# Patient Record
Sex: FEMALE | Race: WHITE | NOT HISPANIC OR LATINO | URBAN - METROPOLITAN AREA
[De-identification: names, ages, dates, MRNs, and addresses within clinical notes are randomized per-mention and may not be internally consistent; named-entity substitution may affect disease eponyms.]

---

## 2020-02-04 ENCOUNTER — OUTPATIENT (OUTPATIENT)
Dept: OUTPATIENT SERVICES | Facility: HOSPITAL | Age: 55
LOS: 1 days | End: 2020-02-04
Payer: MEDICARE

## 2020-02-04 VITALS
HEART RATE: 90 BPM | WEIGHT: 134.92 LBS | HEIGHT: 62 IN | DIASTOLIC BLOOD PRESSURE: 88 MMHG | OXYGEN SATURATION: 99 % | RESPIRATION RATE: 16 BRPM | TEMPERATURE: 98 F | SYSTOLIC BLOOD PRESSURE: 126 MMHG

## 2020-02-04 DIAGNOSIS — R22.0 LOCALIZED SWELLING, MASS AND LUMP, HEAD: ICD-10-CM

## 2020-02-04 DIAGNOSIS — K11.8 OTHER DISEASES OF SALIVARY GLANDS: ICD-10-CM

## 2020-02-04 DIAGNOSIS — Z01.818 ENCOUNTER FOR OTHER PREPROCEDURAL EXAMINATION: ICD-10-CM

## 2020-02-04 LAB
ANION GAP SERPL CALC-SCNC: 8 MMOL/L — SIGNIFICANT CHANGE UP (ref 5–17)
BUN SERPL-MCNC: 24 MG/DL — HIGH (ref 7–23)
CALCIUM SERPL-MCNC: 9.1 MG/DL — SIGNIFICANT CHANGE UP (ref 8.5–10.1)
CHLORIDE SERPL-SCNC: 104 MMOL/L — SIGNIFICANT CHANGE UP (ref 96–108)
CO2 SERPL-SCNC: 25 MMOL/L — SIGNIFICANT CHANGE UP (ref 22–31)
CREAT SERPL-MCNC: 1.2 MG/DL — SIGNIFICANT CHANGE UP (ref 0.5–1.3)
GLUCOSE SERPL-MCNC: 100 MG/DL — HIGH (ref 70–99)
POTASSIUM SERPL-MCNC: 3.9 MMOL/L — SIGNIFICANT CHANGE UP (ref 3.5–5.3)
POTASSIUM SERPL-SCNC: 3.9 MMOL/L — SIGNIFICANT CHANGE UP (ref 3.5–5.3)
SODIUM SERPL-SCNC: 137 MMOL/L — SIGNIFICANT CHANGE UP (ref 135–145)

## 2020-02-04 PROCEDURE — 93010 ELECTROCARDIOGRAM REPORT: CPT

## 2020-02-04 PROCEDURE — G0463: CPT

## 2020-02-04 PROCEDURE — 86901 BLOOD TYPING SEROLOGIC RH(D): CPT

## 2020-02-04 PROCEDURE — 93005 ELECTROCARDIOGRAM TRACING: CPT

## 2020-02-04 PROCEDURE — 80048 BASIC METABOLIC PNL TOTAL CA: CPT

## 2020-02-04 PROCEDURE — 86900 BLOOD TYPING SEROLOGIC ABO: CPT

## 2020-02-04 PROCEDURE — 86850 RBC ANTIBODY SCREEN: CPT

## 2020-02-04 PROCEDURE — 36415 COLL VENOUS BLD VENIPUNCTURE: CPT

## 2020-02-04 NOTE — H&P PST ADULT - NSICDXPASTSURGICALHX_GEN_ALL_CORE_FT
PAST SURGICAL HISTORY:  Glaucoma following surgery Right Eyr - 2012    H/O unilateral nephrectomy Right Laparoscopic Nephrectomy - 16913    History of tonsillectomy     S/P hernia repair umbilical Hernia Repair - 2011    S/P knee surgery knee arthroscopy right x 2 ( 2011 & 2012)    S/P partial hysterectomy 8 years ago

## 2020-02-04 NOTE — H&P PST ADULT - NSICDXFAMILYHX_GEN_ALL_CORE_FT
FAMILY HISTORY:  Father  Still living? No  Family history of pancreatic cancer, Age at diagnosis: Age Unknown    Mother  Still living? No  Family history of lung cancer, Age at diagnosis: Age Unknown

## 2020-02-04 NOTE — H&P PST ADULT - HISTORY OF PRESENT ILLNESS
53 y/o female with c/o pain in the right side of face and behind the ear. Was seen by Dr Dorsey, referred to ENT. Scheduled for right parotidectomy with nerve monitoring. 55 y/o female, PMH of fibromyalgia, anxiety, with c/o pain in the right side of face and behind the ear for almost 6 months.  Was seen by Dr Dorsey, referred to ENT. Seen by DR Rangel, biopsy was inconclusive. Scheduled for right parotidectomy with nerve monitoring. Pre op testing today.

## 2020-02-04 NOTE — H&P PST ADULT - NSANTHOSAYNRD_GEN_A_CORE
No. VANESA screening performed.  STOP BANG Legend: 0-2 = LOW Risk; 3-4 = INTERMEDIATE Risk; 5-8 = HIGH Risk

## 2020-02-04 NOTE — H&P PST ADULT - NSICDXPROBLEM_GEN_ALL_CORE_FT
PROBLEM DIAGNOSES  Problem: Lesion of parotid gland  Assessment and Plan: right parotidectomy with nerve monitor

## 2020-02-04 NOTE — H&P PST ADULT - NSICDXPASTMEDICALHX_GEN_ALL_CORE_FT
PAST MEDICAL HISTORY:  Acid reflux disease     Anxiety     Cancer of kidney right kidney    Fibromyalgia Joint pains    Glaucoma Right eye    Herniated cervical disc     Umbilical hernia     Uterine mass Benign

## 2020-02-04 NOTE — H&P PST ADULT - ASSESSMENT
53 y/o female, PMH of fibromyalgia, anxiety, with c/o pain in the right side of face and behind the ear for almost 6 months.  Was seen by Dr Dorsey, referred to ENT. MRI showed right parotid lesion. Seen by DR Rangel, biopsy FNA was inconclusive(as per patient). Scheduled for right parotidectomy with nerve monitoring. Pre op testing today.  PST today  CBC, BMP, T&S pt/ptt and EKG done  Hold OTC supplements. Medications reviewed.  NPO after 11pm the day before surgery. Patient verbalized understanding.  Medical eval not needed

## 2020-02-10 ENCOUNTER — TRANSCRIPTION ENCOUNTER (OUTPATIENT)
Age: 55
End: 2020-02-10

## 2020-02-10 NOTE — ASU PATIENT PROFILE, ADULT - PSH
Glaucoma following surgery  Right Eyr - 2012  H/O unilateral nephrectomy  Right Laparoscopic Nephrectomy - 60511  History of tonsillectomy    S/P hernia repair  umbilical Hernia Repair - 2011  S/P knee surgery  knee arthroscopy right x 2 ( 2011 & 2012)  S/P partial hysterectomy  8 years ago

## 2020-02-10 NOTE — ASU PATIENT PROFILE, ADULT - PMH
Acid reflux disease    Anxiety    Cancer of kidney  right kidney  Fibromyalgia  Joint pains  Glaucoma  Right eye  Herniated cervical disc    Umbilical hernia    Uterine mass  Benign

## 2020-02-11 ENCOUNTER — OUTPATIENT (OUTPATIENT)
Dept: OUTPATIENT SERVICES | Facility: HOSPITAL | Age: 55
LOS: 1 days | End: 2020-02-11
Payer: MEDICARE

## 2020-02-11 ENCOUNTER — RESULT REVIEW (OUTPATIENT)
Age: 55
End: 2020-02-11

## 2020-02-11 VITALS — DIASTOLIC BLOOD PRESSURE: 69 MMHG | HEART RATE: 97 BPM | RESPIRATION RATE: 15 BRPM | SYSTOLIC BLOOD PRESSURE: 129 MMHG

## 2020-02-11 VITALS
HEIGHT: 61.97 IN | WEIGHT: 134.92 LBS | DIASTOLIC BLOOD PRESSURE: 84 MMHG | TEMPERATURE: 98 F | HEART RATE: 89 BPM | SYSTOLIC BLOOD PRESSURE: 130 MMHG | RESPIRATION RATE: 15 BRPM | OXYGEN SATURATION: 93 %

## 2020-02-11 DIAGNOSIS — Z01.818 ENCOUNTER FOR OTHER PREPROCEDURAL EXAMINATION: ICD-10-CM

## 2020-02-11 DIAGNOSIS — R22.0 LOCALIZED SWELLING, MASS AND LUMP, HEAD: ICD-10-CM

## 2020-02-11 PROCEDURE — C1889: CPT

## 2020-02-11 PROCEDURE — 42415 EXCISE PAROTID GLAND/LESION: CPT | Mod: RT

## 2020-02-11 PROCEDURE — 88307 TISSUE EXAM BY PATHOLOGIST: CPT

## 2020-02-11 PROCEDURE — 88307 TISSUE EXAM BY PATHOLOGIST: CPT | Mod: 26

## 2020-02-11 RX ORDER — SODIUM CHLORIDE 9 MG/ML
1000 INJECTION, SOLUTION INTRAVENOUS
Refills: 0 | Status: DISCONTINUED | OUTPATIENT
Start: 2020-02-11 | End: 2020-02-11

## 2020-02-11 RX ORDER — HYDROMORPHONE HYDROCHLORIDE 2 MG/ML
0.5 INJECTION INTRAMUSCULAR; INTRAVENOUS; SUBCUTANEOUS
Refills: 0 | Status: DISCONTINUED | OUTPATIENT
Start: 2020-02-11 | End: 2020-02-11

## 2020-02-11 RX ORDER — ONDANSETRON 8 MG/1
4 TABLET, FILM COATED ORAL ONCE
Refills: 0 | Status: DISCONTINUED | OUTPATIENT
Start: 2020-02-11 | End: 2020-02-11

## 2020-02-11 RX ORDER — OXYCODONE HYDROCHLORIDE 5 MG/1
5 TABLET ORAL ONCE
Refills: 0 | Status: DISCONTINUED | OUTPATIENT
Start: 2020-02-11 | End: 2020-02-11

## 2020-02-11 RX ADMIN — SODIUM CHLORIDE 50 MILLILITER(S): 9 INJECTION, SOLUTION INTRAVENOUS at 07:25

## 2020-02-11 NOTE — ASU DISCHARGE PLAN (ADULT/PEDIATRIC) - ASU DC SPECIAL INSTRUCTIONSFT
Keep dressing clean dry intact  Keep drain in place  Has pain medication at home- followed by pain management.  If issues, contact pain management physician  Keep head of bed elevated.  No citrus.  Keep foods soft and mushy. Keep dressing clean dry intact  Keep drain in place  Ice packs to neck.  Has pain medication at home- followed by pain management.  If issues, contact pain management physician  Keep head of bed elevated.  No citrus.  Keep foods soft and mushy.

## 2020-02-11 NOTE — BRIEF OPERATIVE NOTE - NSICDXBRIEFPROCEDURE_GEN_ALL_CORE_FT
PROCEDURES:  Parotidectomy with intraoperative facial nerve monitoring 11-Feb-2020 10:59:27  Lei Rangel

## 2020-02-11 NOTE — ASU DISCHARGE PLAN (ADULT/PEDIATRIC) - CARE PROVIDER_API CALL
Lei Rangel)  Otolaryngology  [address], [address]  [place] [address]  Phone: [phone]  Fax: [phone]  Follow Up Time:

## 2020-02-11 NOTE — ASU DISCHARGE PLAN (ADULT/PEDIATRIC) - CALL YOUR DOCTOR IF YOU HAVE ANY OF THE FOLLOWING:
Fever greater than (need to indicate Fahrenheit or Celsius)/Inability to tolerate liquids or foods/Wound/Surgical Site with redness, or foul smelling discharge or pus/Nausea and vomiting that does not stop/Bleeding that does not stop/Numbness, tingling, color or temperature change to extremity/Pain not relieved by Medications/Swelling that gets worse

## 2020-02-18 LAB — SURGICAL PATHOLOGY STUDY: SIGNIFICANT CHANGE UP

## 2021-05-08 RX ADMIN — OXYCODONE HYDROCHLORIDE 30 MILLIGRAM(S): 5 TABLET ORAL at 23:13

## 2022-12-01 ENCOUNTER — RX ONLY (RX ONLY)
Age: 57
End: 2022-12-01

## 2022-12-01 ENCOUNTER — OFFICE (OUTPATIENT)
Dept: URBAN - METROPOLITAN AREA CLINIC 113 | Facility: CLINIC | Age: 57
Setting detail: OPHTHALMOLOGY
End: 2022-12-01
Payer: MEDICARE

## 2022-12-01 DIAGNOSIS — H35.033: ICD-10-CM

## 2022-12-01 DIAGNOSIS — Z96.1: ICD-10-CM

## 2022-12-01 DIAGNOSIS — H25.12: ICD-10-CM

## 2022-12-01 DIAGNOSIS — H40.2232: ICD-10-CM

## 2022-12-01 DIAGNOSIS — H04.123: ICD-10-CM

## 2022-12-01 PROCEDURE — 92012 INTRM OPH EXAM EST PATIENT: CPT | Performed by: OPHTHALMOLOGY

## 2022-12-01 ASSESSMENT — REFRACTION_MANIFEST
OD_CYLINDER: SPHERE
OS_VA1: 20/25
OD_SPHERE: +1.25
OD_ADD: +2.25
OD_VA1: 20/60
OD_CYLINDER: -0.50
OS_AXIS: 003
OS_ADD: +2.25
OD_AXIS: 036
OD_SPHERE: +0.75
OS_SPHERE: +0.25
OS_CYLINDER: -0.25
OD_VA1: 20/25

## 2022-12-01 ASSESSMENT — REFRACTION_CURRENTRX
OD_VPRISM_DIRECTION: BF
OD_SPHERE: +0.75
OD_OVR_VA: 20/
OS_ADD: +2.00
OS_AXIS: 8
OD_ADD: +2.00
OS_SPHERE: -0.25
OS_VPRISM_DIRECTION: BF
OS_OVR_VA: 20/
OD_AXIS: 31
OD_CYLINDER: -0.50
OS_CYLINDER: -0.25

## 2022-12-01 ASSESSMENT — AXIALLENGTH_DERIVED
OS_AL: 22.783
OD_AL: 22.7132
OD_AL: 22.4456
OS_AL: 22.9201

## 2022-12-01 ASSESSMENT — KERATOMETRY
OD_AXISANGLE_DEGREES: 175
OS_AXISANGLE_DEGREES: 084
OS_K2POWER_DIOPTERS: 45.50
METHOD_AUTO_MANUAL: AUTO
OS_K1POWER_DIOPTERS: 45.00
OD_K1POWER_DIOPTERS: 45.00
OD_K2POWER_DIOPTERS: 47.50

## 2022-12-01 ASSESSMENT — REFRACTION_AUTOREFRACTION
OD_AXIS: 010
OS_AXIS: 171
OD_CYLINDER: -2.00
OS_CYLINDER: -0.50
OD_SPHERE: +0.75
OS_SPHERE: +0.75

## 2022-12-01 ASSESSMENT — CONFRONTATIONAL VISUAL FIELD TEST (CVF)
OD_FINDINGS: FULL
OS_FINDINGS: FULL

## 2022-12-01 ASSESSMENT — VISUAL ACUITY
OS_BCVA: 20/50
OD_BCVA: 20/20

## 2022-12-01 ASSESSMENT — SPHEQUIV_DERIVED
OS_SPHEQUIV: 0.125
OD_SPHEQUIV: 0.5
OS_SPHEQUIV: 0.5
OD_SPHEQUIV: -0.25

## 2022-12-01 ASSESSMENT — TONOMETRY
OS_IOP_MMHG: 12
OD_IOP_MMHG: 08

## 2022-12-09 PROBLEM — C64.9 MALIGNANT NEOPLASM OF UNSPECIFIED KIDNEY, EXCEPT RENAL PELVIS: Chronic | Status: ACTIVE | Noted: 2020-02-04

## 2022-12-14 ENCOUNTER — APPOINTMENT (OUTPATIENT)
Dept: OTOLARYNGOLOGY | Facility: CLINIC | Age: 57
End: 2022-12-14

## 2022-12-14 VITALS
SYSTOLIC BLOOD PRESSURE: 160 MMHG | DIASTOLIC BLOOD PRESSURE: 99 MMHG | BODY MASS INDEX: 22.08 KG/M2 | HEIGHT: 62 IN | HEART RATE: 109 BPM | WEIGHT: 120 LBS

## 2022-12-14 DIAGNOSIS — E04.2 NONTOXIC MULTINODULAR GOITER: ICD-10-CM

## 2022-12-14 DIAGNOSIS — R25.2 CRAMP AND SPASM: ICD-10-CM

## 2022-12-14 DIAGNOSIS — K11.8 OTHER DISEASES OF SALIVARY GLANDS: ICD-10-CM

## 2022-12-14 DIAGNOSIS — M79.7 FIBROMYALGIA: ICD-10-CM

## 2022-12-14 DIAGNOSIS — M54.2 CERVICALGIA: ICD-10-CM

## 2022-12-14 DIAGNOSIS — F41.9 ANXIETY DISORDER, UNSPECIFIED: ICD-10-CM

## 2022-12-14 PROCEDURE — 99213 OFFICE O/P EST LOW 20 MIN: CPT

## 2022-12-14 NOTE — CONSULT LETTER
[Dear  ___] : Dear  [unfilled], [Consult Letter:] : I had the pleasure of evaluating your patient, [unfilled]. [Please see my note below.] : Please see my note below. [Consult Closing:] : Thank you very much for allowing me to participate in the care of this patient.  If you have any questions, please do not hesitate to contact me. [Sincerely,] : Sincerely, [FreeTextEntry3] : Lei Rangel MD FACS

## 2022-12-14 NOTE — PHYSICAL EXAM
[Midline] : trachea located in midline position [Normal] : orientation to person, place, and time: normal [FreeTextEntry1] : tender right side near larynx\par parotid scar normal\par right soft fullness, tender along mandible [de-identified] : trismus and pain when pt opens on the right side [FreeTextEntry2] : n

## 2022-12-14 NOTE — REVIEW OF SYSTEMS
[Seasonal Allergies] : seasonal allergies [Ear Pain] : ear pain [Ear Itch] : ear itch [Dizziness] : dizziness [Vertigo] : vertigo [Lightheadedness] : lightheadedness [Ear Drainage] : ear drainage [Ear Noises] : ear noises [Nasal Congestion] : nasal congestion [Sinus Pain] : sinus pain [Sinus Pressure] : sinus pressure [Throat Pain] : throat pain [Recent Weight Loss (___ Lbs)] : recent [unfilled] ~Ulb weight loss [Eye Pain] : eye pain [Eyes Itch] : itching of the eyes [Palpitations] : palpitations [Cough] : cough [Heartburn] : heartburn [Easy Bruising] : a tendency for easy bruising [Swollen Glands] : swollen glands [Negative] : Endocrine [FreeTextEntry3] : watery [FreeTextEntry6] : noisy breathing [FreeTextEntry7] : difficulty swallowing, reflux [FreeTextEntry9] : joint aches,muscle aches [de-identified] : headache [FreeTextEntry1] : chills, fatigue, feel warmer than others,daytime sleepiness, sweating at night

## 2022-12-14 NOTE — ASSESSMENT
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 \par \par Physical Exam\par tender right side near larynx\par parotid scar normal\par right soft fullness, tender along mandible\par trismus and pain when pt opens on the right side\par \par neck US 5/2020 \par nodule upper lobe right thyroid, smaller nodules in right lobe\par nodules in left lobe\par parotid normal\par \par Plan:\par Discussed previous US neck. MRI head ordered. FU after MRI.

## 2022-12-14 NOTE — ADDENDUM
[FreeTextEntry1] : Documented by Mila Mak acting as scribe for Dr. Rangel on 12/14/2022.\par \par All Medical record entries made by the scribe were at my, Dr. Rangel,direction and personally dictated by me on 12/14/2022. I have reviewed the chart and agree that the record accurately reflects my personal performance of the history, physical exam, assessment and plan. I have also personally directed, reviewed, and agreed with the discharge instructions.

## 2022-12-14 NOTE — HISTORY OF PRESENT ILLNESS
[de-identified] : Pt presents with h/o renal cancer, neck nodes abnormal, smoking, anxiety - has emotional support dog, surgery 2/11/2020 - pathology mild epithelioma. Pt presents today c/o repercussion. Pt notes she got part of her feeling back on her right side. Pt notes she felt her nerves rerouting on her right cheek and her ear lobe is starting to feel her nerves on her right side of her face. Pt notes liver enzymes were high and she is seeing hematology, want to eventually do a PET scan. Pt notes she started high blood pressure medication. Pt notes she didn't have any problems before. Pt denies any MRIs were done.

## 2023-08-02 ENCOUNTER — NON-APPOINTMENT (OUTPATIENT)
Age: 58
End: 2023-08-02

## 2023-10-23 ENCOUNTER — NON-APPOINTMENT (OUTPATIENT)
Age: 58
End: 2023-10-23

## 2024-03-09 ENCOUNTER — NON-APPOINTMENT (OUTPATIENT)
Age: 59
End: 2024-03-09

## 2024-03-10 ENCOUNTER — EMERGENCY (EMERGENCY)
Facility: HOSPITAL | Age: 59
LOS: 1 days | Discharge: LEFT BEFORE DISCHARGE | End: 2024-03-10
Attending: EMERGENCY MEDICINE | Admitting: EMERGENCY MEDICINE
Payer: MEDICARE

## 2024-03-10 VITALS
SYSTOLIC BLOOD PRESSURE: 102 MMHG | TEMPERATURE: 98 F | HEIGHT: 61 IN | WEIGHT: 110.01 LBS | RESPIRATION RATE: 20 BRPM | DIASTOLIC BLOOD PRESSURE: 69 MMHG | HEART RATE: 109 BPM | OXYGEN SATURATION: 99 %

## 2024-03-10 VITALS
OXYGEN SATURATION: 98 % | TEMPERATURE: 98 F | HEART RATE: 92 BPM | RESPIRATION RATE: 19 BRPM | DIASTOLIC BLOOD PRESSURE: 72 MMHG | SYSTOLIC BLOOD PRESSURE: 106 MMHG

## 2024-03-10 LAB
ALBUMIN SERPL ELPH-MCNC: 2.7 G/DL — LOW (ref 3.3–5)
ALP SERPL-CCNC: 127 U/L — HIGH (ref 30–120)
ALT FLD-CCNC: 19 U/L — SIGNIFICANT CHANGE UP (ref 10–60)
ANION GAP SERPL CALC-SCNC: 11 MMOL/L — SIGNIFICANT CHANGE UP (ref 5–17)
APPEARANCE UR: CLEAR — SIGNIFICANT CHANGE UP
APTT BLD: 32.9 SEC — SIGNIFICANT CHANGE UP (ref 24.5–35.6)
AST SERPL-CCNC: 14 U/L — SIGNIFICANT CHANGE UP (ref 10–40)
BASOPHILS # BLD AUTO: 0.04 K/UL — SIGNIFICANT CHANGE UP (ref 0–0.2)
BASOPHILS NFR BLD AUTO: 0.5 % — SIGNIFICANT CHANGE UP (ref 0–2)
BILIRUB SERPL-MCNC: 0.6 MG/DL — SIGNIFICANT CHANGE UP (ref 0.2–1.2)
BILIRUB UR-MCNC: NEGATIVE — SIGNIFICANT CHANGE UP
BUN SERPL-MCNC: 21 MG/DL — SIGNIFICANT CHANGE UP (ref 7–23)
CALCIUM SERPL-MCNC: 9.2 MG/DL — SIGNIFICANT CHANGE UP (ref 8.4–10.5)
CHLORIDE SERPL-SCNC: 98 MMOL/L — SIGNIFICANT CHANGE UP (ref 96–108)
CO2 SERPL-SCNC: 23 MMOL/L — SIGNIFICANT CHANGE UP (ref 22–31)
COLOR SPEC: YELLOW — SIGNIFICANT CHANGE UP
CREAT SERPL-MCNC: 0.96 MG/DL — SIGNIFICANT CHANGE UP (ref 0.5–1.3)
DIFF PNL FLD: NEGATIVE — SIGNIFICANT CHANGE UP
EGFR: 68 ML/MIN/1.73M2 — SIGNIFICANT CHANGE UP
EOSINOPHIL # BLD AUTO: 0.04 K/UL — SIGNIFICANT CHANGE UP (ref 0–0.5)
EOSINOPHIL NFR BLD AUTO: 0.5 % — SIGNIFICANT CHANGE UP (ref 0–6)
GLUCOSE SERPL-MCNC: 119 MG/DL — HIGH (ref 70–99)
GLUCOSE UR QL: NEGATIVE MG/DL — SIGNIFICANT CHANGE UP
HCT VFR BLD CALC: 35.6 % — SIGNIFICANT CHANGE UP (ref 34.5–45)
HGB BLD-MCNC: 11.8 G/DL — SIGNIFICANT CHANGE UP (ref 11.5–15.5)
IMM GRANULOCYTES NFR BLD AUTO: 0.1 % — SIGNIFICANT CHANGE UP (ref 0–0.9)
INR BLD: 1.04 RATIO — SIGNIFICANT CHANGE UP (ref 0.85–1.18)
KETONES UR-MCNC: NEGATIVE MG/DL — SIGNIFICANT CHANGE UP
LACTATE SERPL-SCNC: 0.9 MMOL/L — SIGNIFICANT CHANGE UP (ref 0.7–2)
LEUKOCYTE ESTERASE UR-ACNC: ABNORMAL
LYMPHOCYTES # BLD AUTO: 1.05 K/UL — SIGNIFICANT CHANGE UP (ref 1–3.3)
LYMPHOCYTES # BLD AUTO: 11.9 % — LOW (ref 13–44)
MCHC RBC-ENTMCNC: 29 PG — SIGNIFICANT CHANGE UP (ref 27–34)
MCHC RBC-ENTMCNC: 33.1 GM/DL — SIGNIFICANT CHANGE UP (ref 32–36)
MCV RBC AUTO: 87.5 FL — SIGNIFICANT CHANGE UP (ref 80–100)
MONOCYTES # BLD AUTO: 0.58 K/UL — SIGNIFICANT CHANGE UP (ref 0–0.9)
MONOCYTES NFR BLD AUTO: 6.6 % — SIGNIFICANT CHANGE UP (ref 2–14)
NEUTROPHILS # BLD AUTO: 7.1 K/UL — SIGNIFICANT CHANGE UP (ref 1.8–7.4)
NEUTROPHILS NFR BLD AUTO: 80.4 % — HIGH (ref 43–77)
NITRITE UR-MCNC: NEGATIVE — SIGNIFICANT CHANGE UP
NRBC # BLD: 0 /100 WBCS — SIGNIFICANT CHANGE UP (ref 0–0)
PH UR: 5.5 — SIGNIFICANT CHANGE UP (ref 5–8)
PLATELET # BLD AUTO: 316 K/UL — SIGNIFICANT CHANGE UP (ref 150–400)
POTASSIUM SERPL-MCNC: 3.8 MMOL/L — SIGNIFICANT CHANGE UP (ref 3.5–5.3)
POTASSIUM SERPL-SCNC: 3.8 MMOL/L — SIGNIFICANT CHANGE UP (ref 3.5–5.3)
PROT SERPL-MCNC: 7.4 G/DL — SIGNIFICANT CHANGE UP (ref 6–8.3)
PROT UR-MCNC: 300 MG/DL
PROTHROM AB SERPL-ACNC: 11.6 SEC — SIGNIFICANT CHANGE UP (ref 9.5–13)
RBC # BLD: 4.07 M/UL — SIGNIFICANT CHANGE UP (ref 3.8–5.2)
RBC # FLD: 13 % — SIGNIFICANT CHANGE UP (ref 10.3–14.5)
SODIUM SERPL-SCNC: 132 MMOL/L — LOW (ref 135–145)
SP GR SPEC: 1.02 — SIGNIFICANT CHANGE UP (ref 1–1.03)
UROBILINOGEN FLD QL: 0.2 MG/DL — SIGNIFICANT CHANGE UP (ref 0.2–1)
WBC # BLD: 8.82 K/UL — SIGNIFICANT CHANGE UP (ref 3.8–10.5)
WBC # FLD AUTO: 8.82 K/UL — SIGNIFICANT CHANGE UP (ref 3.8–10.5)

## 2024-03-10 PROCEDURE — 71250 CT THORAX DX C-: CPT | Mod: 26,MC

## 2024-03-10 PROCEDURE — 71045 X-RAY EXAM CHEST 1 VIEW: CPT | Mod: 26

## 2024-03-10 PROCEDURE — 99285 EMERGENCY DEPT VISIT HI MDM: CPT | Mod: FS

## 2024-03-10 PROCEDURE — 76642 ULTRASOUND BREAST LIMITED: CPT | Mod: 26,RT

## 2024-03-10 PROCEDURE — 93010 ELECTROCARDIOGRAM REPORT: CPT

## 2024-03-10 RX ORDER — VANCOMYCIN HCL 1 G
1000 VIAL (EA) INTRAVENOUS ONCE
Refills: 0 | Status: COMPLETED | OUTPATIENT
Start: 2024-03-10 | End: 2024-03-10

## 2024-03-10 RX ORDER — SODIUM CHLORIDE 9 MG/ML
1550 INJECTION INTRAMUSCULAR; INTRAVENOUS; SUBCUTANEOUS ONCE
Refills: 0 | Status: COMPLETED | OUTPATIENT
Start: 2024-03-10 | End: 2024-03-10

## 2024-03-10 RX ADMIN — SODIUM CHLORIDE 1550 MILLILITER(S): 9 INJECTION INTRAMUSCULAR; INTRAVENOUS; SUBCUTANEOUS at 17:44

## 2024-03-10 RX ADMIN — Medication 250 MILLIGRAM(S): at 18:28

## 2024-03-10 RX ADMIN — Medication 1000 MILLIGRAM(S): at 19:56

## 2024-03-10 NOTE — ED PROVIDER NOTE - CARE PLAN
1 Principal Discharge DX:	Cellulitis of right breast  Secondary Diagnosis:	Pulmonary nodules  Secondary Diagnosis:	Adenopathy

## 2024-03-10 NOTE — ED PROVIDER NOTE - CARE PROVIDER_API CALL
Jennifer Day  Surgery  [address], [place] [address]  Phone: [phone]  Fax: [phone]  Follow Up Time: 1-3 Days

## 2024-03-10 NOTE — ED PROVIDER NOTE - PATIENT PORTAL LINK FT
You can access the FollowMyHealth Patient Portal offered by [facility] by registering at the following website: [email or web address]. By joining [employer]’s FollowMyHealth portal, you will also be able to view your health information using other applications (apps) compatible with our system.

## 2024-03-10 NOTE — ED PROVIDER NOTE - PROGRESS NOTE DETAILS
Spoke to transfer center, unable to reach breast surgeon for consult. Spoke to plastic surgeon, Dr. Escobar The patient has decided to leave against medical advice (AMA).  I have made reasonable attempts to explain to the patient that leaving prior to completion of work up and treatment may result in recurrent or worsening of symptoms, severe permanent disability, pain and suffering, harm, injury, and/or death.  I have explained the risks, benefits, and alternatives to treatment as well as the attendant risks of refusing treatment at this time.  The patient has demonstrated comprehension and verbalizes understanding of these risks.  The patient has been told that they must return to the ER immediately for persistent or recurring symptoms, worsening symptoms, or any concerning symptoms.  The patient has also been informed that they may return to the ER immediately at any time if they change their mind and wish to resume care. The patient has been given the opportunity to ask questions and have them fully answered. Spoke to general surgery attending, Dr. Sharp, discussed case and results, advised to admit pt to medicine , IV antibiotics and oncology consult tomorrow. Pt does not have oncologist outpatient. I urged pt multiple times to stay for admission for IV antibiotics and surgery/oncology consult tomorrow however pt adamant about leaving and does not want to be admitted. States that she will f/u with oncologist and breast surgeon outpatient. Pt wants to sign out AMA. Will rx clindamycin. strict return precautions given. Pt informed about all incidental findings of pulmonary nodules, multiple sites adenopathy and possibility of metastatic disease. Pt still refusing to be admitted. The patient has decided to leave against medical advice (AMA).  I have made reasonable attempts to explain to the patient that leaving prior to completion of work up and treatment may result in recurrent or worsening of symptoms, severe permanent disability, pain and suffering, harm, injury, and/or death.  I have explained the risks, benefits, and alternatives to treatment as well as the attendant risks of refusing treatment at this time.  The patient has demonstrated comprehension and verbalizes understanding of these risks.  The patient has been told that they must return to the ER immediately for persistent or recurring symptoms, worsening symptoms, or any concerning symptoms.  The patient has also been informed that they may return to the ER immediately at any time if they change their mind and wish to resume care. The patient has been given the opportunity to ask questions and have them fully answered.

## 2024-03-10 NOTE — ED PROVIDER NOTE - NSFOLLOWUPINSTRUCTIONS_ED_ALL_ED_FT
Follow up ASAP with oncologist and breast surgeon for further outpatient workup, re-evaluation, ongoing care and treatment. Take full course of antibiotics as prescribed. If having worsening of symptoms, fever, shortness of breath or other related symptoms, RETURN TO THE ER IMMEDIATELY.     Cellulitis, Adult  A person's legs and feet. One leg is normal and the other leg is affected by cellulitis.  Cellulitis is a skin infection. The infected area is usually warm, red, swollen, and tender. It most commonly occurs on the lower body, such as the legs, feet, and toes, but this condition can occur on any part of the body. The infection can travel to the muscles, blood, and underlying tissue and become life-threatening without treatment. It is important to get medical treatment right away for this condition.    What are the causes?  Cellulitis is caused by bacteria. The bacteria enter through a break in the skin, such as a cut, burn, insect or animal bite, open sore, or crack.    What increases the risk?  This condition is more likely to occur in people who:  Have a weak body's defense system (immune system).  Are older than 60 years old.  Have diabetes.  Have a type of long-term (chronic) liver disease (cirrhosis) or kidney disease.  Are obese.  Have a skin condition such as:  An itchy rash, such as eczema or psoriasis.  A fungal rash on the feet or in skinfolds.  Blistering rashes, such as shingles or chickenpox.  Slow movement of blood in the veins (venous stasis).  Fluid buildup below the skin (edema).  Have open wounds on the skin, such as cuts, puncture wounds, burns, bites, scrapes, tattoos, piercings, or wounds from surgery.  Have had radiation therapy.  Use IV drugs.  What are the signs or symptoms?  Symptoms of this condition include:  Skin that looks red, purple, or slightly darker than your usual skin color.  Streaks or spots on the skin.  Swollen area of the skin.  Tenderness or pain when an area of the skin is touched.  Warm skin.  Fever or chills.  Blisters.  Tiredness (fatigue).  How is this diagnosed?  This condition is diagnosed based on a medical history and physical exam. You may also have tests, including:  Blood tests.  Imaging tests.  Tests on a sample of fluid taken from the wound (wound culture).  How is this treated?  Treatment for this condition may include:  Medicines. These may include antibiotics or medicines to treat allergies (antihistamines).  Rest.  Applying cold or warm wet cloths (compresses) to the skin.  If the condition is severe, you may need to stay in the hospital and get antibiotics through an IV.  The infection usually starts to get better within 1–2 days of treatment.    Follow these instructions at home:  Medicines    Take over-the-counter and prescription medicines only as told by your health care provider.  If you were prescribed antibiotics, take them as told by your provider. Do not stop using the antibiotic even if you start to feel better.  General instructions    Drink enough fluid to keep your pee (urine) pale yellow.  Do not touch or rub the infected area.  Raise (elevate) the infected area above the level of your heart while you are sitting or lying down.  Return to your normal activities as told by your provider. Ask your provider what activities are safe for you.  Apply warm or cold compresses to the affected area as told by your provider.  Keep all follow-up visits. Your provider will need to make sure that a more serious infection is not developing.  Contact a health care provider if:  You have a fever.  Your symptoms do not improve within 1–2 days of starting treatment or you develop new symptoms.  Your bone or joint underneath the infected area becomes painful after the skin has healed.  Your infection returns in the same area or another area. Signs of this may include:  You notice a swollen bump in the infected area.  Your red area gets larger, turns dark in color, or becomes more painful.  Drainage increases.  Pus or a bad smell develops in your infected area.  You have more pain.  You feel ill and have muscle aches and weakness.  You develop vomiting or diarrhea that will not go away.  Get help right away if:  You notice red streaks coming from the infected area.  You notice the skin turns purple or black and falls off.  This symptom may be an emergency. Get help right away. Call 911.  Do not wait to see if the symptom will go away.  Do not drive yourself to the hospital.

## 2024-03-10 NOTE — ED PROVIDER NOTE - ATTENDING APP SHARED VISIT CONTRIBUTION OF CARE
Entire history and physical performed with female RYAN Anderson presents  Patient complaining of worsening right breast pain swelling and erythema which began yesterday.  Patient reports she had lidocaine trigger point injection of her right shoulder the day before.  Patient denies fevers chills shortness of breath cough abdominal pain.    Plan sepsis workup including EKG chest x-ray labs IV fluids vancomycin plastics consult    Differential including but not limited to sepsis cellulitis no evidence of abscess or mass on exam

## 2024-03-10 NOTE — ED PROVIDER NOTE - REFUSAL OF SERVICE, MDM
The patient has decided to leave against medical advice (AMA).  I have made reasonable attempts to explain to the patient that leaving prior to completion of work up and treatment may result in recurrent or worsening of symptoms, severe permanent disability, pain and suffering, harm, injury, and/or death.

## 2024-03-10 NOTE — ED PROVIDER NOTE - RESPIRATORY, MLM
+right breast erythema with swelling, increased warmth and tenderness noted, no induration or fluctuance noted, no discharge or streaking noted

## 2024-03-10 NOTE — ED PROVIDER NOTE - NSFOLLOWUPCLINICS_GEN_ALL_ED_FT
[facility]  [facility]  [place]   Phone: [phone]  Fax:   Follow Up Time: Urgent    [facility]  Hematology/Oncology  [address] [place] [address]  Phone: [phone]  Fax:   Follow Up Time: Urgent

## 2024-03-10 NOTE — ED PROVIDER NOTE - DIFFERENTIAL DIAGNOSIS
Differential including but not limited to sepsis cellulitis no evidence of abscess or mass on exam Differential Diagnosis

## 2024-03-10 NOTE — ED ADULT NURSE NOTE - PLAN OF CARE
Consent: Written consent was obtained and risks were reviewed including but not limited to scarring, infection, bleeding, scabbing, incomplete removal, nerve damage and allergy to anesthesia. Call bell/Fall precautions/Side rails

## 2024-03-10 NOTE — ED ADULT NURSE NOTE - OBJECTIVE STATEMENT
Yesterday morning I noticed my right breast swelling, today its worst.    patient is from home, c/o right side breast pain x 2 days and been taking oxycodone without relief, denies fever or chill, noted redness around nipple, IV accessed and tolerating. Labs drawn & sent results pending. ekg done, comfort measure provided, callbell within reach, reinforced surrounding and plan of care, plan of care ongoing

## 2024-03-10 NOTE — ED PROVIDER NOTE - CROS ED ROS STATEMENT
Radiation Therapy Patient Education    Person involved with teaching: Patient    Patient educational needs for self management of treatment-related side effects assessment completed.  [facility] Patient Ed tab contains Patient Learning Assessment    Education Materials Given  Radiation Therapy     Educational Topics Discussed  Side effects expected, Pain management, Skin care, Nutrition and weight loss and When to call MD/RN    Response To Teaching  Verbalizes understanding    GYN Only  Vaginal Dilator-given and educated: N/A    Referrals sent: None    Chemotherapy?  No      
all other ROS negative except as per HPI

## 2024-03-10 NOTE — ED PROVIDER NOTE - OBJECTIVE STATEMENT
58-year-old female with history of right nephrectomy status post mass, anxiety, fibromyalgia, GERD, glaucoma, right shoulder bursitis presents with complaint of right breast swelling/pain/redness x 2 days.  Patient states that her symptoms are worse today, has been taking oxycodone at home without relief.  States that she had lidocaine trigger point injection in her right shoulder on 3/8 for bursitis in the shoulder. Denies fever, chills, nausea, vomiting, shortness of breath, abdominal pain, trauma or other symptoms. 58-year-old female with history of right nephrectomy status post mass, anxiety, fibromyalgia, GERD, glaucoma, right shoulder bursitis presents with complaint of right breast swelling/pain/redness x 2 days.  Patient states that her symptoms are worse today, has been taking oxycodone at home without relief.  States that she had lidocaine trigger point injection in her right shoulder on 3/8 for bursitis in the shoulder. Denies fever, chills, nausea, vomiting, shortness of breath, abdominal pain, trauma, prior breast surgery/procedure or other symptoms.

## 2024-03-10 NOTE — ED ADULT NURSE REASSESSMENT NOTE - NS ED NURSE REASSESS COMMENT FT1
Received pt from change of shift nurse. A&Ox3 sitting comfortably on the stretcher. no labored breathing noted. denies any pain. slight redness noted to right breast. able to move all extremities.  at bedside.

## 2024-03-10 NOTE — ED PROVIDER NOTE - NSICDXPASTSURGICALHX_GEN_ALL_CORE_FT
PAST SURGICAL HISTORY:  Glaucoma following surgery Right Eyr - 2012    H/O unilateral nephrectomy Right Laparoscopic Nephrectomy - 94088    History of tonsillectomy     S/P hernia repair umbilical Hernia Repair - 2011    S/P knee surgery knee arthroscopy right x 2 ( 2011 & 2012)    S/P partial hysterectomy 8 years ago

## 2024-03-11 ENCOUNTER — NON-APPOINTMENT (OUTPATIENT)
Age: 59
End: 2024-03-11

## 2024-03-11 ENCOUNTER — INPATIENT (INPATIENT)
Facility: HOSPITAL | Age: 59
LOS: 22 days | Discharge: ANOTHER HOSPITAL | DRG: 579 | End: 2024-04-03
Attending: HOSPITALIST | Admitting: HOSPITALIST
Payer: MEDICARE

## 2024-03-11 VITALS
TEMPERATURE: 98 F | HEART RATE: 106 BPM | SYSTOLIC BLOOD PRESSURE: 129 MMHG | RESPIRATION RATE: 18 BRPM | WEIGHT: 110.01 LBS | OXYGEN SATURATION: 96 % | DIASTOLIC BLOOD PRESSURE: 84 MMHG | HEIGHT: 61 IN

## 2024-03-11 DIAGNOSIS — N64.4 MASTODYNIA: ICD-10-CM

## 2024-03-11 LAB
ALBUMIN SERPL ELPH-MCNC: 3.3 G/DL — SIGNIFICANT CHANGE UP (ref 3.3–5)
ALP SERPL-CCNC: 125 U/L — HIGH (ref 40–120)
ALT FLD-CCNC: 15 U/L — SIGNIFICANT CHANGE UP (ref 10–45)
ANION GAP SERPL CALC-SCNC: 13 MMOL/L — SIGNIFICANT CHANGE UP (ref 5–17)
AST SERPL-CCNC: 13 U/L — SIGNIFICANT CHANGE UP (ref 10–40)
BASOPHILS # BLD AUTO: 0.04 K/UL — SIGNIFICANT CHANGE UP (ref 0–0.2)
BASOPHILS NFR BLD AUTO: 0.5 % — SIGNIFICANT CHANGE UP (ref 0–2)
BILIRUB SERPL-MCNC: 0.3 MG/DL — SIGNIFICANT CHANGE UP (ref 0.2–1.2)
BUN SERPL-MCNC: 16 MG/DL — SIGNIFICANT CHANGE UP (ref 7–23)
CALCIUM SERPL-MCNC: 9 MG/DL — SIGNIFICANT CHANGE UP (ref 8.4–10.5)
CHLORIDE SERPL-SCNC: 99 MMOL/L — SIGNIFICANT CHANGE UP (ref 96–108)
CO2 SERPL-SCNC: 20 MMOL/L — LOW (ref 22–31)
CREAT SERPL-MCNC: 0.85 MG/DL — SIGNIFICANT CHANGE UP (ref 0.5–1.3)
CULTURE RESULTS: SIGNIFICANT CHANGE UP
EGFR: 78 ML/MIN/1.73M2 — SIGNIFICANT CHANGE UP
EOSINOPHIL # BLD AUTO: 0.08 K/UL — SIGNIFICANT CHANGE UP (ref 0–0.5)
EOSINOPHIL NFR BLD AUTO: 0.9 % — SIGNIFICANT CHANGE UP (ref 0–6)
GLUCOSE SERPL-MCNC: 132 MG/DL — HIGH (ref 70–99)
HCT VFR BLD CALC: 33.8 % — LOW (ref 34.5–45)
HGB BLD-MCNC: 11.3 G/DL — LOW (ref 11.5–15.5)
IMM GRANULOCYTES NFR BLD AUTO: 0.5 % — SIGNIFICANT CHANGE UP (ref 0–0.9)
LYMPHOCYTES # BLD AUTO: 1.22 K/UL — SIGNIFICANT CHANGE UP (ref 1–3.3)
LYMPHOCYTES # BLD AUTO: 14 % — SIGNIFICANT CHANGE UP (ref 13–44)
MCHC RBC-ENTMCNC: 29 PG — SIGNIFICANT CHANGE UP (ref 27–34)
MCHC RBC-ENTMCNC: 33.4 GM/DL — SIGNIFICANT CHANGE UP (ref 32–36)
MCV RBC AUTO: 86.7 FL — SIGNIFICANT CHANGE UP (ref 80–100)
MONOCYTES # BLD AUTO: 0.5 K/UL — SIGNIFICANT CHANGE UP (ref 0–0.9)
MONOCYTES NFR BLD AUTO: 5.7 % — SIGNIFICANT CHANGE UP (ref 2–14)
NEUTROPHILS # BLD AUTO: 6.82 K/UL — SIGNIFICANT CHANGE UP (ref 1.8–7.4)
NEUTROPHILS NFR BLD AUTO: 78.4 % — HIGH (ref 43–77)
NRBC # BLD: 0 /100 WBCS — SIGNIFICANT CHANGE UP (ref 0–0)
PLATELET # BLD AUTO: 316 K/UL — SIGNIFICANT CHANGE UP (ref 150–400)
POTASSIUM SERPL-MCNC: 3.8 MMOL/L — SIGNIFICANT CHANGE UP (ref 3.5–5.3)
POTASSIUM SERPL-SCNC: 3.8 MMOL/L — SIGNIFICANT CHANGE UP (ref 3.5–5.3)
PROT SERPL-MCNC: 6.7 G/DL — SIGNIFICANT CHANGE UP (ref 6–8.3)
RBC # BLD: 3.9 M/UL — SIGNIFICANT CHANGE UP (ref 3.8–5.2)
RBC # FLD: 13.1 % — SIGNIFICANT CHANGE UP (ref 10.3–14.5)
SODIUM SERPL-SCNC: 132 MMOL/L — LOW (ref 135–145)
SPECIMEN SOURCE: SIGNIFICANT CHANGE UP
WBC # BLD: 8.7 K/UL — SIGNIFICANT CHANGE UP (ref 3.8–10.5)
WBC # FLD AUTO: 8.7 K/UL — SIGNIFICANT CHANGE UP (ref 3.8–10.5)

## 2024-03-11 PROCEDURE — 99285 EMERGENCY DEPT VISIT HI MDM: CPT | Mod: FS

## 2024-03-11 PROCEDURE — 99223 1ST HOSP IP/OBS HIGH 75: CPT

## 2024-03-11 RX ORDER — OXYCODONE HYDROCHLORIDE 5 MG/1
10 TABLET ORAL ONCE
Refills: 0 | Status: DISCONTINUED | OUTPATIENT
Start: 2024-03-11 | End: 2024-03-11

## 2024-03-11 RX ORDER — LANOLIN ALCOHOL/MO/W.PET/CERES
3 CREAM (GRAM) TOPICAL AT BEDTIME
Refills: 0 | Status: DISCONTINUED | OUTPATIENT
Start: 2024-03-11 | End: 2024-03-19

## 2024-03-11 RX ORDER — DIAZEPAM 5 MG
0 TABLET ORAL
Qty: 0 | Refills: 0 | DISCHARGE

## 2024-03-11 RX ORDER — ACETAMINOPHEN 500 MG
650 TABLET ORAL EVERY 6 HOURS
Refills: 0 | Status: DISCONTINUED | OUTPATIENT
Start: 2024-03-11 | End: 2024-03-19

## 2024-03-11 RX ORDER — OXYCODONE HYDROCHLORIDE 5 MG/1
1 TABLET ORAL
Qty: 0 | Refills: 0 | DISCHARGE

## 2024-03-11 RX ORDER — DIAZEPAM 5 MG
5 TABLET ORAL ONCE
Refills: 0 | Status: DISCONTINUED | OUTPATIENT
Start: 2024-03-11 | End: 2024-03-11

## 2024-03-11 RX ADMIN — Medication 5 MILLIGRAM(S): at 23:46

## 2024-03-11 RX ADMIN — OXYCODONE HYDROCHLORIDE 10 MILLIGRAM(S): 5 TABLET ORAL at 23:46

## 2024-03-11 RX ADMIN — Medication 300 MILLIGRAM(S): at 20:13

## 2024-03-11 NOTE — ED ADULT NURSE NOTE - CHIEF COMPLAINT QUOTE
R breast pain, swelling, seen at [place] [facility] yesterday "they think there may be a mass" dc'ed and told to return to ED if pain gets worse

## 2024-03-11 NOTE — H&P ADULT - PROBLEM SELECTOR PLAN 1
- Axillary, subclavicular and mediastinal lymphadenopathy, pulmonary nodules and inflammatory changes of breast seen on CT with possible effect on IVC  - Please consult Oncology in AM  - Possible lymph node biopsy  - Refer to breast surgery

## 2024-03-11 NOTE — H&P ADULT - ASSESSMENT
58F w/Hx of RCC s/p R total nephrectomy, S/p hysterectomy, R-sided glaucoma, Fibromyalgia, Anxiety, GERD, smoker presents due to R breast swelling, redness and pain.

## 2024-03-11 NOTE — ED ADULT NURSE NOTE - NSFALLUNIVINTERV_ED_ALL_ED
Bed/Stretcher in lowest position, wheels locked, appropriate side rails in place/Call bell, personal items and telephone in reach/Instruct patient to call for assistance before getting out of bed/chair/stretcher/Non-slip footwear applied when patient is off stretcher/[place] to call system/Physically safe environment - no spills, clutter or unnecessary equipment/Purposeful proactive rounding/Room/bathroom lighting operational, light cord in reach

## 2024-03-11 NOTE — ED ADULT TRIAGE NOTE - CHIEF COMPLAINT QUOTE
R breast pain, swelling, seen at [place] ED yesterday "they think there may be a mass" dc'ed and told to return to ED if pain gets worse

## 2024-03-11 NOTE — ED ADULT NURSE NOTE - OBJECTIVE STATEMENT
58YF A&OX4 Ambulatory PMHX of R nephrectomy s/p mass, anxiety, fibromyalgia, GERD, glaucoma, R shoulder bursitis, presents to the ED c/o worsening R breast pain and swelling. pt was seen at [place] ED and left AMA (for admission), and was dc'd with clindamycin. pt denies CP, SOB, abdominal pain, f/c/n/v/d

## 2024-03-11 NOTE — H&P ADULT - HISTORY OF PRESENT ILLNESS
58F w/Hx of RCC s/p R total nephrectomy, S/p hysterectomy, R-sided glaucoma, Fibromyalgia, Anxiety, GERD, smoker presents due to R breast swelling, redness and pain. Pt was initially evaluated by [facility] yesterday and was going to be admitted but left AMA. Was prescribed clindamycin for presumed breast cellulitis/mastitis on discharge, but was also told about concerning findings on CT Chest related to possible breast malignancy and metastasis. Pt has not had a mammogram or colonoscopy in years. Reports that she had a lidocaine injection in her R shoulder on 3/8/24 and since then has had worsening swelling, redness and pain in her R breast. Also still has R shoulder pain, which had mild improvement since the injection. Pt consistently takes oxycodone q6h and believes this may have masked her pain earlier. Reports over 40 pound weight loss in the last year and loss of appetite. Has conjunctival injection in R eye which she claims is chronic but she also feels her eyes are more swollen than usual currently. Denies vision changes. Smokes 1ppd for many years. Patient denies fever, chills, chest pain, SOB, headache, dizziness, abd pain, nausea, vomiting, constipation, dysuria.

## 2024-03-11 NOTE — H&P ADULT - NSHPPHYSICALEXAM_GEN_ALL_CORE
T(C): 36.8 (03-11-24 @ 23:52), Max: 36.8 (03-11-24 @ 23:52)  HR: 104 (03-11-24 @ 23:52) (102 - 106)  BP: 142/75 (03-11-24 @ 23:52) (118/76 - 142/75)  RR: 18 (03-11-24 @ 23:52) (18 - 18)  SpO2: 95% (03-11-24 @ 23:52) (95% - 96%)    CONSTITUTIONAL: No apparent distress  EYES: R pupil smaller than L, both reactive. EOMI, R sided conjunctival injection.  ENMT: Oral mucosa with moist membranes.  NECK: Supple, symmetric. No JVD.  RESP: No respiratory distress, no use of accessory muscles; CTA b/l, no WRR  CV: RRR, +S1S2, no MRG  GI: Soft, NT, ND, no rebound, no guarding  : No suprapubic tenderness. No CVA tenderness.  LYMPH: Axillary and cervical painless lymphadenopathy  MSK: No spinal tenderness, normal muscle strength/tone  EXTREMITIES: No pedal edema  SKIN: Redness, swelling, tenderness of R breast  NEURO: A+Ox3, responsive. No tremor, sensation intact in upper and lower extremities b/l  PSYCH: Anxious

## 2024-03-11 NOTE — ED PROVIDER NOTE - OBJECTIVE STATEMENT
59 y/o female, past medical history of right nephrectomy status post mass, anxiety, fibromyalgia, GERD, glaucoma, right shoulder bursitis, presents to the ER for right breast pain/swelling. patient was seen yesterday at and was advised to stay for admission with concern for possible malignancy. patient signed out AMA yesterday and was sent home on clinda. patient states she is still having right sided breast pain and swelling at this time. denies f/n/v/d, CP, SOB, HA, dizziness, abdominal pain, urinary symptoms. 57 y/o female, past medical history of right nephrectomy status post mass, anxiety, fibromyalgia, GERD, glaucoma, right shoulder bursitis, presents to the ER for right breast pain/swelling. patient was seen yesterday at and was advised to stay for admission with concern for possible malignancy vs infection vs other acute pathology. patient signed out AMA yesterday and was sent home on clinda. patient states she is still having right sided breast pain and swelling at this time. denies f/n/v/d, CP, SOB, HA, dizziness, abdominal pain, urinary symptoms. 59 y/o female, past medical history of right nephrectomy status post mass, anxiety, fibromyalgia, GERD, glaucoma, right shoulder bursitis, presents to the ER for right breast pain/swelling. patient was seen yesterday at and was advised to stay for admission with concern for malignancy. patient signed out AMA yesterday and was sent home on clinda. patient states she is still having right sided breast pain and swelling at this time. denies f/n/v/d, CP, SOB, HA, dizziness, abdominal pain, urinary symptoms. 57 y/o female, past medical history of right nephrectomy status post mass, anxiety, fibromyalgia, GERD, glaucoma, right shoulder bursitis, presents to the ER for right breast pain/swelling. patient was seen yesterday at [place] and was advised to stay for admission with concern for malignancy. patient signed out AMA yesterday and was sent home on clinda. patient states she is still having right sided breast pain and swelling at this time. denies f/n/v/d, CP, SOB, HA, dizziness, abdominal pain, urinary symptoms.

## 2024-03-11 NOTE — H&P ADULT - NSICDXPASTSURGICALHX_GEN_ALL_CORE_FT
PAST SURGICAL HISTORY:  Glaucoma following surgery Right Eyr - 2012    H/O unilateral nephrectomy Right Laparoscopic Nephrectomy - [id number]    History of tonsillectomy     S/P hernia repair umbilical Hernia Repair - 2011    S/P knee surgery knee arthroscopy right x 2 ( 2011 & 2012)    S/P partial hysterectomy 8 years ago

## 2024-03-11 NOTE — ED PROVIDER NOTE - PHYSICAL EXAMINATION
skin: +erythema, warmth and swelling to right breast. +ttp to the area.  left breast: no ttp. no swelling, erythema, or warmth.

## 2024-03-11 NOTE — ED PROVIDER NOTE - CLINICAL SUMMARY MEDICAL DECISION MAKING FREE TEXT BOX
58-year-old female with history of RCC status post nephrectomy fibromyalgia anxiety and likely opioid dependence returns 1 day after being seen at [facility] for right breast swelling that was concerning for metastatic disease willing to be admitted patient has been reporting only 1 week of right breast feeling swollen and red but no drainage from the nipple and no fever chills weight loss  On physical examination clear lungs S1-S2 no clear lymphadenopathy of the cervical chain  He has redness and    Swelling right breast has no palpable mass on exam and not contracted  Given the CT and ultrasound concerning for metastatic disease plan for admission for further workup and possible therapies patient has poor insight and despite redirection and difficulty understanding her diagnosis

## 2024-03-11 NOTE — H&P ADULT - PROBLEM SELECTOR PLAN 2
- Will d/c cellulitis and start Ancef q8h  - Pt reports no rxn to ancef in past, and rxn to penicillin was headache and nausea  - Unclear if cellulitis or just inflammatory changes as above

## 2024-03-12 ENCOUNTER — NON-APPOINTMENT (OUTPATIENT)
Age: 59
End: 2024-03-12

## 2024-03-12 DIAGNOSIS — F41.9 ANXIETY DISORDER, UNSPECIFIED: ICD-10-CM

## 2024-03-12 DIAGNOSIS — N63.0 UNSPECIFIED LUMP IN UNSPECIFIED BREAST: ICD-10-CM

## 2024-03-12 DIAGNOSIS — E87.1 HYPO-OSMOLALITY AND HYPONATREMIA: ICD-10-CM

## 2024-03-12 DIAGNOSIS — Z87.39 PERSONAL HISTORY OF OTHER DISEASES OF THE MUSCULOSKELETAL SYSTEM AND CONNECTIVE TISSUE: ICD-10-CM

## 2024-03-12 DIAGNOSIS — L03.90 CELLULITIS, UNSPECIFIED: ICD-10-CM

## 2024-03-12 DIAGNOSIS — F17.200 NICOTINE DEPENDENCE, UNSPECIFIED, UNCOMPLICATED: ICD-10-CM

## 2024-03-12 LAB
A1C WITH ESTIMATED AVERAGE GLUCOSE RESULT: 6.6 % — HIGH (ref 4–5.6)
ALBUMIN SERPL ELPH-MCNC: 3.3 G/DL — SIGNIFICANT CHANGE UP (ref 3.3–5)
ALP SERPL-CCNC: 126 U/L — HIGH (ref 40–120)
ALT FLD-CCNC: 14 U/L — SIGNIFICANT CHANGE UP (ref 10–45)
ANION GAP SERPL CALC-SCNC: 12 MMOL/L — SIGNIFICANT CHANGE UP (ref 5–17)
AST SERPL-CCNC: 9 U/L — LOW (ref 10–40)
BILIRUB SERPL-MCNC: 0.3 MG/DL — SIGNIFICANT CHANGE UP (ref 0.2–1.2)
BUN SERPL-MCNC: 14 MG/DL — SIGNIFICANT CHANGE UP (ref 7–23)
CALCIUM SERPL-MCNC: 9.3 MG/DL — SIGNIFICANT CHANGE UP (ref 8.4–10.5)
CHLORIDE SERPL-SCNC: 98 MMOL/L — SIGNIFICANT CHANGE UP (ref 96–108)
CHOLEST SERPL-MCNC: 143 MG/DL — SIGNIFICANT CHANGE UP
CO2 SERPL-SCNC: 22 MMOL/L — SIGNIFICANT CHANGE UP (ref 22–31)
CREAT SERPL-MCNC: 0.82 MG/DL — SIGNIFICANT CHANGE UP (ref 0.5–1.3)
EGFR: 82 ML/MIN/1.73M2 — SIGNIFICANT CHANGE UP
ESTIMATED AVERAGE GLUCOSE: 143 MG/DL — HIGH (ref 68–114)
GLUCOSE SERPL-MCNC: 117 MG/DL — HIGH (ref 70–99)
HCT VFR BLD CALC: 31.7 % — LOW (ref 34.5–45)
HCV AB S/CO SERPL IA: 0.09 S/CO — SIGNIFICANT CHANGE UP (ref 0–0.99)
HCV AB SERPL-IMP: SIGNIFICANT CHANGE UP
HDLC SERPL-MCNC: 50 MG/DL — LOW
HGB BLD-MCNC: 10.9 G/DL — LOW (ref 11.5–15.5)
LIPID PNL WITH DIRECT LDL SERPL: 72 MG/DL — SIGNIFICANT CHANGE UP
MCHC RBC-ENTMCNC: 29.9 PG — SIGNIFICANT CHANGE UP (ref 27–34)
MCHC RBC-ENTMCNC: 34.4 GM/DL — SIGNIFICANT CHANGE UP (ref 32–36)
MCV RBC AUTO: 86.8 FL — SIGNIFICANT CHANGE UP (ref 80–100)
NON HDL CHOLESTEROL: 94 MG/DL — SIGNIFICANT CHANGE UP
NRBC # BLD: 0 /100 WBCS — SIGNIFICANT CHANGE UP (ref 0–0)
PLATELET # BLD AUTO: 331 K/UL — SIGNIFICANT CHANGE UP (ref 150–400)
POTASSIUM SERPL-MCNC: 3.6 MMOL/L — SIGNIFICANT CHANGE UP (ref 3.5–5.3)
POTASSIUM SERPL-SCNC: 3.6 MMOL/L — SIGNIFICANT CHANGE UP (ref 3.5–5.3)
PROT SERPL-MCNC: 6.6 G/DL — SIGNIFICANT CHANGE UP (ref 6–8.3)
RBC # BLD: 3.65 M/UL — LOW (ref 3.8–5.2)
RBC # FLD: 13.1 % — SIGNIFICANT CHANGE UP (ref 10.3–14.5)
SODIUM SERPL-SCNC: 132 MMOL/L — LOW (ref 135–145)
TRIGL SERPL-MCNC: 124 MG/DL — SIGNIFICANT CHANGE UP
TSH SERPL-MCNC: 2.6 UIU/ML — SIGNIFICANT CHANGE UP (ref 0.27–4.2)
WBC # BLD: 9.73 K/UL — SIGNIFICANT CHANGE UP (ref 3.8–10.5)
WBC # FLD AUTO: 9.73 K/UL — SIGNIFICANT CHANGE UP (ref 3.8–10.5)

## 2024-03-12 PROCEDURE — 99233 SBSQ HOSP IP/OBS HIGH 50: CPT

## 2024-03-12 PROCEDURE — 74177 CT ABD & PELVIS W/CONTRAST: CPT | Mod: 26

## 2024-03-12 PROCEDURE — 99223 1ST HOSP IP/OBS HIGH 75: CPT | Mod: GC

## 2024-03-12 RX ORDER — HYDROMORPHONE HYDROCHLORIDE 2 MG/ML
0.5 INJECTION INTRAMUSCULAR; INTRAVENOUS; SUBCUTANEOUS ONCE
Refills: 0 | Status: DISCONTINUED | OUTPATIENT
Start: 2024-03-12 | End: 2024-03-12

## 2024-03-12 RX ORDER — PANTOPRAZOLE SODIUM 20 MG/1
40 TABLET, DELAYED RELEASE ORAL
Refills: 0 | Status: DISCONTINUED | OUTPATIENT
Start: 2024-03-12 | End: 2024-03-19

## 2024-03-12 RX ORDER — NICOTINE POLACRILEX 2 MG
1 GUM BUCCAL DAILY
Refills: 0 | Status: DISCONTINUED | OUTPATIENT
Start: 2024-03-12 | End: 2024-03-19

## 2024-03-12 RX ORDER — CEFAZOLIN SODIUM 1 G
1000 VIAL (EA) INJECTION EVERY 8 HOURS
Refills: 0 | Status: DISCONTINUED | OUTPATIENT
Start: 2024-03-11 | End: 2024-03-14

## 2024-03-12 RX ORDER — CHLORHEXIDINE GLUCONATE 213 G/1000ML
1 SOLUTION TOPICAL DAILY
Refills: 0 | Status: DISCONTINUED | OUTPATIENT
Start: 2024-03-12 | End: 2024-03-19

## 2024-03-12 RX ORDER — OXYCODONE HYDROCHLORIDE 5 MG/1
10 TABLET ORAL EVERY 6 HOURS
Refills: 0 | Status: DISCONTINUED | OUTPATIENT
Start: 2024-03-12 | End: 2024-03-13

## 2024-03-12 RX ORDER — DIAZEPAM 5 MG
5 TABLET ORAL
Refills: 0 | Status: DISCONTINUED | OUTPATIENT
Start: 2024-03-12 | End: 2024-03-19

## 2024-03-12 RX ORDER — INFLUENZA VIRUS VACCINE 15; 15; 15; 15 UG/.5ML; UG/.5ML; UG/.5ML; UG/.5ML
0.5 SUSPENSION INTRAMUSCULAR ONCE
Refills: 0 | Status: DISCONTINUED | OUTPATIENT
Start: 2024-03-12 | End: 2024-04-03

## 2024-03-12 RX ADMIN — Medication 100 MILLIGRAM(S): at 21:12

## 2024-03-12 RX ADMIN — HYDROMORPHONE HYDROCHLORIDE 0.5 MILLIGRAM(S): 2 INJECTION INTRAMUSCULAR; INTRAVENOUS; SUBCUTANEOUS at 15:32

## 2024-03-12 RX ADMIN — OXYCODONE HYDROCHLORIDE 10 MILLIGRAM(S): 5 TABLET ORAL at 11:29

## 2024-03-12 RX ADMIN — Medication 650 MILLIGRAM(S): at 09:11

## 2024-03-12 RX ADMIN — Medication 650 MILLIGRAM(S): at 10:11

## 2024-03-12 RX ADMIN — Medication 100 MILLIGRAM(S): at 14:21

## 2024-03-12 RX ADMIN — OXYCODONE HYDROCHLORIDE 10 MILLIGRAM(S): 5 TABLET ORAL at 04:07

## 2024-03-12 RX ADMIN — OXYCODONE HYDROCHLORIDE 10 MILLIGRAM(S): 5 TABLET ORAL at 17:52

## 2024-03-12 RX ADMIN — OXYCODONE HYDROCHLORIDE 10 MILLIGRAM(S): 5 TABLET ORAL at 12:29

## 2024-03-12 RX ADMIN — Medication 5 MILLIGRAM(S): at 09:11

## 2024-03-12 RX ADMIN — Medication 5 MILLIGRAM(S): at 21:12

## 2024-03-12 RX ADMIN — PANTOPRAZOLE SODIUM 40 MILLIGRAM(S): 20 TABLET, DELAYED RELEASE ORAL at 05:12

## 2024-03-12 RX ADMIN — Medication 1 PATCH: at 12:36

## 2024-03-12 RX ADMIN — Medication 650 MILLIGRAM(S): at 20:10

## 2024-03-12 RX ADMIN — OXYCODONE HYDROCHLORIDE 10 MILLIGRAM(S): 5 TABLET ORAL at 23:37

## 2024-03-12 RX ADMIN — CHLORHEXIDINE GLUCONATE 1 APPLICATION(S): 213 SOLUTION TOPICAL at 21:18

## 2024-03-12 RX ADMIN — OXYCODONE HYDROCHLORIDE 10 MILLIGRAM(S): 5 TABLET ORAL at 00:46

## 2024-03-12 RX ADMIN — Medication 100 MILLIGRAM(S): at 05:15

## 2024-03-12 RX ADMIN — OXYCODONE HYDROCHLORIDE 10 MILLIGRAM(S): 5 TABLET ORAL at 05:07

## 2024-03-12 RX ADMIN — OXYCODONE HYDROCHLORIDE 10 MILLIGRAM(S): 5 TABLET ORAL at 18:52

## 2024-03-12 NOTE — PROGRESS NOTE ADULT - PROBLEM SELECTOR PLAN 2
- cellulitis and start Ancef q8h  - Pt reports no rxn to ancef in past, and rxn to penicillin was headache and nausea  - Unclear if cellulitis or just inflammatory changes as above

## 2024-03-12 NOTE — PROGRESS NOTE ADULT - PROBLEM SELECTOR PLAN 1
- Axillary, subclavicular and mediastinal lymphadenopathy, pulmonary nodules and inflammatory changes of breast seen on CT with possible effect on IVC  - oncology consulted waiting for their recs   - Possible lymph node biopsy pending oncology comment on that   - will consider refering to breast surgery pending oncology input   -As per Patient she has h/o RCC and had nephrectomy by urology by Dr gerhard hall at [facility], also had lymph node dissection 2 years ago, will follow up with oncology recs

## 2024-03-12 NOTE — CONSULT NOTE ADULT - ASSESSMENT
***Note incomplete*** 57 y/o woman presenting to hospital with right breast swelling and cellulitis found to have imaging evidence concerning for metastatic malignancy w/ lung nodules, and axillary, supraclavicular, and mediastinal adenopathy. Oncology consulted for malignancy w/u.         #Metastatic Disease:  -F/u results of CT A/P w/ IV contrast, patient reportedly was surveilled by GI/surgery? at OSH for pancreatic lesion would consider getting collateral if lesion re-appears on our CT scan. Eval for any intrabdominal LAD and primary lesion that is concering.   -Tumor markers w/ am labs including CEA CA 27-29 and CA 15-3.   -Would perform right supraclavicular LN biopsy, please consult IR.   -Pain control per primary team    Oncology to follow with you.     Plan d/w Dr. Kal Nolen M.D.  H/O PGY-4

## 2024-03-12 NOTE — CONSULT NOTE ADULT - SUBJECTIVE AND OBJECTIVE BOX
HEMATOLOGY ONCOLOGY CONSULT     Patient is a 58y old  Female who presents with a chief complaint of Mastitis, breast malignancy (11 Mar 2024 23:59)      HPI:  58F w/Hx of RCC s/p R total nephrectomy, S/p hysterectomy, R-sided glaucoma, Fibromyalgia, Anxiety, GERD, smoker presents due to R breast swelling, redness and pain. Pt was initially evaluated by [place] [facility] yesterday and was going to be admitted but left AMA. Was prescribed clindamycin for presumed breast cellulitis/mastitis on discharge, but was also told about concerning findings on CT Chest related to possible breast malignancy and metastasis. Pt has not had a mammogram or colonoscopy in years. Reports that she had a lidocaine injection in her R shoulder on 3/8/24 and since then has had worsening swelling, redness and pain in her R breast. Also still has R shoulder pain, which had mild improvement since the injection. Pt consistently takes oxycodone q6h and believes this may have masked her pain earlier. Reports over 40 pound weight loss in the last year and loss of appetite. Has conjunctival injection in R eye which she claims is chronic but she also feels her eyes are more swollen than usual currently. Denies vision changes. Smokes 1ppd for many years. Patient denies fever, chills, chest pain, SOB, headache, dizziness, abd pain, nausea, vomiting, constipation, dysuria. (11 Mar 2024 23:59)       ROS:  Negative except for:    PAST MEDICAL & SURGICAL HISTORY:  Anxiety      Acid reflux disease      Umbilical hernia      Uterine mass  Benign      Fibromyalgia  Joint pains      Glaucoma  Right eye      Herniated cervical disc      Cancer of kidney  right kidney      S/P partial hysterectomy  8 years ago      S/P knee surgery  knee arthroscopy right x 2 ( 2011 & 2012)      S/P hernia repair  umbilical Hernia Repair - 2011      H/O unilateral nephrectomy  Right Laparoscopic Nephrectomy - [id number]      Glaucoma following surgery  Right Eyr - 2012      History of tonsillectomy          SOCIAL HISTORY:    FAMILY HISTORY:  Family history of lung cancer (Mother)    Family history of pancreatic cancer (Father)        MEDICATIONS  (STANDING):  ceFAZolin   IVPB 1000 milliGRAM(s) IV Intermittent every 8 hours  nicotine -  14 mG/24Hr(s) Patch 1 Patch Transdermal daily  pantoprazole    Tablet 40 milliGRAM(s) Oral before breakfast    MEDICATIONS  (PRN):  acetaminophen     Tablet .. 650 milliGRAM(s) Oral every 6 hours PRN Temp greater or equal to 38C (100.4F), Mild Pain (1 - 3)  diazepam    Tablet 5 milliGRAM(s) Oral two times a day PRN for anxiety  melatonin 3 milliGRAM(s) Oral at bedtime PRN Insomnia  oxyCODONE    IR 10 milliGRAM(s) Oral every 6 hours PRN for severe pain      Allergies    Cipro (Headache; Vomiting; Rash)  penicillin (Headache; Vomiting; Rash)  erythromycin (Headache; Vomiting; Rash)    Intolerances        Vital Signs Last 24 Hrs  T(C): 36.6 (12 Mar 2024 09:35), Max: 36.8 (11 Mar 2024 23:52)  T(F): 97.9 (12 Mar 2024 09:35), Max: 98.2 (11 Mar 2024 23:52)  HR: 100 (12 Mar 2024 09:35) (100 - 106)  BP: 111/72 (12 Mar 2024 09:35) (111/72 - 142/75)  BP(mean): --  RR: 18 (12 Mar 2024 09:35) (18 - 18)  SpO2: 93% (12 Mar 2024 09:35) (93% - 96%)    Parameters below as of 12 Mar 2024 09:35  Patient On (Oxygen Delivery Method): room air        PHYSICAL EXAM  General: adult in NAD  HEENT: clear oropharynx, anicteric sclera, pink conjunctiva  Neck: supple  CV: normal S1/S2 with no murmur rubs or gallops  Lungs: positive air movement b/l ant lungs,clear to auscultation, no wheezes, no rales  Abdomen: soft non-tender non-distended, no hepatosplenomegaly  Ext: no clubbing cyanosis or edema  Skin: no rashes and no petechiae  Neuro: alert and oriented X 4, no focal deficits      LABS:                          10.9   9.73  )-----------( 331      ( 12 Mar 2024 07:01 )             31.7         Mean Cell Volume : 86.8 fl  Mean Cell Hemoglobin : 29.9 pg  Mean Cell Hemoglobin Concentration : 34.4 gm/dL  Auto Neutrophil # : x  Auto Lymphocyte # : x  Auto Monocyte # : x  Auto Eosinophil # : x  Auto Basophil # : x  Auto Neutrophil % : x  Auto Lymphocyte % : x  Auto Monocyte % : x  Auto Eosinophil % : x  Auto Basophil % : x      03-12    132<L>  |  98  |  14  ----------------------------<  117<H>  3.6   |  22  |  0.82    Ca    9.3      12 Mar 2024 07:01    TPro  6.6  /  Alb  3.3  /  TBili  0.3  /  DBili  x   /  AST  9<L>  /  ALT  14  /  AlkPhos  126<H>  03-12      PT/INR - ( 10 Mar 2024 17:45 )   PT: 11.6 sec;   INR: 1.04 ratio         PTT - ( 10 Mar 2024 17:45 )  PTT:32.9 sec                BLOOD SMEAR INTERPRETATION:       RADIOLOGY & ADDITIONAL STUDIES:       HEMATOLOGY ONCOLOGY CONSULT     Patient is a 58y old  Female who presents with a chief complaint of Mastitis, breast malignancy (11 Mar 2024 23:59)      HPI:  58F w/Hx of RCC s/p R total nephrectomy, S/p hysterectomy, R-sided glaucoma, Fibromyalgia, Anxiety, GERD, smoker presents due to R breast swelling, redness and pain. Pt was initially evaluated by [facility] yesterday and was going to be admitted but left AMA. Was prescribed clindamycin for presumed breast cellulitis/mastitis on discharge, but was also told about concerning findings on CT Chest related to possible breast malignancy and metastasis. Pt has not had a mammogram or colonoscopy in years. Reports that she had a lidocaine injection in her R shoulder on 3/8/24 and since then has had worsening swelling, redness and pain in her R breast. Also still has R shoulder pain, which had mild improvement since the injection. Pt consistently takes oxycodone q6h and believes this may have masked her pain earlier. Reports over 40 pound weight loss in the last year and loss of appetite. Has conjunctival injection in R eye which she claims is chronic but she also feels her eyes are more swollen than usual currently. Denies vision changes. Smokes 1ppd for many years. Patient denies fever, chills, chest pain, SOB, headache, dizziness, abd pain, nausea, vomiting, constipation, dysuria. (11 Mar 2024 23:59)      Heme/Onc History:    History of right sided RCC managed by urologist w/ nephrectomy at [place] reported ~10 years ago and has been in remission. Patient states having a right neck lymph node dissection for suspected malignancy ~1-2 years ago which was reportedly equivocal per pathologist at OSH however patient never received any official diagnosis or therapy and was not told to follow up. She is a smoker, and noted the swelling in her breast ~1 week ago. She denies any recent trauma to the breast. Has significant family hx of malignancy w/ father having pancreatic cancer and mother having lung cancer. She is not UTD w/ her routine breast cancer screening and GYN/CRC screening. Denies any worsening shortness of breath, nausea or vomiting. Endorses right shoulder and back pain that she attributes to her fibromyalgia.     ROS:  +Shoulder and back pain.     PAST MEDICAL & SURGICAL HISTORY:  Anxiety      Acid reflux disease      Umbilical hernia      Uterine mass  Benign      Fibromyalgia  Joint pains      Glaucoma  Right eye      Herniated cervical disc      Cancer of kidney  right kidney      S/P partial hysterectomy  8 years ago      S/P knee surgery  knee arthroscopy right x 2 ( 2011 & 2012)      S/P hernia repair  umbilical Hernia Repair - 2011      H/O unilateral nephrectomy  Right Laparoscopic Nephrectomy - [id number]      Glaucoma following surgery  Right Eyr - 2012      History of tonsillectomy        FAMILY HISTORY:  Family history of lung cancer (Mother)    Family history of pancreatic cancer (Father)        MEDICATIONS  (STANDING):  ceFAZolin   IVPB 1000 milliGRAM(s) IV Intermittent every 8 hours  nicotine -  14 mG/24Hr(s) Patch 1 Patch Transdermal daily  pantoprazole    Tablet 40 milliGRAM(s) Oral before breakfast    MEDICATIONS  (PRN):  acetaminophen     Tablet .. 650 milliGRAM(s) Oral every 6 hours PRN Temp greater or equal to 38C (100.4F), Mild Pain (1 - 3)  diazepam    Tablet 5 milliGRAM(s) Oral two times a day PRN for anxiety  melatonin 3 milliGRAM(s) Oral at bedtime PRN Insomnia  oxyCODONE    IR 10 milliGRAM(s) Oral every 6 hours PRN for severe pain      Allergies    Cipro (Headache; Vomiting; Rash)  penicillin (Headache; Vomiting; Rash)  erythromycin (Headache; Vomiting; Rash)    Intolerances        Vital Signs Last 24 Hrs  T(C): 36.6 (12 Mar 2024 09:35), Max: 36.8 (11 Mar 2024 23:52)  T(F): 97.9 (12 Mar 2024 09:35), Max: 98.2 (11 Mar 2024 23:52)  HR: 100 (12 Mar 2024 09:35) (100 - 106)  BP: 111/72 (12 Mar 2024 09:35) (111/72 - 142/75)  BP(mean): --  RR: 18 (12 Mar 2024 09:35) (18 - 18)  SpO2: 93% (12 Mar 2024 09:35) (93% - 96%)    Parameters below as of 12 Mar 2024 09:35  Patient On (Oxygen Delivery Method): room air        PHYSICAL EXAM  CONSTITUTIONAL: +pain.  RESP: No respiratory distress, no use of accessory muscles; CTA b/l, no WRR  CV: RRR, +S1S2, no MRG  GI: Soft, NT, ND, no rebound, no guarding  NEURO: A+Ox3, responsive. No tremor, sensation intact in upper and lower extremities b/l      LABS:                          10.9   9.73  )-----------( 331      ( 12 Mar 2024 07:01 )             31.7         Mean Cell Volume : 86.8 fl  Mean Cell Hemoglobin : 29.9 pg  Mean Cell Hemoglobin Concentration : 34.4 gm/dL  Auto Neutrophil # : x  Auto Lymphocyte # : x  Auto Monocyte # : x  Auto Eosinophil # : x  Auto Basophil # : x  Auto Neutrophil % : x  Auto Lymphocyte % : x  Auto Monocyte % : x  Auto Eosinophil % : x  Auto Basophil % : x      03-12    132<L>  |  98  |  14  ----------------------------<  117<H>  3.6   |  22  |  0.82    Ca    9.3      12 Mar 2024 07:01    TPro  6.6  /  Alb  3.3  /  TBili  0.3  /  DBili  x   /  AST  9<L>  /  ALT  14  /  AlkPhos  126<H>  03-12      PT/INR - ( 10 Mar 2024 17:45 )   PT: 11.6 sec;   INR: 1.04 ratio         PTT - ( 10 Mar 2024 17:45 )  PTT:32.9 sec                      RADIOLOGY & ADDITIONAL STUDIES:  < from: CT Chest No Cont (03.10.24 @ 20:45) >  Limited evaluation in the absence of IV contrast.    Axillary, supraclavicular and mediastinal adenopathy as well as pulmonary   nodules compatible with metastatic disease. This may be from patient's   known renal cell carcinoma, however, there are asymmetric right breast   inflammatory changes without focal mass. Inflammatory breast cancer   cannot be entirely excluded. Right breast edema/inflammation could   potentially be from IVC obstruction at the level of the right   paratracheal/medial supraclavicular conglomerate adenopathy. Recommend   follow-up with breast surgeon and additional imaging in a dedicated   breast imaging center. Consider percutaneous biopsy of lymph node for   histopathologic confirmation.    Bilateral pleural effusions and small-to-moderate pericardial effusion.    < end of copied text >

## 2024-03-12 NOTE — PROGRESS NOTE ADULT - SUBJECTIVE AND OBJECTIVE BOX
[facility] Division of Hospital Medicine  Osvaldo Lopez MD  Available via MS Teams    SUBJECTIVE / OVERNIGHT EVENTS:  seen and examined at bedside this am, no acute events overnight, c/o having pain around the breast area, denies any new complaints     ADDITIONAL REVIEW OF SYSTEMS:    MEDICATIONS  (STANDING):  ceFAZolin   IVPB 1000 milliGRAM(s) IV Intermittent every 8 hours  chlorhexidine 2% Cloths 1 Application(s) Topical daily  nicotine -  14 mG/24Hr(s) Patch 1 Patch Transdermal daily  pantoprazole    Tablet 40 milliGRAM(s) Oral before breakfast    MEDICATIONS  (PRN):  acetaminophen     Tablet .. 650 milliGRAM(s) Oral every 6 hours PRN Temp greater or equal to 38C (100.4F), Mild Pain (1 - 3)  diazepam    Tablet 5 milliGRAM(s) Oral two times a day PRN for anxiety  melatonin 3 milliGRAM(s) Oral at bedtime PRN Insomnia  oxyCODONE    IR 10 milliGRAM(s) Oral every 6 hours PRN for severe pain      I&O's Summary      PHYSICAL EXAM:  Vital Signs Last 24 Hrs  T(C): 36.7 (12 Mar 2024 16:02), Max: 36.8 (11 Mar 2024 23:52)  T(F): 98 (12 Mar 2024 16:02), Max: 98.2 (11 Mar 2024 23:52)  HR: 98 (12 Mar 2024 16:02) (98 - 104)  BP: 123/74 (12 Mar 2024 16:02) (111/72 - 142/75)  BP(mean): 90 (12 Mar 2024 16:02) (90 - 90)  RR: 18 (12 Mar 2024 16:02) (18 - 18)  SpO2: 96% (12 Mar 2024 16:02) (93% - 96%)    Parameters below as of 12 Mar 2024 16:02  Patient On (Oxygen Delivery Method): room air    CONSTITUTIONAL: No apparent distressD.  RESP: No respiratory distress, no use of accessory muscles; CTA b/l, no WRR  CV: RRR, +S1S2, no MRG  GI: Soft, NT, ND, no rebound, no guarding  SKIN: Redness improved, swelling, tenderness of R breast  NEURO: A+Ox3, responsive. No tremor, sensation intact in upper and lower extremities b/l    LABS:                        10.9   9.73  )-----------( 331      ( 12 Mar 2024 07:01 )             31.7     03-12    132<L>  |  98  |  14  ----------------------------<  117<H>  3.6   |  22  |  0.82    Ca    9.3      12 Mar 2024 07:01    TPro  6.6  /  Alb  3.3  /  TBili  0.3  /  DBili  x   /  AST  9<L>  /  ALT  14  /  AlkPhos  126<H>  03-12          Urinalysis Basic - ( 12 Mar 2024 07:01 )    Color: x / Appearance: x / SG: x / pH: x  Gluc: 117 mg/dL / Ketone: x  / Bili: x / Urobili: x   Blood: x / Protein: x / Nitrite: x   Leuk Esterase: x / RBC: x / WBC x   Sq Epi: x / Non Sq Epi: x / Bacteria: x        Culture - Urine (collected 10 Mar 2024 19:43)  Source: Clean Catch Clean Catch (Midstream)  Final Report (11 Mar 2024 22:19):    <10,000 CFU/mL Normal Urogenital Tammie    Culture - Blood (collected 10 Mar 2024 17:54)  Source: .Blood Blood-Peripheral  Preliminary Report (12 Mar 2024 01:03):    No growth at 24 hours    Culture - Blood (collected 10 Mar 2024 17:54)  Source: .Blood Blood-Peripheral  Preliminary Report (12 Mar 2024 01:03):    No growth at 24 hours          RADIOLOGY & ADDITIONAL TESTS:  New Imaging Personally Reviewed Today:  New Electrocardiogram Personally Reviewed Today:  Other Results Reviewed Today:   Prior or Outpatient Records Reviewed Today with Summary:    COORDINATION OF CARE:  Consultant Communication and Details of Discussion (where applicable):

## 2024-03-12 NOTE — PATIENT PROFILE ADULT - DO YOU FEEL LIKE HURTING YOURSELF OR OTHERS?
If pt calls back please tell her appt date and time, its in the notes.
Lizzie requests a call back from [facility] please. ( I hope I spelt the name correctly)     Thank you.
no

## 2024-03-12 NOTE — PATIENT PROFILE ADULT - BILL PAYMENT
Oscar Lassiter is a 63 year old male patient.  Patient Active Problem List   Diagnosis   • Low back pain radiating to right leg   • Lumbar spinal stenosis   • Primary osteoarthritis of right knee   • History of total hip replacement   • Open wound   • Cellulitis and abscess of toe of right foot   • Skin flap necrosis (CMS/HCC)     Past Medical History:   Diagnosis Date   • Arthritis     right hip DJD   • Back pain    • Fractures 07/31/2017    Right knee, subchondral fx, work comp   • Hoarse voice quality    • Laryngeal spasm    • Open wound 01/2021    Right great toe   • Right hip pain    • Smoker    • Toe infection 02/18/2021    RIGHT great toe     was asked to see Oscar Lassiter right foot pain         Patient is a 63 year old male who was admitted with Toe infection [L08.9].     CC:  Right foot pain      HPI:  Admitted 3.3 for revision right great toe amp     S/p revision   Complains of right toe pain and phantom pain at tip of toe   Describes pressure in toe   No improved with med  On oxycodone 15 mg without helping pain     Current Facility-Administered Medications   Medication Dose Route Frequency Provider Last Rate Last Admin   • amLODIPine (NORVASC) tablet 5 mg  5 mg Oral Daily ALTHEA Del Cid   5 mg at 03/11/21 1716   • senna (SENOKOT) 8.6 mg  1 tablet Oral Nightly Minerva Aguirre NP       • vancomycin 1,500 mg in sodium chloride 0.9% 250 mL IVPB  1,500 mg Intravenous 2 times per day Ather H MD Bg 125 mL/hr at 03/11/21 1716 1,500 mg at 03/11/21 1716   • atorvastatin (LIPITOR) tablet 40 mg  40 mg Oral Nightly Tato Burgos MD   40 mg at 03/10/21 2055   • pentoxifylline (TRENtal) CR tablet 400 mg  400 mg Oral BID PC Rolando Agee MD   400 mg at 03/11/21 1716    Followed by   • [START ON 3/15/2021] pentoxifylline (TRENtal) CR tablet 400 mg  400 mg Oral TID NICOLAS Agee MD       • HYDROmorphone (DILAUDID) injection 0.5 mg  0.5 mg Intravenous Q4H PRN Sadiq Lara MD       •  pregabalin (LYRICA) capsule 50 mg  50 mg Oral TID Sadiq Lara MD   50 mg at 03/11/21 1052   • baclofen (LIORESAL) tablet 10 mg  10 mg Oral TID Sadiq Lara MD   10 mg at 03/11/21 1053   • nicotine (NICODERM) 14 MG/24HR patch 1 patch  1 patch Transdermal Daily Antonella Martin PA-C   1 patch at 03/11/21 1718   • oxyCODONE (IMM REL) (ROXICODONE) tablet 15 mg  15 mg Oral Q4H PRN Antonella Martin PA-C   15 mg at 03/11/21 0637   • melatonin tablet 9 mg  9 mg Oral Nightly Antonella Martin PA-C   9 mg at 03/10/21 2056   • polyethylene glycol (MIRALAX) packet 17 g  17 g Oral Daily Antonella Martin PA-C   17 g at 03/11/21 1052   • LORazepam (ATIVAN) tablet 0.5 mg  0.5 mg Oral Daily PRN Dagoberto Pink DO   0.5 mg at 03/07/21 1401   • ondansetron (ZOFRAN ODT) disintegrating tablet 4 mg  4 mg Oral Q12H PRN Guzman Mackey MD        Or   • ondansetron (ZOFRAN) injection 4 mg  4 mg Intravenous Q12H PRN Guzman Mackey MD       • sodium chloride 0.9% infusion   Intravenous Continuous Guzman Mackey MD   Stopped at 03/07/21 0723   • acetaminophen (TYLENOL) tablet 650 mg  650 mg Oral 3 times per day Veronica Thacker MD   650 mg at 03/11/21 0634   • sodium chloride 0.9 % flush bag 25 mL  25 mL Intravenous PRN Librado Hill PA-C       • sodium chloride (PF) 0.9 % injection 2 mL  2 mL Intracatheter 2 times per day Librado Hill PA-C   Stopped at 03/10/21 2103   • sodium chloride 0.9% infusion   Intravenous Continuous Librado Hill PA-C   Stopped at 03/07/21 0326   • metoCLOPramide (REGLAN) tablet 5 mg  5 mg Oral Q6H PRN Delfino Yun PA-C        Or   • metoCLOPramide (REGLAN) injection 5 mg  5 mg Intravenous Q6H PRN Delfino Yun PA-C       • acetaminophen (TYLENOL) tablet 650 mg  650 mg Oral Q4H PRN Delfino Yun PA-C   650 mg at 03/05/21 1244    Or   • acetaminophen (TYLENOL) suppository 650 mg  650 mg Rectal Q4H PRN Delfino Yun PA-C       • diphenhydrAMINE (BENADRYL) capsule 25 mg  25 mg Oral Q6H PRN Delfino CHINO  ARA Yun       • ferrous sulfate (65 mg Fe per 325 mg) tablet 325 mg  325 mg Oral Daily with breakfast Delfino Yun PA-C   325 mg at 03/11/21 1052   • polyethylene glycol (MIRALAX) packet 17 g  17 g Oral Daily PRN Delfino Yun PA-C       • docusate sodium-sennosides (SENOKOT S) 50-8.6 MG 2 tablet  2 tablet Oral BID PRN Delfino Yun PA-C       • bisacodyl (DULCOLAX) suppository 10 mg  10 mg Rectal Daily PRN Delfino Yun PA-C       • magnesium hydroxide (MILK OF MAGNESIA) 400 MG/5ML suspension 30 mL  30 mL Oral Daily PRN Delfino Yun PA-C   30 mL at 03/11/21 1716   • aspirin (ECOTRIN) EC tablet 325 mg  325 mg Oral Daily Delfino Yun PA-C   325 mg at 03/11/21 1052   • cefepime (MAXIPIME) 1,000 mg in sodium chloride 0.9 % 100 mL IVPB  1,000 mg Intravenous 3 times per day Ather BEAU Pederson MD   Stopped at 03/11/21 1217   • VANCOMYCIN - PHARMACIST MONITORED   Does not apply See Admin Instructions Ather H MD Bg         ALLERGIES:   Allergen Reactions   • Latex ERYTHEMA     Active Problems:    Skin flap necrosis (CMS/HCC)    Blood pressure (!) 119/92, pulse 73, temperature 97.7 °F (36.5 °C), temperature source Temporal, resp. rate 16, height 6' (1.829 m), weight 103.7 kg, SpO2 96 %.    Subjective:  Symptoms:  Stable.  He reports malaise and weakness.  No shortness of breath, cough, chest pain, headache, chest pressure, anorexia, diarrhea or anxiety.    Diet:  Adequate intake.  No nausea or vomiting.    Activity level: Impaired due to pain.    Pain:  He complains of pain that is moderate.  He reports pain is improving.  Pain is partially controlled and requiring pain medication.      Objective:  General Appearance:  Comfortable, well-appearing, in no acute distress and not in pain (no pain behavior at rest ).    Vital signs: (most recent): Blood pressure (!) 119/92, pulse 73, temperature 97.7 °F (36.5 °C), temperature source Temporal, resp. rate 16, height 6' (1.829 m), weight 103.7 kg, SpO2 96  %.  Vital signs are normal.    Output: Producing urine and producing stool.    HEENT: Normal HEENT exam.    Lungs:  Normal effort and normal respiratory rate.  He is not in respiratory distress.    Heart: Normal rate.  Regular rhythm.    Chest: Symmetric chest wall expansion. No chest wall tenderness.    Extremities: Decreased range of motion.  (Right toe )  Pulses: Distal pulses are intact.    Neurological: Patient is alert and oriented to person, place and time.  Normal strength.    Pupils:  Pupils are equal, round, and reactive to light.    Skin:  Warm and dry.      Assessment:    Condition: In stable condition.  Improving.       Daily or twice daily hbo treatments   Tolerating   Pain unchanged    Scores pain 3-5/10 with goal of 3  lyrca 50 mg every 8 hours  Prn oxycodone 15 mg times 4 yesterday and once  today   Scheduled tylenol and pt refusing     No request to change med    Activity increasing but still remains limited  Awaiting discharge     wi pdmp     Oxycodone w/ Acetaminophen  10-325MG / Tablet  Rx# [id number] Opioid Qty: 60  Days: 10  Refills: 0 Prescribed: 3/3/2021  Dispensed: 3/3/2021  Sold: 3/3/2021 WILIAM MALIK  [address]  [place] [facility] [id number]    Admitted 3/3     A    S/p right toe amp  Neuropathic pain   Lumbar stenosis   Total hip p   pvd  cellulitis   Tobacco use disorder     p   D/wpt   Continue current med     Pt has rx of percocet 10 mg for discharge     Sadiq Lara MD       no

## 2024-03-13 ENCOUNTER — NON-APPOINTMENT (OUTPATIENT)
Age: 59
End: 2024-03-13

## 2024-03-13 DIAGNOSIS — R22.1 LOCALIZED SWELLING, MASS AND LUMP, NECK: ICD-10-CM

## 2024-03-13 LAB
ANION GAP SERPL CALC-SCNC: 14 MMOL/L — SIGNIFICANT CHANGE UP (ref 5–17)
BCA 255 TISS QL IMSTN: 17.9 U/ML — SIGNIFICANT CHANGE UP
BUN SERPL-MCNC: 11 MG/DL — SIGNIFICANT CHANGE UP (ref 7–23)
CALCIUM SERPL-MCNC: 9.3 MG/DL — SIGNIFICANT CHANGE UP (ref 8.4–10.5)
CANCER AG27-29 SERPL-ACNC: 20.6 U/ML — SIGNIFICANT CHANGE UP (ref 0–38.6)
CEA SERPL-MCNC: 7.1 NG/ML — HIGH (ref 0–3.8)
CHLORIDE SERPL-SCNC: 94 MMOL/L — LOW (ref 96–108)
CO2 SERPL-SCNC: 22 MMOL/L — SIGNIFICANT CHANGE UP (ref 22–31)
CREAT SERPL-MCNC: 0.92 MG/DL — SIGNIFICANT CHANGE UP (ref 0.5–1.3)
EGFR: 71 ML/MIN/1.73M2 — SIGNIFICANT CHANGE UP
GLUCOSE SERPL-MCNC: 104 MG/DL — HIGH (ref 70–99)
HCT VFR BLD CALC: 33 % — LOW (ref 34.5–45)
HGB BLD-MCNC: 11 G/DL — LOW (ref 11.5–15.5)
LDH SERPL L TO P-CCNC: 132 U/L — SIGNIFICANT CHANGE UP (ref 50–242)
MCHC RBC-ENTMCNC: 28.9 PG — SIGNIFICANT CHANGE UP (ref 27–34)
MCHC RBC-ENTMCNC: 33.3 GM/DL — SIGNIFICANT CHANGE UP (ref 32–36)
MCV RBC AUTO: 86.8 FL — SIGNIFICANT CHANGE UP (ref 80–100)
MRSA PCR RESULT.: SIGNIFICANT CHANGE UP
NRBC # BLD: 0 /100 WBCS — SIGNIFICANT CHANGE UP (ref 0–0)
PLATELET # BLD AUTO: 354 K/UL — SIGNIFICANT CHANGE UP (ref 150–400)
POTASSIUM SERPL-MCNC: 3.6 MMOL/L — SIGNIFICANT CHANGE UP (ref 3.5–5.3)
POTASSIUM SERPL-SCNC: 3.6 MMOL/L — SIGNIFICANT CHANGE UP (ref 3.5–5.3)
RBC # BLD: 3.8 M/UL — SIGNIFICANT CHANGE UP (ref 3.8–5.2)
RBC # FLD: 12.8 % — SIGNIFICANT CHANGE UP (ref 10.3–14.5)
S AUREUS DNA NOSE QL NAA+PROBE: SIGNIFICANT CHANGE UP
SODIUM SERPL-SCNC: 130 MMOL/L — LOW (ref 135–145)
WBC # BLD: 7.85 K/UL — SIGNIFICANT CHANGE UP (ref 3.8–10.5)
WBC # FLD AUTO: 7.85 K/UL — SIGNIFICANT CHANGE UP (ref 3.8–10.5)

## 2024-03-13 PROCEDURE — 99233 SBSQ HOSP IP/OBS HIGH 50: CPT

## 2024-03-13 PROCEDURE — 76705 ECHO EXAM OF ABDOMEN: CPT | Mod: 26

## 2024-03-13 PROCEDURE — 99223 1ST HOSP IP/OBS HIGH 75: CPT | Mod: FS

## 2024-03-13 RX ORDER — HYDROMORPHONE HYDROCHLORIDE 2 MG/ML
0.5 INJECTION INTRAMUSCULAR; INTRAVENOUS; SUBCUTANEOUS ONCE
Refills: 0 | Status: DISCONTINUED | OUTPATIENT
Start: 2024-03-13 | End: 2024-03-13

## 2024-03-13 RX ORDER — OXYCODONE HYDROCHLORIDE 5 MG/1
15 TABLET ORAL EVERY 6 HOURS
Refills: 0 | Status: DISCONTINUED | OUTPATIENT
Start: 2024-03-13 | End: 2024-03-19

## 2024-03-13 RX ADMIN — Medication 650 MILLIGRAM(S): at 10:00

## 2024-03-13 RX ADMIN — HYDROMORPHONE HYDROCHLORIDE 0.5 MILLIGRAM(S): 2 INJECTION INTRAMUSCULAR; INTRAVENOUS; SUBCUTANEOUS at 13:35

## 2024-03-13 RX ADMIN — HYDROMORPHONE HYDROCHLORIDE 0.5 MILLIGRAM(S): 2 INJECTION INTRAMUSCULAR; INTRAVENOUS; SUBCUTANEOUS at 13:03

## 2024-03-13 RX ADMIN — Medication 100 MILLIGRAM(S): at 05:59

## 2024-03-13 RX ADMIN — Medication 5 MILLIGRAM(S): at 23:19

## 2024-03-13 RX ADMIN — Medication 1 PATCH: at 16:39

## 2024-03-13 RX ADMIN — OXYCODONE HYDROCHLORIDE 10 MILLIGRAM(S): 5 TABLET ORAL at 05:58

## 2024-03-13 RX ADMIN — PANTOPRAZOLE SODIUM 40 MILLIGRAM(S): 20 TABLET, DELAYED RELEASE ORAL at 06:01

## 2024-03-13 RX ADMIN — Medication 100 MILLIGRAM(S): at 13:03

## 2024-03-13 RX ADMIN — Medication 30 MILLILITER(S): at 17:24

## 2024-03-13 RX ADMIN — OXYCODONE HYDROCHLORIDE 15 MILLIGRAM(S): 5 TABLET ORAL at 21:16

## 2024-03-13 RX ADMIN — Medication 1 PATCH: at 07:30

## 2024-03-13 RX ADMIN — Medication 650 MILLIGRAM(S): at 09:01

## 2024-03-13 RX ADMIN — Medication 100 MILLIGRAM(S): at 21:19

## 2024-03-13 RX ADMIN — Medication 30 MILLILITER(S): at 01:29

## 2024-03-13 RX ADMIN — CHLORHEXIDINE GLUCONATE 1 APPLICATION(S): 213 SOLUTION TOPICAL at 13:05

## 2024-03-13 RX ADMIN — Medication 1 PATCH: at 13:00

## 2024-03-13 RX ADMIN — OXYCODONE HYDROCHLORIDE 15 MILLIGRAM(S): 5 TABLET ORAL at 21:46

## 2024-03-13 RX ADMIN — Medication 5 MILLIGRAM(S): at 11:18

## 2024-03-13 NOTE — DIETITIAN INITIAL EVALUATION ADULT - REASON FOR ADMISSION
Breast pain    Per chart, patient is a 59 y/o female with PMH including h/o RCC (s/p R total nephrectomy), s/p hysterectomy, R-sided glaucoma, fibromyalgia, anxiety, GERD. Patient presents to [facility] due to R breast swelling, redness and pain; also endorsing over 40lbs of weight loss x1 year w/ loss of appetite. Heme/oncology consulted.

## 2024-03-13 NOTE — DIETITIAN INITIAL EVALUATION ADULT - PERTINENT MEDS FT
MEDICATIONS  (STANDING):  ceFAZolin   IVPB 1000 milliGRAM(s) IV Intermittent every 8 hours  chlorhexidine 2% Cloths 1 Application(s) Topical daily  influenza   Vaccine 0.5 milliLiter(s) IntraMuscular once  nicotine -  14 mG/24Hr(s) Patch 1 Patch Transdermal daily  pantoprazole    Tablet 40 milliGRAM(s) Oral before breakfast    MEDICATIONS  (PRN):  acetaminophen     Tablet .. 650 milliGRAM(s) Oral every 6 hours PRN Temp greater or equal to 38C (100.4F), Mild Pain (1 - 3)  diazepam    Tablet 5 milliGRAM(s) Oral two times a day PRN for anxiety  melatonin 3 milliGRAM(s) Oral at bedtime PRN Insomnia  oxyCODONE    IR 15 milliGRAM(s) Oral every 6 hours PRN Severe Pain (7 - 10)

## 2024-03-13 NOTE — DIETITIAN INITIAL EVALUATION ADULT - PERTINENT LABORATORY DATA
03-13    130<L>  |  94<L>  |  11  ----------------------------<  104<H>  3.6   |  22  |  0.92    Ca    9.3      13 Mar 2024 06:53    TPro  6.6  /  Alb  3.3  /  TBili  0.3  /  DBili  x   /  AST  9<L>  /  ALT  14  /  AlkPhos  126<H>  03-12  A1C with Estimated Average Glucose Result: 6.6 % (03-12-24 @ 07:01)

## 2024-03-13 NOTE — CONSULT NOTE ADULT - ASSESSMENT
58F w/Hx of RCC s/p R total nephrectomy, S/p hysterectomy, R-sided glaucoma, Fibromyalgia, Anxiety, GERD, 1ppd smoker presents due to R breast swelling, redness and pain. ENT consulted for sore throat, hoarse voice, and right neck swelling. Pt reportedly had lymph nodes removed in her neck in the past. On exam, right eye with scleral injection, periorbital swelling and erythema, right neck swelling noted, oropharynx appears grossly normal. Indirect laryngoscopy was offered to the patient however she is refusing at this time. ENT consulted for sore throat, hoarse voice, and right neck swelling    58F w/Hx of RCC s/p R total nephrectomy, S/p hysterectomy, R-sided glaucoma, Fibromyalgia, Anxiety, GERD, 1ppd smoker presents due to R breast swelling, redness and pain.     Patient reportedly had lymph nodes removed in her neck in the past now with lymphoedema in right neck. Patient currently being worked up for metastatic disease to chest by thoracic surgery      Physical exam shows right eye with scleral injection, periorbital swelling and erythema, right neck swelling noted, oropharynx appears grossly normal. Indirect laryngoscopy was offered to the patient however she is refusing at this time.

## 2024-03-13 NOTE — PROGRESS NOTE ADULT - PROBLEM SELECTOR PLAN 4
- Home Oxycodone (Confirmed via [employer])  - Increase oxy to 15 prn as pt is c/o severe pain   - Savella not in formulary

## 2024-03-13 NOTE — DIETITIAN INITIAL EVALUATION ADULT - NSICDXPASTSURGICALHX_GEN_ALL_CORE_FT
PAST SURGICAL HISTORY:  Glaucoma following surgery Right Eyr - 2012    H/O unilateral nephrectomy Right Laparoscopic Nephrectomy - 73103    History of tonsillectomy     S/P hernia repair umbilical Hernia Repair - 2011    S/P knee surgery knee arthroscopy right x 2 ( 2011 & 2012)    S/P partial hysterectomy 8 years ago

## 2024-03-13 NOTE — DIETITIAN INITIAL EVALUATION ADULT - OTHER INFO
Patient confirms NKFA. Patient reports her UBW is normally at least 140lbs or a touch more before enduring her unintentional weight loss and reports at least 40lbs of unintentional weight loss within the past year. Will monitor weight trend.    Hyponatremia noted; management per team. HgbA1C 6.6% 3/12; patient w/ no h/o DM per chart. No current steroid regimen noted. Will monitor blood glucose trend.

## 2024-03-13 NOTE — DIETITIAN INITIAL EVALUATION ADULT - ORAL INTAKE PTA/DIET HISTORY
Patient endorses poor PO intake/appetite for at least 1 year. Denies nausea/vomiting. Reports some intermittent swallowing difficulty related to right sided lymph node enlargement (which patient showed turning her head). She reports that she does not avoid any specific food item for this. Patient reports the nature of her decreased PO intake/appetite has been both reduced portion sizes and skipping meals at times.

## 2024-03-13 NOTE — PROGRESS NOTE ADULT - SUBJECTIVE AND OBJECTIVE BOX
[facility] Division of Hospital Medicine  Osvaldo Lopez MD  Available via MS Teams    SUBJECTIVE / OVERNIGHT EVENTS:  seen and examined at bedside this am, no acute events overnight, c/o having pain around the breast area and on her face, says she is also having some scratching around her neck, was worried about whats going on with her and what is the diagnosis, addressed all her concerns   ADDITIONAL REVIEW OF SYSTEMS:    MEDICATIONS  (STANDING):  ceFAZolin   IVPB 1000 milliGRAM(s) IV Intermittent every 8 hours  chlorhexidine 2% Cloths 1 Application(s) Topical daily  influenza   Vaccine 0.5 milliLiter(s) IntraMuscular once  nicotine -  14 mG/24Hr(s) Patch 1 Patch Transdermal daily  pantoprazole    Tablet 40 milliGRAM(s) Oral before breakfast    MEDICATIONS  (PRN):  acetaminophen     Tablet .. 650 milliGRAM(s) Oral every 6 hours PRN Temp greater or equal to 38C (100.4F), Mild Pain (1 - 3)  diazepam    Tablet 5 milliGRAM(s) Oral two times a day PRN for anxiety  melatonin 3 milliGRAM(s) Oral at bedtime PRN Insomnia  oxyCODONE    IR 15 milliGRAM(s) Oral every 6 hours PRN Severe Pain (7 - 10)      I&O's Summary    13 Mar 2024 07:01  -  13 Mar 2024 14:00  --------------------------------------------------------  IN: 0 mL / OUT: 0 mL / NET: 0 mL        PHYSICAL EXAM:  Vital Signs Last 24 Hrs  T(C): 36.5 (13 Mar 2024 12:26), Max: 36.9 (12 Mar 2024 20:20)  T(F): 97.7 (13 Mar 2024 12:26), Max: 98.5 (12 Mar 2024 20:20)  HR: 104 (13 Mar 2024 12:26) (98 - 104)  BP: 133/85 (13 Mar 2024 12:26) (123/74 - 161/98)  BP(mean): 90 (12 Mar 2024 16:02) (90 - 90)  RR: 18 (13 Mar 2024 12:26) (18 - 18)  SpO2: 92% (13 Mar 2024 12:26) (92% - 99%)    Parameters below as of 13 Mar 2024 12:26  Patient On (Oxygen Delivery Method): room air      CONSTITUTIONAL: No apparent distressD.  RESP: No respiratory distress, no use of accessory muscles; CTA b/l, no WRR  CV: RRR, +S1S2, no MRG  GI: Soft, NT, ND, no rebound, no guarding  SKIN: Redness improved, swelling, tenderness of R breast  NEURO: A+Ox3, responsive. No tremor, sensation intact in upper and lower extremities b/l    LABS:                        11.0   7.85  )-----------( 354      ( 13 Mar 2024 06:53 )             33.0     03-13    130<L>  |  94<L>  |  11  ----------------------------<  104<H>  3.6   |  22  |  0.92    Ca    9.3      13 Mar 2024 06:53    TPro  6.6  /  Alb  3.3  /  TBili  0.3  /  DBili  x   /  AST  9<L>  /  ALT  14  /  AlkPhos  126<H>  03-12          Urinalysis Basic - ( 13 Mar 2024 06:53 )    Color: x / Appearance: x / SG: x / pH: x  Gluc: 104 mg/dL / Ketone: x  / Bili: x / Urobili: x   Blood: x / Protein: x / Nitrite: x   Leuk Esterase: x / RBC: x / WBC x   Sq Epi: x / Non Sq Epi: x / Bacteria: x        Culture - Urine (collected 10 Mar 2024 19:43)  Source: Clean Catch Clean Catch (Midstream)  Final Report (11 Mar 2024 22:19):    <10,000 CFU/mL Normal Urogenital Tammie    Culture - Blood (collected 10 Mar 2024 17:54)  Source: .Blood Blood-Peripheral  Preliminary Report (13 Mar 2024 01:02):    No growth at 48 Hours    Culture - Blood (collected 10 Mar 2024 17:54)  Source: .Blood Blood-Peripheral  Preliminary Report (13 Mar 2024 01:02):    No growth at 48 Hours          RADIOLOGY & ADDITIONAL TESTS:  New Imaging Personally Reviewed Today:  New Electrocardiogram Personally Reviewed Today:  Other Results Reviewed Today:   Prior or Outpatient Records Reviewed Today with Summary:    COORDINATION OF CARE:  Consultant Communication and Details of Discussion (where applicable):

## 2024-03-13 NOTE — DIETITIAN INITIAL EVALUATION ADULT - PERSON TAUGHT/METHOD
and family at bedside/adequate caloric/protein intake w/ food sources reviewed, weight management/weight gain, role of oral nutrition supplements, strategies to help remedy decreased PO intake/appetite, literature provided as reference, all questions were answered/verbal instruction/written material/teach back - (Patient repeats in own words)/patient instructed

## 2024-03-13 NOTE — CONSULT NOTE ADULT - PROBLEM SELECTOR RECOMMENDATION 9
- F/u CT neck   - Can scope pt if she agrees  - F/u optho  - ENT will continue to follow  - Call with questions or concerns - Follow up CT Neck with contrast  - Can scope pt if she agrees  - Follow up ophthalmology  - ENT will continue to follow  - Call with questions or concerns

## 2024-03-13 NOTE — CONSULT NOTE ADULT - SUBJECTIVE AND OBJECTIVE BOX
-----------------------------------------------------------  Interventional Radiology Brief Consult Note  -----------------------------------------------------------    Reason for Referral: R supraclavicular node biopsy    Clinical Summary: 58F w/Hx of RCC s/p R total nephrectomy, S/p hysterectomy, R-sided glaucoma, Fibromyalgia, Anxiety, GERD, smoker presents due to R breast swelling, redness and pain. Pt was initially evaluated by [place] [facility] and was going to be admitted but left AMA. Was prescribed clindamycin for presumed breast cellulitis/mastitis on discharge, but was also told about concerning findings on CT Chest related to possible breast malignancy and metastasis. Pt has not had a mammogram or colonoscopy in years. Reports that she had a lidocaine injection in her R shoulder on 3/8/24 and since then has had worsening swelling, redness and pain in her R breast. Also still has R shoulder pain, which had mild improvement since the injection. Pt consistently takes oxycodone q6h and believes this may have masked her pain earlier. Reports over 40 pound weight loss in the last year and loss of appetite. Pt has history of right sided RCC managed by urologist w/ nephrectomy at [place] reported ~10 years ago and has been in remission. Patient states having a right neck lymph node dissection for suspected malignancy ~1-2 years ago which was reportedly equivocal per pathologist at OSH however patient never received any official diagnosis or therapy and was not told to follow up. She is a smoker, and noted the swelling in her breast ~1 week ago. She denies any recent trauma to the breast. Has significant family hx of malignancy w/ father having pancreatic cancer and mother having lung cancer. She is not UTD w/ her routine breast cancer screening and GYN/CRC screening. CT chest 3/10 demonstrated axillary, subclavicular and mediastinal lymphadenopathy, pulmonary nodules, pleural and pericardial effusions and inflammatory changes. IR consulted for R supraclavicular node biopsy.    Vitals:  T(F): 97.7 (03-13-24 @ 12:26), Max: 98.5 (03-12-24 @ 20:20)  HR: 104 (03-13-24 @ 12:26) (98 - 104)  BP: 133/85 (03-13-24 @ 12:26) (123/74 - 161/98)  RR: 18 (03-13-24 @ 12:26) (18 - 18)  SpO2: 92% (03-13-24 @ 12:26) (92% - 99%)    Labs:           11.0  7.85)-----(354     (03-13-24 @ 06:53)         33.0     130 | 94 | 11  --------------------< 104     (03-13-24 @ 06:53)  3.6 | 22 | 0.92       PT: 11.6 03-10-24 @ 17:45  aPTT: 32.9 03-10-24 @ 17:45   INR: 1.04 03-10-24 @ 17:45    Imaging: Appropriate imaging was reviewed for this consult.     Assessment:  58F w/Hx of RCC s/p R total nephrectomy, S/p hysterectomy, R-sided glaucoma, Fibromyalgia, Anxiety, GERD, smoker presents due to R breast swelling, redness and pain. CT chest 3/10 demonstrated axillary, subclavicular and mediastinal lymphadenopathy, pulmonary nodules, pleural and pericardial effusions and inflammatory changes. IR consulted for R supraclavicular node biopsy.      Recommendations:  -Discussions with heme/onc team were had regarding targeting RIGHT supraclavicular node in the setting of concerning inflammatory changed involving the RIGHT upper extremity lymphatic drainage. Per team, right supraclavicular node will be limited in full staging evaluation however still wishes to proceed as this would still upstage the patient if positive.  - IR to perform Right supraclavicular node biopsy 3/15  - Place IR procedure order (Dr. Martinez), write pre-IR procedure note.  - AM 3/15 labs CBC, BMP, and coags.   - Hold AM AC if safe/applicable.   -----------------------------------------------------------  Interventional Radiology Brief Consult Note  -----------------------------------------------------------    Reason for Referral: R supraclavicular node biopsy    Clinical Summary: 58F w/Hx of RCC s/p R total nephrectomy, S/p hysterectomy, R-sided glaucoma, Fibromyalgia, Anxiety, GERD, smoker presents due to R breast swelling, redness and pain. Pt was initially evaluated by [place] [facility] and was going to be admitted but left AMA. Was prescribed clindamycin for presumed breast cellulitis/mastitis on discharge, but was also told about concerning findings on CT Chest related to possible breast malignancy and metastasis. Pt has not had a mammogram or colonoscopy in years. Reports that she had a lidocaine injection in her R shoulder on 3/8/24 and since then has had worsening swelling, redness and pain in her R breast. Also still has R shoulder pain, which had mild improvement since the injection. Pt consistently takes oxycodone q6h and believes this may have masked her pain earlier. Reports over 40 pound weight loss in the last year and loss of appetite. Pt has history of right sided RCC managed by urologist w/ nephrectomy at [place] reported ~10 years ago and has been in remission. Patient states having a right neck lymph node dissection for suspected malignancy ~1-2 years ago which was reportedly equivocal per pathologist at OSH however patient never received any official diagnosis or therapy and was not told to follow up. She is a smoker, and noted the swelling in her breast ~1 week ago. She denies any recent trauma to the breast. Has significant family hx of malignancy w/ father having pancreatic cancer and mother having lung cancer. She is not UTD w/ her routine breast cancer screening and GYN/CRC screening. CT chest 3/10 demonstrated axillary, subclavicular and mediastinal lymphadenopathy, pulmonary nodules, pleural and pericardial effusions and inflammatory changes. IR consulted for R supraclavicular node biopsy.    Vitals:  T(F): 97.7 (03-13-24 @ 12:26), Max: 98.5 (03-12-24 @ 20:20)  HR: 104 (03-13-24 @ 12:26) (98 - 104)  BP: 133/85 (03-13-24 @ 12:26) (123/74 - 161/98)  RR: 18 (03-13-24 @ 12:26) (18 - 18)  SpO2: 92% (03-13-24 @ 12:26) (92% - 99%)    Labs:           11.0  7.85)-----(354     (03-13-24 @ 06:53)         33.0     130 | 94 | 11  --------------------< 104     (03-13-24 @ 06:53)  3.6 | 22 | 0.92       PT: 11.6 03-10-24 @ 17:45  aPTT: 32.9 03-10-24 @ 17:45   INR: 1.04 03-10-24 @ 17:45    Imaging: Appropriate imaging was reviewed for this consult.     Assessment:  58F w/Hx of RCC s/p R total nephrectomy, S/p hysterectomy, R-sided glaucoma, Fibromyalgia, Anxiety, GERD, smoker presents due to R breast swelling, redness and pain. CT chest 3/10 demonstrated axillary, subclavicular and mediastinal lymphadenopathy, pulmonary nodules, pleural and pericardial effusions and inflammatory changes. IR consulted for R supraclavicular node biopsy.      Recommendations:  -Discussions with heme/onc team were had regarding targeting RIGHT supraclavicular node in the setting of concerning inflammatory changed involving the RIGHT upper extremity lymphatic drainage. Per team, right supraclavicular node will be limited in full staging evaluation however still wishes to proceed as this would still upstage the patient if positive.  - IR to perform Right supraclavicular node biopsy 3/15  - Place IR procedure order (Dr. Martinez).  - AM 3/15 labs CBC, BMP, and coags.   - Hold AM AC if safe/applicable.

## 2024-03-13 NOTE — PROGRESS NOTE ADULT - PROBLEM SELECTOR PLAN 2
- cellulitis and start Ancef q8h, improving , pending duration determination   - Pt reports no rxn to ancef in past, and rxn to penicillin was headache and nausea  - Unclear if cellulitis or just inflammatory changes as above

## 2024-03-13 NOTE — CONSULT NOTE ADULT - SUBJECTIVE AND OBJECTIVE BOX
CC: sore throat     HPI: 58F w/Hx of RCC s/p R total nephrectomy, S/p hysterectomy, R-sided glaucoma, Fibromyalgia, Anxiety, GERD, smoker presents due to R breast swelling, redness and pain. Pt was initially evaluated by [facility] yesterday and was going to be admitted but left AMA. Was prescribed clindamycin for presumed breast cellulitis/mastitis on discharge, but was also told about concerning findings on CT Chest related to possible breast malignancy and metastasis. Reports over 40 pound weight loss in the last year and loss of appetite. Has conjunctival injection in R eye which she claims is chronic but she also feels her eyes are more swollen than usual currently. ENT consulted for sore throat, hoarse voice, and right neck swelling. Pt reportedly had lymph nodes removed in her neck in the past. Smokes 1ppd for many years. Patient denies fever, chills, chest pain, SOB, headache, dizziness, abd pain, nausea, vomiting, constipation, dysuria.        PAST MEDICAL & SURGICAL HISTORY:  Anxiety      Acid reflux disease      Umbilical hernia      Uterine mass  Benign      Fibromyalgia  Joint pains      Glaucoma  Right eye      Herniated cervical disc      Cancer of kidney  right kidney      S/P partial hysterectomy  8 years ago      S/P knee surgery  knee arthroscopy right x 2 ( 2011 & 2012)      S/P hernia repair  umbilical Hernia Repair - 2011      H/O unilateral nephrectomy  Right Laparoscopic Nephrectomy - [id number]      Glaucoma following surgery  Right Eyr - 2012      History of tonsillectomy        Allergies    Cipro (Headache; Vomiting; Rash)  penicillin (Headache; Vomiting; Rash)  erythromycin (Headache; Vomiting; Rash)    Intolerances      MEDICATIONS  (STANDING):  ceFAZolin   IVPB 1000 milliGRAM(s) IV Intermittent every 8 hours  chlorhexidine 2% Cloths 1 Application(s) Topical daily  influenza   Vaccine 0.5 milliLiter(s) IntraMuscular once  nicotine -  14 mG/24Hr(s) Patch 1 Patch Transdermal daily  pantoprazole    Tablet 40 milliGRAM(s) Oral before breakfast    MEDICATIONS  (PRN):  acetaminophen     Tablet .. 650 milliGRAM(s) Oral every 6 hours PRN Temp greater or equal to 38C (100.4F), Mild Pain (1 - 3)  diazepam    Tablet 5 milliGRAM(s) Oral two times a day PRN for anxiety  melatonin 3 milliGRAM(s) Oral at bedtime PRN Insomnia  oxyCODONE    IR 15 milliGRAM(s) Oral every 6 hours PRN Severe Pain (7 - 10)      FAMILY HISTORY:  Father  Still living? No  Family history of pancreatic cancer, Age at diagnosis: Age Unknown    Mother  Still living? No  Family history of lung cancer, Age at diagnosis: Age Unknown.     Social History:  · Substance use	Yes   · Alcohol use	Never  · Substance use	Never  · Tobacco use	1 ppd  · Social History (marital status, living situation, occupation, and sexual history)	Lives with partner      ROS:   ENT: all negative except as noted in HPI   CV: denies palpitations  Pulm: see hpi   GI: denies change in appetite, indigestion, n/v  : denies pertinent urinary symptoms, urgency  Neuro: denies numbness/tingling, loss of sensation  Psych: +anxiety  MS: denies muscle weakness, instability  Heme: denies easy bruising or bleeding  Endo: denies heat/cold intolerance, excessive sweating  Vascular: denies LE edema      Vital Signs Last 24 Hrs  T(C): 36.5 (13 Mar 2024 12:26), Max: 36.9 (12 Mar 2024 20:20)  T(F): 97.7 (13 Mar 2024 12:26), Max: 98.5 (12 Mar 2024 20:20)  HR: 104 (13 Mar 2024 12:26) (100 - 104)  BP: 133/85 (13 Mar 2024 12:26) (133/85 - 161/98)  BP(mean): --  RR: 18 (13 Mar 2024 12:26) (18 - 18)  SpO2: 92% (13 Mar 2024 12:26) (92% - 99%)    Parameters below as of 13 Mar 2024 12:26  Patient On (Oxygen Delivery Method): room air                              11.0   7.85  )-----------( 354      ( 13 Mar 2024 06:53 )             33.0    03-13    130<L>  |  94<L>  |  11  ----------------------------<  104<H>  3.6   |  22  |  0.92    Ca    9.3      13 Mar 2024 06:53    TPro  6.6  /  Alb  3.3  /  TBili  0.3  /  DBili  x   /  AST  9<L>  /  ALT  14  /  AlkPhos  126<H>  03-12       PHYSICAL EXAM:  Gen: NAD  Skin: No rashes, bruises, or lesions  Head: Normocephalic, Atraumatic  Face: no edema, erythema, or fluctuance. Parotid glands soft without mass  Eyes: +right scleral injection, periorbital swelling and erythema  Nose: Nares bilaterally patent, no discharge  Mouth: No Stridor / Drooling / Trismus.  Mucosa moist, tongue/uvula midline, oropharynx clear  Neck: +R neck swelling. Flat, supple, no lymphadenopathy, trachea midline, no masses  Lymphatic: No lymphadenopathy  Resp: breathing easily, no stridor  CV: no peripheral edema/cyanosis  GI: nondistended   Peripheral vascular: no JVD or edema  Neuro: facial nerve intact, no facial droop        Fiberoptic Indirect laryngoscopy:  (Scope #2 used)  Refused exam at this time        CC: sore throat     HPI: 58F w/Hx of RCC s/p R total nephrectomy, S/p hysterectomy, R-sided glaucoma, Fibromyalgia, Anxiety, GERD, smoker presents due to R breast swelling, redness and pain. Pt was initially evaluated by [facility] yesterday and was going to be admitted but left AMA. Was prescribed clindamycin for presumed breast cellulitis/mastitis on discharge, but was also told about concerning findings on CT Chest related to possible breast malignancy and metastasis. Reports over 40 pound weight loss in the last year and loss of appetite. Has conjunctival injection in R eye which she claims is chronic but she also feels her eyes are more swollen than usual currently. ENT consulted for sore throat, hoarse voice, and right neck swelling. Pt reportedly had lymph nodes removed in her neck in the past. Smokes 1ppd for many years. Patient denies fever, chills, chest pain, SOB, headache, dizziness, abd pain, nausea, vomiting, constipation, dysuria.        PAST MEDICAL & SURGICAL HISTORY:  Anxiety      Acid reflux disease      Umbilical hernia      Uterine mass  Benign      Fibromyalgia  Joint pains      Glaucoma  Right eye      Herniated cervical disc      Cancer of kidney  right kidney      S/P partial hysterectomy  8 years ago      S/P knee surgery  knee arthroscopy right x 2 ( 2011 & 2012)      S/P hernia repair  umbilical Hernia Repair - 2011      H/O unilateral nephrectomy  Right Laparoscopic Nephrectomy - [id number]      Glaucoma following surgery  Right Eyr - 2012      History of tonsillectomy        Allergies    Cipro (Headache; Vomiting; Rash)  penicillin (Headache; Vomiting; Rash)  erythromycin (Headache; Vomiting; Rash)    Intolerances      MEDICATIONS  (STANDING):  ceFAZolin   IVPB 1000 milliGRAM(s) IV Intermittent every 8 hours  chlorhexidine 2% Cloths 1 Application(s) Topical daily  influenza   Vaccine 0.5 milliLiter(s) IntraMuscular once  nicotine -  14 mG/24Hr(s) Patch 1 Patch Transdermal daily  pantoprazole    Tablet 40 milliGRAM(s) Oral before breakfast    MEDICATIONS  (PRN):  acetaminophen     Tablet .. 650 milliGRAM(s) Oral every 6 hours PRN Temp greater or equal to 38C (100.4F), Mild Pain (1 - 3)  diazepam    Tablet 5 milliGRAM(s) Oral two times a day PRN for anxiety  melatonin 3 milliGRAM(s) Oral at bedtime PRN Insomnia  oxyCODONE    IR 15 milliGRAM(s) Oral every 6 hours PRN Severe Pain (7 - 10)      FAMILY HISTORY:  Father  Still living? No  Family history of pancreatic cancer, Age at diagnosis: Age Unknown    Mother  Still living? No  Family history of lung cancer, Age at diagnosis: Age Unknown.     Social History:  · Substance use	Yes   · Alcohol use	Never  · Substance use	Never  · Tobacco use	1 ppd  · Social History (marital status, living situation, occupation, and sexual history)	Lives with partner      ROS:   ENT: all negative except as noted in HPI   CV: denies palpitations  Pulm: see hpi   GI: denies change in appetite, indigestion, n/v  : denies pertinent urinary symptoms, urgency  Neuro: denies numbness/tingling, loss of sensation  Psych: +anxiety  MS: denies muscle weakness, instability  Heme: denies easy bruising or bleeding  Endo: denies heat/cold intolerance, excessive sweating  Vascular: denies LE edema      Vital Signs Last 24 Hrs  T(C): 36.5 (13 Mar 2024 12:26), Max: 36.9 (12 Mar 2024 20:20)  T(F): 97.7 (13 Mar 2024 12:26), Max: 98.5 (12 Mar 2024 20:20)  HR: 104 (13 Mar 2024 12:26) (100 - 104)  BP: 133/85 (13 Mar 2024 12:26) (133/85 - 161/98)  BP(mean): --  RR: 18 (13 Mar 2024 12:26) (18 - 18)  SpO2: 92% (13 Mar 2024 12:26) (92% - 99%)    Parameters below as of 13 Mar 2024 12:26  Patient On (Oxygen Delivery Method): room air                              11.0   7.85  )-----------( 354      ( 13 Mar 2024 06:53 )             33.0    03-13    130<L>  |  94<L>  |  11  ----------------------------<  104<H>  3.6   |  22  |  0.92    Ca    9.3      13 Mar 2024 06:53    TPro  6.6  /  Alb  3.3  /  TBili  0.3  /  DBili  x   /  AST  9<L>  /  ALT  14  /  AlkPhos  126<H>  03-12       PHYSICAL EXAM:  Gen: NAD  Skin: No rashes, bruises, or lesions  Head: Normocephalic, Atraumatic  Face: no edema, erythema, or fluctuance. Parotid glands soft without mass  Eyes: +right scleral injection, periorbital swelling and erythema  Nose: Nares bilaterally patent, no discharge  Mouth: No Stridor / Drooling / Trismus.  Mucosa moist, tongue/uvula midline, oropharynx clear  Neck: +R neck swelling. trachea midline  Lymphatic: Right neck lymphadenopathy  Resp: breathing easily, no stridor  CV: no peripheral edema/cyanosis  GI: nondistended   Peripheral vascular: no JVD or edema  Neuro: facial nerve intact, no facial droop        Fiberoptic Indirect laryngoscopy:  (Scope #2 used)  Refused exam at this time

## 2024-03-13 NOTE — PROGRESS NOTE ADULT - PROBLEM SELECTOR PLAN 1
-As per Patient she has h/o RCC and had nephrectomy by urology by Dr gerhard hall at [facility], also had lymph node dissection 2 years ago  - Now Axillary, subclavicular and mediastinal lymphadenopathy, pulmonary nodules and inflammatory changes of breast seen on CT with possible effect on IVC  - oncology consulted : Ordered CT abdomen/pelvis that showed Status post right nephrectomy. No lymphadenopathy or evidence of new   metastatic lesion. A new 1.6 mL pancreatic neck cystic lesion without main pancreatic ductal   dilatation. Differentials include side branch IPMN. Interval increase in small right and trace left pleural effusions: will obtain US abdomen to see CBD dilation if there   -Supraclavicular lymph node biopsy by IR on 3/15 per onc recs   - will consider refering to breast surgery pending oncology input   -Will obtain CT neck as patient was c/o neck pain and swelling, will consult ENT as well, for now protecting airway, will continue to monitor

## 2024-03-13 NOTE — DIETITIAN INITIAL EVALUATION ADULT - ADD RECOMMEND
1. diet advancement per team when medically appropriate  2. recommend addition of Ensure Plus HP 1x/day as trial for extra caloric/protein support (patient/family amenable to trial of oral nutrition supplement)  3. hyponatremia: management per team  4. monitor PO intake, weight trend, electrolytes, blood glucose levels, labs, BMs

## 2024-03-14 ENCOUNTER — RESULT REVIEW (OUTPATIENT)
Age: 59
End: 2024-03-14

## 2024-03-14 LAB
ADD ON TEST-SPECIMEN IN LAB: SIGNIFICANT CHANGE UP
ALBUMIN SERPL ELPH-MCNC: 3.4 G/DL — SIGNIFICANT CHANGE UP (ref 3.3–5)
ALP SERPL-CCNC: 124 U/L — HIGH (ref 40–120)
ALT FLD-CCNC: 14 U/L — SIGNIFICANT CHANGE UP (ref 10–45)
ANION GAP SERPL CALC-SCNC: 10 MMOL/L — SIGNIFICANT CHANGE UP (ref 5–17)
APTT BLD: 34 SEC — SIGNIFICANT CHANGE UP (ref 24.5–35.6)
AST SERPL-CCNC: 8 U/L — LOW (ref 10–40)
BILIRUB SERPL-MCNC: 0.4 MG/DL — SIGNIFICANT CHANGE UP (ref 0.2–1.2)
BUN SERPL-MCNC: 13 MG/DL — SIGNIFICANT CHANGE UP (ref 7–23)
CALCIUM SERPL-MCNC: 9.5 MG/DL — SIGNIFICANT CHANGE UP (ref 8.4–10.5)
CHLORIDE SERPL-SCNC: 93 MMOL/L — LOW (ref 96–108)
CO2 SERPL-SCNC: 25 MMOL/L — SIGNIFICANT CHANGE UP (ref 22–31)
CREAT SERPL-MCNC: 0.84 MG/DL — SIGNIFICANT CHANGE UP (ref 0.5–1.3)
EGFR: 80 ML/MIN/1.73M2 — SIGNIFICANT CHANGE UP
GLUCOSE SERPL-MCNC: 119 MG/DL — HIGH (ref 70–99)
HCT VFR BLD CALC: 32.5 % — LOW (ref 34.5–45)
HCT VFR BLD CALC: 33.2 % — LOW (ref 34.5–45)
HCT VFR BLD CALC: 33.8 % — LOW (ref 34.5–45)
HGB BLD-MCNC: 10.8 G/DL — LOW (ref 11.5–15.5)
HGB BLD-MCNC: 11.1 G/DL — LOW (ref 11.5–15.5)
HGB BLD-MCNC: 11.3 G/DL — LOW (ref 11.5–15.5)
INR BLD: 1.11 RATIO — SIGNIFICANT CHANGE UP (ref 0.85–1.18)
MCHC RBC-ENTMCNC: 28.7 PG — SIGNIFICANT CHANGE UP (ref 27–34)
MCHC RBC-ENTMCNC: 28.7 PG — SIGNIFICANT CHANGE UP (ref 27–34)
MCHC RBC-ENTMCNC: 29.5 PG — SIGNIFICANT CHANGE UP (ref 27–34)
MCHC RBC-ENTMCNC: 32.8 GM/DL — SIGNIFICANT CHANGE UP (ref 32–36)
MCHC RBC-ENTMCNC: 33.2 GM/DL — SIGNIFICANT CHANGE UP (ref 32–36)
MCHC RBC-ENTMCNC: 34 GM/DL — SIGNIFICANT CHANGE UP (ref 32–36)
MCV RBC AUTO: 86.4 FL — SIGNIFICANT CHANGE UP (ref 80–100)
MCV RBC AUTO: 86.7 FL — SIGNIFICANT CHANGE UP (ref 80–100)
MCV RBC AUTO: 87.3 FL — SIGNIFICANT CHANGE UP (ref 80–100)
NRBC # BLD: 0 /100 WBCS — SIGNIFICANT CHANGE UP (ref 0–0)
OSMOLALITY SERPL: 265 MOSMOL/KG — LOW (ref 280–301)
OSMOLALITY UR: 332 MOS/KG — SIGNIFICANT CHANGE UP (ref 300–900)
PLATELET # BLD AUTO: 311 K/UL — SIGNIFICANT CHANGE UP (ref 150–400)
PLATELET # BLD AUTO: 324 K/UL — SIGNIFICANT CHANGE UP (ref 150–400)
PLATELET # BLD AUTO: 359 K/UL — SIGNIFICANT CHANGE UP (ref 150–400)
POTASSIUM SERPL-MCNC: 3.4 MMOL/L — LOW (ref 3.5–5.3)
POTASSIUM SERPL-SCNC: 3.4 MMOL/L — LOW (ref 3.5–5.3)
PROT SERPL-MCNC: 6.6 G/DL — SIGNIFICANT CHANGE UP (ref 6–8.3)
PROTHROM AB SERPL-ACNC: 11.6 SEC — SIGNIFICANT CHANGE UP (ref 9.5–13)
RBC # BLD: 3.76 M/UL — LOW (ref 3.8–5.2)
RBC # BLD: 3.83 M/UL — SIGNIFICANT CHANGE UP (ref 3.8–5.2)
RBC # BLD: 3.87 M/UL — SIGNIFICANT CHANGE UP (ref 3.8–5.2)
RBC # FLD: 12.9 % — SIGNIFICANT CHANGE UP (ref 10.3–14.5)
SODIUM SERPL-SCNC: 128 MMOL/L — LOW (ref 135–145)
SODIUM UR-SCNC: 25 MMOL/L — SIGNIFICANT CHANGE UP
WBC # BLD: 6.21 K/UL — SIGNIFICANT CHANGE UP (ref 3.8–10.5)
WBC # BLD: 6.79 K/UL — SIGNIFICANT CHANGE UP (ref 3.8–10.5)
WBC # BLD: 7.35 K/UL — SIGNIFICANT CHANGE UP (ref 3.8–10.5)
WBC # FLD AUTO: 6.21 K/UL — SIGNIFICANT CHANGE UP (ref 3.8–10.5)
WBC # FLD AUTO: 6.79 K/UL — SIGNIFICANT CHANGE UP (ref 3.8–10.5)
WBC # FLD AUTO: 7.35 K/UL — SIGNIFICANT CHANGE UP (ref 3.8–10.5)

## 2024-03-14 PROCEDURE — 88342 IMHCHEM/IMCYTCHM 1ST ANTB: CPT | Mod: 26,59

## 2024-03-14 PROCEDURE — 99222 1ST HOSP IP/OBS MODERATE 55: CPT | Mod: GC

## 2024-03-14 PROCEDURE — 76942 ECHO GUIDE FOR BIOPSY: CPT | Mod: 26

## 2024-03-14 PROCEDURE — 88333 PATH CONSLTJ SURG CYTO XM 1: CPT | Mod: 26

## 2024-03-14 PROCEDURE — 88305 TISSUE EXAM BY PATHOLOGIST: CPT | Mod: 26

## 2024-03-14 PROCEDURE — 88360 TUMOR IMMUNOHISTOCHEM/MANUAL: CPT | Mod: 26

## 2024-03-14 PROCEDURE — 99223 1ST HOSP IP/OBS HIGH 75: CPT | Mod: 57

## 2024-03-14 PROCEDURE — 71270 CT THORAX DX C-/C+: CPT | Mod: 26

## 2024-03-14 PROCEDURE — 38505 NEEDLE BIOPSY LYMPH NODES: CPT | Mod: RT

## 2024-03-14 PROCEDURE — 99233 SBSQ HOSP IP/OBS HIGH 50: CPT

## 2024-03-14 PROCEDURE — 70491 CT SOFT TISSUE NECK W/DYE: CPT | Mod: 26

## 2024-03-14 PROCEDURE — 88341 IMHCHEM/IMCYTCHM EA ADD ANTB: CPT | Mod: 26,59

## 2024-03-14 RX ORDER — AMPICILLIN SODIUM AND SULBACTAM SODIUM 250; 125 MG/ML; MG/ML
3 INJECTION, POWDER, FOR SUSPENSION INTRAMUSCULAR; INTRAVENOUS ONCE
Refills: 0 | Status: COMPLETED | OUTPATIENT
Start: 2024-03-14 | End: 2024-03-14

## 2024-03-14 RX ORDER — HYDROMORPHONE HYDROCHLORIDE 2 MG/ML
0.5 INJECTION INTRAMUSCULAR; INTRAVENOUS; SUBCUTANEOUS EVERY 6 HOURS
Refills: 0 | Status: DISCONTINUED | OUTPATIENT
Start: 2024-03-14 | End: 2024-03-19

## 2024-03-14 RX ORDER — HEPARIN SODIUM 5000 [USP'U]/ML
INJECTION INTRAVENOUS; SUBCUTANEOUS
Qty: 25000 | Refills: 0 | Status: DISCONTINUED | OUTPATIENT
Start: 2024-03-14 | End: 2024-03-15

## 2024-03-14 RX ORDER — POLYETHYLENE GLYCOL 3350 17 G/17G
17 POWDER, FOR SOLUTION ORAL DAILY
Refills: 0 | Status: DISCONTINUED | OUTPATIENT
Start: 2024-03-14 | End: 2024-03-19

## 2024-03-14 RX ORDER — HEPARIN SODIUM 5000 [USP'U]/ML
4000 INJECTION INTRAVENOUS; SUBCUTANEOUS EVERY 6 HOURS
Refills: 0 | Status: DISCONTINUED | OUTPATIENT
Start: 2024-03-14 | End: 2024-03-15

## 2024-03-14 RX ORDER — HEPARIN SODIUM 5000 [USP'U]/ML
2000 INJECTION INTRAVENOUS; SUBCUTANEOUS EVERY 6 HOURS
Refills: 0 | Status: DISCONTINUED | OUTPATIENT
Start: 2024-03-14 | End: 2024-03-15

## 2024-03-14 RX ORDER — SENNA PLUS 8.6 MG/1
1 TABLET ORAL AT BEDTIME
Refills: 0 | Status: DISCONTINUED | OUTPATIENT
Start: 2024-03-14 | End: 2024-03-19

## 2024-03-14 RX ORDER — AMPICILLIN SODIUM AND SULBACTAM SODIUM 250; 125 MG/ML; MG/ML
INJECTION, POWDER, FOR SUSPENSION INTRAMUSCULAR; INTRAVENOUS
Refills: 0 | Status: DISCONTINUED | OUTPATIENT
Start: 2024-03-14 | End: 2024-03-16

## 2024-03-14 RX ORDER — POTASSIUM CHLORIDE 20 MEQ
20 PACKET (EA) ORAL ONCE
Refills: 0 | Status: COMPLETED | OUTPATIENT
Start: 2024-03-14 | End: 2024-03-14

## 2024-03-14 RX ORDER — AMPICILLIN SODIUM AND SULBACTAM SODIUM 250; 125 MG/ML; MG/ML
3 INJECTION, POWDER, FOR SUSPENSION INTRAMUSCULAR; INTRAVENOUS EVERY 6 HOURS
Refills: 0 | Status: DISCONTINUED | OUTPATIENT
Start: 2024-03-15 | End: 2024-03-16

## 2024-03-14 RX ADMIN — AMPICILLIN SODIUM AND SULBACTAM SODIUM 200 GRAM(S): 250; 125 INJECTION, POWDER, FOR SUSPENSION INTRAMUSCULAR; INTRAVENOUS at 20:54

## 2024-03-14 RX ADMIN — Medication 1 PATCH: at 18:10

## 2024-03-14 RX ADMIN — HEPARIN SODIUM 900 UNIT(S)/HR: 5000 INJECTION INTRAVENOUS; SUBCUTANEOUS at 19:23

## 2024-03-14 RX ADMIN — HEPARIN SODIUM 900 UNIT(S)/HR: 5000 INJECTION INTRAVENOUS; SUBCUTANEOUS at 17:14

## 2024-03-14 RX ADMIN — Medication 650 MILLIGRAM(S): at 02:14

## 2024-03-14 RX ADMIN — OXYCODONE HYDROCHLORIDE 15 MILLIGRAM(S): 5 TABLET ORAL at 09:23

## 2024-03-14 RX ADMIN — Medication 20 MILLIEQUIVALENT(S): at 14:37

## 2024-03-14 RX ADMIN — Medication 1 PATCH: at 16:00

## 2024-03-14 RX ADMIN — OXYCODONE HYDROCHLORIDE 15 MILLIGRAM(S): 5 TABLET ORAL at 09:53

## 2024-03-14 RX ADMIN — PANTOPRAZOLE SODIUM 40 MILLIGRAM(S): 20 TABLET, DELAYED RELEASE ORAL at 05:11

## 2024-03-14 RX ADMIN — Medication 1 PATCH: at 14:36

## 2024-03-14 RX ADMIN — Medication 5 MILLIGRAM(S): at 11:01

## 2024-03-14 RX ADMIN — Medication 100 MILLIGRAM(S): at 05:11

## 2024-03-14 RX ADMIN — OXYCODONE HYDROCHLORIDE 15 MILLIGRAM(S): 5 TABLET ORAL at 22:23

## 2024-03-14 RX ADMIN — OXYCODONE HYDROCHLORIDE 15 MILLIGRAM(S): 5 TABLET ORAL at 15:27

## 2024-03-14 RX ADMIN — HYDROMORPHONE HYDROCHLORIDE 0.5 MILLIGRAM(S): 2 INJECTION INTRAMUSCULAR; INTRAVENOUS; SUBCUTANEOUS at 19:33

## 2024-03-14 RX ADMIN — Medication 650 MILLIGRAM(S): at 01:44

## 2024-03-14 RX ADMIN — OXYCODONE HYDROCHLORIDE 15 MILLIGRAM(S): 5 TABLET ORAL at 03:17

## 2024-03-14 RX ADMIN — Medication 5 MILLIGRAM(S): at 23:50

## 2024-03-14 RX ADMIN — OXYCODONE HYDROCHLORIDE 15 MILLIGRAM(S): 5 TABLET ORAL at 15:59

## 2024-03-14 RX ADMIN — Medication 100 MILLIGRAM(S): at 14:37

## 2024-03-14 RX ADMIN — CHLORHEXIDINE GLUCONATE 1 APPLICATION(S): 213 SOLUTION TOPICAL at 14:37

## 2024-03-14 NOTE — PROGRESS NOTE ADULT - NSPROGADDITIONALINFOA_GEN_ALL_CORE
time spent reviewing prior charts, meds, discussing plan with patient=  54 minutes    d/w ACP Claudia time spent reviewing prior charts, meds, discussing plan with patient=  54 minutes    Pt's spouse Saeid updated on plan of care.    d/w ACP Claudia

## 2024-03-14 NOTE — CONSULT NOTE ADULT - SUBJECTIVE AND OBJECTIVE BOX
Patient is a 58y old  Female who presents with a chief complaint of Mastitis, breast malignancy (14 Mar 2024 14:07)    HPI:  58F w/hx of RCC s/p R total nephrectomy, s/p hysterectomy, R-sided glaucoma, Fibromyalgia, Anxiety, GERD, smoker presents due to R breast swelling, redness and pain. Pt was initially evaluated by [place] [facility] yesterday and was going to be admitted but left AMA. Was prescribed clindamycin for presumed breast cellulitis/mastitis on discharge, but was also told about concerning findings on CT Chest related to possible breast malignancy and metastasis. Pt has not had a mammogram or colonoscopy in years. Reports that she had a lidocaine injection in her R shoulder on 3/8/24 and since then has had worsening swelling, redness and pain in her R breast. Also still has R shoulder pain, which had mild improvement since the injection. Pt consistently takes oxycodone q6h and believes this may have masked her pain earlier. Reports over 40 pound weight loss in the last year and loss of appetite. Has conjunctival injection in R eye which she claims is chronic but she also feels her eyes are more swollen than usual currently. She was also having rt neck swelling, sore throat and change in voice. Smokes 1ppd for many years. Patient denies fever, chills, chest pain, SOB, headache, dizziness, abd pain, nausea, vomiting, constipation, dysuria. (11 Mar 2024 23:59)    Has been afebrile, no leucocytosis.       prior hospital charts reviewed [  ]  primary team notes reviewed [ x ]  other consultant notes reviewed [ x ]    PAST MEDICAL & SURGICAL HISTORY:  Anxiety      Acid reflux disease      Umbilical hernia      Uterine mass  Benign      Fibromyalgia  Joint pains      Glaucoma  Right eye      Herniated cervical disc      Cancer of kidney  right kidney      S/P partial hysterectomy  8 years ago      S/P knee surgery  knee arthroscopy right x 2 ( 2011 & 2012)      S/P hernia repair  umbilical Hernia Repair - 2011      H/O unilateral nephrectomy  Right Laparoscopic Nephrectomy - [id number]      Glaucoma following surgery  Right Eyr - 2012      History of tonsillectomy          Allergies  Cipro (Headache; Vomiting; Rash)  penicillin (Headache; Vomiting; Rash)  erythromycin (Headache; Vomiting; Rash)    ANTIMICROBIALS (past 90 days)  MEDICATIONS  (STANDING):  ceFAZolin   IVPB   100 mL/Hr IV Intermittent (03-14-24 @ 14:37)   100 mL/Hr IV Intermittent (03-14-24 @ 05:11)   100 mL/Hr IV Intermittent (03-13-24 @ 21:19)   100 mL/Hr IV Intermittent (03-13-24 @ 13:03)   100 mL/Hr IV Intermittent (03-13-24 @ 05:59)   100 mL/Hr IV Intermittent (03-12-24 @ 21:12)   100 mL/Hr IV Intermittent (03-12-24 @ 14:21)   100 mL/Hr IV Intermittent (03-12-24 @ 05:15)    clindamycin   Capsule   300 milliGRAM(s) Oral (03-11-24 @ 20:13)        ceFAZolin   IVPB 1000 every 8 hours    MEDICATIONS  (STANDING):  acetaminophen     Tablet .. 650 every 6 hours PRN  diazepam    Tablet 5 two times a day PRN  heparin   Injectable 4000 every 6 hours PRN  heparin   Injectable 2000 every 6 hours PRN  heparin  Infusion.  <Continuous>  HYDROmorphone  Injectable 0.5 every 6 hours PRN  influenza   Vaccine 0.5 once  melatonin 3 at bedtime PRN  oxyCODONE    IR 15 every 6 hours PRN  pantoprazole    Tablet 40 before breakfast  polyethylene glycol 3350 17 daily  senna 1 at bedtime    SOCIAL HISTORY:  Lives at home, on disability, no pets, no active tobacco, drug, alcohol use     FAMILY HISTORY:  Family history of lung cancer (Mother)    Family history of pancreatic cancer (Father)      REVIEW OF SYSTEMS  [  ] ROS unobtainable because:    [  ] All other systems negative except as noted below:	    Constitutional:  [ ] fever [ ] chills  [ ] weight loss  [ ] weakness  Skin:  [ ] rash [ ] phlebitis	  Eyes: [ ] icterus [ ] pain  [ ] discharge	  ENMT: [ ] sore throat  [ ] thrush [ ] ulcers [ ] exudates  Respiratory: [ ] dyspnea [ ] hemoptysis [ ] cough [ ] sputum	  Cardiovascular:  [ ] chest pain [ ] palpitations [ ] edema	  Gastrointestinal:  [ ] nausea [ ] vomiting [ ] diarrhea [ ] constipation [ ] pain	  Genitourinary:  [ ] dysuria [ ] frequency [ ] hematuria [ ] discharge [ ] flank pain  [ ] incontinence  Musculoskeletal:  [ ] myalgias [ ] arthralgias [ ] arthritis  [ ] back pain  Neurological:  [ ] headache [ ] seizures  [ ] confusion/altered mental status  Psychiatric:  [ ] anxiety [ ] depression	  Hematology/Lymphatics:  [ ] lymphadenopathy  Endocrine:  [ ] adrenal [ ] thyroid  Allergic/Immunologic:	 [ ] transplant [ ] seasonal    Vital Signs Last 24 Hrs  T(F): 97.4 (03-14-24 @ 12:02), Max: 98.5 (03-12-24 @ 20:20)  Vital Signs Last 24 Hrs  HR: 96 (03-14-24 @ 12:02) (60 - 96)  BP: 116/78 (03-14-24 @ 12:02) (110/70 - 150/90)  RR: 18 (03-14-24 @ 12:02)  SpO2: 95% (03-14-24 @ 12:02) (93% - 97%)  Wt(kg): --    PHYSICAL EXAM:    General: Patient in NAD  HEENT: Swelling Rt lateral and anterior neck, swelling rt face, tenderness rt neck  CV: S1+S2, no m/r/g appreciated   Lungs: No respiratory distress, CTAB  Abd: Soft, nontender, no guarding, no rebound tenderness, + bowel sounds   : No suprapubic tenderness  Neuro: Alert and oriented to time, place and person. Moves all extremities against gravity.  Ext: No cyanosis, no edema  Skin: No rash, no phlebitis  Rt breast with swelling/ fullness                            11.1   7.35  )-----------( 359      ( 14 Mar 2024 06:49 )             33.8   03-14    128<L>  |  93<L>  |  13  ----------------------------<  119<H>  3.4<L>   |  25  |  0.84    Ca    9.5      14 Mar 2024 06:49    TPro  6.6  /  Alb  3.4  /  TBili  0.4  /  DBili  x   /  AST  8<L>  /  ALT  14  /  AlkPhos  124<H>  03-14    Urinalysis Basic - ( 14 Mar 2024 06:49 )    Color: x / Appearance: x / SG: x / pH: x  Gluc: 119 mg/dL / Ketone: x  / Bili: x / Urobili: x   Blood: x / Protein: x / Nitrite: x   Leuk Esterase: x / RBC: x / WBC x   Sq Epi: x / Non Sq Epi: x / Bacteria: x    MICROBIOLOGY:  Culture - Blood (collected 12 Mar 2024 16:46)  Source: .Blood Blood-Peripheral  Preliminary Report (13 Mar 2024 20:01):    No growth at 24 hours    Culture - Blood (collected 12 Mar 2024 15:51)  Source: .Blood Blood-Peripheral  Preliminary Report (13 Mar 2024 20:01):    No growth at 24 hours    Culture - Urine (collected 10 Mar 2024 19:43)  Source: Clean Catch Clean Catch (Midstream)  Final Report (11 Mar 2024 22:19):    <10,000 CFU/mL Normal Urogenital Tammie    Culture - Blood (collected 10 Mar 2024 17:54)  Source: .Blood Blood-Peripheral  Preliminary Report (14 Mar 2024 01:01):    No growth at 72 Hours    Culture - Blood (collected 10 Mar 2024 17:54)  Source: .Blood Blood-Peripheral  Preliminary Report (14 Mar 2024 01:01):    No growth at 72 Hours                  RADIOLOGY:  imaging below personally reviewed and agree with findings Patient is a 58y old  Female who presents with a chief complaint of Mastitis, breast malignancy (14 Mar 2024 14:07)    HPI:  58F w/hx of RCC s/p R total nephrectomy, s/p hysterectomy, R-sided glaucoma, Fibromyalgia, Anxiety, GERD, smoker presents due to R breast swelling, redness and pain. Pt was initially evaluated by [place] [facility] yesterday and was going to be admitted but left AMA. Was prescribed clindamycin for presumed breast cellulitis/mastitis on discharge, but was also told about concerning findings on CT Chest related to possible breast malignancy and metastasis. Pt has not had a mammogram or colonoscopy in years. Reports that she had a lidocaine injection in her R shoulder on 3/8/24 and since then has had worsening swelling, redness and pain in her R breast. Also still has R shoulder pain, which had mild improvement since the injection. Pt consistently takes oxycodone q6h and believes this may have masked her pain earlier. Reports over 40 pound weight loss in the last year and loss of appetite. Has conjunctival injection in R eye which she claims is chronic but she also feels her eyes are more swollen than usual currently. She was also having rt neck swelling, sore throat and change in voice. Smokes 1ppd for many years. Patient denies fever, chills, chest pain, SOB, headache, dizziness, abd pain, nausea, vomiting, constipation, dysuria. (11 Mar 2024 23:59)    Has been afebrile, no leucocytosis.       prior hospital charts reviewed [  ]  primary team notes reviewed [ x ]  other consultant notes reviewed [ x ]    PAST MEDICAL & SURGICAL HISTORY:  Anxiety      Acid reflux disease      Umbilical hernia      Uterine mass  Benign      Fibromyalgia  Joint pains      Glaucoma  Right eye      Herniated cervical disc      Cancer of kidney  right kidney      S/P partial hysterectomy  8 years ago      S/P knee surgery  knee arthroscopy right x 2 ( 2011 & 2012)      S/P hernia repair  umbilical Hernia Repair - 2011      H/O unilateral nephrectomy  Right Laparoscopic Nephrectomy - [id number]      Glaucoma following surgery  Right Eyr - 2012      History of tonsillectomy          Allergies  Cipro (Headache; Vomiting; Rash)  penicillin (Headache; Vomiting; Rash)  erythromycin (Headache; Vomiting; Rash)    ANTIMICROBIALS (past 90 days)  MEDICATIONS  (STANDING):  ceFAZolin   IVPB   100 mL/Hr IV Intermittent (03-14-24 @ 14:37)   100 mL/Hr IV Intermittent (03-14-24 @ 05:11)   100 mL/Hr IV Intermittent (03-13-24 @ 21:19)   100 mL/Hr IV Intermittent (03-13-24 @ 13:03)   100 mL/Hr IV Intermittent (03-13-24 @ 05:59)   100 mL/Hr IV Intermittent (03-12-24 @ 21:12)   100 mL/Hr IV Intermittent (03-12-24 @ 14:21)   100 mL/Hr IV Intermittent (03-12-24 @ 05:15)    clindamycin   Capsule   300 milliGRAM(s) Oral (03-11-24 @ 20:13)        ceFAZolin   IVPB 1000 every 8 hours    MEDICATIONS  (STANDING):  acetaminophen     Tablet .. 650 every 6 hours PRN  diazepam    Tablet 5 two times a day PRN  heparin   Injectable 4000 every 6 hours PRN  heparin   Injectable 2000 every 6 hours PRN  heparin  Infusion.  <Continuous>  HYDROmorphone  Injectable 0.5 every 6 hours PRN  influenza   Vaccine 0.5 once  melatonin 3 at bedtime PRN  oxyCODONE    IR 15 every 6 hours PRN  pantoprazole    Tablet 40 before breakfast  polyethylene glycol 3350 17 daily  senna 1 at bedtime    SOCIAL HISTORY:  Lives at home, on disability, no pets, no active tobacco, drug, alcohol use     FAMILY HISTORY:  Family history of lung cancer (Mother)    Family history of pancreatic cancer (Father)      REVIEW OF SYSTEMS  [  ] ROS unobtainable because:    [X] All other systems negative except as noted below:	    Constitutional:  [ ] fever [ ] chills  [ ] weight loss  [ ] weakness  Skin:  [ ] rash [ ] phlebitis	  Eyes: [ ] icterus [ ] pain  [ ] discharge	  ENMT: [ ] sore throat  [ ] thrush [ ] ulcers [ ] exudates [X] neck pain  Respiratory: [ ] dyspnea [ ] hemoptysis [ ] cough [ ] sputum	  Cardiovascular:  [ ] chest pain [ ] palpitations [ ] edema	  Gastrointestinal:  [ ] nausea [ ] vomiting [ ] diarrhea [ ] constipation [ ] pain	  Genitourinary:  [ ] dysuria [ ] frequency [ ] hematuria [ ] discharge [ ] flank pain  [ ] incontinence  Musculoskeletal:  [ ] myalgias [ ] arthralgias [ ] arthritis  [ ] back pain  Neurological:  [ ] headache [ ] seizures  [ ] confusion/altered mental status  Psychiatric:  [ ] anxiety [ ] depression	  Hematology/Lymphatics:  [ ] lymphadenopathy  Endocrine:  [ ] adrenal [ ] thyroid  Allergic/Immunologic:	 [ ] transplant [ ] seasonal    Vital Signs Last 24 Hrs  T(F): 97.4 (03-14-24 @ 12:02), Max: 98.5 (03-12-24 @ 20:20)  Vital Signs Last 24 Hrs  HR: 96 (03-14-24 @ 12:02) (60 - 96)  BP: 116/78 (03-14-24 @ 12:02) (110/70 - 150/90)  RR: 18 (03-14-24 @ 12:02)  SpO2: 95% (03-14-24 @ 12:02) (93% - 97%)  Wt(kg): --    PHYSICAL EXAM:    General: Patient in NAD  HEENT: Swelling Rt lateral and anterior neck, swelling rt face, tenderness rt neck  CV: S1+S2, no m/r/g appreciated   Lungs: No respiratory distress, CTAB  Abd: Soft, nontender, no guarding, no rebound tenderness, + bowel sounds   : No suprapubic tenderness  Neuro: Alert and oriented to time, place and person. Moves all extremities against gravity.  Ext: No cyanosis, no edema  Skin: No rash, no phlebitis  Rt breast with swelling/ fullness                            11.1   7.35  )-----------( 359      ( 14 Mar 2024 06:49 )             33.8   03-14    128<L>  |  93<L>  |  13  ----------------------------<  119<H>  3.4<L>   |  25  |  0.84    Ca    9.5      14 Mar 2024 06:49    TPro  6.6  /  Alb  3.4  /  TBili  0.4  /  DBili  x   /  AST  8<L>  /  ALT  14  /  AlkPhos  124<H>  03-14    Urinalysis Basic - ( 14 Mar 2024 06:49 )    Color: x / Appearance: x / SG: x / pH: x  Gluc: 119 mg/dL / Ketone: x  / Bili: x / Urobili: x   Blood: x / Protein: x / Nitrite: x   Leuk Esterase: x / RBC: x / WBC x   Sq Epi: x / Non Sq Epi: x / Bacteria: x    MICROBIOLOGY:  Culture - Blood (collected 12 Mar 2024 16:46)  Source: .Blood Blood-Peripheral  Preliminary Report (13 Mar 2024 20:01):    No growth at 24 hours    Culture - Blood (collected 12 Mar 2024 15:51)  Source: .Blood Blood-Peripheral  Preliminary Report (13 Mar 2024 20:01):    No growth at 24 hours    Culture - Urine (collected 10 Mar 2024 19:43)  Source: Clean Catch Clean Catch (Midstream)  Final Report (11 Mar 2024 22:19):    <10,000 CFU/mL Normal Urogenital Tammie    Culture - Blood (collected 10 Mar 2024 17:54)  Source: .Blood Blood-Peripheral  Preliminary Report (14 Mar 2024 01:01):    No growth at 72 Hours    Culture - Blood (collected 10 Mar 2024 17:54)  Source: .Blood Blood-Peripheral  Preliminary Report (14 Mar 2024 01:01):    No growth at 72 Hours                  RADIOLOGY:  imaging below personally reviewed and agree with findings  < from: CT Chest w/wo IV Cont (03.14.24 @ 18:33) >    ******PRELIMINARY REPORT******      ******PRELIMINARY REPORT******         ACC: [id number] EXAM:  CT CHEST WAW IC   ORDERED BY: TORIN DANIELSON     PROCEDURE DATE:  03/14/2024    ******PRELIMINARY REPORT******      ******PRELIMINARY REPORT******           INTERPRETATION:  CLINICAL INFORMATION: Mediastinal mass    COMPARISON: CT chest 3/10/2024    CONTRAST/COMPLICATIONS:  IV Contrast: Omnipaque 350  80 cc administered   20 cc discarded  Oral Contrast: NONE  Complications: None reported at time of study completion    PRELIMINARY  IMPRESSION:  Conglomerate soft tissue in the superior mediastinum, predominantly in   the right paratracheal region, with is contiguous with conglomerate right   cervical adenopathy. The SVC is obliterated and there is significant mass   effect on the left brachiocephalic vein. Upstream occlusive thrombus is   noted in the right internal jugular vein and subclavian vein. Venous   collateral vessels are noted throughout the soft tissues of the right   hemithorax and upper extremity.    Small pericardial effusion, unchanged.    Emphysema.  Scattered pulmonary nodules are without significant interval change since   3/10/2024.  Increased small right pleural effusion with associated passive   atelectasis.    Follow-up official report.    Dr. Arguello discussed these findings with RYAN Aldridge on 3/14/2024   8:26 PM with read back.        ******PRELIMINARY REPORT******      ******PRELIMINARY REPORT******        SRINIVASA BARRERA MD; Resident Radiology  This document is a PRELIMINARY interpretation and is pending final   attending approval. Mar 14 2024  8:28PM    < end of copied text >

## 2024-03-14 NOTE — CONSULT NOTE ADULT - ASSESSMENT
58F w/Hx of RCC s/p R total nephrectomy, S/p hysterectomy, R-sided glaucoma, Fibromyalgia, Anxiety, GERD, smoker presents due to R breast swelling, redness and pain. Found to have extensive R supraclavicular, axillary, and mediastinal lymphadenopathy. Also noted to have pulmonary nodules w/bilateral pleural effusions, associated with R face and RUE edema and compressive symptoms.    Recommend CT w/ IV contrast to better evaluate intrathoracic involvement and evaluate for SVC syndrome  No acute surgical intervention  F/u IR bx    Discussed with Dr. Ruiz    Thoracic Surgery [id number] 58F w/Hx of RCC s/p R total nephrectomy, S/p hysterectomy, R-sided glaucoma, Fibromyalgia, Anxiety, GERD, smoker presents due to R breast swelling, redness and pain. Found to have extensive R supraclavicular, axillary, and mediastinal lymphadenopathy. Also noted to have pulmonary nodules w/bilateral pleural effusions, associated with R face and RUE edema and compressive symptoms.    Recommend CT chest w/ IV contrast to better evaluate intrathoracic involvement and evaluate for SVC syndrome  No acute surgical intervention  F/u IR bx    Discussed with Dr. Ruiz    Thoracic Surgery [id number]

## 2024-03-14 NOTE — CONSULT NOTE ADULT - SUBJECTIVE AND OBJECTIVE BOX
THORACIC SURGERY CONSULT NOTE    Consulting surgical team: Thoracic Surgery [id number]  Consulting attending: Dr. Ruiz    HPI:  58F w/Hx of RCC s/p R total nephrectomy, S/p hysterectomy, R-sided glaucoma, Fibromyalgia, Anxiety, GERD, smoker presents due to R breast swelling, redness and pain. Pt was initially evaluated by [place] [facility] yesterday and was going to be admitted but left AMA. Was prescribed clindamycin for presumed breast cellulitis/mastitis on discharge, but was also told about concerning findings on CT Chest related to possible breast malignancy and metastasis. Pt has not had a mammogram or colonoscopy in years. Reports that she had a lidocaine injection in her R shoulder on 3/8/24 and since then has had worsening swelling, redness and pain in her R breast. Also still has R shoulder pain, which had mild improvement since the injection. Pt consistently takes oxycodone q6h and believes this may have masked her pain earlier. Reports over 40 pound weight loss in the last year and loss of appetite. Has conjunctival injection in R eye which she claims is chronic but she also feels her eyes are more swollen than usual currently. Denies vision changes. Smokes 1ppd for many years. Patient denies fever, chills, chest pain, SOB, headache, dizziness, abd pain, nausea, vomiting, constipation, dysuria. (11 Mar 2024 23:59)    Patient presented with R shoulder/breast pain. Found to have extensive R supraclavicular, axillary, and mediastinal lymphadenopathy. Also noted to have pulmonary nodules w/bilateral pleural effusions. Pt is s/p IR biopsy of supraclavicular nodes. Patient c/o R shoulder pain, R face swelling, R ptosis, neck heaviness, R face pain, and hoarseness.. Denies dyspnea, chest pain, cough.     PAST MEDICAL HISTORY:  Anxiety    Acid reflux disease    Renal cancer, left    Umbilical hernia    Uterine mass    Fibromyalgia    Glaucoma    Herniated cervical disc    FH: kidney cancer    Cancer of kidney        PAST SURGICAL HISTORY:  S/P partial hysterectomy    S/P knee surgery    S/P hernia repair    H/O unilateral nephrectomy    Glaucoma following surgery    History of tonsillectomy        MEDICATIONS:  acetaminophen     Tablet .. 650 milliGRAM(s) Oral every 6 hours PRN  ampicillin/sulbactam  IVPB      chlorhexidine 2% Cloths 1 Application(s) Topical daily  diazepam    Tablet 5 milliGRAM(s) Oral two times a day PRN  heparin   Injectable 2000 Unit(s) IV Push every 6 hours PRN  heparin   Injectable 4000 Unit(s) IV Push every 6 hours PRN  heparin  Infusion.  Unit(s)/Hr IV Continuous <Continuous>  HYDROmorphone  Injectable 0.5 milliGRAM(s) IV Push every 6 hours PRN  influenza   Vaccine 0.5 milliLiter(s) IntraMuscular once  melatonin 3 milliGRAM(s) Oral at bedtime PRN  nicotine -  14 mG/24Hr(s) Patch 1 Patch Transdermal daily  oxyCODONE    IR 15 milliGRAM(s) Oral every 6 hours PRN  pantoprazole    Tablet 40 milliGRAM(s) Oral before breakfast  polyethylene glycol 3350 17 Gram(s) Oral daily  senna 1 Tablet(s) Oral at bedtime      ALLERGIES:  Cipro (Headache; Vomiting; Rash)  penicillin (Headache; Vomiting; Rash)  erythromycin (Headache; Vomiting; Rash)      VITALS & I/Os:  Vital Signs Last 24 Hrs  T(C): 36.3 (14 Mar 2024 12:02), Max: 36.6 (14 Mar 2024 11:33)  T(F): 97.4 (14 Mar 2024 12:02), Max: 97.8 (14 Mar 2024 11:33)  HR: 96 (14 Mar 2024 12:02) (60 - 96)  BP: 116/78 (14 Mar 2024 12:02) (110/70 - 150/90)  BP(mean): --  RR: 18 (14 Mar 2024 12:02) (18 - 18)  SpO2: 95% (14 Mar 2024 12:02) (93% - 97%)    Parameters below as of 14 Mar 2024 12:01  Patient On (Oxygen Delivery Method): room air        I&O's Summary    13 Mar 2024 07:01  -  14 Mar 2024 07:00  --------------------------------------------------------  IN: 720 mL / OUT: 0 mL / NET: 720 mL    14 Mar 2024 07:01  -  14 Mar 2024 17:13  --------------------------------------------------------  IN: 360 mL / OUT: 0 mL / NET: 360 mL        PHYSICAL EXAM:  General: well developed, well nourished, NAD  Neuro: alert and oriented, no focal deficits, moves all extremities spontaneously  HEENT: R ptosis, R face swelling  Neck: R neck swelling, nontedner  Chest: no palpable masses, no crepitus, no ecchymosis  Respiratory: airway patent, respirations unlabored  CVS: regular rate and rhythm  Abdomen: soft, nontender, nondistended  Extremities: no edema, sensation and movement grossly intact  Skin: warm, dry, appropriate color      LABS:                        10.8   6.79  )-----------( 324      ( 14 Mar 2024 17:00 )             32.5     03-14    128<L>  |  93<L>  |  13  ----------------------------<  119<H>  3.4<L>   |  25  |  0.84    Ca    9.5      14 Mar 2024 06:49    TPro  6.6  /  Alb  3.4  /  TBili  0.4  /  DBili  x   /  AST  8<L>  /  ALT  14  /  AlkPhos  124<H>  03-14    Lactate:              Urinalysis Basic - ( 14 Mar 2024 06:49 )    Color: x / Appearance: x / SG: x / pH: x  Gluc: 119 mg/dL / Ketone: x  / Bili: x / Urobili: x   Blood: x / Protein: x / Nitrite: x   Leuk Esterase: x / RBC: x / WBC x   Sq Epi: x / Non Sq Epi: x / Bacteria: x        IMAGING:      < from: CT Chest No Cont (03.10.24 @ 20:45) >  Sagittal and coronal reformats were performed.    FINDINGS:    Suboptimal assessment in the absence of IV contrast, particularly for   delineation of soft tissue and vascular structures.    LUNGS AND AIRWAYS: Secretions within the trachea. Mild centrilobular   emphysema. Scattered bilateral pulmonary nodules with a representative   nodules including: a somewhat spiculated right upper lobe 1.6 nodule   (2-60), spiculated 1.8 cm posterior right upper lobe nodule (2-53),   irregular perifissural nodule right upper lobe (2-67) measuring 1.3 cm,   1.5 cm left upper lobe(2-48) and 0.4 cm left upper lobe nodule (2-45).   Right middle lobe subsegmental and lower lobe partial compressive   atelectasis.  PLEURA: Small right and trace pleural effusions.  MEDIASTINUM AND IK: Conglomerate soft tissue in the right paratracheal   region contiguous with the right level IVb medial supraclavicular   conglomerate adenopathy measuring approximately 5.7 x 3.1 x 8.1 cm on   series 2 image 22 and series 5 image 39, suboptimally delineated without   IV contrast. Additional mediastinal lymph nodes include a 1.7 cm left   periaortic (2-51) and 3 cm subcarinal (2-66).  VESSELS: Within normal limits.  HEART: Heart size is normal. Small to moderate pericardial effusion.  CHEST WALL AND LOWER NECK: Asymmetric skin thickening and   edema/inflammation throughout the right breast which involves the right   chest wall without focal fluid collection or soft tissue mass. Asymmetric   right axillary lymph nodes including a 1.9 cm node on series 2 image 68.   Mild inflammatory changes with stranding in the left axillary fat which   does not involve the breasts. Smaller left axillary lymph nodes.   Heterogeneous thyroid gland.  VISUALIZED UPPER ABDOMEN: Right kidney is cannot visualize, suspected to   be surgically absent given surgical clips in the right nephrectomy bed.  BONES: Within normal limits.    IMPRESSION:  Limited evaluation in the absence of IV contrast.    Axillary, supraclavicular and mediastinal adenopathy as well as pulmonary   nodules compatible with metastatic disease. This may be from patient's   known renal cell carcinoma, however, there are asymmetric right breast   inflammatory changes without focal mass. Inflammatory breast cancer   cannot be entirely excluded. Right breast edema/inflammation could   potentially be from IVC obstruction at the level of the right   paratracheal/medial supraclavicular conglomerate adenopathy. Recommend   follow-up with breast surgeon and additional imaging in a dedicated   breast imaging center. Consider percutaneous biopsy of lymph node for   histopathologic confirmation.    Bilateral pleural effusions and small-to-moderate pericardial effusion.    --- End of Report ---    < end of copied text >

## 2024-03-14 NOTE — PROGRESS NOTE ADULT - PROBLEM SELECTOR PLAN 2
- erythema and tenderness of right breast improving  - Unclear if cellulitis or just inflammatory changes as above  - c/w  Ancef q8h for now

## 2024-03-14 NOTE — CHART NOTE - NSCHARTNOTEFT_GEN_A_CORE
Medicine PA note    Notified by radiology w/ prelim results of CT Chest. Pt seen and examined at bedside in NAD, VSS. Pt offers no acute complaints. Case and results discusses w/ thoracic surgery, plan to see pt in am.   Will endorse to primary team in am.     RYAN Aldridge-C  -Medicine

## 2024-03-14 NOTE — PROGRESS NOTE ADULT - PROBLEM SELECTOR PLAN 1
- h/o RCC and had nephrectomy by Urology by Dr Jacky Adkins at [facility], also had lymph node dissection 2 years ago  - Now with axillary, subclavicular and mediastinal lymphadenopathy, pulmonary nodules and inflammatory changes of breast seen on CT with possible effect on IVC  - Heme-Oncology following  - CT neck with Large supraclavicular/mediastinal mass lesion with mass effect and complete occlusion of the right IJ ; Thoracic sx consulted ; reccs dedicated CT chest  - also shows  associated surrounding inflammatory changes in the deep neck, concerning for infectious/inflammatory thrombophlebitis ; ID consulted  - Imaging also shows 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

## 2024-03-14 NOTE — CONSULT NOTE ADULT - ASSESSMENT
58F w/hx of RCC s/p R total nephrectomy, s/p hysterectomy, R-sided glaucoma, Fibromyalgia, Anxiety, GERD, smoker presents due to R breast swelling, redness and pain. Pt was initially evaluated by [place] ED yesterday and was going to be admitted but left AMA. Was prescribed clindamycin for presumed breast cellulitis/mastitis on discharge, but was also told about concerning findings on CT Chest related to possible breast malignancy and metastasis. Pt has not had a mammogram or colonoscopy in years. Reports that she had a lidocaine injection in her R shoulder on 3/8/24 and since then has had worsening swelling, redness and pain in her R breast. Also still has R shoulder pain, which had mild improvement since the injection. Pt consistently takes oxycodone q6h and believes this may have masked her pain earlier. Reports over 40 pound weight loss in the last year and loss of appetite. Has conjunctival injection in R eye which she claims is chronic but she also feels her eyes are more swollen than usual currently. She was also having rt neck swelling, sore throat and change in voice. Smokes 1ppd for many years. Patient denies fever, chills, chest pain, SOB, headache, dizziness, abd pain, nausea, vomiting, constipation, dysuria. (11 Mar 2024 23:59)    Has been afebrile, no leucocytosis    CT Neck Soft Tissue w/ IV Cont   Extensive cellulitis/myositis along the right anterior lateral neck and   right upper chest wall with inflammatory fat induration the deep soft   tissue of the neck.    Large supraclavicular/mediastinal mass lesion, presumably conglomerate of   lymph nodes with mass effect and complete occlusion of the right internal   jugular vein with associated surrounding inflammatory changes in the deep   neck, concerning for infectious/inflammatory thrombophlebitis.     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    CT Abdomen and Pelvis w/ IV Cont   Status post right nephrectomy. No lymphadenopathy or evidence of new   metastatic lesion. A new 1.6 mL pancreatic neck cystic lesion without main pancreatic ductal   dilatation. Differentials include side branch IPMN. Interval increase in small right and trace left pleural effusions.      CT Chest No Cont  Axillary, supraclavicular and mediastinal adenopathy as well as pulmonary   nodules compatible with metastatic disease. This may be from patient's   known renal cell carcinoma, however, there are asymmetric right breast   inflammatory changes without focal mass. Inflammatory breast cancer   cannot be entirely excluded. Right breast edema/inflammation could   potentially be from IVC obstruction at the level of the right   paratracheal/medial supraclavicular conglomerate adenopathy. Recommend   follow-up with breast surgeon and additional imaging in a dedicated   breast imaging center. Consider percutaneous biopsy of lymph node for   histopathologic confirmation.      US Breast Limited, Right     Limited sonographic evaluation of the right breast was performed, targeted to the area of pain. ?Evaluation was performed by the   technologist and submitted for interpretation. This study was performed exclusively for the purpose of excluding the presence of abscess in the   clinical setting of mastitis.?  ?  FINDINGS:  No complicated fluid collection consistent with abscess is   identified.  ?  IMPRESSION:    No sonographic evidence of breast abscess.    Additional imaging in a dedicated breast imaging center should be   performed to exclude the possibility of malignancy.      # Rt Neck Myositis Cellulitis  # Concern for Jugular vein infectious/inflammatory thrombophlebitis  # Rt Mastitis/cellulitis ? 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    Mike Shepherd MD, PGY-4  ID Fellow  [employer] Teams Preferred  After 5pm/weekends call [phone]   58F w/hx of RCC s/p R total nephrectomy, s/p hysterectomy, R-sided glaucoma, Fibromyalgia, Anxiety, GERD, smoker presents due to R breast swelling, redness and pain. Pt was initially evaluated by [place] ED yesterday and was going to be admitted but left AMA. Was prescribed clindamycin for presumed breast cellulitis/mastitis on discharge, but was also told about concerning findings on CT Chest related to possible breast malignancy and metastasis. Pt has not had a mammogram or colonoscopy in years. Reports that she had a lidocaine injection in her R shoulder on 3/8/24 and since then has had worsening swelling, redness and pain in her R breast. Also still has R shoulder pain, which had mild improvement since the injection. Pt consistently takes oxycodone q6h and believes this may have masked her pain earlier. Reports over 40 pound weight loss in the last year and loss of appetite. Has conjunctival injection in R eye which she claims is chronic but she also feels her eyes are more swollen than usual currently. She was also having rt neck swelling, sore throat and change in voice. Smokes 1ppd for many years. Patient denies fever, chills, chest pain, SOB, headache, dizziness, abd pain, nausea, vomiting, constipation, dysuria. (11 Mar 2024 23:59)    Has been afebrile, no leucocytosis    CT Neck Soft Tissue w/ IV Cont   Extensive cellulitis/myositis along the right anterior lateral neck and   right upper chest wall with inflammatory fat induration the deep soft   tissue of the neck.    Large supraclavicular/mediastinal mass lesion, presumably conglomerate of   lymph nodes with mass effect and complete occlusion of the right internal   jugular vein with associated surrounding inflammatory changes in the deep   neck, concerning for infectious/inflammatory thrombophlebitis.     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    CT Abdomen and Pelvis w/ IV Cont   Status post right nephrectomy. No lymphadenopathy or evidence of new   metastatic lesion. A new 1.6 mL pancreatic neck cystic lesion without main pancreatic ductal   dilatation. Differentials include side branch IPMN. Interval increase in small right and trace left pleural effusions.      CT Chest No Cont  Axillary, supraclavicular and mediastinal adenopathy as well as pulmonary   nodules compatible with metastatic disease. This may be from patient's   known renal cell carcinoma, however, there are asymmetric right breast   inflammatory changes without focal mass. Inflammatory breast cancer   cannot be entirely excluded. Right breast edema/inflammation could   potentially be from IVC obstruction at the level of the right   paratracheal/medial supraclavicular conglomerate adenopathy. Recommend   follow-up with breast surgeon and additional imaging in a dedicated   breast imaging center. Consider percutaneous biopsy of lymph node for   histopathologic confirmation.      US Breast Limited, Right     Limited sonographic evaluation of the right breast was performed, targeted to the area of pain. ?Evaluation was performed by the   technologist and submitted for interpretation. This study was performed exclusively for the purpose of excluding the presence of abscess in the   clinical setting of mastitis.?  ?  FINDINGS:  No complicated fluid collection consistent with abscess is   identified.  ?  IMPRESSION:    No sonographic evidence of breast abscess.    Additional imaging in a dedicated breast imaging center should be   performed to exclude the possibility of malignancy.      # Rt Neck Myositis Cellulitis  # Concern for Jugular vein infectious/inflammatory thrombophlebitis  # Rt Mastitis/cellulitis ? 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 and nearly occlusive thrombosis in the proximal left brachiocephalic vein  # Axillary, supraclavicular and mediastinal adenopathy as well as pulmonary nodules compatible with metastatic disease  # h/o penicillin allergy; unclear; has been tolerating amoxicillin; allergy listing to be removed     Afebrile, no leucocytosis, non toxic appearing  Overall favor non infectious process; several findings compatible with metastatic process as above  No evidence of belen-pharyngeal infectious focus to incite septic thromboembolism  Would switch Ancef to Unasyn 3 gm Q6hr  Please send blood cultures  Check ESR/CRP  Continued ENT follow up  Oncology following  Consider General surgery evaluation of Rt breast findings      Discussed with attending and primary service.    Mike Shepherd MD, PGY-4  ID Fellow  [employer] Teams Preferred  After 5pm/weekends call [phone]

## 2024-03-14 NOTE — PROCEDURE NOTE - PROCEDURE FINDINGS AND DETAILS
Right supraclavicular LN biopsy with 4 18-gauge cores obtained. Specimen deemed adequate by cytopathology technologist. Hemostasis with manual pressure.

## 2024-03-14 NOTE — PROGRESS NOTE ADULT - PROBLEM SELECTOR PLAN 4
- Home Oxycodone (Confirmed via [employer])  - oxy increased to 15mg q 6 prn as pt is c/o severe pain   - will add Diluadid 0.5mg IVP q 6 prn for severe pain  - Savella not in formulary

## 2024-03-14 NOTE — PROGRESS NOTE ADULT - SUBJECTIVE AND OBJECTIVE BOX
Patient is a 58y old  Female who presents with a chief complaint of Mastitis, breast malignancy (13 Mar 2024 16:42)      SUBJECTIVE / OVERNIGHT EVENTS:  Pt seen and examined. s/p  right supraclavicular lymph node biopsy this morning, well tolerated. She c/o right sided neck pain, swelling and back pain. She denies SOB, dysphagia, CP.    MEDICATIONS  (STANDING):  ceFAZolin   IVPB 1000 milliGRAM(s) IV Intermittent every 8 hours  chlorhexidine 2% Cloths 1 Application(s) Topical daily  influenza   Vaccine 0.5 milliLiter(s) IntraMuscular once  nicotine -  14 mG/24Hr(s) Patch 1 Patch Transdermal daily  pantoprazole    Tablet 40 milliGRAM(s) Oral before breakfast    MEDICATIONS  (PRN):  acetaminophen     Tablet .. 650 milliGRAM(s) Oral every 6 hours PRN Temp greater or equal to 38C (100.4F), Mild Pain (1 - 3)  diazepam    Tablet 5 milliGRAM(s) Oral two times a day PRN for anxiety  HYDROmorphone  Injectable 0.5 milliGRAM(s) IV Push every 6 hours PRN breakthrough pain  melatonin 3 milliGRAM(s) Oral at bedtime PRN Insomnia  oxyCODONE    IR 15 milliGRAM(s) Oral every 6 hours PRN Severe Pain (7 - 10)      Vital Signs Last 24 Hrs  T(C): 36.3 (14 Mar 2024 12:02), Max: 36.6 (14 Mar 2024 11:33)  T(F): 97.4 (14 Mar 2024 12:02), Max: 97.8 (14 Mar 2024 11:33)  HR: 96 (14 Mar 2024 12:02) (60 - 96)  BP: 116/78 (14 Mar 2024 12:02) (110/70 - 150/90)  BP(mean): --  RR: 18 (14 Mar 2024 12:02) (18 - 18)  SpO2: 95% (14 Mar 2024 12:02) (93% - 97%)    Parameters below as of 14 Mar 2024 12:01  Patient On (Oxygen Delivery Method): room air      CAPILLARY BLOOD GLUCOSE        I&O's Summary    13 Mar 2024 07:01  -  14 Mar 2024 07:00  --------------------------------------------------------  IN: 720 mL / OUT: 0 mL / NET: 720 mL    14 Mar 2024 07:01  -  14 Mar 2024 14:39  --------------------------------------------------------  IN: 360 mL / OUT: 0 mL / NET: 360 mL        PHYSICAL EXAM:  GENERAL: NAD, well-groomed  HEAD:  Atraumatic, Normocephalic  EYES:  conjunctiva and sclera clear  NECK: +right sided neck swelling, tender to palpation  CHEST/LUNG: Clear to auscultation bilaterally; No wheeze  HEART: Regular rate and rhythm; No murmurs, rubs, or gallops  ABDOMEN: Soft, Nontender, Nondistended; Bowel sounds present  EXTREMITIES:  2+ Peripheral Pulses, No clubbing, cyanosis, or edema  PSYCH: AAOx3  NEUROLOGY: non-focal  SKIN: No rashes or lesions    LABS:                        11.1   7.35  )-----------( 359      ( 14 Mar 2024 06:49 )             33.8     03-14    128<L>  |  93<L>  |  13  ----------------------------<  119<H>  3.4<L>   |  25  |  0.84    Ca    9.5      14 Mar 2024 06:49    TPro  6.6  /  Alb  3.4  /  TBili  0.4  /  DBili  x   /  AST  8<L>  /  ALT  14  /  AlkPhos  124<H>  03-14          Urinalysis Basic - ( 14 Mar 2024 06:49 )    Color: x / Appearance: x / SG: x / pH: x  Gluc: 119 mg/dL / Ketone: x  / Bili: x / Urobili: x   Blood: x / Protein: x / Nitrite: x   Leuk Esterase: x / RBC: x / WBC x   Sq Epi: x / Non Sq Epi: x / Bacteria: x        RADIOLOGY & ADDITIONAL TESTS:    Imaging Personally Reviewed:    CT NECK SOFT TISSUE WITH IV CONTRAST  Extensive cellulitis/myositis along the right anterior lateral neck and   right upper chest wall with inflammatory fat induration the deep soft   tissue of the neck.    Large supraclavicular/mediastinal mass lesion, presumably conglomerate of   lymph nodes with mass effect and complete occlusion of the right internal   jugular vein with associated surrounding inflammatory changes in the deep   neck, concerning for infectious/inflammatory thrombophlebitis.   Clinical/laboratory correlation and serial ultrasound follow-up is   recommended.    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    No masslike lesions or abnormal enhancement in aerodigestive mucosa.   Airways are patent.    Cervical adenopathy.    Moderate right pleural effusion progressed relative to recent CT chest   from 3/10/2024. Consider CT chest with contrast to reevaluate.          Consultant(s) Notes Reviewed:  ENT    Care Discussed with Consultants/Other Providers: Heme -Onc

## 2024-03-15 DIAGNOSIS — J98.59 OTHER DISEASES OF MEDIASTINUM, NOT ELSEWHERE CLASSIFIED: ICD-10-CM

## 2024-03-15 DIAGNOSIS — I82.C11 ACUTE EMBOLISM AND THROMBOSIS OF RIGHT INTERNAL JUGULAR VEIN: ICD-10-CM

## 2024-03-15 LAB
ANION GAP SERPL CALC-SCNC: 10 MMOL/L — SIGNIFICANT CHANGE UP (ref 5–17)
ANION GAP SERPL CALC-SCNC: 11 MMOL/L — SIGNIFICANT CHANGE UP (ref 5–17)
ANION GAP SERPL CALC-SCNC: 12 MMOL/L — SIGNIFICANT CHANGE UP (ref 5–17)
APPEARANCE UR: CLEAR — SIGNIFICANT CHANGE UP
APTT BLD: 31.6 SEC — SIGNIFICANT CHANGE UP (ref 24.5–35.6)
APTT BLD: 42 SEC — HIGH (ref 24.5–35.6)
APTT BLD: 54.8 SEC — HIGH (ref 24.5–35.6)
BACTERIA # UR AUTO: NEGATIVE /HPF — SIGNIFICANT CHANGE UP
BILIRUB UR-MCNC: NEGATIVE — SIGNIFICANT CHANGE UP
BUN SERPL-MCNC: 13 MG/DL — SIGNIFICANT CHANGE UP (ref 7–23)
BUN SERPL-MCNC: 13 MG/DL — SIGNIFICANT CHANGE UP (ref 7–23)
BUN SERPL-MCNC: 14 MG/DL — SIGNIFICANT CHANGE UP (ref 7–23)
CALCIUM SERPL-MCNC: 8.7 MG/DL — SIGNIFICANT CHANGE UP (ref 8.4–10.5)
CALCIUM SERPL-MCNC: 8.7 MG/DL — SIGNIFICANT CHANGE UP (ref 8.4–10.5)
CALCIUM SERPL-MCNC: 9 MG/DL — SIGNIFICANT CHANGE UP (ref 8.4–10.5)
CAST: 0 /LPF — SIGNIFICANT CHANGE UP (ref 0–4)
CHLORIDE SERPL-SCNC: 91 MMOL/L — LOW (ref 96–108)
CHLORIDE SERPL-SCNC: 92 MMOL/L — LOW (ref 96–108)
CHLORIDE SERPL-SCNC: 94 MMOL/L — LOW (ref 96–108)
CO2 SERPL-SCNC: 24 MMOL/L — SIGNIFICANT CHANGE UP (ref 22–31)
CO2 SERPL-SCNC: 24 MMOL/L — SIGNIFICANT CHANGE UP (ref 22–31)
CO2 SERPL-SCNC: 25 MMOL/L — SIGNIFICANT CHANGE UP (ref 22–31)
COLOR SPEC: YELLOW — SIGNIFICANT CHANGE UP
CREAT ?TM UR-MCNC: 74 MG/DL — SIGNIFICANT CHANGE UP
CREAT SERPL-MCNC: 0.91 MG/DL — SIGNIFICANT CHANGE UP (ref 0.5–1.3)
CREAT SERPL-MCNC: 0.92 MG/DL — SIGNIFICANT CHANGE UP (ref 0.5–1.3)
CREAT SERPL-MCNC: 1.09 MG/DL — SIGNIFICANT CHANGE UP (ref 0.5–1.3)
DIFF PNL FLD: NEGATIVE — SIGNIFICANT CHANGE UP
EGFR: 58 ML/MIN/1.73M2 — LOW
EGFR: 71 ML/MIN/1.73M2 — SIGNIFICANT CHANGE UP
EGFR: 72 ML/MIN/1.73M2 — SIGNIFICANT CHANGE UP
GLUCOSE SERPL-MCNC: 112 MG/DL — HIGH (ref 70–99)
GLUCOSE SERPL-MCNC: 127 MG/DL — HIGH (ref 70–99)
GLUCOSE SERPL-MCNC: 128 MG/DL — HIGH (ref 70–99)
GLUCOSE UR QL: NEGATIVE MG/DL — SIGNIFICANT CHANGE UP
HCT VFR BLD CALC: 30.9 % — LOW (ref 34.5–45)
HGB BLD-MCNC: 10.3 G/DL — LOW (ref 11.5–15.5)
INR BLD: 1.03 RATIO — SIGNIFICANT CHANGE UP (ref 0.85–1.18)
KETONES UR-MCNC: NEGATIVE MG/DL — SIGNIFICANT CHANGE UP
LEUKOCYTE ESTERASE UR-ACNC: NEGATIVE — SIGNIFICANT CHANGE UP
MCHC RBC-ENTMCNC: 28.7 PG — SIGNIFICANT CHANGE UP (ref 27–34)
MCHC RBC-ENTMCNC: 33.3 GM/DL — SIGNIFICANT CHANGE UP (ref 32–36)
MCV RBC AUTO: 86.1 FL — SIGNIFICANT CHANGE UP (ref 80–100)
NITRITE UR-MCNC: NEGATIVE — SIGNIFICANT CHANGE UP
NRBC # BLD: 0 /100 WBCS — SIGNIFICANT CHANGE UP (ref 0–0)
NT-PROBNP SERPL-SCNC: 48 PG/ML — SIGNIFICANT CHANGE UP (ref 0–300)
OSMOLALITY UR: 446 MOS/KG — SIGNIFICANT CHANGE UP (ref 300–900)
PH UR: 6.5 — SIGNIFICANT CHANGE UP (ref 5–8)
PLATELET # BLD AUTO: 352 K/UL — SIGNIFICANT CHANGE UP (ref 150–400)
POTASSIUM SERPL-MCNC: 3.9 MMOL/L — SIGNIFICANT CHANGE UP (ref 3.5–5.3)
POTASSIUM SERPL-MCNC: 4 MMOL/L — SIGNIFICANT CHANGE UP (ref 3.5–5.3)
POTASSIUM SERPL-MCNC: 4 MMOL/L — SIGNIFICANT CHANGE UP (ref 3.5–5.3)
POTASSIUM SERPL-SCNC: 3.9 MMOL/L — SIGNIFICANT CHANGE UP (ref 3.5–5.3)
POTASSIUM SERPL-SCNC: 4 MMOL/L — SIGNIFICANT CHANGE UP (ref 3.5–5.3)
POTASSIUM SERPL-SCNC: 4 MMOL/L — SIGNIFICANT CHANGE UP (ref 3.5–5.3)
POTASSIUM UR-SCNC: 32 MMOL/L — SIGNIFICANT CHANGE UP
PROT ?TM UR-MCNC: 99 MG/DL — HIGH (ref 0–12)
PROT UR-MCNC: 100 MG/DL
PROT/CREAT UR-RTO: 1.3 RATIO — HIGH (ref 0–0.2)
PROTHROM AB SERPL-ACNC: 10.8 SEC — SIGNIFICANT CHANGE UP (ref 9.5–13)
RBC # BLD: 3.59 M/UL — LOW (ref 3.8–5.2)
RBC # FLD: 12.9 % — SIGNIFICANT CHANGE UP (ref 10.3–14.5)
RBC CASTS # UR COMP ASSIST: 0 /HPF — SIGNIFICANT CHANGE UP (ref 0–4)
SODIUM SERPL-SCNC: 126 MMOL/L — LOW (ref 135–145)
SODIUM SERPL-SCNC: 128 MMOL/L — LOW (ref 135–145)
SODIUM SERPL-SCNC: 129 MMOL/L — LOW (ref 135–145)
SODIUM UR-SCNC: 57 MMOL/L — SIGNIFICANT CHANGE UP
SP GR SPEC: 1.02 — SIGNIFICANT CHANGE UP (ref 1–1.03)
SQUAMOUS # UR AUTO: 1 /HPF — SIGNIFICANT CHANGE UP (ref 0–5)
UROBILINOGEN FLD QL: 0.2 MG/DL — SIGNIFICANT CHANGE UP (ref 0.2–1)
WBC # BLD: 6.24 K/UL — SIGNIFICANT CHANGE UP (ref 3.8–10.5)
WBC # FLD AUTO: 6.24 K/UL — SIGNIFICANT CHANGE UP (ref 3.8–10.5)
WBC UR QL: 0 /HPF — SIGNIFICANT CHANGE UP (ref 0–5)

## 2024-03-15 PROCEDURE — 99233 SBSQ HOSP IP/OBS HIGH 50: CPT | Mod: FS

## 2024-03-15 PROCEDURE — 99222 1ST HOSP IP/OBS MODERATE 55: CPT | Mod: GC

## 2024-03-15 PROCEDURE — 99232 SBSQ HOSP IP/OBS MODERATE 35: CPT

## 2024-03-15 PROCEDURE — 99233 SBSQ HOSP IP/OBS HIGH 50: CPT

## 2024-03-15 RX ORDER — HEPARIN SODIUM 5000 [USP'U]/ML
900 INJECTION INTRAVENOUS; SUBCUTANEOUS
Qty: 25000 | Refills: 0 | Status: DISCONTINUED | OUTPATIENT
Start: 2024-03-15 | End: 2024-03-15

## 2024-03-15 RX ORDER — SODIUM CHLORIDE 5 G/100ML
1000 INJECTION, SOLUTION INTRAVENOUS
Refills: 0 | Status: DISCONTINUED | OUTPATIENT
Start: 2024-03-15 | End: 2024-03-19

## 2024-03-15 RX ORDER — HEPARIN SODIUM 5000 [USP'U]/ML
2000 INJECTION INTRAVENOUS; SUBCUTANEOUS EVERY 6 HOURS
Refills: 0 | Status: DISCONTINUED | OUTPATIENT
Start: 2024-03-15 | End: 2024-03-16

## 2024-03-15 RX ORDER — SODIUM CHLORIDE 9 MG/ML
1000 INJECTION INTRAMUSCULAR; INTRAVENOUS; SUBCUTANEOUS
Refills: 0 | Status: DISCONTINUED | OUTPATIENT
Start: 2024-03-15 | End: 2024-03-15

## 2024-03-15 RX ORDER — HEPARIN SODIUM 5000 [USP'U]/ML
1300 INJECTION INTRAVENOUS; SUBCUTANEOUS
Qty: 25000 | Refills: 0 | Status: DISCONTINUED | OUTPATIENT
Start: 2024-03-15 | End: 2024-03-16

## 2024-03-15 RX ORDER — ONDANSETRON 8 MG/1
4 TABLET, FILM COATED ORAL EVERY 8 HOURS
Refills: 0 | Status: DISCONTINUED | OUTPATIENT
Start: 2024-03-15 | End: 2024-03-19

## 2024-03-15 RX ORDER — HEPARIN SODIUM 5000 [USP'U]/ML
4000 INJECTION INTRAVENOUS; SUBCUTANEOUS EVERY 6 HOURS
Refills: 0 | Status: DISCONTINUED | OUTPATIENT
Start: 2024-03-15 | End: 2024-03-16

## 2024-03-15 RX ADMIN — HEPARIN SODIUM 1100 UNIT(S)/HR: 5000 INJECTION INTRAVENOUS; SUBCUTANEOUS at 00:40

## 2024-03-15 RX ADMIN — AMPICILLIN SODIUM AND SULBACTAM SODIUM 200 GRAM(S): 250; 125 INJECTION, POWDER, FOR SUSPENSION INTRAMUSCULAR; INTRAVENOUS at 01:54

## 2024-03-15 RX ADMIN — Medication 1 PATCH: at 05:20

## 2024-03-15 RX ADMIN — Medication 30 MILLILITER(S): at 02:31

## 2024-03-15 RX ADMIN — HYDROMORPHONE HYDROCHLORIDE 0.5 MILLIGRAM(S): 2 INJECTION INTRAMUSCULAR; INTRAVENOUS; SUBCUTANEOUS at 22:20

## 2024-03-15 RX ADMIN — SODIUM CHLORIDE 40 MILLILITER(S): 5 INJECTION, SOLUTION INTRAVENOUS at 17:35

## 2024-03-15 RX ADMIN — AMPICILLIN SODIUM AND SULBACTAM SODIUM 200 GRAM(S): 250; 125 INJECTION, POWDER, FOR SUSPENSION INTRAMUSCULAR; INTRAVENOUS at 21:25

## 2024-03-15 RX ADMIN — PANTOPRAZOLE SODIUM 40 MILLIGRAM(S): 20 TABLET, DELAYED RELEASE ORAL at 05:21

## 2024-03-15 RX ADMIN — OXYCODONE HYDROCHLORIDE 15 MILLIGRAM(S): 5 TABLET ORAL at 08:13

## 2024-03-15 RX ADMIN — AMPICILLIN SODIUM AND SULBACTAM SODIUM 200 GRAM(S): 250; 125 INJECTION, POWDER, FOR SUSPENSION INTRAMUSCULAR; INTRAVENOUS at 13:30

## 2024-03-15 RX ADMIN — SODIUM CHLORIDE 100 MILLILITER(S): 9 INJECTION INTRAMUSCULAR; INTRAVENOUS; SUBCUTANEOUS at 12:19

## 2024-03-15 RX ADMIN — HEPARIN SODIUM 1300 UNIT(S)/HR: 5000 INJECTION INTRAVENOUS; SUBCUTANEOUS at 18:29

## 2024-03-15 RX ADMIN — Medication 1 PATCH: at 13:30

## 2024-03-15 RX ADMIN — OXYCODONE HYDROCHLORIDE 15 MILLIGRAM(S): 5 TABLET ORAL at 18:15

## 2024-03-15 RX ADMIN — Medication 650 MILLIGRAM(S): at 03:57

## 2024-03-15 RX ADMIN — HYDROMORPHONE HYDROCHLORIDE 0.5 MILLIGRAM(S): 2 INJECTION INTRAMUSCULAR; INTRAVENOUS; SUBCUTANEOUS at 01:52

## 2024-03-15 RX ADMIN — OXYCODONE HYDROCHLORIDE 15 MILLIGRAM(S): 5 TABLET ORAL at 09:00

## 2024-03-15 RX ADMIN — OXYCODONE HYDROCHLORIDE 15 MILLIGRAM(S): 5 TABLET ORAL at 17:35

## 2024-03-15 RX ADMIN — HEPARIN SODIUM 2000 UNIT(S): 5000 INJECTION INTRAVENOUS; SUBCUTANEOUS at 08:21

## 2024-03-15 RX ADMIN — POLYETHYLENE GLYCOL 3350 17 GRAM(S): 17 POWDER, FOR SOLUTION ORAL at 12:17

## 2024-03-15 RX ADMIN — Medication 5 MILLIGRAM(S): at 15:30

## 2024-03-15 RX ADMIN — HEPARIN SODIUM 1100 UNIT(S)/HR: 5000 INJECTION INTRAVENOUS; SUBCUTANEOUS at 07:25

## 2024-03-15 RX ADMIN — HEPARIN SODIUM 4000 UNIT(S): 5000 INJECTION INTRAVENOUS; SUBCUTANEOUS at 00:42

## 2024-03-15 RX ADMIN — AMPICILLIN SODIUM AND SULBACTAM SODIUM 200 GRAM(S): 250; 125 INJECTION, POWDER, FOR SUSPENSION INTRAMUSCULAR; INTRAVENOUS at 08:13

## 2024-03-15 RX ADMIN — HEPARIN SODIUM 1200 UNIT(S)/HR: 5000 INJECTION INTRAVENOUS; SUBCUTANEOUS at 08:18

## 2024-03-15 RX ADMIN — HEPARIN SODIUM 1300 UNIT(S)/HR: 5000 INJECTION INTRAVENOUS; SUBCUTANEOUS at 19:11

## 2024-03-15 RX ADMIN — Medication 30 MILLILITER(S): at 12:16

## 2024-03-15 RX ADMIN — CHLORHEXIDINE GLUCONATE 1 APPLICATION(S): 213 SOLUTION TOPICAL at 12:17

## 2024-03-15 RX ADMIN — Medication 1 PATCH: at 13:22

## 2024-03-15 RX ADMIN — Medication 1 PATCH: at 22:44

## 2024-03-15 RX ADMIN — HEPARIN SODIUM 2000 UNIT(S): 5000 INJECTION INTRAVENOUS; SUBCUTANEOUS at 18:27

## 2024-03-15 NOTE — PROGRESS NOTE ADULT - SUBJECTIVE AND OBJECTIVE BOX
CC: sore throat     HPI: 58F w/Hx of RCC s/p R total nephrectomy, S/p hysterectomy, R-sided glaucoma, Fibromyalgia, Anxiety, GERD, smoker presents due to R breast swelling, redness and pain. Pt was initially evaluated by [place] [facility] yesterday and was going to be admitted but left AMA. Was prescribed clindamycin for presumed breast cellulitis/mastitis on discharge, but was also told about concerning findings on CT Chest related to possible breast malignancy and metastasis. Reports over 40 pound weight loss in the last year and loss of appetite. Has conjunctival injection in R eye which she claims is chronic but she also feels her eyes are more swollen than usual currently. ENT consulted for sore throat, hoarse voice, and right neck swelling. Pt reportedly had lymph nodes removed in her neck in the past. Smokes 1ppd for many years. Patient denies fever, chills, chest pain, SOB, headache, dizziness, abd pain, nausea, vomiting, constipation, dysuria.      PAST MEDICAL & SURGICAL HISTORY:  Anxiety      Acid reflux disease      Umbilical hernia      Uterine mass  Benign      Fibromyalgia  Joint pains      Glaucoma  Right eye      Herniated cervical disc      Cancer of kidney  right kidney      S/P partial hysterectomy  8 years ago      S/P knee surgery  knee arthroscopy right x 2 ( 2011 & 2012)      S/P hernia repair  umbilical Hernia Repair - 2011      H/O unilateral nephrectomy  Right Laparoscopic Nephrectomy - 33099      Glaucoma following surgery  Right Eyr - 2012      History of tonsillectomy        Allergies    Cipro (Headache; Vomiting; Rash)  penicillin (Headache; Vomiting; Rash)  erythromycin (Headache; Vomiting; Rash)    Intolerances      MEDICATIONS  (STANDING):  ampicillin/sulbactam  IVPB      ampicillin/sulbactam  IVPB 3 Gram(s) IV Intermittent every 6 hours  chlorhexidine 2% Cloths 1 Application(s) Topical daily  heparin  Infusion. 1300 Unit(s)/Hr (13 mL/Hr) IV Continuous <Continuous>  influenza   Vaccine 0.5 milliLiter(s) IntraMuscular once  nicotine -  14 mG/24Hr(s) Patch 1 Patch Transdermal daily  pantoprazole    Tablet 40 milliGRAM(s) Oral before breakfast  polyethylene glycol 3350 17 Gram(s) Oral daily  senna 1 Tablet(s) Oral at bedtime  sodium chloride 2% . 1000 milliLiter(s) (40 mL/Hr) IV Continuous <Continuous>    MEDICATIONS  (PRN):  acetaminophen     Tablet .. 650 milliGRAM(s) Oral every 6 hours PRN Temp greater or equal to 38C (100.4F), Mild Pain (1 - 3)  aluminum hydroxide/magnesium hydroxide/simethicone Suspension 30 milliLiter(s) Oral every 6 hours PRN Dyspepsia  diazepam    Tablet 5 milliGRAM(s) Oral two times a day PRN for anxiety  heparin   Injectable 2000 Unit(s) IV Push every 6 hours PRN For aPTT between 40 - 57  heparin   Injectable 4000 Unit(s) IV Push every 6 hours PRN For aPTT less than 40  HYDROmorphone  Injectable 0.5 milliGRAM(s) IV Push every 6 hours PRN breakthrough pain  melatonin 3 milliGRAM(s) Oral at bedtime PRN Insomnia  ondansetron Injectable 4 milliGRAM(s) IV Push every 8 hours PRN Nausea and/or Vomiting  oxyCODONE    IR 15 milliGRAM(s) Oral every 6 hours PRN Severe Pain (7 - 10)      social history: see consult     family history: see consult     ROS:   ENT: all negative except as noted in HPI   Pulm: denies SOB, cough, hemoptysis  Neuro: denies numbness/tingling, loss of sensation  Endo: denies heat/cold intolerance, excessive sweating      Vital Signs Last 24 Hrs  T(C): 37 (15 Mar 2024 16:03), Max: 37 (15 Mar 2024 16:03)  T(F): 98.6 (15 Mar 2024 16:03), Max: 98.6 (15 Mar 2024 16:03)  HR: 98 (15 Mar 2024 12:51) (98 - 102)  BP: 129/80 (15 Mar 2024 16:03) (116/76 - 140/90)  BP(mean): --  RR: 18 (15 Mar 2024 16:03) (18 - 18)  SpO2: 96% (15 Mar 2024 12:51) (92% - 96%)    Parameters below as of 15 Mar 2024 12:51  Patient On (Oxygen Delivery Method): room air                              10.3   6.24  )-----------( 352      ( 15 Mar 2024 06:26 )             30.9    03-15    126<L>  |  91<L>  |  13  ----------------------------<  112<H>  4.0   |  25  |  0.91    Ca    9.0      15 Mar 2024 06:26    TPro  6.6  /  Alb  3.4  /  TBili  0.4  /  DBili  x   /  AST  8<L>  /  ALT  14  /  AlkPhos  124<H>  03-14   PT/INR - ( 15 Mar 2024 06:26 )   PT: 10.8 sec;   INR: 1.03 ratio         PTT - ( 15 Mar 2024 15:13 )  PTT:54.8 sec    PHYSICAL EXAM:  Gen: NAD  Skin: No rashes, bruises, or lesions  Head: Normocephalic, Atraumatic  Face: no edema, erythema, or fluctuance. Parotid glands soft without mass  Eyes: +right scleral injection, periorbital swelling and erythema  Nose: Nares bilaterally patent, no discharge  Mouth: No Stridor / Drooling / Trismus.  Mucosa moist, tongue/uvula midline, oropharynx clear  Neck: +R neck swelling. Flat, supple, no lymphadenopathy, trachea midline, no masses  Lymphatic: No lymphadenopathy  Resp: breathing easily, no stridor  CV: no peripheral edema/cyanosis  GI: nondistended   Peripheral vascular: no JVD or edema  Neuro: facial nerve intact, no facial droop        Fiberoptic Indirect laryngoscopy:  (Scope #2 used)  Refused exam at this time       < from: CT Neck Soft Tissue w/ IV Cont (03.14.24 @ 11:24) >  IMPRESSION:    Extensive cellulitis/myositis along the right anterior lateral neck and   right upper chest wall with inflammatory fat induration the deep soft   tissue of the neck.    Large supraclavicular/mediastinal mass lesion, presumably conglomerate of   lymph nodes with mass effect and complete occlusion of the right internal   jugular vein with associated surrounding inflammatory changes in the deep   neck, concerning for infectious/inflammatory thrombophlebitis.   Clinical/laboratory correlation and serial ultrasound follow-up is   recommended.    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    No masslike lesions or abnormal enhancement in aerodigestive mucosa.   Airways are patent.    Cervical adenopathy.    Moderate right pleural effusion progressed relative to recent CT chest   from 3/10/2024. Consider CT chest with contrast to reevaluate.      Findings were communicated with Kavita Echols NP at 1310 hours on   3/14/2024.    < end of copied text > CC: sore throat     HPI: 58F w/Hx of RCC s/p R total nephrectomy, S/p hysterectomy, R-sided glaucoma, Fibromyalgia, Anxiety, GERD, smoker presents due to R breast swelling, redness and pain. Pt was initially evaluated by [place] ED yesterday and was going to be admitted but left AMA. Was prescribed clindamycin for presumed breast cellulitis/mastitis on discharge, but was also told about concerning findings on CT Chest related to possible breast malignancy and metastasis. Reports over 40 pound weight loss in the last year and loss of appetite. Has conjunctival injection in R eye which she claims is chronic but she also feels her eyes are more swollen than usual currently. ENT consulted for sore throat, hoarse voice, and right neck swelling. Pt reportedly had lymph nodes removed in her neck in the past. Smokes 1ppd for many years. Patient denies fever, chills, chest pain, SOB, headache, dizziness, abd pain, nausea, vomiting, constipation, dysuria.      PAST MEDICAL & SURGICAL HISTORY:  Anxiety      Acid reflux disease      Umbilical hernia      Uterine mass  Benign      Fibromyalgia  Joint pains      Glaucoma  Right eye      Herniated cervical disc      Cancer of kidney  right kidney      S/P partial hysterectomy  8 years ago      S/P knee surgery  knee arthroscopy right x 2 ( 2011 & 2012)      S/P hernia repair  umbilical Hernia Repair - 2011      H/O unilateral nephrectomy  Right Laparoscopic Nephrectomy - 59266      Glaucoma following surgery  Right Eyr - 2012      History of tonsillectomy        Allergies    Cipro (Headache; Vomiting; Rash)  penicillin (Headache; Vomiting; Rash)  erythromycin (Headache; Vomiting; Rash)    Intolerances      MEDICATIONS  (STANDING):  ampicillin/sulbactam  IVPB      ampicillin/sulbactam  IVPB 3 Gram(s) IV Intermittent every 6 hours  chlorhexidine 2% Cloths 1 Application(s) Topical daily  heparin  Infusion. 1300 Unit(s)/Hr (13 mL/Hr) IV Continuous <Continuous>  influenza   Vaccine 0.5 milliLiter(s) IntraMuscular once  nicotine -  14 mG/24Hr(s) Patch 1 Patch Transdermal daily  pantoprazole    Tablet 40 milliGRAM(s) Oral before breakfast  polyethylene glycol 3350 17 Gram(s) Oral daily  senna 1 Tablet(s) Oral at bedtime  sodium chloride 2% . 1000 milliLiter(s) (40 mL/Hr) IV Continuous <Continuous>    MEDICATIONS  (PRN):  acetaminophen     Tablet .. 650 milliGRAM(s) Oral every 6 hours PRN Temp greater or equal to 38C (100.4F), Mild Pain (1 - 3)  aluminum hydroxide/magnesium hydroxide/simethicone Suspension 30 milliLiter(s) Oral every 6 hours PRN Dyspepsia  diazepam    Tablet 5 milliGRAM(s) Oral two times a day PRN for anxiety  heparin   Injectable 2000 Unit(s) IV Push every 6 hours PRN For aPTT between 40 - 57  heparin   Injectable 4000 Unit(s) IV Push every 6 hours PRN For aPTT less than 40  HYDROmorphone  Injectable 0.5 milliGRAM(s) IV Push every 6 hours PRN breakthrough pain  melatonin 3 milliGRAM(s) Oral at bedtime PRN Insomnia  ondansetron Injectable 4 milliGRAM(s) IV Push every 8 hours PRN Nausea and/or Vomiting  oxyCODONE    IR 15 milliGRAM(s) Oral every 6 hours PRN Severe Pain (7 - 10)      social history: see consult     family history: see consult     ROS:   ENT: all negative except as noted in HPI   Pulm: denies SOB, cough, hemoptysis  Neuro: denies numbness/tingling, loss of sensation  Endo: denies heat/cold intolerance, excessive sweating      Vital Signs Last 24 Hrs  T(C): 37 (15 Mar 2024 16:03), Max: 37 (15 Mar 2024 16:03)  T(F): 98.6 (15 Mar 2024 16:03), Max: 98.6 (15 Mar 2024 16:03)  HR: 98 (15 Mar 2024 12:51) (98 - 102)  BP: 129/80 (15 Mar 2024 16:03) (116/76 - 140/90)  BP(mean): --  RR: 18 (15 Mar 2024 16:03) (18 - 18)  SpO2: 96% (15 Mar 2024 12:51) (92% - 96%)    Parameters below as of 15 Mar 2024 12:51  Patient On (Oxygen Delivery Method): room air                              10.3   6.24  )-----------( 352      ( 15 Mar 2024 06:26 )             30.9    03-15    126<L>  |  91<L>  |  13  ----------------------------<  112<H>  4.0   |  25  |  0.91    Ca    9.0      15 Mar 2024 06:26    TPro  6.6  /  Alb  3.4  /  TBili  0.4  /  DBili  x   /  AST  8<L>  /  ALT  14  /  AlkPhos  124<H>  03-14   PT/INR - ( 15 Mar 2024 06:26 )   PT: 10.8 sec;   INR: 1.03 ratio         PTT - ( 15 Mar 2024 15:13 )  PTT:54.8 sec    PHYSICAL EXAM:  Gen: NAD  Skin: No rashes, bruises, or lesions  Head: Normocephalic, Atraumatic  Face: no edema, erythema, or fluctuance. Parotid glands soft without mass  Eyes: +right scleral injection, periorbital swelling and erythema  Nose: Nares bilaterally patent, no discharge  Mouth: No Stridor / Drooling / Trismus.  Mucosa moist, tongue/uvula midline, oropharynx clear  Neck: +R neck swelling.  trachea midline, no masses  Lymphatic: Right neck lymphadenopathy  Resp: breathing easily, no stridor  CV: no peripheral edema/cyanosis  GI: nondistended   Peripheral vascular: no JVD or edema  Neuro: facial nerve intact, no facial droop        Fiberoptic Indirect laryngoscopy:  (Scope #2 used)  Refused exam at this time       < from: CT Neck Soft Tissue w/ IV Cont (03.14.24 @ 11:24) >  IMPRESSION:    Extensive cellulitis/myositis along the right anterior lateral neck and   right upper chest wall with inflammatory fat induration the deep soft   tissue of the neck.    Large supraclavicular/mediastinal mass lesion, presumably conglomerate of   lymph nodes with mass effect and complete occlusion of the right internal   jugular vein with associated surrounding inflammatory changes in the deep   neck, concerning for infectious/inflammatory thrombophlebitis.   Clinical/laboratory correlation and serial ultrasound follow-up is   recommended.    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    No masslike lesions or abnormal enhancement in aerodigestive mucosa.   Airways are patent.    Cervical adenopathy.    Moderate right pleural effusion progressed relative to recent CT chest   from 3/10/2024. Consider CT chest with contrast to reevaluate.      Findings were communicated with Kavita Echols NP at 1310 hours on   3/14/2024.    < end of copied text >

## 2024-03-15 NOTE — PROGRESS NOTE ADULT - SUBJECTIVE AND OBJECTIVE BOX
Follow Up:      Interval History/ROS:    Allergies  Cipro (Headache; Vomiting; Rash)  penicillin (Headache; Vomiting; Rash)  erythromycin (Headache; Vomiting; Rash)        ANTIMICROBIALS:  ampicillin/sulbactam  IVPB    ampicillin/sulbactam  IVPB 3 every 6 hours      OTHER MEDS:  MEDICATIONS  (STANDING):  acetaminophen     Tablet .. 650 every 6 hours PRN  diazepam    Tablet 5 two times a day PRN  heparin   Injectable 4000 every 6 hours PRN  heparin   Injectable 2000 every 6 hours PRN  heparin  Infusion.  <Continuous>  HYDROmorphone  Injectable 0.5 every 6 hours PRN  influenza   Vaccine 0.5 once  melatonin 3 at bedtime PRN  oxyCODONE    IR 15 every 6 hours PRN  pantoprazole    Tablet 40 before breakfast  polyethylene glycol 3350 17 daily  senna 1 at bedtime      Vital Signs Last 24 Hrs  T(C): 36.5 (15 Mar 2024 08:18), Max: 36.9 (14 Mar 2024 20:07)  T(F): 97.7 (15 Mar 2024 08:18), Max: 98.4 (14 Mar 2024 20:07)  HR: 99 (15 Mar 2024 08:18) (95 - 102)  BP: 140/90 (15 Mar 2024 08:18) (116/76 - 150/90)  BP(mean): --  RR: 18 (15 Mar 2024 08:18) (18 - 18)  SpO2: 95% (15 Mar 2024 08:18) (92% - 95%)    Parameters below as of 15 Mar 2024 08:18  Patient On (Oxygen Delivery Method): room air        PHYSICAL EXAM:  Constitutional: non-toxic, no distress  HEAD/EYES: R conjunctival erythema  ENT:  R neck edema and tenderness; clear oropharynx  Cardiovascular:   normal S1, S2, no murmur, RRR  Respiratory:  clear BS bilaterally, no wheezes  :  no nelson  Musculoskeletal:  no BLE edema  Neurologic: awake and oriented x3  Skin:  no rash  Psychiatric:  anxious                                10.3   6.24  )-----------( 352      ( 15 Mar 2024 06:26 )             30.9       03-15    126<L>  |  91<L>  |  13  ----------------------------<  112<H>  4.0   |  25  |  0.91    Ca    9.0      15 Mar 2024 06:26    TPro  6.6  /  Alb  3.4  /  TBili  0.4  /  DBili  x   /  AST  8<L>  /  ALT  14  /  AlkPhos  124<H>  03-14      Urinalysis Basic - ( 15 Mar 2024 06:26 )    Color: x / Appearance: x / SG: x / pH: x  Gluc: 112 mg/dL / Ketone: x  / Bili: x / Urobili: x   Blood: x / Protein: x / Nitrite: x   Leuk Esterase: x / RBC: x / WBC x   Sq Epi: x / Non Sq Epi: x / Bacteria: x        MICROBIOLOGY:  v  .Blood Blood-Peripheral  03-12-24   No growth at 48 Hours  --  --      .Blood Blood-Peripheral  03-12-24   No growth at 48 Hours  --  --      Clean Catch Clean Catch (Midstream)  03-10-24   <10,000 CFU/mL Normal Urogenital Tammie  --  --      .Blood Blood-Peripheral  03-10-24   No growth at 4 days  --  --                RADIOLOGY:  Imaging below independently reviewed.  < from: CT Chest w/wo IV Cont (03.14.24 @ 18:33) >    ACC: [id number] EXAM:  CT CHEST WAW IC   ORDERED BY: TORIN DANIELSON     PROCEDURE DATE:  03/14/2024          INTERPRETATION:  CLINICAL INFORMATION: Mediastinal mass    COMPARISON: CT chest 3/10/2024, 6/17/2015, CT abdomen and pelvis   3/12/2024.    CONTRAST/COMPLICATIONS:  IV Contrast: Omnipaque 350  80 cc administered   20 cc discarded  Oral Contrast: NONE  Complications: None reported at time of study completion    PROCEDURE:  CT of the Chest was performed with intravenous contrast. Delayed imaging   was performed.  Sagittal and coronal reformats were performed.    FINDINGS:    LUNGS AND AIRWAYS: Impacted right lower lobe subsegmental bronchi.   Otherwise patent central airways. Centrilobular emphysema. Bilateral   lower lobe dependent atelectasis and right lower lobe compressive   atelectasis. Additional scattered bilateral areas of linear atelectasis.   Multiple pulmonary nodules are similar to comparison study, for example a   1.9 x 1.3 cm bilobed right perihilar nodule (5-64), a 1.9 x 1.2 cm   spiculated right lower lobe nodule (5-60), a 1.1 x 1.0 cm right upper   lobe perifissural nodule with spiculation surrounding groundglass (5-75),   a 1.5 cm left upper lobe spiculated nodule (5-50), a 0.4 cm left upper   lobe nodule (5-47).  PLEURA: Increased moderate right and small left pleural effusions.  MEDIASTINUM AND KI: Extensive soft tissue signal within the right   supraclavicular region measuring 3.3 x 5.0 x 6.9 cm (3-26, 11-53)   obtained due to this with additional soft tissue signal in the superior   mediastinum measuring approximately 5.5 x 3.7 x 4.9 cm (3-43, 11-61)   likely representing conglomerate lymphadenopathy. There is internal   hypodensity within the soft tissue concerning for necrosis.  VESSELS: Conglomerate mediastinal soft tissue mass encases and   obliterates superior vena cava with a small residual knob just above the   right atrium. Additional encasement with thrombosis of the right internal   jugular, right innominate and subclavian, and left innominate veins.   There is partial encasement of the right brachiocephalic and right   subclavian arteries which are otherwise patent. Multiple right-sided   collateral vessels.. Right vertebral artery not visualized at its origin.  HEART: Heart size isnormal. Similar moderate pericardial effusion.  CHEST WALL AND LOWER NECK: Multiple prominent right axillary lymph nodes   measuring up to 2.2 cm (5-76) predominantly right-sided subcutaneous   edema. Right breast skin thickening.  VISUALIZED UPPER ABDOMEN: Left renal cysts and subcentimeter   hypodensities, too small to characterize. Similar cystic hypodensity   within the proximal pancreatic body measuring 1.2 x 1.2 cm. Right   nephrectomy..  BONES: Degenerative changes.    IMPRESSION:  Conglomerate soft tissue signal in the superior mediastinum and right   supraclavicular region likely representing lymphadenopathy with internal   hypodensity concerning for necrosis. 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 Right vertebral artery not visualized at its   origin. There are multiple right-sided collateral vessels and right-sided   soft tissue edema.    Bilateral pulmonary nodules, similar to comparison study.    Increased moderate right and small left pleural effusions. Similar   moderate pericardial effusion.    Dr. Arguello discussed preliminary findings with RYAN Aldridge on   3/14/2024 8:26 PM with read back.    --- End of Report ---           JACQUELYN MARCOS MD; Resident Radiologist  This document has been electronically signed.  KAELA STRANGE MD; Attending Radiologist  This document has been electronically signed. Mar 15 2024 10:47AM    < end of copied text >     Follow Up:    Neck swelling    Interval History/ROS:  VSS ON. Patient was seen and examined at bedside. +neck pain, no dysphagia    Allergies  Cipro (Headache; Vomiting; Rash)  penicillin (Headache; Vomiting; Rash)  erythromycin (Headache; Vomiting; Rash)        ANTIMICROBIALS:  ampicillin/sulbactam  IVPB    ampicillin/sulbactam  IVPB 3 every 6 hours      OTHER MEDS:  MEDICATIONS  (STANDING):  acetaminophen     Tablet .. 650 every 6 hours PRN  diazepam    Tablet 5 two times a day PRN  heparin   Injectable 4000 every 6 hours PRN  heparin   Injectable 2000 every 6 hours PRN  heparin  Infusion.  <Continuous>  HYDROmorphone  Injectable 0.5 every 6 hours PRN  influenza   Vaccine 0.5 once  melatonin 3 at bedtime PRN  oxyCODONE    IR 15 every 6 hours PRN  pantoprazole    Tablet 40 before breakfast  polyethylene glycol 3350 17 daily  senna 1 at bedtime      Vital Signs Last 24 Hrs  T(C): 36.5 (15 Mar 2024 08:18), Max: 36.9 (14 Mar 2024 20:07)  T(F): 97.7 (15 Mar 2024 08:18), Max: 98.4 (14 Mar 2024 20:07)  HR: 99 (15 Mar 2024 08:18) (95 - 102)  BP: 140/90 (15 Mar 2024 08:18) (116/76 - 150/90)  BP(mean): --  RR: 18 (15 Mar 2024 08:18) (18 - 18)  SpO2: 95% (15 Mar 2024 08:18) (92% - 95%)    Parameters below as of 15 Mar 2024 08:18  Patient On (Oxygen Delivery Method): room air        PHYSICAL EXAM:  Constitutional: non-toxic, no distress  HEAD/EYES: R conjunctival erythema  ENT:  R neck edema and tenderness; clear oropharynx  Cardiovascular:   normal S1, S2, no murmur, RRR  Respiratory:  clear BS bilaterally, no wheezes  :  no nelson  Musculoskeletal:  no BLE edema  Neurologic: awake and oriented x3  Skin:  no rash  Psychiatric:  anxious                                10.3   6.24  )-----------( 352      ( 15 Mar 2024 06:26 )             30.9       03-15    126<L>  |  91<L>  |  13  ----------------------------<  112<H>  4.0   |  25  |  0.91    Ca    9.0      15 Mar 2024 06:26    TPro  6.6  /  Alb  3.4  /  TBili  0.4  /  DBili  x   /  AST  8<L>  /  ALT  14  /  AlkPhos  124<H>  03-14      Urinalysis Basic - ( 15 Mar 2024 06:26 )    Color: x / Appearance: x / SG: x / pH: x  Gluc: 112 mg/dL / Ketone: x  / Bili: x / Urobili: x   Blood: x / Protein: x / Nitrite: x   Leuk Esterase: x / RBC: x / WBC x   Sq Epi: x / Non Sq Epi: x / Bacteria: x        MICROBIOLOGY:  v  .Blood Blood-Peripheral  03-12-24   No growth at 48 Hours  --  --      .Blood Blood-Peripheral  03-12-24   No growth at 48 Hours  --  --      Clean Catch Clean Catch (Midstream)  03-10-24   <10,000 CFU/mL Normal Urogenital Tammie  --  --      .Blood Blood-Peripheral  03-10-24   No growth at 4 days  --  --                RADIOLOGY:  Imaging below independently reviewed.  < from: CT Chest w/wo IV Cont (03.14.24 @ 18:33) >    ACC: [id number] EXAM:  CT CHEST WAW IC   ORDERED BY: TORIN DANIELSON     PROCEDURE DATE:  03/14/2024          INTERPRETATION:  CLINICAL INFORMATION: Mediastinal mass    COMPARISON: CT chest 3/10/2024, 6/17/2015, CT abdomen and pelvis   3/12/2024.    CONTRAST/COMPLICATIONS:  IV Contrast: Omnipaque 350  80 cc administered   20 cc discarded  Oral Contrast: NONE  Complications: None reported at time of study completion    PROCEDURE:  CT of the Chest was performed with intravenous contrast. Delayed imaging   was performed.  Sagittal and coronal reformats were performed.    FINDINGS:    LUNGS AND AIRWAYS: Impacted right lower lobe subsegmental bronchi.   Otherwise patent central airways. Centrilobular emphysema. Bilateral   lower lobe dependent atelectasis and right lower lobe compressive   atelectasis. Additional scattered bilateral areas of linear atelectasis.   Multiple pulmonary nodules are similar to comparison study, for example a   1.9 x 1.3 cm bilobed right perihilar nodule (5-64), a 1.9 x 1.2 cm   spiculated right lower lobe nodule (5-60), a 1.1 x 1.0 cm right upper   lobe perifissural nodule with spiculation surrounding groundglass (5-75),   a 1.5 cm left upper lobe spiculated nodule (5-50), a 0.4 cm left upper   lobe nodule (5-47).  PLEURA: Increased moderate right and small left pleural effusions.  MEDIASTINUM AND KI: Extensive soft tissue signal within the right   supraclavicular region measuring 3.3 x 5.0 x 6.9 cm (3-26, 11-53)   obtained due to this with additional soft tissue signal in the superior   mediastinum measuring approximately 5.5 x 3.7 x 4.9 cm (3-43, 11-61)   likely representing conglomerate lymphadenopathy. There is internal   hypodensity within the soft tissue concerning for necrosis.  VESSELS: Conglomerate mediastinal soft tissue mass encases and   obliterates superior vena cava with a small residual knob just above the   right atrium. Additional encasement with thrombosis of the right internal   jugular, right innominate and subclavian, and left innominate veins.   There is partial encasement of the right brachiocephalic and right   subclavian arteries which are otherwise patent. Multiple right-sided   collateral vessels.. Right vertebral artery not visualized at its origin.  HEART: Heart size isnormal. Similar moderate pericardial effusion.  CHEST WALL AND LOWER NECK: Multiple prominent right axillary lymph nodes   measuring up to 2.2 cm (5-76) predominantly right-sided subcutaneous   edema. Right breast skin thickening.  VISUALIZED UPPER ABDOMEN: Left renal cysts and subcentimeter   hypodensities, too small to characterize. Similar cystic hypodensity   within the proximal pancreatic body measuring 1.2 x 1.2 cm. Right   nephrectomy..  BONES: Degenerative changes.    IMPRESSION:  Conglomerate soft tissue signal in the superior mediastinum and right   supraclavicular region likely representing lymphadenopathy with internal   hypodensity concerning for necrosis. 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 Right vertebral artery not visualized at its   origin. There are multiple right-sided collateral vessels and right-sided   soft tissue edema.    Bilateral pulmonary nodules, similar to comparison study.    Increased moderate right and small left pleural effusions. Similar   moderate pericardial effusion.    Dr. Arguello discussed preliminary findings with RYAN Aldridge on   3/14/2024 8:26 PM with read back.    --- End of Report ---           JACQUELYN MARCOS MD; Resident Radiologist  This document has been electronically signed.  KAELA STRANGE MD; Attending Radiologist  This document has been electronically signed. Mar 15 2024 10:47AM    < end of copied text >

## 2024-03-15 NOTE — PROGRESS NOTE ADULT - SUBJECTIVE AND OBJECTIVE BOX
Patient is a 58y old  Female who presents with a chief complaint of Mastitis, breast malignancy (15 Mar 2024 13:39)      SUBJECTIVE / OVERNIGHT EVENTS:  Pt seen and examined. No acute events overnight. Pt reports that right sided neck pain and right breast pain is improving. Pt denies SOB, dysphagia.    MEDICATIONS  (STANDING):  ampicillin/sulbactam  IVPB      ampicillin/sulbactam  IVPB 3 Gram(s) IV Intermittent every 6 hours  chlorhexidine 2% Cloths 1 Application(s) Topical daily  heparin  Infusion.  Unit(s)/Hr (9 mL/Hr) IV Continuous <Continuous>  influenza   Vaccine 0.5 milliLiter(s) IntraMuscular once  nicotine -  14 mG/24Hr(s) Patch 1 Patch Transdermal daily  pantoprazole    Tablet 40 milliGRAM(s) Oral before breakfast  polyethylene glycol 3350 17 Gram(s) Oral daily  senna 1 Tablet(s) Oral at bedtime    MEDICATIONS  (PRN):  acetaminophen     Tablet .. 650 milliGRAM(s) Oral every 6 hours PRN Temp greater or equal to 38C (100.4F), Mild Pain (1 - 3)  aluminum hydroxide/magnesium hydroxide/simethicone Suspension 30 milliLiter(s) Oral every 6 hours PRN Dyspepsia  diazepam    Tablet 5 milliGRAM(s) Oral two times a day PRN for anxiety  heparin   Injectable 2000 Unit(s) IV Push every 6 hours PRN For aPTT between 40 - 57  heparin   Injectable 4000 Unit(s) IV Push every 6 hours PRN For aPTT less than 40  HYDROmorphone  Injectable 0.5 milliGRAM(s) IV Push every 6 hours PRN breakthrough pain  melatonin 3 milliGRAM(s) Oral at bedtime PRN Insomnia  ondansetron Injectable 4 milliGRAM(s) IV Push every 8 hours PRN Nausea and/or Vomiting  oxyCODONE    IR 15 milliGRAM(s) Oral every 6 hours PRN Severe Pain (7 - 10)      Vital Signs Last 24 Hrs  T(C): 36.7 (15 Mar 2024 12:51), Max: 36.9 (14 Mar 2024 20:07)  T(F): 98 (15 Mar 2024 12:51), Max: 98.4 (14 Mar 2024 20:07)  HR: 98 (15 Mar 2024 12:51) (98 - 102)  BP: 129/76 (15 Mar 2024 12:51) (116/76 - 140/90)  BP(mean): --  RR: 18 (15 Mar 2024 12:51) (18 - 18)  SpO2: 96% (15 Mar 2024 12:51) (92% - 96%)    Parameters below as of 15 Mar 2024 12:51  Patient On (Oxygen Delivery Method): room air      CAPILLARY BLOOD GLUCOSE        I&O's Summary    14 Mar 2024 07:01  -  15 Mar 2024 07:00  --------------------------------------------------------  IN: 480 mL / OUT: 0 mL / NET: 480 mL        PHYSICAL EXAM:  GENERAL: NAD, well-groomed  HEAD:  Atraumatic, Normocephalic  EYES:  conjunctiva and sclera clear  NECK: +right sided neck swelling improving, less tender to palpation  CHEST/LUNG: Clear to auscultation bilaterally; No wheeze  HEART: Regular rate and rhythm; No murmurs, rubs, or gallops  ABDOMEN: Soft, Nontender, Nondistended; Bowel sounds present  EXTREMITIES:  2+ Peripheral Pulses, No clubbing, cyanosis, or edema  PSYCH: AAOx3; tearful and anxious  NEUROLOGY: non-focal  SKIN: No rashes or lesions    LABS:                        10.3   6.24  )-----------( 352      ( 15 Mar 2024 06:26 )             30.9     03-15    126<L>  |  91<L>  |  13  ----------------------------<  112<H>  4.0   |  25  |  0.91    Ca    9.0      15 Mar 2024 06:26    TPro  6.6  /  Alb  3.4  /  TBili  0.4  /  DBili  x   /  AST  8<L>  /  ALT  14  /  AlkPhos  124<H>  03-14    PT/INR - ( 15 Mar 2024 06:26 )   PT: 10.8 sec;   INR: 1.03 ratio         PTT - ( 15 Mar 2024 06:26 )  PTT:42.0 sec      Urinalysis Basic - ( 15 Mar 2024 06:26 )    Color: x / Appearance: x / SG: x / pH: x  Gluc: 112 mg/dL / Ketone: x  / Bili: x / Urobili: x   Blood: x / Protein: x / Nitrite: x   Leuk Esterase: x / RBC: x / WBC x   Sq Epi: x / Non Sq Epi: x / Bacteria: x        RADIOLOGY & ADDITIONAL TESTS:    Imaging Personally Reviewed:  CT CHEST W/IV CONTRAST  Conglomerate soft tissue signal in the superior mediastinum and right   supraclavicular region likely representing lymphadenopathy with internal   hypodensity concerning for necrosis. 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 Right vertebral artery not visualized at its   origin. There are multiple right-sided collateral vessels and right-sided   soft tissue edema.    Bilateral pulmonary nodules, similar to comparison study.    Increased moderate right and small left pleural effusions. Similar   moderate pericardial effusion.      Consultant(s) Notes Reviewed:  ID, Thoracic Surgery    Care Discussed with Consultants/Other Providers: Nephrology

## 2024-03-15 NOTE — PROGRESS NOTE ADULT - ASSESSMENT
58-yo F w/ PMH of RCC s/p R total nephrectomy, s/p hysterectomy, fibromyalgia, and concern for breast malignancy currently undergoing workup, admitted with R breast swelling, erythema, and pain. ID was consulted for extensive cellulitis/myositis along the R anterior lateral neck and R upper chest wall. 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 LN biopsy 3/14.    #Neck edema  #Neck pain  #Abnormal CT scan  #Myositis  #Thrombophlebitis  - VSS. No leukocytosis. Significant R neck lymphadenopathy, s/p biopsy. F/u path  - No sore throat or dysphagia. No odynophagia. No fever or rigors.  - Unclear if patient's presentation represents infectious disease process. Labs and physical exam do not support septic thrombophlebitis.  - BCx NGTD.  - D/c cefazolin. Continue with Unasyn 3g IV Q6H. Patient tolerates Unasyn  - ENT and Onc f/u. CT surgery follow-up.    Plan discussed with primary team ACP.  Thank you for this consult. Inpatient ID team will follow.    Edwar Petersen MD, PhD  Attending Physician  Division of Infectious Diseases  Department of Medicine    Please contact through [employer].  Office: [phone] (after 5 PM or weekend) .     58-yo F w/ PMH of RCC s/p R total nephrectomy, s/p hysterectomy, fibromyalgia, and concern for breast malignancy currently undergoing workup, admitted with R breast swelling, erythema, and pain. ID was consulted for extensive cellulitis/myositis along the R anterior lateral neck and R upper chest wall. 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 LN biopsy 3/14.    #Neck edema  #Neck pain  #Abnormal CT scan  #Myositis  #Thrombophlebitis  - VSS. No leukocytosis. Significant R neck lymphadenopathy, s/p biopsy. F/u path  - No sore throat or dysphagia. No odynophagia. No fever or rigors.  - Unclear if patient's presentation represents infectious disease process. Labs and physical exam do not support septic thrombophlebitis.  - BCx NGTD.  - Check Quantiferon.  - If any surgical plan or further biopsy plan is made, would suggest sending for AFB culture  - D/c cefazolin. Continue with Unasyn 3g IV Q6H. Patient tolerates Unasyn  - ENT and Onc f/u. CT surgery follow-up.    Plan discussed with primary team ACP.  Thank you for this consult. Inpatient ID team will follow.    Edwar Petersen MD, PhD  Attending Physician  Division of Infectious Diseases  Department of Medicine    Please contact through [employer].  Office: [phone] (after 5 PM or weekend) .     58-yo F w/ PMH of RCC s/p R total nephrectomy, s/p hysterectomy, fibromyalgia, and concern for breast malignancy currently undergoing workup, admitted with R breast swelling, erythema, and pain. ID was consulted for extensive cellulitis/myositis along the R anterior lateral neck and R upper chest wall. 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 LN biopsy 3/14.    #Neck edema  #Neck pain  #Abnormal CT scan  #Myositis  #Thrombophlebitis  - VSS. No leukocytosis. Significant R neck lymphadenopathy, s/p biopsy. F/u path  - No sore throat or dysphagia. No odynophagia. No fever or rigors.  - Unclear if patient's presentation represents infectious disease process. Labs and physical exam do not support septic thrombophlebitis.  - BCx NGTD.  - Check Quantiferon.  - If any surgical plan or further biopsy plan is made, would suggest sending for AFB culture  - D/c cefazolin. Continue with Unasyn 3g IV Q6H. Patient tolerates Unasyn  - ENT and Onc f/u. CT surgery follow-up.    Plan discussed with primary team ACP.  Thank you for this consult. Inpatient ID team will follow.    Edwar Petersen MD, PhD  Attending Physician  Division of Infectious Diseases  Department of Medicine    Please contact through [employer].  Office: [phone] (after 5 PM or weekend) .

## 2024-03-15 NOTE — PROGRESS NOTE ADULT - PROBLEM SELECTOR PLAN 2
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

## 2024-03-15 NOTE — PROGRESS NOTE ADULT - PROBLEM SELECTOR PLAN 1
- h/o RCC and had nephrectomy by Urology by Dr Jacky Adkins at [facility], also had lymph node dissection 2 years ago  - Now with axillary, subclavicular and mediastinal lymphadenopathy, pulmonary nodules and inflammatory changes of breast seen on CT with possible effect on IVC  - CT neck with large supraclavicular/mediastinal mass lesion with mass effect and complete occlusion of the right IJ ; associated surrounding inflammatory changes in the deep neck, concerning for infectious/inflammatory thrombophlebitis  - Heme-Onc reccs appreciated ; s/p supraclavicular lymph node biopsy by IR on 3/14 ; f/up pathology  - Thoracic sx following ; recc'd dedicated CT chest ; result as above  - ID reccs appreciated ; c/w Unasyn ; f/up bld cx

## 2024-03-15 NOTE — PROGRESS NOTE ADULT - PROBLEM SELECTOR PLAN 1
continue abx per ID   Pt is to follow up at [facility] ENT clinic in upon discharge with Dr. Lira Call [phone] to make appointment.   Pt information sent to providers  to coordinate apt. continue abx per ID   continue vascular/thoracic follow up regarding findings of large supraclavicular/mediastinal mass lesion with mass effect and complete occlusion of the right IJ on CT.   Pt is to follow up at [facility] ENT clinic in upon discharge with Dr. Lira Call [phone] to make appointment.   Pt information sent to providers  to coordinate apt. -Antibiotics as per ID   -Continue Oncology/thoracic management regarding findings of large supraclavicular/mediastinal mass lesion with mass effect and complete occlusion of the right IJ on CT.   -Pt information sent to providers  to coordinate apt.  -Patient instructed that she will need to follow up with Head and Neck Surgeon Dr. Talha Lira [phone] [address] [place] [address]

## 2024-03-15 NOTE — CONSULT NOTE ADULT - SUBJECTIVE AND OBJECTIVE BOX
[facility] DIVISION OF KIDNEY DISEASES AND HYPERTENSION -- [phone]  -- INITIAL CONSULT NOTE  --------------------------------------------------------------------------------  HPI:  58F w/Hx of RCC s/p R total nephrectomy, S/p hysterectomy, R-sided glaucoma, Fibromyalgia, Anxiety, GERD, smoker presents due to R breast swelling, redness and pain. Pt was initially evaluated by [facility] yesterday and was going to be admitted but left AMA. Was prescribed clindamycin for presumed breast cellulitis/mastitis on discharge, but was also told about concerning findings on CT Chest related to possible breast malignancy and metastasis. Pt has not had a mammogram or colonoscopy in years. Reports that she had a lidocaine injection in her R shoulder on 3/8/24 and since then has had worsening swelling, redness and pain in her R breast. Also still has R shoulder pain, which had mild improvement since the injection. Pt consistently takes oxycodone q6h and believes this may have masked her pain earlier. Reports over 40 pound weight loss in the last year and loss of appetite. Has conjunctival injection in R eye which she claims is chronic but she also feels her eyes are more swollen than usual currently. Denies vision changes. Smokes 1ppd for many years. Patient denies fever, chills, chest pain, SOB, headache, dizziness, abd pain, nausea, vomiting, constipation, dysuria. (11 Mar 2024 23:59)    Patient seen at bedside, endorsing mild headaches, otherwise no complaints. States she voids spontaneously, no dysuria, no flank pain, no fevers, chills, abd pain. Endorses similar R breast pain. Minimal pain from R LN biopsy.        PAST HISTORY  --------------------------------------------------------------------------------  PAST MEDICAL & SURGICAL HISTORY:  Anxiety      Acid reflux disease      Umbilical hernia      Uterine mass  Benign      Fibromyalgia  Joint pains      Glaucoma  Right eye      Herniated cervical disc      Cancer of kidney  right kidney      S/P partial hysterectomy  8 years ago      S/P knee surgery  knee arthroscopy right x 2 ( 2011 & 2012)      S/P hernia repair  umbilical Hernia Repair - 2011      H/O unilateral nephrectomy  Right Laparoscopic Nephrectomy - 69999      Glaucoma following surgery  Right Eyr - 2012      History of tonsillectomy        FAMILY HISTORY:  Family history of lung cancer (Mother)    Family history of pancreatic cancer (Father)      PAST SOCIAL HISTORY:    ALLERGIES & MEDICATIONS  --------------------------------------------------------------------------------  Allergies    Cipro (Headache; Vomiting; Rash)  penicillin (Headache; Vomiting; Rash)  erythromycin (Headache; Vomiting; Rash)    Intolerances      Standing Inpatient Medications  ampicillin/sulbactam  IVPB      ampicillin/sulbactam  IVPB 3 Gram(s) IV Intermittent every 6 hours  chlorhexidine 2% Cloths 1 Application(s) Topical daily  heparin  Infusion.  Unit(s)/Hr IV Continuous <Continuous>  influenza   Vaccine 0.5 milliLiter(s) IntraMuscular once  nicotine -  14 mG/24Hr(s) Patch 1 Patch Transdermal daily  pantoprazole    Tablet 40 milliGRAM(s) Oral before breakfast  polyethylene glycol 3350 17 Gram(s) Oral daily  senna 1 Tablet(s) Oral at bedtime    PRN Inpatient Medications  acetaminophen     Tablet .. 650 milliGRAM(s) Oral every 6 hours PRN  aluminum hydroxide/magnesium hydroxide/simethicone Suspension 30 milliLiter(s) Oral every 6 hours PRN  diazepam    Tablet 5 milliGRAM(s) Oral two times a day PRN  heparin   Injectable 2000 Unit(s) IV Push every 6 hours PRN  heparin   Injectable 4000 Unit(s) IV Push every 6 hours PRN  HYDROmorphone  Injectable 0.5 milliGRAM(s) IV Push every 6 hours PRN  melatonin 3 milliGRAM(s) Oral at bedtime PRN  ondansetron Injectable 4 milliGRAM(s) IV Push every 8 hours PRN  oxyCODONE    IR 15 milliGRAM(s) Oral every 6 hours PRN      REVIEW OF SYSTEMS  --------------------------------------------------------------------------------  Gen: No fevers/chills  Head/Eyes/Ears: No HA  Respiratory: No dyspnea, cough  CV: No chest pain  GI: No abdominal pain, diarrhea  : No dysuria, hematuria  MSK: No  edema  Skin: No rashes  Heme: No easy bruising or bleeding    All other systems were reviewed and are negative, except as noted.    VITALS/PHYSICAL EXAM  --------------------------------------------------------------------------------  T(C): 36.7 (03-15-24 @ 12:51), Max: 36.9 (03-14-24 @ 20:07)  HR: 98 (03-15-24 @ 12:51) (98 - 102)  BP: 129/76 (03-15-24 @ 12:51) (116/76 - 140/90)  RR: 18 (03-15-24 @ 12:51) (18 - 18)  SpO2: 96% (03-15-24 @ 12:51) (92% - 96%)  Wt(kg): --  Height (cm): 154.9 (03-14-24 @ 12:02)  Weight (kg): 49.9 (03-14-24 @ 12:02)  BMI (kg/m2): 20.8 (03-14-24 @ 12:02)  BSA (m2): 1.47 (03-14-24 @ 12:02)      03-14-24 @ 07:01  -  03-15-24 @ 07:00  --------------------------------------------------------  IN: 480 mL / OUT: 0 mL / NET: 480 mL        Physical Exam:  	Gen: NAD  	HEENT: Anicteric, swelling R face and neck, dressing over R supraclavicular area   	Pulm: CTA B/L  	CV: S1S2+  	Abd: Soft, +BS    	Ext: No LE edema B/L  	Neuro: Awake  	Skin: Warm and dry    LABS/STUDIES  --------------------------------------------------------------------------------              10.3   6.24  >-----------<  352      [03-15-24 @ 06:26]              30.9     126  |  91  |  13  ----------------------------<  112      [03-15-24 @ 06:26]  4.0   |  25  |  0.91        Ca     9.0     [03-15-24 @ 06:26]    TPro  6.6  /  Alb  3.4  /  TBili  0.4  /  DBili  x   /  AST  8   /  ALT  14  /  AlkPhos  124  [03-14-24 @ 06:49]    PT/INR: PT 10.8 , INR 1.03       [03-15-24 @ 06:26]  PTT: 42.0       [03-15-24 @ 06:26]      Creatinine Trend:  SCr 0.91 [03-15 @ 06:26]  SCr 0.84 [03-14 @ 06:49]  SCr 0.92 [03-13 @ 06:53]  SCr 0.82 [03-12 @ 07:01]  SCr 0.85 [03-11 @ 20:26]    Urinalysis - [03-15-24 @ 06:26]      Color  / Appearance  / SG  / pH       Gluc 112 / Ketone   / Bili  / Urobili        Blood  / Protein  / Leuk Est  / Nitrite       RBC  / WBC  / Hyaline  / Gran  / Sq Epi  / Non Sq Epi  / Bacteria     Urine Sodium 25      [03-14-24 @ 12:55]  Urine Osmolality 332      [03-14-24 @ 12:55]    HCV 0.09, Nonreact      [03-12-24 @ 07:01]      Tacrolimus  Cyclosporine  Sirolimus  Mycophenolate  BK PCR  CMV PCR  Parvo PCR  EBV PCR

## 2024-03-15 NOTE — PROGRESS NOTE ADULT - ASSESSMENT
58F w/Hx of RCC s/p R total nephrectomy, S/p hysterectomy, R-sided glaucoma, Fibromyalgia, Anxiety, GERD, 1ppd smoker presents due to R breast swelling, redness and pain. ENT consulted for sore throat, hoarse voice, and right neck swelling. Pt reportedly had lymph nodes removed in her neck in the past. On exam, right eye with scleral injection, periorbital swelling and erythema, right neck swelling noted, oropharynx appears grossly normal. Indirect laryngoscopy was offered to the patient however she is refusing at this time. CT shows axillary, subclavicular and mediastinal lymphadenopathy, pulmonary nodules and inflammatory changes of breast, possible effect on IVC- CT neck with large supraclavicular/mediastinal mass lesion with mass effect and complete occlusion of the right IJ ; associated surrounding inflammatory changes in the deep neck, concerning for infectious/inflammatory thrombophlebitis       ENT consulted for sore throat, hoarse voice, and right neck swelling    58F w/Hx of RCC s/p R total nephrectomy, S/p hysterectomy, R-sided glaucoma, Fibromyalgia, Anxiety, GERD, 1ppd smoker presents due to R breast swelling, redness and pain.     Patient reportedly had lymph nodes removed in her neck in the past now with lymphoedema in right neck. Patient currently being worked up for metastatic disease to chest by thoracic surgery. Patient s/p Right supraclavicular node biopsy 3/15 by IR    3/14/24 As per radiology CT Neck with contrast shows Extensive cellulitis/myositis along the right anterior lateral neck and right upper chest wall with inflammatory fat induration the deep soft tissue of the neck. Large supraclavicular/mediastinal mass lesion, presumably conglomerate of lymph nodes with mass effect and complete occlusion of the right internal jugular vein with associated surrounding inflammatory changes in the deep neck, concerning for infectious/inflammatory thrombophlebitis. Clinical/laboratory correlation and serial ultrasound follow-up is recommended. 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 No masslike lesions or abnormal enhancement in aerodigestive mucosa. Airways are patent. Cervical adenopathy.    Physical exam shows right eye with scleral injection, periorbital swelling and erythema, right neck swelling noted, oropharynx appears grossly normal. Indirect laryngoscopy was offered to the patient however she is refusing at this time.

## 2024-03-15 NOTE — PROGRESS NOTE ADULT - PROBLEM SELECTOR PLAN 6
- c/w Home Valium (Confirmed via iStop)  - pt is very tearful, anxious and frustrated about current clinical status  - emotional support provided  - she declines Behavioural Health consult

## 2024-03-15 NOTE — PROGRESS NOTE ADULT - PROBLEM SELECTOR PLAN 3
- erythema and tenderness of right breast improving  - Unclear if cellulitis or just inflammatory changes   - on Unasyn  - f/up bld cx  - ID reccs appreciated

## 2024-03-15 NOTE — CONSULT NOTE ADULT - PROBLEM SELECTOR RECOMMENDATION 9
Pt with hyponatremia in the setting of RCC, likely SIADH. SNa 132 initially on this admission. SNa trended down to 126 today. Serum osm: 280, urine osm: 332, urine Na: 25. Recommend 2% NaCl at 40cc for 8hrs. Start NaCl tabs 3g TID. Check BMP q6h to monitor SNa.     Recommendations preliminary until d/w attending. Pt with hyponatremia in the setting of RCC, likely SIADH. SNa 132 initially on this admission. SNa trended down to 126 today. Serum osm: 280, urine osm: 332, urine Na: 25. Recommend 2% NaCl at 40cc for 8hrs. Check BMP q6h to monitor SNa. Can start NaCl tabs TID once SNa above 130.

## 2024-03-15 NOTE — PROGRESS NOTE ADULT - PROBLEM SELECTOR PLAN 5
- Home Oxycodone (Confirmed via iStop)  - c/w Oxycodone IR 15mg q 6 prn   - c/w Diluadid 0.5mg IVP q 6 prn for breakthrough pain  - Savella not in formulary

## 2024-03-15 NOTE — PROGRESS NOTE ADULT - PROBLEM SELECTOR PLAN 4
- down to 126  - serum osm 265, urine osm wnl, urine sodium 25  - Pt euvolemic on exam  - d/w Nephrology ; reccs to check BNP, TSH, Cortisol  - f/up urine lytes  - will switch heparin gtt fluid from D5 to NS

## 2024-03-16 LAB
ANION GAP SERPL CALC-SCNC: 11 MMOL/L — SIGNIFICANT CHANGE UP (ref 5–17)
ANION GAP SERPL CALC-SCNC: 12 MMOL/L — SIGNIFICANT CHANGE UP (ref 5–17)
APTT BLD: 30.6 SEC — SIGNIFICANT CHANGE UP (ref 24.5–35.6)
APTT BLD: 33.2 SEC — SIGNIFICANT CHANGE UP (ref 24.5–35.6)
APTT BLD: 72 SEC — HIGH (ref 24.5–35.6)
BUN SERPL-MCNC: 14 MG/DL — SIGNIFICANT CHANGE UP (ref 7–23)
BUN SERPL-MCNC: 17 MG/DL — SIGNIFICANT CHANGE UP (ref 7–23)
CALCIUM SERPL-MCNC: 8.7 MG/DL — SIGNIFICANT CHANGE UP (ref 8.4–10.5)
CALCIUM SERPL-MCNC: 9 MG/DL — SIGNIFICANT CHANGE UP (ref 8.4–10.5)
CHLORIDE SERPL-SCNC: 94 MMOL/L — LOW (ref 96–108)
CHLORIDE SERPL-SCNC: 95 MMOL/L — LOW (ref 96–108)
CO2 SERPL-SCNC: 23 MMOL/L — SIGNIFICANT CHANGE UP (ref 22–31)
CO2 SERPL-SCNC: 24 MMOL/L — SIGNIFICANT CHANGE UP (ref 22–31)
CREAT SERPL-MCNC: 0.87 MG/DL — SIGNIFICANT CHANGE UP (ref 0.5–1.3)
CREAT SERPL-MCNC: 0.99 MG/DL — SIGNIFICANT CHANGE UP (ref 0.5–1.3)
CULTURE RESULTS: SIGNIFICANT CHANGE UP
CULTURE RESULTS: SIGNIFICANT CHANGE UP
EGFR: 65 ML/MIN/1.73M2 — SIGNIFICANT CHANGE UP
EGFR: 76 ML/MIN/1.73M2 — SIGNIFICANT CHANGE UP
GLUCOSE SERPL-MCNC: 105 MG/DL — HIGH (ref 70–99)
GLUCOSE SERPL-MCNC: 112 MG/DL — HIGH (ref 70–99)
HCT VFR BLD CALC: 30.3 % — LOW (ref 34.5–45)
HCT VFR BLD CALC: 31.8 % — LOW (ref 34.5–45)
HGB BLD-MCNC: 10.1 G/DL — LOW (ref 11.5–15.5)
HGB BLD-MCNC: 10.4 G/DL — LOW (ref 11.5–15.5)
MAGNESIUM SERPL-MCNC: 2 MG/DL — SIGNIFICANT CHANGE UP (ref 1.6–2.6)
MCHC RBC-ENTMCNC: 28.6 PG — SIGNIFICANT CHANGE UP (ref 27–34)
MCHC RBC-ENTMCNC: 28.9 PG — SIGNIFICANT CHANGE UP (ref 27–34)
MCHC RBC-ENTMCNC: 32.7 GM/DL — SIGNIFICANT CHANGE UP (ref 32–36)
MCHC RBC-ENTMCNC: 33.3 GM/DL — SIGNIFICANT CHANGE UP (ref 32–36)
MCV RBC AUTO: 86.8 FL — SIGNIFICANT CHANGE UP (ref 80–100)
MCV RBC AUTO: 87.4 FL — SIGNIFICANT CHANGE UP (ref 80–100)
NRBC # BLD: 0 /100 WBCS — SIGNIFICANT CHANGE UP (ref 0–0)
NRBC # BLD: 0 /100 WBCS — SIGNIFICANT CHANGE UP (ref 0–0)
PHOSPHATE SERPL-MCNC: 3.1 MG/DL — SIGNIFICANT CHANGE UP (ref 2.5–4.5)
PLATELET # BLD AUTO: 348 K/UL — SIGNIFICANT CHANGE UP (ref 150–400)
PLATELET # BLD AUTO: 361 K/UL — SIGNIFICANT CHANGE UP (ref 150–400)
POTASSIUM SERPL-MCNC: 4 MMOL/L — SIGNIFICANT CHANGE UP (ref 3.5–5.3)
POTASSIUM SERPL-MCNC: 4.5 MMOL/L — SIGNIFICANT CHANGE UP (ref 3.5–5.3)
POTASSIUM SERPL-SCNC: 4 MMOL/L — SIGNIFICANT CHANGE UP (ref 3.5–5.3)
POTASSIUM SERPL-SCNC: 4.5 MMOL/L — SIGNIFICANT CHANGE UP (ref 3.5–5.3)
RBC # BLD: 3.49 M/UL — LOW (ref 3.8–5.2)
RBC # BLD: 3.64 M/UL — LOW (ref 3.8–5.2)
RBC # FLD: 13 % — SIGNIFICANT CHANGE UP (ref 10.3–14.5)
RBC # FLD: 13 % — SIGNIFICANT CHANGE UP (ref 10.3–14.5)
SODIUM SERPL-SCNC: 129 MMOL/L — LOW (ref 135–145)
SODIUM SERPL-SCNC: 130 MMOL/L — LOW (ref 135–145)
SPECIMEN SOURCE: SIGNIFICANT CHANGE UP
SPECIMEN SOURCE: SIGNIFICANT CHANGE UP
TSH SERPL-MCNC: 3.46 UIU/ML — SIGNIFICANT CHANGE UP (ref 0.27–4.2)
UUN UR-MCNC: 556 MG/DL — SIGNIFICANT CHANGE UP
WBC # BLD: 5.62 K/UL — SIGNIFICANT CHANGE UP (ref 3.8–10.5)
WBC # BLD: 6.46 K/UL — SIGNIFICANT CHANGE UP (ref 3.8–10.5)
WBC # FLD AUTO: 5.62 K/UL — SIGNIFICANT CHANGE UP (ref 3.8–10.5)
WBC # FLD AUTO: 6.46 K/UL — SIGNIFICANT CHANGE UP (ref 3.8–10.5)

## 2024-03-16 PROCEDURE — 99233 SBSQ HOSP IP/OBS HIGH 50: CPT

## 2024-03-16 RX ORDER — CEFTRIAXONE 500 MG/1
INJECTION, POWDER, FOR SOLUTION INTRAMUSCULAR; INTRAVENOUS
Refills: 0 | Status: DISCONTINUED | OUTPATIENT
Start: 2024-03-16 | End: 2024-03-19

## 2024-03-16 RX ORDER — IPRATROPIUM/ALBUTEROL SULFATE 18-103MCG
3 AEROSOL WITH ADAPTER (GRAM) INHALATION ONCE
Refills: 0 | Status: COMPLETED | OUTPATIENT
Start: 2024-03-16 | End: 2024-03-16

## 2024-03-16 RX ORDER — CEFTRIAXONE 500 MG/1
1000 INJECTION, POWDER, FOR SOLUTION INTRAMUSCULAR; INTRAVENOUS EVERY 24 HOURS
Refills: 0 | Status: DISCONTINUED | OUTPATIENT
Start: 2024-03-17 | End: 2024-03-19

## 2024-03-16 RX ORDER — HEPARIN SODIUM 5000 [USP'U]/ML
2000 INJECTION INTRAVENOUS; SUBCUTANEOUS EVERY 6 HOURS
Refills: 0 | Status: DISCONTINUED | OUTPATIENT
Start: 2024-03-16 | End: 2024-03-19

## 2024-03-16 RX ORDER — HEPARIN SODIUM 5000 [USP'U]/ML
900 INJECTION INTRAVENOUS; SUBCUTANEOUS
Qty: 25000 | Refills: 0 | Status: DISCONTINUED | OUTPATIENT
Start: 2024-03-16 | End: 2024-03-19

## 2024-03-16 RX ORDER — METRONIDAZOLE 500 MG
500 TABLET ORAL ONCE
Refills: 0 | Status: COMPLETED | OUTPATIENT
Start: 2024-03-16 | End: 2024-03-16

## 2024-03-16 RX ORDER — METRONIDAZOLE 500 MG
500 TABLET ORAL EVERY 12 HOURS
Refills: 0 | Status: DISCONTINUED | OUTPATIENT
Start: 2024-03-16 | End: 2024-03-19

## 2024-03-16 RX ORDER — IPRATROPIUM/ALBUTEROL SULFATE 18-103MCG
3 AEROSOL WITH ADAPTER (GRAM) INHALATION EVERY 6 HOURS
Refills: 0 | Status: DISCONTINUED | OUTPATIENT
Start: 2024-03-16 | End: 2024-03-19

## 2024-03-16 RX ORDER — METRONIDAZOLE 500 MG
TABLET ORAL
Refills: 0 | Status: DISCONTINUED | OUTPATIENT
Start: 2024-03-16 | End: 2024-03-19

## 2024-03-16 RX ORDER — ENOXAPARIN SODIUM 100 MG/ML
50 INJECTION SUBCUTANEOUS EVERY 12 HOURS
Refills: 0 | Status: DISCONTINUED | OUTPATIENT
Start: 2024-03-16 | End: 2024-03-16

## 2024-03-16 RX ORDER — CEFTRIAXONE 500 MG/1
1000 INJECTION, POWDER, FOR SOLUTION INTRAMUSCULAR; INTRAVENOUS ONCE
Refills: 0 | Status: COMPLETED | OUTPATIENT
Start: 2024-03-16 | End: 2024-03-16

## 2024-03-16 RX ORDER — HEPARIN SODIUM 5000 [USP'U]/ML
4000 INJECTION INTRAVENOUS; SUBCUTANEOUS EVERY 6 HOURS
Refills: 0 | Status: DISCONTINUED | OUTPATIENT
Start: 2024-03-16 | End: 2024-03-19

## 2024-03-16 RX ADMIN — AMPICILLIN SODIUM AND SULBACTAM SODIUM 200 GRAM(S): 250; 125 INJECTION, POWDER, FOR SUSPENSION INTRAMUSCULAR; INTRAVENOUS at 02:42

## 2024-03-16 RX ADMIN — Medication 3 MILLILITER(S): at 00:10

## 2024-03-16 RX ADMIN — Medication 30 MILLILITER(S): at 23:07

## 2024-03-16 RX ADMIN — Medication 100 MILLIGRAM(S): at 12:31

## 2024-03-16 RX ADMIN — HEPARIN SODIUM 4000 UNIT(S): 5000 INJECTION INTRAVENOUS; SUBCUTANEOUS at 01:35

## 2024-03-16 RX ADMIN — HEPARIN SODIUM 1500 UNIT(S)/HR: 5000 INJECTION INTRAVENOUS; SUBCUTANEOUS at 07:28

## 2024-03-16 RX ADMIN — Medication 1 PATCH: at 12:38

## 2024-03-16 RX ADMIN — Medication 3 MILLILITER(S): at 04:40

## 2024-03-16 RX ADMIN — HEPARIN SODIUM 1500 UNIT(S)/HR: 5000 INJECTION INTRAVENOUS; SUBCUTANEOUS at 01:33

## 2024-03-16 RX ADMIN — OXYCODONE HYDROCHLORIDE 15 MILLIGRAM(S): 5 TABLET ORAL at 01:06

## 2024-03-16 RX ADMIN — OXYCODONE HYDROCHLORIDE 15 MILLIGRAM(S): 5 TABLET ORAL at 22:00

## 2024-03-16 RX ADMIN — Medication 650 MILLIGRAM(S): at 05:58

## 2024-03-16 RX ADMIN — HYDROMORPHONE HYDROCHLORIDE 0.5 MILLIGRAM(S): 2 INJECTION INTRAMUSCULAR; INTRAVENOUS; SUBCUTANEOUS at 14:17

## 2024-03-16 RX ADMIN — Medication 5 MILLIGRAM(S): at 23:07

## 2024-03-16 RX ADMIN — OXYCODONE HYDROCHLORIDE 15 MILLIGRAM(S): 5 TABLET ORAL at 21:04

## 2024-03-16 RX ADMIN — Medication 3 MILLILITER(S): at 11:41

## 2024-03-16 RX ADMIN — CEFTRIAXONE 100 MILLIGRAM(S): 500 INJECTION, POWDER, FOR SOLUTION INTRAMUSCULAR; INTRAVENOUS at 14:17

## 2024-03-16 RX ADMIN — Medication 3 MILLILITER(S): at 17:02

## 2024-03-16 RX ADMIN — Medication 30 MILLILITER(S): at 12:28

## 2024-03-16 RX ADMIN — HYDROMORPHONE HYDROCHLORIDE 0.5 MILLIGRAM(S): 2 INJECTION INTRAMUSCULAR; INTRAVENOUS; SUBCUTANEOUS at 05:01

## 2024-03-16 RX ADMIN — HEPARIN SODIUM 1500 UNIT(S)/HR: 5000 INJECTION INTRAVENOUS; SUBCUTANEOUS at 09:09

## 2024-03-16 RX ADMIN — HEPARIN SODIUM 1500 UNIT(S)/HR: 5000 INJECTION INTRAVENOUS; SUBCUTANEOUS at 15:23

## 2024-03-16 RX ADMIN — PANTOPRAZOLE SODIUM 40 MILLIGRAM(S): 20 TABLET, DELAYED RELEASE ORAL at 05:20

## 2024-03-16 RX ADMIN — HYDROMORPHONE HYDROCHLORIDE 0.5 MILLIGRAM(S): 2 INJECTION INTRAMUSCULAR; INTRAVENOUS; SUBCUTANEOUS at 14:40

## 2024-03-16 RX ADMIN — OXYCODONE HYDROCHLORIDE 15 MILLIGRAM(S): 5 TABLET ORAL at 09:50

## 2024-03-16 RX ADMIN — HEPARIN SODIUM 900 UNIT(S)/HR: 5000 INJECTION INTRAVENOUS; SUBCUTANEOUS at 23:09

## 2024-03-16 RX ADMIN — ENOXAPARIN SODIUM 50 MILLIGRAM(S): 100 INJECTION SUBCUTANEOUS at 20:52

## 2024-03-16 RX ADMIN — Medication 1 PATCH: at 12:20

## 2024-03-16 RX ADMIN — Medication 1 PATCH: at 19:32

## 2024-03-16 RX ADMIN — Medication 1 PATCH: at 12:32

## 2024-03-16 RX ADMIN — Medication 3 MILLILITER(S): at 21:05

## 2024-03-16 RX ADMIN — OXYCODONE HYDROCHLORIDE 15 MILLIGRAM(S): 5 TABLET ORAL at 09:13

## 2024-03-16 NOTE — PROGRESS NOTE ADULT - SUBJECTIVE AND OBJECTIVE BOX
[facility] Division of Hospital Medicine  Cecy Jesus MD  MS Teams PREFERRED        SUBJECTIVE / OVERNIGHT EVENTS: NAD    MEDICATIONS  (STANDING):  albuterol/ipratropium for Nebulization 3 milliLiter(s) Nebulizer every 6 hours  cefTRIAXone   IVPB 1000 milliGRAM(s) IV Intermittent once  cefTRIAXone   IVPB      chlorhexidine 2% Cloths 1 Application(s) Topical daily  heparin  Infusion. 1300 Unit(s)/Hr (13 mL/Hr) IV Continuous <Continuous>  influenza   Vaccine 0.5 milliLiter(s) IntraMuscular once  metroNIDAZOLE  IVPB 500 milliGRAM(s) IV Intermittent once  metroNIDAZOLE  IVPB 500 milliGRAM(s) IV Intermittent every 12 hours  metroNIDAZOLE  IVPB      nicotine -  14 mG/24Hr(s) Patch 1 Patch Transdermal daily  pantoprazole    Tablet 40 milliGRAM(s) Oral before breakfast  polyethylene glycol 3350 17 Gram(s) Oral daily  senna 1 Tablet(s) Oral at bedtime  sodium chloride 2% . 1000 milliLiter(s) (40 mL/Hr) IV Continuous <Continuous>    MEDICATIONS  (PRN):  acetaminophen     Tablet .. 650 milliGRAM(s) Oral every 6 hours PRN Temp greater or equal to 38C (100.4F), Mild Pain (1 - 3)  aluminum hydroxide/magnesium hydroxide/simethicone Suspension 30 milliLiter(s) Oral every 6 hours PRN Dyspepsia  diazepam    Tablet 5 milliGRAM(s) Oral two times a day PRN for anxiety  heparin   Injectable 4000 Unit(s) IV Push every 6 hours PRN For aPTT less than 40  heparin   Injectable 2000 Unit(s) IV Push every 6 hours PRN For aPTT between 40 - 57  HYDROmorphone  Injectable 0.5 milliGRAM(s) IV Push every 6 hours PRN breakthrough pain  melatonin 3 milliGRAM(s) Oral at bedtime PRN Insomnia  ondansetron Injectable 4 milliGRAM(s) IV Push every 8 hours PRN Nausea and/or Vomiting  oxyCODONE    IR 15 milliGRAM(s) Oral every 6 hours PRN Severe Pain (7 - 10)      I&O's Summary    15 Mar 2024 07:01  -  16 Mar 2024 07:00  --------------------------------------------------------  IN: 934 mL / OUT: 0 mL / NET: 934 mL        PHYSICAL EXAM:  Vital Signs Last 24 Hrs  T(C): 36.8 (16 Mar 2024 08:16), Max: 37 (15 Mar 2024 16:03)  T(F): 98.2 (16 Mar 2024 08:16), Max: 98.6 (15 Mar 2024 16:03)  HR: 100 (16 Mar 2024 08:16) (98 - 106)  BP: 114/72 (16 Mar 2024 08:16) (114/72 - 159/87)  BP(mean): --  RR: 18 (16 Mar 2024 08:16) (18 - 18)  SpO2: 93% (16 Mar 2024 08:16) (93% - 96%)    Parameters below as of 16 Mar 2024 08:16  Patient On (Oxygen Delivery Method): room air      PHYSICAL EXAM:  GENERAL: NAD, well-groomed  HEAD:  Atraumatic, Normocephalic  EYES:  conjunctiva and sclera clear  NECK: +right sided neck swelling improving, less tender to palpation  CHEST/LUNG: Clear to auscultation bilaterally; No wheeze  HEART: Regular rate and rhythm; No murmurs, rubs, or gallops  ABDOMEN: Soft, Nontender, Nondistended; Bowel sounds present  EXTREMITIES:  2+ Peripheral Pulses, No clubbing, cyanosis, or edema  PSYCH: AAOx3; tearful and anxious  NEUROLOGY: non-focal  SKIN: No rashes or lesions  LABS:                        10.4   5.62  )-----------( 361      ( 16 Mar 2024 05:42 )             31.8     03-16    130<L>  |  95<L>  |  14  ----------------------------<  112<H>  4.0   |  23  |  0.87    Ca    8.7      16 Mar 2024 05:42  Phos  3.1     03-16  Mg     2.0     03-16      PT/INR - ( 15 Mar 2024 06:26 )   PT: 10.8 sec;   INR: 1.03 ratio         PTT - ( 16 Mar 2024 08:25 )  PTT:72.0 sec      Urinalysis Basic - ( 16 Mar 2024 05:42 )    Color: x / Appearance: x / SG: x / pH: x  Gluc: 112 mg/dL / Ketone: x  / Bili: x / Urobili: x   Blood: x / Protein: x / Nitrite: x   Leuk Esterase: x / RBC: x / WBC x   Sq Epi: x / Non Sq Epi: x / Bacteria: x

## 2024-03-16 NOTE — PROVIDER CONTACT NOTE (OTHER) - RECOMMENDATIONS
none at present
IV attempted by staff and chemo rN attempted x 4 , unable to get access , manager Danika Hanks aware of same

## 2024-03-16 NOTE — CHART NOTE - NSCHARTNOTEFT_GEN_A_CORE
Notified by RN that multiple attempts were made and unable to place IV fo Heparin drip.   Discussed with Med Attending Dr Jesus. Pt placed on Full dose Enoxaparin. Anticoagulation to be re-evaluated tomorrow by MD. Endorsed to Night ACP.

## 2024-03-16 NOTE — PROGRESS NOTE ADULT - PROBLEM SELECTOR PLAN 1
- h/o RCC and had nephrectomy by Urology by Dr Jacky Adkins at [facility], also had lymph node dissection 2 years ago  - Now with axillary, subclavicular and mediastinal lymphadenopathy, pulmonary nodules and inflammatory changes of breast seen on CT with possible effect on IVC  - CT neck with large supraclavicular/mediastinal mass lesion with mass effect and complete occlusion of the right IJ ; associated surrounding inflammatory changes in the deep neck, concerning for infectious/inflammatory thrombophlebitis  - Heme-Onc reccs appreciated ; s/p supraclavicular lymph node biopsy by IR on 3/14 ; f/up pathology  - Thoracic sx following ; recc'd dedicated CT chest ; result as above  - ID reccs appreciated ; c/w Unasyn ; f/up bld cx

## 2024-03-17 LAB
ANION GAP SERPL CALC-SCNC: 11 MMOL/L — SIGNIFICANT CHANGE UP (ref 5–17)
ANION GAP SERPL CALC-SCNC: 12 MMOL/L — SIGNIFICANT CHANGE UP (ref 5–17)
APTT BLD: 36.5 SEC — HIGH (ref 24.5–35.6)
APTT BLD: 41.4 SEC — HIGH (ref 24.5–35.6)
APTT BLD: 81.1 SEC — HIGH (ref 24.5–35.6)
BUN SERPL-MCNC: 16 MG/DL — SIGNIFICANT CHANGE UP (ref 7–23)
BUN SERPL-MCNC: 17 MG/DL — SIGNIFICANT CHANGE UP (ref 7–23)
CALCIUM SERPL-MCNC: 9.1 MG/DL — SIGNIFICANT CHANGE UP (ref 8.4–10.5)
CALCIUM SERPL-MCNC: 9.6 MG/DL — SIGNIFICANT CHANGE UP (ref 8.4–10.5)
CHLORIDE SERPL-SCNC: 93 MMOL/L — LOW (ref 96–108)
CHLORIDE SERPL-SCNC: 94 MMOL/L — LOW (ref 96–108)
CO2 SERPL-SCNC: 22 MMOL/L — SIGNIFICANT CHANGE UP (ref 22–31)
CO2 SERPL-SCNC: 24 MMOL/L — SIGNIFICANT CHANGE UP (ref 22–31)
CREAT SERPL-MCNC: 0.93 MG/DL — SIGNIFICANT CHANGE UP (ref 0.5–1.3)
CREAT SERPL-MCNC: 0.96 MG/DL — SIGNIFICANT CHANGE UP (ref 0.5–1.3)
CRP SERPL-MCNC: 36 MG/L — HIGH (ref 0–4)
CULTURE RESULTS: SIGNIFICANT CHANGE UP
CULTURE RESULTS: SIGNIFICANT CHANGE UP
EGFR: 68 ML/MIN/1.73M2 — SIGNIFICANT CHANGE UP
EGFR: 70 ML/MIN/1.73M2 — SIGNIFICANT CHANGE UP
ERYTHROCYTE [SEDIMENTATION RATE] IN BLOOD: 69 MM/HR — HIGH (ref 0–20)
GLUCOSE SERPL-MCNC: 135 MG/DL — HIGH (ref 70–99)
GLUCOSE SERPL-MCNC: 98 MG/DL — SIGNIFICANT CHANGE UP (ref 70–99)
HCT VFR BLD CALC: 31 % — LOW (ref 34.5–45)
HGB BLD-MCNC: 10.3 G/DL — LOW (ref 11.5–15.5)
MCHC RBC-ENTMCNC: 29.4 PG — SIGNIFICANT CHANGE UP (ref 27–34)
MCHC RBC-ENTMCNC: 33.2 GM/DL — SIGNIFICANT CHANGE UP (ref 32–36)
MCV RBC AUTO: 88.6 FL — SIGNIFICANT CHANGE UP (ref 80–100)
NRBC # BLD: 0 /100 WBCS — SIGNIFICANT CHANGE UP (ref 0–0)
PLATELET # BLD AUTO: 398 K/UL — SIGNIFICANT CHANGE UP (ref 150–400)
POTASSIUM SERPL-MCNC: 4.3 MMOL/L — SIGNIFICANT CHANGE UP (ref 3.5–5.3)
POTASSIUM SERPL-MCNC: 4.3 MMOL/L — SIGNIFICANT CHANGE UP (ref 3.5–5.3)
POTASSIUM SERPL-SCNC: 4.3 MMOL/L — SIGNIFICANT CHANGE UP (ref 3.5–5.3)
POTASSIUM SERPL-SCNC: 4.3 MMOL/L — SIGNIFICANT CHANGE UP (ref 3.5–5.3)
RBC # BLD: 3.5 M/UL — LOW (ref 3.8–5.2)
RBC # FLD: 13.2 % — SIGNIFICANT CHANGE UP (ref 10.3–14.5)
SODIUM SERPL-SCNC: 126 MMOL/L — LOW (ref 135–145)
SODIUM SERPL-SCNC: 130 MMOL/L — LOW (ref 135–145)
SPECIMEN SOURCE: SIGNIFICANT CHANGE UP
SPECIMEN SOURCE: SIGNIFICANT CHANGE UP
WBC # BLD: 7.47 K/UL — SIGNIFICANT CHANGE UP (ref 3.8–10.5)
WBC # FLD AUTO: 7.47 K/UL — SIGNIFICANT CHANGE UP (ref 3.8–10.5)

## 2024-03-17 PROCEDURE — 99233 SBSQ HOSP IP/OBS HIGH 50: CPT

## 2024-03-17 PROCEDURE — 93010 ELECTROCARDIOGRAM REPORT: CPT

## 2024-03-17 RX ADMIN — Medication 3 MILLILITER(S): at 05:15

## 2024-03-17 RX ADMIN — Medication 1 PATCH: at 12:09

## 2024-03-17 RX ADMIN — Medication 1 PATCH: at 12:12

## 2024-03-17 RX ADMIN — Medication 1 PATCH: at 07:38

## 2024-03-17 RX ADMIN — OXYCODONE HYDROCHLORIDE 15 MILLIGRAM(S): 5 TABLET ORAL at 16:57

## 2024-03-17 RX ADMIN — Medication 100 MILLIGRAM(S): at 00:04

## 2024-03-17 RX ADMIN — CEFTRIAXONE 100 MILLIGRAM(S): 500 INJECTION, POWDER, FOR SOLUTION INTRAMUSCULAR; INTRAVENOUS at 12:07

## 2024-03-17 RX ADMIN — OXYCODONE HYDROCHLORIDE 15 MILLIGRAM(S): 5 TABLET ORAL at 04:00

## 2024-03-17 RX ADMIN — Medication 100 MILLIGRAM(S): at 05:14

## 2024-03-17 RX ADMIN — HEPARIN SODIUM 1100 UNIT(S)/HR: 5000 INJECTION INTRAVENOUS; SUBCUTANEOUS at 04:14

## 2024-03-17 RX ADMIN — Medication 100 MILLIGRAM(S): at 18:15

## 2024-03-17 RX ADMIN — OXYCODONE HYDROCHLORIDE 15 MILLIGRAM(S): 5 TABLET ORAL at 10:32

## 2024-03-17 RX ADMIN — Medication 3 MILLILITER(S): at 22:21

## 2024-03-17 RX ADMIN — PANTOPRAZOLE SODIUM 40 MILLIGRAM(S): 20 TABLET, DELAYED RELEASE ORAL at 05:30

## 2024-03-17 RX ADMIN — HEPARIN SODIUM 1200 UNIT(S)/HR: 5000 INJECTION INTRAVENOUS; SUBCUTANEOUS at 11:54

## 2024-03-17 RX ADMIN — CHLORHEXIDINE GLUCONATE 1 APPLICATION(S): 213 SOLUTION TOPICAL at 12:10

## 2024-03-17 RX ADMIN — OXYCODONE HYDROCHLORIDE 15 MILLIGRAM(S): 5 TABLET ORAL at 09:32

## 2024-03-17 RX ADMIN — HEPARIN SODIUM 1100 UNIT(S)/HR: 5000 INJECTION INTRAVENOUS; SUBCUTANEOUS at 07:20

## 2024-03-17 RX ADMIN — HEPARIN SODIUM 2000 UNIT(S): 5000 INJECTION INTRAVENOUS; SUBCUTANEOUS at 12:04

## 2024-03-17 RX ADMIN — HYDROMORPHONE HYDROCHLORIDE 0.5 MILLIGRAM(S): 2 INJECTION INTRAMUSCULAR; INTRAVENOUS; SUBCUTANEOUS at 21:42

## 2024-03-17 RX ADMIN — HEPARIN SODIUM 1200 UNIT(S)/HR: 5000 INJECTION INTRAVENOUS; SUBCUTANEOUS at 19:37

## 2024-03-17 RX ADMIN — Medication 3 MILLILITER(S): at 12:07

## 2024-03-17 RX ADMIN — Medication 30 MILLILITER(S): at 05:14

## 2024-03-17 RX ADMIN — Medication 3 MILLILITER(S): at 18:15

## 2024-03-17 RX ADMIN — OXYCODONE HYDROCHLORIDE 15 MILLIGRAM(S): 5 TABLET ORAL at 18:57

## 2024-03-17 RX ADMIN — HYDROMORPHONE HYDROCHLORIDE 0.5 MILLIGRAM(S): 2 INJECTION INTRAMUSCULAR; INTRAVENOUS; SUBCUTANEOUS at 20:15

## 2024-03-17 RX ADMIN — OXYCODONE HYDROCHLORIDE 15 MILLIGRAM(S): 5 TABLET ORAL at 03:04

## 2024-03-17 RX ADMIN — HEPARIN SODIUM 1200 UNIT(S)/HR: 5000 INJECTION INTRAVENOUS; SUBCUTANEOUS at 18:50

## 2024-03-17 RX ADMIN — HEPARIN SODIUM 4000 UNIT(S): 5000 INJECTION INTRAVENOUS; SUBCUTANEOUS at 04:19

## 2024-03-17 NOTE — PROGRESS NOTE ADULT - PROBLEM SELECTOR PLAN 1
- h/o RCC and had nephrectomy by Urology by Dr Jacky Adkins at [facility], also had lymph node dissection 2 years ago  - Now with axillary, subclavicular and mediastinal lymphadenopathy, pulmonary nodules and inflammatory changes of breast seen on CT with possible effect on IVC  - CT neck with large supraclavicular/mediastinal mass lesion with mass effect and complete occlusion of the right IJ ; associated surrounding inflammatory changes in the deep neck, concerning for infectious/inflammatory thrombophlebitis  - Heme-Onc reccs appreciated ; s/p supraclavicular lymph node biopsy by IR on 3/14 ; f/up pathology  - Thoracic sx following ; recc'd dedicated CT chest ; result as above  - ID reccs appreciated ; c/w Unasyn ; f/up bld cx

## 2024-03-17 NOTE — PROGRESS NOTE ADULT - SUBJECTIVE AND OBJECTIVE BOX
[facility] Division of Hospital Medicine  Cecy Jesus MD  MS Teams PREFERRED        SUBJECTIVE / OVERNIGHT EVENTS: NAD    MEDICATIONS  (STANDING):  albuterol/ipratropium for Nebulization 3 milliLiter(s) Nebulizer every 6 hours  cefTRIAXone   IVPB      cefTRIAXone   IVPB 1000 milliGRAM(s) IV Intermittent every 24 hours  chlorhexidine 2% Cloths 1 Application(s) Topical daily  heparin  Infusion. 900 Unit(s)/Hr (9 mL/Hr) IV Continuous <Continuous>  influenza   Vaccine 0.5 milliLiter(s) IntraMuscular once  metroNIDAZOLE  IVPB      metroNIDAZOLE  IVPB 500 milliGRAM(s) IV Intermittent every 12 hours  nicotine -  14 mG/24Hr(s) Patch 1 Patch Transdermal daily  pantoprazole    Tablet 40 milliGRAM(s) Oral before breakfast  polyethylene glycol 3350 17 Gram(s) Oral daily  senna 1 Tablet(s) Oral at bedtime  sodium chloride 2% . 1000 milliLiter(s) (40 mL/Hr) IV Continuous <Continuous>    MEDICATIONS  (PRN):  acetaminophen     Tablet .. 650 milliGRAM(s) Oral every 6 hours PRN Temp greater or equal to 38C (100.4F), Mild Pain (1 - 3)  aluminum hydroxide/magnesium hydroxide/simethicone Suspension 30 milliLiter(s) Oral every 6 hours PRN Dyspepsia  diazepam    Tablet 5 milliGRAM(s) Oral two times a day PRN for anxiety  heparin   Injectable 2000 Unit(s) IV Push every 6 hours PRN For aPTT between 40 - 57  heparin   Injectable 4000 Unit(s) IV Push every 6 hours PRN For aPTT less than 40  HYDROmorphone  Injectable 0.5 milliGRAM(s) IV Push every 6 hours PRN breakthrough pain  melatonin 3 milliGRAM(s) Oral at bedtime PRN Insomnia  ondansetron Injectable 4 milliGRAM(s) IV Push every 8 hours PRN Nausea and/or Vomiting  oxyCODONE    IR 15 milliGRAM(s) Oral every 6 hours PRN Severe Pain (7 - 10)      I&O's Summary    16 Mar 2024 07:01  -  17 Mar 2024 07:00  --------------------------------------------------------  IN: 1500 mL / OUT: 0 mL / NET: 1500 mL        PHYSICAL EXAM:  Vital Signs Last 24 Hrs  T(C): 36.3 (17 Mar 2024 08:30), Max: 36.9 (16 Mar 2024 15:54)  T(F): 97.4 (17 Mar 2024 08:30), Max: 98.5 (16 Mar 2024 15:54)  HR: 100 (17 Mar 2024 08:30) (98 - 118)  BP: 129/83 (17 Mar 2024 08:30) (123/81 - 159/93)  BP(mean): --  RR: 18 (17 Mar 2024 08:30) (17 - 20)  SpO2: 95% (17 Mar 2024 08:30) (89% - 99%)    Parameters below as of 17 Mar 2024 08:30  Patient On (Oxygen Delivery Method): nasal cannula  O2 Flow (L/min): 2  PHYSICAL EXAM:  GENERAL: NAD, well-groomed  HEAD:  Atraumatic, Normocephalic  EYES:  conjunctiva and sclera clear  NECK: +right sided neck swelling improving, less tender to palpation  CHEST/LUNG: Clear to auscultation bilaterally; No wheeze  HEART: Regular rate and rhythm; No murmurs, rubs, or gallops  ABDOMEN: Soft, Nontender, Nondistended; Bowel sounds present  EXTREMITIES:  2+ Peripheral Pulses, No clubbing, cyanosis, or edema  PSYCH: AAOx3; tearful and anxious  NEUROLOGY: non-focal  SKIN: No rashes or lesions      LABS:                        10.3   7.47  )-----------( 398      ( 17 Mar 2024 07:38 )             31.0     03-17    130<L>  |  94<L>  |  16  ----------------------------<  98  4.3   |  24  |  0.93    Ca    9.6      17 Mar 2024 07:37  Phos  3.1     03-16  Mg     2.0     03-16      PTT - ( 17 Mar 2024 03:45 )  PTT:36.5 sec      Urinalysis Basic - ( 17 Mar 2024 07:37 )    Color: x / Appearance: x / SG: x / pH: x  Gluc: 98 mg/dL / Ketone: x  / Bili: x / Urobili: x   Blood: x / Protein: x / Nitrite: x   Leuk Esterase: x / RBC: x / WBC x   Sq Epi: x / Non Sq Epi: x / Bacteria: x        Culture - Blood (collected 15 Mar 2024 06:23)  Source: .Blood Blood  Preliminary Report (16 Mar 2024 13:02):    No growth at 24 hours    Culture - Blood (collected 15 Mar 2024 06:23)  Source: .Blood Blood  Preliminary Report (16 Mar 2024 13:02):    No growth at 24 hours

## 2024-03-17 NOTE — PROGRESS NOTE ADULT - PROBLEM SELECTOR PLAN 2
- Imaging also shows complete occlusion of the right subclavian vein and nearly occlusive thrombosis in the proximal left brachiocephalic vein with flow reconstitution distally   - started on heparin gtt - no line yesterday so Lovenox given  resume hep gtt today

## 2024-03-17 NOTE — PROGRESS NOTE ADULT - PROBLEM SELECTOR PLAN 4
improved to 130  - serum osm 265, urine osm wnl, urine sodium 25  - Pt euvolemic on exam  - d/w Nephrology ; reccs to check BNP, TSH, Cortisol  - f/up urine lytes  heparin gtt fluid from D5 to NS

## 2024-03-18 ENCOUNTER — RESULT REVIEW (OUTPATIENT)
Age: 59
End: 2024-03-18

## 2024-03-18 ENCOUNTER — TRANSCRIPTION ENCOUNTER (OUTPATIENT)
Age: 59
End: 2024-03-18

## 2024-03-18 DIAGNOSIS — Z29.9 ENCOUNTER FOR PROPHYLACTIC MEASURES, UNSPECIFIED: ICD-10-CM

## 2024-03-18 DIAGNOSIS — I31.39 OTHER PERICARDIAL EFFUSION (NONINFLAMMATORY): ICD-10-CM

## 2024-03-18 LAB
ANION GAP SERPL CALC-SCNC: 12 MMOL/L — SIGNIFICANT CHANGE UP (ref 5–17)
APTT BLD: 137.4 SEC — CRITICAL HIGH (ref 24.5–35.6)
APTT BLD: 37 SEC — HIGH (ref 24.5–35.6)
APTT BLD: 52.7 SEC — HIGH (ref 24.5–35.6)
BLD GP AB SCN SERPL QL: NEGATIVE — SIGNIFICANT CHANGE UP
BUN SERPL-MCNC: 12 MG/DL — SIGNIFICANT CHANGE UP (ref 7–23)
CALCIUM SERPL-MCNC: 9.2 MG/DL — SIGNIFICANT CHANGE UP (ref 8.4–10.5)
CHLORIDE SERPL-SCNC: 92 MMOL/L — LOW (ref 96–108)
CO2 SERPL-SCNC: 22 MMOL/L — SIGNIFICANT CHANGE UP (ref 22–31)
CREAT SERPL-MCNC: 0.84 MG/DL — SIGNIFICANT CHANGE UP (ref 0.5–1.3)
EGFR: 80 ML/MIN/1.73M2 — SIGNIFICANT CHANGE UP
GLUCOSE SERPL-MCNC: 131 MG/DL — HIGH (ref 70–99)
HCT VFR BLD CALC: 31.4 % — LOW (ref 34.5–45)
HGB BLD-MCNC: 10.4 G/DL — LOW (ref 11.5–15.5)
MCHC RBC-ENTMCNC: 28.8 PG — SIGNIFICANT CHANGE UP (ref 27–34)
MCHC RBC-ENTMCNC: 33.1 GM/DL — SIGNIFICANT CHANGE UP (ref 32–36)
MCV RBC AUTO: 87 FL — SIGNIFICANT CHANGE UP (ref 80–100)
NRBC # BLD: 0 /100 WBCS — SIGNIFICANT CHANGE UP (ref 0–0)
PLATELET # BLD AUTO: 391 K/UL — SIGNIFICANT CHANGE UP (ref 150–400)
POTASSIUM SERPL-MCNC: 4.5 MMOL/L — SIGNIFICANT CHANGE UP (ref 3.5–5.3)
POTASSIUM SERPL-SCNC: 4.5 MMOL/L — SIGNIFICANT CHANGE UP (ref 3.5–5.3)
RBC # BLD: 3.61 M/UL — LOW (ref 3.8–5.2)
RBC # FLD: 13.2 % — SIGNIFICANT CHANGE UP (ref 10.3–14.5)
RH IG SCN BLD-IMP: POSITIVE — SIGNIFICANT CHANGE UP
SODIUM SERPL-SCNC: 126 MMOL/L — LOW (ref 135–145)
TROPONIN T, HIGH SENSITIVITY RESULT: 21 NG/L — SIGNIFICANT CHANGE UP (ref 0–51)
WBC # BLD: 6.47 K/UL — SIGNIFICANT CHANGE UP (ref 3.8–10.5)
WBC # FLD AUTO: 6.47 K/UL — SIGNIFICANT CHANGE UP (ref 3.8–10.5)

## 2024-03-18 PROCEDURE — 93306 TTE W/DOPPLER COMPLETE: CPT | Mod: 26

## 2024-03-18 PROCEDURE — 99232 SBSQ HOSP IP/OBS MODERATE 35: CPT | Mod: GC

## 2024-03-18 PROCEDURE — 99233 SBSQ HOSP IP/OBS HIGH 50: CPT

## 2024-03-18 PROCEDURE — 88304 TISSUE EXAM BY PATHOLOGIST: CPT | Mod: 26

## 2024-03-18 PROCEDURE — 88305 TISSUE EXAM BY PATHOLOGIST: CPT | Mod: 26

## 2024-03-18 PROCEDURE — 99232 SBSQ HOSP IP/OBS MODERATE 35: CPT

## 2024-03-18 PROCEDURE — 99233 SBSQ HOSP IP/OBS HIGH 50: CPT | Mod: 57

## 2024-03-18 RX ORDER — MORPHINE SULFATE 50 MG/1
2 CAPSULE, EXTENDED RELEASE ORAL ONCE
Refills: 0 | Status: DISCONTINUED | OUTPATIENT
Start: 2024-03-18 | End: 2024-03-18

## 2024-03-18 RX ORDER — SODIUM CHLORIDE 9 MG/ML
1 INJECTION INTRAMUSCULAR; INTRAVENOUS; SUBCUTANEOUS THREE TIMES A DAY
Refills: 0 | Status: DISCONTINUED | OUTPATIENT
Start: 2024-03-18 | End: 2024-03-19

## 2024-03-18 RX ADMIN — Medication 100 MILLIGRAM(S): at 17:17

## 2024-03-18 RX ADMIN — HEPARIN SODIUM 1500 UNIT(S)/HR: 5000 INJECTION INTRAVENOUS; SUBCUTANEOUS at 19:32

## 2024-03-18 RX ADMIN — HEPARIN SODIUM 1300 UNIT(S)/HR: 5000 INJECTION INTRAVENOUS; SUBCUTANEOUS at 06:59

## 2024-03-18 RX ADMIN — PANTOPRAZOLE SODIUM 40 MILLIGRAM(S): 20 TABLET, DELAYED RELEASE ORAL at 05:54

## 2024-03-18 RX ADMIN — Medication 1 PATCH: at 06:51

## 2024-03-18 RX ADMIN — Medication 1 PATCH: at 02:48

## 2024-03-18 RX ADMIN — HEPARIN SODIUM 2000 UNIT(S): 5000 INJECTION INTRAVENOUS; SUBCUTANEOUS at 01:56

## 2024-03-18 RX ADMIN — HEPARIN SODIUM 4000 UNIT(S): 5000 INJECTION INTRAVENOUS; SUBCUTANEOUS at 13:41

## 2024-03-18 RX ADMIN — OXYCODONE HYDROCHLORIDE 15 MILLIGRAM(S): 5 TABLET ORAL at 18:16

## 2024-03-18 RX ADMIN — HEPARIN SODIUM 1400 UNIT(S)/HR: 5000 INJECTION INTRAVENOUS; SUBCUTANEOUS at 23:46

## 2024-03-18 RX ADMIN — OXYCODONE HYDROCHLORIDE 15 MILLIGRAM(S): 5 TABLET ORAL at 17:16

## 2024-03-18 RX ADMIN — MORPHINE SULFATE 2 MILLIGRAM(S): 50 CAPSULE, EXTENDED RELEASE ORAL at 02:11

## 2024-03-18 RX ADMIN — Medication 3 MILLILITER(S): at 23:07

## 2024-03-18 RX ADMIN — CHLORHEXIDINE GLUCONATE 1 APPLICATION(S): 213 SOLUTION TOPICAL at 13:44

## 2024-03-18 RX ADMIN — Medication 5 MILLIGRAM(S): at 13:51

## 2024-03-18 RX ADMIN — HYDROMORPHONE HYDROCHLORIDE 0.5 MILLIGRAM(S): 2 INJECTION INTRAMUSCULAR; INTRAVENOUS; SUBCUTANEOUS at 23:07

## 2024-03-18 RX ADMIN — OXYCODONE HYDROCHLORIDE 15 MILLIGRAM(S): 5 TABLET ORAL at 11:25

## 2024-03-18 RX ADMIN — Medication 5 MILLIGRAM(S): at 00:40

## 2024-03-18 RX ADMIN — HEPARIN SODIUM 1300 UNIT(S)/HR: 5000 INJECTION INTRAVENOUS; SUBCUTANEOUS at 01:52

## 2024-03-18 RX ADMIN — OXYCODONE HYDROCHLORIDE 15 MILLIGRAM(S): 5 TABLET ORAL at 10:25

## 2024-03-18 RX ADMIN — HEPARIN SODIUM 1500 UNIT(S)/HR: 5000 INJECTION INTRAVENOUS; SUBCUTANEOUS at 21:10

## 2024-03-18 RX ADMIN — Medication 1 PATCH: at 13:43

## 2024-03-18 RX ADMIN — Medication 1 PATCH: at 12:06

## 2024-03-18 RX ADMIN — CEFTRIAXONE 100 MILLIGRAM(S): 500 INJECTION, POWDER, FOR SOLUTION INTRAMUSCULAR; INTRAVENOUS at 10:29

## 2024-03-18 RX ADMIN — HEPARIN SODIUM 0 UNIT(S)/HR: 5000 INJECTION INTRAVENOUS; SUBCUTANEOUS at 22:27

## 2024-03-18 RX ADMIN — Medication 3 MILLILITER(S): at 13:43

## 2024-03-18 RX ADMIN — Medication 3 MILLILITER(S): at 17:16

## 2024-03-18 RX ADMIN — MORPHINE SULFATE 2 MILLIGRAM(S): 50 CAPSULE, EXTENDED RELEASE ORAL at 02:57

## 2024-03-18 RX ADMIN — SODIUM CHLORIDE 1 GRAM(S): 9 INJECTION INTRAMUSCULAR; INTRAVENOUS; SUBCUTANEOUS at 22:59

## 2024-03-18 RX ADMIN — HYDROMORPHONE HYDROCHLORIDE 0.5 MILLIGRAM(S): 2 INJECTION INTRAMUSCULAR; INTRAVENOUS; SUBCUTANEOUS at 23:37

## 2024-03-18 RX ADMIN — HEPARIN SODIUM 1500 UNIT(S)/HR: 5000 INJECTION INTRAVENOUS; SUBCUTANEOUS at 19:48

## 2024-03-18 RX ADMIN — Medication 100 MILLIGRAM(S): at 05:53

## 2024-03-18 RX ADMIN — SODIUM CHLORIDE 1 GRAM(S): 9 INJECTION INTRAMUSCULAR; INTRAVENOUS; SUBCUTANEOUS at 17:16

## 2024-03-18 RX ADMIN — HEPARIN SODIUM 1500 UNIT(S)/HR: 5000 INJECTION INTRAVENOUS; SUBCUTANEOUS at 13:40

## 2024-03-18 RX ADMIN — Medication 30 MILLILITER(S): at 00:56

## 2024-03-18 RX ADMIN — Medication 3 MILLILITER(S): at 05:54

## 2024-03-18 NOTE — PROGRESS NOTE ADULT - PROBLEM SELECTOR PLAN 3
- erythema and tenderness of right breast improving  - Unclear if cellulitis or just inflammatory changes   - c/w ceftriaxone and flagyl   - blood cx ngtd

## 2024-03-18 NOTE — PROGRESS NOTE ADULT - SUBJECTIVE AND OBJECTIVE BOX
Thoracic Surgery Pre-op Note:    CC: Patient is a 58y old  Female who presents with a chief complaint of Mastitis, breast malignancy (18 Mar 2024 13:41)                                                                                                           Surgeon: Dr. ERIS Ruiz    Procedure: Pericardial Window on  March 19th 2024     Allergies:  Cipro (Headache; Vomiting; Rash)  penicillin (Headache; Vomiting; Rash)  erythromycin (Headache; Vomiting; Rash)    HPI:  58F w/Hx of RCC s/p R total nephrectomy, S/p hysterectomy, R-sided glaucoma, Fibromyalgia, Anxiety, GERD, smoker presents due to R breast swelling, redness and pain. Pt was initially evaluated by [facility] yesterday and was going to be admitted but left AMA. Was prescribed clindamycin for presumed breast cellulitis/mastitis on discharge, but was also told about concerning findings on CT Chest related to possible breast malignancy and metastasis. Pt has not had a mammogram or colonoscopy in years. Reports that she had a lidocaine injection in her R shoulder on 3/8/24 and since then has had worsening swelling, redness and pain in her R breast. Also still has R shoulder pain, which had mild improvement since the injection. Pt consistently takes oxycodone q6h and believes this may have masked her pain earlier. Reports over 40 pound weight loss in the last year and loss of appetite. Has conjunctival injection in R eye which she claims is chronic but she also feels her eyes are more swollen than usual currently. Denies vision changes. Smokes 1ppd for many years. Patient denies fever, chills, chest pain, SOB, headache, dizziness, abd pain, nausea, vomiting, constipation, dysuria. (11 Mar 2024 23:59)      PAST MEDICAL & SURGICAL HISTORY:  Anxiety  Acid reflux disease  Umbilical hernia  Uterine mass Benign  Fibromyalgia  Joint pains  Glaucoma: Right eye  Herniated cervical disc  Renal cancer H/O unilateral nephrectomy Right Laparoscopic Nephrectomy - 83291  S/P partial hysterectomy 8 years ago  S/P knee surgery knee arthroscopy right x 2 ( 2011 & 2012)  S/P hernia repair umbilical Hernia Repair - 2011  History of tonsillectomy    MEDICATIONS  (STANDING):  albuterol/ipratropium for Nebulization 3 milliLiter(s) Nebulizer every 6 hours  cefTRIAXone   IVPB      cefTRIAXone   IVPB 1000 milliGRAM(s) IV Intermittent every 24 hours  chlorhexidine 2% Cloths 1 Application(s) Topical daily  heparin  Infusion. 900 Unit(s)/Hr (9 mL/Hr) IV Continuous <Continuous>  influenza   Vaccine 0.5 milliLiter(s) IntraMuscular once  metroNIDAZOLE  IVPB      metroNIDAZOLE  IVPB 500 milliGRAM(s) IV Intermittent every 12 hours  nicotine -  14 mG/24Hr(s) Patch 1 Patch Transdermal daily  pantoprazole    Tablet 40 milliGRAM(s) Oral before breakfast  polyethylene glycol 3350 17 Gram(s) Oral daily  senna 1 Tablet(s) Oral at bedtime  sodium chloride 1 Gram(s) Oral three times a day  sodium chloride 2% . 1000 milliLiter(s) (40 mL/Hr) IV Continuous <Continuous>    MEDICATIONS  (PRN):  acetaminophen     Tablet .. 650 milliGRAM(s) Oral every 6 hours PRN Temp greater or equal to 38C (100.4F), Mild Pain (1 - 3)  aluminum hydroxide/magnesium hydroxide/simethicone Suspension 30 milliLiter(s) Oral every 6 hours PRN Dyspepsia  diazepam    Tablet 5 milliGRAM(s) Oral two times a day PRN for anxiety  heparin   Injectable 4000 Unit(s) IV Push every 6 hours PRN For aPTT less than 40  heparin   Injectable 2000 Unit(s) IV Push every 6 hours PRN For aPTT between 40 - 57  HYDROmorphone  Injectable 0.5 milliGRAM(s) IV Push every 6 hours PRN breakthrough pain  melatonin 3 milliGRAM(s) Oral at bedtime PRN Insomnia  ondansetron Injectable 4 milliGRAM(s) IV Push every 8 hours PRN Nausea and/or Vomiting  oxyCODONE    IR 15 milliGRAM(s) Oral every 6 hours PRN Severe Pain (7 - 10)      Vital Signs Last 24 Hrs  T(C): 36.4 (03-18-24 @ 15:39), Max: 36.6 (03-18-24 @ 04:22)  T(F): 97.5 (03-18-24 @ 15:39), Max: 97.9 (03-18-24 @ 04:22)  HR: 101 (03-18-24 @ 15:39) (99 - 105)  BP: 154/89 (03-18-24 @ 15:39) (120/83 - 156/90)  RR: 18 (03-18-24 @ 15:39) (18 - 18)  SpO2: 95% (03-18-24 @ 15:39) (93% - 96%)             Height (cm): 154.9 (14 Mar 2024 12:02)  Weight (kg): 49.9 (14 Mar 2024 12:02)  BMI (kg/m2): 20.8 (14 Mar 2024 12:02)  BSA (m2): 1.47 (14 Mar 2024 12:02)    Labs:                        10.4   6.47  )-----------( 391      ( 18 Mar 2024 08:40 )             31.4     03-18    126<L>  |  92<L>  |  12  ----------------------------<  131<H>  4.5   |  22  |  0.84    Ca    9.2      18 Mar 2024 08:39      PTT - ( 18 Mar 2024 12:39 )  PTT:37.0 sec    Blood Type:     Urinalysis Basic - ( 18 Mar 2024 08:39 )    Color: x / Appearance: x / SG: x / pH: x  Gluc: 131 mg/dL / Ketone: x  / Bili: x / Urobili: x   Blood: x / Protein: x / Nitrite: x   Leuk Esterase: x / RBC: x / WBC x   Sq Epi: x / Non Sq Epi: x / Bacteria: x          CXR:     EKG:    Gen: WN/WD NAD  Neuro: AAOx3, nonfocal  Pulm: CTA B/L  CV: RRR, S1S2  Abd: Soft, NT, ND +BS  Ext: No edema, + peripheral pulses      Pt has AICD/PPM [ ] Yes  [ ] No             Brand Name:  Pre-op Beta Blocker ordered within 24 hrs of surgery ([facility] ONLY)?  [ ] Yes  [ ] No  If not, Why?  Type & Cross  [ ] Yes  [ ] No  NPO after Midnight [ ] Yes  [ ] No  Pre-op ABX ordered, to be taped on chart:  [ ] Yes  [ ] No     Hibiclens/Peridex ordered [ ] Yes  [ ] No  Intraop on Hold: PRBCs, CXR, YAA [ ]   Consent obtained  [ ] Yes  [ ] No   Thoracic Surgery Pre-op Note:    CC: Patient is a 58y old  Female who presents with a chief complaint of Mastitis, breast malignancy (18 Mar 2024 13:41)                                                                                                           Surgeon: Dr. ERIS Ruiz    Procedure: Pericardial Window on  March 19th 2024     Allergies:  Cipro (Headache; Vomiting; Rash)  penicillin (Headache; Vomiting; Rash)  erythromycin (Headache; Vomiting; Rash)    HPI:  58F w/Hx of RCC s/p R total nephrectomy, S/p hysterectomy, R-sided glaucoma, Fibromyalgia, Anxiety, GERD, smoker, COPD, presents due to R breast swelling, redness and pain. Pt was initially evaluated by [facility] yesterday and was going to be admitted but left AMA. Was prescribed clindamycin for presumed breast cellulitis/mastitis on discharge, but was also told about concerning findings on CT Chest related to possible breast malignancy and metastasis. Pt has not had a mammogram or colonoscopy in years. Reports that she had a lidocaine injection in her R shoulder on 3/8/24 and since then has had worsening swelling, redness and pain in her R breast. Also still has R shoulder pain, which had mild improvement since the injection. Pt consistently takes oxycodone q6h and believes this may have masked her pain earlier. Reports over 40 pound weight loss in the last year and loss of appetite. Has conjunctival injection in R eye which she claims is chronic but she also feels her eyes are more swollen than usual currently. Denies vision changes. Smokes 1ppd for many years. Patient denies fever, chills, chest pain, SOB, headache, dizziness, abd pain, nausea, vomiting, constipation, dysuria.       PAST MEDICAL & SURGICAL HISTORY:  Anxiety  COPD  Acid reflux disease  Umbilical hernia  Uterine mass Benign  Fibromyalgia  Joint pains  Glaucoma: Right eye  Herniated cervical disc  Renal cancer H/O unilateral nephrectomy Right Laparoscopic Nephrectomy - 79074  S/P partial hysterectomy 8 years ago  S/P knee surgery knee arthroscopy right x 2 ( 2011 & 2012)  S/P hernia repair umbilical Hernia Repair - 2011  History of tonsillectomy    MEDICATIONS  (STANDING):  albuterol/ipratropium for Nebulization 3 milliLiter(s) Nebulizer every 6 hours  cefTRIAXone   IVPB      cefTRIAXone   IVPB 1000 milliGRAM(s) IV Intermittent every 24 hours  chlorhexidine 2% Cloths 1 Application(s) Topical daily  heparin  Infusion. 900 Unit(s)/Hr (9 mL/Hr) IV Continuous <Continuous>  influenza   Vaccine 0.5 milliLiter(s) IntraMuscular once  metroNIDAZOLE  IVPB      metroNIDAZOLE  IVPB 500 milliGRAM(s) IV Intermittent every 12 hours  nicotine -  14 mG/24Hr(s) Patch 1 Patch Transdermal daily  pantoprazole    Tablet 40 milliGRAM(s) Oral before breakfast  polyethylene glycol 3350 17 Gram(s) Oral daily  senna 1 Tablet(s) Oral at bedtime  sodium chloride 1 Gram(s) Oral three times a day  sodium chloride 2% . 1000 milliLiter(s) (40 mL/Hr) IV Continuous <Continuous>    MEDICATIONS  (PRN):  acetaminophen     Tablet .. 650 milliGRAM(s) Oral every 6 hours PRN Temp greater or equal to 38C (100.4F), Mild Pain (1 - 3)  aluminum hydroxide/magnesium hydroxide/simethicone Suspension 30 milliLiter(s) Oral every 6 hours PRN Dyspepsia  diazepam    Tablet 5 milliGRAM(s) Oral two times a day PRN for anxiety  heparin   Injectable 4000 Unit(s) IV Push every 6 hours PRN For aPTT less than 40  heparin   Injectable 2000 Unit(s) IV Push every 6 hours PRN For aPTT between 40 - 57  HYDROmorphone  Injectable 0.5 milliGRAM(s) IV Push every 6 hours PRN breakthrough pain  melatonin 3 milliGRAM(s) Oral at bedtime PRN Insomnia  ondansetron Injectable 4 milliGRAM(s) IV Push every 8 hours PRN Nausea and/or Vomiting  oxyCODONE    IR 15 milliGRAM(s) Oral every 6 hours PRN Severe Pain (7 - 10)      Vital Signs Last 24 Hrs  T(C): 36.4 (03-18-24 @ 15:39), Max: 36.6 (03-18-24 @ 04:22)  T(F): 97.5 (03-18-24 @ 15:39), Max: 97.9 (03-18-24 @ 04:22)  HR: 101 (03-18-24 @ 15:39) (99 - 105)  BP: 154/89 (03-18-24 @ 15:39) (120/83 - 156/90)  RR: 18 (03-18-24 @ 15:39) (18 - 18)  SpO2: 95% (03-18-24 @ 15:39) (93% - 96%)             Height (cm): 154.9 (14 Mar 2024 12:02)  Weight (kg): 49.9 (14 Mar 2024 12:02)  BMI (kg/m2): 20.8 (14 Mar 2024 12:02)  BSA (m2): 1.47 (14 Mar 2024 12:02)    Labs:                        10.4   6.47  )-----------( 391      ( 18 Mar 2024 08:40 )             31.4     03-18    126<L>  |  92<L>  |  12  ----------------------------<  131<H>  4.5   |  22  |  0.84    Ca    9.2      18 Mar 2024 08:39      PTT - ( 18 Mar 2024 12:39 )  PTT:37.0 sec    Blood Type:  T+C pending    Urinalysis Basic - ( 18 Mar 2024 08:39 )    Color: x / Appearance: x / SG: x / pH: x  Gluc: 131 mg/dL / Ketone: x  / Bili: x / Urobili: x   Blood: x / Protein: x / Nitrite: x   Leuk Esterase: x / RBC: x / WBC x   Sq Epi: x / Non Sq Epi: x / Bacteria: x      TTE:   Pt. Name:       GADIEL GENAO Study Date:    3/18/2024  MRN:            [id number]      YOB: 1965  Heart Rate:     99 bpm          Height:        60.00 in (152.40 cm)  Rhythm:         sinus rhythm    Weight:        109.00 lb (49.44 kg)  Blood Pressure: 154/89 mmHg     BSA/BMI:       1.44 m² / 21.29 kg/m²    CPT:               ECHO TTE WO CON COMP W DOPP - 55279.m  Indication(s):     Other pericardial effusion (noninflammatory) - I31.39  Procedure:         Transthoracic echocardiogram with 2-D, M-mode and complete                     spectral and color flow Doppler.  Ordering Location: [facility]  Admission Status:  Inpatient  Study Information: Image quality for this study is fair.    _______________________________________________________________________________________     CONCLUSIONS:      1. Moderate pericardial effusion with evidence of hemodynamic compromise (or echocardiographic evidence of cardiac tamponade): diastolic collapse of the right atrium that exceeds 1/3 of the cardiac cycle, greater than 30% respiratory variation across the mitral valve E wave and early diastolic inversion of the right ventricle.   2. The inferior vena cava is normal in size measuring 1.80 cm in diameter, (normal <2.1cm) with abnormal inspiratory collapse (abnormal <50%) consistent with mildly elevated right atrial pressure (~8, range 5-10mmHg).   3. Findings were discussed with RYAN Mcknight on 3/18/2024 at 4:45pm.    ________________________________________________________________________________________  FINDINGS:     Pericardium:  There is a moderate pericardial effusion measuring 1.70 cm with evidence of hemodynamic compromise (or echocardiographic evidence of cardiac tamponade) with a blood pressure of 154 mmHg/89 mmHg and a heart rate of 99 bpm. This was supported by evidence of, diastolic collapse of the right atrium that exceeds 1/3 of the cardiac cycle, greater than 30% respiratory variation across the mitral valve E wave and early diastolic inversion of the right ventricle.     Pleura:  Right pleural effusion noted.     Systemic Veins:  The inferior vena cava is normal in size measuring 1.80 cm in diameter, (normal <2.1cm) with abnormal inspiratory collapse (abnormal <50%) consistent with mildly elevated right atrial pressure (~8, range 5-10mmHg).  ____________________________________________________________________  QUANTITATIVE DATA:   Tricuspid Valve Measurements:   RA Pressure: 8 mmHg  _______________________________________________________________________________________  Electronically signed on 3/18/2024 at 4:54:22 PM by Cintia Buitrago MD       CXR: PROCEDURE DATE:  03/10/2024    INTERPRETATION:  AP semierect chest on March 10, 2024 at 6:20 PM. Patient   had right breast cellulitis.  Heart magnified by technique.  COPD hyper expansion of the lungs is better seen on CAT scan June 17, 2015.  Present film shows a mild to moderate right base effusion which is new.  IMPRESSION: COPD again noted. To moderate right base effusion at this   time.    EKG: On chart 3/10/24    Physical assessment  Gen: WN/WD NAD  Neuro: AAOx3, nonfocal  Pulm: CTA B/L  CV: RRR, S1S2  Abd: Soft, NT, ND +BS  Ext: No edema, + peripheral pulses    Type & Cross  [ x] Yes  [ ] No  NPO after Midnight [x ] Yes  [ ] No  Hibiclens ordered [x ] Yes  [ ] No  Consent obtained  [ ] Yes  [ ] No

## 2024-03-18 NOTE — PROGRESS NOTE ADULT - ASSESSMENT
58F w/Hx of RCC s/p R total nephrectomy, S/p hysterectomy, R-sided glaucoma, Fibromyalgia, Anxiety, GERD, smoker p/w R breast swelling, redness and pain, found to have axillary, subclavicular and mediastinal lymphadenopathy & pulm nodules, s/p LN biopsy. Nephrology consulted for hyponatremia..      Pt alert, conversant,  at bedside.  Chronic ill appearing    Hyponatremia--was up to 130 and back to 126 today  Please make sure all abx are in saline  Na tabs 1tab TID if pt can tolerate  will follow along    Katerine Rai MD  Office   Contact me directly via [employer] Teams     (After 5 pm or on weekends please page the on-call fellow/attending, can check [email or web address] for schedule. Login is shailesh mendoza, schedule under [facility] medicine, psych, derm)

## 2024-03-18 NOTE — PROGRESS NOTE ADULT - PROBLEM SELECTOR PLAN 1
- h/o RCC and had nephrectomy by Urology by Dr Jacky Adkins at [facility], also had lymph node dissection 2 years ago  - Now with axillary, subclavicular and mediastinal lymphadenopathy, pulmonary nodules and inflammatory changes of breast seen on CT with possible effect on IVC  - CT neck with large supraclavicular/mediastinal mass lesion with mass effect and complete occlusion of the right IJ ; associated surrounding inflammatory changes in the deep neck, concerning for infectious/inflammatory thrombophlebitis  - Heme-Onc reccs appreciated ; s/p supraclavicular lymph node biopsy by IR on 3/14 ; f/up pathology  - Thoracic sx following ; recc'd dedicated CT chest ; result as above  - continue with ceftriaxone 1g IV Q24H and metronidazole 500 mg PO Q12H.

## 2024-03-18 NOTE — PROGRESS NOTE ADULT - SUBJECTIVE AND OBJECTIVE BOX
Follow Up:    Neckedema    Interval History/ROS:  VSS. CT chest w/ mediastinal and supraclavicular lymphadenopathy c/f necrosis.      Allergies  Cipro (Headache; Vomiting; Rash)  penicillin (Headache; Vomiting; Rash)  erythromycin (Headache; Vomiting; Rash)        ANTIMICROBIALS:  cefTRIAXone   IVPB    cefTRIAXone   IVPB 1000 every 24 hours  metroNIDAZOLE  IVPB 500 every 12 hours  metroNIDAZOLE  IVPB        OTHER MEDS:  MEDICATIONS  (STANDING):  acetaminophen     Tablet .. 650 every 6 hours PRN  albuterol/ipratropium for Nebulization 3 every 6 hours  aluminum hydroxide/magnesium hydroxide/simethicone Suspension 30 every 6 hours PRN  diazepam    Tablet 5 two times a day PRN  heparin   Injectable 4000 every 6 hours PRN  heparin   Injectable 2000 every 6 hours PRN  heparin  Infusion. 900 <Continuous>  HYDROmorphone  Injectable 0.5 every 6 hours PRN  influenza   Vaccine 0.5 once  melatonin 3 at bedtime PRN  ondansetron Injectable 4 every 8 hours PRN  oxyCODONE    IR 15 every 6 hours PRN  pantoprazole    Tablet 40 before breakfast  polyethylene glycol 3350 17 daily  senna 1 at bedtime      Vital Signs Last 24 Hrs  T(C): 36.4 (18 Mar 2024 08:41), Max: 36.6 (18 Mar 2024 04:22)  T(F): 97.6 (18 Mar 2024 08:41), Max: 97.9 (18 Mar 2024 04:22)  HR: 99 (18 Mar 2024 08:41) (99 - 105)  BP: 156/90 (18 Mar 2024 08:41) (114/71 - 156/90)  BP(mean): --  RR: 18 (18 Mar 2024 08:41) (18 - 18)  SpO2: 95% (18 Mar 2024 08:41) (95% - 96%)    Parameters below as of 18 Mar 2024 08:41  Patient On (Oxygen Delivery Method): nasal cannula  O2 Flow (L/min): 2      PHYSICAL EXAM:  Constitutional: non-toxic, no distress  HEAD/EYES: anicteric, no conjunctival injection  ENT:  supple, no thrush  Cardiovascular:   normal S1, S2, no murmur, no edema  Respiratory:  clear BS bilaterally, no wheezes, no rales  GI:  soft, non-tender, normal bowel sounds  :  no nelson, no CVA tenderness  Musculoskeletal:  no synovitis, normal ROM  Neurologic: awake and alert, normal strength, no focal findings  Skin:  no rash, no erythema, no phlebitis  Heme/Onc: no lymphadenopathy   Psychiatric:  awake, alert, appropriate mood                                10.4   6.47  )-----------( 391      ( 18 Mar 2024 08:40 )             31.4       03-18    126<L>  |  92<L>  |  12  ----------------------------<  131<H>  4.5   |  22  |  0.84    Ca    9.2      18 Mar 2024 08:39        Urinalysis Basic - ( 18 Mar 2024 08:39 )    Color: x / Appearance: x / SG: x / pH: x  Gluc: 131 mg/dL / Ketone: x  / Bili: x / Urobili: x   Blood: x / Protein: x / Nitrite: x   Leuk Esterase: x / RBC: x / WBC x   Sq Epi: x / Non Sq Epi: x / Bacteria: x        MICROBIOLOGY:  v  .Blood Blood  03-15-24   No growth at 48 Hours  --  --      .Blood Blood-Peripheral  03-12-24   No growth at 5 days  --  --      .Blood Blood-Peripheral  03-12-24   No growth at 5 days  --  --      Clean Catch Clean Catch (Midstream)  03-10-24   <10,000 CFU/mL Normal Urogenital Tammie  --  --      .Blood Blood-Peripheral  03-10-24   No growth at 5 days  --  --                RADIOLOGY:  Imaging below independently reviewed.  < from: CT Chest w/wo IV Cont (03.14.24 @ 18:33) >    ACC: [id number] EXAM:  CT CHEST WAW IC   ORDERED BY: TORIN DANIELSON     PROCEDURE DATE:  03/14/2024          INTERPRETATION:  CLINICAL INFORMATION: Mediastinal mass    COMPARISON: CT chest 3/10/2024, 6/17/2015, CT abdomen and pelvis   3/12/2024.    CONTRAST/COMPLICATIONS:  IV Contrast: Omnipaque 350  80 cc administered   20 cc discarded  Oral Contrast: NONE  Complications: None reported at time of study completion    PROCEDURE:  CT of the Chest was performed with intravenous contrast. Delayed imaging   was performed.  Sagittal and coronal reformats were performed.    FINDINGS:    LUNGS AND AIRWAYS: Impacted right lower lobe subsegmental bronchi.   Otherwise patent central airways. Centrilobular emphysema. Bilateral   lower lobe dependent atelectasis and right lower lobe compressive   atelectasis. Additional scattered bilateral areas of linear atelectasis.   Multiple pulmonary nodules are similar to comparison study, for example a   1.9 x 1.3 cm bilobed right perihilar nodule (5-64), a 1.9 x 1.2 cm   spiculated right lower lobe nodule (5-60), a 1.1 x 1.0 cm right upper   lobe perifissural nodule with spiculation surrounding groundglass (5-75),   a 1.5 cm left upper lobe spiculated nodule (5-50), a 0.4 cm left upper   lobe nodule (5-47).  PLEURA: Increased moderate right and small left pleural effusions.  MEDIASTINUM AND KI: Extensive soft tissue signal within the right   supraclavicular region measuring 3.3 x 5.0 x 6.9 cm (3-26, 11-53)   obtained due to this with additional soft tissue signal in the superior   mediastinum measuring approximately 5.5 x 3.7 x 4.9 cm (3-43, 11-61)   likely representing conglomerate lymphadenopathy. There is internal   hypodensity within the soft tissue concerning for necrosis.  VESSELS: Conglomerate mediastinal soft tissue mass encases and   obliterates superior vena cava with a small residual knob just above the   right atrium. Additional encasement with thrombosis of the right internal   jugular, right innominate and subclavian, and left innominate veins.   There is partial encasement of the right brachiocephalic and right   subclavian arteries which are otherwise patent. Multiple right-sided   collateral vessels.. Right vertebral artery not visualized at its origin.  HEART: Heart size isnormal. Similar moderate pericardial effusion.  CHEST WALL AND LOWER NECK: Multiple prominent right axillary lymph nodes   measuring up to 2.2 cm (5-76) predominantly right-sided subcutaneous   edema. Right breast skin thickening.  VISUALIZED UPPER ABDOMEN: Left renal cysts and subcentimeter   hypodensities, too small to characterize. Similar cystic hypodensity   within the proximal pancreatic body measuring 1.2 x 1.2 cm. Right   nephrectomy..  BONES: Degenerative changes.    IMPRESSION:  Conglomerate soft tissue signal in the superior mediastinum and right   supraclavicular region likely representing lymphadenopathy with internal   hypodensity concerning for necrosis. 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 Right vertebral artery not visualized at its   origin. There are multiple right-sided collateral vessels and right-sided   soft tissue edema.    Bilateral pulmonary nodules, similar to comparison study.    Increased moderate right and small left pleural effusions. Similar   moderate pericardial effusion.    Dr. Arguello discussed preliminary findings with RYAN Aldridge on   3/14/2024 8:26 PM with read back.    --- End of Report ---           JACQUELYN MARCOS MD; Resident Radiologist  This document has been electronically signed.  KAELA STRANGE MD; Attending Radiologist  This document has been electronically signed. Mar 15 2024 10:47AM    < end of copied text >     Follow Up:    Neck edema    Interval History/ROS:  VSS. CT chest w/ mediastinal and supraclavicular lymphadenopathy c/f necrosis. Patient was seen and examined at bedside. Reports no change in pain and discomfort. Denies fever.      Allergies  Cipro (Headache; Vomiting; Rash)  penicillin (Headache; Vomiting; Rash)  erythromycin (Headache; Vomiting; Rash)        ANTIMICROBIALS:  cefTRIAXone   IVPB    cefTRIAXone   IVPB 1000 every 24 hours  metroNIDAZOLE  IVPB 500 every 12 hours  metroNIDAZOLE  IVPB        OTHER MEDS:  MEDICATIONS  (STANDING):  acetaminophen     Tablet .. 650 every 6 hours PRN  albuterol/ipratropium for Nebulization 3 every 6 hours  aluminum hydroxide/magnesium hydroxide/simethicone Suspension 30 every 6 hours PRN  diazepam    Tablet 5 two times a day PRN  heparin   Injectable 4000 every 6 hours PRN  heparin   Injectable 2000 every 6 hours PRN  heparin  Infusion. 900 <Continuous>  HYDROmorphone  Injectable 0.5 every 6 hours PRN  influenza   Vaccine 0.5 once  melatonin 3 at bedtime PRN  ondansetron Injectable 4 every 8 hours PRN  oxyCODONE    IR 15 every 6 hours PRN  pantoprazole    Tablet 40 before breakfast  polyethylene glycol 3350 17 daily  senna 1 at bedtime      Vital Signs Last 24 Hrs  T(C): 36.4 (18 Mar 2024 08:41), Max: 36.6 (18 Mar 2024 04:22)  T(F): 97.6 (18 Mar 2024 08:41), Max: 97.9 (18 Mar 2024 04:22)  HR: 99 (18 Mar 2024 08:41) (99 - 105)  BP: 156/90 (18 Mar 2024 08:41) (114/71 - 156/90)  BP(mean): --  RR: 18 (18 Mar 2024 08:41) (18 - 18)  SpO2: 95% (18 Mar 2024 08:41) (95% - 96%)    Parameters below as of 18 Mar 2024 08:41  Patient On (Oxygen Delivery Method): nasal cannula  O2 Flow (L/min): 2      PHYSICAL EXAM:  Constitutional: NAD  HEAD/EYES: R conjunctival erythema  ENT:  R neck edema and tenderness; clear oropharynx  Cardiovascular:   normal S1, S2, no murmur, RRR  Respiratory:  clear BS bilaterally, no wheezes  Musculoskeletal:  no BLE edema  Neurologic: awake and oriented x3  Skin:  R breast w/ slightly erythematous change  Psychiatric:  anxious                                10.4   6.47  )-----------( 391      ( 18 Mar 2024 08:40 )             31.4       03-18    126<L>  |  92<L>  |  12  ----------------------------<  131<H>  4.5   |  22  |  0.84    Ca    9.2      18 Mar 2024 08:39        Urinalysis Basic - ( 18 Mar 2024 08:39 )    Color: x / Appearance: x / SG: x / pH: x  Gluc: 131 mg/dL / Ketone: x  / Bili: x / Urobili: x   Blood: x / Protein: x / Nitrite: x   Leuk Esterase: x / RBC: x / WBC x   Sq Epi: x / Non Sq Epi: x / Bacteria: x        MICROBIOLOGY:  v  .Blood Blood  03-15-24   No growth at 48 Hours  --  --      .Blood Blood-Peripheral  03-12-24   No growth at 5 days  --  --      .Blood Blood-Peripheral  03-12-24   No growth at 5 days  --  --      Clean Catch Clean Catch (Midstream)  03-10-24   <10,000 CFU/mL Normal Urogenital Tammie  --  --      .Blood Blood-Peripheral  03-10-24   No growth at 5 days  --  --                RADIOLOGY:  Imaging below independently reviewed.  < from: CT Chest w/wo IV Cont (03.14.24 @ 18:33) >    ACC: [id number] EXAM:  CT CHEST WAW IC   ORDERED BY: TORIN DANIELSON     PROCEDURE DATE:  03/14/2024          INTERPRETATION:  CLINICAL INFORMATION: Mediastinal mass    COMPARISON: CT chest 3/10/2024, 6/17/2015, CT abdomen and pelvis   3/12/2024.    CONTRAST/COMPLICATIONS:  IV Contrast: Omnipaque 350  80 cc administered   20 cc discarded  Oral Contrast: NONE  Complications: None reported at time of study completion    PROCEDURE:  CT of the Chest was performed with intravenous contrast. Delayed imaging   was performed.  Sagittal and coronal reformats were performed.    FINDINGS:    LUNGS AND AIRWAYS: Impacted right lower lobe subsegmental bronchi.   Otherwise patent central airways. Centrilobular emphysema. Bilateral   lower lobe dependent atelectasis and right lower lobe compressive   atelectasis. Additional scattered bilateral areas of linear atelectasis.   Multiple pulmonary nodules are similar to comparison study, for example a   1.9 x 1.3 cm bilobed right perihilar nodule (5-64), a 1.9 x 1.2 cm   spiculated right lower lobe nodule (5-60), a 1.1 x 1.0 cm right upper   lobe perifissural nodule with spiculation surrounding groundglass (5-75),   a 1.5 cm left upper lobe spiculated nodule (5-50), a 0.4 cm left upper   lobe nodule (5-47).  PLEURA: Increased moderate right and small left pleural effusions.  MEDIASTINUM AND KI: Extensive soft tissue signal within the right   supraclavicular region measuring 3.3 x 5.0 x 6.9 cm (3-26, 11-53)   obtained due to this with additional soft tissue signal in the superior   mediastinum measuring approximately 5.5 x 3.7 x 4.9 cm (3-43, 11-61)   likely representing conglomerate lymphadenopathy. There is internal   hypodensity within the soft tissue concerning for necrosis.  VESSELS: Conglomerate mediastinal soft tissue mass encases and   obliterates superior vena cava with a small residual knob just above the   right atrium. Additional encasement with thrombosis of the right internal   jugular, right innominate and subclavian, and left innominate veins.   There is partial encasement of the right brachiocephalic and right   subclavian arteries which are otherwise patent. Multiple right-sided   collateral vessels.. Right vertebral artery not visualized at its origin.  HEART: Heart size isnormal. Similar moderate pericardial effusion.  CHEST WALL AND LOWER NECK: Multiple prominent right axillary lymph nodes   measuring up to 2.2 cm (5-76) predominantly right-sided subcutaneous   edema. Right breast skin thickening.  VISUALIZED UPPER ABDOMEN: Left renal cysts and subcentimeter   hypodensities, too small to characterize. Similar cystic hypodensity   within the proximal pancreatic body measuring 1.2 x 1.2 cm. Right   nephrectomy..  BONES: Degenerative changes.    IMPRESSION:  Conglomerate soft tissue signal in the superior mediastinum and right   supraclavicular region likely representing lymphadenopathy with internal   hypodensity concerning for necrosis. 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 Right vertebral artery not visualized at its   origin. There are multiple right-sided collateral vessels and right-sided   soft tissue edema.    Bilateral pulmonary nodules, similar to comparison study.    Increased moderate right and small left pleural effusions. Similar   moderate pericardial effusion.    Dr. Arguello discussed preliminary findings with RYAN Aldridge on   3/14/2024 8:26 PM with read back.    --- End of Report ---           JACQUELYN MARCOS MD; Resident Radiologist  This document has been electronically signed.  KAELA STRANGE MD; Attending Radiologist  This document has been electronically signed. Mar 15 2024 10:47AM    < end of copied text >

## 2024-03-18 NOTE — CONSULT NOTE ADULT - NS ATTEND AMEND GEN_ALL_CORE FT
Patient care and plan discussed and reviewed with Advanced Care Provider. Plan as outlined above edited by me to reflect our discussion.
ENT consulted for sore throat, hoarse voice, and right neck swelling    58F w/Hx of RCC s/p R total nephrectomy, S/p hysterectomy, R-sided glaucoma, Fibromyalgia, Anxiety, GERD, 1ppd smoker presents due to R breast swelling, redness and pain.     Patient reportedly had lymph nodes removed in her neck in the past now with lymphoedema in right neck. Patient currently being worked up for metastatic disease to chest by thoracic surgery      Physical exam shows right eye with scleral injection, periorbital swelling and erythema, right neck swelling noted, oropharynx appears grossly normal. Indirect laryngoscopy was offered to the patient however she is refusing at this time.    Recommend:  Neck swelling  - Follow up CT Neck with contrast  - Can scope pt if she agrees  - Follow up ophthalmology  - ENT will continue to follow  - Call with questions or concerns
patient had supraclavicular lymph node biopsy - pending pathology - asked to evaluate for mediastinal adenopathy BUT concerns for svc syndrome - recommend CT with IV contrast.   as IR already involved - recommend management once pathology is final for svc syndrome.   if tissue is nondiagnostic from supraclavicular can consider mediastinal LN biopsy - otherwise no acute thoracic intervention warranted   reconsult prn

## 2024-03-18 NOTE — PROGRESS NOTE ADULT - SUBJECTIVE AND OBJECTIVE BOX
Patient is a 58y old  Female who presents with a chief complaint of Mastitis, breast malignancy (18 Mar 2024 13:32)      SUBJECTIVE / OVERNIGHT EVENTS: pt still with some pain around right breast, denies sob, cp, abdominal pain     MEDICATIONS  (STANDING):  albuterol/ipratropium for Nebulization 3 milliLiter(s) Nebulizer every 6 hours  cefTRIAXone   IVPB 1000 milliGRAM(s) IV Intermittent every 24 hours  cefTRIAXone   IVPB      chlorhexidine 2% Cloths 1 Application(s) Topical daily  heparin  Infusion. 900 Unit(s)/Hr (9 mL/Hr) IV Continuous <Continuous>  influenza   Vaccine 0.5 milliLiter(s) IntraMuscular once  metroNIDAZOLE  IVPB      metroNIDAZOLE  IVPB 500 milliGRAM(s) IV Intermittent every 12 hours  nicotine -  14 mG/24Hr(s) Patch 1 Patch Transdermal daily  pantoprazole    Tablet 40 milliGRAM(s) Oral before breakfast  polyethylene glycol 3350 17 Gram(s) Oral daily  senna 1 Tablet(s) Oral at bedtime  sodium chloride 2% . 1000 milliLiter(s) (40 mL/Hr) IV Continuous <Continuous>    MEDICATIONS  (PRN):  acetaminophen     Tablet .. 650 milliGRAM(s) Oral every 6 hours PRN Temp greater or equal to 38C (100.4F), Mild Pain (1 - 3)  aluminum hydroxide/magnesium hydroxide/simethicone Suspension 30 milliLiter(s) Oral every 6 hours PRN Dyspepsia  diazepam    Tablet 5 milliGRAM(s) Oral two times a day PRN for anxiety  heparin   Injectable 4000 Unit(s) IV Push every 6 hours PRN For aPTT less than 40  heparin   Injectable 2000 Unit(s) IV Push every 6 hours PRN For aPTT between 40 - 57  HYDROmorphone  Injectable 0.5 milliGRAM(s) IV Push every 6 hours PRN breakthrough pain  melatonin 3 milliGRAM(s) Oral at bedtime PRN Insomnia  ondansetron Injectable 4 milliGRAM(s) IV Push every 8 hours PRN Nausea and/or Vomiting  oxyCODONE    IR 15 milliGRAM(s) Oral every 6 hours PRN Severe Pain (7 - 10)        CAPILLARY BLOOD GLUCOSE        I&O's Summary    17 Mar 2024 07:01  -  18 Mar 2024 07:00  --------------------------------------------------------  IN: 470 mL / OUT: 0 mL / NET: 470 mL    18 Mar 2024 07:01  -  18 Mar 2024 13:41  --------------------------------------------------------  IN: 240 mL / OUT: 0 mL / NET: 240 mL        PHYSICAL EXAM:  GENERAL: NAD, well-developed  HEAD:  Atraumatic, Normocephalic  EYES: EOMI, PERRLA, conjunctiva and sclera clear  NECK: Supple, No JVD  CHEST/LUNG: Clear to auscultation bilaterally; No wheeze, +right breast erythema, right neck swelling   HEART: Regular rate and rhythm; No murmurs, rubs, or gallops  ABDOMEN: Soft, Nontender, Nondistended; Bowel sounds present  EXTREMITIES:  2+ Peripheral Pulses, No clubbing, cyanosis, or edema  PSYCH: AAOx3      LABS:                        10.4   6.47  )-----------( 391      ( 18 Mar 2024 08:40 )             31.4     03-18    126<L>  |  92<L>  |  12  ----------------------------<  131<H>  4.5   |  22  |  0.84    Ca    9.2      18 Mar 2024 08:39      PTT - ( 18 Mar 2024 12:39 )  PTT:37.0 sec      Urinalysis Basic - ( 18 Mar 2024 08:39 )    Color: x / Appearance: x / SG: x / pH: x  Gluc: 131 mg/dL / Ketone: x  / Bili: x / Urobili: x   Blood: x / Protein: x / Nitrite: x   Leuk Esterase: x / RBC: x / WBC x   Sq Epi: x / Non Sq Epi: x / Bacteria: x        RADIOLOGY & ADDITIONAL TESTS:    Imaging Personally Reviewed:    Consultant(s) Notes Reviewed:      Care Discussed with Consultants/Other Providers:

## 2024-03-18 NOTE — PROGRESS NOTE ADULT - PROBLEM SELECTOR PLAN 2
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

## 2024-03-18 NOTE — PROGRESS NOTE ADULT - SUBJECTIVE AND OBJECTIVE BOX
[facility] DIVISION OF KIDNEY DISEASES AND HYPERTENSION -- FOLLOW UP NOTE  --------------------------------------------------------------------------------  Chief Complaint:    24 hour events/subjective:      PAST HISTORY  --------------------------------------------------------------------------------  No significant changes to PMH, PSH, FHx, SHx, unless otherwise noted    ALLERGIES & MEDICATIONS  --------------------------------------------------------------------------------  Allergies    Cipro (Headache; Vomiting; Rash)  penicillin (Headache; Vomiting; Rash)  erythromycin (Headache; Vomiting; Rash)    Intolerances      Standing Inpatient Medications  albuterol/ipratropium for Nebulization 3 milliLiter(s) Nebulizer every 6 hours  cefTRIAXone   IVPB      cefTRIAXone   IVPB 1000 milliGRAM(s) IV Intermittent every 24 hours  chlorhexidine 2% Cloths 1 Application(s) Topical daily  heparin  Infusion. 900 Unit(s)/Hr IV Continuous <Continuous>  influenza   Vaccine 0.5 milliLiter(s) IntraMuscular once  metroNIDAZOLE  IVPB 500 milliGRAM(s) IV Intermittent every 12 hours  metroNIDAZOLE  IVPB      nicotine -  14 mG/24Hr(s) Patch 1 Patch Transdermal daily  pantoprazole    Tablet 40 milliGRAM(s) Oral before breakfast  polyethylene glycol 3350 17 Gram(s) Oral daily  senna 1 Tablet(s) Oral at bedtime  sodium chloride 2% . 1000 milliLiter(s) IV Continuous <Continuous>    PRN Inpatient Medications  acetaminophen     Tablet .. 650 milliGRAM(s) Oral every 6 hours PRN  aluminum hydroxide/magnesium hydroxide/simethicone Suspension 30 milliLiter(s) Oral every 6 hours PRN  diazepam    Tablet 5 milliGRAM(s) Oral two times a day PRN  heparin   Injectable 4000 Unit(s) IV Push every 6 hours PRN  heparin   Injectable 2000 Unit(s) IV Push every 6 hours PRN  HYDROmorphone  Injectable 0.5 milliGRAM(s) IV Push every 6 hours PRN  melatonin 3 milliGRAM(s) Oral at bedtime PRN  ondansetron Injectable 4 milliGRAM(s) IV Push every 8 hours PRN  oxyCODONE    IR 15 milliGRAM(s) Oral every 6 hours PRN      REVIEW OF SYSTEMS  --------------------------------------------------------------------------------  Gen: No weight changes, fatigue, fevers/chills, weakness  Skin: No rashes  Head/Eyes/Ears/Mouth: No headache; Normal hearing; Normal vision w/o blurriness; No sinus pain/discomfort, sore throat  Respiratory: No dyspnea, cough, wheezing, hemoptysis  CV: No chest pain, PND, orthopnea  GI: No abdominal pain, diarrhea, constipation, nausea, vomiting, melena, hematochezia  : No increased frequency, dysuria, hematuria, nocturia  MSK: No joint pain/swelling; no back pain; no edema  Neuro: No dizziness/lightheadedness, weakness, seizures, numbness, tingling  Heme: No easy bruising or bleeding  Endo: No heat/cold intolerance  Psych: No significant nervousness, anxiety, stress, depression    All other systems were reviewed and are negative, except as noted.    VITALS/PHYSICAL EXAM  --------------------------------------------------------------------------------  T(C): 36.4 (03-18-24 @ 08:41), Max: 36.6 (03-18-24 @ 04:22)  HR: 99 (03-18-24 @ 08:41) (99 - 105)  BP: 156/90 (03-18-24 @ 08:41) (114/71 - 156/90)  RR: 18 (03-18-24 @ 08:41) (18 - 18)  SpO2: 95% (03-18-24 @ 08:41) (95% - 96%)  Wt(kg): --        03-17-24 @ 07:01  -  03-18-24 @ 07:00  --------------------------------------------------------  IN: 470 mL / OUT: 0 mL / NET: 470 mL    03-18-24 @ 07:01  -  03-18-24 @ 13:33  --------------------------------------------------------  IN: 240 mL / OUT: 0 mL / NET: 240 mL        LABS/STUDIES  --------------------------------------------------------------------------------              10.4   6.47  >-----------<  391      [03-18-24 @ 08:40]              31.4     126  |  92  |  12  ----------------------------<  131      [03-18-24 @ 08:39]  4.5   |  22  |  0.84        Ca     9.2     [03-18-24 @ 08:39]        PTT: 37.0       [03-18-24 @ 12:39]      Creatinine Trend:  SCr 0.84 [03-18 @ 08:39]  SCr 0.93 [03-17 @ 07:37]  SCr 0.96 [03-17 @ 01:21]  SCr 0.99 [03-16 @ 17:26]  SCr 0.87 [03-16 @ 05:42]    Urinalysis - [03-18-24 @ 08:39]      Color  / Appearance  / SG  / pH       Gluc 131 / Ketone   / Bili  / Urobili        Blood  / Protein  / Leuk Est  / Nitrite       RBC  / WBC  / Hyaline  / Gran  / Sq Epi  / Non Sq Epi  / Bacteria     Urine Creatinine 74      [03-15-24 @ 15:08]  Urine Protein 99      [03-15-24 @ 15:08]  Urine Sodium 57      [03-15-24 @ 15:08]  Urine Urea Nitrogen 556      [03-15-24 @ 15:08]  Urine Potassium 32      [03-15-24 @ 15:08]  Urine Osmolality 446      [03-15-24 @ 15:08]    TSH 3.46      [03-15-24 @ 15:13]  Lipid: chol 143, , HDL 50, LDL --      [03-12-24 @ 07:01]    HCV 0.09, Nonreact      [03-12-24 @ 07:01]

## 2024-03-18 NOTE — CONSULT NOTE ADULT - SUBJECTIVE AND OBJECTIVE BOX
DATE OF SERVICE: 03-18-24     CHIEF COMPLAINT:Patient is a 58y old  Female who presents with a chief complaint of Mastitis, breast malignancy (18 Mar 2024 18:36)      HISTORY OF PRESENT ILLNESS:HPI:  58F w/Hx of RCC s/p R total nephrectomy, S/p hysterectomy, R-sided glaucoma, Fibromyalgia, Anxiety, GERD, smoker presents due to R breast swelling, redness and pain. Pt was initially evaluated by [facility] yesterday and was going to be admitted but left AMA. Was prescribed clindamycin for presumed breast cellulitis/mastitis on discharge, but was also told about concerning findings on CT Chest related to possible breast malignancy and metastasis. Pt has not had a mammogram or colonoscopy in years. Reports that she had a lidocaine injection in her R shoulder on 3/8/24 and since then has had worsening swelling, redness and pain in her R breast. Also still has R shoulder pain, which had mild improvement since the injection. Pt consistently takes oxycodone q6h and believes this may have masked her pain earlier. Reports over 40 pound weight loss in the last year and loss of appetite. Has conjunctival injection in R eye which she claims is chronic but she also feels her eyes are more swollen than usual currently. Denies vision changes. Smokes 1ppd for many years. Patient denies fever, chills, chest pain, SOB, headache, dizziness, abd pain, nausea, vomiting, constipation, dysuria. (11 Mar 2024 23:59)      PAST MEDICAL & SURGICAL HISTORY:  Anxiety      Acid reflux disease      Umbilical hernia      Uterine mass  Benign      Fibromyalgia  Joint pains      Glaucoma  Right eye      Herniated cervical disc      Cancer of kidney  right kidney      S/P partial hysterectomy  8 years ago      S/P knee surgery  knee arthroscopy right x 2 ( 2011 & 2012)      S/P hernia repair  umbilical Hernia Repair - 2011      H/O unilateral nephrectomy  Right Laparoscopic Nephrectomy - 72093      Glaucoma following surgery  Right Eyr - 2012      History of tonsillectomy              MEDICATIONS:  heparin   Injectable 4000 Unit(s) IV Push every 6 hours PRN  heparin   Injectable 2000 Unit(s) IV Push every 6 hours PRN  heparin  Infusion. 900 Unit(s)/Hr IV Continuous <Continuous>    cefTRIAXone   IVPB 1000 milliGRAM(s) IV Intermittent every 24 hours  cefTRIAXone   IVPB      metroNIDAZOLE  IVPB 500 milliGRAM(s) IV Intermittent every 12 hours  metroNIDAZOLE  IVPB        albuterol/ipratropium for Nebulization 3 milliLiter(s) Nebulizer every 6 hours    acetaminophen     Tablet .. 650 milliGRAM(s) Oral every 6 hours PRN  diazepam    Tablet 5 milliGRAM(s) Oral two times a day PRN  HYDROmorphone  Injectable 0.5 milliGRAM(s) IV Push every 6 hours PRN  melatonin 3 milliGRAM(s) Oral at bedtime PRN  ondansetron Injectable 4 milliGRAM(s) IV Push every 8 hours PRN  oxyCODONE    IR 15 milliGRAM(s) Oral every 6 hours PRN    aluminum hydroxide/magnesium hydroxide/simethicone Suspension 30 milliLiter(s) Oral every 6 hours PRN  pantoprazole    Tablet 40 milliGRAM(s) Oral before breakfast  polyethylene glycol 3350 17 Gram(s) Oral daily  senna 1 Tablet(s) Oral at bedtime      chlorhexidine 2% Cloths 1 Application(s) Topical daily  influenza   Vaccine 0.5 milliLiter(s) IntraMuscular once  sodium chloride 1 Gram(s) Oral three times a day  sodium chloride 2% . 1000 milliLiter(s) IV Continuous <Continuous>      FAMILY HISTORY:  Family history of lung cancer (Mother)    Family history of pancreatic cancer (Father)        Non-contributory    SOCIAL HISTORY:    [ ] not a smoker    Allergies    Cipro (Headache; Vomiting; Rash)  penicillin (Headache; Vomiting; Rash)  erythromycin (Headache; Vomiting; Rash)    Intolerances    	    REVIEW OF SYSTEMS:  CONSTITUTIONAL: No fever, COMFORTABLE   EYES: No eye pain, visual disturbances, or discharge  ENMT:  No difficulty hearing, tinnitus  NECK: No pain or stiffness  RESPIRATORY: No cough, wheezing, NO SOB  CARDIOVASCULAR: No chest pain, palpitations, passing out, dizziness, or leg swelling  GASTROINTESTINAL:  No nausea, vomiting, diarrhea or constipation. No melena.  GENITOURINARY: No dysuria, hematuria  NEUROLOGICAL: No stroke like symptoms  SKIN: No burning or lesions   ENDOCRINE: No heat or cold intolerance  MUSCULOSKELETAL: No joint pain or swelling  PSYCHIATRIC: No  anxiety, mood swings  HEME/LYMPH: No bleeding gums  ALLERGY AND IMMUNOLOGIC: No hives or eczema	    All other ROS negative    PHYSICAL EXAM:  T(C): 36.4 (03-18-24 @ 15:39), Max: 36.6 (03-18-24 @ 04:22)  HR: 101 (03-18-24 @ 15:39) (99 - 105)  BP: 154/89 (03-18-24 @ 15:39) (126/82 - 156/90)  RR: 18 (03-18-24 @ 15:39) (18 - 18)  SpO2: 95% (03-18-24 @ 15:39) (93% - 96%)  Wt(kg): --  I&O's Summary    17 Mar 2024 07:01  -  18 Mar 2024 07:00  --------------------------------------------------------  IN: 470 mL / OUT: 0 mL / NET: 470 mL    18 Mar 2024 07:01  -  18 Mar 2024 22:09  --------------------------------------------------------  IN: 480 mL / OUT: 0 mL / NET: 480 mL        Appearance: Normal	  HEENT:   Normal oral mucosa, EOMI	  Cardiovascular:  S1 S2, No JVD,    Respiratory: Lungs clear to auscultation	  Psychiatry: Alert  Gastrointestinal:  Soft, Non-tender, + BS	  Skin: No rashes   Neurologic: Non-focal  Extremities:  No edema  Vascular: Peripheral pulses palpable    	    	  	  CARDIAC MARKERS:  Labs personally reviewed by me                                  10.4   6.47  )-----------( 391      ( 18 Mar 2024 08:40 )             31.4     03-18    126<L>  |  92<L>  |  12  ----------------------------<  131<H>  4.5   |  22  |  0.84    Ca    9.2      18 Mar 2024 08:39            EKG: Personally reviewed by me - Sinus tach @ 100bpm  Radiology: Personally reviewed by me - CT Chest   Conglomerate soft tissue signal in the superior mediastinum and right   supraclavicular region likely representing lymphadenopathy with internal   hypodensity concerning for necrosis. 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 Right vertebral artery not visualized at its   origin. There are multiple right-sided collateral vessels and right-sided   soft tissue edema.    Bilateral pulmonary nodules, similar to comparison study.    Increased moderate right and small left pleural effusions. Similar   moderate pericardial effusion.       TTE CONCLUSIONS:      1. Moderate pericardial effusion with evidence of hemodynamic compromise (or echocardiographic evidence of cardiac tamponade): diastolic collapse of the right atrium that exceeds 1/3 of the cardiac cycle, greater than 30% respiratory variation across the mitral valve E wave and early diastolic inversion of the right ventricle.   2. The inferior vena cava is normal in size measuring 1.80 cm in diameter, (normal <2.1cm) with abnormal inspiratory collapse (abnormal <50%) consistent with mildly elevated right atrial pressure (~8, range 5-10mmHg).           Assessment and Plan: 	  58F w/Hx of RCC s/p R total nephrectomy, S/p hysterectomy, R-sided glaucoma, Fibromyalgia, Anxiety, GERD, smoker presents due to R breast swelling, redness and pain.     Problem/Plan - 1:   ·  Problem: Pericardial effusion.  ·  Plan: - pericardial effusion on CT chest   - TTE with Moderate pericardial effusion with echocardiographic evidence of hemodynamic compromise    - Pt hypErtenisve, sinus 90s, denies any SOB or chest pain. Clinically pt is not in tamponade   - Thoracic surgery consulted for pericardial window given likely malignant effusion  ----Plan for pericardial window Tuesday  - Transfer pt to tele    Problem/Plan - 2:  ·  Problem: Internal jugular vein thrombosis, right.   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    Problem/Plan - 3:  ·  Problem: Smoker.   ·  Plan: - 1 ppd smoker  - c/w Nicotine patch.    Problem/Plan - 4:  ·  Problem: Prophylactic measure.   ·  Plan: dispo- home.                Differential diagnosis and plan of care discussed with patient after the evaluation. Counseling on diet, nutritional counseling, weight management, exercise and medication compliance was done.   Advanced care planning/advanced directives discussed with patient/family. DNR status including forceful chest compressions to attempt to restart the heart, ventilator support/artificial breathing, electric shock, artificial nutrition, health care proxy, Molst form all discussed with pt. Pt wishes to consider. Sixteen minutes spent on discussing advanced directives.          Lb Mata DO [facility]  Cardiovascular Medicine  [address] [place] [address], [address]  Office [phone]  Available via call/text on Microsoft Teams

## 2024-03-18 NOTE — PROGRESS NOTE ADULT - ASSESSMENT
**** incomplete note ****    necrotic LN, TB vs malignancy workup? would need repeat biopsy and culture 58-yo F w/ PMH of RCC s/p R total nephrectomy, s/p hysterectomy, fibromyalgia, and concern for breast malignancy currently undergoing workup, admitted with R breast swelling, erythema, and pain. ID was consulted for extensive cellulitis/myositis along the R anterior lateral neck and R upper chest wall. 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 LN biopsy 3/14.    #Neck edema  #Neck pain  #Abnormal CT scan  #Myositis  #Thrombophlebitis  - VSS. No leukocytosis. Significant R neck lymphadenopathy, s/p biopsy. F/u path  - No sore throat or dysphagia. No odynophagia. No fever or rigors.  - Unclear if patient's presentation represents infectious disease process. Labs and physical exam do not support septic thrombophlebitis.  - BCx NGTD.  - F/u Quantiferon.  - CT chest reviewed. Necrotic lympn nodes noted. Would recommend workup (e.g. malignancy vs TB?). Might require repeat biopsy for path and culture. If repeat biopsy is performed, please also send for AFB culture.  - Can continue with ceftriaxone 1g IV Q24H and metronidazole 500 mg PO Q12H.  - ENT and Onc f/u. CT surgery follow-up.    Plan discussed with primary team ACP.  Thank you for this consult. Inpatient ID team will follow.    Edwar Petersen MD, PhD  Attending Physician  Division of Infectious Diseases  Department of Medicine    Please contact through MS Teams message.  Office: [phone] (after 5 PM or weekend) .

## 2024-03-19 LAB
ANION GAP SERPL CALC-SCNC: 11 MMOL/L — SIGNIFICANT CHANGE UP (ref 5–17)
ANION GAP SERPL CALC-SCNC: 14 MMOL/L — SIGNIFICANT CHANGE UP (ref 5–17)
APTT BLD: 33.1 SEC — SIGNIFICANT CHANGE UP (ref 24.5–35.6)
BLD GP AB SCN SERPL QL: NEGATIVE — SIGNIFICANT CHANGE UP
BUN SERPL-MCNC: 10 MG/DL — SIGNIFICANT CHANGE UP (ref 7–23)
BUN SERPL-MCNC: 8 MG/DL — SIGNIFICANT CHANGE UP (ref 7–23)
CALCIUM SERPL-MCNC: 9.3 MG/DL — SIGNIFICANT CHANGE UP (ref 8.4–10.5)
CALCIUM SERPL-MCNC: 9.6 MG/DL — SIGNIFICANT CHANGE UP (ref 8.4–10.5)
CHLORIDE SERPL-SCNC: 90 MMOL/L — LOW (ref 96–108)
CHLORIDE SERPL-SCNC: 92 MMOL/L — LOW (ref 96–108)
CO2 SERPL-SCNC: 18 MMOL/L — LOW (ref 22–31)
CO2 SERPL-SCNC: 24 MMOL/L — SIGNIFICANT CHANGE UP (ref 22–31)
CREAT SERPL-MCNC: 0.87 MG/DL — SIGNIFICANT CHANGE UP (ref 0.5–1.3)
CREAT SERPL-MCNC: 0.88 MG/DL — SIGNIFICANT CHANGE UP (ref 0.5–1.3)
EGFR: 75 ML/MIN/1.73M2 — SIGNIFICANT CHANGE UP
EGFR: 76 ML/MIN/1.73M2 — SIGNIFICANT CHANGE UP
GLUCOSE SERPL-MCNC: 104 MG/DL — HIGH (ref 70–99)
GLUCOSE SERPL-MCNC: 112 MG/DL — HIGH (ref 70–99)
HCT VFR BLD CALC: 31.2 % — LOW (ref 34.5–45)
HGB BLD-MCNC: 10.4 G/DL — LOW (ref 11.5–15.5)
MCHC RBC-ENTMCNC: 29.1 PG — SIGNIFICANT CHANGE UP (ref 27–34)
MCHC RBC-ENTMCNC: 33.3 GM/DL — SIGNIFICANT CHANGE UP (ref 32–36)
MCV RBC AUTO: 87.4 FL — SIGNIFICANT CHANGE UP (ref 80–100)
NRBC # BLD: 0 /100 WBCS — SIGNIFICANT CHANGE UP (ref 0–0)
PLATELET # BLD AUTO: 435 K/UL — HIGH (ref 150–400)
POTASSIUM SERPL-MCNC: 4.2 MMOL/L — SIGNIFICANT CHANGE UP (ref 3.5–5.3)
POTASSIUM SERPL-MCNC: 4.3 MMOL/L — SIGNIFICANT CHANGE UP (ref 3.5–5.3)
POTASSIUM SERPL-SCNC: 4.2 MMOL/L — SIGNIFICANT CHANGE UP (ref 3.5–5.3)
POTASSIUM SERPL-SCNC: 4.3 MMOL/L — SIGNIFICANT CHANGE UP (ref 3.5–5.3)
RBC # BLD: 3.57 M/UL — LOW (ref 3.8–5.2)
RBC # FLD: 13.2 % — SIGNIFICANT CHANGE UP (ref 10.3–14.5)
RH IG SCN BLD-IMP: POSITIVE — SIGNIFICANT CHANGE UP
SODIUM SERPL-SCNC: 124 MMOL/L — LOW (ref 135–145)
SODIUM SERPL-SCNC: 125 MMOL/L — LOW (ref 135–145)
WBC # BLD: 7.36 K/UL — SIGNIFICANT CHANGE UP (ref 3.8–10.5)
WBC # FLD AUTO: 7.36 K/UL — SIGNIFICANT CHANGE UP (ref 3.8–10.5)

## 2024-03-19 PROCEDURE — 99232 SBSQ HOSP IP/OBS MODERATE 35: CPT

## 2024-03-19 PROCEDURE — 93010 ELECTROCARDIOGRAM REPORT: CPT

## 2024-03-19 PROCEDURE — 33025 INCISION OF HEART SAC: CPT

## 2024-03-19 PROCEDURE — 99233 SBSQ HOSP IP/OBS HIGH 50: CPT

## 2024-03-19 PROCEDURE — 71045 X-RAY EXAM CHEST 1 VIEW: CPT | Mod: 26

## 2024-03-19 PROCEDURE — 99232 SBSQ HOSP IP/OBS MODERATE 35: CPT | Mod: GC

## 2024-03-19 RX ORDER — METRONIDAZOLE 500 MG
500 TABLET ORAL EVERY 12 HOURS
Refills: 0 | Status: DISCONTINUED | OUTPATIENT
Start: 2024-03-19 | End: 2024-03-26

## 2024-03-19 RX ORDER — OXYCODONE HYDROCHLORIDE 5 MG/1
10 TABLET ORAL EVERY 6 HOURS
Refills: 0 | Status: DISCONTINUED | OUTPATIENT
Start: 2024-03-19 | End: 2024-03-22

## 2024-03-19 RX ORDER — CEFTRIAXONE 500 MG/1
1000 INJECTION, POWDER, FOR SOLUTION INTRAMUSCULAR; INTRAVENOUS EVERY 24 HOURS
Refills: 0 | Status: DISCONTINUED | OUTPATIENT
Start: 2024-03-19 | End: 2024-03-20

## 2024-03-19 RX ORDER — DIAZEPAM 5 MG
5 TABLET ORAL
Refills: 0 | Status: DISCONTINUED | OUTPATIENT
Start: 2024-03-19 | End: 2024-03-26

## 2024-03-19 RX ORDER — SODIUM BICARBONATE 1 MEQ/ML
650 SYRINGE (ML) INTRAVENOUS THREE TIMES A DAY
Refills: 0 | Status: DISCONTINUED | OUTPATIENT
Start: 2024-03-19 | End: 2024-03-19

## 2024-03-19 RX ORDER — HYDROMORPHONE HYDROCHLORIDE 2 MG/ML
0.5 INJECTION INTRAMUSCULAR; INTRAVENOUS; SUBCUTANEOUS
Refills: 0 | Status: DISCONTINUED | OUTPATIENT
Start: 2024-03-19 | End: 2024-03-19

## 2024-03-19 RX ORDER — LANOLIN ALCOHOL/MO/W.PET/CERES
3 CREAM (GRAM) TOPICAL AT BEDTIME
Refills: 0 | Status: DISCONTINUED | OUTPATIENT
Start: 2024-03-19 | End: 2024-03-26

## 2024-03-19 RX ORDER — SODIUM CHLORIDE 5 G/100ML
100 INJECTION, SOLUTION INTRAVENOUS
Refills: 0 | Status: COMPLETED | OUTPATIENT
Start: 2024-03-19 | End: 2024-03-19

## 2024-03-19 RX ORDER — PANTOPRAZOLE SODIUM 20 MG/1
40 TABLET, DELAYED RELEASE ORAL
Refills: 0 | Status: DISCONTINUED | OUTPATIENT
Start: 2024-03-19 | End: 2024-03-26

## 2024-03-19 RX ORDER — FENTANYL CITRATE 50 UG/ML
25 INJECTION INTRAVENOUS
Refills: 0 | Status: DISCONTINUED | OUTPATIENT
Start: 2024-03-19 | End: 2024-03-19

## 2024-03-19 RX ORDER — POLYETHYLENE GLYCOL 3350 17 G/17G
17 POWDER, FOR SOLUTION ORAL DAILY
Refills: 0 | Status: DISCONTINUED | OUTPATIENT
Start: 2024-03-19 | End: 2024-03-21

## 2024-03-19 RX ORDER — NICOTINE POLACRILEX 2 MG
1 GUM BUCCAL DAILY
Refills: 0 | Status: DISCONTINUED | OUTPATIENT
Start: 2024-03-19 | End: 2024-03-26

## 2024-03-19 RX ORDER — HYDROMORPHONE HYDROCHLORIDE 2 MG/ML
0.5 INJECTION INTRAMUSCULAR; INTRAVENOUS; SUBCUTANEOUS EVERY 6 HOURS
Refills: 0 | Status: DISCONTINUED | OUTPATIENT
Start: 2024-03-19 | End: 2024-03-22

## 2024-03-19 RX ADMIN — OXYCODONE HYDROCHLORIDE 15 MILLIGRAM(S): 5 TABLET ORAL at 04:00

## 2024-03-19 RX ADMIN — OXYCODONE HYDROCHLORIDE 15 MILLIGRAM(S): 5 TABLET ORAL at 13:00

## 2024-03-19 RX ADMIN — OXYCODONE HYDROCHLORIDE 15 MILLIGRAM(S): 5 TABLET ORAL at 03:21

## 2024-03-19 RX ADMIN — HYDROMORPHONE HYDROCHLORIDE 0.5 MILLIGRAM(S): 2 INJECTION INTRAMUSCULAR; INTRAVENOUS; SUBCUTANEOUS at 22:21

## 2024-03-19 RX ADMIN — Medication 100 MILLIGRAM(S): at 05:48

## 2024-03-19 RX ADMIN — CEFTRIAXONE 100 MILLIGRAM(S): 500 INJECTION, POWDER, FOR SOLUTION INTRAMUSCULAR; INTRAVENOUS at 10:50

## 2024-03-19 RX ADMIN — SODIUM CHLORIDE 1 GRAM(S): 9 INJECTION INTRAMUSCULAR; INTRAVENOUS; SUBCUTANEOUS at 15:42

## 2024-03-19 RX ADMIN — Medication 3 MILLILITER(S): at 17:29

## 2024-03-19 RX ADMIN — HYDROMORPHONE HYDROCHLORIDE 0.5 MILLIGRAM(S): 2 INJECTION INTRAMUSCULAR; INTRAVENOUS; SUBCUTANEOUS at 22:45

## 2024-03-19 RX ADMIN — OXYCODONE HYDROCHLORIDE 15 MILLIGRAM(S): 5 TABLET ORAL at 12:39

## 2024-03-19 RX ADMIN — Medication 1 PATCH: at 12:28

## 2024-03-19 RX ADMIN — Medication 1 PATCH: at 08:52

## 2024-03-19 RX ADMIN — Medication 3 MILLILITER(S): at 05:50

## 2024-03-19 RX ADMIN — Medication 1 PATCH: at 12:52

## 2024-03-19 RX ADMIN — SODIUM CHLORIDE 200 MILLILITER(S): 5 INJECTION, SOLUTION INTRAVENOUS at 12:24

## 2024-03-19 RX ADMIN — SODIUM CHLORIDE 1 GRAM(S): 9 INJECTION INTRAMUSCULAR; INTRAVENOUS; SUBCUTANEOUS at 05:50

## 2024-03-19 RX ADMIN — Medication 650 MILLIGRAM(S): at 15:41

## 2024-03-19 RX ADMIN — Medication 5 MILLIGRAM(S): at 01:29

## 2024-03-19 RX ADMIN — Medication 3 MILLILITER(S): at 12:31

## 2024-03-19 RX ADMIN — Medication 100 MILLIGRAM(S): at 17:28

## 2024-03-19 RX ADMIN — CHLORHEXIDINE GLUCONATE 1 APPLICATION(S): 213 SOLUTION TOPICAL at 12:52

## 2024-03-19 NOTE — PROGRESS NOTE ADULT - PROBLEM SELECTOR PLAN 1
Pt with hyponatremia in the setting of RCC, likely SIADH. SNa 132 initially on this admission. SNa trended down to 126 3/19. Serum osm: 280, urine osm: 332, urine Na: 25. C/w NaCl tabs 1g TID. Monitor SNa. Ensure all Abx are in saline. Pt with hyponatremia in the setting of RCC, likely SIADH. SNa 132 initially on this admission. SNa trended down to 126 3/19, 124 today. Serum osm: 280, urine osm: 332, urine Na: 25. Monitor SNa. Ensure all Abx are in saline.  Tentative pericardial window today. Recommend 100cc bolus 2% NaCl and recheck SNa to ensure SNa optimized for procedure.

## 2024-03-19 NOTE — PROVIDER CONTACT NOTE (OTHER) - ACTION/TREATMENT ORDERED:
PA notified, given prn dilaudid at 2300 and oxycodone at this time, pt also received valium prn for sleep/anxiety per pt request.

## 2024-03-19 NOTE — PROGRESS NOTE ADULT - PROBLEM SELECTOR PLAN 3
- pericardial effusion on CT chest   - echo with moderate pericardial effusion and collapse of the RA  - plan for pericardial window 3/19

## 2024-03-19 NOTE — PROGRESS NOTE ADULT - SUBJECTIVE AND OBJECTIVE BOX
Follow Up:    Neck edema    Interval History/ROS:  VSS ON. Patient was seen and examined at bedside. Pericardial window pending. Patient denied fever. +neck pain.    Allergies  Cipro (Headache; Vomiting; Rash)  penicillin (Headache; Vomiting; Rash)  erythromycin (Headache; Vomiting; Rash)        ANTIMICROBIALS:  cefTRIAXone   IVPB    cefTRIAXone   IVPB 1000 every 24 hours  metroNIDAZOLE  IVPB 500 every 12 hours  metroNIDAZOLE  IVPB        OTHER MEDS:  MEDICATIONS  (STANDING):  acetaminophen     Tablet .. 650 every 6 hours PRN  albuterol/ipratropium for Nebulization 3 every 6 hours  aluminum hydroxide/magnesium hydroxide/simethicone Suspension 30 every 6 hours PRN  diazepam    Tablet 5 two times a day PRN  HYDROmorphone  Injectable 0.5 every 6 hours PRN  influenza   Vaccine 0.5 once  melatonin 3 at bedtime PRN  ondansetron Injectable 4 every 8 hours PRN  oxyCODONE    IR 15 every 6 hours PRN  pantoprazole    Tablet 40 before breakfast  polyethylene glycol 3350 17 daily  senna 1 at bedtime      Vital Signs Last 24 Hrs  T(C): 36.4 (19 Mar 2024 05:29), Max: 36.6 (18 Mar 2024 20:50)  T(F): 97.5 (19 Mar 2024 05:29), Max: 97.8 (18 Mar 2024 20:50)  HR: 95 (19 Mar 2024 05:29) (95 - 105)  BP: 151/90 (19 Mar 2024 05:29) (128/88 - 171/95)  BP(mean): --  RR: 18 (19 Mar 2024 05:29) (18 - 18)  SpO2: 94% (19 Mar 2024 05:29) (93% - 95%)    Parameters below as of 19 Mar 2024 05:29  Patient On (Oxygen Delivery Method): nasal cannula  O2 Flow (L/min): 2      PHYSICAL EXAM:  Constitutional: NAD  HEAD/EYES: R conjunctival erythema  ENT:  R neck edema and tenderness; clear oropharynx  Cardiovascular:   normal S1, S2, no murmur, RRR  Respiratory:  clear BS bilaterally, no wheezes  Musculoskeletal:  no BLE edema  Neurologic: awake and oriented x3  Skin:  R breast w/ slightly erythematous change, tenderness to palpation  Psychiatric:  anxious                            10.4   7.36  )-----------( 435      ( 19 Mar 2024 07:30 )             31.2       03-19    124<L>  |  92<L>  |  10  ----------------------------<  104<H>  4.3   |  18<L>  |  0.87    Ca    9.3      19 Mar 2024 07:30        Urinalysis Basic - ( 19 Mar 2024 07:30 )    Color: x / Appearance: x / SG: x / pH: x  Gluc: 104 mg/dL / Ketone: x  / Bili: x / Urobili: x   Blood: x / Protein: x / Nitrite: x   Leuk Esterase: x / RBC: x / WBC x   Sq Epi: x / Non Sq Epi: x / Bacteria: x        MICROBIOLOGY:  v  .Blood Blood  03-15-24   No growth at 72 Hours  --  --      .Blood Blood-Peripheral  03-12-24   No growth at 5 days  --  --      .Blood Blood-Peripheral  03-12-24   No growth at 5 days  --  --      Clean Catch Clean Catch (Midstream)  03-10-24   <10,000 CFU/mL Normal Urogenital Tammie  --  --      .Blood Blood-Peripheral  03-10-24   No growth at 5 days  --  --                RADIOLOGY:  Imaging below independently reviewed.  < from: CT Chest w/wo IV Cont (03.14.24 @ 18:33) >    ACC: [id number] EXAM:  CT CHEST WAW IC   ORDERED BY: TORIN DANIELSON     PROCEDURE DATE:  03/14/2024          INTERPRETATION:  CLINICAL INFORMATION: Mediastinal mass    COMPARISON: CT chest 3/10/2024, 6/17/2015, CT abdomen and pelvis   3/12/2024.    CONTRAST/COMPLICATIONS:  IV Contrast: Omnipaque 350  80 cc administered   20 cc discarded  Oral Contrast: NONE  Complications: None reported at time of study completion    PROCEDURE:  CT of the Chest was performed with intravenous contrast. Delayed imaging   was performed.  Sagittal and coronal reformats were performed.    FINDINGS:    LUNGS AND AIRWAYS: Impacted right lower lobe subsegmental bronchi.   Otherwise patent central airways. Centrilobular emphysema. Bilateral   lower lobe dependent atelectasis and right lower lobe compressive   atelectasis. Additional scattered bilateral areas of linear atelectasis.   Multiple pulmonary nodules are similar to comparison study, for example a   1.9 x 1.3 cm bilobed right perihilar nodule (5-64), a 1.9 x 1.2 cm   spiculated right lower lobe nodule (5-60), a 1.1 x 1.0 cm right upper   lobe perifissural nodule with spiculation surrounding groundglass (5-75),   a 1.5 cm left upper lobe spiculated nodule (5-50), a 0.4 cm left upper   lobe nodule (5-47).  PLEURA: Increased moderate right and small left pleural effusions.  MEDIASTINUM AND KI: Extensive soft tissue signal within the right   supraclavicular region measuring 3.3 x 5.0 x 6.9 cm (3-26, 11-53)   obtained due to this with additional soft tissue signal in the superior   mediastinum measuring approximately 5.5 x 3.7 x 4.9 cm (3-43, 11-61)   likely representing conglomerate lymphadenopathy. There is internal   hypodensity within the soft tissue concerning for necrosis.  VESSELS: Conglomerate mediastinal soft tissue mass encases and   obliterates superior vena cava with a small residual knob just above the   right atrium. Additional encasement with thrombosis of the right internal   jugular, right innominate and subclavian, and left innominate veins.   There is partial encasement of the right brachiocephalic and right   subclavian arteries which are otherwise patent. Multiple right-sided   collateral vessels.. Right vertebral artery not visualized at its origin.  HEART: Heart size isnormal. Similar moderate pericardial effusion.  CHEST WALL AND LOWER NECK: Multiple prominent right axillary lymph nodes   measuring up to 2.2 cm (5-76) predominantly right-sided subcutaneous   edema. Right breast skin thickening.  VISUALIZED UPPER ABDOMEN: Left renal cysts and subcentimeter   hypodensities, too small to characterize. Similar cystic hypodensity   within the proximal pancreatic body measuring 1.2 x 1.2 cm. Right   nephrectomy..  BONES: Degenerative changes.    IMPRESSION:  Conglomerate soft tissue signal in the superior mediastinum and right   supraclavicular region likely representing lymphadenopathy with internal   hypodensity concerning for necrosis. 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 Right vertebral artery not visualized at its   origin. There are multiple right-sided collateral vessels and right-sided   soft tissue edema.    Bilateral pulmonary nodules, similar to comparison study.    Increased moderate right and small left pleural effusions. Similar   moderate pericardial effusion.    Dr. Arguello discussed preliminary findings with RYAN Aldridge on   3/14/2024 8:26 PM with read back.    --- End of Report ---           JACQUELYN MARCOS MD; Resident Radiologist  This document has been electronically signed.  KAELA STRANGE MD; Attending Radiologist  This document has been electronically signed. Mar 15 2024 10:47AM    < end of copied text >

## 2024-03-19 NOTE — PROGRESS NOTE ADULT - ASSESSMENT
58F w/Hx of RCC s/p R total nephrectomy, S/p hysterectomy, R-sided glaucoma, Fibromyalgia, Anxiety, GERD, smoker p/w R breast swelling, redness and pain, found to have axillary, subclavicular and mediastinal lymphadenopathy & pulm nodules, s/p LN biopsy. Nephrology consulted for hyponatremia.

## 2024-03-19 NOTE — PROGRESS NOTE ADULT - PROBLEM SELECTOR PLAN 1
- h/o RCC and had nephrectomy by Urology by Dr Jacky Adkins at [facility], also had lymph node dissection 2 years ago  - Now with axillary, subclavicular and mediastinal lymphadenopathy, pulmonary nodules and inflammatory changes of breast seen on CT with possible effect on IVC  - CT neck with large supraclavicular/mediastinal mass lesion with mass effect and complete occlusion of the right IJ ; associated surrounding inflammatory changes in the deep neck, concerning for infectious/inflammatory thrombophlebitis  - S/p supraclavicular lymph node biopsy by IR on 3/14- f/up pathology  - continue with ceftriaxone 1g IV Q24H and metronidazole 500 mg PO Q12H.

## 2024-03-19 NOTE — PROGRESS NOTE ADULT - SUBJECTIVE AND OBJECTIVE BOX
Patient is a 58y old  Female who presents with a chief complaint of Mastitis, breast malignancy (19 Mar 2024 11:29)      SUBJECTIVE / OVERNIGHT EVENTS: pt hungry, wants to eat, still with some pain and discomfort over right breast/ neck region     MEDICATIONS  (STANDING):  albuterol/ipratropium for Nebulization 3 milliLiter(s) Nebulizer every 6 hours  cefTRIAXone   IVPB      cefTRIAXone   IVPB 1000 milliGRAM(s) IV Intermittent every 24 hours  chlorhexidine 2% Cloths 1 Application(s) Topical daily  influenza   Vaccine 0.5 milliLiter(s) IntraMuscular once  metroNIDAZOLE  IVPB 500 milliGRAM(s) IV Intermittent every 12 hours  metroNIDAZOLE  IVPB      nicotine -  14 mG/24Hr(s) Patch 1 Patch Transdermal daily  pantoprazole    Tablet 40 milliGRAM(s) Oral before breakfast  polyethylene glycol 3350 17 Gram(s) Oral daily  senna 1 Tablet(s) Oral at bedtime  sodium bicarbonate 650 milliGRAM(s) Oral three times a day  sodium chloride 1 Gram(s) Oral three times a day    MEDICATIONS  (PRN):  acetaminophen     Tablet .. 650 milliGRAM(s) Oral every 6 hours PRN Temp greater or equal to 38C (100.4F), Mild Pain (1 - 3)  aluminum hydroxide/magnesium hydroxide/simethicone Suspension 30 milliLiter(s) Oral every 6 hours PRN Dyspepsia  diazepam    Tablet 5 milliGRAM(s) Oral two times a day PRN for anxiety  HYDROmorphone  Injectable 0.5 milliGRAM(s) IV Push every 6 hours PRN breakthrough pain  melatonin 3 milliGRAM(s) Oral at bedtime PRN Insomnia  ondansetron Injectable 4 milliGRAM(s) IV Push every 8 hours PRN Nausea and/or Vomiting  oxyCODONE    IR 15 milliGRAM(s) Oral every 6 hours PRN Severe Pain (7 - 10)        CAPILLARY BLOOD GLUCOSE        I&O's Summary    18 Mar 2024 07:01  -  19 Mar 2024 07:00  --------------------------------------------------------  IN: 830 mL / OUT: 0 mL / NET: 830 mL        PHYSICAL EXAM:  GENERAL: NAD, well-developed  HEAD:  Atraumatic, Normocephalic  EYES: EOMI, PERRLA, conjunctiva and sclera clear  NECK: Supple, No JVD  CHEST/LUNG: Clear to auscultation bilaterally; No wheeze, +right breast erythema, right neck swelling   HEART: Regular rate and rhythm; No murmurs, rubs, or gallops  ABDOMEN: Soft, Nontender, Nondistended; Bowel sounds present  EXTREMITIES:  2+ Peripheral Pulses, No clubbing, cyanosis, or edema  PSYCH: AAOx3    LABS:                        10.4   7.36  )-----------( 435      ( 19 Mar 2024 07:30 )             31.2     03-19    124<L>  |  92<L>  |  10  ----------------------------<  104<H>  4.3   |  18<L>  |  0.87    Ca    9.3      19 Mar 2024 07:30      PTT - ( 19 Mar 2024 07:38 )  PTT:33.1 sec      Urinalysis Basic - ( 19 Mar 2024 07:30 )    Color: x / Appearance: x / SG: x / pH: x  Gluc: 104 mg/dL / Ketone: x  / Bili: x / Urobili: x   Blood: x / Protein: x / Nitrite: x   Leuk Esterase: x / RBC: x / WBC x   Sq Epi: x / Non Sq Epi: x / Bacteria: x        RADIOLOGY & ADDITIONAL TESTS:    Imaging Personally Reviewed:    Consultant(s) Notes Reviewed:      Care Discussed with Consultants/Other Providers:

## 2024-03-19 NOTE — PROGRESS NOTE ADULT - PROBLEM SELECTOR PLAN 4
- erythema and tenderness of right breast improving  - Unclear if cellulitis or just inflammatory changes   - c/w ceftriaxone and flagyl as above   - blood cx ngtd

## 2024-03-19 NOTE — PROGRESS NOTE ADULT - ASSESSMENT
58-yo F w/ PMH of RCC s/p R total nephrectomy, s/p hysterectomy, fibromyalgia, and concern for breast malignancy currently undergoing workup, admitted with R breast swelling, erythema, and pain. ID was consulted for extensive cellulitis/myositis along the R anterior lateral neck and R upper chest wall. 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 LN biopsy 3/14. CT chest w/ mediastinal and supraclavicular lymphadenopathy w/ necrosis. 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    #Neck edema  #Neck pain  #Abnormal CT scan  #Myositis  #Thrombophlebitis  - VSS. No leukocytosis. Significant R neck lymphadenopathy, s/p biopsy. F/u path  - No sore throat or dysphagia. No odynophagia. No fever or rigors.  - Unclear if patient's presentation represents infectious disease process. Labs and physical exam do not support septic thrombophlebitis.  - BCx NGTD.  - F/u Quantiferon.  - CT chest reviewed. Necrotic lympn nodes noted. Would recommend workup (e.g. malignancy vs TB?). Might require repeat biopsy for path and culture. If surgical management is performed, please send repeat biopsy and AFB culture of lymph nodes.  - Can continue with ceftriaxone 1g IV Q24H and metronidazole 500 mg PO Q12H.  - ENT and Onc f/u. CT surgery follow-up.    Plan discussed with primary team ACP.  Thank you for this consult. Inpatient ID team will follow.    Edwar Petersen MD, PhD  Attending Physician  Division of Infectious Diseases  Department of Medicine    Please contact through MS Teams message.  Office: [phone] (after 5 PM or weekend) .

## 2024-03-19 NOTE — PROGRESS NOTE ADULT - SUBJECTIVE AND OBJECTIVE BOX
DATE OF SERVICE: 03-19-24 @ 16:15    Patient is a 58y old  Female who presents with a chief complaint of Mastitis, breast malignancy (19 Mar 2024 14:00)      INTERVAL HISTORY: Feels ok.     REVIEW OF SYSTEMS:  CONSTITUTIONAL: No weakness  EYES/ENT: No visual changes;  No throat pain   NECK: No pain or stiffness  RESPIRATORY: No cough, wheezing; No shortness of breath  CARDIOVASCULAR: No chest pain or palpitations  GASTROINTESTINAL: No abdominal  pain. No nausea, vomiting, or hematemesis  GENITOURINARY: No dysuria, frequency or hematuria  NEUROLOGICAL: No stroke like symptoms  SKIN: No rashes    TELEMETRY Personally reviewed: -110  	  MEDICATIONS:        PHYSICAL EXAM:  T(C): 36.1 (03-19-24 @ 11:48), Max: 36.6 (03-18-24 @ 20:50)  HR: 98 (03-19-24 @ 11:48) (95 - 105)  BP: 150/87 (03-19-24 @ 11:48) (128/88 - 171/95)  RR: 18 (03-19-24 @ 11:48) (18 - 18)  SpO2: 93% (03-19-24 @ 11:48) (93% - 95%)  Wt(kg): --  I&O's Summary    18 Mar 2024 07:01  -  19 Mar 2024 07:00  --------------------------------------------------------  IN: 830 mL / OUT: 0 mL / NET: 830 mL          Appearance: In no distress	  HEENT:    PERRL, EOMI	  Cardiovascular:  S1 S2, No JVD  Respiratory: Lungs clear to auscultation	  Gastrointestinal:  Soft, Non-tender, + BS	  Vascularature:  No edema of LE  Psychiatric: Appropriate affect   Neuro: no acute focal deficits                               10.4   7.36  )-----------( 435      ( 19 Mar 2024 07:30 )             31.2     03-19    124<L>  |  92<L>  |  10  ----------------------------<  104<H>  4.3   |  18<L>  |  0.87    Ca    9.3      19 Mar 2024 07:30          Labs personally reviewed      ASSESSMENT/PLAN: 	    58F w/Hx of RCC s/p R total nephrectomy, S/p hysterectomy, R-sided glaucoma, Fibromyalgia, Anxiety, GERD, smoker presents due to R breast swelling, redness and pain.     Problem/Plan - 1:   ·  Problem: Pericardial effusion.  ·  Plan: - pericardial effusion on CT chest   - TTE with Moderate pericardial effusion with echocardiographic evidence of hemodynamic compromise    - Pt hypErtenisve, sinus 90s, denies any SOB or chest pain. Clinically pt is not in tamponade   - Thoracic surgery consulted for pericardial window given likely malignant effusion  ----Plan for pericardial window today   - Transfer pt to tele    Problem/Plan - 2:  ·  Problem: Internal jugular vein thrombosis, right.   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    Problem/Plan - 3:  ·  Problem: Smoker.   ·  Plan: - 1 ppd smoker  - c/w Nicotine patch.    Problem/Plan - 4:  ·  Problem: Prophylactic measure.   ·  Plan: dispo- home.                  Adriana Julian, TIFFANI-NP   Lb Mata DO [facility]  Cardiovascular Medicine  [address], [facility], [address]  Available through call or text on [employer] TEAMs  Office: [phone]   DATE OF SERVICE: 03-19-24 @ 16:15    Patient is a 58y old  Female who presents with a chief complaint of Mastitis, breast malignancy (19 Mar 2024 14:00)      INTERVAL HISTORY: Feels ok.     REVIEW OF SYSTEMS:  CONSTITUTIONAL: No weakness  EYES/ENT: No visual changes;  No throat pain   NECK: No pain or stiffness  RESPIRATORY: No cough, wheezing; No shortness of breath  CARDIOVASCULAR: No chest pain or palpitations  GASTROINTESTINAL: No abdominal  pain. No nausea, vomiting, or hematemesis  GENITOURINARY: No dysuria, frequency or hematuria  NEUROLOGICAL: No stroke like symptoms  SKIN: No rashes    TELEMETRY Personally reviewed: -110  	  MEDICATIONS:        PHYSICAL EXAM:  T(C): 36.1 (03-19-24 @ 11:48), Max: 36.6 (03-18-24 @ 20:50)  HR: 98 (03-19-24 @ 11:48) (95 - 105)  BP: 150/87 (03-19-24 @ 11:48) (128/88 - 171/95)  RR: 18 (03-19-24 @ 11:48) (18 - 18)  SpO2: 93% (03-19-24 @ 11:48) (93% - 95%)  Wt(kg): --  I&O's Summary    18 Mar 2024 07:01  -  19 Mar 2024 07:00  --------------------------------------------------------  IN: 830 mL / OUT: 0 mL / NET: 830 mL          Appearance: In no distress	  HEENT:    PERRL, EOMI	  Cardiovascular:  S1 S2, No JVD  Respiratory: Lungs clear to auscultation	  Gastrointestinal:  Soft, Non-tender, + BS	  Vascularature:  No edema of LE  Psychiatric: Appropriate affect   Neuro: no acute focal deficits                               10.4   7.36  )-----------( 435      ( 19 Mar 2024 07:30 )             31.2     03-19    124<L>  |  92<L>  |  10  ----------------------------<  104<H>  4.3   |  18<L>  |  0.87    Ca    9.3      19 Mar 2024 07:30          Labs personally reviewed      ASSESSMENT/PLAN: 	  58F w/Hx of RCC s/p R total nephrectomy, S/p hysterectomy, R-sided glaucoma, Fibromyalgia, Anxiety, GERD, smoker presents due to R breast swelling, redness and pain.     Problem/Plan - 1:   ·  Problem: Pericardial effusion.  ·  Plan: - pericardial effusion on CT chest   - TTE with Moderate pericardial effusion with echocardiographic evidence of hemodynamic compromise    - Pt hypErtenisve, sinus 90s, denies any SOB or chest pain. Clinically pt is not in tamponade   - Thoracic surgery consulted for pericardial window given likely malignant effusion  ----Plan for pericardial window today   - Transfer pt to tele    Problem/Plan - 2:  ·  Problem: Internal jugular vein thrombosis, right.   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    Problem/Plan - 3:  ·  Problem: Smoker.   ·  Plan: - 1 ppd smoker  - c/w Nicotine patch.    Problem/Plan - 4:  ·  Problem: Prophylactic measure.   ·  Plan: dispo- home.                  Adriana Julian, TIFFANI-NP   Lb Mata DO [facility]  Cardiovascular Medicine  [address], [address]  Available through call or text on [employer] TEAMs  Office: [phone]

## 2024-03-19 NOTE — PROGRESS NOTE ADULT - SUBJECTIVE AND OBJECTIVE BOX
[facility] DIVISION OF KIDNEY DISEASES AND HYPERTENSION   FOLLOW UP NOTE    --------------------------------------------------------------------------------  Chief Complaint:    24 hour events/subjective: Pt. was seen and examined today. Patient still having R sided facial pain, no new urinary sxs. Endorses not liking the salt tabs but has been compliant with them.       PAST HISTORY  --------------------------------------------------------------------------------  No significant changes to PMH, PSH, FHx, SHx, unless otherwise noted    ALLERGIES & MEDICATIONS  --------------------------------------------------------------------------------  Allergies    Cipro (Headache; Vomiting; Rash)  penicillin (Headache; Vomiting; Rash)  erythromycin (Headache; Vomiting; Rash)    Intolerances      Standing Inpatient Medications  albuterol/ipratropium for Nebulization 3 milliLiter(s) Nebulizer every 6 hours  cefTRIAXone   IVPB      cefTRIAXone   IVPB 1000 milliGRAM(s) IV Intermittent every 24 hours  chlorhexidine 2% Cloths 1 Application(s) Topical daily  influenza   Vaccine 0.5 milliLiter(s) IntraMuscular once  metroNIDAZOLE  IVPB      metroNIDAZOLE  IVPB 500 milliGRAM(s) IV Intermittent every 12 hours  nicotine -  14 mG/24Hr(s) Patch 1 Patch Transdermal daily  pantoprazole    Tablet 40 milliGRAM(s) Oral before breakfast  polyethylene glycol 3350 17 Gram(s) Oral daily  senna 1 Tablet(s) Oral at bedtime  sodium chloride 1 Gram(s) Oral three times a day  sodium chloride 2% . 1000 milliLiter(s) IV Continuous <Continuous>    PRN Inpatient Medications  acetaminophen     Tablet .. 650 milliGRAM(s) Oral every 6 hours PRN  aluminum hydroxide/magnesium hydroxide/simethicone Suspension 30 milliLiter(s) Oral every 6 hours PRN  diazepam    Tablet 5 milliGRAM(s) Oral two times a day PRN  HYDROmorphone  Injectable 0.5 milliGRAM(s) IV Push every 6 hours PRN  melatonin 3 milliGRAM(s) Oral at bedtime PRN  ondansetron Injectable 4 milliGRAM(s) IV Push every 8 hours PRN  oxyCODONE    IR 15 milliGRAM(s) Oral every 6 hours PRN      REVIEW OF SYSTEMS  --------------------------------------------------------------------------------  Gen: No fevers/chills  Head/Eyes/Ears: No HA   Respiratory: No dyspnea, cough  CV: No chest pain  GI: No abdominal pain, diarrhea  : No dysuria, hematuria  MSK: No  edema  Skin: No rashes  Heme: No easy bruising or bleeding    All other systems were reviewed and are negative, except as noted.    VITALS/PHYSICAL EXAM  --------------------------------------------------------------------------------  T(C): 36.4 (03-19-24 @ 05:29), Max: 36.6 (03-18-24 @ 20:50)  HR: 95 (03-19-24 @ 05:29) (95 - 105)  BP: 151/90 (03-19-24 @ 05:29) (128/88 - 171/95)  RR: 18 (03-19-24 @ 05:29) (18 - 18)  SpO2: 94% (03-19-24 @ 05:29) (93% - 95%)  Wt(kg): --        03-18-24 @ 07:01  -  03-19-24 @ 07:00  --------------------------------------------------------  IN: 480 mL / OUT: 0 mL / NET: 480 mL        Physical Exam:  	Gen: NAD  	HEENT: Anicteric, dec swelling R face and neck   	Pulm: CTA B/L  	CV: S1S2+  	Abd: Soft, +BS    	Ext: No LE edema B/L  	Neuro: Awake  	Skin: Warm and dry        LABS/STUDIES  --------------------------------------------------------------------------------              10.4   7.36  >-----------<  435      [03-19-24 @ 07:30]              31.2     126  |  92  |  12  ----------------------------<  131      [03-18-24 @ 08:39]  4.5   |  22  |  0.84        Ca     9.2     [03-18-24 @ 08:39]        PTT: 33.1       [03-19-24 @ 07:38]      Creatinine Trend:  SCr 0.84 [03-18 @ 08:39]  SCr 0.93 [03-17 @ 07:37]  SCr 0.96 [03-17 @ 01:21]  SCr 0.99 [03-16 @ 17:26]  SCr 0.87 [03-16 @ 05:42]    Urinalysis - [03-18-24 @ 08:39]      Color  / Appearance  / SG  / pH       Gluc 131 / Ketone   / Bili  / Urobili        Blood  / Protein  / Leuk Est  / Nitrite       RBC  / WBC  / Hyaline  / Gran  / Sq Epi  / Non Sq Epi  / Bacteria     Urine Creatinine 74      [03-15-24 @ 15:08]  Urine Protein 99      [03-15-24 @ 15:08]  Urine Sodium 57      [03-15-24 @ 15:08]  Urine Urea Nitrogen 556      [03-15-24 @ 15:08]  Urine Potassium 32      [03-15-24 @ 15:08]  Urine Osmolality 446      [03-15-24 @ 15:08]    HCV 0.09, Nonreact      [03-12-24 @ 07:01]      Tacrolimus  Cyclosporine  Sirolimus  Mycophenolate  BK PCR  CMV PCR  Parvo PCR  EBV PCR

## 2024-03-19 NOTE — PROGRESS NOTE ADULT - PROBLEM SELECTOR PLAN 5
- serum osm 265, urine osm wnl, urine sodium 25  - likely SIADH  - Pt euvolemic on exam  - heparin gtt fluid from D5 to NS  - salt tabs started  - received hypertonic saline 3/19

## 2024-03-19 NOTE — PROGRESS NOTE ADULT - ATTENDING COMMENTS
I have seen this patient with the fellow and agree with their assessment and plan. I was physically present for significant portions of the evaluation and management (E/M) service provided.  I agree with the above history, physical, and plan which I have reviewed and edited where appropriate.    Katerine Rai MD  Office   Contact me directly via Microsoft Teams     (After 5 pm or on weekends please page the on-call fellow/attending, can check [email or web address] for schedule. Login is shailesh mendoza, schedule under [facility] medicine, psych, derm)

## 2024-03-20 ENCOUNTER — RESULT REVIEW (OUTPATIENT)
Age: 59
End: 2024-03-20

## 2024-03-20 LAB
ANION GAP SERPL CALC-SCNC: 10 MMOL/L — SIGNIFICANT CHANGE UP (ref 5–17)
BUN SERPL-MCNC: 12 MG/DL — SIGNIFICANT CHANGE UP (ref 7–23)
CALCIUM SERPL-MCNC: 9.1 MG/DL — SIGNIFICANT CHANGE UP (ref 8.4–10.5)
CHLORIDE SERPL-SCNC: 93 MMOL/L — LOW (ref 96–108)
CO2 SERPL-SCNC: 24 MMOL/L — SIGNIFICANT CHANGE UP (ref 22–31)
CREAT SERPL-MCNC: 0.92 MG/DL — SIGNIFICANT CHANGE UP (ref 0.5–1.3)
CULTURE RESULTS: SIGNIFICANT CHANGE UP
CULTURE RESULTS: SIGNIFICANT CHANGE UP
EGFR: 71 ML/MIN/1.73M2 — SIGNIFICANT CHANGE UP
GAMMA INTERFERON BACKGROUND BLD IA-ACNC: 0.04 IU/ML — SIGNIFICANT CHANGE UP
GLUCOSE SERPL-MCNC: 142 MG/DL — HIGH (ref 70–99)
HCT VFR BLD CALC: 32.9 % — LOW (ref 34.5–45)
HCT VFR BLD CALC: 33 % — LOW (ref 34.5–45)
HGB BLD-MCNC: 10.9 G/DL — LOW (ref 11.5–15.5)
HGB BLD-MCNC: 11.1 G/DL — LOW (ref 11.5–15.5)
M TB IFN-G BLD-IMP: NEGATIVE — SIGNIFICANT CHANGE UP
M TB IFN-G CD4+ BCKGRND COR BLD-ACNC: 0 IU/ML — SIGNIFICANT CHANGE UP
M TB IFN-G CD4+CD8+ BCKGRND COR BLD-ACNC: 0 IU/ML — SIGNIFICANT CHANGE UP
MCHC RBC-ENTMCNC: 28.4 PG — SIGNIFICANT CHANGE UP (ref 27–34)
MCHC RBC-ENTMCNC: 28.8 PG — SIGNIFICANT CHANGE UP (ref 27–34)
MCHC RBC-ENTMCNC: 33.1 GM/DL — SIGNIFICANT CHANGE UP (ref 32–36)
MCHC RBC-ENTMCNC: 33.6 GM/DL — SIGNIFICANT CHANGE UP (ref 32–36)
MCV RBC AUTO: 85.5 FL — SIGNIFICANT CHANGE UP (ref 80–100)
MCV RBC AUTO: 85.7 FL — SIGNIFICANT CHANGE UP (ref 80–100)
NON-GYNECOLOGICAL CYTOLOGY STUDY: SIGNIFICANT CHANGE UP
NRBC # BLD: 0 /100 WBCS — SIGNIFICANT CHANGE UP (ref 0–0)
NRBC # BLD: 0 /100 WBCS — SIGNIFICANT CHANGE UP (ref 0–0)
PLATELET # BLD AUTO: 388 K/UL — SIGNIFICANT CHANGE UP (ref 150–400)
PLATELET # BLD AUTO: 420 K/UL — HIGH (ref 150–400)
POTASSIUM SERPL-MCNC: 4 MMOL/L — SIGNIFICANT CHANGE UP (ref 3.5–5.3)
POTASSIUM SERPL-SCNC: 4 MMOL/L — SIGNIFICANT CHANGE UP (ref 3.5–5.3)
QUANT TB PLUS MITOGEN MINUS NIL: 4.39 IU/ML — SIGNIFICANT CHANGE UP
RBC # BLD: 3.84 M/UL — SIGNIFICANT CHANGE UP (ref 3.8–5.2)
RBC # BLD: 3.86 M/UL — SIGNIFICANT CHANGE UP (ref 3.8–5.2)
RBC # FLD: 13 % — SIGNIFICANT CHANGE UP (ref 10.3–14.5)
RBC # FLD: 13.1 % — SIGNIFICANT CHANGE UP (ref 10.3–14.5)
SODIUM SERPL-SCNC: 127 MMOL/L — LOW (ref 135–145)
SPECIMEN SOURCE: SIGNIFICANT CHANGE UP
SPECIMEN SOURCE: SIGNIFICANT CHANGE UP
WBC # BLD: 8.07 K/UL — SIGNIFICANT CHANGE UP (ref 3.8–10.5)
WBC # BLD: 9.04 K/UL — SIGNIFICANT CHANGE UP (ref 3.8–10.5)
WBC # FLD AUTO: 8.07 K/UL — SIGNIFICANT CHANGE UP (ref 3.8–10.5)
WBC # FLD AUTO: 9.04 K/UL — SIGNIFICANT CHANGE UP (ref 3.8–10.5)

## 2024-03-20 PROCEDURE — 99232 SBSQ HOSP IP/OBS MODERATE 35: CPT

## 2024-03-20 PROCEDURE — 99233 SBSQ HOSP IP/OBS HIGH 50: CPT | Mod: GC

## 2024-03-20 PROCEDURE — 99232 SBSQ HOSP IP/OBS MODERATE 35: CPT | Mod: GC

## 2024-03-20 PROCEDURE — 88112 CYTOPATH CELL ENHANCE TECH: CPT | Mod: 26

## 2024-03-20 PROCEDURE — 71045 X-RAY EXAM CHEST 1 VIEW: CPT | Mod: 26

## 2024-03-20 PROCEDURE — 88305 TISSUE EXAM BY PATHOLOGIST: CPT | Mod: 26

## 2024-03-20 PROCEDURE — 99233 SBSQ HOSP IP/OBS HIGH 50: CPT

## 2024-03-20 RX ORDER — HYDROMORPHONE HYDROCHLORIDE 2 MG/ML
0.5 INJECTION INTRAMUSCULAR; INTRAVENOUS; SUBCUTANEOUS ONCE
Refills: 0 | Status: DISCONTINUED | OUTPATIENT
Start: 2024-03-20 | End: 2024-03-20

## 2024-03-20 RX ORDER — HEPARIN SODIUM 5000 [USP'U]/ML
4000 INJECTION INTRAVENOUS; SUBCUTANEOUS EVERY 6 HOURS
Refills: 0 | Status: DISCONTINUED | OUTPATIENT
Start: 2024-03-20 | End: 2024-03-22

## 2024-03-20 RX ORDER — HEPARIN SODIUM 5000 [USP'U]/ML
INJECTION INTRAVENOUS; SUBCUTANEOUS
Qty: 25000 | Refills: 0 | Status: DISCONTINUED | OUTPATIENT
Start: 2024-03-20 | End: 2024-03-22

## 2024-03-20 RX ORDER — SODIUM CHLORIDE 9 MG/ML
1 INJECTION INTRAMUSCULAR; INTRAVENOUS; SUBCUTANEOUS THREE TIMES A DAY
Refills: 0 | Status: DISCONTINUED | OUTPATIENT
Start: 2024-03-20 | End: 2024-03-22

## 2024-03-20 RX ORDER — HEPARIN SODIUM 5000 [USP'U]/ML
2000 INJECTION INTRAVENOUS; SUBCUTANEOUS EVERY 6 HOURS
Refills: 0 | Status: DISCONTINUED | OUTPATIENT
Start: 2024-03-20 | End: 2024-03-22

## 2024-03-20 RX ORDER — CEFTRIAXONE 500 MG/1
1000 INJECTION, POWDER, FOR SOLUTION INTRAMUSCULAR; INTRAVENOUS EVERY 24 HOURS
Refills: 0 | Status: DISCONTINUED | OUTPATIENT
Start: 2024-03-20 | End: 2024-03-23

## 2024-03-20 RX ADMIN — Medication 1 PATCH: at 13:39

## 2024-03-20 RX ADMIN — OXYCODONE HYDROCHLORIDE 10 MILLIGRAM(S): 5 TABLET ORAL at 04:45

## 2024-03-20 RX ADMIN — OXYCODONE HYDROCHLORIDE 10 MILLIGRAM(S): 5 TABLET ORAL at 22:13

## 2024-03-20 RX ADMIN — POLYETHYLENE GLYCOL 3350 17 GRAM(S): 17 POWDER, FOR SOLUTION ORAL at 13:42

## 2024-03-20 RX ADMIN — HYDROMORPHONE HYDROCHLORIDE 0.5 MILLIGRAM(S): 2 INJECTION INTRAMUSCULAR; INTRAVENOUS; SUBCUTANEOUS at 17:17

## 2024-03-20 RX ADMIN — HYDROMORPHONE HYDROCHLORIDE 0.5 MILLIGRAM(S): 2 INJECTION INTRAMUSCULAR; INTRAVENOUS; SUBCUTANEOUS at 00:09

## 2024-03-20 RX ADMIN — SODIUM CHLORIDE 1 GRAM(S): 9 INJECTION INTRAMUSCULAR; INTRAVENOUS; SUBCUTANEOUS at 22:13

## 2024-03-20 RX ADMIN — CEFTRIAXONE 100 MILLIGRAM(S): 500 INJECTION, POWDER, FOR SOLUTION INTRAMUSCULAR; INTRAVENOUS at 00:09

## 2024-03-20 RX ADMIN — SODIUM CHLORIDE 1 GRAM(S): 9 INJECTION INTRAMUSCULAR; INTRAVENOUS; SUBCUTANEOUS at 13:40

## 2024-03-20 RX ADMIN — OXYCODONE HYDROCHLORIDE 10 MILLIGRAM(S): 5 TABLET ORAL at 14:00

## 2024-03-20 RX ADMIN — Medication 5 MILLIGRAM(S): at 04:45

## 2024-03-20 RX ADMIN — HEPARIN SODIUM 900 UNIT(S)/HR: 5000 INJECTION INTRAVENOUS; SUBCUTANEOUS at 19:35

## 2024-03-20 RX ADMIN — HEPARIN SODIUM 900 UNIT(S)/HR: 5000 INJECTION INTRAVENOUS; SUBCUTANEOUS at 22:13

## 2024-03-20 RX ADMIN — Medication 100 MILLIGRAM(S): at 06:00

## 2024-03-20 RX ADMIN — OXYCODONE HYDROCHLORIDE 10 MILLIGRAM(S): 5 TABLET ORAL at 13:41

## 2024-03-20 RX ADMIN — PANTOPRAZOLE SODIUM 40 MILLIGRAM(S): 20 TABLET, DELAYED RELEASE ORAL at 06:00

## 2024-03-20 RX ADMIN — Medication 100 MILLIGRAM(S): at 17:19

## 2024-03-20 RX ADMIN — CEFTRIAXONE 100 MILLIGRAM(S): 500 INJECTION, POWDER, FOR SOLUTION INTRAMUSCULAR; INTRAVENOUS at 17:11

## 2024-03-20 RX ADMIN — HYDROMORPHONE HYDROCHLORIDE 0.5 MILLIGRAM(S): 2 INJECTION INTRAMUSCULAR; INTRAVENOUS; SUBCUTANEOUS at 09:34

## 2024-03-20 NOTE — PROGRESS NOTE ADULT - ASSESSMENT
this is a 58 F hx of   RCC total nephrectomy, S/p hysterectomy, R-sided glaucoma, Fibromyalgia, Anxiety, GERD, smoker, COPD, presents due to R breast swelling, redness and pain. Pt was initially evaluated by [place] [facility] yesterday and was going to be admitted but left AMA. Was prescribed clindamycin for presumed breast cellulitis/mastitis on discharge, but was also told about concerning findings on CT Chest related to possible breast malignancy and metastasis. Pt has not had a mammogram or colonoscopy in years. Reports that she had a lidocaine injection in her R shoulder on 3/8/24 and since then has had worsening swelling, redness and pain in her R breast. Also still has R shoulder pain, which had mild improvement since the injection. Pt consistently takes oxycodone q6h and believes this may have masked her pain earlier. Reports over 40 pound weight loss in the last year and loss of appetite. Has conjunctival injection in R eye which she claims is chronic but she also feels her eyes are more swollen than usual currently. Denies vision changes. Smokes 1ppd for many years. Patient denies fever, chills, chest pain, SOB, headache, dizziness, abd pain, nausea, vomiting, constipation, dysuria.     3/19 underwent Subxiphoid pericardial window. Portion of xiphoid resected and pericardium incised. Fluid drained and Regan drain placed. Approx 320cc of serous pericardial fluid drained initially.  3/20 VSS OOB to chair  pericardial drain  145 cc overnight on 4lnc .

## 2024-03-20 NOTE — PROGRESS NOTE ADULT - PROBLEM SELECTOR PLAN 1
Pt with hyponatremia in the setting of RCC, likely SIADH. SNa 132 initially on this admission. SNa trended down to 125 3/20. Serum osm: 280, urine osm: 332, urine Na: 25. Monitor SNa. Ensure all Abx are in saline. Pt with hyponatremia in the setting of RCC, likely SIADH. SNa 132 initially on this admission. SNa trended down to 125 3/20. Serum osm: 280, urine osm: 332, urine Na: 25. Monitor SNa. Ensure all Abx are in saline. Recommend 2% NaCl at 40cc/hr for 8hrs.

## 2024-03-20 NOTE — PROGRESS NOTE ADULT - PROBLEM SELECTOR PLAN 2
- Imaging also shows complete occlusion of the right subclavian vein and nearly occlusive thrombosis in the proximal left brachiocephalic vein with flow reconstitution distally   - resume heparin gtt when ok with thoracic

## 2024-03-20 NOTE — PROGRESS NOTE ADULT - PROBLEM SELECTOR PLAN 1
- h/o RCC and [facility], also had lymph node dissection 2 years ago  - Now with axillary, subclavicular and mediastinal lymphadenopathy, pulmonary nodules and inflammatory changes of breast seen on CT with possible effect on IVC  - CT neck with large supraclavicular/mediastinal mass lesion with mass effect and complete occlusion of the right IJ; associated surrounding inflammatory changes in the deep neck, concerning for infectious/inflammatory thrombophlebitis  - S/p supraclavicular lymph node biopsy by IR on 3/14- f/up pathology  - discussed with pathology lab 3/20 special stains ordered, can take 2-3 more days to result   - continue with ceftriaxone 1g IV Q24H and metronidazole 500 mg PO Q12H.  - follow up quantiferon   - blood cx ngtd - h/o RCC and had nephrectomy by Urology by Dr Jacky Adkins at [facility], also had lymph node dissection 2 years ago  - Now with axillary, subclavicular and mediastinal lymphadenopathy, pulmonary nodules and inflammatory changes of breast seen on CT with possible effect on IVC  - CT neck with large supraclavicular/mediastinal mass lesion with mass effect and complete occlusion of the right IJ; associated surrounding inflammatory changes in the deep neck, concerning for infectious/inflammatory thrombophlebitis  - S/p supraclavicular lymph node biopsy by IR on 3/14  - discussed with pathology lab 3/20 suspicion for upper GI malignancy, pancreatic/hepatobiliary in origin   - GI eval emailed    - continue with ceftriaxone 1g IV Q24H and metronidazole 500 mg PO Q12H.  - follow up quantiferon   - blood cx ngtd

## 2024-03-20 NOTE — PROGRESS NOTE ADULT - ASSESSMENT
57 y/o woman presenting to hospital with right breast swelling and cellulitis found to have imaging evidence concerning for metastatic malignancy w/ lung nodules, and axillary, supraclavicular, and mediastinal adenopathy. Oncology consulted for malignancy w/u. 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         #Metastatic Disease of GI origin:  -Path from 3/14 showing neoplastic cells positive for CK7 and CDX2 immunostains, while negative for CK20, TTF1, GATA3, TRPS1 and PAX8. The immunoprofile is in favor of carcinoma of upper GI or pancreaticobiliary origin.  -S/p pericardiocentesis and drain on 3/19 for malignant pericardial effusion, f/u cytology from procedure.   -CT Neck c/f SVC syndrome w/ thrombus that patient is on full dose anticoagulation for.   -Given CT A/P findings of pancreatic cyst, with RUQ US results of possible gastric thickening would first recommend MR Abdomen pancreas protocol and MRCP to eval both the pancreatic vasculature and the hepatobiliary system. Pending results of MR abdomen would recommend GI consult to eval if patient would need EGD/EUS +/- FNA for further pathologic evaluation. Would favor inpatient w/u for patient given aggressive nature of her disease and extensive disease.   -Pain control per primary team    Oncology to follow with you.     Plan d/w Dr. Kal Nolen M.D.  H/O PGY-4

## 2024-03-20 NOTE — PROGRESS NOTE ADULT - PROBLEM SELECTOR PLAN 1
3/19 underwent Subxiphoid pericardial window with Dr Ruiz  daily labs  pericardial drain  to water seal document output q shift   echo prior to drain removal  follow up on cytology   care as primary team

## 2024-03-20 NOTE — PROGRESS NOTE ADULT - ASSESSMENT
58-yo F w/ PMH of RCC s/p R total nephrectomy, s/p hysterectomy, fibromyalgia, and concern for breast malignancy currently undergoing workup, admitted with R breast swelling, erythema, and pain. ID was consulted for extensive cellulitis/myositis along the R anterior lateral neck and R upper chest wall. 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 LN biopsy 3/14. CT chest w/ mediastinal and supraclavicular lymphadenopathy w/ necrosis. 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    #Neck edema  #Neck pain  #Abnormal CT scan  #Myositis  #Thrombophlebitis  - VSS. No leukocytosis. Significant R neck lymphadenopathy, s/p biopsy. F/u path  - No sore throat or dysphagia. No odynophagia. No fever or rigors.  - Unclear if patient's presentation represents infectious disease process. Labs and physical exam do not support septic thrombophlebitis.  - BCx NGTD.  - F/u Quantiferon.  - CT chest reviewed. Necrotic lymph nodes noted. Would recommend workup (e.g. malignancy vs TB?). Might require repeat biopsy for path and culture. If surgical management is performed, please send repeat biopsy and AFB culture of lymph nodes.  - Can continue with ceftriaxone 1g IV Q24H and metronidazole 500 mg PO Q12H.  - F/u path results  - ENT and Onc f/u. CT surgery follow-up.    Plan discussed with primary team attending Dr. Shine  Thank you for this consult. Inpatient ID team will follow.    Edwar Petersen MD, PhD  Attending Physician  Division of Infectious Diseases  Department of Medicine    Please contact through MS Teams message.  Office: [phone] (after 5 PM or weekend) .

## 2024-03-20 NOTE — PROGRESS NOTE ADULT - ATTENDING COMMENTS
I have seen this patient with the fellow and agree with their assessment and plan. I was physically present for significant portions of the evaluation and management (E/M) service provided.  I agree with the above history, physical, and plan which I have reviewed and edited where appropriate.    appears dry on exam  d/w with cards, 2% saline 40cc.hr for few hours  no lasix for now  got 300cc of serosangnous fluid removal pericardiocentesis yesterday    Katerine Rai MD  Office   Contact me directly via [employer] Teams     (After 5 pm or on weekends please page the on-call fellow/attending, can check [email or web address] for schedule. Login is shailesh mendoza, schedule under [facility] medicine, psych, derm)

## 2024-03-20 NOTE — PROGRESS NOTE ADULT - SUBJECTIVE AND OBJECTIVE BOX
Follow Up:    Neck pain    Interval History/ROS:  S/p pericardial window. Afebrile. Remains tachycardic. Patient was seen and examined at bedside. Complains of R shoulder pain. No fever.    Allergies  Cipro (Headache; Vomiting; Rash)  penicillin (Headache; Vomiting; Rash)  erythromycin (Headache; Vomiting; Rash)        ANTIMICROBIALS:  cefTRIAXone   IVPB 1000 every 24 hours  metroNIDAZOLE  IVPB 500 every 12 hours      OTHER MEDS:  MEDICATIONS  (STANDING):  aluminum hydroxide/magnesium hydroxide/simethicone Suspension 30 every 6 hours PRN  diazepam    Tablet 5 two times a day PRN  HYDROmorphone  Injectable 0.5 every 6 hours PRN  influenza   Vaccine 0.5 once  melatonin 3 at bedtime PRN  oxyCODONE    IR 10 every 6 hours PRN  pantoprazole    Tablet 40 before breakfast  polyethylene glycol 3350 17 daily      Vital Signs Last 24 Hrs  T(C): 36.9 (20 Mar 2024 11:28), Max: 36.9 (20 Mar 2024 11:28)  T(F): 98.5 (20 Mar 2024 11:28), Max: 98.5 (20 Mar 2024 11:28)  HR: 104 (20 Mar 2024 11:28) (100 - 108)  BP: 148/91 (20 Mar 2024 11:28) (147/69 - 177/79)  BP(mean): 99 (19 Mar 2024 22:45) (90 - 121)  RR: 18 (20 Mar 2024 11:28) (16 - 18)  SpO2: 94% (20 Mar 2024 11:28) (88% - 98%)    Parameters below as of 20 Mar 2024 11:28  Patient On (Oxygen Delivery Method): nasal cannula      PHYSICAL EXAM:  Constitutional: NAD  HEAD/EYES: R conjunctival erythema  ENT:  R neck edema and tenderness; clear oropharynx  Cardiovascular:   normal S1, S2, no murmur, RRR  Respiratory:  clear BS bilaterally, no wheezes  Musculoskeletal:  no BLE edema  Neurologic: awake and oriented x3  Skin:  R breast w/ slightly erythematous change, tenderness to palpation; Chest w/ drain in place, serosanguineous fluid w/ debris  Psychiatric:  anxious                            11.1   9.04  )-----------( 420      ( 20 Mar 2024 06:49 )             33.0       03-19    125<L>  |  90<L>  |  8   ----------------------------<  112<H>  4.2   |  24  |  0.88    Ca    9.6      19 Mar 2024 16:25        Urinalysis Basic - ( 19 Mar 2024 16:25 )    Color: x / Appearance: x / SG: x / pH: x  Gluc: 112 mg/dL / Ketone: x  / Bili: x / Urobili: x   Blood: x / Protein: x / Nitrite: x   Leuk Esterase: x / RBC: x / WBC x   Sq Epi: x / Non Sq Epi: x / Bacteria: x        MICROBIOLOGY:  v  .Blood Blood  03-15-24   No growth at 4 days  --  --      .Blood Blood-Peripheral  03-12-24   No growth at 5 days  --  --      .Blood Blood-Peripheral  03-12-24   No growth at 5 days  --  --      Clean Catch Clean Catch (Midstream)  03-10-24   <10,000 CFU/mL Normal Urogenital Tammie  --  --      .Blood Blood-Peripheral  03-10-24   No growth at 5 days  --  --                RADIOLOGY:  Imaging below independently reviewed.  < from: Xray Chest 1 View- PORTABLE-Routine (Xray Chest 1 View- PORTABLE-Routine in AM.) (03.20.24 @ 09:36) >    ACC: [id number] EXAM:  XR CHEST PORTABLE ROUTINE 1V   ORDERED BY: CANDACE TRUJILLO     ACC: [id number] EXAM:  XR CHEST AP OR PA 1V   ORDERED BY: KENYATTA PARADA     PROCEDURE DATE:  03/19/2024          INTERPRETATION:  EXAMINATION: XR CHEST, XRCHEST    CLINICAL INDICATION: s/p pericardial window w/ drain placement    TECHNIQUE: Single frontal portable view of the chest from 3/19/2024 10:32   PM and 3/20/2024 8:50AM    COMPARISON: Chest x-ray 3/10/2024, CT chest 3/14/1824.    FINDINGS:  3/19/2024  Pericardial drain.  The heart size is not accurately assessed on this projection.  Hazy and patchy opacities throughout the mid to lower right lung. There   is right-sided volume loss to suggest atelectasis. Consolidative opacity   in the right paratracheal region, corresponding to soft tissue mass   characterized on prior CT.  Small right pleural effusion. No pneumothorax.    3/20/2024  Redemonstrated small right pleural effusion, which is better defined on   this projection    IMPRESSION:  Small right pleural effusion with associated atelectasis of the right   lower lung.    --- End of Report ---          MERI WEINSTEIN MD; Resident Radiologist  This document has been electronically signed.  CHRISTOPHE ALLEN MD; Attending Radiologist  This document has been electronically signed. Mar 20 2024 10:05AM    < end of copied text >

## 2024-03-20 NOTE — PROGRESS NOTE ADULT - SUBJECTIVE AND OBJECTIVE BOX
DATE OF SERVICE: 03-20-24 @ 16:10    Patient is a 58y old  Female who presents with a chief complaint of Mastitis, breast malignancy (20 Mar 2024 13:04)      INTERVAL HISTORY: Feels ok.     REVIEW OF SYSTEMS:  CONSTITUTIONAL: No weakness  EYES/ENT: No visual changes;  No throat pain   NECK: No pain or stiffness  RESPIRATORY: No cough, wheezing; No shortness of breath  CARDIOVASCULAR: No chest pain or palpitations  GASTROINTESTINAL: No abdominal  pain. No nausea, vomiting, or hematemesis  GENITOURINARY: No dysuria, frequency or hematuria  NEUROLOGICAL: No stroke like symptoms  SKIN: No rashes    TELEMETRY Personally reviewed: SR/ST , -180  	  MEDICATIONS:        PHYSICAL EXAM:  T(C): 36.9 (03-20-24 @ 11:28), Max: 36.9 (03-20-24 @ 11:28)  HR: 104 (03-20-24 @ 11:28) (100 - 108)  BP: 148/91 (03-20-24 @ 11:28) (147/69 - 177/79)  RR: 18 (03-20-24 @ 11:28) (16 - 18)  SpO2: 94% (03-20-24 @ 11:28) (88% - 98%)  Wt(kg): --  I&O's Summary    19 Mar 2024 07:01  -  20 Mar 2024 07:00  --------------------------------------------------------  IN: 0 mL / OUT: 115 mL / NET: -115 mL    20 Mar 2024 07:01  -  20 Mar 2024 16:10  --------------------------------------------------------  IN: 240 mL / OUT: 0 mL / NET: 240 mL      Height (cm): 154.9 (03-19 @ 20:45)  Weight (kg): 49.9 (03-19 @ 20:45)  BMI (kg/m2): 20.8 (03-19 @ 20:45)  BSA (m2): 1.47 (03-19 @ 20:45)    Appearance: In no distress	  HEENT:    PERRL, EOMI	  Cardiovascular:  S1 S2, No JVD  Respiratory: Lungs clear to auscultation	  Gastrointestinal:  Soft, Non-tender, + BS	  Vascularature:  No edema of LE  Psychiatric: Appropriate affect   Neuro: no acute focal deficits                               11.1   9.04  )-----------( 420      ( 20 Mar 2024 06:49 )             33.0     03-19    125<L>  |  90<L>  |  8   ----------------------------<  112<H>  4.2   |  24  |  0.88    Ca    9.6      19 Mar 2024 16:25          Labs personally reviewed      ASSESSMENT/PLAN: 	    58F w/Hx of RCC s/p R total nephrectomy, S/p hysterectomy, R-sided glaucoma, Fibromyalgia, Anxiety, GERD, smoker presents due to R breast swelling, redness and pain.     Problem/Plan - 1:   ·  Problem: Pericardial effusion.  ·  Plan: - pericardial effusion on CT chest   - TTE with Moderate pericardial effusion with echocardiographic evidence of hemodynamic compromise    - Pt hypErtenisve, sinus 90s, denies any SOB or chest pain. Clinically pt is not in tamponade   - Thoracic surgery consulted for pericardial window given likely malignant effusion  ----s/p pericardial window 3/19      Problem/Plan - 2:  ·  Problem: Internal jugular vein thrombosis, right.   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    Problem/Plan - 3:  ·  Problem: Smoker.   ·  Plan: - 1 ppd smoker  - c/w Nicotine patch.    Problem/Plan - 4:  ·  Problem: Prophylactic measure.   ·  Plan: dispo- home.              TIFFANI Hair-SACHA Mata,  [facility]  Cardiovascular Medicine  [address], [address]  Available through call or text on [employer]  Office: [phone]   DATE OF SERVICE: 03-20-24 @ 16:10    Patient is a 58y old  Female who presents with a chief complaint of Mastitis, breast malignancy (20 Mar 2024 13:04)      INTERVAL HISTORY: Feels ok.     REVIEW OF SYSTEMS:  CONSTITUTIONAL: No weakness  EYES/ENT: No visual changes;  No throat pain   NECK: No pain or stiffness  RESPIRATORY: No cough, wheezing; No shortness of breath  CARDIOVASCULAR: No chest pain or palpitations  GASTROINTESTINAL: No abdominal  pain. No nausea, vomiting, or hematemesis  GENITOURINARY: No dysuria, frequency or hematuria  NEUROLOGICAL: No stroke like symptoms  SKIN: No rashes    TELEMETRY Personally reviewed: SR/ST , -180  	  MEDICATIONS:        PHYSICAL EXAM:  T(C): 36.9 (03-20-24 @ 11:28), Max: 36.9 (03-20-24 @ 11:28)  HR: 104 (03-20-24 @ 11:28) (100 - 108)  BP: 148/91 (03-20-24 @ 11:28) (147/69 - 177/79)  RR: 18 (03-20-24 @ 11:28) (16 - 18)  SpO2: 94% (03-20-24 @ 11:28) (88% - 98%)  Wt(kg): --  I&O's Summary    19 Mar 2024 07:01  -  20 Mar 2024 07:00  --------------------------------------------------------  IN: 0 mL / OUT: 115 mL / NET: -115 mL    20 Mar 2024 07:01  -  20 Mar 2024 16:10  --------------------------------------------------------  IN: 240 mL / OUT: 0 mL / NET: 240 mL      Height (cm): 154.9 (03-19 @ 20:45)  Weight (kg): 49.9 (03-19 @ 20:45)  BMI (kg/m2): 20.8 (03-19 @ 20:45)  BSA (m2): 1.47 (03-19 @ 20:45)    Appearance: In no distress	  HEENT:    PERRL, EOMI	  Cardiovascular:  S1 S2, No JVD  Respiratory: Lungs clear to auscultation	  Gastrointestinal:  Soft, Non-tender, + BS	  Vascularature:  No edema of LE  Psychiatric: Appropriate affect   Neuro: no acute focal deficits                               11.1   9.04  )-----------( 420      ( 20 Mar 2024 06:49 )             33.0     03-19    125<L>  |  90<L>  |  8   ----------------------------<  112<H>  4.2   |  24  |  0.88    Ca    9.6      19 Mar 2024 16:25          Labs personally reviewed      ASSESSMENT/PLAN: 	    58F w/Hx of RCC s/p R total nephrectomy, S/p hysterectomy, R-sided glaucoma, Fibromyalgia, Anxiety, GERD, smoker presents due to R breast swelling, redness and pain.     Problem/Plan - 1:   ·  Problem: Pericardial effusion.  ·  Plan: - pericardial effusion on CT chest   - TTE with Moderate pericardial effusion with echocardiographic evidence of hemodynamic compromise    - Pt hypErtenisve, sinus 90s, denies any SOB or chest pain. Clinically pt is not in tamponade   - Thoracic surgery consulted for pericardial window given likely malignant effusion  ----s/p pericardial window 3/19      Problem/Plan - 2:  ·  Problem: Internal jugular vein thrombosis, right.   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    Problem/Plan - 3:  ·  Problem: Smoker.   ·  Plan: - 1 ppd smoker  - c/w Nicotine patch.    Problem/Plan - 4:  ·  Problem: Prophylactic measure.   ·  Plan: dispo- home.      Problem/Plan - 5:  ·  Problem: Sinus Tachycardia  - Likely reactive   - D5 as per renal      Problem/Plan - 6:  ·  Problem: New Onset Atrial Fibrillation  - Approx 1:42pm--> episode of AF with RVR which lasted 3 minutes  - c/w heparin gtt          Adriana Julian, TIFFANI-SACHA Mata,  [facility]  Cardiovascular Medicine  [address], [address]  Available through call or text on [employer] TE[employer]  Office: [phone]

## 2024-03-20 NOTE — PROGRESS NOTE ADULT - SUBJECTIVE AND OBJECTIVE BOX
[facility] DIVISION OF KIDNEY DISEASES AND HYPERTENSION   FOLLOW UP NOTE    --------------------------------------------------------------------------------  Chief Complaint:    24 hour events/subjective: Pt. was seen and examined today. S/p pericardial window with drain in place. Says she feels lousy some pain at drain site and wants to move around.       PAST HISTORY  --------------------------------------------------------------------------------  No significant changes to PMH, PSH, FHx, SHx, unless otherwise noted    ALLERGIES & MEDICATIONS  --------------------------------------------------------------------------------  Allergies    Cipro (Headache; Vomiting; Rash)  penicillin (Headache; Vomiting; Rash)  erythromycin (Headache; Vomiting; Rash)    Intolerances      Standing Inpatient Medications  cefTRIAXone   IVPB 1000 milliGRAM(s) IV Intermittent every 24 hours  influenza   Vaccine 0.5 milliLiter(s) IntraMuscular once  metroNIDAZOLE  IVPB 500 milliGRAM(s) IV Intermittent every 12 hours  nicotine -  14 mG/24Hr(s) Patch 1 Patch Transdermal daily  pantoprazole    Tablet 40 milliGRAM(s) Oral before breakfast  polyethylene glycol 3350 17 Gram(s) Oral daily    PRN Inpatient Medications  aluminum hydroxide/magnesium hydroxide/simethicone Suspension 30 milliLiter(s) Oral every 6 hours PRN  diazepam    Tablet 5 milliGRAM(s) Oral two times a day PRN  HYDROmorphone  Injectable 0.5 milliGRAM(s) IV Push every 6 hours PRN  melatonin 3 milliGRAM(s) Oral at bedtime PRN  oxyCODONE    IR 10 milliGRAM(s) Oral every 6 hours PRN      REVIEW OF SYSTEMS  --------------------------------------------------------------------------------  Gen: No fevers/chills  Head/Eyes/Ears: No HA   Respiratory: No dyspnea, cough  CV: No chest pain  GI: No abdominal pain, diarrhea  : No dysuria, hematuria  MSK: No  edema  Skin: No rashes  Heme: No easy bruising or bleeding    All other systems were reviewed and are negative, except as noted.    VITALS/PHYSICAL EXAM  --------------------------------------------------------------------------------  T(C): 36.8 (03-20-24 @ 04:51), Max: 36.8 (03-20-24 @ 04:51)  HR: 106 (03-20-24 @ 04:51) (98 - 108)  BP: 160/89 (03-20-24 @ 04:51) (147/69 - 177/79)  RR: 18 (03-20-24 @ 04:51) (16 - 18)  SpO2: 94% (03-20-24 @ 04:51) (88% - 98%)  Wt(kg): --  Height (cm): 154.9 (03-19-24 @ 20:45)  Weight (kg): 49.9 (03-19-24 @ 20:45)  BMI (kg/m2): 20.8 (03-19-24 @ 20:45)  BSA (m2): 1.47 (03-19-24 @ 20:45)      03-19-24 @ 07:01  -  03-20-24 @ 07:00  --------------------------------------------------------  IN: 0 mL / OUT: 115 mL / NET: -115 mL      Physical Exam:  	Gen: NAD  	HEENT: Anicteric, dec swelling R face and neck   	Pulm: CTA B/L  	CV: S1S2+. pericardial drain   	Abd: Soft, +BS    	Ext: No LE edema B/L  	Neuro: Awake  	Skin: Warm and dry      LABS/STUDIES  --------------------------------------------------------------------------------              11.1   9.04  >-----------<  420      [03-20-24 @ 06:49]              33.0     125  |  90  |  8   ----------------------------<  112      [03-19-24 @ 16:25]  4.2   |  24  |  0.88        Ca     9.6     [03-19-24 @ 16:25]        PTT: 33.1       [03-19-24 @ 07:38]      Creatinine Trend:  SCr 0.88 [03-19 @ 16:25]  SCr 0.87 [03-19 @ 07:30]  SCr 0.84 [03-18 @ 08:39]  SCr 0.93 [03-17 @ 07:37]  SCr 0.96 [03-17 @ 01:21]    Urinalysis - [03-19-24 @ 16:25]      Color  / Appearance  / SG  / pH       Gluc 112 / Ketone   / Bili  / Urobili        Blood  / Protein  / Leuk Est  / Nitrite       RBC  / WBC  / Hyaline  / Gran  / Sq Epi  / Non Sq Epi  / Bacteria     Urine Creatinine 74      [03-15-24 @ 15:08]  Urine Protein 99      [03-15-24 @ 15:08]  Urine Sodium 57      [03-15-24 @ 15:08]  Urine Urea Nitrogen 556      [03-15-24 @ 15:08]  Urine Potassium 32      [03-15-24 @ 15:08]  Urine Osmolality 446      [03-15-24 @ 15:08]    HCV 0.09, Nonreact      [03-12-24 @ 07:01]

## 2024-03-20 NOTE — PROGRESS NOTE ADULT - SUBJECTIVE AND OBJECTIVE BOX
Subjective " hello I am much better OOB"         Vital Signs Last 24 Hrs  T(C): 36.9 (03-20-24 @ 11:28), Max: 36.9 (03-20-24 @ 11:28)  T(F): 98.5 (03-20-24 @ 11:28), Max: 98.5 (03-20-24 @ 11:28)  HR: 104 (03-20-24 @ 11:28) (100 - 108)  BP: 148/91 (03-20-24 @ 11:28) (147/69 - 177/79)  RR: 18 (03-20-24 @ 11:28) (16 - 18)  SpO2: 94% (03-20-24 @ 11:28) (88% - 98%)           03-19 @ 07:01  -  03-20 @ 07:00  --------------------------------------------------------  IN: 0 mL / OUT: 115 mL / NET: -115 mL                          11.1   9.04  )-----------( 420      ( 20 Mar 2024 06:49 )             33.0       03-19    125<L>  |  90<L>  |  8   ----------------------------<  112<H>  4.2   |  24  |  0.88    Ca    9.6      19 Mar 2024 16:25      MEDICATIONS  (STANDING):  cefTRIAXone   IVPB 1000 milliGRAM(s) IV Intermittent every 24 hours  influenza   Vaccine 0.5 milliLiter(s) IntraMuscular once  metroNIDAZOLE  IVPB 500 milliGRAM(s) IV Intermittent every 12 hours  nicotine -  14 mG/24Hr(s) Patch 1 Patch Transdermal daily  pantoprazole    Tablet 40 milliGRAM(s) Oral before breakfast  polyethylene glycol 3350 17 Gram(s) Oral daily  sodium chloride 1 Gram(s) Oral three times a day    MEDICATIONS  (PRN):  aluminum hydroxide/magnesium hydroxide/simethicone Suspension 30 milliLiter(s) Oral every 6 hours PRN Dyspepsia  diazepam    Tablet 5 milliGRAM(s) Oral two times a day PRN for anxiety  HYDROmorphone  Injectable 0.5 milliGRAM(s) IV Push every 6 hours PRN breakthrough pain  melatonin 3 milliGRAM(s) Oral at bedtime PRN Insomnia  oxyCODONE    IR 10 milliGRAM(s) Oral every 6 hours PRN for severe pain                Drains:     MS         [  ] Drainage:     80/145                                PHYSICAL EXAM        Neurology: alert and oriented x 3, nonfocal, no gross deficits    CV :S1S2     SUB xiophoid Wound :  CDI , Stable drain to Pleura- vac water seal     Lungs: B/l breath sound s 2lnc    Abdomen: soft, nontender, nondistended, positive bowel sounds,     :voids               Extremities: warm well equal strength throughout    b/lle + DP no edema no calf tenderness                                              Discussed with Dr Ruiz  at AM rounds.

## 2024-03-20 NOTE — PROGRESS NOTE ADULT - SUBJECTIVE AND OBJECTIVE BOX
Patient is a 58y old  Female who presents with a chief complaint of Mastitis, breast malignancy (20 Mar 2024 16:10)       Pt is seen and examined  Patient awake and in bed, tired appearing, states that she is having pain in b/l shoulders and chest  Endorses early satiety.           ROS:  + b/l shoulder pain and chest wall pain  +fatigue     MEDICATIONS  (STANDING):  cefTRIAXone   IVPB 1000 milliGRAM(s) IV Intermittent every 24 hours  heparin  Infusion.  Unit(s)/Hr (9 mL/Hr) IV Continuous <Continuous>  influenza   Vaccine 0.5 milliLiter(s) IntraMuscular once  metroNIDAZOLE  IVPB 500 milliGRAM(s) IV Intermittent every 12 hours  nicotine -  14 mG/24Hr(s) Patch 1 Patch Transdermal daily  pantoprazole    Tablet 40 milliGRAM(s) Oral before breakfast  polyethylene glycol 3350 17 Gram(s) Oral daily  sodium chloride 1 Gram(s) Oral three times a day    MEDICATIONS  (PRN):  aluminum hydroxide/magnesium hydroxide/simethicone Suspension 30 milliLiter(s) Oral every 6 hours PRN Dyspepsia  diazepam    Tablet 5 milliGRAM(s) Oral two times a day PRN for anxiety  heparin   Injectable 4000 Unit(s) IV Push every 6 hours PRN For aPTT less than 40  heparin   Injectable 2000 Unit(s) IV Push every 6 hours PRN For aPTT between 40 - 57  HYDROmorphone  Injectable 0.5 milliGRAM(s) IV Push every 6 hours PRN breakthrough pain  melatonin 3 milliGRAM(s) Oral at bedtime PRN Insomnia  oxyCODONE    IR 10 milliGRAM(s) Oral every 6 hours PRN for severe pain      Allergies    Cipro (Headache; Vomiting; Rash)  penicillin (Headache; Vomiting; Rash)  erythromycin (Headache; Vomiting; Rash)    Intolerances        Vital Signs Last 24 Hrs  T(C): 36.9 (20 Mar 2024 11:28), Max: 36.9 (20 Mar 2024 11:28)  T(F): 98.5 (20 Mar 2024 11:28), Max: 98.5 (20 Mar 2024 11:28)  HR: 104 (20 Mar 2024 11:28) (100 - 108)  BP: 148/91 (20 Mar 2024 11:28) (147/69 - 177/79)  BP(mean): 99 (19 Mar 2024 22:45) (90 - 121)  RR: 18 (20 Mar 2024 11:28) (16 - 18)  SpO2: 94% (20 Mar 2024 11:28) (88% - 98%)    Parameters below as of 20 Mar 2024 11:28  Patient On (Oxygen Delivery Method): nasal cannula        PHYSICAL EXAM:  GENERAL: NAD, well-developed  HEAD:  Atraumatic, Normocephalic  EYES: conjunctiva and sclera clear  NECK:  No JVD  CHEST/LUNG: CTAB, + pericardial drain.   HEART: Regular rate and rhythm; S1S2  ABDOMEN: Soft, Nontender, Nondistended; Bowel sounds present  EXTREMITIES:  2+ Peripheral Pulses, No clubbing, cyanosis, or edema  PSYCH: AAOx3    LABS:                          10.9   8.07  )-----------( 388      ( 20 Mar 2024 17:00 )             32.9         Mean Cell Volume : 85.7 fl  Mean Cell Hemoglobin : 28.4 pg  Mean Cell Hemoglobin Concentration : 33.1 gm/dL  Auto Neutrophil # : x  Auto Lymphocyte # : x  Auto Monocyte # : x  Auto Eosinophil # : x  Auto Basophil # : x  Auto Neutrophil % : x  Auto Lymphocyte % : x  Auto Monocyte % : x  Auto Eosinophil % : x  Auto Basophil % : x    Serial CBC's  03-20 @ 17:00  Hct-32.9 / Hgb-10.9 / Plat-388 / RBC-3.84 / WBC-8.07          Serial CBC's  03-20 @ 06:49  Hct-33.0 / Hgb-11.1 / Plat-420 / RBC-3.86 / WBC-9.04          Serial CBC's  03-19 @ 07:30  Hct-31.2 / Hgb-10.4 / Plat-435 / RBC-3.57 / WBC-7.36          Serial CBC's  03-18 @ 08:40  Hct-31.4 / Hgb-10.4 / Plat-391 / RBC-3.61 / WBC-6.47          Serial CBC's  03-17 @ 07:38  Hct-31.0 / Hgb-10.3 / Plat-398 / RBC-3.50 / WBC-7.47            03-20    127<L>  |  93<L>  |  12  ----------------------------<  142<H>  4.0   |  24  |  0.92    Ca    9.1      20 Mar 2024 17:00        PTT - ( 19 Mar 2024 07:38 )  PTT:33.1 sec    WBC Count: 8.07 K/uL (03-20-24 @ 17:00)  Hemoglobin: 10.9 g/dL (03-20-24 @ 17:00)  Hematocrit: 32.9 % (03-20-24 @ 17:00)  Platelet Count - Automated: 388 K/uL (03-20-24 @ 17:00)  WBC Count: 9.04 K/uL (03-20-24 @ 06:49)  Platelet Count - Automated: 420 K/uL (03-20-24 @ 06:49)  Hemoglobin: 11.1 g/dL (03-20-24 @ 06:49)  Hematocrit: 33.0 % (03-20-24 @ 06:49)  WBC Count: 7.36 K/uL (03-19-24 @ 07:30)  Hemoglobin: 10.4 g/dL (03-19-24 @ 07:30)  Hematocrit: 31.2 % (03-19-24 @ 07:30)  Platelet Count - Automated: 435 K/uL (03-19-24 @ 07:30)  WBC Count: 6.47 K/uL (03-18-24 @ 08:40)  Hemoglobin: 10.4 g/dL (03-18-24 @ 08:40)  Hematocrit: 31.4 % (03-18-24 @ 08:40)  Platelet Count - Automated: 391 K/uL (03-18-24 @ 08:40)  WBC Count: 7.47 K/uL (03-17-24 @ 07:38)  Hemoglobin: 10.3 g/dL (03-17-24 @ 07:38)  Hematocrit: 31.0 % (03-17-24 @ 07:38)  Platelet Count - Automated: 398 K/uL (03-17-24 @ 07:38)  WBC Count: 5.62 K/uL (03-16-24 @ 05:42)  Hemoglobin: 10.4 g/dL (03-16-24 @ 05:42)  Hematocrit: 31.8 % (03-16-24 @ 05:42)  Platelet Count - Automated: 361 K/uL (03-16-24 @ 05:42)  WBC Count: 6.46 K/uL (03-16-24 @ 00:29)  Hemoglobin: 10.1 g/dL (03-16-24 @ 00:29)  Hematocrit: 30.3 % (03-16-24 @ 00:29)  Platelet Count - Automated: 348 K/uL (03-16-24 @ 00:29)  WBC Count: 6.24 K/uL (03-15-24 @ 06:26)  Hemoglobin: 10.3 g/dL (03-15-24 @ 06:26)  Hematocrit: 30.9 % (03-15-24 @ 06:26)  Platelet Count - Automated: 352 K/uL (03-15-24 @ 06:26)  WBC Count: 6.21 K/uL (03-14-24 @ 23:19)  Hemoglobin: 11.3 g/dL (03-14-24 @ 23:19)  Hematocrit: 33.2 % (03-14-24 @ 23:19)  Platelet Count - Automated: 311 K/uL (03-14-24 @ 23:19)  WBC Count: 6.79 K/uL (03-14-24 @ 17:00)  Hemoglobin: 10.8 g/dL (03-14-24 @ 17:00)  Hematocrit: 32.5 % (03-14-24 @ 17:00)  Platelet Count - Automated: 324 K/uL (03-14-24 @ 17:00)  WBC Count: 7.35 K/uL (03-14-24 @ 06:49)  Hemoglobin: 11.1 g/dL (03-14-24 @ 06:49)  Hematocrit: 33.8 % (03-14-24 @ 06:49)  Platelet Count - Automated: 359 K/uL (03-14-24 @ 06:49)  WBC Count: 7.85 K/uL (03-13-24 @ 06:53)  Hemoglobin: 11.0 g/dL (03-13-24 @ 06:53)  Hematocrit: 33.0 % (03-13-24 @ 06:53)  Platelet Count - Automated: 354 K/uL (03-13-24 @ 06:53)  WBC Count: 9.73 K/uL (03-12-24 @ 07:01)  Hemoglobin: 10.9 g/dL (03-12-24 @ 07:01)  Hematocrit: 31.7 % (03-12-24 @ 07:01)  Platelet Count - Automated: 331 K/uL (03-12-24 @ 07:01)  WBC Count: 8.70 K/uL (03-11-24 @ 20:26)  Hemoglobin: 11.3 g/dL (03-11-24 @ 20:26)  Hematocrit: 33.8 % (03-11-24 @ 20:26)  Platelet Count - Automated: 316 K/uL (03-11-24 @ 20:26)                      RADIOLOGY & ADDITIONAL STUDIES: Reviewed

## 2024-03-20 NOTE — PROGRESS NOTE ADULT - ATTENDING COMMENTS
60 F 59 y/o woman presenting to hospital with right breast swelling and cellulitis found to have imaging evidence concerning for metastatic malignancy w/ lung nodules, and axillary, supraclavicular, and mediastinal adenopathy. CT CAP w/co shows compression of IVC as well as R IJ. There is conglomerate radha mass in the neck, supraclavicular area. 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 Biliary dilatation noted as well. Recommend MRCP to eval further, advance GI to consider EUS/EGD to r/o gastric malignancy, pancreatic cancer or cholangio. Further treatment recommendations pending above work up. d/w pt at bedside.

## 2024-03-20 NOTE — PROGRESS NOTE ADULT - PROBLEM SELECTOR PLAN 5
- serum osm 265, urine osm wnl, urine sodium 25  - likely SIADH  - Pt euvolemic on exam  - heparin gtt fluid from D5 to NS  - salt tabs started  - received hypertonic saline 3/19  - continue to monitor na

## 2024-03-20 NOTE — PROGRESS NOTE ADULT - SUBJECTIVE AND OBJECTIVE BOX
Patient is a 58y old  Female who presents with a chief complaint of Mastitis, breast malignancy (20 Mar 2024 08:48)      SUBJECTIVE / OVERNIGHT EVENTS: no events on tele, pt feels ok, denies cp, sob, abdominal pain, chills     MEDICATIONS  (STANDING):  cefTRIAXone   IVPB 1000 milliGRAM(s) IV Intermittent every 24 hours  influenza   Vaccine 0.5 milliLiter(s) IntraMuscular once  metroNIDAZOLE  IVPB 500 milliGRAM(s) IV Intermittent every 12 hours  nicotine -  14 mG/24Hr(s) Patch 1 Patch Transdermal daily  pantoprazole    Tablet 40 milliGRAM(s) Oral before breakfast  polyethylene glycol 3350 17 Gram(s) Oral daily  sodium chloride 1 Gram(s) Oral three times a day    MEDICATIONS  (PRN):  aluminum hydroxide/magnesium hydroxide/simethicone Suspension 30 milliLiter(s) Oral every 6 hours PRN Dyspepsia  diazepam    Tablet 5 milliGRAM(s) Oral two times a day PRN for anxiety  HYDROmorphone  Injectable 0.5 milliGRAM(s) IV Push every 6 hours PRN breakthrough pain  melatonin 3 milliGRAM(s) Oral at bedtime PRN Insomnia  oxyCODONE    IR 10 milliGRAM(s) Oral every 6 hours PRN for severe pain        CAPILLARY BLOOD GLUCOSE        I&O's Summary    19 Mar 2024 07:01  -  20 Mar 2024 07:00  --------------------------------------------------------  IN: 0 mL / OUT: 115 mL / NET: -115 mL        PHYSICAL EXAM:  GENERAL: NAD, well-developed  HEAD:  Atraumatic, Normocephalic  EYES: conjunctiva and sclera clear  NECK:  No JVD  CHEST/LUNG: Clear to auscultation bilaterally; No wheeze  HEART: Regular rate and rhythm; S1S2  ABDOMEN: Soft, Nontender, Nondistended; Bowel sounds present  EXTREMITIES:  2+ Peripheral Pulses, No clubbing, cyanosis, or edema  PSYCH: AAOx3      LABS:                        11.1   9.04  )-----------( 420      ( 20 Mar 2024 06:49 )             33.0     03-19    125<L>  |  90<L>  |  8   ----------------------------<  112<H>  4.2   |  24  |  0.88    Ca    9.6      19 Mar 2024 16:25      PTT - ( 19 Mar 2024 07:38 )  PTT:33.1 sec      Urinalysis Basic - ( 19 Mar 2024 16:25 )    Color: x / Appearance: x / SG: x / pH: x  Gluc: 112 mg/dL / Ketone: x  / Bili: x / Urobili: x   Blood: x / Protein: x / Nitrite: x   Leuk Esterase: x / RBC: x / WBC x   Sq Epi: x / Non Sq Epi: x / Bacteria: x        RADIOLOGY & ADDITIONAL TESTS:    Imaging Personally Reviewed:    Consultant(s) Notes Reviewed:      Care Discussed with Consultants/Other Providers: CARI Petersen

## 2024-03-20 NOTE — PROGRESS NOTE ADULT - PROBLEM SELECTOR PLAN 3
- pericardial effusion on CT chest   - echo with moderate pericardial effusion and collapse of the RA  - s/p pericardial window 3/19  - chest tube drainage care per thoracic sx

## 2024-03-20 NOTE — PROGRESS NOTE ADULT - PROBLEM SELECTOR PLAN 6
- Home Oxycodone (Confirmed via [employer])  - c/w Oxycodone IR 10mg q 6 prn   - c/w Diluadid 0.5mg IVP q 6 prn for breakthrough pain  - Savella not in formulary

## 2024-03-21 ENCOUNTER — RESULT REVIEW (OUTPATIENT)
Age: 59
End: 2024-03-21

## 2024-03-21 DIAGNOSIS — I48.21 PERMANENT ATRIAL FIBRILLATION: ICD-10-CM

## 2024-03-21 LAB
ANION GAP SERPL CALC-SCNC: 13 MMOL/L — SIGNIFICANT CHANGE UP (ref 5–17)
APTT BLD: 31.1 SEC — SIGNIFICANT CHANGE UP (ref 24.5–35.6)
APTT BLD: 32.5 SEC — SIGNIFICANT CHANGE UP (ref 24.5–35.6)
APTT BLD: >200 SEC — CRITICAL HIGH (ref 24.5–35.6)
BUN SERPL-MCNC: 12 MG/DL — SIGNIFICANT CHANGE UP (ref 7–23)
CALCIUM SERPL-MCNC: 9 MG/DL — SIGNIFICANT CHANGE UP (ref 8.4–10.5)
CHLORIDE SERPL-SCNC: 96 MMOL/L — SIGNIFICANT CHANGE UP (ref 96–108)
CK MB CFR SERPL CALC: 2.3 NG/ML — SIGNIFICANT CHANGE UP (ref 0–3.8)
CK SERPL-CCNC: 73 U/L — SIGNIFICANT CHANGE UP (ref 25–170)
CO2 SERPL-SCNC: 22 MMOL/L — SIGNIFICANT CHANGE UP (ref 22–31)
CREAT SERPL-MCNC: 0.83 MG/DL — SIGNIFICANT CHANGE UP (ref 0.5–1.3)
EGFR: 81 ML/MIN/1.73M2 — SIGNIFICANT CHANGE UP
GLUCOSE SERPL-MCNC: 198 MG/DL — HIGH (ref 70–99)
HCT VFR BLD CALC: 31.8 % — LOW (ref 34.5–45)
HGB BLD-MCNC: 10.7 G/DL — LOW (ref 11.5–15.5)
MCHC RBC-ENTMCNC: 28.8 PG — SIGNIFICANT CHANGE UP (ref 27–34)
MCHC RBC-ENTMCNC: 33.6 GM/DL — SIGNIFICANT CHANGE UP (ref 32–36)
MCV RBC AUTO: 85.7 FL — SIGNIFICANT CHANGE UP (ref 80–100)
NRBC # BLD: 0 /100 WBCS — SIGNIFICANT CHANGE UP (ref 0–0)
PLATELET # BLD AUTO: 374 K/UL — SIGNIFICANT CHANGE UP (ref 150–400)
POTASSIUM SERPL-MCNC: 4.2 MMOL/L — SIGNIFICANT CHANGE UP (ref 3.5–5.3)
POTASSIUM SERPL-SCNC: 4.2 MMOL/L — SIGNIFICANT CHANGE UP (ref 3.5–5.3)
RBC # BLD: 3.71 M/UL — LOW (ref 3.8–5.2)
RBC # FLD: 13 % — SIGNIFICANT CHANGE UP (ref 10.3–14.5)
SODIUM SERPL-SCNC: 131 MMOL/L — LOW (ref 135–145)
TROPONIN T, HIGH SENSITIVITY RESULT: 18 NG/L — SIGNIFICANT CHANGE UP (ref 0–51)
WBC # BLD: 8.62 K/UL — SIGNIFICANT CHANGE UP (ref 3.8–10.5)
WBC # FLD AUTO: 8.62 K/UL — SIGNIFICANT CHANGE UP (ref 3.8–10.5)

## 2024-03-21 PROCEDURE — 71045 X-RAY EXAM CHEST 1 VIEW: CPT | Mod: 26

## 2024-03-21 PROCEDURE — 93308 TTE F-UP OR LMTD: CPT | Mod: 26

## 2024-03-21 PROCEDURE — 93010 ELECTROCARDIOGRAM REPORT: CPT

## 2024-03-21 PROCEDURE — 99232 SBSQ HOSP IP/OBS MODERATE 35: CPT | Mod: GC

## 2024-03-21 PROCEDURE — 99233 SBSQ HOSP IP/OBS HIGH 50: CPT

## 2024-03-21 PROCEDURE — 93321 DOPPLER ECHO F-UP/LMTD STD: CPT | Mod: 26

## 2024-03-21 PROCEDURE — 99232 SBSQ HOSP IP/OBS MODERATE 35: CPT

## 2024-03-21 RX ORDER — METOPROLOL TARTRATE 50 MG
25 TABLET ORAL
Refills: 0 | Status: DISCONTINUED | OUTPATIENT
Start: 2024-03-21 | End: 2024-03-26

## 2024-03-21 RX ORDER — METOPROLOL TARTRATE 50 MG
25 TABLET ORAL ONCE
Refills: 0 | Status: COMPLETED | OUTPATIENT
Start: 2024-03-21 | End: 2024-03-21

## 2024-03-21 RX ORDER — METOPROLOL TARTRATE 50 MG
2.5 TABLET ORAL ONCE
Refills: 0 | Status: COMPLETED | OUTPATIENT
Start: 2024-03-21 | End: 2024-03-21

## 2024-03-21 RX ORDER — POLYETHYLENE GLYCOL 3350 17 G/17G
17 POWDER, FOR SOLUTION ORAL
Refills: 0 | Status: DISCONTINUED | OUTPATIENT
Start: 2024-03-21 | End: 2024-03-26

## 2024-03-21 RX ORDER — SODIUM CHLORIDE 5 G/100ML
1000 INJECTION, SOLUTION INTRAVENOUS
Refills: 0 | Status: DISCONTINUED | OUTPATIENT
Start: 2024-03-21 | End: 2024-03-22

## 2024-03-21 RX ORDER — HYDROMORPHONE HYDROCHLORIDE 2 MG/ML
0.5 INJECTION INTRAMUSCULAR; INTRAVENOUS; SUBCUTANEOUS ONCE
Refills: 0 | Status: DISCONTINUED | OUTPATIENT
Start: 2024-03-21 | End: 2024-03-21

## 2024-03-21 RX ORDER — METOPROLOL TARTRATE 50 MG
5 TABLET ORAL ONCE
Refills: 0 | Status: DISCONTINUED | OUTPATIENT
Start: 2024-03-21 | End: 2024-03-21

## 2024-03-21 RX ADMIN — Medication 100 MILLIGRAM(S): at 17:10

## 2024-03-21 RX ADMIN — Medication 2.5 MILLIGRAM(S): at 01:44

## 2024-03-21 RX ADMIN — HEPARIN SODIUM 1000 UNIT(S)/HR: 5000 INJECTION INTRAVENOUS; SUBCUTANEOUS at 11:36

## 2024-03-21 RX ADMIN — Medication 5 MILLIGRAM(S): at 22:33

## 2024-03-21 RX ADMIN — OXYCODONE HYDROCHLORIDE 10 MILLIGRAM(S): 5 TABLET ORAL at 05:41

## 2024-03-21 RX ADMIN — SODIUM CHLORIDE 1 GRAM(S): 9 INJECTION INTRAMUSCULAR; INTRAVENOUS; SUBCUTANEOUS at 21:48

## 2024-03-21 RX ADMIN — HEPARIN SODIUM 4000 UNIT(S): 5000 INJECTION INTRAVENOUS; SUBCUTANEOUS at 02:26

## 2024-03-21 RX ADMIN — Medication 1 PATCH: at 07:00

## 2024-03-21 RX ADMIN — PANTOPRAZOLE SODIUM 40 MILLIGRAM(S): 20 TABLET, DELAYED RELEASE ORAL at 05:42

## 2024-03-21 RX ADMIN — HYDROMORPHONE HYDROCHLORIDE 0.5 MILLIGRAM(S): 2 INJECTION INTRAMUSCULAR; INTRAVENOUS; SUBCUTANEOUS at 17:05

## 2024-03-21 RX ADMIN — HYDROMORPHONE HYDROCHLORIDE 0.5 MILLIGRAM(S): 2 INJECTION INTRAMUSCULAR; INTRAVENOUS; SUBCUTANEOUS at 21:49

## 2024-03-21 RX ADMIN — HYDROMORPHONE HYDROCHLORIDE 0.5 MILLIGRAM(S): 2 INJECTION INTRAMUSCULAR; INTRAVENOUS; SUBCUTANEOUS at 11:21

## 2024-03-21 RX ADMIN — HEPARIN SODIUM 1100 UNIT(S)/HR: 5000 INJECTION INTRAVENOUS; SUBCUTANEOUS at 02:24

## 2024-03-21 RX ADMIN — OXYCODONE HYDROCHLORIDE 10 MILLIGRAM(S): 5 TABLET ORAL at 15:40

## 2024-03-21 RX ADMIN — HYDROMORPHONE HYDROCHLORIDE 0.5 MILLIGRAM(S): 2 INJECTION INTRAMUSCULAR; INTRAVENOUS; SUBCUTANEOUS at 10:43

## 2024-03-21 RX ADMIN — Medication 2.5 MILLIGRAM(S): at 03:49

## 2024-03-21 RX ADMIN — Medication 25 MILLIGRAM(S): at 17:11

## 2024-03-21 RX ADMIN — Medication 5 MILLIGRAM(S): at 00:15

## 2024-03-21 RX ADMIN — HEPARIN SODIUM 1200 UNIT(S)/HR: 5000 INJECTION INTRAVENOUS; SUBCUTANEOUS at 21:49

## 2024-03-21 RX ADMIN — Medication 1 PATCH: at 12:15

## 2024-03-21 RX ADMIN — Medication 25 MILLIGRAM(S): at 08:24

## 2024-03-21 RX ADMIN — SODIUM CHLORIDE 40 MILLILITER(S): 5 INJECTION, SOLUTION INTRAVENOUS at 04:17

## 2024-03-21 RX ADMIN — Medication 100 MILLIGRAM(S): at 05:42

## 2024-03-21 RX ADMIN — HEPARIN SODIUM 0 UNIT(S)/HR: 5000 INJECTION INTRAVENOUS; SUBCUTANEOUS at 10:35

## 2024-03-21 RX ADMIN — OXYCODONE HYDROCHLORIDE 10 MILLIGRAM(S): 5 TABLET ORAL at 16:04

## 2024-03-21 RX ADMIN — OXYCODONE HYDROCHLORIDE 10 MILLIGRAM(S): 5 TABLET ORAL at 07:23

## 2024-03-21 RX ADMIN — CEFTRIAXONE 100 MILLIGRAM(S): 500 INJECTION, POWDER, FOR SOLUTION INTRAMUSCULAR; INTRAVENOUS at 17:10

## 2024-03-21 RX ADMIN — Medication 1 PATCH: at 12:18

## 2024-03-21 RX ADMIN — HEPARIN SODIUM 1200 UNIT(S)/HR: 5000 INJECTION INTRAVENOUS; SUBCUTANEOUS at 18:45

## 2024-03-21 RX ADMIN — HYDROMORPHONE HYDROCHLORIDE 0.5 MILLIGRAM(S): 2 INJECTION INTRAMUSCULAR; INTRAVENOUS; SUBCUTANEOUS at 01:44

## 2024-03-21 RX ADMIN — HYDROMORPHONE HYDROCHLORIDE 0.5 MILLIGRAM(S): 2 INJECTION INTRAMUSCULAR; INTRAVENOUS; SUBCUTANEOUS at 17:46

## 2024-03-21 NOTE — PROGRESS NOTE ADULT - PROBLEM SELECTOR PLAN 6
- serum osm 265, urine osm wnl, urine sodium 25  - likely SIADH  - Pt euvolemic on exam  - heparin gtt fluid from D5 to NS  - salt tabs started  - received hypertonic saline 3/19 and 3/21  - continue to monitor na

## 2024-03-21 NOTE — PROGRESS NOTE ADULT - ATTENDING COMMENTS
I have seen this patient with the fellow and agree with their assessment and plan. I was physically present for significant portions of the evaluation and management (E/M) service provided.  I agree with the above history, physical, and plan which I have reviewed and edited where appropriate.    Katerine Rai MD  Office   Contact me directly via Microsoft Teams     (After 5 pm or on weekends please page the on-call fellow/attending, can check [email or web address] for schedule. Login is shailesh [email or web address], schedule under [facility] medicine, psych, derm)

## 2024-03-21 NOTE — PROVIDER CONTACT NOTE (OTHER) - NAME OF MD/NP/PA/DO NOTIFIED:
Pascual Serrano ACP
Sharmaine Unger
RYAN Dougherty
NP Zeta Clarisse Manriquez
NP Zeta Clarisse Manriquez

## 2024-03-21 NOTE — PROGRESS NOTE ADULT - SUBJECTIVE AND OBJECTIVE BOX
[facility] DIVISION OF KIDNEY DISEASES AND HYPERTENSION   FOLLOW UP NOTE    --------------------------------------------------------------------------------  Chief Complaint:    24 hour events/subjective: Pt. was seen and examined today. No acute events.       PAST HISTORY  --------------------------------------------------------------------------------  No significant changes to PMH, PSH, FHx, SHx, unless otherwise noted    ALLERGIES & MEDICATIONS  --------------------------------------------------------------------------------  Allergies    Cipro (Headache; Vomiting; Rash)  penicillin (Headache; Vomiting; Rash)  erythromycin (Headache; Vomiting; Rash)    Intolerances      Standing Inpatient Medications  cefTRIAXone   IVPB 1000 milliGRAM(s) IV Intermittent every 24 hours  heparin  Infusion.  Unit(s)/Hr IV Continuous <Continuous>  influenza   Vaccine 0.5 milliLiter(s) IntraMuscular once  metoprolol tartrate 25 milliGRAM(s) Oral two times a day  metroNIDAZOLE  IVPB 500 milliGRAM(s) IV Intermittent every 12 hours  nicotine -  14 mG/24Hr(s) Patch 1 Patch Transdermal daily  pantoprazole    Tablet 40 milliGRAM(s) Oral before breakfast  polyethylene glycol 3350 17 Gram(s) Oral daily  sodium chloride 1 Gram(s) Oral three times a day  sodium chloride 2% . 1000 milliLiter(s) IV Continuous <Continuous>    PRN Inpatient Medications  aluminum hydroxide/magnesium hydroxide/simethicone Suspension 30 milliLiter(s) Oral every 6 hours PRN  diazepam    Tablet 5 milliGRAM(s) Oral two times a day PRN  heparin   Injectable 4000 Unit(s) IV Push every 6 hours PRN  heparin   Injectable 2000 Unit(s) IV Push every 6 hours PRN  HYDROmorphone  Injectable 0.5 milliGRAM(s) IV Push every 6 hours PRN  melatonin 3 milliGRAM(s) Oral at bedtime PRN  oxyCODONE    IR 10 milliGRAM(s) Oral every 6 hours PRN      REVIEW OF SYSTEMS  --------------------------------------------------------------------------------  Gen: No fevers/chills  Head/Eyes/Ears: No HA   Respiratory: No dyspnea, cough  CV: No chest pain  GI: No abdominal pain, diarrhea  : No dysuria, hematuria  MSK: No  edema  Skin: No rashes  Heme: No easy bruising or bleeding    All other systems were reviewed and are negative, except as noted.    VITALS/PHYSICAL EXAM  --------------------------------------------------------------------------------  T(C): 36.5 (03-21-24 @ 11:34), Max: 36.5 (03-21-24 @ 11:34)  HR: 91 (03-21-24 @ 11:34) (83 - 120)  BP: 97/63 (03-21-24 @ 11:34) (91/60 - 117/67)  RR: 18 (03-21-24 @ 11:34) (17 - 18)  SpO2: 93% (03-21-24 @ 11:34) (92% - 96%)  Wt(kg): --  Height (cm): 154.9 (03-19-24 @ 20:45)  Weight (kg): 49.9 (03-19-24 @ 20:45)  BMI (kg/m2): 20.8 (03-19-24 @ 20:45)  BSA (m2): 1.47 (03-19-24 @ 20:45)      03-20-24 @ 07:01  -  03-21-24 @ 07:00  --------------------------------------------------------  IN: 1051 mL / OUT: 380 mL / NET: 671 mL    03-21-24 @ 07:01  -  03-21-24 @ 14:13  --------------------------------------------------------  IN: 120 mL / OUT: 0 mL / NET: 120 mL        Physical Exam:  	Gen: NAD  	HEENT: Anicteric  	Pulm: CTA B/L  	CV: S1S2+. pericardial drain   	Abd: Soft, +BS    	Ext: No LE edema B/L  	Neuro: Awake  	Skin: Warm and dry      LABS/STUDIES  --------------------------------------------------------------------------------              10.7   8.62  >-----------<  374      [03-21-24 @ 01:48]              31.8     127  |  93  |  12  ----------------------------<  142      [03-20-24 @ 17:00]  4.0   |  24  |  0.92        Ca     9.1     [03-20-24 @ 17:00]        PTT: >200.0      [03-21-24 @ 08:50]    CK 73      [03-21-24 @ 01:48]    Creatinine Trend:  SCr 0.92 [03-20 @ 17:00]  SCr 0.88 [03-19 @ 16:25]  SCr 0.87 [03-19 @ 07:30]  SCr 0.84 [03-18 @ 08:39]  SCr 0.93 [03-17 @ 07:37]    Urinalysis - [03-20-24 @ 17:00]      Color  / Appearance  / SG  / pH       Gluc 142 / Ketone   / Bili  / Urobili        Blood  / Protein  / Leuk Est  / Nitrite       RBC  / WBC  / Hyaline  / Gran  / Sq Epi  / Non Sq Epi  / Bacteria     Urine Creatinine 74      [03-15-24 @ 15:08]  Urine Protein 99      [03-15-24 @ 15:08]  Urine Sodium 57      [03-15-24 @ 15:08]  Urine Urea Nitrogen 556      [03-15-24 @ 15:08]  Urine Potassium 32      [03-15-24 @ 15:08]  Urine Osmolality 446      [03-15-24 @ 15:08]    HCV 0.09, Nonreact      [03-12-24 @ 07:01]      Tacrolimus  Cyclosporine  Sirolimus  Mycophenolate  BK PCR  CMV PCR  Parvo PCR  EBV PCR

## 2024-03-21 NOTE — PROGRESS NOTE ADULT - SUBJECTIVE AND OBJECTIVE BOX
Patient is a 58y old  Female who presents with a chief complaint of Mastitis, breast malignancy (21 Mar 2024 11:19)      SUBJECTIVE / OVERNIGHT EVENTS: overnight events noted, Afib 90s on tele, pt still with some chest/ right breast discomfort, no abdominal pain, breathing is better     MEDICATIONS  (STANDING):  cefTRIAXone   IVPB 1000 milliGRAM(s) IV Intermittent every 24 hours  heparin  Infusion.  Unit(s)/Hr (9 mL/Hr) IV Continuous <Continuous>  influenza   Vaccine 0.5 milliLiter(s) IntraMuscular once  metoprolol tartrate 25 milliGRAM(s) Oral two times a day  metroNIDAZOLE  IVPB 500 milliGRAM(s) IV Intermittent every 12 hours  nicotine -  14 mG/24Hr(s) Patch 1 Patch Transdermal daily  pantoprazole    Tablet 40 milliGRAM(s) Oral before breakfast  polyethylene glycol 3350 17 Gram(s) Oral daily  sodium chloride 1 Gram(s) Oral three times a day  sodium chloride 2% . 1000 milliLiter(s) (40 mL/Hr) IV Continuous <Continuous>    MEDICATIONS  (PRN):  aluminum hydroxide/magnesium hydroxide/simethicone Suspension 30 milliLiter(s) Oral every 6 hours PRN Dyspepsia  diazepam    Tablet 5 milliGRAM(s) Oral two times a day PRN for anxiety  heparin   Injectable 4000 Unit(s) IV Push every 6 hours PRN For aPTT less than 40  heparin   Injectable 2000 Unit(s) IV Push every 6 hours PRN For aPTT between 40 - 57  HYDROmorphone  Injectable 0.5 milliGRAM(s) IV Push every 6 hours PRN breakthrough pain  melatonin 3 milliGRAM(s) Oral at bedtime PRN Insomnia  oxyCODONE    IR 10 milliGRAM(s) Oral every 6 hours PRN for severe pain        CAPILLARY BLOOD GLUCOSE        I&O's Summary    20 Mar 2024 07:01  -  21 Mar 2024 07:00  --------------------------------------------------------  IN: 1051 mL / OUT: 380 mL / NET: 671 mL    21 Mar 2024 07:01  -  21 Mar 2024 12:51  --------------------------------------------------------  IN: 120 mL / OUT: 0 mL / NET: 120 mL        PHYSICAL EXAM:  GENERAL: NAD, well-developed  HEAD:  Atraumatic, Normocephalic  EYES:  conjunctiva and sclera clear  NECK: Supple, No JVD  CHEST/LUNG: decreased breath sounds right base, + right chest tube ; No wheeze  HEART: Regular rate and rhythm; No murmurs, rubs, or gallops  ABDOMEN: Soft, Nontender, Nondistended; Bowel sounds present  EXTREMITIES:  2+ Peripheral Pulses, No clubbing, cyanosis, or edema  PSYCH: AAOx3    LABS:                        10.7   8.62  )-----------( 374      ( 21 Mar 2024 01:48 )             31.8     03-20    127<L>  |  93<L>  |  12  ----------------------------<  142<H>  4.0   |  24  |  0.92    Ca    9.1      20 Mar 2024 17:00      PTT - ( 21 Mar 2024 08:50 )  PTT:>200.0 sec  CARDIAC MARKERS ( 21 Mar 2024 01:48 )  x     / x     / 73 U/L / x     / 2.3 ng/mL      Urinalysis Basic - ( 20 Mar 2024 17:00 )    Color: x / Appearance: x / SG: x / pH: x  Gluc: 142 mg/dL / Ketone: x  / Bili: x / Urobili: x   Blood: x / Protein: x / Nitrite: x   Leuk Esterase: x / RBC: x / WBC x   Sq Epi: x / Non Sq Epi: x / Bacteria: x        RADIOLOGY & ADDITIONAL TESTS:    Imaging Personally Reviewed:    Consultant(s) Notes Reviewed:      Care Discussed with Consultants/Other Providers:

## 2024-03-21 NOTE — PROGRESS NOTE ADULT - ASSESSMENT
58F w/Hx of RCC s/p R total nephrectomy, S/p hysterectomy, R-sided glaucoma, Fibromyalgia, Anxiety, GERD, smoker p/w R breast swelling, redness and pain, found to have axillary, subclavicular and mediastinal lymphadenopathy & pulm nodules, s/p LN biopsy. Nephrology consulted for hyponatremia.       #Hyponatremia with increased ADH activity  -SNa 132 initially on this admission. SNa trended down to 125 3/20.   -Serum osm: 265, urine osm: 446, urine Na: 57.   -S/p 2% NaCl at 40cc/hr for 8hrs (3/20) with improvement of SNa to 127 from 124    Recs:   -Start 2% NaCl at 40cc/hr for 8hrs and recheck Na -goal 4-6 mEq/24 hr  -Will monitor response       Wilfredo Hurd  Nephrology Fellow  Feel free to contact me on TEAMS  After 4 pm please contact the on-call Fellow.   58F w/Hx of RCC s/p R total nephrectomy, S/p hysterectomy, R-sided glaucoma, Fibromyalgia, Anxiety, GERD, smoker p/w R breast swelling, redness and pain, found to have axillary, subclavicular and mediastinal lymphadenopathy & pulm nodules, s/p LN biopsy. Nephrology consulted for hyponatremia.       # Hyponatremia with increased ADH activity  -SNa 132 initially on this admission. SNa trended down to 125 3/20.   -Serum osm: 265, urine osm: 446, urine Na: 57.   -S/p 2% NaCl at 40cc/hr for 8hrs (3/20) with improvement of SNa to 127 from 124    Recs:   -Start 2% NaCl at 40cc/hr for 8hrs and recheck Na -goal 4-6 mEq/24 hr  -Will monitor response       Wilfredo Hurd  Nephrology Fellow  Feel free to contact me on TEAMS  After 4 pm please contact the on-call Fellow.

## 2024-03-21 NOTE — PROVIDER CONTACT NOTE (OTHER) - ASSESSMENT
Pt eating breakfast. Says she feels "weak" but no symptoms of CP, respiratory distress or headache. Pt was given 25mg PO Lopressor at 08:00. BP is stable.
pt in nAD , no visible rash
pt moaning and c/o 10/10 pain at the right shoulder, not a new onset explained by patient. Bp 146/94 hr 104, o2 95% on 2L NC.
pt with no bP / Blood right arm - subclavian clot bilateral arms swollen and firm/ hard
AOX4 . Pt had 7 out of 10 chest pain midsternal. Chest felt tight when trying to breathe.

## 2024-03-21 NOTE — PROGRESS NOTE ADULT - SUBJECTIVE AND OBJECTIVE BOX
DATE OF SERVICE: 03-21-24 @ 11:19    Patient is a 58y old  Female who presents with a chief complaint of Mastitis, breast malignancy (21 Mar 2024 06:50)      INTERVAL HISTORY: Feels ok. Reported some chest discomfort this am.     REVIEW OF SYSTEMS:  CONSTITUTIONAL: No weakness  EYES/ENT: No visual changes;  No throat pain   NECK: No pain or stiffness  RESPIRATORY: No cough, wheezing; No shortness of breath  CARDIOVASCULAR: + chest pain or palpitations  GASTROINTESTINAL: No abdominal  pain. No nausea, vomiting, or hematemesis  GENITOURINARY: No dysuria, frequency or hematuria  NEUROLOGICAL: No stroke like symptoms  SKIN: No rashes    TELEMETRY Personally reviewed: SR/ST  paroxysmal with pAF  	  MEDICATIONS:        PHYSICAL EXAM:  T(C): 36.4 (03-21-24 @ 08:21), Max: 36.9 (03-20-24 @ 11:28)  HR: 83 (03-21-24 @ 08:21) (83 - 120)  BP: 117/67 (03-21-24 @ 08:21) (91/60 - 148/91)  RR: 18 (03-21-24 @ 08:21) (17 - 18)  SpO2: 96% (03-21-24 @ 08:21) (92% - 96%)  Wt(kg): --  I&O's Summary    20 Mar 2024 07:01  -  21 Mar 2024 07:00  --------------------------------------------------------  IN: 1051 mL / OUT: 380 mL / NET: 671 mL          Appearance: In no distress	  HEENT:    PERRL, EOMI	  Cardiovascular:  S1 S2, No JVD  Respiratory: Lungs clear to auscultation	  Gastrointestinal:  Soft, Non-tender, + BS	  Vascularature:  No edema of LE  Psychiatric: Appropriate affect   Neuro: no acute focal deficits                               10.7   8.62  )-----------( 374      ( 21 Mar 2024 01:48 )             31.8     03-20    127<L>  |  93<L>  |  12  ----------------------------<  142<H>  4.0   |  24  |  0.92    Ca    9.1      20 Mar 2024 17:00          Labs personally reviewed  < from: Xray Chest 1 View- PORTABLE-Routine (Xray Chest 1 View- PORTABLE-Routine in AM.) (03.20.24 @ 09:36) >  IMPRESSION:  Small right pleural effusion with associated atelectasis of the right   lower lung.      ASSESSMENT/PLAN: 	    58F w/Hx of RCC s/p R total nephrectomy, S/p hysterectomy, R-sided glaucoma, Fibromyalgia, Anxiety, GERD, smoker presents due to R breast swelling, redness and pain.     Problem/Plan - 1:   ·  Problem: Pericardial effusion.  ·  Plan: - pericardial effusion on CT chest   - TTE 3/18 with Moderate pericardial effusion with echocardiographic evidence of hemodynamic compromise    - Clinically pt is not in tamponade   - Thoracic surgery consulted for pericardial window given likely malignant effusion  ----s/p pericardial window 3/19  - 3/21 now with ST paroxysmal with AF with RVR and reports of chest pain--> Will get Limited TTE to assess pericardial effusion although drain output adequate       Problem/Plan - 2:  ·  Problem: Internal jugular vein thrombosis, right.   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    Problem/Plan - 3:  ·  Problem: Smoker.   ·  Plan: - 1 ppd smoker  - c/w Nicotine patch.    Problem/Plan - 4:  ·  Problem: Prophylactic measure.   ·  Plan: dispo- home.      Problem/Plan - 5:  ·  Problem: Sinus Tachycardia  - Likely reactive   - D5 as per renal      Problem/Plan - 6:  ·  Problem: New Onset Atrial Fibrillation  - Approx 1:42pm--> episode of AF with RVR which lasted 3 minutes  - c/w heparin gtt  - 3/21 now with ST paroxysmal with AF with RVR and reports of chest pain  - Start lopressor 25mg PO BID with hold parameters  - Limited TTE as noted above        Adriana Julian, TIFFANI-NP   Lb Mata DO [facility]  Cardiovascular Medicine  [address], [address]  Available through call or text on [employer] TEAMs  Office: [phone]   DATE OF SERVICE: 03-21-24 @ 11:19    Patient is a 58y old  Female who presents with a chief complaint of Mastitis, breast malignancy (21 Mar 2024 06:50)      INTERVAL HISTORY: Feels ok. Reported some chest discomfort this am.     REVIEW OF SYSTEMS:  CONSTITUTIONAL: No weakness  EYES/ENT: No visual changes;  No throat pain   NECK: No pain or stiffness  RESPIRATORY: No cough, wheezing; No shortness of breath  CARDIOVASCULAR: + chest pain or palpitations  GASTROINTESTINAL: No abdominal  pain. No nausea, vomiting, or hematemesis  GENITOURINARY: No dysuria, frequency or hematuria  NEUROLOGICAL: No stroke like symptoms  SKIN: No rashes    TELEMETRY Personally reviewed: SR/ST  paroxysmal with pAF  	  MEDICATIONS:        PHYSICAL EXAM:  T(C): 36.4 (03-21-24 @ 08:21), Max: 36.9 (03-20-24 @ 11:28)  HR: 83 (03-21-24 @ 08:21) (83 - 120)  BP: 117/67 (03-21-24 @ 08:21) (91/60 - 148/91)  RR: 18 (03-21-24 @ 08:21) (17 - 18)  SpO2: 96% (03-21-24 @ 08:21) (92% - 96%)  Wt(kg): --  I&O's Summary    20 Mar 2024 07:01  -  21 Mar 2024 07:00  --------------------------------------------------------  IN: 1051 mL / OUT: 380 mL / NET: 671 mL          Appearance: In no distress	  HEENT:    PERRL, EOMI	  Cardiovascular:  S1 S2, No JVD  Respiratory: Lungs clear to auscultation	  Gastrointestinal:  Soft, Non-tender, + BS	  Vascularature:  No edema of LE  Psychiatric: Appropriate affect   Neuro: no acute focal deficits                               10.7   8.62  )-----------( 374      ( 21 Mar 2024 01:48 )             31.8     03-20    127<L>  |  93<L>  |  12  ----------------------------<  142<H>  4.0   |  24  |  0.92    Ca    9.1      20 Mar 2024 17:00          Labs personally reviewed  < from: Xray Chest 1 View- PORTABLE-Routine (Xray Chest 1 View- PORTABLE-Routine in AM.) (03.20.24 @ 09:36) >  IMPRESSION:  Small right pleural effusion with associated atelectasis of the right   lower lung.      ASSESSMENT/PLAN: 	    58F w/Hx of RCC s/p R total nephrectomy, S/p hysterectomy, R-sided glaucoma, Fibromyalgia, Anxiety, GERD, smoker presents due to R breast swelling, redness and pain.     Problem/Plan - 1:   ·  Problem: Pericardial effusion.  ·  Plan: - pericardial effusion on CT chest   - TTE 3/18 with Moderate pericardial effusion with echocardiographic evidence of hemodynamic compromise    - Clinically pt is not in tamponade   - Thoracic surgery consulted for pericardial window given likely malignant effusion  ----s/p pericardial window 3/19  - 3/21 now with ST paroxysmal with AF with RVR and reports of chest pain--> Will get Limited TTE to assess pericardial effusion although drain output adequate       Problem/Plan - 2:  ·  Problem: Internal jugular vein thrombosis, right.   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    Problem/Plan - 3:  ·  Problem: Smoker.   ·  Plan: - 1 ppd smoker  - c/w Nicotine patch.    Problem/Plan - 4:  ·  Problem: Prophylactic measure.   ·  Plan: dispo- home.      Problem/Plan - 5:  ·  Problem: Sinus Tachycardia  - Likely reactive   - D5 as per renal      Problem/Plan - 6:  ·  Problem: New Onset Atrial Fibrillation  - Approx 1:42pm--> episode of AF with RVR which lasted 3 minutes  - c/w heparin gtt  - 3/21 now with ST paroxysmal with AF with RVR and reports of chest pain  - Start lopressor 25mg PO BID with hold parameters  - Limited TTE as noted above        Adriana Julian, TIFFANI-NP   Lb Mata DO [facility]  Cardiovascular Medicine  [address], [address]  Available through call or text on [employer] TEAMs  Office: [phone]

## 2024-03-21 NOTE — CHART NOTE - NSCHARTNOTEFT_GEN_A_CORE
Medicine NP Episodic Note    HPI/Interval Hx:  58F w/ PMHx of RCC s/p R total nephrectomy, s/p hysterectomy, R-sided glaucoma, Fibromyalgia, Anxiety, GERD, smoker presents d/t R breast swelling, redness and pain. Pt. found to have pericardial effusion on CT chest; TTE w/ mod pericardial effusion w/ echocardiographic evidence of hemodynamic compromise; s/p pericardial window given likely malignant effusion on 3/19.  Pt. 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  Per chart review, pt. had episode of Afib RVR (new) on 3/20, for approx 3 mins, converting back to NSR.    Event Summary:  Notified by RN, pt. converted to Afib w/ RVR on tele, HR fluctuating from 120's-180.  Pt. seen and examined at bedside, A+Ox3, in NAD.  Pt. admits to having 5/10, non-radiating, mid-sternal CP.  Denies SOB, palpitations, dizziness/lightheadedness or N/V.     Vital Signs Last 24 Hrs  T(C): 36.4 (21 Mar 2024 03:24), Max: 36.9 (20 Mar 2024 11:28)  T(F): 97.6 (21 Mar 2024 03:24), Max: 98.5 (20 Mar 2024 11:28)  HR: 101 (21 Mar 2024 04:23) (91 - 120)  BP: 105/66 (21 Mar 2024 04:23) (91/60 - 148/91)  BP(mean): --  RR: 18 (21 Mar 2024 03:24) (17 - 18)  SpO2: 93% (21 Mar 2024 03:24) (92% - 95%)    Parameters below as of 21 Mar 2024 03:24  Patient On (Oxygen Delivery Method): nasal cannula  O2 Flow (L/min): 4      Labs:                          10.7   8.62  )-----------( 374      ( 21 Mar 2024 01:48 )             31.8     03-20    127<L>  |  93<L>  |  12  ----------------------------<  142<H>  4.0   |  24  |  0.92    Ca    9.1      20 Mar 2024 17:00        CARDIAC MARKERS ( 21 Mar 2024 01:48 )  x     / x     / 73 U/L / x     / 2.3 ng/mL      Physical Exam:  Gen/Neuro: A+Ox3, moaning/grimacing w/ positional changes   Resp: Even and non-labored bilat; lungs CTA B/L; + pericardial drain (pleuravac)  CV: Irregularly irregular, tachycardic; reproducible CP   Abd: Soft, NT/ND, + BS x 4 quad   MSK: + trace LE edema     Assessment & Plan:    # Afib w/ RVR  > Stat EKG - Afib w/ RVR,  bpm; no acute ST/T-wave changes   > Stat CE - trop 18  > Stat dose of Lopressor 2.5 mg IV x 1 given initially w/ HR subsiding to 's     - Pt. required additional dose of Lopressor 2.5 mg IV x 1 for rate control   > Case discussed w/ Cardiology w/ recs to give Lopressor 25 mg x 1 now (hold if SBP < 100)  > Continue Heparin gtt   > PRN dose of Dilaudid given for reproducible CP   > Continue tele  > Close monitoring of vital signs   > F/u am labs  > Cardiology to follow in am    Will endorse to primary team in am.  Attending to follow.    Sharmaine Unger, [facility]  [phone]

## 2024-03-21 NOTE — PROGRESS NOTE ADULT - ASSESSMENT
58F w/Hx of RCC s/p R total nephrectomy, S/p hysterectomy, R-sided glaucoma, Fibromyalgia, Anxiety, GERD, smoker presents due to R breast swelling, redness and pain. Admitted for right breast swelling and cellulitis found to have imaging evidence concerning for metastatic malignancy w/ lung nodules, and axillary, supraclavicular, and mediastinal adenopathy. 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 Also s/p pericardial window for moderate pericardial effusion now with chest tube. Now found to be in Afib with RVR

## 2024-03-21 NOTE — PROVIDER CONTACT NOTE (OTHER) - SITUATION
pt rhona no iV access
Pt converted to Afib from NSR on tele
pt re fusing chlorhexidene wash - makes my skin itch
Tele notified pt HR high jumping between range of 130-190s. ST and MATs
pt c/o right shoulder pain from breast mass.

## 2024-03-21 NOTE — PROGRESS NOTE ADULT - PROBLEM SELECTOR PLAN 4
- erythema and tenderness of right breast improving  - Unclear if cellulitis or just inflammatory changes   - Continue with ceftriaxone 1g IV Q24H and metronidazole 500 mg PO Q12H.  - blood cx ngtd  - ID follow up

## 2024-03-21 NOTE — PROVIDER CONTACT NOTE (OTHER) - ACTION/TREATMENT ORDERED:
Provider notified .  IVP Lopressor ordered and given. Provider notified .  IVP Lopressor ordered and given. EKG performed

## 2024-03-21 NOTE — PROGRESS NOTE ADULT - ASSESSMENT
this is a 58 F hx of   RCC total nephrectomy, S/p hysterectomy, R-sided glaucoma, Fibromyalgia, Anxiety, GERD, smoker, COPD, presents due to R breast swelling, redness and pain. Pt was initially evaluated by [facility] yesterday and was going to be admitted but left AMA. Was prescribed clindamycin for presumed breast cellulitis/mastitis on discharge, but was also told about concerning findings on CT Chest related to possible breast malignancy and metastasis. Pt has not had a mammogram or colonoscopy in years. Reports that she had a lidocaine injection in her R shoulder on 3/8/24 and since then has had worsening swelling, redness and pain in her R breast. Also still has R shoulder pain, which had mild improvement since the injection. Pt consistently takes oxycodone q6h and believes this may have masked her pain earlier. Reports over 40 pound weight loss in the last year and loss of appetite. Has conjunctival injection in R eye which she claims is chronic but she also feels her eyes are more swollen than usual currently. Denies vision changes. Smokes 1ppd for many years. Patient denies fever, chills, chest pain, SOB, headache, dizziness, abd pain, nausea, vomiting, constipation, dysuria.     3/19 underwent Subxiphoid pericardial window. Portion of xiphoid resected and pericardium incised. Fluid drained and Regan drain placed. Approx 320cc of serous pericardial fluid drained initially.  3/20 VSS OOB to chair  pericardial drain  145 cc overnight on 4lnc .  3/21 VSS pericardial drain -240 cc

## 2024-03-21 NOTE — PROGRESS NOTE ADULT - ASSESSMENT
58-yo F w/ PMH of RCC s/p R total nephrectomy, s/p hysterectomy, fibromyalgia, and concern for breast malignancy currently undergoing workup, admitted with R breast swelling, erythema, and pain. ID was consulted for extensive cellulitis/myositis along the R anterior lateral neck and R upper chest wall. 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 LN biopsy 3/14. CT chest w/ mediastinal and supraclavicular lymphadenopathy w/ necrosis. 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    #Neck edema  #Neck pain  #Abnormal CT scan  #Myositis  #Thrombophlebitis  - VSS. No leukocytosis. Significant R neck lymphadenopathy, s/p biopsy. F/u path  - No sore throat or dysphagia. No odynophagia. No fever or rigors.  - Unclear if patient's presentation represents infectious disease process. Labs and physical exam do not support septic thrombophlebitis.  - BCx NGTD. Quantiferon neg.  - CT chest reviewed. Necrotic lymph nodes noted. Path from neck concern for malignancy.  - Day 7 abx today (3/21). Would complete today and monitor.   - ENT and Onc f/u. CT surgery follow-up.    Plan discussed with primary team ACP. Left MS Teams message to ACP Jadyn.  Thank you for this consult. Inpatient ID team will peripherally follow.    Edwar Petersen MD, PhD  Attending Physician  Division of Infectious Diseases  Department of Medicine    Please contact through MS Teams message.  Office: [phone] (after 5 PM or weekend) .       58-yo F w/ PMH of RCC s/p R total nephrectomy, s/p hysterectomy, fibromyalgia, and concern for breast malignancy currently undergoing workup, admitted with R breast swelling, erythema, and pain. ID was consulted for extensive cellulitis/myositis along the R anterior lateral neck and R upper chest wall. 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 LN biopsy 3/14. CT chest w/ mediastinal and supraclavicular lymphadenopathy w/ necrosis. 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    #Neck edema  #Neck pain  #Abnormal CT scan  #Myositis  #Thrombophlebitis  - VSS. No leukocytosis. Significant R neck lymphadenopathy, s/p biopsy. F/u path  - No sore throat or dysphagia. No odynophagia. No fever or rigors.  - Unclear if patient's presentation represents infectious disease process. Labs and physical exam do not support septic thrombophlebitis.  - BCx NGTD. Quantiferon neg.  - CT chest reviewed. Necrotic lymph nodes noted. Path from neck concern for malignancy.  - On ceftriaxone 1g IV Q12H and metronidazole 500 mg PO Q12H. Pending MRCP. Would consider stop, however no objection continuing until MRCP is performed. D/c if no infectious focus is found.   - ENT and Onc f/u. CT surgery follow-up.    Plan discussed with primary team ACP. Left MS Teams message to ACP Jadyn.  Thank you for this consult. Inpatient ID team will peripherally follow.    Edwar Petersen MD, PhD  Attending Physician  Division of Infectious Diseases  Department of Medicine    Please contact through MS Teams message.  Office: [phone] (after 5 PM or weekend) .       58-yo F w/ PMH of RCC s/p R total nephrectomy, s/p hysterectomy, fibromyalgia, and concern for breast malignancy currently undergoing workup, admitted with R breast swelling, erythema, and pain. ID was consulted for extensive cellulitis/myositis along the R anterior lateral neck and R upper chest wall. 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 LN biopsy 3/14. CT chest w/ mediastinal and supraclavicular lymphadenopathy w/ necrosis. 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    #Neck edema  #Neck pain  #Abnormal CT scan  #Myositis  #Thrombophlebitis  - VSS. No leukocytosis. Significant R neck lymphadenopathy, s/p biopsy. F/u path  - No sore throat or dysphagia. No odynophagia. No fever or rigors.  - Unclear if patient's presentation represents infectious disease process. Labs and physical exam do not support septic thrombophlebitis.  - BCx NGTD. Quantiferon neg.  - CT chest reviewed. Necrotic lymph nodes noted. Path from neck concern for malignancy.  - On ceftriaxone 1g IV Q12H and metronidazole 500 mg PO Q12H. Pending MRCP. Would consider stop, however no objection continuing until MRCP is performed. D/c if no infectious focus is found.   - ENT and Onc f/u. CT surgery follow-up.    Plan discussed with primary team ACP. Left MS Teams message to PAT Serrano.  Thank you for this consult. Inpatient ID team will peripherally follow.    Edwar Petersen MD, PhD  Attending Physician  Division of Infectious Diseases  Department of Medicine    Please contact through MS Teams message.  Office: [phone] (after 5 PM or weekend) .       58-yo F w/ PMH of RCC s/p R total nephrectomy, s/p hysterectomy, fibromyalgia, and concern for breast malignancy currently undergoing workup, admitted with R breast swelling, erythema, and pain. ID was consulted for extensive cellulitis/myositis along the R anterior lateral neck and R upper chest wall. 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 LN biopsy 3/14. CT chest w/ mediastinal and supraclavicular lymphadenopathy w/ necrosis. 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    #Neck edema  #Neck pain  #Abnormal CT scan  #Myositis  #Thrombophlebitis  - VSS. No leukocytosis. Significant R neck lymphadenopathy, s/p biopsy. F/u path  - No sore throat or dysphagia. No odynophagia. No fever or rigors.  - Unclear if patient's presentation represents infectious disease process. Labs and physical exam do not support septic thrombophlebitis.  - BCx NGTD. Quantiferon neg.  - CT chest reviewed. Necrotic lymph nodes noted. Path from neck concern for malignancy.  - On ceftriaxone 1g IV Q12H and metronidazole 500 mg PO Q12H. Pending MRCP. Would consider stop, however no objection continuing until MRCP is performed. D/c if no infectious focus is found.   - ENT and Onc f/u. CT surgery follow-up.    Plan discussed with primary team ACP.  Thank you for this consult. Inpatient ID team will peripherally follow.    Edwar Petersen MD, PhD  Attending Physician  Division of Infectious Diseases  Department of Medicine    Please contact through MS Teams message.  Office: [phone] (after 5 PM or weekend) .

## 2024-03-21 NOTE — PROGRESS NOTE ADULT - SUBJECTIVE AND OBJECTIVE BOX
Follow Up:    Neck pain    Interval History/ROS:  VSS ON. Quantiferon neg. Patient was seen and examined at bedside. Anxious. No improvement in pain. No fever.    Allergies  Cipro (Headache; Vomiting; Rash)  penicillin (Headache; Vomiting; Rash)  erythromycin (Headache; Vomiting; Rash)        ANTIMICROBIALS:  cefTRIAXone   IVPB 1000 every 24 hours  metroNIDAZOLE  IVPB 500 every 12 hours      OTHER MEDS:  MEDICATIONS  (STANDING):  aluminum hydroxide/magnesium hydroxide/simethicone Suspension 30 every 6 hours PRN  diazepam    Tablet 5 two times a day PRN  heparin   Injectable 4000 every 6 hours PRN  heparin   Injectable 2000 every 6 hours PRN  heparin  Infusion.  <Continuous>  HYDROmorphone  Injectable 0.5 every 6 hours PRN  influenza   Vaccine 0.5 once  melatonin 3 at bedtime PRN  metoprolol tartrate 25 two times a day  oxyCODONE    IR 10 every 6 hours PRN  pantoprazole    Tablet 40 before breakfast  polyethylene glycol 3350 17 daily      Vital Signs Last 24 Hrs  T(C): 36.5 (21 Mar 2024 11:34), Max: 36.5 (21 Mar 2024 11:34)  T(F): 97.7 (21 Mar 2024 11:34), Max: 97.7 (21 Mar 2024 11:34)  HR: 91 (21 Mar 2024 11:34) (83 - 120)  BP: 97/63 (21 Mar 2024 11:34) (91/60 - 117/67)  BP(mean): --  RR: 18 (21 Mar 2024 11:34) (17 - 18)  SpO2: 93% (21 Mar 2024 11:34) (92% - 96%)    Parameters below as of 21 Mar 2024 11:34  Patient On (Oxygen Delivery Method): nasal cannula      PHYSICAL EXAM:  Constitutional: NAD  ENT:  R neck edema and tenderness; clear oropharynx  Cardiovascular:   normal S1, S2, no murmur, RRR  Respiratory:  clear BS bilaterally, no wheezes  Musculoskeletal:  no BLE edema  Neurologic: awake and oriented x3  Skin:  R breast w/ slightly erythematous change, tenderness to palpation; Chest w/ drain in place, serosanguineous fluid w/ debris; R chest tender to palpation  Psychiatric:  anxious                        10.7   8.62  )-----------( 374      ( 21 Mar 2024 01:48 )             31.8       03-21    131<L>  |  96  |  12  ----------------------------<  198<H>  4.2   |  22  |  0.83    Ca    9.0      21 Mar 2024 13:57        Urinalysis Basic - ( 21 Mar 2024 13:57 )    Color: x / Appearance: x / SG: x / pH: x  Gluc: 198 mg/dL / Ketone: x  / Bili: x / Urobili: x   Blood: x / Protein: x / Nitrite: x   Leuk Esterase: x / RBC: x / WBC x   Sq Epi: x / Non Sq Epi: x / Bacteria: x        MICROBIOLOGY:  v  .Blood Blood  03-15-24   No growth at 5 days  --  --      .Blood Blood-Peripheral  03-12-24   No growth at 5 days  --  --      .Blood Blood-Peripheral  03-12-24   No growth at 5 days  --  --      Clean Catch Clean Catch (Midstream)  03-10-24   <10,000 CFU/mL Normal Urogenital Tammie  --  --      .Blood Blood-Peripheral  03-10-24   No growth at 5 days  --  --                RADIOLOGY:  Imaging below independently reviewed.  < from: Xray Chest 1 View- PORTABLE-Routine (Xray Chest 1 View- PORTABLE-Routine in AM.) (03.20.24 @ 09:36) >    ACC: [id number] EXAM:  XR CHEST PORTABLE ROUTINE 1V   ORDERED BY: CANDACE TRUJILLO     ACC: [id number] EXAM:  XR CHEST AP OR PA 1V   ORDERED BY: KENYATTA PARADA     PROCEDURE DATE:  03/19/2024          INTERPRETATION:  EXAMINATION: XR CHEST, XRCHEST    CLINICAL INDICATION: s/p pericardial window w/ drain placement    TECHNIQUE: Single frontal portable view of the chest from 3/19/2024 10:32   PM and 3/20/2024 8:50AM    COMPARISON: Chest x-ray 3/10/2024, CT chest 3/14/1824.    FINDINGS:  3/19/2024  Pericardial drain.  The heart size is not accurately assessed on this projection.  Hazy and patchy opacities throughout the mid to lower right lung. There   is right-sided volume loss to suggest atelectasis. Consolidative opacity   in the right paratracheal region, corresponding to soft tissue mass   characterized on prior CT.  Small right pleural effusion. No pneumothorax.    3/20/2024  Redemonstrated small right pleural effusion, which is better defined on   this projection    IMPRESSION:  Small right pleural effusion with associated atelectasis of the right   lower lung.    --- End of Report ---          MERI WEINSTEIN MD; Resident Radiologist  This document has been electronically signed.  CHRISTOPHE ALLEN MD; Attending Radiologist  This document has been electronically signed. Mar 20 2024 10:05AM    < end of copied text >

## 2024-03-21 NOTE — PROVIDER CONTACT NOTE (OTHER) - BACKGROUND
admitted with breast mass and breast cellulitis.
pt admitted with right breast swelling and pain on 3/11/24
58F w/Hx of RCC s/p R total nephrectomy, S/p hysterectomy, R-sided glaucoma, Fibromyalgia, Anxiety, GERD, smoker presents due to R breast swelling, redness and pain.
58F w/Hx of RCC s/p R total nephrectomy, S/p hysterectomy, R-sided glaucoma, Fibromyalgia, Anxiety, GERD, smoker presents due to R breast swelling, redness and pain. Found to be with new Breast Ca
pt admitted 3/11 with right breast swelling,

## 2024-03-21 NOTE — PROGRESS NOTE ADULT - PROBLEM SELECTOR PLAN 7
- Home Oxycodone (Confirmed via [employer])  - c/w Oxycodone IR 10mg q 6 prn   - c/w Diluadid 0.5mg IVP q 6 prn for breakthrough pain  - Savella not in formulary  - Pallative consult pending for pain control

## 2024-03-21 NOTE — PROGRESS NOTE ADULT - SUBJECTIVE AND OBJECTIVE BOX
Subjective ' hello I   have some pain"        Vital Signs Last 24 Hrs  T(C): 36.4 (03-21-24 @ 03:24), Max: 36.9 (03-20-24 @ 11:28)  T(F): 97.6 (03-21-24 @ 03:24), Max: 98.5 (03-20-24 @ 11:28)  HR: 101 (03-21-24 @ 04:23) (91 - 120)  BP: 105/66 (03-21-24 @ 04:23) (91/60 - 148/91)  RR: 18 (03-21-24 @ 03:24) (17 - 18)  SpO2: 93% (03-21-24 @ 03:24) (92% - 95%)           03-19 @ 07:01  -  03-20 @ 07:00  --------------------------------------------------------  IN: 0 mL / OUT: 115 mL / NET: -115 mL    03-20 @ 07:01  -  03-21 @ 06:51  --------------------------------------------------------  IN: 1051 mL / OUT: 380 mL / NET: 671 mL                        10.7   8.62  )-----------( 374      ( 21 Mar 2024 01:48 )             31.8     03-20    127<L>  |  93<L>  |  12  ----------------------------<  142<H>  4.0   |  24  |  0.92    Ca    9.1      20 Mar 2024 17:00            < from: Xray Chest 1 View- PORTABLE-Routine (Xray Chest 1 View- PORTABLE-Routine in AM.) (03.20.24 @ 09:36) >    FINDINGS:  3/19/2024  Pericardial drain.  The heart size is not accurately assessed on this projection.  Hazy and patchy opacities throughout the mid to lower right lung. There   is right-sided volume loss to suggest atelectasis. Consolidative opacity   in the right paratracheal region, corresponding to soft tissue mass   characterized on prior CT.  Small right pleural effusion. No pneumothorax.    3/20/2024  Redemonstrated small right pleural effusion, which is better defined on   this projection    IMPRESSION:  Small right pleural effusion with associated atelectasis of the right   lower lung.      < end of copied text >                  Drains:   pericardial drain  ] Drainage:  240                              PHYSICAL EXAM        Neurology: alert and oriented x 3, nonfocal, no gross deficits    CV :S1S2    xiphoid  Wound : max  pericardial drain - pleuravac - water seal 240  Lungs: B/l breath sounds on 2lnc    Abdomen: soft, nontender, nondistended, positive bowel sounds,    :  voids             Extremities:   warm well perfused equal strength throughout     b/le + DP trace edema no calf  tenderness                                          Discussed with Cardiothoracic Team at AM rounds.

## 2024-03-21 NOTE — PROGRESS NOTE ADULT - PROBLEM SELECTOR PLAN 1
- h/o RCC and had nephrectomy by Urology by Dr Jacky Adkins at [facility], also had lymph node dissection 2 years ago  - Now with axillary, subclavicular and mediastinal lymphadenopathy, pulmonary nodules and inflammatory changes of breast seen on CT with possible effect on IVC  - CT neck with large supraclavicular/mediastinal mass lesion with mass effect and complete occlusion of the right IJ; associated surrounding inflammatory changes in the deep neck, concerning for infectious/inflammatory thrombophlebitis  - S/p supraclavicular lymph node biopsy by IR on 3/14  - Path positive for malignancy of GI origin    - D/w GI follow up MRI abdomen and MRCP  - D/w oncology, to continue to follow

## 2024-03-21 NOTE — PROVIDER CONTACT NOTE (OTHER) - REASON
unable to get iV access
Tele Event: HR jumping between 130s-190s
Pt converted to Afib from NSR on tele
pt c/o breast pain
pt refusing chlorhexidene wipe

## 2024-03-21 NOTE — PROGRESS NOTE ADULT - PROBLEM SELECTOR PLAN 2
- Imaging shows complete occlusion of the right subclavian vein and nearly occlusive thrombosis in the proximal left brachiocephalic vein with flow reconstitution distally   - c/w heparin gtt

## 2024-03-22 DIAGNOSIS — Z91.89 OTHER SPECIFIED PERSONAL RISK FACTORS, NOT ELSEWHERE CLASSIFIED: ICD-10-CM

## 2024-03-22 DIAGNOSIS — G89.3 NEOPLASM RELATED PAIN (ACUTE) (CHRONIC): ICD-10-CM

## 2024-03-22 DIAGNOSIS — C25.9 MALIGNANT NEOPLASM OF PANCREAS, UNSPECIFIED: ICD-10-CM

## 2024-03-22 DIAGNOSIS — Z71.89 OTHER SPECIFIED COUNSELING: ICD-10-CM

## 2024-03-22 DIAGNOSIS — Z51.5 ENCOUNTER FOR PALLIATIVE CARE: ICD-10-CM

## 2024-03-22 DIAGNOSIS — F41.9 ANXIETY DISORDER, UNSPECIFIED: ICD-10-CM

## 2024-03-22 LAB
ALBUMIN SERPL ELPH-MCNC: 2.8 G/DL — LOW (ref 3.3–5)
ALP SERPL-CCNC: 98 U/L — SIGNIFICANT CHANGE UP (ref 40–120)
ALT FLD-CCNC: 16 U/L — SIGNIFICANT CHANGE UP (ref 10–45)
ANION GAP SERPL CALC-SCNC: 12 MMOL/L — SIGNIFICANT CHANGE UP (ref 5–17)
ANION GAP SERPL CALC-SCNC: 13 MMOL/L — SIGNIFICANT CHANGE UP (ref 5–17)
APTT BLD: 36.6 SEC — HIGH (ref 24.5–35.6)
APTT BLD: 50.5 SEC — HIGH (ref 24.5–35.6)
APTT BLD: >200 SEC — CRITICAL HIGH (ref 24.5–35.6)
AST SERPL-CCNC: 13 U/L — SIGNIFICANT CHANGE UP (ref 10–40)
BASOPHILS # BLD AUTO: 0.04 K/UL — SIGNIFICANT CHANGE UP (ref 0–0.2)
BASOPHILS NFR BLD AUTO: 0.6 % — SIGNIFICANT CHANGE UP (ref 0–2)
BILIRUB SERPL-MCNC: 0.2 MG/DL — SIGNIFICANT CHANGE UP (ref 0.2–1.2)
BUN SERPL-MCNC: 13 MG/DL — SIGNIFICANT CHANGE UP (ref 7–23)
BUN SERPL-MCNC: 14 MG/DL — SIGNIFICANT CHANGE UP (ref 7–23)
CALCIUM SERPL-MCNC: 9 MG/DL — SIGNIFICANT CHANGE UP (ref 8.4–10.5)
CALCIUM SERPL-MCNC: 9.3 MG/DL — SIGNIFICANT CHANGE UP (ref 8.4–10.5)
CHLORIDE SERPL-SCNC: 99 MMOL/L — SIGNIFICANT CHANGE UP (ref 96–108)
CHLORIDE SERPL-SCNC: 99 MMOL/L — SIGNIFICANT CHANGE UP (ref 96–108)
CO2 SERPL-SCNC: 22 MMOL/L — SIGNIFICANT CHANGE UP (ref 22–31)
CO2 SERPL-SCNC: 23 MMOL/L — SIGNIFICANT CHANGE UP (ref 22–31)
CORTICOSTEROID BINDING GLOBULIN RESULT: 2.3 MG/DL — SIGNIFICANT CHANGE UP
CORTIS F/TOTAL MFR SERPL: 20 % — SIGNIFICANT CHANGE UP
CORTIS SERPL-MCNC: 18 UG/DL — SIGNIFICANT CHANGE UP
CORTISOL, FREE RESULT: 3.6 UG/DL — HIGH
CREAT SERPL-MCNC: 0.81 MG/DL — SIGNIFICANT CHANGE UP (ref 0.5–1.3)
CREAT SERPL-MCNC: 0.83 MG/DL — SIGNIFICANT CHANGE UP (ref 0.5–1.3)
EGFR: 81 ML/MIN/1.73M2 — SIGNIFICANT CHANGE UP
EGFR: 83 ML/MIN/1.73M2 — SIGNIFICANT CHANGE UP
EOSINOPHIL # BLD AUTO: 0.45 K/UL — SIGNIFICANT CHANGE UP (ref 0–0.5)
EOSINOPHIL NFR BLD AUTO: 6.7 % — HIGH (ref 0–6)
GLUCOSE SERPL-MCNC: 120 MG/DL — HIGH (ref 70–99)
GLUCOSE SERPL-MCNC: 168 MG/DL — HIGH (ref 70–99)
HCT VFR BLD CALC: 32.3 % — LOW (ref 34.5–45)
HGB BLD-MCNC: 10.9 G/DL — LOW (ref 11.5–15.5)
IMM GRANULOCYTES NFR BLD AUTO: 0.6 % — SIGNIFICANT CHANGE UP (ref 0–0.9)
LYMPHOCYTES # BLD AUTO: 0.96 K/UL — LOW (ref 1–3.3)
LYMPHOCYTES # BLD AUTO: 14.2 % — SIGNIFICANT CHANGE UP (ref 13–44)
MCHC RBC-ENTMCNC: 29.6 PG — SIGNIFICANT CHANGE UP (ref 27–34)
MCHC RBC-ENTMCNC: 33.7 GM/DL — SIGNIFICANT CHANGE UP (ref 32–36)
MCV RBC AUTO: 87.8 FL — SIGNIFICANT CHANGE UP (ref 80–100)
MONOCYTES # BLD AUTO: 0.41 K/UL — SIGNIFICANT CHANGE UP (ref 0–0.9)
MONOCYTES NFR BLD AUTO: 6.1 % — SIGNIFICANT CHANGE UP (ref 2–14)
NEUTROPHILS # BLD AUTO: 4.84 K/UL — SIGNIFICANT CHANGE UP (ref 1.8–7.4)
NEUTROPHILS NFR BLD AUTO: 71.8 % — SIGNIFICANT CHANGE UP (ref 43–77)
NRBC # BLD: 0 /100 WBCS — SIGNIFICANT CHANGE UP (ref 0–0)
PLATELET # BLD AUTO: 414 K/UL — HIGH (ref 150–400)
POTASSIUM SERPL-MCNC: 3.7 MMOL/L — SIGNIFICANT CHANGE UP (ref 3.5–5.3)
POTASSIUM SERPL-MCNC: 4.2 MMOL/L — SIGNIFICANT CHANGE UP (ref 3.5–5.3)
POTASSIUM SERPL-SCNC: 3.7 MMOL/L — SIGNIFICANT CHANGE UP (ref 3.5–5.3)
POTASSIUM SERPL-SCNC: 4.2 MMOL/L — SIGNIFICANT CHANGE UP (ref 3.5–5.3)
PROT SERPL-MCNC: 6.2 G/DL — SIGNIFICANT CHANGE UP (ref 6–8.3)
RBC # BLD: 3.68 M/UL — LOW (ref 3.8–5.2)
RBC # FLD: 13.4 % — SIGNIFICANT CHANGE UP (ref 10.3–14.5)
SODIUM SERPL-SCNC: 134 MMOL/L — LOW (ref 135–145)
SODIUM SERPL-SCNC: 134 MMOL/L — LOW (ref 135–145)
WBC # BLD: 6.74 K/UL — SIGNIFICANT CHANGE UP (ref 3.8–10.5)
WBC # FLD AUTO: 6.74 K/UL — SIGNIFICANT CHANGE UP (ref 3.8–10.5)

## 2024-03-22 PROCEDURE — 99233 SBSQ HOSP IP/OBS HIGH 50: CPT

## 2024-03-22 PROCEDURE — 99223 1ST HOSP IP/OBS HIGH 75: CPT | Mod: GC

## 2024-03-22 PROCEDURE — 99232 SBSQ HOSP IP/OBS MODERATE 35: CPT | Mod: GC

## 2024-03-22 PROCEDURE — 99232 SBSQ HOSP IP/OBS MODERATE 35: CPT

## 2024-03-22 RX ORDER — IPRATROPIUM/ALBUTEROL SULFATE 18-103MCG
3 AEROSOL WITH ADAPTER (GRAM) INHALATION EVERY 6 HOURS
Refills: 0 | Status: DISCONTINUED | OUTPATIENT
Start: 2024-03-22 | End: 2024-03-26

## 2024-03-22 RX ORDER — HEPARIN SODIUM 5000 [USP'U]/ML
4000 INJECTION INTRAVENOUS; SUBCUTANEOUS EVERY 6 HOURS
Refills: 0 | Status: DISCONTINUED | OUTPATIENT
Start: 2024-03-22 | End: 2024-03-23

## 2024-03-22 RX ORDER — NALOXONE HYDROCHLORIDE 4 MG/.1ML
0.1 SPRAY NASAL
Refills: 0 | Status: DISCONTINUED | OUTPATIENT
Start: 2024-03-22 | End: 2024-03-26

## 2024-03-22 RX ORDER — BLEOMYCIN SULFATE 30 UNIT
60 VIAL (EA) INJECTION ONCE
Refills: 0 | Status: COMPLETED | OUTPATIENT
Start: 2024-03-22 | End: 2024-03-22

## 2024-03-22 RX ORDER — HEPARIN SODIUM 5000 [USP'U]/ML
1300 INJECTION INTRAVENOUS; SUBCUTANEOUS
Qty: 25000 | Refills: 0 | Status: DISCONTINUED | OUTPATIENT
Start: 2024-03-22 | End: 2024-03-23

## 2024-03-22 RX ORDER — OXYCODONE HYDROCHLORIDE 5 MG/1
20 TABLET ORAL
Refills: 0 | Status: DISCONTINUED | OUTPATIENT
Start: 2024-03-22 | End: 2024-03-26

## 2024-03-22 RX ORDER — HEPARIN SODIUM 5000 [USP'U]/ML
2000 INJECTION INTRAVENOUS; SUBCUTANEOUS EVERY 6 HOURS
Refills: 0 | Status: DISCONTINUED | OUTPATIENT
Start: 2024-03-22 | End: 2024-03-23

## 2024-03-22 RX ORDER — OXYCODONE HYDROCHLORIDE 5 MG/1
10 TABLET ORAL EVERY 4 HOURS
Refills: 0 | Status: DISCONTINUED | OUTPATIENT
Start: 2024-03-22 | End: 2024-03-26

## 2024-03-22 RX ORDER — SODIUM CHLORIDE 9 MG/ML
2 INJECTION INTRAMUSCULAR; INTRAVENOUS; SUBCUTANEOUS
Refills: 0 | Status: DISCONTINUED | OUTPATIENT
Start: 2024-03-22 | End: 2024-03-25

## 2024-03-22 RX ORDER — HYDROMORPHONE HYDROCHLORIDE 2 MG/ML
1 INJECTION INTRAMUSCULAR; INTRAVENOUS; SUBCUTANEOUS
Refills: 0 | Status: DISCONTINUED | OUTPATIENT
Start: 2024-03-22 | End: 2024-03-26

## 2024-03-22 RX ORDER — SENNA PLUS 8.6 MG/1
2 TABLET ORAL AT BEDTIME
Refills: 0 | Status: DISCONTINUED | OUTPATIENT
Start: 2024-03-22 | End: 2024-03-26

## 2024-03-22 RX ORDER — HEPARIN SODIUM 5000 [USP'U]/ML
4000 INJECTION INTRAVENOUS; SUBCUTANEOUS ONCE
Refills: 0 | Status: DISCONTINUED | OUTPATIENT
Start: 2024-03-22 | End: 2024-03-23

## 2024-03-22 RX ADMIN — SODIUM CHLORIDE 1 GRAM(S): 9 INJECTION INTRAMUSCULAR; INTRAVENOUS; SUBCUTANEOUS at 05:33

## 2024-03-22 RX ADMIN — HYDROMORPHONE HYDROCHLORIDE 0.5 MILLIGRAM(S): 2 INJECTION INTRAMUSCULAR; INTRAVENOUS; SUBCUTANEOUS at 13:50

## 2024-03-22 RX ADMIN — OXYCODONE HYDROCHLORIDE 20 MILLIGRAM(S): 5 TABLET ORAL at 14:00

## 2024-03-22 RX ADMIN — HEPARIN SODIUM 1400 UNIT(S)/HR: 5000 INJECTION INTRAVENOUS; SUBCUTANEOUS at 01:54

## 2024-03-22 RX ADMIN — OXYCODONE HYDROCHLORIDE 10 MILLIGRAM(S): 5 TABLET ORAL at 10:49

## 2024-03-22 RX ADMIN — Medication 25 MILLIGRAM(S): at 05:33

## 2024-03-22 RX ADMIN — Medication 100 MILLIGRAM(S): at 17:42

## 2024-03-22 RX ADMIN — PANTOPRAZOLE SODIUM 40 MILLIGRAM(S): 20 TABLET, DELAYED RELEASE ORAL at 05:33

## 2024-03-22 RX ADMIN — SENNA PLUS 2 TABLET(S): 8.6 TABLET ORAL at 21:33

## 2024-03-22 RX ADMIN — SODIUM CHLORIDE 1 GRAM(S): 9 INJECTION INTRAMUSCULAR; INTRAVENOUS; SUBCUTANEOUS at 13:22

## 2024-03-22 RX ADMIN — OXYCODONE HYDROCHLORIDE 10 MILLIGRAM(S): 5 TABLET ORAL at 11:38

## 2024-03-22 RX ADMIN — OXYCODONE HYDROCHLORIDE 10 MILLIGRAM(S): 5 TABLET ORAL at 04:22

## 2024-03-22 RX ADMIN — HEPARIN SODIUM 0 UNIT(S)/HR: 5000 INJECTION INTRAVENOUS; SUBCUTANEOUS at 11:22

## 2024-03-22 RX ADMIN — OXYCODONE HYDROCHLORIDE 20 MILLIGRAM(S): 5 TABLET ORAL at 13:59

## 2024-03-22 RX ADMIN — Medication 1 PATCH: at 13:23

## 2024-03-22 RX ADMIN — OXYCODONE HYDROCHLORIDE 20 MILLIGRAM(S): 5 TABLET ORAL at 21:33

## 2024-03-22 RX ADMIN — Medication 1 PATCH: at 07:45

## 2024-03-22 RX ADMIN — SODIUM CHLORIDE 2 GRAM(S): 9 INJECTION INTRAMUSCULAR; INTRAVENOUS; SUBCUTANEOUS at 17:41

## 2024-03-22 RX ADMIN — Medication 1 PATCH: at 12:00

## 2024-03-22 RX ADMIN — HYDROMORPHONE HYDROCHLORIDE 0.5 MILLIGRAM(S): 2 INJECTION INTRAMUSCULAR; INTRAVENOUS; SUBCUTANEOUS at 13:17

## 2024-03-22 RX ADMIN — HEPARIN SODIUM 4000 UNIT(S): 5000 INJECTION INTRAVENOUS; SUBCUTANEOUS at 01:58

## 2024-03-22 RX ADMIN — Medication 25 MILLIGRAM(S): at 17:29

## 2024-03-22 RX ADMIN — Medication 100 MILLIGRAM(S): at 19:03

## 2024-03-22 RX ADMIN — HEPARIN SODIUM 1300 UNIT(S)/HR: 5000 INJECTION INTRAVENOUS; SUBCUTANEOUS at 12:26

## 2024-03-22 RX ADMIN — Medication 60 UNIT(S): at 13:49

## 2024-03-22 RX ADMIN — CEFTRIAXONE 100 MILLIGRAM(S): 500 INJECTION, POWDER, FOR SOLUTION INTRAMUSCULAR; INTRAVENOUS at 17:42

## 2024-03-22 RX ADMIN — Medication 100 MILLIGRAM(S): at 05:32

## 2024-03-22 NOTE — PROGRESS NOTE ADULT - SUBJECTIVE AND OBJECTIVE BOX
[facility] DIVISION OF KIDNEY DISEASES AND HYPERTENSION -- FOLLOW UP NOTE  --------------------------------------------------------------------------------  Chief Complaint:    24 hour events/subjective:        PAST HISTORY  --------------------------------------------------------------------------------  No significant changes to PMH, PSH, FHx, SHx, unless otherwise noted    ALLERGIES & MEDICATIONS  --------------------------------------------------------------------------------  Allergies    Cipro (Headache; Vomiting; Rash)  penicillin (Headache; Vomiting; Rash)  erythromycin (Headache; Vomiting; Rash)    Intolerances      Standing Inpatient Medications  bleomycin PF IntraPleural (Non - oncologic) 60 Unit(s) IntraPleural. once  cefTRIAXone   IVPB 1000 milliGRAM(s) IV Intermittent every 24 hours  heparin  Infusion.  Unit(s)/Hr IV Continuous <Continuous>  influenza   Vaccine 0.5 milliLiter(s) IntraMuscular once  metoprolol tartrate 25 milliGRAM(s) Oral two times a day  metroNIDAZOLE  IVPB 500 milliGRAM(s) IV Intermittent every 12 hours  nicotine -  14 mG/24Hr(s) Patch 1 Patch Transdermal daily  pantoprazole    Tablet 40 milliGRAM(s) Oral before breakfast  polyethylene glycol 3350 17 Gram(s) Oral two times a day  sodium chloride 1 Gram(s) Oral three times a day  sodium chloride 2% . 1000 milliLiter(s) IV Continuous <Continuous>    PRN Inpatient Medications  aluminum hydroxide/magnesium hydroxide/simethicone Suspension 30 milliLiter(s) Oral every 6 hours PRN  diazepam    Tablet 5 milliGRAM(s) Oral two times a day PRN  heparin   Injectable 4000 Unit(s) IV Push every 6 hours PRN  heparin   Injectable 2000 Unit(s) IV Push every 6 hours PRN  HYDROmorphone  Injectable 0.5 milliGRAM(s) IV Push every 6 hours PRN  melatonin 3 milliGRAM(s) Oral at bedtime PRN  oxyCODONE    IR 10 milliGRAM(s) Oral every 6 hours PRN      REVIEW OF SYSTEMS  --------------------------------------------------------------------------------  Gen: No weight changes, fatigue, fevers/chills, weakness  Skin: No rashes  Head/Eyes/Ears/Mouth: No headache; Normal hearing; Normal vision w/o blurriness; No sinus pain/discomfort, sore throat  Respiratory: No dyspnea, cough, wheezing, hemoptysis  CV: No chest pain, PND, orthopnea  GI: No abdominal pain, diarrhea, constipation, nausea, vomiting, melena, hematochezia  : No increased frequency, dysuria, hematuria, nocturia  MSK: No joint pain/swelling; no back pain; no edema  Neuro: No dizziness/lightheadedness, weakness, seizures, numbness, tingling  Heme: No easy bruising or bleeding  Endo: No heat/cold intolerance  Psych: No significant nervousness, anxiety, stress, depression    All other systems were reviewed and are negative, except as noted.    VITALS/PHYSICAL EXAM  --------------------------------------------------------------------------------  T(C): 36.2 (03-22-24 @ 11:34), Max: 37.2 (03-22-24 @ 08:44)  HR: 93 (03-22-24 @ 11:34) (88 - 98)  BP: 112/73 (03-22-24 @ 11:34) (99/59 - 123/76)  RR: 18 (03-22-24 @ 11:34) (17 - 18)  SpO2: 91% (03-22-24 @ 11:34) (91% - 98%)  Wt(kg): --        03-21-24 @ 07:01  -  03-22-24 @ 07:00  --------------------------------------------------------  IN: 404 mL / OUT: 40 mL / NET: 364 mL    03-22-24 @ 07:01  -  03-22-24 @ 12:41  --------------------------------------------------------  IN: 100 mL / OUT: 0 mL / NET: 100 mL    LABS/STUDIES  --------------------------------------------------------------------------------              10.9   6.74  >-----------<  414      [03-22-24 @ 10:08]              32.3     134  |  99  |  13  ----------------------------<  120      [03-22-24 @ 08:42]  4.2   |  22  |  0.81        Ca     9.3     [03-22-24 @ 08:42]    TPro  6.2  /  Alb  2.8  /  TBili  0.2  /  DBili  x   /  AST  13  /  ALT  16  /  AlkPhos  98  [03-22-24 @ 08:42]      PTT: >200.0      [03-22-24 @ 10:08]    CK 73      [03-21-24 @ 01:48]    Creatinine Trend:  SCr 0.81 [03-22 @ 08:42]  SCr 0.83 [03-21 @ 13:57]  SCr 0.92 [03-20 @ 17:00]  SCr 0.88 [03-19 @ 16:25]  SCr 0.87 [03-19 @ 07:30]    Urinalysis - [03-22-24 @ 08:42]      Color  / Appearance  / SG  / pH       Gluc 120 / Ketone   / Bili  / Urobili        Blood  / Protein  / Leuk Est  / Nitrite       RBC  / WBC  / Hyaline  / Gran  / Sq Epi  / Non Sq Epi  / Bacteria     Urine Creatinine 74      [03-15-24 @ 15:08]  Urine Protein 99      [03-15-24 @ 15:08]  Urine Sodium 57      [03-15-24 @ 15:08]  Urine Urea Nitrogen 556      [03-15-24 @ 15:08]  Urine Potassium 32      [03-15-24 @ 15:08]  Urine Osmolality 446      [03-15-24 @ 15:08]    TSH 3.46      [03-15-24 @ 15:13]  Lipid: chol 143, , HDL 50, LDL --      [03-12-24 @ 07:01]    HCV 0.09, Nonreact      [03-12-24 @ 07:01]

## 2024-03-22 NOTE — CONSULT NOTE ADULT - SUBJECTIVE AND OBJECTIVE BOX
Date of Service:03-22-24 @ 13:21  HPI:  58F w/Hx of RCC s/p R total nephrectomy, S/p hysterectomy, R-sided glaucoma, Fibromyalgia, Anxiety, GERD, smoker presents due to R breast swelling, redness and pain. Pt was initially evaluated by [place] ED yesterday and was going to be admitted but left AMA. Was prescribed clindamycin for presumed breast cellulitis/mastitis on discharge, but was also told about concerning findings on CT Chest related to possible breast malignancy and metastasis. Pt has not had a mammogram or colonoscopy in years. Reports that she had a lidocaine injection in her R shoulder on 3/8/24 and since then has had worsening swelling, redness and pain in her R breast. Also still has R shoulder pain, which had mild improvement since the injection. Pt consistently takes oxycodone q6h and believes this may have masked her pain earlier. Reports over 40 pound weight loss in the last year and loss of appetite. Has conjunctival injection in R eye which she claims is chronic but she also feels her eyes are more swollen than usual currently. Denies vision changes. Smokes 1ppd for many years. Patient denies fever, chills, chest pain, SOB, headache, dizziness, abd pain, nausea, vomiting, constipation, dysuria. (11 Mar 2024 23:59)    PERTINENT PM/SXH:   Anxiety    Acid reflux disease    Renal cancer, left    Umbilical hernia    Uterine mass    Fibromyalgia    Glaucoma    Herniated cervical disc    FH: kidney cancer    Cancer of kidney      S/P partial hysterectomy    S/P knee surgery    S/P hernia repair    H/O unilateral nephrectomy    Glaucoma following surgery    History of tonsillectomy      FAMILY HISTORY:  Family history of lung cancer (Mother)    Family history of pancreatic cancer (Father)      Family Hx substance abuse [ ]yes [ ]no  ITEMS NOT CHECKED ARE NOT PRESENT    SOCIAL HISTORY:   Significant other/partner[ ]  Children[ ]  Episcopalian/Spirituality:  Substance hx:  [ ]   Tobacco hx:  [ ]   Alcohol hx: [ ]   Home Opioid hx:  [ ] I-Stop Reference No: [id number]    Prescription Information      PDI Filter:    PDI	My Rx	Current Rx	Drug Type	Rx Written	Rx Dispensed	Drug	Quantity	Days Supply	Prescriber Name	Prescriber KVNG #	Payment Method	Dispenser  A	N	Y	O	03/08/2024	03/11/2024	oxycodone hcl (ir) 10 mg tab	120	30	Bakari Morales	[id number]	Medicare	[facility]  A	N	N	B	02/14/2024	02/14/2024	diazepam 5 mg tablet	60	30	Neha Arreaga	[id number]	Medicare	[facility]  A	N	N	O	02/09/2024	02/09/2024	oxycodone hcl (ir) 10 mg tab	120	30	Bakari Morales	[id number]	Medicare	[facility]  A	N	N	B	01/11/2024	01/12/2024	diazepam 5 mg tablet	60	30	Pulatov, Pulat	[id number]	Medicare	[facility]  A	N	N	O	01/08/2024	01/09/2024	oxycodone hcl (ir) 10 mg tab	120	30	Bakari Morales	[id number]	Medicare	[facility]  A	N	N	B	12/12/2023	12/12/2023	diazepam 5 mg tablet	60	30	Pulatov, Pulat	[id number]	[facility]  Living Situation: [ ]Home  [ ]Long term care  [ ]Rehab [ ]Other    ADVANCE DIRECTIVES:    DNR/MOLST  [ ]  Living Will  [ ]   DECISION MAKER(s):  [ ] Health Care Proxy(s)  [ ] Surrogate(s)  [ ] Guardian           Name(s): Phone Number(s):    BASELINE (I)ADL(s) (prior to admission):  [place]: [ ]Total  [ ] Moderate [ ]Dependent    Allergies    Cipro (Headache; Vomiting; Rash)  penicillin (Headache; Vomiting; Rash)  erythromycin (Headache; Vomiting; Rash)    Intolerances    MEDICATIONS  (STANDING):  bleomycin PF IntraPleural (Non - oncologic) 60 Unit(s) IntraPleural. once  cefTRIAXone   IVPB 1000 milliGRAM(s) IV Intermittent every 24 hours  heparin  Infusion.  Unit(s)/Hr (9 mL/Hr) IV Continuous <Continuous>  influenza   Vaccine 0.5 milliLiter(s) IntraMuscular once  metoprolol tartrate 25 milliGRAM(s) Oral two times a day  metroNIDAZOLE  IVPB 500 milliGRAM(s) IV Intermittent every 12 hours  nicotine -  14 mG/24Hr(s) Patch 1 Patch Transdermal daily  pantoprazole    Tablet 40 milliGRAM(s) Oral before breakfast  polyethylene glycol 3350 17 Gram(s) Oral two times a day  sodium chloride 1 Gram(s) Oral three times a day  sodium chloride 2% . 1000 milliLiter(s) (40 mL/Hr) IV Continuous <Continuous>    MEDICATIONS  (PRN):  aluminum hydroxide/magnesium hydroxide/simethicone Suspension 30 milliLiter(s) Oral every 6 hours PRN Dyspepsia  diazepam    Tablet 5 milliGRAM(s) Oral two times a day PRN for anxiety  heparin   Injectable 4000 Unit(s) IV Push every 6 hours PRN For aPTT less than 40  heparin   Injectable 2000 Unit(s) IV Push every 6 hours PRN For aPTT between 40 - 57  HYDROmorphone  Injectable 0.5 milliGRAM(s) IV Push every 6 hours PRN breakthrough pain  melatonin 3 milliGRAM(s) Oral at bedtime PRN Insomnia  oxyCODONE    IR 10 milliGRAM(s) Oral every 6 hours PRN for severe pain    PRESENT SYMPTOMS: [ ]Unable to self-report  [ ] CPOT [ ] PAINADs [ ] RDOS  Source if other than patient:  [ ]Family   [ ]Team     Pain: [ ]yes [ ]no  QOL impact -   Location -                    Aggravating factors -  Quality -  Radiation -  Timing-  Severity (0-10 scale):  Minimal acceptable level (0-10 scale):     CPOT:    https://www.[facility].org/getattachment/[email or web address]/Critical-Care-Pain-Observation-Tool-(CPOT)    PAINAD Score: See PAINAD tool and score below     Dyspnea:                           [ ]Mild [ ]Moderate [ ]Severe    RDOS: See RDOS tool and score below   0 to 2  minimal or no respiratory distress   3  mild distress  4 to 6 moderate distress  >7 severe distress      Anxiety:                             [ ]Mild [ ]Moderate [ ]Severe  Fatigue:                             [ ]Mild [ ]Moderate [ ]Severe  Nausea:                             [ ]Mild [ ]Moderate [ ]Severe  Loss of appetite:              [ ]Mild [ ]Moderate [ ]Severe  Constipation:                    [ ]Mild [ ]Moderate [ ]Severe    PCSSQ[Palliative Care Spiritual Screening Question]   Severity (0-10):  Score of 4 or > indicate consideration of Chaplaincy referral.  Chaplaincy Referral: [ ] yes [ ] refused [ ] following [ ] Deferred     Caregiver [place]? : [ ] yes [ ] no [ ] Deferred [ ] Declined             Social work referral [ ] Patient & Family Centered Care Referral [ ]     Anticipatory Grief present?:  [ ] yes [ ] no  [ ] Deferred                  Social work referral [ ] Chaplaincy Referral [ ]    		  Other Symptoms:  [ ]All other review of systems negative     Palliative Performance Status Version 2:   See PPSv2 tool and score below          PHYSICAL EXAM:  Vital Signs Last 24 Hrs  T(C): 36.2 (22 Mar 2024 11:34), Max: 37.2 (22 Mar 2024 08:44)  T(F): 97.2 (22 Mar 2024 11:34), Max: 99 (22 Mar 2024 08:44)  HR: 93 (22 Mar 2024 11:34) (88 - 98)  BP: 112/73 (22 Mar 2024 11:34) (99/59 - 123/76)  BP(mean): --  RR: 18 (22 Mar 2024 11:34) (17 - 18)  SpO2: 91% (22 Mar 2024 11:34) (91% - 98%)    Parameters below as of 22 Mar 2024 11:34  Patient On (Oxygen Delivery Method): nasal cannula  O2 Flow (L/min): 4   I&O's Summary    21 Mar 2024 07:01  -  22 Mar 2024 07:00  --------------------------------------------------------  IN: 404 mL / OUT: 40 mL / NET: 364 mL    22 Mar 2024 07:01  -  22 Mar 2024 13:21  --------------------------------------------------------  IN: 100 mL / OUT: 0 mL / NET: 100 mL      GENERAL: [ ]Cachexia    [ ]Alert  [ ]Oriented x   [ ]Lethargic  [ ]Unarousable  [ ]Verbal  [ ]Non-Verbal  Behavioral:   [ ] Anxiety  [ ] Delirium [ ] Agitation [ ] Other  HEENT:  [ ]Normal   [ ]Dry mouth   [ ]ET Tube/Trach  [ ]Oral lesions  PULMONARY:   [ ]Clear [ ]Tachypnea  [ ]Audible excessive secretions   [ ]Rhonchi        [ ]Right [ ]Left [ ]Bilateral  [ ]Crackles        [ ]Right [ ]Left [ ]Bilateral  [ ]Wheezing     [ ]Right [ ]Left [ ]Bilateral  [ ]Diminished breath sounds [ ]right [ ]left [ ]bilateral  CARDIOVASCULAR:    [ ]Regular [ ]Irregular [ ]Tachy  [ ]Malik [ ]Murmur [ ]Other  GASTROINTESTINAL:  [ ]Soft  [ ]Distended   [ ]+BS  [ ]Non tender [ ]Tender  [ ]Other [ ]PEG [ ]OGT/ NGT  Last BM:  GENITOURINARY:  [ ]Normal [ ] Incontinent   [ ]Oliguria/Anuria   [ ]Che  MUSCULOSKELETAL:   [ ]Normal   [ ]Weakness  [ ]Bed/Wheelchair bound [ ]Edema  NEUROLOGIC:   [ ]No focal deficits  [ ]Cognitive impairment  [ ]Dysphagia [ ]Dysarthria [ ]Paresis [ ]Other   SKIN:   [ ]Normal  [ ]Rash  [ ]Other  [ ]Pressure ulcer(s)       Present on admission [ ]y [ ]n    CRITICAL CARE:  [ ] Shock Present  [ ]Septic [ ]Cardiogenic [ ]Neurologic [ ]Hypovolemic  [ ]  Vasopressors [ ]  Inotropes   [ ]Respiratory failure present [ ]Mechanical ventilation [ ]Non-invasive ventilatory support [ ]High flow    [ ]Acute  [ ]Chronic [ ]Hypoxic  [ ]Hypercarbic [ ]Other  [ ]Other organ failure     LABS:                        10.9   6.74  )-----------( 414      ( 22 Mar 2024 10:08 )             32.3   03-22    134<L>  |  99  |  13  ----------------------------<  120<H>  4.2   |  22  |  0.81    Ca    9.3      22 Mar 2024 08:42    TPro  6.2  /  Alb  2.8<L>  /  TBili  0.2  /  DBili  x   /  AST  13  /  ALT  16  /  AlkPhos  98  03-22  PTT - ( 22 Mar 2024 10:08 )  PTT:>200.0 sec    Urinalysis Basic - ( 22 Mar 2024 08:42 )    Color: x / Appearance: x / SG: x / pH: x  Gluc: 120 mg/dL / Ketone: x  / Bili: x / Urobili: x   Blood: x / Protein: x / Nitrite: x   Leuk Esterase: x / RBC: x / WBC x   Sq Epi: x / Non Sq Epi: x / Bacteria: x      RADIOLOGY & ADDITIONAL STUDIES:    PROTEIN CALORIE MALNUTRITION PRESENT: [ ]mild [ ]moderate [ ]severe [ ]underweight [ ]morbid obesity  https://www.andeal.org/vault/2440/web/files/ONC/Table_Clinical%20Characteristics%20to%20Document%20Malnutrition-White%20JV%20et%20al%202012.pdf    Height (cm): 154.9 (03-19-24 @ 20:45), 154.9 (03-10-24 @ 16:32)  Weight (kg): 49.9 (03-19-24 @ 20:45), 49.9 (03-10-24 @ 16:32)  BMI (kg/m2): 20.8 (03-19-24 @ 20:45), 20.8 (03-10-24 @ 16:32)    [ ]PPSV2 < or = to 30% [ ]significant weight loss  [ ]poor nutritional intake  [ ]anasarca[ ]Artificial Nutrition      Other REFERRALS:  [ ]Hospice  [ ]Child Life  [ ]Social Work  [ ]Case management [ ]Holistic Therapy     Goals of Care Document:

## 2024-03-22 NOTE — CONSULT NOTE ADULT - PROBLEM SELECTOR RECOMMENDATION 2
Patient describes severe pain, sharp / burning / electric in quality, confluent throughout her upper anterior chest wall, right neck, and RUE, constant, severe, worse with movement and palpation.    Patient with 10-year history of opioids for back and neck pain, oxycodone 10 mg tabs x 4 tabs per day. Patient receiving some benefit from oxycodone 10 mg tabs and moderate benefit from dilaudid 0.5 mg IV while inpatient.    Recommendations:  - Start oxycodone ER 20 mg PO TID  - Oxycodone 10 mg PO q4h PRN for moderate pain  - Dilaudid 1 mg IV q3h PRN for severe pain  - Narcan ordered  - Bowel regimen while on opioids, below Continue home diazepam 5 mg PO BID PRN for anxiety

## 2024-03-22 NOTE — CONSULT NOTE ADULT - PROBLEM SELECTOR RECOMMENDATION 4
Continue home diazepam 5 mg PO BID PRN for anxiety Bowel regimen while on opioids:  - Miralax BID  - Senna 2 tabs nightly

## 2024-03-22 NOTE — PROGRESS NOTE ADULT - PROBLEM SELECTOR PLAN 6
- serum osm 265, urine osm wnl, urine sodium 25  - likely SIADH  - Pt euvolemic on exam  - heparin gtt fluid from D5 to NS  - salt tabs increased to 2gm bid   - received hypertonic saline 3/19 and 3/21  - continue to monitor na

## 2024-03-22 NOTE — PROGRESS NOTE ADULT - SUBJECTIVE AND OBJECTIVE BOX
Subjective " I have pain""    VITAL SIGNS    Telemetry:   afib    Vital Signs Last 24 Hrs  T(C): 36.2 (24 @ 11:34), Max: 37.2 (24 @ 08:44)  T(F): 97.2 (24 @ 11:34), Max: 99 (24 @ 08:44)  HR: 93 (24 @ 11:34) (88 - 98)  BP: 112/73 (24 @ 11:34) (99/59 - 123/76)  RR: 18 (24 @ 11:34) (17 - 18)  SpO2: 91% (24 @ 11:34) (91% - 98%)            @ 07:01  -   @ 07:00  --------------------------------------------------------  IN: 404 mL / OUT: 40 mL / NET: 364 mL     @ 07:01  -   @ 13:39  --------------------------------------------------------  IN: 100 mL / OUT: 0 mL / NET: 100 mL    Daily Weight in k (22 Mar 2024 04:33)                        10.9   6.74  )-----------( 414      ( 22 Mar 2024 10:08 )             32.3           134<L>  |  99  |  13  ----------------------------<  120<H>  4.2   |  22  |  0.81    Ca    9.3      22 Mar 2024 08:42    TPro  6.2  /  Alb  2.8<L>  /  TBili  0.2  /  DBili  x   /  AST  13  /  ALT  16  /  AlkPhos  98      MEDICATIONS  (STANDING):  bleomycin PF IntraPleural (Non - oncologic) 60 Unit(s) IntraPleural. once  cefTRIAXone   IVPB 1000 milliGRAM(s) IV Intermittent every 24 hours  heparin  Infusion.  Unit(s)/Hr (9 mL/Hr) IV Continuous <Continuous>  influenza   Vaccine 0.5 milliLiter(s) IntraMuscular once  metoprolol tartrate 25 milliGRAM(s) Oral two times a day  metroNIDAZOLE  IVPB 500 milliGRAM(s) IV Intermittent every 12 hours  nicotine -  14 mG/24Hr(s) Patch 1 Patch Transdermal daily  oxyCODONE  ER Tablet 20 milliGRAM(s) Oral <User Schedule>  pantoprazole    Tablet 40 milliGRAM(s) Oral before breakfast  polyethylene glycol 3350 17 Gram(s) Oral two times a day  senna 2 Tablet(s) Oral at bedtime  sodium chloride 1 Gram(s) Oral three times a day  sodium chloride 2% . 1000 milliLiter(s) (40 mL/Hr) IV Continuous <Continuous>    MEDICATIONS  (PRN):  aluminum hydroxide/magnesium hydroxide/simethicone Suspension 30 milliLiter(s) Oral every 6 hours PRN Dyspepsia  diazepam    Tablet 5 milliGRAM(s) Oral two times a day PRN for anxiety  heparin   Injectable 4000 Unit(s) IV Push every 6 hours PRN For aPTT less than 40  heparin   Injectable 2000 Unit(s) IV Push every 6 hours PRN For aPTT between 40 - 57  HYDROmorphone  Injectable 1 milliGRAM(s) IV Push every 3 hours PRN Severe Pain (7 - 10)  melatonin 3 milliGRAM(s) Oral at bedtime PRN Insomnia  naloxone Injectable 0.1 milliGRAM(s) IV Push every 3 minutes PRN Obtundation, respiratory suppression  oxyCODONE    IR 10 milliGRAM(s) Oral every 4 hours PRN Moderate Pain (4 - 6)                  Drains:     Mediastinal drain 0/40       PHYSICAL EXAM    Neurology: alert and oriented x 3, nonfocal, no gross deficits    CV : s1    SUB Xiphoid Wound   mediastinal drain- pleurvac 40 c    Lungs: b/l breath sounds on 4l nc    Abdomen: soft, nontender, nondistended, positive bowel sounds, + Bm     :   voids            Extremities: warm well perfused equal strength thought   B/lle + DP andrew edema                                      Discussed with Dr Ruiz  at AM rounds.

## 2024-03-22 NOTE — CONSULT NOTE ADULT - PROBLEM SELECTOR RECOMMENDATION 5
See [facility] documentation 3/22. Patient elected her boyfriend, Saeid Peña [phone], as her HCP. She elects DNR/DNI code status. HCP paperwork and MOLST form completed and entered into the chart.

## 2024-03-22 NOTE — PROGRESS NOTE ADULT - PROBLEM SELECTOR PLAN 3
- pericardial effusion on CT chest   - echo with moderate pericardial effusion and collapse of the RA  - s/p pericardial window 3/19  - chest tube drainage care per thoracic sx  - repeat echo with resolution of pericardial effusion

## 2024-03-22 NOTE — PROGRESS NOTE ADULT - PROBLEM SELECTOR PLAN 1
3/19 underwent Subxiphoid pericardial window with Dr Ruiz  daily labs  pericardial drain  to water seal document output q shift   echo prior to drain removal  follow up on cytology  3/22 bleomycin x1@ given @ 1345  OOB to chair ambulate    care as primary team  Plan d/w Dr Ruiz

## 2024-03-22 NOTE — CONSULT NOTE ADULT - CONVERSATION DETAILS
Conversation at the bedside 3/22 PM with the patient and members of the Palliative Care team. 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 She understands her prognosis is limited and wishes to pursue DMT for her cancer. She elected her boyfriend, Saeid Peña [phone], as her HCP. She elected DNR/DNI code status. HCP paperwork and MOLST completed and entered into the chart. All questions and concerns addressed. Emotional support provided.

## 2024-03-22 NOTE — PROGRESS NOTE ADULT - ASSESSMENT
58-yo F w/ PMH of RCC s/p R total nephrectomy, s/p hysterectomy, fibromyalgia, and concern for breast malignancy currently undergoing workup, admitted with R breast swelling, erythema, and pain. ID was consulted for extensive cellulitis/myositis along the R anterior lateral neck and R upper chest wall. 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 LN biopsy 3/14. CT chest w/ mediastinal and supraclavicular lymphadenopathy w/ necrosis. 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    #Neck edema  #Neck pain  #Abnormal CT scan  #Myositis  #Thrombophlebitis  - VSS. No leukocytosis. Significant R neck lymphadenopathy, s/p biopsy. F/u path  - No sore throat or dysphagia. No odynophagia. No fever or rigors.  - Unclear if patient's presentation represents infectious disease process. Labs and physical exam do not support septic thrombophlebitis.  - BCx NGTD. Quantiferon neg.  - CT chest reviewed. Necrotic lymph nodes noted. Path from neck concern for malignancy.  - On ceftriaxone 1g IV Q12H and metronidazole 500 mg PO Q12H. Can add on doxycycline 100 mg PO Q12H empirically. Favor discontinuing after 5 days.  - ENT and Onc f/u. CT surgery follow-up.    Plan discussed with primary team ACP.  Thank you for this consult. Inpatient ID team will peripherally follow.    Ewdar Petersen MD, PhD  Attending Physician  Division of Infectious Diseases  Department of Medicine    Please contact through MS Teams message.  Office: [phone] (after 5 PM or weekend) .       58-yo F w/ PMH of RCC s/p R total nephrectomy, s/p hysterectomy, fibromyalgia, and concern for breast malignancy currently undergoing workup, admitted with R breast swelling, erythema, and pain. ID was consulted for extensive cellulitis/myositis along the R anterior lateral neck and R upper chest wall. 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 LN biopsy 3/14. CT chest w/ mediastinal and supraclavicular lymphadenopathy w/ necrosis. 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    #Neck edema  #Neck pain  #Abnormal CT scan  #Myositis  #Thrombophlebitis  - VSS. No leukocytosis. Significant R neck lymphadenopathy, s/p biopsy. F/u path  - No sore throat or dysphagia. No odynophagia. No fever or rigors.  - Unclear if patient's presentation represents infectious disease process. Labs and physical exam do not support septic thrombophlebitis.  - BCx NGTD. Quantiferon neg.  - CT chest reviewed. Necrotic lymph nodes noted. Path from neck concern for malignancy. Breast erythema and pain could be from inflammation from mass burden.  - On ceftriaxone 1g IV Q12H and metronidazole 500 mg PO Q12H. Can add on doxycycline 100 mg PO Q12H empirically. Favor discontinuing if no improvement is seen.  - ENT and Onc f/u. CT surgery follow-up.    Plan discussed with primary team ACP.  Thank you for this consult. Inpatient ID team will peripherally follow.    Edawr Petersen MD, PhD  Attending Physician  Division of Infectious Diseases  Department of Medicine    Please contact through MS Teams message.  Office: [phone] (after 5 PM or weekend) .

## 2024-03-22 NOTE — PROGRESS NOTE ADULT - PROBLEM SELECTOR PLAN 1
- h/o RCC and had nephrectomy by Urology by Dr Jacky Adkins at [facility], also had lymph node dissection 2 years ago  - Now with axillary, subclavicular and mediastinal lymphadenopathy, pulmonary nodules and inflammatory changes of breast seen on CT with possible effect on IVC  - CT neck with large supraclavicular/mediastinal mass lesion with mass effect and complete occlusion of the right IJ; associated surrounding inflammatory changes in the deep neck, concerning for infectious/inflammatory thrombophlebitis  - S/p supraclavicular lymph node biopsy by IR on 3/14  - Path positive for malignancy of GI origin    - D/w GI follow up MRI abdomen and MRCP  - D/w oncology, to continue to follow  - Pallative follow up for pain control

## 2024-03-22 NOTE — PROGRESS NOTE ADULT - SUBJECTIVE AND OBJECTIVE BOX
DATE OF SERVICE: 03-22-24 @ 13:23    Patient is a 58y old  Female who presents with a chief complaint of Mastitis, breast malignancy (22 Mar 2024 12:41)      INTERVAL HISTORY: feels ok. c/o incisional discomfort    REVIEW OF SYSTEMS:  CONSTITUTIONAL: No weakness  EYES/ENT: No visual changes;  No throat pain   NECK: No pain or stiffness  RESPIRATORY: No cough, wheezing; No shortness of breath  CARDIOVASCULAR: No chest pain or palpitations  GASTROINTESTINAL: No abdominal  pain. No nausea, vomiting, or hematemesis  GENITOURINARY: No dysuria, frequency or hematuria  NEUROLOGICAL: No stroke like symptoms  SKIN: No rashes    TELEMETRY Personally reviewed: SR   	  MEDICATIONS:  metoprolol tartrate 25 milliGRAM(s) Oral two times a day        PHYSICAL EXAM:  T(C): 36.2 (03-22-24 @ 11:34), Max: 37.2 (03-22-24 @ 08:44)  HR: 93 (03-22-24 @ 11:34) (88 - 98)  BP: 112/73 (03-22-24 @ 11:34) (99/59 - 123/76)  RR: 18 (03-22-24 @ 11:34) (17 - 18)  SpO2: 91% (03-22-24 @ 11:34) (91% - 98%)  Wt(kg): --  I&O's Summary    21 Mar 2024 07:01  -  22 Mar 2024 07:00  --------------------------------------------------------  IN: 404 mL / OUT: 40 mL / NET: 364 mL    22 Mar 2024 07:01  -  22 Mar 2024 13:23  --------------------------------------------------------  IN: 100 mL / OUT: 0 mL / NET: 100 mL          Appearance: In no distress	  HEENT:    PERRL, EOMI	  Cardiovascular:  S1 S2, No JVD  Respiratory: Lungs clear to auscultation	  Gastrointestinal:  Soft, Non-tender, + BS	  Vascularature:  No edema of LE  Psychiatric: Appropriate affect   Neuro: no acute focal deficits                               10.9   6.74  )-----------( 414      ( 22 Mar 2024 10:08 )             32.3     03-22    134<L>  |  99  |  13  ----------------------------<  120<H>  4.2   |  22  |  0.81    Ca    9.3      22 Mar 2024 08:42    TPro  6.2  /  Alb  2.8<L>  /  TBili  0.2  /  DBili  x   /  AST  13  /  ALT  16  /  AlkPhos  98  03-22        Labs personally reviewed      ASSESSMENT/PLAN: 	  58F w/Hx of RCC s/p R total nephrectomy, S/p hysterectomy, R-sided glaucoma, Fibromyalgia, Anxiety, GERD, smoker presents due to R breast swelling, redness and pain.     Problem/Plan - 1:   ·  Problem: Pericardial effusion.  ·  Plan: - pericardial effusion on CT chest   - TTE 3/18 with Moderate pericardial effusion with echocardiographic evidence of hemodynamic compromise    - Clinically pt is not in tamponade   - Thoracic surgery consulted for pericardial window given likely malignant effusion  ----s/p pericardial window 3/19  - 3/21 now with ST paroxysmal with AF with RVR and reports of chest pain--> Will get Limited TTE to assess pericardial effusion although drain output adequate   - limited TTE: left ventricular systolic function appears grossly normal. Thickened pericardium. Bilateral pleural effusion noted. No pericardial effusion seen.  Compared to the TTE 3/18/2024, the pericardial effusion is no longer present.    Problem/Plan - 2:  ·  Problem: Internal jugular vein thrombosis, right.   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    Problem/Plan - 3:  ·  Problem: Smoker.   ·  Plan: - 1 ppd smoker  - c/w Nicotine patch.    Problem/Plan - 4:  ·  Problem: Prophylactic measure.   ·  Plan: dispo- home.    Problem/Plan - 5:  ·  Problem: Sinus Tachycardia  - Likely reactive   - D5 as per renal    Problem/Plan - 6:  ·  Problem: New Onset Atrial Fibrillation  - Approx 1:42pm--> episode of AF with RVR which lasted 3 minutes  - c/w heparin gtt  - 3/21 now with ST paroxysmal with AF with RVR and reports of chest pain  - Start lopressor 25mg PO BID with hold parameters  - Limited TTE as noted above            Iolani Behrbom, AG-NP   Lb Mata DO [facility]  Cardiovascular Medicine  [address] [place] [address], [address]  Available through call or text on [employer] TEAM[employer]  Office: [phone]

## 2024-03-22 NOTE — PROGRESS NOTE ADULT - SUBJECTIVE AND OBJECTIVE BOX
Patient is a 58y old  Female who presents with a chief complaint of Mastitis, breast malignancy (22 Mar 2024 13:38)      SUBJECTIVE / OVERNIGHT EVENTS: sinus 80s on tele, pt still with right sided chest and back pain, breathing is ok     MEDICATIONS  (STANDING):  cefTRIAXone   IVPB 1000 milliGRAM(s) IV Intermittent every 24 hours  heparin  Infusion.  Unit(s)/Hr (9 mL/Hr) IV Continuous <Continuous>  influenza   Vaccine 0.5 milliLiter(s) IntraMuscular once  metoprolol tartrate 25 milliGRAM(s) Oral two times a day  metroNIDAZOLE  IVPB 500 milliGRAM(s) IV Intermittent every 12 hours  nicotine -  14 mG/24Hr(s) Patch 1 Patch Transdermal daily  oxyCODONE  ER Tablet 20 milliGRAM(s) Oral <User Schedule>  pantoprazole    Tablet 40 milliGRAM(s) Oral before breakfast  polyethylene glycol 3350 17 Gram(s) Oral two times a day  senna 2 Tablet(s) Oral at bedtime  sodium chloride 2 Gram(s) Oral two times a day    MEDICATIONS  (PRN):  aluminum hydroxide/magnesium hydroxide/simethicone Suspension 30 milliLiter(s) Oral every 6 hours PRN Dyspepsia  diazepam    Tablet 5 milliGRAM(s) Oral two times a day PRN for anxiety  heparin   Injectable 4000 Unit(s) IV Push every 6 hours PRN For aPTT less than 40  heparin   Injectable 2000 Unit(s) IV Push every 6 hours PRN For aPTT between 40 - 57  HYDROmorphone  Injectable 1 milliGRAM(s) IV Push every 3 hours PRN Severe Pain (7 - 10)  melatonin 3 milliGRAM(s) Oral at bedtime PRN Insomnia  naloxone Injectable 0.1 milliGRAM(s) IV Push every 3 minutes PRN Obtundation, respiratory suppression  oxyCODONE    IR 10 milliGRAM(s) Oral every 4 hours PRN Moderate Pain (4 - 6)        CAPILLARY BLOOD GLUCOSE        I&O's Summary    21 Mar 2024 07:01  -  22 Mar 2024 07:00  --------------------------------------------------------  IN: 404 mL / OUT: 40 mL / NET: 364 mL    22 Mar 2024 07:01  -  22 Mar 2024 14:34  --------------------------------------------------------  IN: 200 mL / OUT: 0 mL / NET: 200 mL        PHYSICAL EXAM:  GENERAL: NAD, well-developed  HEAD:  Atraumatic, Normocephalic  EYES:  conjunctiva and sclera clear  NECK: Supple, No JVD  CHEST/LUNG: decreased breath sounds right base, + right chest tube ; No wheeze, right breast erythema   HEART: Regular rate and rhythm; No murmurs, rubs, or gallops  ABDOMEN: Soft, Nontender, Nondistended; Bowel sounds present  EXTREMITIES:  2+ Peripheral Pulses, No clubbing, cyanosis, or edema  PSYCH: AAOx3  LABS:                        10.9   6.74  )-----------( 414      ( 22 Mar 2024 10:08 )             32.3     03-22    134<L>  |  99  |  13  ----------------------------<  120<H>  4.2   |  22  |  0.81    Ca    9.3      22 Mar 2024 08:42    TPro  6.2  /  Alb  2.8<L>  /  TBili  0.2  /  DBili  x   /  AST  13  /  ALT  16  /  AlkPhos  98  03-22    PTT - ( 22 Mar 2024 10:08 )  PTT:>200.0 sec  CARDIAC MARKERS ( 21 Mar 2024 01:48 )  x     / x     / 73 U/L / x     / 2.3 ng/mL      Urinalysis Basic - ( 22 Mar 2024 08:42 )    Color: x / Appearance: x / SG: x / pH: x  Gluc: 120 mg/dL / Ketone: x  / Bili: x / Urobili: x   Blood: x / Protein: x / Nitrite: x   Leuk Esterase: x / RBC: x / WBC x   Sq Epi: x / Non Sq Epi: x / Bacteria: x        RADIOLOGY & ADDITIONAL TESTS:    Imaging Personally Reviewed:    Consultant(s) Notes Reviewed:      Care Discussed with Consultants/Other Providers:

## 2024-03-22 NOTE — CONSULT NOTE ADULT - PROBLEM SELECTOR RECOMMENDATION 6
Code Status: DNR/DNI    The patient has broad decision-making capacity on this encounter. She has elected her boyfriend, Saeid Peña [phone], as her HCP should she lose decision-making capacity.     Palliative Care will continue to follow. Do not hesitate to reach out with questions or for uncontrolled symptoms. 24-hour pager: [phone].    Note is not complete until co-signed by an Attending.    Jose David Del Real MD  Fellow in Hospice & Palliative Medicine  [facility]

## 2024-03-22 NOTE — PROGRESS NOTE ADULT - PROBLEM SELECTOR PLAN 7
- Home Oxycodone (Confirmed via [employer])  - Per pallative:  oxycodone 10mg q4prn   oxycodone 20mg 6am, 2pm, 10pm   dilaudid 1mg q3prn

## 2024-03-22 NOTE — CONSULT NOTE ADULT - PROBLEM SELECTOR RECOMMENDATION 9
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 Workup and management per Medical Oncology and GI. Patient describes severe pain, sharp / burning / electric in quality, confluent throughout her upper anterior chest wall, right neck, and RUE, constant, severe, worse with movement and palpation.    Patient with 10-year history of opioids for back and neck pain, oxycodone 10 mg tabs x 4 tabs per day. Patient receiving some benefit from oxycodone 10 mg tabs and moderate benefit from dilaudid 0.5 mg IV while inpatient.    Recommendations:  - Start oxycodone ER 20 mg PO TID  - Oxycodone 10 mg PO q4h PRN for moderate pain  - Dilaudid 1 mg IV q3h PRN for severe pain  - Narcan ordered  - Bowel regimen while on opioids, below

## 2024-03-22 NOTE — PROGRESS NOTE ADULT - ASSESSMENT
this is a 58 F hx of   RCC total nephrectomy, S/p hysterectomy, R-sided glaucoma, Fibromyalgia, Anxiety, GERD, smoker, COPD, presents due to R breast swelling, redness and pain. Pt was initially evaluated by [place] [facility] yesterday and was going to be admitted but left AMA. Was prescribed clindamycin for presumed breast cellulitis/mastitis on discharge, but was also told about concerning findings on CT Chest related to possible breast malignancy and metastasis. Pt has not had a mammogram or colonoscopy in years. Reports that she had a lidocaine injection in her R shoulder on 3/8/24 and since then has had worsening swelling, redness and pain in her R breast. Also still has R shoulder pain, which had mild improvement since the injection. Pt consistently takes oxycodone q6h and believes this may have masked her pain earlier. Reports over 40 pound weight loss in the last year and loss of appetite. Has conjunctival injection in R eye which she claims is chronic but she also feels her eyes are more swollen than usual currently. Denies vision changes. Smokes 1ppd for many years. Patient denies fever, chills, chest pain, SOB, headache, dizziness, abd pain, nausea, vomiting, constipation, dysuria.     3/19 underwent Subxiphoid pericardial window. Portion of xiphoid resected and pericardium incised. Fluid drained and Regan drain placed. Approx 320cc of serous pericardial fluid drained initially.  3/20 VSS OOB to chair  pericardial drain  145 cc overnight on 4lnc .  3/21 VSS pericardial drain -240 cc  3/22 VSS pericardial 40 cc bleomycin x1@ given @ 1345

## 2024-03-22 NOTE — CONSULT NOTE ADULT - SUBJECTIVE AND OBJECTIVE BOX
HPI:  58F w/Hx of RCC s/p R total nephrectomy, S/p hysterectomy, R-sided glaucoma, Fibromyalgia, Anxiety, GERD, smoker presents due to R breast swelling, redness and pain. Pt was initially evaluated by [facility] yesterday and was going to be admitted but left AMA. Was prescribed clindamycin for presumed breast cellulitis/mastitis on discharge, but was also told about concerning findings on CT Chest related to possible breast malignancy and metastasis. Pt has not had a mammogram or colonoscopy in years. Reports that she had a lidocaine injection in her R shoulder on 3/8/24 and since then has had worsening swelling, redness and pain in her R breast. Also still has R shoulder pain, which had mild improvement since the injection. Pt consistently takes oxycodone q6h and believes this may have masked her pain earlier. Reports over 40 pound weight loss in the last year and loss of appetite. Has conjunctival injection in R eye which she claims is chronic but she also feels her eyes are more swollen than usual currently. Denies vision changes. Smokes 1ppd for many years. Patient denies fever, chills, chest pain, SOB, headache, dizziness, abd pain, nausea, vomiting, constipation, dysuria. (11 Mar 2024 23:59)    PERTINENT PM/SXH:   Anxiety    Acid reflux disease    Renal cancer, left    Umbilical hernia    Uterine mass    Fibromyalgia    Glaucoma    Herniated cervical disc    FH: kidney cancer    Cancer of kidney      S/P partial hysterectomy    S/P knee surgery    S/P hernia repair    H/O unilateral nephrectomy    Glaucoma following surgery    History of tonsillectomy      FAMILY HISTORY:  Family history of lung cancer (Mother)    Family history of pancreatic cancer (Father)      Family Hx substance abuse [ ]yes [ ]no  ITEMS NOT CHECKED ARE NOT PRESENT    SOCIAL HISTORY:   Significant other/partner[x ]  Children[ ]  Buddhist/Spirituality:  Substance hx:  [ ]   Tobacco hx:  [ ]   Alcohol hx: [ ]   Home Opioid hx:  [ ] I-Stop Reference No:  Living Situation: [ ]Home  [ ]Long term care  [ ]Rehab [ ]Other    ADVANCE DIRECTIVES:    DNR/MOLST  [ ]  Living Will  [ ]   DECISION MAKER(s):  [ ] Health Care Proxy(s)  [ ] Surrogate(s)  [ ] Guardian           Name(s): Phone Number(s):    BASELINE (I)ADL(s) (prior to admission):  [place]: [ ]Total  [ ] Moderate [ ]Dependent    Allergies    Cipro (Headache; Vomiting; Rash)  penicillin (Headache; Vomiting; Rash)  erythromycin (Headache; Vomiting; Rash)    Intolerances    MEDICATIONS  (STANDING):  cefTRIAXone   IVPB 1000 milliGRAM(s) IV Intermittent every 24 hours  heparin  Infusion.  Unit(s)/Hr (9 mL/Hr) IV Continuous <Continuous>  influenza   Vaccine 0.5 milliLiter(s) IntraMuscular once  metoprolol tartrate 25 milliGRAM(s) Oral two times a day  metroNIDAZOLE  IVPB 500 milliGRAM(s) IV Intermittent every 12 hours  nicotine -  14 mG/24Hr(s) Patch 1 Patch Transdermal daily  oxyCODONE  ER Tablet 20 milliGRAM(s) Oral <User Schedule>  pantoprazole    Tablet 40 milliGRAM(s) Oral before breakfast  polyethylene glycol 3350 17 Gram(s) Oral two times a day  senna 2 Tablet(s) Oral at bedtime  sodium chloride 2 Gram(s) Oral two times a day    MEDICATIONS  (PRN):  albuterol/ipratropium for Nebulization 3 milliLiter(s) Nebulizer every 6 hours PRN Shortness of Breath and/or Wheezing  aluminum hydroxide/magnesium hydroxide/simethicone Suspension 30 milliLiter(s) Oral every 6 hours PRN Dyspepsia  diazepam    Tablet 5 milliGRAM(s) Oral two times a day PRN for anxiety  heparin   Injectable 4000 Unit(s) IV Push every 6 hours PRN For aPTT less than 40  heparin   Injectable 2000 Unit(s) IV Push every 6 hours PRN For aPTT between 40 - 57  HYDROmorphone  Injectable 1 milliGRAM(s) IV Push every 3 hours PRN Severe Pain (7 - 10)  melatonin 3 milliGRAM(s) Oral at bedtime PRN Insomnia  naloxone Injectable 0.1 milliGRAM(s) IV Push every 3 minutes PRN Obtundation, respiratory suppression  oxyCODONE    IR 10 milliGRAM(s) Oral every 4 hours PRN Moderate Pain (4 - 6)    PRESENT SYMPTOMS: [ ]Unable to self-report see CPOT, PAINADs, RDOS  Source if other than patient:  [ ]Family   [ ]Team     Pain: [ ]yes [ ]no  QOL impact -   Location -                    Aggravating factors -  Quality -  Radiation -  Timing-  Severity (0-10 scale):  Minimal acceptable level (0-10 scale):       Dyspnea:                           [ ]Mild [ ]Moderate [ ]Severe  Anxiety:                             [ ]Mild [ ]Moderate [ ]Severe  Fatigue:                             [ ]Mild [ ]Moderate [ ]Severe  Nausea:                             [ ]Mild [ ]Moderate [ ]Severe  Loss of appetite:              [ ]Mild [ ]Moderate [ ]Severe  Constipation:                    [ ]Mild [ ]Moderate [ ]Severe    PCSSQ [Palliative Care Spiritual Screening Question]   Severity (0-10):  Score of 4 or > indicate consideration of Chaplaincy referral.  Chaplaincy Referral: [ ] yes [ ] refused [ ] following    Caregiver [place]? : [ ] yes [ ] no [ ] deferred:  Social work referral [ ] Patient & Family Centered Care Referral [ ]     Anticipatory Grief Present?: [ ] yes [ ] no  [ ] deferred: Social work referral [ ]  Patient & Family Centered Care Referral [ ]       Other Symptoms:  [ ]All other review of systems negative     PHYSICAL EXAM:  Vital Signs Last 24 Hrs  T(C): 36.2 (22 Mar 2024 11:34), Max: 37.2 (22 Mar 2024 08:44)  T(F): 97.2 (22 Mar 2024 11:34), Max: 99 (22 Mar 2024 08:44)  HR: 93 (22 Mar 2024 11:34) (88 - 98)  BP: 112/73 (22 Mar 2024 11:34) (99/59 - 123/76)  BP(mean): --  RR: 18 (22 Mar 2024 11:34) (17 - 18)  SpO2: 91% (22 Mar 2024 11:34) (91% - 98%)    Parameters below as of 22 Mar 2024 11:34  Patient On (Oxygen Delivery Method): nasal cannula  O2 Flow (L/min): 4   I&O's Summary    21 Mar 2024 07:01  -  22 Mar 2024 07:00  --------------------------------------------------------  IN: 404 mL / OUT: 40 mL / NET: 364 mL    22 Mar 2024 07:01  -  22 Mar 2024 15:56  --------------------------------------------------------  IN: 200 mL / OUT: 0 mL / NET: 200 mL      GENERAL: [ ]Cachexia    [ ]Alert  [ ]Oriented x   [ ]Lethargic  [ ]Unarousable  [ ]Verbal  [ ]Non-Verbal  Behavioral:   [ ] Anxiety  [ ] Delirium [ ] Agitation [ ] Other  HEENT:  [ ]Normal   [ ]Dry mouth   [ ]ET Tube/Trach  [ ]Oral lesions  PULMONARY:   [ ]Clear [ ]Tachypnea  [ ]Audible excessive secretions   [ ]Rhonchi        [ ]Right [ ]Left [ ]Bilateral  [ ]Crackles        [ ]Right [ ]Left [ ]Bilateral  [ ]Wheezing     [ ]Right [ ]Left [ ]Bilateral  [ ]Diminished breath sounds [ ]right [ ]left [ ]bilateral  CARDIOVASCULAR:    [ ]Regular [ ]Irregular [ ]Tachy  [ ]Malki [ ]Murmur [ ]Other  GASTROINTESTINAL:  [ ]Soft  [ ]Distended   [ ]+BS  [ ]Non tender [ ]Tender  [ ]Other [ ]PEG [ ]OGT/ NGT  Last BM:  GENITOURINARY:  [ ]Normal [ ] Incontinent   [ ]Oliguria/Anuria   [ ]Che  MUSCULOSKELETAL:   [ ]Normal   [ ]Weakness  [ ]Bed/Wheelchair bound [ ]Edema  NEUROLOGIC:   [ ]No focal deficits  [ ]Cognitive impairment  [ ]Dysphagia [ ]Dysarthria [ ]Paresis [ ]Other   SKIN:   [ ]Normal  [ ]Rash  [ ]Other  [ ]Pressure ulcer(s)       Present on admission [ ]y [ ]n    CRITICAL CARE:  [ ] Shock Present  [ ]Septic [ ]Cardiogenic [ ]Neurologic [ ]Hypovolemic  [ ]  Vasopressors [ ]  Inotropes   [ ]Respiratory failure present [ ]Mechanical ventilation [ ]Non-invasive ventilatory support [ ]High flow    [ ]Acute  [ ]Chronic [ ]Hypoxic  [ ]Hypercarbic [ ]Other  [ ]Other organ failure     LABS:                        10.9   6.74  )-----------( 414      ( 22 Mar 2024 10:08 )             32.3   03-22    134<L>  |  99  |  13  ----------------------------<  120<H>  4.2   |  22  |  0.81    Ca    9.3      22 Mar 2024 08:42    TPro  6.2  /  Alb  2.8<L>  /  TBili  0.2  /  DBili  x   /  AST  13  /  ALT  16  /  AlkPhos  98  03-22  PTT - ( 22 Mar 2024 10:08 )  PTT:>200.0 sec    Urinalysis Basic - ( 22 Mar 2024 08:42 )    Color: x / Appearance: x / SG: x / pH: x  Gluc: 120 mg/dL / Ketone: x  / Bili: x / Urobili: x   Blood: x / Protein: x / Nitrite: x   Leuk Esterase: x / RBC: x / WBC x   Sq Epi: x / Non Sq Epi: x / Bacteria: x      RADIOLOGY & ADDITIONAL STUDIES:    PROTEIN CALORIE MALNUTRITION PRESENT: [ ]mild [ ]moderate [ ]severe [ ]underweight [ ]morbid obesity  https://www.andeal.org/vault/2440/web/files/ONC/Table_Clinical%20Characteristics%20to%20Document%20Malnutrition-White%20JV%20et%20al%202012.pdf    Height (cm): 154.9 (03-19-24 @ 20:45), 154.9 (03-10-24 @ 16:32)  Weight (kg): 49.9 (03-19-24 @ 20:45), 49.9 (03-10-24 @ 16:32)  BMI (kg/m2): 20.8 (03-19-24 @ 20:45), 20.8 (03-10-24 @ 16:32)    [ ]PPSV2 < or = to 30% [ ]significant weight loss  [ ]poor nutritional intake  [ ]anasarca[ ]Artificial Nutrition      Other REFERRALS:  [ ]Hospice  [ ]Child Life  [ ]Social Work  [ ]Case management [ ]Holistic Therapy     Care Coordination Assessment 201 [C. Provider] (03-14-24 @ 10:40)      Palliative Performance Scale:  http://npcrc.org/files/news/palliative_performance_scale_ppsv2.pdf  (Ctrl +  left click to view)  Respiratory Distress Observation Tool:  https://homecareinformation.net/handouts/hen/Respiratory_Distress_Observation_Scale.pdf (Ctrl +  left click to view)  PAINAD Score:  http://geriatrictoolkit.[facility]/cog/painad.pdf (Ctrl +  left click to view)   HPI:  58F w/Hx of RCC s/p R total nephrectomy, S/p hysterectomy, R-sided glaucoma, Fibromyalgia, Anxiety, GERD, smoker presents due to R breast swelling, redness and pain. Pt was initially evaluated by [facility] yesterday and was going to be admitted but left AMA. Was prescribed clindamycin for presumed breast cellulitis/mastitis on discharge, but was also told about concerning findings on CT Chest related to possible breast malignancy and metastasis. Pt has not had a mammogram or colonoscopy in years. Reports that she had a lidocaine injection in her R shoulder on 3/8/24 and since then has had worsening swelling, redness and pain in her R breast. Also still has R shoulder pain, which had mild improvement since the injection. Pt consistently takes oxycodone q6h and believes this may have masked her pain earlier. Reports over 40 pound weight loss in the last year and loss of appetite. Has conjunctival injection in R eye which she claims is chronic but she also feels her eyes are more swollen than usual currently. Denies vision changes. Smokes 1ppd for many years. Patient denies fever, chills, chest pain, SOB, headache, dizziness, abd pain, nausea, vomiting, constipation, dysuria. (11 Mar 2024 23:59)    PERTINENT PM/SXH:   Anxiety    Acid reflux disease    Renal cancer, left    Umbilical hernia    Uterine mass    Fibromyalgia    Glaucoma    Herniated cervical disc    FH: kidney cancer    Cancer of kidney      S/P partial hysterectomy    S/P knee surgery    S/P hernia repair    H/O unilateral nephrectomy    Glaucoma following surgery    History of tonsillectomy      FAMILY HISTORY:  Family history of lung cancer (Mother)    Family history of pancreatic cancer (Father)      Family Hx substance abuse [ ]yes [ ]no  ITEMS NOT CHECKED ARE NOT PRESENT    SOCIAL HISTORY:   Significant other/partner[x ]  Children[ ]  Scientology/Spirituality:  Substance hx:  [ ] - denies  Tobacco hx:  [ ]   Alcohol hx: [ ]   Home Opioid hx:  [x ] I-Stop Reference No: [id number]  Living Situation: [x ]Home  [ ]Long term care  [ ]Rehab [ ]Other  PDI	Current Rx	Drug Type	Rx Written	Rx Dispensed	Drug	Quantity	Days Supply	Prescriber Name	Prescriber KVNG #	Payment Method	Dispenser  A	Y	O	03/08/2024	03/11/2024	oxycodone hcl (ir) 10 mg tab	120	30	Bakari Morales	[id number]	Medicare	[facility]  A	N	B	02/14/2024	02/14/2024	diazepam 5 mg tablet	60	30	Neha Arreaga	[id number]	Medicare	[facility]  A	N	O	02/09/2024	02/09/2024	oxycodone hcl (ir) 10 mg tab	120	30	Replogle, Bakari	[id number]	Medicare	[facility]  A	N	B	01/11/2024	01/12/2024	diazepam 5 mg tablet	60	30	Pulatov, Pulat	[id number]	Medicare	[facility]  A	N	O	01/08/2024	01/09/2024	oxycodone hcl (ir) 10 mg tab	120	30	Replogle, Bakari	[id number]	Medicare	[facility]  A	N	B	12/12/2023	12/12/2023	diazepam 5 mg tablet	60	30	Pulatov, Pulat	[id number]	[facility]  A	N	O	12/11/2023	12/11/2023	oxycodone hcl (ir) 10 mg tab	120	30	Replogle, Bakari	[id number]	Medicare	[facility]  A	N	O	11/13/2023	11/13/2023	oxycodone hcl (ir) 10 mg tab	120	30	José Miguel Caro	[id number]	Medicare	[facility]  A	N	B	10/19/2023	10/19/2023	diazepam 5 mg tablet	60	30	Pulatov, Pulat	[id number]	Cash	[facility]  A	N	O	10/13/2023	10/13/2023	oxycodone hcl (ir) 10 mg tab	120	30	José Miguel Caro	[id number]	Medicare	[facility]  A	N	B	09/11/2023	09/16/2023	diazepam 5 mg tablet	60	30	Pulatov, Pulat	[id number]	Medicare	[facility]  A	N	O	09/15/2023	09/16/2023	oxycodone hcl (ir) 10 mg tab	120	30	Sanjiv Persaud	[id number]	Medicare	[facility]  A	N	O	08/18/2023	08/19/2023	oxycodone hcl (ir) 10 mg tab	120	30	Sanjiv Persaud	[id number]	Medicare	[facility]  A	N	B	07/30/2023	08/03/2023	diazepam 5 mg tablet	60	30	Pulatov, Pulat	[id number]	Medicare	[facility]  A	N	O	07/21/2023	07/21/2023	oxycodone hcl (ir) 10 mg tab	120	30	Hung Kincaid	[id number]	Medicare	[facility]  A	N	B	06/25/2023	07/12/2023	diazepam 5 mg tablet	60	30	Pulatov, Pulat	[id number]	Medicare	[facility]  A	N	O	06/23/2023	06/24/2023	oxycodone hcl (ir) 10 mg tab	120	30	Hung Sanjiv	[id number]	Medicare	[facility]  A	N	O	05/26/2023	05/31/2023	oxycodone hcl (ir) 10 mg tab	120	30	José Miguel Caro	[id number]	Medicare	[facility]  A	N	B	05/16/2023	05/26/2023	diazepam 5 mg tablet	60	30	Pulatov, Pulat	[id number]	Medicare	[facility]  A	N	O	05/01/2023	05/03/2023	oxycodone hcl (ir) 10 mg tab	120	30	Andrew Bakari	[id number]	Medicare	[facility]  A	N	B	04/11/2023	04/13/2023	diazepam 5 mg tablet	60	30	Pulatov, Pulat	[id number]	Medicare	[facility]  A	N	O	04/03/2023	04/05/2023	oxycodone hcl (ir) 10 mg tab	120	30	Bri Chapito	[id number]	Medicare	[facility]    ADVANCE DIRECTIVES:    DNR/MOLST  [ ]  Living Will  [ ]   DECISION MAKER(s):  [ ] Health Care Proxy(s)  [ ] Surrogate(s)  [ ] Guardian           Name(s): Phone Number(s):    BASELINE (I)ADL(s) (prior to admission):  [place]: [x ]Total  [ ] Moderate [ ]Dependent    Allergies    Cipro (Headache; Vomiting; Rash)  penicillin (Headache; Vomiting; Rash)  erythromycin (Headache; Vomiting; Rash)    Intolerances    MEDICATIONS  (STANDING):  cefTRIAXone   IVPB 1000 milliGRAM(s) IV Intermittent every 24 hours  heparin  Infusion.  Unit(s)/Hr (9 mL/Hr) IV Continuous <Continuous>  influenza   Vaccine 0.5 milliLiter(s) IntraMuscular once  metoprolol tartrate 25 milliGRAM(s) Oral two times a day  metroNIDAZOLE  IVPB 500 milliGRAM(s) IV Intermittent every 12 hours  nicotine -  14 mG/24Hr(s) Patch 1 Patch Transdermal daily  oxyCODONE  ER Tablet 20 milliGRAM(s) Oral <User Schedule>  pantoprazole    Tablet 40 milliGRAM(s) Oral before breakfast  polyethylene glycol 3350 17 Gram(s) Oral two times a day  senna 2 Tablet(s) Oral at bedtime  sodium chloride 2 Gram(s) Oral two times a day    MEDICATIONS  (PRN):  albuterol/ipratropium for Nebulization 3 milliLiter(s) Nebulizer every 6 hours PRN Shortness of Breath and/or Wheezing  aluminum hydroxide/magnesium hydroxide/simethicone Suspension 30 milliLiter(s) Oral every 6 hours PRN Dyspepsia  diazepam    Tablet 5 milliGRAM(s) Oral two times a day PRN for anxiety  heparin   Injectable 4000 Unit(s) IV Push every 6 hours PRN For aPTT less than 40  heparin   Injectable 2000 Unit(s) IV Push every 6 hours PRN For aPTT between 40 - 57  HYDROmorphone  Injectable 1 milliGRAM(s) IV Push every 3 hours PRN Severe Pain (7 - 10)  melatonin 3 milliGRAM(s) Oral at bedtime PRN Insomnia  naloxone Injectable 0.1 milliGRAM(s) IV Push every 3 minutes PRN Obtundation, respiratory suppression  oxyCODONE    IR 10 milliGRAM(s) Oral every 4 hours PRN Moderate Pain (4 - 6)    PRESENT SYMPTOMS: [ ]Unable to self-report see CPOT, PAINADs, RDOS  Source if other than patient:  [ ]Family   [ ]Team     Pain: [x ]yes [ ]no  QOL impact - severe  Location - across chest, right arm            Aggravating factors - movement, palpation  Quality - sharp, burning, electric  Radiation - none  Timing- constant  Severity (0-10 scale): 10  Minimal acceptable level (0-10 scale): 4      Dyspnea:                           [ ]Mild [ ]Moderate [ ]Severe  Anxiety:                             [ ]Mild [ ]Moderate [ x]Severe  Fatigue:                             [x ]Mild [ ]Moderate [ ]Severe  Nausea:                             [ ]Mild [ ]Moderate [ ]Severe  Loss of appetite:              [ ]Mild [ ]Moderate [ ]Severe  Constipation:                    [ ]Mild [ ]Moderate [ ]Severe    PCSSQ [Palliative Care Spiritual Screening Question]   Severity (0-10):  Score of 4 or > indicate consideration of Chaplaincy referral.  Chaplaincy Referral: [ ] yes [ ] refused [ ] following    Caregiver [place]? : [ ] yes [ ] no [x ] deferred:  Social work referral [ ] Patient & Family Centered Care Referral [ ]     Anticipatory Grief Present?: [x ] yes [ ] no  [ ] deferred: Social work referral [ x]  Patient & Family Centered Care Referral [ ]       Other Symptoms:  [x ]All other review of systems negative     PHYSICAL EXAM:  Vital Signs Last 24 Hrs  T(C): 36.2 (22 Mar 2024 11:34), Max: 37.2 (22 Mar 2024 08:44)  T(F): 97.2 (22 Mar 2024 11:34), Max: 99 (22 Mar 2024 08:44)  HR: 93 (22 Mar 2024 11:34) (88 - 98)  BP: 112/73 (22 Mar 2024 11:34) (99/59 - 123/76)  BP(mean): --  RR: 18 (22 Mar 2024 11:34) (17 - 18)  SpO2: 91% (22 Mar 2024 11:34) (91% - 98%)    Parameters below as of 22 Mar 2024 11:34  Patient On (Oxygen Delivery Method): nasal cannula  O2 Flow (L/min): 4   I&O's Summary    21 Mar 2024 07:01  -  22 Mar 2024 07:00  --------------------------------------------------------  IN: 404 mL / OUT: 40 mL / NET: 364 mL    22 Mar 2024 07:01  -  22 Mar 2024 15:56  --------------------------------------------------------  IN: 200 mL / OUT: 0 mL / NET: 200 mL      GENERAL: [ ]Cachexia    [x ]Alert  [x ]Oriented x3   [ ]Lethargic  [ ]Unarousable  [ x]Verbal  [ ]Non-Verbal  Behavioral:   [x ] Anxiety  [ ] Delirium [ ] Agitation [ ] Other  HEENT: plethoric face, enlarged swollen LN around neck and clavicles  [ ]Normal   [ ]Dry mouth   [ ]ET Tube/Trach  [ ]Oral lesions  PULMONARY:   [x ]Clear [ ]Tachypnea  [ ]Audible excessive secretions   [ ]Rhonchi        [ ]Right [ ]Left [ ]Bilateral  [ ]Crackles        [ ]Right [ ]Left [ ]Bilateral  [ ]Wheezing     [ ]Right [ ]Left [ ]Bilateral  [ ]Diminished breath sounds [ ]right [ ]left [ ]bilateral  CARDIOVASCULAR:    [x ]Regular [ ]Irregular [x ]Tachy  [ ]Malik [ ]Murmur [ ]Other  GASTROINTESTINAL:  [x ]Soft  [ ]Distended   [ x]+BS  [ ]Non tender [ x]Tender  [ ]Other [ ]PEG [ ]OGT/ NGT  Last BM: 3/22  GENITOURINARY:  [x ]Normal [ ] Incontinent   [ ]Oliguria/Anuria   [ ]Che  MUSCULOSKELETAL:   [ ]Normal   [ x]Weakness  [ ]Bed/Wheelchair bound [ ]Edema  NEUROLOGIC:   [x ]No focal deficits  [ ]Cognitive impairment  [ ]Dysphagia [ ]Dysarthria [ ]Paresis [ ]Other   SKIN:   [x ]Normal  [ ]Rash  [ ]Other  [ ]Pressure ulcer(s)       Present on admission [ ]y [ ]n    CRITICAL CARE:  [ ] Shock Present  [ ]Septic [ ]Cardiogenic [ ]Neurologic [ ]Hypovolemic  [ ]  Vasopressors [ ]  Inotropes   [ ]Respiratory failure present [ ]Mechanical ventilation [ ]Non-invasive ventilatory support [ ]High flow    [ ]Acute  [ ]Chronic [ ]Hypoxic  [ ]Hypercarbic [ ]Other  [ ]Other organ failure     LABS:                        10.9   6.74  )-----------( 414      ( 22 Mar 2024 10:08 )             32.3   03-22    134<L>  |  99  |  13  ----------------------------<  120<H>  4.2   |  22  |  0.81    Ca    9.3      22 Mar 2024 08:42    TPro  6.2  /  Alb  2.8<L>  /  TBili  0.2  /  DBili  x   /  AST  13  /  ALT  16  /  AlkPhos  98  03-22  PTT - ( 22 Mar 2024 10:08 )  PTT:>200.0 sec    Urinalysis Basic - ( 22 Mar 2024 08:42 )    Color: x / Appearance: x / SG: x / pH: x  Gluc: 120 mg/dL / Ketone: x  / Bili: x / Urobili: x   Blood: x / Protein: x / Nitrite: x   Leuk Esterase: x / RBC: x / WBC x   Sq Epi: x / Non Sq Epi: x / Bacteria: x      RADIOLOGY & ADDITIONAL STUDIES:  3/14 CT chest w/ IV contrast  LUNGS AND AIRWAYS: Impacted right lower lobe subsegmental bronchi.   Otherwise patent central airways. Centrilobular emphysema. Bilateral   lower lobe dependent atelectasis and right lower lobe compressive   atelectasis. Additional scattered bilateral areas of linear atelectasis.   Multiple pulmonary nodules are similar to comparison study, for example a   1.9 x 1.3 cm bilobed right perihilar nodule (5-64), a 1.9 x 1.2 cm   spiculated right lower lobe nodule (5-60), a 1.1 x 1.0 cm right upper   lobe perifissural nodule with spiculation surrounding groundglass (5-75),   a 1.5 cm left upper lobe spiculated nodule (5-50), a 0.4 cm left upper   lobe nodule (5-47).  PLEURA: Increased moderate right and small left pleural effusions.  MEDIASTINUM AND KI: Extensive soft tissue signal within the right   supraclavicular region measuring 3.3 x 5.0 x 6.9 cm (3-26, 11-53)   obtained due to this with additional soft tissue signal in the superior   mediastinum measuring approximately 5.5 x 3.7 x 4.9 cm (3-43, 11-61)   likely representing conglomerate lymphadenopathy. There is internal   hypodensity within the soft tissue concerning for necrosis.  VESSELS: Conglomerate mediastinal soft tissue mass encases and   obliterates superior vena cava with a small residual knob just above the   right atrium. Additional encasement with thrombosis of the right internal   jugular, right innominate and subclavian, and left innominate veins.   There is partial encasement of the right brachiocephalic and right   subclavian arteries which are otherwise patent. Multiple right-sided   collateral vessels.. Right vertebral artery not visualized at its origin.  HEART: Heart size is normal. Similar moderate pericardial effusion.  CHEST WALL AND LOWER NECK: Multiple prominent right axillary lymph nodes   measuring up to 2.2 cm (5-76) predominantly right-sided subcutaneous   edema. Right breast skin thickening.  VISUALIZED UPPER ABDOMEN: Left renal cysts and subcentimeter   hypodensities, too small to characterize. Similar cystic hypodensity   within the proximal pancreatic body measuring 1.2 x 1.2 cm. Right   nephrectomy..  BONES: Degenerative changes.    IMPRESSION:  Conglomerate soft tissue signal in the superior mediastinum and right   supraclavicular region likely representing lymphadenopathy with internal   hypodensity concerning for necrosis. 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 Right vertebral artery not visualized at its   origin. There are multiple right-sided collateral vessels and right-sided   soft tissue edema.    3/14 CT neck soft tissue w/ IV contrast  FINDINGS:    BRAIN:Limited evaluation in periventricular but also no focal enhancing   abnormality in the partially imaged brain parenchyma.    SKULL BASE: Unremarkable    AERODIGESTIVE TRACT: Accounting from dental amalgam related streak   artifact no masslike lesions or abnormal enhancement at the base of the   tongue oropharynx and nasopharynx. Mild lymphoid hyperplasia of the   lingual tonsil with partial effacement of vallecular space. No fluid or   enhancing lesions in the tonsillar fossa or retropharyngeal space.   Supraglottic, glottic and infraglottic airways are patent.    SOFT TISSUE/LYMPH NODES:    There is diffuse subcutaneous inflammatory fat induration along the right   anterior lateral neck with associated inflammatory thickening of the   platysma, edematous changes in sternocleidomastoid mastoid and small   pocket of fluid underneath of platysma. No enhancing fluid collection is   demonstrated. There is skin thickening and edematous/inflammatory changes   in the partially visualized right upper chest wall. Multiple enlarged   lymph nodes throughout the neck.    Redemonstration of large right superior mediastinal/supraclavicular mass   measuring roughly 4 cm x 6 cm x 8 cm (AP X TR X CC) with associated mass   effect upon the right internal jugular vein which demonstrate occlusive   thrombus up to the level of hyoid bone with flow reconstitution distally   to the visualized sigmoid sinus. There is mild plantar fat stranding   surrounding thrombosed segment of the right jugular vein. There is also   mass effect upon the common carotid artery with mild anteromedial   displacement. Visualized subclavian arteries demonstrate normal   opacification..    Partial visualization of the 1.5 cm para-aortic lymph node. (Series 3,   image 223). Abnormal mass, presumably conglomerate of lymph nodes in   superior mediastinum with mass effect upon the brachiocephalic vein with   nearly occlusive thrombus in the proximal segment with flow   reconstitution at its distal segment and left jugular/subclavian veins   distally. 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 Multiple   venous collateral in the right hemithorax likely postobstructive.    SALIVARY GLANDS: Fat reticulation and swelling in the right submandibular   space with thickening of the platysma adjacently.No abnormal enhancement   or calcifications are identified.    THYROID: Small subcentimeter hypodense nodule in the left thyroid lobe.   Haziness in the radiology: There is urgent findings the patient also a   tiny K0 millicuries assess nursing station and fascial age suggesting some    PARANASAL SINUSES: Paranasal sinuses are largely clear. Mastoids are   intact.    ORBITS: Right intraocular lens replacement.    VESSELS: Patent extracranial carotid and vertebral arteries accounting   for limits of given exam which is not tailored for angiographic   assessment.    LUNG APICES: Moderate right and minimal left pleural effusions. Rounded   about 1.3 cm nodules in the right upper lobe. (Series 6, image 70). 0.5   cm pulmonary nodule in the left upper lobe. Please correlate with CT   chest from 3/10/2024    BONES: No bony destructive lesion within the limits of given technique.    IMPRESSION:    Extensive cellulitis/myositis along the right anterior lateral neck and   right upper chest wall with inflammatory fat induration the deep soft   tissue of the neck.    Large supraclavicular/mediastinal mass lesion, presumably conglomerate of   lymph nodes with mass effect and complete occlusion of the right internal   jugular vein with associated surrounding inflammatory changes in the deep   neck, concerning for infectious/inflammatory thrombophlebitis.   Clinical/laboratory correlation and serial ultrasound follow-up is   recommended.    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    No masslike lesions or abnormal enhancement in aerodigestive mucosa.   Airways are patent.    Cervical adenopathy.    Moderate right pleural effusion progressed relative to recent CT chest   from 3/10/2024. Consider CT chest with contrast to reevaluate.    PROTEIN CALORIE MALNUTRITION PRESENT: [ ]mild [ ]moderate [ ]severe [ ]underweight [ ]morbid obesity  https://www.andeal.org/vault/2440/web/files/ONC/Table_Clinical%20Characteristics%20to%20Document%20Malnutrition-White%20JV%20et%20al%202012.pdf    Height (cm): 154.9 (03-19-24 @ 20:45), 154.9 (03-10-24 @ 16:32)  Weight (kg): 49.9 (03-19-24 @ 20:45), 49.9 (03-10-24 @ 16:32)  BMI (kg/m2): 20.8 (03-19-24 @ 20:45), 20.8 (03-10-24 @ 16:32)    [ ]PPSV2 < or = to 30% [ ]significant weight loss  [ ]poor nutritional intake  [ ]anasarca[ ]Artificial Nutrition      Other REFERRALS:  [ ]Hospice  [ ]Child Life  [ ]Social Work  [ ]Case management [ ]Holistic Therapy     Care Coordination Assessment 201 [C. Provider] (03-14-24 @ 10:40)      Palliative Performance Scale:  http://npcrc.org/files/news/palliative_performance_scale_ppsv2.pdf  (Ctrl +  left click to view)  Respiratory Distress Observation Tool:  https://homecareinformation.net/handouts/hen/Respiratory_Distress_Observation_Scale.pdf (Ctrl +  left click to view)  PAINAD Score:  http://geriatrictoolkit.[facility]/cog/painad.pdf (Ctrl +  left click to view)

## 2024-03-22 NOTE — PROGRESS NOTE ADULT - ASSESSMENT
58F w/Hx of RCC s/p R total nephrectomy, S/p hysterectomy, R-sided glaucoma, Fibromyalgia, Anxiety, GERD, smoker p/w R breast swelling, redness and pain, found to have axillary, subclavicular and mediastinal lymphadenopathy & pulm nodules, s/p LN biopsy. Nephrology consulted for hyponatremia.       # Hyponatremia with increased ADH activity  -SNa 132 initially on this admission. SNa trended down to 125 3/20.   -Serum osm: 265, urine osm: 446, urine Na: 57.   -S/p 2% NaCl at 40cc/hr for 8hrs (3/20) with improvement of SNa to 127 from 124 and now back to 131  -Can do Na tabs 2gm BID now      Katerine Rai MD  Office   Contact me directly via Microsoft Teams     (After 5 pm or on weekends please page the on-call fellow/attending, can check [email or web address] for schedule. Login is shailesh mendoza, schedule under [facility] medicine, psych, derm)

## 2024-03-22 NOTE — CONSULT NOTE ADULT - ASSESSMENT
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 Palliative Care is consulted for management of cancer-related pain.

## 2024-03-22 NOTE — CONSULT NOTE ADULT - PROBLEM SELECTOR RECOMMENDATION 3
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 Workup and management per Medical Oncology and GI.

## 2024-03-22 NOTE — PROGRESS NOTE ADULT - SUBJECTIVE AND OBJECTIVE BOX
Follow Up:    Neck mass    Interval History/ROS:  VSS ON. Patient was seen and examined at bedside. +breast pain. No fever.     Allergies  Cipro (Headache; Vomiting; Rash)  penicillin (Headache; Vomiting; Rash)  erythromycin (Headache; Vomiting; Rash)        ANTIMICROBIALS:  cefTRIAXone   IVPB 1000 every 24 hours  metroNIDAZOLE  IVPB 500 every 12 hours      OTHER MEDS:  MEDICATIONS  (STANDING):  albuterol/ipratropium for Nebulization 3 every 6 hours PRN  aluminum hydroxide/magnesium hydroxide/simethicone Suspension 30 every 6 hours PRN  diazepam    Tablet 5 two times a day PRN  heparin   Injectable 4000 every 6 hours PRN  heparin   Injectable 2000 every 6 hours PRN  heparin  Infusion.  <Continuous>  HYDROmorphone  Injectable 1 every 3 hours PRN  influenza   Vaccine 0.5 once  melatonin 3 at bedtime PRN  metoprolol tartrate 25 two times a day  oxyCODONE    IR 10 every 4 hours PRN  oxyCODONE  ER Tablet 20 <User Schedule>  pantoprazole    Tablet 40 before breakfast  polyethylene glycol 3350 17 two times a day  senna 2 at bedtime      Vital Signs Last 24 Hrs  T(C): 36.2 (22 Mar 2024 11:34), Max: 37.2 (22 Mar 2024 08:44)  T(F): 97.2 (22 Mar 2024 11:34), Max: 99 (22 Mar 2024 08:44)  HR: 93 (22 Mar 2024 11:34) (88 - 97)  BP: 112/73 (22 Mar 2024 11:34) (99/59 - 123/76)  BP(mean): --  RR: 18 (22 Mar 2024 11:34) (18 - 18)  SpO2: 91% (22 Mar 2024 11:34) (91% - 98%)    Parameters below as of 22 Mar 2024 11:34  Patient On (Oxygen Delivery Method): nasal cannula  O2 Flow (L/min): 4    PHYSICAL EXAM:  Constitutional: NAD  ENT:  R neck edema and tenderness; clear oropharynx  Cardiovascular:   normal S1, S2, no murmur, RRR  Respiratory:  clear BS bilaterally, no wheezes  Musculoskeletal:  no BLE edema  Neurologic: awake and oriented x3  Skin:  R breast w/ slightly erythematous change, tenderness to palpation; Chest w/ drain in place, serosanguineous fluid w/ debris; R chest tender to palpation overall  Psychiatric:  anxious                            10.9   6.74  )-----------( 414      ( 22 Mar 2024 10:08 )             32.3       03-22    134<L>  |  99  |  13  ----------------------------<  120<H>  4.2   |  22  |  0.81    Ca    9.3      22 Mar 2024 08:42    TPro  6.2  /  Alb  2.8<L>  /  TBili  0.2  /  DBili  x   /  AST  13  /  ALT  16  /  AlkPhos  98  03-22      Urinalysis Basic - ( 22 Mar 2024 08:42 )    Color: x / Appearance: x / SG: x / pH: x  Gluc: 120 mg/dL / Ketone: x  / Bili: x / Urobili: x   Blood: x / Protein: x / Nitrite: x   Leuk Esterase: x / RBC: x / WBC x   Sq Epi: x / Non Sq Epi: x / Bacteria: x        MICROBIOLOGY:  v  .Blood Blood  03-15-24   No growth at 5 days  --  --      .Blood Blood-Peripheral  03-12-24   No growth at 5 days  --  --      .Blood Blood-Peripheral  03-12-24   No growth at 5 days  --  --      Clean Catch Clean Catch (Midstream)  03-10-24   <10,000 CFU/mL Normal Urogenital Tammie  --  --      .Blood Blood-Peripheral  03-10-24   No growth at 5 days  --  --                RADIOLOGY:  Imaging below independently reviewed.  < from: Xray Chest 1 View- PORTABLE-Urgent (Xray Chest 1 View- PORTABLE-Urgent .) (03.21.24 @ 06:51) >    ACC: [id number] EXAM:  XR CHEST PORTABLE URGENT 1V   ORDERED BY: BENI HENLEY     PROCEDURE DATE:  03/21/2024          INTERPRETATION:  EXAMINATION: XR CHEST URGENT    CLINICAL INDICATION: s/p pericardial window    TECHNIQUE: Single frontal portable view of the chest from 3/21/2024 6:51   AM    COMPARISON: Chest x-ray 3/20/2024.    FINDINGS:  Pericardial drain.  The heart size is not accurately assessed on this projection.  Increased patchy opacities throughout the right lung with right pleural   effusion since the prior exams.  No pneumothorax.    IMPRESSION:  Increased right lung opacities likely representing atelectasis with small   right pleural effusion.    --- End of Report ---          MERI WEINSTEIN MD; Resident Radiologist  This document has been electronically signed.  KRIS MERCER MD; Attending Radiologist  This document has been electronically signed. Mar 21 2024  5:28PM    < end of copied text >

## 2024-03-23 LAB
ALBUMIN SERPL ELPH-MCNC: 2.9 G/DL — LOW (ref 3.3–5)
ALP SERPL-CCNC: 93 U/L — SIGNIFICANT CHANGE UP (ref 40–120)
ALT FLD-CCNC: 13 U/L — SIGNIFICANT CHANGE UP (ref 10–45)
ANION GAP SERPL CALC-SCNC: 11 MMOL/L — SIGNIFICANT CHANGE UP (ref 5–17)
ANION GAP SERPL CALC-SCNC: 13 MMOL/L — SIGNIFICANT CHANGE UP (ref 5–17)
APTT BLD: 107.3 SEC — HIGH (ref 24.5–35.6)
APTT BLD: 192.8 SEC — CRITICAL HIGH (ref 24.5–35.6)
APTT BLD: 23.5 SEC — LOW (ref 24.5–35.6)
APTT BLD: 23.7 SEC — LOW (ref 24.5–35.6)
AST SERPL-CCNC: 11 U/L — SIGNIFICANT CHANGE UP (ref 10–40)
BASE EXCESS BLDA CALC-SCNC: -1.6 MMOL/L — SIGNIFICANT CHANGE UP (ref -2–3)
BASE EXCESS BLDV CALC-SCNC: 2 MMOL/L — SIGNIFICANT CHANGE UP (ref -2–3)
BASOPHILS # BLD AUTO: 0.03 K/UL — SIGNIFICANT CHANGE UP (ref 0–0.2)
BASOPHILS NFR BLD AUTO: 0.3 % — SIGNIFICANT CHANGE UP (ref 0–2)
BILIRUB SERPL-MCNC: 0.2 MG/DL — SIGNIFICANT CHANGE UP (ref 0.2–1.2)
BUN SERPL-MCNC: 13 MG/DL — SIGNIFICANT CHANGE UP (ref 7–23)
BUN SERPL-MCNC: 14 MG/DL — SIGNIFICANT CHANGE UP (ref 7–23)
CA-I SERPL-SCNC: 1.22 MMOL/L — SIGNIFICANT CHANGE UP (ref 1.15–1.33)
CALCIUM SERPL-MCNC: 8.6 MG/DL — SIGNIFICANT CHANGE UP (ref 8.4–10.5)
CALCIUM SERPL-MCNC: 8.8 MG/DL — SIGNIFICANT CHANGE UP (ref 8.4–10.5)
CHLORIDE BLDV-SCNC: 100 MMOL/L — SIGNIFICANT CHANGE UP (ref 96–108)
CHLORIDE SERPL-SCNC: 99 MMOL/L — SIGNIFICANT CHANGE UP (ref 96–108)
CHLORIDE SERPL-SCNC: 99 MMOL/L — SIGNIFICANT CHANGE UP (ref 96–108)
CO2 BLDA-SCNC: 22 MMOL/L — SIGNIFICANT CHANGE UP (ref 19–24)
CO2 BLDV-SCNC: 27 MMOL/L — HIGH (ref 22–26)
CO2 SERPL-SCNC: 20 MMOL/L — LOW (ref 22–31)
CO2 SERPL-SCNC: 20 MMOL/L — LOW (ref 22–31)
CREAT SERPL-MCNC: 0.92 MG/DL — SIGNIFICANT CHANGE UP (ref 0.5–1.3)
CREAT SERPL-MCNC: 0.97 MG/DL — SIGNIFICANT CHANGE UP (ref 0.5–1.3)
EGFR: 67 ML/MIN/1.73M2 — SIGNIFICANT CHANGE UP
EGFR: 71 ML/MIN/1.73M2 — SIGNIFICANT CHANGE UP
EOSINOPHIL # BLD AUTO: 0.32 K/UL — SIGNIFICANT CHANGE UP (ref 0–0.5)
EOSINOPHIL NFR BLD AUTO: 3.7 % — SIGNIFICANT CHANGE UP (ref 0–6)
GAS PNL BLDV: 129 MMOL/L — LOW (ref 136–145)
GAS PNL BLDV: SIGNIFICANT CHANGE UP
GAS PNL BLDV: SIGNIFICANT CHANGE UP
GLUCOSE BLDC GLUCOMTR-MCNC: 149 MG/DL — HIGH (ref 70–99)
GLUCOSE BLDV-MCNC: 156 MG/DL — HIGH (ref 70–99)
GLUCOSE SERPL-MCNC: 148 MG/DL — HIGH (ref 70–99)
GLUCOSE SERPL-MCNC: 154 MG/DL — HIGH (ref 70–99)
HCO3 BLDA-SCNC: 22 MMOL/L — SIGNIFICANT CHANGE UP (ref 21–28)
HCO3 BLDV-SCNC: 26 MMOL/L — SIGNIFICANT CHANGE UP (ref 22–29)
HCT VFR BLD CALC: 29.4 % — LOW (ref 34.5–45)
HCT VFR BLD CALC: 30.2 % — LOW (ref 34.5–45)
HCT VFR BLD CALC: 30.3 % — LOW (ref 34.5–45)
HCT VFR BLDA CALC: 32 % — LOW (ref 34.5–46.5)
HGB BLD CALC-MCNC: 10.7 G/DL — LOW (ref 11.7–16.1)
HGB BLD-MCNC: 10 G/DL — LOW (ref 11.5–15.5)
HGB BLD-MCNC: 10.1 G/DL — LOW (ref 11.5–15.5)
HGB BLD-MCNC: 9.3 G/DL — LOW (ref 11.5–15.5)
HOROWITZ INDEX BLDA+IHG-RTO: 50 — SIGNIFICANT CHANGE UP
IMM GRANULOCYTES NFR BLD AUTO: 0.6 % — SIGNIFICANT CHANGE UP (ref 0–0.9)
INR BLD: 1.15 RATIO — SIGNIFICANT CHANGE UP (ref 0.85–1.18)
LACTATE BLDV-MCNC: 1.1 MMOL/L — SIGNIFICANT CHANGE UP (ref 0.5–2)
LYMPHOCYTES # BLD AUTO: 0.86 K/UL — LOW (ref 1–3.3)
LYMPHOCYTES # BLD AUTO: 10 % — LOW (ref 13–44)
MAGNESIUM SERPL-MCNC: 1.5 MG/DL — LOW (ref 1.6–2.6)
MCHC RBC-ENTMCNC: 29.2 PG — SIGNIFICANT CHANGE UP (ref 27–34)
MCHC RBC-ENTMCNC: 29.2 PG — SIGNIFICANT CHANGE UP (ref 27–34)
MCHC RBC-ENTMCNC: 29.7 PG — SIGNIFICANT CHANGE UP (ref 27–34)
MCHC RBC-ENTMCNC: 31.6 GM/DL — LOW (ref 32–36)
MCHC RBC-ENTMCNC: 33 GM/DL — SIGNIFICANT CHANGE UP (ref 32–36)
MCHC RBC-ENTMCNC: 33.4 GM/DL — SIGNIFICANT CHANGE UP (ref 32–36)
MCV RBC AUTO: 87.3 FL — SIGNIFICANT CHANGE UP (ref 80–100)
MCV RBC AUTO: 88.3 FL — SIGNIFICANT CHANGE UP (ref 80–100)
MCV RBC AUTO: 93.9 FL — SIGNIFICANT CHANGE UP (ref 80–100)
MONOCYTES # BLD AUTO: 0.47 K/UL — SIGNIFICANT CHANGE UP (ref 0–0.9)
MONOCYTES NFR BLD AUTO: 5.5 % — SIGNIFICANT CHANGE UP (ref 2–14)
NEUTROPHILS # BLD AUTO: 6.89 K/UL — SIGNIFICANT CHANGE UP (ref 1.8–7.4)
NEUTROPHILS NFR BLD AUTO: 79.9 % — HIGH (ref 43–77)
NRBC # BLD: 0 /100 WBCS — SIGNIFICANT CHANGE UP (ref 0–0)
PCO2 BLDA: 31 MMHG — LOW (ref 32–45)
PCO2 BLDV: 35 MMHG — LOW (ref 39–42)
PH BLDA: 7.45 — SIGNIFICANT CHANGE UP (ref 7.35–7.45)
PH BLDV: 7.47 — HIGH (ref 7.32–7.43)
PHOSPHATE SERPL-MCNC: 4.1 MG/DL — SIGNIFICANT CHANGE UP (ref 2.5–4.5)
PLATELET # BLD AUTO: 284 K/UL — SIGNIFICANT CHANGE UP (ref 150–400)
PLATELET # BLD AUTO: 400 K/UL — SIGNIFICANT CHANGE UP (ref 150–400)
PLATELET # BLD AUTO: 422 K/UL — HIGH (ref 150–400)
PO2 BLDA: 185 MMHG — HIGH (ref 83–108)
PO2 BLDV: 76 MMHG — HIGH (ref 25–45)
POTASSIUM BLDV-SCNC: 3.9 MMOL/L — SIGNIFICANT CHANGE UP (ref 3.5–5.1)
POTASSIUM SERPL-MCNC: 3.9 MMOL/L — SIGNIFICANT CHANGE UP (ref 3.5–5.3)
POTASSIUM SERPL-MCNC: 4.5 MMOL/L — SIGNIFICANT CHANGE UP (ref 3.5–5.3)
POTASSIUM SERPL-SCNC: 3.9 MMOL/L — SIGNIFICANT CHANGE UP (ref 3.5–5.3)
POTASSIUM SERPL-SCNC: 4.5 MMOL/L — SIGNIFICANT CHANGE UP (ref 3.5–5.3)
PROT SERPL-MCNC: 5.9 G/DL — LOW (ref 6–8.3)
PROTHROM AB SERPL-ACNC: 12 SEC — SIGNIFICANT CHANGE UP (ref 9.5–13)
RBC # BLD: 3.13 M/UL — LOW (ref 3.8–5.2)
RBC # BLD: 3.43 M/UL — LOW (ref 3.8–5.2)
RBC # BLD: 3.46 M/UL — LOW (ref 3.8–5.2)
RBC # FLD: 13.4 % — SIGNIFICANT CHANGE UP (ref 10.3–14.5)
RBC # FLD: 13.5 % — SIGNIFICANT CHANGE UP (ref 10.3–14.5)
RBC # FLD: 16.5 % — HIGH (ref 10.3–14.5)
SAO2 % BLDA: 99.8 % — HIGH (ref 94–98)
SAO2 % BLDV: 97.5 % — HIGH (ref 67–88)
SODIUM SERPL-SCNC: 130 MMOL/L — LOW (ref 135–145)
SODIUM SERPL-SCNC: 132 MMOL/L — LOW (ref 135–145)
TSH SERPL-MCNC: 3.82 UIU/ML — SIGNIFICANT CHANGE UP (ref 0.27–4.2)
WBC # BLD: 16.22 K/UL — HIGH (ref 3.8–10.5)
WBC # BLD: 8.14 K/UL — SIGNIFICANT CHANGE UP (ref 3.8–10.5)
WBC # BLD: 8.62 K/UL — SIGNIFICANT CHANGE UP (ref 3.8–10.5)
WBC # FLD AUTO: 16.22 K/UL — HIGH (ref 3.8–10.5)
WBC # FLD AUTO: 8.14 K/UL — SIGNIFICANT CHANGE UP (ref 3.8–10.5)
WBC # FLD AUTO: 8.62 K/UL — SIGNIFICANT CHANGE UP (ref 3.8–10.5)

## 2024-03-23 PROCEDURE — 93010 ELECTROCARDIOGRAM REPORT: CPT | Mod: 76

## 2024-03-23 PROCEDURE — 99232 SBSQ HOSP IP/OBS MODERATE 35: CPT

## 2024-03-23 PROCEDURE — 71045 X-RAY EXAM CHEST 1 VIEW: CPT | Mod: 26

## 2024-03-23 PROCEDURE — 99233 SBSQ HOSP IP/OBS HIGH 50: CPT

## 2024-03-23 PROCEDURE — 99233 SBSQ HOSP IP/OBS HIGH 50: CPT | Mod: GC

## 2024-03-23 PROCEDURE — 99232 SBSQ HOSP IP/OBS MODERATE 35: CPT | Mod: FS

## 2024-03-23 RX ORDER — ENOXAPARIN SODIUM 100 MG/ML
50 INJECTION SUBCUTANEOUS EVERY 12 HOURS
Refills: 0 | Status: DISCONTINUED | OUTPATIENT
Start: 2024-03-23 | End: 2024-03-25

## 2024-03-23 RX ORDER — VANCOMYCIN HCL 1 G
1000 VIAL (EA) INTRAVENOUS EVERY 12 HOURS
Refills: 0 | Status: DISCONTINUED | OUTPATIENT
Start: 2024-03-23 | End: 2024-03-23

## 2024-03-23 RX ORDER — SODIUM CHLORIDE 9 MG/ML
1000 INJECTION INTRAMUSCULAR; INTRAVENOUS; SUBCUTANEOUS
Refills: 0 | Status: DISCONTINUED | OUTPATIENT
Start: 2024-03-23 | End: 2024-03-24

## 2024-03-23 RX ORDER — VANCOMYCIN HCL 1 G
750 VIAL (EA) INTRAVENOUS ONCE
Refills: 0 | Status: COMPLETED | OUTPATIENT
Start: 2024-03-23 | End: 2024-03-23

## 2024-03-23 RX ORDER — CEFEPIME 1 G/1
2000 INJECTION, POWDER, FOR SOLUTION INTRAMUSCULAR; INTRAVENOUS DAILY
Refills: 0 | Status: DISCONTINUED | OUTPATIENT
Start: 2024-03-23 | End: 2024-03-26

## 2024-03-23 RX ORDER — VANCOMYCIN HCL 1 G
VIAL (EA) INTRAVENOUS
Refills: 0 | Status: DISCONTINUED | OUTPATIENT
Start: 2024-03-23 | End: 2024-03-25

## 2024-03-23 RX ORDER — AMIODARONE HYDROCHLORIDE 400 MG/1
1 TABLET ORAL
Qty: 450 | Refills: 0 | Status: DISCONTINUED | OUTPATIENT
Start: 2024-03-23 | End: 2024-03-27

## 2024-03-23 RX ORDER — SODIUM CHLORIDE 9 MG/ML
1000 INJECTION INTRAMUSCULAR; INTRAVENOUS; SUBCUTANEOUS
Refills: 0 | Status: DISCONTINUED | OUTPATIENT
Start: 2024-03-23 | End: 2024-03-23

## 2024-03-23 RX ORDER — AMIODARONE HYDROCHLORIDE 400 MG/1
0.5 TABLET ORAL
Qty: 450 | Refills: 0 | Status: DISCONTINUED | OUTPATIENT
Start: 2024-03-23 | End: 2024-03-27

## 2024-03-23 RX ORDER — MAGNESIUM SULFATE 500 MG/ML
2 VIAL (ML) INJECTION ONCE
Refills: 0 | Status: COMPLETED | OUTPATIENT
Start: 2024-03-23 | End: 2024-03-23

## 2024-03-23 RX ORDER — POTASSIUM CHLORIDE 20 MEQ
40 PACKET (EA) ORAL ONCE
Refills: 0 | Status: COMPLETED | OUTPATIENT
Start: 2024-03-23 | End: 2024-03-23

## 2024-03-23 RX ORDER — CEFEPIME 1 G/1
2000 INJECTION, POWDER, FOR SOLUTION INTRAMUSCULAR; INTRAVENOUS DAILY
Refills: 0 | Status: DISCONTINUED | OUTPATIENT
Start: 2024-03-23 | End: 2024-03-23

## 2024-03-23 RX ORDER — METOPROLOL TARTRATE 50 MG
2.5 TABLET ORAL ONCE
Refills: 0 | Status: DISCONTINUED | OUTPATIENT
Start: 2024-03-23 | End: 2024-03-23

## 2024-03-23 RX ORDER — AMIODARONE HYDROCHLORIDE 400 MG/1
150 TABLET ORAL ONCE
Refills: 0 | Status: DISCONTINUED | OUTPATIENT
Start: 2024-03-23 | End: 2024-03-26

## 2024-03-23 RX ORDER — IPRATROPIUM/ALBUTEROL SULFATE 18-103MCG
3 AEROSOL WITH ADAPTER (GRAM) INHALATION ONCE
Refills: 0 | Status: COMPLETED | OUTPATIENT
Start: 2024-03-23 | End: 2024-03-23

## 2024-03-23 RX ORDER — VANCOMYCIN HCL 1 G
750 VIAL (EA) INTRAVENOUS EVERY 12 HOURS
Refills: 0 | Status: DISCONTINUED | OUTPATIENT
Start: 2024-03-23 | End: 2024-03-25

## 2024-03-23 RX ORDER — FUROSEMIDE 40 MG
20 TABLET ORAL ONCE
Refills: 0 | Status: COMPLETED | OUTPATIENT
Start: 2024-03-23 | End: 2024-03-24

## 2024-03-23 RX ADMIN — AMIODARONE HYDROCHLORIDE 33.3 MG/MIN: 400 TABLET ORAL at 04:47

## 2024-03-23 RX ADMIN — AMIODARONE HYDROCHLORIDE 33.3 MG/MIN: 400 TABLET ORAL at 04:26

## 2024-03-23 RX ADMIN — Medication 25 GRAM(S): at 03:35

## 2024-03-23 RX ADMIN — OXYCODONE HYDROCHLORIDE 20 MILLIGRAM(S): 5 TABLET ORAL at 13:50

## 2024-03-23 RX ADMIN — Medication 100 MILLIGRAM(S): at 17:32

## 2024-03-23 RX ADMIN — SODIUM CHLORIDE 2 GRAM(S): 9 INJECTION INTRAMUSCULAR; INTRAVENOUS; SUBCUTANEOUS at 05:25

## 2024-03-23 RX ADMIN — PANTOPRAZOLE SODIUM 40 MILLIGRAM(S): 20 TABLET, DELAYED RELEASE ORAL at 08:55

## 2024-03-23 RX ADMIN — Medication 100 MILLIGRAM(S): at 05:24

## 2024-03-23 RX ADMIN — Medication 1 PATCH: at 21:55

## 2024-03-23 RX ADMIN — OXYCODONE HYDROCHLORIDE 20 MILLIGRAM(S): 5 TABLET ORAL at 06:25

## 2024-03-23 RX ADMIN — HEPARIN SODIUM 1200 UNIT(S)/HR: 5000 INJECTION INTRAVENOUS; SUBCUTANEOUS at 10:29

## 2024-03-23 RX ADMIN — OXYCODONE HYDROCHLORIDE 20 MILLIGRAM(S): 5 TABLET ORAL at 13:11

## 2024-03-23 RX ADMIN — Medication 3 MILLILITER(S): at 20:45

## 2024-03-23 RX ADMIN — Medication 1 PATCH: at 13:00

## 2024-03-23 RX ADMIN — SODIUM CHLORIDE 50 MILLILITER(S): 9 INJECTION INTRAMUSCULAR; INTRAVENOUS; SUBCUTANEOUS at 08:55

## 2024-03-23 RX ADMIN — OXYCODONE HYDROCHLORIDE 20 MILLIGRAM(S): 5 TABLET ORAL at 05:25

## 2024-03-23 RX ADMIN — SODIUM CHLORIDE 2 GRAM(S): 9 INJECTION INTRAMUSCULAR; INTRAVENOUS; SUBCUTANEOUS at 17:31

## 2024-03-23 RX ADMIN — Medication 200 MILLIGRAM(S): at 19:05

## 2024-03-23 RX ADMIN — OXYCODONE HYDROCHLORIDE 20 MILLIGRAM(S): 5 TABLET ORAL at 21:55

## 2024-03-23 RX ADMIN — HEPARIN SODIUM 0 UNIT(S)/HR: 5000 INJECTION INTRAVENOUS; SUBCUTANEOUS at 09:28

## 2024-03-23 RX ADMIN — POLYETHYLENE GLYCOL 3350 17 GRAM(S): 17 POWDER, FOR SOLUTION ORAL at 17:31

## 2024-03-23 RX ADMIN — Medication 1 PATCH: at 07:54

## 2024-03-23 RX ADMIN — CEFEPIME 100 MILLIGRAM(S): 1 INJECTION, POWDER, FOR SOLUTION INTRAMUSCULAR; INTRAVENOUS at 10:12

## 2024-03-23 RX ADMIN — Medication 25 MILLIGRAM(S): at 17:31

## 2024-03-23 RX ADMIN — Medication 250 MILLIGRAM(S): at 18:39

## 2024-03-23 RX ADMIN — Medication 5 MILLIGRAM(S): at 20:00

## 2024-03-23 RX ADMIN — ENOXAPARIN SODIUM 50 MILLIGRAM(S): 100 INJECTION SUBCUTANEOUS at 21:59

## 2024-03-23 RX ADMIN — Medication 3 MILLILITER(S): at 10:10

## 2024-03-23 RX ADMIN — Medication 3 MILLILITER(S): at 15:19

## 2024-03-23 RX ADMIN — OXYCODONE HYDROCHLORIDE 20 MILLIGRAM(S): 5 TABLET ORAL at 22:33

## 2024-03-23 RX ADMIN — Medication 250 MILLIGRAM(S): at 11:23

## 2024-03-23 RX ADMIN — Medication 100 MILLIGRAM(S): at 05:25

## 2024-03-23 RX ADMIN — Medication 25 MILLIGRAM(S): at 05:25

## 2024-03-23 RX ADMIN — OXYCODONE HYDROCHLORIDE 20 MILLIGRAM(S): 5 TABLET ORAL at 22:55

## 2024-03-23 RX ADMIN — Medication 5 MILLIGRAM(S): at 03:35

## 2024-03-23 RX ADMIN — Medication 3 MILLILITER(S): at 01:31

## 2024-03-23 RX ADMIN — Medication 1 PATCH: at 13:04

## 2024-03-23 RX ADMIN — SENNA PLUS 2 TABLET(S): 8.6 TABLET ORAL at 21:59

## 2024-03-23 RX ADMIN — HEPARIN SODIUM 1300 UNIT(S)/HR: 5000 INJECTION INTRAVENOUS; SUBCUTANEOUS at 00:08

## 2024-03-23 RX ADMIN — Medication 40 MILLIEQUIVALENT(S): at 03:35

## 2024-03-23 RX ADMIN — AMIODARONE HYDROCHLORIDE 16.7 MG/MIN: 400 TABLET ORAL at 10:12

## 2024-03-23 RX ADMIN — SODIUM CHLORIDE 75 MILLILITER(S): 9 INJECTION INTRAMUSCULAR; INTRAVENOUS; SUBCUTANEOUS at 13:04

## 2024-03-23 NOTE — PROGRESS NOTE ADULT - PROBLEM SELECTOR PLAN 1
3/19 underwent Subxiphoid pericardial window with Dr Ruiz    pericardial drain  to water seal document output q shift   echo prior to drain removal  follow up on cytology  3/22 bleomycin x1      care as primary team  Plan d/w Dr Mac 3/19 underwent Subxiphoid pericardial window with Dr Ruiz    pericardial drain  to water seal document output q shift     follow up on cytology  sp     bleomycin x1  via rafa tube  possible out am sunday     care as primary team  Plan d/w Dr Mac

## 2024-03-23 NOTE — PROGRESS NOTE ADULT - SUBJECTIVE AND OBJECTIVE BOX
SUBJECTIVE AND OBJECTIVE:  Indication for Geriatrics and Palliative Care Services/INTERVAL HPI:    OVERNIGHT EVENTS: RRT called overnight for tachyarrhythmia. Patient HDS at time of assessment. Patient reports great improvement in pain overall on current regimen. No new acute symptomatic complaints.    DNR on chart:DNI  DNI      Allergies    Cipro (Headache; Vomiting; Rash)  penicillin (Headache; Vomiting; Rash)  erythromycin (Headache; Vomiting; Rash)    Intolerances    MEDICATIONS  (STANDING):  aMIOdarone Infusion 1 mG/Min (33.3 mL/Hr) IV Continuous <Continuous>  aMIOdarone Infusion 0.5 mG/Min (16.7 mL/Hr) IV Continuous <Continuous>  aMIOdarone IVPB 150 milliGRAM(s) IV Intermittent once  cefepime   IVPB 2000 milliGRAM(s) IV Intermittent daily  heparin   Injectable 4000 Unit(s) IV Push once  heparin  Infusion. 1300 Unit(s)/Hr (13 mL/Hr) IV Continuous <Continuous>  influenza   Vaccine 0.5 milliLiter(s) IntraMuscular once  metoprolol tartrate 25 milliGRAM(s) Oral two times a day  metroNIDAZOLE  IVPB 500 milliGRAM(s) IV Intermittent every 12 hours  nicotine -  14 mG/24Hr(s) Patch 1 Patch Transdermal daily  oxyCODONE  ER Tablet 20 milliGRAM(s) Oral <User Schedule>  pantoprazole    Tablet 40 milliGRAM(s) Oral before breakfast  polyethylene glycol 3350 17 Gram(s) Oral two times a day  senna 2 Tablet(s) Oral at bedtime  sodium chloride 2 Gram(s) Oral two times a day  sodium chloride 0.9%. 1000 milliLiter(s) (75 mL/Hr) IV Continuous <Continuous>  vancomycin  IVPB      vancomycin  IVPB 750 milliGRAM(s) IV Intermittent every 12 hours    MEDICATIONS  (PRN):  albuterol/ipratropium for Nebulization 3 milliLiter(s) Nebulizer every 6 hours PRN Shortness of Breath and/or Wheezing  aluminum hydroxide/magnesium hydroxide/simethicone Suspension 30 milliLiter(s) Oral every 6 hours PRN Dyspepsia  diazepam    Tablet 5 milliGRAM(s) Oral two times a day PRN for anxiety  heparin   Injectable 4000 Unit(s) IV Push every 6 hours PRN For aPTT less than 40  heparin   Injectable 2000 Unit(s) IV Push every 6 hours PRN For aPTT between 40 - 57  HYDROmorphone  Injectable 1 milliGRAM(s) IV Push every 3 hours PRN Severe Pain (7 - 10)  melatonin 3 milliGRAM(s) Oral at bedtime PRN Insomnia  naloxone Injectable 0.1 milliGRAM(s) IV Push every 3 minutes PRN Obtundation, respiratory suppression  oxyCODONE    IR 10 milliGRAM(s) Oral every 4 hours PRN Moderate Pain (4 - 6)      ITEMS UNCHECKED ARE NOT PRESENT    PRESENT SYMPTOMS: [ ]Unable to self-report - see  CPOT, PAINADS, RDOS below  Source if other than patient:  [ ]Family   [ ]Team     Pain: [x ]yes [ ]no  QOL impact - severe  Location - across chest, right arm            Aggravating factors - movement, palpation  Quality - sharp, burning, electric  Radiation - none  Timing- constant  Severity (0-10 scale): 4  Minimal acceptable level (0-10 scale): 4    Dyspnea:                           [ ]Mild [ ]Moderate [ ]Severe  Anxiety:                             [ ]Mild [ ]Moderate [ x]Severe  Fatigue:                             [x ]Mild [ ]Moderate [ ]Severe  Nausea:                             [ ]Mild [ ]Moderate [ ]Severe  Loss of appetite:              [ ]Mild [ ]Moderate [ ]Severe  Constipation:                    [ ]Mild [ ]Moderate [ ]Severe    PCSSQ[Palliative Care Spiritual Screening Question]   Severity (0-10):  Score of 4 or > indicate consideration of Chaplaincy referral.  Chaplaincy Referral: [ ] yes [ ] refused [ ] following    Caregiver [place]? : [ ] yes [ ] no  [ x] deferred:  Social work referral [ ] Patient & Family Centered Care Referral [ ]     Anticipatory Grief present?:  [x ] yes [ ] no  [ ] deferred: Social work referral [x ] Patient & Family Centered Care Referral [ ]      Other Symptoms:  [x ]All other review of systems negative     PHYSICAL EXAM:  Vital Signs Last 24 Hrs  T(C): 36.2 (23 Mar 2024 12:11), Max: 37 (23 Mar 2024 04:39)  T(F): 97.1 (23 Mar 2024 12:11), Max: 98.6 (23 Mar 2024 04:39)  HR: 77 (23 Mar 2024 12:11) (73 - 104)  BP: 109/70 (23 Mar 2024 12:11) (103/64 - 121/85)  BP(mean): --  RR: 18 (23 Mar 2024 12:11) (18 - 19)  SpO2: 92% (23 Mar 2024 12:11) (92% - 97%)    Parameters below as of 23 Mar 2024 12:11  Patient On (Oxygen Delivery Method): nasal cannula  O2 Flow (L/min): 4   I&O's Summary    22 Mar 2024 07:01  -  23 Mar 2024 07:00  --------------------------------------------------------  IN: 200 mL / OUT: 120 mL / NET: 80 mL    23 Mar 2024 07:01  -  23 Mar 2024 12:55  --------------------------------------------------------  IN: 120 mL / OUT: 0 mL / NET: 120 mL       GENERAL: [ ]Cachexia    [x ]Alert  [x ]Oriented x3   [ ]Lethargic  [ ]Unarousable  [ x]Verbal  [ ]Non-Verbal  Behavioral:   [x ] Anxiety  [ ] Delirium [ ] Agitation [ ] Other  HEENT: plethoric face, enlarged swollen LN around neck and clavicles  [ ]Normal   [ ]Dry mouth   [ ]ET Tube/Trach  [ ]Oral lesions  PULMONARY:   [x ]Clear [ ]Tachypnea  [ ]Audible excessive secretions   [ ]Rhonchi        [ ]Right [ ]Left [ ]Bilateral  [ ]Crackles        [ ]Right [ ]Left [ ]Bilateral  [ ]Wheezing     [ ]Right [ ]Left [ ]Bilateral  [ ]Diminished breath sounds [ ]right [ ]left [ ]bilateral  CARDIOVASCULAR:    [x ]Regular [ ]Irregular [x ]Tachy  [ ]Malik [ ]Murmur [ ]Other  GASTROINTESTINAL:  [x ]Soft  [ ]Distended   [ x]+BS  [ ]Non tender [ x]Tender  [ ]Other [ ]PEG [ ]OGT/ NGT  Last BM: 3/22  GENITOURINARY:  [x ]Normal [ ] Incontinent   [ ]Oliguria/Anuria   [ ]Che  MUSCULOSKELETAL:   [ ]Normal   [ x]Weakness  [ ]Bed/Wheelchair bound [ ]Edema  NEUROLOGIC:   [x ]No focal deficits  [ ]Cognitive impairment  [ ]Dysphagia [ ]Dysarthria [ ]Paresis [ ]Other   SKIN:   [x ]Normal  [ ]Rash  [ ]Other  [ ]Pressure ulcer(s)       Present on admission [ ]y [ ]n      CRITICAL CARE:  [ ]Shock Present  [ ]Septic [ ]Cardiogenic [ ]Neurologic [ ]Hypovolemic  [ ]Vasopressors [ ]Inotropes  [ ]Respiratory failure present [ ]Mechanical Ventilation [ ]Non-invasive ventilatory support [ ]High-Flow   [ ]Acute  [ ]Chronic [ ]Hypoxic  [ ]Hypercarbic [ ]Other  [ ]Other organ failure     LABS:                        10.0   8.14  )-----------( 422      ( 23 Mar 2024 08:39 )             30.3   03-23    132<L>  |  99  |  14  ----------------------------<  154<H>  3.9   |  20<L>  |  0.97    Ca    8.8      23 Mar 2024 02:46  Phos  4.1     03-23  Mg     1.5     03-23    TPro  5.9<L>  /  Alb  2.9<L>  /  TBili  0.2  /  DBili  x   /  AST  11  /  ALT  13  /  AlkPhos  93  03-23  PT/INR - ( 23 Mar 2024 02:45 )   PT: 12.0 sec;   INR: 1.15 ratio         PTT - ( 23 Mar 2024 08:39 )  PTT:192.8 sec    Urinalysis Basic - ( 23 Mar 2024 02:46 )    Color: x / Appearance: x / SG: x / pH: x  Gluc: 154 mg/dL / Ketone: x  / Bili: x / Urobili: x   Blood: x / Protein: x / Nitrite: x   Leuk Esterase: x / RBC: x / WBC x   Sq Epi: x / Non Sq Epi: x / Bacteria: x      RADIOLOGY & ADDITIONAL STUDIES:  3/14 CT chest w/ IV contrast  LUNGS AND AIRWAYS: Impacted right lower lobe subsegmental bronchi.   Otherwise patent central airways. Centrilobular emphysema. Bilateral   lower lobe dependent atelectasis and right lower lobe compressive   atelectasis. Additional scattered bilateral areas of linear atelectasis.   Multiple pulmonary nodules are similar to comparison study, for example a   1.9 x 1.3 cm bilobed right perihilar nodule (5-64), a 1.9 x 1.2 cm   spiculated right lower lobe nodule (5-60), a 1.1 x 1.0 cm right upper   lobe perifissural nodule with spiculation surrounding groundglass (5-75),   a 1.5 cm left upper lobe spiculated nodule (5-50), a 0.4 cm left upper   lobe nodule (5-47).  PLEURA: Increased moderate right and small left pleural effusions.  MEDIASTINUM AND KI: Extensive soft tissue signal within the right   supraclavicular region measuring 3.3 x 5.0 x 6.9 cm (3-26, 11-53)   obtained due to this with additional soft tissue signal in the superior   mediastinum measuring approximately 5.5 x 3.7 x 4.9 cm (3-43, 11-61)   likely representing conglomerate lymphadenopathy. There is internal   hypodensity within the soft tissue concerning for necrosis.  VESSELS: Conglomerate mediastinal soft tissue mass encases and   obliterates superior vena cava with a small residual knob just above the   right atrium. Additional encasement with thrombosis of the right internal   jugular, right innominate and subclavian, and left innominate veins.   There is partial encasement of the right brachiocephalic and right   subclavian arteries which are otherwise patent. Multiple right-sided   collateral vessels.. Right vertebral artery not visualized at its origin.  HEART: Heart size is normal. Similar moderate pericardial effusion.  CHEST WALL AND LOWER NECK: Multiple prominent right axillary lymph nodes   measuring up to 2.2 cm (5-76) predominantly right-sided subcutaneous   edema. Right breast skin thickening.  VISUALIZED UPPER ABDOMEN: Left renal cysts and subcentimeter   hypodensities, too small to characterize. Similar cystic hypodensity   within the proximal pancreatic body measuring 1.2 x 1.2 cm. Right   nephrectomy..  BONES: Degenerative changes.    IMPRESSION:  Conglomerate soft tissue signal in the superior mediastinum and right   supraclavicular region likely representing lymphadenopathy with internal   hypodensity concerning for necrosis. 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 Right vertebral artery not visualized at its   origin. There are multiple right-sided collateral vessels and right-sided   soft tissue edema.    3/14 CT neck soft tissue w/ IV contrast  FINDINGS:    BRAIN:Limited evaluation in periventricular but also no focal enhancing   abnormality in the partially imaged brain parenchyma.    SKULL BASE: Unremarkable    AERODIGESTIVE TRACT: Accounting from dental amalgam related streak   artifact no masslike lesions or abnormal enhancement at the base of the   tongue oropharynx and nasopharynx. Mild lymphoid hyperplasia of the   lingual tonsil with partial effacement of vallecular space. No fluid or   enhancing lesions in the tonsillar fossa or retropharyngeal space.   Supraglottic, glottic and infraglottic airways are patent.    SOFT TISSUE/LYMPH NODES:    There is diffuse subcutaneous inflammatory fat induration along the right   anterior lateral neck with associated inflammatory thickening of the   platysma, edematous changes in sternocleidomastoid mastoid and small   pocket of fluid underneath of platysma. No enhancing fluid collection is   demonstrated. There is skin thickening and edematous/inflammatory changes   in the partially visualized right upper chest wall. Multiple enlarged   lymph nodes throughout the neck.    Redemonstration of large right superior mediastinal/supraclavicular mass   measuring roughly 4 cm x 6 cm x 8 cm (AP X TR X CC) with associated mass   effect upon the right internal jugular vein which demonstrate occlusive   thrombus up to the level of hyoid bone with flow reconstitution distally   to the visualized sigmoid sinus. There is mild plantar fat stranding   surrounding thrombosed segment of the right jugular vein. There is also   mass effect upon the common carotid artery with mild anteromedial   displacement. Visualized subclavian arteries demonstrate normal   opacification..    Partial visualization of the 1.5 cm para-aortic lymph node. (Series 3,   image 223). Abnormal mass, presumably conglomerate of lymph nodes in   superior mediastinum with mass effect upon the brachiocephalic vein with   nearly occlusive thrombus in the proximal segment with flow   reconstitution at its distal segment and left jugular/subclavian veins   distally. 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 Multiple   venous collateral in the right hemithorax likely postobstructive.    SALIVARY GLANDS: Fat reticulation and swelling in the right submandibular   space with thickening of the platysma adjacently.No abnormal enhancement   or calcifications are identified.    THYROID: Small subcentimeter hypodense nodule in the left thyroid lobe.   Haziness in the radiology: There is urgent findings the patient also a   tiny K0 millicuries assess nursing station and fascial age suggesting some    PARANASAL SINUSES: Paranasal sinuses are largely clear. Mastoids are   intact.    ORBITS: Right intraocular lens replacement.    VESSELS: Patent extracranial carotid and vertebral arteries accounting   for limits of given exam which is not tailored for angiographic   assessment.    LUNG APICES: Moderate right and minimal left pleural effusions. Rounded   about 1.3 cm nodules in the right upper lobe. (Series 6, image 70). 0.5   cm pulmonary nodule in the left upper lobe. Please correlate with CT   chest from 3/10/2024    BONES: No bony destructive lesion within the limits of given technique.    IMPRESSION:    Extensive cellulitis/myositis along the right anterior lateral neck and   right upper chest wall with inflammatory fat induration the deep soft   tissue of the neck.    Large supraclavicular/mediastinal mass lesion, presumably conglomerate of   lymph nodes with mass effect and complete occlusion of the right internal   jugular vein with associated surrounding inflammatory changes in the deep   neck, concerning for infectious/inflammatory thrombophlebitis.   Clinical/laboratory correlation and serial ultrasound follow-up is   recommended.    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    No masslike lesions or abnormal enhancement in aerodigestive mucosa.   Airways are patent.    Cervical adenopathy.    Moderate right pleural effusion progressed relative to recent CT chest   from 3/10/2024. Consider CT chest with contrast to reevaluate.    Protein Calorie Malnutrition Present: [ ]mild [ ]moderate [ ]severe [ ]underweight [ ]morbid obesity  https://www.andeal.org/vault/2440/web/files/ONC/Table_Clinical%20Characteristics%20to%20Document%20Malnutrition-White%20JV%20et%20al%202012.pdf    Height (cm): 154.9 (03-19-24 @ 20:45), 154.9 (03-10-24 @ 16:32)  Weight (kg): 49.9 (03-19-24 @ 20:45), 49.9 (03-10-24 @ 16:32)  BMI (kg/m2): 20.8 (03-19-24 @ 20:45), 20.8 (03-10-24 @ 16:32)    [ ]PPSV2 < or = 30%  [ ]significant weight loss [ ]poor nutritional intake [ ]anasarca[ ]Artificial Nutrition    Other REFERRALS:  [ ]Hospice  [ ]Child Life  [ ]Social Work  [ ]Case management [ ]Holistic Therapy     Palliative Performance Scale:  http://npcrc.org/files/news/palliative_performance_scale_ppsv2.pdf  (Ctrl +  left click to view)  Respiratory Distress Observation Tool:  https://homecareinformation.net/handouts/hen/Respiratory_Distress_Observation_Scale.pdf (Ctrl +  left click to view)  PAINAD Score:  http://geriatrictoolkit.[place].[facility]/cog/painad.pdf (Ctrl +  left click to view)       SUBJECTIVE AND OBJECTIVE:  Indication for Geriatrics and Palliative Care Services/INTERVAL HPI:    OVERNIGHT EVENTS: RRT called overnight for tachyarrhythmia. Patient HDS at time of assessment. Patient reports great improvement in pain overall on current regimen. No new acute symptomatic complaints.    DNR on chart:DNI  DNI    Allergies    Cipro (Headache; Vomiting; Rash)  penicillin (Headache; Vomiting; Rash)  erythromycin (Headache; Vomiting; Rash)    Intolerances    MEDICATIONS  (STANDING):  aMIOdarone Infusion 1 mG/Min (33.3 mL/Hr) IV Continuous <Continuous>  aMIOdarone Infusion 0.5 mG/Min (16.7 mL/Hr) IV Continuous <Continuous>  aMIOdarone IVPB 150 milliGRAM(s) IV Intermittent once  cefepime   IVPB 2000 milliGRAM(s) IV Intermittent daily  heparin   Injectable 4000 Unit(s) IV Push once  heparin  Infusion. 1300 Unit(s)/Hr (13 mL/Hr) IV Continuous <Continuous>  influenza   Vaccine 0.5 milliLiter(s) IntraMuscular once  metoprolol tartrate 25 milliGRAM(s) Oral two times a day  metroNIDAZOLE  IVPB 500 milliGRAM(s) IV Intermittent every 12 hours  nicotine -  14 mG/24Hr(s) Patch 1 Patch Transdermal daily  oxyCODONE  ER Tablet 20 milliGRAM(s) Oral <User Schedule>  pantoprazole    Tablet 40 milliGRAM(s) Oral before breakfast  polyethylene glycol 3350 17 Gram(s) Oral two times a day  senna 2 Tablet(s) Oral at bedtime  sodium chloride 2 Gram(s) Oral two times a day  sodium chloride 0.9%. 1000 milliLiter(s) (75 mL/Hr) IV Continuous <Continuous>  vancomycin  IVPB      vancomycin  IVPB 750 milliGRAM(s) IV Intermittent every 12 hours    MEDICATIONS  (PRN):  albuterol/ipratropium for Nebulization 3 milliLiter(s) Nebulizer every 6 hours PRN Shortness of Breath and/or Wheezing  aluminum hydroxide/magnesium hydroxide/simethicone Suspension 30 milliLiter(s) Oral every 6 hours PRN Dyspepsia  diazepam    Tablet 5 milliGRAM(s) Oral two times a day PRN for anxiety  heparin   Injectable 4000 Unit(s) IV Push every 6 hours PRN For aPTT less than 40  heparin   Injectable 2000 Unit(s) IV Push every 6 hours PRN For aPTT between 40 - 57  HYDROmorphone  Injectable 1 milliGRAM(s) IV Push every 3 hours PRN Severe Pain (7 - 10)  melatonin 3 milliGRAM(s) Oral at bedtime PRN Insomnia  naloxone Injectable 0.1 milliGRAM(s) IV Push every 3 minutes PRN Obtundation, respiratory suppression  oxyCODONE    IR 10 milliGRAM(s) Oral every 4 hours PRN Moderate Pain (4 - 6)    ITEMS UNCHECKED ARE NOT PRESENT    PRESENT SYMPTOMS: [ ]Unable to self-report - see  CPOT, PAINADS, RDOS below  Source if other than patient:  [ ]Family   [ ]Team     Pain: [x ]yes [ ]no  QOL impact - severe  Location - across chest, right arm            Aggravating factors - movement, palpation  Quality - sharp, burning, electric  Radiation - none  Timing- constant  Severity (0-10 scale): 4  Minimal acceptable level (0-10 scale): 4    Dyspnea:                           [ ]Mild [ ]Moderate [ ]Severe  Anxiety:                             [ ]Mild [ ]Moderate [ x]Severe  Fatigue:                             [x ]Mild [ ]Moderate [ ]Severe  Nausea:                             [ ]Mild [ ]Moderate [ ]Severe  Loss of appetite:              [ ]Mild [ ]Moderate [ ]Severe  Constipation:                    [ ]Mild [ ]Moderate [ ]Severe    PCSSQ[Palliative Care Spiritual Screening Question]   Severity (0-10): deferred  Score of 4 or > indicate consideration of Chaplaincy referral.  Chaplaincy Referral: [ ] yes [ ] refused [ ] following    Caregiver [place]? : [ ] yes [ ] no  [ x] deferred:  Social work referral [ ] Patient & Family Centered Care Referral [ ]     Anticipatory Grief present?:  [x ] yes [ ] no  [ ] deferred: Social work referral [x ] Patient & Family Centered Care Referral [ ]    Other Symptoms:  [x ]All other review of systems negative     PHYSICAL EXAM:  Vital Signs Last 24 Hrs  T(C): 36.2 (23 Mar 2024 12:11), Max: 37 (23 Mar 2024 04:39)  T(F): 97.1 (23 Mar 2024 12:11), Max: 98.6 (23 Mar 2024 04:39)  HR: 77 (23 Mar 2024 12:11) (73 - 104)  BP: 109/70 (23 Mar 2024 12:11) (103/64 - 121/85)  BP(mean): --  RR: 18 (23 Mar 2024 12:11) (18 - 19)  SpO2: 92% (23 Mar 2024 12:11) (92% - 97%)    Parameters below as of 23 Mar 2024 12:11  Patient On (Oxygen Delivery Method): nasal cannula  O2 Flow (L/min): 4   I&O's Summary    22 Mar 2024 07:01  -  23 Mar 2024 07:00  --------------------------------------------------------  IN: 200 mL / OUT: 120 mL / NET: 80 mL    23 Mar 2024 07:01  -  23 Mar 2024 12:55  --------------------------------------------------------  IN: 120 mL / OUT: 0 mL / NET: 120 mL     GENERAL: [ ]Cachexia    [x ]Alert  [x ]Oriented x3   [ ]Lethargic  [ ]Unarousable  [ x]Verbal  [ ]Non-Verbal  Behavioral:   [x ] Anxiety  [ ] Delirium [ ] Agitation [ ] Other  HEENT: plethoric face, enlarged swollen LN around neck and clavicles  [ ]Normal   [ ]Dry mouth   [ ]ET Tube/Trach  [ ]Oral lesions  PULMONARY:   [x ]Clear [ ]Tachypnea  [ ]Audible excessive secretions   [ ]Rhonchi        [ ]Right [ ]Left [ ]Bilateral  [ ]Crackles        [ ]Right [ ]Left [ ]Bilateral  [ ]Wheezing     [ ]Right [ ]Left [ ]Bilateral  [ ]Diminished breath sounds [ ]right [ ]left [ ]bilateral  CARDIOVASCULAR:    [x ]Regular [ ]Irregular [x ]Tachy  [ ]Malik [ ]Murmur [ ]Other  GASTROINTESTINAL:  [x ]Soft  [ ]Distended   [ x]+BS  [ ]Non tender [ x]Tender  [ ]Other [ ]PEG [ ]OGT/ NGT  Last BM: 3/22  GENITOURINARY:  [x ]Normal [ ] Incontinent   [ ]Oliguria/Anuria   [ ]Che  MUSCULOSKELETAL:   [ ]Normal   [ x]Weakness  [ ]Bed/Wheelchair bound [ ]Edema  NEUROLOGIC:   [x ]No focal deficits  [ ]Cognitive impairment  [ ]Dysphagia [ ]Dysarthria [ ]Paresis [ ]Other   SKIN:   [x ]Normal  [ ]Rash  [ ]Other  [ ]Pressure ulcer(s)       Present on admission [ ]y [ ]n    CRITICAL CARE:  [ ]Shock Present  [ ]Septic [ ]Cardiogenic [ ]Neurologic [ ]Hypovolemic  [ ]Vasopressors [ ]Inotropes  [ ]Respiratory failure present [ ]Mechanical Ventilation [ ]Non-invasive ventilatory support [ ]High-Flow   [ ]Acute  [ ]Chronic [ ]Hypoxic  [ ]Hypercarbic [ ]Other  [ ]Other organ failure     LABS:                        10.0   8.14  )-----------( 422      ( 23 Mar 2024 08:39 )             30.3   03-23    132<L>  |  99  |  14  ----------------------------<  154<H>  3.9   |  20<L>  |  0.97    Ca    8.8      23 Mar 2024 02:46  Phos  4.1     03-23  Mg     1.5     03-23    TPro  5.9<L>  /  Alb  2.9<L>  /  TBili  0.2  /  DBili  x   /  AST  11  /  ALT  13  /  AlkPhos  93  03-23  PT/INR - ( 23 Mar 2024 02:45 )   PT: 12.0 sec;   INR: 1.15 ratio       PTT - ( 23 Mar 2024 08:39 )  PTT:192.8 sec    Urinalysis Basic - ( 23 Mar 2024 02:46 )    Color: x / Appearance: x / SG: x / pH: x  Gluc: 154 mg/dL / Ketone: x  / Bili: x / Urobili: x   Blood: x / Protein: x / Nitrite: x   Leuk Esterase: x / RBC: x / WBC x   Sq Epi: x / Non Sq Epi: x / Bacteria: x    RADIOLOGY & ADDITIONAL STUDIES:  3/14 CT chest w/ IV contrast  LUNGS AND AIRWAYS: Impacted right lower lobe subsegmental bronchi.   Otherwise patent central airways. Centrilobular emphysema. Bilateral   lower lobe dependent atelectasis and right lower lobe compressive   atelectasis. Additional scattered bilateral areas of linear atelectasis.   Multiple pulmonary nodules are similar to comparison study, for example a   1.9 x 1.3 cm bilobed right perihilar nodule (5-64), a 1.9 x 1.2 cm   spiculated right lower lobe nodule (5-60), a 1.1 x 1.0 cm right upper   lobe perifissural nodule with spiculation surrounding groundglass (5-75),   a 1.5 cm left upper lobe spiculated nodule (5-50), a 0.4 cm left upper   lobe nodule (5-47).  PLEURA: Increased moderate right and small left pleural effusions.  MEDIASTINUM AND KI: Extensive soft tissue signal within the right   supraclavicular region measuring 3.3 x 5.0 x 6.9 cm (3-26, 11-53)   obtained due to this with additional soft tissue signal in the superior   mediastinum measuring approximately 5.5 x 3.7 x 4.9 cm (3-43, 11-61)   likely representing conglomerate lymphadenopathy. There is internal   hypodensity within the soft tissue concerning for necrosis.  VESSELS: Conglomerate mediastinal soft tissue mass encases and   obliterates superior vena cava with a small residual knob just above the   right atrium. Additional encasement with thrombosis of the right internal   jugular, right innominate and subclavian, and left innominate veins.   There is partial encasement of the right brachiocephalic and right   subclavian arteries which are otherwise patent. Multiple right-sided   collateral vessels.. Right vertebral artery not visualized at its origin.  HEART: Heart size is normal. Similar moderate pericardial effusion.  CHEST WALL AND LOWER NECK: Multiple prominent right axillary lymph nodes   measuring up to 2.2 cm (5-76) predominantly right-sided subcutaneous   edema. Right breast skin thickening.  VISUALIZED UPPER ABDOMEN: Left renal cysts and subcentimeter   hypodensities, too small to characterize. Similar cystic hypodensity   within the proximal pancreatic body measuring 1.2 x 1.2 cm. Right   nephrectomy..  BONES: Degenerative changes.    IMPRESSION:  Conglomerate soft tissue signal in the superior mediastinum and right   supraclavicular region likely representing lymphadenopathy with internal   hypodensity concerning for necrosis. 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 Right vertebral artery not visualized at its   origin. There are multiple right-sided collateral vessels and right-sided   soft tissue edema.    3/14 CT neck soft tissue w/ IV contrast  FINDINGS:    BRAIN:Limited evaluation in periventricular but also no focal enhancing   abnormality in the partially imaged brain parenchyma.    SKULL BASE: Unremarkable    AERODIGESTIVE TRACT: Accounting from dental amalgam related streak   artifact no masslike lesions or abnormal enhancement at the base of the   tongue oropharynx and nasopharynx. Mild lymphoid hyperplasia of the   lingual tonsil with partial effacement of vallecular space. No fluid or   enhancing lesions in the tonsillar fossa or retropharyngeal space.   Supraglottic, glottic and infraglottic airways are patent.    SOFT TISSUE/LYMPH NODES:    There is diffuse subcutaneous inflammatory fat induration along the right   anterior lateral neck with associated inflammatory thickening of the   platysma, edematous changes in sternocleidomastoid mastoid and small   pocket of fluid underneath of platysma. No enhancing fluid collection is   demonstrated. There is skin thickening and edematous/inflammatory changes   in the partially visualized right upper chest wall. Multiple enlarged   lymph nodes throughout the neck.    Redemonstration of large right superior mediastinal/supraclavicular mass   measuring roughly 4 cm x 6 cm x 8 cm (AP X TR X CC) with associated mass   effect upon the right internal jugular vein which demonstrate occlusive   thrombus up to the level of hyoid bone with flow reconstitution distally   to the visualized sigmoid sinus. There is mild plantar fat stranding   surrounding thrombosed segment of the right jugular vein. There is also   mass effect upon the common carotid artery with mild anteromedial   displacement. Visualized subclavian arteries demonstrate normal   opacification..    Partial visualization of the 1.5 cm para-aortic lymph node. (Series 3,   image 223). Abnormal mass, presumably conglomerate of lymph nodes in   superior mediastinum with mass effect upon the brachiocephalic vein with   nearly occlusive thrombus in the proximal segment with flow   reconstitution at its distal segment and left jugular/subclavian veins   distally. 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 Multiple   venous collateral in the right hemithorax likely postobstructive.    SALIVARY GLANDS: Fat reticulation and swelling in the right submandibular   space with thickening of the platysma adjacently.No abnormal enhancement   or calcifications are identified.    THYROID: Small subcentimeter hypodense nodule in the left thyroid lobe.   Haziness in the radiology: There is urgent findings the patient also a   tiny K0 millicuries assess nursing station and fascial age suggesting some    PARANASAL SINUSES: Paranasal sinuses are largely clear. Mastoids are   intact.    ORBITS: Right intraocular lens replacement.    VESSELS: Patent extracranial carotid and vertebral arteries accounting   for limits of given exam which is not tailored for angiographic   assessment.    LUNG APICES: Moderate right and minimal left pleural effusions. Rounded   about 1.3 cm nodules in the right upper lobe. (Series 6, image 70). 0.5   cm pulmonary nodule in the left upper lobe. Please correlate with CT   chest from 3/10/2024    BONES: No bony destructive lesion within the limits of given technique.    IMPRESSION:    Extensive cellulitis/myositis along the right anterior lateral neck and   right upper chest wall with inflammatory fat induration the deep soft   tissue of the neck.    Large supraclavicular/mediastinal mass lesion, presumably conglomerate of   lymph nodes with mass effect and complete occlusion of the right internal   jugular vein with associated surrounding inflammatory changes in the deep   neck, concerning for infectious/inflammatory thrombophlebitis.   Clinical/laboratory correlation and serial ultrasound follow-up is   recommended.    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    No masslike lesions or abnormal enhancement in aerodigestive mucosa.   Airways are patent.    Cervical adenopathy.    Moderate right pleural effusion progressed relative to recent CT chest   from 3/10/2024. Consider CT chest with contrast to reevaluate.    Protein Calorie Malnutrition Present: [ ]mild [ ]moderate [ ]severe [ ]underweight [ ]morbid obesity  https://www.andeal.org/vault/2440/web/files/ONC/Table_Clinical%20Characteristics%20to%20Document%20Malnutrition-White%20JV%20et%20al%202012.pdf    Height (cm): 154.9 (03-19-24 @ 20:45), 154.9 (03-10-24 @ 16:32)  Weight (kg): 49.9 (03-19-24 @ 20:45), 49.9 (03-10-24 @ 16:32)  BMI (kg/m2): 20.8 (03-19-24 @ 20:45), 20.8 (03-10-24 @ 16:32)    [ ]PPSV2 < or = 30%  [ ]significant weight loss [ ]poor nutritional intake [ ]anasarca[ ]Artificial Nutrition    Other REFERRALS:  [ ]Hospice  [ ]Child Life  [ ]Social Work  [ ]Case management [ ]Holistic Therapy     Palliative Performance Scale:  http://[facility].org/files/news/palliative_performance_scale_ppsv2.pdf  (Ctrl +  left click to view)  Respiratory Distress Observation Tool:  https://homecareinformation.net/handouts/hen/Respiratory_Distress_Observation_Scale.pdf (Ctrl +  left click to view)  PAINAD Score:  http://geriatrictoolkit.[place].[facility]/cog/painad.pdf (Ctrl +  left click to view)

## 2024-03-23 NOTE — RAPID RESPONSE TEAM SUMMARY - NSMEDICATIONSRRT_GEN_ALL_CORE
On arrival, patient afebrile, MAPs in mid 60s, tachycardic to 160s-190s, satting 88-94% on 4L NC. Pericardial drain tubing draining appropriately. Patient AAO4, cardiac exam notable for tachycardia and lungs CTAB. Patient denies symptoms other than palpitations and feeling warm. Etiology unclear, will obtain full set of labs including electrolytes, maintain K>4, and Mg>2. Patient already on hep gtt which is the treatment for PE (given hypoxemia and tachycardia). Possible contribution of hypoxemia to tachycardia (CXR 3/21 with right pleff and atelectasis), in the setting of hypermetabolic state from cancer. On recheck patient's bp improved to SBP >120. Gave 1L bolus followed with amio load given persistent tachycardia with improvement to 120s. Primary team to follow up on labs and continue eval.

## 2024-03-23 NOTE — PROVIDER CONTACT NOTE (CRITICAL VALUE NOTIFICATION) - BACKGROUND
Chief complaint:   Chief Complaint   Patient presents with   • Follow-up     9mn follow up       Vitals:  Visit Vitals  /88 (BP Location: LUE - Left upper extremity, Patient Position: Sitting, Cuff Size: Regular)   Pulse 80   Ht 6' 1\" (1.854 m)   Wt 107 kg (235 lb 12.8 oz)   BMI 31.11 kg/m²       HISTORY OF PRESENT ILLNESS     Attila Bedoya is a 46 year old male with a PMHx significant for CAD s/p CABG x 2 in 04/2015 ([employer] [facility])-initial presentation was \"heart burn\" with exercise, ascending aortic aneurysm, hyperlipidemia, GERD and anxiety.    Today patient reports doing well from a cardiac standpoint. Denies any medication changes, ER visits, or hospitalizations. He remains active without issue. Denies chest pain, shortness of breath, palpitations, dizziness, lightheadedness, syncope, or edema. Patient is compliant with his cardiac medications. No further complaints at this time.     Social History:  Former smoker - quit 14 years  ETOH - 5 to 6 per week - less than 1 per day  Works at [employer] for [employer]    Echo Stress 03/31/2022  Normal exercise stress echocardiogram.  Pratt treadmill score, 11.  No evidence of myocardial ischemia with stress.    Echo 08/19/2021  Normal LV function.  Normal cardiac valves.  Normal size ascending aorta.  Normal transthoracic echocardiogram.    CTA Chest 02/13/2020 ([facility])  Impression: Stable 4.0 x 4.0 cm aneurysm of the descending thoracic aorta.    Cardiac Cath 04/08/2015  IMPRESSION:   1. Severe multilesion, multivessel coronary disease, multiple 90% lesions in the proximal, mid, and distal right with the distal right totally occluded at the crux.   2. A 95% proximal LAD, somewhat dissected lesion.   3. Normal left ventriculogram.     PLAN: Due to the multivessel, multilesion nature of his coronary disease, recommend consultation with cardiothoracic surgery for bypass rather than multiple stents with less long-term disease-free state.     Other significant 
57 yo female admitted with breast pain
problems:  Patient Active Problem List    Diagnosis Date Noted   • GERD (esophageal reflux)      Priority: Low   • Excoriation, neurotic 01/15/2011     Priority: Low   • Benign neoplasm of skin of trunk, except scrotum 01/15/2011     Priority: Low   • Contact dermatitis and other eczema, due to unspecified cause 11/23/2004     Priority: Low       PAST MEDICAL, FAMILY AND SOCIAL HISTORY     Medications:  Current Outpatient Medications   Medication Sig Dispense Refill   • rosuvastatin (CRESTOR) 10 MG tablet Take 1 tablet by mouth once daily 90 tablet 3   • nitroGLYcerin (NITROSTAT) 0.4 MG sublingual tablet Place 1 tablet under the tongue every 5 minutes as needed for Chest pain. Use up to 3 doses, then seek medical attn. 25 tablet 5   • aspirin 81 MG chewable tablet Chew 1 tablet by mouth daily. 90 tablet 3   • ACETAMINOPHEN PO Take 81 mg by mouth.       No current facility-administered medications for this visit.       Allergies:  ALLERGIES:   Allergen Reactions   • Latex RASH   • Statins Other (See Comments)     Muscle pain         Past Medical  History/Surgeries:  Past Medical History:   Diagnosis Date   • Contact dermatitis and other eczema, due to unspecified cause 11/23/2004    scalp, elbows, knees   • GERD (esophageal reflux) 2007   • Hyperlipidemia     in the past       Past Surgical History:   Procedure Laterality Date   • Cabg, arterial, two     • Past surgical history      None; no major injuries       Family History:  Family History   Problem Relation Age of Onset   • Heart Mother         palpitations   • Hyperlipidemia Mother    • Hypertension Mother    • Hypertension Father    • Heart Father    • Alcohol Abuse Father    • Hypertension Brother    • Hyperlipidemia Brother    • Cancer Paternal Grandfather         bone   • Cancer Paternal Aunt         breast       Social History:  Social History     Tobacco Use   • Smoking status: Former Smoker     Packs/day: 1.00     Years: 15.00     Pack years: 15.00     
Patient admitted for breast pain. S/p pericardial drain.
Types: Cigarettes     Start date: 1/1/1992     Quit date: 4/1/2007     Years since quitting: 15.4   • Smokeless tobacco: Never Used   • Tobacco comment: off and onfor 18 years. noted 6/7/06.   Substance Use Topics   • Alcohol use: Yes     Comment: 6       REVIEW OF SYSTEMS     Review of Systems   Constitutional: Negative.  Negative for activity change, fatigue and unexpected weight change.   Respiratory: Negative for shortness of breath.    Cardiovascular: Negative.  Negative for chest pain, palpitations and leg swelling.   Endocrine: Negative.    Musculoskeletal: Negative.    Skin: Negative.    Neurological: Negative.  Negative for dizziness, syncope and light-headedness.   Hematological: Negative.  Does not bruise/bleed easily.     PHYSICAL EXAM     Physical Exam  Vitals and nursing note reviewed.   Constitutional:       General: He is not in acute distress.     Appearance: Normal appearance. He is well-developed. He is not ill-appearing.   HENT:      Head: Normocephalic and atraumatic.      Right Ear: External ear normal.      Left Ear: External ear normal.      Nose: Nose normal.      Mouth/Throat:      Mouth: Mucous membranes are moist.      Pharynx: Oropharynx is clear. No oropharyngeal exudate or posterior oropharyngeal erythema.   Eyes:      General: No scleral icterus.        Right eye: No discharge.         Left eye: No discharge.      Extraocular Movements: Extraocular movements intact.      Conjunctiva/sclera: Conjunctivae normal.   Neck:      Thyroid: No thyromegaly.      Vascular: No carotid bruit or JVD.   Cardiovascular:      Rate and Rhythm: Normal rate and regular rhythm.      Pulses: Normal pulses.      Heart sounds: Normal heart sounds. No murmur heard.  Pulmonary:      Effort: Pulmonary effort is normal. No respiratory distress.      Breath sounds: Normal breath sounds. No stridor. No wheezing, rhonchi or rales.   Chest:      Chest wall: No tenderness.   Abdominal:      Palpations: Abdomen is 
Patient admitted for breast pain
soft.   Musculoskeletal:         General: No swelling or tenderness. Normal range of motion.      Cervical back: Normal range of motion.      Right lower leg: No edema.      Left lower leg: No edema.   Skin:     General: Skin is warm and dry.   Neurological:      General: No focal deficit present.      Mental Status: He is alert and oriented to person, place, and time.   Psychiatric:         Behavior: Behavior normal.         Thought Content: Thought content normal.       ASSESSMENT/PLAN     Coronary artery disease: S/p CABG x2 (LIMA-LAD, SVG-PD) in 04/2015 with Dr. Johnson. Echo stress 03/2022 negative for ischemia. Denies angina or anginal equivalent. On asa, rosuvastatin, metoprolol. Will switch to Toprol-XL 25 mg daily for long-acting.     Aortic aneurysm: 4 cm aneurysm demonstrated on CTA Chest 02/2020 at [facility], unclear whether this is ascending or descending aorta. Normal ascending aorta size per echo 08/2021. Will further evaluate with CTA thoracic aorta in February.      Dyslipidemia: LDL 86, Chol 138 as of 03/2022. On rosuvastatin 10 mg daily, could not tolerate higher doses. Will start zetia 10 mg daily to optimize cholesterol control. Will recheck FLP in 3 months.     Return in about 1 year (around 8/26/2023). Call or return to clinic as needed if symptoms worsen, fail to improve as anticipated, or if new symptoms develop.     On 8/26/2022, ICielo scribed the services personally performed by MD GRADY Castillo, Gaetano Streeter MD, have personally taken the medical history, performed a physical examination of the patient, reviewed the clinical data, labs, images, ecg, etc. I have reviewed and edited the above note as needed. I have personally formulated the clinical impression and recommendations as stated.     Gaetano Streeter MD  [phone]    
58F w/Hx of RCC s/p R total nephrectomy, S/p hysterectomy, R-sided glaucoma, Fibromyalgia, Anxiety, GERD, smoker presents due to R breast swelling, redness and pain

## 2024-03-23 NOTE — PROGRESS NOTE ADULT - SUBJECTIVE AND OBJECTIVE BOX
VITAL SIGNS    Telemetry:      Vital Signs Last 24 Hrs  T(C): 37 (03-23-24 @ 04:39), Max: 37 (03-23-24 @ 04:39)  T(F): 98.6 (03-23-24 @ 04:39), Max: 98.6 (03-23-24 @ 04:39)  HR: 98 (03-23-24 @ 07:53) (73 - 104)  BP: 114/72 (03-23-24 @ 07:53) (103/64 - 121/85)  RR: 18 (03-23-24 @ 07:53) (18 - 19)  SpO2: 95% (03-23-24 @ 07:53) (91% - 97%)                   Daily     Daily       Bilirubin Total: 0.2 mg/dL (03-23 @ 02:46)    CAPILLARY BLOOD GLUCOSE      POCT Blood Glucose.: 149 mg/dL (23 Mar 2024 02:22)          Drains:     pericardial drain                    PHYSICAL EXAM  s   No SOB   tube uncomfortable"  Neurology: alert and oriented x 3, moves all extremities with no defecits  CV :  RRR    Lungs:   CTA B/L  Abdomen: soft, nontender, nondistended, positive bowel sounds, last bowel movement   Extremities:

## 2024-03-23 NOTE — PROGRESS NOTE ADULT - PROBLEM SELECTOR PLAN 1
Please let Yi know that her u/s was negative for DVT  Plan to keep upcoming appt with Dr. Umana    A - h/o RCC and had nephrectomy by Urology by Dr Jacky Adkins at [facility], also had lymph node dissection 2 years ago  - Now with axillary, subclavicular and mediastinal lymphadenopathy, pulmonary nodules and inflammatory changes of breast seen on CT with possible effect on IVC  - CT neck with large supraclavicular/mediastinal mass lesion with mass effect and complete occlusion of the right IJ; associated surrounding inflammatory changes in the deep neck, concerning for infectious/inflammatory thrombophlebitis  - S/p supraclavicular lymph node biopsy by IR on 3/14  - Path positive for malignancy of GI origin    - D/w GI follow up MRI abdomen and MRCP  - D/w oncology, to continue to follow  - Pallative follow up for pain control

## 2024-03-23 NOTE — CHART NOTE - NSCHARTNOTEFT_GEN_A_CORE
seen and evaluated for  c/o wheezing   Pt found sitting at the edge of bed , anxious , stated that she is wheezing , not in distress   Rhonchi on exam , Pt unable to expectorate , added  Robitussin , c/w supplemental oxygen , c/w Tele , c/w  IVF , c/w  Duonebs   c/w  abx as recommended by ID , Cefepime 2 Grams daily , Vanco 750 mg BID and  Flagyl   Clarified about  pericardial drain with CTS providers   may c/w low  continues suction at 20 mm .   Izabel Mcgreogr  NP-C [id number]

## 2024-03-23 NOTE — PROVIDER CONTACT NOTE (CRITICAL VALUE NOTIFICATION) - ASSESSMENT
PTT QNS
Patient A&Ox4, VSS. Heparin gtt infusing for history of afib. Patient denies chest pain, lightheadedness, or shortness of breath. No bleeding noted
Patient A&Ox4. Patient is on heparin drip. No  signs of bleeding noted. Patient denies chest pain, lightheadedness, or shortness of breath.
Pt is on new Heparin gtt. Pt with no s/s of bleeding.

## 2024-03-23 NOTE — PROGRESS NOTE ADULT - PROBLEM SELECTOR PLAN 3
- pericardial effusion on CT chest   - echo with moderate pericardial effusion and collapse of the RA  - s/p pericardial window 3/19  - chest tube drainage care per thoracic sx  - repeat echo with resolution of pericardial effusion- f/u thoracic/IR on whether drain can be removed monday

## 2024-03-23 NOTE — PROGRESS NOTE ADULT - PROBLEM SELECTOR PLAN 1
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 Workup and management per Medical Oncology and GI.

## 2024-03-23 NOTE — PROGRESS NOTE ADULT - SUBJECTIVE AND OBJECTIVE BOX
Follow Up:    Neck mass    Interval History/ROS:  Afebrile ON. RRT called for afib w/ RVR. Rate controlled. Patient was seen and examined at bedside. Reports pain. No fever.    Allergies  Cipro (Headache; Vomiting; Rash)  penicillin (Headache; Vomiting; Rash)  erythromycin (Headache; Vomiting; Rash)        ANTIMICROBIALS:  cefepime  Injectable. 2000 daily  metroNIDAZOLE  IVPB 500 every 12 hours  vancomycin  IVPB 1000 every 12 hours      OTHER MEDS:  MEDICATIONS  (STANDING):  albuterol/ipratropium for Nebulization 3 every 6 hours PRN  aluminum hydroxide/magnesium hydroxide/simethicone Suspension 30 every 6 hours PRN  aMIOdarone Infusion 1 <Continuous>  aMIOdarone Infusion 0.5 <Continuous>  aMIOdarone IVPB 150 once  diazepam    Tablet 5 two times a day PRN  heparin   Injectable 4000 once  heparin   Injectable 4000 every 6 hours PRN  heparin   Injectable 2000 every 6 hours PRN  heparin  Infusion. 1300 <Continuous>  HYDROmorphone  Injectable 1 every 3 hours PRN  influenza   Vaccine 0.5 once  melatonin 3 at bedtime PRN  metoprolol tartrate 25 two times a day  oxyCODONE    IR 10 every 4 hours PRN  oxyCODONE  ER Tablet 20 <User Schedule>  pantoprazole    Tablet 40 before breakfast  polyethylene glycol 3350 17 two times a day  senna 2 at bedtime      Vital Signs Last 24 Hrs  T(C): 37 (23 Mar 2024 04:39), Max: 37 (23 Mar 2024 04:39)  T(F): 98.6 (23 Mar 2024 04:39), Max: 98.6 (23 Mar 2024 04:39)  HR: 98 (23 Mar 2024 07:53) (73 - 104)  BP: 114/72 (23 Mar 2024 07:53) (103/64 - 121/85)  BP(mean): --  RR: 18 (23 Mar 2024 07:53) (18 - 19)  SpO2: 95% (23 Mar 2024 07:53) (91% - 97%)    Parameters below as of 23 Mar 2024 07:53  Patient On (Oxygen Delivery Method): nasal cannula  O2 Flow (L/min): 4      PHYSICAL EXAM:  Constitutional: NAD  ENT:  R neck edema and tenderness; clear oropharynx  Cardiovascular:   normal S1, S2, no murmur, RRR  Respiratory:  clear BS bilaterally, no wheezes  Musculoskeletal:  no BLE edema  Neurologic: awake and oriented x3  Skin:  R breast w/ slightly erythematous change somewhat improved from prior, tenderness to palpation; Chest w/ drain in place, serosanguineous fluid w/ debris; R chest tender to palpation overall  Psychiatric:  anxious                            10.0   8.14  )-----------( 422      ( 23 Mar 2024 08:39 )             30.3       03-23    132<L>  |  99  |  14  ----------------------------<  154<H>  3.9   |  20<L>  |  0.97    Ca    8.8      23 Mar 2024 02:46  Phos  4.1     03-23  Mg     1.5     03-23    TPro  5.9<L>  /  Alb  2.9<L>  /  TBili  0.2  /  DBili  x   /  AST  11  /  ALT  13  /  AlkPhos  93  03-23      Urinalysis Basic - ( 23 Mar 2024 02:46 )    Color: x / Appearance: x / SG: x / pH: x  Gluc: 154 mg/dL / Ketone: x  / Bili: x / Urobili: x   Blood: x / Protein: x / Nitrite: x   Leuk Esterase: x / RBC: x / WBC x   Sq Epi: x / Non Sq Epi: x / Bacteria: x        MICROBIOLOGY:  v  .Blood Blood  03-15-24   No growth at 5 days  --  --      .Blood Blood-Peripheral  03-12-24   No growth at 5 days  --  --      .Blood Blood-Peripheral  03-12-24   No growth at 5 days  --  --      Clean Catch Clean Catch (Midstream)  03-10-24   <10,000 CFU/mL Normal Urogenital Tammie  --  --      .Blood Blood-Peripheral  03-10-24   No growth at 5 days  --  --                RADIOLOGY:  Imaging below independently reviewed.  < from: Xray Chest 1 View- PORTABLE-Urgent (Xray Chest 1 View- PORTABLE-Urgent .) (03.21.24 @ 06:51) >    ACC: [id number] EXAM:  XR CHEST PORTABLE URGENT 1V   ORDERED BY: BENI HENLEY     PROCEDURE DATE:  03/21/2024          INTERPRETATION:  EXAMINATION: XR CHEST URGENT    CLINICAL INDICATION: s/p pericardial window    TECHNIQUE: Single frontal portable view of the chest from 3/21/2024 6:51   AM    COMPARISON: Chest x-ray 3/20/2024.    FINDINGS:  Pericardial drain.  The heart size is not accurately assessed on this projection.  Increased patchy opacities throughout the right lung with right pleural   effusion since the prior exams.  No pneumothorax.    IMPRESSION:  Increased right lung opacities likely representing atelectasis with small   right pleural effusion.    --- End of Report ---          MERI WEINSTEIN MD; Resident Radiologist  This document has been electronically signed.  KRIS MERCER MD; Attending Radiologist  This document has been electronically signed. Mar 21 2024  5:28PM    < end of copied text >

## 2024-03-23 NOTE — PROGRESS NOTE ADULT - ASSESSMENT
58-yo F w/ PMH of RCC s/p R total nephrectomy, s/p hysterectomy, fibromyalgia, and concern for breast malignancy currently undergoing workup, admitted with R breast swelling, erythema, and pain. ID was consulted for extensive cellulitis/myositis along the R anterior lateral neck and R upper chest wall. 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 LN biopsy 3/14. CT chest w/ mediastinal and supraclavicular lymphadenopathy w/ necrosis. 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 RRT 3/22-23 ON for afib w/ RVR. Afebrile. New leukocytosis    #Neck edema  #Neck pain  #Abnormal CT scan  #Myositis  #Thrombophlebitis  #Leukocytosis  #Afib w/ RVR  - VSS. No leukocytosis. Significant R neck lymphadenopathy, s/p biopsy. F/u path  - No sore throat or dysphagia. No odynophagia. No fever or rigors.  - Unclear if patient's presentation represents infectious disease process. Labs and physical exam do not support septic thrombophlebitis.  - BCx NGTD. Quantiferon neg.  - CT chest reviewed. Necrotic lymph nodes noted. Path from neck concern for malignancy. Breast erythema and pain could be from inflammation from mass burden.  - RRT 3/22-23 ON w/ afib w/ RVR. Afebrile. New leukocytosis. Probably from cancer burden? Would broaden abx as below.  - D/c ceftriaxone and doxycycline. Start cefepime 2g IV Q12H and continue with metronidazole 500 mg PO Q12H. Start vancomycin 1g IV Q12H, w/ pre-4th dose trough.  - Repeat BCx x2 sets, UCx, and CXR  - ENT and Onc f/u. CT surgery follow-up.      Plan discussed with primary team ACP.  Thank you for this consult. Inpatient ID team will peripherally follow.    Edwar Petersen MD, PhD  Attending Physician  Division of Infectious Diseases  Department of Medicine    Please contact through MS Teams message.  Office: [phone] (after 5 PM or weekend) .

## 2024-03-23 NOTE — PROGRESS NOTE ADULT - TIME BILLING
Time spent for extensive review of the physical chart, electronic medical record, and documentation to obtain collateral information including but not limited to:  [ x] Inpatient records (ED, H&P, primary team, and consultants if applicable, care coordination)  [x ] Inpatient values/results (biomarkers, immunoassays, imaging, and microbiology results)  [x ] Current or proposed treatment plans  [x ] Discussion with the primary team  [x ] Discussion with the patient, surrogate decision maker, or family    Time spent: >52 min

## 2024-03-23 NOTE — PROGRESS NOTE ADULT - ASSESSMENT
this is a 58 F hx of   RCC total nephrectomy, S/p hysterectomy, R-sided glaucoma, Fibromyalgia, Anxiety, GERD, smoker, COPD, presents due to R breast swelling, redness and pain. Pt was initially evaluated by [facility] yesterday and was going to be admitted but left AMA. Was prescribed clindamycin for presumed breast cellulitis/mastitis on discharge, but was also told about concerning findings on CT Chest related to possible breast malignancy and metastasis. Pt has not had a mammogram or colonoscopy in years. Reports that she had a lidocaine injection in her R shoulder on 3/8/24 and since then has had worsening swelling, redness and pain in her R breast. Also still has R shoulder pain, which had mild improvement since the injection. Pt consistently takes oxycodone q6h and believes this may have masked her pain earlier. Reports over 40 pound weight loss in the last year and loss of appetite. Has conjunctival injection in R eye which she claims is chronic but she also feels her eyes are more swollen than usual currently. Denies vision changes. Smokes 1ppd for many years. Patient denies fever, chills, chest pain, SOB, headache, dizziness, abd pain, nausea, vomiting, constipation, dysuria.     3/19 underwent Subxiphoid pericardial window. Portion of xiphoid resected and pericardium incised. Fluid drained and Regan drain placed. Approx 320cc of serous pericardial fluid drained initially.  3/20 VSS OOB to chair  pericardial drain  145 cc overnight on 4lnc .  3/21 VSS pericardial drain -240 cc  3/22 VSS pericardial 40 cc bleomycin x1@ given @ 1345  3/23          vss     pericardiAL DRAIN

## 2024-03-23 NOTE — PROGRESS NOTE ADULT - PROBLEM SELECTOR PLAN 2
Patient describes severe pain, sharp / burning / electric in quality, confluent throughout her upper anterior chest wall, right neck, and RUE, constant, severe, worse with movement and palpation.    Patient with 10-year history of opioids for back and neck pain, oxycodone 10 mg tabs x 4 tabs per day. Patient receiving some benefit from oxycodone 10 mg tabs and moderate benefit from dilaudid 0.5 mg IV while inpatient.    Recommendations:  - Continue oxycodone ER 20 mg PO TID  - Continue oxycodone 10 mg PO q4h PRN for moderate pain  - Dilaudid 1 mg IV q3h PRN for severe pain  - Narcan ordered  - Bowel regimen while on opioids, below. Patient describes severe pain, sharp / burning / electric in quality, confluent throughout her upper anterior chest wall, right neck, and RUE, constant, severe, worse with movement and palpation.    Patient with 10-year history of opioids for back and neck pain, oxycodone 10 mg tabs x 4 tabs per day. Patient receiving some benefit from oxycodone 10 mg tabs and moderate benefit from Dilaudid 0.5 mg IV while inpatient.    Recommendations:  - Continue oxycodone ER 20 mg PO TID  - Continue oxycodone 10 mg PO q4h PRN for moderate pain  - Dilaudid 1 mg IV q3h PRN for severe pain  - Narcan ordered  - Bowel regimen while on opioids, below.

## 2024-03-23 NOTE — PROGRESS NOTE ADULT - SUBJECTIVE AND OBJECTIVE BOX
Moose Nolen MD  Division of Hospital Medicine  Available on MS teams until 7pm  If no response or off-hours, page [phone]  -------------------------------------    Patient is a 58y old  Female who presents with a chief complaint of Mastitis, breast malignancy (23 Mar 2024 12:24)      SUBJECTIVE / OVERNIGHT EVENTS: RRT in early AM for afib with rvr now reverted back to NSR  ADDITIONAL REVIEW OF SYSTEMS: abx expanded by ID team. Pt otherwise sleepy but arousable, does not want to eat/drink enough due to having to go to the bathroom often and not feeling like she has enough nursing support to do so. Otherwise wants her pericardial drain removed, but no uncontrolled aches/pains.     MEDICATIONS  (STANDING):  aMIOdarone Infusion 1 mG/Min (33.3 mL/Hr) IV Continuous <Continuous>  aMIOdarone Infusion 0.5 mG/Min (16.7 mL/Hr) IV Continuous <Continuous>  aMIOdarone IVPB 150 milliGRAM(s) IV Intermittent once  cefepime   IVPB 2000 milliGRAM(s) IV Intermittent daily  heparin   Injectable 4000 Unit(s) IV Push once  heparin  Infusion. 1300 Unit(s)/Hr (13 mL/Hr) IV Continuous <Continuous>  influenza   Vaccine 0.5 milliLiter(s) IntraMuscular once  metoprolol tartrate 25 milliGRAM(s) Oral two times a day  metroNIDAZOLE  IVPB 500 milliGRAM(s) IV Intermittent every 12 hours  nicotine -  14 mG/24Hr(s) Patch 1 Patch Transdermal daily  oxyCODONE  ER Tablet 20 milliGRAM(s) Oral <User Schedule>  pantoprazole    Tablet 40 milliGRAM(s) Oral before breakfast  polyethylene glycol 3350 17 Gram(s) Oral two times a day  senna 2 Tablet(s) Oral at bedtime  sodium chloride 2 Gram(s) Oral two times a day  sodium chloride 0.9%. 1000 milliLiter(s) (50 mL/Hr) IV Continuous <Continuous>  vancomycin  IVPB      vancomycin  IVPB 750 milliGRAM(s) IV Intermittent every 12 hours    MEDICATIONS  (PRN):  albuterol/ipratropium for Nebulization 3 milliLiter(s) Nebulizer every 6 hours PRN Shortness of Breath and/or Wheezing  aluminum hydroxide/magnesium hydroxide/simethicone Suspension 30 milliLiter(s) Oral every 6 hours PRN Dyspepsia  diazepam    Tablet 5 milliGRAM(s) Oral two times a day PRN for anxiety  heparin   Injectable 4000 Unit(s) IV Push every 6 hours PRN For aPTT less than 40  heparin   Injectable 2000 Unit(s) IV Push every 6 hours PRN For aPTT between 40 - 57  HYDROmorphone  Injectable 1 milliGRAM(s) IV Push every 3 hours PRN Severe Pain (7 - 10)  melatonin 3 milliGRAM(s) Oral at bedtime PRN Insomnia  naloxone Injectable 0.1 milliGRAM(s) IV Push every 3 minutes PRN Obtundation, respiratory suppression  oxyCODONE    IR 10 milliGRAM(s) Oral every 4 hours PRN Moderate Pain (4 - 6)      CAPILLARY BLOOD GLUCOSE      POCT Blood Glucose.: 149 mg/dL (23 Mar 2024 02:22)    I&O's Summary    22 Mar 2024 07:01  -  23 Mar 2024 07:00  --------------------------------------------------------  IN: 200 mL / OUT: 120 mL / NET: 80 mL    23 Mar 2024 07:01  -  23 Mar 2024 12:53  --------------------------------------------------------  IN: 120 mL / OUT: 0 mL / NET: 120 mL        PHYSICAL EXAM:  Vital Signs Last 24 Hrs  T(C): 36.2 (23 Mar 2024 12:11), Max: 37 (23 Mar 2024 04:39)  T(F): 97.1 (23 Mar 2024 12:11), Max: 98.6 (23 Mar 2024 04:39)  HR: 77 (23 Mar 2024 12:11) (73 - 104)  BP: 109/70 (23 Mar 2024 12:11) (103/64 - 121/85)  BP(mean): --  RR: 18 (23 Mar 2024 12:11) (18 - 19)  SpO2: 92% (23 Mar 2024 12:11) (92% - 97%)    Parameters below as of 23 Mar 2024 12:11  Patient On (Oxygen Delivery Method): nasal cannula  O2 Flow (L/min): 4    CONSTITUTIONAL: NAD  EYES: PERRLA; conjunctiva R red- chronic per patient 2/2 glaucoma  ENMT: MMM  NECK: Supple  RESPIRATORY: Normal respiratory effort; decreased breath sounds R  CARDIOVASCULAR: RRR, no JVD, no peripheral edema, +pericardial drain   ABDOMEN: Nontender to palpation, normoactive BS, no guarding/rigidity  MUSCLOSKELETAL:  no clubbing/cyanosis, no joint swelling or tenderness to palpation  PSYCH: A+O x 3, affect normal  NEUROLOGY: CN 2-12 are intact and symmetric; no gross sensory or motor deficits  SKIN: No rashes; no palpable lesions    LABS:                        10.0   8.14  )-----------( 422      ( 23 Mar 2024 08:39 )             30.3     03-23    132<L>  |  99  |  14  ----------------------------<  154<H>  3.9   |  20<L>  |  0.97    Ca    8.8      23 Mar 2024 02:46  Phos  4.1     03-23  Mg     1.5     03-23    TPro  5.9<L>  /  Alb  2.9<L>  /  TBili  0.2  /  DBili  x   /  AST  11  /  ALT  13  /  AlkPhos  93  03-23    PT/INR - ( 23 Mar 2024 02:45 )   PT: 12.0 sec;   INR: 1.15 ratio         PTT - ( 23 Mar 2024 08:39 )  PTT:192.8 sec      Urinalysis Basic - ( 23 Mar 2024 02:46 )    Color: x / Appearance: x / SG: x / pH: x  Gluc: 154 mg/dL / Ketone: x  / Bili: x / Urobili: x   Blood: x / Protein: x / Nitrite: x   Leuk Esterase: x / RBC: x / WBC x   Sq Epi: x / Non Sq Epi: x / Bacteria: x          RADIOLOGY & ADDITIONAL TESTS:  Results Reviewed:   Imaging Personally Reviewed:  Electrocardiogram Personally Reviewed:    COORDINATION OF CARE:  Care Discussed with Consultants/Other Providers [Y/N]:  Prior or Outpatient Records Reviewed [Y/N]:

## 2024-03-23 NOTE — PROGRESS NOTE ADULT - PROBLEM SELECTOR PLAN 6
Code Status: DNR/DNI    The patient has broad decision-making capacity on this encounter. She has elected her boyfriend, Saeid Peña [phone], as her HCP should she lose decision-making capacity.     Palliative Care will continue to follow. Do not hesitate to reach out with questions or for uncontrolled symptoms. 24-hour pager: [phone].    Note is not complete until co-signed by an Attending.    Jose David Del Real MD  Fellow in Hospice & Palliative Medicine  [facility].

## 2024-03-23 NOTE — PROGRESS NOTE ADULT - PROBLEM SELECTOR PLAN 5
See [facility] documentation 3/22. Patient elected her boyfriend, Saeid Peña [phone], as her HCP. She elects DNR/DNI code status. HCP paperwork and MOLST form completed and entered into the chart.

## 2024-03-23 NOTE — CHART NOTE - NSCHARTNOTEFT_GEN_A_CORE
Notified by RN; patient with Afib/SVT hypotensive and symptomatic. RN advised to call RRT; writer arrived to floor RRT in progress.                                   10.1   8.62  )-----------( 400      ( 23 Mar 2024 02:46 )             30.2     03-23    132<L>  |  99  |  14  ----------------------------<  154<H>  3.9   |  20<L>  |  0.97    Ca    8.8      23 Mar 2024 02:46  Phos  4.1     03-23  Mg     1.5     03-23    TPro  5.9<L>  /  Alb  2.9<L>  /  TBili  0.2  /  DBili  x   /  AST  11  /  ALT  13  /  AlkPhos  93  03-23         LIVER FUNCTIONS - ( 23 Mar 2024 02:46 )  Alb: 2.9 g/dL / Pro: 5.9 g/dL / ALK PHOS: 93 U/L / ALT: 13 U/L / AST: 11 U/L / GGT: x         Urinalysis Basic - ( 23 Mar 2024 02:46 )    Color: x / Appearance: x / SG: x / pH: x  Gluc: 154 mg/dL / Ketone: x  / Bili: x / Urobili: x   Blood: x / Protein: x / Nitrite: x   Leuk Esterase: x / RBC: x / WBC x   Sq Epi: x / Non Sq Epi: x / Bacteria: x         PT/INR - ( 23 Mar 2024 02:45 )   PT: 12.0 sec;   INR: 1.15 ratio         PTT - ( 23 Mar 2024 02:45 )  PTT:107.3 sec          Assessment- Rapid Response called for 58y year old Female with a past medical history of     Plan- Notified by RN; patient with Afib/SVT hypotensive and symptomatic. RN advised to call RRT; writer arrived to floor RRT in progress.                                   10.1   8.62  )-----------( 400      ( 23 Mar 2024 02:46 )             30.2     03-23    132<L>  |  99  |  14  ----------------------------<  154<H>  3.9   |  20<L>  |  0.97    Ca    8.8      23 Mar 2024 02:46  Phos  4.1     03-23  Mg     1.5     03-23    TPro  5.9<L>  /  Alb  2.9<L>  /  TBili  0.2  /  DBili  x   /  AST  11  /  ALT  13  /  AlkPhos  93  03-23        LIVER FUNCTIONS - ( 23 Mar 2024 02:46 )  Alb: 2.9 g/dL / Pro: 5.9 g/dL / ALK PHOS: 93 U/L / ALT: 13 U/L / AST: 11 U/L / GGT: x         Urinalysis Basic - ( 23 Mar 2024 02:46 )    Color: x / Appearance: x / SG: x / pH: x  Gluc: 154 mg/dL / Ketone: x  / Bili: x / Urobili: x   Blood: x / Protein: x / Nitrite: x   Leuk Esterase: x / RBC: x / WBC x   Sq Epi: x / Non Sq Epi: x / Bacteria: x  PT/INR - ( 23 Mar 2024 02:45 )   PT: 12.0 sec;   INR: 1.15 ratio    PTT - ( 23 Mar 2024 02:45 )  PTT:107.3 sec          Assessment/Plan    Rapid Response called for 58y year old Female with a past medical history of     - Amiodarone 150mg IVF x 1 given during RRT   - Amiodarone 1mg x 6 hrs then 0.5 x 18 hours ordered   - Continue with heparin drip  - ABG/CBC/BMP Notified by RN; patient with Afib/SVT hypotensive and symptomatic. RN advised to call RRT; writer arrived to floor RRT in progress.                                   10.1   8.62  )-----------( 400      ( 23 Mar 2024 02:46 )             30.2     03-23    132<L>  |  99  |  14  ----------------------------<  154<H>  3.9   |  20<L>  |  0.97    Ca    8.8      23 Mar 2024 02:46  Phos  4.1     03-23  Mg     1.5     03-23    TPro  5.9<L>  /  Alb  2.9<L>  /  TBili  0.2  /  DBili  x   /  AST  11  /  ALT  13  /  AlkPhos  93  03-23        LIVER FUNCTIONS - ( 23 Mar 2024 02:46 )  Alb: 2.9 g/dL / Pro: 5.9 g/dL / ALK PHOS: 93 U/L / ALT: 13 U/L / AST: 11 U/L / GGT: x         Urinalysis Basic - ( 23 Mar 2024 02:46 )    Color: x / Appearance: x / SG: x / pH: x  Gluc: 154 mg/dL / Ketone: x  / Bili: x / Urobili: x   Blood: x / Protein: x / Nitrite: x   Leuk Esterase: x / RBC: x / WBC x   Sq Epi: x / Non Sq Epi: x / Bacteria: x  PT/INR - ( 23 Mar 2024 02:45 )   PT: 12.0 sec;   INR: 1.15 ratio    PTT - ( 23 Mar 2024 02:45 )  PTT:107.3 sec          Assessment/Plan    Rapid Response called for 58y year old Female with a past medical history of 58-yo F w/ PMH of RCC s/p R total nephrectomy, s/p hysterectomy, fibromyalgia, and concern for breast malignancy currently undergoing workup, admitted with R breast swelling, erythema, and pain. ID was consulted for extensive cellulitis/myositis along the R anterior lateral neck and R upper chest wall hospital course c/b persistent afib with RVR     - Amiodarone 150mg IVF x 1 given during RRT   - Amiodarone 1mg x 6 hrs then 0.5 x 18 hours ordered   - Continue with heparin drip  - ABG/CBC/BMP Notified by RN; patient with Afib/SVT hypotensive and symptomatic. RN advised to call RRT; writer arrived to floor RRT in progress.                    10.1   8.62  )-----------( 400      ( 23 Mar 2024 02:46 )             30.2     03-23    132<L>  |  99  |  14  ----------------------------<  154<H>  3.9   |  20<L>  |  0.97    Ca    8.8      23 Mar 2024 02:46  Phos  4.1     03-23  Mg     1.5     03-23    TPro  5.9<L>  /  Alb  2.9<L>  /  TBili  0.2  /  DBili  x   /  AST  11  /  ALT  13  /  AlkPhos  93  03-23        LIVER FUNCTIONS - ( 23 Mar 2024 02:46 )  Alb: 2.9 g/dL / Pro: 5.9 g/dL / ALK PHOS: 93 U/L / ALT: 13 U/L / AST: 11 U/L / GGT: x         Urinalysis Basic - ( 23 Mar 2024 02:46 )    Color: x / Appearance: x / SG: x / pH: x  Gluc: 154 mg/dL / Ketone: x  / Bili: x / Urobili: x   Blood: x / Protein: x / Nitrite: x   Leuk Esterase: x / RBC: x / WBC x   Sq Epi: x / Non Sq Epi: x / Bacteria: x  PT/INR - ( 23 Mar 2024 02:45 )   PT: 12.0 sec;   INR: 1.15 ratio    PTT - ( 23 Mar 2024 02:45 )  PTT:107.3 sec      Assessment/Plan    Rapid Response called for 58y year old Female with a past medical history of 58-yo F w/ PMH of RCC s/p R total nephrectomy, s/p hysterectomy, fibromyalgia, and concern for breast malignancy currently undergoing workup, admitted with R breast swelling, erythema, and pain. ID was consulted for extensive cellulitis/myositis along the R anterior lateral neck and R upper chest wall hospital course c/b persistent afib with RVR.     - Amiodarone 150mg IVF x 1 given during RRT   - Amiodarone 1mg x 6 hrs then 0.5 x 18 hours ordered   - Continue with heparin drip  - ABG/CBC/BMP stat drawn  - Continue with supportive care   - Will discuss plan of care  care    - Pt remained on unit      Katie Harrison NP  Department of Medicine   Spectra [phone]

## 2024-03-23 NOTE — RAPID RESPONSE TEAM SUMMARY - NSADDTLFINDINGSRRT_GEN_ALL_CORE
58-yo F w/ PMH of RCC s/p R total nephrectomy, s/p hysterectomy, fibromyalgia, and concern for breast malignancy currently undergoing workup, admitted with R breast swelling, erythema, and pain. ID was consulted for extensive cellulitis/myositis along the R anterior lateral neck and R upper chest wall. 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 LN biopsy 3/14. CT chest w/ mediastinal and supraclavicular lymphadenopathy w/ necrosis. 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 Hospital course further c/b rapid afib on po metoprolol 25 bid, started on hep gtt for R jugular vein occlusion and new afib. RRT called for tachycardia with associated hypotension 50s/30s. EKG obtained immediately prior to RRT with rapid afib.

## 2024-03-23 NOTE — PROGRESS NOTE ADULT - ATTENDING COMMENTS
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 Palliative Care is consulted for management of cancer-related pain.    1) Cancer related pain.  Controlled  - Continue with oxycodone ER 20 mg PO q8hrs  - Continue with Oxycodone 10 mg PO q4h PRN for moderate pain  - Continue with Dilaudid 1 mg IV q3h PRN for severe pain  - Narcan PRN  - Bowel regimen while on opioids     2) Anxiety.  - Continue with home diazepam 5 mg PO BID PRN for anxiety.    3) Carcinoma of pancreas.  - Workup and management per Medical Oncology and GI.  - Goal is for DMT if offered    4) At high risk for constipation.  - Miralax BID  - Senna 2 tabs hs     5) Goals of care, counseling/discussion.   - See [facility] documentation 3/22   - Saeid cordoba - HCP.   - DNR/DNI

## 2024-03-23 NOTE — PROGRESS NOTE ADULT - SUBJECTIVE AND OBJECTIVE BOX
DATE OF SERVICE: 03-23-24 @ 12:24    Patient is a 58y old  Female who presents with a chief complaint of Mastitis, breast malignancy (23 Mar 2024 11:29)      INTERVAL HISTORY:     REVIEW OF SYSTEMS:  CONSTITUTIONAL: No weakness  EYES/ENT: No visual changes;  No throat pain   NECK: No pain or stiffness  RESPIRATORY: No cough, wheezing; No shortness of breath  CARDIOVASCULAR: No chest pain or palpitations  GASTROINTESTINAL: No abdominal  pain. No nausea, vomiting, or hematemesis  GENITOURINARY: No dysuria, frequency or hematuria  NEUROLOGICAL: No stroke like symptoms  SKIN: No rashes    TELEMETRY Personally reviewed: SR -->  BMP (rapid called)   	  MEDICATIONS:  aMIOdarone Infusion 1 mG/Min IV Continuous <Continuous>  aMIOdarone Infusion 0.5 mG/Min IV Continuous <Continuous>  aMIOdarone IVPB 150 milliGRAM(s) IV Intermittent once  metoprolol tartrate 25 milliGRAM(s) Oral two times a day        PHYSICAL EXAM:  T(C): 36.2 (03-23-24 @ 12:11), Max: 37 (03-23-24 @ 04:39)  HR: 77 (03-23-24 @ 12:11) (73 - 104)  BP: 109/70 (03-23-24 @ 12:11) (103/64 - 121/85)  RR: 18 (03-23-24 @ 12:11) (18 - 19)  SpO2: 92% (03-23-24 @ 12:11) (92% - 97%)  Wt(kg): --  I&O's Summary    22 Mar 2024 07:01  -  23 Mar 2024 07:00  --------------------------------------------------------  IN: 200 mL / OUT: 120 mL / NET: 80 mL    23 Mar 2024 07:01  -  23 Mar 2024 12:24  --------------------------------------------------------  IN: 120 mL / OUT: 0 mL / NET: 120 mL          Appearance: In no distress	  HEENT:    PERRL, EOMI	  Cardiovascular:  S1 S2, No JVD  Respiratory: Lungs clear to auscultation	  Gastrointestinal:  Soft, Non-tender, + BS	  Vascularature:  No edema of LE  Psychiatric: Appropriate affect   Neuro: no acute focal deficits                               10.0   8.14  )-----------( 422      ( 23 Mar 2024 08:39 )             30.3     03-23    132<L>  |  99  |  14  ----------------------------<  154<H>  3.9   |  20<L>  |  0.97    Ca    8.8      23 Mar 2024 02:46  Phos  4.1     03-23  Mg     1.5     03-23    TPro  5.9<L>  /  Alb  2.9<L>  /  TBili  0.2  /  DBili  x   /  AST  11  /  ALT  13  /  AlkPhos  93  03-23        Labs personally reviewed      ASSESSMENT/PLAN: 	  58F w/Hx of RCC s/p R total nephrectomy, S/p hysterectomy, R-sided glaucoma, Fibromyalgia, Anxiety, GERD, smoker presents due to R breast swelling, redness and pain.     Problem/Plan - 1:   ·  Problem: Pericardial effusion.  ·  Plan: - pericardial effusion on CT chest   - TTE 3/18 with Moderate pericardial effusion with echocardiographic evidence of hemodynamic compromise    - Clinically pt is not in tamponade   - Thoracic surgery consulted for pericardial window given likely malignant effusion  ----s/p pericardial window 3/19  - 3/21 now with ST paroxysmal with AF with RVR and reports of chest pain--> Will get Limited TTE to assess pericardial effusion although drain output adequate   - limited TTE: left ventricular systolic function appears grossly normal. Thickened pericardium. Bilateral pleural effusion noted. No pericardial effusion seen.  Compared to the TTE 3/18/2024, the pericardial effusion is no longer present.    Problem/Plan - 2:  ·  Problem: Internal jugular vein thrombosis, right.   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    Problem/Plan - 3:  ·  Problem: Smoker.   ·  Plan: - 1 ppd smoker  - c/w Nicotine patch.    Problem/Plan - 4:  ·  Problem: Prophylactic measure.   ·  Plan: dispo- home.    Problem/Plan - 5:  ·  Problem: SVT on tele   - On tele overnight RRT called for SVT rate of 200 bpm  - c/w Amio gtt as per order     Problem/Plan - 6:  ·  Problem: New Onset Atrial Fibrillation  - Approx 1:42pm--> episode of AF with RVR which lasted 3 minutes  - c/w heparin gtt  - 3/21 now with ST paroxysmal with AF with RVR and reports of chest pain  - c/w lopressor 25mg PO BID with hold parameters  - SVT on tele c/w Amio gtt   - Limited TTE as noted above            SACHA Borges, DO [facility]  Cardiovascular Medicine  [address] [place] [address], [place] [address]  Available through call or text on [employer]  Office: [phone]

## 2024-03-24 LAB
ANION GAP SERPL CALC-SCNC: 15 MMOL/L — SIGNIFICANT CHANGE UP (ref 5–17)
BUN SERPL-MCNC: 12 MG/DL — SIGNIFICANT CHANGE UP (ref 7–23)
CALCIUM SERPL-MCNC: 9.2 MG/DL — SIGNIFICANT CHANGE UP (ref 8.4–10.5)
CANCER AG19-9 SERPL-ACNC: 27 U/ML — SIGNIFICANT CHANGE UP
CHLORIDE SERPL-SCNC: 96 MMOL/L — SIGNIFICANT CHANGE UP (ref 96–108)
CO2 SERPL-SCNC: 20 MMOL/L — LOW (ref 22–31)
CREAT SERPL-MCNC: 0.99 MG/DL — SIGNIFICANT CHANGE UP (ref 0.5–1.3)
EGFR: 65 ML/MIN/1.73M2 — SIGNIFICANT CHANGE UP
GAS PNL BLDA: SIGNIFICANT CHANGE UP
GLUCOSE SERPL-MCNC: 125 MG/DL — HIGH (ref 70–99)
HCT VFR BLD CALC: 33.4 % — LOW (ref 34.5–45)
HGB BLD-MCNC: 10.6 G/DL — LOW (ref 11.5–15.5)
MCHC RBC-ENTMCNC: 28.5 PG — SIGNIFICANT CHANGE UP (ref 27–34)
MCHC RBC-ENTMCNC: 31.7 GM/DL — LOW (ref 32–36)
MCV RBC AUTO: 89.8 FL — SIGNIFICANT CHANGE UP (ref 80–100)
NRBC # BLD: 0 /100 WBCS — SIGNIFICANT CHANGE UP (ref 0–0)
PLATELET # BLD AUTO: 479 K/UL — HIGH (ref 150–400)
POTASSIUM SERPL-MCNC: 4.3 MMOL/L — SIGNIFICANT CHANGE UP (ref 3.5–5.3)
POTASSIUM SERPL-SCNC: 4.3 MMOL/L — SIGNIFICANT CHANGE UP (ref 3.5–5.3)
RBC # BLD: 3.72 M/UL — LOW (ref 3.8–5.2)
RBC # FLD: 13.6 % — SIGNIFICANT CHANGE UP (ref 10.3–14.5)
SODIUM SERPL-SCNC: 131 MMOL/L — LOW (ref 135–145)
WBC # BLD: 8.32 K/UL — SIGNIFICANT CHANGE UP (ref 3.8–10.5)
WBC # FLD AUTO: 8.32 K/UL — SIGNIFICANT CHANGE UP (ref 3.8–10.5)

## 2024-03-24 PROCEDURE — 99232 SBSQ HOSP IP/OBS MODERATE 35: CPT | Mod: FS

## 2024-03-24 PROCEDURE — 99233 SBSQ HOSP IP/OBS HIGH 50: CPT

## 2024-03-24 PROCEDURE — 71045 X-RAY EXAM CHEST 1 VIEW: CPT | Mod: 26

## 2024-03-24 PROCEDURE — 36556 INSERT NON-TUNNEL CV CATH: CPT | Mod: GC

## 2024-03-24 RX ORDER — CHLORHEXIDINE GLUCONATE 213 G/1000ML
1 SOLUTION TOPICAL
Refills: 0 | Status: DISCONTINUED | OUTPATIENT
Start: 2024-03-24 | End: 2024-03-26

## 2024-03-24 RX ORDER — DIGOXIN 250 MCG
250 TABLET ORAL ONCE
Refills: 0 | Status: COMPLETED | OUTPATIENT
Start: 2024-03-25 | End: 2024-03-25

## 2024-03-24 RX ORDER — DIGOXIN 250 MCG
250 TABLET ORAL ONCE
Refills: 0 | Status: COMPLETED | OUTPATIENT
Start: 2024-03-24 | End: 2024-03-24

## 2024-03-24 RX ORDER — SODIUM CHLORIDE 9 MG/ML
10 INJECTION INTRAMUSCULAR; INTRAVENOUS; SUBCUTANEOUS
Refills: 0 | Status: DISCONTINUED | OUTPATIENT
Start: 2024-03-24 | End: 2024-03-26

## 2024-03-24 RX ORDER — BENZOCAINE AND MENTHOL 5; 1 G/100ML; G/100ML
1 LIQUID ORAL
Refills: 0 | Status: DISCONTINUED | OUTPATIENT
Start: 2024-03-24 | End: 2024-03-26

## 2024-03-24 RX ORDER — DIGOXIN 250 MCG
500 TABLET ORAL ONCE
Refills: 0 | Status: COMPLETED | OUTPATIENT
Start: 2024-03-24 | End: 2024-03-24

## 2024-03-24 RX ADMIN — OXYCODONE HYDROCHLORIDE 20 MILLIGRAM(S): 5 TABLET ORAL at 07:09

## 2024-03-24 RX ADMIN — PANTOPRAZOLE SODIUM 40 MILLIGRAM(S): 20 TABLET, DELAYED RELEASE ORAL at 06:09

## 2024-03-24 RX ADMIN — Medication 20 MILLIGRAM(S): at 01:11

## 2024-03-24 RX ADMIN — Medication 3 MILLILITER(S): at 21:38

## 2024-03-24 RX ADMIN — Medication 25 MILLIGRAM(S): at 06:10

## 2024-03-24 RX ADMIN — Medication 25 MILLIGRAM(S): at 16:10

## 2024-03-24 RX ADMIN — Medication 3 MILLILITER(S): at 00:20

## 2024-03-24 RX ADMIN — Medication 5 MILLIGRAM(S): at 23:50

## 2024-03-24 RX ADMIN — OXYCODONE HYDROCHLORIDE 20 MILLIGRAM(S): 5 TABLET ORAL at 21:38

## 2024-03-24 RX ADMIN — ENOXAPARIN SODIUM 50 MILLIGRAM(S): 100 INJECTION SUBCUTANEOUS at 08:36

## 2024-03-24 RX ADMIN — Medication 250 MILLIGRAM(S): at 21:38

## 2024-03-24 RX ADMIN — CEFEPIME 100 MILLIGRAM(S): 1 INJECTION, POWDER, FOR SOLUTION INTRAMUSCULAR; INTRAVENOUS at 21:38

## 2024-03-24 RX ADMIN — SODIUM CHLORIDE 2 GRAM(S): 9 INJECTION INTRAMUSCULAR; INTRAVENOUS; SUBCUTANEOUS at 17:41

## 2024-03-24 RX ADMIN — OXYCODONE HYDROCHLORIDE 20 MILLIGRAM(S): 5 TABLET ORAL at 06:09

## 2024-03-24 RX ADMIN — SENNA PLUS 2 TABLET(S): 8.6 TABLET ORAL at 21:43

## 2024-03-24 RX ADMIN — OXYCODONE HYDROCHLORIDE 20 MILLIGRAM(S): 5 TABLET ORAL at 14:45

## 2024-03-24 RX ADMIN — Medication 500 MICROGRAM(S): at 17:41

## 2024-03-24 RX ADMIN — Medication 1 PATCH: at 11:30

## 2024-03-24 RX ADMIN — POLYETHYLENE GLYCOL 3350 17 GRAM(S): 17 POWDER, FOR SOLUTION ORAL at 06:10

## 2024-03-24 RX ADMIN — Medication 250 MICROGRAM(S): at 23:51

## 2024-03-24 RX ADMIN — Medication 100 MILLIGRAM(S): at 21:40

## 2024-03-24 NOTE — CHART NOTE - NSCHARTNOTEFT_GEN_A_CORE
ID CHART NOTE    Contacted by primary team re: lost IV access. Would recommend midline.    Blood culture pending in lab. Would ideally recommend waiting for blood culture negativity prior to placing a central line (e.g. PICC). Patient appears low risk for bacteremia - no new port of entry, never been bacteremic during this hospitalization, and never been febrile during this admission.    However, if IV access is necessary as a life-saving measure, consider placing a PICC line accepting the risk of line contamination/infection should the blood culture from 3/23 return positive, in which case prompt removal of PICC line is advisable.      Edwar Petersen MD, PhD  Attending Physician  Division of Infectious Diseases  Department of Medicine    Please contact through MS Teams message.  Office: [phone] (after 5 PM or weekend)

## 2024-03-24 NOTE — PROGRESS NOTE ADULT - SUBJECTIVE AND OBJECTIVE BOX
VITAL SIGNS      Vital Signs Last 24 Hrs  T(C): 36.3 (24 @ 11:37), Max: 37.1 (24 @ 04:33)  T(F): 97.4 (24 @ 11:37), Max: 98.7 (24 @ 04:33)  HR: 93 (24 @ 11:37) (78 - 93)  BP: 118/80 (24 @ 11:37) (118/80 - 128/66)  RR: 18 (24 @ 11:37) (18 - 19)  SpO2: 96% (24 @ 11:37) (93% - 97%)                   Daily     Daily Weight in k.1 (24 Mar 2024 04:33)        CAPILLARY BLOOD GLUCOSE              Drains:    meds ct                    PHYSICAL EXAM  s"   No  CP  some SOB"  Neurology: alert and oriented x 3, moves all extremities with no defecits  CV :  RRR  Sternal Wound :  CDI , Stable  Lungs:   rt side diminished throughout  Abdomen: soft, nontender, nondistended, positive bowel sounds

## 2024-03-24 NOTE — ADVANCED PRACTICE NURSE CONSULT - REASON FOR CONSULT
Vascular Access Team    Evaluation for: double lumen PICC placement @ bedside  Requested by name: Irma Griffin  Date/Time: 3/24/2024 @12:31    Indication: cellulitis, mutiple thombi  Allergy to CHG or Heparin or Lidocaine: no     Platelets(>20): 479  INR(<3): 1.15  eGFR(>40): 65  Blood cultures sent: yes, 3/23  Blood culture negative in 48hrs: no  Anticoagulants: Lovenox 40mg   DVTs: yes, thrombus left proximal brachial cephalic vein and  right arm precautions due to multiple thombi    Mastectomy: No  Fistula: No  PPM/Defib: No  IR or Nephrology or ID clearance needed: needs IR or vascular clearance   ID clearance note 3/24/24    Consent obtained: Yes       Pending: IR or vascular clearance    Plan: Bedside picc order evaluated. Provider to contact Picc RN @ [id number] when pt cleared to place line in LUE.

## 2024-03-24 NOTE — PROGRESS NOTE ADULT - ASSESSMENT
58F w/Hx of RCC s/p R total nephrectomy, S/p hysterectomy, R-sided glaucoma, Fibromyalgia, Anxiety, GERD, smoker presents due to R breast swelling, redness and pain. Admitted for right breast swelling and cellulitis found to have imaging evidence concerning for metastatic malignancy w/ lung nodules, and axillary, supraclavicular, and mediastinal adenopathy. 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 Also s/p pericardial window for moderate pericardial effusion now with chest tube. Now found to be in Afib with RVR and lost IV access

## 2024-03-24 NOTE — CHART NOTE - NSCHARTNOTEFT_GEN_A_CORE
Pt used 1x Valium and no PRNs for pain in the past 24hrs  No changes to regimen  Page for uncontrolled symptoms [phone]

## 2024-03-24 NOTE — PROGRESS NOTE ADULT - PROBLEM SELECTOR PLAN 3
- pericardial effusion on CT chest   - echo with moderate pericardial effusion and collapse of the RA  - s/p pericardial window 3/19  - chest tube drainage care per thoracic sx  - repeat echo with resolution of pericardial effusion- f/u thoracic/IR on whether drain can be removed monday    #pleural effusion- pt on 4-6 L NC with pleff seen on imaging  - reached out to thoracic re: possible chest tube- will discuss with attg and plan for NPO tonight in anticipation for possible intervention  - will also consult pulm tomorrow to be on board per thoracic request  - give lasix PRN

## 2024-03-24 NOTE — PROGRESS NOTE ADULT - PROBLEM SELECTOR PLAN 1
- h/o RCC and had nephrectomy by Urology by Dr Jacky Adkins at [facility], also had lymph node dissection 2 years ago  - Now with axillary, subclavicular and mediastinal lymphadenopathy, pulmonary nodules and inflammatory changes of breast seen on CT with possible effect on IVC  - CT neck with large supraclavicular/mediastinal mass lesion with mass effect and complete occlusion of the right IJ; associated surrounding inflammatory changes in the deep neck, concerning for infectious/inflammatory thrombophlebitis  - S/p supraclavicular lymph node biopsy by IR on 3/14  - Path positive for malignancy of GI origin    - D/w GI follow up MRI abdomen and MRCP  - D/w oncology, to continue to follow  - Pallative follow up for pain control

## 2024-03-24 NOTE — PROGRESS NOTE ADULT - PROBLEM SELECTOR PLAN 2
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 Not candidate for IV on R due to mass, but also has SVC obliteration on CT neck to doubt patient can have PICC or central line placed on R  - vascular team consulted for assistance with femoral line placement- discussion ongoing

## 2024-03-24 NOTE — PROGRESS NOTE ADULT - SUBJECTIVE AND OBJECTIVE BOX
DATE OF SERVICE: 03-24-24 @ 16:50    Patient is a 58y old  Female who presents with a chief complaint of Mastitis, breast malignancy (24 Mar 2024 15:33)      INTERVAL HISTORY: In no acute distress.     REVIEW OF SYSTEMS:  CONSTITUTIONAL: No weakness  EYES/ENT: No visual changes;  No throat pain   NECK: No pain or stiffness  RESPIRATORY: No cough, wheezing; No shortness of breath  CARDIOVASCULAR: No chest pain or palpitations  GASTROINTESTINAL: No abdominal  pain. No nausea, vomiting, or hematemesis  GENITOURINARY: No dysuria, frequency or hematuria  NEUROLOGICAL: No stroke like symptoms  SKIN: No rashes    TELEMETRY Personally reviewed: SR paroxysmal with AF with RVR  	  MEDICATIONS:  aMIOdarone Infusion 0.5 mG/Min IV Continuous <Continuous>  aMIOdarone Infusion 1 mG/Min IV Continuous <Continuous>  aMIOdarone IVPB 150 milliGRAM(s) IV Intermittent once  digoxin     Tablet 500 MICROGram(s) Oral once  metoprolol tartrate 25 milliGRAM(s) Oral two times a day        PHYSICAL EXAM:  T(C): 37.2 (03-24-24 @ 16:29), Max: 37.2 (03-24-24 @ 16:29)  HR: 120 (03-24-24 @ 16:29) (78 - 120)  BP: 100/64 (03-24-24 @ 16:29) (100/64 - 128/66)  RR: 18 (03-24-24 @ 11:37) (18 - 19)  SpO2: 96% (03-24-24 @ 11:37) (94% - 97%)  Wt(kg): --  I&O's Summary    23 Mar 2024 07:01  -  24 Mar 2024 07:00  --------------------------------------------------------  IN: 825 mL / OUT: 1130 mL / NET: -305 mL    24 Mar 2024 07:01  -  24 Mar 2024 16:51  --------------------------------------------------------  IN: 240 mL / OUT: 0 mL / NET: 240 mL          Appearance: In no distress	  HEENT:    PERRL, EOMI	  Cardiovascular:  S1 S2, No JVD  Respiratory: Lungs clear to auscultation	  Gastrointestinal:  Soft, Non-tender, + BS	  Vascularature:  No edema of LE  Psychiatric: Appropriate affect   Neuro: no acute focal deficits                               10.6   8.32  )-----------( 479      ( 24 Mar 2024 07:23 )             33.4     03-24    131<L>  |  96  |  12  ----------------------------<  125<H>  4.3   |  20<L>  |  0.99    Ca    9.2      24 Mar 2024 07:21  Phos  4.1     03-23  Mg     1.5     03-23    TPro  5.9<L>  /  Alb  2.9<L>  /  TBili  0.2  /  DBili  x   /  AST  11  /  ALT  13  /  AlkPhos  93  03-23        Labs personally reviewed      ASSESSMENT/PLAN: 	    58F w/Hx of RCC s/p R total nephrectomy, S/p hysterectomy, R-sided glaucoma, Fibromyalgia, Anxiety, GERD, smoker presents due to R breast swelling, redness and pain.     Problem/Plan - 1:   ·  Problem: Pericardial effusion.  ·  Plan: - pericardial effusion on CT chest   - TTE 3/18 with Moderate pericardial effusion with echocardiographic evidence of hemodynamic compromise    - Clinically pt is not in tamponade   - Thoracic surgery consulted for pericardial window given likely malignant effusion  ----s/p pericardial window 3/19  - 3/21 now with ST paroxysmal with AF with RVR and reports of chest pain\  - limited TTE: left ventricular systolic function appears grossly normal. Thickened pericardium. Bilateral pleural effusion noted. No pericardial effusion seen.  Compared to the TTE 3/18/2024, the pericardial effusion is no longer present.    Problem/Plan - 2:  ·  Problem: Internal jugular vein thrombosis, right.   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    Problem/Plan - 3:  ·  Problem: Smoker.   ·  Plan: - 1 ppd smoker  - c/w Nicotine patch.    Problem/Plan - 4:  ·  Problem: Prophylactic measure.   ·  Plan: dispo- home.    Problem/Plan - 5:  ·  Problem: Sinus Tachycardia  - Likely reactive   - D5 as per renal    Problem/Plan - 6:  ·  Problem: New Onset Atrial Fibrillation  - c/w heparin gtt  - Cont lopressor 25mg PO BID with hold parameters  - c/w Amiodarone gtt  - s/p Digoxin 500mcg PO once          Adriana Julian, AG-NP   Lb Mata DO [facility]  Cardiovascular Medicine  [address] [place] [address], [place] [address]  Available through call or text on [employer] TEAMs  Office: [phone]   DATE OF SERVICE: 03-24-24 @ 16:50    Patient is a 58y old  Female who presents with a chief complaint of Mastitis, breast malignancy (24 Mar 2024 15:33)      INTERVAL HISTORY: In no acute distress.     REVIEW OF SYSTEMS:  CONSTITUTIONAL: No weakness  EYES/ENT: No visual changes;  No throat pain   NECK: No pain or stiffness  RESPIRATORY: No cough, wheezing; No shortness of breath  CARDIOVASCULAR: No chest pain or palpitations  GASTROINTESTINAL: No abdominal  pain. No nausea, vomiting, or hematemesis  GENITOURINARY: No dysuria, frequency or hematuria  NEUROLOGICAL: No stroke like symptoms  SKIN: No rashes    TELEMETRY Personally reviewed: SR paroxysmal with AF with RVR  	  MEDICATIONS:  aMIOdarone Infusion 0.5 mG/Min IV Continuous <Continuous>  aMIOdarone Infusion 1 mG/Min IV Continuous <Continuous>  aMIOdarone IVPB 150 milliGRAM(s) IV Intermittent once  digoxin     Tablet 500 MICROGram(s) Oral once  metoprolol tartrate 25 milliGRAM(s) Oral two times a day        PHYSICAL EXAM:  T(C): 37.2 (03-24-24 @ 16:29), Max: 37.2 (03-24-24 @ 16:29)  HR: 120 (03-24-24 @ 16:29) (78 - 120)  BP: 100/64 (03-24-24 @ 16:29) (100/64 - 128/66)  RR: 18 (03-24-24 @ 11:37) (18 - 19)  SpO2: 96% (03-24-24 @ 11:37) (94% - 97%)  Wt(kg): --  I&O's Summary    23 Mar 2024 07:01  -  24 Mar 2024 07:00  --------------------------------------------------------  IN: 825 mL / OUT: 1130 mL / NET: -305 mL    24 Mar 2024 07:01  -  24 Mar 2024 16:51  --------------------------------------------------------  IN: 240 mL / OUT: 0 mL / NET: 240 mL          Appearance: In no distress	  HEENT:    PERRL, EOMI	  Cardiovascular:  S1 S2, No JVD  Respiratory: Lungs clear to auscultation	  Gastrointestinal:  Soft, Non-tender, + BS	  Vascularature:  No edema of LE  Psychiatric: Appropriate affect   Neuro: no acute focal deficits                               10.6   8.32  )-----------( 479      ( 24 Mar 2024 07:23 )             33.4     03-24    131<L>  |  96  |  12  ----------------------------<  125<H>  4.3   |  20<L>  |  0.99    Ca    9.2      24 Mar 2024 07:21  Phos  4.1     03-23  Mg     1.5     03-23    TPro  5.9<L>  /  Alb  2.9<L>  /  TBili  0.2  /  DBili  x   /  AST  11  /  ALT  13  /  AlkPhos  93  03-23        Labs personally reviewed      ASSESSMENT/PLAN: 	    58F w/Hx of RCC s/p R total nephrectomy, S/p hysterectomy, R-sided glaucoma, Fibromyalgia, Anxiety, GERD, smoker presents due to R breast swelling, redness and pain.     Problem/Plan - 1:   ·  Problem: Pericardial effusion.  ·  Plan: - pericardial effusion on CT chest   - TTE 3/18 with Moderate pericardial effusion with echocardiographic evidence of hemodynamic compromise    - Clinically pt is not in tamponade   - Thoracic surgery consulted for pericardial window given likely malignant effusion  ----s/p pericardial window 3/19  - 3/21 now with ST paroxysmal with AF with RVR and reports of chest pain\  - limited TTE: left ventricular systolic function appears grossly normal. Thickened pericardium. Bilateral pleural effusion noted. No pericardial effusion seen.  Compared to the TTE 3/18/2024, the pericardial effusion is no longer present.    Problem/Plan - 2:  ·  Problem: Internal jugular vein thrombosis, right.   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    Problem/Plan - 3:  ·  Problem: Smoker.   ·  Plan: - 1 ppd smoker  - c/w Nicotine patch.    Problem/Plan - 4:  ·  Problem: Prophylactic measure.   ·  Plan: dispo- home.    Problem/Plan - 5:  ·  Problem: Sinus Tachycardia  - Likely reactive   - D5 as per renal    Problem/Plan - 6:  ·  Problem: New Onset Atrial Fibrillation  - c/w heparin gtt  - TSH wnl  - Cont lopressor 25mg PO BID with hold parameters  - c/w Amiodarone gtt  - s/p oral dig load          Adriana Julian, AG-NP   Lb Mata DO [facility]  Cardiovascular Medicine  [address] [place] [address], [address]  Available through call or text on [employer] TEAMs  Office: [phone]   DATE OF SERVICE: 03-24-24 @ 16:50    Patient is a 58y old  Female who presents with a chief complaint of Mastitis, breast malignancy (24 Mar 2024 15:33)      INTERVAL HISTORY: In no acute distress.     REVIEW OF SYSTEMS:  CONSTITUTIONAL: No weakness  EYES/ENT: No visual changes;  No throat pain   NECK: No pain or stiffness  RESPIRATORY: No cough, wheezing; No shortness of breath  CARDIOVASCULAR: No chest pain or palpitations  GASTROINTESTINAL: No abdominal  pain. No nausea, vomiting, or hematemesis  GENITOURINARY: No dysuria, frequency or hematuria  NEUROLOGICAL: No stroke like symptoms  SKIN: No rashes    TELEMETRY Personally reviewed: SR paroxysmal with AF with RVR  	  MEDICATIONS:  aMIOdarone Infusion 0.5 mG/Min IV Continuous <Continuous>  aMIOdarone Infusion 1 mG/Min IV Continuous <Continuous>  aMIOdarone IVPB 150 milliGRAM(s) IV Intermittent once  digoxin     Tablet 500 MICROGram(s) Oral once  metoprolol tartrate 25 milliGRAM(s) Oral two times a day        PHYSICAL EXAM:  T(C): 37.2 (03-24-24 @ 16:29), Max: 37.2 (03-24-24 @ 16:29)  HR: 120 (03-24-24 @ 16:29) (78 - 120)  BP: 100/64 (03-24-24 @ 16:29) (100/64 - 128/66)  RR: 18 (03-24-24 @ 11:37) (18 - 19)  SpO2: 96% (03-24-24 @ 11:37) (94% - 97%)  Wt(kg): --  I&O's Summary    23 Mar 2024 07:01  -  24 Mar 2024 07:00  --------------------------------------------------------  IN: 825 mL / OUT: 1130 mL / NET: -305 mL    24 Mar 2024 07:01  -  24 Mar 2024 16:51  --------------------------------------------------------  IN: 240 mL / OUT: 0 mL / NET: 240 mL          Appearance: In no distress	  HEENT:    PERRL, EOMI	  Cardiovascular:  S1 S2, No JVD  Respiratory: Lungs clear to auscultation	  Gastrointestinal:  Soft, Non-tender, + BS	  Vascularature:  No edema of LE  Psychiatric: Appropriate affect   Neuro: no acute focal deficits                               10.6   8.32  )-----------( 479      ( 24 Mar 2024 07:23 )             33.4     03-24    131<L>  |  96  |  12  ----------------------------<  125<H>  4.3   |  20<L>  |  0.99    Ca    9.2      24 Mar 2024 07:21  Phos  4.1     03-23  Mg     1.5     03-23    TPro  5.9<L>  /  Alb  2.9<L>  /  TBili  0.2  /  DBili  x   /  AST  11  /  ALT  13  /  AlkPhos  93  03-23        Labs personally reviewed      ASSESSMENT/PLAN: 	    58F w/Hx of RCC s/p R total nephrectomy, S/p hysterectomy, R-sided glaucoma, Fibromyalgia, Anxiety, GERD, smoker presents due to R breast swelling, redness and pain.     Problem/Plan - 1:   ·  Problem: Pericardial effusion.  ·  Plan: - pericardial effusion on CT chest   - TTE 3/18 with Moderate pericardial effusion with echocardiographic evidence of hemodynamic compromise    - Clinically pt is not in tamponade   - Thoracic surgery consulted for pericardial window given likely malignant effusion  ----s/p pericardial window 3/19  - 3/21 now with ST paroxysmal with AF with RVR and reports of chest pain\  - limited TTE: left ventricular systolic function appears grossly normal. Thickened pericardium. Bilateral pleural effusion noted. No pericardial effusion seen.  Compared to the TTE 3/18/2024, the pericardial effusion is no longer present.    Problem/Plan - 2:  ·  Problem: Internal jugular vein thrombosis, right.   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    Problem/Plan - 3:  ·  Problem: Smoker.   ·  Plan: - 1 ppd smoker  - c/w Nicotine patch.    Problem/Plan - 4:  ·  Problem: Prophylactic measure.   ·  Plan: dispo- home.    Problem/Plan - 5:  ·  Problem: Sinus Tachycardia  - Likely reactive   - D5 as per renal    Problem/Plan - 6:  ·  Problem: New Onset Atrial Fibrillation  - c/w heparin gtt  - TSH wnl  - Cont lopressor 25mg PO BID with hold parameters  - s/p Amiodarone gtt but currently with no0 IV access  - c/w oral dig load          Adriana Julian, AG-NP   Lb Mata DO [facility]  Cardiovascular Medicine  [address] [place] [address], [address]  Available through call or text on [employer] TEAMs  Office: [phone]   DATE OF SERVICE: 03-24-24 @ 16:50    Patient is a 58y old  Female who presents with a chief complaint of Mastitis, breast malignancy (24 Mar 2024 15:33)      INTERVAL HISTORY: In no acute distress.     REVIEW OF SYSTEMS:  CONSTITUTIONAL: No weakness  EYES/ENT: No visual changes;  No throat pain   NECK: No pain or stiffness  RESPIRATORY: No cough, wheezing; No shortness of breath  CARDIOVASCULAR: No chest pain or palpitations  GASTROINTESTINAL: No abdominal  pain. No nausea, vomiting, or hematemesis  GENITOURINARY: No dysuria, frequency or hematuria  NEUROLOGICAL: No stroke like symptoms  SKIN: No rashes    TELEMETRY Personally reviewed: SR paroxysmal with AF with RVR  	  MEDICATIONS:  aMIOdarone Infusion 0.5 mG/Min IV Continuous <Continuous>  aMIOdarone Infusion 1 mG/Min IV Continuous <Continuous>  aMIOdarone IVPB 150 milliGRAM(s) IV Intermittent once  digoxin     Tablet 500 MICROGram(s) Oral once  metoprolol tartrate 25 milliGRAM(s) Oral two times a day        PHYSICAL EXAM:  T(C): 37.2 (03-24-24 @ 16:29), Max: 37.2 (03-24-24 @ 16:29)  HR: 120 (03-24-24 @ 16:29) (78 - 120)  BP: 100/64 (03-24-24 @ 16:29) (100/64 - 128/66)  RR: 18 (03-24-24 @ 11:37) (18 - 19)  SpO2: 96% (03-24-24 @ 11:37) (94% - 97%)  Wt(kg): --  I&O's Summary    23 Mar 2024 07:01  -  24 Mar 2024 07:00  --------------------------------------------------------  IN: 825 mL / OUT: 1130 mL / NET: -305 mL    24 Mar 2024 07:01  -  24 Mar 2024 16:51  --------------------------------------------------------  IN: 240 mL / OUT: 0 mL / NET: 240 mL          Appearance: In no distress	  HEENT:    PERRL, EOMI	  Cardiovascular:  S1 S2, No JVD  Respiratory: Lungs clear to auscultation	  Gastrointestinal:  Soft, Non-tender, + BS	  Vascularature:  No edema of LE  Psychiatric: Appropriate affect   Neuro: no acute focal deficits                               10.6   8.32  )-----------( 479      ( 24 Mar 2024 07:23 )             33.4     03-24    131<L>  |  96  |  12  ----------------------------<  125<H>  4.3   |  20<L>  |  0.99    Ca    9.2      24 Mar 2024 07:21  Phos  4.1     03-23  Mg     1.5     03-23    TPro  5.9<L>  /  Alb  2.9<L>  /  TBili  0.2  /  DBili  x   /  AST  11  /  ALT  13  /  AlkPhos  93  03-23        Labs personally reviewed      ASSESSMENT/PLAN: 	    58F w/Hx of RCC s/p R total nephrectomy, S/p hysterectomy, R-sided glaucoma, Fibromyalgia, Anxiety, GERD, smoker presents due to R breast swelling, redness and pain.     Problem/Plan - 1:   ·  Problem: Pericardial effusion.  ·  Plan: - pericardial effusion on CT chest   - TTE 3/18 with Moderate pericardial effusion with echocardiographic evidence of hemodynamic compromise    - Clinically pt is not in tamponade   - Thoracic surgery consulted for pericardial window given likely malignant effusion  ----s/p pericardial window 3/19  - 3/21 now with ST paroxysmal with AF with RVR and reports of chest pain\  - limited TTE: left ventricular systolic function appears grossly normal. Thickened pericardium. Bilateral pleural effusion noted. No pericardial effusion seen.  Compared to the TTE 3/18/2024, the pericardial effusion is no longer present.    Problem/Plan - 2:  ·  Problem: Internal jugular vein thrombosis, right.   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    Problem/Plan - 3:  ·  Problem: Smoker.   ·  Plan: - 1 ppd smoker  - c/w Nicotine patch.    Problem/Plan - 4:  ·  Problem: Prophylactic measure.   ·  Plan: dispo- home.    Problem/Plan - 5:  ·  Problem: Sinus Tachycardia  - Likely reactive   - D5 as per renal    Problem/Plan - 6:  ·  Problem: New Onset Atrial Fibrillation  - c/w heparin gtt  - TSH wnl  - Cont lopressor 25mg PO BID with hold parameters  - s/p Amiodarone gtt but currently with 0 IV access  - Initiate oral dig load          Adriana Julian, AG-NP   Lb Mata DO [facility]  Cardiovascular Medicine  [address] [place] [address], [address]  Available through call or text on [employer] TEAMs  Office: [phone]

## 2024-03-24 NOTE — PROCEDURE NOTE - ADDITIONAL PROCEDURE DETAILS
Called to assist in obtaining emergency venous access for patient with SVC syndrome, afib/RVR, and requirement for a multitude of IV medicines, where the team has been unable to obtain other access in veins draining to the SVC. Despite rigorous attempts, the primary team has not been able to successfully enlist the assistance of any other team member for placement of a line in a patient with fluctuating vitals, which is quite unfortunate in a patient with aggressive malignancy and uncontrolled symptoms, who is DNR/DNI. Therefore, to prevent further deterioration, the ICU has stepped in to assist the primary team in caring for this patient, despite not being a part of the escalation protocol for venous access.    Placement in R femoral vein confirmed with sonographic agitated saline study demonstrating entrance of saline into the right heart, as well as wire within the femoral vein. Images stored in [place].    Care for catheter as per unit protocols. Wire removed from patient and demonstrated to bedside nurse (who also served as chaperone for the duration of the procedure) prior to disposal. Called to assist in obtaining emergency venous access for patient with SVC syndrome, afib/RVR, and requirement for a multitude of IV medicines, where the team has been unable to obtain other access in veins draining to the SVC. Despite rigorous, diligent, earnest attempts, the primary team has not been able to successfully enlist the assistance of any other team member for placement of a line in a patient with fluctuating vitals, which is quite unfortunate in a patient with aggressive malignancy and uncontrolled symptoms, who is DNR/DNI. Therefore, to prevent further deterioration, the ICU has stepped in to assist the primary team in caring for this patient, despite not being a part of the escalation protocol for venous access.    Placement in R femoral vein confirmed with sonographic agitated saline study demonstrating entrance of saline into the right heart, as well as wire within the femoral vein. Images stored in [facility].    Care for catheter as per unit protocols. Wire removed from patient and demonstrated to bedside nurse (who also served as chaperone for the duration of the procedure) prior to disposal.

## 2024-03-24 NOTE — PROGRESS NOTE ADULT - SUBJECTIVE AND OBJECTIVE BOX
Moose Nolen MD  Division of Hospital Medicine  Available on MS teams until 7pm  If no response or off-hours, page [phone]  -------------------------------------    Patient is a 58y old  Female who presents with a chief complaint of Mastitis, breast malignancy (23 Mar 2024 12:54)    SUBJECTIVE / OVERNIGHT EVENTS: lost IV access  ADDITIONAL REVIEW OF SYSTEMS: pt frustrated and feels isolated and anxioux over her health issues and complications with IV access. otherwise no new complaints/symptoms.    MEDICATIONS  (STANDING):  aMIOdarone Infusion 1 mG/Min (33.3 mL/Hr) IV Continuous <Continuous>  aMIOdarone Infusion 0.5 mG/Min (16.7 mL/Hr) IV Continuous <Continuous>  aMIOdarone IVPB 150 milliGRAM(s) IV Intermittent once  cefepime   IVPB 2000 milliGRAM(s) IV Intermittent daily  enoxaparin Injectable 50 milliGRAM(s) SubCutaneous every 12 hours  influenza   Vaccine 0.5 milliLiter(s) IntraMuscular once  metoprolol tartrate 25 milliGRAM(s) Oral two times a day  metroNIDAZOLE  IVPB 500 milliGRAM(s) IV Intermittent every 12 hours  nicotine -  14 mG/24Hr(s) Patch 1 Patch Transdermal daily  oxyCODONE  ER Tablet 20 milliGRAM(s) Oral <User Schedule>  pantoprazole    Tablet 40 milliGRAM(s) Oral before breakfast  polyethylene glycol 3350 17 Gram(s) Oral two times a day  senna 2 Tablet(s) Oral at bedtime  sodium chloride 2 Gram(s) Oral two times a day  vancomycin  IVPB      vancomycin  IVPB 750 milliGRAM(s) IV Intermittent every 12 hours    MEDICATIONS  (PRN):  albuterol/ipratropium for Nebulization 3 milliLiter(s) Nebulizer every 6 hours PRN Shortness of Breath and/or Wheezing  aluminum hydroxide/magnesium hydroxide/simethicone Suspension 30 milliLiter(s) Oral every 6 hours PRN Dyspepsia  benzocaine/menthol Lozenge 1 Lozenge Oral two times a day PRN Sore Throat  diazepam    Tablet 5 milliGRAM(s) Oral two times a day PRN for anxiety  guaiFENesin Oral Liquid (Sugar-Free) 200 milliGRAM(s) Oral every 6 hours PRN Cough  HYDROmorphone  Injectable 1 milliGRAM(s) IV Push every 3 hours PRN Severe Pain (7 - 10)  melatonin 3 milliGRAM(s) Oral at bedtime PRN Insomnia  naloxone Injectable 0.1 milliGRAM(s) IV Push every 3 minutes PRN Obtundation, respiratory suppression  oxyCODONE    IR 10 milliGRAM(s) Oral every 4 hours PRN Moderate Pain (4 - 6)      CAPILLARY BLOOD GLUCOSE        I&O's Summary    23 Mar 2024 07:01  -  24 Mar 2024 07:00  --------------------------------------------------------  IN: 825 mL / OUT: 1130 mL / NET: -305 mL    24 Mar 2024 07:01  -  24 Mar 2024 14:41  --------------------------------------------------------  IN: 240 mL / OUT: 0 mL / NET: 240 mL        PHYSICAL EXAM:  Vital Signs Last 24 Hrs  T(C): 36.3 (24 Mar 2024 11:37), Max: 37.1 (24 Mar 2024 04:33)  T(F): 97.4 (24 Mar 2024 11:37), Max: 98.7 (24 Mar 2024 04:33)  HR: 93 (24 Mar 2024 11:37) (78 - 93)  BP: 118/80 (24 Mar 2024 11:37) (118/80 - 128/66)  BP(mean): --  RR: 18 (24 Mar 2024 11:37) (18 - 19)  SpO2: 96% (24 Mar 2024 11:37) (93% - 97%)    Parameters below as of 24 Mar 2024 11:37  Patient On (Oxygen Delivery Method): nasal cannula      CONSTITUTIONAL: NAD  EYES: PERRLA; conjunctiva and sclera clear  ENMT: MMM  NECK: Supple  RESPIRATORY: Normal respiratory effort; CTAB  CARDIOVASCULAR: RRR, no JVD, no peripheral edema   ABDOMEN: Nontender to palpation, normoactive BS, no guarding/rigidity  MUSCLOSKELETAL:  no clubbing/cyanosis, no joint swelling or tenderness to palpation  PSYCH: A+O x 3, affect anxious/tearful  NEUROLOGY: CN 2-12 are intact and symmetric; no gross sensory or motor deficits  SKIN: No rashes; no palpable lesions    LABS:                        10.6   8.32  )-----------( 479      ( 24 Mar 2024 07:23 )             33.4     03-24    131<L>  |  96  |  12  ----------------------------<  125<H>  4.3   |  20<L>  |  0.99    Ca    9.2      24 Mar 2024 07:21  Phos  4.1     03-23  Mg     1.5     03-23    TPro  5.9<L>  /  Alb  2.9<L>  /  TBili  0.2  /  DBili  x   /  AST  11  /  ALT  13  /  AlkPhos  93  03-23    PT/INR - ( 23 Mar 2024 02:45 )   PT: 12.0 sec;   INR: 1.15 ratio         PTT - ( 23 Mar 2024 16:49 )  PTT:23.5 sec      Urinalysis Basic - ( 24 Mar 2024 07:21 )    Color: x / Appearance: x / SG: x / pH: x  Gluc: 125 mg/dL / Ketone: x  / Bili: x / Urobili: x   Blood: x / Protein: x / Nitrite: x   Leuk Esterase: x / RBC: x / WBC x   Sq Epi: x / Non Sq Epi: x / Bacteria: x          RADIOLOGY & ADDITIONAL TESTS:  Results Reviewed:   Imaging Personally Reviewed:  Electrocardiogram Personally Reviewed:    COORDINATION OF CARE:  Care Discussed with Consultants/Other Providers [Y/N]: vascular team  Prior or Outpatient Records Reviewed [Y/N]:

## 2024-03-24 NOTE — PROGRESS NOTE ADULT - ASSESSMENT
this is a 58 F hx of   RCC total nephrectomy, S/p hysterectomy, R-sided glaucoma, Fibromyalgia, Anxiety, GERD, smoker, COPD, presents due to R breast swelling, redness and pain. Pt was initially evaluated by [facility] yesterday and was going to be admitted but left AMA. Was prescribed clindamycin for presumed breast cellulitis/mastitis on discharge, but was also told about concerning findings on CT Chest related to possible breast malignancy and metastasis. Pt has not had a mammogram or colonoscopy in years. Reports that she had a lidocaine injection in her R shoulder on 3/8/24 and since then has had worsening swelling, redness and pain in her R breast. Also still has R shoulder pain, which had mild improvement since the injection. Pt consistently takes oxycodone q6h and believes this may have masked her pain earlier. Reports over 40 pound weight loss in the last year and loss of appetite. Has conjunctival injection in R eye which she claims is chronic but she also feels her eyes are more swollen than usual currently. Denies vision changes. Smokes 1ppd for many years. Patient denies fever, chills, chest pain, SOB, headache, dizziness, abd pain, nausea, vomiting, constipation, dysuria.     3/19 underwent Subxiphoid pericardial window. Portion of xiphoid resected and pericardium incised. Fluid drained and Regan drain placed. Approx 320cc of serous pericardial fluid drained initially.  3/20 VSS OOB to chair  pericardial drain  145 cc overnight on 4lnc .  3/21 VSS pericardial drain -240 cc  3/22 VSS pericardial 40 cc bleomycin x1@ given @ 1345  3/23          vss     pericardiAL DRAIN  3/24     pericardial drain  90 cc   24 hrs

## 2024-03-24 NOTE — PROGRESS NOTE ADULT - PROBLEM SELECTOR PLAN 1
3/19 underwent Subxiphoid pericardial window with Dr Ruiz    pericardial drain  to water seal document output q shift     follow up on cytology  sp     bleomycin x1  via rafa tube  possible out am  monday     care as primary team  Plan d/w Dr Mac

## 2024-03-24 NOTE — CHART NOTE - NSCHARTNOTEFT_GEN_A_CORE
Medicine ACP Episodic note    CC: SOB  Notified by RN that patient c/o SOB  Evaluated the pta t bedside, patient a&ox4, lying in bed, on 4lo2nc  extremely anxious, states that she has been having SOB during day also  denies HA, dizziness, CP, palpaitiosn, fever,, chills\\    Vital Signs Last 24 Hrs  T(C): 37 (23 Mar 2024 19:53), Max: 37 (23 Mar 2024 04:39)  T(F): 98.6 (23 Mar 2024 19:53), Max: 98.6 (23 Mar 2024 04:39)  HR: 92 (24 Mar 2024 00:42) (73 - 98)  BP: 123/67 (24 Mar 2024 00:42) (109/70 - 128/66)  BP(mean): --  RR: 18 (24 Mar 2024 00:42) (18 - 19)  SpO2: 97% (24 Mar 2024 00:42) (92% - 97%)    Parameters below as of 24 Mar 2024 00:42  Patient On (Oxygen Delivery Method): mask, Venturi  O2 Flow (L/min): 15    Physical exam      A/P    58F w/Hx of RCC s/p R total nephrectomy, S/p hysterectomy, R-sided glaucoma, Fibromyalgia, Anxiety, GERD, smoker presents due to R breast swelling, redness and pain. Admitted for right breast swelling and cellulitis found to have imaging evidence concerning for metastatic malignancy w/ lung nodules, and axillary, supraclavicular, and mediastinal adenopathy. 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 Also s/p pericardial window for moderate pericardial effusion now with chest tube. Now found to be in Afib with RVR  Mediastinal mass.   possible effect on IVC  - CT neck with large supraclavicular/mediastinal mass lesion with mass effect and complete occlusion of the right IJ; associated surrounding inflammatory changes in the deep neck, concerning for infectious/inflammatory thrombophlebitis  - S/p supraclavicular lymph node biopsy by IR on 3/14  Internal jugular vein thrombosis, right.    Pericardial effusion. s/p pericardial window 3/19  CXR with right moderate pleural effusion    # AHRF  Likely multifactorial from mediastinal mass,  pleural effusion, anxiety   Pulm exam consistent  with crackles RT LUNG BASES, WHEEZING+, FLUID OVERLOAD  iv FLUID D/CD  Duoneb stat  Will do Lasix 20mg ivp x1 dose trial to see whether that hels the patient   Closely monitor creatinine  Placed pt o 50% VM  Abg stat results consistent with adequate ventilation and oxygenation  C/W cefepime and vanco  C/W pericardial drain , draining well  Pt DNR/DNI  F/U pulm, cardiology am    Ashly Ortiz [facility]  [id number] Medicine ACP Episodic note    CC: SOB  Notified by RN that patient c/o SOB  Evaluated the pta t bedside, patient a&ox4, lying in bed, on 4lo2nc  extremely anxious, states that she has been having SOB during day also  denies HA, dizziness, CP, palpaitiosn, fever,, chills\\    Vital Signs Last 24 Hrs  T(C): 37 (23 Mar 2024 19:53), Max: 37 (23 Mar 2024 04:39)  T(F): 98.6 (23 Mar 2024 19:53), Max: 98.6 (23 Mar 2024 04:39)  HR: 92 (24 Mar 2024 00:42) (73 - 98)  BP: 123/67 (24 Mar 2024 00:42) (109/70 - 128/66)  BP(mean): --  RR: 18 (24 Mar 2024 00:42) (18 - 19)  SpO2: 97% (24 Mar 2024 00:42) (92% - 97%)    Parameters below as of 24 Mar 2024 00:42  Patient On (Oxygen Delivery Method): mask, Venturi  O2 Flow (L/min): 15    Physical exam    Neuro: anxious, A&OX4  pULM: crackles/wheezing+  GI: Benign  ENT: Neck swelling, airway patent, swelling on eyelids  VasculR: 2+ EDEMA b/l UE      A/P    58F w/Hx of RCC s/p R total nephrectomy, S/p hysterectomy, R-sided glaucoma, Fibromyalgia, Anxiety, GERD, smoker presents due to R breast swelling, redness and pain. Admitted for right breast swelling and cellulitis found to have imaging evidence concerning for metastatic malignancy w/ lung nodules, and axillary, supraclavicular, and mediastinal adenopathy. 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 Also s/p pericardial window for moderate pericardial effusion now with chest tube. Now found to be in Afib with RVR  Mediastinal mass.   possible effect on IVC  - CT neck with large supraclavicular/mediastinal mass lesion with mass effect and complete occlusion of the right IJ; associated surrounding inflammatory changes in the deep neck, concerning for infectious/inflammatory thrombophlebitis  - S/p supraclavicular lymph node biopsy by IR on 3/14  Internal jugular vein thrombosis, right.    Pericardial effusion. s/p pericardial window 3/19  CXR with right moderate pleural effusion    # AHRF  Likely multifactorial from mediastinal mass,  pleural effusion, anxiety   Pulm exam consistent  with crackles RT LUNG BASES, WHEEZING+, FLUID OVERLOAD  iv FLUID D/CD  Duoneb stat  Will do Lasix 20mg ivp x1 dose trial to see whether that hels the patient   Closely monitor creatinine  Placed pt o 50% VM  Abg stat results consistent with adequate ventilation and oxygenation  C/W cefepime and vanco  C/W pericardial drain , draining well  Pt DNR/DNI  F/U pulm, cardiology am    Ashly Ortiz [facility]  [id number]

## 2024-03-25 ENCOUNTER — TRANSCRIPTION ENCOUNTER (OUTPATIENT)
Age: 59
End: 2024-03-25

## 2024-03-25 ENCOUNTER — NON-APPOINTMENT (OUTPATIENT)
Age: 59
End: 2024-03-25

## 2024-03-25 DIAGNOSIS — J90 PLEURAL EFFUSION, NOT ELSEWHERE CLASSIFIED: ICD-10-CM

## 2024-03-25 LAB
ADD ON TEST-SPECIMEN IN LAB: SIGNIFICANT CHANGE UP
ANION GAP SERPL CALC-SCNC: 13 MMOL/L — SIGNIFICANT CHANGE UP (ref 5–17)
BLD GP AB SCN SERPL QL: NEGATIVE — SIGNIFICANT CHANGE UP
BUN SERPL-MCNC: 14 MG/DL — SIGNIFICANT CHANGE UP (ref 7–23)
CALCIUM SERPL-MCNC: 9.5 MG/DL — SIGNIFICANT CHANGE UP (ref 8.4–10.5)
CHLORIDE SERPL-SCNC: 97 MMOL/L — SIGNIFICANT CHANGE UP (ref 96–108)
CO2 SERPL-SCNC: 21 MMOL/L — LOW (ref 22–31)
CREAT SERPL-MCNC: 0.95 MG/DL — SIGNIFICANT CHANGE UP (ref 0.5–1.3)
EGFR: 69 ML/MIN/1.73M2 — SIGNIFICANT CHANGE UP
GLUCOSE SERPL-MCNC: 126 MG/DL — HIGH (ref 70–99)
HCT VFR BLD CALC: 33.2 % — LOW (ref 34.5–45)
HGB BLD-MCNC: 10.5 G/DL — LOW (ref 11.5–15.5)
MAGNESIUM SERPL-MCNC: 1.8 MG/DL — SIGNIFICANT CHANGE UP (ref 1.6–2.6)
MCHC RBC-ENTMCNC: 28.5 PG — SIGNIFICANT CHANGE UP (ref 27–34)
MCHC RBC-ENTMCNC: 31.6 GM/DL — LOW (ref 32–36)
MCV RBC AUTO: 90.2 FL — SIGNIFICANT CHANGE UP (ref 80–100)
NRBC # BLD: 0 /100 WBCS — SIGNIFICANT CHANGE UP (ref 0–0)
NT-PROBNP SERPL-SCNC: 1203 PG/ML — HIGH (ref 0–300)
PLATELET # BLD AUTO: 487 K/UL — HIGH (ref 150–400)
POTASSIUM SERPL-MCNC: 4.6 MMOL/L — SIGNIFICANT CHANGE UP (ref 3.5–5.3)
POTASSIUM SERPL-SCNC: 4.6 MMOL/L — SIGNIFICANT CHANGE UP (ref 3.5–5.3)
RBC # BLD: 3.68 M/UL — LOW (ref 3.8–5.2)
RBC # FLD: 13.7 % — SIGNIFICANT CHANGE UP (ref 10.3–14.5)
RH IG SCN BLD-IMP: POSITIVE — SIGNIFICANT CHANGE UP
SODIUM SERPL-SCNC: 131 MMOL/L — LOW (ref 135–145)
VANCOMYCIN TROUGH SERPL-MCNC: 7.4 UG/ML — LOW (ref 10–20)
WBC # BLD: 9.82 K/UL — SIGNIFICANT CHANGE UP (ref 3.8–10.5)
WBC # FLD AUTO: 9.82 K/UL — SIGNIFICANT CHANGE UP (ref 3.8–10.5)

## 2024-03-25 PROCEDURE — 99232 SBSQ HOSP IP/OBS MODERATE 35: CPT

## 2024-03-25 PROCEDURE — 99223 1ST HOSP IP/OBS HIGH 75: CPT | Mod: GC

## 2024-03-25 PROCEDURE — 71045 X-RAY EXAM CHEST 1 VIEW: CPT | Mod: 26

## 2024-03-25 PROCEDURE — 99232 SBSQ HOSP IP/OBS MODERATE 35: CPT | Mod: FS

## 2024-03-25 PROCEDURE — 99232 SBSQ HOSP IP/OBS MODERATE 35: CPT | Mod: GC

## 2024-03-25 PROCEDURE — 99233 SBSQ HOSP IP/OBS HIGH 50: CPT

## 2024-03-25 RX ORDER — UREA 15 G
15 POWDER IN PACKET (EA) ORAL
Refills: 0 | Status: DISCONTINUED | OUTPATIENT
Start: 2024-03-25 | End: 2024-03-26

## 2024-03-25 RX ORDER — FUROSEMIDE 40 MG
20 TABLET ORAL ONCE
Refills: 0 | Status: COMPLETED | OUTPATIENT
Start: 2024-03-25 | End: 2024-03-25

## 2024-03-25 RX ORDER — IPRATROPIUM/ALBUTEROL SULFATE 18-103MCG
3 AEROSOL WITH ADAPTER (GRAM) INHALATION ONCE
Refills: 0 | Status: DISCONTINUED | OUTPATIENT
Start: 2024-03-25 | End: 2024-03-26

## 2024-03-25 RX ORDER — VANCOMYCIN HCL 1 G
1000 VIAL (EA) INTRAVENOUS EVERY 12 HOURS
Refills: 0 | Status: DISCONTINUED | OUTPATIENT
Start: 2024-03-25 | End: 2024-03-26

## 2024-03-25 RX ORDER — HEPARIN SODIUM 5000 [USP'U]/ML
2000 INJECTION INTRAVENOUS; SUBCUTANEOUS EVERY 6 HOURS
Refills: 0 | Status: DISCONTINUED | OUTPATIENT
Start: 2024-03-25 | End: 2024-03-26

## 2024-03-25 RX ORDER — HEPARIN SODIUM 5000 [USP'U]/ML
4000 INJECTION INTRAVENOUS; SUBCUTANEOUS EVERY 6 HOURS
Refills: 0 | Status: DISCONTINUED | OUTPATIENT
Start: 2024-03-25 | End: 2024-03-26

## 2024-03-25 RX ORDER — HEPARIN SODIUM 5000 [USP'U]/ML
INJECTION INTRAVENOUS; SUBCUTANEOUS
Qty: 25000 | Refills: 0 | Status: DISCONTINUED | OUTPATIENT
Start: 2024-03-25 | End: 2024-03-26

## 2024-03-25 RX ADMIN — OXYCODONE HYDROCHLORIDE 20 MILLIGRAM(S): 5 TABLET ORAL at 06:25

## 2024-03-25 RX ADMIN — Medication 15 GRAM(S): at 18:28

## 2024-03-25 RX ADMIN — Medication 25 MILLIGRAM(S): at 18:23

## 2024-03-25 RX ADMIN — HEPARIN SODIUM 900 UNIT(S)/HR: 5000 INJECTION INTRAVENOUS; SUBCUTANEOUS at 13:32

## 2024-03-25 RX ADMIN — SODIUM CHLORIDE 2 GRAM(S): 9 INJECTION INTRAMUSCULAR; INTRAVENOUS; SUBCUTANEOUS at 06:27

## 2024-03-25 RX ADMIN — HYDROMORPHONE HYDROCHLORIDE 1 MILLIGRAM(S): 2 INJECTION INTRAMUSCULAR; INTRAVENOUS; SUBCUTANEOUS at 20:10

## 2024-03-25 RX ADMIN — Medication 25 MILLIGRAM(S): at 06:26

## 2024-03-25 RX ADMIN — SENNA PLUS 2 TABLET(S): 8.6 TABLET ORAL at 22:56

## 2024-03-25 RX ADMIN — Medication 250 MILLIGRAM(S): at 22:57

## 2024-03-25 RX ADMIN — OXYCODONE HYDROCHLORIDE 20 MILLIGRAM(S): 5 TABLET ORAL at 23:30

## 2024-03-25 RX ADMIN — ENOXAPARIN SODIUM 50 MILLIGRAM(S): 100 INJECTION SUBCUTANEOUS at 09:39

## 2024-03-25 RX ADMIN — Medication 1 PATCH: at 11:30

## 2024-03-25 RX ADMIN — Medication 3 MILLILITER(S): at 06:32

## 2024-03-25 RX ADMIN — POLYETHYLENE GLYCOL 3350 17 GRAM(S): 17 POWDER, FOR SOLUTION ORAL at 06:27

## 2024-03-25 RX ADMIN — OXYCODONE HYDROCHLORIDE 20 MILLIGRAM(S): 5 TABLET ORAL at 14:05

## 2024-03-25 RX ADMIN — POLYETHYLENE GLYCOL 3350 17 GRAM(S): 17 POWDER, FOR SOLUTION ORAL at 18:21

## 2024-03-25 RX ADMIN — Medication 250 MILLIGRAM(S): at 10:08

## 2024-03-25 RX ADMIN — OXYCODONE HYDROCHLORIDE 20 MILLIGRAM(S): 5 TABLET ORAL at 22:56

## 2024-03-25 RX ADMIN — BENZOCAINE AND MENTHOL 1 LOZENGE: 5; 1 LIQUID ORAL at 06:34

## 2024-03-25 RX ADMIN — OXYCODONE HYDROCHLORIDE 20 MILLIGRAM(S): 5 TABLET ORAL at 13:35

## 2024-03-25 RX ADMIN — Medication 20 MILLIGRAM(S): at 09:24

## 2024-03-25 RX ADMIN — Medication 1 PATCH: at 13:33

## 2024-03-25 RX ADMIN — Medication 100 MILLIGRAM(S): at 22:57

## 2024-03-25 RX ADMIN — CEFEPIME 100 MILLIGRAM(S): 1 INJECTION, POWDER, FOR SOLUTION INTRAMUSCULAR; INTRAVENOUS at 22:57

## 2024-03-25 RX ADMIN — CHLORHEXIDINE GLUCONATE 1 APPLICATION(S): 213 SOLUTION TOPICAL at 08:09

## 2024-03-25 RX ADMIN — Medication 3 MILLIGRAM(S): at 22:56

## 2024-03-25 RX ADMIN — Medication 250 MICROGRAM(S): at 06:25

## 2024-03-25 RX ADMIN — Medication 1 PATCH: at 05:19

## 2024-03-25 RX ADMIN — PANTOPRAZOLE SODIUM 40 MILLIGRAM(S): 20 TABLET, DELAYED RELEASE ORAL at 06:26

## 2024-03-25 RX ADMIN — HYDROMORPHONE HYDROCHLORIDE 1 MILLIGRAM(S): 2 INJECTION INTRAMUSCULAR; INTRAVENOUS; SUBCUTANEOUS at 19:52

## 2024-03-25 RX ADMIN — Medication 100 MILLIGRAM(S): at 09:24

## 2024-03-25 NOTE — PROGRESS NOTE ADULT - PROBLEM SELECTOR PLAN 2
- Imaging shows complete occlusion of the right subclavian vein and nearly occlusive thrombosis in the proximal left brachiocephalic vein with flow reconstitution distally   - heparin back on per thoracic request given upcoming pleurx placement    #IV access issues- lost L piv access, floor unable to place another. Not candidate for IV on R due to mass, but also has SVC obliteration on CT neck to doubt patient can have PICC or central line placed on R  - MICU team assistance appreciated in placing femoral TLC    #SVC syndrome- IR reconsulted for possible stenting

## 2024-03-25 NOTE — PROGRESS NOTE ADULT - SUBJECTIVE AND OBJECTIVE BOX
[facility] DIVISION OF KIDNEY DISEASES AND HYPERTENSION -- FOLLOW UP NOTE  --------------------------------------------------------------------------------  Chief Complaint:    24 hour events/subjective:    Pt Na stable  Ongoing drainage       PAST HISTORY  --------------------------------------------------------------------------------  No significant changes to PMH, PSH, FHx, SHx, unless otherwise noted    ALLERGIES & MEDICATIONS  --------------------------------------------------------------------------------  Allergies    Cipro (Headache; Vomiting; Rash)  penicillin (Headache; Vomiting; Rash)  erythromycin (Headache; Vomiting; Rash)    Intolerances      Standing Inpatient Medications  albuterol/ipratropium for Nebulization. 3 milliLiter(s) Nebulizer once  aMIOdarone Infusion 1 mG/Min IV Continuous <Continuous>  aMIOdarone Infusion 0.5 mG/Min IV Continuous <Continuous>  aMIOdarone IVPB 150 milliGRAM(s) IV Intermittent once  cefepime   IVPB 2000 milliGRAM(s) IV Intermittent daily  chlorhexidine 4% Liquid 1 Application(s) Topical <User Schedule>  heparin  Infusion.  Unit(s)/Hr IV Continuous <Continuous>  influenza   Vaccine 0.5 milliLiter(s) IntraMuscular once  metoprolol tartrate 25 milliGRAM(s) Oral two times a day  metroNIDAZOLE  IVPB 500 milliGRAM(s) IV Intermittent every 12 hours  nicotine -  14 mG/24Hr(s) Patch 1 Patch Transdermal daily  oxyCODONE  ER Tablet 20 milliGRAM(s) Oral <User Schedule>  pantoprazole    Tablet 40 milliGRAM(s) Oral before breakfast  polyethylene glycol 3350 17 Gram(s) Oral two times a day  senna 2 Tablet(s) Oral at bedtime  vancomycin  IVPB 1000 milliGRAM(s) IV Intermittent every 12 hours    PRN Inpatient Medications  albuterol/ipratropium for Nebulization 3 milliLiter(s) Nebulizer every 6 hours PRN  aluminum hydroxide/magnesium hydroxide/simethicone Suspension 30 milliLiter(s) Oral every 6 hours PRN  benzocaine/menthol Lozenge 1 Lozenge Oral two times a day PRN  diazepam    Tablet 5 milliGRAM(s) Oral two times a day PRN  guaiFENesin Oral Liquid (Sugar-Free) 200 milliGRAM(s) Oral every 6 hours PRN  heparin   Injectable 4000 Unit(s) IV Push every 6 hours PRN  heparin   Injectable 2000 Unit(s) IV Push every 6 hours PRN  HYDROmorphone  Injectable 1 milliGRAM(s) IV Push every 3 hours PRN  melatonin 3 milliGRAM(s) Oral at bedtime PRN  naloxone Injectable 0.1 milliGRAM(s) IV Push every 3 minutes PRN  oxyCODONE    IR 10 milliGRAM(s) Oral every 4 hours PRN  sodium chloride 0.9% lock flush 10 milliLiter(s) IV Push every 1 hour PRN      REVIEW OF SYSTEMS  --------------------------------------------------------------------------------  Gen: No weight changes, fatigue, fevers/chills, weakness  Skin: No rashes  Head/Eyes/Ears/Mouth: No headache; Normal hearing; Normal vision w/o blurriness; No sinus pain/discomfort, sore throat  Respiratory: No dyspnea, cough, wheezing, hemoptysis  CV: No chest pain, PND, orthopnea  GI: No abdominal pain, diarrhea, constipation, nausea, vomiting, melena, hematochezia  : No increased frequency, dysuria, hematuria, nocturia  MSK: No joint pain/swelling; no back pain; no edema  Neuro: No dizziness/lightheadedness, weakness, seizures, numbness, tingling  Heme: No easy bruising or bleeding  Endo: No heat/cold intolerance  Psych: No significant nervousness, anxiety, stress, depression    All other systems were reviewed and are negative, except as noted.    VITALS/PHYSICAL EXAM  --------------------------------------------------------------------------------  T(C): 36.9 (03-25-24 @ 12:28), Max: 37.2 (03-24-24 @ 16:29)  HR: 93 (03-25-24 @ 12:28) (93 - 120)  BP: 101/62 (03-25-24 @ 12:28) (100/64 - 139/92)  RR: 19 (03-25-24 @ 12:28) (19 - 20)  SpO2: 93% (03-25-24 @ 12:28) (93% - 97%)  Wt(kg): --        03-24-24 @ 07:01  -  03-25-24 @ 07:00  --------------------------------------------------------  IN: 240 mL / OUT: 80 mL / NET: 160 mL      Physical Exam:  	Gen: NAD, well-appearing  	HEENT: PERRL, supple neck, clear oropharynx  	Pulm: CTA B/L  	CV: RRR, S1S2; no rub  	Back: No spinal or CVA tenderness; no sacral edema  	Abd: +BS, soft, nontender/nondistended  	: No suprapubic tenderness  	UE: Warm, FROM, no clubbing, intact strength; no edema; no asterixis  	LE: Warm, FROM, no clubbing, intact strength; no edema  	Neuro: No focal deficits, intact gait  	Psych: Normal affect and mood  	Skin: Warm, without rashes  	Vascular access:    LABS/STUDIES  --------------------------------------------------------------------------------              10.5   9.82  >-----------<  487      [03-25-24 @ 09:16]              33.2     131  |  97  |  14  ----------------------------<  126      [03-25-24 @ 09:14]  4.6   |  21  |  0.95        Ca     9.5     [03-25-24 @ 09:14]      Mg     1.8     [03-25-24 @ 09:14]        PTT: 23.5       [03-23-24 @ 16:49]      Creatinine Trend:  SCr 0.95 [03-25 @ 09:14]  SCr 0.99 [03-24 @ 07:21]  SCr 0.92 [03-23 @ 16:49]  SCr 0.97 [03-23 @ 02:46]  SCr 0.83 [03-22 @ 20:22]    Urinalysis - [03-25-24 @ 09:14]      Color  / Appearance  / SG  / pH       Gluc 126 / Ketone   / Bili  / Urobili        Blood  / Protein  / Leuk Est  / Nitrite       RBC  / WBC  / Hyaline  / Gran  / Sq Epi  / Non Sq Epi  / Bacteria       TSH 3.82      [03-23-24 @ 08:39]  Lipid: chol 143, , HDL 50, LDL --      [03-12-24 @ 07:01]    HCV 0.09, Nonreact      [03-12-24 @ 07:01]

## 2024-03-25 NOTE — PROGRESS NOTE ADULT - PROBLEM SELECTOR PLAN 8
- c/w Home Valium (Confirmed via iStop)  - pt is very tearful, anxious and frustrated about current clinical status  - emotional support provided  - she declines Behavioural Health consult, palliative following

## 2024-03-25 NOTE — PROGRESS NOTE ADULT - SUBJECTIVE AND OBJECTIVE BOX
Follow Up:    Neck mass    Interval History/ROS:  VSS ON. Patient was seen and examined at bedside. Subxiphoid drain ongoing. No fever. Pain R shoulder.    Allergies  Cipro (Headache; Vomiting; Rash)  penicillin (Headache; Vomiting; Rash)  erythromycin (Headache; Vomiting; Rash)        ANTIMICROBIALS:  cefepime   IVPB 2000 daily  metroNIDAZOLE  IVPB 500 every 12 hours  vancomycin  IVPB 1000 every 12 hours      OTHER MEDS:  MEDICATIONS  (STANDING):  albuterol/ipratropium for Nebulization 3 every 6 hours PRN  albuterol/ipratropium for Nebulization. 3 once  aluminum hydroxide/magnesium hydroxide/simethicone Suspension 30 every 6 hours PRN  aMIOdarone Infusion 1 <Continuous>  aMIOdarone Infusion 0.5 <Continuous>  aMIOdarone IVPB 150 once  diazepam    Tablet 5 two times a day PRN  guaiFENesin Oral Liquid (Sugar-Free) 200 every 6 hours PRN  heparin   Injectable 4000 every 6 hours PRN  heparin   Injectable 2000 every 6 hours PRN  heparin  Infusion.  <Continuous>  HYDROmorphone  Injectable 1 every 3 hours PRN  influenza   Vaccine 0.5 once  melatonin 3 at bedtime PRN  metoprolol tartrate 25 two times a day  oxyCODONE    IR 10 every 4 hours PRN  oxyCODONE  ER Tablet 20 <User Schedule>  pantoprazole    Tablet 40 before breakfast  polyethylene glycol 3350 17 two times a day  senna 2 at bedtime      Vital Signs Last 24 Hrs  T(C): 36.9 (25 Mar 2024 12:28), Max: 37.2 (24 Mar 2024 16:29)  T(F): 98.4 (25 Mar 2024 12:28), Max: 98.9 (24 Mar 2024 16:29)  HR: 93 (25 Mar 2024 12:28) (93 - 120)  BP: 101/62 (25 Mar 2024 12:28) (100/64 - 139/92)  BP(mean): --  RR: 19 (25 Mar 2024 12:28) (19 - 20)  SpO2: 93% (25 Mar 2024 12:28) (93% - 97%)    Parameters below as of 25 Mar 2024 12:28  Patient On (Oxygen Delivery Method): room air      PHYSICAL EXAM:  Constitutional: NAD  ENT:  R neck edema and tenderness; clear oropharynx  Cardiovascular:   normal S1, S2, no murmur, RRR  Respiratory:  clear BS bilaterally, no wheezes  Musculoskeletal:  no BLE edema  Neurologic: awake and oriented x3  Skin:  R breast w/ erythematous skin mostly resolved, but still somewhat tender to palpation to R upper chest; Chest w/ drain in place, serosanguineous fluid                                10.5   9.82  )-----------( 487      ( 25 Mar 2024 09:16 )             33.2       03-25    131<L>  |  97  |  14  ----------------------------<  126<H>  4.6   |  21<L>  |  0.95    Ca    9.5      25 Mar 2024 09:14  Mg     1.8     03-25        Urinalysis Basic - ( 25 Mar 2024 09:14 )    Color: x / Appearance: x / SG: x / pH: x  Gluc: 126 mg/dL / Ketone: x  / Bili: x / Urobili: x   Blood: x / Protein: x / Nitrite: x   Leuk Esterase: x / RBC: x / WBC x   Sq Epi: x / Non Sq Epi: x / Bacteria: x        MICROBIOLOGY:  Vancomycin Level, Trough: 7.4 ug/mL (03-25-24 @ 09:16)  v  .Blood Blood-Peripheral  03-23-24   No growth at 24 hours  --  --      .Blood Blood  03-15-24   No growth at 5 days  --  --      .Blood Blood-Peripheral  03-12-24   No growth at 5 days  --  --      .Blood Blood-Peripheral  03-12-24   No growth at 5 days  --  --      Clean Catch Clean Catch (Midstream)  03-10-24   <10,000 CFU/mL Normal Urogenital Tammie  --  --      .Blood Blood-Peripheral  03-10-24   No growth at 5 days  --  --                RADIOLOGY:  Imaging below independently reviewed.  < from: Xray Chest 1 View- PORTABLE-Routine (Xray Chest 1 View- PORTABLE-Routine .) (03.24.24 @ 08:47) >  IMPRESSION:    Moderate right pleural effusion and atelectasis unchanged.  Increased hazy opacity at the left lung base which may also represent   atelectasis.    < end of copied text >

## 2024-03-25 NOTE — PROGRESS NOTE ADULT - PROBLEM SELECTOR PLAN 3
- Workup and management per Medical Oncology and GI.  - Goal is for DMT if offered -Supplemental O2 as per the primary team.   -For possible Pleurx   -CT Sx and Pulm are f/u  -If symptoms are recurrent and or creating distress, may also add Dilaudid 1mg q 3 PRN

## 2024-03-25 NOTE — CONSULT NOTE ADULT - SUBJECTIVE AND OBJECTIVE BOX
CHIEF COMPLAINT: Breast swelling    HPI:  59YO Female active smoker hx of RCC s/p total nephrectomy, S/p hysterectomy, R-sided glaucoma, Fibromyalgia, Anxiety, GERD,  COPD, presented 3/11  R breast swelling, redness and pain.      Pt was initially evaluated by [place] ED and was going to be admitted but left AMA. Was prescribed clindamycin for presumed breast cellulitis/mastitis on discharge, but was also told about concerning findings on CT Chest related to possible breast malignancy and metastasis. Reports over 40 pound weight loss in the last year and loss of appetite.  CT chest Impacted right lower lobe subsegmental bronchi. Otherwise patent central airways. Centrilobular emphysema. Bilateral lower lobe dependent atelectasis and pleural effusions right lower lobe compressive atelectasis with moderate pleural effusion.      Hospital course complicated by pericardial effusion s/p pericardial drain by Thoracic surgery 3/19. Pt underwent supraclavicular node bx with cytopath showing malignancy of upper GI origin. 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    Pulmonary consulted in the setting of pleural effusion.       PAST MEDICAL & SURGICAL HISTORY:  Anxiety  Acid reflux disease  Umbilical hernia  Uterine mass Benign  Fibromyalgia Joint pains  Glaucoma Right eye  Herniated cervical disc  Cancer of kidney right kidney  S/P partial hysterectomy 8 years ago  S/P knee surgery knee arthroscopy right x 2 ( 2011 & 2012)  S/P hernia repair umbilical Hernia Repair - 2011  H/O unilateral nephrectomy Right Laparoscopic Nephrectomy - 75880  Glaucoma following surgery Right Eyr - 2012  History of tonsillectomy     FAMILY HISTORY:  Family history of lung cancer (Mother)    Family history of pancreatic cancer (Father)        SOCIAL HISTORY:  Smoking: [ ] Never Smoked [ ] Former Smoker (__ packs x ___ years) [ ] Current Smoker  (__ packs x ___ years)  Substance Use: [ ] Never Used [ ] Used ____  EtOH Use:  Marital Status: [ ] Single [ ]  [ ]  [ ]   Sexual History:   Occupation:  Recent Travel:  Country of Birth:  Advance Directives:    Allergies    Cipro (Headache; Vomiting; Rash)  penicillin (Headache; Vomiting; Rash)  erythromycin (Headache; Vomiting; Rash)    Intolerances        HOME MEDICATIONS:  Home Medications:  omeprazole 40 mg oral delayed release capsule: 1 cap(s) orally once a day (12 Mar 2024 00:07)  oxyCODONE 10 mg oral tablet: 1 tab(s) orally every 6 hours as needed for  severe pain (11 Mar 2024 23:02)  Savella 12.5 mg oral tablet: once daily (12 Mar 2024 00:07)  Valium 5 mg oral tablet: 1 tab(s) orally 2 times a day as needed for  anxiety (11 Mar 2024 23:02)      REVIEW OF SYSTEMS:  Constitutional: [ ] negative [ ] fevers [ ] chills [ ] weight loss [ ] weight gain  HEENT: [ ] negative [ ] dry eyes [ ] eye irritation [ ] postnasal drip [ ] nasal congestion  CV: [ ] negative  [ ] chest pain [ ] orthopnea [ ] palpitations [ ] murmur  Resp: [ ] negative [ ] cough [ ] shortness of breath [ ] dyspnea [ ] wheezing [ ] sputum [ ] hemoptysis  GI: [ ] negative [ ] nausea [ ] vomiting [ ] diarrhea [ ] constipation [ ] abd pain [ ] dysphagia   : [ ] negative [ ] dysuria [ ] nocturia [ ] hematuria [ ] increased urinary frequency  Musculoskeletal: [ ] negative [ ] back pain [ ] myalgias [ ] arthralgias [ ] fracture  Skin: [ ] negative [ ] rash [ ] itch  Neurological: [ ] negative [ ] headache [ ] dizziness [ ] syncope [ ] weakness [ ] numbness  Psychiatric: [ ] negative [ ] anxiety [ ] depression  Endocrine: [ ] negative [ ] diabetes [ ] thyroid problem  Hematologic/Lymphatic: [ ] negative [ ] anemia [ ] bleeding problem  Allergic/Immunologic: [ ] negative [ ] itchy eyes [ ] nasal discharge [ ] hives [ ] angioedema  [ ] All other systems negative  [ ] Unable to assess ROS because ________    OBJECTIVE:  ICU Vital Signs Last 24 Hrs  T(C): 36.4 (24 Mar 2024 21:12), Max: 37.2 (24 Mar 2024 16:29)  T(F): 97.5 (24 Mar 2024 21:12), Max: 98.9 (24 Mar 2024 16:29)  HR: 95 (24 Mar 2024 21:12) (93 - 120)  BP: 139/92 (24 Mar 2024 21:12) (100/64 - 139/92)  BP(mean): --  ABP: --  ABP(mean): --  RR: 20 (24 Mar 2024 21:12) (18 - 20)  SpO2: 97% (24 Mar 2024 21:12) (96% - 97%)    O2 Parameters below as of 24 Mar 2024 21:12  Patient On (Oxygen Delivery Method): room air              03-24 @ 07:01  -  03-25 @ 07:00  --------------------------------------------------------  IN: 240 mL / OUT: 0 mL / NET: 240 mL      CAPILLARY BLOOD GLUCOSE          PHYSICAL EXAM:  General:   HEENT:   Lymph Nodes:  Neck:   Respiratory:   Cardiovascular:   Abdomen:   Extremities:   Skin:   Neurological:  Psychiatry:    LINES:     HOSPITAL MEDICATIONS:  Standing Meds:  albuterol/ipratropium for Nebulization. 3 milliLiter(s) Nebulizer once  aMIOdarone Infusion 1 mG/Min IV Continuous <Continuous>  aMIOdarone Infusion 0.5 mG/Min IV Continuous <Continuous>  aMIOdarone IVPB 150 milliGRAM(s) IV Intermittent once  cefepime   IVPB 2000 milliGRAM(s) IV Intermittent daily  chlorhexidine 4% Liquid 1 Application(s) Topical <User Schedule>  enoxaparin Injectable 50 milliGRAM(s) SubCutaneous every 12 hours  influenza   Vaccine 0.5 milliLiter(s) IntraMuscular once  metoprolol tartrate 25 milliGRAM(s) Oral two times a day  metroNIDAZOLE  IVPB 500 milliGRAM(s) IV Intermittent every 12 hours  nicotine -  14 mG/24Hr(s) Patch 1 Patch Transdermal daily  oxyCODONE  ER Tablet 20 milliGRAM(s) Oral <User Schedule>  pantoprazole    Tablet 40 milliGRAM(s) Oral before breakfast  polyethylene glycol 3350 17 Gram(s) Oral two times a day  senna 2 Tablet(s) Oral at bedtime  sodium chloride 2 Gram(s) Oral two times a day  vancomycin  IVPB      vancomycin  IVPB 750 milliGRAM(s) IV Intermittent every 12 hours      PRN Meds:  albuterol/ipratropium for Nebulization 3 milliLiter(s) Nebulizer every 6 hours PRN  aluminum hydroxide/magnesium hydroxide/simethicone Suspension 30 milliLiter(s) Oral every 6 hours PRN  benzocaine/menthol Lozenge 1 Lozenge Oral two times a day PRN  diazepam    Tablet 5 milliGRAM(s) Oral two times a day PRN  guaiFENesin Oral Liquid (Sugar-Free) 200 milliGRAM(s) Oral every 6 hours PRN  HYDROmorphone  Injectable 1 milliGRAM(s) IV Push every 3 hours PRN  melatonin 3 milliGRAM(s) Oral at bedtime PRN  naloxone Injectable 0.1 milliGRAM(s) IV Push every 3 minutes PRN  oxyCODONE    IR 10 milliGRAM(s) Oral every 4 hours PRN  sodium chloride 0.9% lock flush 10 milliLiter(s) IV Push every 1 hour PRN      LABS:                        10.6   8.32  )-----------( 479      ( 24 Mar 2024 07:23 )             33.4     Hgb Trend: 10.6<--, 10.0<--, 9.3<--, 10.1<--, 10.9<--  03-24    131<L>  |  96  |  12  ----------------------------<  125<H>  4.3   |  20<L>  |  0.99    Ca    9.2      24 Mar 2024 07:21      Creatinine Trend: 0.99<--, 0.92<--, 0.97<--, 0.83<--, 0.81<--, 0.83<--  PTT - ( 23 Mar 2024 16:49 )  PTT:23.5 sec  Urinalysis Basic - ( 24 Mar 2024 07:21 )    Color: x / Appearance: x / SG: x / pH: x  Gluc: 125 mg/dL / Ketone: x  / Bili: x / Urobili: x   Blood: x / Protein: x / Nitrite: x   Leuk Esterase: x / RBC: x / WBC x   Sq Epi: x / Non Sq Epi: x / Bacteria: x      Arterial Blood Gas:  03-24 @ 19:18  7.36/47/37/27/59.8/0.8  ABG lactate: --  Arterial Blood Gas:  03-23 @ 22:14  7.45/31/185/22/99.8/-1.6  ABG lactate: --        MICROBIOLOGY:     Culture - Blood (collected 23 Mar 2024 16:49)  Source: .Blood Blood-Peripheral  Preliminary Report (24 Mar 2024 22:02):    No growth at 24 hours        RADIOLOGY:  [ ] Reviewed and interpreted by me    PULMONARY FUNCTION TESTS:    EKG: CHIEF COMPLAINT: Breast swelling    HPI:  59YO Female active smoker hx of RCC s/p total nephrectomy, S/p hysterectomy, R-sided glaucoma, Fibromyalgia, Anxiety, GERD,  COPD, presented 3/11  R breast swelling, redness and pain.      Pt was initially evaluated by [place] ED and was going to be admitted but left AMA. Was prescribed clindamycin for presumed breast cellulitis/mastitis on discharge, but was also told about concerning findings on CT Chest related to possible breast malignancy and metastasis. Reports over 40 pound weight loss in the last year and loss of appetite.  CT chest Impacted right lower lobe subsegmental bronchi. Otherwise patent central airways. Centrilobular emphysema. Bilateral lower lobe dependent atelectasis and pleural effusions right lower lobe compressive atelectasis with moderate pleural effusion.      Hospital course complicated by pericardial effusion s/p pericardial drain by Thoracic surgery 3/19. Pt underwent supraclavicular node bx with cytopath showing malignancy of upper GI origin. 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    Pulmonary consulted in the setting of pleural effusion.       PAST MEDICAL & SURGICAL HISTORY:  Anxiety  Acid reflux disease  Umbilical hernia  Uterine mass Benign  Fibromyalgia Joint pains  Glaucoma Right eye  Herniated cervical disc  Cancer of kidney right kidney  S/P partial hysterectomy 8 years ago  S/P knee surgery knee arthroscopy right x 2 ( 2011 & 2012)  S/P hernia repair umbilical Hernia Repair - 2011  H/O unilateral nephrectomy Right Laparoscopic Nephrectomy - 31413  Glaucoma following surgery Right Eyr - 2012  History of tonsillectomy     FAMILY HISTORY:  Family history of lung cancer (Mother)    Family history of pancreatic cancer (Father)        SOCIAL HISTORY:  Smoking: [ ] Never Smoked [ ] Former Smoker (__ packs x ___ years) [ ] Current Smoker  (__ packs x ___ years)  Substance Use: [ ] Never Used [ ] Used ____  EtOH Use:  Marital Status: [ ] Single [ ]  [ ]  [ ]   Sexual History:   Occupation:  Recent Travel:  Country of Birth:  Advance Directives:    Allergies    Cipro (Headache; Vomiting; Rash)  penicillin (Headache; Vomiting; Rash)  erythromycin (Headache; Vomiting; Rash)    Intolerances        HOME MEDICATIONS:  Home Medications:  omeprazole 40 mg oral delayed release capsule: 1 cap(s) orally once a day (12 Mar 2024 00:07)  oxyCODONE 10 mg oral tablet: 1 tab(s) orally every 6 hours as needed for  severe pain (11 Mar 2024 23:02)  Savella 12.5 mg oral tablet: once daily (12 Mar 2024 00:07)  Valium 5 mg oral tablet: 1 tab(s) orally 2 times a day as needed for  anxiety (11 Mar 2024 23:02)      REVIEW OF SYSTEMS:  Constitutional: [ ] negative [ ] fevers [ ] chills [ ] weight loss [ ] weight gain  HEENT: [ ] negative [ ] dry eyes [ ] eye irritation [ ] postnasal drip [ ] nasal congestion  CV: [ ] negative  [ ] chest pain [ ] orthopnea [ ] palpitations [ ] murmur  Resp: [ ] negative [x ] cough [ x] shortness of breath [ ] dyspnea [ ] wheezing [ ] sputum [ ] hemoptysis  GI: [ ] negative [ ] nausea [ ] vomiting [ ] diarrhea [ ] constipation [ ] abd pain [ ] dysphagia   : [ ] negative [ ] dysuria [ ] nocturia [ ] hematuria [ ] increased urinary frequency  Musculoskeletal: [ ] negative [ ] back pain [ ] myalgias [ ] arthralgias [ ] fracture  Skin: [ ] negative [ ] rash [ ] itch  Neurological: [ ] negative [ ] headache [ ] dizziness [ ] syncope [ ] weakness [ ] numbness  Psychiatric: [ ] negative [ ] anxiety [ ] depression  Endocrine: [ ] negative [ ] diabetes [ ] thyroid problem  Hematologic/Lymphatic: [ ] negative [ ] anemia [ ] bleeding problem  Allergic/Immunologic: [ ] negative [ ] itchy eyes [ ] nasal discharge [ ] hives [ ] angioedema  [ x] All other systems negative  [ ] Unable to assess ROS because ________    OBJECTIVE:  ICU Vital Signs Last 24 Hrs  T(C): 36.4 (24 Mar 2024 21:12), Max: 37.2 (24 Mar 2024 16:29)  T(F): 97.5 (24 Mar 2024 21:12), Max: 98.9 (24 Mar 2024 16:29)  HR: 95 (24 Mar 2024 21:12) (93 - 120)  BP: 139/92 (24 Mar 2024 21:12) (100/64 - 139/92)  BP(mean): --  ABP: --  ABP(mean): --  RR: 20 (24 Mar 2024 21:12) (18 - 20)  SpO2: 97% (24 Mar 2024 21:12) (96% - 97%)    O2 Parameters below as of 24 Mar 2024 21:12  Patient On (Oxygen Delivery Method): room air              03-24 @ 07:01  -  03-25 @ 07:00  --------------------------------------------------------  IN: 240 mL / OUT: 0 mL / NET: 240 mL      CAPILLARY BLOOD GLUCOSE        PHYSICAL EXAM:  General: Female laying in bed in mild distress  HEENT: Nasal cannula in place  Respiratory: Crackles at the bilateral bases and diminished breath sounds at the right base  Cardiovascular: Regular rate and rhythm, pericardial drain present  Abdomen: Nontender nondistended  Extremities: Left arm appears swollen  Neurological: No appreciable neurologic deficits  Psychiatry: Appropriate mood and affect    LINES:     HOSPITAL MEDICATIONS:  Standing Meds:  albuterol/ipratropium for Nebulization. 3 milliLiter(s) Nebulizer once  aMIOdarone Infusion 1 mG/Min IV Continuous <Continuous>  aMIOdarone Infusion 0.5 mG/Min IV Continuous <Continuous>  aMIOdarone IVPB 150 milliGRAM(s) IV Intermittent once  cefepime   IVPB 2000 milliGRAM(s) IV Intermittent daily  chlorhexidine 4% Liquid 1 Application(s) Topical <User Schedule>  enoxaparin Injectable 50 milliGRAM(s) SubCutaneous every 12 hours  influenza   Vaccine 0.5 milliLiter(s) IntraMuscular once  metoprolol tartrate 25 milliGRAM(s) Oral two times a day  metroNIDAZOLE  IVPB 500 milliGRAM(s) IV Intermittent every 12 hours  nicotine -  14 mG/24Hr(s) Patch 1 Patch Transdermal daily  oxyCODONE  ER Tablet 20 milliGRAM(s) Oral <User Schedule>  pantoprazole    Tablet 40 milliGRAM(s) Oral before breakfast  polyethylene glycol 3350 17 Gram(s) Oral two times a day  senna 2 Tablet(s) Oral at bedtime  sodium chloride 2 Gram(s) Oral two times a day  vancomycin  IVPB      vancomycin  IVPB 750 milliGRAM(s) IV Intermittent every 12 hours      PRN Meds:  albuterol/ipratropium for Nebulization 3 milliLiter(s) Nebulizer every 6 hours PRN  aluminum hydroxide/magnesium hydroxide/simethicone Suspension 30 milliLiter(s) Oral every 6 hours PRN  benzocaine/menthol Lozenge 1 Lozenge Oral two times a day PRN  diazepam    Tablet 5 milliGRAM(s) Oral two times a day PRN  guaiFENesin Oral Liquid (Sugar-Free) 200 milliGRAM(s) Oral every 6 hours PRN  HYDROmorphone  Injectable 1 milliGRAM(s) IV Push every 3 hours PRN  melatonin 3 milliGRAM(s) Oral at bedtime PRN  naloxone Injectable 0.1 milliGRAM(s) IV Push every 3 minutes PRN  oxyCODONE    IR 10 milliGRAM(s) Oral every 4 hours PRN  sodium chloride 0.9% lock flush 10 milliLiter(s) IV Push every 1 hour PRN      LABS:                        10.6   8.32  )-----------( 479      ( 24 Mar 2024 07:23 )             33.4     Hgb Trend: 10.6<--, 10.0<--, 9.3<--, 10.1<--, 10.9<--  03-24    131<L>  |  96  |  12  ----------------------------<  125<H>  4.3   |  20<L>  |  0.99    Ca    9.2      24 Mar 2024 07:21      Creatinine Trend: 0.99<--, 0.92<--, 0.97<--, 0.83<--, 0.81<--, 0.83<--  PTT - ( 23 Mar 2024 16:49 )  PTT:23.5 sec  Urinalysis Basic - ( 24 Mar 2024 07:21 )    Color: x / Appearance: x / SG: x / pH: x  Gluc: 125 mg/dL / Ketone: x  / Bili: x / Urobili: x   Blood: x / Protein: x / Nitrite: x   Leuk Esterase: x / RBC: x / WBC x   Sq Epi: x / Non Sq Epi: x / Bacteria: x      Arterial Blood Gas:  03-24 @ 19:18  7.36/47/37/27/59.8/0.8  ABG lactate: --  Arterial Blood Gas:  03-23 @ 22:14  7.45/31/185/22/99.8/-1.6  ABG lactate: --        MICROBIOLOGY:     Culture - Blood (collected 23 Mar 2024 16:49)  Source: .Blood Blood-Peripheral  Preliminary Report (24 Mar 2024 22:02):    No growth at 24 hours        RADIOLOGY:  [ ] Reviewed and interpreted by me    PULMONARY FUNCTION TESTS:    EKG:

## 2024-03-25 NOTE — PROGRESS NOTE ADULT - PROBLEM SELECTOR PLAN 6
Will continue to f/u for symptoms.         Conor Jack MD   Geriatrics and Palliative Care (GAP) Consult Service    of Geriatric and Palliative Medicine  [facility]      Please page the following number for clinical matters between the hours of 9 am and 5 pm from Monday through Friday : [phone]    After 5pm and on weekends, please see the contact information below:    In the event of newly developing, evolving, or worsening symptoms, please contact the Palliative Medicine team via pager (if the patient is at [facility] #[phone] or if the patient is at [facility] #[phone]) The Geriatric and Palliative Medicine service has coverage 24 hours a day/ 7 days a week to provide medical recommendations regarding symptom management needs via telephone See [facility] documentation 3/22. Patient elected her boyfriend, Saeid Peña [phone], as her HCP. She elects DNR/DNI code status. HCP paperwork and MOLST form completed and entered into the chart.

## 2024-03-25 NOTE — CONSULT NOTE ADULT - ASSESSMENT
59YO Female active smoker hx of RCC s/p total nephrectomy, S/p hysterectomy, R-sided glaucoma, Fibromyalgia, Anxiety, GERD,  COPD, presented 3/11  R breast swelling, redness and pain. Found to have supraclavicular node bx concerning for malignancy of upper GI/pancreaticobiliary origin. CT concerning for nodular metastasis to lung with bilateral pleural effusions. Hospital course complicated by pericardial effusion s/p pericardial drain and SVC syndrome in the setting of IJ thrombus. Pulmonary consulted in the setting of pleural effusion.     #Pleural Effusions  #Hypoxemic Respiratory failure  #Concern for lung metastasis  - Pt with nodular metastasis to lung  - F/u cytopath in lab ?pericardial drain (not listed in order)  - CT surgery planning on chest tube placement per primary team please send cytopath and micro  - If quickly reaccumulating pt may warrant PleurX         59YO Female active smoker hx of RCC s/p total nephrectomy, S/p hysterectomy, R-sided glaucoma, Fibromyalgia, Anxiety, GERD,  COPD, presented 3/11  R breast swelling, redness and pain. Found to have supraclavicular node bx concerning for malignancy of upper GI/pancreaticobiliary origin. CT concerning for nodular metastasis to lung with bilateral pleural effusions. Hospital course complicated by pericardial effusion s/p pericardial drain and SVC syndrome in the setting of IJ thrombus. Pulmonary consulted in the setting of pleural effusion.     #Pleural Effusions  #Hypoxemic Respiratory failure  #Concern for lung metastasis  - Pt with nodular metastasis to lung  - F/u cytopath in lab ?pericardial drain (not listed in order)  - CT surgery planning on PleurX placement per primary team please send cytopath and micro  - Pt reported improvement with Lasix  - Would continue to diurese -500 daily   - Please obtain BNP  - Physical therapy  - Palliative Care following    #SVC syndrome  - Would consult IR for possible stent placement for revascularization  - Please assess left hand for ring removal as pt reports that she can no longer remove it    Pulmonary to sign off please call with questions.    Case discussed with Dr. Marr

## 2024-03-25 NOTE — PROGRESS NOTE ADULT - ASSESSMENT
this is a 58 F hx of   RCC total nephrectomy, S/p hysterectomy, R-sided glaucoma, Fibromyalgia, Anxiety, GERD, smoker, COPD, presents due to R breast swelling, redness and pain. Pt was initially evaluated by [place] ED yesterday and was going to be admitted but left AMA. Was prescribed clindamycin for presumed breast cellulitis/mastitis on discharge, but was also told about concerning findings on CT Chest related to possible breast malignancy and metastasis. Pt has not had a mammogram or colonoscopy in years. Reports that she had a lidocaine injection in her R shoulder on 3/8/24 and since then has had worsening swelling, redness and pain in her R breast. Also still has R shoulder pain, which had mild improvement since the injection. Pt consistently takes oxycodone q6h and believes this may have masked her pain earlier. Reports over 40 pound weight loss in the last year and loss of appetite. Has conjunctival injection in R eye which she claims is chronic but she also feels her eyes are more swollen than usual currently. Denies vision changes. Smokes 1ppd for many years. Patient denies fever, chills, chest pain, SOB, headache, dizziness, abd pain, nausea, vomiting, constipation, dysuria.     3/19 underwent Subxiphoid pericardial window. Portion of xiphoid resected and pericardium incised. Fluid drained and Regan drain placed. Approx 320cc of serous pericardial fluid drained initially.  3/20 VSS OOB to chair  pericardial drain  145 cc overnight on 4lnc .  3/21 VSS pericardial drain -240 cc  3/22 VSS pericardial 40 cc bleomycin x1@ g  iven @ 1345  3/23          vss     pericardiAL DRAIN  3/24    meds  ct   80 cc 24 hr        rt pl effusion  on 4 L  NC

## 2024-03-25 NOTE — CONSULT NOTE ADULT - TIME BILLING
Review of patient records, including history, laboratory data, imaging. Patient evaluation and assessment. Coordination of care. Time excludes teaching
Total time spent including the following  [x] Physical chart review and documentation   An extensive review of the physical chart, electronic health record, and documentation was conducted to obtain collateral information including but not limited to:   - Current inpatient records (ED, H&P, primary team, and consultants [ Onc   ])   - Inpatient values/results (CBC, CMP, Imaging)   - Social work assessments   - Current or proposed treatment plans   - Pharmacotherapy review (including I-STOP if applicable)  [x]discussion with the patient  [x]Physical Exam and/or review of systems   [x]Formulation of assessment and plan   [x]Evaluating for response to treatment and side effects of opioids or benzodiazepines      The __20____ minutes spent in ACP (    See GOC note above) were independent to the time spent in time based billing

## 2024-03-25 NOTE — PROGRESS NOTE ADULT - ASSESSMENT
58F w/Hx of RCC s/p R total nephrectomy, S/p hysterectomy, R-sided glaucoma, Fibromyalgia, Anxiety, GERD, smoker p/w R breast swelling, redness and pain, found to have axillary, subclavicular and mediastinal lymphadenopathy & pulm nodules, s/p LN biopsy. Nephrology consulted for hyponatremia.       # Hyponatremia with increased ADH activity  -SNa 132 initially on this admission. SNa trended down to 125 3/20.   -Serum osm: 265, urine osm: 446, urine Na: 57.   -S/p 2% NaCl at 40cc/hr for 8hrs (3/20) with improvement of SNa to 127 from 124 and now back to 131  -Discontinue Na tabs 2gm BID as pt unable to tolerate and instead start Borrego 15 gm BID      Wilfredo Hurd  Nephrology Fellow  Feel free to contact me on TEAMS  After 4 pm please contact the on-call Fellow.

## 2024-03-25 NOTE — PROGRESS NOTE ADULT - PROBLEM SELECTOR PLAN 2
Controlled   Continue home diazepam 5 mg PO BID PRN for anxiety. Uncontrolled   -On Senna 2 tabs hs and Miralax bid   -Will add Dulcolax 5mg bid. x 1 dose to be given on 3/15 hs  -I recurrent symptoms, may give Movantik x 1 dose

## 2024-03-25 NOTE — PROGRESS NOTE ADULT - SUBJECTIVE AND OBJECTIVE BOX
Moose Nolen MD  Division of Hospital Medicine  Available on MS teams until 7pm  If no response or off-hours, page [phone]  -------------------------------------    Patient is a 58y old  Female who presents with a chief complaint of Mastitis, breast malignancy (25 Mar 2024 15:06)      SUBJECTIVE / OVERNIGHT EVENTS: none acute  ADDITIONAL REVIEW OF SYSTEMS: pt still uncomfortable and anxious about her health issues, notes slightyl more sob but overall unchanged.     MEDICATIONS  (STANDING):  albuterol/ipratropium for Nebulization. 3 milliLiter(s) Nebulizer once  aMIOdarone Infusion 1 mG/Min (33.3 mL/Hr) IV Continuous <Continuous>  aMIOdarone Infusion 0.5 mG/Min (16.7 mL/Hr) IV Continuous <Continuous>  aMIOdarone IVPB 150 milliGRAM(s) IV Intermittent once  cefepime   IVPB 2000 milliGRAM(s) IV Intermittent daily  chlorhexidine 4% Liquid 1 Application(s) Topical <User Schedule>  heparin  Infusion.  Unit(s)/Hr (9 mL/Hr) IV Continuous <Continuous>  influenza   Vaccine 0.5 milliLiter(s) IntraMuscular once  metoprolol tartrate 25 milliGRAM(s) Oral two times a day  metroNIDAZOLE  IVPB 500 milliGRAM(s) IV Intermittent every 12 hours  nicotine -  14 mG/24Hr(s) Patch 1 Patch Transdermal daily  oxyCODONE  ER Tablet 20 milliGRAM(s) Oral <User Schedule>  pantoprazole    Tablet 40 milliGRAM(s) Oral before breakfast  polyethylene glycol 3350 17 Gram(s) Oral two times a day  senna 2 Tablet(s) Oral at bedtime  urea Oral Powder 15 Gram(s) Oral two times a day  vancomycin  IVPB 1000 milliGRAM(s) IV Intermittent every 12 hours    MEDICATIONS  (PRN):  albuterol/ipratropium for Nebulization 3 milliLiter(s) Nebulizer every 6 hours PRN Shortness of Breath and/or Wheezing  aluminum hydroxide/magnesium hydroxide/simethicone Suspension 30 milliLiter(s) Oral every 6 hours PRN Dyspepsia  benzocaine/menthol Lozenge 1 Lozenge Oral two times a day PRN Sore Throat  diazepam    Tablet 5 milliGRAM(s) Oral two times a day PRN for anxiety  guaiFENesin Oral Liquid (Sugar-Free) 200 milliGRAM(s) Oral every 6 hours PRN Cough  heparin   Injectable 4000 Unit(s) IV Push every 6 hours PRN For aPTT less than 40  heparin   Injectable 2000 Unit(s) IV Push every 6 hours PRN For aPTT between 40 - 57  HYDROmorphone  Injectable 1 milliGRAM(s) IV Push every 3 hours PRN Severe Pain (7 - 10)  melatonin 3 milliGRAM(s) Oral at bedtime PRN Insomnia  naloxone Injectable 0.1 milliGRAM(s) IV Push every 3 minutes PRN Obtundation, respiratory suppression  oxyCODONE    IR 10 milliGRAM(s) Oral every 4 hours PRN Moderate Pain (4 - 6)  sodium chloride 0.9% lock flush 10 milliLiter(s) IV Push every 1 hour PRN Pre/post blood products, medications, blood draw, and to maintain line patency      CAPILLARY BLOOD GLUCOSE        I&O's Summary    24 Mar 2024 07:01  -  25 Mar 2024 07:00  --------------------------------------------------------  IN: 240 mL / OUT: 80 mL / NET: 160 mL        PHYSICAL EXAM:  Vital Signs Last 24 Hrs  T(C): 36.4 (25 Mar 2024 15:57), Max: 37.2 (24 Mar 2024 16:29)  T(F): 97.5 (25 Mar 2024 15:57), Max: 98.9 (24 Mar 2024 16:29)  HR: 97 (25 Mar 2024 15:57) (93 - 120)  BP: 110/68 (25 Mar 2024 15:57) (100/64 - 139/92)  BP(mean): --  RR: 20 (25 Mar 2024 15:57) (19 - 20)  SpO2: 96% (25 Mar 2024 15:57) (93% - 97%)    Parameters below as of 25 Mar 2024 15:57  Patient On (Oxygen Delivery Method): nasal cannula  O2 Flow (L/min): 5    CONSTITUTIONAL: NAD  EYES: PERRLA; conjunctiva and sclera clear  ENMT: MMM  NECK: R protuberance  RESPIRATORY: decreased breath sounds R side  CARDIOVASCULAR: RRR, no JVD, no peripheral edema   ABDOMEN: Nontender to palpation, normoactive BS, no guarding/rigidity  MUSCLOSKELETAL:  no clubbing/cyanosis, no joint swelling or tenderness to palpation  PSYCH: A+O x 3, affect normal  NEUROLOGY: CN 2-12 are intact and symmetric; no gross sensory or motor deficits  SKIN: No rashes; no palpable lesions    LABS:                        10.5   9.82  )-----------( 487      ( 25 Mar 2024 09:16 )             33.2     03-25    131<L>  |  97  |  14  ----------------------------<  126<H>  4.6   |  21<L>  |  0.95    Ca    9.5      25 Mar 2024 09:14  Mg     1.8     03-25      PTT - ( 23 Mar 2024 16:49 )  PTT:23.5 sec      Urinalysis Basic - ( 25 Mar 2024 09:14 )    Color: x / Appearance: x / SG: x / pH: x  Gluc: 126 mg/dL / Ketone: x  / Bili: x / Urobili: x   Blood: x / Protein: x / Nitrite: x   Leuk Esterase: x / RBC: x / WBC x   Sq Epi: x / Non Sq Epi: x / Bacteria: x        Culture - Blood (collected 23 Mar 2024 16:49)  Source: .Blood Blood-Peripheral  Preliminary Report (24 Mar 2024 22:02):    No growth at 24 hours        RADIOLOGY & ADDITIONAL TESTS:  Results Reviewed:   Imaging Personally Reviewed:  Electrocardiogram Personally Reviewed:    COORDINATION OF CARE:  Care Discussed with Consultants/Other Providers [Y/N]: thoracic, pulmonary  Prior or Outpatient Records Reviewed [Y/N]:

## 2024-03-25 NOTE — PROGRESS NOTE ADULT - PROBLEM SELECTOR PLAN 1
Uncontrolled but mainly 2/2 to pain over infiltrated IV access on her right arm   - Continue with oxycodone ER 20 mg PO q8hrs  - Continue with Oxycodone 10 mg PO q4h PRN for moderate pain  - Continue with Dilaudid 1 mg IV q3h PRN for severe pain  - Educated the patient about availability of PRN meds. Dilaudid 1mg IV x 1 was to be given by the patient's RN   - Narcan PRN  - Bowel regimen while on opioids

## 2024-03-25 NOTE — PROGRESS NOTE ADULT - SUBJECTIVE AND OBJECTIVE BOX
[facility] DIVISION OF KIDNEY DISEASES AND HYPERTENSION   FOLLOW UP NOTE    --------------------------------------------------------------------------------  Chief Complaint:    24 hour events/subjective: Pt. was seen and examined today. Feels Na-tabs are unbearable. Otherwise doing ok.      PAST HISTORY  --------------------------------------------------------------------------------  No significant changes to PMH, PSH, FHx, SHx, unless otherwise noted    ALLERGIES & MEDICATIONS  --------------------------------------------------------------------------------  Allergies    Cipro (Headache; Vomiting; Rash)  penicillin (Headache; Vomiting; Rash)  erythromycin (Headache; Vomiting; Rash)    Intolerances      Standing Inpatient Medications  albuterol/ipratropium for Nebulization. 3 milliLiter(s) Nebulizer once  aMIOdarone Infusion 1 mG/Min IV Continuous <Continuous>  aMIOdarone Infusion 0.5 mG/Min IV Continuous <Continuous>  aMIOdarone IVPB 150 milliGRAM(s) IV Intermittent once  cefepime   IVPB 2000 milliGRAM(s) IV Intermittent daily  chlorhexidine 4% Liquid 1 Application(s) Topical <User Schedule>  heparin  Infusion.  Unit(s)/Hr IV Continuous <Continuous>  influenza   Vaccine 0.5 milliLiter(s) IntraMuscular once  metoprolol tartrate 25 milliGRAM(s) Oral two times a day  metroNIDAZOLE  IVPB 500 milliGRAM(s) IV Intermittent every 12 hours  nicotine -  14 mG/24Hr(s) Patch 1 Patch Transdermal daily  oxyCODONE  ER Tablet 20 milliGRAM(s) Oral <User Schedule>  pantoprazole    Tablet 40 milliGRAM(s) Oral before breakfast  polyethylene glycol 3350 17 Gram(s) Oral two times a day  senna 2 Tablet(s) Oral at bedtime  sodium chloride 2 Gram(s) Oral two times a day  vancomycin  IVPB 1000 milliGRAM(s) IV Intermittent every 12 hours    PRN Inpatient Medications  albuterol/ipratropium for Nebulization 3 milliLiter(s) Nebulizer every 6 hours PRN  aluminum hydroxide/magnesium hydroxide/simethicone Suspension 30 milliLiter(s) Oral every 6 hours PRN  benzocaine/menthol Lozenge 1 Lozenge Oral two times a day PRN  diazepam    Tablet 5 milliGRAM(s) Oral two times a day PRN  guaiFENesin Oral Liquid (Sugar-Free) 200 milliGRAM(s) Oral every 6 hours PRN  heparin   Injectable 4000 Unit(s) IV Push every 6 hours PRN  heparin   Injectable 2000 Unit(s) IV Push every 6 hours PRN  HYDROmorphone  Injectable 1 milliGRAM(s) IV Push every 3 hours PRN  melatonin 3 milliGRAM(s) Oral at bedtime PRN  naloxone Injectable 0.1 milliGRAM(s) IV Push every 3 minutes PRN  oxyCODONE    IR 10 milliGRAM(s) Oral every 4 hours PRN  sodium chloride 0.9% lock flush 10 milliLiter(s) IV Push every 1 hour PRN      REVIEW OF SYSTEMS  --------------------------------------------------------------------------------  Gen: No fevers/chills  Head/Eyes/Ears: No HA   Respiratory: No dyspnea, cough  CV: No chest pain  GI: No abdominal pain, diarrhea  : No dysuria, hematuria  MSK: No  edema  Skin: No rashes  Heme: No easy bruising or bleeding    All other systems were reviewed and are negative, except as noted.    VITALS/PHYSICAL EXAM  --------------------------------------------------------------------------------  T(C): 36.9 (03-25-24 @ 12:28), Max: 37.2 (03-24-24 @ 16:29)  HR: 93 (03-25-24 @ 12:28) (93 - 120)  BP: 101/62 (03-25-24 @ 12:28) (100/64 - 139/92)  RR: 19 (03-25-24 @ 12:28) (19 - 20)  SpO2: 93% (03-25-24 @ 12:28) (93% - 97%)  Wt(kg): --        03-24-24 @ 07:01  -  03-25-24 @ 07:00  --------------------------------------------------------  IN: 240 mL / OUT: 80 mL / NET: 160 mL        Physical Exam:  	Gen: NAD  	HEENT: Anicteric  	Pulm: CTA B/L  	CV: S1S2+  	Abd: Soft, +BS   	Ext: No LE edema B/L  	Neuro: Awake  	Skin: Warm and dry      LABS/STUDIES  --------------------------------------------------------------------------------              10.5   9.82  >-----------<  487      [03-25-24 @ 09:16]              33.2     131  |  97  |  14  ----------------------------<  126      [03-25-24 @ 09:14]  4.6   |  21  |  0.95        Ca     9.5     [03-25-24 @ 09:14]      Mg     1.8     [03-25-24 @ 09:14]        PTT: 23.5       [03-23-24 @ 16:49]      Creatinine Trend:  SCr 0.95 [03-25 @ 09:14]  SCr 0.99 [03-24 @ 07:21]  SCr 0.92 [03-23 @ 16:49]  SCr 0.97 [03-23 @ 02:46]  SCr 0.83 [03-22 @ 20:22]    Urinalysis - [03-25-24 @ 09:14]      Color  / Appearance  / SG  / pH       Gluc 126 / Ketone   / Bili  / Urobili        Blood  / Protein  / Leuk Est  / Nitrite       RBC  / WBC  / Hyaline  / Gran  / Sq Epi  / Non Sq Epi  / Bacteria       HCV 0.09, Nonreact      [03-12-24 @ 07:01]      Tacrolimus  Cyclosporine  Sirolimus  Mycophenolate  BK PCR  CMV PCR  Parvo PCR  EBV PCR

## 2024-03-25 NOTE — PROGRESS NOTE ADULT - SUBJECTIVE AND OBJECTIVE BOX
DATE OF SERVICE: 03-25-24 @ 15:06    Patient is a 58y old  Female who presents with a chief complaint of Mastitis, breast malignancy (25 Mar 2024 15:01)      INTERVAL HISTORY: No acute events, frustrated with hospital stay     REVIEW OF SYSTEMS:  CONSTITUTIONAL: No weakness  EYES/ENT: No visual changes;  No throat pain   NECK: No pain or stiffness  RESPIRATORY: No cough, wheezing; No shortness of breath  CARDIOVASCULAR: No chest pain or palpitations  GASTROINTESTINAL: No abdominal  pain. No nausea, vomiting, or hematemesis  GENITOURINARY: No dysuria, frequency or hematuria  NEUROLOGICAL: No stroke like symptoms  SKIN: No rashes    TELEMETRY Personally reviewed: AF   	  MEDICATIONS:  aMIOdarone Infusion 1 mG/Min IV Continuous <Continuous>  aMIOdarone Infusion 0.5 mG/Min IV Continuous <Continuous>  aMIOdarone IVPB 150 milliGRAM(s) IV Intermittent once  metoprolol tartrate 25 milliGRAM(s) Oral two times a day        PHYSICAL EXAM:  T(C): 36.9 (03-25-24 @ 12:28), Max: 37.2 (03-24-24 @ 16:29)  HR: 93 (03-25-24 @ 12:28) (93 - 120)  BP: 101/62 (03-25-24 @ 12:28) (100/64 - 139/92)  RR: 19 (03-25-24 @ 12:28) (19 - 20)  SpO2: 93% (03-25-24 @ 12:28) (93% - 97%)  Wt(kg): --  I&O's Summary    24 Mar 2024 07:01  -  25 Mar 2024 07:00  --------------------------------------------------------  IN: 240 mL / OUT: 80 mL / NET: 160 mL          Appearance: In no distress	  HEENT:    PERRL, EOMI	  Cardiovascular:  S1 S2, No JVD  Respiratory: Lungs clear to auscultation	  Gastrointestinal:  Soft, Non-tender, + BS	  Vascularature:  No edema of LE  Psychiatric: Appropriate affect   Neuro: no acute focal deficits                               10.5   9.82  )-----------( 487      ( 25 Mar 2024 09:16 )             33.2     03-25    131<L>  |  97  |  14  ----------------------------<  126<H>  4.6   |  21<L>  |  0.95    Ca    9.5      25 Mar 2024 09:14  Mg     1.8     03-25          Labs personally reviewed      ASSESSMENT/PLAN: 	  58F w/Hx of RCC s/p R total nephrectomy, S/p hysterectomy, R-sided glaucoma, Fibromyalgia, Anxiety, GERD, smoker presents due to R breast swelling, redness and pain.     Problem/Plan - 1:   ·  Problem: Pericardial effusion.  ·  Plan: - pericardial effusion on CT chest   - TTE 3/18 with Moderate pericardial effusion with echocardiographic evidence of hemodynamic compromise    - Clinically pt is not in tamponade   - Thoracic surgery consulted for pericardial window given likely malignant effusion  ----s/p pericardial window 3/19  - 3/21 now with ST paroxysmal with AF with RVR and reports of chest pain\par- limited TTE: left ventricular systolic function appears grossly normal. Thickened pericardium. Bilateral pleural effusion noted. No pericardial effusion seen.  Compared to the TTE 3/18/2024, the pericardial effusion is no longer present.    Problem/Plan - 2:  ·  Problem: Internal jugular vein thrombosis, right.   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    Problem/Plan - 3:  ·  Problem: Smoker.   ·  Plan: - 1 ppd smoker  - c/w Nicotine patch.    Problem/Plan - 4:  ·  Problem: Prophylactic measure.   ·  Plan: dispo- home.    Problem/Plan - 5:  ·  Problem: Sinus Tachycardia  - Likely reactive   - D5 as per renal    Problem/Plan - 6:  ·  Problem: New Onset Atrial Fibrillation  - c/w heparin gtt  - TSH wnl  - Cont lopressor 25mg PO BID with hold parameters  - s/p Amiodarone gtt but currently with 0 IV access  - Initiate oral dig load        SAIMA Candelaria DO [facility]  Cardiovascular Medicine  [address], [address]  Available via call or text on [employer] TEAMs  Office: [phone]

## 2024-03-25 NOTE — PROGRESS NOTE ADULT - ASSESSMENT
58-yo F w/ PMH of RCC s/p R total nephrectomy, s/p hysterectomy, fibromyalgia, and concern for breast malignancy currently undergoing workup, admitted with R breast swelling, erythema, and pain. ID was consulted for extensive cellulitis/myositis along the R anterior lateral neck and R upper chest wall. 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 LN biopsy 3/14. CT chest w/ mediastinal and supraclavicular lymphadenopathy w/ necrosis. 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 RRT 3/22-23 ON for afib w/ RVR. Afebrile. New leukocytosis. Breast erythema and tenderness improving on antibiotics.    #Neck edema  #Neck pain  #Abnormal CT scan  #Myositis  #Thrombophlebitis  #Leukocytosis  #Afib w/ RVR  - VSS. No leukocytosis. Significant R neck lymphadenopathy, s/p biopsy. F/u path  - No sore throat or dysphagia. No odynophagia. No fever or rigors.  - Unclear if patient's presentation represents infectious disease process. Labs and physical exam do not support septic thrombophlebitis.  - BCx NGTD. Quantiferon neg.  - CT chest reviewed. Necrotic lymph nodes noted. Path from neck concern for malignancy. Breast erythema and pain could be from inflammation from mass burden.  - RRT 3/22-23 ON w/ afib w/ RVR. Afebrile. New leukocytosis. Probably from cancer burden? Would broaden abx as below.  - Continue with cefepime 2g IV Q12H and metronidazole 500 mg PO Q12H.  - Increase vancomycin to 1.25 g IV Q12H, w/ pre-4th dose trough.  - BCx repeat NGTD.  - ENT and Onc f/u. CT surgery follow-up.      Plan discussed with primary team ACP.  Thank you for this consult. Inpatient ID team will peripherally follow.    Edwar Petersen MD, PhD  Attending Physician  Division of Infectious Diseases  Department of Medicine    Please contact through MS Teams message.  Office: [phone] (after 5 PM or weekend) .         58-yo F w/ PMH of RCC s/p R total nephrectomy, s/p hysterectomy, fibromyalgia, and concern for breast malignancy currently undergoing workup, admitted with R breast swelling, erythema, and pain. ID was consulted for extensive cellulitis/myositis along the R anterior lateral neck and R upper chest wall. 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 LN biopsy 3/14. CT chest w/ mediastinal and supraclavicular lymphadenopathy w/ necrosis. 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 RRT 3/22-23 ON for afib w/ RVR. Afebrile. New leukocytosis. Breast erythema and tenderness improving on antibiotics.    #Neck edema  #Neck pain  #Abnormal CT scan  #Myositis  #Thrombophlebitis  #Leukocytosis  #Afib w/ RVR  - VSS. No leukocytosis. Significant R neck lymphadenopathy, s/p biopsy. F/u path  - No sore throat or dysphagia. No odynophagia. No fever or rigors.  - Unclear if patient's presentation represents infectious disease process. Labs and physical exam do not support septic thrombophlebitis.  - BCx NGTD. Quantiferon neg.  - CT chest reviewed. Necrotic lymph nodes noted. Path from neck concern for malignancy. Breast erythema and pain could be from inflammation from mass burden.  - RRT 3/22-23 ON w/ afib w/ RVR. Afebrile. New leukocytosis. Probably from cancer burden? Would broaden abx as below.  - Continue with cefepime 2g IV Q12H and metronidazole 500 mg PO Q12H.  - Increase vancomycin to 1g IV Q12H, w/ pre-4th dose trough.  - BCx repeat NGTD.  - ENT and Onc f/u. CT surgery follow-up.      Plan discussed with primary team ACP.  Thank you for this consult. Inpatient ID team will peripherally follow.    Edwar Petersen MD, PhD  Attending Physician  Division of Infectious Diseases  Department of Medicine    Please contact through MS Teams message.  Office: [phone] (after 5 PM or weekend) .

## 2024-03-25 NOTE — PROGRESS NOTE ADULT - TIME BILLING
Time spent for extensive review of the physical chart, electronic medical record, and documentation to obtain collateral information including but not limited to:  [ x] Inpatient records (ED, H&P, primary team, and consultants if applicable, care coordination)  [x ] Inpatient values/results (biomarkers, immunoassays, imaging, and microbiology results)  [x ] Current or proposed treatment plans  [x ] Discussion with the primary team  [x ] Discussion with the patient, surrogate decision maker, or family    Time spent: >52 min Total time spent including the following  [x] Physical chart review and documentation   An extensive review of the physical chart, electronic health record, and documentation was conducted to obtain collateral information including but not limited to:   - Current inpatient records (primary team, and consultants [ pulmonary   ])   - Inpatient values/results (CBC, CMP, Imaging)   - Current or proposed treatment plans   - Pharmacotherapy review  [x]discussion with floor staff (patient's RN)   [x]discussion with the patient  [x]Physical Exam and/or review of systems   [x]Formulation of assessment and plan   [x]Evaluating for response to treatment and side effects of opioids or benzodiazepines

## 2024-03-25 NOTE — PROGRESS NOTE ADULT - ASSESSMENT
58F w/Hx of RCC s/p R total nephrectomy, S/p hysterectomy, R-sided glaucoma, Fibromyalgia, Anxiety, GERD, smoker p/w R breast swelling, redness and pain, found to have axillary, subclavicular and mediastinal lymphadenopathy & pulm nodules, s/p LN biopsy. Nephrology consulted for hyponatremia.       # Hyponatremia with increased ADH activity  -SNa 132 initially on this admission. SNa trended down to 125 3/20.   -Serum osm: 265, urine osm: 446, urine Na: 57.   -S/p 2% NaCl at 40cc/hr for 8hrs (3/20) with improvement of SNa to 127 from 124 and now back to 131  -pt doesn't want Na tabs and is not taking it here  -start UreNa 15gm BID to keep Na at this range  - CT surgery planning on PleurX placement per primary team please send cytopath and micro  - Pt reported improvement with Lasix  - Would continue to diurese net neg 500-1L per day    Katerine Rai MD  Office   Contact me directly via Microsoft Teams     (After 5 pm or on weekends please page the on-call fellow/attending, can check [email or web address] for schedule. Login is shailesh mendoza, schedule under [facility] medicine, psych, derm)

## 2024-03-25 NOTE — PROGRESS NOTE ADULT - PROBLEM SELECTOR PLAN 3
- pericardial effusion on CT chest   - echo with moderate pericardial effusion and collapse of the RA  - s/p pericardial window 3/19  - chest tube drainage care per thoracic sx  - repeat echo with resolution of pericardial effusion- f/u thoracic/IR on whether drain can be removed monday    #pleural effusion- pt on 4-6 L NC with pleff seen on imaging  - thoracic planning pleurx placement tomorrow 230pm; patient medically optimized  - give lasix PRN

## 2024-03-25 NOTE — PROGRESS NOTE ADULT - PROBLEM SELECTOR PLAN 4
Bowel regimen while on opioids:  - Miralax BID  - Senna 2 tabs nightly. Exacerbated by pain, respiratory issues, and newly diagnosed metastatic CA.   -Continue home diazepam 5 mg PO BID PRN for anxiety.  -Chaplaincy oscar  -Holistic nurse referral will be entered

## 2024-03-25 NOTE — PROGRESS NOTE ADULT - SUBJECTIVE AND OBJECTIVE BOX
Date of Service: 03-25-24 @ 21:44    SUBJECTIVE AND OBJECTIVE:  Indication for Geriatrics and Palliative Care Services/INTERVAL HPI:    OVERNIGHT EVENTS:    DNR on chart:DNI  DNI      Allergies    Cipro (Headache; Vomiting; Rash)  penicillin (Headache; Vomiting; Rash)  erythromycin (Headache; Vomiting; Rash)    Intolerances    MEDICATIONS  (STANDING):  albuterol/ipratropium for Nebulization. 3 milliLiter(s) Nebulizer once  aMIOdarone Infusion 1 mG/Min (33.3 mL/Hr) IV Continuous <Continuous>  aMIOdarone Infusion 0.5 mG/Min (16.7 mL/Hr) IV Continuous <Continuous>  aMIOdarone IVPB 150 milliGRAM(s) IV Intermittent once  cefepime   IVPB 2000 milliGRAM(s) IV Intermittent daily  chlorhexidine 4% Liquid 1 Application(s) Topical <User Schedule>  heparin  Infusion.  Unit(s)/Hr (9 mL/Hr) IV Continuous <Continuous>  influenza   Vaccine 0.5 milliLiter(s) IntraMuscular once  metoprolol tartrate 25 milliGRAM(s) Oral two times a day  metroNIDAZOLE  IVPB 500 milliGRAM(s) IV Intermittent every 12 hours  nicotine -  14 mG/24Hr(s) Patch 1 Patch Transdermal daily  oxyCODONE  ER Tablet 20 milliGRAM(s) Oral <User Schedule>  pantoprazole    Tablet 40 milliGRAM(s) Oral before breakfast  polyethylene glycol 3350 17 Gram(s) Oral two times a day  senna 2 Tablet(s) Oral at bedtime  urea Oral Powder 15 Gram(s) Oral two times a day  vancomycin  IVPB 1000 milliGRAM(s) IV Intermittent every 12 hours    MEDICATIONS  (PRN):  albuterol/ipratropium for Nebulization 3 milliLiter(s) Nebulizer every 6 hours PRN Shortness of Breath and/or Wheezing  aluminum hydroxide/magnesium hydroxide/simethicone Suspension 30 milliLiter(s) Oral every 6 hours PRN Dyspepsia  benzocaine/menthol Lozenge 1 Lozenge Oral two times a day PRN Sore Throat  diazepam    Tablet 5 milliGRAM(s) Oral two times a day PRN for anxiety  guaiFENesin Oral Liquid (Sugar-Free) 200 milliGRAM(s) Oral every 6 hours PRN Cough  heparin   Injectable 4000 Unit(s) IV Push every 6 hours PRN For aPTT less than 40  heparin   Injectable 2000 Unit(s) IV Push every 6 hours PRN For aPTT between 40 - 57  HYDROmorphone  Injectable 1 milliGRAM(s) IV Push every 3 hours PRN Severe Pain (7 - 10)  melatonin 3 milliGRAM(s) Oral at bedtime PRN Insomnia  naloxone Injectable 0.1 milliGRAM(s) IV Push every 3 minutes PRN Obtundation, respiratory suppression  oxyCODONE    IR 10 milliGRAM(s) Oral every 4 hours PRN Moderate Pain (4 - 6)  sodium chloride 0.9% lock flush 10 milliLiter(s) IV Push every 1 hour PRN Pre/post blood products, medications, blood draw, and to maintain line patency      ITEMS UNCHECKED ARE NOT PRESENT    PRESENT SYMPTOMS: [ ]Unable to self-report - see [ ] CPOT [ ] PAINADS [ ] RDOS  Source if other than patient:  [ ]Family   [ ]Team     Pain:  [ ]yes [ ]no  QOL impact -   Location -                    Aggravating factors -  Quality -  Radiation -  Timing-  Severity (0-10 scale):  Minimal acceptable level (0-10 scale):     CPOT:    https://www.[facility].org/getattachment/[email or web address]/Critical-Care-Pain-Observation-Tool-(CPOT)    PAINAD Score: See PAINAD tool and score below       Dyspnea:                           [ ]Mild [ ]Moderate [ ]Severe    RDOS: See RDOS tool and score below   0 to 2  minimal or no respiratory distress   3  mild distress  4 to 6 moderate distress  >7 severe distress      Anxiety:                             [ ]Mild [ ]Moderate [ ]Severe  Fatigue:                             [ ]Mild [ ]Moderate [ ]Severe  Nausea:                             [ ]Mild [ ]Moderate [ ]Severe  Loss of appetite:              [ ]Mild [ ]Moderate [ ]Severe  Constipation:                    [ ]Mild [ ]Moderate [ ]Severe    PCSSQ[Palliative Care Spiritual Screening Question]   Severity (0-10):  Score of 4 or > indicate consideration of Chaplaincy referral.  Chaplaincy Referral: [ ] yes [ ] refused [ ] following [ ] Deferred     Caregiver [place]? : [ ] yes [ ] no [ ] Deferred [ ] Declined             Social work referral [ ] Patient & Family Centered Care Referral [ ]     Anticipatory Grief present?:  [ ] yes [ ] no  [ ] Deferred                  Social work referral [ ] Chaplaincy Referral [ ]    		  Other Symptoms:  [ ]All other review of systems negative     Palliative Performance Status Version 2:   See PPSv2 tool and score below         PHYSICAL EXAM:  Vital Signs Last 24 Hrs  T(C): 36.4 (25 Mar 2024 15:57), Max: 36.9 (25 Mar 2024 12:28)  T(F): 97.5 (25 Mar 2024 15:57), Max: 98.4 (25 Mar 2024 12:28)  HR: 96 (25 Mar 2024 18:20) (93 - 97)  BP: 115/70 (25 Mar 2024 18:20) (101/62 - 115/70)  BP(mean): --  RR: 20 (25 Mar 2024 15:57) (19 - 20)  SpO2: 96% (25 Mar 2024 15:57) (93% - 96%)    Parameters below as of 25 Mar 2024 15:57  Patient On (Oxygen Delivery Method): nasal cannula  O2 Flow (L/min): 5   I&O's Summary    24 Mar 2024 07:01  -  25 Mar 2024 07:00  --------------------------------------------------------  IN: 240 mL / OUT: 80 mL / NET: 160 mL    25 Mar 2024 07:01  -  25 Mar 2024 21:44  --------------------------------------------------------  IN: 0 mL / OUT: 30 mL / NET: -30 mL       GENERAL: [ ]Cachexia    [ ]Alert  [ ]Oriented x   [ ]Lethargic  [ ]Unarousable  [ ]Verbal  [ ]Non-Verbal  Behavioral:   [ ]Anxiety  [ ]Delirium [ ]Agitation [ ]Other  HEENT:  [ ]Normal   [ ]Dry mouth   [ ]ET Tube/Trach  [ ]Oral lesions  PULMONARY:   [ ]Clear [ ]Tachypnea  [ ]Audible excessive secretions   [ ]Rhonchi        [ ]Right [ ]Left [ ]Bilateral  [ ]Crackles        [ ]Right [ ]Left [ ]Bilateral  [ ]Wheezing     [ ]Right [ ]Left [ ]Bilateral  [ ]Diminished BS [ ] Right [ ]Left [ ]Bilateral  CARDIOVASCULAR:    [ ]Regular [ ]Irregular [ ]Tachy  [ ]Malik [ ]Murmur [ ]Other  GASTROINTESTINAL:  [ ]Soft  [ ]Distended   [ ]+BS  [ ]Non tender [ ]Tender  [ ]Other [ ]PEG [ ]OGT/ NGT   Last BM:   GENITOURINARY:  [ ]Normal [ ]Incontinent   [ ]Oliguria/Anuria   [ ]Che  MUSCULOSKELETAL:   [ ]Normal   [ ]Weakness  [ ]Bed/Wheelchair bound [ ]Edema  NEUROLOGIC:   [ ]No focal deficits  [ ] Cognitive impairment  [ ] Dysphagia [ ]Dysarthria [ ] Paresis [ ]Other   SKIN:   [ ]Normal  [ ]Rash  [ ]Other  [ ]Pressure ulcer(s) [ ]y [ ]n present on admission    CRITICAL CARE:  [ ]Shock Present  [ ]Septic [ ]Cardiogenic [ ]Neurologic [ ]Hypovolemic  [ ]Vasopressors [ ]Inotropes  [ ]Respiratory failure present [ ]Mechanical Ventilation [ ]Non-invasive ventilatory support [ ]High-Flow   [ ]Acute  [ ]Chronic [ ]Hypoxic  [ ]Hypercarbic [ ]Other  [ ]Other organ failure     LABS:                        10.5   9.82  )-----------( 487      ( 25 Mar 2024 09:16 )             33.2   03-25    131<L>  |  97  |  14  ----------------------------<  126<H>  4.6   |  21<L>  |  0.95    Ca    9.5      25 Mar 2024 09:14  Mg     1.8     03-25        Urinalysis Basic - ( 25 Mar 2024 09:14 )    Color: x / Appearance: x / SG: x / pH: x  Gluc: 126 mg/dL / Ketone: x  / Bili: x / Urobili: x   Blood: x / Protein: x / Nitrite: x   Leuk Esterase: x / RBC: x / WBC x   Sq Epi: x / Non Sq Epi: x / Bacteria: x      RADIOLOGY & ADDITIONAL STUDIES:    Protein Calorie Malnutrition Present: [ ]mild [ ]moderate [ ]severe [ ]underweight [ ]morbid obesity  https://www.andeal.org/vault/2080/web/files/ONC/Table_Clinical%20Characteristics%20to%20Document%20Malnutrition-White%20JV%20et%20al%877782.pdf    Height (cm): 154.9 (03-19-24 @ 20:45), 154.9 (03-10-24 @ 16:32)  Weight (kg): 49.9 (03-19-24 @ 20:45), 49.9 (03-10-24 @ 16:32)  BMI (kg/m2): 20.8 (03-19-24 @ 20:45), 20.8 (03-10-24 @ 16:32)    [ ]PPSV2 < or = 30%  [ ]significant weight loss [ ]poor nutritional intake [ ]anasarca[ ]Artificial Nutrition    Other REFERRALS:  [ ]Hospice  [ ]Child Life  [ ]Social Work  [ ]Case management [ ]Holistic Therapy     Goals of Care Document: Date of Service: 03-25-24 @ 21:44    SUBJECTIVE AND OBJECTIVE: The patient was seen and examined. She was c/o severe pain over her left arm where she said IVF infiltrated. She indicated she had not taken any PRN pain meds because she was not aware PRN pain meds were available.   Indication for Geriatrics and Palliative Care Services/INTERVAL HPI: Pain management     OVERNIGHT EVENTS: Now with right pleural effusion for which Pleurx placement is being considered.     DNR on chart:DNI  DNI      Allergies    Cipro (Headache; Vomiting; Rash)  penicillin (Headache; Vomiting; Rash)  erythromycin (Headache; Vomiting; Rash)    Intolerances    MEDICATIONS  (STANDING):  albuterol/ipratropium for Nebulization. 3 milliLiter(s) Nebulizer once  aMIOdarone Infusion 1 mG/Min (33.3 mL/Hr) IV Continuous <Continuous>  aMIOdarone Infusion 0.5 mG/Min (16.7 mL/Hr) IV Continuous <Continuous>  aMIOdarone IVPB 150 milliGRAM(s) IV Intermittent once  cefepime   IVPB 2000 milliGRAM(s) IV Intermittent daily  chlorhexidine 4% Liquid 1 Application(s) Topical <User Schedule>  heparin  Infusion.  Unit(s)/Hr (9 mL/Hr) IV Continuous <Continuous>  influenza   Vaccine 0.5 milliLiter(s) IntraMuscular once  metoprolol tartrate 25 milliGRAM(s) Oral two times a day  metroNIDAZOLE  IVPB 500 milliGRAM(s) IV Intermittent every 12 hours  nicotine -  14 mG/24Hr(s) Patch 1 Patch Transdermal daily  oxyCODONE  ER Tablet 20 milliGRAM(s) Oral <User Schedule>  pantoprazole    Tablet 40 milliGRAM(s) Oral before breakfast  polyethylene glycol 3350 17 Gram(s) Oral two times a day  senna 2 Tablet(s) Oral at bedtime  urea Oral Powder 15 Gram(s) Oral two times a day  vancomycin  IVPB 1000 milliGRAM(s) IV Intermittent every 12 hours    MEDICATIONS  (PRN):  albuterol/ipratropium for Nebulization 3 milliLiter(s) Nebulizer every 6 hours PRN Shortness of Breath and/or Wheezing  aluminum hydroxide/magnesium hydroxide/simethicone Suspension 30 milliLiter(s) Oral every 6 hours PRN Dyspepsia  benzocaine/menthol Lozenge 1 Lozenge Oral two times a day PRN Sore Throat  diazepam    Tablet 5 milliGRAM(s) Oral two times a day PRN for anxiety  guaiFENesin Oral Liquid (Sugar-Free) 200 milliGRAM(s) Oral every 6 hours PRN Cough  heparin   Injectable 4000 Unit(s) IV Push every 6 hours PRN For aPTT less than 40  heparin   Injectable 2000 Unit(s) IV Push every 6 hours PRN For aPTT between 40 - 57  HYDROmorphone  Injectable 1 milliGRAM(s) IV Push every 3 hours PRN Severe Pain (7 - 10)  melatonin 3 milliGRAM(s) Oral at bedtime PRN Insomnia  naloxone Injectable 0.1 milliGRAM(s) IV Push every 3 minutes PRN Obtundation, respiratory suppression  oxyCODONE    IR 10 milliGRAM(s) Oral every 4 hours PRN Moderate Pain (4 - 6)  sodium chloride 0.9% lock flush 10 milliLiter(s) IV Push every 1 hour PRN Pre/post blood products, medications, blood draw, and to maintain line patency      ITEMS UNCHECKED ARE NOT PRESENT    PRESENT SYMPTOMS: [ ]Unable to self-report - see [ ] CPOT [ ] PAINADS [ ] RDOS  Source if other than patient:  [ ]Family   [ ]Team     Pain:  [x ]yes [ ]no  QOL impact - Not allowing her to rest  Location -                  left arm   Aggravating factors - palpation   Quality - tension   Radiation -  none  Timing- constant   Severity (0-10 scale): 10/10   Minimal acceptable level (0-10 scale): 4     Pain: [x ]yes [ ]no  QOL impact - severe  Location - across chest, right arm            Aggravating factors - movement, palpation  Quality - sharp, burning, electric  Radiation - none  Timing- constant  Severity (0-10 scale): 4  Minimal acceptable level (0-10 scale): 4    CPOT:    https://www.[facility].org/getattachment/[email or web address]/Critical-Care-Pain-Observation-Tool-(CPOT)    PAINAD Score: See PAINAD tool and score below       Dyspnea:                           [ ]Mild [ ]Moderate [ ]Severe    RDOS: See RDOS tool and score below   0 to 2  minimal or no respiratory distress   3  mild distress  4 to 6 moderate distress  >7 severe distress      Anxiety:                             [x ]Mild [ ]Moderate [ ]Severe  Fatigue:                             [ ]Mild [x ]Moderate [ ]Severe  Nausea:                             [ ]Mild [ ]Moderate [ ]Severe  Loss of appetite:              [x ]Mild [ ]Moderate [ ]Severe  Constipation:                    [ ]Mild [x ]Moderate [ ]Severe    PCSSQ[Palliative Care Spiritual Screening Question]   Severity (0-10):  Score of 4 or > indicate consideration of Chaplaincy referral.  Chaplaincy Referral: [x ] yes [ ] refused [ ] following [ ] Deferred     Caregiver [place]? : [ ] yes [x ] no [ ] Deferred [ ] Declined             Social work referral [ ] Patient & Family Centered Care Referral [ ]     Anticipatory Grief present?:  [ ] yes [x ] no  [ ] Deferred                  Social work referral [ ] Chaplaincy Referral [ ]    		  Other Symptoms:  [ ]All other review of systems negative     Palliative Performance Status Version 2:   See PPSv2 tool and score below         PHYSICAL EXAM:  Vital Signs Last 24 Hrs  T(C): 36.4 (25 Mar 2024 15:57), Max: 36.9 (25 Mar 2024 12:28)  T(F): 97.5 (25 Mar 2024 15:57), Max: 98.4 (25 Mar 2024 12:28)  HR: 96 (25 Mar 2024 18:20) (93 - 97)  BP: 115/70 (25 Mar 2024 18:20) (101/62 - 115/70)  BP(mean): --  RR: 20 (25 Mar 2024 15:57) (19 - 20)  SpO2: 96% (25 Mar 2024 15:57) (93% - 96%)    Parameters below as of 25 Mar 2024 15:57  Patient On (Oxygen Delivery Method): nasal cannula  O2 Flow (L/min): 5   I&O's Summary    24 Mar 2024 07:01  -  25 Mar 2024 07:00  --------------------------------------------------------  IN: 240 mL / OUT: 80 mL / NET: 160 mL    25 Mar 2024 07:01  -  25 Mar 2024 21:44  --------------------------------------------------------  IN: 0 mL / OUT: 30 mL / NET: -30 mL       GENERAL: [ ]Cachexia    [x ]Alert  [ ]Oriented x   [ ]Lethargic  [ ]Unarousable  [x ]Verbal  [ ]Non-Verbal  Behavioral:   [ ]Anxiety  [ ]Delirium [ ]Agitation [ ]Other  HEENT:  [x ]Normal   [ ]Dry mouth   [ ]ET Tube/Trach  [ ]Oral lesions  PULMONARY:   [ ]Clear [ ]Tachypnea  [ ]Audible excessive secretions   [ ]Rhonchi        [ ]Right [ ]Left [ ]Bilateral  [ ]Crackles        [ ]Right [ ]Left [ ]Bilateral  [ ]Wheezing     [ ]Right [ ]Left [ ]Bilateral  [x ]Diminished BS [ ] Right [ ]Left [x ]Bilateral but more over right thoracic region   CARDIOVASCULAR:    [ x]Regular [ ]Irregular [ ]Tachy  [ ]Malik [ ]Murmur [x ]Other: Pericardial drain   GASTROINTESTINAL:  [x ]Soft  [ ]Distended   [x ]+BS  [ x]Non tender [ ]Tender  [ ]Other [ ]PEG [ ]OGT/ NGT   Last BM: 3/22  GENITOURINARY:  [x ]Normal [ ]Incontinent   [ ]Oliguria/Anuria   [ ]Che  MUSCULOSKELETAL:   [ ]Normal   [x ]Weakness  [ ]Bed/Wheelchair bound [ ]Edema [x] Kyphoscoliosis   NEUROLOGIC:   [x ]No focal deficits  [ ] Cognitive impairment  [ ] Dysphagia [ ]Dysarthria [ ] Paresis [ ]Other   SKIN:   [ x]Normal  [ ]Rash  [ ]Other  [ ]Pressure ulcer(s) [ ]y [ ]n present on admission    CRITICAL CARE:  [ ]Shock Present  [ ]Septic [ ]Cardiogenic [ ]Neurologic [ ]Hypovolemic  [ ]Vasopressors [ ]Inotropes  [ ]Respiratory failure present [ ]Mechanical Ventilation [ ]Non-invasive ventilatory support [ ]High-Flow   [ ]Acute  [ ]Chronic [ ]Hypoxic  [ ]Hypercarbic [ ]Other  [ ]Other organ failure     LABS:                        10.5   9.82  )-----------( 487      ( 25 Mar 2024 09:16 )             33.2   03-25    131<L>  |  97  |  14  ----------------------------<  126<H>  4.6   |  21<L>  |  0.95    Ca    9.5      25 Mar 2024 09:14  Mg     1.8     03-25        Urinalysis Basic - ( 25 Mar 2024 09:14 )    Color: x / Appearance: x / SG: x / pH: x  Gluc: 126 mg/dL / Ketone: x  / Bili: x / Urobili: x   Blood: x / Protein: x / Nitrite: x   Leuk Esterase: x / RBC: x / WBC x   Sq Epi: x / Non Sq Epi: x / Bacteria: x      RADIOLOGY & ADDITIONAL STUDIES:  < from: Xray Chest 1 View- PORTABLE-Routine (Xray Chest 1 View- PORTABLE-Routine .) (03.25.24 @ 11:50) >  IMPRESSION:    Moderate right and small left pleural effusion and atelectasis are   unchanged    --- End of Report ---            KRIS MERCER MD; Attending Radiologist  This document has been electronically signed. Mar 25 2024  3:45PM    < end of copied text >    < from: CT Chest w/wo IV Cont (03.14.24 @ 18:33) >  IMPRESSION:  Conglomerate soft tissue signal in the superior mediastinum and right   supraclavicular region likely representing lymphadenopathy with internal   hypodensity concerning for necrosis. 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 Right vertebral artery not visualized at its   origin. There are multiple right-sided collateral vessels and right-sided   soft tissue edema.    Bilateral pulmonary nodules, similar to comparison study.    Increased moderate right and small left pleural effusions. Similar   moderate pericardial effusion.    Dr. Arguello discussed preliminary findings with RYAN Aldridge on   3/14/2024 8:26 PM with read back.    --- End of Report ---           JACQUELYN MARCOS MD; Resident Radiologist  This document has been electronically signed.  KAELA STRANGE MD; Attending Radiologist  This document has been electronically signed. Mar 15 2024 10:47    < end of copied text >    Protein Calorie Malnutrition Present: [ ]mild [ ]moderate [ ]severe [ ]underweight [ ]morbid obesity  https://www.andeal.org/vault/2440/web/files/ONC/Table_Clinical%20Characteristics%20to%20Document%20Malnutrition-White%20JV%20et%20al%202012.pdf    Height (cm): 154.9 (03-19-24 @ 20:45), 154.9 (03-10-24 @ 16:32)  Weight (kg): 49.9 (03-19-24 @ 20:45), 49.9 (03-10-24 @ 16:32)  BMI (kg/m2): 20.8 (03-19-24 @ 20:45), 20.8 (03-10-24 @ 16:32)    [ ]PPSV2 < or = 30%  [ ]significant weight loss [ ]poor nutritional intake [ ]anasarca[ ]Artificial Nutrition    Other REFERRALS:  [ ]Hospice  [ ]Child Life  [ ]Social Work  [ ]Case management [ ]Holistic Therapy     Goals of Care Document:

## 2024-03-25 NOTE — PRE PROCEDURE NOTE - PRE PROCEDURE EVALUATION
Interventional Radiology    HPI: 58y Female with concern for metastatic malignancy presents for right supraclavicular lymph node biopsy.     Allergies: Cipro (Headache; Vomiting; Rash)  penicillin (Headache; Vomiting; Rash)  erythromycin (Headache; Vomiting; Rash)    Medications (Abx/Cardiac/Anticoagulation/Blood Products)  ceFAZolin   IVPB: 100 mL/Hr IV Intermittent (03-14 @ 05:11)    Data:  154.9  49.9  T(C): 36.3  HR: 96  BP: 116/78  RR: 18  SpO2: 95%    Exam  General: No acute distress  Chest: Non labored breathing  Abdomen: Non-distended  Extremities: No swelling, warm    -WBC 7.35 / HgB 11.1 / Hct 33.8 / Plt 359  -Na 128 / Cl 93 / BUN 13 / Glucose 119  -K 3.4 / CO2 25 / Cr 0.84  -ALT 14 / Alk Phos 124 / T.Bili 0.4  -INR1.04    Imaging: Reviewed    Plan: 58y Female presents for right supraclavicular lymph node biopsy  -Risks/Benefits/alternatives explained with the patient and/or healthcare proxy and witnessed informed consent obtained.   
Cardiac Surgery Pre-op Note:  CC: Patient is a 58y old  Female who presents with a chief complaint of Mastitis, breast malignancy (25 Mar 2024 15:06)      Referring Physician:                                                                                             Surgeon:  Procedure: (Date) (Procedure)    Allergies    Cipro (Headache; Vomiting; Rash)  penicillin (Headache; Vomiting; Rash)  erythromycin (Headache; Vomiting; Rash)    Intolerances      HPI:  58F w/Hx of RCC s/p R total nephrectomy, S/p hysterectomy, R-sided glaucoma, Fibromyalgia, Anxiety, GERD, smoker presents due to R breast swelling, redness and pain. Pt was initially evaluated by [facility] yesterday and was going to be admitted but left AMA. Was prescribed clindamycin for presumed breast cellulitis/mastitis on discharge, but was also told about concerning findings on CT Chest related to possible breast malignancy and metastasis. Pt has not had a mammogram or colonoscopy in years. Reports that she had a lidocaine injection in her R shoulder on 3/8/24 and since then has had worsening swelling, redness and pain in her R breast. Also still has R shoulder pain, which had mild improvement since the injection. Pt consistently takes oxycodone q6h and believes this may have masked her pain earlier. Reports over 40 pound weight loss in the last year and loss of appetite. Has conjunctival injection in R eye which she claims is chronic but she also feels her eyes are more swollen than usual currently. Denies vision changes. Smokes 1ppd for many years. Patient denies fever, chills, chest pain, SOB, headache, dizziness, abd pain, nausea, vomiting, constipation, dysuria. (11 Mar 2024 23:59)      PAST MEDICAL & SURGICAL HISTORY:  Anxiety      Acid reflux disease      Umbilical hernia      Uterine mass  Benign      Fibromyalgia  Joint pains      Glaucoma  Right eye      Herniated cervical disc      Cancer of kidney  right kidney      S/P partial hysterectomy  8 years ago      S/P knee surgery  knee arthroscopy right x 2 ( 2011 & 2012)      S/P hernia repair  umbilical Hernia Repair - 2011      H/O unilateral nephrectomy  Right Laparoscopic Nephrectomy - 94093      Glaucoma following surgery  Right Eyr - 2012      History of tonsillectomy          MEDICATIONS  (STANDING):  albuterol/ipratropium for Nebulization. 3 milliLiter(s) Nebulizer once  aMIOdarone Infusion 1 mG/Min (33.3 mL/Hr) IV Continuous <Continuous>  aMIOdarone Infusion 0.5 mG/Min (16.7 mL/Hr) IV Continuous <Continuous>  aMIOdarone IVPB 150 milliGRAM(s) IV Intermittent once  cefepime   IVPB 2000 milliGRAM(s) IV Intermittent daily  chlorhexidine 4% Liquid 1 Application(s) Topical <User Schedule>  heparin  Infusion.  Unit(s)/Hr (9 mL/Hr) IV Continuous <Continuous>  influenza   Vaccine 0.5 milliLiter(s) IntraMuscular once  metoprolol tartrate 25 milliGRAM(s) Oral two times a day  metroNIDAZOLE  IVPB 500 milliGRAM(s) IV Intermittent every 12 hours  nicotine -  14 mG/24Hr(s) Patch 1 Patch Transdermal daily  oxyCODONE  ER Tablet 20 milliGRAM(s) Oral <User Schedule>  pantoprazole    Tablet 40 milliGRAM(s) Oral before breakfast  polyethylene glycol 3350 17 Gram(s) Oral two times a day  senna 2 Tablet(s) Oral at bedtime  urea Oral Powder 15 Gram(s) Oral two times a day  vancomycin  IVPB 1000 milliGRAM(s) IV Intermittent every 12 hours    MEDICATIONS  (PRN):  albuterol/ipratropium for Nebulization 3 milliLiter(s) Nebulizer every 6 hours PRN Shortness of Breath and/or Wheezing  aluminum hydroxide/magnesium hydroxide/simethicone Suspension 30 milliLiter(s) Oral every 6 hours PRN Dyspepsia  benzocaine/menthol Lozenge 1 Lozenge Oral two times a day PRN Sore Throat  diazepam    Tablet 5 milliGRAM(s) Oral two times a day PRN for anxiety  guaiFENesin Oral Liquid (Sugar-Free) 200 milliGRAM(s) Oral every 6 hours PRN Cough  heparin   Injectable 4000 Unit(s) IV Push every 6 hours PRN For aPTT less than 40  heparin   Injectable 2000 Unit(s) IV Push every 6 hours PRN For aPTT between 40 - 57  HYDROmorphone  Injectable 1 milliGRAM(s) IV Push every 3 hours PRN Severe Pain (7 - 10)  melatonin 3 milliGRAM(s) Oral at bedtime PRN Insomnia  naloxone Injectable 0.1 milliGRAM(s) IV Push every 3 minutes PRN Obtundation, respiratory suppression  oxyCODONE    IR 10 milliGRAM(s) Oral every 4 hours PRN Moderate Pain (4 - 6)  sodium chloride 0.9% lock flush 10 milliLiter(s) IV Push every 1 hour PRN Pre/post blood products, medications, blood draw, and to maintain line patency      Labs:                        10.5   9.82  )-----------( 487      ( 25 Mar 2024 09:16 )             33.2     03-25    131<L>  |  97  |  14  ----------------------------<  126<H>  4.6   |  21<L>  |  0.95    Ca    9.5      25 Mar 2024 09:14  Mg     1.8     03-25      PTT - ( 23 Mar 2024 16:49 )  PTT:23.5 sec    Blood Type:   HGB A1C:   Prealbumin:   Pro-BNP:   Thyroid Panel: 03-23 @ 08:39/3.82  --/--/--    MRSA:  / MSSA:   Urinalysis Basic - ( 25 Mar 2024 09:14 )    Color: x / Appearance: x / SG: x / pH: x  Gluc: 126 mg/dL / Ketone: x  / Bili: x / Urobili: x   Blood: x / Protein: x / Nitrite: x   Leuk Esterase: x / RBC: x / WBC x   Sq Epi: x / Non Sq Epi: x / Bacteria: x        CXR:     EKG:                  Gen: WN/WD NAD  Neuro: AAOx3, nonfocal  Pulm: CTA B/L  CV: RRR, S1S2 +systolic murmur  Abd: Soft, NT, ND +BS  Ext: No edema, + peripheral pulses      Pt has AICD/PPM [ ] Yes  [x ] No             Brand Name:  Pre-op Beta Blocker ordered within 24 hrs of surgery ([facility] ONLY)?  Not applicable   Type & Cross  [ ] Yes  [ ] No  NPO after Midnight [x ] Yes  [ ] No  Pre-op ABX ordered, to be taped on chart:  Not applicable    Consent obtained  [ ] Yes  [ ] No

## 2024-03-25 NOTE — PROGRESS NOTE ADULT - SUBJECTIVE AND OBJECTIVE BOX
VITAL SIGNS      Vital Signs Last 24 Hrs  T(C): 36.4 (03-24-24 @ 21:12), Max: 37.2 (03-24-24 @ 16:29)  T(F): 97.5 (03-24-24 @ 21:12), Max: 98.9 (03-24-24 @ 16:29)  HR: 95 (03-24-24 @ 21:12) (93 - 120)  BP: 139/92 (03-24-24 @ 21:12) (100/64 - 139/92)  RR: 20 (03-24-24 @ 21:12) (18 - 20)  SpO2: 97% (03-24-24 @ 21:12) (96% - 97%)                   Daily     Daily         CAPILLARY BLOOD GLUCOSE              Drains:  pericardial drain  80 cc   24 hr                       PHYSICAL EXAM  s   no CP  some SOB"  Neurology: alert and oriented x 3, moves all extremities with no defecits  CV :  RRR  Sternal Wound :  CDI , Stable  Lungs: diminished bl  throughout  Abdomen: soft, nontender, nondistended, positive bowel sounds,  Extremities:       lt arm edema

## 2024-03-25 NOTE — CHART NOTE - NSCHARTNOTEFT_GEN_A_CORE
Nutrition Follow Up Note  Patient seen for: follow-up for chronic severe malnutrition     Chart reviewed, events noted.    Source: [] Patient       [x] electronic medical record         [] RN        [] Family at bedside       [] Other:    -If unable to interview patient: [] Trach/Vent/BiPAP  [] Disoriented/confused/inappropriate to interview    Diet Order:   Diet, Regular (24)    - Is current order appropriate/adequate? 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 encounter for palliative care consult   -- Pericardial effusion on CT chest, s/p pericardial window 3/19  -- Pleural effusion, on 4-6 L NC  -- s/p RRT 3/23, s/p IV bolus and Amiodarone   -- s/p Lasix today   -- s/p MgSO4 and KCl for repletions  -- GI: on Protonix, MgOH, Miralax and Senna.  Last BM on 3/22 per flowsheet.    Weights:   Drug Dosing Weight  Height (cm): 154.9 (19 Mar 2024 20:45)  Weight (kg): 49.9 (19 Mar 2024 20:45)  BMI (kg/m2): 20.8 (19 Mar 2024 20:45)  Daily Weight in k.1 (), Weight in k (), Weight in k ()  Wt obtained by RD at bedside:    weight fluctuation might due to fluids shift due to medical condition and diuretic use, also ? accuracy of bed scale wt. Will continue to monitor weight trends as available/able.     Nutritionally Pertinent MEDICATIONS  (STANDING):  aMIOdarone Infusion  aMIOdarone Infusion  aMIOdarone IVPB  cefepime   IVPB  metoprolol tartrate  metroNIDAZOLE  IVPB  pantoprazole    Tablet  polyethylene glycol 3350  senna  sodium chloride  vancomycin  IVPB  vancomycin  IVPB    Pertinent Labs:  @ 09:14: Na 131<L>, BUN 14, Cr 0.95, <H>, K+ 4.6, Phos --, Mg 1.8, Alk Phos --, ALT/SGPT --, AST/SGOT --, HbA1c --    A1C with Estimated Average Glucose Result: 6.6 % (24 @ 07:01)    Skin per nursing documentation: no pressure injury per nursing flowsheet 3/25    Edema: edema +2 to left arm and hand per flowsheet 3/25     Estimated Needs:   [] no change since previous assessment  [] recalculated:     Previous Nutrition Diagnosis: chronic severe malnutrition   Nutrition Diagnosis is: [x] ongoing      New Nutrition Diagnosis: [x] Increased nutrient needs     Nutrition Care Plan:  [x] In Progress    Nutrition Interventions:     Education Provided:       [] Yes:  [] No:        Recommendations:         [] Continue current diet order            [] Add oral nutrition supplement:     [] Discontinue current diet order. Recommend:      [] Add micronutrient supplementation:      [] Continue current micronutrient supplementation:      [] Other:     Monitoring and Evaluation:   Continue to monitor nutritional intake, tolerance to diet prescription, weights, labs, skin integrity      RD remains available upon request and will follow up per protocol Nutrition Follow Up Note  Patient seen for: follow-up for chronic severe malnutrition     Chart reviewed, events noted.    Source: [x] Patient       [x] electronic medical record         [x] RN           Diet Order:   Diet, Regular (24)    - Is current order appropriate/adequate?  [x]  No: see below for recommendation     - PO intake :    [x] 50-75%  Fair       [x] <50%  Poor  -- Pt reports PO intake ranges from poor to fair, reports not feeling hungry most of the time. Menu at bedside, pt is aware of menu ordering procedure in house, has been ordering preferred foods as needed since admission.   -- Food preferences updated, will provide peanut butter banana smoothie when available, wiling to try Ensure in strawberry flavor and Magic cup in vanilla flavor.     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 encounter for palliative care for pain management   -- Pericardial effusion on CT chest, s/p pericardial window 3/19  -- Pleural effusion, on 4-6 L NC  -- s/p RRT 3/23, s/p IV bolus and Amiodarone   -- s/p Lasix today   -- s/p MgSO4 and KCl for repletions  -- GI: Denies nausea/vomiting/constipation/diarrhea upon visit. on Protonix, MgOH, Miralax and Senna. Last BM on 3/22 per flowsheet.    Weights:   Drug Dosing Weight  Height (cm): 154.9 (19 Mar 2024 20:45)  Weight (kg): 49.9 (19 Mar 2024 20:45)  BMI (kg/m2): 20.8 (19 Mar 2024 20:45)  Daily Weight in k.1 (), Weight in k (), Weight in k ()  Wt obtained by RD at bedside: 60.1kg (3/25)   - Weight fluctuation might due to fluids shift due to medical condition and diuretic use, also ? accuracy of bed scale wt. Will continue to monitor weight trends as available/able.     Nutritionally Pertinent MEDICATIONS  (STANDING):  aMIOdarone Infusion  aMIOdarone Infusion  aMIOdarone IVPB  cefepime   IVPB  metoprolol tartrate  metroNIDAZOLE  IVPB  pantoprazole    Tablet  polyethylene glycol 3350  senna  sodium chloride  vancomycin  IVPB  vancomycin  IVPB    Pertinent Labs:  @ 09:14: Na 131<L>, BUN 14, Cr 0.95, <H>, K+ 4.6, Phos --, Mg 1.8, Alk Phos --, ALT/SGPT --, AST/SGOT --, HbA1c --    A1C with Estimated Average Glucose Result: 6.6 % (24 @ 07:01)    Skin per nursing documentation: no pressure injury per nursing flowsheet 3/25    Edema: edema +2 to left arm and hand per flowsheet 3/25     Estimated Needs based on dosing wt of 49.9kg:   [x] recalculated:   Estimated needs based on dosing wt/actual wt/IBW of lbs:  Calories: 1643-1896kcal (30-35 kcal/kg BW)   Proteins: 65-75g (1.3-1.5 g/kg BW)   Fluids: defer to team     Previous Nutrition Diagnosis: chronic severe malnutrition   Nutrition Diagnosis is: [x] ongoing      New Nutrition Diagnosis: [x] Increased nutrient needs related to decreased ability to consume adequate protein-energy in setting of increased physiological demand for nutrients as evidenced by dx cancer     Nutrition Care Plan:  [x] In Progress    Nutrition Interventions:     Education Provided:       [x] Yes:  Emphasized the importance of adequate kcal and protein intake; recommend to optimize nutritional intake in case of decreased appetite; recommended small frequent meals by ordering nutrient-dense snacks and leaving non-perishable food away from tray for later consumption during the day or between meals; to start with protein, and sips of supplement throughout the day; reviewed foods with protein and menu order procedures in hospital.      Recommendations:      -- Continue regular diet as tolerated. RD remains available to adjust diet as needed.   -- Recommend to add Ensure plus high protein 1x daily (350kcal, 20g proteins) in strawberry flavor to optimize calories and nutrients intake.   -- RD will provide protein-fortified smoothie (pt only wants peanut butter and banana flavor) when available for additional calories and nutrients.  -- Will add Magic cup 1x daily (290kcal, 9g proteins) in vanilla flavor for additional calories and nutrients.   -- Recommend MVI, pending no medical contraindications, for micronutrient support.   -- Monitor PO intake, GI tolerance, skin integrity, labs, weight, and bowel movement regularity.   -- Will continue to honor food and beverage preferences within diet restriction of patient in an effort to maximize level of nutrient intake.  -- Assist with meals PRN and encourage PO intake.    Monitoring and Evaluation:   Continue to monitor nutritional intake, tolerance to diet prescription, weights, labs, skin integrity    RD remains available upon request and will follow up per protocol  Shanika Holloway MS, RDN, CDN (Teams) [facility]

## 2024-03-25 NOTE — PROGRESS NOTE ADULT - PROBLEM SELECTOR PLAN 5
See [facility] documentation 3/22. Patient elected her boyfriend, Saeid Peña [phone], as her HCP. She elects DNR/DNI code status. HCP paperwork and MOLST form completed and entered into the chart. - Workup and management per Medical Oncology and GI.  - Goal is for DMT if offered

## 2024-03-25 NOTE — PROGRESS NOTE ADULT - PROBLEM SELECTOR PLAN 1
- h/o RCC and had nephrectomy by Urology by Dr Jacky Adkins at [facility], also had lymph node dissection 2 years ago  - Now with axillary, subclavicular and mediastinal lymphadenopathy, pulmonary nodules and inflammatory changes of breast seen on CT with possible effect on IVC  - CT neck with large supraclavicular/mediastinal mass lesion with mass effect and complete occlusion of the right IJ; associated surrounding inflammatory changes in the deep neck, concerning for infectious/inflammatory thrombophlebitis  - S/p supraclavicular lymph node biopsy by IR on 3/14  - Path positive for malignancy of GI origin    - D/w GI follow up MRI abdomen and MRCP  - D/w oncology, to continue to follow  - Pallative follow up for pain control

## 2024-03-25 NOTE — PROGRESS NOTE ADULT - PROBLEM SELECTOR PLAN 1
3/19 underwent Subxiphoid pericardial window with Dr Ruiz    pericardial drain  to water seal document output q shift       sp     bleomycin x1  via rafa tube  possible out  tuesday     care as primary team  Plan d/w Dr Ruiz

## 2024-03-25 NOTE — PROGRESS NOTE ADULT - PROBLEM SELECTOR PLAN 7
Will continue to f/u for symptoms.         Conor Jack MD   Geriatrics and Palliative Care ([facility]) Consult Service    of Geriatric and Palliative Medicine  [facility]      Please page the following number for clinical matters between the hours of 9 am and 5 pm from Monday through Friday : [phone]    After 5pm and on weekends, please see the contact information below:    In the event of newly developing, evolving, or worsening symptoms, please contact the Palliative Medicine team via pager (if the patient is at [facility] #[phone] or if the patient is at [facility] #[phone]) The Geriatric and Palliative Medicine service has coverage 24 hours a day/ 7 days a week to provide medical recommendations regarding symptom management needs via telephone

## 2024-03-25 NOTE — CONSULT NOTE ADULT - ATTENDING COMMENTS
58-yo F w/ PMH of RCC s/p R total nephrectomy, s/p hysterectomy, fibromyalgia, and concern for breast malignancy currently undergoing workup, admitted with R breast swelling, erythema, and pain. ID was consulted for extensive cellulitis/myositis along the R anterior lateral neck and R upper chest wall. 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 LN biopsy 3/14.    #Neck edema  #Neck pain  #Abnormal CT scan  #Myositis  #Thrombophlebitis  - VSS. No leukocytosis. Significant R neck lymphadenopathy, s/p biopsy.  - No sore throat or dysphagia. No odynophagia. No fever or rigors.  - Unclear if patient's presentation represents infectious disease process. Labs and physical exam do not support septic thrombophlebitis.  - F/u BCx x2 sets  - F/u path result from LN biopsy  - D/c cefazolin. Can start Unasyn 3g IV Q6H. Patient reported having taken Augmentin and tolerating it.  - ENT and Onc f/u    Plan discussed with primary team ACP.  Thank you for this consult. Inpatient ID team will follow.    Edwar Petersen MD, PhD  Attending Physician  Division of Infectious Diseases  Department of Medicine    Please contact through [place].  Office: [phone] (after 5 PM or weekend)
59 y/o woman presenting to hospital with right breast swelling and cellulitis found to have imaging evidence concerning for metastatic malignancy w/ lung nodules, and axillary, supraclavicular, and mediastinal adenopathy. CT CAP w/co shows compression of IVC as well as R IJ. There is conglomerate radha mass in the neck, supraclavicular area. Recommend bx of supraclavicular area. Monitor clinical status closely. Would keep pt in the hospital until prelim diagnosis results. If this is aggressive lymphoma, will need to initiate treatment in the hospital.
58 year old female active smoker hx of RCC s/p total nephrectomy, S/p hysterectomy, R-sided glaucoma, Fibromyalgia, Anxiety, GERD, COPD, presented 3/11 R breast swelling, redness and pain found to have imaging concerning for metastatic malignancy with lung nodules, right sided effusion, pericardial effusion, significant mediastinal vascular involvement and diffuse LAD s/p supraclavicular LN biopsy with results concerning for malignancy of upper GI/pancreaticobiliary origin.   Pulmonary has been consulted in the setting of her effusion.     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 Please ensure cytopath and micro are sent from fluid  - Continue diuresis with lasix, goal -500 daily   - Please obtain BNP  - IS, OOB ast tolerated, PT/OT  - Rest as above
58F w/Hx of RCC s/p R total nephrectomy, S/p hysterectomy, R-sided glaucoma, Fibromyalgia, Anxiety, GERD, smoker p/w R breast swelling, redness and pain, found to have axillary, subclavicular and mediastinal lymphadenopathy & pulm nodules, s/p LN biopsy. Nephrology consulted for hyponatremia..  Seen this pm at bedside with team.  Pt alert, conversant,  at bedside.  Chronic ill appearing  1.  Hyponatremia--awaiting full w/u as outlined.  Given Uosm around 300 normal saline will not help unless truly volume depleted (lower Na makes this a possibility).  Hoewever 2% with osm of 678 should work and if volume depleted will be effective.  If low Na consequent to CHF than lasix would be option.  Na rise to >130--> salt tabs, high salt diet    discussed with team, attending  Daniel Pineda MD  contact me on TEAMS
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 Palliative Care is consulted for management of cancer-related pain.    1) Cancer related pain.  Uncontrolled   - Start oxycodone ER 20 mg PO at 06:00, 14:00, and 22:00  - Oxycodone 10 mg PO q4h PRN for moderate pain  - Dilaudid 1 mg IV q3h PRN for severe pain  - Narcan PRN  - Bowel regimen while on opioids     2) Anxiety.  -Continue home diazepam 5 mg PO BID PRN for anxiety.    3) Carcinoma of pancreas.  -Workup and management per Medical Oncology and GI.  -GOC are for DMT if possible.     4) At high risk for constipation.  - Miralax BID  - Senna 2 tabs hs     5) Goals of care, counseling/discussion.   - See [facility] documentation 3/22 (above). Patient elected her boyfriend, Saeid Peña [phone], as her HCP. She elects DNR/DNI code status. HCP paperwork and MOLST form completed and entered into the chart.    6) Palliative care encounter.   Will continue to f/u for pain and support.       Conor Jack MD  Associate Chief Geriatrics and Palliative Care ([facility]) [facility]   [place] Consult Service   , [employer] at [facility]      Please contact me via Teams from Monday through Friday between 9am-5pm. If not answering, please call the palliative care pager [phone]    After 5pm and on weekends, please see the contact information below:    In the event of newly developing, evolving, or worsening symptoms, please contact the Palliative Medicine team via pager (if the patient is at [facility] #[phone] or if the patient is at [facility] #[phone]) The Geriatric and Palliative Medicine service has coverage 24 hours a day/ 7 days a week to provide medical recommendations regarding symptom management needs via telephone

## 2024-03-26 ENCOUNTER — RESULT REVIEW (OUTPATIENT)
Age: 59
End: 2024-03-26

## 2024-03-26 ENCOUNTER — APPOINTMENT (OUTPATIENT)
Dept: THORACIC SURGERY | Facility: HOSPITAL | Age: 59
End: 2024-03-26

## 2024-03-26 DIAGNOSIS — R06.00 DYSPNEA, UNSPECIFIED: ICD-10-CM

## 2024-03-26 DIAGNOSIS — K59.00 CONSTIPATION, UNSPECIFIED: ICD-10-CM

## 2024-03-26 LAB
ALBUMIN SERPL ELPH-MCNC: 2.9 G/DL — LOW (ref 3.3–5)
ALP SERPL-CCNC: 89 U/L — SIGNIFICANT CHANGE UP (ref 40–120)
ALT FLD-CCNC: 12 U/L — SIGNIFICANT CHANGE UP (ref 10–45)
ANION GAP SERPL CALC-SCNC: 15 MMOL/L — SIGNIFICANT CHANGE UP (ref 5–17)
APTT BLD: 124.1 SEC — CRITICAL HIGH (ref 24.5–35.6)
APTT BLD: 30.2 SEC — SIGNIFICANT CHANGE UP (ref 24.5–35.6)
AST SERPL-CCNC: 13 U/L — SIGNIFICANT CHANGE UP (ref 10–40)
BILIRUB SERPL-MCNC: 0.2 MG/DL — SIGNIFICANT CHANGE UP (ref 0.2–1.2)
BUN SERPL-MCNC: 22 MG/DL — SIGNIFICANT CHANGE UP (ref 7–23)
CALCIUM SERPL-MCNC: 8.9 MG/DL — SIGNIFICANT CHANGE UP (ref 8.4–10.5)
CHLORIDE SERPL-SCNC: 95 MMOL/L — LOW (ref 96–108)
CHLORIDE UR-SCNC: 47 MMOL/L — SIGNIFICANT CHANGE UP
CK MB CFR SERPL CALC: 1.9 NG/ML — SIGNIFICANT CHANGE UP (ref 0–3.8)
CK SERPL-CCNC: 30 U/L — SIGNIFICANT CHANGE UP (ref 25–170)
CO2 SERPL-SCNC: 20 MMOL/L — LOW (ref 22–31)
CREAT ?TM UR-MCNC: 67 MG/DL — SIGNIFICANT CHANGE UP
CREAT SERPL-MCNC: 0.93 MG/DL — SIGNIFICANT CHANGE UP (ref 0.5–1.3)
EGFR: 70 ML/MIN/1.73M2 — SIGNIFICANT CHANGE UP
GAS PNL BLDA: SIGNIFICANT CHANGE UP
GLUCOSE BLDC GLUCOMTR-MCNC: 159 MG/DL — HIGH (ref 70–99)
GLUCOSE SERPL-MCNC: 159 MG/DL — HIGH (ref 70–99)
HCT VFR BLD CALC: 29.9 % — LOW (ref 34.5–45)
HCT VFR BLD CALC: 31.1 % — LOW (ref 34.5–45)
HGB BLD-MCNC: 10 G/DL — LOW (ref 11.5–15.5)
HGB BLD-MCNC: 9.4 G/DL — LOW (ref 11.5–15.5)
INR BLD: 1.14 RATIO — SIGNIFICANT CHANGE UP (ref 0.85–1.18)
MAGNESIUM SERPL-MCNC: 1.7 MG/DL — SIGNIFICANT CHANGE UP (ref 1.6–2.6)
MCHC RBC-ENTMCNC: 28.7 PG — SIGNIFICANT CHANGE UP (ref 27–34)
MCHC RBC-ENTMCNC: 28.9 PG — SIGNIFICANT CHANGE UP (ref 27–34)
MCHC RBC-ENTMCNC: 31.4 GM/DL — LOW (ref 32–36)
MCHC RBC-ENTMCNC: 32.2 GM/DL — SIGNIFICANT CHANGE UP (ref 32–36)
MCV RBC AUTO: 89.9 FL — SIGNIFICANT CHANGE UP (ref 80–100)
MCV RBC AUTO: 91.4 FL — SIGNIFICANT CHANGE UP (ref 80–100)
NRBC # BLD: 0 /100 WBCS — SIGNIFICANT CHANGE UP (ref 0–0)
NRBC # BLD: 0 /100 WBCS — SIGNIFICANT CHANGE UP (ref 0–0)
OSMOLALITY UR: 461 MOS/KG — SIGNIFICANT CHANGE UP (ref 300–900)
PHOSPHATE SERPL-MCNC: 3.7 MG/DL — SIGNIFICANT CHANGE UP (ref 2.5–4.5)
PLATELET # BLD AUTO: 405 K/UL — HIGH (ref 150–400)
PLATELET # BLD AUTO: 429 K/UL — HIGH (ref 150–400)
POTASSIUM SERPL-MCNC: 4.2 MMOL/L — SIGNIFICANT CHANGE UP (ref 3.5–5.3)
POTASSIUM SERPL-SCNC: 4.2 MMOL/L — SIGNIFICANT CHANGE UP (ref 3.5–5.3)
POTASSIUM UR-SCNC: 37 MMOL/L — SIGNIFICANT CHANGE UP
PROT SERPL-MCNC: 6.2 G/DL — SIGNIFICANT CHANGE UP (ref 6–8.3)
PROTHROM AB SERPL-ACNC: 12.5 SEC — SIGNIFICANT CHANGE UP (ref 9.5–13)
RBC # BLD: 3.27 M/UL — LOW (ref 3.8–5.2)
RBC # BLD: 3.46 M/UL — LOW (ref 3.8–5.2)
RBC # FLD: 13.5 % — SIGNIFICANT CHANGE UP (ref 10.3–14.5)
RBC # FLD: 13.7 % — SIGNIFICANT CHANGE UP (ref 10.3–14.5)
SODIUM SERPL-SCNC: 130 MMOL/L — LOW (ref 135–145)
SODIUM UR-SCNC: 40 MMOL/L — SIGNIFICANT CHANGE UP
TROPONIN T, HIGH SENSITIVITY RESULT: 11 NG/L — SIGNIFICANT CHANGE UP (ref 0–51)
WBC # BLD: 10.29 K/UL — SIGNIFICANT CHANGE UP (ref 3.8–10.5)
WBC # BLD: 8.2 K/UL — SIGNIFICANT CHANGE UP (ref 3.8–10.5)
WBC # FLD AUTO: 10.29 K/UL — SIGNIFICANT CHANGE UP (ref 3.8–10.5)
WBC # FLD AUTO: 8.2 K/UL — SIGNIFICANT CHANGE UP (ref 3.8–10.5)

## 2024-03-26 PROCEDURE — 31645 BRNCHSC W/THER ASPIR 1ST: CPT | Mod: 78

## 2024-03-26 PROCEDURE — 99233 SBSQ HOSP IP/OBS HIGH 50: CPT | Mod: GC

## 2024-03-26 PROCEDURE — 88305 TISSUE EXAM BY PATHOLOGIST: CPT | Mod: 26

## 2024-03-26 PROCEDURE — 32659 THORACOSCOPY W/SAC DRAINAGE: CPT | Mod: 22,RT,78

## 2024-03-26 PROCEDURE — 32550 INSERT PLEURAL CATH: CPT | Mod: RT,78

## 2024-03-26 PROCEDURE — 88112 CYTOPATH CELL ENHANCE TECH: CPT | Mod: 26

## 2024-03-26 PROCEDURE — 88305 TISSUE EXAM BY PATHOLOGIST: CPT | Mod: 26,59

## 2024-03-26 PROCEDURE — 99232 SBSQ HOSP IP/OBS MODERATE 35: CPT

## 2024-03-26 PROCEDURE — 31624 DX BRONCHOSCOPE/LAVAGE: CPT | Mod: 78

## 2024-03-26 PROCEDURE — 71045 X-RAY EXAM CHEST 1 VIEW: CPT | Mod: 26

## 2024-03-26 DEVICE — SURGICEL NU-KNIT 6 X 9": Type: IMPLANTABLE DEVICE | Status: FUNCTIONAL

## 2024-03-26 DEVICE — PLEURX CATHETER KIT: Type: IMPLANTABLE DEVICE | Status: FUNCTIONAL

## 2024-03-26 RX ORDER — DIGOXIN 250 MCG
250 TABLET ORAL ONCE
Refills: 0 | Status: COMPLETED | OUTPATIENT
Start: 2024-03-26 | End: 2024-03-26

## 2024-03-26 RX ORDER — UREA 15 G
15 POWDER IN PACKET (EA) ORAL
Refills: 0 | Status: DISCONTINUED | OUTPATIENT
Start: 2024-03-26 | End: 2024-04-02

## 2024-03-26 RX ORDER — HYDROMORPHONE HYDROCHLORIDE 2 MG/ML
0.5 INJECTION INTRAMUSCULAR; INTRAVENOUS; SUBCUTANEOUS
Refills: 0 | Status: DISCONTINUED | OUTPATIENT
Start: 2024-03-26 | End: 2024-03-26

## 2024-03-26 RX ORDER — OXYCODONE HYDROCHLORIDE 5 MG/1
10 TABLET ORAL EVERY 4 HOURS
Refills: 0 | Status: DISCONTINUED | OUTPATIENT
Start: 2024-03-26 | End: 2024-04-02

## 2024-03-26 RX ORDER — AMIODARONE HYDROCHLORIDE 400 MG/1
150 TABLET ORAL ONCE
Refills: 0 | Status: COMPLETED | OUTPATIENT
Start: 2024-03-26 | End: 2024-03-26

## 2024-03-26 RX ORDER — METOPROLOL TARTRATE 50 MG
5 TABLET ORAL ONCE
Refills: 0 | Status: DISCONTINUED | OUTPATIENT
Start: 2024-03-26 | End: 2024-03-26

## 2024-03-26 RX ORDER — NALOXONE HYDROCHLORIDE 4 MG/.1ML
0.1 SPRAY NASAL
Refills: 0 | Status: DISCONTINUED | OUTPATIENT
Start: 2024-03-26 | End: 2024-04-03

## 2024-03-26 RX ORDER — HYDROMORPHONE HYDROCHLORIDE 2 MG/ML
1 INJECTION INTRAMUSCULAR; INTRAVENOUS; SUBCUTANEOUS
Refills: 0 | Status: DISCONTINUED | OUTPATIENT
Start: 2024-03-26 | End: 2024-04-01

## 2024-03-26 RX ORDER — MAGNESIUM SULFATE 500 MG/ML
1 VIAL (ML) INJECTION ONCE
Refills: 0 | Status: DISCONTINUED | OUTPATIENT
Start: 2024-03-26 | End: 2024-03-26

## 2024-03-26 RX ORDER — OXYCODONE HYDROCHLORIDE 5 MG/1
20 TABLET ORAL
Refills: 0 | Status: DISCONTINUED | OUTPATIENT
Start: 2024-03-26 | End: 2024-03-28

## 2024-03-26 RX ORDER — HYDROMORPHONE HYDROCHLORIDE 2 MG/ML
0.4 INJECTION INTRAMUSCULAR; INTRAVENOUS; SUBCUTANEOUS
Refills: 0 | Status: DISCONTINUED | OUTPATIENT
Start: 2024-03-26 | End: 2024-03-26

## 2024-03-26 RX ORDER — MAGNESIUM SULFATE 500 MG/ML
1 VIAL (ML) INJECTION ONCE
Refills: 0 | Status: COMPLETED | OUTPATIENT
Start: 2024-03-26 | End: 2024-03-26

## 2024-03-26 RX ORDER — SENNA PLUS 8.6 MG/1
2 TABLET ORAL AT BEDTIME
Refills: 0 | Status: DISCONTINUED | OUTPATIENT
Start: 2024-03-26 | End: 2024-04-03

## 2024-03-26 RX ORDER — PANTOPRAZOLE SODIUM 20 MG/1
40 TABLET, DELAYED RELEASE ORAL
Refills: 0 | Status: DISCONTINUED | OUTPATIENT
Start: 2024-03-26 | End: 2024-04-03

## 2024-03-26 RX ORDER — CHLORHEXIDINE GLUCONATE 213 G/1000ML
1 SOLUTION TOPICAL
Refills: 0 | Status: DISCONTINUED | OUTPATIENT
Start: 2024-03-26 | End: 2024-04-03

## 2024-03-26 RX ORDER — METRONIDAZOLE 500 MG
500 TABLET ORAL EVERY 12 HOURS
Refills: 0 | Status: DISCONTINUED | OUTPATIENT
Start: 2024-03-26 | End: 2024-03-28

## 2024-03-26 RX ORDER — IPRATROPIUM/ALBUTEROL SULFATE 18-103MCG
3 AEROSOL WITH ADAPTER (GRAM) INHALATION EVERY 6 HOURS
Refills: 0 | Status: DISCONTINUED | OUTPATIENT
Start: 2024-03-26 | End: 2024-04-03

## 2024-03-26 RX ORDER — METOPROLOL TARTRATE 50 MG
25 TABLET ORAL
Refills: 0 | Status: DISCONTINUED | OUTPATIENT
Start: 2024-03-26 | End: 2024-04-03

## 2024-03-26 RX ORDER — VANCOMYCIN HCL 1 G
1000 VIAL (EA) INTRAVENOUS EVERY 12 HOURS
Refills: 0 | Status: DISCONTINUED | OUTPATIENT
Start: 2024-03-26 | End: 2024-03-28

## 2024-03-26 RX ORDER — HYDROMORPHONE HYDROCHLORIDE 2 MG/ML
0.25 INJECTION INTRAMUSCULAR; INTRAVENOUS; SUBCUTANEOUS
Refills: 0 | Status: DISCONTINUED | OUTPATIENT
Start: 2024-03-26 | End: 2024-03-26

## 2024-03-26 RX ORDER — FUROSEMIDE 40 MG
20 TABLET ORAL ONCE
Refills: 0 | Status: COMPLETED | OUTPATIENT
Start: 2024-03-26 | End: 2024-03-26

## 2024-03-26 RX ORDER — CEFEPIME 1 G/1
2000 INJECTION, POWDER, FOR SOLUTION INTRAMUSCULAR; INTRAVENOUS EVERY 12 HOURS
Refills: 0 | Status: DISCONTINUED | OUTPATIENT
Start: 2024-03-26 | End: 2024-03-26

## 2024-03-26 RX ORDER — LANOLIN ALCOHOL/MO/W.PET/CERES
3 CREAM (GRAM) TOPICAL AT BEDTIME
Refills: 0 | Status: DISCONTINUED | OUTPATIENT
Start: 2024-03-26 | End: 2024-04-03

## 2024-03-26 RX ORDER — NICOTINE POLACRILEX 2 MG
1 GUM BUCCAL DAILY
Refills: 0 | Status: DISCONTINUED | OUTPATIENT
Start: 2024-03-26 | End: 2024-04-03

## 2024-03-26 RX ORDER — DIAZEPAM 5 MG
5 TABLET ORAL
Refills: 0 | Status: DISCONTINUED | OUTPATIENT
Start: 2024-03-26 | End: 2024-04-02

## 2024-03-26 RX ORDER — ONDANSETRON 8 MG/1
4 TABLET, FILM COATED ORAL ONCE
Refills: 0 | Status: DISCONTINUED | OUTPATIENT
Start: 2024-03-26 | End: 2024-03-26

## 2024-03-26 RX ORDER — POLYETHYLENE GLYCOL 3350 17 G/17G
17 POWDER, FOR SOLUTION ORAL
Refills: 0 | Status: DISCONTINUED | OUTPATIENT
Start: 2024-03-26 | End: 2024-04-03

## 2024-03-26 RX ORDER — CEFEPIME 1 G/1
2000 INJECTION, POWDER, FOR SOLUTION INTRAMUSCULAR; INTRAVENOUS EVERY 12 HOURS
Refills: 0 | Status: DISCONTINUED | OUTPATIENT
Start: 2024-03-26 | End: 2024-03-28

## 2024-03-26 RX ORDER — BENZOCAINE AND MENTHOL 5; 1 G/100ML; G/100ML
1 LIQUID ORAL
Refills: 0 | Status: DISCONTINUED | OUTPATIENT
Start: 2024-03-26 | End: 2024-04-03

## 2024-03-26 RX ORDER — SODIUM CHLORIDE 9 MG/ML
10 INJECTION INTRAMUSCULAR; INTRAVENOUS; SUBCUTANEOUS
Refills: 0 | Status: DISCONTINUED | OUTPATIENT
Start: 2024-03-26 | End: 2024-04-03

## 2024-03-26 RX ORDER — HYDROMORPHONE HYDROCHLORIDE 2 MG/ML
0.2 INJECTION INTRAMUSCULAR; INTRAVENOUS; SUBCUTANEOUS
Refills: 0 | Status: DISCONTINUED | OUTPATIENT
Start: 2024-03-26 | End: 2024-03-26

## 2024-03-26 RX ADMIN — HYDROMORPHONE HYDROCHLORIDE 0.5 MILLIGRAM(S): 2 INJECTION INTRAMUSCULAR; INTRAVENOUS; SUBCUTANEOUS at 21:45

## 2024-03-26 RX ADMIN — CEFEPIME 100 MILLIGRAM(S): 1 INJECTION, POWDER, FOR SOLUTION INTRAMUSCULAR; INTRAVENOUS at 12:38

## 2024-03-26 RX ADMIN — CHLORHEXIDINE GLUCONATE 1 APPLICATION(S): 213 SOLUTION TOPICAL at 09:13

## 2024-03-26 RX ADMIN — Medication 5 MILLIGRAM(S): at 01:48

## 2024-03-26 RX ADMIN — Medication 1 PATCH: at 12:34

## 2024-03-26 RX ADMIN — OXYCODONE HYDROCHLORIDE 20 MILLIGRAM(S): 5 TABLET ORAL at 23:40

## 2024-03-26 RX ADMIN — HYDROMORPHONE HYDROCHLORIDE 0.4 MILLIGRAM(S): 2 INJECTION INTRAMUSCULAR; INTRAVENOUS; SUBCUTANEOUS at 20:30

## 2024-03-26 RX ADMIN — PANTOPRAZOLE SODIUM 40 MILLIGRAM(S): 20 TABLET, DELAYED RELEASE ORAL at 06:31

## 2024-03-26 RX ADMIN — HEPARIN SODIUM 800 UNIT(S)/HR: 5000 INJECTION INTRAVENOUS; SUBCUTANEOUS at 08:20

## 2024-03-26 RX ADMIN — HEPARIN SODIUM 900 UNIT(S)/HR: 5000 INJECTION INTRAVENOUS; SUBCUTANEOUS at 06:31

## 2024-03-26 RX ADMIN — Medication 100 GRAM(S): at 09:26

## 2024-03-26 RX ADMIN — HYDROMORPHONE HYDROCHLORIDE 1 MILLIGRAM(S): 2 INJECTION INTRAMUSCULAR; INTRAVENOUS; SUBCUTANEOUS at 04:30

## 2024-03-26 RX ADMIN — Medication 25 MILLIGRAM(S): at 04:00

## 2024-03-26 RX ADMIN — Medication 3 MILLILITER(S): at 09:26

## 2024-03-26 RX ADMIN — Medication 15 GRAM(S): at 06:31

## 2024-03-26 RX ADMIN — HYDROMORPHONE HYDROCHLORIDE 0.5 MILLIGRAM(S): 2 INJECTION INTRAMUSCULAR; INTRAVENOUS; SUBCUTANEOUS at 22:00

## 2024-03-26 RX ADMIN — Medication 20 MILLIGRAM(S): at 04:03

## 2024-03-26 RX ADMIN — HYDROMORPHONE HYDROCHLORIDE 1 MILLIGRAM(S): 2 INJECTION INTRAMUSCULAR; INTRAVENOUS; SUBCUTANEOUS at 04:12

## 2024-03-26 RX ADMIN — Medication 1 PATCH: at 11:59

## 2024-03-26 RX ADMIN — HYDROMORPHONE HYDROCHLORIDE 0.5 MILLIGRAM(S): 2 INJECTION INTRAMUSCULAR; INTRAVENOUS; SUBCUTANEOUS at 22:15

## 2024-03-26 RX ADMIN — HYDROMORPHONE HYDROCHLORIDE 0.4 MILLIGRAM(S): 2 INJECTION INTRAMUSCULAR; INTRAVENOUS; SUBCUTANEOUS at 20:15

## 2024-03-26 RX ADMIN — HYDROMORPHONE HYDROCHLORIDE 0.4 MILLIGRAM(S): 2 INJECTION INTRAMUSCULAR; INTRAVENOUS; SUBCUTANEOUS at 20:00

## 2024-03-26 RX ADMIN — AMIODARONE HYDROCHLORIDE 600 MILLIGRAM(S): 400 TABLET ORAL at 03:57

## 2024-03-26 RX ADMIN — Medication 250 MILLIGRAM(S): at 11:59

## 2024-03-26 RX ADMIN — HEPARIN SODIUM 900 UNIT(S)/HR: 5000 INJECTION INTRAVENOUS; SUBCUTANEOUS at 07:46

## 2024-03-26 RX ADMIN — HEPARIN SODIUM 4000 UNIT(S): 5000 INJECTION INTRAVENOUS; SUBCUTANEOUS at 02:41

## 2024-03-26 RX ADMIN — Medication 100 MILLIGRAM(S): at 09:25

## 2024-03-26 RX ADMIN — OXYCODONE HYDROCHLORIDE 20 MILLIGRAM(S): 5 TABLET ORAL at 06:31

## 2024-03-26 NOTE — PROGRESS NOTE ADULT - PROBLEM SELECTOR PLAN 2
Uncontrolled   -On Senna 2 tabs hs and Miralax bid   -Will add Dulcolax 5mg bid. x 1 dose to be given on 3/15 hs  -I recurrent symptoms, may give Movantik x 1 dose Uncontrolled, last documented BM 3/22  -On Senna 2 tabs hs and Miralax bid, dulcolax BID  can consider 1x Movantik if pt remains constipated

## 2024-03-26 NOTE — PRE-ANESTHESIA EVALUATION ADULT - NSANTHAIRWAYFT_ENT_ALL_CORE
Small mouth opening, soft tissue mass seen in right neck, TMD>3FB, FROM Cspine
neck limited range of motion, R sided neck swelling, Mallampati evaluated

## 2024-03-26 NOTE — BRIEF OPERATIVE NOTE - OPERATION/FINDINGS
Subxiphoid pericardial window. Portion of xiphoid resected and pericardium incised. Fluid drained and Regan drain placed. Approx 320cc of serous pericardial fluid drained initially. Incision closed in 3 layers. 
Right VATS with drainage of 1000cc of r. pleural effusion.   Creation of pericardial window  Placement of r. pleurx catheter    Of note, r. chest wall with significant venous tributaries controlled with electrocautery.

## 2024-03-26 NOTE — PROGRESS NOTE ADULT - ASSESSMENT
58F w/Hx of RCC s/p R total nephrectomy, S/p hysterectomy, R-sided glaucoma, Fibromyalgia, Anxiety, GERD, smoker p/w R breast swelling, redness and pain, found to have axillary, subclavicular and mediastinal lymphadenopathy & pulm nodules, s/p LN biopsy. Nephrology consulted for hyponatremia.       #Hyponatremia with increased ADH activity  -SNa 132 initially on this admission. SNa trended down to 125 3/20.   -Serum osm: 265, urine osm: 446, urine Na: 57  -S/p 2% NaCl at 40cc/hr for 8hrs (3/20) with improvement of SNa to 127 from 124 and now back to 130  -Started UreNa 15gm BID to keep Na at this range (pt didn't tolerate Na tabs)  - Pt reported improvement with Lasix  - Would continue to diurese net neg 500-1L per day      #Pericardial effusion -being worked up  -s/p pericardial window 3/19  -CT surgery following       Wilfredo Hurd  Nephrology Fellow  Feel free to contact me on TEAMS  After 4 pm please contact the on-call Fellow.

## 2024-03-26 NOTE — PROGRESS NOTE ADULT - PROBLEM SELECTOR PLAN 3
-Supplemental O2 as per the primary team.   -For possible Pleurx   -CT Sx and Pulm are f/u  -If symptoms are recurrent and or creating distress, may also add Dilaudid 1mg q 3 PRN -Supplemental O2 as per the primary team.   - plan for pleurx today as per primary team  -If symptoms are recurrent and or creating distress, may also add Dilaudid 1mg q 3 PRN for dyspnea

## 2024-03-26 NOTE — CONSULT NOTE ADULT - ASSESSMENT
58 year old Female, 1ppd smoker with Hx of RCC s/p R total nephrectomy, S/p hysterectomy, R-sided glaucoma, Fibromyalgia, Anxiety, GERD, smoker presents due to Right breast swelling, redness and pain.  Admitted for right breast swelling and cellulitis found to have imaging evidence concerning for metastatic malignancy with lung nodules, and axillary, supraclavicular, and mediastinal adenopathy. 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 Also s/p pericardial window for moderate pericardial effusion now with chest tube. Now found to be in new onset paroxysmal Afib with RVR was previously initially given digoxin, IV amiodarone. Patient has a right moderate to large pleural effusion, pending pleurX placement today.  Patient is currently in the OR.     1.  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  Pericardial effusion, likely malignant, s/p pericardial window 3/19  3.  moderate to large right pleural effusion  4.  Internal jugular vein thrombosis, right  5.  New Onset Paroxysmal Atrial Fibrillation, reverted to NSR this AM at 11am     - Patient is currently in the OR,  will discuss potential oral antiarrythmic medication with patient in AM  - TSH wnl 3.82   - s/p PO dig load 3/24, s/p IV amiodarone bolus given  - Cont lopressor 25mg PO BID with BP hold parameters  - Was on IV Heparin prior to OR, resume anticoagulation once able post op   - Monitor closely on telemetry    [facility]  Teams      58 year old Female, 1ppd smoker with Hx of RCC s/p R total nephrectomy, S/p hysterectomy, R-sided glaucoma, Fibromyalgia, Anxiety, GERD, smoker presents due to Right breast swelling, redness and pain.  Admitted for right breast swelling and cellulitis found to have imaging evidence concerning for metastatic malignancy with lung nodules, and axillary, supraclavicular, and mediastinal adenopathy and inflammatory changes of breast seen on CT with possible effect on IVC.  CT neck with large supraclavicular/mediastinal mass lesion with mass effect and complete occlusion of the right IJ; associated surrounding inflammatory changes in the deep neck, concerning for infectious/inflammatory thrombophlebitis.  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 Also s/p pericardial window for moderate pericardial effusion now with chest tube. Now found to be in new onset paroxysmal Afib with RVR was previously initially given digoxin, IV amiodarone. Patient has a right moderate to large pleural effusion, pending pleurX placement today.  Patient is currently in the OR.     1.  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  Pericardial effusion, likely malignant, s/p pericardial window 3/19  3.  moderate to large right pleural effusion awaits pleurex drain   4.  Internal jugular vein thrombosis, right  5.  New Onset Paroxysmal Atrial Fibrillation, reverted to NSR this AM at 11am     - Patient is currently in the OR, will discuss potential oral antiarrythmic medication with patient in AM  - TSH wnl 3.82   - s/p PO dig load 3/24, s/p IV amiodarone bolus given  - Cont lopressor 25mg PO BID with BP hold parameters  - Was on IV Heparin prior to OR, resume anticoagulation once able post op   - Monitor closely on telemetry    LEEANN Bellamy [employer]  Teams      58 year old Female, 1ppd smoker with Hx of RCC s/p R total nephrectomy, S/p hysterectomy, R-sided glaucoma, Fibromyalgia, Anxiety, GERD, smoker presents due to Right breast swelling, redness and pain.  Admitted for right breast swelling and cellulitis found to have imaging evidence concerning for metastatic malignancy with lung nodules, and axillary, supraclavicular, and mediastinal adenopathy and inflammatory changes of breast seen on CT with possible effect on IVC.  CT neck with large supraclavicular/mediastinal mass lesion with mass effect and complete occlusion of the right IJ; associated surrounding inflammatory changes in the deep neck, concerning for infectious/inflammatory thrombophlebitis.  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 Also s/p pericardial window for moderate pericardial effusion now with chest tube. Now found to be in new onset paroxysmal Afib with RVR was previously initially given digoxin, IV amiodarone. Patient has a right moderate to large pleural effusion, pending pleurX placement today.  Patient is currently in the OR.     1.  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  Pericardial effusion, likely malignant, s/p pericardial window 3/19  3.  moderate to large right pleural effusion awaits pleurex drain   4.  Internal jugular vein thrombosis, right  5.  New Onset Paroxysmal Atrial Fibrillation, reverted to NSR this AM at 11am     - Patient is currently in the OR   - TSH wnl 3.82   - s/p PO dig load 3/24, s/p IV amiodarone bolus given.  Would change Amiodarone to oral load 400mg PO TID to 10 gram load, then 200mg Po daily there after.   Monitor TSH, LFT's, out patient opthalmology and pulmonary function tests while on Amio.   - Cont lopressor 25mg PO BID with BP hold parameters  - Was on IV Heparin prior to OR, resume anticoagulation once able post op   - Monitor closely on telemetry    LEEANN Bellamy [facility]  Teams

## 2024-03-26 NOTE — PROGRESS NOTE ADULT - ATTENDING COMMENTS
I have seen this patient with the fellow and agree with their assessment and plan. I was physically present for significant portions of the evaluation and management (E/M) service provided.  I agree with the above history, physical, and plan which I have reviewed and edited where appropriate.    Appear volume overloaded  Needs more diuresis  Cont Riddle also for hyponatremia  repeating urine studies and osm    Katerine Rai MD  Office   Contact me directly via Microsoft Teams     (After 5 pm or on weekends please page the on-call fellow/attending, can check [email or web address] for schedule. Login is shailesh mendoza, schedule under [facility] medicine, psych, derm)

## 2024-03-26 NOTE — PROGRESS NOTE ADULT - SUBJECTIVE AND OBJECTIVE BOX
DATE OF SERVICE: 03-26-24 @ 13:56    Patient is a 58y old  Female who presents with a chief complaint of Mastitis, breast malignancy (26 Mar 2024 13:34)      INTERVAL HISTORY: Feels ok. Very fatigued.     REVIEW OF SYSTEMS:  CONSTITUTIONAL: No weakness  EYES/ENT: No visual changes;  No throat pain   NECK: No pain or stiffness  RESPIRATORY: No cough, wheezing; No shortness of breath  CARDIOVASCULAR: No chest pain or palpitations  GASTROINTESTINAL: No abdominal  pain. No nausea, vomiting, or hematemesis  GENITOURINARY: No dysuria, frequency or hematuria  NEUROLOGICAL: No stroke like symptoms  SKIN: No rashes    TELEMETRY Personally reviewed: SR pAF up to 170 s/p RRT earlier this am d/t uncontrolled AF with RVR  	  MEDICATIONS:  aMIOdarone Infusion 1 mG/Min IV Continuous <Continuous>  aMIOdarone Infusion 0.5 mG/Min IV Continuous <Continuous>  aMIOdarone IVPB 150 milliGRAM(s) IV Intermittent once  metoprolol tartrate 25 milliGRAM(s) Oral two times a day  urea Oral Powder 15 Gram(s) Oral two times a day        PHYSICAL EXAM:  T(C): 36.5 (03-26-24 @ 12:31), Max: 36.9 (03-26-24 @ 04:00)  HR: 83 (03-26-24 @ 12:31) (83 - 188)  BP: 102/64 (03-26-24 @ 12:31) (102/64 - 135/92)  RR: 19 (03-26-24 @ 12:31) (19 - 20)  SpO2: 94% (03-26-24 @ 12:31) (91% - 96%)  Wt(kg): --  I&O's Summary    25 Mar 2024 07:01  -  26 Mar 2024 07:00  --------------------------------------------------------  IN: 0 mL / OUT: 740 mL / NET: -740 mL    26 Mar 2024 07:01  -  26 Mar 2024 13:56  --------------------------------------------------------  IN: 0 mL / OUT: 5 mL / NET: -5 mL          Appearance: In no distress	  HEENT:    PERRL, EOMI	  Cardiovascular:  S1 S2, No JVD  Respiratory: Lungs clear to auscultation	  Gastrointestinal:  Soft, Non-tender, + BS	  Vascularature:  No edema of LE  Psychiatric: Appropriate affect   Neuro: no acute focal deficits                               10.0   10.29 )-----------( 429      ( 26 Mar 2024 04:06 )             31.1     03-26    130<L>  |  95<L>  |  22  ----------------------------<  159<H>  4.2   |  20<L>  |  0.93    Ca    8.9      26 Mar 2024 04:06  Phos  3.7     03-26  Mg     1.7     03-26    TPro  6.2  /  Alb  2.9<L>  /  TBili  0.2  /  DBili  x   /  AST  13  /  ALT  12  /  AlkPhos  89  03-26        Labs personally reviewed      ASSESSMENT/PLAN: 	    58F w/Hx of RCC s/p R total nephrectomy, S/p hysterectomy, R-sided glaucoma, Fibromyalgia, Anxiety, GERD, smoker presents due to R breast swelling, redness and pain.     Problem/Plan - 1:   ·  Problem: Pericardial effusion.  ·  Plan: - pericardial effusion on CT chest   - TTE 3/18 with Moderate pericardial effusion with echocardiographic evidence of hemodynamic compromise    - Clinically pt is not in tamponade   - Thoracic surgery consulted for pericardial window given likely malignant effusion  ----s/p pericardial window 3/19  - 3/21 now with ST paroxysmal with AF with RVR and reports of chest pain  limited TTE: left ventricular systolic function appears grossly normal. Thickened pericardium. Bilateral pleural effusion noted. No pericardial effusion seen.  Compared to the TTE 3/18/2024, the pericardial effusion is no longer present.    Problem/Plan - 2:  ·  Problem: Internal jugular vein thrombosis, right.   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 (on hold for pleurx placement)     Problem/Plan - 3:  ·  Problem: Smoker.   ·  Plan: - 1 ppd smoker  - c/w Nicotine patch.    Problem/Plan - 4:  ·  Problem: Prophylactic measure.   ·  Plan: dispo- home.    Problem/Plan - 5:  ·  Problem: Sinus Tachycardia  - Likely reactive   - D5 as per renal    Problem/Plan - 6:  ·  Problem: New Onset Atrial Fibrillation  - c/w heparin gtt  - TSH wnl  - Cont lopressor 25mg PO BID with hold parameters  - c/w Amiodarone gtt   - Initiate dig load          Adriana Julian, AG-NP   Lb Mata DO [facility]  Cardiovascular Medicine  [address] [place] [address], [address]  Available through call or text on Microsoft TEAMs  Office: [phone]   DATE OF SERVICE: 03-26-24 @ 13:56    Patient is a 58y old  Female who presents with a chief complaint of Mastitis, breast malignancy (26 Mar 2024 13:34)      INTERVAL HISTORY: Feels ok. Very fatigued.     REVIEW OF SYSTEMS:  CONSTITUTIONAL: No weakness  EYES/ENT: No visual changes;  No throat pain   NECK: No pain or stiffness  RESPIRATORY: No cough, wheezing; No shortness of breath  CARDIOVASCULAR: No chest pain or palpitations  GASTROINTESTINAL: No abdominal  pain. No nausea, vomiting, or hematemesis  GENITOURINARY: No dysuria, frequency or hematuria  NEUROLOGICAL: No stroke like symptoms  SKIN: No rashes    TELEMETRY Personally reviewed: SR pAF up to 170 s/p RRT earlier this am d/t uncontrolled AF with RVR  	  MEDICATIONS:  aMIOdarone Infusion 1 mG/Min IV Continuous <Continuous>  aMIOdarone Infusion 0.5 mG/Min IV Continuous <Continuous>  aMIOdarone IVPB 150 milliGRAM(s) IV Intermittent once  metoprolol tartrate 25 milliGRAM(s) Oral two times a day  urea Oral Powder 15 Gram(s) Oral two times a day        PHYSICAL EXAM:  T(C): 36.5 (03-26-24 @ 12:31), Max: 36.9 (03-26-24 @ 04:00)  HR: 83 (03-26-24 @ 12:31) (83 - 188)  BP: 102/64 (03-26-24 @ 12:31) (102/64 - 135/92)  RR: 19 (03-26-24 @ 12:31) (19 - 20)  SpO2: 94% (03-26-24 @ 12:31) (91% - 96%)  Wt(kg): --  I&O's Summary    25 Mar 2024 07:01  -  26 Mar 2024 07:00  --------------------------------------------------------  IN: 0 mL / OUT: 740 mL / NET: -740 mL    26 Mar 2024 07:01  -  26 Mar 2024 13:56  --------------------------------------------------------  IN: 0 mL / OUT: 5 mL / NET: -5 mL          Appearance: In no distress	  HEENT:    PERRL, EOMI	  Cardiovascular:  S1 S2, No JVD  Respiratory: Lungs clear to auscultation	  Gastrointestinal:  Soft, Non-tender, + BS	  Vascularature:  No edema of LE  Psychiatric: Appropriate affect   Neuro: no acute focal deficits                               10.0   10.29 )-----------( 429      ( 26 Mar 2024 04:06 )             31.1     03-26    130<L>  |  95<L>  |  22  ----------------------------<  159<H>  4.2   |  20<L>  |  0.93    Ca    8.9      26 Mar 2024 04:06  Phos  3.7     03-26  Mg     1.7     03-26    TPro  6.2  /  Alb  2.9<L>  /  TBili  0.2  /  DBili  x   /  AST  13  /  ALT  12  /  AlkPhos  89  03-26        Labs personally reviewed      ASSESSMENT/PLAN: 	    58F w/Hx of RCC s/p R total nephrectomy, S/p hysterectomy, R-sided glaucoma, Fibromyalgia, Anxiety, GERD, smoker presents due to R breast swelling, redness and pain.     Problem/Plan - 1:   ·  Problem: Pericardial effusion.  ·  Plan: - pericardial effusion on CT chest   - TTE 3/18 with Moderate pericardial effusion with echocardiographic evidence of hemodynamic compromise    - Clinically pt is not in tamponade   - Thoracic surgery consulted for pericardial window given likely malignant effusion  ----s/p pericardial window 3/19  - 3/21 now with ST paroxysmal with AF with RVR and reports of chest pain  limited TTE: left ventricular systolic function appears grossly normal. Thickened pericardium. Bilateral pleural effusion noted. No pericardial effusion seen.  Compared to the TTE 3/18/2024, the pericardial effusion is no longer present.    Problem/Plan - 2:  ·  Problem: Internal jugular vein thrombosis, right.   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 (on hold for pleurx placement)     Problem/Plan - 3:  ·  Problem: Smoker.   ·  Plan: - 1 ppd smoker  - c/w Nicotine patch.    Problem/Plan - 4:  ·  Problem: Prophylactic measure.   ·  Plan: dispo- home.    Problem/Plan - 5:  ·  Problem: Sinus Tachycardia  - Likely reactive   - D5 as per renal    Problem/Plan - 6:  ·  Problem: New Onset Atrial Fibrillation  - c/w heparin gtt  - TSH wnl  - s/p PO dig load 3/24  - Cont lopressor 25mg PO BID with hold parameters  - s/p Amiodarone bolus and now in SR  - Appreciate EP recs          Adriana Julian, AG-NP   Lb Mata DO [facility]  Cardiovascular Medicine  [address] [place] [address], [address]  Available through call or text on [employer] TEAMs  Office: [phone]

## 2024-03-26 NOTE — PROGRESS NOTE ADULT - SUBJECTIVE AND OBJECTIVE BOX
Moose Nolen MD  Division of Hospital Medicine  Available on MS teams until 7pm  If no response or off-hours, page [phone]  -------------------------------------    Patient is a 58y old  Female who presents with a chief complaint of Mastitis, breast malignancy (26 Mar 2024 13:23)    SUBJECTIVE / OVERNIGHT EVENTS: RRT in am for afib rvr, now back in sinus rhythm  ADDITIONAL REVIEW OF SYSTEMS: pt with ongoing sob and anxiety over her condition, but does not feel any new/acute symptoms.     MEDICATIONS  (STANDING):  albuterol/ipratropium for Nebulization. 3 milliLiter(s) Nebulizer once  aMIOdarone Infusion 1 mG/Min (33.3 mL/Hr) IV Continuous <Continuous>  aMIOdarone Infusion 0.5 mG/Min (16.7 mL/Hr) IV Continuous <Continuous>  aMIOdarone IVPB 150 milliGRAM(s) IV Intermittent once  cefepime   IVPB 2000 milliGRAM(s) IV Intermittent every 12 hours  chlorhexidine 4% Liquid 1 Application(s) Topical <User Schedule>  influenza   Vaccine 0.5 milliLiter(s) IntraMuscular once  metoprolol tartrate 25 milliGRAM(s) Oral two times a day  metroNIDAZOLE  IVPB 500 milliGRAM(s) IV Intermittent every 12 hours  nicotine -  14 mG/24Hr(s) Patch 1 Patch Transdermal daily  oxyCODONE  ER Tablet 20 milliGRAM(s) Oral <User Schedule>  pantoprazole    Tablet 40 milliGRAM(s) Oral before breakfast  polyethylene glycol 3350 17 Gram(s) Oral two times a day  senna 2 Tablet(s) Oral at bedtime  urea Oral Powder 15 Gram(s) Oral two times a day  vancomycin  IVPB 1000 milliGRAM(s) IV Intermittent every 12 hours    MEDICATIONS  (PRN):  albuterol/ipratropium for Nebulization 3 milliLiter(s) Nebulizer every 6 hours PRN Shortness of Breath and/or Wheezing  aluminum hydroxide/magnesium hydroxide/simethicone Suspension 30 milliLiter(s) Oral every 6 hours PRN Dyspepsia  benzocaine/menthol Lozenge 1 Lozenge Oral two times a day PRN Sore Throat  bisacodyl 5 milliGRAM(s) Oral every 12 hours PRN Constipation  diazepam    Tablet 5 milliGRAM(s) Oral two times a day PRN for anxiety  guaiFENesin Oral Liquid (Sugar-Free) 200 milliGRAM(s) Oral every 6 hours PRN Cough  HYDROmorphone  Injectable 1 milliGRAM(s) IV Push every 3 hours PRN Severe Pain (7 - 10)  melatonin 3 milliGRAM(s) Oral at bedtime PRN Insomnia  naloxone Injectable 0.1 milliGRAM(s) IV Push every 3 minutes PRN Obtundation, respiratory suppression  oxyCODONE    IR 10 milliGRAM(s) Oral every 4 hours PRN Moderate Pain (4 - 6)  sodium chloride 0.9% lock flush 10 milliLiter(s) IV Push every 1 hour PRN Pre/post blood products, medications, blood draw, and to maintain line patency      CAPILLARY BLOOD GLUCOSE      POCT Blood Glucose.: 159 mg/dL (26 Mar 2024 03:46)    I&O's Summary    25 Mar 2024 07:01  -  26 Mar 2024 07:00  --------------------------------------------------------  IN: 0 mL / OUT: 740 mL / NET: -740 mL    26 Mar 2024 07:01  -  26 Mar 2024 13:34  --------------------------------------------------------  IN: 0 mL / OUT: 5 mL / NET: -5 mL        PHYSICAL EXAM:  Vital Signs Last 24 Hrs  T(C): 36.5 (26 Mar 2024 12:31), Max: 36.9 (26 Mar 2024 04:00)  T(F): 97.7 (26 Mar 2024 12:31), Max: 98.5 (26 Mar 2024 04:00)  HR: 83 (26 Mar 2024 12:31) (83 - 188)  BP: 102/64 (26 Mar 2024 12:31) (102/64 - 135/92)  BP(mean): --  RR: 19 (26 Mar 2024 12:31) (19 - 20)  SpO2: 94% (26 Mar 2024 12:31) (91% - 96%)    Parameters below as of 26 Mar 2024 12:31  Patient On (Oxygen Delivery Method): mask, Venturi      CONSTITUTIONAL: NAD  EYES: PERRLA; conjunctiva and sclera clear  ENMT: MMM, mild facial plethora  NECK: Supple  RESPIRATORY: decreased BS R  CARDIOVASCULAR: RRR, no JVD, LUE edema  ABDOMEN: Nontender to palpation, normoactive BS, no guarding/rigidity  MUSCLOSKELETAL:  no clubbing/cyanosis, no joint swelling or tenderness to palpation  PSYCH: A+O x 3, affect normal  NEUROLOGY: CN 2-12 are intact and symmetric; no gross sensory or motor deficits  SKIN: No rashes; no palpable lesions    LABS:                        10.0   10.29 )-----------( 429      ( 26 Mar 2024 04:06 )             31.1     03-26    130<L>  |  95<L>  |  22  ----------------------------<  159<H>  4.2   |  20<L>  |  0.93    Ca    8.9      26 Mar 2024 04:06  Phos  3.7     03-26  Mg     1.7     03-26    TPro  6.2  /  Alb  2.9<L>  /  TBili  0.2  /  DBili  x   /  AST  13  /  ALT  12  /  AlkPhos  89  03-26    PT/INR - ( 26 Mar 2024 04:06 )   PT: 12.5 sec;   INR: 1.14 ratio         PTT - ( 26 Mar 2024 04:06 )  PTT:124.1 sec  CARDIAC MARKERS ( 26 Mar 2024 04:06 )  x     / x     / 30 U/L / x     / 1.9 ng/mL      Urinalysis Basic - ( 26 Mar 2024 04:06 )    Color: x / Appearance: x / SG: x / pH: x  Gluc: 159 mg/dL / Ketone: x  / Bili: x / Urobili: x   Blood: x / Protein: x / Nitrite: x   Leuk Esterase: x / RBC: x / WBC x   Sq Epi: x / Non Sq Epi: x / Bacteria: x        Culture - Blood (collected 23 Mar 2024 16:49)  Source: .Blood Blood-Peripheral  Preliminary Report (25 Mar 2024 22:01):    No growth at 48 Hours        RADIOLOGY & ADDITIONAL TESTS:  Results Reviewed:   Imaging Personally Reviewed:  Electrocardiogram Personally Reviewed:    COORDINATION OF CARE:  Care Discussed with Consultants/Other Providers [Y/N]:  Prior or Outpatient Records Reviewed [Y/N]:

## 2024-03-26 NOTE — PROGRESS NOTE ADULT - PROBLEM SELECTOR PLAN 3
- pericardial effusion on CT chest   - echo with moderate pericardial effusion and collapse of the RA  - s/p pericardial window 3/19  - chest tube drainage care per thoracic sx  - repeat echo with resolution of pericardial effusion- f/u thoracic/IR on whether drain can be removed monday    #pleural effusion- pt on 4-6 L NC with pleff seen on imaging  - thoracic planning pleurx placement today 230pm; patient medically optimized  - give lasix PRN

## 2024-03-26 NOTE — PROGRESS NOTE ADULT - SUBJECTIVE AND OBJECTIVE BOX
SUBJECTIVE AND OBJECTIVE: Pt seen and examined at bedside. Pt awake but endorsing sleepiness from RRT o/n.   Indication for Geriatrics and Palliative Care Services/INTERVAL HPI: GOC     OVERNIGHT EVENTS: RRT o/n for rapid afib, now resolved. In 24 hours (8a-8a) pt used 2x IV dilaudid.     DNR on chart:DNI  DNI      Allergies    Cipro (Headache; Vomiting; Rash)  penicillin (Headache; Vomiting; Rash)  erythromycin (Headache; Vomiting; Rash)    Intolerances    MEDICATIONS  (STANDING):  albuterol/ipratropium for Nebulization. 3 milliLiter(s) Nebulizer once  aMIOdarone Infusion 1 mG/Min (33.3 mL/Hr) IV Continuous <Continuous>  aMIOdarone Infusion 0.5 mG/Min (16.7 mL/Hr) IV Continuous <Continuous>  aMIOdarone IVPB 150 milliGRAM(s) IV Intermittent once  cefepime   IVPB 2000 milliGRAM(s) IV Intermittent every 12 hours  chlorhexidine 4% Liquid 1 Application(s) Topical <User Schedule>  influenza   Vaccine 0.5 milliLiter(s) IntraMuscular once  metoprolol tartrate 25 milliGRAM(s) Oral two times a day  metroNIDAZOLE  IVPB 500 milliGRAM(s) IV Intermittent every 12 hours  nicotine -  14 mG/24Hr(s) Patch 1 Patch Transdermal daily  oxyCODONE  ER Tablet 20 milliGRAM(s) Oral <User Schedule>  pantoprazole    Tablet 40 milliGRAM(s) Oral before breakfast  polyethylene glycol 3350 17 Gram(s) Oral two times a day  senna 2 Tablet(s) Oral at bedtime  urea Oral Powder 15 Gram(s) Oral two times a day  vancomycin  IVPB 1000 milliGRAM(s) IV Intermittent every 12 hours    MEDICATIONS  (PRN):  albuterol/ipratropium for Nebulization 3 milliLiter(s) Nebulizer every 6 hours PRN Shortness of Breath and/or Wheezing  aluminum hydroxide/magnesium hydroxide/simethicone Suspension 30 milliLiter(s) Oral every 6 hours PRN Dyspepsia  benzocaine/menthol Lozenge 1 Lozenge Oral two times a day PRN Sore Throat  bisacodyl 5 milliGRAM(s) Oral every 12 hours PRN Constipation  diazepam    Tablet 5 milliGRAM(s) Oral two times a day PRN for anxiety  guaiFENesin Oral Liquid (Sugar-Free) 200 milliGRAM(s) Oral every 6 hours PRN Cough  HYDROmorphone  Injectable 1 milliGRAM(s) IV Push every 3 hours PRN Severe Pain (7 - 10)  melatonin 3 milliGRAM(s) Oral at bedtime PRN Insomnia  naloxone Injectable 0.1 milliGRAM(s) IV Push every 3 minutes PRN Obtundation, respiratory suppression  oxyCODONE    IR 10 milliGRAM(s) Oral every 4 hours PRN Moderate Pain (4 - 6)  sodium chloride 0.9% lock flush 10 milliLiter(s) IV Push every 1 hour PRN Pre/post blood products, medications, blood draw, and to maintain line patency      ITEMS UNCHECKED ARE NOT PRESENT    PRESENT SYMPTOMS: [ ]Unable to self-report - see [ ] CPOT [ ] PAINADS [ ] RDOS  Source if other than patient:  [ ]Family   [ ]Team     Pain:  [ ]yes [x ]no- pt endorsing relief with current regimen   QOL impact -   Location -                    Aggravating factors -  Quality -  Radiation -  Timing-  Severity (0-10 scale):  Minimal acceptable level (0-10 scale):     CPOT:    https://www.[email or web address]/getattachment/[email or web address]/Critical-Care-Pain-Observation-Tool-(CPOT)    PAINAD Score: See PAINAD tool and score below       Dyspnea:                           [ ]Mild [ ]Moderate [ ]Severe    RDOS: See RDOS tool and score below   0 to 2  minimal or no respiratory distress   3  mild distress  4 to 6 moderate distress  >7 severe distress      Anxiety:                             [ ]Mild [ ]Moderate [ ]Severe  Fatigue:                             [ ]Mild [x ]Moderate [ ]Severe  Nausea:                             [ ]Mild [ ]Moderate [ ]Severe  Loss of appetite:              [ ]Mild [ ]Moderate [ ]Severe  Constipation:                    [ ]Mild [ ]Moderate [ ]Severe    PCSSQ[Palliative Care Spiritual Screening Question]   Severity (0-10):  Score of 4 or > indicate consideration of Chaplaincy referral.  Chaplaincy Referral: [ ] yes [ ] refused [ ] following [ ] Deferred     Caregiver [place]? : [ ] yes [ ] no [ ] Deferred [ ] Declined             Social work referral [ ] Patient & Family Centered Care Referral [ ]     Anticipatory Grief present?:  [ ] yes [ ] no  [ ] Deferred                  Social work referral [ ] Chaplaincy Referral [ ]    		  Other Symptoms:  [ x]All other review of systems negative     Palliative Performance Status Version 2:   See PPSv2 tool and score below         PHYSICAL EXAM:  Vital Signs Last 24 Hrs  T(C): 36.5 (26 Mar 2024 12:31), Max: 36.9 (26 Mar 2024 04:00)  T(F): 97.7 (26 Mar 2024 12:31), Max: 98.5 (26 Mar 2024 04:00)  HR: 83 (26 Mar 2024 12:31) (83 - 188)  BP: 102/64 (26 Mar 2024 12:31) (102/64 - 135/92)  BP(mean): --  RR: 19 (26 Mar 2024 12:31) (19 - 20)  SpO2: 94% (26 Mar 2024 12:31) (91% - 96%)    Parameters below as of 26 Mar 2024 12:31  Patient On (Oxygen Delivery Method): mask, Venturi     I&O's Summary    25 Mar 2024 07:01  -  26 Mar 2024 07:00  --------------------------------------------------------  IN: 0 mL / OUT: 740 mL / NET: -740 mL    26 Mar 2024 07:01  -  26 Mar 2024 13:24  --------------------------------------------------------  IN: 0 mL / OUT: 5 mL / NET: -5 mL       GENERAL: [ ]Cachexia    [ ]Alert  [x]Oriented x  3 [x ]Lethargic  [ ]Unarousable  [ ]Verbal  [ ]Non-Verbal  Behavioral:   [ ]Anxiety  [ ]Delirium [ ]Agitation [ ]Other  HEENT:  [ ]Normal   [ x]Dry mouth   [ ]ET Tube/Trach  [ ]Oral lesions  PULMONARY:   [ ]Clear [x ]Tachypnea  [ ]Audible excessive secretions   [ ]Rhonchi        [ ]Right [ ]Left [ ]Bilateral  [ ]Crackles        [ ]Right [ ]Left [ ]Bilateral  [ ]Wheezing     [ ]Right [ ]Left [ ]Bilateral  [ ]Diminished BS [ ] Right [ ]Left [ ]Bilateral  CARDIOVASCULAR:    [x ]Regular [ ]Irregular [ ]Tachy  [ ]Malik [ ]Murmur [ ]Other  GASTROINTESTINAL:  [x ]Soft  [ ]Distended   [x ]+BS  [ x]Non tender [ ]Tender  [ ]Other [ ]PEG [ ]OGT/ NGT   Last BM:   GENITOURINARY:  [ ]Normal [ ]Incontinent   [ ]Oliguria/Anuria   [ ]Che  MUSCULOSKELETAL:   [ ]Normal   [ x]Weakness  [x ]Bed/Wheelchair bound [ ]Edema  NEUROLOGIC:   [ x]No focal deficits  [ ] Cognitive impairment  [ ] Dysphagia [ ]Dysarthria [ ] Paresis [ ]Other   SKIN:   [ ]Normal  [ ]Rash  [ ]Other  [ ]Pressure ulcer(s) [ ]y [ ]n present on admission    CRITICAL CARE:  [ ]Shock Present  [ ]Septic [ ]Cardiogenic [ ]Neurologic [ ]Hypovolemic  [ ]Vasopressors [ ]Inotropes  [ ]Respiratory failure present [ ]Mechanical Ventilation [ ]Non-invasive ventilatory support [ ]High-Flow   [ ]Acute  [ ]Chronic [ ]Hypoxic  [ ]Hypercarbic [ ]Other  [ ]Other organ failure     LABS:                        10.0   10.29 )-----------( 429      ( 26 Mar 2024 04:06 )             31.1   03-26    130<L>  |  95<L>  |  22  ----------------------------<  159<H>  4.2   |  20<L>  |  0.93    Ca    8.9      26 Mar 2024 04:06  Phos  3.7     03-26  Mg     1.7     03-26    TPro  6.2  /  Alb  2.9<L>  /  TBili  0.2  /  DBili  x   /  AST  13  /  ALT  12  /  AlkPhos  89  03-26  PT/INR - ( 26 Mar 2024 04:06 )   PT: 12.5 sec;   INR: 1.14 ratio         PTT - ( 26 Mar 2024 04:06 )  PTT:124.1 sec    Urinalysis Basic - ( 26 Mar 2024 04:06 )    Color: x / Appearance: x / SG: x / pH: x  Gluc: 159 mg/dL / Ketone: x  / Bili: x / Urobili: x   Blood: x / Protein: x / Nitrite: x   Leuk Esterase: x / RBC: x / WBC x   Sq Epi: x / Non Sq Epi: x / Bacteria: x      RADIOLOGY & ADDITIONAL STUDIES:  < from: Xray Chest 1 View- PORTABLE-Urgent (Xray Chest 1 View- PORTABLE-Urgent .) (03.26.24 @ 04:18) >  ACC: [id number] EXAM:  XR CHEST PORTABLE URGENT 1V   ORDERED BY:  SAEID BELLO     PROCEDURE DATE:  03/26/2024          INTERPRETATION:  CLINICAL INFORMATION: Rapid response.    TECHNIQUE: Frontal view of the chest.    COMPARISON: Multiple prior chest x-rays with most recent dated 3/25/2024.    FINDINGS:  Support Devices/Implanted Hardware: Pericardial drain.  Heart: Heart size cannot be accurately assessed in this projection.  Pulmonary: Moderate to large right pleural effusion with associated   atelectasis, increased from 3/25/2024. Left lung is clear. No   pneumothorax.  Bones: No acute bony pathology.    IMPRESSION:  Moderate to large right pleural effusion, increased from 3/25/2024.    Findings were discussed by Dr. Kruger with Dr. Tello 3/26/2024 at 3:57   AM with read back confirmation.    --- End of Report ---           ASHLEY KRUGER MD; Resident Radiologist  This document has been electronically signed.  KRIS MERCER MD; Attending Radiologist  This document has been electronically signed. Mar 26 2024 12:45PM    < end of copied text >    Protein Calorie Malnutrition Present: [ ]mild [ ]moderate [ ]severe [ ]underweight [ ]morbid obesity  https://www.andeal.org/vault/2440/web/files/ONC/Table_Clinical%20Characteristics%20to%20Document%20Malnutrition-White%20JV%20et%20al%202012.pdf    Height (cm): 154.9 (03-19-24 @ 20:45), 154.9 (03-10-24 @ 16:32)  Weight (kg): 49.9 (03-19-24 @ 20:45), 49.9 (03-10-24 @ 16:32)  BMI (kg/m2): 20.8 (03-19-24 @ 20:45), 20.8 (03-10-24 @ 16:32)    [ ]PPSV2 < or = 30%  [ ]significant weight loss [ ]poor nutritional intake [ ]anasarca[ ]Artificial Nutrition    Other REFERRALS:  [ ]Hospice  [ ]Child Life  [ ]Social Work  [ ]Case management [ ]Holistic Therapy     Goals of Care Document:

## 2024-03-26 NOTE — PROGRESS NOTE ADULT - PROBLEM SELECTOR PLAN 1
Uncontrolled but mainly 2/2 to pain over infiltrated IV access on her right arm   - Continue with oxycodone ER 20 mg PO q8hrs  - Continue with Oxycodone 10 mg PO q4h PRN for moderate pain  - Continue with Dilaudid 1 mg IV q3h PRN for severe pain  - Educated the patient about availability of PRN meds. Dilaudid 1mg IV x 1 was to be given by the patient's RN   - Narcan PRN  - Bowel regimen while on opioids pt endorsing relief with current regimen, used 2x IVP Dilaudid in 24 hours (8a-8a)   - Continue with oxycodone ER 20 mg PO q8hrs  - Continue with Oxycodone 10 mg PO q4h PRN for moderate pain  - Continue with Dilaudid 1 mg IV q3h PRN for severe pain  - Reinforced education regarding the availability of PRN meds.   - Narcan PRN  - Bowel regimen while on opioids

## 2024-03-26 NOTE — PROGRESS NOTE ADULT - PROBLEM SELECTOR PLAN 7
Will continue to f/u for symptoms.         Conor Jack MD   Geriatrics and Palliative Care ([facility]) Consult Service    of Geriatric and Palliative Medicine  [facility]      Please page the following number for clinical matters between the hours of 9 am and 5 pm from Monday through Friday : [phone]    After 5pm and on weekends, please see the contact information below:    In the event of newly developing, evolving, or worsening symptoms, please contact the Palliative Medicine team via pager (if the patient is at [facility] #[phone] or if the patient is at [facility] #[phone]) The Geriatric and Palliative Medicine service has coverage 24 hours a day/ 7 days a week to provide medical recommendations regarding symptom management needs via telephone will continue to follow for symptoms   case discussed with primary team  Can be reached by TEAMS M-F 9-5 Carina Peacock Any other time please page [phone] if needed

## 2024-03-26 NOTE — PRE-ANESTHESIA EVALUATION ADULT - TEMPERATURE IN CELSIUS (DEGREES C)
36.7
36.5
on the discharge service for the patient. I have reviewed and made amendments to the documentation where necessary.

## 2024-03-26 NOTE — PROGRESS NOTE ADULT - ASSESSMENT
58-yo F w/ PMH of RCC s/p R total nephrectomy, s/p hysterectomy, fibromyalgia, and concern for breast malignancy currently undergoing workup, admitted with R breast swelling, erythema, and pain. ID was consulted for extensive cellulitis/myositis along the R anterior lateral neck and R upper chest wall. 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 LN biopsy 3/14. CT chest w/ mediastinal and supraclavicular lymphadenopathy w/ necrosis. 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 RRT 3/22-23 ON for afib w/ RVR. Afebrile. New leukocytosis. Breast erythema and tenderness improving while on antibiotics. Another RRT 3/26 w/ tachycardia.    #Neck edema  #Neck pain  #Abnormal CT scan  #Myositis  #Thrombophlebitis  #Leukocytosis  #Afib w/ RVR  - VSS. No leukocytosis. Significant R neck lymphadenopathy, s/p biopsy. F/u path  - No sore throat or dysphagia. No odynophagia. No fever or rigors.  - Unclear if patient's presentation represents infectious disease process. Labs and physical exam do not support septic thrombophlebitis.  - BCx NGTD. Quantiferon neg.  - CT chest reviewed. Necrotic lymph nodes noted. Path from neck concern for malignancy. Breast erythema and pain could be from inflammation from mass burden.  - RRT 3/22-23 ON w/ afib w/ RVR. Afebrile. New leukocytosis. Probably from cancer burden? Broadened antibiotics to cefepime/metroniazole/vancomycin.  - RRT 3/26 for tachycardia. Increased pleural effusion on CXR.  - Continue with cefepime 2g IV Q12H and metronidazole 500 mg PO Q12H.  - C/w vancomycin to 1g IV Q12H, w/ pre-4th dose trough.  - BCx repeat NGTD.  - ENT and Onc f/u. CT surgery follow-up. Pulm f/u      Plan discussed with primary team ACP.  Thank you for this consult. Inpatient ID team will peripherally follow.    Edwar Petersen MD, PhD  Attending Physician  Division of Infectious Diseases  Department of Medicine    Please contact through MS Teams message.  Office: [phone] (after 5 PM or weekend) .     58-yo F w/ PMH of RCC s/p R total nephrectomy, s/p hysterectomy, fibromyalgia, and concern for breast malignancy currently undergoing workup, admitted with R breast swelling, erythema, and pain. ID was consulted for extensive cellulitis/myositis along the R anterior lateral neck and R upper chest wall. 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 LN biopsy 3/14. CT chest w/ mediastinal and supraclavicular lymphadenopathy w/ necrosis. 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 RRT 3/22-23 ON for afib w/ RVR. Afebrile. New leukocytosis. Breast erythema and tenderness improving while on antibiotics. Another RRT 3/26 w/ tachycardia.    #Neck edema  #Neck pain  #Abnormal CT scan  #Myositis  #Thrombophlebitis  #Leukocytosis  #Afib w/ RVR  - VSS. No leukocytosis. Significant R neck lymphadenopathy, s/p biopsy. F/u path  - No sore throat or dysphagia. No odynophagia. No fever or rigors.  - Unclear if patient's presentation represents infectious disease process. Labs and physical exam do not support septic thrombophlebitis.  - BCx NGTD. Quantiferon neg.  - CT chest reviewed. Necrotic lymph nodes noted. Path from neck concern for malignancy. Breast erythema and pain could be from inflammation from mass burden.  - RRT 3/22-23 ON w/ afib w/ RVR. Afebrile. New leukocytosis. Probably from cancer burden? Broadened antibiotics to cefepime/metroniazole/vancomycin.  - RRT 3/26 for tachycardia. Increased pleural effusion on CXR.  - Continue with cefepime 2g IV Q12H and metronidazole 500 mg PO Q12H.  - C/w vancomycin to 1g IV Q12H, w/ pre-4th dose trough.  - BCx repeat NGTD.  - ENT and Onc f/u. CT surgery follow-up. Pulm f/u    #L hand phlebitis  - L dorsal hand w/ phlebitis 3/26. ABx as above. Warm compress    Plan discussed with primary team ACP.  Thank you for this consult. Inpatient ID team will peripherally follow.    Edwar Petersen MD, PhD  Attending Physician  Division of Infectious Diseases  Department of Medicine    Please contact through MS Teams message.  Office: [phone] (after 5 PM or weekend) .

## 2024-03-26 NOTE — BRIEF OPERATIVE NOTE - NSICDXBRIEFPREOP_GEN_ALL_CORE_FT
PRE-OP DIAGNOSIS:  Pericardial tamponade 19-Mar-2024 22:01:58  Jeanie Cao  
PRE-OP DIAGNOSIS:  Pleural effusion 26-Mar-2024 20:31:55  Sawyer Avila

## 2024-03-26 NOTE — PRE-ANESTHESIA EVALUATION ADULT - NSANTHPMHFT_GEN_ALL_CORE
DNR/DNI rescinded for procedure and perioperative period by surgeon. 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
Medical History discussed with patient. All concerns were addressed.

## 2024-03-26 NOTE — PROGRESS NOTE ADULT - SUBJECTIVE AND OBJECTIVE BOX
[facility] DIVISION OF KIDNEY DISEASES AND HYPERTENSION   FOLLOW UP NOTE    --------------------------------------------------------------------------------  Chief Complaint:    24 hour events/subjective: Pt. was seen and examined today.   On O2, lethargic.       PAST HISTORY  --------------------------------------------------------------------------------  No significant changes to PMH, PSH, FHx, SHx, unless otherwise noted    ALLERGIES & MEDICATIONS  --------------------------------------------------------------------------------  Allergies    Cipro (Headache; Vomiting; Rash)  penicillin (Headache; Vomiting; Rash)  erythromycin (Headache; Vomiting; Rash)    Intolerances      Standing Inpatient Medications  albuterol/ipratropium for Nebulization. 3 milliLiter(s) Nebulizer once  aMIOdarone Infusion 1 mG/Min IV Continuous <Continuous>  aMIOdarone Infusion 0.5 mG/Min IV Continuous <Continuous>  aMIOdarone IVPB 150 milliGRAM(s) IV Intermittent once  cefepime   IVPB 2000 milliGRAM(s) IV Intermittent every 12 hours  chlorhexidine 4% Liquid 1 Application(s) Topical <User Schedule>  influenza   Vaccine 0.5 milliLiter(s) IntraMuscular once  metoprolol tartrate 25 milliGRAM(s) Oral two times a day  metroNIDAZOLE  IVPB 500 milliGRAM(s) IV Intermittent every 12 hours  nicotine -  14 mG/24Hr(s) Patch 1 Patch Transdermal daily  oxyCODONE  ER Tablet 20 milliGRAM(s) Oral <User Schedule>  pantoprazole    Tablet 40 milliGRAM(s) Oral before breakfast  polyethylene glycol 3350 17 Gram(s) Oral two times a day  senna 2 Tablet(s) Oral at bedtime  urea Oral Powder 15 Gram(s) Oral two times a day  vancomycin  IVPB 1000 milliGRAM(s) IV Intermittent every 12 hours    PRN Inpatient Medications  albuterol/ipratropium for Nebulization 3 milliLiter(s) Nebulizer every 6 hours PRN  aluminum hydroxide/magnesium hydroxide/simethicone Suspension 30 milliLiter(s) Oral every 6 hours PRN  benzocaine/menthol Lozenge 1 Lozenge Oral two times a day PRN  bisacodyl 5 milliGRAM(s) Oral every 12 hours PRN  diazepam    Tablet 5 milliGRAM(s) Oral two times a day PRN  guaiFENesin Oral Liquid (Sugar-Free) 200 milliGRAM(s) Oral every 6 hours PRN  HYDROmorphone  Injectable 1 milliGRAM(s) IV Push every 3 hours PRN  melatonin 3 milliGRAM(s) Oral at bedtime PRN  naloxone Injectable 0.1 milliGRAM(s) IV Push every 3 minutes PRN  oxyCODONE    IR 10 milliGRAM(s) Oral every 4 hours PRN  sodium chloride 0.9% lock flush 10 milliLiter(s) IV Push every 1 hour PRN      REVIEW OF SYSTEMS  --------------------------------------------------------------------------------  Gen: No fevers/chills  Head/Eyes/Ears: No HA   Respiratory: No dyspnea, cough  CV: No chest pain  GI: No abdominal pain, diarrhea  : No dysuria, hematuria  MSK: No  edema  Skin: No rashes  Heme: No easy bruising or bleeding    All other systems were reviewed and are negative, except as noted.    VITALS/PHYSICAL EXAM  --------------------------------------------------------------------------------  T(C): 36.5 (03-26-24 @ 12:31), Max: 36.9 (03-26-24 @ 04:00)  HR: 83 (03-26-24 @ 12:31) (83 - 188)  BP: 102/64 (03-26-24 @ 12:31) (102/64 - 135/92)  RR: 19 (03-26-24 @ 12:31) (19 - 20)  SpO2: 94% (03-26-24 @ 12:31) (91% - 96%)  Wt(kg): --        03-25-24 @ 07:01  -  03-26-24 @ 07:00  --------------------------------------------------------  IN: 0 mL / OUT: 740 mL / NET: -740 mL    03-26-24 @ 07:01  -  03-26-24 @ 13:13  --------------------------------------------------------  IN: 0 mL / OUT: 5 mL / NET: -5 mL        Physical Exam:  	Gen: NAD  	HEENT: Anicteric  	Pulm: CTA B/L  	CV: S1S2+  	Abd: Soft, +BS   	Ext: No LE edema B/L  	Neuro: Awake  	Skin: Warm and dry      LABS/STUDIES  --------------------------------------------------------------------------------              10.0   10.29 >-----------<  429      [03-26-24 @ 04:06]              31.1     130  |  95  |  22  ----------------------------<  159      [03-26-24 @ 04:06]  4.2   |  20  |  0.93        Ca     8.9     [03-26-24 @ 04:06]      Mg     1.7     [03-26-24 @ 04:06]      Phos  3.7     [03-26-24 @ 04:06]    TPro  6.2  /  Alb  2.9  /  TBili  0.2  /  DBili  x   /  AST  13  /  ALT  12  /  AlkPhos  89  [03-26-24 @ 04:06]    PT/INR: PT 12.5 , INR 1.14       [03-26-24 @ 04:06]  PTT: 124.1      [03-26-24 @ 04:06]    CK 30      [03-26-24 @ 04:06]    Creatinine Trend:  SCr 0.93 [03-26 @ 04:06]  SCr 0.95 [03-25 @ 09:14]  SCr 0.99 [03-24 @ 07:21]  SCr 0.92 [03-23 @ 16:49]  SCr 0.97 [03-23 @ 02:46]    Urinalysis - [03-26-24 @ 04:06]      Color  / Appearance  / SG  / pH       Gluc 159 / Ketone   / Bili  / Urobili        Blood  / Protein  / Leuk Est  / Nitrite       RBC  / WBC  / Hyaline  / Gran  / Sq Epi  / Non Sq Epi  / Bacteria       HCV 0.09, Nonreact      [03-12-24 @ 07:01]      Tacrolimus  Cyclosporine  Sirolimus  Mycophenolate  BK PCR  CMV PCR  Parvo PCR  EBV PCR

## 2024-03-26 NOTE — PRE-ANESTHESIA EVALUATION ADULT - NSANTHPEFT_GEN_ALL_CORE
tachycardic, on room air,
Gen: Elderly woman sitting upright in distress, breathing with nonrebreather  CV: RRR  Resp: CTAB

## 2024-03-26 NOTE — PROGRESS NOTE ADULT - ASSESSMENT
58F w/Hx of RCC s/p R total nephrectomy, S/p hysterectomy, R-sided glaucoma, Fibromyalgia, Anxiety, GERD, smoker presents due to R breast swelling, redness and pain. Admitted for right breast swelling and cellulitis found to have imaging evidence concerning for metastatic malignancy w/ lung nodules, and axillary, supraclavicular, and mediastinal adenopathy. 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 Also s/p pericardial window for moderate pericardial effusion now with chest tube. Now found to be in Afib with RVR awaiting R pleurx placement

## 2024-03-26 NOTE — PROGRESS NOTE ADULT - PROBLEM SELECTOR PLAN 4
Exacerbated by pain, respiratory issues, and newly diagnosed metastatic CA.   -Continue home diazepam 5 mg PO BID PRN for anxiety.  -Chaplaincy oscar  -Holistic nurse referral will be entered Exacerbated by pain, respiratory issues, and newly diagnosed metastatic CA.   -Continue home diazepam 5 mg PO BID PRN for anxiety.  pt used 1x Valium in 24 hours, endorsing relief   -Chaplaincy eval  -Holistic nurse referral will be entered

## 2024-03-26 NOTE — PROGRESS NOTE ADULT - PROBLEM SELECTOR PLAN 1
- h/o RCC and had nephrectomy by Urology by Dr Jacky Adkins at [facility], also had lymph node dissection 2 years ago  - Now with axillary, subclavicular and mediastinal lymphadenopathy, pulmonary nodules and inflammatory changes of breast seen on CT with possible effect on IVC  - CT neck with large supraclavicular/mediastinal mass lesion with mass effect and complete occlusion of the right IJ; associated surrounding inflammatory changes in the deep neck, concerning for infectious/inflammatory thrombophlebitis  - S/p supraclavicular lymph node biopsy by IR on 3/14- final path pending  - Path suspcious of malignancy of GI origin  - D/w GI follow up MRI abdomen and MRCP  - D/w oncology, to continue to follow  - Pallative follow up for pain control and anxiety management

## 2024-03-26 NOTE — PHARMACOTHERAPY INTERVENTION NOTE - COMMENTS
57 y/o female w/ PMH of RCC s/p R total nephrectomy, s/p hysterectomy, fibromyalgia, and concern for breast malignancy currently undergoing workup, admitted with R breast swelling, erythema, and pain.    Patient currently being treated with cefepime 2g IV Q24H, metronidazole 500mg IV Q12H, and vancomycin 1g IV Q12H. Patient's CrCl is 49 ml/min (SCr 0.93). Recommend increasing frequency of cefepime to 2g IV Q12H.     Rosemarie Salamanca PharmD  Transitions of Care Pharmacist  Available on Microsoft Teams  Spectra #[phone]

## 2024-03-26 NOTE — CONSULT NOTE ADULT - SUBJECTIVE AND OBJECTIVE BOX
CHIEF COMPLAINT: Patient is currently in OR.  Unable to assess     HISTORY OF PRESENT ILLNESS:     Allergies    Cipro (Headache; Vomiting; Rash)  penicillin (Headache; Vomiting; Rash)  erythromycin (Headache; Vomiting; Rash)    Intolerances    MEDICATIONS:  aMIOdarone Infusion 1 mG/Min IV Continuous <Continuous>  aMIOdarone Infusion 0.5 mG/Min IV Continuous <Continuous>    influenza   Vaccine 0.5 milliLiter(s) IntraMuscular once    PAST MEDICAL & SURGICAL HISTORY:  Anxiety    Acid reflux disease    Umbilical hernia    Uterine mass  Benign    Fibromyalgia  Joint pains    Glaucoma  Right eye    Herniated cervical disc    Cancer of kidney  right kidney    S/P partial hysterectomy  8 years ago    S/P knee surgery  knee arthroscopy right x 2 ( 2011 & 2012)    S/P hernia repair  umbilical Hernia Repair - 2011    H/O unilateral nephrectomy  Right Laparoscopic Nephrectomy - 29859    Glaucoma following surgery  Right Eyr - 2012    History of tonsillectomy    FAMILY HISTORY:  Family history of lung cancer (Mother)    Family history of pancreatic cancer (Father)      SOCIAL HISTORY:    [ ] Non-smoker  [ x] Smoker  1ppd   [ ] Alcohol      REVIEW OF SYSTEMS:   Patient is in OR, unable to assess     PHYSICAL EXAM:  T(C): 36.5 (03-26-24 @ 12:31), Max: 36.9 (03-26-24 @ 04:00)  HR: 83 (03-26-24 @ 12:31) (83 - 188)  BP: 102/64 (03-26-24 @ 12:31) (102/64 - 135/92)  RR: 19 (03-26-24 @ 12:31) (19 - 20)  SpO2: 94% (03-26-24 @ 12:31) (91% - 96%)  Wt(kg): --  I&O's Summary    25 Mar 2024 07:01  -  26 Mar 2024 07:00  --------------------------------------------------------  IN: 0 mL / OUT: 740 mL / NET: -740 mL    26 Mar 2024 07:01  -  26 Mar 2024 16:37  --------------------------------------------------------  IN: 0 mL / OUT: 5 mL / NET: -5 mL       Exam:   Patient is in OR, unable to assess       LABS:	 	    CBC Full  -  ( 26 Mar 2024 04:06 )  WBC Count : 10.29 K/uL  Hemoglobin : 10.0 g/dL  Hematocrit : 31.1 %  Platelet Count - Automated : 429 K/uL  Mean Cell Volume : 89.9 fl  Mean Cell Hemoglobin : 28.9 pg  Mean Cell Hemoglobin Concentration : 32.2 gm/dL  Auto Neutrophil # : x  Auto Lymphocyte # : x  Auto Monocyte # : x  Auto Eosinophil # : x  Auto Basophil # : x  Auto Neutrophil % : x  Auto Lymphocyte % : x  Auto Monocyte % : x  Auto Eosinophil % : x  Auto Basophil % : x    03-26    130<L>  |  95<L>  |  22  ----------------------------<  159<H>  4.2   |  20<L>  |  0.93  03-25    131<L>  |  97  |  14  ----------------------------<  126<H>  4.6   |  21<L>  |  0.95    Ca    8.9      26 Mar 2024 04:06  Ca    9.5      25 Mar 2024 09:14  Phos  3.7     03-26  Mg     1.7     03-26  Mg     1.8     03-25    TPro  6.2  /  Alb  2.9<L>  /  TBili  0.2  /  DBili  x   /  AST  13  /  ALT  12  /  AlkPhos  89  03-26      Creatine Kinase, Serum: 30 U/L (03-26-24 @ 04:06)      TELEMETRY: 	  	Paroxysmal AFib with RVR, reverted back to NSR at 11am this am.  NSR 70's       TRANSTHORACIC ECHOCARDIOGRAM REPORT  Pt. Name:       GADIEL GENAO Study Date:    3/21/2024  MRN:            [id number]      YOB: 1965  Accession #:    [id number]       Age:           58 years  Account#:       [id number]    Gender:        F  Heart Rate:                     Height:        60.00 in (152.40 cm)  Rhythm:                         Weight:        109.00 lb (49.44 kg)  Blood Pressure: 97/63 mmHg      BSA/BMI:       1.44 m² / 21.29 kg/m²  ________________________________________________________________________________________  Referring Physician:    [id number] China Shine  Interpreting Physician: Luke Kolb M.D.  Primary Sonographer:    Jesse Davenport LAYTON    CPT:               ECHO TTE W/O CON F/U [facility] - 14536.m;LIMITED SPECTRAL - 73664.m  Indication(s):     Other pericardial effusion (noninflammatory) - I31.39  Procedure:        Limited transthoracic echocardiogram.  Ordering Location: [facility]  Admission Status:  Inpatient  Study Information: Image quality for this study is fair.       CONCLUSIONS:      1. Left ventricular endocardium is not well visualized; however, the left ventricular systolic function appears grossly normal.   2. Thickened pericardium.   3. Bilateral pleural effusion noted.   4. No pericardial effusion seen.   5. Compared to the transthoracic echocardiogram performed on 3/18/2024, the pericardial effusion is no longer present.    FINDINGS:     Left Ventricle:  Left ventricular endocardium is not well visualized; however, the left ventricular systolic function appears grossly normal.     Pericardium:  No pericardial effusion seen. The pericardium is thickened.     Pleura:  Bilateral pleural effusion noted.  ________________________________________________________________________________________  Electronically signed on 3/21/2024 at 5:44:23 PM by Luke Kolb M.D.     *** Final ***

## 2024-03-26 NOTE — PRE-ANESTHESIA EVALUATION ADULT - NSANTHADDINFOFT_GEN_ALL_CORE
Risks and benefits of anesthesia discussed with patient - including nausea/vomiting, sore throat, dental injury, and cardiopulmonary complications. All questions were answered.
Discussed with patient the possibility of postoperative mechanical ventilation though we will attempt to the extent possible to extubate in the operating room.

## 2024-03-26 NOTE — PROGRESS NOTE ADULT - SUBJECTIVE AND OBJECTIVE BOX
Follow Up:    R neck mass    Interval History/ROS:  Afebrile ON. RRT for tachycardia ON, rate control given. Patient was seen and examined at bedside. Sleeping. No fever. +chest wall pain.    Allergies  Cipro (Headache; Vomiting; Rash)  penicillin (Headache; Vomiting; Rash)  erythromycin (Headache; Vomiting; Rash)        ANTIMICROBIALS:  cefepime   IVPB 2000 every 12 hours  metroNIDAZOLE  IVPB 500 every 12 hours  vancomycin  IVPB 1000 every 12 hours      OTHER MEDS:  MEDICATIONS  (STANDING):  albuterol/ipratropium for Nebulization 3 every 6 hours PRN  albuterol/ipratropium for Nebulization. 3 once  aluminum hydroxide/magnesium hydroxide/simethicone Suspension 30 every 6 hours PRN  aMIOdarone Infusion 1 <Continuous>  aMIOdarone Infusion 0.5 <Continuous>  aMIOdarone IVPB 150 once  bisacodyl 5 every 12 hours PRN  diazepam    Tablet 5 two times a day PRN  guaiFENesin Oral Liquid (Sugar-Free) 200 every 6 hours PRN  HYDROmorphone  Injectable 1 every 3 hours PRN  influenza   Vaccine 0.5 once  melatonin 3 at bedtime PRN  metoprolol tartrate 25 two times a day  oxyCODONE    IR 10 every 4 hours PRN  oxyCODONE  ER Tablet 20 <User Schedule>  pantoprazole    Tablet 40 before breakfast  polyethylene glycol 3350 17 two times a day  senna 2 at bedtime  urea Oral Powder 15 two times a day      Vital Signs Last 24 Hrs  T(C): 36.5 (26 Mar 2024 12:31), Max: 36.9 (26 Mar 2024 04:00)  T(F): 97.7 (26 Mar 2024 12:31), Max: 98.5 (26 Mar 2024 04:00)  HR: 83 (26 Mar 2024 12:31) (83 - 188)  BP: 102/64 (26 Mar 2024 12:31) (102/64 - 135/92)  BP(mean): --  RR: 19 (26 Mar 2024 12:31) (19 - 20)  SpO2: 94% (26 Mar 2024 12:31) (91% - 96%)    Parameters below as of 26 Mar 2024 12:31  Patient On (Oxygen Delivery Method): mask, Venturi      PHYSICAL EXAM:  Constitutional: NAD  ENT:  R neck edema and tenderness; clear oropharynx  Cardiovascular:   normal S1, S2, no murmur, RRR  Respiratory:  clear BS bilaterally, no wheezes  Musculoskeletal:  no BLE edema  Neurologic: awake and oriented x3  Skin: R breast w/ erythematous skin mostly resolved, moderate tenderness to palpation to R upper chest; Chest w/ drain in place, serosanguineous fluid                            10.0   10.29 )-----------( 429      ( 26 Mar 2024 04:06 )             31.1       03-26    130<L>  |  95<L>  |  22  ----------------------------<  159<H>  4.2   |  20<L>  |  0.93    Ca    8.9      26 Mar 2024 04:06  Phos  3.7     03-26  Mg     1.7     03-26    TPro  6.2  /  Alb  2.9<L>  /  TBili  0.2  /  DBili  x   /  AST  13  /  ALT  12  /  AlkPhos  89  03-26      Urinalysis Basic - ( 26 Mar 2024 04:06 )    Color: x / Appearance: x / SG: x / pH: x  Gluc: 159 mg/dL / Ketone: x  / Bili: x / Urobili: x   Blood: x / Protein: x / Nitrite: x   Leuk Esterase: x / RBC: x / WBC x   Sq Epi: x / Non Sq Epi: x / Bacteria: x        MICROBIOLOGY:  v  .Blood Blood-Peripheral  03-23-24   No growth at 48 Hours  --  --      .Blood Blood  03-15-24   No growth at 5 days  --  --      .Blood Blood-Peripheral  03-12-24   No growth at 5 days  --  --      .Blood Blood-Peripheral  03-12-24   No growth at 5 days  --  --      Clean Catch Clean Catch (Midstream)  03-10-24   <10,000 CFU/mL Normal Urogenital Tammie  --  --      .Blood Blood-Peripheral  03-10-24   No growth at 5 days  --  --                RADIOLOGY:  Imaging below independently reviewed.  < from: Xray Chest 1 View- PORTABLE-Urgent (Xray Chest 1 View- PORTABLE-Urgent .) (03.26.24 @ 04:18) >    ACC: [id number] EXAM:  XR CHEST PORTABLE URGENT 1V   ORDERED BY:  SAEID BELLO     PROCEDURE DATE:  03/26/2024          INTERPRETATION:  CLINICAL INFORMATION: Rapid response.    TECHNIQUE: Frontal view of the chest.    COMPARISON: Multiple prior chest x-rays with most recent dated 3/25/2024.    FINDINGS:  Support Devices/Implanted Hardware: Pericardial drain.  Heart: Heart size cannot be accurately assessed in this projection.  Pulmonary: Moderate to large right pleural effusion with associated   atelectasis, increased from 3/25/2024. Left lung is clear. No   pneumothorax.  Bones: No acute bony pathology.    IMPRESSION:  Moderate to large right pleural effusion, increased from 3/25/2024.    Findings were discussed by Dr. Kruger with Dr. Tello 3/26/2024 at 3:57   AM with read back confirmation.    --- End of Report ---           ASHLEY KRUGER MD; Resident Radiologist  This document has been electronically signed.  KRIS MERCER MD; Attending Radiologist  This document has been electronically signed. Mar 26 2024 12:45PM    < end of copied text >     Follow Up:    R neck mass    Interval History/ROS:  Afebrile ON. RRT for tachycardia ON, rate control given. Patient was seen and examined at bedside. Sleeping. No fever. +chest wall pain.    Allergies  Cipro (Headache; Vomiting; Rash)  penicillin (Headache; Vomiting; Rash)  erythromycin (Headache; Vomiting; Rash)        ANTIMICROBIALS:  cefepime   IVPB 2000 every 12 hours  metroNIDAZOLE  IVPB 500 every 12 hours  vancomycin  IVPB 1000 every 12 hours      OTHER MEDS:  MEDICATIONS  (STANDING):  albuterol/ipratropium for Nebulization 3 every 6 hours PRN  albuterol/ipratropium for Nebulization. 3 once  aluminum hydroxide/magnesium hydroxide/simethicone Suspension 30 every 6 hours PRN  aMIOdarone Infusion 1 <Continuous>  aMIOdarone Infusion 0.5 <Continuous>  aMIOdarone IVPB 150 once  bisacodyl 5 every 12 hours PRN  diazepam    Tablet 5 two times a day PRN  guaiFENesin Oral Liquid (Sugar-Free) 200 every 6 hours PRN  HYDROmorphone  Injectable 1 every 3 hours PRN  influenza   Vaccine 0.5 once  melatonin 3 at bedtime PRN  metoprolol tartrate 25 two times a day  oxyCODONE    IR 10 every 4 hours PRN  oxyCODONE  ER Tablet 20 <User Schedule>  pantoprazole    Tablet 40 before breakfast  polyethylene glycol 3350 17 two times a day  senna 2 at bedtime  urea Oral Powder 15 two times a day      Vital Signs Last 24 Hrs  T(C): 36.5 (26 Mar 2024 12:31), Max: 36.9 (26 Mar 2024 04:00)  T(F): 97.7 (26 Mar 2024 12:31), Max: 98.5 (26 Mar 2024 04:00)  HR: 83 (26 Mar 2024 12:31) (83 - 188)  BP: 102/64 (26 Mar 2024 12:31) (102/64 - 135/92)  BP(mean): --  RR: 19 (26 Mar 2024 12:31) (19 - 20)  SpO2: 94% (26 Mar 2024 12:31) (91% - 96%)    Parameters below as of 26 Mar 2024 12:31  Patient On (Oxygen Delivery Method): mask, Venturi      PHYSICAL EXAM:  Constitutional: NAD  ENT:  R neck edema and tenderness; clear oropharynx  Cardiovascular:   normal S1, S2, no murmur, RRR  Respiratory:  clear BS bilaterally, no wheezes  Musculoskeletal:  no BLE edema  Neurologic: awake and oriented x3  Skin: R breast w/ erythematous skin mostly resolved, moderate tenderness to palpation to R upper chest; Chest w/ drain in place, serosanguineous fluid; L hand phlebitis                            10.0   10.29 )-----------( 429      ( 26 Mar 2024 04:06 )             31.1       03-26    130<L>  |  95<L>  |  22  ----------------------------<  159<H>  4.2   |  20<L>  |  0.93    Ca    8.9      26 Mar 2024 04:06  Phos  3.7     03-26  Mg     1.7     03-26    TPro  6.2  /  Alb  2.9<L>  /  TBili  0.2  /  DBili  x   /  AST  13  /  ALT  12  /  AlkPhos  89  03-26      Urinalysis Basic - ( 26 Mar 2024 04:06 )    Color: x / Appearance: x / SG: x / pH: x  Gluc: 159 mg/dL / Ketone: x  / Bili: x / Urobili: x   Blood: x / Protein: x / Nitrite: x   Leuk Esterase: x / RBC: x / WBC x   Sq Epi: x / Non Sq Epi: x / Bacteria: x        MICROBIOLOGY:  v  .Blood Blood-Peripheral  03-23-24   No growth at 48 Hours  --  --      .Blood Blood  03-15-24   No growth at 5 days  --  --      .Blood Blood-Peripheral  03-12-24   No growth at 5 days  --  --      .Blood Blood-Peripheral  03-12-24   No growth at 5 days  --  --      Clean Catch Clean Catch (Midstream)  03-10-24   <10,000 CFU/mL Normal Urogenital Tammie  --  --      .Blood Blood-Peripheral  03-10-24   No growth at 5 days  --  --                RADIOLOGY:  Imaging below independently reviewed.  < from: Xray Chest 1 View- PORTABLE-Urgent (Xray Chest 1 View- PORTABLE-Urgent .) (03.26.24 @ 04:18) >    ACC: [id number] EXAM:  XR CHEST PORTABLE URGENT 1V   ORDERED BY:  SAEID BELLO     PROCEDURE DATE:  03/26/2024          INTERPRETATION:  CLINICAL INFORMATION: Rapid response.    TECHNIQUE: Frontal view of the chest.    COMPARISON: Multiple prior chest x-rays with most recent dated 3/25/2024.    FINDINGS:  Support Devices/Implanted Hardware: Pericardial drain.  Heart: Heart size cannot be accurately assessed in this projection.  Pulmonary: Moderate to large right pleural effusion with associated   atelectasis, increased from 3/25/2024. Left lung is clear. No   pneumothorax.  Bones: No acute bony pathology.    IMPRESSION:  Moderate to large right pleural effusion, increased from 3/25/2024.    Findings were discussed by Dr. Kruger with Dr. Tello 3/26/2024 at 3:57   AM with read back confirmation.    --- End of Report ---           ASHLEY KRUGER MD; Resident Radiologist  This document has been electronically signed.  KRIS MERCER MD; Attending Radiologist  This document has been electronically signed. Mar 26 2024 12:45PM    < end of copied text >

## 2024-03-26 NOTE — PROGRESS NOTE ADULT - SUBJECTIVE AND OBJECTIVE BOX
patient seen- discussed plan for right vats, pleurx and pericardial window given high output. discussed risks of surgery including and not limited to bleeding, infection, scarring, injury to surrounding structures, need for prolonged intubation. all questions answered - expressed understanding and agreeable to proceed.

## 2024-03-26 NOTE — PROGRESS NOTE ADULT - PROBLEM SELECTOR PLAN 6
See [facility] documentation 3/22. Patient elected her boyfriend, Saeid Peña [phone], as her HCP. She elects DNR/DNI code status. HCP paperwork and MOLST form completed and entered into the chart. See [facility] documentation 3/22. Patient elected her boyfriend, Saeid Peña [phone], as her HCP. She elects DNR/DNI code status. HCP paperwork and MOLST form completed and entered into the chart.  pt confirmed ongoing medical management, discussed RRT o/n, endorsing too tired to talk as she did not sleep well o/n.

## 2024-03-26 NOTE — RAPID RESPONSE TEAM SUMMARY - NSSITUATIONBACKGROUNDRRT_GEN_ALL_CORE
58F w/Hx of RCC s/p R total nephrectomy, S/p hysterectomy, R-sided glaucoma, Fibromyalgia, Anxiety, GERD, smoker presents due to R breast swelling, redness and pain. Admitted for right breast swelling and cellulitis found to have imaging evidence concerning for metastatic malignancy w/ lung nodules, and axillary, supraclavicular, and mediastinal adenopathy. 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 Also s/p pericardial window for moderate pericardial effusion now with chest tube. Now found to be in Afib with RVR was previously amio and dig loaded. RRT called for rapid afib. Patient reports SOB prior to RRT called.  58F w/Hx of RCC s/p R total nephrectomy, S/p hysterectomy, R-sided glaucoma, Fibromyalgia, Anxiety, GERD, smoker presents due to R breast swelling, redness and pain. Admitted for right breast swelling and cellulitis found to have imaging evidence concerning for metastatic malignancy w/ lung nodules, and axillary, supraclavicular, and mediastinal adenopathy. 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 Also s/p pericardial window for moderate pericardial effusion now with chest tube. Now found to be in Afib with RVR was previously amio and dig loaded. RRT called for rapid afib. Patient reports SOB prior to RRT called. Patient with right pleff, pending pleurX placement 3/26.

## 2024-03-26 NOTE — BRIEF OPERATIVE NOTE - NSICDXBRIEFPOSTOP_GEN_ALL_CORE_FT
POST-OP DIAGNOSIS:  Pericardial tamponade 19-Mar-2024 22:02:13  Jeanie Cao  
POST-OP DIAGNOSIS:  Pleural effusion 26-Mar-2024 20:32:04  Sawyer Avila

## 2024-03-26 NOTE — RAPID RESPONSE TEAM SUMMARY - NSADDTLFINDINGSRRT_GEN_ALL_CORE
Vitals on arrival  Vitals on arrival afebrile, /80, , RR 26, satting 99% on  Vitals on arrival afebrile, /80, , RR 26, satting 99% on 6L. On exam  Vitals on arrival afebrile, /80, , RR 26, satting 99% on 6L. On exam tachycardic, with diminished right lung sounds, transmitted upper airway sounds b/l. pericardial drain intact.  Vitals on arrival afebrile, /80, , RR 26, satting 99% on 6L. On exam tachycardic, with diminished right lung sounds, transmitted upper airway sounds b/l. pericardial drain intact. EKG tachycardic to 180-190s, gave amio load. Repeat EKG on improved HR notable for rapid afib. CXR with increased R pleff. Per pulm note, diurese with net neg 500 cc. gave lasix 20 mg iv x1, maintain strict I&O per primary team. Rapid afib likely due to hypoxemia iso worsening pleff. Full set of labs obtained, including ABG. dilaudid 1 mg ivp x1 given for pain. AM dose of standing metoprolol 25 mg po given early. primary team to consider uptitrating. If patient sustaining HR >140 can trial lopressor 2.5 mg IVP.

## 2024-03-26 NOTE — BRIEF OPERATIVE NOTE - NSICDXBRIEFPROCEDURE_GEN_ALL_CORE_FT
PROCEDURES:  Creation, pericardial window 19-Mar-2024 21:58:55  Jeanie Cao  
PROCEDURES:  Creation, pericardial window 19-Mar-2024 21:58:55  Jeanie Cao  Insertion, PleurX catheter system, pleural 26-Mar-2024 20:31:41  Sawyer Avila

## 2024-03-27 ENCOUNTER — TRANSCRIPTION ENCOUNTER (OUTPATIENT)
Age: 59
End: 2024-03-27

## 2024-03-27 LAB
ANION GAP SERPL CALC-SCNC: 10 MMOL/L — SIGNIFICANT CHANGE UP (ref 5–17)
BUN SERPL-MCNC: 28 MG/DL — HIGH (ref 7–23)
CALCIUM SERPL-MCNC: 8.5 MG/DL — SIGNIFICANT CHANGE UP (ref 8.4–10.5)
CHLORIDE SERPL-SCNC: 99 MMOL/L — SIGNIFICANT CHANGE UP (ref 96–108)
CO2 SERPL-SCNC: 24 MMOL/L — SIGNIFICANT CHANGE UP (ref 22–31)
CREAT SERPL-MCNC: 1.16 MG/DL — SIGNIFICANT CHANGE UP (ref 0.5–1.3)
EGFR: 54 ML/MIN/1.73M2 — LOW
GLUCOSE SERPL-MCNC: 166 MG/DL — HIGH (ref 70–99)
HCT VFR BLD CALC: 31.9 % — LOW (ref 34.5–45)
HGB BLD-MCNC: 10.1 G/DL — LOW (ref 11.5–15.5)
MCHC RBC-ENTMCNC: 29.2 PG — SIGNIFICANT CHANGE UP (ref 27–34)
MCHC RBC-ENTMCNC: 31.7 GM/DL — LOW (ref 32–36)
MCV RBC AUTO: 92.2 FL — SIGNIFICANT CHANGE UP (ref 80–100)
NRBC # BLD: 0 /100 WBCS — SIGNIFICANT CHANGE UP (ref 0–0)
PLATELET # BLD AUTO: 470 K/UL — HIGH (ref 150–400)
POTASSIUM SERPL-MCNC: 4.8 MMOL/L — SIGNIFICANT CHANGE UP (ref 3.5–5.3)
POTASSIUM SERPL-SCNC: 4.8 MMOL/L — SIGNIFICANT CHANGE UP (ref 3.5–5.3)
RBC # BLD: 3.46 M/UL — LOW (ref 3.8–5.2)
RBC # FLD: 13.8 % — SIGNIFICANT CHANGE UP (ref 10.3–14.5)
SODIUM SERPL-SCNC: 133 MMOL/L — LOW (ref 135–145)
VANCOMYCIN TROUGH SERPL-MCNC: 14.6 UG/ML — SIGNIFICANT CHANGE UP (ref 10–20)
WBC # BLD: 14.19 K/UL — HIGH (ref 3.8–10.5)
WBC # FLD AUTO: 14.19 K/UL — HIGH (ref 3.8–10.5)

## 2024-03-27 PROCEDURE — 99232 SBSQ HOSP IP/OBS MODERATE 35: CPT

## 2024-03-27 PROCEDURE — 71045 X-RAY EXAM CHEST 1 VIEW: CPT | Mod: 26

## 2024-03-27 PROCEDURE — 99232 SBSQ HOSP IP/OBS MODERATE 35: CPT | Mod: GC

## 2024-03-27 PROCEDURE — 71260 CT THORAX DX C+: CPT | Mod: 26

## 2024-03-27 PROCEDURE — 70491 CT SOFT TISSUE NECK W/DYE: CPT | Mod: 26

## 2024-03-27 PROCEDURE — 99223 1ST HOSP IP/OBS HIGH 75: CPT

## 2024-03-27 PROCEDURE — 99233 SBSQ HOSP IP/OBS HIGH 50: CPT

## 2024-03-27 PROCEDURE — 99231 SBSQ HOSP IP/OBS SF/LOW 25: CPT | Mod: FS

## 2024-03-27 PROCEDURE — 99233 SBSQ HOSP IP/OBS HIGH 50: CPT | Mod: GC

## 2024-03-27 RX ORDER — KETOROLAC TROMETHAMINE 30 MG/ML
15 SYRINGE (ML) INJECTION ONCE
Refills: 0 | Status: DISCONTINUED | OUTPATIENT
Start: 2024-03-27 | End: 2024-04-02

## 2024-03-27 RX ORDER — HYDROMORPHONE HYDROCHLORIDE 2 MG/ML
1 INJECTION INTRAMUSCULAR; INTRAVENOUS; SUBCUTANEOUS
Qty: 0 | Refills: 0 | DISCHARGE
Start: 2024-03-27

## 2024-03-27 RX ORDER — LANOLIN ALCOHOL/MO/W.PET/CERES
1 CREAM (GRAM) TOPICAL
Qty: 0 | Refills: 0 | DISCHARGE
Start: 2024-03-27

## 2024-03-27 RX ORDER — OXYCODONE HYDROCHLORIDE 5 MG/1
1 TABLET ORAL
Refills: 0 | DISCHARGE

## 2024-03-27 RX ORDER — POLYETHYLENE GLYCOL 3350 17 G/17G
17 POWDER, FOR SOLUTION ORAL
Qty: 0 | Refills: 0 | DISCHARGE
Start: 2024-03-27

## 2024-03-27 RX ORDER — OMEPRAZOLE 10 MG/1
1 CAPSULE, DELAYED RELEASE ORAL
Qty: 0 | Refills: 0 | DISCHARGE

## 2024-03-27 RX ORDER — AMIODARONE HYDROCHLORIDE 400 MG/1
400 TABLET ORAL THREE TIMES A DAY
Refills: 0 | Status: DISCONTINUED | OUTPATIENT
Start: 2024-03-27 | End: 2024-04-03

## 2024-03-27 RX ORDER — DIAZEPAM 5 MG
1 TABLET ORAL
Qty: 0 | Refills: 0 | DISCHARGE
Start: 2024-03-27

## 2024-03-27 RX ORDER — OXYCODONE HYDROCHLORIDE 5 MG/1
1 TABLET ORAL
Qty: 0 | Refills: 0 | DISCHARGE
Start: 2024-03-27

## 2024-03-27 RX ORDER — ACETAMINOPHEN 500 MG
750 TABLET ORAL ONCE
Refills: 0 | Status: DISCONTINUED | OUTPATIENT
Start: 2024-03-27 | End: 2024-04-03

## 2024-03-27 RX ORDER — ENOXAPARIN SODIUM 100 MG/ML
50 INJECTION SUBCUTANEOUS EVERY 12 HOURS
Refills: 0 | Status: DISCONTINUED | OUTPATIENT
Start: 2024-03-27 | End: 2024-04-03

## 2024-03-27 RX ORDER — METOPROLOL TARTRATE 50 MG
1 TABLET ORAL
Qty: 0 | Refills: 0 | DISCHARGE
Start: 2024-03-27

## 2024-03-27 RX ORDER — VANCOMYCIN HCL 1 G
1 VIAL (EA) INTRAVENOUS
Qty: 0 | Refills: 0 | DISCHARGE
Start: 2024-03-27

## 2024-03-27 RX ORDER — NICOTINE POLACRILEX 2 MG
1 GUM BUCCAL
Qty: 0 | Refills: 0 | DISCHARGE
Start: 2024-03-27

## 2024-03-27 RX ORDER — DEXAMETHASONE 0.5 MG/5ML
10 ELIXIR ORAL ONCE
Refills: 0 | Status: COMPLETED | OUTPATIENT
Start: 2024-03-27 | End: 2024-03-27

## 2024-03-27 RX ORDER — MILNACIPRAN HYDROCHLORIDE 50 MG/1
0 TABLET, FILM COATED ORAL
Qty: 0 | Refills: 0 | DISCHARGE

## 2024-03-27 RX ORDER — CEFEPIME 1 G/1
2 INJECTION, POWDER, FOR SOLUTION INTRAMUSCULAR; INTRAVENOUS
Qty: 0 | Refills: 0 | DISCHARGE
Start: 2024-03-27

## 2024-03-27 RX ORDER — METRONIDAZOLE 500 MG
100 TABLET ORAL
Qty: 0 | Refills: 0 | DISCHARGE
Start: 2024-03-27

## 2024-03-27 RX ORDER — AMIODARONE HYDROCHLORIDE 400 MG/1
0 TABLET ORAL
Qty: 0 | Refills: 0 | DISCHARGE
Start: 2024-03-27

## 2024-03-27 RX ORDER — NALOXONE HYDROCHLORIDE 4 MG/.1ML
0.1 SPRAY NASAL
Qty: 0 | Refills: 0 | DISCHARGE
Start: 2024-03-27

## 2024-03-27 RX ORDER — IPRATROPIUM/ALBUTEROL SULFATE 18-103MCG
3 AEROSOL WITH ADAPTER (GRAM) INHALATION
Qty: 0 | Refills: 0 | DISCHARGE
Start: 2024-03-27

## 2024-03-27 RX ORDER — ACETAMINOPHEN 500 MG
75 TABLET ORAL
Qty: 0 | Refills: 0 | DISCHARGE
Start: 2024-03-27

## 2024-03-27 RX ORDER — AMIODARONE HYDROCHLORIDE 400 MG/1
200 TABLET ORAL DAILY
Refills: 0 | Status: DISCONTINUED | OUTPATIENT
Start: 2024-04-04 | End: 2024-04-03

## 2024-03-27 RX ORDER — SENNA PLUS 8.6 MG/1
2 TABLET ORAL
Qty: 0 | Refills: 0 | DISCHARGE
Start: 2024-03-27

## 2024-03-27 RX ORDER — UREA 15 G
1 POWDER IN PACKET (EA) ORAL
Qty: 0 | Refills: 0 | DISCHARGE
Start: 2024-03-27

## 2024-03-27 RX ORDER — PANTOPRAZOLE SODIUM 20 MG/1
1 TABLET, DELAYED RELEASE ORAL
Qty: 0 | Refills: 0 | DISCHARGE
Start: 2024-03-27

## 2024-03-27 RX ORDER — AMIODARONE HYDROCHLORIDE 400 MG/1
TABLET ORAL
Refills: 0 | Status: DISCONTINUED | OUTPATIENT
Start: 2024-03-27 | End: 2024-03-27

## 2024-03-27 RX ORDER — AMIODARONE HYDROCHLORIDE 400 MG/1
TABLET ORAL
Refills: 0 | Status: DISCONTINUED | OUTPATIENT
Start: 2024-03-27 | End: 2024-04-03

## 2024-03-27 RX ORDER — DEXAMETHASONE 0.5 MG/5ML
4 ELIXIR ORAL EVERY 8 HOURS
Refills: 0 | Status: DISCONTINUED | OUTPATIENT
Start: 2024-03-28 | End: 2024-04-03

## 2024-03-27 RX ORDER — DIAZEPAM 5 MG
1 TABLET ORAL
Refills: 0 | DISCHARGE

## 2024-03-27 RX ADMIN — HYDROMORPHONE HYDROCHLORIDE 1 MILLIGRAM(S): 2 INJECTION INTRAMUSCULAR; INTRAVENOUS; SUBCUTANEOUS at 03:18

## 2024-03-27 RX ADMIN — Medication 100 MILLIGRAM(S): at 05:19

## 2024-03-27 RX ADMIN — CEFEPIME 100 MILLIGRAM(S): 1 INJECTION, POWDER, FOR SOLUTION INTRAMUSCULAR; INTRAVENOUS at 05:16

## 2024-03-27 RX ADMIN — OXYCODONE HYDROCHLORIDE 20 MILLIGRAM(S): 5 TABLET ORAL at 14:31

## 2024-03-27 RX ADMIN — CEFEPIME 100 MILLIGRAM(S): 1 INJECTION, POWDER, FOR SOLUTION INTRAMUSCULAR; INTRAVENOUS at 17:35

## 2024-03-27 RX ADMIN — Medication 15 GRAM(S): at 05:17

## 2024-03-27 RX ADMIN — OXYCODONE HYDROCHLORIDE 20 MILLIGRAM(S): 5 TABLET ORAL at 21:45

## 2024-03-27 RX ADMIN — HYDROMORPHONE HYDROCHLORIDE 1 MILLIGRAM(S): 2 INJECTION INTRAMUSCULAR; INTRAVENOUS; SUBCUTANEOUS at 06:44

## 2024-03-27 RX ADMIN — Medication 5 MILLIGRAM(S): at 23:24

## 2024-03-27 RX ADMIN — Medication 250 MILLIGRAM(S): at 20:39

## 2024-03-27 RX ADMIN — HYDROMORPHONE HYDROCHLORIDE 1 MILLIGRAM(S): 2 INJECTION INTRAMUSCULAR; INTRAVENOUS; SUBCUTANEOUS at 10:30

## 2024-03-27 RX ADMIN — Medication 15 GRAM(S): at 17:35

## 2024-03-27 RX ADMIN — OXYCODONE HYDROCHLORIDE 20 MILLIGRAM(S): 5 TABLET ORAL at 00:10

## 2024-03-27 RX ADMIN — Medication 25 MILLIGRAM(S): at 05:16

## 2024-03-27 RX ADMIN — Medication 1 PATCH: at 12:10

## 2024-03-27 RX ADMIN — Medication 250 MILLIGRAM(S): at 05:15

## 2024-03-27 RX ADMIN — POLYETHYLENE GLYCOL 3350 17 GRAM(S): 17 POWDER, FOR SOLUTION ORAL at 05:16

## 2024-03-27 RX ADMIN — OXYCODONE HYDROCHLORIDE 20 MILLIGRAM(S): 5 TABLET ORAL at 05:19

## 2024-03-27 RX ADMIN — HYDROMORPHONE HYDROCHLORIDE 1 MILLIGRAM(S): 2 INJECTION INTRAMUSCULAR; INTRAVENOUS; SUBCUTANEOUS at 07:00

## 2024-03-27 RX ADMIN — OXYCODONE HYDROCHLORIDE 20 MILLIGRAM(S): 5 TABLET ORAL at 06:55

## 2024-03-27 RX ADMIN — Medication 1 PATCH: at 12:09

## 2024-03-27 RX ADMIN — HYDROMORPHONE HYDROCHLORIDE 1 MILLIGRAM(S): 2 INJECTION INTRAMUSCULAR; INTRAVENOUS; SUBCUTANEOUS at 18:32

## 2024-03-27 RX ADMIN — HYDROMORPHONE HYDROCHLORIDE 1 MILLIGRAM(S): 2 INJECTION INTRAMUSCULAR; INTRAVENOUS; SUBCUTANEOUS at 03:48

## 2024-03-27 RX ADMIN — Medication 100 MILLIGRAM(S): at 18:13

## 2024-03-27 RX ADMIN — HYDROMORPHONE HYDROCHLORIDE 1 MILLIGRAM(S): 2 INJECTION INTRAMUSCULAR; INTRAVENOUS; SUBCUTANEOUS at 09:55

## 2024-03-27 RX ADMIN — OXYCODONE HYDROCHLORIDE 20 MILLIGRAM(S): 5 TABLET ORAL at 21:19

## 2024-03-27 RX ADMIN — ENOXAPARIN SODIUM 50 MILLIGRAM(S): 100 INJECTION SUBCUTANEOUS at 19:00

## 2024-03-27 RX ADMIN — CHLORHEXIDINE GLUCONATE 1 APPLICATION(S): 213 SOLUTION TOPICAL at 05:19

## 2024-03-27 RX ADMIN — Medication 102 MILLIGRAM(S): at 19:13

## 2024-03-27 RX ADMIN — SENNA PLUS 2 TABLET(S): 8.6 TABLET ORAL at 21:18

## 2024-03-27 RX ADMIN — PANTOPRAZOLE SODIUM 40 MILLIGRAM(S): 20 TABLET, DELAYED RELEASE ORAL at 06:46

## 2024-03-27 RX ADMIN — Medication 3 MILLILITER(S): at 20:46

## 2024-03-27 RX ADMIN — AMIODARONE HYDROCHLORIDE 400 MILLIGRAM(S): 400 TABLET ORAL at 20:48

## 2024-03-27 RX ADMIN — AMIODARONE HYDROCHLORIDE 400 MILLIGRAM(S): 400 TABLET ORAL at 09:15

## 2024-03-27 NOTE — PROGRESS NOTE ADULT - PROBLEM SELECTOR PLAN 5
- Found to have Afib with RVR intermittently  - resume AC 3/27 PM  - C/w metoprolol 25mg bid   - echo noted

## 2024-03-27 NOTE — PROGRESS NOTE ADULT - PROBLEM SELECTOR PLAN 2
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 Not candidate for IV on R due to mass, but also has SVC obliteration on CT neck to doubt patient can have PICC or central line placed on R  - MICU team assistance appreciated in placing R femoral TLC  - would f/u ID re: duration of abx for cellulitis and if they can be stopped    #SVC syndrome- IR reconsulted for possible stenting; not feasible due to size/location, now planned for tier 2 transfer to [facility] for urgent radiation  - d/w and agreed on by IR, rad onc Dr. Sterling, oncology, cardiology, thoracic  - pt and boyfriencarlton Breaux also aware and in agreement with transfer  - NM notified

## 2024-03-27 NOTE — PROGRESS NOTE ADULT - ASSESSMENT
57 y/o woman presenting to hospital with right breast/chest wall swelling and cellulitis with imaging evidence concerning for metastatic malignancy w/ lung nodules, and axillary, supraclavicular, and mediastinal adenopathy.  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 Course complicated with SVC syndrome.       #Metastatic Disease suspecting of GI origin:  -Path from 3/14/24 showing neoplastic cells positive for CK7 and CDX2 immunostains, while negative for CK20, TTF1, GATA3, TRPS1 and PAX8. The immunoprofile is in favor of carcinoma of upper GI or pancreaticobiliary origin.  -S/p pericardiocentesis and drain on 3/19 for malignant pericardial effusion, f/u cytology from procedure.   -CT Neck c/f SVC syndrome w/ thrombus that patient is on full dose anticoagulation for.   -Given CT A/P findings of pancreatic cyst, with RUQ US results of possible gastric thickening would first recommend MR Abdomen pancreas protocol and MRCP to eval both the pancreatic vasculature and the hepatobiliary system. Pending results of MR abdomen would recommend GI consult to eval if patient would need EGD/EUS +/- FNA for further pathologic evaluation. Would favor inpatient w/u for patient given aggressive nature of her disease and extensive disease.   -Pain control/anxiety/Supportive care  per primary team or palliative care team if necessary; continue O2 supplement.   -Of note, Attending Dr. Ponce communicated with primary team attending Dr. Nolen, [facility]; pending transferring to [facility] for radiation.   -During the rounding in the afternoon, also communcate with Primary team PA for starting Dexamethasone 10mg loading dose stat then 4mg q8hr.    -closely monitor for worsening of signs/symptoms of SVC syndrome.   -Check tumor lysis lab including K, phos, Mg, LDH, uric acid;   -per primary team, IR may consider stent placement if necessary after CT scan before transferring to [facility] for Radiation.   -onc team will f/u     Discussed with attending Dr. Ponce; note is not finalized until signed by attending  Alex Magana PGY6   hem & onc fellow [phone]

## 2024-03-27 NOTE — PROGRESS NOTE ADULT - PROBLEM SELECTOR PLAN 1
- h/o RCC and had nephrectomy by Urology by Dr Jacky Adkins at [facility], also had lymph node dissection 2 years ago  - Now with axillary, subclavicular and mediastinal lymphadenopathy, pulmonary nodules and inflammatory changes of breast seen on CT with possible effect on IVC  - CT neck with large supraclavicular/mediastinal mass lesion with mass effect and complete occlusion of the right IJ; associated surrounding inflammatory changes in the deep neck, concerning for infectious/inflammatory thrombophlebitis  - S/p supraclavicular lymph node biopsy by IR on 3/14- path consistent with primary of GI origin  - D/w GI follow up MRI abdomen and MRCP- currently deferred due to worsening svc syndrome warranting urgent radiation  - D/w oncology, to continue to follow  - Pallative follow up for pain control and anxiety management

## 2024-03-27 NOTE — DISCHARGE NOTE PROVIDER - NSDCMRMEDTOKEN_GEN_ALL_CORE_FT
acetaminophen 10 mg/mL intravenous solution: 75 milliliter(s) intravenous once  aluminum hydroxide-magnesium hydroxide 200 mg-200 mg/5 mL oral suspension: 30 milliliter(s) orally every 6 hours As needed Dyspepsia  amiodarone 200 mg oral tablet: orally 3 times a day Amiodarone 400mg PO TID x 24 doses then amiodarone 200mg PO daily  bisacodyl 5 mg oral delayed release tablet: 1 tab(s) orally every 12 hours As needed Constipation  cefepime 2 g intravenous injection: 2 gram(s) intravenous every 12 hours  diazePAM 5 mg oral tablet: 1 tab(s) orally 2 times a day as needed for  anxiety  guaiFENesin 100 mg/5 mL oral liquid: 10 milliliter(s) orally every 6 hours As needed Cough  HYDROmorphone: 1 milligram(s) intravenous every 3 hours as needed for  severe pain  ipratropium-albuterol 0.5 mg-2.5 mg/3 mL inhalation solution: 3 milliliter(s) inhaled every 6 hours As needed Shortness of Breath and/or Wheezing  melatonin 3 mg oral tablet: 1 tab(s) orally once a day (at bedtime) As needed Insomnia  metoprolol tartrate 25 mg oral tablet: 1 tab(s) orally 2 times a day  metroNIDAZOLE 500 mg/100 mL intravenous solution: 100 milliliter(s) intravenous every 12 hours  naloxone: 0.1 milligram(s) intravenous every 3 minutes as needed for Obtundation  nicotine 14 mg/24 hr transdermal film, extended release: 1 patch transdermal once a day  oxyCODONE 10 mg oral tablet: 1 tab(s) orally every 4 hours As needed Moderate Pain (4 - 6)  oxyCODONE 20 mg oral tablet, extended release: 1 tab(s) orally 3 times a day  pantoprazole 40 mg oral delayed release tablet: 1 tab(s) orally once a day (before a meal)  polyethylene glycol 3350 oral powder for reconstitution: 17 gram(s) orally 2 times a day  senna leaf extract oral tablet: 2 tab(s) orally once a day (at bedtime)  urea 15 g oral powder for reconstitution: 1 packet(s) orally 2 times a day  vancomycin 1 g intravenous injection: 1 gram(s) intravenous every 12 hours   acetaminophen 10 mg/mL intravenous solution: 75 milliliter(s) intravenous once  aluminum hydroxide-magnesium hydroxide 200 mg-200 mg/5 mL oral suspension: 30 milliliter(s) orally every 6 hours As needed Dyspepsia  amiodarone 200 mg oral tablet: orally 3 times a day Amiodarone 400mg PO TID x 24 doses then amiodarone 200mg PO daily  bisacodyl 5 mg oral delayed release tablet: 1 tab(s) orally every 12 hours As needed Constipation  cefepime 2 g intravenous injection: 2 gram(s) intravenous every 12 hours  dexAMETHasone 6 mg/25 mL-NaCl 0.9% intravenous solution: 16.67 milliliter(s) intravenous every 8 hours  diazePAM 5 mg oral tablet: 1 tab(s) orally 2 times a day as needed for  anxiety  guaiFENesin 100 mg/5 mL oral liquid: 10 milliliter(s) orally every 6 hours As needed Cough  HYDROmorphone: 1 milligram(s) intravenous every 3 hours as needed for  severe pain  ipratropium-albuterol 0.5 mg-2.5 mg/3 mL inhalation solution: 3 milliliter(s) inhaled every 6 hours As needed Shortness of Breath and/or Wheezing  melatonin 3 mg oral tablet: 1 tab(s) orally once a day (at bedtime) As needed Insomnia  metoprolol tartrate 25 mg oral tablet: 1 tab(s) orally 2 times a day  metroNIDAZOLE 500 mg/100 mL intravenous solution: 100 milliliter(s) intravenous every 12 hours  naloxone: 0.1 milligram(s) intravenous every 3 minutes as needed for Obtundation  nicotine 14 mg/24 hr transdermal film, extended release: 1 patch transdermal once a day  oxyCODONE 10 mg oral tablet: 1 tab(s) orally every 4 hours As needed Moderate Pain (4 - 6)  oxyCODONE 20 mg oral tablet, extended release: 1 tab(s) orally 3 times a day  pantoprazole 40 mg oral delayed release tablet: 1 tab(s) orally once a day (before a meal)  polyethylene glycol 3350 oral powder for reconstitution: 17 gram(s) orally 2 times a day  senna leaf extract oral tablet: 2 tab(s) orally once a day (at bedtime)  urea 15 g oral powder for reconstitution: 1 packet(s) orally 2 times a day  vancomycin 1 g intravenous injection: 1 gram(s) intravenous every 12 hours   aluminum hydroxide-magnesium hydroxide 200 mg-200 mg/5 mL oral suspension: 30 milliliter(s) orally every 6 hours As needed Dyspepsia  amiodarone 200 mg oral tablet: orally 3 times a day Amiodarone 400mg PO TID x 24 doses then amiodarone 200mg PO daily  bisacodyl 5 mg oral delayed release tablet: 1 tab(s) orally every 12 hours As needed Constipation  guaiFENesin 100 mg/5 mL oral liquid: 10 milliliter(s) orally every 6 hours As needed Cough  ipratropium-albuterol 0.5 mg-2.5 mg/3 mL inhalation solution: 3 milliliter(s) inhaled every 6 hours As needed Shortness of Breath and/or Wheezing  lactobacillus acidophilus oral capsule: 1 orally once a day  melatonin 3 mg oral tablet: 1 tab(s) orally once a day (at bedtime) As needed Insomnia  metoprolol tartrate 25 mg oral tablet: 1 tab(s) orally 2 times a day  naloxegol 25 mg oral tablet: 1 tab(s) orally once a day (before a meal)  naloxone: 0.1 milligram(s) intravenous every 3 minutes as needed for Obtundation  nicotine 14 mg/24 hr transdermal film, extended release: 1 patch transdermal once a day  pantoprazole 40 mg oral delayed release tablet: 1 tab(s) orally once a day (before a meal)  polyethylene glycol 3350 oral powder for reconstitution: 17 gram(s) orally 2 times a day  saliva substitutes oral solution: 1 orally prn  senna leaf extract oral tablet: 2 tab(s) orally once a day (at bedtime)

## 2024-03-27 NOTE — PROGRESS NOTE ADULT - PROBLEM SELECTOR PLAN 6
See [facility] documentation 3/22. Patient elected her boyfriend, Saeid Peña [phone], as her HCP. She elects DNR/DNI code status. HCP paperwork and MOLST form completed and entered into the chart.  pt confirmed ongoing medical management, discussed RRT o/n, endorsing too tired to talk as she did not sleep well o/n. following consult received call from primary team ACP, as per ACP patient rescinded DNR/I  as per ACP form voided  as per discussion today pt wishing to pursue further medical work up and DMT   will f/u regarding GOC

## 2024-03-27 NOTE — DISCHARGE NOTE PROVIDER - CARE PROVIDERS DIRECT ADDRESSES
,DirectAddress_Unknown ,DirectAddress_Unknown,[email or web address]

## 2024-03-27 NOTE — CONSULT NOTE ADULT - CONSULT REASON
Malignancy W/u
hyponatremia
Pericardial effusion
SVC stent
mediastinal LAD
AFib 
r/o R breast mass malignancy, SVC syndrome
Management of cancer-related pain
sore throat
R supraclavicular node biopsy
Abscess in Neck
Pleural effusion

## 2024-03-27 NOTE — PROGRESS NOTE ADULT - SUBJECTIVE AND OBJECTIVE BOX
SUBJECTIVE AND OBJECTIVE: Pt seen and examined at bedside. Pt awake and alert, able to participate in exam.   Indication for Geriatrics and Palliative Care Services/INTERVAL HPI: GOC/symptoms    OVERNIGHT EVENTS: No acute events o/n. in 24 hours patient used     DNR on chart:  Allergies    Cipro (Headache; Vomiting; Rash)  penicillin (Headache; Vomiting; Rash)  erythromycin (Headache; Vomiting; Rash)    Intolerances    MEDICATIONS  (STANDING):  acetaminophen   IVPB .. 750 milliGRAM(s) IV Intermittent once  aMIOdarone    Tablet   Oral   aMIOdarone    Tablet 400 milliGRAM(s) Oral three times a day  cefepime   IVPB 2000 milliGRAM(s) IV Intermittent every 12 hours  chlorhexidine 4% Liquid 1 Application(s) Topical <User Schedule>  influenza   Vaccine 0.5 milliLiter(s) IntraMuscular once  ketorolac   Injectable 15 milliGRAM(s) IV Push once  metoprolol tartrate 25 milliGRAM(s) Oral two times a day  metroNIDAZOLE  IVPB 500 milliGRAM(s) IV Intermittent every 12 hours  nicotine -  14 mG/24Hr(s) Patch 1 Patch Transdermal daily  oxyCODONE  ER Tablet 20 milliGRAM(s) Oral <User Schedule>  pantoprazole    Tablet 40 milliGRAM(s) Oral before breakfast  polyethylene glycol 3350 17 Gram(s) Oral two times a day  senna 2 Tablet(s) Oral at bedtime  urea Oral Powder 15 Gram(s) Oral two times a day  vancomycin  IVPB 1000 milliGRAM(s) IV Intermittent every 12 hours    MEDICATIONS  (PRN):  albuterol/ipratropium for Nebulization 3 milliLiter(s) Nebulizer every 6 hours PRN Shortness of Breath and/or Wheezing  aluminum hydroxide/magnesium hydroxide/simethicone Suspension 30 milliLiter(s) Oral every 6 hours PRN Dyspepsia  benzocaine/menthol Lozenge 1 Lozenge Oral two times a day PRN Sore Throat  bisacodyl 5 milliGRAM(s) Oral every 12 hours PRN Constipation  diazepam    Tablet 5 milliGRAM(s) Oral two times a day PRN for anxiety  guaiFENesin Oral Liquid (Sugar-Free) 200 milliGRAM(s) Oral every 6 hours PRN Cough  HYDROmorphone  Injectable 1 milliGRAM(s) IV Push every 3 hours PRN Severe Pain (7 - 10)  melatonin 3 milliGRAM(s) Oral at bedtime PRN Insomnia  naloxone Injectable 0.1 milliGRAM(s) IV Push every 3 minutes PRN Obtundation, respiratory suppression  oxyCODONE    IR 10 milliGRAM(s) Oral every 4 hours PRN Moderate Pain (4 - 6)  sodium chloride 0.9% lock flush 10 milliLiter(s) IV Push every 1 hour PRN Pre/post blood products, medications, blood draw, and to maintain line patency      ITEMS UNCHECKED ARE NOT PRESENT    PRESENT SYMPTOMS: [ ]Unable to self-report - see [ ] CPOT [ ] PAINADS [ ] RDOS  Source if other than patient:  [ ]Family   [ ]Team     Pain:  [ ]yes [ ]no  QOL impact -   Location -                    Aggravating factors -  Quality -  Radiation -  Timing-  Severity (0-10 scale):  Minimal acceptable level (0-10 scale):     CPOT:    https://www.[place].org/getattachment/[email or web address]/Critical-Care-Pain-Observation-Tool-(CPOT)    PAINAD Score: See PAINAD tool and score below       Dyspnea:                           [ ]Mild [ ]Moderate [ ]Severe    RDOS: See RDOS tool and score below   0 to 2  minimal or no respiratory distress   3  mild distress  4 to 6 moderate distress  >7 severe distress      Anxiety:                             [ ]Mild [ ]Moderate [ ]Severe  Fatigue:                             [ ]Mild [ ]Moderate [ ]Severe  Nausea:                             [ ]Mild [ ]Moderate [ ]Severe  Loss of appetite:              [ ]Mild [ ]Moderate [ ]Severe  Constipation:                    [ ]Mild [ ]Moderate [ ]Severe    PCSSQ[Palliative Care Spiritual Screening Question]   Severity (0-10):  Score of 4 or > indicate consideration of Chaplaincy referral.  Chaplaincy Referral: [ ] yes [ ] refused [ ] following [ ] Deferred     Caregiver [place]? : [ ] yes [ ] no [ ] Deferred [ ] Declined             Social work referral [ ] Patient & Family Centered Care Referral [ ]     Anticipatory Grief present?:  [ ] yes [ ] no  [ ] Deferred                  Social work referral [ ] Chaplaincy Referral [ ]    		  Other Symptoms:  [ ]All other review of systems negative     Palliative Performance Status Version 2:   See PPSv2 tool and score below         PHYSICAL EXAM:  Vital Signs Last 24 Hrs  T(C): 37.1 (27 Mar 2024 12:29), Max: 37.1 (27 Mar 2024 12:29)  T(F): 98.8 (27 Mar 2024 12:29), Max: 98.8 (27 Mar 2024 12:29)  HR: 96 (27 Mar 2024 12:29) (63 - 105)  BP: 95/58 (27 Mar 2024 12:29) (94/53 - 131/66)  BP(mean): 76 (26 Mar 2024 22:30) (66 - 99)  RR: 18 (27 Mar 2024 12:29) (14 - 19)  SpO2: 97% (27 Mar 2024 12:29) (90% - 99%)    Parameters below as of 27 Mar 2024 12:29  Patient On (Oxygen Delivery Method): mask, Venturi     I&O's Summary    26 Mar 2024 07:01  -  27 Mar 2024 07:00  --------------------------------------------------------  IN: 0 mL / OUT: 250 mL / NET: -250 mL    27 Mar 2024 07:01  -  27 Mar 2024 13:51  --------------------------------------------------------  IN: 236 mL / OUT: 400 mL / NET: -164 mL       GENERAL: [ ]Cachexia    [ ]Alert  [ ]Oriented x   [ ]Lethargic  [ ]Unarousable  [ ]Verbal  [ ]Non-Verbal  Behavioral:   [ ]Anxiety  [ ]Delirium [ ]Agitation [ ]Other  HEENT:  [ ]Normal   [ ]Dry mouth   [ ]ET Tube/Trach  [ ]Oral lesions  PULMONARY:   [ ]Clear [ ]Tachypnea  [ ]Audible excessive secretions   [ ]Rhonchi        [ ]Right [ ]Left [ ]Bilateral  [ ]Crackles        [ ]Right [ ]Left [ ]Bilateral  [ ]Wheezing     [ ]Right [ ]Left [ ]Bilateral  [ ]Diminished BS [ ] Right [ ]Left [ ]Bilateral  CARDIOVASCULAR:    [ ]Regular [ ]Irregular [ ]Tachy  [ ]Malik [ ]Murmur [ ]Other  GASTROINTESTINAL:  [ ]Soft  [ ]Distended   [ ]+BS  [ ]Non tender [ ]Tender  [ ]Other [ ]PEG [ ]OGT/ NGT   Last BM:   GENITOURINARY:  [ ]Normal [ ]Incontinent   [ ]Oliguria/Anuria   [ ]Che  MUSCULOSKELETAL:   [ ]Normal   [ ]Weakness  [ ]Bed/Wheelchair bound [ ]Edema  NEUROLOGIC:   [ ]No focal deficits  [ ] Cognitive impairment  [ ] Dysphagia [ ]Dysarthria [ ] Paresis [ ]Other   SKIN:   [ ]Normal  [ ]Rash  [ ]Other  [ ]Pressure ulcer(s) [ ]y [ ]n present on admission    CRITICAL CARE:  [ ]Shock Present  [ ]Septic [ ]Cardiogenic [ ]Neurologic [ ]Hypovolemic  [ ]Vasopressors [ ]Inotropes  [ ]Respiratory failure present [ ]Mechanical Ventilation [ ]Non-invasive ventilatory support [ ]High-Flow   [ ]Acute  [ ]Chronic [ ]Hypoxic  [ ]Hypercarbic [ ]Other  [ ]Other organ failure     LABS:                        10.1   14.19 )-----------( 470      ( 27 Mar 2024 06:59 )             31.9   03-27    133<L>  |  99  |  28<H>  ----------------------------<  166<H>  4.8   |  24  |  1.16    Ca    8.5      27 Mar 2024 13:01  Phos  3.7     03-26  Mg     1.7     03-26    TPro  6.2  /  Alb  2.9<L>  /  TBili  0.2  /  DBili  x   /  AST  13  /  ALT  12  /  AlkPhos  89  03-26  PT/INR - ( 26 Mar 2024 04:06 )   PT: 12.5 sec;   INR: 1.14 ratio         PTT - ( 26 Mar 2024 04:06 )  PTT:124.1 sec    Urinalysis Basic - ( 27 Mar 2024 13:01 )    Color: x / Appearance: x / SG: x / pH: x  Gluc: 166 mg/dL / Ketone: x  / Bili: x / Urobili: x   Blood: x / Protein: x / Nitrite: x   Leuk Esterase: x / RBC: x / WBC x   Sq Epi: x / Non Sq Epi: x / Bacteria: x      RADIOLOGY & ADDITIONAL STUDIES:    Protein Calorie Malnutrition Present: [ ]mild [ ]moderate [ ]severe [ ]underweight [ ]morbid obesity  https://www.andeal.org/vault/7000/web/files/ONC/Table_Clinical%20Characteristics%20to%20Document%20Malnutrition-White%20JV%20et%20al%202012.pdf    Height (cm): 154.9 (03-26-24 @ 16:20), 154.9 (03-10-24 @ 16:32)  Weight (kg): 49.9 (03-26-24 @ 16:20), 49.9 (03-10-24 @ 16:32)  BMI (kg/m2): 20.8 (03-26-24 @ 16:20), 20.8 (03-10-24 @ 16:32)    [ ]PPSV2 < or = 30%  [ ]significant weight loss [ ]poor nutritional intake [ ]anasarca[ ]Artificial Nutrition    Other REFERRALS:  [ ]Hospice  [ ]Child Life  [ ]Social Work  [ ]Case management [ ]Holistic Therapy     Goals of Care Document: SUBJECTIVE AND OBJECTIVE: Pt seen and examined at bedside. Pt awake and alert, able to participate in exam.   Indication for Geriatrics and Palliative Care Services/INTERVAL HPI: GOC/symptoms    OVERNIGHT EVENTS: No acute events o/n. in 24 hours patient used 3x 1mg IVP Dilaudid following placement of pleurx (8a-8a)     DNR on chart:  Allergies    Cipro (Headache; Vomiting; Rash)  penicillin (Headache; Vomiting; Rash)  erythromycin (Headache; Vomiting; Rash)    Intolerances    MEDICATIONS  (STANDING):  acetaminophen   IVPB .. 750 milliGRAM(s) IV Intermittent once  aMIOdarone    Tablet   Oral   aMIOdarone    Tablet 400 milliGRAM(s) Oral three times a day  cefepime   IVPB 2000 milliGRAM(s) IV Intermittent every 12 hours  chlorhexidine 4% Liquid 1 Application(s) Topical <User Schedule>  influenza   Vaccine 0.5 milliLiter(s) IntraMuscular once  ketorolac   Injectable 15 milliGRAM(s) IV Push once  metoprolol tartrate 25 milliGRAM(s) Oral two times a day  metroNIDAZOLE  IVPB 500 milliGRAM(s) IV Intermittent every 12 hours  nicotine -  14 mG/24Hr(s) Patch 1 Patch Transdermal daily  oxyCODONE  ER Tablet 20 milliGRAM(s) Oral <User Schedule>  pantoprazole    Tablet 40 milliGRAM(s) Oral before breakfast  polyethylene glycol 3350 17 Gram(s) Oral two times a day  senna 2 Tablet(s) Oral at bedtime  urea Oral Powder 15 Gram(s) Oral two times a day  vancomycin  IVPB 1000 milliGRAM(s) IV Intermittent every 12 hours    MEDICATIONS  (PRN):  albuterol/ipratropium for Nebulization 3 milliLiter(s) Nebulizer every 6 hours PRN Shortness of Breath and/or Wheezing  aluminum hydroxide/magnesium hydroxide/simethicone Suspension 30 milliLiter(s) Oral every 6 hours PRN Dyspepsia  benzocaine/menthol Lozenge 1 Lozenge Oral two times a day PRN Sore Throat  bisacodyl 5 milliGRAM(s) Oral every 12 hours PRN Constipation  diazepam    Tablet 5 milliGRAM(s) Oral two times a day PRN for anxiety  guaiFENesin Oral Liquid (Sugar-Free) 200 milliGRAM(s) Oral every 6 hours PRN Cough  HYDROmorphone  Injectable 1 milliGRAM(s) IV Push every 3 hours PRN Severe Pain (7 - 10)  melatonin 3 milliGRAM(s) Oral at bedtime PRN Insomnia  naloxone Injectable 0.1 milliGRAM(s) IV Push every 3 minutes PRN Obtundation, respiratory suppression  oxyCODONE    IR 10 milliGRAM(s) Oral every 4 hours PRN Moderate Pain (4 - 6)  sodium chloride 0.9% lock flush 10 milliLiter(s) IV Push every 1 hour PRN Pre/post blood products, medications, blood draw, and to maintain line patency      ITEMS UNCHECKED ARE NOT PRESENT    PRESENT SYMPTOMS: [ ]Unable to self-report - see [ ] CPOT [ ] PAINADS [ ] RDOS  Source if other than patient:  [ ]Family   [ ]Team     Pain:  [x ]yes [ ]no  QOL impact -   Location -       pleurx insertion site, pain relieved with IV Dilaudid              Aggravating factors -  Quality -  Radiation -  Timing-  Severity (0-10 scale):  Minimal acceptable level (0-10 scale):     CPOT:    https://www.[facility].org/getattachment/[email or web address]/Critical-Care-Pain-Observation-Tool-(CPOT)    PAINAD Score: See PAINAD tool and score below       Dyspnea:                           [ x]Mild [ ]Moderate [ ]Severe    RDOS: See RDOS tool and score below   0 to 2  minimal or no respiratory distress   3  mild distress  4 to 6 moderate distress  >7 severe distress      Anxiety:                             [ ]Mild [ ]Moderate [ ]Severe  Fatigue:                             [x ]Mild [ ]Moderate [ ]Severe  Nausea:                             [ ]Mild [ ]Moderate [ ]Severe  Loss of appetite:              [ ]Mild [ ]Moderate [ ]Severe  Constipation:                    [ ]Mild [ ]Moderate [ ]Severe    PCSSQ[Palliative Care Spiritual Screening Question]   Severity (0-10):  Score of 4 or > indicate consideration of Chaplaincy referral.  Chaplaincy Referral: [ ] yes [ ] refused [ ] following [ ] Deferred     Caregiver [place]? : [ ] yes [ ] no [ ] Deferred [ ] Declined             Social work referral [ ] Patient & Family Centered Care Referral [ ]     Anticipatory Grief present?:  [ ] yes [ ] no  [ ] Deferred                  Social work referral [ ] Chaplaincy Referral [ ]    		  Other Symptoms:  [x ]All other review of systems negative     Palliative Performance Status Version 2:   See PPSv2 tool and score below         PHYSICAL EXAM:  Vital Signs Last 24 Hrs  T(C): 37.1 (27 Mar 2024 12:29), Max: 37.1 (27 Mar 2024 12:29)  T(F): 98.8 (27 Mar 2024 12:29), Max: 98.8 (27 Mar 2024 12:29)  HR: 96 (27 Mar 2024 12:29) (63 - 105)  BP: 95/58 (27 Mar 2024 12:29) (94/53 - 131/66)  BP(mean): 76 (26 Mar 2024 22:30) (66 - 99)  RR: 18 (27 Mar 2024 12:29) (14 - 19)  SpO2: 97% (27 Mar 2024 12:29) (90% - 99%)    Parameters below as of 27 Mar 2024 12:29  Patient On (Oxygen Delivery Method): mask, Venturi     I&O's Summary    26 Mar 2024 07:01  -  27 Mar 2024 07:00  --------------------------------------------------------  IN: 0 mL / OUT: 250 mL / NET: -250 mL    27 Mar 2024 07:01  -  27 Mar 2024 13:51  --------------------------------------------------------  IN: 236 mL / OUT: 400 mL / NET: -164 mL       GENERAL: [ ]Cachexia    [x ]Alert  [x ]Oriented x3   [ ]Lethargic  [ ]Unarousable  [ ]Verbal  [ ]Non-Verbal  Behavioral:   [ ]Anxiety  [ ]Delirium [ ]Agitation [ ]Other  HEENT:  [ ]Normal   [ x]Dry mouth   [ ]ET Tube/Trach  [ ]Oral lesions  PULMONARY:   [ ]Clear [ ]Tachypnea  [ ]Audible excessive secretions   [ ]Rhonchi        [ ]Right [ ]Left [ ]Bilateral  [ ]Crackles        [ ]Right [ ]Left [ ]Bilateral  [ ]Wheezing     [ ]Right [ ]Left [ ]Bilateral  [ x]Diminished BS [ ] Right [ ]Left [ x]Bilateral  CARDIOVASCULAR:    [ ]Regular [ ]Irregular [ x]Tachy  [ ]Malik [ ]Murmur [ ]Other  GASTROINTESTINAL:  [x ]Soft  [ ]Distended   [x ]+BS  [ x]Non tender [ ]Tender  [ ]Other [ ]PEG [ ]OGT/ NGT   Last BM: 3/22  GENITOURINARY:  [x ]Normal [ ]Incontinent   [ ]Oliguria/Anuria   [ ]Che  MUSCULOSKELETAL:   [ ]Normal   [ x]Weakness  [ x]Bed/Wheelchair bound [ ]Edema  NEUROLOGIC:   [x ]No focal deficits  [ ] Cognitive impairment  [ ] Dysphagia [ ]Dysarthria [ ] Paresis [ ]Other   SKIN:   [ ]Normal  [ ]Rash  [ ]Other  [ ]Pressure ulcer(s) [ ]y [ ]n present on admission    CRITICAL CARE:  [ ]Shock Present  [ ]Septic [ ]Cardiogenic [ ]Neurologic [ ]Hypovolemic  [ ]Vasopressors [ ]Inotropes  [ ]Respiratory failure present [ ]Mechanical Ventilation [ ]Non-invasive ventilatory support [ ]High-Flow   [ ]Acute  [ ]Chronic [ ]Hypoxic  [ ]Hypercarbic [ ]Other  [ ]Other organ failure     LABS:                        10.1   14.19 )-----------( 470      ( 27 Mar 2024 06:59 )             31.9   03-27    133<L>  |  99  |  28<H>  ----------------------------<  166<H>  4.8   |  24  |  1.16    Ca    8.5      27 Mar 2024 13:01  Phos  3.7     03-26  Mg     1.7     03-26    TPro  6.2  /  Alb  2.9<L>  /  TBili  0.2  /  DBili  x   /  AST  13  /  ALT  12  /  AlkPhos  89  03-26  PT/INR - ( 26 Mar 2024 04:06 )   PT: 12.5 sec;   INR: 1.14 ratio         PTT - ( 26 Mar 2024 04:06 )  PTT:124.1 sec    Urinalysis Basic - ( 27 Mar 2024 13:01 )    Color: x / Appearance: x / SG: x / pH: x  Gluc: 166 mg/dL / Ketone: x  / Bili: x / Urobili: x   Blood: x / Protein: x / Nitrite: x   Leuk Esterase: x / RBC: x / WBC x   Sq Epi: x / Non Sq Epi: x / Bacteria: x      RADIOLOGY & ADDITIONAL STUDIES:  < from: Xray Chest 1 View- PORTABLE-Urgent (Xray Chest 1 View- PORTABLE-Urgent .) (03.26.24 @ 04:18) >  ACC: [id number] EXAM:  XR CHEST PORTABLE URGENT 1V   ORDERED BY:  SAEID BELLO     PROCEDURE DATE:  03/26/2024          INTERPRETATION:  CLINICAL INFORMATION: Rapid response.    TECHNIQUE: Frontal view of the chest.    COMPARISON: Multiple prior chest x-rays with most recent dated 3/25/2024.    FINDINGS:  Support Devices/Implanted Hardware: Pericardial drain.  Heart: Heart size cannot be accurately assessed in this projection.  Pulmonary: Moderate to large right pleural effusion with associated   atelectasis, increased from 3/25/2024. Left lung is clear. No   pneumothorax.  Bones: No acute bony pathology.    IMPRESSION:  Moderate to large right pleural effusion, increased from 3/25/2024.    Findings were discussed by Dr. Kruger with Dr. Tello 3/26/2024 at 3:57   AM with read back confirmation.    --- End of Report ---           ASHLEY KRUGER MD; Resident Radiologist  This document has been electronically signed.  KRIS MERCER MD; Attending Radiologist  This document has been electronically signed. Mar 26 2024 12:45PM    < end of copied text >    Protein Calorie Malnutrition Present: [ ]mild [ ]moderate [ ]severe [ ]underweight [ ]morbid obesity  https://www.andeal.org/vault/2440/web/files/ONC/Table_Clinical%20Characteristics%20to%20Document%20Malnutrition-White%20JV%20et%20al%202012.pdf    Height (cm): 154.9 (03-26-24 @ 16:20), 154.9 (03-10-24 @ 16:32)  Weight (kg): 49.9 (03-26-24 @ 16:20), 49.9 (03-10-24 @ 16:32)  BMI (kg/m2): 20.8 (03-26-24 @ 16:20), 20.8 (03-10-24 @ 16:32)    [x ]PPSV2 < or = 30%  [ ]significant weight loss [ ]poor nutritional intake [ ]anasarca[ ]Artificial Nutrition    Other REFERRALS:  [ ]Hospice  [ ]Child Life  [ ]Social Work  [ ]Case management [ ]Holistic Therapy     Goals of Care Document:

## 2024-03-27 NOTE — PROGRESS NOTE ADULT - ASSESSMENT
58F w/Hx of RCC s/p R total nephrectomy, S/p hysterectomy, R-sided glaucoma, Fibromyalgia, Anxiety, GERD, smoker presents due to R breast swelling, redness and pain. Admitted for right breast swelling and cellulitis found to have imaging evidence concerning for metastatic malignancy w/ lung nodules, and axillary, supraclavicular, and mediastinal adenopathy. 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 Also s/p pericardial window for moderate pericardial effusion now with chest tube. Now found to be in Afib with RVR, s/p R pleurx placement with minimal improvement in respiratory status- now concerned for worsening SVC syndrome pending transfer to [facility] for urgent radiation

## 2024-03-27 NOTE — CONSULT NOTE ADULT - REASON FOR ADMISSION
Mastitis, breast malignancy

## 2024-03-27 NOTE — PROGRESS NOTE ADULT - PROBLEM SELECTOR PLAN 3
-Supplemental O2 as per the primary team.   - plan for pleurx today as per primary team  -If symptoms are recurrent and or creating distress, may also add Dilaudid 1mg q 3 PRN for dyspnea

## 2024-03-27 NOTE — PROGRESS NOTE ADULT - SUBJECTIVE AND OBJECTIVE BOX
Subjective:  c/o pain at pleurx site                       MEDICATIONS  acetaminophen   IVPB .. 750 milliGRAM(s) IV Intermittent once  albuterol/ipratropium for Nebulization 3 milliLiter(s) Nebulizer every 6 hours PRN  aluminum hydroxide/magnesium hydroxide/simethicone Suspension 30 milliLiter(s) Oral every 6 hours PRN  aMIOdarone    Tablet 400 milliGRAM(s) Oral three times a day  aMIOdarone    Tablet   Oral   benzocaine/menthol Lozenge 1 Lozenge Oral two times a day PRN  bisacodyl 5 milliGRAM(s) Oral every 12 hours PRN  cefepime   IVPB 2000 milliGRAM(s) IV Intermittent every 12 hours  chlorhexidine 4% Liquid 1 Application(s) Topical <User Schedule>  diazepam    Tablet 5 milliGRAM(s) Oral two times a day PRN  guaiFENesin Oral Liquid (Sugar-Free) 200 milliGRAM(s) Oral every 6 hours PRN  HYDROmorphone  Injectable 1 milliGRAM(s) IV Push every 3 hours PRN  influenza   Vaccine 0.5 milliLiter(s) IntraMuscular once  ketorolac   Injectable 15 milliGRAM(s) IV Push once  melatonin 3 milliGRAM(s) Oral at bedtime PRN  metoprolol tartrate 25 milliGRAM(s) Oral two times a day  metroNIDAZOLE  IVPB 500 milliGRAM(s) IV Intermittent every 12 hours  naloxone Injectable 0.1 milliGRAM(s) IV Push every 3 minutes PRN  nicotine -  14 mG/24Hr(s) Patch 1 Patch Transdermal daily  oxyCODONE    IR 10 milliGRAM(s) Oral every 4 hours PRN  oxyCODONE  ER Tablet 20 milliGRAM(s) Oral <User Schedule>  pantoprazole    Tablet 40 milliGRAM(s) Oral before breakfast  polyethylene glycol 3350 17 Gram(s) Oral two times a day  senna 2 Tablet(s) Oral at bedtime  sodium chloride 0.9% lock flush 10 milliLiter(s) IV Push every 1 hour PRN  urea Oral Powder 15 Gram(s) Oral two times a day  vancomycin  IVPB 1000 milliGRAM(s) IV Intermittent every 12 hours      Vital Signs Last 24 Hrs  T(C): 37.1 (27 Mar 2024 12:29), Max: 37.1 (27 Mar 2024 12:29)  T(F): 98.8 (27 Mar 2024 12:29), Max: 98.8 (27 Mar 2024 12:29)  HR: 96 (27 Mar 2024 12:29) (63 - 105)  BP: 95/58 (27 Mar 2024 12:29) (94/53 - 131/66)  BP(mean): 76 (26 Mar 2024 22:30) (66 - 99)  RR: 18 (27 Mar 2024 12:29) (14 - 19)  SpO2: 97% (27 Mar 2024 12:29) (90% - 99%)    Parameters below as of 27 Mar 2024 12:29  Patient On (Oxygen Delivery Method): mask, Venturi          PHYSICAL EXAM:  Neurology: alert and oriented x 3, nonfocal, no gross deficits        Right pleurx:  250cc/24hrs    CXR:  right eff    LABS  03-26    130<L>  |  95<L>  |  22  ----------------------------<  159<H>  4.2   |  20<L>  |  0.93    Ca    8.9      26 Mar 2024 04:06  Phos  3.7     03-26  Mg     1.7     03-26    TPro  6.2  /  Alb  2.9<L>  /  TBili  0.2  /  DBili  x   /  AST  13  /  ALT  12  /  AlkPhos  89  03-26                                 10.1   14.19 )-----------( 470      ( 27 Mar 2024 06:59 )             31.9          PT/INR - ( 26 Mar 2024 04:06 )   PT: 12.5 sec;   INR: 1.14 ratio         PTT - ( 26 Mar 2024 04:06 )  PTT:124.1 sec                    PAST MEDICAL & SURGICAL HISTORY:  Anxiety      Acid reflux disease      Umbilical hernia      Uterine mass  Benign      Fibromyalgia  Joint pains      Glaucoma  Right eye      Herniated cervical disc      Cancer of kidney  right kidney      S/P partial hysterectomy  8 years ago      S/P knee surgery  knee arthroscopy right x 2 ( 2011 & 2012)      S/P hernia repair  umbilical Hernia Repair - 2011      H/O unilateral nephrectomy  Right Laparoscopic Nephrectomy - [id number]      Glaucoma following surgery  Right Eyr - 2012      History of tonsillectomy

## 2024-03-27 NOTE — PROGRESS NOTE ADULT - ASSESSMENT
this is a 58 F hx of   RCC total nephrectomy, S/p hysterectomy, R-sided glaucoma, Fibromyalgia, Anxiety, GERD, smoker, COPD, presents due to R breast swelling, redness and pain. Pt was initially evaluated by [place] [facility] yesterday and was going to be admitted but left AMA. Was prescribed clindamycin for presumed breast cellulitis/mastitis on discharge, but was also told about concerning findings on CT Chest related to possible breast malignancy and metastasis. Pt has not had a mammogram or colonoscopy in years. Reports that she had a lidocaine injection in her R shoulder on 3/8/24 and since then has had worsening swelling, redness and pain in her R breast. Also still has R shoulder pain, which had mild improvement since the injection. Pt consistently takes oxycodone q6h and believes this may have masked her pain earlier. Reports over 40 pound weight loss in the last year and loss of appetite. Has conjunctival injection in R eye which she claims is chronic but she also feels her eyes are more swollen than usual currently. Denies vision changes. Smokes 1ppd for many years. Patient denies fever, chills, chest pain, SOB, headache, dizziness, abd pain, nausea, vomiting, constipation, dysuria.     3/19 underwent Subxiphoid pericardial window. Portion of xiphoid resected and pericardium incised. Fluid drained and Regan drain placed. Approx 320cc of serous pericardial fluid drained initially.  3/20 VSS OOB to chair  pericardial drain  145 cc overnight on 4lnc .  3/21 VSS pericardial drain -240 cc  3/22 VSS pericardial 40 cc bleomycin x1@ g  iven @ 1345  3/23          vss     pericardiAL DRAIN  3/24    meds  ct   80 cc 24 hr        rt pl effusion  on 4 L  NC  3/27 POD#1 Right VATs window pleurx.  R pleurx 250cc/24 no al

## 2024-03-27 NOTE — PROGRESS NOTE ADULT - PROBLEM SELECTOR PLAN 1
pt endorsing relief with current regimen, used 2x IVP Dilaudid in 24 hours (8a-8a)   - Continue with oxycodone ER 20 mg PO q8hrs  - Continue with Oxycodone 10 mg PO q4h PRN for moderate pain  - Continue with Dilaudid 1 mg IV q3h PRN for severe pain  - Reinforced education regarding the availability of PRN meds.   - Narcan PRN  - Bowel regimen while on opioids pt endorsing relief with current regimen, used 3x IVP Dilaudid in 24 hours (8a-8a)   - Continue with oxycodone ER 20 mg PO q8hrs  - Continue with Oxycodone 10 mg PO q4h PRN for moderate pain  - Continue with Dilaudid 1 mg IV q3h PRN for severe pain  - Reinforced education regarding the availability of PRN meds.   - Narcan PRN  - Bowel regimen while on opioids

## 2024-03-27 NOTE — DISCHARGE NOTE PROVIDER - CARE PROVIDER_API CALL
Clarisa Sterling  Radiation Oncology  [id number] [address] [place] [id number]  Phone: [phone]  Fax: [phone]  Follow Up Time:    Clarisa Sterling  Radiation Oncology  [address] [place] [id number]  Phone: [phone]  Fax: [phone]  Follow Up Time:     Merline Ruiz  Thoracic Surgery  [address] [place] [address], [address] [place] [address]  Phone: [phone]  Fax: [phone]  Follow Up Time: 2 weeks

## 2024-03-27 NOTE — PROGRESS NOTE ADULT - ASSESSMENT
58F w/Hx of RCC s/p R total nephrectomy, S/p hysterectomy, R-sided glaucoma, Fibromyalgia, Anxiety, GERD, smoker p/w R breast swelling, redness and pain, found to have axillary, subclavicular and mediastinal lymphadenopathy & pulm nodules, s/p LN biopsy. Nephrology consulted for hyponatremia.       #Hyponatremia with increased ADH activity  -SNa 132 initially on this admission. SNa trended down to 125 3/20.   -Serum osm: 265, urine osm: 446, urine Na: 57  -S/p 2% NaCl at 40cc/hr for 8hrs (3/20) with improvement of SNa to 127 from 124 and now back to 130  -Started UreNa 15gm BID to keep Na at this range (pt didn't tolerate Na tabs) -await labs for today  - Pt reported improvement with Lasix -would continue to diurese net neg 500-1L per day      #Pericardial effusion -being worked up  -s/p pericardial window 3/19  -CT surgery following     **ncomplete*** 58F w/Hx of RCC s/p R total nephrectomy, S/p hysterectomy, R-sided glaucoma, Fibromyalgia, Anxiety, GERD, smoker p/w R breast swelling, redness and pain, found to have axillary, subclavicular and mediastinal lymphadenopathy & pulm nodules, s/p LN biopsy. Nephrology consulted for hyponatremia.       #Hyponatremia with increased ADH activity  -SNa 132 initially on this admission. SNa trended down to 125 3/20.   -Serum osm: 265, urine osm: 446, urine Na: 57  -S/p 2% NaCl at 40cc/hr for 8hrs (3/20) with improvement of SNa to 127 from 124 and now back to 130  -Started UreNa 15gm BID to keep Na at this range (pt didn't tolerate Na tabs) -await labs for today  - Pt reported improvement with Lasix -would continue to diurese net neg 500-1L per day      #Pericardial effusion -being worked up  -s/p pericardial window 3/19  -CT surgery following

## 2024-03-27 NOTE — DISCHARGE NOTE PROVIDER - PROVIDER TOKENS
PROVIDER:[TOKEN:[[id number]]] PROVIDER:[TOKEN:[[id number]]],PROVIDER:[TOKEN:[[id number]],FOLLOWUP:[2 weeks]]

## 2024-03-27 NOTE — PROGRESS NOTE ADULT - PROBLEM SELECTOR PLAN 2
Uncontrolled, last documented BM 3/22  -On Senna 2 tabs hs and Miralax bid, dulcolax BID  can consider 1x Movantik if pt remains constipated Uncontrolled, last documented BM 3/22, pt endorsing difficulty due to environment, education provided on improving bowel routine  -On Senna 2 tabs hs and Miralax bid, dulcolax BID  please administer 1x Movantik

## 2024-03-27 NOTE — PROGRESS NOTE ADULT - SUBJECTIVE AND OBJECTIVE BOX
[facility] DIVISION OF KIDNEY DISEASES AND HYPERTENSION   FOLLOW UP NOTE    --------------------------------------------------------------------------------  Chief Complaint:    24 hour events/subjective: Pt. was seen and examined today.   S/p maryann aguilera 3/26.      PAST HISTORY  --------------------------------------------------------------------------------  No significant changes to PMH, PSH, FHx, SHx, unless otherwise noted    ALLERGIES & MEDICATIONS  --------------------------------------------------------------------------------  Allergies    Cipro (Headache; Vomiting; Rash)  penicillin (Headache; Vomiting; Rash)  erythromycin (Headache; Vomiting; Rash)    Intolerances      Standing Inpatient Medications  acetaminophen   IVPB .. 750 milliGRAM(s) IV Intermittent once  aMIOdarone    Tablet   Oral   aMIOdarone    Tablet 400 milliGRAM(s) Oral three times a day  cefepime   IVPB 2000 milliGRAM(s) IV Intermittent every 12 hours  chlorhexidine 4% Liquid 1 Application(s) Topical <User Schedule>  influenza   Vaccine 0.5 milliLiter(s) IntraMuscular once  ketorolac   Injectable 15 milliGRAM(s) IV Push once  metoprolol tartrate 25 milliGRAM(s) Oral two times a day  metroNIDAZOLE  IVPB 500 milliGRAM(s) IV Intermittent every 12 hours  nicotine -  14 mG/24Hr(s) Patch 1 Patch Transdermal daily  oxyCODONE  ER Tablet 20 milliGRAM(s) Oral <User Schedule>  pantoprazole    Tablet 40 milliGRAM(s) Oral before breakfast  polyethylene glycol 3350 17 Gram(s) Oral two times a day  senna 2 Tablet(s) Oral at bedtime  urea Oral Powder 15 Gram(s) Oral two times a day  vancomycin  IVPB 1000 milliGRAM(s) IV Intermittent every 12 hours    PRN Inpatient Medications  albuterol/ipratropium for Nebulization 3 milliLiter(s) Nebulizer every 6 hours PRN  aluminum hydroxide/magnesium hydroxide/simethicone Suspension 30 milliLiter(s) Oral every 6 hours PRN  benzocaine/menthol Lozenge 1 Lozenge Oral two times a day PRN  bisacodyl 5 milliGRAM(s) Oral every 12 hours PRN  diazepam    Tablet 5 milliGRAM(s) Oral two times a day PRN  guaiFENesin Oral Liquid (Sugar-Free) 200 milliGRAM(s) Oral every 6 hours PRN  HYDROmorphone  Injectable 1 milliGRAM(s) IV Push every 3 hours PRN  melatonin 3 milliGRAM(s) Oral at bedtime PRN  naloxone Injectable 0.1 milliGRAM(s) IV Push every 3 minutes PRN  oxyCODONE    IR 10 milliGRAM(s) Oral every 4 hours PRN  sodium chloride 0.9% lock flush 10 milliLiter(s) IV Push every 1 hour PRN      REVIEW OF SYSTEMS  --------------------------------------------------------------------------------  Gen: No fevers/chills  Head/Eyes/Ears: No HA   Respiratory: No dyspnea, cough  CV: No chest pain  GI: No abdominal pain, diarrhea  : No dysuria, hematuria  MSK: No  edema  Skin: No rashes  Heme: No easy bruising or bleeding    All other systems were reviewed and are negative, except as noted.    VITALS/PHYSICAL EXAM  --------------------------------------------------------------------------------  T(C): 36.8 (03-27-24 @ 09:37), Max: 36.8 (03-27-24 @ 09:37)  HR: 63 (03-27-24 @ 09:37) (63 - 105)  BP: 127/74 (03-27-24 @ 09:37) (94/53 - 131/66)  RR: 18 (03-27-24 @ 09:37) (14 - 19)  SpO2: 96% (03-27-24 @ 09:37) (90% - 99%)  Wt(kg): --  Height (cm): 154.9 (03-26-24 @ 16:20)  Weight (kg): 49.9 (03-26-24 @ 16:20)  BMI (kg/m2): 20.8 (03-26-24 @ 16:20)  BSA (m2): 1.47 (03-26-24 @ 16:20)      03-26-24 @ 07:01  -  03-27-24 @ 07:00  --------------------------------------------------------  IN: 0 mL / OUT: 250 mL / NET: -250 mL    03-27-24 @ 07:01  -  03-27-24 @ 10:41  --------------------------------------------------------  IN: 0 mL / OUT: 400 mL / NET: -400 mL        Physical Exam:  	Gen: NAD  	HEENT: Anicteric  	Pulm: CTA B/L  	CV: S1S2+  	Abd: Soft, +BS   	Ext: No LE edema B/L  	Neuro: Awake  	Skin: Warm and dry    	Dialysis access:      LABS/STUDIES  --------------------------------------------------------------------------------              10.1   14.19 >-----------<  470      [03-27-24 @ 06:59]              31.9     130  |  95  |  22  ----------------------------<  159      [03-26-24 @ 04:06]  4.2   |  20  |  0.93        Ca     8.9     [03-26-24 @ 04:06]      Mg     1.7     [03-26-24 @ 04:06]      Phos  3.7     [03-26-24 @ 04:06]    TPro  6.2  /  Alb  2.9  /  TBili  0.2  /  DBili  x   /  AST  13  /  ALT  12  /  AlkPhos  89  [03-26-24 @ 04:06]    PT/INR: PT 12.5 , INR 1.14       [03-26-24 @ 04:06]  PTT: 124.1      [03-26-24 @ 04:06]    CK 30      [03-26-24 @ 04:06]    Creatinine Trend:  SCr 0.93 [03-26 @ 04:06]  SCr 0.95 [03-25 @ 09:14]  SCr 0.99 [03-24 @ 07:21]  SCr 0.92 [03-23 @ 16:49]  SCr 0.97 [03-23 @ 02:46]    Urinalysis - [03-26-24 @ 04:06]      Color  / Appearance  / SG  / pH       Gluc 159 / Ketone   / Bili  / Urobili        Blood  / Protein  / Leuk Est  / Nitrite       RBC  / WBC  / Hyaline  / Gran  / Sq Epi  / Non Sq Epi  / Bacteria     Urine Creatinine 67      [03-26-24 @ 16:59]  Urine Sodium 40      [03-26-24 @ 16:59]  Urine Potassium 37      [03-26-24 @ 16:59]  Urine Chloride 47      [03-26-24 @ 16:59]  Urine Osmolality 461      [03-26-24 @ 16:59]    HCV 0.09, Nonreact      [03-12-24 @ 07:01]      Tacrolimus  Cyclosporine  Sirolimus  Mycophenolate  BK PCR  CMV PCR  Parvo PCR  EBV PCR

## 2024-03-27 NOTE — PROGRESS NOTE ADULT - SUBJECTIVE AND OBJECTIVE BOX
INTERVAL HPI/OVERNIGHT EVENTS:  Patient S&E at bedside. She complaints extreme discomfort including SOB at rest, pain/discomfort on chest area and back. Appears very anxious about potential image study, further procedure, and transferring to [facility]. Denied dysphagia, wheezing.     VITAL SIGNS:  T(F): 98.8 (03-27-24 @ 12:29)  HR: 96 (03-27-24 @ 12:29)  BP: 95/58 (03-27-24 @ 12:29)  RR: 18 (03-27-24 @ 12:29)  SpO2: 97% (03-27-24 @ 12:29)  Wt(kg): --    PHYSICAL EXAM:    Constitutional: anxious, extreme discomfort   Eyes:  anicteric sclera; redness and swelling of R conjunctiva. mitosis, partial ptosis.   Neck: supple; swelling of R neck soft tissue   Respiratory:  no wheezes or crackles  Chest wall dialation of superficial vein of upper chest   Cardiovascular: RRR  Gastrointestinal: soft, NTND, + BS  Extremities: no cyanosis, clubbing or edema   Neurological: awake and alert; anxious      MEDICATIONS  (STANDING):  acetaminophen   IVPB .. 750 milliGRAM(s) IV Intermittent once  aMIOdarone    Tablet   Oral   aMIOdarone    Tablet 400 milliGRAM(s) Oral three times a day  cefepime   IVPB 2000 milliGRAM(s) IV Intermittent every 12 hours  chlorhexidine 4% Liquid 1 Application(s) Topical <User Schedule>  dexAMETHasone  IVPB 10 milliGRAM(s) IV Intermittent once  influenza   Vaccine 0.5 milliLiter(s) IntraMuscular once  ketorolac   Injectable 15 milliGRAM(s) IV Push once  metoprolol tartrate 25 milliGRAM(s) Oral two times a day  metroNIDAZOLE  IVPB 500 milliGRAM(s) IV Intermittent every 12 hours  nicotine -  14 mG/24Hr(s) Patch 1 Patch Transdermal daily  oxyCODONE  ER Tablet 20 milliGRAM(s) Oral <User Schedule>  pantoprazole    Tablet 40 milliGRAM(s) Oral before breakfast  polyethylene glycol 3350 17 Gram(s) Oral two times a day  senna 2 Tablet(s) Oral at bedtime  urea Oral Powder 15 Gram(s) Oral two times a day  vancomycin  IVPB 1000 milliGRAM(s) IV Intermittent every 12 hours    MEDICATIONS  (PRN):  albuterol/ipratropium for Nebulization 3 milliLiter(s) Nebulizer every 6 hours PRN Shortness of Breath and/or Wheezing  aluminum hydroxide/magnesium hydroxide/simethicone Suspension 30 milliLiter(s) Oral every 6 hours PRN Dyspepsia  benzocaine/menthol Lozenge 1 Lozenge Oral two times a day PRN Sore Throat  bisacodyl 5 milliGRAM(s) Oral every 12 hours PRN Constipation  diazepam    Tablet 5 milliGRAM(s) Oral two times a day PRN for anxiety  guaiFENesin Oral Liquid (Sugar-Free) 200 milliGRAM(s) Oral every 6 hours PRN Cough  HYDROmorphone  Injectable 1 milliGRAM(s) IV Push every 3 hours PRN Severe Pain (7 - 10)  melatonin 3 milliGRAM(s) Oral at bedtime PRN Insomnia  naloxone Injectable 0.1 milliGRAM(s) IV Push every 3 minutes PRN Obtundation, respiratory suppression  oxyCODONE    IR 10 milliGRAM(s) Oral every 4 hours PRN Moderate Pain (4 - 6)  sodium chloride 0.9% lock flush 10 milliLiter(s) IV Push every 1 hour PRN Pre/post blood products, medications, blood draw, and to maintain line patency      Allergies    Cipro (Headache; Vomiting; Rash)  penicillin (Headache; Vomiting; Rash)  erythromycin (Headache; Vomiting; Rash)    Intolerances        LABS:                        10.1   14.19 )-----------( 470      ( 27 Mar 2024 06:59 )             31.9     03-27    133<L>  |  99  |  28<H>  ----------------------------<  166<H>  4.8   |  24  |  1.16    Ca    8.5      27 Mar 2024 13:01  Phos  3.7     03-26  Mg     1.7     03-26    TPro  6.2  /  Alb  2.9<L>  /  TBili  0.2  /  DBili  x   /  AST  13  /  ALT  12  /  AlkPhos  89  03-26    PT/INR - ( 26 Mar 2024 04:06 )   PT: 12.5 sec;   INR: 1.14 ratio         PTT - ( 26 Mar 2024 04:06 )  PTT:124.1 sec  Urinalysis Basic - ( 27 Mar 2024 13:01 )    Color: x / Appearance: x / SG: x / pH: x  Gluc: 166 mg/dL / Ketone: x  / Bili: x / Urobili: x   Blood: x / Protein: x / Nitrite: x   Leuk Esterase: x / RBC: x / WBC x   Sq Epi: x / Non Sq Epi: x / Bacteria: x        RADIOLOGY & ADDITIONAL TESTS:  Studies reviewed.

## 2024-03-27 NOTE — PROGRESS NOTE ADULT - ATTENDING COMMENTS
carcinoma unknown primary potential from upper GI or pancreaticobiliary tract   with lung and mediastinal and cervical node involvement and pericardial effusion  SVC syndrome, IR consult recommended no stent placement  please consult rad onc asap  start dexamethasone   will further follow up Patient

## 2024-03-27 NOTE — DISCHARGE NOTE PROVIDER - HOSPITAL COURSE
58F w/Hx of RCC s/p R total nephrectomy, S/p hysterectomy, R-sided glaucoma, Fibromyalgia, Anxiety, GERD, smoker presents due to R breast swelling, redness and pain. Admitted for right breast swelling and cellulitis found to have imaging evidence concerning for metastatic malignancy w/ lung nodules, and axillary, supraclavicular, and mediastinal adenopathy. 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 Also s/p pericardial window on 3/19 for moderate pericardial effusion with drain.  On 3/27 s/p Right VATS with drainage of 1000cc w/ creation of pericardial window and placement of r. pleurx catheter to drain.    IR was consulted for stent for SVC syndrome however the patient was deemed to have no adequate landing for SVC stenting given size and invasiveness of tumor. Recommended SBIRT and chemotherapy.  Rad/Onc was consulted and was accepted at [facility] for further treatment.      Of note patient was DNR which was rescinded for OR on 3/27 however on date of transfer patient decided to continue as FULL CODE.  pALLIATIVE TO FOLLOW UP FOR       Important Medication Changes and Reason:  n/a    Active or Pending Issues Requiring Follow-up:  Rad/Onc for SBIRT   Onc for possible chemo  CTS for Right chest tube    Advanced Directives:   [x] Full code  [ ] DNR  [ ] Hospice    Discharge Diagnoses:  Mediastinal mass         58F w/Hx of RCC s/p R total nephrectomy, S/p hysterectomy, R-sided glaucoma, Fibromyalgia, Anxiety, GERD, smoker presents due to R breast swelling, redness and pain. Admitted for right breast swelling and cellulitis found to have imaging evidence concerning for metastatic malignancy w/ lung nodules, and axillary, supraclavicular, and mediastinal adenopathy. 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 Also s/p pericardial window on 3/19 for moderate pericardial effusion with drain.  On 3/27 s/p Right VATS with drainage of 1000cc w/ creation of pericardial window and placement of r. pleurx catheter to drain.    IR was consulted for stent for SVC syndrome however the patient was deemed to have no adequate landing for SVC stenting given size and invasiveness of tumor. Recommended SBIRT and chemotherapy.  Rad/Onc was consulted and was accepted at [facility] for further treatment.      Of note patient was DNR which was rescinded for OR on 3/27 however on date of transfer patient decided to continue as FULL CODE.  pALLIATIVE TO FOLLOW UP FOR       Important Medication Changes and Reason:  n/a    Active or Pending Issues Requiring Follow-up:  Rad/Onc for SBIRT   Onc for possible chemo  CTS for Right chest tube  MRI/MRCP for further malignancy origin delineation  ID f/u for abx duration/plan  Cards/EP f/u for afib  Monitoring/management of hyponatremia  Resume AC in the PM of 3/27 per thoracic sx    Advanced Directives:   [x] Full code  [ ] DNR  [ ] Hospice    Discharge Diagnoses:  Mediastinal mass         58F w/Hx of RCC s/p R total nephrectomy, S/p hysterectomy, R-sided glaucoma, Fibromyalgia, Anxiety, GERD, smoker presents due to R breast swelling, redness and pain. Admitted for right breast swelling and cellulitis found to have imaging evidence concerning for metastatic malignancy w/ lung nodules, and axillary, supraclavicular, and mediastinal adenopathy. 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 Also s/p pericardial window on 3/19 for moderate pericardial effusion with drain.  On 3/27 s/p Right VATS with drainage of 1000cc w/ creation of pericardial window and placement of r. pleurx catheter now capped      IR was consulted for stent for SVC syndrome however the patient was deemed to have no adequate landing for SVC stenting given size and invasiveness of tumor. Recommended SBIRT and chemotherapy.  Rad/Onc was consulted and was accepted at [facility] for further treatment.      Pt is DNR  MOLST in chart         Important Medication Changes and Reason:  n/a    Active or Pending Issues Requiring Follow-up:  Rad/Onc for SBIRT   Onc for possible chemo  CTS for Right chest tube  MRI/MRCP for further malignancy origin delineation  ID f/u for abx duration/plan  Cards/EP f/u for afib  Monitoring/management of hyponatremia  Resume AC in the PM of 3/27 per thoracic sx    Advanced Directives:   [] Full code  [ x] DNR  [ ] Hospice    Discharge Diagnoses:  Mediastinal mass , transferring to [facility] for  radiation treatment     Cellulitis , Phlebitis   R pneumonia   Pericardial centesis    Pleurex cath on R           6HPI:  58F w/Hx of RCC s/p R total nephrectomy, S/p hysterectomy, R-sided glaucoma, Fibromyalgia, Anxiety, GERD, smoker presents due to R breast swelling, redness and pain. Pt was initially evaluated by [facility] yesterday and was going to be admitted but left AMA. Was prescribed clindamycin for presumed breast cellulitis/mastitis on discharge, but was also told about concerning findings on CT Chest related to possible breast malignancy and metastasis. Pt has not had a mammogram or colonoscopy in years. Reports that she had a lidocaine injection in her R shoulder on 3/8/24 and since then has had worsening swelling, redness and pain in her R breast. Also still has R shoulder pain, which had mild improvement since the injection. Pt consistently takes oxycodone q6h and believes this may have masked her pain earlier. Reports over 40 pound weight loss in the last year and loss of appetite. Has conjunctival injection in R eye which she claims is chronic but she also feels her eyes are more swollen than usual currently. Denies vision changes. Smokes 1ppd for many years. Patient denies fever, chills, chest pain, SOB, headache, dizziness, abd pain, nausea, vomiting, constipation, dysuria. (11 Mar 2024 23:59)    Hospital Course:  #Mediastinal mass.    H/o RCC and had nephrectomy by Urology by Dr Jacky Adkins at [facility], also had lymph node dissection 2 years ago. Found to have axillary, subclavicular and mediastinal lymphadenopathy, pulmonary nodules and inflammatory changes of breast seen on CT with possible effect on IVC  - CT neck with large supraclavicular/mediastinal mass lesion with mass effect and complete occlusion of the right IJ and SVC  - S/p supraclavicular lymph node biopsy by IR on 3/14- path consistent with primary of GI origin  - c/w decadron  - Prior hospitalist Dr. Moose Nolen D/w GI follow up MRI abdomen and MRCP- currently deferred due to respiratory status and svc syndrome warranting urgent radiation  - Palliative follow up for pain control  - seen by behavioral health see for anxiety management. Recommended sertraline 25mg, ordered but pt refuses to take, will d/c sertraline      #SVC syndrome.    IR was consulted for possible stenting; not feasible due to size/location,   - Tier 2 transfer to [facility] for urgent radiation. Discussed with transfer center on 4/2 and 4/3, bed available later today, unsure of timetable .  - c/w decadron  - IR, rad onc, oncology, cardiology, thoracic all agree on transfer     # Internal jugular vein thrombosis, right.   · Imaging shows complete occlusion of the right subclavian vein and nearly occlusive thrombosis in the proximal left brachiocephalic vein with flow reconstitution distally   - per thoracic- resume AC 3/27, tolerating Lovenox sq    #IV access issues- lost L piv access, floor unable to place another. Not candidate for IV on R due to mass, but also has SVC obliteration on CT neck to doubt patient can have PICC or central line placed on R  - MICU team placed R femoral TLC.    # Pleural effusion.   ·  Pt on 4-6 L NC, s/p R chest tube by thoracic. Resp status currently stable  - cont chest tube output monitoring  - Currently draining up to 500ml qd    # Pericardial effusion.   ·  Plan: - pericardial effusion on CT chest   - s/p pericardial window 3/26  - Monitor on tele.    # Cellulitis.   · Had erythema and tenderness of right breast improving  - blood cx Ngtd  - completed last does of abx 4/1 as per ID (Cefepime and Flagyl).    # Chronic atrial fibrillation.   ·  On Amio taper as per EP  - c/w full dose AC  - C/w metoprolol 25mg bid.    # Hyponatremia.   ·  Plan: - likely SIADH, now improved  - Observing off urena  - continue to monitor na  - Nephro following.    # History of fibromyalgia.   ·  Plan: - Home Oxycodone (Confirmed via iStop)  - Per Pallative:  Oxycodone ER to 30mg at 6am, 14:00, 22:00   - Continue with Oxycodone 10 mg PO q4h PRN for moderate pain  - Continue with Dilaudid 1 mg IV q3h PRN for severe pain.    # Anxiety.   ·  Plan; - c/w Home Valium (Confirmed via iStop)  - Pt is intermittently tearful, anxious and frustrated about current clinical status and poor prognosis  - Seen by Behavioural Health. Started Sertraline 25mg daily. Pt refuse to take Sertraline.     Important Medication Changes and Reason:    Active or Pending Issues Requiring Follow-up:  Mediastinal mass and SVC Syndrome for Urgent radiation   Advanced Directives:   [ ] Full code  [ x] DNR  [ ] Hospice    Discharge Diagnoses:  Mediastinal Mass transferring to [facility] for radiation treatment  Cellulitis  Pericardial centesis  Right pleurex cath          6HPI:  58F w/Hx of RCC s/p R total nephrectomy, S/p hysterectomy, R-sided glaucoma, Fibromyalgia, Anxiety, GERD, smoker presents due to R breast swelling, redness and pain. Pt was initially evaluated by [place] [facility] yesterday and was going to be admitted but left AMA. Was prescribed clindamycin for presumed breast cellulitis/mastitis on discharge, but was also told about concerning findings on CT Chest related to possible breast malignancy and metastasis. Pt has not had a mammogram or colonoscopy in years. Reports that she had a lidocaine injection in her R shoulder on 3/8/24 and since then has had worsening swelling, redness and pain in her R breast. Also still has R shoulder pain, which had mild improvement since the injection. Pt consistently takes oxycodone q6h and believes this may have masked her pain earlier. Reports over 40 pound weight loss in the last year and loss of appetite. Has conjunctival injection in R eye which she claims is chronic but she also feels her eyes are more swollen than usual currently. Denies vision changes. Smokes 1ppd for many years. Patient denies fever, chills, chest pain, SOB, headache, dizziness, abd pain, nausea, vomiting, constipation, dysuria. (11 Mar 2024 23:59)    Hospital Course:  #Mediastinal mass.    H/o RCC and had nephrectomy by Urology by Dr Jacky Adkins at [facility], also had lymph node dissection 2 years ago. Found to have axillary, subclavicular and mediastinal lymphadenopathy, pulmonary nodules and inflammatory changes of breast seen on CT with possible effect on IVC  - CT neck with large supraclavicular/mediastinal mass lesion with mass effect and complete occlusion of the right IJ and SVC  - S/p supraclavicular lymph node biopsy by IR on 3/14- path consistent with primary of GI origin  - c/w decadron  - Prior hospitalist Dr. Moose Nolen D/w GI follow up MRI abdomen and MRCP- currently deferred due to respiratory status and svc syndrome warranting urgent radiation  - Palliative follow up for pain control  - seen by behavioral health see for anxiety management. Recommended sertraline 25mg, ordered but pt refuses to take, will d/c sertraline      #SVC syndrome.    IR was consulted for possible stenting; not feasible due to size/location,   - Tier 2 transfer to [facility] for urgent radiation. Discussed with transfer center on 4/2 and 4/3, bed available later today, unsure of timetable .  - c/w decadron  - IR, rad onc, oncology, cardiology, thoracic all agree on transfer     # Internal jugular vein thrombosis, right.   · Imaging shows complete occlusion of the right subclavian vein and nearly occlusive thrombosis in the proximal left brachiocephalic vein with flow reconstitution distally   - per thoracic- resume AC 3/27, tolerating Lovenox sq    #IV access issues- lost L piv access, floor unable to place another. Not candidate for IV on R due to mass, but also has SVC obliteration on CT neck to doubt patient can have PICC or central line placed on R  - MICU team placed R femoral TLC.    # Pleural effusion.   ·  Pt on 4-6 L NC, s/p R chest tube by thoracic. Resp status currently stable  - cont chest tube output monitoring  - Currently draining up to 500ml qd    # Pericardial effusion.   ·  Plan: - pericardial effusion on CT chest   - s/p pericardial window 3/26  - Monitor on tele.    # Cellulitis.   · Had erythema and tenderness of right breast improving  - blood cx Ngtd  - completed last does of abx 4/1 as per ID (Cefepime and Flagyl).    # Chronic atrial fibrillation.   ·  On Amio taper as per EP  - c/w full dose AC  - C/w metoprolol 25mg bid.    # Hyponatremia.   ·  Plan: - likely SIADH, now improved  - Observing off urena  - continue to monitor na  - Nephro following.    # History of fibromyalgia.   ·  Plan: - Home Oxycodone (Confirmed via iStop)  - Per Pallative:  Oxycodone ER to 30mg at 6am, 14:00, 22:00   - Continue with Oxycodone 10 mg PO q4h PRN for moderate pain  - Continue with Dilaudid 1 mg IV q3h PRN for severe pain.    # Anxiety.   ·  Plan; - c/w Home Valium (Confirmed via iStop)  - Pt is intermittently tearful, anxious and frustrated about current clinical status and poor prognosis  - Seen by Behavioural Health. Started Sertraline 25mg daily. Pt refuse to take Sertraline.     Important Medication Changes and Reason:  UreNA discontinued   Active or Pending Issues Requiring Follow-up:  Mediastinal mass and SVC Syndrome for Urgent radiation   Advanced Directives:   [ ] Full code  [ x] DNR  [ ] Hospice    Discharge Diagnoses:  Mediastinal Mass transferring to [facility] for radiation treatment  Cellulitis  Pericardial centesis  Right pleurex cath

## 2024-03-27 NOTE — PROGRESS NOTE ADULT - ATTENDING COMMENTS
I have seen this patient with the fellow and agree with their assessment and plan. I was physically present for significant portions of the evaluation and management (E/M) service provided.  I agree with the above history, physical, and plan which I have reviewed and edited where appropriate.    Katerine Rai MD  Office   Contact me directly via Microsoft Teams     (After 5 pm or on weekends please page the on-call fellow/attending, can check [email or web address] for schedule. Login is shailesh mendoza, schedule under [facility] medicine, psych, derm)

## 2024-03-27 NOTE — DISCHARGE NOTE PROVIDER - DETAILS OF MALNUTRITION DIAGNOSIS/DIAGNOSES
This patient has been assessed with a concern for Malnutrition and was treated during this hospitalization for the following Nutrition diagnosis/diagnoses:     -  03/13/2024: Severe protein-calorie malnutrition

## 2024-03-27 NOTE — PROGRESS NOTE ADULT - SUBJECTIVE AND OBJECTIVE BOX
DATE OF SERVICE: 03-27-24 @ 17:20    Patient is a 58y old  Female who presents with a chief complaint of Mastitis, breast malignancy (27 Mar 2024 16:55)      INTERVAL HISTORY: Patient restless and uncomfortable at time of exam.     REVIEW OF SYSTEMS:  CONSTITUTIONAL: No weakness  EYES/ENT: No visual changes;  No throat pain   NECK: No pain or stiffness  RESPIRATORY: No cough, wheezing; + shortness of breath  CARDIOVASCULAR: No chest pain or palpitations  GASTROINTESTINAL: No abdominal  pain. No nausea, vomiting, or hematemesis  GENITOURINARY: No dysuria, frequency or hematuria  NEUROLOGICAL: No stroke like symptoms  SKIN: No rashes    TELEMETRY Personally reviewed:  80-90  	  MEDICATIONS:  aMIOdarone    Tablet   Oral   aMIOdarone    Tablet 400 milliGRAM(s) Oral three times a day  metoprolol tartrate 25 milliGRAM(s) Oral two times a day  urea Oral Powder 15 Gram(s) Oral two times a day        PHYSICAL EXAM:  T(C): 37.1 (03-27-24 @ 12:29), Max: 37.1 (03-27-24 @ 12:29)  HR: 96 (03-27-24 @ 12:29) (63 - 105)  BP: 95/58 (03-27-24 @ 12:29) (94/53 - 131/66)  RR: 18 (03-27-24 @ 12:29) (14 - 18)  SpO2: 97% (03-27-24 @ 12:29) (90% - 99%)  Wt(kg): --  I&O's Summary    26 Mar 2024 07:01  -  27 Mar 2024 07:00  --------------------------------------------------------  IN: 0 mL / OUT: 250 mL / NET: -250 mL    27 Mar 2024 07:01  -  27 Mar 2024 17:20  --------------------------------------------------------  IN: 236 mL / OUT: 550 mL / NET: -314 mL          Appearance: In no distress	  HEENT:    PERRL, EOMI	  Cardiovascular:  S1 S2, No JVD  Respiratory: LLL wheeze and crackles	  Gastrointestinal:  Soft, Non-tender, + BS	  Vascularature:  No edema of LE  Psychiatric: Appropriate affect   Neuro: no acute focal deficits                               10.1   14.19 )-----------( 470      ( 27 Mar 2024 06:59 )             31.9     03-27    133<L>  |  99  |  28<H>  ----------------------------<  166<H>  4.8   |  24  |  1.16    Ca    8.5      27 Mar 2024 13:01  Phos  3.7     03-26  Mg     1.7     03-26    TPro  6.2  /  Alb  2.9<L>  /  TBili  0.2  /  DBili  x   /  AST  13  /  ALT  12  /  AlkPhos  89  03-26        Labs personally reviewed      ASSESSMENT/PLAN: 	    58F w/Hx of RCC s/p R total nephrectomy, S/p hysterectomy, R-sided glaucoma, Fibromyalgia, Anxiety, GERD, smoker presents due to R breast swelling, redness and pain.     Problem/Plan - 1:   ·  Problem: Pericardial effusion.  ·  Plan: - pericardial effusion on CT chest   - TTE 3/18 with Moderate pericardial effusion with echocardiographic evidence of hemodynamic compromise    - Clinically pt is not in tamponade   - Thoracic surgery consulted for pericardial window given likely malignant effusion  ----s/p pericardial window 3/19  - 3/21 now with ST paroxysmal with AF with RVR and reports of chest pain  limited TTE: left ventricular systolic function appears grossly normal. Thickened pericardium. Bilateral pleural effusion noted. No pericardial effusion seen.  Compared to the TTE 3/18/2024, the pericardial effusion is no longer present.    Problem/Plan - 2:  ·  Problem: Internal jugular vein thrombosis, right.   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     Problem/Plan - 3:  ·  Problem: Smoker.   ·  Plan: - 1 ppd smoker  - c/w Nicotine patch.    Problem/Plan - 4:  ·  Problem: Prophylactic measure.   ·  Plan: dispo- home.    Problem/Plan - 5:  ·  Problem: Sinus Tachycardia  - Likely reactive   - Improved    Problem/Plan - 6:  ·  Problem: New Onset Atrial Fibrillation  - c/w heparin gtt  - TSH wnl  - s/p PO dig load 3/24  - Cont lopressor 25mg PO BID with hold parameters  - c/w Amio taper as per EP          Adriana Julian, AG-NP   Lb Mata DO [facility]  Cardiovascular Medicine  [address] [place] [address], [address]  Available through call or text on [employer] TEAMs  Office: [phone]   DATE OF SERVICE: 03-27-24 @ 17:20    Patient is a 58y old  Female who presents with a chief complaint of Mastitis, breast malignancy (27 Mar 2024 16:55)      INTERVAL HISTORY: Patient restless and uncomfortable at time of exam.     REVIEW OF SYSTEMS:  CONSTITUTIONAL: No weakness  EYES/ENT: No visual changes;  No throat pain   NECK: No pain or stiffness  RESPIRATORY: No cough, wheezing; + shortness of breath  CARDIOVASCULAR: No chest pain or palpitations  GASTROINTESTINAL: No abdominal  pain. No nausea, vomiting, or hematemesis  GENITOURINARY: No dysuria, frequency or hematuria  NEUROLOGICAL: No stroke like symptoms  SKIN: No rashes    TELEMETRY Personally reviewed:  80-90  	  MEDICATIONS:  aMIOdarone    Tablet   Oral   aMIOdarone    Tablet 400 milliGRAM(s) Oral three times a day  metoprolol tartrate 25 milliGRAM(s) Oral two times a day  urea Oral Powder 15 Gram(s) Oral two times a day        PHYSICAL EXAM:  T(C): 37.1 (03-27-24 @ 12:29), Max: 37.1 (03-27-24 @ 12:29)  HR: 96 (03-27-24 @ 12:29) (63 - 105)  BP: 95/58 (03-27-24 @ 12:29) (94/53 - 131/66)  RR: 18 (03-27-24 @ 12:29) (14 - 18)  SpO2: 97% (03-27-24 @ 12:29) (90% - 99%)  Wt(kg): --  I&O's Summary    26 Mar 2024 07:01  -  27 Mar 2024 07:00  --------------------------------------------------------  IN: 0 mL / OUT: 250 mL / NET: -250 mL    27 Mar 2024 07:01  -  27 Mar 2024 17:20  --------------------------------------------------------  IN: 236 mL / OUT: 550 mL / NET: -314 mL          Appearance: In no distress	  HEENT:    PERRL, EOMI	  Cardiovascular:  S1 S2, No JVD  Respiratory: LLL wheeze and crackles	  Gastrointestinal:  Soft, Non-tender, + BS	  Vascularature:  No edema of LE  Psychiatric: Appropriate affect   Neuro: no acute focal deficits                               10.1   14.19 )-----------( 470      ( 27 Mar 2024 06:59 )             31.9     03-27    133<L>  |  99  |  28<H>  ----------------------------<  166<H>  4.8   |  24  |  1.16    Ca    8.5      27 Mar 2024 13:01  Phos  3.7     03-26  Mg     1.7     03-26    TPro  6.2  /  Alb  2.9<L>  /  TBili  0.2  /  DBili  x   /  AST  13  /  ALT  12  /  AlkPhos  89  03-26        Labs personally reviewed      ASSESSMENT/PLAN: 	    58F w/Hx of RCC s/p R total nephrectomy, S/p hysterectomy, R-sided glaucoma, Fibromyalgia, Anxiety, GERD, smoker presents due to R breast swelling, redness and pain.     Problem/Plan - 1:   ·  Problem: Pericardial effusion.  ·  Plan: - pericardial effusion on CT chest   - TTE 3/18 with Moderate pericardial effusion with echocardiographic evidence of hemodynamic compromise    - Clinically pt is not in tamponade   - Thoracic surgery consulted for pericardial window given likely malignant effusion  ----s/p pericardial window 3/19  - 3/21 now with ST paroxysmal with AF with RVR and reports of chest pain  limited TTE: left ventricular systolic function appears grossly normal. Thickened pericardium. Bilateral pleural effusion noted. No pericardial effusion seen.  Compared to the TTE 3/18/2024, the pericardial effusion is no longer present.    Problem/Plan - 2:  ·  Problem: Internal jugular vein thrombosis, right.   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     Problem/Plan - 3:  ·  Problem: Smoker.   ·  Plan: - 1 ppd smoker  - c/w Nicotine patch.    Problem/Plan - 4:  ·  Problem: Acute Hypoxia  ·  Plan: low threshold for IV Lasix PRN given increased oxygen requirement and crackles on exam    Problem/Plan - 5:  ·  Problem: Sinus Tachycardia  - Likely reactive   - Improved    Problem/Plan - 6:  ·  Problem: New Onset Atrial Fibrillation  - c/w heparin gtt  - TSH wnl  - s/p PO dig load 3/24  - Cont lopressor 25mg PO BID with hold parameters  - c/w Amio taper as per EP          Adriana Julian, AG-NP   Lb Mata DO [facility]  Cardiovascular Medicine  [address] [place] [address], [address]  Available through call or text on Microsoft TEAMs  Office: [phone]

## 2024-03-27 NOTE — CONSULT NOTE ADULT - PROVIDER SPECIALTY LIST ADULT
Infectious Disease
Nephrology
Palliative Care
Intervent Radiology
Intervent Radiology
Rad Onc
Heme/Onc
Palliative Care
Pulmonology
Thoracic Surgery
Electrophysiology
Cardiology
ENT

## 2024-03-27 NOTE — PROGRESS NOTE ADULT - SUBJECTIVE AND OBJECTIVE BOX
Electrophysiology Progress Note  ------------------------------------------------------------------------------------------  SUBJECTIVE:   - Tele: sinus 80's-100's, no events  - This morning states feeling fatigued, otherwise denies CP, SOB, fainting, or palpitations.     -------------------------------------------------------------------------------------------  VS:  T(F): 98.3 (03-27), Max: 98.3 (03-27)  HR: 63 (03-27) (63 - 105)  BP: 127/74 (03-27) (94/53 - 131/66)  RR: 18 (03-27)  SpO2: 96% (03-27)  I&O's Summary    26 Mar 2024 07:01  -  27 Mar 2024 07:00  --------------------------------------------------------  IN: 0 mL / OUT: 250 mL / NET: -250 mL    27 Mar 2024 07:01  -  27 Mar 2024 09:50  --------------------------------------------------------  IN: 0 mL / OUT: 400 mL / NET: -400 mL      PHYSICAL EXAM:  GENERAL: lying in hospital bed  HEAD: Atraumatic, Normocephalic.  ENT: Moist mucous membranes.  NECK: Supple, No JVD.  CHEST/LUNG: Coarse rhonchorous breath sounds throughout  HEART: Regular rate and rhythm; No murmurs, rubs, or gallops.  ABDOMEN: Bowel sounds present; Soft, Nontender, Nondistended.   EXTREMITIES: No edema. 2+ Peripheral Pulses, brisk capillary refill. No clubbing or cyanosis.     -------------------------------------------------------------------------------------------  LABS:                          10.1   14.19 )-----------( 470      ( 27 Mar 2024 06:59 )             31.9     03-26    130<L>  |  95<L>  |  22  ----------------------------<  159<H>  4.2   |  20<L>  |  0.93    Ca    8.9      26 Mar 2024 04:06  Phos  3.7     03-26  Mg     1.7     03-26    TPro  6.2  /  Alb  2.9<L>  /  TBili  0.2  /  DBili  x   /  AST  13  /  ALT  12  /  AlkPhos  89  03-26    PT/INR - ( 26 Mar 2024 04:06 )   PT: 12.5 sec;   INR: 1.14 ratio         PTT - ( 26 Mar 2024 04:06 )  PTT:124.1 sec  CARDIAC MARKERS ( 26 Mar 2024 04:06 )  11 ng/L / x     / x     / 30 U/L / x     / 1.9 ng/mL  CARDIAC MARKERS ( 21 Mar 2024 01:48 )  18 ng/L / x     / x     / 73 U/L / x     / 2.3 ng/mL            -------------------------------------------------------------------------------------------  Meds:  albuterol/ipratropium for Nebulization 3 milliLiter(s) Nebulizer every 6 hours PRN  aluminum hydroxide/magnesium hydroxide/simethicone Suspension 30 milliLiter(s) Oral every 6 hours PRN  aMIOdarone    Tablet   Oral   aMIOdarone    Tablet 400 milliGRAM(s) Oral three times a day  benzocaine/menthol Lozenge 1 Lozenge Oral two times a day PRN  bisacodyl 5 milliGRAM(s) Oral every 12 hours PRN  cefepime   IVPB 2000 milliGRAM(s) IV Intermittent every 12 hours  chlorhexidine 4% Liquid 1 Application(s) Topical <User Schedule>  diazepam    Tablet 5 milliGRAM(s) Oral two times a day PRN  guaiFENesin Oral Liquid (Sugar-Free) 200 milliGRAM(s) Oral every 6 hours PRN  HYDROmorphone  Injectable 1 milliGRAM(s) IV Push every 3 hours PRN  influenza   Vaccine 0.5 milliLiter(s) IntraMuscular once  melatonin 3 milliGRAM(s) Oral at bedtime PRN  metoprolol tartrate 25 milliGRAM(s) Oral two times a day  metroNIDAZOLE  IVPB 500 milliGRAM(s) IV Intermittent every 12 hours  naloxone Injectable 0.1 milliGRAM(s) IV Push every 3 minutes PRN  nicotine -  14 mG/24Hr(s) Patch 1 Patch Transdermal daily  oxyCODONE    IR 10 milliGRAM(s) Oral every 4 hours PRN  oxyCODONE  ER Tablet 20 milliGRAM(s) Oral <User Schedule>  pantoprazole    Tablet 40 milliGRAM(s) Oral before breakfast  polyethylene glycol 3350 17 Gram(s) Oral two times a day  senna 2 Tablet(s) Oral at bedtime  sodium chloride 0.9% lock flush 10 milliLiter(s) IV Push every 1 hour PRN  urea Oral Powder 15 Gram(s) Oral two times a day  vancomycin  IVPB 1000 milliGRAM(s) IV Intermittent every 12 hours    -------------------------------------------------------------------------------------------  TTE 3/21/24:  CONCLUSIONS:      1. Left ventricular endocardium is not well visualized; however, the left ventricular systolic function appears grossly normal.   2. Thickened pericardium.   3. Bilateral pleural effusion noted.   4. No pericardial effusion seen.   5. Compared to the transthoracic echocardiogram performed on 3/18/2024, the pericardial effusion is no longer present.      -------------------------------------------------------------------------------------------

## 2024-03-27 NOTE — CONSULT NOTE ADULT - SUBJECTIVE AND OBJECTIVE BOX
Interventional Radiology    Evaluate for Procedure: SVC stent    HPI: 58 Female active smoker hx of RCC s/p total nephrectomy, S/p hysterectomy, R-sided glaucoma, Fibromyalgia, Anxiety, GERD,  COPD, presented 3/11  R breast swelling, redness and pain.      Pt was initially evaluated by [place] ED and was going to be admitted but left AMA. Was prescribed clindamycin for presumed breast cellulitis/mastitis on discharge, but was also told about concerning findings on CT Chest related to possible breast malignancy and metastasis. Reports over 40 pound weight loss in the last year and loss of appetite.  CT chest Impacted right lower lobe subsegmental bronchi. Otherwise patent central airways. Centrilobular emphysema. Bilateral lower lobe dependent atelectasis and pleural effusions right lower lobe compressive atelectasis with moderate pleural effusion.      Hospital course complicated by pericardial effusion s/p pericardial drain by Thoracic surgery 3/19. Pt underwent supraclavicular node bx with cytopath showing malignancy of upper GI origin. 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    Allergies: Cipro (Headache; Vomiting; Rash)  penicillin (Headache; Vomiting; Rash)  erythromycin (Headache; Vomiting; Rash)    Medications (Abx/Cardiac/Anticoagulation/Blood Products)    aMIOdarone IVPB: 600 mL/Hr IV Intermittent (03-26 @ 03:57)  cefepime   IVPB: 100 mL/Hr IV Intermittent (03-26 @ 12:38)  cefepime   IVPB: 100 mL/Hr IV Intermittent (03-25 @ 22:57)  cefepime   IVPB: 100 mL/Hr IV Intermittent (03-27 @ 05:16)  enoxaparin Injectable: 50 milliGRAM(s) SubCutaneous (03-25 @ 09:39)  furosemide   Injectable: 20 milliGRAM(s) IV Push (03-25 @ 09:24)  furosemide   Injectable: 20 milliGRAM(s) IV Push (03-26 @ 04:03)  heparin  Infusion.: 800 Unit(s)/Hr IV Continuous (03-26 @ 07:47)  metoprolol tartrate: 25 milliGRAM(s) Oral (03-27 @ 05:16)  metoprolol tartrate: 25 milliGRAM(s) Oral (03-26 @ 04:00)  metroNIDAZOLE  IVPB: 100 mL/Hr IV Intermittent (03-27 @ 05:19)  metroNIDAZOLE  IVPB: 100 mL/Hr IV Intermittent (03-26 @ 09:25)  urea Oral Powder: 15 Gram(s) Oral (03-27 @ 05:17)  urea Oral Powder: 15 Gram(s) Oral (03-26 @ 06:31)  vancomycin  IVPB: 250 mL/Hr IV Intermittent (03-25 @ 10:08)  vancomycin  IVPB: 250 mL/Hr IV Intermittent (03-27 @ 05:15)  vancomycin  IVPB: 250 mL/Hr IV Intermittent (03-26 @ 11:59)    Data:  154.9  49.9  T(C): 36.6  HR: 105  BP: 97/70  RR: 18  SpO2: 90%    -WBC 14.19 / HgB 10.1 / Hct 31.9 / Plt 470  -Na 130 / Cl 95 / BUN 22 / Glucose 159  -K 4.2 / CO2 20 / Cr 0.93  -ALT 12 / Alk Phos 89 / T.Bili 0.2  -INR 1.14 / .1    Radiology:     Assessment/Plan:   -58y Female with      - case reviewed and approved for ____  - please place IR procedure order under ______  - STAT labs in AM (cbc,coags, bmp, T&S)  - hold AC x___hrs  - NPO on ____ at 11pm  - d/w primary team Interventional Radiology    Evaluate for Procedure: SVC stent    HPI: 58 Female active smoker hx of RCC s/p total nephrectomy, S/p hysterectomy, R-sided glaucoma, Fibromyalgia, Anxiety, GERD,  COPD, presented 3/11  R breast swelling, redness and pain.      Pt was initially evaluated by [place] ED and was going to be admitted but left AMA. Was prescribed clindamycin for presumed breast cellulitis/mastitis on discharge, but was also told about concerning findings on CT Chest related to possible breast malignancy and metastasis. Reports over 40 pound weight loss in the last year and loss of appetite.  CT chest Impacted right lower lobe subsegmental bronchi. Otherwise patent central airways. Centrilobular emphysema. Bilateral lower lobe dependent atelectasis and pleural effusions right lower lobe compressive atelectasis with moderate pleural effusion.      Hospital course complicated by pericardial effusion s/p pericardial drain by Thoracic surgery 3/19. Pt underwent supraclavicular node bx with cytopath showing malignancy of upper GI origin. 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    IR consulted for possible SVC stenting.    Allergies: Cipro (Headache; Vomiting; Rash)  penicillin (Headache; Vomiting; Rash)  erythromycin (Headache; Vomiting; Rash)    Medications (Abx/Cardiac/Anticoagulation/Blood Products)  aMIOdarone IVPB: 600 mL/Hr IV Intermittent (03-26 @ 03:57)  cefepime   IVPB: 100 mL/Hr IV Intermittent (03-26 @ 12:38)  cefepime   IVPB: 100 mL/Hr IV Intermittent (03-25 @ 22:57)  cefepime   IVPB: 100 mL/Hr IV Intermittent (03-27 @ 05:16)  enoxaparin Injectable: 50 milliGRAM(s) SubCutaneous (03-25 @ 09:39)  furosemide   Injectable: 20 milliGRAM(s) IV Push (03-25 @ 09:24)  furosemide   Injectable: 20 milliGRAM(s) IV Push (03-26 @ 04:03)  heparin  Infusion.: 800 Unit(s)/Hr IV Continuous (03-26 @ 07:47)  metoprolol tartrate: 25 milliGRAM(s) Oral (03-27 @ 05:16)  metoprolol tartrate: 25 milliGRAM(s) Oral (03-26 @ 04:00)  metroNIDAZOLE  IVPB: 100 mL/Hr IV Intermittent (03-27 @ 05:19)  metroNIDAZOLE  IVPB: 100 mL/Hr IV Intermittent (03-26 @ 09:25)  urea Oral Powder: 15 Gram(s) Oral (03-27 @ 05:17)  urea Oral Powder: 15 Gram(s) Oral (03-26 @ 06:31)  vancomycin  IVPB: 250 mL/Hr IV Intermittent (03-25 @ 10:08)  vancomycin  IVPB: 250 mL/Hr IV Intermittent (03-27 @ 05:15)  vancomycin  IVPB: 250 mL/Hr IV Intermittent (03-26 @ 11:59)    Data:  154.9  49.9  T(C): 36.6  HR: 105  BP: 97/70  RR: 18  SpO2: 90%    -WBC 14.19 / HgB 10.1 / Hct 31.9 / Plt 470  -Na 130 / Cl 95 / BUN 22 / Glucose 159  -K 4.2 / CO2 20 / Cr 0.93  -ALT 12 / Alk Phos 89 / T.Bili 0.2  -INR 1.14 / .1    Assessment/Plan: 58 Female active smoker hx of RCC s/p total nephrectomy, S/p hysterectomy, R-sided glaucoma, Fibromyalgia, Anxiety, GERD,  COPD, presented 3/11  R breast swelling, redness and pain.  Pt was recently seen by MICU who placed a femoral central line iso of SVC syndrome.    IR consulted for SVC stenting.    - At this time and after reviewing imaging, no adequate landing for SVC stenting given size and invasiveness of tumor. Recommend SBIRT and chemotherapy.  - Please reconsult if pt symptomatically declines such as angioedema and difficulty with respiration.  - imaging reviewed with Dr. Jose David Marr  - d/w primary team Interventional Radiology    Evaluate for Procedure: SVC stent    HPI: 58 Female active smoker hx of RCC s/p total nephrectomy, S/p hysterectomy, R-sided glaucoma, Fibromyalgia, Anxiety, GERD,  COPD, presented 3/11  R breast swelling, redness and pain.      Pt was initially evaluated by [place] ED and was going to be admitted but left AMA. Was prescribed clindamycin for presumed breast cellulitis/mastitis on discharge, but was also told about concerning findings on CT Chest related to possible breast malignancy and metastasis. Reports over 40 pound weight loss in the last year and loss of appetite.  CT chest Impacted right lower lobe subsegmental bronchi. Otherwise patent central airways. Centrilobular emphysema. Bilateral lower lobe dependent atelectasis and pleural effusions right lower lobe compressive atelectasis with moderate pleural effusion.      Hospital course complicated by pericardial effusion s/p pericardial drain by Thoracic surgery 3/19. Pt underwent supraclavicular node bx with cytopath showing malignancy of upper GI origin. 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    IR consulted for possible SVC stenting.    Allergies: Cipro (Headache; Vomiting; Rash)  penicillin (Headache; Vomiting; Rash)  erythromycin (Headache; Vomiting; Rash)    Medications (Abx/Cardiac/Anticoagulation/Blood Products)  aMIOdarone IVPB: 600 mL/Hr IV Intermittent (03-26 @ 03:57)  cefepime   IVPB: 100 mL/Hr IV Intermittent (03-26 @ 12:38)  cefepime   IVPB: 100 mL/Hr IV Intermittent (03-25 @ 22:57)  cefepime   IVPB: 100 mL/Hr IV Intermittent (03-27 @ 05:16)  enoxaparin Injectable: 50 milliGRAM(s) SubCutaneous (03-25 @ 09:39)  furosemide   Injectable: 20 milliGRAM(s) IV Push (03-25 @ 09:24)  furosemide   Injectable: 20 milliGRAM(s) IV Push (03-26 @ 04:03)  heparin  Infusion.: 800 Unit(s)/Hr IV Continuous (03-26 @ 07:47)  metoprolol tartrate: 25 milliGRAM(s) Oral (03-27 @ 05:16)  metoprolol tartrate: 25 milliGRAM(s) Oral (03-26 @ 04:00)  metroNIDAZOLE  IVPB: 100 mL/Hr IV Intermittent (03-27 @ 05:19)  metroNIDAZOLE  IVPB: 100 mL/Hr IV Intermittent (03-26 @ 09:25)  urea Oral Powder: 15 Gram(s) Oral (03-27 @ 05:17)  urea Oral Powder: 15 Gram(s) Oral (03-26 @ 06:31)  vancomycin  IVPB: 250 mL/Hr IV Intermittent (03-25 @ 10:08)  vancomycin  IVPB: 250 mL/Hr IV Intermittent (03-27 @ 05:15)  vancomycin  IVPB: 250 mL/Hr IV Intermittent (03-26 @ 11:59)    Data:  154.9  49.9  T(C): 36.6  HR: 105  BP: 97/70  RR: 18  SpO2: 90%    -WBC 14.19 / HgB 10.1 / Hct 31.9 / Plt 470  -Na 130 / Cl 95 / BUN 22 / Glucose 159  -K 4.2 / CO2 20 / Cr 0.93  -ALT 12 / Alk Phos 89 / T.Bili 0.2  -INR 1.14 / .1    Assessment/Plan: 58 Female active smoker hx of RCC s/p total nephrectomy, S/p hysterectomy, R-sided glaucoma, Fibromyalgia, Anxiety, GERD,  COPD, presented 3/11  R breast swelling, redness and pain.  Pt was recently seen by MICU who placed a femoral central line iso of SVC syndrome.    IR consulted for SVC stenting.    - At this time and after reviewing imaging from 3/14, no adequate landing for SVC stenting given size and invasiveness of tumor. Recommend SBIRT and chemotherapy.  - Pt will be obtaining repeat CT today, please keep patient NPO at this time.  - imaging reviewed with Dr. Jose David Marr  - d/w primary team

## 2024-03-27 NOTE — DISCHARGE NOTE PROVIDER - NSDCFUSCHEDAPPT_GEN_ALL_CORE_FT
Talha Lira  [facility]  [address] [address] [facility]  Scheduled Appointment: 04/08/2024

## 2024-03-27 NOTE — PROGRESS NOTE ADULT - PROBLEM SELECTOR PLAN 3
- pericardial effusion on CT chest   - echo with moderate pericardial effusion and collapse of the RA  - s/p pericardial window 3/26  - chest tube drainage care per thoracic sx    #pleural effusion- pt on 4-6 L NC, now s/p R chest tube by thoracic with 1L output and minimal improvement in resp status  - cont chest tube output monitoring  - transfer to [facility] for urgent radiation for SVC syndrome

## 2024-03-27 NOTE — PROGRESS NOTE ADULT - SUBJECTIVE AND OBJECTIVE BOX
Moose Nolen MD  Division of Hospital Medicine  Available on MS teams until 7pm  If no response or off-hours, page [phone]  -------------------------------------    Patient is a 58y old  Female who presents with a chief complaint of Mastitis, breast malignancy (27 Mar 2024 14:12)    SUBJECTIVE / OVERNIGHT EVENTS: pt s/p pericardial window and R pleurx with 1L fluid drainage  ADDITIONAL REVIEW OF SYSTEMS: today, has ongoing SOB and tachypnea, notes ongoing anxiety and pain from plerux site- requests to do abdominal MRIs tomorrow and not today.     MEDICATIONS  (STANDING):  acetaminophen   IVPB .. 750 milliGRAM(s) IV Intermittent once  aMIOdarone    Tablet 400 milliGRAM(s) Oral three times a day  aMIOdarone    Tablet   Oral   cefepime   IVPB 2000 milliGRAM(s) IV Intermittent every 12 hours  chlorhexidine 4% Liquid 1 Application(s) Topical <User Schedule>  influenza   Vaccine 0.5 milliLiter(s) IntraMuscular once  ketorolac   Injectable 15 milliGRAM(s) IV Push once  metoprolol tartrate 25 milliGRAM(s) Oral two times a day  metroNIDAZOLE  IVPB 500 milliGRAM(s) IV Intermittent every 12 hours  nicotine -  14 mG/24Hr(s) Patch 1 Patch Transdermal daily  oxyCODONE  ER Tablet 20 milliGRAM(s) Oral <User Schedule>  pantoprazole    Tablet 40 milliGRAM(s) Oral before breakfast  polyethylene glycol 3350 17 Gram(s) Oral two times a day  senna 2 Tablet(s) Oral at bedtime  urea Oral Powder 15 Gram(s) Oral two times a day  vancomycin  IVPB 1000 milliGRAM(s) IV Intermittent every 12 hours    MEDICATIONS  (PRN):  albuterol/ipratropium for Nebulization 3 milliLiter(s) Nebulizer every 6 hours PRN Shortness of Breath and/or Wheezing  aluminum hydroxide/magnesium hydroxide/simethicone Suspension 30 milliLiter(s) Oral every 6 hours PRN Dyspepsia  benzocaine/menthol Lozenge 1 Lozenge Oral two times a day PRN Sore Throat  bisacodyl 5 milliGRAM(s) Oral every 12 hours PRN Constipation  diazepam    Tablet 5 milliGRAM(s) Oral two times a day PRN for anxiety  guaiFENesin Oral Liquid (Sugar-Free) 200 milliGRAM(s) Oral every 6 hours PRN Cough  HYDROmorphone  Injectable 1 milliGRAM(s) IV Push every 3 hours PRN Severe Pain (7 - 10)  melatonin 3 milliGRAM(s) Oral at bedtime PRN Insomnia  naloxone Injectable 0.1 milliGRAM(s) IV Push every 3 minutes PRN Obtundation, respiratory suppression  oxyCODONE    IR 10 milliGRAM(s) Oral every 4 hours PRN Moderate Pain (4 - 6)  sodium chloride 0.9% lock flush 10 milliLiter(s) IV Push every 1 hour PRN Pre/post blood products, medications, blood draw, and to maintain line patency      CAPILLARY BLOOD GLUCOSE        I&O's Summary    26 Mar 2024 07:01  -  27 Mar 2024 07:00  --------------------------------------------------------  IN: 0 mL / OUT: 250 mL / NET: -250 mL    27 Mar 2024 07:01  -  27 Mar 2024 15:04  --------------------------------------------------------  IN: 236 mL / OUT: 550 mL / NET: -314 mL        PHYSICAL EXAM:  Vital Signs Last 24 Hrs  T(C): 37.1 (27 Mar 2024 12:29), Max: 37.1 (27 Mar 2024 12:29)  T(F): 98.8 (27 Mar 2024 12:29), Max: 98.8 (27 Mar 2024 12:29)  HR: 96 (27 Mar 2024 12:29) (63 - 105)  BP: 95/58 (27 Mar 2024 12:29) (94/53 - 131/66)  BP(mean): 76 (26 Mar 2024 22:30) (66 - 99)  RR: 18 (27 Mar 2024 12:29) (14 - 19)  SpO2: 97% (27 Mar 2024 12:29) (90% - 99%)    Parameters below as of 27 Mar 2024 12:29  Patient On (Oxygen Delivery Method): mask, Venturi      CONSTITUTIONAL: NAD, +tachypnea  EYES: PERRLA; conjunctiva and sclera clear  ENMT: MMM  NECK: R supraclavicular swelling  RESPIRATORY: decreased breath sounds R, pleurx with bloody output  CARDIOVASCULAR: RRR, no JVD, B/L UE edema  ABDOMEN: Nontender to palpation, normoactive BS, no guarding/rigidity  MUSCLOSKELETAL:  no clubbing/cyanosis, no joint swelling or tenderness to palpation  PSYCH: A+O x 3, affect anxious  NEUROLOGY: CN 2-12 are intact and symmetric; no gross sensory or motor deficits  SKIN: No rashes; no palpable lesions    LABS:                        10.1   14.19 )-----------( 470      ( 27 Mar 2024 06:59 )             31.9     03-27    133<L>  |  99  |  28<H>  ----------------------------<  166<H>  4.8   |  24  |  1.16    Ca    8.5      27 Mar 2024 13:01  Phos  3.7     03-26  Mg     1.7     03-26    TPro  6.2  /  Alb  2.9<L>  /  TBili  0.2  /  DBili  x   /  AST  13  /  ALT  12  /  AlkPhos  89  03-26    PT/INR - ( 26 Mar 2024 04:06 )   PT: 12.5 sec;   INR: 1.14 ratio         PTT - ( 26 Mar 2024 04:06 )  PTT:124.1 sec  CARDIAC MARKERS ( 26 Mar 2024 04:06 )  x     / x     / 30 U/L / x     / 1.9 ng/mL      Urinalysis Basic - ( 27 Mar 2024 13:01 )    Color: x / Appearance: x / SG: x / pH: x  Gluc: 166 mg/dL / Ketone: x  / Bili: x / Urobili: x   Blood: x / Protein: x / Nitrite: x   Leuk Esterase: x / RBC: x / WBC x   Sq Epi: x / Non Sq Epi: x / Bacteria: x        Culture - Blood (collected 25 Mar 2024 08:45)  Source: .Blood Blood-Peripheral  Preliminary Report (26 Mar 2024 16:01):    No growth at 24 hours        RADIOLOGY & ADDITIONAL TESTS:  Results Reviewed:   Imaging Personally Reviewed:  Electrocardiogram Personally Reviewed:    COORDINATION OF CARE:  Care Discussed with Consultants/Other Providers [Y/N]: IR, oncology, radiation oncology, cardiology  Prior or Outpatient Records Reviewed [Y/N]:

## 2024-03-27 NOTE — PROGRESS NOTE ADULT - ASSESSMENT
58F w/ hx 1ppd smoker with Hx of RCC s/p R total nephrectomy, S/p hysterectomy, R-sided glaucoma, Fibromyalgia, Anxiety, GERD, smoker presents due to Right breast swelling, redness and pain. Admitted for right breast swelling and cellulitis found to have imaging evidence concerning for metastatic malignancy with lung nodules, and axillary, supraclavicular, and mediastinal adenopathy and inflammatory changes of breast seen on CT with possible effect on IVC as well as associated surrounding inflammatory changes in the deep neck, concerning for infectious/inflammatory thrombophlebitis. 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 Also s/p pericardial window for moderate pericardial effusion now with chest tube.     EP consulted for new onset paroxysmal Afib with RVR was previously initially given digoxin, IV amiodarone.    1.  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  Pericardial effusion, likely malignant, s/p pericardial window 3/19  3.  moderate to large right pleural effusion awaits pleurex drain   4.  Internal jugular vein thrombosis, right  5.  New Onset Paroxysmal Atrial Fibrillation, reverted to NSR 3/26/24 at 11am     Recs:  - TSH wnl 3.82   - s/p PO dig load 3/24, s/p IV amiodarone bolus given  - c/w Amiodarone oral load 400mg PO TID to 10 gram load, then 200mg Po daily there after  - Monitor TSH, LFT's, out patient opthalmology and pulmonary function tests while on Amio.   - c/w lopressor 25mg PO BID with BP hold parameters  - Was on IV Heparin prior to OR, resume anticoagulation once able post op   - c/w telemetry monitoring    Bakari Sepulveda MD  Cardiology Fellow - PGY5    Please check [email or web address]: [email or web address] for cardiology service schedule and contact information.      58F w/ hx 1ppd smoker with Hx of RCC s/p R total nephrectomy, S/p hysterectomy, R-sided glaucoma, Fibromyalgia, Anxiety, GERD, smoker presents due to Right breast swelling, redness and pain. Admitted for right breast swelling and cellulitis found to have imaging evidence concerning for metastatic malignancy with lung nodules, and axillary, supraclavicular, and mediastinal adenopathy and inflammatory changes of breast seen on CT with possible effect on IVC as well as associated surrounding inflammatory changes in the deep neck, concerning for infectious/inflammatory thrombophlebitis. 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 Also s/p pericardial window for moderate pericardial effusion now with chest tube.     EP consulted for new onset paroxysmal Afib with RVR was previously initially given digoxin, IV amiodarone. Now in sinus rhythm.    1.  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  Pericardial effusion, likely malignant, s/p pericardial window 3/19  3.  moderate to large right pleural effusion awaits pleurex drain   4.  Internal jugular vein thrombosis, right  5.  New Onset Paroxysmal Atrial Fibrillation, reverted to NSR 3/26/24 at 11am     Recs:  - TSH wnl 3.82   - s/p PO dig load 3/24, s/p IV amiodarone bolus given  - c/w Amiodarone oral load 400mg PO TID to 10 gram load, then 200mg PO daily there after  - Monitor TSH, LFT's, out patient opthalmology and pulmonary function tests while on Amio.   - c/w lopressor 25mg PO BID with BP hold parameters  - Was on IV Heparin prior to OR, resume anticoagulation once able post op   - c/w telemetry monitoring while admitted    Has remained in sinus rhythm. EP will sign off at this time, please re-consult with any questions or concerns.    Bakari Sepulveda MD  Cardiology Fellow - PGY5    Please check [email or web address]: [email or web address] for cardiology service schedule and contact information.

## 2024-03-27 NOTE — PROGRESS NOTE ADULT - PROBLEM SELECTOR PLAN 6
- likely SIADH, now improved  - salt tabs increased to 2gm bid   - received hypertonic saline 3/19 and 3/21  - continue to monitor na

## 2024-03-27 NOTE — PROGRESS NOTE ADULT - PROBLEM SELECTOR PLAN 7
will continue to follow for symptoms   case discussed with primary team  Can be reached by TEAMS M-F 9-5 Carina Peacock Any other time please page [phone] if needed will continue to follow for symptoms/GOC   case discussed with primary team  Can be reached by TEAMS M-F 9-5 Carina Peacock Any other time please page [phone] if needed

## 2024-03-27 NOTE — DISCHARGE NOTE PROVIDER - NSDCACTIVITY_GEN_ALL_CORE
Weigh yourself daily.  If you gain 3lbs in 3 days, or 5lbs in a week call your Health Care Provider.  Do not eat or drink foods containing more than 2000mg of salt (sodium) in your diet every day.  Call your Health Care Provider if you have any swelling or increased swelling in your feet, ankles, and/or stomach.  Take all of your medication as directed.  If you become dizzy call your Health Care Provider. No restrictions Avoid taking (NSAIDs) - (ex: Ibuprofen, Advil, Celebrex, Naprosyn)  Avoid taking any nephrotoxic agents (can harm kidneys) - Intravenous contrast for diagnostic testing, combination cold medications.  Have all medications adjusted for your renal function by your Health Care Provider.  Blood pressure control is important.  Take all medication as prescribed. Take all of your antibiotics as ordered.  Call your Health Care Provider within two days of arriving home to make a follow up appointment within one week.  If the affected cellulitic area increases in redness, warmth, pain or swelling call your Health Care Provider.  If you develop fever, chills, and/or malaise, call your Health Care Provider. Improved Continue antibiotics as prescribed by your doctor.  Follow-up with your primary care physician within 1 week.  Follow-up with your doctor for continued management of your chronic indwelling fox catheter. Continue with Wound VAC as prescribed by your doctor.  Follow-up with your primary care physician within 1 week. Continue with wound VAC as prescribed .   Call your Health Care Provider within two days of arriving home to make a follow up appointment within one week.  If the affected cellulitic area increases in redness, warmth, pain or swelling call your Health Care Provider.  If you develop fever, chills, and/or malaise, call your Health Care Provider. Continue with Wound care :        Cleanse w/ NS  Pat dry  CAVILON to periwound skin  Pack w/ AQUACEL rope  Cover w/ gauze and tegaderm    Follow-up with your primary care physician within 1 week. Continue with Wound care :        Cleanse w/ NS  Pat dry  CAVILON to periwound skin  Pack w/ AQUACEL rope  Cover w/ Aleyvn    Follow-up with your primary care physician within 1 week. You are taking coumadin for a chronic DVT and PE.  You will continue taking Coumadin 4mg oral at bedtime.  You will need to have your INR checked in 2 days by your physician and have your dose adjusted accordingly. You are taking coumadin for a chronic DVT and PE.  You will continue taking Coumadin 4mg oral at bedtime.  You will need to have your INR checked on Monday, 4/30/28, by your physician and have your dose adjusted accordingly.

## 2024-03-27 NOTE — CONSULT NOTE ADULT - CONSULT REQUESTED BY NAME
China Shine
Dr. Mata Cardiology
MD Tamanna
marcell cross
Primary team
Medicine
Dr China Shine
Dr Coates
Medicine/Thoracic
Osvaldo Lopez
Valente Coates
medicine

## 2024-03-27 NOTE — CONSULT NOTE ADULT - CONSULT REQUESTED DATE/TIME
13-Mar-2024 13:12
25-Mar-2024 07:27
13-Mar-2024
14-Mar-2024 17:13
18-Mar-2024
22-Mar-2024 13:21
21-Mar-2024 11:54
14-Mar-2024 16:29
15-Mar-2024 13:40
26-Mar-2024
27-Mar-2024 14:40
12-Mar-2024 13:50
27-Mar-2024 07:46

## 2024-03-27 NOTE — PROGRESS NOTE ADULT - PROBLEM SELECTOR PLAN 4
- erythema and tenderness of right breast improving  - Unclear if cellulitis or just inflammatory changes   - Abx expanded to vanco/cefepime/flagyl given RRT last weekend with concern for worsening infection, however now appears improved with blood cultures all negative  - blood cx ngtd  - f/u with ID on final abx plan/duration

## 2024-03-27 NOTE — DISCHARGE NOTE PROVIDER - NSDCCPCAREPLAN_GEN_ALL_CORE_FT
PRINCIPAL DISCHARGE DIAGNOSIS  Diagnosis: Breast pain  Assessment and Plan of Treatment: Secondary to cellulitis.  Continue with Vanco, Cefepime and flagyl per ID recs.  ID follow up.      SECONDARY DISCHARGE DIAGNOSES  Diagnosis: Mediastinal mass  Assessment and Plan of Treatment: Causing SVC syndrome.  Unable to be stented with IR.  Accepted to [facility] for possible SBIRT.  Rad/Onc follow up.    Diagnosis: Internal jugular vein thrombosis, right  Assessment and Plan of Treatment: Imaging shows complete occlusion of the right subclavian vein and nearly occlusive thrombosis in the proximal left brachiocephalic vein with flow reconstitution distally.  -Heparin gtt held on 3/26 for thoracic procedure.  -Unable to be stented by IR.  Transfer to [facility] for further treatment.    Diagnosis: Permanent atrial fibrillation  Assessment and Plan of Treatment: - C/w metoprolol 25mg bid   Resume heparin gtt when cleared by thoracic team s/p pleurex.      Diagnosis: Pericardial effusion  Assessment and Plan of Treatment: s/p Pericardial Window 3/19.  Thoracic team follow up.    Diagnosis: Pleural effusion, right  Assessment and Plan of Treatment: s/p Pleurex 3/26.  Continue to monitor drain output.  Thoracic follow up.  Patient on Ventimask    Diagnosis: Hyponatremia  Assessment and Plan of Treatment: Improved. Received hypertonic saline 3/19 and 3/21.  Continue with salt tabs 2gm bid.  Na 133 on date of transfer to [facility].    Diagnosis: History of fibromyalgia  Assessment and Plan of Treatment: Continue pain meds as prescribed.  Palliative following for pain.    Diagnosis: Anxiety  Assessment and Plan of Treatment: Continue valium as prescribed.  Refused psych consult.

## 2024-03-27 NOTE — PROGRESS NOTE ADULT - PROBLEM SELECTOR PLAN 4
Exacerbated by pain, respiratory issues, and newly diagnosed metastatic CA.   -Continue home diazepam 5 mg PO BID PRN for anxiety.  pt used 1x Valium in 24 hours, endorsing relief   -Chaplaincy eval  -Holistic nurse referral will be entered Exacerbated by pain, respiratory issues, and newly diagnosed metastatic CA.   -Continue home diazepam 5 mg PO BID PRN for anxiety.  pt used 0x Valium in 24 hours, endorsing relief   -Chaplaincy eval  -Holistic nurse referral will be entered

## 2024-03-27 NOTE — CONSULT NOTE ADULT - SUBJECTIVE AND OBJECTIVE BOX
{\rtf1\nxrkhl16113\ansi\haayjtg3987\ftnbj\uc1\deff0  {\fonttbl{\f0 \fnil Segoe UI;}{\f1 \fnil \fcharset0 Segoe UI;}{\f2 \fnil Times New Wilian;}}  {\colortbl ;\qnu985\yxrzx938\schu590 ;\red0\green0\blue0 ;\red0\green0\bphe339 ;\red0\green0\blue0 ;}  {\stylesheet{\f0\fs20 Normal;}{\cs1 Default Paragraph Font;}{\cs2\f0\fs16 Line Number;}{\cs3\f2\fs24\ul\cf3 Hyperlink;}}  {\*\revtbl{Unknown;}}  \igchtt74889\sbiajz71008\wkmgo0883\iikcy2321\zhxhg4456\unbdi3444\smiziha196\dtfxvzk094\nogrowautofit\kpaeki842\formshade\nofeaturethrottle1\dntblnsbdb\fet4\aendnotes\aftnnrlc\pgbrdrhead\pgbrdrfoot  \sectd\kwmgmp72239\yzxgxm99594\guttersxn0\wfbqupgv7757\ohgymwdy9409\gzzixefe9639\qiylyrod9583\ixphcuu626\sneakzz972\sbkpage\pgncont\pgndec  \plain\plain\f0\fs24\pard\plain\f0\fs24\plain\f0\fs20\rkgh2495\hich\f0\dbch\f0\loch\f0\fs20 CC: right breast mass, [facility] ED 3/11/24, was at [facility] day prior but signed out AMA. \par  \plain\f1\fs20\liqi1838\hich\f1\dbch\f1\loch\f1\cf2\fs20\b pathology: 3/14/24: metastatic carcinoma. \plain\f0\fs20\uxxp1264\hich\f0\dbch\f0\loch\f0\fs20\par  \par  Cytopathology - Non Gyn Report: \par  ACCESSION No:  [id number]\par  Patient:     GADIEL GENAO\par  \par  Accession:                             [id number]\par  \par  Collected Date/Time:                   3/14/2024 15:50 EDT\par  Received Date/Time:                    3/14/2024 21:58 EDT\par  \par  [employer] Addendum Report - Auth (Verified)\par  _________________________________________________________________\par  \plain\f1\fs20\tqfw7434\hich\f1\dbch\f1\loch\f1\cf2\fs20\b\par  [employer] Report - Auth (Verified)\par  \par  Specimen(s) Submitted\par  LYMPH NODE, SUPRACLAVICULAR, RIGHT, US GUIDED CORE BIOPSY AND FNA\par  \par  Final Diagnosis\par  LYMPH NODE, SUPRACLAVICULAR, RIGHT, US GUIDED CORE BIOPSY AND FNA\par  POSITIVE FOR MALIGNANT CELLS.\par  \plain\f0\fs20\wdou0963\hich\f0\dbch\f0\loch\f0\fs20 Carcinoma\par  \par  _________________________________________________________________\par  \par  \par  \par  HPI:\par  58F w/Hx of RCC s/p R total nephrectomy, S/p hysterectomy, R-sided glaucoma, Fibromyalgia, Anxiety, GERD, smoker presents due to R breast swelling, redness and pain. Pt was initially evaluated by [facility] ED yesterday and was going to be admitted but left   AMA. Was prescribed clindamycin for presumed breast cellulitis/mastitis on discharge, but was also told about concerning findings on CT Chest related to possible breast malignancy and metastasis. Pt has not had a mammogram or colonoscopy in years. Reports   that she had a lidocaine injection in her R shoulder on 3/8/24 and since then has had worsening swelling, redness and pain in her R breast. Also still has R shoulder pain, which had mild improvement since the injection. Pt consistently takes oxycodone   q6h and believes this may have masked her pain earlier. Reports over 40 pound weight loss in the last year and loss of appetite.\par  \par  \ql\plain\f0\fs24\plain\f1\fs16\xoxf2452\hich\f1\dbch\f1\loch\f1\cf2\fs16\b\ul{\field{\*\fldinst HYPERLINK [id number] }{\fldrslt Problem/Plan - 1:}}\plain\f1\fs16\bbwp1757\hich\f1\dbch\f1\loch\f1\cf2\fs16\ql\par  \'b7  {\*\bkmkstart [id number]}{\*\bkmkend [id number]}Problem: {\*\bkmkstart [id number]}{\*\bkmkend [id number]}Pericardial effusion. \par  \'b7  {\*\bkmkstart [id number]}{\*\bkmkend [id number]}Plan: {\*\bkmkstart [id number]}{\*\bkmkend [id number]}3/19 underwent Subxiphoid pericardial window with Dr Ruiz\par  \par  pericardial drain  to water seal document output q shift\par  \par  \plain\f1\fs20\njec9554\hich\f1\dbch\f1\loch\f1\cf2\fs20\b Heme onc: Dr. Bowden: \plain\f1\fs16\nqph4204\hich\f1\dbch\f1\loch\f1\cf2\fs16\par  {\*\bkmkstart [id number]}{\*\bkmkend [id number]}59 y/o woman presenting to hospital with right breast swelling and cellulitis found to have imaging evidence concerning for metastatic malignancy w/ lung nodules, and axillary, supraclavicular, and   mediastinal adenopathy. CT CAP w/co shows compression of IVC as well as R IJ. There is conglomerate radha mass in the neck, supraclavicular area. 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 Biliary dilatation noted as well. Recommend MRCP to eval further, advance GI to consider EUS/EGD to r/o gastric malignancy, pancreatic cancer or cholangio. Further treatment recommendations   pending above work up. d/w pt at bedside. \plain\f1\fs16\keeb7206\hich\f1\dbch\f1\loch\f1\cf2\fs16\strike\plain\f1\fs16\guak9318\hich\f1\dbch\f1\loch\f1\cf2\fs16\plain\f1\fs16\cgei4470\hich\f1\dbch\f1\loch\f1\cf2\fs16\par  \par  \plain\f0\fs20\ygyf6978\hich\f0\dbch\f0\loch\f0\fs20 Thoracic surgery: \plain\f1\fs16\bhuw0911\hich\f1\dbch\f1\loch\f1\cf2\fs16\par  . Additional comments: patient had supraclavicular lymph node biopsy - pending pathology - asked to evaluate for mediastinal adenopathy BUT concerns for svc syndrome - recommend CT with IV contrast. \par  as IR already involved - recommend management once pathology is final for svc syndrome. \par  \par  \par  \par  IR consulted for SVC stenting.\par  \par  - At this time and after reviewing imaging from 3/14, no adequate landing for SVC stenting given size and invasiveness of tumor. Recommend SBIRT and chemotherapy.\par  \par  \pard\plain\f0\fs24\plain\f1\fs16\uwgq7415\hich\f1\dbch\f1\loch\f1\cf2\fs16  \plain\f0\fs20\nrsw5501\hich\f0\dbch\f0\loch\f0\fs20 Allergies\par  \par  Cipro (Headache; Vomiting; Rash)\par  penicillin (Headache; Vomiting; Rash)\par  erythromycin (Headache; Vomiting; Rash)\par  \par  Intolerances\par  \par  \par  \par  ROS: [  ] Fever  [  ] Chills  [  ]Chest Pain [  ] SOB  [  ]Cough [  ] N/V  [  ] Diarrhea [  ]Constipation [  ]Other ROS:\par  [  ] ROS otherwise negative\par  \par  PAST MEDICAL & SURGICAL HISTORY:\par  Anxiety\par  \par  Acid reflux disease\par  \par  Renal cancer, left\par  \par  Umbilical hernia\par  \par  Uterine mass\par  Benign\par  \par  Fibromyalgia\par  Joint pains\par  \par  Glaucoma\par  Right eye\par  \par  Herniated cervical disc\par  \par  FH: kidney cancer\par  \par  Cancer of kidney\par  right kidney\par  \par  S/P partial hysterectomy\par  8 years ago\par  \par  S/P knee surgery\par  knee arthroscopy right x 2 ( 2011 & 2012)\par  \par  S/P hernia repair\par  umbilical Hernia Repair - 2011\par  \par  H/O unilateral nephrectomy\par  Right Laparoscopic Nephrectomy - 12015\par  \par  Glaucoma following surgery\par  Right Eyr - 2012\par  \par  History of tonsillectomy\par  \par  \par  \par  FAMILY HISTORY:\par  Family history of lung cancer (Mother)\par  \par  Family history of pancreatic cancer (Father)\par  \par  \par  \par  MEDICATIONS  (STANDING):\par  acetaminophen   IVPB .. 750 milliGRAM(s) IV Intermittent once\par  aMIOdarone    Tablet   Oral \par  aMIOdarone    Tablet 400 milliGRAM(s) Oral three times a day\par  cefepime   IVPB 2000 milliGRAM(s) IV Intermittent every 12 hours\par  chlorhexidine 4% Liquid 1 Application(s) Topical <User Schedule>\par  influenza   Vaccine 0.5 milliLiter(s) IntraMuscular once\par  ketorolac   Injectable 15 milliGRAM(s) IV Push once\par  metoprolol tartrate 25 milliGRAM(s) Oral two times a day\par  metroNIDAZOLE  IVPB 500 milliGRAM(s) IV Intermittent every 12 hours\par  nicotine -  14 mG/24Hr(s) Patch 1 Patch Transdermal daily\par  oxyCODONE  ER Tablet 20 milliGRAM(s) Oral <User Schedule>\par  pantoprazole    Tablet 40 milliGRAM(s) Oral before breakfast\par  polyethylene glycol 3350 17 Gram(s) Oral two times a day\par  senna 2 Tablet(s) Oral at bedtime\par  urea Oral Powder 15 Gram(s) Oral two times a day\par  vancomycin  IVPB 1000 milliGRAM(s) IV Intermittent every 12 hours\par  \par  MEDICATIONS  (PRN):\par  albuterol/ipratropium for Nebulization 3 milliLiter(s) Nebulizer every 6 hours PRN Shortness of Breath and/or Wheezing\par  aluminum hydroxide/magnesium hydroxide/simethicone Suspension 30 milliLiter(s) Oral every 6 hours PRN Dyspepsia\par  benzocaine/menthol Lozenge 1 Lozenge Oral two times a day PRN Sore Throat\par  bisacodyl 5 milliGRAM(s) Oral every 12 hours PRN Constipation\par  diazepam    Tablet 5 milliGRAM(s) Oral two times a day PRN for anxiety\par  guaiFENesin Oral Liquid (Sugar-Free) 200 milliGRAM(s) Oral every 6 hours PRN Cough\par  HYDROmorphone  Injectable 1 milliGRAM(s) IV Push every 3 hours PRN Severe Pain (7 - 10)\par  melatonin 3 milliGRAM(s) Oral at bedtime PRN Insomnia\par  naloxone Injectable 0.1 milliGRAM(s) IV Push every 3 minutes PRN Obtundation, respiratory suppression\par  oxyCODONE    IR 10 milliGRAM(s) Oral every 4 hours PRN Moderate Pain (4 - 6)\par  sodium chloride 0.9% lock flush 10 milliLiter(s) IV Push every 1 hour PRN Pre/post blood products, medications, blood draw, and to maintain line patency\par  \par  \par  PHYSICAL EXAM\par  Vital Signs Last 24 Hrs\par  T(C): 37.1 (27 Mar 2024 12:29), Max: 37.1 (27 Mar 2024 12:29)\par  T(F): 98.8 (27 Mar 2024 12:29), Max: 98.8 (27 Mar 2024 12:29)\par  HR: 96 (27 Mar 2024 12:29) (63 - 105)\par  BP: 95/58 (27 Mar 2024 12:29) (94/53 - 131/66)\par  BP(mean): 76 (26 Mar 2024 22:30) (66 - 99)\par  RR: 18 (27 Mar 2024 12:29) (14 - 19)\par  SpO2: 97% (27 Mar 2024 12:29) (90% - 99%)\par  \par  Parameters below as of 27 Mar 2024 12:29\par  Patient On (Oxygen Delivery Method): mask, Venturi\par  \par  \par  \par  KPS 40-50\par  diminished breath sounds b/l\par  on Venturi mask, signs of respiratory effort noted. \par  \par  \par  IMAGING/LABS/PATHOLOGY: I have personally reviewed the relevant labs, pathology, and imaging as noted in the HPI.  In addition,\par  \par           \par             10.1 \par  14.19 )-----------( 470      ( 27 Mar 2024 06:59 )\par             31.9 \par  \par  03-27\par  \par  133<L>  |  99  |  28<H>\par  ----------------------------<  166<H>\par  4.8   |  24  |  1.16\par  \par  Ca    8.5      27 Mar 2024 13:01\par  Phos  3.7     03-26\par  Mg     1.7     03-26\par  \par  TPro  6.2  /  Alb  2.9<L>  /  TBili  0.2  /  DBili  x   /  AST  13  /  ALT  12  /  AlkPhos  89  03-26\par  \par  \par  \par  ASSESSMENT/PLAN\par  \par  GADIEL GENAO is a 58y woman with unknown primary, RCC s/p R total nephrectomy, S/p hysterectomy, R-sided glaucoma, Fibromyalgia, Anxiety, GERD, smoker presents due to R breast swelling, redness and pain went to [facility], signed out and then came to [facility] for   further care.  She is s/p LN biopsy Right supraclavicular + for malignant cells, evidence of malignancy w/ lung nodules, and axillary, supraclavicular, and mediastinal adenopathy. CT CAP w/co shows compression of IVC as well as R IJ and SVC syndrome suspected.    IR consulted and cannot stent.\par  Dr. Sterling spoke to referring Dr. Nolen and agreed to transfer care from [facility] to [facility]. \par  [facility] to arrange transfer to [facility].\par  \par  }

## 2024-03-28 DIAGNOSIS — I87.1 COMPRESSION OF VEIN: ICD-10-CM

## 2024-03-28 LAB
ANION GAP SERPL CALC-SCNC: 13 MMOL/L — SIGNIFICANT CHANGE UP (ref 5–17)
BUN SERPL-MCNC: 30 MG/DL — HIGH (ref 7–23)
CALCIUM SERPL-MCNC: 9 MG/DL — SIGNIFICANT CHANGE UP (ref 8.4–10.5)
CHLORIDE SERPL-SCNC: 97 MMOL/L — SIGNIFICANT CHANGE UP (ref 96–108)
CO2 SERPL-SCNC: 22 MMOL/L — SIGNIFICANT CHANGE UP (ref 22–31)
CREAT SERPL-MCNC: 0.97 MG/DL — SIGNIFICANT CHANGE UP (ref 0.5–1.3)
CULTURE RESULTS: SIGNIFICANT CHANGE UP
EGFR: 67 ML/MIN/1.73M2 — SIGNIFICANT CHANGE UP
GLUCOSE SERPL-MCNC: 168 MG/DL — HIGH (ref 70–99)
HCT VFR BLD CALC: 28.5 % — LOW (ref 34.5–45)
HGB BLD-MCNC: 8.9 G/DL — LOW (ref 11.5–15.5)
LDH SERPL L TO P-CCNC: 133 U/L — SIGNIFICANT CHANGE UP (ref 50–242)
MAGNESIUM SERPL-MCNC: 2 MG/DL — SIGNIFICANT CHANGE UP (ref 1.6–2.6)
MCHC RBC-ENTMCNC: 28.3 PG — SIGNIFICANT CHANGE UP (ref 27–34)
MCHC RBC-ENTMCNC: 31.2 GM/DL — LOW (ref 32–36)
MCV RBC AUTO: 90.8 FL — SIGNIFICANT CHANGE UP (ref 80–100)
NON-GYNECOLOGICAL CYTOLOGY STUDY: SIGNIFICANT CHANGE UP
NRBC # BLD: 0 /100 WBCS — SIGNIFICANT CHANGE UP (ref 0–0)
PHOSPHATE SERPL-MCNC: 3.1 MG/DL — SIGNIFICANT CHANGE UP (ref 2.5–4.5)
PLATELET # BLD AUTO: 371 K/UL — SIGNIFICANT CHANGE UP (ref 150–400)
POTASSIUM SERPL-MCNC: 4.9 MMOL/L — SIGNIFICANT CHANGE UP (ref 3.5–5.3)
POTASSIUM SERPL-SCNC: 4.9 MMOL/L — SIGNIFICANT CHANGE UP (ref 3.5–5.3)
RBC # BLD: 3.14 M/UL — LOW (ref 3.8–5.2)
RBC # FLD: 14 % — SIGNIFICANT CHANGE UP (ref 10.3–14.5)
SODIUM SERPL-SCNC: 132 MMOL/L — LOW (ref 135–145)
SPECIMEN SOURCE: SIGNIFICANT CHANGE UP
URATE SERPL-MCNC: 3.8 MG/DL — SIGNIFICANT CHANGE UP (ref 2.5–7)
WBC # BLD: 7.07 K/UL — SIGNIFICANT CHANGE UP (ref 3.8–10.5)
WBC # FLD AUTO: 7.07 K/UL — SIGNIFICANT CHANGE UP (ref 3.8–10.5)

## 2024-03-28 PROCEDURE — 99233 SBSQ HOSP IP/OBS HIGH 50: CPT

## 2024-03-28 PROCEDURE — 93970 EXTREMITY STUDY: CPT | Mod: 26

## 2024-03-28 PROCEDURE — 99232 SBSQ HOSP IP/OBS MODERATE 35: CPT

## 2024-03-28 PROCEDURE — 99232 SBSQ HOSP IP/OBS MODERATE 35: CPT | Mod: GC

## 2024-03-28 PROCEDURE — 99233 SBSQ HOSP IP/OBS HIGH 50: CPT | Mod: GC

## 2024-03-28 RX ORDER — CEFEPIME 1 G/1
2000 INJECTION, POWDER, FOR SOLUTION INTRAMUSCULAR; INTRAVENOUS EVERY 12 HOURS
Refills: 0 | Status: DISCONTINUED | OUTPATIENT
Start: 2024-03-29 | End: 2024-03-30

## 2024-03-28 RX ORDER — OXYCODONE HYDROCHLORIDE 5 MG/1
30 TABLET ORAL
Refills: 0 | Status: DISCONTINUED | OUTPATIENT
Start: 2024-03-28 | End: 2024-04-01

## 2024-03-28 RX ORDER — VANCOMYCIN HCL 1 G
1000 VIAL (EA) INTRAVENOUS EVERY 12 HOURS
Refills: 0 | Status: DISCONTINUED | OUTPATIENT
Start: 2024-03-28 | End: 2024-03-29

## 2024-03-28 RX ORDER — CEFEPIME 1 G/1
INJECTION, POWDER, FOR SOLUTION INTRAMUSCULAR; INTRAVENOUS
Refills: 0 | Status: DISCONTINUED | OUTPATIENT
Start: 2024-03-28 | End: 2024-03-30

## 2024-03-28 RX ORDER — OXYCODONE HYDROCHLORIDE 5 MG/1
10 TABLET ORAL
Refills: 0 | Status: DISCONTINUED | OUTPATIENT
Start: 2024-03-28 | End: 2024-03-28

## 2024-03-28 RX ORDER — CEFEPIME 1 G/1
2000 INJECTION, POWDER, FOR SOLUTION INTRAMUSCULAR; INTRAVENOUS ONCE
Refills: 0 | Status: COMPLETED | OUTPATIENT
Start: 2024-03-28 | End: 2024-03-28

## 2024-03-28 RX ORDER — METRONIDAZOLE 500 MG
500 TABLET ORAL EVERY 12 HOURS
Refills: 0 | Status: DISCONTINUED | OUTPATIENT
Start: 2024-03-28 | End: 2024-04-02

## 2024-03-28 RX ORDER — NALOXEGOL OXALATE 12.5 MG/1
25 TABLET, FILM COATED ORAL
Refills: 0 | Status: DISCONTINUED | OUTPATIENT
Start: 2024-03-29 | End: 2024-04-03

## 2024-03-28 RX ORDER — DEXAMETHASONE 0.5 MG/5ML
16.67 ELIXIR ORAL
Qty: 0 | Refills: 0 | DISCHARGE
Start: 2024-03-28

## 2024-03-28 RX ORDER — OXYCODONE HYDROCHLORIDE 5 MG/1
20 TABLET ORAL
Refills: 0 | Status: DISCONTINUED | OUTPATIENT
Start: 2024-03-28 | End: 2024-04-01

## 2024-03-28 RX ORDER — ACETAMINOPHEN 500 MG
750 TABLET ORAL ONCE
Refills: 0 | Status: COMPLETED | OUTPATIENT
Start: 2024-03-28 | End: 2024-03-28

## 2024-03-28 RX ORDER — OXYCODONE HYDROCHLORIDE 5 MG/1
20 TABLET ORAL
Refills: 0 | Status: DISCONTINUED | OUTPATIENT
Start: 2024-03-28 | End: 2024-03-28

## 2024-03-28 RX ORDER — ALTEPLASE 100 MG
2 KIT INTRAVENOUS ONCE
Refills: 0 | Status: COMPLETED | OUTPATIENT
Start: 2024-03-28 | End: 2024-03-28

## 2024-03-28 RX ADMIN — OXYCODONE HYDROCHLORIDE 20 MILLIGRAM(S): 5 TABLET ORAL at 06:15

## 2024-03-28 RX ADMIN — Medication 1 PATCH: at 04:14

## 2024-03-28 RX ADMIN — OXYCODONE HYDROCHLORIDE 10 MILLIGRAM(S): 5 TABLET ORAL at 12:00

## 2024-03-28 RX ADMIN — OXYCODONE HYDROCHLORIDE 20 MILLIGRAM(S): 5 TABLET ORAL at 05:44

## 2024-03-28 RX ADMIN — AMIODARONE HYDROCHLORIDE 400 MILLIGRAM(S): 400 TABLET ORAL at 04:20

## 2024-03-28 RX ADMIN — ENOXAPARIN SODIUM 50 MILLIGRAM(S): 100 INJECTION SUBCUTANEOUS at 05:37

## 2024-03-28 RX ADMIN — Medication 1 PATCH: at 12:09

## 2024-03-28 RX ADMIN — OXYCODONE HYDROCHLORIDE 10 MILLIGRAM(S): 5 TABLET ORAL at 11:31

## 2024-03-28 RX ADMIN — PANTOPRAZOLE SODIUM 40 MILLIGRAM(S): 20 TABLET, DELAYED RELEASE ORAL at 05:36

## 2024-03-28 RX ADMIN — Medication 25 MILLIGRAM(S): at 18:04

## 2024-03-28 RX ADMIN — Medication 15 GRAM(S): at 18:04

## 2024-03-28 RX ADMIN — Medication 1 PATCH: at 19:00

## 2024-03-28 RX ADMIN — OXYCODONE HYDROCHLORIDE 20 MILLIGRAM(S): 5 TABLET ORAL at 22:04

## 2024-03-28 RX ADMIN — Medication 4 MILLIGRAM(S): at 04:20

## 2024-03-28 RX ADMIN — Medication 300 MILLIGRAM(S): at 11:05

## 2024-03-28 RX ADMIN — Medication 500 MILLIGRAM(S): at 22:51

## 2024-03-28 RX ADMIN — AMIODARONE HYDROCHLORIDE 400 MILLIGRAM(S): 400 TABLET ORAL at 22:03

## 2024-03-28 RX ADMIN — ENOXAPARIN SODIUM 50 MILLIGRAM(S): 100 INJECTION SUBCUTANEOUS at 18:04

## 2024-03-28 RX ADMIN — CEFEPIME 100 MILLIGRAM(S): 1 INJECTION, POWDER, FOR SOLUTION INTRAMUSCULAR; INTRAVENOUS at 20:07

## 2024-03-28 RX ADMIN — Medication 15 GRAM(S): at 05:12

## 2024-03-28 RX ADMIN — Medication 1 PATCH: at 11:32

## 2024-03-28 RX ADMIN — Medication 3 MILLILITER(S): at 05:12

## 2024-03-28 RX ADMIN — Medication 25 MILLIGRAM(S): at 05:12

## 2024-03-28 RX ADMIN — POLYETHYLENE GLYCOL 3350 17 GRAM(S): 17 POWDER, FOR SOLUTION ORAL at 18:04

## 2024-03-28 RX ADMIN — POLYETHYLENE GLYCOL 3350 17 GRAM(S): 17 POWDER, FOR SOLUTION ORAL at 05:12

## 2024-03-28 RX ADMIN — Medication 4 MILLIGRAM(S): at 11:10

## 2024-03-28 RX ADMIN — OXYCODONE HYDROCHLORIDE 20 MILLIGRAM(S): 5 TABLET ORAL at 16:42

## 2024-03-28 RX ADMIN — AMIODARONE HYDROCHLORIDE 400 MILLIGRAM(S): 400 TABLET ORAL at 11:32

## 2024-03-28 RX ADMIN — CEFEPIME 100 MILLIGRAM(S): 1 INJECTION, POWDER, FOR SOLUTION INTRAMUSCULAR; INTRAVENOUS at 05:38

## 2024-03-28 RX ADMIN — ALTEPLASE 2 MILLIGRAM(S): KIT at 12:53

## 2024-03-28 RX ADMIN — Medication 750 MILLIGRAM(S): at 11:38

## 2024-03-28 RX ADMIN — Medication 100 MILLIGRAM(S): at 05:14

## 2024-03-28 RX ADMIN — Medication 4 MILLIGRAM(S): at 18:05

## 2024-03-28 RX ADMIN — Medication 250 MILLIGRAM(S): at 20:07

## 2024-03-28 RX ADMIN — CHLORHEXIDINE GLUCONATE 1 APPLICATION(S): 213 SOLUTION TOPICAL at 05:13

## 2024-03-28 RX ADMIN — OXYCODONE HYDROCHLORIDE 20 MILLIGRAM(S): 5 TABLET ORAL at 17:18

## 2024-03-28 NOTE — PROGRESS NOTE ADULT - PROBLEM SELECTOR PLAN 8
- c/w Home Valium (Confirmed via [employer])  - pt is intermittently tearful, anxious and frustrated about current clinical status and poor prognosis  - emotional support provided  - she declines Behavioural Health consult, palliative following

## 2024-03-28 NOTE — PROGRESS NOTE ADULT - PROBLEM SELECTOR PLAN 1
pt endorsing relief with current regimen, used 3x IVP Dilaudid in 24 hours (8a-8a)   - Will increase Oxycodone ER to 30mg at 6am and 10pm and 20mg at 2pm.   - Continue with Oxycodone 10 mg PO q4h PRN for moderate pain  - Continue with Dilaudid 1 mg IV q3h PRN for severe pain  - Reinforced education regarding the availability of PRN meds.   - Narcan PRN  - Bowel regimen while on opioids Uncontrolled  - Will increase Oxycodone ER to 30mg at 6am and 10pm and 20mg at 2pm.   - Continue with Oxycodone 10 mg PO q4h PRN for moderate pain  - Continue with Dilaudid 1 mg IV q3h PRN for severe pain  - Reinforced education regarding the availability of PRN meds.   - Narcan PRN  - Bowel regimen while on opioids

## 2024-03-28 NOTE — PROGRESS NOTE ADULT - SUBJECTIVE AND OBJECTIVE BOX
Patient is a 58y old  Female who presents with a chief complaint of Mastitis, breast malignancy (28 Mar 2024 15:45)       Pt is seen and examined  pt is awake and lying in bed w/ NRB.   Still having R neck pain.         ROS:  + R neck pain    MEDICATIONS  (STANDING):  acetaminophen   IVPB .. 750 milliGRAM(s) IV Intermittent once  aMIOdarone    Tablet 400 milliGRAM(s) Oral three times a day  aMIOdarone    Tablet   Oral   cefepime   IVPB 2000 milliGRAM(s) IV Intermittent once  cefepime   IVPB      chlorhexidine 4% Liquid 1 Application(s) Topical <User Schedule>  dexAMETHasone  Injectable 4 milliGRAM(s) IV Push every 8 hours  enoxaparin Injectable 50 milliGRAM(s) SubCutaneous every 12 hours  influenza   Vaccine 0.5 milliLiter(s) IntraMuscular once  ketorolac   Injectable 15 milliGRAM(s) IV Push once  metoprolol tartrate 25 milliGRAM(s) Oral two times a day  metroNIDAZOLE    Tablet 500 milliGRAM(s) Oral every 12 hours  nicotine -  14 mG/24Hr(s) Patch 1 Patch Transdermal daily  oxyCODONE  ER Tablet 20 milliGRAM(s) Oral <User Schedule>  pantoprazole    Tablet 40 milliGRAM(s) Oral before breakfast  polyethylene glycol 3350 17 Gram(s) Oral two times a day  senna 2 Tablet(s) Oral at bedtime  urea Oral Powder 15 Gram(s) Oral two times a day  vancomycin  IVPB 1000 milliGRAM(s) IV Intermittent every 12 hours    MEDICATIONS  (PRN):  albuterol/ipratropium for Nebulization 3 milliLiter(s) Nebulizer every 6 hours PRN Shortness of Breath and/or Wheezing  aluminum hydroxide/magnesium hydroxide/simethicone Suspension 30 milliLiter(s) Oral every 6 hours PRN Dyspepsia  benzocaine/menthol Lozenge 1 Lozenge Oral two times a day PRN Sore Throat  bisacodyl 5 milliGRAM(s) Oral every 12 hours PRN Constipation  diazepam    Tablet 5 milliGRAM(s) Oral two times a day PRN for anxiety  guaiFENesin Oral Liquid (Sugar-Free) 200 milliGRAM(s) Oral every 6 hours PRN Cough  HYDROmorphone  Injectable 1 milliGRAM(s) IV Push every 3 hours PRN Severe Pain (7 - 10)  melatonin 3 milliGRAM(s) Oral at bedtime PRN Insomnia  naloxone Injectable 0.1 milliGRAM(s) IV Push every 3 minutes PRN Obtundation, respiratory suppression  oxyCODONE    IR 10 milliGRAM(s) Oral every 4 hours PRN Moderate Pain (4 - 6)  sodium chloride 0.9% lock flush 10 milliLiter(s) IV Push every 1 hour PRN Pre/post blood products, medications, blood draw, and to maintain line patency      Allergies    Cipro (Headache; Vomiting; Rash)  penicillin (Headache; Vomiting; Rash)  erythromycin (Headache; Vomiting; Rash)    Intolerances        Vital Signs Last 24 Hrs  T(C): 36.3 (28 Mar 2024 11:49), Max: 36.9 (27 Mar 2024 20:18)  T(F): 97.3 (28 Mar 2024 11:49), Max: 98.5 (27 Mar 2024 20:18)  HR: 79 (28 Mar 2024 11:49) (79 - 93)  BP: 97/57 (28 Mar 2024 11:49) (97/57 - 127/72)  BP(mean): --  RR: 18 (28 Mar 2024 11:49) (18 - 18)  SpO2: 99% (28 Mar 2024 11:49) (96% - 99%)    Parameters below as of 28 Mar 2024 11:49  Patient On (Oxygen Delivery Method): mask, nonrebreather        PHYSICAL EXAM  EYES: PERRLA; conjunctiva and sclera clear  ENMT: MMM  NECK: Supple  RESPIRATORY: Normal respiratory effort; CTAB  CARDIOVASCULAR: RRR, no JVD, B/L UE edema, R groin TLC  ABDOMEN: Nontender to palpation, normoactive BS, no guarding/rigidity  MUSCLOSKELETAL:  no clubbing/cyanosis, no joint swelling or tenderness to palpation  PSYCH: A+O x 3, affect normal  NEUROLOGY: CN 2-12 are intact and symmetric; no gross sensory or motor deficits  SKIN: No rashes; no palpable lesions    LABS:                          8.9    7.07  )-----------( 371      ( 28 Mar 2024 06:12 )             28.5         Mean Cell Volume : 90.8 fl  Mean Cell Hemoglobin : 28.3 pg  Mean Cell Hemoglobin Concentration : 31.2 gm/dL  Auto Neutrophil # : x  Auto Lymphocyte # : x  Auto Monocyte # : x  Auto Eosinophil # : x  Auto Basophil # : x  Auto Neutrophil % : x  Auto Lymphocyte % : x  Auto Monocyte % : x  Auto Eosinophil % : x  Auto Basophil % : x    Serial CBC's  03-28 @ 06:12  Hct-28.5 / Hgb-8.9 / Plat-371 / RBC-3.14 / WBC-7.07          Serial CBC's  03-27 @ 06:59  Hct-31.9 / Hgb-10.1 / Plat-470 / RBC-3.46 / WBC-14.19          Serial CBC's  03-26 @ 04:06  Hct-31.1 / Hgb-10.0 / Plat-429 / RBC-3.46 / WBC-10.29          Serial CBC's  03-26 @ 00:57  Hct-29.9 / Hgb-9.4 / Plat-405 / RBC-3.27 / WBC-8.20          Serial CBC's  03-25 @ 09:16  Hct-33.2 / Hgb-10.5 / Plat-487 / RBC-3.68 / WBC-9.82            03-28    132<L>  |  97  |  30<H>  ----------------------------<  168<H>  4.9   |  22  |  0.97    Ca    9.0      28 Mar 2024 06:11  Phos  3.1     03-28  Mg     2.0     03-28            WBC Count: 7.07 K/uL (03-28-24 @ 06:12)  Hemoglobin: 8.9 g/dL (03-28-24 @ 06:12)  Hematocrit: 28.5 % (03-28-24 @ 06:12)  Platelet Count - Automated: 371 K/uL (03-28-24 @ 06:12)  WBC Count: 14.19 K/uL (03-27-24 @ 06:59)  Hemoglobin: 10.1 g/dL (03-27-24 @ 06:59)  Hematocrit: 31.9 % (03-27-24 @ 06:59)  Platelet Count - Automated: 470 K/uL (03-27-24 @ 06:59)  WBC Count: 10.29 K/uL (03-26-24 @ 04:06)  Hemoglobin: 10.0 g/dL (03-26-24 @ 04:06)  Hematocrit: 31.1 % (03-26-24 @ 04:06)  Platelet Count - Automated: 429 K/uL (03-26-24 @ 04:06)  WBC Count: 8.20 K/uL (03-26-24 @ 00:57)  Hemoglobin: 9.4 g/dL (03-26-24 @ 00:57)  Hematocrit: 29.9 % (03-26-24 @ 00:57)  Platelet Count - Automated: 405 K/uL (03-26-24 @ 00:57)  Platelet Count - Automated: 487 K/uL (03-25-24 @ 09:16)  WBC Count: 9.82 K/uL (03-25-24 @ 09:16)  Hemoglobin: 10.5 g/dL (03-25-24 @ 09:16)  Hematocrit: 33.2 % (03-25-24 @ 09:16)  WBC Count: 8.32 K/uL (03-24-24 @ 07:23)  Hemoglobin: 10.6 g/dL (03-24-24 @ 07:23)  Hematocrit: 33.4 % (03-24-24 @ 07:23)  Platelet Count - Automated: 479 K/uL (03-24-24 @ 07:23)  WBC Count: 8.14 K/uL (03-23-24 @ 08:39)  Hemoglobin: 10.0 g/dL (03-23-24 @ 08:39)  Hematocrit: 30.3 % (03-23-24 @ 08:39)  Platelet Count - Automated: 422 K/uL (03-23-24 @ 08:39)  WBC Count: 16.22 K/uL (03-23-24 @ 07:15)  Hemoglobin: 9.3 g/dL (03-23-24 @ 07:15)  Hematocrit: 29.4 % (03-23-24 @ 07:15)  Platelet Count - Automated: 284 K/uL (03-23-24 @ 07:15)  WBC Count: 8.62 K/uL (03-23-24 @ 02:46)  Hemoglobin: 10.1 g/dL (03-23-24 @ 02:46)  Hematocrit: 30.2 % (03-23-24 @ 02:46)  Platelet Count - Automated: 400 K/uL (03-23-24 @ 02:46)  Hemoglobin: 10.9 g/dL (03-22-24 @ 10:08)  Hematocrit: 32.3 % (03-22-24 @ 10:08)  Platelet Count - Automated: 414 K/uL (03-22-24 @ 10:08)  WBC Count: 6.74 K/uL (03-22-24 @ 10:08)  WBC Count: 8.62 K/uL (03-21-24 @ 01:48)  Hemoglobin: 10.7 g/dL (03-21-24 @ 01:48)  Hematocrit: 31.8 % (03-21-24 @ 01:48)  Platelet Count - Automated: 374 K/uL (03-21-24 @ 01:48)  WBC Count: 8.07 K/uL (03-20-24 @ 17:00)  Hemoglobin: 10.9 g/dL (03-20-24 @ 17:00)  Hematocrit: 32.9 % (03-20-24 @ 17:00)  Platelet Count - Automated: 388 K/uL (03-20-24 @ 17:00)  WBC Count: 9.04 K/uL (03-20-24 @ 06:49)  Hemoglobin: 11.1 g/dL (03-20-24 @ 06:49)  Hematocrit: 33.0 % (03-20-24 @ 06:49)  Platelet Count - Automated: 420 K/uL (03-20-24 @ 06:49)  WBC Count: 7.36 K/uL (03-19-24 @ 07:30)  Hemoglobin: 10.4 g/dL (03-19-24 @ 07:30)  Hematocrit: 31.2 % (03-19-24 @ 07:30)  Platelet Count - Automated: 435 K/uL (03-19-24 @ 07:30

## 2024-03-28 NOTE — PROGRESS NOTE ADULT - CONVERSATION DETAILS
d/w the patient about the extent of her disease and the fact that due to extrinsic (LN and tumor) or intrinsic issues, there was a  compressing on the IVC and upper thoracic veins which was an emergent situation fro which stenting and RT were being considered. I also indicated her illness was advanced and that any current Rxs were palliative in nature and not curative. She gave verbalized understanding about the info provided and expressed an internal conflict when trying to understand why this was happening to her and how the cancer is taking over her independency and comfort. I recognized her internal struggle, provided support, and offered chaplaincy and social work services to find copying strategies and internal strength as she is dealing with this advanced disease. The patient expressed a desire to continue current level of care and trying to see if there were any ways to try to address her acute medical issues to improve symptoms controlled and prolong her life. However, she recognized that due the advanced nature of her disease, aggressive interventions such as CPR or intubation were not going to be helpful, reason why she had re-stated a DNR/I order. She also indicated she does not want to be in a position where she is depending on other for everything once independency is something that she values.     At this point she is hoping that RT or a stent my deal with the emergent issues with her IVC and upper thoracic venous congestion.     She indicated her boyfriend is not accepting what is going on with her health. I offered to reach out to him; however, she did not want me to talk to him at this time.

## 2024-03-28 NOTE — PROGRESS NOTE ADULT - PROBLEM SELECTOR PLAN 5
Exacerbated by pain, respiratory issues, and newly diagnosed metastatic CA.   -Continue home diazepam 5 mg PO BID PRN for anxiety.  pt used 0x Valium in 24 hours, endorsing relief   -Chaplaincy eval  -Holistic nurse referral will be entered Exacerbated by pain, respiratory issues, and newly diagnosed metastatic CA.   -Continue home diazepam 5 mg PO BID PRN for anxiety.  -Chaplaincy to f/u.   -Holistic nurse inputs noticed and interventions appreciated.

## 2024-03-28 NOTE — PROGRESS NOTE ADULT - SUBJECTIVE AND OBJECTIVE BOX
Date of Service: 03-28-24 @ 15:46    SUBJECTIVE AND OBJECTIVE:  Indication for Geriatrics and Palliative Care Services/INTERVAL HPI:    OVERNIGHT EVENTS:    DNR on chart:DNI  DNI      Allergies    Cipro (Headache; Vomiting; Rash)  penicillin (Headache; Vomiting; Rash)  erythromycin (Headache; Vomiting; Rash)    Intolerances    MEDICATIONS  (STANDING):  acetaminophen   IVPB .. 750 milliGRAM(s) IV Intermittent once  aMIOdarone    Tablet   Oral   aMIOdarone    Tablet 400 milliGRAM(s) Oral three times a day  chlorhexidine 4% Liquid 1 Application(s) Topical <User Schedule>  dexAMETHasone  Injectable 4 milliGRAM(s) IV Push every 8 hours  enoxaparin Injectable 50 milliGRAM(s) SubCutaneous every 12 hours  influenza   Vaccine 0.5 milliLiter(s) IntraMuscular once  ketorolac   Injectable 15 milliGRAM(s) IV Push once  metoprolol tartrate 25 milliGRAM(s) Oral two times a day  nicotine -  14 mG/24Hr(s) Patch 1 Patch Transdermal daily  oxyCODONE  ER Tablet 20 milliGRAM(s) Oral <User Schedule>  pantoprazole    Tablet 40 milliGRAM(s) Oral before breakfast  polyethylene glycol 3350 17 Gram(s) Oral two times a day  senna 2 Tablet(s) Oral at bedtime  urea Oral Powder 15 Gram(s) Oral two times a day    MEDICATIONS  (PRN):  albuterol/ipratropium for Nebulization 3 milliLiter(s) Nebulizer every 6 hours PRN Shortness of Breath and/or Wheezing  aluminum hydroxide/magnesium hydroxide/simethicone Suspension 30 milliLiter(s) Oral every 6 hours PRN Dyspepsia  benzocaine/menthol Lozenge 1 Lozenge Oral two times a day PRN Sore Throat  bisacodyl 5 milliGRAM(s) Oral every 12 hours PRN Constipation  diazepam    Tablet 5 milliGRAM(s) Oral two times a day PRN for anxiety  guaiFENesin Oral Liquid (Sugar-Free) 200 milliGRAM(s) Oral every 6 hours PRN Cough  HYDROmorphone  Injectable 1 milliGRAM(s) IV Push every 3 hours PRN Severe Pain (7 - 10)  melatonin 3 milliGRAM(s) Oral at bedtime PRN Insomnia  naloxone Injectable 0.1 milliGRAM(s) IV Push every 3 minutes PRN Obtundation, respiratory suppression  oxyCODONE    IR 10 milliGRAM(s) Oral every 4 hours PRN Moderate Pain (4 - 6)  sodium chloride 0.9% lock flush 10 milliLiter(s) IV Push every 1 hour PRN Pre/post blood products, medications, blood draw, and to maintain line patency      ITEMS UNCHECKED ARE NOT PRESENT    PRESENT SYMPTOMS: [ ]Unable to self-report - see [ ] CPOT [ ] PAINADS [ ] RDOS  Source if other than patient:  [ ]Family   [ ]Team     Pain:  [ ]yes [ ]no  QOL impact -   Location -                    Aggravating factors -  Quality -  Radiation -  Timing-  Severity (0-10 scale):  Minimal acceptable level (0-10 scale):     CPOT:    https://www.[facility].org/getattachment/apr33v75-6q4e-[email or web address]/Critical-Care-Pain-Observation-Tool-(CPOT)    PAINAD Score: See PAINAD tool and score below       Dyspnea:                           [ ]Mild [ ]Moderate [ ]Severe    RDOS: See RDOS tool and score below   0 to 2  minimal or no respiratory distress   3  mild distress  4 to 6 moderate distress  >7 severe distress      Anxiety:                             [ ]Mild [ ]Moderate [ ]Severe  Fatigue:                             [ ]Mild [ ]Moderate [ ]Severe  Nausea:                             [ ]Mild [ ]Moderate [ ]Severe  Loss of appetite:              [ ]Mild [ ]Moderate [ ]Severe  Constipation:                    [ ]Mild [ ]Moderate [ ]Severe    PCSSQ[Palliative Care Spiritual Screening Question]   Severity (0-10):  Score of 4 or > indicate consideration of Chaplaincy referral.  Chaplaincy Referral: [ ] yes [ ] refused [ ] following [ ] Deferred     Caregiver [place]? : [ ] yes [ ] no [ ] Deferred [ ] Declined             Social work referral [ ] Patient & Family Centered Care Referral [ ]     Anticipatory Grief present?:  [ ] yes [ ] no  [ ] Deferred                  Social work referral [ ] Chaplaincy Referral [ ]    		  Other Symptoms:  [ ]All other review of systems negative     Palliative Performance Status Version 2:   See PPSv2 tool and score below         PHYSICAL EXAM:  Vital Signs Last 24 Hrs  T(C): 36.3 (28 Mar 2024 11:49), Max: 36.9 (27 Mar 2024 20:18)  T(F): 97.3 (28 Mar 2024 11:49), Max: 98.5 (27 Mar 2024 20:18)  HR: 79 (28 Mar 2024 11:49) (79 - 93)  BP: 97/57 (28 Mar 2024 11:49) (97/57 - 127/72)  BP(mean): --  RR: 18 (28 Mar 2024 11:49) (18 - 18)  SpO2: 99% (28 Mar 2024 11:49) (96% - 99%)    Parameters below as of 28 Mar 2024 11:49  Patient On (Oxygen Delivery Method): mask, nonrebreather     I&O's Summary    27 Mar 2024 07:01  -  28 Mar 2024 07:00  --------------------------------------------------------  IN: 236 mL / OUT: 2218 mL / NET: -1982 mL    28 Mar 2024 07:01  -  28 Mar 2024 15:46  --------------------------------------------------------  IN: 200 mL / OUT: 0 mL / NET: 200 mL       GENERAL: [ ]Cachexia    [ ]Alert  [ ]Oriented x   [ ]Lethargic  [ ]Unarousable  [ ]Verbal  [ ]Non-Verbal  Behavioral:   [ ]Anxiety  [ ]Delirium [ ]Agitation [ ]Other  HEENT:  [ ]Normal   [ ]Dry mouth   [ ]ET Tube/Trach  [ ]Oral lesions  PULMONARY:   [ ]Clear [ ]Tachypnea  [ ]Audible excessive secretions   [ ]Rhonchi        [ ]Right [ ]Left [ ]Bilateral  [ ]Crackles        [ ]Right [ ]Left [ ]Bilateral  [ ]Wheezing     [ ]Right [ ]Left [ ]Bilateral  [ ]Diminished BS [ ] Right [ ]Left [ ]Bilateral  CARDIOVASCULAR:    [ ]Regular [ ]Irregular [ ]Tachy  [ ]Malik [ ]Murmur [ ]Other  GASTROINTESTINAL:  [ ]Soft  [ ]Distended   [ ]+BS  [ ]Non tender [ ]Tender  [ ]Other [ ]PEG [ ]OGT/ NGT   Last BM:   GENITOURINARY:  [ ]Normal [ ]Incontinent   [ ]Oliguria/Anuria   [ ]Che  MUSCULOSKELETAL:   [ ]Normal   [ ]Weakness  [ ]Bed/Wheelchair bound [ ]Edema  NEUROLOGIC:   [ ]No focal deficits  [ ] Cognitive impairment  [ ] Dysphagia [ ]Dysarthria [ ] Paresis [ ]Other   SKIN:   [ ]Normal  [ ]Rash  [ ]Other  [ ]Pressure ulcer(s) [ ]y [ ]n present on admission    CRITICAL CARE:  [ ]Shock Present  [ ]Septic [ ]Cardiogenic [ ]Neurologic [ ]Hypovolemic  [ ]Vasopressors [ ]Inotropes  [ ]Respiratory failure present [ ]Mechanical Ventilation [ ]Non-invasive ventilatory support [ ]High-Flow   [ ]Acute  [ ]Chronic [ ]Hypoxic  [ ]Hypercarbic [ ]Other  [ ]Other organ failure     LABS:                        8.9    7.07  )-----------( 371      ( 28 Mar 2024 06:12 )             28.5   03-28    132<L>  |  97  |  30<H>  ----------------------------<  168<H>  4.9   |  22  |  0.97    Ca    9.0      28 Mar 2024 06:11  Phos  3.1     03-28  Mg     2.0     03-28        Urinalysis Basic - ( 28 Mar 2024 06:11 )    Color: x / Appearance: x / SG: x / pH: x  Gluc: 168 mg/dL / Ketone: x  / Bili: x / Urobili: x   Blood: x / Protein: x / Nitrite: x   Leuk Esterase: x / RBC: x / WBC x   Sq Epi: x / Non Sq Epi: x / Bacteria: x      RADIOLOGY & ADDITIONAL STUDIES:    Protein Calorie Malnutrition Present: [ ]mild [ ]moderate [ ]severe [ ]underweight [ ]morbid obesity  https://www.andeal.org/vault/2440/web/files/ONC/Table_Clinical%20Characteristics%20to%20Document%20Malnutrition-White%20JV%20et%20al%202012.pdf    Height (cm): 154.9 (03-26-24 @ 16:20), 154.9 (03-10-24 @ 16:32)  Weight (kg): 49.9 (03-26-24 @ 16:20), 49.9 (03-10-24 @ 16:32)  BMI (kg/m2): 20.8 (03-26-24 @ 16:20), 20.8 (03-10-24 @ 16:32)    [ ]PPSV2 < or = 30%  [ ]significant weight loss [ ]poor nutritional intake [ ]anasarca[ ]Artificial Nutrition    Other REFERRALS:  [ ]Hospice  [ ]Child Life  [ ]Social Work  [ ]Case management [ ]Holistic Therapy     Goals of Care Document: Date of Service: 03-28-24 @ 15:46    SUBJECTIVE AND OBJECTIVE: Worsening RUE edema and pain. She was also c/o mild dyspnea. She denied any other complaints.   Indication for Geriatrics and Palliative Care Services/INTERVAL HPI: Goals of care/symptoms.     OVERNIGHT EVENTS: She used Dilaudid 1mg IV x 3 over 24 hours (6am to 6am)     DNR on chart:DNI  DNI      Allergies    Cipro (Headache; Vomiting; Rash)  penicillin (Headache; Vomiting; Rash)  erythromycin (Headache; Vomiting; Rash)    Intolerances    MEDICATIONS  (STANDING):  acetaminophen   IVPB .. 750 milliGRAM(s) IV Intermittent once  aMIOdarone    Tablet   Oral   aMIOdarone    Tablet 400 milliGRAM(s) Oral three times a day  chlorhexidine 4% Liquid 1 Application(s) Topical <User Schedule>  dexAMETHasone  Injectable 4 milliGRAM(s) IV Push every 8 hours  enoxaparin Injectable 50 milliGRAM(s) SubCutaneous every 12 hours  influenza   Vaccine 0.5 milliLiter(s) IntraMuscular once  ketorolac   Injectable 15 milliGRAM(s) IV Push once  metoprolol tartrate 25 milliGRAM(s) Oral two times a day  nicotine -  14 mG/24Hr(s) Patch 1 Patch Transdermal daily  oxyCODONE  ER Tablet 20 milliGRAM(s) Oral <User Schedule>  pantoprazole    Tablet 40 milliGRAM(s) Oral before breakfast  polyethylene glycol 3350 17 Gram(s) Oral two times a day  senna 2 Tablet(s) Oral at bedtime  urea Oral Powder 15 Gram(s) Oral two times a day    MEDICATIONS  (PRN):  albuterol/ipratropium for Nebulization 3 milliLiter(s) Nebulizer every 6 hours PRN Shortness of Breath and/or Wheezing  aluminum hydroxide/magnesium hydroxide/simethicone Suspension 30 milliLiter(s) Oral every 6 hours PRN Dyspepsia  benzocaine/menthol Lozenge 1 Lozenge Oral two times a day PRN Sore Throat  bisacodyl 5 milliGRAM(s) Oral every 12 hours PRN Constipation  diazepam    Tablet 5 milliGRAM(s) Oral two times a day PRN for anxiety  guaiFENesin Oral Liquid (Sugar-Free) 200 milliGRAM(s) Oral every 6 hours PRN Cough  HYDROmorphone  Injectable 1 milliGRAM(s) IV Push every 3 hours PRN Severe Pain (7 - 10)  melatonin 3 milliGRAM(s) Oral at bedtime PRN Insomnia  naloxone Injectable 0.1 milliGRAM(s) IV Push every 3 minutes PRN Obtundation, respiratory suppression  oxyCODONE    IR 10 milliGRAM(s) Oral every 4 hours PRN Moderate Pain (4 - 6)  sodium chloride 0.9% lock flush 10 milliLiter(s) IV Push every 1 hour PRN Pre/post blood products, medications, blood draw, and to maintain line patency      ITEMS UNCHECKED ARE NOT PRESENT    PRESENT SYMPTOMS: [ ]Unable to self-report - see [ ] CPOT [ ] PAINADS [ ] RDOS  Source if other than patient:  [ ]Family   [ ]Team     Pain:  [x ]yes [ ]no  QOL impact - not being able to rest   Location -                  right arm and   Aggravating factors - Palpation   Quality - not able to indicate   Radiation - none   Timing- constant   Severity (0-10 scale): up to 9/10 and going to 3/10 with Dilaudid  Minimal acceptable level (0-10 scale):     CPOT:    https://www.[facility].org/getattachment/aeh32v46-4i3a-3h9g-2t6v-7300m0816n0p/Critical-Care-Pain-Observation-Tool-(CPOT)    PAINAD Score: See PAINAD tool and score below       Dyspnea:                           [x ]Mild [ ]Moderate [ ]Severe    RDOS: See RDOS tool and score below   0 to 2  minimal or no respiratory distress   3  mild distress  4 to 6 moderate distress  >7 severe distress      Anxiety:                             [ ]Mild [ ]Moderate [ ]Severe  Fatigue:                             [ ]Mild [ ]Moderate [ ]Severe  Nausea:                             [ ]Mild [ ]Moderate [ ]Severe  Loss of appetite:              [ ]Mild [ ]Moderate [ ]Severe  Constipation:                    [ ]Mild [ ]Moderate [ ]Severe    PCSSQ[Palliative Care Spiritual Screening Question]   Severity (0-10):  Score of 4 or > indicate consideration of Chaplaincy referral.  Chaplaincy Referral: [x] yes [ ] refused [ ] following [ ] Deferred     Caregiver [place]? : [ ] yes [ ] no [ x] Deferred [ ] Declined             Social work referral [ ] Patient & Family Centered Care Referral [ ]     Anticipatory Grief present?:  [ ] yes [ ] no  [x ] Deferred                  Social work referral [ ] Chaplaincy Referral [ ]    		  Other Symptoms:  [x ]All other review of systems negative     Palliative Performance Status Version 2:   See PPSv2 tool and score below         PHYSICAL EXAM:  Vital Signs Last 24 Hrs  T(C): 36.3 (28 Mar 2024 11:49), Max: 36.9 (27 Mar 2024 20:18)  T(F): 97.3 (28 Mar 2024 11:49), Max: 98.5 (27 Mar 2024 20:18)  HR: 79 (28 Mar 2024 11:49) (79 - 93)  BP: 97/57 (28 Mar 2024 11:49) (97/57 - 127/72)  BP(mean): --  RR: 18 (28 Mar 2024 11:49) (18 - 18)  SpO2: 99% (28 Mar 2024 11:49) (96% - 99%)    Parameters below as of 28 Mar 2024 11:49  Patient On (Oxygen Delivery Method): mask, nonrebreather     I&O's Summary    27 Mar 2024 07:01  -  28 Mar 2024 07:00  --------------------------------------------------------  IN: 236 mL / OUT: 2218 mL / NET: -1982 mL    28 Mar 2024 07:01  -  28 Mar 2024 15:46  --------------------------------------------------------  IN: 200 mL / OUT: 0 mL / NET: 200 mL       GENERAL: [ ]Cachexia    [x ]Alert  [x ]Oriented x 3   [ ]Lethargic  [ ]Unarousable  [ ]Verbal  [ ]Non-Verbal  Behavioral:   [ ]Anxiety  [ ]Delirium [ ]Agitation [ ]Other  HEENT:  [ ]Normal   [ ]Dry mouth   [ ]ET Tube/Trach  [ ]Oral lesions  [x] Right eye with conjunctival ecchymosis   PULMONARY:   [ ]Clear [ ]Tachypnea  [ ]Audible excessive secretions   [ ]Rhonchi        [ ]Right [ ]Left [ ]Bilateral  [ ]Crackles        [ ]Right [ ]Left [ ]Bilateral  [ ]Wheezing     [ ]Right [ ]Left [ ]Bilateral  [x ]Diminished BS [ ] Right [ ]Left [x ]Bilateral  [x] Pleurx   CARDIOVASCULAR:    [x ]Regular [ ]Irregular [ ]Tachy  [ ]Malik [ ]Murmur [ ]Other  GASTROINTESTINAL:  [ ]Soft  [ ]Distended   [x ]+BS  [ ]Non tender [ ]Tender  [ ]Other [ ]PEG [ ]OGT/ NGT   Last BM: 3/22  GENITOURINARY:  [x ]Normal [ ]Incontinent   [ ]Oliguria/Anuria   [ ]Che  MUSCULOSKELETAL:   [ ]Normal   [ ]Weakness  [ ]Bed/Wheelchair bound [ ]Edema  NEUROLOGIC:   [ x]No focal deficits  [ ] Cognitive impairment  [ ] Dysphagia [ ]Dysarthria [ ] Paresis [ ]Other   SKIN:   [x ]Normal  [ ]Rash  [ ]Other  [ ]Pressure ulcer(s) [ ]y [ ]n present on admission    CRITICAL CARE:  [ ]Shock Present  [ ]Septic [ ]Cardiogenic [ ]Neurologic [ ]Hypovolemic  [ ]Vasopressors [ ]Inotropes  [ ]Respiratory failure present [ ]Mechanical Ventilation [ ]Non-invasive ventilatory support [ ]High-Flow   [ ]Acute  [ ]Chronic [ ]Hypoxic  [ ]Hypercarbic [ ]Other  [ ]Other organ failure     LABS:                        8.9    7.07  )-----------( 371      ( 28 Mar 2024 06:12 )             28.5   03-28    132<L>  |  97  |  30<H>  ----------------------------<  168<H>  4.9   |  22  |  0.97    Ca    9.0      28 Mar 2024 06:11  Phos  3.1     03-28  Mg     2.0     03-28        Urinalysis Basic - ( 28 Mar 2024 06:11 )    Color: x / Appearance: x / SG: x / pH: x  Gluc: 168 mg/dL / Ketone: x  / Bili: x / Urobili: x   Blood: x / Protein: x / Nitrite: x   Leuk Esterase: x / RBC: x / WBC x   Sq Epi: x / Non Sq Epi: x / Bacteria: x      RADIOLOGY & ADDITIONAL STUDIES:  < from: [facility] Duplex Upper Ext Vein Scan, Bilat (03.28.24 @ 12:02) >    IMPRESSION:    Bilateral upper extremity venous ultrasound demonstrates acute DVT   involving: right internal jugular vein, right innominate vein, right   subclavianvein, right brachial vein, and left internal jugular vein.    Superficial venous thrombosis involves the right and left cephalic veins.   Left basilic vein is not visualized.    Edema of the superficial soft tissues of the upper extremities, right   greater than left. Correlate clinically. Known right neck/thoracic mass   is better evaluated on recent neck CT.    SACHA Betancur was informed on 3/28/2024 at 10:50PM.    --- End of Report ---            JAME MAYER M.D., ATTENDING RADIOLOGIST  This document has been electronically signed. Mar 28 2024  1:34PM    < end of copied text >  < from: CT Neck Soft Tissue w/ IV Cont (03.27.24 @ 17:10) >  IMPRESSION:    Bulky and conglomerate lymphadenopathy at right level 4 invades internal   jugular vein with thrombus extending up to hyoid level. 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 IJV otherwise patent up to skull base.    New left IJV nonocclusive thrombus. Otherwise, no significant change from   3/14/24.    --- End of Report ---            MARISEL PÉREZ MD; Attending Radiologist  This document has been electronically signed. Mar 27 2024  9:31PM    < end of copied text >    Protein Calorie Malnutrition Present: [ ]mild [ ]moderate [ ]severe [ ]underweight [ ]morbid obesity  https://www.andeal.org/vault/2440/web/files/ONC/Table_Clinical%20Characteristics%20to%20Document%20Malnutrition-White%20JV%20et%20al%202012.pdf    Height (cm): 154.9 (03-26-24 @ 16:20), 154.9 (03-10-24 @ 16:32)  Weight (kg): 49.9 (03-26-24 @ 16:20), 49.9 (03-10-24 @ 16:32)  BMI (kg/m2): 20.8 (03-26-24 @ 16:20), 20.8 (03-10-24 @ 16:32)    [ ]PPSV2 < or = 30%  [ ]significant weight loss [ ]poor nutritional intake [ ]anasarca[ ]Artificial Nutrition    Other REFERRALS:  [ ]Hospice  [ ]Child Life  [ ]Social Work  [ ]Case management [ ]Holistic Therapy     Goals of Care Document:

## 2024-03-28 NOTE — PROGRESS NOTE ADULT - PROBLEM SELECTOR PLAN 8
Patient's  calls to say that she only has 2 more pills of the meclizine left and she is still experiencing dizziness. He also states that you took her out of work until Dec 19 and needs you to complete paperwork for him being out of work to take care of her. He will bring paperwork by the office. will continue to follow for symptoms and goals of    case discussed the with primary team Will continue to f/u for symptoms, GOC, and support.         Conor Jack MD  Associate Chief Geriatrics and Palliative [facility]   [facility] Consult Service   , [employer] at [facility]      Please contact me via Teams from Monday through Friday between 9am-5pm. If not answering, please call the palliative care pager [phone]    After 5pm and on weekends, please see the contact information below:    In the event of newly developing, evolving, or worsening symptoms, please contact the Palliative Medicine team via pager (if the patient is at [facility] #[phone] or if the patient is at [facility] #[phone]) The Geriatric and Palliative Medicine service has coverage 24 hours a day/ 7 days a week to provide medical recommendations regarding symptom management needs via telephone

## 2024-03-28 NOTE — PROGRESS NOTE ADULT - PROBLEM SELECTOR PLAN 3
IR and RT are f/u.   Further work up and Rx as per the primary team.   No IR intervention is currently planned.   For possible RT at [facility]   O2 by NC -IR and RT are f/u.   -Further work up and Rx as per the primary team.   -For possible RT at [facility]   -Prelim not from IR is indicating that stenting is not to be considered at this time.   -O2 by NC

## 2024-03-28 NOTE — PROGRESS NOTE ADULT - SUBJECTIVE AND OBJECTIVE BOX
[facility] DIVISION OF KIDNEY DISEASES AND HYPERTENSION -- FOLLOW UP NOTE  --------------------------------------------------------------------------------  Chief Complaint:    24 hour events/subjective:      PAST HISTORY  --------------------------------------------------------------------------------  No significant changes to PMH, PSH, FHx, SHx, unless otherwise noted    ALLERGIES & MEDICATIONS  --------------------------------------------------------------------------------  Allergies    Cipro (Headache; Vomiting; Rash)  penicillin (Headache; Vomiting; Rash)  erythromycin (Headache; Vomiting; Rash)    Intolerances      Standing Inpatient Medications  acetaminophen   IVPB .. 750 milliGRAM(s) IV Intermittent once  aMIOdarone    Tablet   Oral   aMIOdarone    Tablet 400 milliGRAM(s) Oral three times a day  chlorhexidine 4% Liquid 1 Application(s) Topical <User Schedule>  dexAMETHasone  Injectable 4 milliGRAM(s) IV Push every 8 hours  enoxaparin Injectable 50 milliGRAM(s) SubCutaneous every 12 hours  influenza   Vaccine 0.5 milliLiter(s) IntraMuscular once  ketorolac   Injectable 15 milliGRAM(s) IV Push once  metoprolol tartrate 25 milliGRAM(s) Oral two times a day  nicotine -  14 mG/24Hr(s) Patch 1 Patch Transdermal daily  oxyCODONE  ER Tablet 20 milliGRAM(s) Oral <User Schedule>  pantoprazole    Tablet 40 milliGRAM(s) Oral before breakfast  polyethylene glycol 3350 17 Gram(s) Oral two times a day  senna 2 Tablet(s) Oral at bedtime  urea Oral Powder 15 Gram(s) Oral two times a day    PRN Inpatient Medications  albuterol/ipratropium for Nebulization 3 milliLiter(s) Nebulizer every 6 hours PRN  aluminum hydroxide/magnesium hydroxide/simethicone Suspension 30 milliLiter(s) Oral every 6 hours PRN  benzocaine/menthol Lozenge 1 Lozenge Oral two times a day PRN  bisacodyl 5 milliGRAM(s) Oral every 12 hours PRN  diazepam    Tablet 5 milliGRAM(s) Oral two times a day PRN  guaiFENesin Oral Liquid (Sugar-Free) 200 milliGRAM(s) Oral every 6 hours PRN  HYDROmorphone  Injectable 1 milliGRAM(s) IV Push every 3 hours PRN  melatonin 3 milliGRAM(s) Oral at bedtime PRN  naloxone Injectable 0.1 milliGRAM(s) IV Push every 3 minutes PRN  oxyCODONE    IR 10 milliGRAM(s) Oral every 4 hours PRN  sodium chloride 0.9% lock flush 10 milliLiter(s) IV Push every 1 hour PRN      REVIEW OF SYSTEMS  --------------------------------------------------------------------------------  Gen: No weight changes, fatigue, fevers/chills, weakness  Skin: No rashes  Head/Eyes/Ears/Mouth: No headache; Normal hearing; Normal vision w/o blurriness; No sinus pain/discomfort, sore throat  Respiratory: No dyspnea, cough, wheezing, hemoptysis  CV: No chest pain, PND, orthopnea  GI: No abdominal pain, diarrhea, constipation, nausea, vomiting, melena, hematochezia  : No increased frequency, dysuria, hematuria, nocturia  MSK: No joint pain/swelling; no back pain; no edema  Neuro: No dizziness/lightheadedness, weakness, seizures, numbness, tingling  Heme: No easy bruising or bleeding  Endo: No heat/cold intolerance  Psych: No significant nervousness, anxiety, stress, depression    All other systems were reviewed and are negative, except as noted.    VITALS/PHYSICAL EXAM  --------------------------------------------------------------------------------  T(C): 36.3 (03-28-24 @ 11:49), Max: 36.9 (03-27-24 @ 20:18)  HR: 79 (03-28-24 @ 11:49) (79 - 93)  BP: 97/57 (03-28-24 @ 11:49) (97/57 - 127/72)  RR: 18 (03-28-24 @ 11:49) (18 - 18)  SpO2: 99% (03-28-24 @ 11:49) (96% - 99%)  Wt(kg): --  Height (cm): 154.9 (03-26-24 @ 16:20)  Weight (kg): 49.9 (03-26-24 @ 16:20)  BMI (kg/m2): 20.8 (03-26-24 @ 16:20)  BSA (m2): 1.47 (03-26-24 @ 16:20)      03-27-24 @ 07:01  -  03-28-24 @ 07:00  --------------------------------------------------------  IN: 236 mL / OUT: 2218 mL / NET: -1982 mL    03-28-24 @ 07:01  -  03-28-24 @ 13:59  --------------------------------------------------------  IN: 100 mL / OUT: 0 mL / NET: 100 mL        LABS/STUDIES  --------------------------------------------------------------------------------              8.9    7.07  >-----------<  371      [03-28-24 @ 06:12]              28.5     132  |  97  |  30  ----------------------------<  168      [03-28-24 @ 06:11]  4.9   |  22  |  0.97        Ca     9.0     [03-28-24 @ 06:11]      Mg     2.0     [03-28-24 @ 06:11]      Phos  3.1     [03-28-24 @ 06:11]          Uric acid 3.8      [03-28-24 @ 06:11]        [03-28-24 @ 06:11]    Creatinine Trend:  SCr 0.97 [03-28 @ 06:11]  SCr 1.16 [03-27 @ 13:01]  SCr 0.93 [03-26 @ 04:06]  SCr 0.95 [03-25 @ 09:14]  SCr 0.99 [03-24 @ 07:21]    Urinalysis - [03-28-24 @ 06:11]      Color  / Appearance  / SG  / pH       Gluc 168 / Ketone   / Bili  / Urobili        Blood  / Protein  / Leuk Est  / Nitrite       RBC  / WBC  / Hyaline  / Gran  / Sq Epi  / Non Sq Epi  / Bacteria     Urine Creatinine 67      [03-26-24 @ 16:59]  Urine Sodium 40      [03-26-24 @ 16:59]  Urine Potassium 37      [03-26-24 @ 16:59]  Urine Chloride 47      [03-26-24 @ 16:59]  Urine Osmolality 461      [03-26-24 @ 16:59]    TSH 3.82      [03-23-24 @ 08:39]  Lipid: chol 143, , HDL 50, LDL --      [03-12-24 @ 07:01]    HCV 0.09, Nonreact      [03-12-24 @ 07:01]

## 2024-03-28 NOTE — PROGRESS NOTE ADULT - PROBLEM SELECTOR PLAN 2
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 Not candidate for IV on R due to mass, but also has SVC obliteration on CT neck to doubt patient can have PICC or central line placed on R  - MICU team assistance appreciated in placing R femoral TLC  - per ID ok to stop abx    #SVC syndrome- IR reconsulted for possible stenting; not feasible due to size/location, now planned for tier 2 transfer to [facility] for urgent radiation  - d/w and agreed on by IR, rad onc Dr. Sterling, oncology, cardiology, thoracic  - pt and boyfriencarlton Breaux also aware and in agreement with transfer  - NM notified  - bed still pending

## 2024-03-28 NOTE — CHART NOTE - NSCHARTNOTEFT_GEN_A_CORE
58 Female active smoker hx of RCC s/p total nephrectomy, S/p hysterectomy, R-sided glaucoma, Fibromyalgia, Anxiety, GERD,  COPD, presented 3/11  R breast swelling, redness and pain.      Pt was recently seen by MICU who placed a femoral central line iso of SVC syndrome.    IR consulted for SVC stenting.    After reviewing CT images with IR attending Dr. Marr and discussion with Dr Nolen, appears to be mixed bland and tumor thrombus:     SVC appears to be predominantly tumor thrombus  left brachiocephalic vein thrombus, however,  appears to be predominantly bland.    in this setting, SVC stent IS AN OPTION for palliation of SVC syndrome, with possible landing zones for stent.     In addition, AC may help decrease clot burden.     However, at this time, patient undergoing evaluation for radiation w/ transfer to [facility] and without acute worsening of SVC syndrome symptoms, which may also improve with AC.       continue AC and radiation management.   no plan for urgent/emergent stent placement, however If clinical status worsens stent is desired, IR will continue to follow for possible procedure if patient remains at [facility].       Pj Oh MD   Interventional Radiology Chief resident/PGY6  Contact on Microsoft Teams for nonemergent issues    - Nonemergent consults:  [facility] order "Consult- Interventional Radiology", no page required  - Emergent issues (pager): [facility] [phone]; [facility] [phone]; [phone]; DO NOT PAGE FOR SCHEDULING QUESTIONS  - Scheduling questions 8am-6pm : [facility] [phone]; [facility] [phone],   - Clinic/outpatient booking: [facility] [phone]; [facility] [phone] 58 Female active smoker hx of RCC s/p total nephrectomy, S/p hysterectomy, R-sided glaucoma, Fibromyalgia, Anxiety, GERD,  COPD, presented 3/11  R breast swelling, redness and pain.      Pt was recently seen by MICU who placed a femoral central line iso of SVC syndrome.    IR consulted for SVC stenting.    After reviewing CT images with IR attending Dr. Marr and discussion with Dr Nolen, appears to be mixed bland and tumor thrombus:     SVC appears to be predominantly tumor thrombus  left brachiocephalic vein thrombus, however,  appears to be predominantly bland.    in this setting, SVC stent IS AN OPTION for palliation of SVC syndrome, with possible landing zones for stent.     In addition, AC may help decrease clot burden.     However, at this time, patient undergoing evaluation for radiation w/ transfer to [facility] and without acute worsening of SVC syndrome symptoms, which may also improve with AC.       continue AC and radiation management.   no plan for urgent/emergent stent placement, however If clinical status worsens stent is desired, IR will continue to follow for possible procedure if patient remains at [facility].         Pj Oh MD   Interventional Radiology Chief resident/PGY6  Contact on Microsoft Teams for nonemergent issues    - Nonemergent consults:  [facility] order "Consult- Interventional Radiology", no page required  - Emergent issues (pager): [facility] [phone]; [facility] [phone]; [phone]; DO NOT PAGE FOR SCHEDULING QUESTIONS  - Scheduling questions 8am-6pm : [facility] [phone]; [facility] [phone],   - Clinic/outpatient booking: [facility] [phone]; [facility] [phone]

## 2024-03-28 NOTE — PROGRESS NOTE ADULT - SUBJECTIVE AND OBJECTIVE BOX
Moose Nolen MD  Division of Hospital Medicine  Available on MS teams until 7pm  If no response or off-hours, page [phone]  -------------------------------------    Patient is a 58y old  Female who presents with a chief complaint of Mastitis, breast malignancy (28 Mar 2024 10:51)    SUBJECTIVE / OVERNIGHT EVENTS: none acute  ADDITIONAL REVIEW OF SYSTEMS: pt notes ongoing arm pain with movements, but otherwise comfortable and wishes to reinstate her DNR/DNI. Otherwise no new complaints.     MEDICATIONS  (STANDING):  acetaminophen   IVPB .. 750 milliGRAM(s) IV Intermittent once  alteplase for catheter clearance 2 milliGRAM(s) Catheter once  aMIOdarone    Tablet 400 milliGRAM(s) Oral three times a day  aMIOdarone    Tablet   Oral   chlorhexidine 4% Liquid 1 Application(s) Topical <User Schedule>  dexAMETHasone  Injectable 4 milliGRAM(s) IV Push every 8 hours  enoxaparin Injectable 50 milliGRAM(s) SubCutaneous every 12 hours  influenza   Vaccine 0.5 milliLiter(s) IntraMuscular once  ketorolac   Injectable 15 milliGRAM(s) IV Push once  metoprolol tartrate 25 milliGRAM(s) Oral two times a day  nicotine -  14 mG/24Hr(s) Patch 1 Patch Transdermal daily  oxyCODONE  ER Tablet 20 milliGRAM(s) Oral <User Schedule>  pantoprazole    Tablet 40 milliGRAM(s) Oral before breakfast  polyethylene glycol 3350 17 Gram(s) Oral two times a day  senna 2 Tablet(s) Oral at bedtime  urea Oral Powder 15 Gram(s) Oral two times a day    MEDICATIONS  (PRN):  albuterol/ipratropium for Nebulization 3 milliLiter(s) Nebulizer every 6 hours PRN Shortness of Breath and/or Wheezing  aluminum hydroxide/magnesium hydroxide/simethicone Suspension 30 milliLiter(s) Oral every 6 hours PRN Dyspepsia  benzocaine/menthol Lozenge 1 Lozenge Oral two times a day PRN Sore Throat  bisacodyl 5 milliGRAM(s) Oral every 12 hours PRN Constipation  diazepam    Tablet 5 milliGRAM(s) Oral two times a day PRN for anxiety  guaiFENesin Oral Liquid (Sugar-Free) 200 milliGRAM(s) Oral every 6 hours PRN Cough  HYDROmorphone  Injectable 1 milliGRAM(s) IV Push every 3 hours PRN Severe Pain (7 - 10)  melatonin 3 milliGRAM(s) Oral at bedtime PRN Insomnia  naloxone Injectable 0.1 milliGRAM(s) IV Push every 3 minutes PRN Obtundation, respiratory suppression  oxyCODONE    IR 10 milliGRAM(s) Oral every 4 hours PRN Moderate Pain (4 - 6)  sodium chloride 0.9% lock flush 10 milliLiter(s) IV Push every 1 hour PRN Pre/post blood products, medications, blood draw, and to maintain line patency      CAPILLARY BLOOD GLUCOSE        I&O's Summary    27 Mar 2024 07:01  -  28 Mar 2024 07:00  --------------------------------------------------------  IN: 236 mL / OUT: 2218 mL / NET: -1982 mL    28 Mar 2024 07:01  -  28 Mar 2024 12:50  --------------------------------------------------------  IN: 100 mL / OUT: 0 mL / NET: 100 mL        PHYSICAL EXAM:  Vital Signs Last 24 Hrs  T(C): 36.3 (28 Mar 2024 11:49), Max: 36.9 (27 Mar 2024 20:18)  T(F): 97.3 (28 Mar 2024 11:49), Max: 98.5 (27 Mar 2024 20:18)  HR: 79 (28 Mar 2024 11:49) (79 - 93)  BP: 97/57 (28 Mar 2024 11:49) (97/57 - 127/72)  BP(mean): --  RR: 18 (28 Mar 2024 11:49) (18 - 18)  SpO2: 99% (28 Mar 2024 11:49) (96% - 99%)    Parameters below as of 28 Mar 2024 11:49  Patient On (Oxygen Delivery Method): mask, nonrebreather      CONSTITUTIONAL: NAD  EYES: PERRLA; conjunctiva and sclera clear  ENMT: MMM  NECK: Supple  RESPIRATORY: Normal respiratory effort; CTAB  CARDIOVASCULAR: RRR, no JVD, B/L UE edema, R groin TLC  ABDOMEN: Nontender to palpation, normoactive BS, no guarding/rigidity  MUSCLOSKELETAL:  no clubbing/cyanosis, no joint swelling or tenderness to palpation  PSYCH: A+O x 3, affect normal  NEUROLOGY: CN 2-12 are intact and symmetric; no gross sensory or motor deficits  SKIN: No rashes; no palpable lesions    LABS:                        8.9    7.07  )-----------( 371      ( 28 Mar 2024 06:12 )             28.5     03-28    132<L>  |  97  |  30<H>  ----------------------------<  168<H>  4.9   |  22  |  0.97    Ca    9.0      28 Mar 2024 06:11  Phos  3.1     03-28  Mg     2.0     03-28            Urinalysis Basic - ( 28 Mar 2024 06:11 )    Color: x / Appearance: x / SG: x / pH: x  Gluc: 168 mg/dL / Ketone: x  / Bili: x / Urobili: x   Blood: x / Protein: x / Nitrite: x   Leuk Esterase: x / RBC: x / WBC x   Sq Epi: x / Non Sq Epi: x / Bacteria: x          RADIOLOGY & ADDITIONAL TESTS:  Results Reviewed:   Imaging Personally Reviewed:  Electrocardiogram Personally Reviewed:    COORDINATION OF CARE:  Care Discussed with Consultants/Other Providers [Y/N]: IR, oncology  Prior or Outpatient Records Reviewed [Y/N]:

## 2024-03-28 NOTE — PROGRESS NOTE ADULT - ASSESSMENT
59 y/o woman presenting to hospital with right breast/chest wall swelling and cellulitis with imaging evidence concerning for metastatic malignancy w/ lung nodules, and axillary, supraclavicular, and mediastinal adenopathy.  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 Course complicated with SVC syndrome.       #Metastatic Disease suspecting of GI origin:  -Path from 3/14/24 showing neoplastic cells positive for CK7 and CDX2 immunostains, while negative for CK20, TTF1, GATA3, TRPS1 and PAX8. The immunoprofile is in favor of carcinoma of upper GI or pancreaticobiliary origin.  -S/p pericardiocentesis and drain on 3/19 for malignant pericardial effusion, f/u cytology from procedure.   -CT Neck c/f SVC syndrome w/ thrombus that patient is on full dose anticoagulation for.   -Given CT A/P findings of pancreatic cyst, with RUQ US results of possible gastric thickening would first recommend MR Abdomen pancreas protocol and MRCP to eval both the pancreatic vasculature and the hepatobiliary system. Pending results of MR abdomen would recommend GI consult to eval if patient would need EGD/EUS +/- FNA for further pathologic evaluation. Would favor inpatient w/u for patient given aggressive nature of her disease and extensive disease.   -Pain control/anxiety/Supportive care  per primary team or palliative care team if necessary; continue O2 supplement.   -Of note, Attending Dr. Ponce communicated with primary team attending Dr. Nolen, and recommended [facility] for radiation on 3/27/24; pending transferring to [facility] for radiation.   -C/w Dexamethasone 4mg q8hr.    -closely monitor for worsening of signs/symptoms of SVC syndrome.   -Check tumor lysis lab including K, phos, Mg, LDH, uric acid;   -onc team will f/u     Plan d/w Dr. Ponce.     Larry Nolen M.D.  H/O PGY-4

## 2024-03-28 NOTE — PROGRESS NOTE ADULT - PROBLEM SELECTOR PLAN 2
-Supplemental O2 as per the primary team.   - s/p Pleurx   -If symptoms are recurrent and or creating distress, may also add Dilaudid 1mg q 3 PRN for dyspnea

## 2024-03-28 NOTE — PROGRESS NOTE ADULT - PROBLEM SELECTOR PLAN 3
- pericardial effusion on CT chest   - echo with moderate pericardial effusion and collapse of the RA  - s/p pericardial window 3/26  - chest tube drainage care per thoracic sx    #pleural effusion- pt on 4-6 L NC, now s/p R chest tube by thoracic with 1L output and minimal improvement in resp status  - cont chest tube output monitoring  - transfer to [facility] for urgent radiation for SVC syndrome

## 2024-03-28 NOTE — PROGRESS NOTE ADULT - ASSESSMENT
58F w/Hx of RCC s/p R total nephrectomy, S/p hysterectomy, R-sided glaucoma, Fibromyalgia, Anxiety, GERD, smoker presents due to R breast swelling, redness and pain. Admitted for right breast swelling and cellulitis found to have imaging evidence concerning for metastatic malignancy w/ lung nodules, and axillary, supraclavicular, and mediastinal adenopathy. 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 Also s/p pericardial window for moderate pericardial effusion now with chest tube. Now found to be in Afib with RVR, s/p R pleurx placement with minimal improvement in respiratory status- now concerned for worsening SVC syndrome pending transfer to [facility] for urgent radiation

## 2024-03-28 NOTE — PROGRESS NOTE ADULT - PROBLEM SELECTOR PLAN 7
following consult received call from primary team ACP, as per ACP patient rescinded DNR/I  as per ACP form voided  as per discussion today pt wishing to pursue further medical work up and DMT   will f/u regarding GOC See GOC above.

## 2024-03-28 NOTE — PROGRESS NOTE ADULT - SUBJECTIVE AND OBJECTIVE BOX
Follow Up:    R neck mass    Interval History/ROS:  VSS ON. S/p Pleurx. Patient was seen and examined at bedside. +R chest wall and breast pain. No fever.     Allergies  Cipro (Headache; Vomiting; Rash)  penicillin (Headache; Vomiting; Rash)  erythromycin (Headache; Vomiting; Rash)        ANTIMICROBIALS:      OTHER MEDS:  MEDICATIONS  (STANDING):  acetaminophen   IVPB .. 750 once  albuterol/ipratropium for Nebulization 3 every 6 hours PRN  aluminum hydroxide/magnesium hydroxide/simethicone Suspension 30 every 6 hours PRN  aMIOdarone    Tablet    aMIOdarone    Tablet 400 three times a day  bisacodyl 5 every 12 hours PRN  dexAMETHasone  Injectable 4 every 8 hours  diazepam    Tablet 5 two times a day PRN  enoxaparin Injectable 50 every 12 hours  guaiFENesin Oral Liquid (Sugar-Free) 200 every 6 hours PRN  HYDROmorphone  Injectable 1 every 3 hours PRN  influenza   Vaccine 0.5 once  ketorolac   Injectable 15 once  melatonin 3 at bedtime PRN  metoprolol tartrate 25 two times a day  oxyCODONE    IR 10 every 4 hours PRN  oxyCODONE  ER Tablet 20 <User Schedule>  pantoprazole    Tablet 40 before breakfast  polyethylene glycol 3350 17 two times a day  senna 2 at bedtime  urea Oral Powder 15 two times a day      Vital Signs Last 24 Hrs  T(C): 36.3 (28 Mar 2024 11:49), Max: 36.9 (27 Mar 2024 20:18)  T(F): 97.3 (28 Mar 2024 11:49), Max: 98.5 (27 Mar 2024 20:18)  HR: 79 (28 Mar 2024 11:49) (79 - 93)  BP: 97/57 (28 Mar 2024 11:49) (97/57 - 127/72)  BP(mean): --  RR: 18 (28 Mar 2024 11:49) (18 - 18)  SpO2: 99% (28 Mar 2024 11:49) (96% - 99%)    Parameters below as of 28 Mar 2024 11:49  Patient On (Oxygen Delivery Method): mask, nonrebreather        PHYSICAL EXAM:  Constitutional: NAD  ENT:  R neck edema and tenderness; clear oropharynx  Cardiovascular:   normal S1, S2, no murmur, RRR  Respiratory:  clear BS bilaterally, no wheezes  Musculoskeletal:  no BLE edema  Neurologic: awake and oriented x3  Skin: R breast w/ erythematous skin mostly resolved, moderate tenderness to palpation to R upper chest                                  8.9    7.07  )-----------( 371      ( 28 Mar 2024 06:12 )             28.5       03-28    132<L>  |  97  |  30<H>  ----------------------------<  168<H>  4.9   |  22  |  0.97    Ca    9.0      28 Mar 2024 06:11  Phos  3.1     03-28  Mg     2.0     03-28        Urinalysis Basic - ( 28 Mar 2024 06:11 )    Color: x / Appearance: x / SG: x / pH: x  Gluc: 168 mg/dL / Ketone: x  / Bili: x / Urobili: x   Blood: x / Protein: x / Nitrite: x   Leuk Esterase: x / RBC: x / WBC x   Sq Epi: x / Non Sq Epi: x / Bacteria: x        MICROBIOLOGY:  Vancomycin Level, Trough: 14.6 ug/mL (03-27-24 @ 16:29)  v  .Blood Blood-Peripheral  03-25-24   No growth at 48 Hours  --  --      .Blood Blood-Peripheral  03-23-24   No growth at 4 days  --  --      .Blood Blood  03-15-24   No growth at 5 days  --  --      .Blood Blood-Peripheral  03-12-24   No growth at 5 days  --  --      .Blood Blood-Peripheral  03-12-24   No growth at 5 days  --  --      Clean Catch Clean Catch (Midstream)  03-10-24   <10,000 CFU/mL Normal Urogenital Tammie  --  --      .Blood Blood-Peripheral  03-10-24   No growth at 5 days  --  --                RADIOLOGY:  Imaging below independently reviewed.  < from: CT Neck Soft Tissue w/ IV Cont (03.27.24 @ 17:10) >  IMPRESSION:    Bulky and conglomerate lymphadenopathy at right level 4 invades internal   jugular vein with thrombus extending up to hyoid level. 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 IJV otherwise patent up to skull base.    New left IJV nonocclusive thrombus. Otherwise, no significant change from   3/14/24.    < end of copied text >    IMPRESSION:    In comparison with 3/14/2024, new consolidations throughout the right   lung more severely within right lower lobe likely representing pneumonia.    Interval insertion of right Pleurx catheter and decrease of right pleural   effusion. Small/moderate left pleural effusion is increased.    Redemonstrated extensive DVT within thoracic and lower neck. The upper   SVC remains obliterated.    Large right supraclavicular mass infiltrating into the mediastinum is   unchanged.    Trace right pneumothorax.     Detail Level: Detailed Detail Level: Generalized

## 2024-03-28 NOTE — PROGRESS NOTE ADULT - SUBJECTIVE AND OBJECTIVE BOX
Subjective:  c/o pain at pleurx site                       MEDICATIONS  acetaminophen   IVPB .. 750 milliGRAM(s) IV Intermittent once  albuterol/ipratropium for Nebulization 3 milliLiter(s) Nebulizer every 6 hours PRN  aluminum hydroxide/magnesium hydroxide/simethicone Suspension 30 milliLiter(s) Oral every 6 hours PRN  aMIOdarone    Tablet   Oral   aMIOdarone    Tablet 400 milliGRAM(s) Oral three times a day  benzocaine/menthol Lozenge 1 Lozenge Oral two times a day PRN  bisacodyl 5 milliGRAM(s) Oral every 12 hours PRN  chlorhexidine 4% Liquid 1 Application(s) Topical <User Schedule>  dexAMETHasone  Injectable 4 milliGRAM(s) IV Push every 8 hours  diazepam    Tablet 5 milliGRAM(s) Oral two times a day PRN  enoxaparin Injectable 50 milliGRAM(s) SubCutaneous every 12 hours  guaiFENesin Oral Liquid (Sugar-Free) 200 milliGRAM(s) Oral every 6 hours PRN  HYDROmorphone  Injectable 1 milliGRAM(s) IV Push every 3 hours PRN  influenza   Vaccine 0.5 milliLiter(s) IntraMuscular once  ketorolac   Injectable 15 milliGRAM(s) IV Push once  melatonin 3 milliGRAM(s) Oral at bedtime PRN  metoprolol tartrate 25 milliGRAM(s) Oral two times a day  naloxone Injectable 0.1 milliGRAM(s) IV Push every 3 minutes PRN  nicotine -  14 mG/24Hr(s) Patch 1 Patch Transdermal daily  oxyCODONE    IR 10 milliGRAM(s) Oral every 4 hours PRN  oxyCODONE  ER Tablet 20 milliGRAM(s) Oral <User Schedule>  pantoprazole    Tablet 40 milliGRAM(s) Oral before breakfast  polyethylene glycol 3350 17 Gram(s) Oral two times a day  senna 2 Tablet(s) Oral at bedtime  sodium chloride 0.9% lock flush 10 milliLiter(s) IV Push every 1 hour PRN  urea Oral Powder 15 Gram(s) Oral two times a day      Vital Signs Last 24 Hrs  T(C): 36.3 (28 Mar 2024 11:49), Max: 36.9 (27 Mar 2024 20:18)  T(F): 97.3 (28 Mar 2024 11:49), Max: 98.5 (27 Mar 2024 20:18)  HR: 79 (28 Mar 2024 11:49) (79 - 93)  BP: 97/57 (28 Mar 2024 11:49) (97/57 - 127/72)  BP(mean): --  RR: 18 (28 Mar 2024 11:49) (18 - 18)  SpO2: 99% (28 Mar 2024 11:49) (96% - 99%)    Parameters below as of 28 Mar 2024 11:49  Patient On (Oxygen Delivery Method): mask, nonrebreather          PHYSICAL EXAM:      Pleurx 1,018/cc no al        LABS  03-28    132<L>  |  97  |  30<H>  ----------------------------<  168<H>  4.9   |  22  |  0.97    Ca    9.0      28 Mar 2024 06:11  Phos  3.1     03-28  Mg     2.0     03-28                                   8.9    7.07  )-----------( 371      ( 28 Mar 2024 06:12 )             28.5                              PAST MEDICAL & SURGICAL HISTORY:  Anxiety      Acid reflux disease      Umbilical hernia      Uterine mass  Benign      Fibromyalgia  Joint pains      Glaucoma  Right eye      Herniated cervical disc      Cancer of kidney  right kidney      S/P partial hysterectomy  8 years ago      S/P knee surgery  knee arthroscopy right x 2 ( 2011 & 2012)      S/P hernia repair  umbilical Hernia Repair - 2011      H/O unilateral nephrectomy  Right Laparoscopic Nephrectomy - 34335      Glaucoma following surgery  Right Eyr - 2012      History of tonsillectomy

## 2024-03-28 NOTE — PROGRESS NOTE ADULT - PROBLEM SELECTOR PLAN 4
Uncontrolled, last documented BM 3/22, pt endorsing difficulty due to environment, education provided on improving bowel routine  -On Senna 2 tabs hs and Miralax bid, dulcolax BID PRN   please administer 1x Movantik Still with constipation.   -On Senna 2 tabs hs and Miralax bid, dulcolax BID PRN   -Movantik x 1 dose to be given on 3/29 AM

## 2024-03-28 NOTE — PROGRESS NOTE ADULT - ASSESSMENT
58-yo F w/ PMH of RCC s/p R total nephrectomy, s/p hysterectomy, fibromyalgia, and concern for breast malignancy currently undergoing workup, admitted with R breast swelling, erythema, and pain. ID was consulted for extensive cellulitis/myositis along the R anterior lateral neck and R upper chest wall. 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 LN biopsy 3/14. CT chest w/ mediastinal and supraclavicular lymphadenopathy w/ necrosis. 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 RRT 3/22-23 ON for afib w/ RVR. Afebrile. New leukocytosis. Breast erythema and tenderness improving while on antibiotics. Another RRT 3/26 w/ tachycardia.    #Neck edema  #Neck pain  #Abnormal CT scan  #Myositis  #Thrombophlebitis  #Leukocytosis  #Afib w/ RVR  - VSS. No leukocytosis. Significant R neck lymphadenopathy, s/p biopsy. F/u path  - No sore throat or dysphagia. No odynophagia. No fever or rigors.  - Unclear if patient's presentation represents infectious disease process. Labs and physical exam do not support septic thrombophlebitis.  - BCx NGTD. Quantiferon neg.  - CT chest reviewed. Necrotic lymph nodes noted. Path from neck concern for malignancy. Breast erythema and pain could be from inflammation from mass burden.  - RRT 3/22-23 ON w/ afib w/ RVR. Afebrile. New leukocytosis. Probably from cancer burden? Broadened antibiotics to cefepime/metroniazole/vancomycin.  - RRT 3/26 for tachycardia. Increased pleural effusion on CXR.  - CT chest repeat on 3/27 w/ possible PNA. Would continue with antibiotics for now  - Continue with cefepime 2g IV Q12H and metronidazole 500 mg PO Q12H.  - C/w vancomycin to 1g IV Q12H, w/ pre-4th dose trough.  - BCx repeat NGTD.  - ENT and Onc f/u. CT surgery follow-up. Pulm f/u    #L hand phlebitis  - L dorsal hand w/ phlebitis 3/26. ABx as above. Warm compress    Plan discussed with primary team attending Dr. Nolen.  Thank you for this consult.     Edwar Petersen MD, PhD  Attending Physician  Division of Infectious Diseases  Department of Medicine    Please contact through MS Teams message.  Office: [phone] (after 5 PM or weekend) .

## 2024-03-28 NOTE — PROGRESS NOTE ADULT - ASSESSMENT
this is a 58 F hx of   RCC total nephrectomy, S/p hysterectomy, R-sided glaucoma, Fibromyalgia, Anxiety, GERD, smoker, COPD, presents due to R breast swelling, redness and pain. Pt was initially evaluated by [place] [facility] yesterday and was going to be admitted but left AMA. Was prescribed clindamycin for presumed breast cellulitis/mastitis on discharge, but was also told about concerning findings on CT Chest related to possible breast malignancy and metastasis. Pt has not had a mammogram or colonoscopy in years. Reports that she had a lidocaine injection in her R shoulder on 3/8/24 and since then has had worsening swelling, redness and pain in her R breast. Also still has R shoulder pain, which had mild improvement since the injection. Pt consistently takes oxycodone q6h and believes this may have masked her pain earlier. Reports over 40 pound weight loss in the last year and loss of appetite. Has conjunctival injection in R eye which she claims is chronic but she also feels her eyes are more swollen than usual currently. Denies vision changes. Smokes 1ppd for many years. Patient denies fever, chills, chest pain, SOB, headache, dizziness, abd pain, nausea, vomiting, constipation, dysuria.     3/19 underwent Subxiphoid pericardial window. Portion of xiphoid resected and pericardium incised. Fluid drained and Regan drain placed. Approx 320cc of serous pericardial fluid drained initially.  3/20 VSS OOB to chair  pericardial drain  145 cc overnight on 4lnc .  3/21 VSS pericardial drain -240 cc  3/22 VSS pericardial 40 cc bleomycin x1@ g  iven @ 1345  3/23          vss     pericardiAL DRAIN  3/24    meds  ct   80 cc 24 hr        rt pl effusion  on 4 L  NC  3/27 POD#1 Right VATs window pleurx.  R pleurx 250cc/24 no al  3/28 POD#2 pleurx 1.018--serous--capped will sign off.  reconsult prn

## 2024-03-28 NOTE — PROGRESS NOTE ADULT - SUBJECTIVE AND OBJECTIVE BOX
DATE OF SERVICE: 03-28-24 @ 10:51    Patient is a 58y old  Female who presents with a chief complaint of Mastitis, breast malignancy (27 Mar 2024 17:20)      INTERVAL HISTORY: Feeling slightly better today.     REVIEW OF SYSTEMS:  CONSTITUTIONAL: No weakness  EYES/ENT: No visual changes;  No throat pain   NECK: No pain or stiffness  RESPIRATORY: No cough, wheezing; No shortness of breath  CARDIOVASCULAR: No chest pain or palpitations  GASTROINTESTINAL: No abdominal  pain. No nausea, vomiting, or hematemesis  GENITOURINARY: No dysuria, frequency or hematuria  NEUROLOGICAL: No stroke like symptoms  SKIN: No rashes    TELEMETRY Personally reviewed: SR 80-90  	  MEDICATIONS:  aMIOdarone    Tablet   Oral   aMIOdarone    Tablet 400 milliGRAM(s) Oral three times a day  metoprolol tartrate 25 milliGRAM(s) Oral two times a day  urea Oral Powder 15 Gram(s) Oral two times a day        PHYSICAL EXAM:  T(C): 36.3 (03-28-24 @ 04:32), Max: 37.1 (03-27-24 @ 12:29)  HR: 93 (03-28-24 @ 04:32) (87 - 96)  BP: 127/72 (03-28-24 @ 04:32) (95/58 - 127/72)  RR: 18 (03-28-24 @ 04:32) (18 - 18)  SpO2: 96% (03-28-24 @ 04:32) (96% - 97%)  Wt(kg): --  I&O's Summary    27 Mar 2024 07:01  -  28 Mar 2024 07:00  --------------------------------------------------------  IN: 236 mL / OUT: 2218 mL / NET: -1982 mL          Appearance: In no distress	  HEENT:    PERRL, EOMI	  Cardiovascular:  S1 S2, No JVD  Respiratory: Lungs clear to auscultation	  Gastrointestinal:  Soft, Non-tender, + BS	  Vascularature:  No edema of LE  Psychiatric: Appropriate affect   Neuro: no acute focal deficits                               8.9    7.07  )-----------( 371      ( 28 Mar 2024 06:12 )             28.5     03-28    132<L>  |  97  |  30<H>  ----------------------------<  168<H>  4.9   |  22  |  0.97    Ca    9.0      28 Mar 2024 06:11  Phos  3.1     03-28  Mg     2.0     03-28          Labs personally reviewed      ASSESSMENT/PLAN: 	      58F w/Hx of RCC s/p R total nephrectomy, S/p hysterectomy, R-sided glaucoma, Fibromyalgia, Anxiety, GERD, smoker presents due to R breast swelling, redness and pain.     Problem/Plan - 1:   ·  Problem: Pericardial effusion.  ·  Plan: - pericardial effusion on CT chest   - TTE 3/18 with Moderate pericardial effusion with echocardiographic evidence of hemodynamic compromise    - Clinically pt is not in tamponade   - Thoracic surgery consulted for pericardial window given likely malignant effusion  ----s/p pericardial window 3/19  - 3/21 now with ST paroxysmal with AF with RVR and reports of chest pain  limited TTE: left ventricular systolic function appears grossly normal. Thickened pericardium. Bilateral pleural effusion noted. No pericardial effusion seen.  Compared to the TTE 3/18/2024, the pericardial effusion is no longer present.    Problem/Plan - 2:  ·  Problem: Internal jugular vein thrombosis, right.   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     Problem/Plan - 3:  ·  Problem: Smoker.   ·  Plan: - 1 ppd smoker  - c/w Nicotine patch.    Problem/Plan - 4:  ·  Problem: Acute Hypoxia  ·  Plan: low threshold for IV Lasix PRN given increased oxygen requirement and crackles on exam    Problem/Plan - 5:  ·  Problem: Sinus Tachycardia  - Likely reactive   - Improved    Problem/Plan - 6:  ·  Problem: New Onset Atrial Fibrillation  - c/w heparin gtt  - TSH wnl  - s/p PO dig load 3/24  - Cont lopressor 25mg PO BID with hold parameters  - c/w Amio taper as per EVELINA Julian, AG-NP   Lb Mata DO [facility]  Cardiovascular Medicine  [address], [address]  Available through call or text on [employer] TEAMs  Office: [phone]

## 2024-03-28 NOTE — PROGRESS NOTE ADULT - PROBLEM SELECTOR PLAN 4
- erythema and tenderness of right breast improving  - Unclear if cellulitis or just inflammatory changes   - Abx expanded to vanco/cefepime/flagyl given RRT last weekend with concern for worsening infection, however now appears improved with blood cultures all negative  - blood cx ngtd  - per ID ok to stop abx and monitor

## 2024-03-28 NOTE — PROGRESS NOTE ADULT - ASSESSMENT
58F w/Hx of RCC s/p R total nephrectomy, S/p hysterectomy, R-sided glaucoma, Fibromyalgia, Anxiety, GERD, smoker p/w R breast swelling, redness and pain, found to have axillary, subclavicular and mediastinal lymphadenopathy & pulm nodules, s/p LN biopsy. Nephrology consulted for hyponatremia.       #Hyponatremia with increased ADH activity  -SNa >130 now on urena and diuretics  -Cont to observe and CT surg f/u ongoing    Katerine Rai MD  Office   Contact me directly via Microsoft Teams     (After 5 pm or on weekends please page the on-call fellow/attending, can check [email or web address] for schedule. Login is shailesh mendoza, schedule under [facility] medicine, psych, derm)

## 2024-03-28 NOTE — PROGRESS NOTE ADULT - PROBLEM SELECTOR PLAN 1
- h/o RCC and had nephrectomy by Urology by Dr Jacky Adkins at [facility], also had lymph node dissection 2 years ago  - Now with axillary, subclavicular and mediastinal lymphadenopathy, pulmonary nodules and inflammatory changes of breast seen on CT with possible effect on IVC  - CT neck with large supraclavicular/mediastinal mass lesion with mass effect and complete occlusion of the right IJ and SVC  - S/p supraclavicular lymph node biopsy by IR on 3/14- path consistent with primary of GI origin  - D/w GI follow up MRI abdomen and MRCP- currently deferred due to worsening svc syndrome warranting urgent radiation  - D/w oncology, to continue to follow  - Pallative follow up for pain control and anxiety management

## 2024-03-29 LAB
ALBUMIN SERPL ELPH-MCNC: 2.9 G/DL — LOW (ref 3.3–5)
ALP SERPL-CCNC: 68 U/L — SIGNIFICANT CHANGE UP (ref 40–120)
ALT FLD-CCNC: 10 U/L — SIGNIFICANT CHANGE UP (ref 10–45)
ANION GAP SERPL CALC-SCNC: 10 MMOL/L — SIGNIFICANT CHANGE UP (ref 5–17)
AST SERPL-CCNC: 10 U/L — SIGNIFICANT CHANGE UP (ref 10–40)
BILIRUB SERPL-MCNC: 0.2 MG/DL — SIGNIFICANT CHANGE UP (ref 0.2–1.2)
BLD GP AB SCN SERPL QL: NEGATIVE — SIGNIFICANT CHANGE UP
BUN SERPL-MCNC: 38 MG/DL — HIGH (ref 7–23)
CALCIUM SERPL-MCNC: 9.2 MG/DL — SIGNIFICANT CHANGE UP (ref 8.4–10.5)
CHLORIDE SERPL-SCNC: 99 MMOL/L — SIGNIFICANT CHANGE UP (ref 96–108)
CO2 SERPL-SCNC: 24 MMOL/L — SIGNIFICANT CHANGE UP (ref 22–31)
CREAT SERPL-MCNC: 0.98 MG/DL — SIGNIFICANT CHANGE UP (ref 0.5–1.3)
EGFR: 66 ML/MIN/1.73M2 — SIGNIFICANT CHANGE UP
GLUCOSE SERPL-MCNC: 131 MG/DL — HIGH (ref 70–99)
HCT VFR BLD CALC: 26.1 % — LOW (ref 34.5–45)
HGB BLD-MCNC: 8.4 G/DL — LOW (ref 11.5–15.5)
LDH SERPL L TO P-CCNC: 123 U/L — SIGNIFICANT CHANGE UP (ref 50–242)
MAGNESIUM SERPL-MCNC: 2 MG/DL — SIGNIFICANT CHANGE UP (ref 1.6–2.6)
MCHC RBC-ENTMCNC: 29.3 PG — SIGNIFICANT CHANGE UP (ref 27–34)
MCHC RBC-ENTMCNC: 32.2 GM/DL — SIGNIFICANT CHANGE UP (ref 32–36)
MCV RBC AUTO: 90.9 FL — SIGNIFICANT CHANGE UP (ref 80–100)
NRBC # BLD: 0 /100 WBCS — SIGNIFICANT CHANGE UP (ref 0–0)
PHOSPHATE SERPL-MCNC: 3.1 MG/DL — SIGNIFICANT CHANGE UP (ref 2.5–4.5)
PLATELET # BLD AUTO: 425 K/UL — HIGH (ref 150–400)
POTASSIUM SERPL-MCNC: 4.9 MMOL/L — SIGNIFICANT CHANGE UP (ref 3.5–5.3)
POTASSIUM SERPL-SCNC: 4.9 MMOL/L — SIGNIFICANT CHANGE UP (ref 3.5–5.3)
PROT SERPL-MCNC: 5.8 G/DL — LOW (ref 6–8.3)
RBC # BLD: 2.87 M/UL — LOW (ref 3.8–5.2)
RBC # FLD: 13.9 % — SIGNIFICANT CHANGE UP (ref 10.3–14.5)
RH IG SCN BLD-IMP: POSITIVE — SIGNIFICANT CHANGE UP
SODIUM SERPL-SCNC: 133 MMOL/L — LOW (ref 135–145)
URATE SERPL-MCNC: 3.6 MG/DL — SIGNIFICANT CHANGE UP (ref 2.5–7)
VANCOMYCIN TROUGH SERPL-MCNC: 11.9 UG/ML — SIGNIFICANT CHANGE UP (ref 10–20)
WBC # BLD: 8.16 K/UL — SIGNIFICANT CHANGE UP (ref 3.8–10.5)
WBC # FLD AUTO: 8.16 K/UL — SIGNIFICANT CHANGE UP (ref 3.8–10.5)

## 2024-03-29 PROCEDURE — 99232 SBSQ HOSP IP/OBS MODERATE 35: CPT

## 2024-03-29 PROCEDURE — 99232 SBSQ HOSP IP/OBS MODERATE 35: CPT | Mod: GC

## 2024-03-29 PROCEDURE — 71045 X-RAY EXAM CHEST 1 VIEW: CPT | Mod: 26

## 2024-03-29 PROCEDURE — 99233 SBSQ HOSP IP/OBS HIGH 50: CPT

## 2024-03-29 RX ADMIN — Medication 500 MILLIGRAM(S): at 17:52

## 2024-03-29 RX ADMIN — Medication 4 MILLIGRAM(S): at 13:01

## 2024-03-29 RX ADMIN — NALOXEGOL OXALATE 25 MILLIGRAM(S): 12.5 TABLET, FILM COATED ORAL at 09:04

## 2024-03-29 RX ADMIN — Medication 15 GRAM(S): at 07:16

## 2024-03-29 RX ADMIN — CEFEPIME 100 MILLIGRAM(S): 1 INJECTION, POWDER, FOR SOLUTION INTRAMUSCULAR; INTRAVENOUS at 09:03

## 2024-03-29 RX ADMIN — Medication 4 MILLIGRAM(S): at 03:24

## 2024-03-29 RX ADMIN — Medication 1 PATCH: at 11:45

## 2024-03-29 RX ADMIN — Medication 4 MILLIGRAM(S): at 18:26

## 2024-03-29 RX ADMIN — POLYETHYLENE GLYCOL 3350 17 GRAM(S): 17 POWDER, FOR SOLUTION ORAL at 17:50

## 2024-03-29 RX ADMIN — CHLORHEXIDINE GLUCONATE 1 APPLICATION(S): 213 SOLUTION TOPICAL at 09:59

## 2024-03-29 RX ADMIN — OXYCODONE HYDROCHLORIDE 30 MILLIGRAM(S): 5 TABLET ORAL at 21:55

## 2024-03-29 RX ADMIN — SENNA PLUS 2 TABLET(S): 8.6 TABLET ORAL at 21:56

## 2024-03-29 RX ADMIN — OXYCODONE HYDROCHLORIDE 20 MILLIGRAM(S): 5 TABLET ORAL at 01:33

## 2024-03-29 RX ADMIN — Medication 1 PATCH: at 08:40

## 2024-03-29 RX ADMIN — AMIODARONE HYDROCHLORIDE 400 MILLIGRAM(S): 400 TABLET ORAL at 21:55

## 2024-03-29 RX ADMIN — Medication 25 MILLIGRAM(S): at 17:50

## 2024-03-29 RX ADMIN — Medication 500 MILLIGRAM(S): at 07:15

## 2024-03-29 RX ADMIN — Medication 250 MILLIGRAM(S): at 09:03

## 2024-03-29 RX ADMIN — CEFEPIME 100 MILLIGRAM(S): 1 INJECTION, POWDER, FOR SOLUTION INTRAMUSCULAR; INTRAVENOUS at 17:52

## 2024-03-29 RX ADMIN — Medication 25 MILLIGRAM(S): at 07:14

## 2024-03-29 RX ADMIN — OXYCODONE HYDROCHLORIDE 20 MILLIGRAM(S): 5 TABLET ORAL at 14:15

## 2024-03-29 RX ADMIN — OXYCODONE HYDROCHLORIDE 30 MILLIGRAM(S): 5 TABLET ORAL at 07:13

## 2024-03-29 RX ADMIN — PANTOPRAZOLE SODIUM 40 MILLIGRAM(S): 20 TABLET, DELAYED RELEASE ORAL at 07:15

## 2024-03-29 RX ADMIN — ENOXAPARIN SODIUM 50 MILLIGRAM(S): 100 INJECTION SUBCUTANEOUS at 17:49

## 2024-03-29 RX ADMIN — OXYCODONE HYDROCHLORIDE 20 MILLIGRAM(S): 5 TABLET ORAL at 15:04

## 2024-03-29 RX ADMIN — Medication 5 MILLIGRAM(S): at 00:39

## 2024-03-29 RX ADMIN — AMIODARONE HYDROCHLORIDE 400 MILLIGRAM(S): 400 TABLET ORAL at 13:01

## 2024-03-29 RX ADMIN — ENOXAPARIN SODIUM 50 MILLIGRAM(S): 100 INJECTION SUBCUTANEOUS at 07:13

## 2024-03-29 RX ADMIN — POLYETHYLENE GLYCOL 3350 17 GRAM(S): 17 POWDER, FOR SOLUTION ORAL at 07:14

## 2024-03-29 RX ADMIN — Medication 1 PATCH: at 13:01

## 2024-03-29 RX ADMIN — Medication 15 GRAM(S): at 17:50

## 2024-03-29 RX ADMIN — AMIODARONE HYDROCHLORIDE 400 MILLIGRAM(S): 400 TABLET ORAL at 07:14

## 2024-03-29 NOTE — PROGRESS NOTE ADULT - ASSESSMENT
58F w/Hx of RCC s/p R total nephrectomy, S/p hysterectomy, R-sided glaucoma, Fibromyalgia, Anxiety, GERD, smoker p/w R breast swelling, redness and pain, found to have axillary, subclavicular and mediastinal lymphadenopathy & pulm nodules, s/p LN biopsy. Nephrology consulted for hyponatremia.       #Hyponatremia with increased ADH activity  -SNa >130 now on urena and diuretics, expect some increase in BUN with this  -Cont to observe and CT surg f/u ongoing    Katerine Rai MD  Office   Contact me directly via Microsoft Teams     For weekend coverage, call  Dr. Reji Funes( fellow) and Dr Daniel Pineda( attending)    (After 5 pm or on weekends please page the on-call fellow/attending, can check [email or web address] for schedule. Login is shailesh mendoza, schedule under [facility] medicine, psych, derm)

## 2024-03-29 NOTE — PROGRESS NOTE ADULT - SUBJECTIVE AND OBJECTIVE BOX
[facility] DIVISION OF KIDNEY DISEASES AND HYPERTENSION -- FOLLOW UP NOTE  --------------------------------------------------------------------------------  Chief Complaint:    24 hour events/subjective:    PAST HISTORY  --------------------------------------------------------------------------------  No significant changes to PMH, PSH, FHx, SHx, unless otherwise noted    ALLERGIES & MEDICATIONS  --------------------------------------------------------------------------------  Allergies    Cipro (Headache; Vomiting; Rash)  penicillin (Headache; Vomiting; Rash)  erythromycin (Headache; Vomiting; Rash)    Intolerances      Standing Inpatient Medications  acetaminophen   IVPB .. 750 milliGRAM(s) IV Intermittent once  aMIOdarone    Tablet   Oral   aMIOdarone    Tablet 400 milliGRAM(s) Oral three times a day  cefepime   IVPB 2000 milliGRAM(s) IV Intermittent every 12 hours  cefepime   IVPB      chlorhexidine 4% Liquid 1 Application(s) Topical <User Schedule>  dexAMETHasone  Injectable 4 milliGRAM(s) IV Push every 8 hours  enoxaparin Injectable 50 milliGRAM(s) SubCutaneous every 12 hours  influenza   Vaccine 0.5 milliLiter(s) IntraMuscular once  ketorolac   Injectable 15 milliGRAM(s) IV Push once  metoprolol tartrate 25 milliGRAM(s) Oral two times a day  metroNIDAZOLE    Tablet 500 milliGRAM(s) Oral every 12 hours  naloxegol 25 milliGRAM(s) Oral before breakfast  nicotine -  14 mG/24Hr(s) Patch 1 Patch Transdermal daily  oxyCODONE  ER Tablet 20 milliGRAM(s) Oral <User Schedule>  oxyCODONE  ER Tablet 30 milliGRAM(s) Oral <User Schedule>  pantoprazole    Tablet 40 milliGRAM(s) Oral before breakfast  polyethylene glycol 3350 17 Gram(s) Oral two times a day  senna 2 Tablet(s) Oral at bedtime  urea Oral Powder 15 Gram(s) Oral two times a day    PRN Inpatient Medications  albuterol/ipratropium for Nebulization 3 milliLiter(s) Nebulizer every 6 hours PRN  aluminum hydroxide/magnesium hydroxide/simethicone Suspension 30 milliLiter(s) Oral every 6 hours PRN  benzocaine/menthol Lozenge 1 Lozenge Oral two times a day PRN  bisacodyl 5 milliGRAM(s) Oral every 12 hours PRN  diazepam    Tablet 5 milliGRAM(s) Oral two times a day PRN  guaiFENesin Oral Liquid (Sugar-Free) 200 milliGRAM(s) Oral every 6 hours PRN  HYDROmorphone  Injectable 1 milliGRAM(s) IV Push every 3 hours PRN  melatonin 3 milliGRAM(s) Oral at bedtime PRN  naloxone Injectable 0.1 milliGRAM(s) IV Push every 3 minutes PRN  oxyCODONE    IR 10 milliGRAM(s) Oral every 4 hours PRN  sodium chloride 0.9% lock flush 10 milliLiter(s) IV Push every 1 hour PRN      REVIEW OF SYSTEMS  --------------------------------------------------------------------------------  Gen: No weight changes, fatigue, fevers/chills, weakness  Skin: No rashes  Head/Eyes/Ears/Mouth: No headache; Normal hearing; Normal vision w/o blurriness; No sinus pain/discomfort, sore throat  Respiratory: No dyspnea, cough, wheezing, hemoptysis  CV: No chest pain, PND, orthopnea  GI: No abdominal pain, diarrhea, constipation, nausea, vomiting, melena, hematochezia  : No increased frequency, dysuria, hematuria, nocturia  MSK: No joint pain/swelling; no back pain; no edema  Neuro: No dizziness/lightheadedness, weakness, seizures, numbness, tingling  Heme: No easy bruising or bleeding  Endo: No heat/cold intolerance  Psych: No significant nervousness, anxiety, stress, depression    All other systems were reviewed and are negative, except as noted.    VITALS/PHYSICAL EXAM  --------------------------------------------------------------------------------  T(C): 36.4 (03-29-24 @ 11:46), Max: 36.4 (03-28-24 @ 20:56)  HR: 64 (03-29-24 @ 11:46) (64 - 67)  BP: 115/68 (03-29-24 @ 11:46) (106/62 - 115/68)  RR: 18 (03-29-24 @ 11:46) (18 - 18)  SpO2: 99% (03-29-24 @ 11:46) (99% - 100%)  Wt(kg): --        03-28-24 @ 07:01  -  03-29-24 @ 07:00  --------------------------------------------------------  IN: 200 mL / OUT: 0 mL / NET: 200 mL      LABS/STUDIES  --------------------------------------------------------------------------------              8.4    8.16  >-----------<  425      [03-29-24 @ 07:34]              26.1     133  |  99  |  38  ----------------------------<  131      [03-29-24 @ 07:34]  4.9   |  24  |  0.98        Ca     9.2     [03-29-24 @ 07:34]      Mg     2.0     [03-29-24 @ 07:34]      Phos  3.1     [03-29-24 @ 07:34]    TPro  5.8  /  Alb  2.9  /  TBili  0.2  /  DBili  x   /  AST  10  /  ALT  10  /  AlkPhos  68  [03-29-24 @ 07:34]        Uric acid 3.6      [03-29-24 @ 07:34]        [03-29-24 @ 07:34]    Creatinine Trend:  SCr 0.98 [03-29 @ 07:34]  SCr 0.97 [03-28 @ 06:11]  SCr 1.16 [03-27 @ 13:01]  SCr 0.93 [03-26 @ 04:06]  SCr 0.95 [03-25 @ 09:14]    Urinalysis - [03-29-24 @ 07:34]      Color  / Appearance  / SG  / pH       Gluc 131 / Ketone   / Bili  / Urobili        Blood  / Protein  / Leuk Est  / Nitrite       RBC  / WBC  / Hyaline  / Gran  / Sq Epi  / Non Sq Epi  / Bacteria     Urine Creatinine 67      [03-26-24 @ 16:59]  Urine Sodium 40      [03-26-24 @ 16:59]  Urine Potassium 37      [03-26-24 @ 16:59]  Urine Chloride 47      [03-26-24 @ 16:59]  Urine Osmolality 461      [03-26-24 @ 16:59]    TSH 3.82      [03-23-24 @ 08:39]  Lipid: chol 143, , HDL 50, LDL --      [03-12-24 @ 07:01]    HCV 0.09, Nonreact      [03-12-24 @ 07:01]

## 2024-03-29 NOTE — PROGRESS NOTE ADULT - SUBJECTIVE AND OBJECTIVE BOX
Follow Up:    Neck mass    Interval History/ROS:  Afebrile ON. Patient was seen and examined at bedside. Tearful and anxious. No fever. +neck and chest wall pain.    Allergies  Cipro (Headache; Vomiting; Rash)  penicillin (Headache; Vomiting; Rash)  erythromycin (Headache; Vomiting; Rash)        ANTIMICROBIALS:  cefepime   IVPB 2000 every 12 hours  cefepime   IVPB    metroNIDAZOLE    Tablet 500 every 12 hours  vancomycin  IVPB 1000 every 12 hours      OTHER MEDS:  MEDICATIONS  (STANDING):  acetaminophen   IVPB .. 750 once  albuterol/ipratropium for Nebulization 3 every 6 hours PRN  aluminum hydroxide/magnesium hydroxide/simethicone Suspension 30 every 6 hours PRN  aMIOdarone    Tablet 400 three times a day  aMIOdarone    Tablet    bisacodyl 5 every 12 hours PRN  dexAMETHasone  Injectable 4 every 8 hours  diazepam    Tablet 5 two times a day PRN  enoxaparin Injectable 50 every 12 hours  guaiFENesin Oral Liquid (Sugar-Free) 200 every 6 hours PRN  HYDROmorphone  Injectable 1 every 3 hours PRN  influenza   Vaccine 0.5 once  ketorolac   Injectable 15 once  melatonin 3 at bedtime PRN  metoprolol tartrate 25 two times a day  naloxegol 25 before breakfast  oxyCODONE    IR 10 every 4 hours PRN  oxyCODONE  ER Tablet 20 <User Schedule>  oxyCODONE  ER Tablet 30 <User Schedule>  pantoprazole    Tablet 40 before breakfast  polyethylene glycol 3350 17 two times a day  senna 2 at bedtime  urea Oral Powder 15 two times a day      Vital Signs Last 24 Hrs  T(C): 36.4 (29 Mar 2024 06:55), Max: 36.4 (28 Mar 2024 20:56)  T(F): 97.6 (29 Mar 2024 06:55), Max: 97.6 (28 Mar 2024 20:56)  HR: 67 (29 Mar 2024 06:55) (67 - 79)  BP: 106/62 (29 Mar 2024 06:55) (97/57 - 113/67)  BP(mean): --  RR: 18 (29 Mar 2024 06:55) (18 - 18)  SpO2: 100% (29 Mar 2024 06:55) (99% - 100%)    Parameters below as of 29 Mar 2024 06:55  Patient On (Oxygen Delivery Method): mask, nonrebreather      PHYSICAL EXAM:  Constitutional: NAD  ENT:  R neck edema and tenderness; clear oropharynx  Cardiovascular:   normal S1, S2, no murmur, RRR  Respiratory:  clear BS bilaterally, no wheezes  Musculoskeletal:  no BLE edema  Neurologic: awake and oriented x3  Skin: R breast w/ erythematous skin no longer appreciated; moderate tenderness to palpation to R upper chest                            8.4    8.16  )-----------( 425      ( 29 Mar 2024 07:34 )             26.1       03-29    133<L>  |  99  |  38<H>  ----------------------------<  131<H>  4.9   |  24  |  0.98    Ca    9.2      29 Mar 2024 07:34  Phos  3.1     03-29  Mg     2.0     03-29    TPro  5.8<L>  /  Alb  2.9<L>  /  TBili  0.2  /  DBili  x   /  AST  10  /  ALT  10  /  AlkPhos  68  03-29      Urinalysis Basic - ( 29 Mar 2024 07:34 )    Color: x / Appearance: x / SG: x / pH: x  Gluc: 131 mg/dL / Ketone: x  / Bili: x / Urobili: x   Blood: x / Protein: x / Nitrite: x   Leuk Esterase: x / RBC: x / WBC x   Sq Epi: x / Non Sq Epi: x / Bacteria: x        MICROBIOLOGY:  Vancomycin Level, Trough: 11.9 ug/mL (03-29-24 @ 07:35)  v  .Blood Blood-Peripheral  03-25-24   No growth at 72 Hours  --  --      .Blood Blood-Peripheral  03-23-24   No growth at 5 days  --  --      .Blood Blood  03-15-24   No growth at 5 days  --  --      .Blood Blood-Peripheral  03-12-24   No growth at 5 days  --  --      .Blood Blood-Peripheral  03-12-24   No growth at 5 days  --  --      Clean Catch Clean Catch (Midstream)  03-10-24   <10,000 CFU/mL Normal Urogenital Tammie  --  --      .Blood Blood-Peripheral  03-10-24   No growth at 5 days  --  --      < from: [facility] Duplex Upper Ext Vein Scan, [place] (03.28.24 @ 12:02) >    IMPRESSION:    Bilateral upper extremity venous ultrasound demonstrates acute DVT   involving: right internal jugular vein, right innominate vein, right   subclavianvein, right brachial vein, and left internal jugular vein.    Superficial venous thrombosis involves the right and left cephalic veins.   Left basilic vein is not visualized.    Edema of the superficial soft tissues of the upper extremities, right   greater than left. Correlate clinically. Known right neck/thoracic mass   is better evaluated on recent neck CT.    < end of copied text >            RADIOLOGY:  Imaging below independently reviewed.  < from: CT Neck Soft Tissue w/ IV Cont (03.27.24 @ 17:10) >    ACC: [id number] EXAM:  CT NECK SOFT TISSUE IC   ORDERED BY: MIO BARRAGAN     PROCEDURE DATE:  03/27/2024          INTERPRETATION:  Technique: A thin section axial acquisition was   performed from the skull base to the thoracic inlet.  The data was   reformatted in the sagittal, coronal and axial planes.    Images acquired both in arterial and venous phases of injection.    Contrast: 80 cc Omnipaque 350 given IV, 20 cc discarded    Clinical Information: SVC syndrome. Adenopathy with path yielding   metastatic carcinoma.    Prior Studies: 03/14/2024    Findings:    The right internal jugular vein is occluded and invaded by pathologic   adenopathy at right level 4 and thoracic inlet. Irregular wall thickening   of the lower internal jugular vein in the neck from direct tumor   invasion. Above this, approximately 6 cm long intraluminal filling defect   with occlusion up to hyoid level is again identified, some appearing   enhancing and more superiorly appearing bland. Distally clot is 1.3 cm AP   diameter, previously 1.7 cm suggesting a bland thrombus with some   interval contraction.    New thrombus is identified in the left internal jugular vein,   nonocclusive at posterior wall as seen on series 4, image 71. This is   discussed in a preliminary report for concurrent chest chest.    *  Aerodigestive Tract: No mass or abnormal enhancement. There is   retropharyngeal edema in keeping with local venolymphatic obstruction,   similar to slightly improved. No abscess or organized fluid collection.    *  Lymph Nodes: Pathologic adenopathy is present at right level 4 as   recently described with invasive margins and arterial encasement and   contiguity with level 6 and other mediastinal radha disease. This is   about 8 x 5 cm on coronal image 49. Right scalene and longus coli appear   invaded. Common carotid artery is partially encased on the right but not   narrowed.    Additional smaller lymph nodes in the right suprahyoid neck are   indeterminate, suspicious for additional metastasis but also could be   prominent in the presence of venous obstruction.    *  Salivary Glands: Unremarkable    *  Thyroid Gland: Grossly unremarkable    *  Included orbits, brain and skull base: No focal abnormalities.    *  Cervical Spine: No fracture or aggressive bony lesion    *  Lung Apices: Right chest tube and small pneumothorax noted; right   pleural effusion is smaller. Please refer to complete separate reporting   of concurrent chest CT regarding detail of other vascular tumor invasion.      IMPRESSION:    Bulky and conglomerate lymphadenopathy at right level 4 invades internal   jugular vein with thrombus extending up to hyoid level. 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 IJV otherwise patent up to skull base.    New left IJV nonocclusive thrombus. Otherwise, no significant change from   3/14/24.    --- End of Report ---            MARISEL PÉREZ MD; Attending Radiologist  This document has been electronically signed. Mar 27 2024  9:31PM    < end of copied text >

## 2024-03-29 NOTE — PROGRESS NOTE ADULT - ASSESSMENT
57 y/o woman presenting to hospital with right breast/chest wall swelling and cellulitis with imaging evidence concerning for metastatic malignancy w/ lung nodules, and axillary, supraclavicular, and mediastinal adenopathy.  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 Course complicated with SVC syndrome.       #Metastatic Disease suspecting of GI origin:  -Path from 3/14/24 showing neoplastic cells positive for CK7 and CDX2 immunostains, while negative for CK20, TTF1, GATA3, TRPS1 and PAX8. The immunoprofile is in favor of carcinoma of upper GI or pancreaticobiliary origin.  -S/p pericardiocentesis and drain on 3/19 for malignant pericardial effusion, f/u cytology from procedure.   -CT Neck c/f SVC syndrome w/ thrombus that patient is on full dose anticoagulation for.   -Given CT A/P findings of pancreatic cyst, with RUQ US results of possible gastric thickening would first recommend MR Abdomen pancreas protocol and MRCP to eval both the pancreatic vasculature and the hepatobiliary system. Pending results of MR abdomen would recommend GI consult to eval if patient would need EGD/EUS +/- FNA for further pathologic evaluation. Would favor inpatient w/u for patient given aggressive nature of her disease and extensive disease.   -Pain control/anxiety/Supportive care  per primary team or palliative care team if necessary; continue O2 supplement.   -Of note, Attending Dr. Ponce communicated with primary team attending Dr. Nolen, and recommended [facility] for radiation on 3/27/24; pending transferring to [facility] for radiation.   -C/w Dexamethasone 4mg q8hr.    -closely monitor for worsening of signs/symptoms of SVC syndrome.   -Check tumor lysis lab including K, phos, Mg, LDH, uric acid;   -onc team will f/u     Plan d/w Dr. Ponce.     Larry Nolen M.D.  H/O PGY-4

## 2024-03-29 NOTE — PROGRESS NOTE ADULT - PROBLEM SELECTOR PLAN 2
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 Not candidate for IV on R due to mass, but also has SVC obliteration on CT neck to doubt patient can have PICC or central line placed on R  - MICU team assistance appreciated in placing R femoral TLC  - per ID resume abx for possible PNA    #SVC syndrome- IR reconsulted for possible stenting; not feasible due to size/location, now planned for tier 2 transfer to [facility] for urgent radiation  - d/w and agreed on by IR, rad onc Dr. Sterling, oncology, cardiology, thoracic  - pt and boyfriencarlton Breaux also aware and in agreement with transfer  - NM notified  - bed still pending

## 2024-03-29 NOTE — PROGRESS NOTE ADULT - SUBJECTIVE AND OBJECTIVE BOX
Date of Service: 03-29-24 @ 15:00    SUBJECTIVE AND OBJECTIVE: Worsening RUE edema and pain. She was also c/o mild dyspnea. She denied any other complaints.   Indication for Geriatrics and Palliative Care Services/INTERVAL HPI: Goals of care/symptoms.     OVERNIGHT EVENTS: She used Oxycodone 10mg x 1 and Diazepam 5mg PO x 1  (6am to 6am)     DNR on chart:DNI  DNI      Allergies    Cipro (Headache; Vomiting; Rash)  penicillin (Headache; Vomiting; Rash)  erythromycin (Headache; Vomiting; Rash)    Intolerances    MEDICATIONS  (STANDING):  acetaminophen   IVPB .. 750 milliGRAM(s) IV Intermittent once  aMIOdarone    Tablet 400 milliGRAM(s) Oral three times a day  aMIOdarone    Tablet   Oral   cefepime   IVPB 2000 milliGRAM(s) IV Intermittent every 12 hours  cefepime   IVPB      chlorhexidine 4% Liquid 1 Application(s) Topical <User Schedule>  dexAMETHasone  Injectable 4 milliGRAM(s) IV Push every 8 hours  enoxaparin Injectable 50 milliGRAM(s) SubCutaneous every 12 hours  influenza   Vaccine 0.5 milliLiter(s) IntraMuscular once  ketorolac   Injectable 15 milliGRAM(s) IV Push once  metoprolol tartrate 25 milliGRAM(s) Oral two times a day  metroNIDAZOLE    Tablet 500 milliGRAM(s) Oral every 12 hours  naloxegol 25 milliGRAM(s) Oral before breakfast  nicotine -  14 mG/24Hr(s) Patch 1 Patch Transdermal daily  oxyCODONE  ER Tablet 20 milliGRAM(s) Oral <User Schedule>  oxyCODONE  ER Tablet 30 milliGRAM(s) Oral <User Schedule>  pantoprazole    Tablet 40 milliGRAM(s) Oral before breakfast  polyethylene glycol 3350 17 Gram(s) Oral two times a day  senna 2 Tablet(s) Oral at bedtime  urea Oral Powder 15 Gram(s) Oral two times a day    MEDICATIONS  (PRN):  albuterol/ipratropium for Nebulization 3 milliLiter(s) Nebulizer every 6 hours PRN Shortness of Breath and/or Wheezing  aluminum hydroxide/magnesium hydroxide/simethicone Suspension 30 milliLiter(s) Oral every 6 hours PRN Dyspepsia  benzocaine/menthol Lozenge 1 Lozenge Oral two times a day PRN Sore Throat  bisacodyl 5 milliGRAM(s) Oral every 12 hours PRN Constipation  diazepam    Tablet 5 milliGRAM(s) Oral two times a day PRN for anxiety  guaiFENesin Oral Liquid (Sugar-Free) 200 milliGRAM(s) Oral every 6 hours PRN Cough  HYDROmorphone  Injectable 1 milliGRAM(s) IV Push every 3 hours PRN Severe Pain (7 - 10)  melatonin 3 milliGRAM(s) Oral at bedtime PRN Insomnia  naloxone Injectable 0.1 milliGRAM(s) IV Push every 3 minutes PRN Obtundation, respiratory suppression  oxyCODONE    IR 10 milliGRAM(s) Oral every 4 hours PRN Moderate Pain (4 - 6)  sodium chloride 0.9% lock flush 10 milliLiter(s) IV Push every 1 hour PRN Pre/post blood products, medications, blood draw, and to maintain line patency        ITEMS UNCHECKED ARE NOT PRESENT    PRESENT SYMPTOMS: [ ]Unable to self-report - see [ ] CPOT [ ] PAINADS [ ] RDOS  Source if other than patient:  [ ]Family   [ ]Team     Pain:  [x ]yes [ ]no  QOL impact - not being able to rest   Location -                  right arm and   Aggravating factors - Palpation   Quality - not able to indicate   Radiation - none   Timing- constant   Severity (0-10 scale): up to 9/10 and going to 3/10 with Dilaudid  Minimal acceptable level (0-10 scale):     CPOT:    https://www.[email or web address]/getattachment/[email or web address]/Critical-Care-Pain-Observation-Tool-(CPOT)    PAINAD Score: See PAINAD tool and score below       Dyspnea:                           [x ]Mild [ ]Moderate [ ]Severe    RDOS: See RDOS tool and score below   0 to 2  minimal or no respiratory distress   3  mild distress  4 to 6 moderate distress  >7 severe distress      Anxiety:                             [ ]Mild [ ]Moderate [ ]Severe  Fatigue:                             [ ]Mild [ ]Moderate [ ]Severe  Nausea:                             [ ]Mild [ ]Moderate [ ]Severe  Loss of appetite:              [ ]Mild [ ]Moderate [ ]Severe  Constipation:                    [ ]Mild [ ]Moderate [ ]Severe    PCSSQ[Palliative Care Spiritual Screening Question]   Severity (0-10):  Score of 4 or > indicate consideration of Chaplaincy referral.  Chaplaincy Referral: [x] yes [ ] refused [ ] following [ ] Deferred     Caregiver Matthews? : [ ] yes [ ] no [ x] Deferred [ ] Declined             Social work referral [ ] Patient & Family Centered Care Referral [ ]     Anticipatory Grief present?:  [ ] yes [ ] no  [x ] Deferred                  Social work referral [ ] Chaplaincy Referral [ ]    		  Other Symptoms:  [x ]All other review of systems negative     Palliative Performance Status Version 2:   See PPSv2 tool and score below         PHYSICAL EXAM:  Vital Signs Last 24 Hrs  T(C): 36.4 (29 Mar 2024 11:46), Max: 36.4 (28 Mar 2024 20:56)  T(F): 97.6 (29 Mar 2024 11:46), Max: 97.6 (28 Mar 2024 20:56)  HR: 68 (29 Mar 2024 17:10) (64 - 68)  BP: 112/65 (29 Mar 2024 17:10) (106/62 - 115/68)  BP(mean): --  RR: 18 (29 Mar 2024 13:00) (18 - 18)  SpO2: 93% (29 Mar 2024 13:00) (93% - 100%)    Parameters below as of 29 Mar 2024 13:00  Patient On (Oxygen Delivery Method): nasal cannula  O2 Flow (L/min): 6         GENERAL: [ ]Cachexia    [x ]Alert  [x ]Oriented x 3   [ ]Lethargic  [ ]Unarousable  [ ]Verbal  [ ]Non-Verbal  Behavioral:   [ ]Anxiety  [ ]Delirium [ ]Agitation [ ]Other  HEENT:  [ ]Normal   [ ]Dry mouth   [ ]ET Tube/Trach  [ ]Oral lesions  [x] Right eye with conjunctival ecchymosis   PULMONARY:   [ ]Clear [ ]Tachypnea  [ ]Audible excessive secretions   [ ]Rhonchi        [ ]Right [ ]Left [ ]Bilateral  [ ]Crackles        [ ]Right [ ]Left [ ]Bilateral  [ ]Wheezing     [ ]Right [ ]Left [ ]Bilateral  [x ]Diminished BS [ ] Right [ ]Left [x ]Bilateral  [x] Pleurx   CARDIOVASCULAR:    [x ]Regular [ ]Irregular [ ]Tachy  [ ]Malik [ ]Murmur [ ]Other  GASTROINTESTINAL:  [ ]Soft  [ ]Distended   [x ]+BS  [ ]Non tender [ ]Tender  [ ]Other [ ]PEG [ ]OGT/ NGT   Last BM: 3/22  GENITOURINARY:  [x ]Normal [ ]Incontinent   [ ]Oliguria/Anuria   [ ]Che  MUSCULOSKELETAL:   [ ]Normal   [ ]Weakness  [ ]Bed/Wheelchair bound [ ]Edema  NEUROLOGIC:   [ x]No focal deficits  [ ] Cognitive impairment  [ ] Dysphagia [ ]Dysarthria [ ] Paresis [ ]Other   SKIN:   [x ]Normal  [ ]Rash  [ ]Other  [ ]Pressure ulcer(s) [ ]y [ ]n present on admission    CRITICAL CARE:  [ ]Shock Present  [ ]Septic [ ]Cardiogenic [ ]Neurologic [ ]Hypovolemic  [ ]Vasopressors [ ]Inotropes  [ ]Respiratory failure present [ ]Mechanical Ventilation [ ]Non-invasive ventilatory support [ ]High-Flow   [ ]Acute  [ ]Chronic [ ]Hypoxic  [ ]Hypercarbic [ ]Other  [ ]Other organ failure     LABS:                                    8.4    8.16  )-----------( 425      ( 29 Mar 2024 07:34 )             26.1   03-29    133<L>  |  99  |  38<H>  ----------------------------<  131<H>  4.9   |  24  |  0.98    Ca    9.2      29 Mar 2024 07:34  Phos  3.1     03-29  Mg     2.0     03-29    TPro  5.8<L>  /  Alb  2.9<L>  /  TBili  0.2  /  DBili  x   /  AST  10  /  ALT  10  /  AlkPhos  68  03-29        RADIOLOGY & ADDITIONAL STUDIES:  < from: VA Duplex Upper Ext Vein Scan, Bilat (03.28.24 @ 12:02) >    IMPRESSION:    Bilateral upper extremity venous ultrasound demonstrates acute DVT   involving: right internal jugular vein, right innominate vein, right   subclavianvein, right brachial vein, and left internal jugular vein.    Superficial venous thrombosis involves the right and left cephalic veins.   Left basilic vein is not visualized.    Edema of the superficial soft tissues of the upper extremities, right   greater than left. Correlate clinically. Known right neck/thoracic mass   is better evaluated on recent neck CT.    SACHA Pipe was informed on 3/28/2024 at 10:50PM.    --- End of Report ---            JAME MAYER M.D., ATTENDING RADIOLOGIST  This document has been electronically signed. Mar 28 2024  1:34PM    < end of copied text >  < from: CT Neck Soft Tissue w/ IV Cont (03.27.24 @ 17:10) >  IMPRESSION:    Bulky and conglomerate lymphadenopathy at right level 4 invades internal   jugular vein with thrombus extending up to hyoid level. 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 IJV otherwise patent up to skull base.    New left IJV nonocclusive thrombus. Otherwise, no significant change from   3/14/24.    --- End of Report ---            MARISEL PÉREZ MD; Attending Radiologist  This document has been electronically signed. Mar 27 2024  9:31PM    < end of copied text >    Protein Calorie Malnutrition Present: [ ]mild [ ]moderate [ ]severe [ ]underweight [ ]morbid obesity  https://www.andeal.org/vault/2440/web/files/ONC/Table_Clinical%20Characteristics%20to%20Document%20Malnutrition-White%20JV%20et%20al%202012.pdf    Height (cm): 154.9 (03-26-24 @ 16:20), 154.9 (03-10-24 @ 16:32)  Weight (kg): 49.9 (03-26-24 @ 16:20), 49.9 (03-10-24 @ 16:32)  BMI (kg/m2): 20.8 (03-26-24 @ 16:20), 20.8 (03-10-24 @ 16:32)    [ ]PPSV2 < or = 30%  [ ]significant weight loss [ ]poor nutritional intake [ ]anasarca[ ]Artificial Nutrition    Other REFERRALS:  [ ]Hospice  [ ]Child Life  [ ]Social Work  [ ]Case management [ ]Holistic Therapy     Goals of Care Document:

## 2024-03-29 NOTE — PROGRESS NOTE ADULT - PROBLEM SELECTOR PLAN 1
- h/o RCC and had nephrectomy by Urology by Dr Jacky Adkins at [facility], also had lymph node dissection 2 years ago  - Now with axillary, subclavicular and mediastinal lymphadenopathy, pulmonary nodules and inflammatory changes of breast seen on CT with possible effect on IVC  - CT neck with large supraclavicular/mediastinal mass lesion with mass effect and complete occlusion of the right IJ and SVC  - S/p supraclavicular lymph node biopsy by IR on 3/14- path consistent with primary of GI origin  - D/w GI follow up MRI abdomen and MRCP- currently deferred due to worsening svc syndrome warranting urgent radiation  - D/w oncology, to continue to follow  - Pallative follow up for pain control and anxiety management

## 2024-03-29 NOTE — PROGRESS NOTE ADULT - PROBLEM SELECTOR PLAN 5
Exacerbated by pain, respiratory issues, and newly diagnosed metastatic CA.   -Continue home diazepam 5 mg PO BID PRN for anxiety.  -Chaplaincy to f/u.   -Holistic nurse inputs noticed and interventions appreciated.

## 2024-03-29 NOTE — PROGRESS NOTE ADULT - ASSESSMENT
58F w/Hx of RCC s/p R total nephrectomy, S/p hysterectomy, R-sided glaucoma, Fibromyalgia, Anxiety, GERD, smoker presents due to R breast swelling, redness and pain. Admitted for right breast swelling and cellulitis found to have imaging evidence concerning for metastatic malignancy w/ lung nodules, and axillary, supraclavicular, and mediastinal adenopathy. 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 Also s/p pericardial window for moderate pericardial effusion now with chest tube. Now found to be in Afib with RVR, s/p R pleurx placement with minimal improvement in respiratory status- now concerned for worsening SVC syndrome pending transfer to [facility] for urgent radiation

## 2024-03-29 NOTE — PROGRESS NOTE ADULT - PROBLEM SELECTOR PLAN 4
Still with constipation.   -On Senna 2 tabs hs and Miralax bid, dulcolax BID PRN   -Movantik x 1 dose to be given on 3/29 AM

## 2024-03-29 NOTE — PROGRESS NOTE ADULT - PROBLEM SELECTOR PLAN 3
-IR and RT are f/u.   -Further work up and Rx as per the primary team.   -For possible RT at [facility]   -Prelim not from IR is indicating that stenting is not to be considered at this time.   -O2 by NC

## 2024-03-29 NOTE — PROGRESS NOTE ADULT - SUBJECTIVE AND OBJECTIVE BOX
DATE OF SERVICE: 03-29-24 @ 13:56    Patient is a 58y old  Female who presents with a chief complaint of Mastitis, breast malignancy (29 Mar 2024 12:35)      INTERVAL HISTORY: In no acute distress.     REVIEW OF SYSTEMS:  CONSTITUTIONAL: No weakness  EYES/ENT: No visual changes;  No throat pain   NECK: No pain or stiffness  RESPIRATORY: No cough, wheezing; No shortness of breath  CARDIOVASCULAR: No chest pain or palpitations  GASTROINTESTINAL: No abdominal  pain. No nausea, vomiting, or hematemesis  GENITOURINARY: No dysuria, frequency or hematuria  NEUROLOGICAL: No stroke like symptoms  SKIN: No rashes    TELEMETRY Personally reviewed: SR 60-80  	  MEDICATIONS:  aMIOdarone    Tablet 400 milliGRAM(s) Oral three times a day  aMIOdarone    Tablet   Oral   metoprolol tartrate 25 milliGRAM(s) Oral two times a day  urea Oral Powder 15 Gram(s) Oral two times a day        PHYSICAL EXAM:  T(C): 36.4 (03-29-24 @ 11:46), Max: 36.4 (03-28-24 @ 20:56)  HR: 64 (03-29-24 @ 11:46) (64 - 67)  BP: 115/68 (03-29-24 @ 11:46) (106/62 - 115/68)  RR: 18 (03-29-24 @ 11:46) (18 - 18)  SpO2: 99% (03-29-24 @ 11:46) (99% - 100%)  Wt(kg): --  I&O's Summary    28 Mar 2024 07:01  -  29 Mar 2024 07:00  --------------------------------------------------------  IN: 200 mL / OUT: 0 mL / NET: 200 mL          Appearance: In no distress	  HEENT:    PERRL, EOMI	  Cardiovascular:  S1 S2, No JVD  Respiratory: Lungs clear to auscultation	  Gastrointestinal:  Soft, Non-tender, + BS	  Vascularature:  No edema of LE  Psychiatric: Appropriate affect   Neuro: no acute focal deficits                               8.4    8.16  )-----------( 425      ( 29 Mar 2024 07:34 )             26.1     03-29    133<L>  |  99  |  38<H>  ----------------------------<  131<H>  4.9   |  24  |  0.98    Ca    9.2      29 Mar 2024 07:34  Phos  3.1     03-29  Mg     2.0     03-29    TPro  5.8<L>  /  Alb  2.9<L>  /  TBili  0.2  /  DBili  x   /  AST  10  /  ALT  10  /  AlkPhos  68  03-29        Labs personally reviewed      ASSESSMENT/PLAN: 	    58F w/Hx of RCC s/p R total nephrectomy, S/p hysterectomy, R-sided glaucoma, Fibromyalgia, Anxiety, GERD, smoker presents due to R breast swelling, redness and pain.     Problem/Plan - 1:   ·  Problem: Pericardial effusion.  ·  Plan: - pericardial effusion on CT chest   - TTE 3/18 with Moderate pericardial effusion with echocardiographic evidence of hemodynamic compromise    - Clinically pt is not in tamponade   - Thoracic surgery consulted for pericardial window given likely malignant effusion  ----s/p pericardial window 3/19  - 3/21 now with ST paroxysmal with AF with RVR and reports of chest pain  limited TTE: left ventricular systolic function appears grossly normal. Thickened pericardium. Bilateral pleural effusion noted. No pericardial effusion seen.  Compared to the TTE 3/18/2024, the pericardial effusion is no longer present.    Problem/Plan - 2:  ·  Problem: Internal jugular vein thrombosis, right.   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     Problem/Plan - 3:  ·  Problem: Smoker.   ·  Plan: - 1 ppd smoker  - c/w Nicotine patch.    Problem/Plan - 4:  ·  Problem: Acute Hypoxia  ·  Plan: low threshold for IV Lasix PRN given increased oxygen requirement and crackles on exam    Problem/Plan - 5:  ·  Problem: Sinus Tachycardia  - Likely reactive   - Improved    Problem/Plan - 6:  ·  Problem: New Onset Atrial Fibrillation  - c/w heparin gtt  - TSH wnl  - s/p PO dig load 3/24  - Cont lopressor 25mg PO BID with hold parameters  - c/w Amio taper as per EP        Adriana ElielJUSTIN smith DO [facility]  Cardiovascular Medicine  [address], [address]  Available through call or text on [employer] TEAMs  Office: [phone]

## 2024-03-29 NOTE — PROGRESS NOTE ADULT - PROBLEM SELECTOR PLAN 8
Will continue to f/u for symptoms, GOC, and support.         Conor Jack MD  Associate Chief Geriatrics and Palliative Care (GaP) [facility]   GaP Consult Service   , [employer] [facility]      Please contact me via Teams from Monday through Friday between 9am-5pm. If not answering, please call the palliative care pager [phone]    After 5pm and on weekends, please see the contact information below:    In the event of newly developing, evolving, or worsening symptoms, please contact the Palliative Medicine team via pager (if the patient is at [facility] #[phone] or if the patient is at [facility] #[phone]) The Geriatric and Palliative Medicine service has coverage 24 hours a day/ 7 days a week to provide medical recommendations regarding symptom management needs via telephone

## 2024-03-29 NOTE — PROGRESS NOTE ADULT - ASSESSMENT
58-yo F w/ PMH of RCC s/p R total nephrectomy, s/p hysterectomy, fibromyalgia, and concern for breast malignancy currently undergoing workup, admitted with R breast swelling, erythema, and pain. ID was consulted for extensive cellulitis/myositis along the R anterior lateral neck and R upper chest wall. 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 LN biopsy 3/14. CT chest w/ mediastinal and supraclavicular lymphadenopathy w/ necrosis. 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 RRT 3/22-23 ON for afib w/ RVR. Afebrile. New leukocytosis. Breast erythema and tenderness improving while on antibiotics. Another RRT 3/26 w/ tachycardia.    #Neck edema  #Neck pain  #Pneumonia  #Abnormal CT scan  #Myositis  #Thrombophlebitis  #Leukocytosis  #Afib w/ RVR  - VSS. No leukocytosis. Significant R neck lymphadenopathy, s/p biopsy. F/u path  - No sore throat or dysphagia. No odynophagia. No fever or rigors.  - Unclear if patient's presentation represents infectious disease process. Labs and physical exam do not support septic thrombophlebitis.  - BCx NGTD. Quantiferon neg.  - CT chest reviewed. Necrotic lymph nodes noted. Path from neck concern for malignancy. Breast erythema and pain could be from inflammation from mass burden.  - RRT 3/22-23 ON w/ afib w/ RVR. Afebrile. New leukocytosis. Probably from cancer burden? Broadened antibiotics to cefepime/metroniazole/vancomycin.  - RRT 3/26 for tachycardia. Increased pleural effusion on CXR.  - CT chest repeat on 3/27 w/ possible PNA. Would continue with antibiotics for now.  - CT neck also reviewed (3/27). Neck/chest wall pain and neck swelling likely from evolving thrombus (a/w malignancy?) then infectious process (e.g. Lemierre's disease). Not febrile. Never been bacteremic this admission.  - Continue with cefepime 2g IV Q12H and metronidazole 500 mg PO Q12H, target last day 4/1.  - D/c vancomycin.  - ENT and Onc f/u. CT surgery follow-up. Pulm f/u    #L hand phlebitis  - L dorsal hand w/ phlebitis 3/26. ABx as above. Warm compress    Plan discussed with primary team ACP.  Thank you for this consult.     Edwar Petersen MD, PhD  Attending Physician  Division of Infectious Diseases  Department of Medicine    Please contact through MS Teams message.  Office: [phone] (after 5 PM or weekend) .

## 2024-03-29 NOTE — PROGRESS NOTE ADULT - SUBJECTIVE AND OBJECTIVE BOX
Moose Nolen MD  Division of Hospital Medicine  Available on MS teams until 7pm  If no response or off-hours, page [phone]  -------------------------------------    Patient is a 58y old  Female who presents with a chief complaint of Mastitis, breast malignancy (29 Mar 2024 10:49)      SUBJECTIVE / OVERNIGHT EVENTS: none acute  ADDITIONAL REVIEW OF SYSTEMS: pt feels somewhat better in terms of mood, pain, still having some shortness of breath    MEDICATIONS  (STANDING):  acetaminophen   IVPB .. 750 milliGRAM(s) IV Intermittent once  aMIOdarone    Tablet 400 milliGRAM(s) Oral three times a day  aMIOdarone    Tablet   Oral   cefepime   IVPB 2000 milliGRAM(s) IV Intermittent every 12 hours  cefepime   IVPB      chlorhexidine 4% Liquid 1 Application(s) Topical <User Schedule>  dexAMETHasone  Injectable 4 milliGRAM(s) IV Push every 8 hours  enoxaparin Injectable 50 milliGRAM(s) SubCutaneous every 12 hours  influenza   Vaccine 0.5 milliLiter(s) IntraMuscular once  ketorolac   Injectable 15 milliGRAM(s) IV Push once  metoprolol tartrate 25 milliGRAM(s) Oral two times a day  metroNIDAZOLE    Tablet 500 milliGRAM(s) Oral every 12 hours  naloxegol 25 milliGRAM(s) Oral before breakfast  nicotine -  14 mG/24Hr(s) Patch 1 Patch Transdermal daily  oxyCODONE  ER Tablet 20 milliGRAM(s) Oral <User Schedule>  oxyCODONE  ER Tablet 30 milliGRAM(s) Oral <User Schedule>  pantoprazole    Tablet 40 milliGRAM(s) Oral before breakfast  polyethylene glycol 3350 17 Gram(s) Oral two times a day  senna 2 Tablet(s) Oral at bedtime  urea Oral Powder 15 Gram(s) Oral two times a day    MEDICATIONS  (PRN):  albuterol/ipratropium for Nebulization 3 milliLiter(s) Nebulizer every 6 hours PRN Shortness of Breath and/or Wheezing  aluminum hydroxide/magnesium hydroxide/simethicone Suspension 30 milliLiter(s) Oral every 6 hours PRN Dyspepsia  benzocaine/menthol Lozenge 1 Lozenge Oral two times a day PRN Sore Throat  bisacodyl 5 milliGRAM(s) Oral every 12 hours PRN Constipation  diazepam    Tablet 5 milliGRAM(s) Oral two times a day PRN for anxiety  guaiFENesin Oral Liquid (Sugar-Free) 200 milliGRAM(s) Oral every 6 hours PRN Cough  HYDROmorphone  Injectable 1 milliGRAM(s) IV Push every 3 hours PRN Severe Pain (7 - 10)  melatonin 3 milliGRAM(s) Oral at bedtime PRN Insomnia  naloxone Injectable 0.1 milliGRAM(s) IV Push every 3 minutes PRN Obtundation, respiratory suppression  oxyCODONE    IR 10 milliGRAM(s) Oral every 4 hours PRN Moderate Pain (4 - 6)  sodium chloride 0.9% lock flush 10 milliLiter(s) IV Push every 1 hour PRN Pre/post blood products, medications, blood draw, and to maintain line patency      CAPILLARY BLOOD GLUCOSE        I&O's Summary    28 Mar 2024 07:01  -  29 Mar 2024 07:00  --------------------------------------------------------  IN: 200 mL / OUT: 0 mL / NET: 200 mL        PHYSICAL EXAM:  Vital Signs Last 24 Hrs  T(C): 36.4 (29 Mar 2024 06:55), Max: 36.4 (28 Mar 2024 20:56)  T(F): 97.6 (29 Mar 2024 06:55), Max: 97.6 (28 Mar 2024 20:56)  HR: 67 (29 Mar 2024 06:55) (67 - 79)  BP: 106/62 (29 Mar 2024 06:55) (97/57 - 113/67)  BP(mean): --  RR: 18 (29 Mar 2024 06:55) (18 - 18)  SpO2: 100% (29 Mar 2024 06:55) (99% - 100%)    Parameters below as of 29 Mar 2024 06:55  Patient On (Oxygen Delivery Method): mask, nonrebreather      CONSTITUTIONAL: NAD  EYES: PERRLA; conjunctiva and sclera clear  ENMT: MMM  NECK: Supple  RESPIRATORY: decreased breath sounds R side, R chest tube  CARDIOVASCULAR: RRR, no JVD, BL UE extremity swelling, R groin TLC  ABDOMEN: Nontender to palpation, normoactive BS, no guarding/rigidity  MUSCLOSKELETAL:  no clubbing/cyanosis, no joint swelling or tenderness to palpation  PSYCH: A+O x 3, affect normal  NEUROLOGY: CN 2-12 are intact and symmetric; no gross sensory or motor deficits  SKIN: No rashes; no palpable lesions    LABS:                        8.4    8.16  )-----------( 425      ( 29 Mar 2024 07:34 )             26.1     03-29    133<L>  |  99  |  38<H>  ----------------------------<  131<H>  4.9   |  24  |  0.98    Ca    9.2      29 Mar 2024 07:34  Phos  3.1     03-29  Mg     2.0     03-29    TPro  5.8<L>  /  Alb  2.9<L>  /  TBili  0.2  /  DBili  x   /  AST  10  /  ALT  10  /  AlkPhos  68  03-29          Urinalysis Basic - ( 29 Mar 2024 07:34 )    Color: x / Appearance: x / SG: x / pH: x  Gluc: 131 mg/dL / Ketone: x  / Bili: x / Urobili: x   Blood: x / Protein: x / Nitrite: x   Leuk Esterase: x / RBC: x / WBC x   Sq Epi: x / Non Sq Epi: x / Bacteria: x          RADIOLOGY & ADDITIONAL TESTS:  Results Reviewed:   Imaging Personally Reviewed:  Electrocardiogram Personally Reviewed:    COORDINATION OF CARE:  Care Discussed with Consultants/Other Providers [Y/N]:  Prior or Outpatient Records Reviewed [Y/N]:

## 2024-03-29 NOTE — PROGRESS NOTE ADULT - PROBLEM SELECTOR PLAN 1
Uncontrolled  - Will increase Oxycodone ER to 30mg at 6am and 10pm and 20mg at 2pm.   - Continue with Oxycodone 10 mg PO q4h PRN for moderate pain  - Continue with Dilaudid 1 mg IV q3h PRN for severe pain  - Reinforced education regarding the availability of PRN meds.   - Narcan PRN  - Bowel regimen while on opioids

## 2024-03-29 NOTE — PROGRESS NOTE ADULT - SUBJECTIVE AND OBJECTIVE BOX
Patient is a 58y old  Female who presents with a chief complaint of Mastitis, breast malignancy (29 Mar 2024 19:56)       Pt is seen and examined  pt is awake and lying in bed w/ nasal cannula  having right sided neck pain.           ROS:  + right neck pain     MEDICATIONS  (STANDING):  acetaminophen   IVPB .. 750 milliGRAM(s) IV Intermittent once  aMIOdarone    Tablet   Oral   aMIOdarone    Tablet 400 milliGRAM(s) Oral three times a day  cefepime   IVPB      cefepime   IVPB 2000 milliGRAM(s) IV Intermittent every 12 hours  chlorhexidine 4% Liquid 1 Application(s) Topical <User Schedule>  dexAMETHasone  Injectable 4 milliGRAM(s) IV Push every 8 hours  enoxaparin Injectable 50 milliGRAM(s) SubCutaneous every 12 hours  influenza   Vaccine 0.5 milliLiter(s) IntraMuscular once  ketorolac   Injectable 15 milliGRAM(s) IV Push once  metoprolol tartrate 25 milliGRAM(s) Oral two times a day  metroNIDAZOLE    Tablet 500 milliGRAM(s) Oral every 12 hours  naloxegol 25 milliGRAM(s) Oral before breakfast  nicotine -  14 mG/24Hr(s) Patch 1 Patch Transdermal daily  oxyCODONE  ER Tablet 20 milliGRAM(s) Oral <User Schedule>  oxyCODONE  ER Tablet 30 milliGRAM(s) Oral <User Schedule>  pantoprazole    Tablet 40 milliGRAM(s) Oral before breakfast  polyethylene glycol 3350 17 Gram(s) Oral two times a day  senna 2 Tablet(s) Oral at bedtime  urea Oral Powder 15 Gram(s) Oral two times a day    MEDICATIONS  (PRN):  albuterol/ipratropium for Nebulization 3 milliLiter(s) Nebulizer every 6 hours PRN Shortness of Breath and/or Wheezing  aluminum hydroxide/magnesium hydroxide/simethicone Suspension 30 milliLiter(s) Oral every 6 hours PRN Dyspepsia  benzocaine/menthol Lozenge 1 Lozenge Oral two times a day PRN Sore Throat  bisacodyl 5 milliGRAM(s) Oral every 12 hours PRN Constipation  diazepam    Tablet 5 milliGRAM(s) Oral two times a day PRN for anxiety  guaiFENesin Oral Liquid (Sugar-Free) 200 milliGRAM(s) Oral every 6 hours PRN Cough  HYDROmorphone  Injectable 1 milliGRAM(s) IV Push every 3 hours PRN Severe Pain (7 - 10)  melatonin 3 milliGRAM(s) Oral at bedtime PRN Insomnia  naloxone Injectable 0.1 milliGRAM(s) IV Push every 3 minutes PRN Obtundation, respiratory suppression  oxyCODONE    IR 10 milliGRAM(s) Oral every 4 hours PRN Moderate Pain (4 - 6)  sodium chloride 0.9% lock flush 10 milliLiter(s) IV Push every 1 hour PRN Pre/post blood products, medications, blood draw, and to maintain line patency      Allergies    Cipro (Headache; Vomiting; Rash)  penicillin (Headache; Vomiting; Rash)  erythromycin (Headache; Vomiting; Rash)    Intolerances        Vital Signs Last 24 Hrs  T(C): 36.4 (29 Mar 2024 11:46), Max: 36.4 (28 Mar 2024 20:56)  T(F): 97.6 (29 Mar 2024 11:46), Max: 97.6 (28 Mar 2024 20:56)  HR: 68 (29 Mar 2024 17:10) (64 - 68)  BP: 112/65 (29 Mar 2024 17:10) (106/62 - 115/68)  BP(mean): --  RR: 18 (29 Mar 2024 13:00) (18 - 18)  SpO2: 93% (29 Mar 2024 13:00) (93% - 100%)    Parameters below as of 29 Mar 2024 13:00  Patient On (Oxygen Delivery Method): nasal cannula  O2 Flow (L/min): 6      PHYSICAL EXAM  CONSTITUTIONAL: NAD  EYES: PERRLA; conjunctiva and sclera clear  ENMT: MMM  NECK: Supple  RESPIRATORY: decreased breath sounds R side, R chest tube  CARDIOVASCULAR: RRR, no JVD, BL UE extremity swelling, R groin TLC  ABDOMEN: Nontender to palpation, normoactive BS, no guarding/rigidity  MUSCLOSKELETAL:  no clubbing/cyanosis, no joint swelling or tenderness to palpation  PSYCH: A+O x 3, affect normal  NEUROLOGY: CN 2-12 are intact and symmetric; no gross sensory or motor deficits  SKIN: No rashes; no palpable lesions  LABS:                          8.4    8.16  )-----------( 425      ( 29 Mar 2024 07:34 )             26.1         Mean Cell Volume : 90.9 fl  Mean Cell Hemoglobin : 29.3 pg  Mean Cell Hemoglobin Concentration : 32.2 gm/dL  Auto Neutrophil # : x  Auto Lymphocyte # : x  Auto Monocyte # : x  Auto Eosinophil # : x  Auto Basophil # : x  Auto Neutrophil % : x  Auto Lymphocyte % : x  Auto Monocyte % : x  Auto Eosinophil % : x  Auto Basophil % : x    Serial CBC's  03-29 @ 07:34  Hct-26.1 / Hgb-8.4 / Plat-425 / RBC-2.87 / WBC-8.16          Serial CBC's  03-28 @ 06:12  Hct-28.5 / Hgb-8.9 / Plat-371 / RBC-3.14 / WBC-7.07          Serial CBC's  03-27 @ 06:59  Hct-31.9 / Hgb-10.1 / Plat-470 / RBC-3.46 / WBC-14.19          Serial CBC's  03-26 @ 04:06  Hct-31.1 / Hgb-10.0 / Plat-429 / RBC-3.46 / WBC-10.29          Serial CBC's  03-26 @ 00:57  Hct-29.9 / Hgb-9.4 / Plat-405 / RBC-3.27 / WBC-8.20            03-29    133<L>  |  99  |  38<H>  ----------------------------<  131<H>  4.9   |  24  |  0.98    Ca    9.2      29 Mar 2024 07:34  Phos  3.1     03-29  Mg     2.0     03-29    TPro  5.8<L>  /  Alb  2.9<L>  /  TBili  0.2  /  DBili  x   /  AST  10  /  ALT  10  /  AlkPhos  68  03-29          WBC Count: 8.16 K/uL (03-29-24 @ 07:34)  Hemoglobin: 8.4 g/dL (03-29-24 @ 07:34)  Hematocrit: 26.1 % (03-29-24 @ 07:34)  Platelet Count - Automated: 425 K/uL (03-29-24 @ 07:34)  WBC Count: 7.07 K/uL (03-28-24 @ 06:12)  Hemoglobin: 8.9 g/dL (03-28-24 @ 06:12)  Hematocrit: 28.5 % (03-28-24 @ 06:12)  Platelet Count - Automated: 371 K/uL (03-28-24 @ 06:12)  WBC Count: 14.19 K/uL (03-27-24 @ 06:59)  Hemoglobin: 10.1 g/dL (03-27-24 @ 06:59)  Hematocrit: 31.9 % (03-27-24 @ 06:59)  Platelet Count - Automated: 470 K/uL (03-27-24 @ 06:59)  WBC Count: 10.29 K/uL (03-26-24 @ 04:06)  Hemoglobin: 10.0 g/dL (03-26-24 @ 04:06)  Hematocrit: 31.1 % (03-26-24 @ 04:06)  Platelet Count - Automated: 429 K/uL (03-26-24 @ 04:06)  WBC Count: 8.20 K/uL (03-26-24 @ 00:57)  Hemoglobin: 9.4 g/dL (03-26-24 @ 00:57)  Hematocrit: 29.9 % (03-26-24 @ 00:57)  Platelet Count - Automated: 405 K/uL (03-26-24 @ 00:57)  WBC Count: 9.82 K/uL (03-25-24 @ 09:16)  Hemoglobin: 10.5 g/dL (03-25-24 @ 09:16)  Hematocrit: 33.2 % (03-25-24 @ 09:16)  Platelet Count - Automated: 487 K/uL (03-25-24 @ 09:16)  WBC Count: 8.32 K/uL (03-24-24 @ 07:23)  Hemoglobin: 10.6 g/dL (03-24-24 @ 07:23)  Hematocrit: 33.4 % (03-24-24 @ 07:23)  Platelet Count - Automated: 479 K/uL (03-24-24 @ 07:23)  WBC Count: 8.14 K/uL (03-23-24 @ 08:39)  Hemoglobin: 10.0 g/dL (03-23-24 @ 08:39)  Hematocrit: 30.3 % (03-23-24 @ 08:39)  Platelet Count - Automated: 422 K/uL (03-23-24 @ 08:39)  WBC Count: 16.22 K/uL (03-23-24 @ 07:15)  Hemoglobin: 9.3 g/dL (03-23-24 @ 07:15)  Hematocrit: 29.4 % (03-23-24 @ 07:15)  Platelet Count - Automated: 284 K/uL (03-23-24 @ 07:15)  WBC Count: 8.62 K/uL (03-23-24 @ 02:46)  Hemoglobin: 10.1 g/dL (03-23-24 @ 02:46)  Hematocrit: 30.2 % (03-23-24 @ 02:46)  Platelet Count - Automated: 400 K/uL (03-23-24 @ 02:46)  Platelet Count - Automated: 414 K/uL (03-22-24 @ 10:08)  WBC Count: 6.74 K/uL (03-22-24 @ 10:08)  Hemoglobin: 10.9 g/dL (03-22-24 @ 10:08)  Hematocrit: 32.3 % (03-22-24 @ 10:08)  WBC Count: 8.62 K/uL (03-21-24 @ 01:48)  Hemoglobin: 10.7 g/dL (03-21-24 @ 01:48)  Hematocrit: 31.8 % (03-21-24 @ 01:48)  Platelet Count - Automated: 374 K/uL (03-21-24 @ 01:48)  WBC Count: 8.07 K/uL (03-20-24 @ 17:00)  Hemoglobin: 10.9 g/dL (03-20-24 @ 17:00)  Hematocrit: 32.9 % (03-20-24 @ 17:00)  Platelet Count - Automated: 388 K/uL (03-20-24 @ 17:00)  WBC Count: 9.04 K/uL (03-20-24 @ 06:49)  Hemoglobin: 11.1 g/dL (03-20-24 @ 06:49)  Hematocrit: 33.0 % (03-20-24 @ 06:49)  Platelet Count - Automated: 420 K/uL (03-20-24 @ 06:49)

## 2024-03-30 LAB
ANION GAP SERPL CALC-SCNC: 9 MMOL/L — SIGNIFICANT CHANGE UP (ref 5–17)
BUN SERPL-MCNC: 52 MG/DL — HIGH (ref 7–23)
CALCIUM SERPL-MCNC: 9.7 MG/DL — SIGNIFICANT CHANGE UP (ref 8.4–10.5)
CHLORIDE SERPL-SCNC: 98 MMOL/L — SIGNIFICANT CHANGE UP (ref 96–108)
CO2 SERPL-SCNC: 27 MMOL/L — SIGNIFICANT CHANGE UP (ref 22–31)
CREAT SERPL-MCNC: 0.92 MG/DL — SIGNIFICANT CHANGE UP (ref 0.5–1.3)
CULTURE RESULTS: SIGNIFICANT CHANGE UP
EGFR: 71 ML/MIN/1.73M2 — SIGNIFICANT CHANGE UP
GLUCOSE SERPL-MCNC: 115 MG/DL — HIGH (ref 70–99)
HCT VFR BLD CALC: 26.6 % — LOW (ref 34.5–45)
HGB BLD-MCNC: 8.5 G/DL — LOW (ref 11.5–15.5)
MCHC RBC-ENTMCNC: 29.4 PG — SIGNIFICANT CHANGE UP (ref 27–34)
MCHC RBC-ENTMCNC: 32 GM/DL — SIGNIFICANT CHANGE UP (ref 32–36)
MCV RBC AUTO: 92 FL — SIGNIFICANT CHANGE UP (ref 80–100)
NRBC # BLD: 0 /100 WBCS — SIGNIFICANT CHANGE UP (ref 0–0)
PLATELET # BLD AUTO: 457 K/UL — HIGH (ref 150–400)
POTASSIUM SERPL-MCNC: 4.6 MMOL/L — SIGNIFICANT CHANGE UP (ref 3.5–5.3)
POTASSIUM SERPL-SCNC: 4.6 MMOL/L — SIGNIFICANT CHANGE UP (ref 3.5–5.3)
RBC # BLD: 2.89 M/UL — LOW (ref 3.8–5.2)
RBC # FLD: 13.8 % — SIGNIFICANT CHANGE UP (ref 10.3–14.5)
SODIUM SERPL-SCNC: 134 MMOL/L — LOW (ref 135–145)
SPECIMEN SOURCE: SIGNIFICANT CHANGE UP
WBC # BLD: 8.12 K/UL — SIGNIFICANT CHANGE UP (ref 3.8–10.5)
WBC # FLD AUTO: 8.12 K/UL — SIGNIFICANT CHANGE UP (ref 3.8–10.5)

## 2024-03-30 PROCEDURE — 99233 SBSQ HOSP IP/OBS HIGH 50: CPT

## 2024-03-30 RX ORDER — CEFEPIME 1 G/1
2000 INJECTION, POWDER, FOR SOLUTION INTRAMUSCULAR; INTRAVENOUS EVERY 12 HOURS
Refills: 0 | Status: COMPLETED | OUTPATIENT
Start: 2024-03-30 | End: 2024-04-01

## 2024-03-30 RX ORDER — SALIVA SUBSTITUTE COMB NO.11 351 MG
15 POWDER IN PACKET (EA) MUCOUS MEMBRANE EVERY 4 HOURS
Refills: 0 | Status: DISCONTINUED | OUTPATIENT
Start: 2024-03-30 | End: 2024-04-03

## 2024-03-30 RX ADMIN — AMIODARONE HYDROCHLORIDE 400 MILLIGRAM(S): 400 TABLET ORAL at 14:34

## 2024-03-30 RX ADMIN — Medication 15 MILLILITER(S): at 18:59

## 2024-03-30 RX ADMIN — OXYCODONE HYDROCHLORIDE 20 MILLIGRAM(S): 5 TABLET ORAL at 14:45

## 2024-03-30 RX ADMIN — Medication 15 GRAM(S): at 17:36

## 2024-03-30 RX ADMIN — POLYETHYLENE GLYCOL 3350 17 GRAM(S): 17 POWDER, FOR SOLUTION ORAL at 06:31

## 2024-03-30 RX ADMIN — OXYCODONE HYDROCHLORIDE 30 MILLIGRAM(S): 5 TABLET ORAL at 23:00

## 2024-03-30 RX ADMIN — POLYETHYLENE GLYCOL 3350 17 GRAM(S): 17 POWDER, FOR SOLUTION ORAL at 17:35

## 2024-03-30 RX ADMIN — Medication 1 PATCH: at 19:32

## 2024-03-30 RX ADMIN — Medication 25 MILLIGRAM(S): at 06:30

## 2024-03-30 RX ADMIN — SENNA PLUS 2 TABLET(S): 8.6 TABLET ORAL at 22:02

## 2024-03-30 RX ADMIN — Medication 15 GRAM(S): at 06:31

## 2024-03-30 RX ADMIN — CHLORHEXIDINE GLUCONATE 1 APPLICATION(S): 213 SOLUTION TOPICAL at 07:34

## 2024-03-30 RX ADMIN — OXYCODONE HYDROCHLORIDE 30 MILLIGRAM(S): 5 TABLET ORAL at 06:30

## 2024-03-30 RX ADMIN — CEFEPIME 100 MILLIGRAM(S): 1 INJECTION, POWDER, FOR SOLUTION INTRAMUSCULAR; INTRAVENOUS at 19:47

## 2024-03-30 RX ADMIN — AMIODARONE HYDROCHLORIDE 400 MILLIGRAM(S): 400 TABLET ORAL at 22:05

## 2024-03-30 RX ADMIN — Medication 500 MILLIGRAM(S): at 17:37

## 2024-03-30 RX ADMIN — Medication 4 MILLIGRAM(S): at 03:13

## 2024-03-30 RX ADMIN — CEFEPIME 100 MILLIGRAM(S): 1 INJECTION, POWDER, FOR SOLUTION INTRAMUSCULAR; INTRAVENOUS at 06:29

## 2024-03-30 RX ADMIN — Medication 15 MILLILITER(S): at 22:53

## 2024-03-30 RX ADMIN — OXYCODONE HYDROCHLORIDE 30 MILLIGRAM(S): 5 TABLET ORAL at 22:02

## 2024-03-30 RX ADMIN — ENOXAPARIN SODIUM 50 MILLIGRAM(S): 100 INJECTION SUBCUTANEOUS at 06:31

## 2024-03-30 RX ADMIN — ENOXAPARIN SODIUM 50 MILLIGRAM(S): 100 INJECTION SUBCUTANEOUS at 17:35

## 2024-03-30 RX ADMIN — PANTOPRAZOLE SODIUM 40 MILLIGRAM(S): 20 TABLET, DELAYED RELEASE ORAL at 06:30

## 2024-03-30 RX ADMIN — OXYCODONE HYDROCHLORIDE 10 MILLIGRAM(S): 5 TABLET ORAL at 09:59

## 2024-03-30 RX ADMIN — Medication 1 PATCH: at 14:30

## 2024-03-30 RX ADMIN — Medication 500 MILLIGRAM(S): at 06:30

## 2024-03-30 RX ADMIN — OXYCODONE HYDROCHLORIDE 20 MILLIGRAM(S): 5 TABLET ORAL at 15:30

## 2024-03-30 RX ADMIN — Medication 4 MILLIGRAM(S): at 18:53

## 2024-03-30 RX ADMIN — Medication 1 PATCH: at 14:41

## 2024-03-30 RX ADMIN — Medication 5 MILLIGRAM(S): at 00:44

## 2024-03-30 RX ADMIN — Medication 25 MILLIGRAM(S): at 17:36

## 2024-03-30 RX ADMIN — OXYCODONE HYDROCHLORIDE 10 MILLIGRAM(S): 5 TABLET ORAL at 10:45

## 2024-03-30 RX ADMIN — Medication 4 MILLIGRAM(S): at 12:50

## 2024-03-30 RX ADMIN — AMIODARONE HYDROCHLORIDE 400 MILLIGRAM(S): 400 TABLET ORAL at 06:30

## 2024-03-30 RX ADMIN — OXYCODONE HYDROCHLORIDE 30 MILLIGRAM(S): 5 TABLET ORAL at 07:00

## 2024-03-30 RX ADMIN — NALOXEGOL OXALATE 25 MILLIGRAM(S): 12.5 TABLET, FILM COATED ORAL at 06:32

## 2024-03-30 NOTE — PROGRESS NOTE ADULT - SUBJECTIVE AND OBJECTIVE BOX
Moose Nolen MD  Division of Hospital Medicine  Available on MS teams until 7pm  If no response or off-hours, page [phone]  -------------------------------------    Patient is a 58y old  Female who presents with a chief complaint of Mastitis, breast malignancy (30 Mar 2024 11:00)      SUBJECTIVE / OVERNIGHT EVENTS: none acute  ADDITIONAL REVIEW OF SYSTEMS: pt notes leakage from R chest tube site, no fevers/chills, no other new complaints, pain is controlled.     MEDICATIONS  (STANDING):  acetaminophen   IVPB .. 750 milliGRAM(s) IV Intermittent once  aMIOdarone    Tablet   Oral   aMIOdarone    Tablet 400 milliGRAM(s) Oral three times a day  cefepime   IVPB 2000 milliGRAM(s) IV Intermittent every 12 hours  cefepime   IVPB      chlorhexidine 4% Liquid 1 Application(s) Topical <User Schedule>  dexAMETHasone  Injectable 4 milliGRAM(s) IV Push every 8 hours  enoxaparin Injectable 50 milliGRAM(s) SubCutaneous every 12 hours  influenza   Vaccine 0.5 milliLiter(s) IntraMuscular once  ketorolac   Injectable 15 milliGRAM(s) IV Push once  metoprolol tartrate 25 milliGRAM(s) Oral two times a day  metroNIDAZOLE    Tablet 500 milliGRAM(s) Oral every 12 hours  naloxegol 25 milliGRAM(s) Oral before breakfast  nicotine -  14 mG/24Hr(s) Patch 1 Patch Transdermal daily  oxyCODONE  ER Tablet 20 milliGRAM(s) Oral <User Schedule>  oxyCODONE  ER Tablet 30 milliGRAM(s) Oral <User Schedule>  pantoprazole    Tablet 40 milliGRAM(s) Oral before breakfast  polyethylene glycol 3350 17 Gram(s) Oral two times a day  senna 2 Tablet(s) Oral at bedtime  urea Oral Powder 15 Gram(s) Oral two times a day    MEDICATIONS  (PRN):  albuterol/ipratropium for Nebulization 3 milliLiter(s) Nebulizer every 6 hours PRN Shortness of Breath and/or Wheezing  aluminum hydroxide/magnesium hydroxide/simethicone Suspension 30 milliLiter(s) Oral every 6 hours PRN Dyspepsia  benzocaine/menthol Lozenge 1 Lozenge Oral two times a day PRN Sore Throat  bisacodyl 5 milliGRAM(s) Oral every 12 hours PRN Constipation  diazepam    Tablet 5 milliGRAM(s) Oral two times a day PRN for anxiety  guaiFENesin Oral Liquid (Sugar-Free) 200 milliGRAM(s) Oral every 6 hours PRN Cough  HYDROmorphone  Injectable 1 milliGRAM(s) IV Push every 3 hours PRN Severe Pain (7 - 10)  melatonin 3 milliGRAM(s) Oral at bedtime PRN Insomnia  naloxone Injectable 0.1 milliGRAM(s) IV Push every 3 minutes PRN Obtundation, respiratory suppression  oxyCODONE    IR 10 milliGRAM(s) Oral every 4 hours PRN Moderate Pain (4 - 6)  sodium chloride 0.9% lock flush 10 milliLiter(s) IV Push every 1 hour PRN Pre/post blood products, medications, blood draw, and to maintain line patency      CAPILLARY BLOOD GLUCOSE        I&O's Summary    30 Mar 2024 07:01  -  30 Mar 2024 12:10  --------------------------------------------------------  IN: 0 mL / OUT: 550 mL / NET: -550 mL        PHYSICAL EXAM:  Vital Signs Last 24 Hrs  T(C): 36.6 (30 Mar 2024 11:41), Max: 36.7 (29 Mar 2024 21:12)  T(F): 97.9 (30 Mar 2024 11:41), Max: 98.1 (29 Mar 2024 21:12)  HR: 72 (30 Mar 2024 11:41) (63 - 72)  BP: 134/80 (30 Mar 2024 11:41) (112/65 - 134/80)  BP(mean): --  RR: 18 (30 Mar 2024 11:41) (18 - 18)  SpO2: 95% (30 Mar 2024 11:41) (93% - 95%)    Parameters below as of 30 Mar 2024 11:41  Patient On (Oxygen Delivery Method): nasal cannula  O2 Flow (L/min): 6    CONSTITUTIONAL: NAD  EYES: PERRLA; conjunctiva and sclera clear  ENMT: MMM  NECK: Supple  RESPIRATORY: R chest tube with surrounding leakage of serous fluid  CARDIOVASCULAR: RRR, no JVD, no peripheral edema   ABDOMEN: Nontender to palpation, normoactive BS, no guarding/rigidity  MUSCLOSKELETAL:  no clubbing/cyanosis, no joint swelling or tenderness to palpation  PSYCH: A+O x 3, affect normal  NEUROLOGY: CN 2-12 are intact and symmetric; no gross sensory or motor deficits  SKIN: No rashes; no palpable lesions    LABS:                        8.5    8.12  )-----------( 457      ( 30 Mar 2024 07:08 )             26.6     03-30    134<L>  |  98  |  52<H>  ----------------------------<  115<H>  4.6   |  27  |  0.92    Ca    9.7      30 Mar 2024 07:09  Phos  3.1     03-29  Mg     2.0     03-29    TPro  5.8<L>  /  Alb  2.9<L>  /  TBili  0.2  /  DBili  x   /  AST  10  /  ALT  10  /  AlkPhos  68  03-29          Urinalysis Basic - ( 30 Mar 2024 07:09 )    Color: x / Appearance: x / SG: x / pH: x  Gluc: 115 mg/dL / Ketone: x  / Bili: x / Urobili: x   Blood: x / Protein: x / Nitrite: x   Leuk Esterase: x / RBC: x / WBC x   Sq Epi: x / Non Sq Epi: x / Bacteria: x          RADIOLOGY & ADDITIONAL TESTS:  Results Reviewed:   Imaging Personally Reviewed:  Electrocardiogram Personally Reviewed:    COORDINATION OF CARE:  Care Discussed with Consultants/Other Providers [Y/N]:  Prior or Outpatient Records Reviewed [Y/N]:

## 2024-03-30 NOTE — PROGRESS NOTE ADULT - PROBLEM SELECTOR PLAN 3
- pericardial effusion on CT chest   - echo with moderate pericardial effusion and collapse of the RA  - s/p pericardial window 3/26  - chest tube drainage care per thoracic sx    #pleural effusion- pt on 4-6 L NC, now s/p R chest tube by thoracic with 1L output and minimal improvement in resp status  - cont chest tube output monitoring  - transfer to [facility] for urgent radiation for SVC syndrome

## 2024-03-30 NOTE — PROGRESS NOTE ADULT - SUBJECTIVE AND OBJECTIVE BOX
DATE OF SERVICE: 03-30-24 @ 11:00    Patient is a 58y old  Female who presents with a chief complaint of Mastitis, breast malignancy (29 Mar 2024 20:31)      INTERVAL HISTORY: no complaints     REVIEW OF SYSTEMS:  CONSTITUTIONAL: No weakness  EYES/ENT: No visual changes;  No throat pain   NECK: No pain or stiffness  RESPIRATORY: No cough, wheezing; No shortness of breath  CARDIOVASCULAR: No chest pain or palpitations  GASTROINTESTINAL: No abdominal  pain. No nausea, vomiting, or hematemesis  GENITOURINARY: No dysuria, frequency or hematuria  NEUROLOGICAL: No stroke like symptoms  SKIN: No rashes      	  MEDICATIONS:  aMIOdarone    Tablet 400 milliGRAM(s) Oral three times a day  aMIOdarone    Tablet   Oral   metoprolol tartrate 25 milliGRAM(s) Oral two times a day  urea Oral Powder 15 Gram(s) Oral two times a day        PHYSICAL EXAM:  T(C): 36.6 (03-30-24 @ 04:41), Max: 36.7 (03-29-24 @ 21:12)  HR: 63 (03-30-24 @ 04:41) (63 - 68)  BP: 117/73 (03-30-24 @ 04:41) (112/65 - 117/73)  RR: 18 (03-30-24 @ 04:41) (18 - 18)  SpO2: 95% (03-30-24 @ 04:41) (93% - 99%)  Wt(kg): --  I&O's Summary        Appearance: In no distress	  HEENT:    PERRL, EOMI	  Cardiovascular:  S1 S2, No JVD  Respiratory: Lungs clear to auscultation	  Gastrointestinal:  Soft, Non-tender, + BS	  Vascularature:  No edema of LE  Psychiatric: Appropriate affect   Neuro: no acute focal deficits                               8.5    8.12  )-----------( 457      ( 30 Mar 2024 07:08 )             26.6     03-30    134<L>  |  98  |  52<H>  ----------------------------<  115<H>  4.6   |  27  |  0.92    Ca    9.7      30 Mar 2024 07:09  Phos  3.1     03-29  Mg     2.0     03-29    TPro  5.8<L>  /  Alb  2.9<L>  /  TBili  0.2  /  DBili  x   /  AST  10  /  ALT  10  /  AlkPhos  68  03-29        Labs personally reviewed      ASSESSMENT/PLAN: 	    58F w/Hx of RCC s/p R total nephrectomy, S/p hysterectomy, R-sided glaucoma, Fibromyalgia, Anxiety, GERD, smoker presents due to R breast swelling, redness and pain.     Problem/Plan - 1:   ·  Problem: Pericardial effusion.  ·  Plan: - pericardial effusion on CT chest   - TTE 3/18 with Moderate pericardial effusion with echocardiographic evidence of hemodynamic compromise    - Clinically pt is not in tamponade   - Thoracic surgery consulted for pericardial window given likely malignant effusion  ----s/p pericardial window 3/19  - 3/21 now with ST paroxysmal with AF with RVR and reports of chest pain  limited TTE: left ventricular systolic function appears grossly normal. Thickened pericardium. Bilateral pleural effusion noted. No pericardial effusion seen.  Compared to the TTE 3/18/2024, the pericardial effusion is no longer present.    Problem/Plan - 2:  ·  Problem: Internal jugular vein thrombosis, right.   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     Problem/Plan - 3:  ·  Problem: Smoker.   ·  Plan: - 1 ppd smoker  - c/w Nicotine patch.    Problem/Plan - 4:  ·  Problem: Acute Hypoxia  ·  Plan: low threshold for IV Lasix PRN given increased oxygen requirement and crackles on exam    Problem/Plan - 5:  ·  Problem: Sinus Tachycardia  - Likely reactive   - Improved    Problem/Plan - 6:  ·  Problem: New Onset Atrial Fibrillation  - c/w heparin gtt  - TSH wnl  - s/p PO dig load 3/24  - Cont lopressor 25mg PO BID with hold parameters  - c/w Amio taper as per MAURICIO Shipley DO [facility]  Cardiovascular Medicine  [address] [place] [address], [address]  Office: [phone]  Available via call/text on [employer] Teams

## 2024-03-30 NOTE — PROGRESS NOTE ADULT - PROBLEM SELECTOR PLAN 2
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 Not candidate for IV on R due to mass, but also has SVC obliteration on CT neck to doubt patient can have PICC or central line placed on R  - MICU team assistance appreciated in placing R femoral TLC  - per ID resume abx for possible PNA    #SVC syndrome- IR reconsulted for possible stenting; not feasible due to size/location, now planned for tier 2 transfer to [facility] for urgent radiation  - d/w and agreed on by IR, rad onc Dr. Sterling, oncology, cardiology, thoracic  - pt and boyfriencarlton Breaux also aware and in agreement with transfer  - NM notified  - bed still pending

## 2024-03-30 NOTE — CHART NOTE - NSCHARTNOTEFT_GEN_A_CORE
Pt adamantly refusing  MRI , states that she is in too  much pain " My tail bone hurts " , offered her  IV meds prior to the exam , still continue to refuse , please consider  MR with anesthesia , please call anesthesia extension [phone] and coordinate   with MRI dept   at [phone] .  Izabel Mcgregor NP-C [id number]

## 2024-03-30 NOTE — CHART NOTE - NSCHARTNOTEFT_GEN_A_CORE
Chart reviewed for symptoms.  Required diazepam x 1 past 24 hours for anxiety.    No changes recommended at this time.    Kelly Christie MD [facility] FACP  Available on Teams during regular business hours  Pager after hours [phone]  Division of Geriatrics and Palliative Medicine

## 2024-03-30 NOTE — PROGRESS NOTE ADULT - ASSESSMENT
58F w/Hx of RCC s/p R total nephrectomy, S/p hysterectomy, R-sided glaucoma, Fibromyalgia, Anxiety, GERD, smoker presents due to R breast swelling, redness and pain. Admitted for right breast swelling and cellulitis found to have imaging evidence concerning for metastatic malignancy w/ lung nodules, and axillary, supraclavicular, and mediastinal adenopathy. 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 Also s/p pericardial window for moderate pericardial effusion now with chest tube. Now found to be in Afib with RVR, s/p R pleurx placement with minimal improvement in respiratory status- now concerned for worsening SVC syndrome pending transfer to [facility] for urgent radiation

## 2024-03-30 NOTE — PROGRESS NOTE ADULT - PROBLEM SELECTOR PLAN 1
- h/o RCC and had nephrectomy by Urology by Dr Jacky Adkins at [facility], also had lymph node dissection 2 years ago  - Now with axillary, subclavicular and mediastinal lymphadenopathy, pulmonary nodules and inflammatory changes of breast seen on CT with possible effect on IVC  - CT neck with large supraclavicular/mediastinal mass lesion with mass effect and complete occlusion of the right IJ and SVC  - S/p supraclavicular lymph node biopsy by IR on 3/14- path consistent with primary of GI origin  - D/w GI follow up MRI abdomen and MRCP- currently deferred due to worsening svc syndrome warranting urgent radiation  - D/w oncology, to continue to follow  - Pallative follow up for pain control and anxiety management

## 2024-03-31 LAB
ANION GAP SERPL CALC-SCNC: 13 MMOL/L — SIGNIFICANT CHANGE UP (ref 5–17)
BUN SERPL-MCNC: 49 MG/DL — HIGH (ref 7–23)
CALCIUM SERPL-MCNC: 9.5 MG/DL — SIGNIFICANT CHANGE UP (ref 8.4–10.5)
CHLORIDE SERPL-SCNC: 97 MMOL/L — SIGNIFICANT CHANGE UP (ref 96–108)
CO2 SERPL-SCNC: 23 MMOL/L — SIGNIFICANT CHANGE UP (ref 22–31)
CREAT SERPL-MCNC: 0.89 MG/DL — SIGNIFICANT CHANGE UP (ref 0.5–1.3)
EGFR: 74 ML/MIN/1.73M2 — SIGNIFICANT CHANGE UP
GLUCOSE SERPL-MCNC: 123 MG/DL — HIGH (ref 70–99)
HCT VFR BLD CALC: 28.4 % — LOW (ref 34.5–45)
HGB BLD-MCNC: 9.2 G/DL — LOW (ref 11.5–15.5)
MCHC RBC-ENTMCNC: 29.3 PG — SIGNIFICANT CHANGE UP (ref 27–34)
MCHC RBC-ENTMCNC: 32.4 GM/DL — SIGNIFICANT CHANGE UP (ref 32–36)
MCV RBC AUTO: 90.4 FL — SIGNIFICANT CHANGE UP (ref 80–100)
NRBC # BLD: 0 /100 WBCS — SIGNIFICANT CHANGE UP (ref 0–0)
PLATELET # BLD AUTO: 489 K/UL — HIGH (ref 150–400)
POTASSIUM SERPL-MCNC: 4.9 MMOL/L — SIGNIFICANT CHANGE UP (ref 3.5–5.3)
POTASSIUM SERPL-SCNC: 4.9 MMOL/L — SIGNIFICANT CHANGE UP (ref 3.5–5.3)
RBC # BLD: 3.14 M/UL — LOW (ref 3.8–5.2)
RBC # FLD: 13.8 % — SIGNIFICANT CHANGE UP (ref 10.3–14.5)
SODIUM SERPL-SCNC: 133 MMOL/L — LOW (ref 135–145)
WBC # BLD: 8.63 K/UL — SIGNIFICANT CHANGE UP (ref 3.8–10.5)
WBC # FLD AUTO: 8.63 K/UL — SIGNIFICANT CHANGE UP (ref 3.8–10.5)

## 2024-03-31 PROCEDURE — 99232 SBSQ HOSP IP/OBS MODERATE 35: CPT

## 2024-03-31 RX ADMIN — AMIODARONE HYDROCHLORIDE 400 MILLIGRAM(S): 400 TABLET ORAL at 21:48

## 2024-03-31 RX ADMIN — Medication 4 MILLIGRAM(S): at 03:58

## 2024-03-31 RX ADMIN — Medication 25 MILLIGRAM(S): at 17:03

## 2024-03-31 RX ADMIN — OXYCODONE HYDROCHLORIDE 30 MILLIGRAM(S): 5 TABLET ORAL at 22:49

## 2024-03-31 RX ADMIN — Medication 5 MILLIGRAM(S): at 17:00

## 2024-03-31 RX ADMIN — Medication 1 PATCH: at 19:57

## 2024-03-31 RX ADMIN — Medication 500 MILLIGRAM(S): at 17:03

## 2024-03-31 RX ADMIN — Medication 4 MILLIGRAM(S): at 18:40

## 2024-03-31 RX ADMIN — Medication 15 MILLILITER(S): at 21:56

## 2024-03-31 RX ADMIN — Medication 1 PATCH: at 09:56

## 2024-03-31 RX ADMIN — CEFEPIME 100 MILLIGRAM(S): 1 INJECTION, POWDER, FOR SOLUTION INTRAMUSCULAR; INTRAVENOUS at 17:01

## 2024-03-31 RX ADMIN — Medication 500 MILLIGRAM(S): at 05:30

## 2024-03-31 RX ADMIN — Medication 15 GRAM(S): at 05:30

## 2024-03-31 RX ADMIN — POLYETHYLENE GLYCOL 3350 17 GRAM(S): 17 POWDER, FOR SOLUTION ORAL at 05:31

## 2024-03-31 RX ADMIN — OXYCODONE HYDROCHLORIDE 30 MILLIGRAM(S): 5 TABLET ORAL at 21:49

## 2024-03-31 RX ADMIN — ENOXAPARIN SODIUM 50 MILLIGRAM(S): 100 INJECTION SUBCUTANEOUS at 17:02

## 2024-03-31 RX ADMIN — OXYCODONE HYDROCHLORIDE 10 MILLIGRAM(S): 5 TABLET ORAL at 10:23

## 2024-03-31 RX ADMIN — AMIODARONE HYDROCHLORIDE 400 MILLIGRAM(S): 400 TABLET ORAL at 05:31

## 2024-03-31 RX ADMIN — ENOXAPARIN SODIUM 50 MILLIGRAM(S): 100 INJECTION SUBCUTANEOUS at 05:30

## 2024-03-31 RX ADMIN — CEFEPIME 100 MILLIGRAM(S): 1 INJECTION, POWDER, FOR SOLUTION INTRAMUSCULAR; INTRAVENOUS at 05:30

## 2024-03-31 RX ADMIN — Medication 25 MILLIGRAM(S): at 05:30

## 2024-03-31 RX ADMIN — OXYCODONE HYDROCHLORIDE 20 MILLIGRAM(S): 5 TABLET ORAL at 14:06

## 2024-03-31 RX ADMIN — Medication 4 MILLIGRAM(S): at 10:23

## 2024-03-31 RX ADMIN — OXYCODONE HYDROCHLORIDE 30 MILLIGRAM(S): 5 TABLET ORAL at 05:45

## 2024-03-31 RX ADMIN — Medication 15 MILLILITER(S): at 03:58

## 2024-03-31 RX ADMIN — CHLORHEXIDINE GLUCONATE 1 APPLICATION(S): 213 SOLUTION TOPICAL at 05:31

## 2024-03-31 RX ADMIN — Medication 1 PATCH: at 13:35

## 2024-03-31 RX ADMIN — OXYCODONE HYDROCHLORIDE 20 MILLIGRAM(S): 5 TABLET ORAL at 14:45

## 2024-03-31 RX ADMIN — Medication 5 MILLIGRAM(S): at 02:04

## 2024-03-31 RX ADMIN — NALOXEGOL OXALATE 25 MILLIGRAM(S): 12.5 TABLET, FILM COATED ORAL at 05:45

## 2024-03-31 RX ADMIN — Medication 1 PATCH: at 14:07

## 2024-03-31 RX ADMIN — PANTOPRAZOLE SODIUM 40 MILLIGRAM(S): 20 TABLET, DELAYED RELEASE ORAL at 05:30

## 2024-03-31 RX ADMIN — AMIODARONE HYDROCHLORIDE 400 MILLIGRAM(S): 400 TABLET ORAL at 14:06

## 2024-03-31 RX ADMIN — OXYCODONE HYDROCHLORIDE 10 MILLIGRAM(S): 5 TABLET ORAL at 11:15

## 2024-03-31 NOTE — PROGRESS NOTE ADULT - SUBJECTIVE AND OBJECTIVE BOX
DATE OF SERVICE: 03-31-24 @ 12:01    Patient is a 58y old  Female who presents with a chief complaint of Mastitis, breast malignancy (30 Mar 2024 12:10)      INTERVAL HISTORY: no complaints     REVIEW OF SYSTEMS:  CONSTITUTIONAL: No weakness  EYES/ENT: No visual changes;  No throat pain   NECK: No pain or stiffness  RESPIRATORY: No cough, wheezing; No shortness of breath  CARDIOVASCULAR: No chest pain or palpitations  GASTROINTESTINAL: No abdominal  pain. No nausea, vomiting, or hematemesis  GENITOURINARY: No dysuria, frequency or hematuria  NEUROLOGICAL: No stroke like symptoms  SKIN: No rashes        	  MEDICATIONS:  aMIOdarone    Tablet   Oral   aMIOdarone    Tablet 400 milliGRAM(s) Oral three times a day  metoprolol tartrate 25 milliGRAM(s) Oral two times a day  urea Oral Powder 15 Gram(s) Oral two times a day        PHYSICAL EXAM:  T(C): 36.7 (03-31-24 @ 04:15), Max: 36.7 (03-31-24 @ 04:15)  HR: 78 (03-31-24 @ 04:15) (67 - 78)  BP: 124/69 (03-31-24 @ 04:15) (118/61 - 124/69)  RR: 18 (03-31-24 @ 04:15) (18 - 18)  SpO2: 96% (03-31-24 @ 04:15) (96% - 97%)  Wt(kg): --  I&O's Summary    30 Mar 2024 07:01  -  31 Mar 2024 07:00  --------------------------------------------------------  IN: 0 mL / OUT: 800 mL / NET: -800 mL          Appearance: In no distress	  HEENT:    PERRL, EOMI	  Cardiovascular:  S1 S2, No JVD  Respiratory: Lungs clear to auscultation	  Gastrointestinal:  Soft, Non-tender, + BS	  Vascularature:  No edema of LE  Psychiatric: Appropriate affect   Neuro: no acute focal deficits                               9.2    8.63  )-----------( 489      ( 31 Mar 2024 07:00 )             28.4     03-31    133<L>  |  97  |  49<H>  ----------------------------<  123<H>  4.9   |  23  |  0.89    Ca    9.5      31 Mar 2024 07:00          Labs personally reviewed      ASSESSMENT/PLAN: 	      58F w/Hx of RCC s/p R total nephrectomy, S/p hysterectomy, R-sided glaucoma, Fibromyalgia, Anxiety, GERD, smoker presents due to R breast swelling, redness and pain.     Problem/Plan - 1:   ·  Problem: Pericardial effusion.  ·  Plan: - pericardial effusion on CT chest   - TTE 3/18 with Moderate pericardial effusion with echocardiographic evidence of hemodynamic compromise    - Clinically pt is not in tamponade   - Thoracic surgery consulted for pericardial window given likely malignant effusion  ----s/p pericardial window 3/19  - 3/21 now with ST paroxysmal with AF with RVR and reports of chest pain  limited TTE: left ventricular systolic function appears grossly normal. Thickened pericardium. Bilateral pleural effusion noted. No pericardial effusion seen.  Compared to the TTE 3/18/2024, the pericardial effusion is no longer present.    Problem/Plan - 2:  ·  Problem: Internal jugular vein thrombosis, right.   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     Problem/Plan - 3:  ·  Problem: Smoker.   ·  Plan: - 1 ppd smoker  - c/w Nicotine patch.    Problem/Plan - 4:  ·  Problem: Acute Hypoxia  ·  Plan: low threshold for IV Lasix PRN given increased oxygen requirement and crackles on exam    Problem/Plan - 5:  ·  Problem: Sinus Tachycardia  - Likely reactive   - Improved    Problem/Plan - 6:  ·  Problem: New Onset Atrial Fibrillation  - c/w heparin gtt  - TSH wnl  - s/p PO dig load 3/24  - Cont lopressor 25mg PO BID with hold parameters  - c/w Amio taper as per MAURICIO Shipley DO [facility]  Cardiovascular Medicine  [address], [address]  Office: [phone]  Available via call/text on [employer] Teams

## 2024-03-31 NOTE — PROGRESS NOTE ADULT - ASSESSMENT
58F w/Hx of RCC s/p R total nephrectomy, S/p hysterectomy, R-sided glaucoma, Fibromyalgia, Anxiety, GERD, smoker presents due to R breast swelling, redness and pain. Admitted for right breast swelling and cellulitis found to have imaging evidence concerning for metastatic malignancy w/ lung nodules, and axillary, supraclavicular, and mediastinal adenopathy. 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 Also s/p pericardial window for moderate pericardial effusion now with chest tube. Now found to be in Afib with RVR, s/p R pleurx placement with minimal improvement in respiratory status- now concerned for worsening SVC syndrome pending transfer to [facility] for urgent radiation

## 2024-03-31 NOTE — PROGRESS NOTE ADULT - PROBLEM SELECTOR PLAN 3
- pericardial effusion on CT chest   - echo with moderate pericardial effusion and collapse of the RA  - s/p pericardial window 3/26  - chest tube drainage care per thoracic sx    #pleural effusion- pt on 4-6 L NC, now s/p R chest tube by thoracic with 1L output and minimal improvement in resp status  - cont chest tube output monitoring  - transfer to [facility] for urgent radiation for SVC syndrome

## 2024-03-31 NOTE — PROGRESS NOTE ADULT - PROBLEM SELECTOR PLAN 1
- h/o RCC and had nephrectomy by Urology by Dr Jacky Adkins at [facility], also had lymph node dissection 2 years ago  - Now with axillary, subclavicular and mediastinal lymphadenopathy, pulmonary nodules and inflammatory changes of breast seen on CT with possible effect on IVC  - CT neck with large supraclavicular/mediastinal mass lesion with mass effect and complete occlusion of the right IJ and SVC  - S/p supraclavicular lymph node biopsy by IR on 3/14- path consistent with primary of GI origin  - Prior hospitalist Dr. Moose Nolen D/w GI follow up MRI abdomen and MRCP- currently deferred due to worsening svc syndrome warranting urgent radiation,  - D/w oncology, to continue to follow  - Pallative follow up for pain control and anxiety management

## 2024-03-31 NOTE — PROGRESS NOTE ADULT - PROBLEM SELECTOR PLAN 2
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 Not candidate for IV on R due to mass, but also has SVC obliteration on CT neck to doubt patient can have PICC or central line placed on R  - MICU team assistance appreciated in placing R femoral TLC  - per ID resume abx for possible PNA    #SVC syndrome- IR reconsulted for possible stenting; not feasible due to size/location, now planned for tier 2 transfer [facility] for urgent radiation  - d/w and agreed on by IR, rad onc Dr. Sterling, oncology, cardiology, thoracic  - pt and boyfriencarlton Breaux also aware and in agreement with transfer  - NM notified  - bed still pending

## 2024-03-31 NOTE — PROGRESS NOTE ADULT - SUBJECTIVE AND OBJECTIVE BOX
Patient is a 58y old  Female who presents with a chief complaint of Mastitis, breast malignancy (31 Mar 2024 12:01)      SUBJECTIVE / OVERNIGHT EVENTS: Patient seen and examined at bedside. States that she feels ok no acute events overnight.     ROS:  All other review of systems negative    Allergies    Cipro (Headache; Vomiting; Rash)  penicillin (Headache; Vomiting; Rash)  erythromycin (Headache; Vomiting; Rash)    Intolerances        MEDICATIONS  (STANDING):  acetaminophen   IVPB .. 750 milliGRAM(s) IV Intermittent once  aMIOdarone    Tablet   Oral   aMIOdarone    Tablet 400 milliGRAM(s) Oral three times a day  cefepime   IVPB 2000 milliGRAM(s) IV Intermittent every 12 hours  chlorhexidine 4% Liquid 1 Application(s) Topical <User Schedule>  dexAMETHasone  Injectable 4 milliGRAM(s) IV Push every 8 hours  enoxaparin Injectable 50 milliGRAM(s) SubCutaneous every 12 hours  influenza   Vaccine 0.5 milliLiter(s) IntraMuscular once  ketorolac   Injectable 15 milliGRAM(s) IV Push once  metoprolol tartrate 25 milliGRAM(s) Oral two times a day  metroNIDAZOLE    Tablet 500 milliGRAM(s) Oral every 12 hours  naloxegol 25 milliGRAM(s) Oral before breakfast  nicotine -  14 mG/24Hr(s) Patch 1 Patch Transdermal daily  oxyCODONE  ER Tablet 20 milliGRAM(s) Oral <User Schedule>  oxyCODONE  ER Tablet 30 milliGRAM(s) Oral <User Schedule>  pantoprazole    Tablet 40 milliGRAM(s) Oral before breakfast  polyethylene glycol 3350 17 Gram(s) Oral two times a day  senna 2 Tablet(s) Oral at bedtime  urea Oral Powder 15 Gram(s) Oral two times a day    MEDICATIONS  (PRN):  albuterol/ipratropium for Nebulization 3 milliLiter(s) Nebulizer every 6 hours PRN Shortness of Breath and/or Wheezing  aluminum hydroxide/magnesium hydroxide/simethicone Suspension 30 milliLiter(s) Oral every 6 hours PRN Dyspepsia  benzocaine/menthol Lozenge 1 Lozenge Oral two times a day PRN Sore Throat  Biotene Dry Mouth Oral Rinse 15 milliLiter(s) Swish and Spit every 4 hours PRN Mouth Care  bisacodyl 5 milliGRAM(s) Oral every 12 hours PRN Constipation  diazepam    Tablet 5 milliGRAM(s) Oral two times a day PRN for anxiety  guaiFENesin Oral Liquid (Sugar-Free) 200 milliGRAM(s) Oral every 6 hours PRN Cough  HYDROmorphone  Injectable 1 milliGRAM(s) IV Push every 3 hours PRN Severe Pain (7 - 10)  melatonin 3 milliGRAM(s) Oral at bedtime PRN Insomnia  naloxone Injectable 0.1 milliGRAM(s) IV Push every 3 minutes PRN Obtundation, respiratory suppression  oxyCODONE    IR 10 milliGRAM(s) Oral every 4 hours PRN Moderate Pain (4 - 6)  sodium chloride 0.9% lock flush 10 milliLiter(s) IV Push every 1 hour PRN Pre/post blood products, medications, blood draw, and to maintain line patency      Vital Signs Last 24 Hrs  T(C): 36.6 (31 Mar 2024 11:57), Max: 36.7 (31 Mar 2024 04:15)  T(F): 97.8 (31 Mar 2024 11:57), Max: 98 (31 Mar 2024 04:15)  HR: 67 (31 Mar 2024 11:57) (67 - 78)  BP: 134/71 (31 Mar 2024 11:57) (118/61 - 134/71)  BP(mean): --  RR: 18 (31 Mar 2024 11:57) (18 - 18)  SpO2: 97% (31 Mar 2024 11:57) (96% - 97%)    Parameters below as of 31 Mar 2024 11:57  Patient On (Oxygen Delivery Method): nasal cannula  O2 Flow (L/min): 6    CAPILLARY BLOOD GLUCOSE        I&O's Summary    30 Mar 2024 07:01  -  31 Mar 2024 07:00  --------------------------------------------------------  IN: 0 mL / OUT: 800 mL / NET: -800 mL        PHYSICAL EXAM:  GENERAL: NAD, well-developed  HEAD:  Atraumatic, Normocephalic  EYES: EOMI, PERRLA, conjunctiva and sclera clear  NECK: Supple, No JVD  CHEST/LUNG: Clear to auscultation bilaterally; No wheeze  HEART: Regular rate and rhythm; No murmurs, rubs, or gallops  ABDOMEN: Soft, Nontender, Nondistended; Bowel sounds present  EXTREMITIES:  2+ Peripheral Pulses, No clubbing, cyanosis, or edema  NEUROLOGY: AAOx3, non-focal      LABS:                        9.2    8.63  )-----------( 489      ( 31 Mar 2024 07:00 )             28.4     03-31    133<L>  |  97  |  49<H>  ----------------------------<  123<H>  4.9   |  23  |  0.89    Ca    9.5      31 Mar 2024 07:00            Urinalysis Basic - ( 31 Mar 2024 07:00 )    Color: x / Appearance: x / SG: x / pH: x  Gluc: 123 mg/dL / Ketone: x  / Bili: x / Urobili: x   Blood: x / Protein: x / Nitrite: x   Leuk Esterase: x / RBC: x / WBC x   Sq Epi: x / Non Sq Epi: x / Bacteria: x        RADIOLOGY & ADDITIONAL TESTS:    Care Discussed with Consultants/Other Providers: Medicine ACP

## 2024-04-01 LAB
HCT VFR BLD CALC: 29 % — LOW (ref 34.5–45)
HGB BLD-MCNC: 9.1 G/DL — LOW (ref 11.5–15.5)
MCHC RBC-ENTMCNC: 29.1 PG — SIGNIFICANT CHANGE UP (ref 27–34)
MCHC RBC-ENTMCNC: 31.4 GM/DL — LOW (ref 32–36)
MCV RBC AUTO: 92.7 FL — SIGNIFICANT CHANGE UP (ref 80–100)
NRBC # BLD: 0 /100 WBCS — SIGNIFICANT CHANGE UP (ref 0–0)
PLATELET # BLD AUTO: 473 K/UL — HIGH (ref 150–400)
RBC # BLD: 3.13 M/UL — LOW (ref 3.8–5.2)
RBC # FLD: 13.9 % — SIGNIFICANT CHANGE UP (ref 10.3–14.5)
WBC # BLD: 9.86 K/UL — SIGNIFICANT CHANGE UP (ref 3.8–10.5)
WBC # FLD AUTO: 9.86 K/UL — SIGNIFICANT CHANGE UP (ref 3.8–10.5)

## 2024-04-01 PROCEDURE — 99233 SBSQ HOSP IP/OBS HIGH 50: CPT

## 2024-04-01 PROCEDURE — 99232 SBSQ HOSP IP/OBS MODERATE 35: CPT

## 2024-04-01 PROCEDURE — 99221 1ST HOSP IP/OBS SF/LOW 40: CPT

## 2024-04-01 PROCEDURE — 99232 SBSQ HOSP IP/OBS MODERATE 35: CPT | Mod: GC

## 2024-04-01 RX ORDER — SERTRALINE 25 MG/1
25 TABLET, FILM COATED ORAL ONCE
Refills: 0 | Status: COMPLETED | OUTPATIENT
Start: 2024-04-01 | End: 2024-04-01

## 2024-04-01 RX ORDER — LACTOBACILLUS ACIDOPHILUS 100MM CELL
1 CAPSULE ORAL DAILY
Refills: 0 | Status: DISCONTINUED | OUTPATIENT
Start: 2024-04-01 | End: 2024-04-03

## 2024-04-01 RX ORDER — HYDROMORPHONE HYDROCHLORIDE 2 MG/ML
0.5 INJECTION INTRAMUSCULAR; INTRAVENOUS; SUBCUTANEOUS
Refills: 0 | Status: DISCONTINUED | OUTPATIENT
Start: 2024-04-01 | End: 2024-04-03

## 2024-04-01 RX ORDER — OXYCODONE HYDROCHLORIDE 5 MG/1
30 TABLET ORAL
Refills: 0 | Status: DISCONTINUED | OUTPATIENT
Start: 2024-04-01 | End: 2024-04-03

## 2024-04-01 RX ORDER — HYDROMORPHONE HYDROCHLORIDE 2 MG/ML
1 INJECTION INTRAMUSCULAR; INTRAVENOUS; SUBCUTANEOUS
Refills: 0 | Status: DISCONTINUED | OUTPATIENT
Start: 2024-04-01 | End: 2024-04-03

## 2024-04-01 RX ORDER — SERTRALINE 25 MG/1
25 TABLET, FILM COATED ORAL DAILY
Refills: 0 | Status: DISCONTINUED | OUTPATIENT
Start: 2024-04-02 | End: 2024-04-03

## 2024-04-01 RX ADMIN — Medication 500 MILLIGRAM(S): at 18:27

## 2024-04-01 RX ADMIN — Medication 1 PATCH: at 21:07

## 2024-04-01 RX ADMIN — CHLORHEXIDINE GLUCONATE 1 APPLICATION(S): 213 SOLUTION TOPICAL at 05:08

## 2024-04-01 RX ADMIN — Medication 4 MILLIGRAM(S): at 11:44

## 2024-04-01 RX ADMIN — AMIODARONE HYDROCHLORIDE 400 MILLIGRAM(S): 400 TABLET ORAL at 05:08

## 2024-04-01 RX ADMIN — OXYCODONE HYDROCHLORIDE 20 MILLIGRAM(S): 5 TABLET ORAL at 13:49

## 2024-04-01 RX ADMIN — CEFEPIME 100 MILLIGRAM(S): 1 INJECTION, POWDER, FOR SOLUTION INTRAMUSCULAR; INTRAVENOUS at 05:08

## 2024-04-01 RX ADMIN — HYDROMORPHONE HYDROCHLORIDE 1 MILLIGRAM(S): 2 INJECTION INTRAMUSCULAR; INTRAVENOUS; SUBCUTANEOUS at 20:30

## 2024-04-01 RX ADMIN — Medication 15 MILLILITER(S): at 22:04

## 2024-04-01 RX ADMIN — Medication 1 PATCH: at 07:15

## 2024-04-01 RX ADMIN — Medication 15 GRAM(S): at 18:14

## 2024-04-01 RX ADMIN — HYDROMORPHONE HYDROCHLORIDE 1 MILLIGRAM(S): 2 INJECTION INTRAMUSCULAR; INTRAVENOUS; SUBCUTANEOUS at 19:52

## 2024-04-01 RX ADMIN — CEFEPIME 100 MILLIGRAM(S): 1 INJECTION, POWDER, FOR SOLUTION INTRAMUSCULAR; INTRAVENOUS at 18:14

## 2024-04-01 RX ADMIN — Medication 25 MILLIGRAM(S): at 05:07

## 2024-04-01 RX ADMIN — AMIODARONE HYDROCHLORIDE 400 MILLIGRAM(S): 400 TABLET ORAL at 21:59

## 2024-04-01 RX ADMIN — Medication 4 MILLIGRAM(S): at 04:01

## 2024-04-01 RX ADMIN — OXYCODONE HYDROCHLORIDE 10 MILLIGRAM(S): 5 TABLET ORAL at 09:33

## 2024-04-01 RX ADMIN — ENOXAPARIN SODIUM 50 MILLIGRAM(S): 100 INJECTION SUBCUTANEOUS at 05:07

## 2024-04-01 RX ADMIN — Medication 1 TABLET(S): at 11:44

## 2024-04-01 RX ADMIN — Medication 4 MILLIGRAM(S): at 18:14

## 2024-04-01 RX ADMIN — ENOXAPARIN SODIUM 50 MILLIGRAM(S): 100 INJECTION SUBCUTANEOUS at 18:14

## 2024-04-01 RX ADMIN — Medication 1 PATCH: at 11:45

## 2024-04-01 RX ADMIN — Medication 15 GRAM(S): at 05:09

## 2024-04-01 RX ADMIN — OXYCODONE HYDROCHLORIDE 10 MILLIGRAM(S): 5 TABLET ORAL at 09:16

## 2024-04-01 RX ADMIN — Medication 15 MILLILITER(S): at 13:49

## 2024-04-01 RX ADMIN — NALOXEGOL OXALATE 25 MILLIGRAM(S): 12.5 TABLET, FILM COATED ORAL at 05:09

## 2024-04-01 RX ADMIN — PANTOPRAZOLE SODIUM 40 MILLIGRAM(S): 20 TABLET, DELAYED RELEASE ORAL at 05:07

## 2024-04-01 RX ADMIN — OXYCODONE HYDROCHLORIDE 30 MILLIGRAM(S): 5 TABLET ORAL at 22:30

## 2024-04-01 RX ADMIN — Medication 1 PATCH: at 12:54

## 2024-04-01 RX ADMIN — Medication 500 MILLIGRAM(S): at 05:08

## 2024-04-01 RX ADMIN — OXYCODONE HYDROCHLORIDE 30 MILLIGRAM(S): 5 TABLET ORAL at 21:59

## 2024-04-01 RX ADMIN — AMIODARONE HYDROCHLORIDE 400 MILLIGRAM(S): 400 TABLET ORAL at 13:49

## 2024-04-01 RX ADMIN — Medication 5 MILLIGRAM(S): at 00:55

## 2024-04-01 RX ADMIN — OXYCODONE HYDROCHLORIDE 30 MILLIGRAM(S): 5 TABLET ORAL at 05:08

## 2024-04-01 RX ADMIN — Medication 25 MILLIGRAM(S): at 18:15

## 2024-04-01 RX ADMIN — OXYCODONE HYDROCHLORIDE 20 MILLIGRAM(S): 5 TABLET ORAL at 14:13

## 2024-04-01 NOTE — PROGRESS NOTE ADULT - PROBLEM SELECTOR PLAN 6
- likely SIADH, now improved  - c/w salt tabs 2gm bid   - received hypertonic saline 3/19 and 3/21  - continue to monitor na  - Nephro following

## 2024-04-01 NOTE — PROGRESS NOTE ADULT - PROBLEM SELECTOR PLAN 8
- c/w Home Valium (Confirmed via [employer])  - pt is intermittently tearful, anxious and frustrated about current clinical status and poor prognosis  - Given continued anxiety will have Behavioural Health see

## 2024-04-01 NOTE — BH CONSULTATION LIAISON ASSESSMENT NOTE - HPI (INCLUDE ILLNESS QUALITY, SEVERITY, DURATION, TIMING, CONTEXT, MODIFYING FACTORS, ASSOCIATED SIGNS AND SYMPTOMS)
Mood: "I am ok"    Pt is a 58F domiciled with boyfriend disabled non caregiver pphx anxiety on valium no hx admissions smoker admitted to medicine for chest mass, psych consulted for anxiety. Pt was admitted for workup of metastatic cancer, pt endorses anxiety regarding current prognosis, her future, and treatment. She reports that she "wants to fight, but doesn't know how to". She endorses feelings of hopelessness and nervousness. She endorses a long hx of anxiety managed with valium 5mg bd from her PCP. Pt endorses anxiety regarding an upcoming MRI, because she is worried that she will not be able to lie down for that long due to her breathing. Pt denies claustrophobia. Pt denies hx of other psychiatric diagnoses. She denies current or past si/hi/attempts/plan, delusions or hallucinations. She endorses a vague use of citalopram in the past; reports "it did not agree with her". Patient reported that she has children however "they do not speak to me".  Mood: "I am ok"    Pt is a 58F domiciled with boyfriend disabled non caregiver pphx anxiety on valium no hx admissions smoker admitted to medicine for chest mass, psych consulted for anxiety. Pt was admitted for workup of metastatic cancer, pt endorses anxiety regarding current prognosis, her future, and treatment. She reports that she "wants to fight, but doesn't know how to". She endorses feelings of hopelessness and nervousness. She endorses a long hx of anxiety managed with valium 5mg bd from her PCP. Pt endorses anxiety regarding an upcoming MRI, because she is worried that she will not be able to lie down for that long due to her breathing. Pt denies claustrophobia. Pt denies hx of other psychiatric diagnoses. She denies current or past si/hi/attempts/plan, delusions or hallucinations. She endorses a vague use of citalopram in the past; reports "it did not agree with her". Patient reported that she has children however "they do not speak to me". denies substance abuse issues and limited sleep due to pain, and fair appetite.

## 2024-04-01 NOTE — PROGRESS NOTE ADULT - SUBJECTIVE AND OBJECTIVE BOX
DATE OF SERVICE: 04-01-24 @ 16:05    Patient is a 58y old  Female who presents with a chief complaint of Mastitis, breast malignancy (01 Apr 2024 12:05)      INTERVAL HISTORY: No acute events    REVIEW OF SYSTEMS:  CONSTITUTIONAL: No weakness  EYES/ENT: No visual changes;  No throat pain   NECK: No pain or stiffness  RESPIRATORY: No cough, wheezing; No shortness of breath  CARDIOVASCULAR: No chest pain or palpitations  GASTROINTESTINAL: No abdominal  pain. No nausea, vomiting, or hematemesis  GENITOURINARY: No dysuria, frequency or hematuria  NEUROLOGICAL: No stroke like symptoms  SKIN: No rashes    TELEMETRY Personally reviewed: SR 60s  	  MEDICATIONS:  aMIOdarone    Tablet 400 milliGRAM(s) Oral three times a day  aMIOdarone    Tablet   Oral   metoprolol tartrate 25 milliGRAM(s) Oral two times a day  urea Oral Powder 15 Gram(s) Oral two times a day        PHYSICAL EXAM:  T(C): 36.3 (04-01-24 @ 11:21), Max: 36.3 (04-01-24 @ 04:00)  HR: 65 (04-01-24 @ 11:21) (63 - 65)  BP: 100/60 (04-01-24 @ 11:21) (100/60 - 110/73)  RR: 18 (04-01-24 @ 11:21) (18 - 18)  SpO2: 95% (04-01-24 @ 11:21) (95% - 95%)  Wt(kg): --  I&O's Summary    31 Mar 2024 07:01  -  01 Apr 2024 07:00  --------------------------------------------------------  IN: 450 mL / OUT: 0 mL / NET: 450 mL          Appearance: In no distress	  HEENT:    PERRL, EOMI	  Cardiovascular:  S1 S2, No JVD  Respiratory: Lungs clear to auscultation	  Gastrointestinal:  Soft, Non-tender, + BS	  Vascularature:  No edema of LE  Psychiatric: Appropriate affect   Neuro: no acute focal deficits                               9.1    9.86  )-----------( 473      ( 01 Apr 2024 06:25 )             29.0     03-31    133<L>  |  97  |  49<H>  ----------------------------<  123<H>  4.9   |  23  |  0.89    Ca    9.5      31 Mar 2024 07:00          Labs personally reviewed      ASSESSMENT/PLAN: 	  58F w/Hx of RCC s/p R total nephrectomy, S/p hysterectomy, R-sided glaucoma, Fibromyalgia, Anxiety, GERD, smoker presents due to R breast swelling, redness and pain.     Problem/Plan - 1:   ·  Problem: Pericardial effusion.  ·  Plan: - pericardial effusion on CT chest   - TTE 3/18 with Moderate pericardial effusion with echocardiographic evidence of hemodynamic compromise    - Clinically pt is not in tamponade   - Thoracic surgery consulted for pericardial window given likely malignant effusion  ----s/p pericardial window 3/19  - 3/21 now with ST paroxysmal with AF with RVR and reports of chest pain  limited TTE: left ventricular systolic function appears grossly normal. Thickened pericardium. Bilateral pleural effusion noted. No pericardial effusion seen.  Compared to the TTE 3/18/2024, the pericardial effusion is no longer present.    Problem/Plan - 2:  ·  Problem: Internal jugular vein thrombosis, right.   ·  Plan: - Imaging also shows complete occlusion of the right subclavian vein and nearly occlusive thrombosis in the proximal left brachiocephalic vein with flow reconstitution distally       Problem/Plan - 3:  ·  Problem: Smoker.   ·  Plan: - 1 ppd smoker  - c/w Nicotine patch.    Problem/Plan - 4:  ·  Problem: Acute Hypoxia  ·  Plan: low threshold for IV Lasix PRN given increased oxygen requirement and crackles on exam    Problem/Plan - 5:  ·  Problem: Sinus Tachycardia  - Likely reactive   - Improved    Problem/Plan - 6:  ·  Problem: New Onset Atrial Fibrillation  - c/w heparin gtt  - TSH wnl  - s/p PO dig load 3/24  - Cont lopressor 25mg PO BID with hold parameters  - c/w Amio taper as per SAIMA Eller,  [facility]  Cardiovascular Medicine  [address], [address]  Available via call or text on [employer] TE[employer]  Office: [phone]   DATE OF SERVICE: 04-01-24 @ 16:05    Patient is a 58y old  Female who presents with a chief complaint of Mastitis, breast malignancy (01 Apr 2024 12:05)      INTERVAL HISTORY: No acute events    REVIEW OF SYSTEMS:  CONSTITUTIONAL: No weakness  EYES/ENT: No visual changes;  No throat pain   NECK: No pain or stiffness  RESPIRATORY: No cough, wheezing; No shortness of breath  CARDIOVASCULAR: No chest pain or palpitations  GASTROINTESTINAL: No abdominal  pain. No nausea, vomiting, or hematemesis  GENITOURINARY: No dysuria, frequency or hematuria  NEUROLOGICAL: No stroke like symptoms  SKIN: No rashes    TELEMETRY Personally reviewed: SR 60s  	  MEDICATIONS:  aMIOdarone    Tablet 400 milliGRAM(s) Oral three times a day  aMIOdarone    Tablet   Oral   metoprolol tartrate 25 milliGRAM(s) Oral two times a day  urea Oral Powder 15 Gram(s) Oral two times a day        PHYSICAL EXAM:  T(C): 36.3 (04-01-24 @ 11:21), Max: 36.3 (04-01-24 @ 04:00)  HR: 65 (04-01-24 @ 11:21) (63 - 65)  BP: 100/60 (04-01-24 @ 11:21) (100/60 - 110/73)  RR: 18 (04-01-24 @ 11:21) (18 - 18)  SpO2: 95% (04-01-24 @ 11:21) (95% - 95%)  Wt(kg): --  I&O's Summary    31 Mar 2024 07:01  -  01 Apr 2024 07:00  --------------------------------------------------------  IN: 450 mL / OUT: 0 mL / NET: 450 mL          Appearance: In no distress	  HEENT:    PERRL, EOMI	  Cardiovascular:  S1 S2, No JVD  Respiratory: Lungs clear to auscultation	  Gastrointestinal:  Soft, Non-tender, + BS	  Vascularature:  No edema of LE  Psychiatric: Appropriate affect   Neuro: no acute focal deficits                               9.1    9.86  )-----------( 473      ( 01 Apr 2024 06:25 )             29.0     03-31    133<L>  |  97  |  49<H>  ----------------------------<  123<H>  4.9   |  23  |  0.89    Ca    9.5      31 Mar 2024 07:00          Labs personally reviewed      ASSESSMENT/PLAN: 	  58F w/Hx of RCC s/p R total nephrectomy, S/p hysterectomy, R-sided glaucoma, Fibromyalgia, Anxiety, GERD, smoker presents due to R breast swelling, redness and pain.     Problem/Plan - 1:   ·  Problem: Pericardial effusion.  ·  Plan: - pericardial effusion on CT chest   - TTE 3/18 with Moderate pericardial effusion with echocardiographic evidence of hemodynamic compromise    - Clinically pt is not in tamponade   - Thoracic surgery consulted for pericardial window given likely malignant effusion  ----s/p pericardial window 3/19  - 3/21 now with ST paroxysmal with AF with RVR and reports of chest pain  limited TTE: left ventricular systolic function appears grossly normal. Thickened pericardium. Bilateral pleural effusion noted. No pericardial effusion seen.  Compared to the TTE 3/18/2024, the pericardial effusion is no longer present.    Problem/Plan - 2:  ·  Problem: Internal jugular vein thrombosis, right.   ·  Plan: - Imaging also shows complete occlusion of the right subclavian vein and nearly occlusive thrombosis in the proximal left brachiocephalic vein with flow reconstitution distally       Problem/Plan - 3:  ·  Problem: Smoker.   ·  Plan: - 1 ppd smoker  - c/w Nicotine patch.    Problem/Plan - 4:  ·  Problem: Acute Hypoxia  ·  Plan: low threshold for IV Lasix PRN given increased oxygen requirement and crackles on exam    Problem/Plan - 5:  ·  Problem: Sinus Tachycardia  - Likely reactive   - Improved    Problem/Plan - 6:  ·  Problem: New Onset Atrial Fibrillation  - c/w heparin gtt  - TSH wnl  - s/p PO dig load 3/24  - Cont lopressor 25mg PO BID with hold parameters  - c/w Amio taper as per SAIMA Eller,  [facility]  Cardiovascular Medicine  [address], [address]  Available via call or text on [employer] TE[employer]  Office: [phone]

## 2024-04-01 NOTE — BH CONSULTATION LIAISON ASSESSMENT NOTE - CURRENT MEDICATION
MEDICATIONS  (STANDING):  acetaminophen   IVPB .. 750 milliGRAM(s) IV Intermittent once  aMIOdarone    Tablet   Oral   aMIOdarone    Tablet 400 milliGRAM(s) Oral three times a day  cefepime   IVPB 2000 milliGRAM(s) IV Intermittent every 12 hours  chlorhexidine 4% Liquid 1 Application(s) Topical <User Schedule>  dexAMETHasone  Injectable 4 milliGRAM(s) IV Push every 8 hours  enoxaparin Injectable 50 milliGRAM(s) SubCutaneous every 12 hours  influenza   Vaccine 0.5 milliLiter(s) IntraMuscular once  ketorolac   Injectable 15 milliGRAM(s) IV Push once  lactobacillus acidophilus 1 Tablet(s) Oral daily  metoprolol tartrate 25 milliGRAM(s) Oral two times a day  metroNIDAZOLE    Tablet 500 milliGRAM(s) Oral every 12 hours  naloxegol 25 milliGRAM(s) Oral before breakfast  nicotine -  14 mG/24Hr(s) Patch 1 Patch Transdermal daily  oxyCODONE  ER Tablet 20 milliGRAM(s) Oral <User Schedule>  oxyCODONE  ER Tablet 30 milliGRAM(s) Oral <User Schedule>  pantoprazole    Tablet 40 milliGRAM(s) Oral before breakfast  polyethylene glycol 3350 17 Gram(s) Oral two times a day  senna 2 Tablet(s) Oral at bedtime  urea Oral Powder 15 Gram(s) Oral two times a day    MEDICATIONS  (PRN):  albuterol/ipratropium for Nebulization 3 milliLiter(s) Nebulizer every 6 hours PRN Shortness of Breath and/or Wheezing  aluminum hydroxide/magnesium hydroxide/simethicone Suspension 30 milliLiter(s) Oral every 6 hours PRN Dyspepsia  benzocaine/menthol Lozenge 1 Lozenge Oral two times a day PRN Sore Throat  Biotene Dry Mouth Oral Rinse 15 milliLiter(s) Swish and Spit every 4 hours PRN Mouth Care  bisacodyl 5 milliGRAM(s) Oral every 12 hours PRN Constipation  diazepam    Tablet 5 milliGRAM(s) Oral two times a day PRN for anxiety  guaiFENesin Oral Liquid (Sugar-Free) 200 milliGRAM(s) Oral every 6 hours PRN Cough  HYDROmorphone  Injectable 1 milliGRAM(s) IV Push every 3 hours PRN Severe Pain (7 - 10)  melatonin 3 milliGRAM(s) Oral at bedtime PRN Insomnia  naloxone Injectable 0.1 milliGRAM(s) IV Push every 3 minutes PRN Obtundation, respiratory suppression  oxyCODONE    IR 10 milliGRAM(s) Oral every 4 hours PRN Moderate Pain (4 - 6)  sodium chloride 0.9% lock flush 10 milliLiter(s) IV Push every 1 hour PRN Pre/post blood products, medications, blood draw, and to maintain line patency

## 2024-04-01 NOTE — PROGRESS NOTE ADULT - PROBLEM SELECTOR PLAN 2
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 Not candidate for IV on R due to mass, but also has SVC obliteration on CT neck to doubt patient can have PICC or central line placed on R  - MICU team assistance appreciated in placing R femoral TLC    #SVC syndrome- IR reconsulted for possible stenting; not feasible due to size/location, now planned for tier 2 transfer to [facility] for urgent radiation  - IR, rad onc, oncology, cardiology, thoracic all agree on transfer  - NM notified  - bed still pending

## 2024-04-01 NOTE — BH CONSULTATION LIAISON ASSESSMENT NOTE - NSBHPSYCHOLCOGORIENT_PSY_A_CORE
Airway    Performed by: Sloane Josue MD  Authorized by: Sloane Josue MD    Final Airway Type:  Supraglottic airway  Final Supraglottic Airway:  Air-Q  SGA Size*:  3  Attempts*:  1  Number of Other Approaches Attempted:  0   Patient Identified, Procedure confirmed, Emergency equipment available and Safety protocols followed  Location:  OR  Urgency:  Elective  Difficult Airway: No    Indications for Airway Management:  Anesthesia  Mask Difficulty Assessment:  0 - not attempted  Start Time: 1/8/2024 3:07 AM      
Oriented to time, place, person, situation

## 2024-04-01 NOTE — PROGRESS NOTE ADULT - PROBLEM SELECTOR PLAN 3
-IR and RT are f/u.   -Further work up and Rx as per the primary team.   -Pending on transferring to [facility] for RT  -As per Medicine progress notes: "IR reconsulted for possible stenting; not feasible due to size/location, now planned for tier 2 transfer to [facility] for urgent radiation."   -O2 by NC

## 2024-04-01 NOTE — PROGRESS NOTE ADULT - ATTENDING COMMENTS
Dinora Tran MD  Off: [phone]  contact me on teams    (After 5 pm or on weekends please page the on-call fellow/attending, can check [email or web address] for schedule. Login is shailesh mendoza, schedule under [facility] medicine, psych, derm)

## 2024-04-01 NOTE — PROGRESS NOTE ADULT - PROBLEM SELECTOR PLAN 1
#Hyponatremia with increased ADH activity  -SNa >130 now on ureNa   Trend Na  Check labs and if remains stable can consider stopping and observing off UreNa

## 2024-04-01 NOTE — PROGRESS NOTE ADULT - PROBLEM SELECTOR PLAN 7
For details, please see GOC note dated 3/28; however, GOC are for DMT if possible. However, she is DNR/I.

## 2024-04-01 NOTE — BH CONSULTATION LIAISON ASSESSMENT NOTE - NSBHATTESTCOMMENTATTENDFT_PSY_A_CORE
Pt is a 59 y/o SWF with hx of multiple medical problems, including metastatic cancer, presents with chest mass and psychiatry called for increased anxiety and irritable mood. Pt is AOA x 3, reports high anxiety and mild depression due to ongoing medical issues. pt feels hopeless at times, but denies si/hi, and feels nervous daily regarding the uncertainty of her condition. pt has been taking valium 5 mg bid by outside doctor for the past few years, confirmed on I-STOP. pt willing to start low dose zoloft 25 mg daily to help address anxiety and irritable mood, however pt does not want to change her benzodiazepine at this time. continue valium as ordered. agree with psych student's A/P above.

## 2024-04-01 NOTE — PROGRESS NOTE ADULT - PROBLEM SELECTOR PLAN 4
- erythema and tenderness of right breast improving  - Unclear if cellulitis or just inflammatory changes   - Abx expanded to vanco/cefepime/flagyl given RRT last weekend with concern for worsening infection, however now appears improved with blood cultures all negative  - blood cx ngtd  - To complete last does of abx today 4/1 as per IDF (Cefepime and Flagyl)

## 2024-04-01 NOTE — PROGRESS NOTE ADULT - PROBLEM SELECTOR PLAN 3
- pericardial effusion on CT chest   - echo with moderate pericardial effusion and collapse of the RA  - s/p pericardial window 3/26  - chest tube drainage care per thoracic sx    #pleural effusion- pt on 4-6 L NC, now s/p R chest tube by thoracic with 1L output and minimal improvement in resp status  - cont chest tube output monitoring  - transfer to [facility] for urgent radiation for SVC syndrome

## 2024-04-01 NOTE — PROGRESS NOTE ADULT - PROBLEM SELECTOR PLAN 1
- h/o RCC and had nephrectomy by Urology by Dr Jacky Adkins at [facility], also had lymph node dissection 2 years ago  - Now with axillary, subclavicular and mediastinal lymphadenopathy, pulmonary nodules and inflammatory changes of breast seen on CT with possible effect on IVC  - CT neck with large supraclavicular/mediastinal mass lesion with mass effect and complete occlusion of the right IJ and SVC  - S/p supraclavicular lymph node biopsy by IR on 3/14- path consistent with primary of GI origin  - Prior hospitalist Dr. Moose Nolen D/w GI follow up MRI abdomen and MRCP- currently deferred due to worsening svc syndrome warranting urgent radiation,  - Palliative follow up for pain control  - Will have behavioral health see for anxiety management

## 2024-04-01 NOTE — BH CONSULTATION LIAISON ASSESSMENT NOTE - NSBHCHARTREVIEWLAB_PSY_A_CORE FT
Complete Blood Count Repeat Every 24 Hours X 3 Days (04.01.24 @ 06:25)   Nucleated RBC: 0 /100 WBCs  WBC Count: 9.86 K/uL  RBC Count: 3.13 M/uL  Hemoglobin: 9.1 g/dL  Hematocrit: 29.0 %  Mean Cell Volume: 92.7 fl  Mean Cell Hemoglobin: 29.1 pg  Mean Cell Hemoglobin Conc: 31.4 gm/dL  Red Cell Distrib Width: 13.9 %  Platelet Count - Automated: 473 K/uL    Basic Metabolic Panel in AM (03.31.24 @ 07:00)   Sodium: 133 mmol/L  Potassium: 4.9 mmol/L  Chloride: 97 mmol/L  Carbon Dioxide: 23 mmol/L  Anion Gap: 13 mmol/L  Blood Urea Nitrogen: 49 mg/dL  Creatinine: 0.89 mg/dL  Glucose: 123 mg/dL  Calcium: 9.5 mg/dL  eGFR: 74: The estimated glomerular filtration rate (eGFR) is calculated using the   2021 CKD-EPI creatinine equation, which does not have a coefficient for   race and is validated in individuals 18 years of age and older (N Engl J   Med 2021; 385:9792-6754). Creatinine-based eGFR may be inaccurate in   various situations including but not limited to extremes of muscle mass,   altered dietary protein intake, or medications that affect renal tubular   creatinine secretion. mL/min/1.73m2

## 2024-04-01 NOTE — PROGRESS NOTE ADULT - PROBLEM SELECTOR PLAN 2
-Supplemental O2 as per the primary team.   -s/p Pleurx. Provider to RN order entered, so it can be drained now.   -If symptoms are recurrent and or creating distress, may also add Dilaudid 1mg q 3 PRN for dyspnea -Supplemental O2 as per the primary team.   -s/p Pleurx. Provider to RN order entered, so up to 500ml can be drained now. Continue Pleurex drainage three times per week and PRN as already ordered.   -If symptoms are recurrent and or creating distress, may also add Dilaudid 1mg q 3 PRN for dyspnea

## 2024-04-01 NOTE — PROGRESS NOTE ADULT - SUBJECTIVE AND OBJECTIVE BOX
Eric Hickey MD  Division of Hospital Medicine  Available via MS teams  ---------------------------------------------------------    YONIGADIEL HARRIS  58y  Female      Patient is a 58y old  Female who presents with a chief complaint of Mastitis, breast malignancy (01 Apr 2024 11:30)      INTERVAL HPI/OVERNIGHT EVENTS:  Seen at bedside. Anxious. Feels SOB. Worries about her chest tube      REVIEW OF SYSTEMS: 10 point ROS negative unless listed above    T(C): 36.3 (04-01-24 @ 11:21), Max: 36.3 (04-01-24 @ 04:00)  HR: 65 (04-01-24 @ 11:21) (63 - 65)  BP: 100/60 (04-01-24 @ 11:21) (100/60 - 110/73)  RR: 18 (04-01-24 @ 11:21) (18 - 18)  SpO2: 95% (04-01-24 @ 11:21) (95% - 95%)  Wt(kg): --Vital Signs Last 24 Hrs  T(C): 36.3 (01 Apr 2024 11:21), Max: 36.3 (01 Apr 2024 04:00)  T(F): 97.3 (01 Apr 2024 11:21), Max: 97.4 (01 Apr 2024 04:00)  HR: 65 (01 Apr 2024 11:21) (63 - 65)  BP: 100/60 (01 Apr 2024 11:21) (100/60 - 110/73)  BP(mean): --  RR: 18 (01 Apr 2024 11:21) (18 - 18)  SpO2: 95% (01 Apr 2024 11:21) (95% - 95%)    Parameters below as of 01 Apr 2024 11:21  Patient On (Oxygen Delivery Method): nasal cannula  O2 Flow (L/min): 6      PHYSICAL EXAM:  GENERAL: Anxious  HEAD:  Atraumatic, Normocephalic  EYES: EOMI, PERRLA, conjunctiva and sclera clear  CHEST/LUNG: Clear to auscultation bilaterally; No wheeze  HEART: Regular rate and rhythm; No murmurs, rubs, or gallops  ABDOMEN: Soft, Nontender, Nondistended; Bowel sounds present  EXTREMITIES:  2+ Peripheral Pulses, No clubbing, cyanosis, or edema          LABS:                        9.1    9.86  )-----------( 473      ( 01 Apr 2024 06:25 )             29.0     03-31    133<L>  |  97  |  49<H>  ----------------------------<  123<H>  4.9   |  23  |  0.89    Ca    9.5      31 Mar 2024 07:00        Urinalysis Basic - ( 31 Mar 2024 07:00 )    Color: x / Appearance: x / SG: x / pH: x  Gluc: 123 mg/dL / Ketone: x  / Bili: x / Urobili: x   Blood: x / Protein: x / Nitrite: x   Leuk Esterase: x / RBC: x / WBC x   Sq Epi: x / Non Sq Epi: x / Bacteria: x      CAPILLARY BLOOD GLUCOSE            Urinalysis Basic - ( 31 Mar 2024 07:00 )    Color: x / Appearance: x / SG: x / pH: x  Gluc: 123 mg/dL / Ketone: x  / Bili: x / Urobili: x   Blood: x / Protein: x / Nitrite: x   Leuk Esterase: x / RBC: x / WBC x   Sq Epi: x / Non Sq Epi: x / Bacteria: x        RADIOLOGY & ADDITIONAL TESTS:    Imaging Personally Reviewed:  [ ] YES  [ ] NO

## 2024-04-01 NOTE — BH CONSULTATION LIAISON ASSESSMENT NOTE - NSBHREFERDETAILS_PSY_A_CORE_FT
pending MRI of abdomen  hx anxiety unable to tolerate MRI  given valium prescribed by PCP for anxiety  anxiety med recs

## 2024-04-01 NOTE — BH CONSULTATION LIAISON ASSESSMENT NOTE - SUMMARY
Pt is a 58F domiciled with boyfriend disabled non caregiver pphx anxiety on valium no hx admissions smoker admitted to medicine for chest mass, psych consulted for anxiety. Pt was admitted for workup of metastatic cancer, pt endorses anxiety regarding current prognosis, her future, and treatment. She endorses a long hx of anxiety managed with valium 5mg bd from her PCP. Pt endorses anxiety regarding an upcoming MRI, because she is worried that she will not be able to lie down for that long due to her breathing. Endorses amenability to trying low dose SSRI for management of chronic anxiety.    Plan:  - Start sertraline HCl 25 mg qd  - Continue diazepam 5 mg bd    Plan incomplete pending attending attestation. Pt is a 58F domiciled with boyfriend disabled non caregiver pphx anxiety on valium no hx admissions smoker admitted to medicine for chest mass, psych consulted for anxiety. Pt was admitted for workup of metastatic cancer, pt endorses anxiety regarding current prognosis, her future, and treatment. She endorses a long hx of anxiety managed with valium 5mg bd from her PCP. Pt endorses anxiety regarding an upcoming MRI, because she is worried that she will not be able to lie down for that long due to her breathing. Endorses amenability to trying low dose SSRI for management of chronic anxiety.    Plan:  - Consider initiating sertraline HCl 25 mg qd for anxiety/depression  - Continue diazepam 5 mg bid PRN, confirmed on I-STOP

## 2024-04-01 NOTE — PROGRESS NOTE ADULT - PROBLEM SELECTOR PLAN 1
Uncontrolled  - Will increase Oxycodone ER to 30mg at 6am, 2pm,   - Continue with Oxycodone 10 mg PO q4h PRN for moderate pain  - Continue with Dilaudid 1 mg IV q3h PRN for severe pain. x 1 dose now   - Reinforced education regarding the availability of PRN meds.   - Narcan PRN  - Bowel regimen while on opioids

## 2024-04-01 NOTE — PROGRESS NOTE ADULT - PROBLEM SELECTOR PLAN 8
Will continue to f/u for symptoms, GOC, and support.         Conor Jack MD  Associate Chief Geriatrics and Palliative Care (GaP) [facility]   GaP Consult Service   , [employer] [facility]      Please contact me via Teams from Monday through Friday between 9am-5pm. If not answering, please call the palliative care pager [phone]    After 5pm and on weekends, please see the contact information below:    In the event of newly developing, evolving, or worsening symptoms, please contact the Palliative Medicine team via pager (if the patient is at [facility] #[phone] or if the patient is at [facility] #[phone]) The Geriatric and Palliative Medicine service has coverage 24 hours a day/ 7 days a week to provide medical recommendations regarding symptom management needs via telephone

## 2024-04-01 NOTE — BH CONSULTATION LIAISON ASSESSMENT NOTE - NSICDXPASTSURGICALHX_GEN_ALL_CORE_FT
PAST SURGICAL HISTORY:  Glaucoma following surgery Right Eyr - 2012    H/O unilateral nephrectomy Right Laparoscopic Nephrectomy - 34022    History of tonsillectomy     S/P hernia repair umbilical Hernia Repair - 2011    S/P knee surgery knee arthroscopy right x 2 ( 2011 & 2012)    S/P partial hysterectomy 8 years ago

## 2024-04-01 NOTE — PROGRESS NOTE ADULT - SUBJECTIVE AND OBJECTIVE BOX
[facility] Division of Kidney Diseases & Hypertension  FOLLOW UP NOTE  --------------------------------------------------------------------------------  Chief Complaint:    24 hour events/subjective:    feels thirsty    PAST HISTORY  --------------------------------------------------------------------------------  No significant changes to PMH, PSH, FHx, SHx, unless otherwise noted    ALLERGIES & MEDICATIONS  --------------------------------------------------------------------------------  Allergies    Cipro (Headache; Vomiting; Rash)  penicillin (Headache; Vomiting; Rash)  erythromycin (Headache; Vomiting; Rash)    Intolerances      Standing Inpatient Medications  acetaminophen   IVPB .. 750 milliGRAM(s) IV Intermittent once  aMIOdarone    Tablet   Oral   aMIOdarone    Tablet 400 milliGRAM(s) Oral three times a day  cefepime   IVPB 2000 milliGRAM(s) IV Intermittent every 12 hours  chlorhexidine 4% Liquid 1 Application(s) Topical <User Schedule>  dexAMETHasone  Injectable 4 milliGRAM(s) IV Push every 8 hours  enoxaparin Injectable 50 milliGRAM(s) SubCutaneous every 12 hours  influenza   Vaccine 0.5 milliLiter(s) IntraMuscular once  ketorolac   Injectable 15 milliGRAM(s) IV Push once  lactobacillus acidophilus 1 Tablet(s) Oral daily  metoprolol tartrate 25 milliGRAM(s) Oral two times a day  metroNIDAZOLE    Tablet 500 milliGRAM(s) Oral every 12 hours  naloxegol 25 milliGRAM(s) Oral before breakfast  nicotine -  14 mG/24Hr(s) Patch 1 Patch Transdermal daily  oxyCODONE  ER Tablet 30 milliGRAM(s) Oral <User Schedule>  oxyCODONE  ER Tablet 20 milliGRAM(s) Oral <User Schedule>  pantoprazole    Tablet 40 milliGRAM(s) Oral before breakfast  polyethylene glycol 3350 17 Gram(s) Oral two times a day  senna 2 Tablet(s) Oral at bedtime  urea Oral Powder 15 Gram(s) Oral two times a day    PRN Inpatient Medications  albuterol/ipratropium for Nebulization 3 milliLiter(s) Nebulizer every 6 hours PRN  aluminum hydroxide/magnesium hydroxide/simethicone Suspension 30 milliLiter(s) Oral every 6 hours PRN  benzocaine/menthol Lozenge 1 Lozenge Oral two times a day PRN  Biotene Dry Mouth Oral Rinse 15 milliLiter(s) Swish and Spit every 4 hours PRN  bisacodyl 5 milliGRAM(s) Oral every 12 hours PRN  diazepam    Tablet 5 milliGRAM(s) Oral two times a day PRN  guaiFENesin Oral Liquid (Sugar-Free) 200 milliGRAM(s) Oral every 6 hours PRN  HYDROmorphone  Injectable 1 milliGRAM(s) IV Push every 3 hours PRN  melatonin 3 milliGRAM(s) Oral at bedtime PRN  naloxone Injectable 0.1 milliGRAM(s) IV Push every 3 minutes PRN  oxyCODONE    IR 10 milliGRAM(s) Oral every 4 hours PRN  sodium chloride 0.9% lock flush 10 milliLiter(s) IV Push every 1 hour PRN      REVIEW OF SYSTEMS  --------------------------------------------------------------------------------    All other systems were reviewed and are negative, except as noted.    VITALS/PHYSICAL EXAM  --------------------------------------------------------------------------------  T(C): 36.3 (04-01-24 @ 11:21), Max: 36.6 (03-31-24 @ 11:57)  HR: 65 (04-01-24 @ 11:21) (63 - 67)  BP: 100/60 (04-01-24 @ 11:21) (100/60 - 134/71)  RR: 18 (04-01-24 @ 11:21) (18 - 18)  SpO2: 95% (04-01-24 @ 11:21) (95% - 97%)  Wt(kg): --        03-31-24 @ 07:01  -  04-01-24 @ 07:00  --------------------------------------------------------  IN: 450 mL / OUT: 0 mL / NET: 450 mL      Physical Exam:  	Gen: NAD  	Pulm: CTA B/L  	CV: RRR, S1S2  	Abd: +BS, soft,  	UE: Warm,  	LE: Warm, trace edema  	Neuro: No focal deficits, intact gait  	Psych: Normal affect and mood  	Skin: Warm    LABS/STUDIES  --------------------------------------------------------------------------------              9.1    9.86  >-----------<  473      [04-01-24 @ 06:25]              29.0     133  |  97  |  49  ----------------------------<  123      [03-31-24 @ 07:00]  4.9   |  23  |  0.89        Ca     9.5     [03-31-24 @ 07:00]      Creatinine Trend:  SCr 0.89 [03-31 @ 07:00]  SCr 0.92 [03-30 @ 07:09]  SCr 0.98 [03-29 @ 07:34]  SCr 0.97 [03-28 @ 06:11]  SCr 1.16 [03-27 @ 13:01]    Urinalysis - [03-31-24 @ 07:00]      Color  / Appearance  / SG  / pH       Gluc 123 / Ketone   / Bili  / Urobili        Blood  / Protein  / Leuk Est  / Nitrite       RBC  / WBC  / Hyaline  / Gran  / Sq Epi  / Non Sq Epi  / Bacteria     Urine Creatinine 67      [03-26-24 @ 16:59]  Urine Sodium 40      [03-26-24 @ 16:59]  Urine Potassium 37      [03-26-24 @ 16:59]  Urine Chloride 47      [03-26-24 @ 16:59]  Urine Osmolality 461      [03-26-24 @ 16:59]

## 2024-04-01 NOTE — PROGRESS NOTE ADULT - SUBJECTIVE AND OBJECTIVE BOX
Follow Up:    Neck edema    Interval History/ROS:  VSS ON. MRI canceled due to difficulty lying flat. Patient was seen and examined at bedside. Anxious. No fever. Chest tube s/p removal. Complains of R shoulder and R upper chest pain (improved from prior)    Allergies  Cipro (Headache; Vomiting; Rash)  penicillin (Headache; Vomiting; Rash)  erythromycin (Headache; Vomiting; Rash)        ANTIMICROBIALS:  cefepime   IVPB 2000 every 12 hours  metroNIDAZOLE    Tablet 500 every 12 hours      OTHER MEDS:  MEDICATIONS  (STANDING):  acetaminophen   IVPB .. 750 once  albuterol/ipratropium for Nebulization 3 every 6 hours PRN  aluminum hydroxide/magnesium hydroxide/simethicone Suspension 30 every 6 hours PRN  aMIOdarone    Tablet    aMIOdarone    Tablet 400 three times a day  bisacodyl 5 every 12 hours PRN  dexAMETHasone  Injectable 4 every 8 hours  diazepam    Tablet 5 two times a day PRN  enoxaparin Injectable 50 every 12 hours  guaiFENesin Oral Liquid (Sugar-Free) 200 every 6 hours PRN  HYDROmorphone  Injectable 1 every 3 hours PRN  influenza   Vaccine 0.5 once  ketorolac   Injectable 15 once  melatonin 3 at bedtime PRN  metoprolol tartrate 25 two times a day  naloxegol 25 before breakfast  oxyCODONE    IR 10 every 4 hours PRN  oxyCODONE  ER Tablet 20 <User Schedule>  oxyCODONE  ER Tablet 30 <User Schedule>  pantoprazole    Tablet 40 before breakfast  polyethylene glycol 3350 17 two times a day  senna 2 at bedtime  urea Oral Powder 15 two times a day      Vital Signs Last 24 Hrs  T(C): 36.3 (01 Apr 2024 04:00), Max: 36.6 (31 Mar 2024 11:57)  T(F): 97.4 (01 Apr 2024 04:00), Max: 97.8 (31 Mar 2024 11:57)  HR: 63 (01 Apr 2024 04:00) (63 - 67)  BP: 110/73 (01 Apr 2024 04:00) (110/73 - 134/71)  BP(mean): --  RR: 18 (01 Apr 2024 04:00) (18 - 18)  SpO2: 95% (01 Apr 2024 04:00) (95% - 97%)    Parameters below as of 01 Apr 2024 04:00  Patient On (Oxygen Delivery Method): nasal cannula  O2 Flow (L/min): 6      PHYSICAL EXAM:  Constitutional: NAD  ENT:  R neck edema improved w/ slight tenderness  Cardiovascular:   normal S1, S2, no murmur, RRR  Respiratory:  clear BS bilaterally, no wheezes  Musculoskeletal:  no BLE edema  Neurologic: awake and oriented x3  Skin: R breast w/ erythematous skin no longer appreciated; mild to moderate tenderness to palpation to R upper chest, improved to prior; pericardial drain removed  Psych: anxious                            9.1    9.86  )-----------( 473      ( 01 Apr 2024 06:25 )             29.0       03-31    133<L>  |  97  |  49<H>  ----------------------------<  123<H>  4.9   |  23  |  0.89    Ca    9.5      31 Mar 2024 07:00        Urinalysis Basic - ( 31 Mar 2024 07:00 )    Color: x / Appearance: x / SG: x / pH: x  Gluc: 123 mg/dL / Ketone: x  / Bili: x / Urobili: x   Blood: x / Protein: x / Nitrite: x   Leuk Esterase: x / RBC: x / WBC x   Sq Epi: x / Non Sq Epi: x / Bacteria: x        MICROBIOLOGY:  v  .Blood Blood-Peripheral  03-25-24   No growth at 5 days  --  --      .Blood Blood-Peripheral  03-23-24   No growth at 5 days  --  --      .Blood Blood  03-15-24   No growth at 5 days  --  --      .Blood Blood-Peripheral  03-12-24   No growth at 5 days  --  --      .Blood Blood-Peripheral  03-12-24   No growth at 5 days  --  --      Clean Catch Clean Catch (Midstream)  03-10-24   <10,000 CFU/mL Normal Urogenital Tammie  --  --      .Blood Blood-Peripheral  03-10-24   No growth at 5 days  --  --                RADIOLOGY:  Imaging below independently reviewed.  < from: Xray Chest 1 View- PORTABLE-Routine (Xray Chest 1 View- PORTABLE-Routine in AM.) (03.29.24 @ 09:41) >    ACC: [id number] EXAM:  XR CHEST PORTABLE ROUTINE 1V   ORDERED BY: MICHAEL BECK     PROCEDURE DATE:  03/29/2024          INTERPRETATION:  DATE OF STUDY: 3/29/24    PRIOR: 3/27/24    CLINICAL INDICATION: Reassess right-sided pneumothorax    TECHNIQUE: AP radiograph of the chest.    FINDINGS:  Right-sided chest tube remains in place.  Difficult to assess heart size on this AP projection.  Decrease in left pleural effusion. No left-sided focal infiltrate.  Interval decrease in right pleural effusion.  No discernible pneumothorax.    IMPRESSION:  Decrease in right pleural effusion.  No discernible right-sided pneumothorax.    --- End of Report ---            CHRISTOPHE ALLEN MD; Attending Radiologist  This document has been electronically signed. Mar 29 2024  1:11PM    < end of copied text >

## 2024-04-01 NOTE — PROGRESS NOTE ADULT - SUBJECTIVE AND OBJECTIVE BOX
Date of Service: 04-01-24 @ 19:34    SUBJECTIVE AND OBJECTIVE: Worsening RUE edema and pain. She was also c/o mild dyspnea and severe pain over her R arm.   Indication for Geriatrics and Palliative Care Services/INTERVAL HPI: Goals of care/symptoms.     OVERNIGHT EVENTS: She used Oxycodone 10mg x 1 and Diazepam 5mg PO x 1  (6am to 6am)     DNR on chart:DNI  DNI      Allergies    Cipro (Headache; Vomiting; Rash)  penicillin (Headache; Vomiting; Rash)  erythromycin (Headache; Vomiting; Rash)    Intolerances    MEDICATIONS  (STANDING):  acetaminophen   IVPB .. 750 milliGRAM(s) IV Intermittent once  aMIOdarone    Tablet   Oral   aMIOdarone    Tablet 400 milliGRAM(s) Oral three times a day  chlorhexidine 4% Liquid 1 Application(s) Topical <User Schedule>  dexAMETHasone  Injectable 4 milliGRAM(s) IV Push every 8 hours  enoxaparin Injectable 50 milliGRAM(s) SubCutaneous every 12 hours  influenza   Vaccine 0.5 milliLiter(s) IntraMuscular once  ketorolac   Injectable 15 milliGRAM(s) IV Push once  lactobacillus acidophilus 1 Tablet(s) Oral daily  metoprolol tartrate 25 milliGRAM(s) Oral two times a day  metroNIDAZOLE    Tablet 500 milliGRAM(s) Oral every 12 hours  naloxegol 25 milliGRAM(s) Oral before breakfast  nicotine -  14 mG/24Hr(s) Patch 1 Patch Transdermal daily  oxyCODONE  ER Tablet 30 milliGRAM(s) Oral <User Schedule>  pantoprazole    Tablet 40 milliGRAM(s) Oral before breakfast  polyethylene glycol 3350 17 Gram(s) Oral two times a day  senna 2 Tablet(s) Oral at bedtime  sertraline 25 milliGRAM(s) Oral once  urea Oral Powder 15 Gram(s) Oral two times a day    MEDICATIONS  (PRN):  albuterol/ipratropium for Nebulization 3 milliLiter(s) Nebulizer every 6 hours PRN Shortness of Breath and/or Wheezing  aluminum hydroxide/magnesium hydroxide/simethicone Suspension 30 milliLiter(s) Oral every 6 hours PRN Dyspepsia  benzocaine/menthol Lozenge 1 Lozenge Oral two times a day PRN Sore Throat  Biotene Dry Mouth Oral Rinse 15 milliLiter(s) Swish and Spit every 4 hours PRN Mouth Care  bisacodyl 5 milliGRAM(s) Oral every 12 hours PRN Constipation  diazepam    Tablet 5 milliGRAM(s) Oral two times a day PRN for anxiety  guaiFENesin Oral Liquid (Sugar-Free) 200 milliGRAM(s) Oral every 6 hours PRN Cough  HYDROmorphone  Injectable 0.5 milliGRAM(s) IV Push every 3 hours PRN Moderate Pain (4 - 6)  HYDROmorphone  Injectable 1 milliGRAM(s) IV Push every 3 hours PRN Severe Pain (7 - 10)  melatonin 3 milliGRAM(s) Oral at bedtime PRN Insomnia  naloxone Injectable 0.1 milliGRAM(s) IV Push every 3 minutes PRN Obtundation, respiratory suppression  oxyCODONE    IR 10 milliGRAM(s) Oral every 4 hours PRN Moderate Pain (4 - 6)  sodium chloride 0.9% lock flush 10 milliLiter(s) IV Push every 1 hour PRN Pre/post blood products, medications, blood draw, and to maintain line patency        ITEMS UNCHECKED ARE NOT PRESENT    PRESENT SYMPTOMS: [ ]Unable to self-report - see [ ] CPOT [ ] PAINADS [ ] RDOS  Source if other than patient:  [ ]Family   [ ]Team     Pain:  [x ]yes [ ]no  QOL impact - not being able to rest   Location -                  right arm and   Aggravating factors - Palpation   Quality - not able to indicate   Radiation - none   Timing- constant   Severity (0-10 scale): up to 9/10 and going to 3/10 with Dilaudid  Minimal acceptable level (0-10 scale):     CPOT:    https://www.[facility].org/getattachment/[email or web address]-[email or web address]/Critical-Care-Pain-Observation-Tool-(CPOT)    PAINAD Score: See PAINAD tool and score below       Dyspnea:                           [x ]Mild [ ]Moderate [ ]Severe    RDOS: See RDOS tool and score below   0 to 2  minimal or no respiratory distress   3  mild distress  4 to 6 moderate distress  >7 severe distress      Anxiety:                             [ ]Mild [ ]Moderate [ ]Severe  Fatigue:                             [ ]Mild [ ]Moderate [ ]Severe  Nausea:                             [ ]Mild [ ]Moderate [ ]Severe  Loss of appetite:              [ ]Mild [ ]Moderate [ ]Severe  Constipation:                    [ ]Mild [ ]Moderate [ ]Severe    PCSSQ[Palliative Care Spiritual Screening Question]   Severity (0-10):  Score of 4 or > indicate consideration of Chaplaincy referral.  Chaplaincy Referral: [x] yes [ ] refused [ ] following [ ] Deferred     Caregiver [place]? : [ ] yes [ ] no [ x] Deferred [ ] Declined             Social work referral [ ] Patient & Family Centered Care Referral [ ]     Anticipatory Grief present?:  [ ] yes [ ] no  [x ] Deferred                  Social work referral [ ] Chaplaincy Referral [ ]    		  Other Symptoms:  [x ]All other review of systems negative     Palliative Performance Status Version 2:   See PPSv2 tool and score below         PHYSICAL EXAM:  Vital Signs Last 24 Hrs  T(C): 36.3 (01 Apr 2024 15:25), Max: 36.3 (01 Apr 2024 04:00)  T(F): 97.4 (01 Apr 2024 15:25), Max: 97.4 (01 Apr 2024 04:00)  HR: 72 (01 Apr 2024 15:25) (63 - 72)  BP: 131/74 (01 Apr 2024 15:25) (100/60 - 131/74)  BP(mean): --  RR: 18 (01 Apr 2024 15:25) (18 - 18)  SpO2: 96% (01 Apr 2024 15:25) (95% - 96%)    Parameters below as of 01 Apr 2024 15:25  Patient On (Oxygen Delivery Method): nasal cannula  O2 Flow (L/min): 6         GENERAL: [ ]Cachexia    [x ]Alert  [x ]Oriented x 3   [ ]Lethargic  [ ]Unarousable  [ ]Verbal  [ ]Non-Verbal  Behavioral:   [ ]Anxiety  [ ]Delirium [ ]Agitation [ ]Other  HEENT:  [ ]Normal   [ ]Dry mouth   [ ]ET Tube/Trach  [ ]Oral lesions  [x] Right eye with conjunctival ecchymosis   PULMONARY:   [ ]Clear [ ]Tachypnea  [ ]Audible excessive secretions   [ ]Rhonchi        [ ]Right [ ]Left [ ]Bilateral  [ ]Crackles        [ ]Right [ ]Left [ ]Bilateral  [ ]Wheezing     [ ]Right [ ]Left [ ]Bilateral  [x ]Diminished BS [ ] Right [ ]Left [x ]Bilateral  [x] Pleurx   CARDIOVASCULAR:    [x ]Regular [ ]Irregular [ ]Tachy  [ ]Malik [ ]Murmur [ ]Other  GASTROINTESTINAL:  [ ]Soft  [ ]Distended   [x ]+BS  [ ]Non tender [ ]Tender  [ ]Other [ ]PEG [ ]OGT/ NGT   Last BM: 3/22  GENITOURINARY:  [x ]Normal [ ]Incontinent   [ ]Oliguria/Anuria   [ ]Che  MUSCULOSKELETAL:   [ ]Normal   [ ]Weakness  [ ]Bed/Wheelchair bound [ ]Edema  NEUROLOGIC:   [ x]No focal deficits  [ ] Cognitive impairment  [ ] Dysphagia [ ]Dysarthria [ ] Paresis [ ]Other   SKIN:   [x ]Normal  [ ]Rash  [ ]Other  [ ]Pressure ulcer(s) [ ]y [ ]n present on admission    CRITICAL CARE:  [ ]Shock Present  [ ]Septic [ ]Cardiogenic [ ]Neurologic [ ]Hypovolemic  [ ]Vasopressors [ ]Inotropes  [ ]Respiratory failure present [ ]Mechanical Ventilation [ ]Non-invasive ventilatory support [ ]High-Flow   [ ]Acute  [ ]Chronic [ ]Hypoxic  [ ]Hypercarbic [ ]Other  [ ]Other organ failure     LABS:                                               9.1    9.86  )-----------( 473      ( 01 Apr 2024 06:25 )             29.0   03-31    133<L>  |  97  |  49<H>  ----------------------------<  123<H>  4.9   |  23  |  0.89    Ca    9.5      31 Mar 2024 07:00          RADIOLOGY & ADDITIONAL STUDIES:  < from: [facility] Duplex Upper Ext Vein Scan, Bilat (03.28.24 @ 12:02) >    IMPRESSION:    Bilateral upper extremity venous ultrasound demonstrates acute DVT   involving: right internal jugular vein, right innominate vein, right   subclavianvein, right brachial vein, and left internal jugular vein.    Superficial venous thrombosis involves the right and left cephalic veins.   Left basilic vein is not visualized.    Edema of the superficial soft tissues of the upper extremities, right   greater than left. Correlate clinically. Known right neck/thoracic mass   is better evaluated on recent neck CT.    SACHA Pipe was informed on 3/28/2024 at 10:50PM.    --- End of Report ---            JAME MAYER M.D., ATTENDING RADIOLOGIST  This document has been electronically signed. Mar 28 2024  1:34PM    < end of copied text >  < from: CT Neck Soft Tissue w/ IV Cont (03.27.24 @ 17:10) >  IMPRESSION:    Bulky and conglomerate lymphadenopathy at right level 4 invades internal   jugular vein with thrombus extending up to hyoid level. 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 IJV otherwise patent up to skull base.    New left IJV nonocclusive thrombus. Otherwise, no significant change from   3/14/24.    --- End of Report ---            MARISEL PÉREZ MD; Attending Radiologist  This document has been electronically signed. Mar 27 2024  9:31PM    < end of copied text >    Protein Calorie Malnutrition Present: [ ]mild [ ]moderate [ ]severe [ ]underweight [ ]morbid obesity  https://www.andeal.org/vault/2440/web/files/ONC/Table_Clinical%20Characteristics%20to%20Document%20Malnutrition-White%20JV%20et%20al%202012.pdf    Height (cm): 154.9 (03-26-24 @ 16:20), 154.9 (03-10-24 @ 16:32)  Weight (kg): 49.9 (03-26-24 @ 16:20), 49.9 (03-10-24 @ 16:32)  BMI (kg/m2): 20.8 (03-26-24 @ 16:20), 20.8 (03-10-24 @ 16:32)    [ ]PPSV2 < or = 30%  [ ]significant weight loss [ ]poor nutritional intake [ ]anasarca[ ]Artificial Nutrition    Other REFERRALS:  [ ]Hospice  [ ]Child Life  [ ]Social Work  [ ]Case management [ ]Holistic Therapy     Goals of Care Document: Date of Service: 04-01-24 @ 19:34    SUBJECTIVE AND OBJECTIVE: Worsening RUE edema and pain. She was also c/o mild dyspnea and severe pain over her R arm.   Indication for Geriatrics and Palliative Care Services/INTERVAL HPI: Goals of care/symptoms.     OVERNIGHT EVENTS: She used Oxycodone 10mg x 1 and Diazepam 5mg PO x 1  (6am to 6am)     DNR on chart:DNI  DNI      Allergies    Cipro (Headache; Vomiting; Rash)  penicillin (Headache; Vomiting; Rash)  erythromycin (Headache; Vomiting; Rash)    Intolerances    MEDICATIONS  (STANDING):  acetaminophen   IVPB .. 750 milliGRAM(s) IV Intermittent once  aMIOdarone    Tablet   Oral   aMIOdarone    Tablet 400 milliGRAM(s) Oral three times a day  chlorhexidine 4% Liquid 1 Application(s) Topical <User Schedule>  dexAMETHasone  Injectable 4 milliGRAM(s) IV Push every 8 hours  enoxaparin Injectable 50 milliGRAM(s) SubCutaneous every 12 hours  influenza   Vaccine 0.5 milliLiter(s) IntraMuscular once  ketorolac   Injectable 15 milliGRAM(s) IV Push once  lactobacillus acidophilus 1 Tablet(s) Oral daily  metoprolol tartrate 25 milliGRAM(s) Oral two times a day  metroNIDAZOLE    Tablet 500 milliGRAM(s) Oral every 12 hours  naloxegol 25 milliGRAM(s) Oral before breakfast  nicotine -  14 mG/24Hr(s) Patch 1 Patch Transdermal daily  oxyCODONE  ER Tablet 30 milliGRAM(s) Oral <User Schedule>  pantoprazole    Tablet 40 milliGRAM(s) Oral before breakfast  polyethylene glycol 3350 17 Gram(s) Oral two times a day  senna 2 Tablet(s) Oral at bedtime  sertraline 25 milliGRAM(s) Oral once  urea Oral Powder 15 Gram(s) Oral two times a day    MEDICATIONS  (PRN):  albuterol/ipratropium for Nebulization 3 milliLiter(s) Nebulizer every 6 hours PRN Shortness of Breath and/or Wheezing  aluminum hydroxide/magnesium hydroxide/simethicone Suspension 30 milliLiter(s) Oral every 6 hours PRN Dyspepsia  benzocaine/menthol Lozenge 1 Lozenge Oral two times a day PRN Sore Throat  Biotene Dry Mouth Oral Rinse 15 milliLiter(s) Swish and Spit every 4 hours PRN Mouth Care  bisacodyl 5 milliGRAM(s) Oral every 12 hours PRN Constipation  diazepam    Tablet 5 milliGRAM(s) Oral two times a day PRN for anxiety  guaiFENesin Oral Liquid (Sugar-Free) 200 milliGRAM(s) Oral every 6 hours PRN Cough  HYDROmorphone  Injectable 0.5 milliGRAM(s) IV Push every 3 hours PRN Moderate Pain (4 - 6)  HYDROmorphone  Injectable 1 milliGRAM(s) IV Push every 3 hours PRN Severe Pain (7 - 10)  melatonin 3 milliGRAM(s) Oral at bedtime PRN Insomnia  naloxone Injectable 0.1 milliGRAM(s) IV Push every 3 minutes PRN Obtundation, respiratory suppression  oxyCODONE    IR 10 milliGRAM(s) Oral every 4 hours PRN Moderate Pain (4 - 6)  sodium chloride 0.9% lock flush 10 milliLiter(s) IV Push every 1 hour PRN Pre/post blood products, medications, blood draw, and to maintain line patency        ITEMS UNCHECKED ARE NOT PRESENT    PRESENT SYMPTOMS: [ ]Unable to self-report - see [ ] CPOT [ ] PAINADS [ ] RDOS  Source if other than patient:  [ ]Family   [ ]Team     Pain:  [x ]yes [ ]no  QOL impact - not being able to rest   Location -                  right arm and   Aggravating factors - Palpation   Quality - not able to indicate   Radiation - none   Timing- constant   Severity (0-10 scale): up to 9/10 and going to 3/10 with Dilaudid  Minimal acceptable level (0-10 scale):     CPOT:    https://www.[facility].org/getattachment/[email or web address]/Critical-Care-Pain-Observation-Tool-(CPOT)    PAINAD Score: See PAINAD tool and score below       Dyspnea:                           [x ]Mild [ ]Moderate [ ]Severe    RDOS: See RDOS tool and score below   0 to 2  minimal or no respiratory distress   3  mild distress  4 to 6 moderate distress  >7 severe distress      Anxiety:                             [ ]Mild [ ]Moderate [ ]Severe  Fatigue:                             [ ]Mild [ ]Moderate [ ]Severe  Nausea:                             [ ]Mild [ ]Moderate [ ]Severe  Loss of appetite:              [ ]Mild [ ]Moderate [ ]Severe  Constipation:                    [ ]Mild [ ]Moderate [ ]Severe    PCSSQ[Palliative Care Spiritual Screening Question]   Severity (0-10):  Score of 4 or > indicate consideration of Chaplaincy referral.  Chaplaincy Referral: [x] yes [ ] refused [ ] following [ ] Deferred   3/27  saw pt as a consult. Pt is scared of the night. Also there may be some issues with pt's boyfriends daughter and pt & pt with  another female.   will remain available to provide emotional and spiritual support.  Caregiver [place]? : [ ] yes [ ] no [ x] Deferred [ ] Declined             Social work referral [ ] Patient & Family Centered Care Referral [ ]     Anticipatory Grief present?:  [ ] yes [ ] no  [x ] Deferred                  Social work referral [ ] Chaplaincy Referral [ ]    		  Other Symptoms:  [x ]All other review of systems negative     Palliative Performance Status Version 2:   See PPSv2 tool and score below         PHYSICAL EXAM:  Vital Signs Last 24 Hrs  T(C): 36.3 (01 Apr 2024 15:25), Max: 36.3 (01 Apr 2024 04:00)  T(F): 97.4 (01 Apr 2024 15:25), Max: 97.4 (01 Apr 2024 04:00)  HR: 72 (01 Apr 2024 15:25) (63 - 72)  BP: 131/74 (01 Apr 2024 15:25) (100/60 - 131/74)  BP(mean): --  RR: 18 (01 Apr 2024 15:25) (18 - 18)  SpO2: 96% (01 Apr 2024 15:25) (95% - 96%)    Parameters below as of 01 Apr 2024 15:25  Patient On (Oxygen Delivery Method): nasal cannula  O2 Flow (L/min): 6         GENERAL: [ ]Cachexia    [x ]Alert  [x ]Oriented x 3   [ ]Lethargic  [ ]Unarousable  [x ]Verbal  [ ]Non-Verbal  Behavioral:   [ ]Anxiety  [ ]Delirium [ ]Agitation [ ]Other  HEENT:  [ ]Normal   [ ]Dry mouth   [ ]ET Tube/Trach  [ ]Oral lesions  [x] Right eye with conjunctival ecchymosis   PULMONARY:   [ ]Clear [ ]Tachypnea  [ ]Audible excessive secretions   [ ]Rhonchi        [ ]Right [ ]Left [ ]Bilateral  [ ]Crackles        [ ]Right [ ]Left [ ]Bilateral  [ ]Wheezing     [ ]Right [ ]Left [ ]Bilateral  [x ]Diminished BS [ ] Right [ ]Left [x ]Bilateral  [x] Pleurx   CARDIOVASCULAR:    [x ]Regular [ ]Irregular [ ]Tachy  [ ]Malik [ ]Murmur [x ]Other: distended chest veins collaterals.   GASTROINTESTINAL:  [ ]Soft  [ ]Distended   [x ]+BS  [ ]Non tender [ ]Tender  [ ]Other [ ]PEG [ ]OGT/ NGT   Last BM: 4/1  GENITOURINARY:  [x ]Normal [ ]Incontinent   [ ]Oliguria/Anuria   [ ]Che  MUSCULOSKELETAL:   [ ]Normal   [ ]Weakness  [ ]Bed/Wheelchair bound [ x]Edema over right arm   NEUROLOGIC:   [ x]No focal deficits  [ ] Cognitive impairment  [ ] Dysphagia [ ]Dysarthria [ ] Paresis [ ]Other   SKIN:   [x ]Normal  [ ]Rash  [ ]Other  [ ]Pressure ulcer(s) [ ]y [ ]n present on admission    CRITICAL CARE:  [ ]Shock Present  [ ]Septic [ ]Cardiogenic [ ]Neurologic [ ]Hypovolemic  [ ]Vasopressors [ ]Inotropes  [ ]Respiratory failure present [ ]Mechanical Ventilation [ ]Non-invasive ventilatory support [ ]High-Flow   [ ]Acute  [ ]Chronic [ ]Hypoxic  [ ]Hypercarbic [ ]Other  [ ]Other organ failure     LABS:                                               9.1    9.86  )-----------( 473      ( 01 Apr 2024 06:25 )             29.0   03-31    133<L>  |  97  |  49<H>  ----------------------------<  123<H>  4.9   |  23  |  0.89    Ca    9.5      31 Mar 2024 07:00          RADIOLOGY & ADDITIONAL STUDIES:  < from: [facility] Duplex Upper Ext Vein Scan, Bilat (03.28.24 @ 12:02) >    IMPRESSION:    Bilateral upper extremity venous ultrasound demonstrates acute DVT   involving: right internal jugular vein, right innominate vein, right   subclavianvein, right brachial vein, and left internal jugular vein.    Superficial venous thrombosis involves the right and left cephalic veins.   Left basilic vein is not visualized.    Edema of the superficial soft tissues of the upper extremities, right   greater than left. Correlate clinically. Known right neck/thoracic mass   is better evaluated on recent neck CT.    NP Pipe was informed on 3/28/2024 at 10:50PM.    --- End of Report ---            JAME MAYER M.D., ATTENDING RADIOLOGIST  This document has been electronically signed. Mar 28 2024  1:34PM    < end of copied text >  < from: CT Neck Soft Tissue w/ IV Cont (03.27.24 @ 17:10) >  IMPRESSION:    Bulky and conglomerate lymphadenopathy at right level 4 invades internal   jugular vein with thrombus extending up to hyoid level. 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 IJV otherwise patent up to skull base.    New left IJV nonocclusive thrombus. Otherwise, no significant change from   3/14/24.    --- End of Report ---            MARISEL PÉREZ MD; Attending Radiologist  This document has been electronically signed. Mar 27 2024  9:31PM    < end of copied text >    Protein Calorie Malnutrition Present: [ ]mild [ ]moderate [ ]severe [ ]underweight [ ]morbid obesity  https://www.andeal.org/vault/2440/web/files/ONC/Table_Clinical%20Characteristics%20to%20Document%20Malnutrition-White%20JV%20et%20al%202012.pdf    Height (cm): 154.9 (03-26-24 @ 16:20), 154.9 (03-10-24 @ 16:32)  Weight (kg): 49.9 (03-26-24 @ 16:20), 49.9 (03-10-24 @ 16:32)  BMI (kg/m2): 20.8 (03-26-24 @ 16:20), 20.8 (03-10-24 @ 16:32)    [ ]PPSV2 < or = 30%  [ ]significant weight loss [ ]poor nutritional intake [ ]anasarca[ ]Artificial Nutrition    Other REFERRALS:  [ ]Hospice  [ ]Child Life  [ ]Social Work  [ ]Case management [ ]Holistic Therapy     Goals of Care Document:

## 2024-04-01 NOTE — PROGRESS NOTE ADULT - ASSESSMENT
58-yo F w/ PMH of RCC s/p R total nephrectomy, s/p hysterectomy, fibromyalgia, and concern for breast malignancy currently undergoing workup, admitted with R breast swelling, erythema, and pain. ID was consulted for extensive cellulitis/myositis along the R anterior lateral neck and R upper chest wall. 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 LN biopsy 3/14. CT chest w/ mediastinal and supraclavicular lymphadenopathy w/ necrosis. 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 RRT 3/22-23 ON for afib w/ RVR. Afebrile. New leukocytosis. Breast erythema and tenderness improving while on antibiotics. Another RRT 3/26 w/ tachycardia.    #Neck edema  #Neck pain  #Pneumonia  #Abnormal CT scan  #Myositis  #Thrombophlebitis  #Leukocytosis  #Afib w/ RVR  - VSS. No leukocytosis. Significant R neck lymphadenopathy, s/p biopsy. F/u path  - No sore throat or dysphagia. No odynophagia. No fever or rigors.  - Unclear if patient's presentation represents infectious disease process. Labs and physical exam do not support septic thrombophlebitis.  - BCx NGTD. Quantiferon neg.  - CT chest reviewed. Necrotic lymph nodes noted. Path from neck concern for malignancy. Breast erythema and pain could be from inflammation from mass burden.  - RRT 3/22-23 ON w/ afib w/ RVR. Afebrile. New leukocytosis. Probably from cancer burden? Broadened antibiotics to cefepime/metroniazole/vancomycin.  - RRT 3/26 for tachycardia. Increased pleural effusion on CXR.  - CT chest repeat on 3/27 w/ possible PNA. Would continue with antibiotics for now.  - CT neck also reviewed (3/27). Neck/chest wall pain and neck swelling likely from evolving thrombus (a/w malignancy?) then infectious process (e.g. Lemierre's disease). Not febrile. Never been bacteremic this admission.  - Can complete cefepime and metronidazole today (s/p 3 wks of abx)  - ENT and Onc f/u. CT surgery follow-up. Pulm f/u    #L hand phlebitis  - L dorsal hand w/ phlebitis 3/26. ABx as above. Warm compress    Plan discussed with primary team ACP.  Thank you for this consult. Inpatient ID team will peripherally follow.    Edwar Petersen MD, PhD  Attending Physician  Division of Infectious Diseases  Department of Medicine    Please contact through MS Teams message.  Office: [phone] (after 5 PM or weekend)

## 2024-04-01 NOTE — BH CONSULTATION LIAISON ASSESSMENT NOTE - NSBHCHARTREVIEWVS_PSY_A_CORE FT
Vital Signs Last 24 Hrs  T(C): 36.3 (01 Apr 2024 04:00), Max: 36.6 (31 Mar 2024 11:57)  T(F): 97.4 (01 Apr 2024 04:00), Max: 97.8 (31 Mar 2024 11:57)  HR: 63 (01 Apr 2024 04:00) (63 - 67)  BP: 110/73 (01 Apr 2024 04:00) (110/73 - 134/71)  BP(mean): --  RR: 18 (01 Apr 2024 04:00) (18 - 18)  SpO2: 95% (01 Apr 2024 04:00) (95% - 97%)    Parameters below as of 01 Apr 2024 04:00  Patient On (Oxygen Delivery Method): nasal cannula  O2 Flow (L/min): 6

## 2024-04-01 NOTE — BH CONSULTATION LIAISON ASSESSMENT NOTE - NSBHCHARTREVIEWINVESTIGATE_PSY_A_CORE FT
< from: 12 Lead ECG (03.23.24 @ 02:21) >      Ventricular Rate 191 BPM    QRS Duration 66 ms    Q-T Interval 228 ms    QTC Calculation(Bazett) 406 ms    R Axis 59 degrees    T Axis 100 degrees    Diagnosis Line ATRIAL FIBRILLATION WITH RAPID VENTRICULAR RESPONSE  NONSPECIFIC ST AND T WAVE ABNORMALITY  ABNORMAL ECG  Confirmed by RAGHU Draper Moussa ([id number]) on 3/23/2024 12:56:31 PM    < end of copied text >

## 2024-04-01 NOTE — PROGRESS NOTE ADULT - ASSESSMENT
58F w/Hx of RCC s/p R total nephrectomy, S/p hysterectomy, R-sided glaucoma, Fibromyalgia, Anxiety, GERD, smoker presents due to R breast swelling, redness and pain. Admitted for right breast swelling and cellulitis found to have imaging evidence concerning for metastatic malignancy w/ lung nodules, and axillary, supraclavicular, and mediastinal adenopathy. 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 Also s/p pericardial window for moderate pericardial effusion now with chest tube. Now found to be in Afib with RVR, s/p R pleurx placement with minimal improvement in respiratory status- now concerned for worsening SVC syndrome pending transfer to [facility] for urgent radiation

## 2024-04-02 DIAGNOSIS — I87.1 COMPRESSION OF VEIN: ICD-10-CM

## 2024-04-02 DIAGNOSIS — J90 PLEURAL EFFUSION, NOT ELSEWHERE CLASSIFIED: ICD-10-CM

## 2024-04-02 LAB
ANION GAP SERPL CALC-SCNC: 11 MMOL/L — SIGNIFICANT CHANGE UP (ref 5–17)
BUN SERPL-MCNC: 47 MG/DL — HIGH (ref 7–23)
CALCIUM SERPL-MCNC: 9.5 MG/DL — SIGNIFICANT CHANGE UP (ref 8.4–10.5)
CHLORIDE SERPL-SCNC: 102 MMOL/L — SIGNIFICANT CHANGE UP (ref 96–108)
CO2 SERPL-SCNC: 25 MMOL/L — SIGNIFICANT CHANGE UP (ref 22–31)
CREAT SERPL-MCNC: 0.87 MG/DL — SIGNIFICANT CHANGE UP (ref 0.5–1.3)
EGFR: 76 ML/MIN/1.73M2 — SIGNIFICANT CHANGE UP
GLUCOSE SERPL-MCNC: 123 MG/DL — HIGH (ref 70–99)
HCT VFR BLD CALC: 29.4 % — LOW (ref 34.5–45)
HGB BLD-MCNC: 9.4 G/DL — LOW (ref 11.5–15.5)
MAGNESIUM SERPL-MCNC: 1.8 MG/DL — SIGNIFICANT CHANGE UP (ref 1.6–2.6)
MCHC RBC-ENTMCNC: 29.3 PG — SIGNIFICANT CHANGE UP (ref 27–34)
MCHC RBC-ENTMCNC: 32 GM/DL — SIGNIFICANT CHANGE UP (ref 32–36)
MCV RBC AUTO: 91.6 FL — SIGNIFICANT CHANGE UP (ref 80–100)
NRBC # BLD: 0 /100 WBCS — SIGNIFICANT CHANGE UP (ref 0–0)
PLATELET # BLD AUTO: 542 K/UL — HIGH (ref 150–400)
POTASSIUM SERPL-MCNC: 4.7 MMOL/L — SIGNIFICANT CHANGE UP (ref 3.5–5.3)
POTASSIUM SERPL-SCNC: 4.7 MMOL/L — SIGNIFICANT CHANGE UP (ref 3.5–5.3)
RBC # BLD: 3.21 M/UL — LOW (ref 3.8–5.2)
RBC # FLD: 13.8 % — SIGNIFICANT CHANGE UP (ref 10.3–14.5)
SODIUM SERPL-SCNC: 138 MMOL/L — SIGNIFICANT CHANGE UP (ref 135–145)
WBC # BLD: 11.51 K/UL — HIGH (ref 3.8–10.5)
WBC # FLD AUTO: 11.51 K/UL — HIGH (ref 3.8–10.5)

## 2024-04-02 PROCEDURE — 99233 SBSQ HOSP IP/OBS HIGH 50: CPT

## 2024-04-02 RX ORDER — DIAZEPAM 5 MG
5 TABLET ORAL
Refills: 0 | Status: DISCONTINUED | OUTPATIENT
Start: 2024-04-03 | End: 2024-04-03

## 2024-04-02 RX ORDER — OXYCODONE HYDROCHLORIDE 5 MG/1
10 TABLET ORAL EVERY 4 HOURS
Refills: 0 | Status: DISCONTINUED | OUTPATIENT
Start: 2024-04-02 | End: 2024-04-03

## 2024-04-02 RX ADMIN — NALOXEGOL OXALATE 25 MILLIGRAM(S): 12.5 TABLET, FILM COATED ORAL at 06:30

## 2024-04-02 RX ADMIN — AMIODARONE HYDROCHLORIDE 400 MILLIGRAM(S): 400 TABLET ORAL at 05:27

## 2024-04-02 RX ADMIN — Medication 25 MILLIGRAM(S): at 05:27

## 2024-04-02 RX ADMIN — Medication 5 MILLIGRAM(S): at 01:17

## 2024-04-02 RX ADMIN — Medication 1 PATCH: at 11:15

## 2024-04-02 RX ADMIN — Medication 5 MILLIGRAM(S): at 22:33

## 2024-04-02 RX ADMIN — Medication 15 GRAM(S): at 05:26

## 2024-04-02 RX ADMIN — Medication 1 PATCH: at 19:21

## 2024-04-02 RX ADMIN — Medication 4 MILLIGRAM(S): at 03:20

## 2024-04-02 RX ADMIN — HYDROMORPHONE HYDROCHLORIDE 1 MILLIGRAM(S): 2 INJECTION INTRAMUSCULAR; INTRAVENOUS; SUBCUTANEOUS at 12:58

## 2024-04-02 RX ADMIN — Medication 4 MILLIGRAM(S): at 18:28

## 2024-04-02 RX ADMIN — OXYCODONE HYDROCHLORIDE 30 MILLIGRAM(S): 5 TABLET ORAL at 22:33

## 2024-04-02 RX ADMIN — AMIODARONE HYDROCHLORIDE 400 MILLIGRAM(S): 400 TABLET ORAL at 22:28

## 2024-04-02 RX ADMIN — CHLORHEXIDINE GLUCONATE 1 APPLICATION(S): 213 SOLUTION TOPICAL at 08:01

## 2024-04-02 RX ADMIN — OXYCODONE HYDROCHLORIDE 30 MILLIGRAM(S): 5 TABLET ORAL at 13:58

## 2024-04-02 RX ADMIN — ENOXAPARIN SODIUM 50 MILLIGRAM(S): 100 INJECTION SUBCUTANEOUS at 05:26

## 2024-04-02 RX ADMIN — AMIODARONE HYDROCHLORIDE 400 MILLIGRAM(S): 400 TABLET ORAL at 13:58

## 2024-04-02 RX ADMIN — Medication 500 MILLIGRAM(S): at 05:26

## 2024-04-02 RX ADMIN — Medication 1 TABLET(S): at 11:14

## 2024-04-02 RX ADMIN — OXYCODONE HYDROCHLORIDE 10 MILLIGRAM(S): 5 TABLET ORAL at 18:36

## 2024-04-02 RX ADMIN — Medication 15 MILLILITER(S): at 23:57

## 2024-04-02 RX ADMIN — Medication 4 MILLIGRAM(S): at 11:15

## 2024-04-02 RX ADMIN — Medication 15 MILLILITER(S): at 03:20

## 2024-04-02 RX ADMIN — HYDROMORPHONE HYDROCHLORIDE 1 MILLIGRAM(S): 2 INJECTION INTRAMUSCULAR; INTRAVENOUS; SUBCUTANEOUS at 12:01

## 2024-04-02 RX ADMIN — Medication 25 MILLIGRAM(S): at 18:28

## 2024-04-02 RX ADMIN — OXYCODONE HYDROCHLORIDE 30 MILLIGRAM(S): 5 TABLET ORAL at 23:51

## 2024-04-02 RX ADMIN — ENOXAPARIN SODIUM 50 MILLIGRAM(S): 100 INJECTION SUBCUTANEOUS at 18:28

## 2024-04-02 RX ADMIN — Medication 1 PATCH: at 08:01

## 2024-04-02 RX ADMIN — Medication 1 PATCH: at 11:58

## 2024-04-02 RX ADMIN — PANTOPRAZOLE SODIUM 40 MILLIGRAM(S): 20 TABLET, DELAYED RELEASE ORAL at 06:30

## 2024-04-02 RX ADMIN — OXYCODONE HYDROCHLORIDE 30 MILLIGRAM(S): 5 TABLET ORAL at 14:36

## 2024-04-02 RX ADMIN — OXYCODONE HYDROCHLORIDE 30 MILLIGRAM(S): 5 TABLET ORAL at 05:38

## 2024-04-02 NOTE — CHART NOTE - NSCHARTNOTEFT_GEN_A_CORE
YO consulted to assist in active pain/symptom management in the setting of patient with advanced illness. RAMON, Dr. Jack, and ROXY Buitrago met with patient at bedside today.     Team first introduced and reintroduced selves and roles in patient care. Patient verbalized understanding and was receptive to visit today. Team next inquired into patient coping with hospitalization and current medical status, and patient notes coping as well as possible. Patient states that she is feeling apprehension at the prospect of transfer to [facility] for radiation and notes overall frustration with her current medical status. Patient discussed the events leading to current status and notes that she continues to have an unclear diagnosis but is aware of the advanced stage of cancer she is currently navigating. Patient additionally notes the shock she felt and continues to feel upon learning more about her disease process, and team validated this with patient today. Patient states that to further compound her coping, her mother  from lung cancer after a routine MRI having had a complication during the scan, which patient states adds an extra layer of stress to her ongoing work up and treatment process. Team again validated these concerns and stressors with patient today, and much emotional support and active listening provided.     Team inquired into patient's support system throughout this difficult process, as well as methods patient employs to cope and decompress. Patient notes her primary support is her significant other, whom patient states owns a small business and visits patient during evenings and weekends. Patient notes concerns with burdening her partner as he additionally lost his mother to breast cancer several years ago and continues to cope with that loss. Patient states that otherwise, she enjoys nature and being outdoors, and notes being present in nature assists in her decompression and stress relief normally, but notes the limitations whilst admitted. Patient does note continuing to connect with her OP therapist often during the admission, but discussed limitations in stress management overall. Patient expanded on this today as well, discussing the "fairness" of the situation as well as the lack of control felt throughout this process and with patient's overall condition. Patient states that additionally, whilst she continues to wish for radiation and any other DMT to prolong her life, she is concerned that she may not survive. Team again provided much validation and discussed that, whilst patient's diagnosis is advanced and cannot be cured, patient can still undergo treatment in the hopes of extending her life if at all possible. Team additionally discussed ongoing availability and support throughout this process, assuring patient that she is not isolated or alone in this. Patient verbalized much appreciation.     GAP and LCSW will continue to follow this case.

## 2024-04-02 NOTE — PROGRESS NOTE ADULT - PROBLEM SELECTOR PLAN 1
H/o RCC and had nephrectomy by Urology by Dr Jacky Adkins at [facility], also had lymph node dissection 2 years ago. Now with axillary, subclavicular and mediastinal lymphadenopathy, pulmonary nodules and inflammatory changes of breast seen on CT with possible effect on IVC  - CT neck with large supraclavicular/mediastinal mass lesion with mass effect and complete occlusion of the right IJ and SVC  - S/p supraclavicular lymph node biopsy by IR on 3/14- path consistent with primary of GI origin  - c/w decadron  - Prior hospitalist Dr. Moose Nolen D/w GI follow up MRI abdomen and MRCP- currently deferred due to worsening svc syndrome warranting urgent radiation  - Palliative follow up for pain control  - seen by behavioral health see for anxiety management. Recommended sertraline 25mg, ordered but pt hesitant to take

## 2024-04-02 NOTE — CHART NOTE - NSCHARTNOTEFT_GEN_A_CORE
HPI: 58F w/Hx of RCC s/p R total nephrectomy, S/p hysterectomy, R-sided glaucoma, Fibromyalgia, Anxiety, GERD, smoker p/w R breast swelling, redness and pain, found to have axillary, subclavicular and mediastinal lymphadenopathy & pulm nodules, s/p LN biopsy. Pt. was being seen for hyponatremia.     Labs reviewed, SNa now WNL. Hyponatremia resolved.    -Recommend to discontinue Ure-Na and monitor SNa.  -Continue with fluid restriction.    Will discontinue follow-up. Re-consult, as needed.    If you have any questions, please feel free to contact me  Isaias Clark  Nephrology Fellow  [phone] / [employer] Teams(Preferred)  (After 5pm or on weekends please page the on-call fellow)

## 2024-04-02 NOTE — PROGRESS NOTE ADULT - PROBLEM SELECTOR PLAN 4
Pt remains pt on 4-6 L NC, s/p R chest tube by thoracic. Resp status currently stable  - cont chest tube output monitoring  - Pt states has some drainage around tube, thoracic eval  - transfer to [facility] for urgent radiation for SVC syndrome

## 2024-04-02 NOTE — PROGRESS NOTE ADULT - PROBLEM SELECTOR PLAN 3
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 Not candidate for IV on R due to mass, but also has SVC obliteration on CT neck to doubt patient can have PICC or central line placed on R  - MICU team placed R femoral TLC

## 2024-04-02 NOTE — PROGRESS NOTE ADULT - PROBLEM SELECTOR PLAN 6
Had erythema and tenderness of right breast improving  - blood cx ngtd  - completed last does of abx 4/1 as per ID (Cefepime and Flagyl)

## 2024-04-02 NOTE — PROGRESS NOTE ADULT - PROBLEM SELECTOR PLAN 2
- IR was consulted for possible stenting; not feasible due to size/location,   - Remains tier 2 transfer to [facility] for urgent radiation. Called and discussed with transfer center today, currently no bed again today, unsure of timetable as to when bed will be available  - c/w decadron  - IR, rad onc, oncology, cardiology, thoracic all agree on transfer  - NM notified

## 2024-04-02 NOTE — PROGRESS NOTE ADULT - ASSESSMENT
58F w/Hx of RCC s/p R total nephrectomy, S/p hysterectomy, R-sided glaucoma, Fibromyalgia, Anxiety, GERD, smoker presents due to R breast swelling, redness and pain. Admitted for right breast swelling and cellulitis found to have imaging evidence concerning for metastatic malignancy w/ lung nodules, and axillary, supraclavicular, and mediastinal adenopathy. 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 Also s/p pericardial window for moderate pericardial effusion now with chest tube. Now found to be in Afib with RVR, s/p R pleurx placement with minimal improvement in respiratory status- now concerned for worsening SVC syndrome pending transfer to [facility] for urgent radiation

## 2024-04-02 NOTE — PROGRESS NOTE ADULT - PROBLEM SELECTOR PLAN 7
For details, please see GOC note dated 3/28; however, GOC are for DMT if possible. However, she is DNR/I. For details, please see GOC note dated 3/28; however, GOC are for DMT if possible. Nonetheless, she is DNR/I.

## 2024-04-02 NOTE — PROGRESS NOTE ADULT - PROBLEM SELECTOR PLAN 3
-IR and RT are f/u.   -Further work up and Rx as per the primary team.   -Pending on transferring to [facility] for RT  -As per Medicine progress notes: "IR reconsulted for possible stenting; not feasible due to size/location, now planned for tier 2 transfer to [facility] for urgent radiation."   -O2 by NC

## 2024-04-02 NOTE — PROGRESS NOTE ADULT - SUBJECTIVE AND OBJECTIVE BOX
Eric Hickey MD  Division of Hospital Medicine  Available via MS teams  ---------------------------------------------------------    GADIEL GENAO  58y  Female      Patient is a 58y old  Female who presents with a chief complaint of Mastitis, breast malignancy (01 Apr 2024 19:34)      INTERVAL HPI/OVERNIGHT EVENTS:  Seen at bedside. Seen by psych, has not yet started sertraline HCl 25mg. Remains anxious asking "why did this happen to me?" Breathing better today. pain better today      REVIEW OF SYSTEMS: 10 point ROS negative unless listed above    T(C): 36.7 (04-02-24 @ 11:34), Max: 36.7 (04-02-24 @ 11:34)  HR: 63 (04-02-24 @ 11:34) (63 - 72)  BP: 113/71 (04-02-24 @ 11:34) (113/71 - 131/74)  RR: 18 (04-02-24 @ 11:34) (18 - 18)  SpO2: 97% (04-02-24 @ 11:34) (96% - 97%)  Wt(kg): --Vital Signs Last 24 Hrs  T(C): 36.7 (02 Apr 2024 11:34), Max: 36.7 (02 Apr 2024 11:34)  T(F): 98 (02 Apr 2024 11:34), Max: 98 (02 Apr 2024 11:34)  HR: 63 (02 Apr 2024 11:34) (63 - 72)  BP: 113/71 (02 Apr 2024 11:34) (113/71 - 131/74)  BP(mean): --  RR: 18 (02 Apr 2024 11:34) (18 - 18)  SpO2: 97% (02 Apr 2024 11:34) (96% - 97%)    Parameters below as of 02 Apr 2024 11:34  Patient On (Oxygen Delivery Method): nasal cannula        PHYSICAL EXAM:  GENERAL: Anxious  HEAD:  Atraumatic, Normocephalic  EYES: EOMI, PERRLA, conjunctiva and sclera clear  CHEST/LUNG: Clear to auscultation bilaterally; No wheeze  HEART: Regular rate and rhythm; No murmurs, rubs, or gallops  ABDOMEN: Soft, Nontender, Nondistended; Bowel sounds present  EXTREMITIES:  2+ Peripheral Pulses, No clubbing, cyanosis, or edema      LABS:                        9.4    11.51 )-----------( 542      ( 02 Apr 2024 07:02 )             29.4     04-02    138  |  102  |  47<H>  ----------------------------<  123<H>  4.7   |  25  |  0.87    Ca    9.5      02 Apr 2024 07:02  Mg     1.8     04-02        Urinalysis Basic - ( 02 Apr 2024 07:02 )    Color: x / Appearance: x / SG: x / pH: x  Gluc: 123 mg/dL / Ketone: x  / Bili: x / Urobili: x   Blood: x / Protein: x / Nitrite: x   Leuk Esterase: x / RBC: x / WBC x   Sq Epi: x / Non Sq Epi: x / Bacteria: x      CAPILLARY BLOOD GLUCOSE            Urinalysis Basic - ( 02 Apr 2024 07:02 )    Color: x / Appearance: x / SG: x / pH: x  Gluc: 123 mg/dL / Ketone: x  / Bili: x / Urobili: x   Blood: x / Protein: x / Nitrite: x   Leuk Esterase: x / RBC: x / WBC x   Sq Epi: x / Non Sq Epi: x / Bacteria: x        RADIOLOGY & ADDITIONAL TESTS:    Imaging Personally Reviewed:  [ ] YES  [ ] NO

## 2024-04-02 NOTE — PROGRESS NOTE ADULT - NS ATTEST RISK PROBLEM GEN_ALL_CORE FT
1. Number and complexity of problems addressed for this patient:    1.1 Moderate (At least 1)  [ ] 1 or more chronic illnesses with exacerbation, progression, or side effects of treatment  [ ] 2 or more stable chronic illnesses  [ ] 1 undiagnosed new problem with uncertain prognosis  [ ] 1 acute illness with systemic symptoms  [ ] 1 acute complicated injury  1.2 High (At least 1)   [x ] 1 or more chronic illnesses with severe exacerbation, progression, or side effects of treatment  [x ] 1 acute or chronic illnesses or injuries that may pose a threat to life or bodily function    2. Amount and/or Complexity of Data that was Reviewed and Analyzed for this case:       Moderate (1 out of 3)       High (2 out of 3)  2.1. (Any combination of 3 of the following)   [ ] Prior External notes were reviewed  [ ] Each test result was reviewed (see "LABS" and "RADIOLOGY & ADDITIONAL STUDIES" above)  [ ] The following tests were ordered and/or reviewed (Only count 1 point for ordering or reviewing a unique test):  	[ ]CBC  	[ ] Chemistry   	[ ] Imaging   	[ ] Other:   [ ] Assessment requiring an independent historian   		Name of historian and relationship:   2.2  [ ] Personally review and interpretation of  image or testing   2.3  [ ] Discussion of management or test interpretation with external physician/other qualified health care professional\appropriate source (not separately reported)    3. Risk of Complications and/or Morbidity or Mortality of for this Patient’s Management:  3.1 Moderate risk of morbidity from additional diagnostic testing or treatment (At least 1):   [ ] Prescription drug management   [ ] Decision regarding minor surgery, treatment, or procedure with identified patient or procedure risk factors  [ ] Decision regarding elective major surgery, treatment, or procedure without identified patient or procedure risk factors   [ ] Diagnosis or treatment significantly limited by social determinants of health   [ ] Other:   3.2 High risk of morbidity from additional diagnostic testing or treatment (At least 1):   [x ] Drug therapy requiring intensive monitoring for toxicity   [ ] Decision regarding elective major surgery, treatment, or procedure with identified patient or procedure risk factors   [ ] Decision regarding emergency major surgery, treatment, or procedure   [x] Decision regarding hospitalization or escalation of hospital-level of care  [ ] Decision not to resuscitate, not to intubate, or to de-escalate care because of poor prognosis   [ ] Decision to proceed or not with artificial nutrition   [x] Parenteral controlled substance  [ ] Other:

## 2024-04-02 NOTE — PROGRESS NOTE ADULT - SUBJECTIVE AND OBJECTIVE BOX
Date of Service: 04-02-24 @ 17:14    SUBJECTIVE AND OBJECTIVE: Recurrent pain; however, she appeared more comfortable than yesterday. She was also c/o mild dyspnea that improves with Pleurx drainage. Pain also improves with Pleurx drainage.   Indication for Geriatrics and Palliative Care Services/INTERVAL HPI: Goals of care/symptoms.     OVERNIGHT EVENTS: She used Dilaudid 1mg x 1 and Diazepam 5mg PO x 2  (6am to 6am)     DNR on chart:DNI  DNI      Allergies    Cipro (Headache; Vomiting; Rash)  penicillin (Headache; Vomiting; Rash)  erythromycin (Headache; Vomiting; Rash)    Intolerances    MEDICATIONS  (STANDING):  acetaminophen   IVPB .. 750 milliGRAM(s) IV Intermittent once  aMIOdarone    Tablet 400 milliGRAM(s) Oral three times a day  aMIOdarone    Tablet   Oral   chlorhexidine 4% Liquid 1 Application(s) Topical <User Schedule>  dexAMETHasone  Injectable 4 milliGRAM(s) IV Push every 8 hours  enoxaparin Injectable 50 milliGRAM(s) SubCutaneous every 12 hours  influenza   Vaccine 0.5 milliLiter(s) IntraMuscular once  lactobacillus acidophilus 1 Tablet(s) Oral daily  metoprolol tartrate 25 milliGRAM(s) Oral two times a day  naloxegol 25 milliGRAM(s) Oral before breakfast  nicotine -  14 mG/24Hr(s) Patch 1 Patch Transdermal daily  oxyCODONE  ER Tablet 30 milliGRAM(s) Oral <User Schedule>  pantoprazole    Tablet 40 milliGRAM(s) Oral before breakfast  polyethylene glycol 3350 17 Gram(s) Oral two times a day  senna 2 Tablet(s) Oral at bedtime  sertraline 25 milliGRAM(s) Oral daily    MEDICATIONS  (PRN):  albuterol/ipratropium for Nebulization 3 milliLiter(s) Nebulizer every 6 hours PRN Shortness of Breath and/or Wheezing  aluminum hydroxide/magnesium hydroxide/simethicone Suspension 30 milliLiter(s) Oral every 6 hours PRN Dyspepsia  benzocaine/menthol Lozenge 1 Lozenge Oral two times a day PRN Sore Throat  Biotene Dry Mouth Oral Rinse 15 milliLiter(s) Swish and Spit every 4 hours PRN Mouth Care  bisacodyl 5 milliGRAM(s) Oral every 12 hours PRN Constipation  diazepam    Tablet 5 milliGRAM(s) Oral two times a day PRN for anxiety  guaiFENesin Oral Liquid (Sugar-Free) 200 milliGRAM(s) Oral every 6 hours PRN Cough  HYDROmorphone  Injectable 0.5 milliGRAM(s) IV Push every 3 hours PRN Moderate Pain (4 - 6)  HYDROmorphone  Injectable 1 milliGRAM(s) IV Push every 3 hours PRN Severe Pain (7 - 10)  melatonin 3 milliGRAM(s) Oral at bedtime PRN Insomnia  naloxone Injectable 0.1 milliGRAM(s) IV Push every 3 minutes PRN Obtundation, respiratory suppression  oxyCODONE    IR 10 milliGRAM(s) Oral every 4 hours PRN Moderate Pain (4 - 6)  sodium chloride 0.9% lock flush 10 milliLiter(s) IV Push every 1 hour PRN Pre/post blood products, medications, blood draw, and to maintain line patency        ITEMS UNCHECKED ARE NOT PRESENT    PRESENT SYMPTOMS: [ ]Unable to self-report - see [ ] CPOT [ ] PAINADS [ ] RDOS  Source if other than patient:  [ ]Family   [ ]Team     Pain:  [x ]yes [ ]no  QOL impact - not being able to rest   Location -                  right arm and   Aggravating factors - Palpation   Quality - not able to indicate   Radiation - none   Timing- constant   Severity (0-10 scale): up to 9/10 and going to 3/10 with Dilaudid  Minimal acceptable level (0-10 scale):     CPOT:    https://www.[facility].org/getattachment/[email or web address]/Critical-Care-Pain-Observation-Tool-(CPOT)    PAINAD Score: See PAINAD tool and score below       Dyspnea:                           [x ]Mild [ ]Moderate [ ]Severe    RDOS: See RDOS tool and score below   0 to 2  minimal or no respiratory distress   3  mild distress  4 to 6 moderate distress  >7 severe distress      Anxiety:                             [ x]Mild [ ]Moderate [ ]Severe  Fatigue:                             [ ]Mild [ ]Moderate [ ]Severe  Nausea:                             [ ]Mild [ ]Moderate [ ]Severe  Loss of appetite:              [ ]Mild [ ]Moderate [ ]Severe  Constipation:                    [ ]Mild [ ]Moderate [ ]Severe    PCSSQ[Palliative Care Spiritual Screening Question]   Severity (0-10):  Score of 4 or > indicate consideration of Chaplaincy referral.  Chaplaincy Referral: [x] yes [ ] refused [ ] following [ ] Deferred   3/27  saw pt as a consult. Pt is scared of the night. Also there may be some issues with pt's boyfriends daughter and pt & pt with  another female.   will remain available to provide emotional and spiritual support.  Caregiver [place]? : [ ] yes [ ] no [ x] Deferred [ ] Declined             Social work referral [ ] Patient & Family Centered Care Referral [ ]     Anticipatory Grief present?:  [ ] yes [ ] no  [x ] Deferred                  Social work referral [ ] Chaplaincy Referral [ ]    Psychosocial distress [x] Social work referral.     		  Other Symptoms:  [x ]All other review of systems negative     Palliative Performance Status Version 2:   See PPSv2 tool and score below         PHYSICAL EXAM:  Vital Signs Last 24 Hrs  T(C): 36.7 (02 Apr 2024 11:34), Max: 36.7 (02 Apr 2024 11:34)  T(F): 98 (02 Apr 2024 11:34), Max: 98 (02 Apr 2024 11:34)  HR: 63 (02 Apr 2024 11:34) (63 - 67)  BP: 113/71 (02 Apr 2024 11:34) (113/71 - 122/80)  BP(mean): --  RR: 18 (02 Apr 2024 11:34) (18 - 18)  SpO2: 97% (02 Apr 2024 11:34) (96% - 97%)    Parameters below as of 02 Apr 2024 11:34  Patient On (Oxygen Delivery Method): nasal cannula             GENERAL: [ ]Cachexia    [x ]Alert  [x ]Oriented x 3   [ ]Lethargic  [ ]Unarousable  [x ]Verbal  [ ]Non-Verbal  Behavioral:   [ ]Anxiety  [ ]Delirium [ ]Agitation [ ]Other  HEENT:  [ ]Normal   [ ]Dry mouth   [ ]ET Tube/Trach  [ ]Oral lesions  [x] Right eye with conjunctival ecchymosis   PULMONARY:   [ ]Clear [ ]Tachypnea  [ ]Audible excessive secretions   [ ]Rhonchi        [ ]Right [ ]Left [ ]Bilateral  [ ]Crackles        [ ]Right [ ]Left [ ]Bilateral  [ ]Wheezing     [ ]Right [ ]Left [ ]Bilateral  [x ]Diminished BS [ ] Right [ ]Left [x ]Bilateral  [x] Pleurx   CARDIOVASCULAR:    [x ]Regular [ ]Irregular [ ]Tachy  [ ]Malik [ ]Murmur [x ]Other: distended chest veins collaterals.   GASTROINTESTINAL:  [ ]Soft  [ ]Distended   [x ]+BS  [ ]Non tender [ ]Tender  [ ]Other [ ]PEG [ ]OGT/ NGT   Last BM: 4/2  GENITOURINARY:  [x ]Normal [ ]Incontinent   [ ]Oliguria/Anuria   [ ]Che  MUSCULOSKELETAL:   [ ]Normal   [ ]Weakness  [ ]Bed/Wheelchair bound [ x]Edema over right arm   NEUROLOGIC:   [ x]No focal deficits  [ ] Cognitive impairment  [ ] Dysphagia [ ]Dysarthria [ ] Paresis [ ]Other   SKIN:   [x ]Normal  [ ]Rash  [ ]Other  [ ]Pressure ulcer(s) [ ]y [ ]n present on admission    CRITICAL CARE:  [ ]Shock Present  [ ]Septic [ ]Cardiogenic [ ]Neurologic [ ]Hypovolemic  [ ]Vasopressors [ ]Inotropes  [ ]Respiratory failure present [ ]Mechanical Ventilation [ ]Non-invasive ventilatory support [ ]High-Flow   [ ]Acute  [ ]Chronic [ ]Hypoxic  [ ]Hypercarbic [ ]Other  [ ]Other organ failure     LABS:                                                               9.4    11.51 )-----------( 542      ( 02 Apr 2024 07:02 )             29.4   04-02    138  |  102  |  47<H>  ----------------------------<  123<H>  4.7   |  25  |  0.87    Ca    9.5      02 Apr 2024 07:02  Mg     1.8     04-02              RADIOLOGY & ADDITIONAL STUDIES:  < from: [facility] Duplex Upper Ext Vein Scan, Bilat (03.28.24 @ 12:02) >    IMPRESSION:    Bilateral upper extremity venous ultrasound demonstrates acute DVT   involving: right internal jugular vein, right innominate vein, right   subclavianvein, right brachial vein, and left internal jugular vein.    Superficial venous thrombosis involves the right and left cephalic veins.   Left basilic vein is not visualized.    Edema of the superficial soft tissues of the upper extremities, right   greater than left. Correlate clinically. Known right neck/thoracic mass   is better evaluated on recent neck CT.    SACHA Betancur was informed on 3/28/2024 at 10:50PM.    --- End of Report ---            JAME MAYER M.D., ATTENDING RADIOLOGIST  This document has been electronically signed. Mar 28 2024  1:34PM    < end of copied text >  < from: CT Neck Soft Tissue w/ IV Cont (03.27.24 @ 17:10) >  IMPRESSION:    Bulky and conglomerate lymphadenopathy at right level 4 invades internal   jugular vein with thrombus extending up to hyoid level. 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 IJV otherwise patent up to skull base.    New left IJV nonocclusive thrombus. Otherwise, no significant change from   3/14/24.    --- End of Report ---            MARISEL PÉREZ MD; Attending Radiologist  This document has been electronically signed. Mar 27 2024  9:31PM    < end of copied text >    Protein Calorie Malnutrition Present: [ ]mild [ ]moderate [ ]severe [ ]underweight [ ]morbid obesity  https://www.andeal.org/vault/2440/web/files/ONC/Table_Clinical%20Characteristics%20to%20Document%20Malnutrition-White%20JV%20et%20al%202012.pdf    Height (cm): 154.9 (03-26-24 @ 16:20), 154.9 (03-10-24 @ 16:32)  Weight (kg): 49.9 (03-26-24 @ 16:20), 49.9 (03-10-24 @ 16:32)  BMI (kg/m2): 20.8 (03-26-24 @ 16:20), 20.8 (03-10-24 @ 16:32)    [ ]PPSV2 < or = 30%  [ ]significant weight loss [ ]poor nutritional intake [ ]anasarca[ ]Artificial Nutrition    Other REFERRALS:  [ ]Hospice  [ ]Child Life  [ ]Social Work  [ ]Case management [ ]Holistic Therapy     Goals of Care Document: Date of Service: 04-02-24 @ 17:14    SUBJECTIVE AND OBJECTIVE: Recurrent pain; however, she appeared more comfortable than yesterday. She was also c/o mild dyspnea that improves with Pleurx drainage. Pain also improves with Pleurx drainage.   Indication for Geriatrics and Palliative Care Services/INTERVAL HPI: Goals of care/symptoms.     OVERNIGHT EVENTS: She used Dilaudid 1mg x 1 and Diazepam 5mg PO x 2  (6am to 6am)     DNR on chart:DNI  DNI      Allergies    Cipro (Headache; Vomiting; Rash)  penicillin (Headache; Vomiting; Rash)  erythromycin (Headache; Vomiting; Rash)    Intolerances    MEDICATIONS  (STANDING):  acetaminophen   IVPB .. 750 milliGRAM(s) IV Intermittent once  aMIOdarone    Tablet 400 milliGRAM(s) Oral three times a day  aMIOdarone    Tablet   Oral   chlorhexidine 4% Liquid 1 Application(s) Topical <User Schedule>  dexAMETHasone  Injectable 4 milliGRAM(s) IV Push every 8 hours  enoxaparin Injectable 50 milliGRAM(s) SubCutaneous every 12 hours  influenza   Vaccine 0.5 milliLiter(s) IntraMuscular once  lactobacillus acidophilus 1 Tablet(s) Oral daily  metoprolol tartrate 25 milliGRAM(s) Oral two times a day  naloxegol 25 milliGRAM(s) Oral before breakfast  nicotine -  14 mG/24Hr(s) Patch 1 Patch Transdermal daily  oxyCODONE  ER Tablet 30 milliGRAM(s) Oral <User Schedule>  pantoprazole    Tablet 40 milliGRAM(s) Oral before breakfast  polyethylene glycol 3350 17 Gram(s) Oral two times a day  senna 2 Tablet(s) Oral at bedtime  sertraline 25 milliGRAM(s) Oral daily    MEDICATIONS  (PRN):  albuterol/ipratropium for Nebulization 3 milliLiter(s) Nebulizer every 6 hours PRN Shortness of Breath and/or Wheezing  aluminum hydroxide/magnesium hydroxide/simethicone Suspension 30 milliLiter(s) Oral every 6 hours PRN Dyspepsia  benzocaine/menthol Lozenge 1 Lozenge Oral two times a day PRN Sore Throat  Biotene Dry Mouth Oral Rinse 15 milliLiter(s) Swish and Spit every 4 hours PRN Mouth Care  bisacodyl 5 milliGRAM(s) Oral every 12 hours PRN Constipation  diazepam    Tablet 5 milliGRAM(s) Oral two times a day PRN for anxiety  guaiFENesin Oral Liquid (Sugar-Free) 200 milliGRAM(s) Oral every 6 hours PRN Cough  HYDROmorphone  Injectable 0.5 milliGRAM(s) IV Push every 3 hours PRN Moderate Pain (4 - 6)  HYDROmorphone  Injectable 1 milliGRAM(s) IV Push every 3 hours PRN Severe Pain (7 - 10)  melatonin 3 milliGRAM(s) Oral at bedtime PRN Insomnia  naloxone Injectable 0.1 milliGRAM(s) IV Push every 3 minutes PRN Obtundation, respiratory suppression  oxyCODONE    IR 10 milliGRAM(s) Oral every 4 hours PRN Moderate Pain (4 - 6)  sodium chloride 0.9% lock flush 10 milliLiter(s) IV Push every 1 hour PRN Pre/post blood products, medications, blood draw, and to maintain line patency        ITEMS UNCHECKED ARE NOT PRESENT    PRESENT SYMPTOMS: [ ]Unable to self-report - see [ ] CPOT [ ] PAINADS [ ] RDOS  Source if other than patient:  [ ]Family   [ ]Team     Pain:  [x ]yes [ ]no  QOL impact - not being able to rest   Location -                  right arm and   Aggravating factors - Palpation   Quality - not able to indicate   Radiation - none   Timing- constant   Severity (0-10 scale): up to 9/10 and going to 3/10 with Dilaudid  Minimal acceptable level (0-10 scale):     CPOT:    https://www.[facility].org/getattachment/[email or web address]-[email or web address]/Critical-Care-Pain-Observation-Tool-(CPOT)    PAINAD Score: See PAINAD tool and score below       Dyspnea:                           [x ]Mild [ ]Moderate [ ]Severe    RDOS: See RDOS tool and score below   0 to 2  minimal or no respiratory distress   3  mild distress  4 to 6 moderate distress  >7 severe distress      Anxiety:                             [ x]Mild [ ]Moderate [ ]Severe  Fatigue:                             [ ]Mild [ ]Moderate [ ]Severe  Nausea:                             [ ]Mild [ ]Moderate [ ]Severe  Loss of appetite:              [ ]Mild [ ]Moderate [ ]Severe  Constipation:                    [ ]Mild [ ]Moderate [ ]Severe    PCSSQ[Palliative Care Spiritual Screening Question]   Severity (0-10):  Score of 4 or > indicate consideration of Chaplaincy referral.  Chaplaincy Referral: [x] yes [ ] refused [ ] following [ ] Deferred   3/27  saw pt as a consult. Pt is scared of the night. Also there may be some issues with pt's boyfriends daughter and pt & pt with  another female.   will remain available to provide emotional and spiritual support.  Caregiver [place]? : [ ] yes [ ] no [ x] Deferred [ ] Declined             Social work referral [ ] Patient & Family Centered Care Referral [ ]     Anticipatory Grief present?:  [ ] yes [ ] no  [x ] Deferred                  Social work referral [ ] Chaplaincy Referral [ ]    Psychosocial distress [x] Social work referral.     		  Other Symptoms:  [x ]All other review of systems negative     Palliative Performance Status Version 2:   See PPSv2 tool and score below         PHYSICAL EXAM:  Vital Signs Last 24 Hrs  T(C): 36.7 (02 Apr 2024 11:34), Max: 36.7 (02 Apr 2024 11:34)  T(F): 98 (02 Apr 2024 11:34), Max: 98 (02 Apr 2024 11:34)  HR: 63 (02 Apr 2024 11:34) (63 - 67)  BP: 113/71 (02 Apr 2024 11:34) (113/71 - 122/80)  BP(mean): --  RR: 18 (02 Apr 2024 11:34) (18 - 18)  SpO2: 97% (02 Apr 2024 11:34) (96% - 97%)    Parameters below as of 02 Apr 2024 11:34  Patient On (Oxygen Delivery Method): nasal cannula    GENERAL: [ ]Cachexia    [x ]Alert  [x ]Oriented x 3   [ ]Lethargic  [ ]Unarousable  [x ]Verbal  [ ]Non-Verbal  Behavioral:   [ ]Anxiety  [ ]Delirium [ ]Agitation [ ]Other  HEENT:  [ ]Normal   [ ]Dry mouth   [ ]ET Tube/Trach  [ ]Oral lesions  [x] Right eye with conjunctival ecchymosis   PULMONARY:   [ ]Clear [ ]Tachypnea  [ ]Audible excessive secretions   [ ]Rhonchi        [ ]Right [ ]Left [ ]Bilateral  [ ]Crackles        [ ]Right [ ]Left [ ]Bilateral  [ ]Wheezing     [ ]Right [ ]Left [ ]Bilateral  [x ]Diminished BS [ ] Right [ ]Left [x ]Bilateral  [x] Pleurx   CARDIOVASCULAR:    [x ]Regular [ ]Irregular [ ]Tachy  [ ]Malik [ ]Murmur [x ]Other: distended chest veins collaterals.   GASTROINTESTINAL:  [ ]Soft  [ ]Distended   [x ]+BS  [ ]Non tender [ ]Tender  [ ]Other [ ]PEG [ ]OGT/ NGT   Last BM: 4/2  GENITOURINARY:  [x ]Normal [ ]Incontinent   [ ]Oliguria/Anuria   [ ]Che  MUSCULOSKELETAL:   [ ]Normal   [ ]Weakness  [ ]Bed/Wheelchair bound [ x]Edema over right arm   NEUROLOGIC:   [ x]No focal deficits  [ ] Cognitive impairment  [ ] Dysphagia [ ]Dysarthria [ ] Paresis [ ]Other   SKIN:   [x ]Normal  [ ]Rash  [ ]Other  [ ]Pressure ulcer(s) [ ]y [ ]n present on admission    CRITICAL CARE:  [ ]Shock Present  [ ]Septic [ ]Cardiogenic [ ]Neurologic [ ]Hypovolemic  [ ]Vasopressors [ ]Inotropes  [ ]Respiratory failure present [ ]Mechanical Ventilation [ ]Non-invasive ventilatory support [ ]High-Flow   [ ]Acute  [ ]Chronic [ ]Hypoxic  [ ]Hypercarbic [ ]Other  [ ]Other organ failure     LABS:                                                               9.4    11.51 )-----------( 542      ( 02 Apr 2024 07:02 )             29.4   04-02    138  |  102  |  47<H>  ----------------------------<  123<H>  4.7   |  25  |  0.87    Ca    9.5      02 Apr 2024 07:02  Mg     1.8     04-02              RADIOLOGY & ADDITIONAL STUDIES:  < from: [facility] Duplex Upper Ext Vein Scan, Bilat (03.28.24 @ 12:02) >    IMPRESSION:    Bilateral upper extremity venous ultrasound demonstrates acute DVT   involving: right internal jugular vein, right innominate vein, right   subclavianvein, right brachial vein, and left internal jugular vein.    Superficial venous thrombosis involves the right and left cephalic veins.   Left basilic vein is not visualized.    Edema of the superficial soft tissues of the upper extremities, right   greater than left. Correlate clinically. Known right neck/thoracic mass   is better evaluated on recent neck CT.    SACHA Betancur was informed on 3/28/2024 at 10:50PM.    --- End of Report ---            JAME MAYER M.D., ATTENDING RADIOLOGIST  This document has been electronically signed. Mar 28 2024  1:34PM    < end of copied text >  < from: CT Neck Soft Tissue w/ IV Cont (03.27.24 @ 17:10) >  IMPRESSION:    Bulky and conglomerate lymphadenopathy at right level 4 invades internal   jugular vein with thrombus extending up to hyoid level. 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 IJV otherwise patent up to skull base.    New left IJV nonocclusive thrombus. Otherwise, no significant change from   3/14/24.    --- End of Report ---            MARISEL PÉREZ MD; Attending Radiologist  This document has been electronically signed. Mar 27 2024  9:31PM    < end of copied text >    Protein Calorie Malnutrition Present: [ ]mild [ ]moderate [ ]severe [ ]underweight [ ]morbid obesity  https://www.andeal.org/vault/2440/web/files/ONC/Table_Clinical%20Characteristics%20to%20Document%20Malnutrition-White%20JV%20et%20al%202012.pdf    Height (cm): 154.9 (03-26-24 @ 16:20), 154.9 (03-10-24 @ 16:32)  Weight (kg): 49.9 (03-26-24 @ 16:20), 49.9 (03-10-24 @ 16:32)  BMI (kg/m2): 20.8 (03-26-24 @ 16:20), 20.8 (03-10-24 @ 16:32)    [ ]PPSV2 < or = 30%  [ ]significant weight loss [ ]poor nutritional intake [ ]anasarca[ ]Artificial Nutrition    Other REFERRALS:  [ ]Hospice  [ ]Child Life  [ ]Social Work  [ ]Case management [ ]Holistic Therapy     Goals of Care Document:

## 2024-04-02 NOTE — PROGRESS NOTE ADULT - SUBJECTIVE AND OBJECTIVE BOX
DATE OF SERVICE: 04-02-24 @ 18:25    Patient is a 58y old  Female who presents with a chief complaint of Mastitis, breast malignancy (02 Apr 2024 17:12)      INTERVAL HISTORY: In no acute distress.     REVIEW OF SYSTEMS:  CONSTITUTIONAL: No weakness  EYES/ENT: No visual changes;  No throat pain   NECK: No pain or stiffness  RESPIRATORY: No cough, wheezing; No shortness of breath  CARDIOVASCULAR: No chest pain or palpitations  GASTROINTESTINAL: No abdominal  pain. No nausea, vomiting, or hematemesis  GENITOURINARY: No dysuria, frequency or hematuria  NEUROLOGICAL: No stroke like symptoms  SKIN: No rashes    TELEMETRY Personally reviewed: SR 60-70  	  MEDICATIONS:  aMIOdarone    Tablet   Oral   aMIOdarone    Tablet 400 milliGRAM(s) Oral three times a day  metoprolol tartrate 25 milliGRAM(s) Oral two times a day        PHYSICAL EXAM:  T(C): 36.7 (04-02-24 @ 11:34), Max: 36.7 (04-02-24 @ 11:34)  HR: 63 (04-02-24 @ 11:34) (63 - 67)  BP: 113/71 (04-02-24 @ 11:34) (113/71 - 122/80)  RR: 18 (04-02-24 @ 11:34) (18 - 18)  SpO2: 97% (04-02-24 @ 11:34) (96% - 97%)  Wt(kg): --  I&O's Summary    01 Apr 2024 07:01  -  02 Apr 2024 07:00  --------------------------------------------------------  IN: 0 mL / OUT: 450 mL / NET: -450 mL    02 Apr 2024 07:01  -  02 Apr 2024 18:25  --------------------------------------------------------  IN: 420 mL / OUT: 0 mL / NET: 420 mL          Appearance: In no distress	  HEENT:    PERRL, EOMI	  Cardiovascular:  S1 S2, No JVD  Respiratory: Lungs clear to auscultation	  Gastrointestinal:  Soft, Non-tender, + BS	  Vascularature:  No edema of LE  Psychiatric: Appropriate affect   Neuro: no acute focal deficits                               9.4    11.51 )-----------( 542      ( 02 Apr 2024 07:02 )             29.4     04-02    138  |  102  |  47<H>  ----------------------------<  123<H>  4.7   |  25  |  0.87    Ca    9.5      02 Apr 2024 07:02  Mg     1.8     04-02          Labs personally reviewed      ASSESSMENT/PLAN: 	    58F w/Hx of RCC s/p R total nephrectomy, S/p hysterectomy, R-sided glaucoma, Fibromyalgia, Anxiety, GERD, smoker presents due to R breast swelling, redness and pain.     Problem/Plan - 1:   ·  Problem: Pericardial effusion.  ·  Plan: - pericardial effusion on CT chest   - TTE 3/18 with Moderate pericardial effusion with echocardiographic evidence of hemodynamic compromise    - Clinically pt is not in tamponade   - Thoracic surgery consulted for pericardial window given likely malignant effusion  ----s/p pericardial window 3/19  - 3/21 now with ST paroxysmal with AF with RVR and reports of chest pain  limited TTE: left ventricular systolic function appears grossly normal. Thickened pericardium. Bilateral pleural effusion noted. No pericardial effusion seen.  Compared to the TTE 3/18/2024, the pericardial effusion is no longer present.    Problem/Plan - 2:  ·  Problem: Internal jugular vein thrombosis, right.   ·  Plan: - Imaging also shows complete occlusion of the right subclavian vein and nearly occlusive thrombosis in the proximal left brachiocephalic vein with flow reconstitution distally   - c/w SQ lovenox      Problem/Plan - 3:  ·  Problem: Smoker.   ·  Plan: - 1 ppd smoker  - c/w Nicotine patch.    Problem/Plan - 4:  ·  Problem: Acute Hypoxia  ·  Plan: low threshold for IV Lasix PRN     Problem/Plan - 5:  ·  Problem: Sinus Tachycardia  - Likely reactive   - Improved    Problem/Plan - 6:  ·  Problem: New Onset Atrial Fibrillation  - c/w lovenox  - TSH wnl  - s/p PO dig load 3/24  - Cont lopressor 25mg PO BID with hold parameters  - c/w Amio taper as per TIFFANI Parada-NP   Lb Mata, DO [facility]  Cardiovascular Medicine  [address], [address]  Available through call or text on [employer]  Office: [phone]

## 2024-04-02 NOTE — PROGRESS NOTE ADULT - PROBLEM SELECTOR PLAN 1
Improved   - Continue Oxycodone ER to 30mg at 6am, 2pm,   - Continue with Oxycodone 10 mg PO q4h PRN for moderate pain  - Continue with Dilaudid 1 mg IV q3h PRN for severe pain. x 1 dose now   - Reinforced education regarding the availability of PRN meds.   - Narcan PRN  - Bowel regimen while taking opioids Improved   - Continue Oxycodone ER 30mg at 6am, 2pm, and 10pm ,   - Continue with Oxycodone 10 mg PO q4h PRN for moderate pain  - Continue with Dilaudid 1 mg IV q3h PRN for severe pain. x 1 dose now   - Reinforced education regarding the availability of PRN meds.   - Narcan PRN  - Bowel regimen while taking opioids

## 2024-04-02 NOTE — HISTORY OF PRESENT ILLNESS
[de-identified] : 58F w/Hx of RCC s/p R total nephrectomy, S/p hysterectomy, R-sided glaucoma, Fibromyalgia, Anxiety, GERD, 1ppd smoker was seen in the hospital by  and is here to follow up on hoarse voice and right sided  cervical  lymphadenopathy   Patient reportedly had lymph nodes removed in her neck in the past now with lymphoedema in right neck. Patient s/p Right supraclavicular node biopsy 3/15 by IR  3/14/24:  CT Neck with contrast shows Extensive cellulitis/myositis along the right anterior lateral neck and right upper chest wall with inflammatory fat induration the deep soft tissue of the neck. Large supraclavicular/mediastinal mass lesion, presumably conglomerate of lymph nodes with mass effect and complete occlusion of the right internal jugular vein with associated surrounding inflammatory changes in the deep neck, concerning for infectious/inflammatory thrombophlebitis. Clinical/laboratory correlation and serial ultrasound follow-up is recommended. 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 No masslike lesions or abnormal enhancement in aerodigestive mucosa. Airways are patent. Cervical adenopathy. .

## 2024-04-02 NOTE — PROGRESS NOTE ADULT - PROBLEM SELECTOR PLAN 2
-Supplemental O2 as per the primary team.   -s/p Pleurx. Will increase drainage to 500ml qd and holding for SBP < 100  -If symptoms are recurrent and or creating distress, may also add Dilaudid 1mg q 3 PRN for dyspnea

## 2024-04-02 NOTE — PROGRESS NOTE ADULT - PROBLEM SELECTOR PLAN 8
See Eusebio Guerrero's (LCSW) care coordination notes for details about today's meeting where active listening, emotional and psychosocial support were provided.   Will continue to f/u for symptoms and support         Conor Jack MD  Associate Chief Geriatrics and Palliative Care (GaP) [facility]   [place] [facility]   , [employer] at [facility]      Please contact me via Teams from Monday through Friday between 9am-5pm. If not answering, please call the palliative care pager [phone]    After 5pm and on weekends, please see the contact information below:    In the event of newly developing, evolving, or worsening symptoms, please contact the Palliative Medicine team via pager (if the patient is at [facility] #[phone] or if the patient is at [facility] #[phone]) The Geriatric and Palliative Medicine service has coverage 24 hours a day/ 7 days a week to provide medical recommendations regarding symptom management needs via telephone See Eusebio Guerrero's ([facility]) GaP Chart note for details about today's meeting where active listening, emotional and psychosocial support were provided.   Will continue to f/u for symptoms and support         Conor Jack MD  Associate Chief Geriatrics and Palliative Care (GaP) [facility]   [place] [facility]   , [employer] at [facility]      Please contact me via Teams from Monday through Friday between 9am-5pm. If not answering, please call the palliative care pager [phone]    After 5pm and on weekends, please see the contact information below:    In the event of newly developing, evolving, or worsening symptoms, please contact the Palliative Medicine team via pager (if the patient is at [facility] #[phone] or if the patient is at [facility] #[phone]) The Geriatric and Palliative Medicine service has coverage 24 hours a day/ 7 days a week to provide medical recommendations regarding symptom management needs via telephone

## 2024-04-03 ENCOUNTER — INPATIENT (INPATIENT)
Facility: HOSPITAL | Age: 59
LOS: 57 days | Discharge: OTHER INSTITUTION | End: 2024-05-31
Attending: INTERNAL MEDICINE | Admitting: INTERNAL MEDICINE
Payer: MEDICARE

## 2024-04-03 ENCOUNTER — TRANSCRIPTION ENCOUNTER (OUTPATIENT)
Age: 59
End: 2024-04-03

## 2024-04-03 VITALS
SYSTOLIC BLOOD PRESSURE: 129 MMHG | OXYGEN SATURATION: 99 % | TEMPERATURE: 98 F | DIASTOLIC BLOOD PRESSURE: 79 MMHG | RESPIRATION RATE: 18 BRPM | HEART RATE: 62 BPM

## 2024-04-03 VITALS
SYSTOLIC BLOOD PRESSURE: 111 MMHG | HEART RATE: 66 BPM | TEMPERATURE: 99 F | RESPIRATION RATE: 18 BRPM | DIASTOLIC BLOOD PRESSURE: 63 MMHG | OXYGEN SATURATION: 98 %

## 2024-04-03 DIAGNOSIS — C80.1 MALIGNANT (PRIMARY) NEOPLASM, UNSPECIFIED: ICD-10-CM

## 2024-04-03 LAB
ANION GAP SERPL CALC-SCNC: 10 MMOL/L — SIGNIFICANT CHANGE UP (ref 5–17)
BUN SERPL-MCNC: 43 MG/DL — HIGH (ref 7–23)
CALCIUM SERPL-MCNC: 9.4 MG/DL — SIGNIFICANT CHANGE UP (ref 8.4–10.5)
CHLORIDE SERPL-SCNC: 103 MMOL/L — SIGNIFICANT CHANGE UP (ref 96–108)
CO2 SERPL-SCNC: 25 MMOL/L — SIGNIFICANT CHANGE UP (ref 22–31)
CREAT SERPL-MCNC: 0.81 MG/DL — SIGNIFICANT CHANGE UP (ref 0.5–1.3)
EGFR: 83 ML/MIN/1.73M2 — SIGNIFICANT CHANGE UP
GLUCOSE SERPL-MCNC: 130 MG/DL — HIGH (ref 70–99)
HCT VFR BLD CALC: 32.2 % — LOW (ref 34.5–45)
HGB BLD-MCNC: 10.2 G/DL — LOW (ref 11.5–15.5)
MAGNESIUM SERPL-MCNC: 2.1 MG/DL — SIGNIFICANT CHANGE UP (ref 1.6–2.6)
MCHC RBC-ENTMCNC: 29.5 PG — SIGNIFICANT CHANGE UP (ref 27–34)
MCHC RBC-ENTMCNC: 31.7 GM/DL — LOW (ref 32–36)
MCV RBC AUTO: 93.1 FL — SIGNIFICANT CHANGE UP (ref 80–100)
NRBC # BLD: 0 /100 WBCS — SIGNIFICANT CHANGE UP (ref 0–0)
PLATELET # BLD AUTO: 579 K/UL — HIGH (ref 150–400)
POTASSIUM SERPL-MCNC: 4.9 MMOL/L — SIGNIFICANT CHANGE UP (ref 3.5–5.3)
POTASSIUM SERPL-SCNC: 4.9 MMOL/L — SIGNIFICANT CHANGE UP (ref 3.5–5.3)
RBC # BLD: 3.46 M/UL — LOW (ref 3.8–5.2)
RBC # FLD: 14.2 % — SIGNIFICANT CHANGE UP (ref 10.3–14.5)
SODIUM SERPL-SCNC: 138 MMOL/L — SIGNIFICANT CHANGE UP (ref 135–145)
WBC # BLD: 12.77 K/UL — HIGH (ref 3.8–10.5)
WBC # FLD AUTO: 12.77 K/UL — HIGH (ref 3.8–10.5)

## 2024-04-03 PROCEDURE — 94640 AIRWAY INHALATION TREATMENT: CPT

## 2024-04-03 PROCEDURE — 81001 URINALYSIS AUTO W/SCOPE: CPT

## 2024-04-03 PROCEDURE — 86850 RBC ANTIBODY SCREEN: CPT

## 2024-04-03 PROCEDURE — 85014 HEMATOCRIT: CPT

## 2024-04-03 PROCEDURE — 85610 PROTHROMBIN TIME: CPT

## 2024-04-03 PROCEDURE — 88112 CYTOPATH CELL ENHANCE TECH: CPT

## 2024-04-03 PROCEDURE — 88341 IMHCHEM/IMCYTCHM EA ADD ANTB: CPT

## 2024-04-03 PROCEDURE — 87641 MR-STAPH DNA AMP PROBE: CPT

## 2024-04-03 PROCEDURE — 80048 BASIC METABOLIC PNL TOTAL CA: CPT

## 2024-04-03 PROCEDURE — 82803 BLOOD GASES ANY COMBINATION: CPT

## 2024-04-03 PROCEDURE — 93970 EXTREMITY STUDY: CPT

## 2024-04-03 PROCEDURE — 84132 ASSAY OF SERUM POTASSIUM: CPT

## 2024-04-03 PROCEDURE — 84133 ASSAY OF URINE POTASSIUM: CPT

## 2024-04-03 PROCEDURE — 83615 LACTATE (LD) (LDH) ENZYME: CPT

## 2024-04-03 PROCEDURE — 84550 ASSAY OF BLOOD/URIC ACID: CPT

## 2024-04-03 PROCEDURE — 38505 NEEDLE BIOPSY LYMPH NODES: CPT

## 2024-04-03 PROCEDURE — 82947 ASSAY GLUCOSE BLOOD QUANT: CPT

## 2024-04-03 PROCEDURE — 85018 HEMOGLOBIN: CPT

## 2024-04-03 PROCEDURE — 76642 ULTRASOUND BREAST LIMITED: CPT

## 2024-04-03 PROCEDURE — 84295 ASSAY OF SERUM SODIUM: CPT

## 2024-04-03 PROCEDURE — C1729: CPT

## 2024-04-03 PROCEDURE — 86140 C-REACTIVE PROTEIN: CPT

## 2024-04-03 PROCEDURE — 93306 TTE W/DOPPLER COMPLETE: CPT

## 2024-04-03 PROCEDURE — 80202 ASSAY OF VANCOMYCIN: CPT

## 2024-04-03 PROCEDURE — 85027 COMPLETE CBC AUTOMATED: CPT

## 2024-04-03 PROCEDURE — 88304 TISSUE EXAM BY PATHOLOGIST: CPT

## 2024-04-03 PROCEDURE — 83880 ASSAY OF NATRIURETIC PEPTIDE: CPT

## 2024-04-03 PROCEDURE — 82378 CARCINOEMBRYONIC ANTIGEN: CPT

## 2024-04-03 PROCEDURE — 36600 WITHDRAWAL OF ARTERIAL BLOOD: CPT

## 2024-04-03 PROCEDURE — 84300 ASSAY OF URINE SODIUM: CPT

## 2024-04-03 PROCEDURE — 76942 ECHO GUIDE FOR BIOPSY: CPT

## 2024-04-03 PROCEDURE — 86923 COMPATIBILITY TEST ELECTRIC: CPT

## 2024-04-03 PROCEDURE — 86300 IMMUNOASSAY TUMOR CA 15-3: CPT

## 2024-04-03 PROCEDURE — 85025 COMPLETE CBC W/AUTO DIFF WBC: CPT

## 2024-04-03 PROCEDURE — 87040 BLOOD CULTURE FOR BACTERIA: CPT

## 2024-04-03 PROCEDURE — 84100 ASSAY OF PHOSPHORUS: CPT

## 2024-04-03 PROCEDURE — 71260 CT THORAX DX C+: CPT | Mod: MC

## 2024-04-03 PROCEDURE — 71270 CT THORAX DX C-/C+: CPT | Mod: MC

## 2024-04-03 PROCEDURE — 36415 COLL VENOUS BLD VENIPUNCTURE: CPT

## 2024-04-03 PROCEDURE — 88360 TUMOR IMMUNOHISTOCHEM/MANUAL: CPT

## 2024-04-03 PROCEDURE — 87640 STAPH A DNA AMP PROBE: CPT

## 2024-04-03 PROCEDURE — 99231 SBSQ HOSP IP/OBS SF/LOW 25: CPT

## 2024-04-03 PROCEDURE — 86901 BLOOD TYPING SEROLOGIC RH(D): CPT

## 2024-04-03 PROCEDURE — 83735 ASSAY OF MAGNESIUM: CPT

## 2024-04-03 PROCEDURE — 88342 IMHCHEM/IMCYTCHM 1ST ANTB: CPT

## 2024-04-03 PROCEDURE — 82962 GLUCOSE BLOOD TEST: CPT

## 2024-04-03 PROCEDURE — 84443 ASSAY THYROID STIM HORMONE: CPT

## 2024-04-03 PROCEDURE — 70491 CT SOFT TISSUE NECK W/DYE: CPT | Mod: MC

## 2024-04-03 PROCEDURE — 82553 CREATINE MB FRACTION: CPT

## 2024-04-03 PROCEDURE — C9399: CPT

## 2024-04-03 PROCEDURE — 82436 ASSAY OF URINE CHLORIDE: CPT

## 2024-04-03 PROCEDURE — C1889: CPT

## 2024-04-03 PROCEDURE — 99232 SBSQ HOSP IP/OBS MODERATE 35: CPT

## 2024-04-03 PROCEDURE — 93308 TTE F-UP OR LMTD: CPT

## 2024-04-03 PROCEDURE — 82550 ASSAY OF CK (CPK): CPT

## 2024-04-03 PROCEDURE — 99233 SBSQ HOSP IP/OBS HIGH 50: CPT | Mod: GC

## 2024-04-03 PROCEDURE — 76705 ECHO EXAM OF ABDOMEN: CPT

## 2024-04-03 PROCEDURE — 80053 COMPREHEN METABOLIC PANEL: CPT

## 2024-04-03 PROCEDURE — 74177 CT ABD & PELVIS W/CONTRAST: CPT | Mod: MC

## 2024-04-03 PROCEDURE — 83605 ASSAY OF LACTIC ACID: CPT

## 2024-04-03 PROCEDURE — 88305 TISSUE EXAM BY PATHOLOGIST: CPT

## 2024-04-03 PROCEDURE — 85730 THROMBOPLASTIN TIME PARTIAL: CPT

## 2024-04-03 PROCEDURE — 82533 TOTAL CORTISOL: CPT

## 2024-04-03 PROCEDURE — 82570 ASSAY OF URINE CREATININE: CPT

## 2024-04-03 PROCEDURE — 96365 THER/PROPH/DIAG IV INF INIT: CPT

## 2024-04-03 PROCEDURE — 83036 HEMOGLOBIN GLYCOSYLATED A1C: CPT

## 2024-04-03 PROCEDURE — 83935 ASSAY OF URINE OSMOLALITY: CPT

## 2024-04-03 PROCEDURE — 86803 HEPATITIS C AB TEST: CPT

## 2024-04-03 PROCEDURE — 93321 DOPPLER ECHO F-UP/LMTD STD: CPT

## 2024-04-03 PROCEDURE — 85652 RBC SED RATE AUTOMATED: CPT

## 2024-04-03 PROCEDURE — 82330 ASSAY OF CALCIUM: CPT

## 2024-04-03 PROCEDURE — 71250 CT THORAX DX C-: CPT | Mod: MC

## 2024-04-03 PROCEDURE — 86301 IMMUNOASSAY TUMOR CA 19-9: CPT

## 2024-04-03 PROCEDURE — 84156 ASSAY OF PROTEIN URINE: CPT

## 2024-04-03 PROCEDURE — 93005 ELECTROCARDIOGRAM TRACING: CPT

## 2024-04-03 PROCEDURE — 82435 ASSAY OF BLOOD CHLORIDE: CPT

## 2024-04-03 PROCEDURE — 88333 PATH CONSLTJ SURG CYTO XM 1: CPT

## 2024-04-03 PROCEDURE — 84540 ASSAY OF URINE/UREA-N: CPT

## 2024-04-03 PROCEDURE — 71045 X-RAY EXAM CHEST 1 VIEW: CPT

## 2024-04-03 PROCEDURE — 86900 BLOOD TYPING SEROLOGIC ABO: CPT

## 2024-04-03 PROCEDURE — 80061 LIPID PANEL: CPT

## 2024-04-03 PROCEDURE — 87086 URINE CULTURE/COLONY COUNT: CPT

## 2024-04-03 PROCEDURE — 86480 TB TEST CELL IMMUN MEASURE: CPT

## 2024-04-03 PROCEDURE — 83930 ASSAY OF BLOOD OSMOLALITY: CPT

## 2024-04-03 PROCEDURE — 99233 SBSQ HOSP IP/OBS HIGH 50: CPT

## 2024-04-03 PROCEDURE — 99285 EMERGENCY DEPT VISIT HI MDM: CPT | Mod: 25

## 2024-04-03 PROCEDURE — 84484 ASSAY OF TROPONIN QUANT: CPT

## 2024-04-03 RX ORDER — NALOXEGOL OXALATE 12.5 MG/1
1 TABLET, FILM COATED ORAL
Qty: 0 | Refills: 0 | DISCHARGE
Start: 2024-04-03

## 2024-04-03 RX ORDER — HYDROMORPHONE HYDROCHLORIDE 2 MG/ML
0.5 INJECTION INTRAMUSCULAR; INTRAVENOUS; SUBCUTANEOUS
Qty: 1 | Refills: 0
Start: 2024-04-03

## 2024-04-03 RX ORDER — ENOXAPARIN SODIUM 100 MG/ML
50 INJECTION SUBCUTANEOUS
Qty: 1 | Refills: 0
Start: 2024-04-03

## 2024-04-03 RX ORDER — DIAZEPAM 5 MG
1 TABLET ORAL
Qty: 60 | Refills: 0
Start: 2024-04-03 | End: 2024-05-02

## 2024-04-03 RX ORDER — OXYCODONE HYDROCHLORIDE 5 MG/1
1 TABLET ORAL
Qty: 240 | Refills: 0
Start: 2024-04-03 | End: 2024-05-02

## 2024-04-03 RX ORDER — OXYCODONE HYDROCHLORIDE 5 MG/1
1 TABLET ORAL
Qty: 90 | Refills: 0
Start: 2024-04-03 | End: 2024-05-02

## 2024-04-03 RX ORDER — LACTOBACILLUS ACIDOPHILUS 100MM CELL
1 CAPSULE ORAL
Qty: 0 | Refills: 0 | DISCHARGE
Start: 2024-04-03

## 2024-04-03 RX ORDER — SALIVA SUBSTITUTE COMB NO.11 351 MG
1 POWDER IN PACKET (EA) MUCOUS MEMBRANE
Qty: 0 | Refills: 0 | DISCHARGE
Start: 2024-04-03

## 2024-04-03 RX ORDER — DEXAMETHASONE 0.5 MG/5ML
4 ELIXIR ORAL
Qty: 1 | Refills: 0
Start: 2024-04-03

## 2024-04-03 RX ADMIN — ENOXAPARIN SODIUM 50 MILLIGRAM(S): 100 INJECTION SUBCUTANEOUS at 18:28

## 2024-04-03 RX ADMIN — ENOXAPARIN SODIUM 50 MILLIGRAM(S): 100 INJECTION SUBCUTANEOUS at 06:08

## 2024-04-03 RX ADMIN — POLYETHYLENE GLYCOL 3350 17 GRAM(S): 17 POWDER, FOR SOLUTION ORAL at 06:09

## 2024-04-03 RX ADMIN — Medication 5 MILLIGRAM(S): at 18:27

## 2024-04-03 RX ADMIN — OXYCODONE HYDROCHLORIDE 30 MILLIGRAM(S): 5 TABLET ORAL at 15:03

## 2024-04-03 RX ADMIN — Medication 4 MILLIGRAM(S): at 02:42

## 2024-04-03 RX ADMIN — OXYCODONE HYDROCHLORIDE 30 MILLIGRAM(S): 5 TABLET ORAL at 06:07

## 2024-04-03 RX ADMIN — Medication 4 MILLIGRAM(S): at 12:13

## 2024-04-03 RX ADMIN — PANTOPRAZOLE SODIUM 40 MILLIGRAM(S): 20 TABLET, DELAYED RELEASE ORAL at 06:10

## 2024-04-03 RX ADMIN — Medication 15 MILLILITER(S): at 06:07

## 2024-04-03 RX ADMIN — Medication 4 MILLIGRAM(S): at 21:48

## 2024-04-03 RX ADMIN — CHLORHEXIDINE GLUCONATE 1 APPLICATION(S): 213 SOLUTION TOPICAL at 06:08

## 2024-04-03 RX ADMIN — Medication 1 PATCH: at 07:30

## 2024-04-03 RX ADMIN — Medication 1 PATCH: at 12:11

## 2024-04-03 RX ADMIN — AMIODARONE HYDROCHLORIDE 400 MILLIGRAM(S): 400 TABLET ORAL at 15:03

## 2024-04-03 RX ADMIN — Medication 1 TABLET(S): at 12:13

## 2024-04-03 RX ADMIN — AMIODARONE HYDROCHLORIDE 400 MILLIGRAM(S): 400 TABLET ORAL at 06:09

## 2024-04-03 RX ADMIN — HYDROMORPHONE HYDROCHLORIDE 1 MILLIGRAM(S): 2 INJECTION INTRAMUSCULAR; INTRAVENOUS; SUBCUTANEOUS at 12:04

## 2024-04-03 RX ADMIN — OXYCODONE HYDROCHLORIDE 30 MILLIGRAM(S): 5 TABLET ORAL at 06:45

## 2024-04-03 RX ADMIN — Medication 25 MILLIGRAM(S): at 18:28

## 2024-04-03 RX ADMIN — Medication 15 MILLILITER(S): at 21:58

## 2024-04-03 RX ADMIN — AMIODARONE HYDROCHLORIDE 400 MILLIGRAM(S): 400 TABLET ORAL at 21:49

## 2024-04-03 RX ADMIN — OXYCODONE HYDROCHLORIDE 30 MILLIGRAM(S): 5 TABLET ORAL at 21:54

## 2024-04-03 RX ADMIN — Medication 1 PATCH: at 20:50

## 2024-04-03 RX ADMIN — Medication 30 MILLILITER(S): at 21:58

## 2024-04-03 RX ADMIN — OXYCODONE HYDROCHLORIDE 30 MILLIGRAM(S): 5 TABLET ORAL at 15:45

## 2024-04-03 RX ADMIN — Medication 1 PATCH: at 11:30

## 2024-04-03 RX ADMIN — OXYCODONE HYDROCHLORIDE 30 MILLIGRAM(S): 5 TABLET ORAL at 22:20

## 2024-04-03 NOTE — PROGRESS NOTE ADULT - PROBLEM SELECTOR PLAN 2
IR was consulted for possible stenting; not feasible due to size/location,   - Remains tier 2 transfer to [facility] for urgent radiation. Called and discussed with transfer center today, currently no bed again today, unsure of timetable as to when bed will be available  - c/w decadron  - IR, rad onc, oncology, cardiology, thoracic all agree on transfer  - NM aware IR was consulted for possible stenting; not feasible due to size/location,   - Remains tier 2 transfer to [facility] for urgent radiation. Discussed with transfer center on 4/2 and 4/3, currently no bed again today, unsure of timetable as to when bed will be available. I am concerned her medical status could worsen given delay in radiation therapy from lack of bed availability at [facility]. This concern has been relayed to transfer center and bed management   - c/w decadron  - IR, rad onc, oncology, cardiology, thoracic all agree on transfer  - NM aware

## 2024-04-03 NOTE — PROGRESS NOTE ADULT - TIME BILLING
- Ordering, reviewing, and interpreting labs, testing, and imaging.  - Independently obtaining a review of systems and performing a physical exam  - Reviewing consultant documentation/recommendations.  - Counselling and educating patient and family regarding interpretation of aforementioned items and plan of care.    30 minutes of 55 minutes spent at bedside in conversation with patient

## 2024-04-03 NOTE — PROGRESS NOTE ADULT - PROBLEM SELECTOR PLAN 1
H/o RCC and had nephrectomy by Urology by Dr Jacky Adkins at [facility], also had lymph node dissection 2 years ago. Found to have axillary, subclavicular and mediastinal lymphadenopathy, pulmonary nodules and inflammatory changes of breast seen on CT with possible effect on IVC  - CT neck with large supraclavicular/mediastinal mass lesion with mass effect and complete occlusion of the right IJ and SVC  - S/p supraclavicular lymph node biopsy by IR on 3/14- path consistent with primary of GI origin  - c/w decadron  - Prior hospitalist Dr. Moose Nolen D/w GI follow up MRI abdomen and MRCP- currently deferred due to worsening svc syndrome warranting urgent radiation  - Palliative follow up for pain control  - seen by behavioral health see for anxiety management. Recommended sertraline 25mg, ordered but pt hesitant to take H/o RCC and had nephrectomy by Urology by Dr Jacky Adkins at [facility], also had lymph node dissection 2 years ago. Found to have axillary, subclavicular and mediastinal lymphadenopathy, pulmonary nodules and inflammatory changes of breast seen on CT with possible effect on IVC  - CT neck with large supraclavicular/mediastinal mass lesion with mass effect and complete occlusion of the right IJ and SVC  - S/p supraclavicular lymph node biopsy by IR on 3/14- path consistent with primary of GI origin  - c/w decadron  - Prior hospitalist Dr. Moose Nolen D/w GI follow up MRI abdomen and MRCP- currently deferred due to respiratory status and svc syndrome warranting urgent radiation  - Palliative follow up for pain control  - seen by behavioral health see for anxiety management. Recommended sertraline 25mg, ordered but pt hesitant to take

## 2024-04-03 NOTE — PROGRESS NOTE ADULT - REASON FOR ADMISSION
Mastitis, breast malignancy

## 2024-04-03 NOTE — DISCHARGE NOTE NURSING/CASE MANAGEMENT/SOCIAL WORK - NSDCPNDISPN_GEN_ALL_CORE
Refilled per Rheum RN refill protocol     Education provided on the pain management plan of care/Side effects of pain management treatment

## 2024-04-03 NOTE — PROGRESS NOTE ADULT - PROBLEM SELECTOR PLAN 9
- 1 ppd smoker  - c/w Nicotine patch
- Home Oxycodone (Confirmed via iStop)  - Per pallative:  Oxycodone ER to 30mg at 6am, 14:00, 22:00   - Continue with Oxycodone 10 mg PO q4h PRN for moderate pain  - Continue with Dilaudid 1 mg IV q3h PRN for severe pain
Eventual dispo is home when medically optimized
- 1 ppd smoker  - c/w Nicotine patch
Eventual dispo is home when medically optimized
- 1 ppd smoker  - c/w Nicotine patch
- Home Oxycodone (Confirmed via iStop)  - Per pallative:  Oxycodone ER to 30mg at 6am, 14:00, 22:00   - Continue with Oxycodone 10 mg PO q4h PRN for moderate pain  - Continue with Dilaudid 1 mg IV q3h PRN for severe pain
- 1 ppd smoker  - c/w Nicotine patch
dispo- home
- 1 ppd smoker  - c/w Nicotine patch

## 2024-04-03 NOTE — PROGRESS NOTE ADULT - PROBLEM SELECTOR PROBLEM 10
Prophylactic measure
Anxiety
Prophylactic measure
Anxiety
Prophylactic measure
Prophylactic measure

## 2024-04-03 NOTE — PROGRESS NOTE ADULT - PROBLEM SELECTOR PROBLEM 1
Cancer related pain
Hyponatremia
Pericardial effusion
Cancer related pain
Hyponatremia
Hyponatremia
Pericardial effusion
Breast mass
Cancer related pain
Cancer related pain
Breast mass
Pericardial effusion
Pericardial effusion
Hyponatremia
Mediastinal mass
Neck swelling
Pericardial effusion
Breast mass
Mediastinal mass
Mediastinal mass
Pericardial effusion
Mediastinal mass
Cancer related pain
Carcinoma of pancreas
Cancer related pain
Cancer related pain
Mediastinal mass

## 2024-04-03 NOTE — CHART NOTE - NSCHARTNOTESELECT_GEN_ALL_CORE
Event Note
Event Note
IR
IV line/Event Note
Nephrology/Off Service Note
Palliative Care/Event Note
Palliative Medicine/Event Note
Rapid Afib w/ RVR/Event Note
rapid afib vs SVT/Event Note
Anticoagulation changed to Lovenox./Event Note
Event Note
Event Note
GAP 
GAP 
Nutrition Services
Overnight Event Note/Event Note
Rad Onc/Event Note

## 2024-04-03 NOTE — PROGRESS NOTE ADULT - SUBJECTIVE AND OBJECTIVE BOX
DATE OF SERVICE: 04-03-24 @ 16:49    Patient is a 58y old  Female who presents with a chief complaint of Mastitis, breast malignancy (03 Apr 2024 16:38)      INTERVAL HISTORY: Feels well, in no distress    REVIEW OF SYSTEMS:  CONSTITUTIONAL: No weakness  EYES/ENT: No visual changes;  No throat pain   NECK: No pain or stiffness  RESPIRATORY: No cough, wheezing; No shortness of breath  CARDIOVASCULAR: No chest pain or palpitations  GASTROINTESTINAL: No abdominal  pain. No nausea, vomiting, or hematemesis  GENITOURINARY: No dysuria, frequency or hematuria  NEUROLOGICAL: No stroke like symptoms  SKIN: No rashes    TELEMETRY Personally reviewed: SB/SR 55-80  	  MEDICATIONS:  aMIOdarone    Tablet 400 milliGRAM(s) Oral three times a day  aMIOdarone    Tablet   Oral   metoprolol tartrate 25 milliGRAM(s) Oral two times a day        PHYSICAL EXAM:  T(C): 36.5 (04-03-24 @ 12:07), Max: 36.8 (04-03-24 @ 04:00)  HR: 65 (04-03-24 @ 12:07) (57 - 68)  BP: 126/80 (04-03-24 @ 12:07) (108/66 - 126/80)  RR: 18 (04-03-24 @ 12:07) (18 - 18)  SpO2: 98% (04-03-24 @ 12:07) (98% - 99%)  Wt(kg): --  I&O's Summary    02 Apr 2024 07:01  -  03 Apr 2024 07:00  --------------------------------------------------------  IN: 900 mL / OUT: 250 mL / NET: 650 mL    03 Apr 2024 07:01  -  03 Apr 2024 16:49  --------------------------------------------------------  IN: 100 mL / OUT: 0 mL / NET: 100 mL          Appearance: In no distress	  HEENT:    PERRL, EOMI	  Cardiovascular:  S1 S2, No JVD  Respiratory: Lungs clear to auscultation	  Gastrointestinal:  Soft, Non-tender, + BS	  Vascularature:  No edema of LE  Psychiatric: Appropriate affect   Neuro: no acute focal deficits                               10.2   12.77 )-----------( 579      ( 03 Apr 2024 07:25 )             32.2     04-03    138  |  103  |  43<H>  ----------------------------<  130<H>  4.9   |  25  |  0.81    Ca    9.4      03 Apr 2024 07:23  Mg     2.1     04-03          Labs personally reviewed      ASSESSMENT/PLAN: 	  58F w/Hx of RCC s/p R total nephrectomy, S/p hysterectomy, R-sided glaucoma, Fibromyalgia, Anxiety, GERD, smoker presents due to R breast swelling, redness and pain.     Problem/Plan - 1:   ·  Problem: Pericardial effusion.  ·  Plan: - pericardial effusion on CT chest   - TTE 3/18 with Moderate pericardial effusion with echocardiographic evidence of hemodynamic compromise    - Clinically pt is not in tamponade   - Thoracic surgery consulted for pericardial window given likely malignant effusion  ----s/p pericardial window 3/19  - 3/21 now with ST paroxysmal with AF with RVR and reports of chest pain  limited TTE: left ventricular systolic function appears grossly normal. Thickened pericardium. Bilateral pleural effusion noted. No pericardial effusion seen.  Compared to the TTE 3/18/2024, the pericardial effusion is no longer present.    Problem/Plan - 2:  ·  Problem: Internal jugular vein thrombosis, right.   ·  Plan: - Imaging also shows complete occlusion of the right subclavian vein and nearly occlusive thrombosis in the proximal left brachiocephalic vein with flow reconstitution distally   - c/w SQ lovenox      Problem/Plan - 3:  ·  Problem: Smoker.   ·  Plan: - 1 ppd smoker  - c/w Nicotine patch.    Problem/Plan - 4:  ·  Problem: Acute Hypoxia  ·  Plan: low threshold for IV Lasix PRN     Problem/Plan - 5:  ·  Problem: Sinus Tachycardia  - Likely reactive   - Improved    Problem/Plan - 6:  ·  Problem: New Onset Atrial Fibrillation  - c/w lovenox  - TSH wnl  - s/p PO dig load 3/24  - Cont lopressor 25mg PO BID with hold parameters  - c/w Amio taper as per EP          YOHANNES Candelaria-C  bL Mata DO [facility]  Cardiovascular Medicine  [address] [place] [address], [address]  Available via call or text on [employer] TEAMs  Office: [phone]   DATE OF SERVICE: 04-03-24 @ 16:49    Patient is a 58y old  Female who presents with a chief complaint of Mastitis, breast malignancy (03 Apr 2024 16:38)      INTERVAL HISTORY: Feels well, in no distress    REVIEW OF SYSTEMS:  CONSTITUTIONAL: No weakness  EYES/ENT: No visual changes;  No throat pain   NECK: No pain or stiffness  RESPIRATORY: No cough, wheezing; No shortness of breath  CARDIOVASCULAR: No chest pain or palpitations  GASTROINTESTINAL: No abdominal  pain. No nausea, vomiting, or hematemesis  GENITOURINARY: No dysuria, frequency or hematuria  NEUROLOGICAL: No stroke like symptoms  SKIN: No rashes    TELEMETRY Personally reviewed: SB/SR 55-80  	  MEDICATIONS:  aMIOdarone    Tablet 400 milliGRAM(s) Oral three times a day  aMIOdarone    Tablet   Oral   metoprolol tartrate 25 milliGRAM(s) Oral two times a day        PHYSICAL EXAM:  T(C): 36.5 (04-03-24 @ 12:07), Max: 36.8 (04-03-24 @ 04:00)  HR: 65 (04-03-24 @ 12:07) (57 - 68)  BP: 126/80 (04-03-24 @ 12:07) (108/66 - 126/80)  RR: 18 (04-03-24 @ 12:07) (18 - 18)  SpO2: 98% (04-03-24 @ 12:07) (98% - 99%)  Wt(kg): --  I&O's Summary    02 Apr 2024 07:01  -  03 Apr 2024 07:00  --------------------------------------------------------  IN: 900 mL / OUT: 250 mL / NET: 650 mL    03 Apr 2024 07:01  -  03 Apr 2024 16:49  --------------------------------------------------------  IN: 100 mL / OUT: 0 mL / NET: 100 mL          Appearance: In no distress	  HEENT:    PERRL, EOMI	  Cardiovascular:  S1 S2, No JVD  Respiratory: Lungs clear to auscultation	  Gastrointestinal:  Soft, Non-tender, + BS	  Vascularature:  No edema of LE  Psychiatric: Appropriate affect   Neuro: no acute focal deficits                               10.2   12.77 )-----------( 579      ( 03 Apr 2024 07:25 )             32.2     04-03    138  |  103  |  43<H>  ----------------------------<  130<H>  4.9   |  25  |  0.81    Ca    9.4      03 Apr 2024 07:23  Mg     2.1     04-03          Labs personally reviewed      ASSESSMENT/PLAN: 	  58F w/Hx of RCC s/p R total nephrectomy, S/p hysterectomy, R-sided glaucoma, Fibromyalgia, Anxiety, GERD, smoker presents due to R breast swelling, redness and pain.     Problem/Plan - 1:   ·  Problem: Pericardial effusion.  ·  Plan: - pericardial effusion on CT chest   - TTE 3/18 with Moderate pericardial effusion with echocardiographic evidence of hemodynamic compromise    - Clinically pt is not in tamponade   - Thoracic surgery consulted for pericardial window given likely malignant effusion  ----s/p pericardial window 3/19  - 3/21 now with ST paroxysmal with AF with RVR and reports of chest pain  limited TTE: left ventricular systolic function appears grossly normal. Thickened pericardium. Bilateral pleural effusion noted. No pericardial effusion seen.  Compared to the TTE 3/18/2024, the pericardial effusion is no longer present.    Problem/Plan - 2:  ·  Problem: Internal jugular vein thrombosis, right.   ·  Plan: - Imaging also shows complete occlusion of the right subclavian vein and nearly occlusive thrombosis in the proximal left brachiocephalic vein with flow reconstitution distally   - c/w SQ lovenox    Problem/Plan - 3:  ·  Problem: Smoker.   ·  Plan: - 1 ppd smoker  - c/w Nicotine patch.    Problem/Plan - 4:  ·  Problem: Acute Hypoxia  ·  Plan: low threshold for IV Lasix PRN     Problem/Plan - 5:  ·  Problem: Sinus Tachycardia  - Likely reactive   - Improved    Problem/Plan - 6:  ·  Problem: New Onset Atrial Fibrillation  - c/w lovenox  - TSH wnl  - s/p PO dig load 3/24  - Cont lopressor 25mg PO BID with hold parameters  - c/w Amio taper as per EP          YOHANNES Candelaria-C  Lb Mata DO [facility]  Cardiovascular Medicine  [address] [place] [address], [address]  Available via call or text on [employer] TEAMs  Office: [phone]

## 2024-04-03 NOTE — PROGRESS NOTE ADULT - PROBLEM SELECTOR PLAN 10
DVT ppx- lovenox  plan d/w and agreed on by pt, pt's HCP and boyfriend Saeid, naseem acp  code: DNR/DNI- updated molst in chart      The necessity of the time spent during the encounter on this date of service was due to:   - Ordering, reviewing, and interpreting labs, testing, and imaging.  - Independently obtaining a review of systems and performing a physical exam  - Reviewing prior hospitalization and where necessary, outpatient records.  - Counselling and educating patient and family regarding interpretation of aforementioned items and plan of care.    Time-based billing (NON-critical care). Total minutes spent: 57
Eventual dispo is home when medically optimized
DVT ppx- lovenox  plan d/w and agreed on by pt, pt's HCP and boyfriend Saeid, naseem acp  code: DNR/DNI- updated molst in chart      The necessity of the time spent during the encounter on this date of service was due to:   - Ordering, reviewing, and interpreting labs, testing, and imaging.  - Independently obtaining a review of systems and performing a physical exam  - Reviewing prior hospitalization and where necessary, outpatient records.  - Counselling and educating patient and family regarding interpretation of aforementioned items and plan of care.    Time-based billing (NON-critical care). Total minutes spent: 70
- c/w Home Valium (Confirmed via iStop)  - pt is intermittently tearful, anxious and frustrated about current clinical status and poor prognosis  - Seen by Behavioural Health. Started Sertraline 25mg daily
DVT ppx- lovenox  code: DNR/DNI
DVT ppx- heparin gtt    plan d/w and agreed on by pt, floor acp
DVT ppx- heparin gtt    plan d/w and agreed on by pt, pt's HCP and boyfriend Saeid, floor acp  code: rescinded DNR/DNI for procedures 3/26, subsequently opting to remain FULL CODE. Would continue ongoing GOC given poor prognosis
DVT ppx- heparin gtt    D/w  3/22 updated on full plan of care
DVT ppx- heparin gtt    plan d/w and agreed on by pt, floor acp
DVT ppx- heparin gtt    plan d/w and agreed on by pt, floor acp
DVT ppx- lovenox  plan d/w and agreed on by pt, pt's HCP and boyfriend Saeid, naseem acp  code: DNR/DNI- updated molst in chart      The necessity of the time spent during the encounter on this date of service was due to:   - Ordering, reviewing, and interpreting labs, testing, and imaging.  - Independently obtaining a review of systems and performing a physical exam  - Reviewing prior hospitalization and where necessary, outpatient records.  - Counselling and educating patient and family regarding interpretation of aforementioned items and plan of care.    Time-based billing (NON-critical care). Total minutes spent: 57
DVT ppx- lovenox  plan d/w and agreed on by pt, pt's HCP and gilmarfriencarlton Breaux, floor acp  code: DNR/DNI- updated molst in chart
DVT ppx- heparin gtt    plan d/w and agreed on by pt, floor acp
- c/w Home Valium (Confirmed via iStop)  - pt is intermittently tearful, anxious and frustrated about current clinical status and poor prognosis  - Seen by Behavioural Health. Started Sertraline 25mg daily

## 2024-04-03 NOTE — PROGRESS NOTE ADULT - ASSESSMENT
57 y/o woman presenting to hospital with right breast/chest wall swelling and cellulitis with imaging evidence concerning for metastatic malignancy w/ lung nodules, and axillary, supraclavicular, and mediastinal adenopathy.  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 Course complicated with SVC syndrome, started on steroids and pending transfer to [facility] for urgent RT.        #Metastatic Disease suspecting of GI origin:  -Path from 3/14/24 IR biopsy of R Supraclavicular LN showing neoplastic cells positive for CK7 and CDX2 immunostains, while negative for CK20, TTF1, GATA3, TRPS1 and PAX8. The immunoprofile is in favor of carcinoma of upper GI or pancreaticobiliary origin.  -S/p pericardiocentesis and drain on 3/19 for malignant pericardial effusion, f/u cytology from procedure.   -CT Neck c/f SVC syndrome w/ thrombus that patient is on full dose anticoagulation for.   -Given CT A/P findings of pancreatic cyst, with RUQ US results of possible gastric thickening would first recommend MR Abdomen pancreas protocol and MRCP to eval both the pancreatic vasculature and the hepatobiliary system. Pending results of MR abdomen would recommend GI consult to eval if patient would need EGD/EUS +/- FNA for further pathologic evaluation. Would favor inpatient w/u for patient given aggressive nature of her disease and extensive disease.   -Pain control/anxiety/Supportive care  per primary team or palliative care team if necessary; continue O2 supplement.   -Of note, given c/f SVC syndrome w/ positive path recommended RadOnc for radiation on 3/27/24; pending transferring to [facility] for radiation.   -C/w Dexamethasone 4mg q8hr for SVC syndrome.   -closely monitor for worsening of signs/symptoms of SVC syndrome.   -Check tumor lysis lab including K, phos, Mg, LDH, uric acid;   -onc team will f/u     Plan d/w Dr. Ponce.     Larry Nolen M.D.  H/O PGY-4

## 2024-04-03 NOTE — PROGRESS NOTE ADULT - NUTRITIONAL ASSESSMENT
This patient has been assessed with a concern for Malnutrition and has been determined to have a diagnosis/diagnoses of Severe protein-calorie malnutrition.    This patient is being managed with:   Diet Regular-  Supplement Feeding Modality:  Oral  Ensure Plus High Protein Cans or Servings Per Day:  1       Frequency:  Daily  Entered: Mar 27 2024  9:38AM  
This patient has been assessed with a concern for Malnutrition and has been determined to have a diagnosis/diagnoses of Severe protein-calorie malnutrition.    This patient is being managed with:   Diet Regular-  Supplement Feeding Modality:  Oral  Ensure Plus High Protein Cans or Servings Per Day:  1       Frequency:  Daily  Entered: Mar 27 2024  9:38AM  
This patient has been assessed with a concern for Malnutrition and has been determined to have a diagnosis/diagnoses of Severe protein-calorie malnutrition.    This patient is being managed with:   Diet Regular-  Entered: Mar 20 2024 10:39AM  
This patient has been assessed with a concern for Malnutrition and has been determined to have a diagnosis/diagnoses of Severe protein-calorie malnutrition.    This patient is being managed with:   Diet Regular-  Entered: Mar 20 2024 10:39AM  
This patient has been assessed with a concern for Malnutrition and has been determined to have a diagnosis/diagnoses of Severe protein-calorie malnutrition.    This patient is being managed with:   Diet Regular-  Supplement Feeding Modality:  Oral  Ensure Plus High Protein Cans or Servings Per Day:  1       Frequency:  Daily  Entered: Mar 25 2024  4:24PM    Diet NPO after Midnight-     NPO Start Date: 25-Mar-2024   NPO Start Time: 23:59  Entered: Mar 25 2024  3:10PM    Diet Regular-  Entered: Mar 20 2024 10:39AM    The following pending diet order is being considered for treatment of Severe protein-calorie malnutrition:null
This patient has been assessed with a concern for Malnutrition and has been determined to have a diagnosis/diagnoses of Severe protein-calorie malnutrition.    This patient is being managed with:   Diet Regular-  Supplement Feeding Modality:  Oral  Ensure Plus High Protein Cans or Servings Per Day:  1       Frequency:  Daily  Entered: Mar 27 2024  9:38AM  
d/w the patient about the extent of her disease and the fact that due to extrinsic (LN and tumor) or intrinsic issues, there was a  compressing on the IVC and upper thoracic veins which was an emergent situation fro which stenting and RT were being considered. I also indicated her illness was advanced and that any current Rxs were palliative in nature and not curative. She gave verbalized understanding of the info provided and express an internal conflict when trying to understand why this was happening to her and how the cancer is taking over her independency and comfort. I recognized her internal struggle, provided support, and offered chaplaincy services. The patient expressed a desire to continue current level of care and trying to see if there were any ways to try to address her acute medical issues to improve symptoms controlled and prolong her life. However, she recognized that due the advanced nature of her disease, aggressive interventions such as CPR or intubation were not going to be helpful, reason why she had re-stated a DNR/I order.     At this point she is hoping that RT or a stent my deal with the emergent issues with her IVC and upper thoracic venous congestion.     She indicated her boyfriend is not accepting what is going on with her. I offered to reach out to him; however, she did not accept my offer.
This patient has been assessed with a concern for Malnutrition and has been determined to have a diagnosis/diagnoses of Severe protein-calorie malnutrition.    This patient is being managed with:   Diet Regular-  Supplement Feeding Modality:  Oral  Ensure Plus High Protein Cans or Servings Per Day:  1       Frequency:  Daily  Entered: Mar 27 2024  9:38AM  
This patient has been assessed with a concern for Malnutrition and has been determined to have a diagnosis/diagnoses of Severe protein-calorie malnutrition.    This patient is being managed with:   Diet Regular-  Supplement Feeding Modality:  Oral  Ensure Plus High Protein Cans or Servings Per Day:  1       Frequency:  Daily  Entered: Mar 13 2024  5:29PM    This patient has been assessed with a concern for Malnutrition and has been determined to have a diagnosis/diagnoses of Severe protein-calorie malnutrition.    This patient is being managed with:   Diet Regular-  Supplement Feeding Modality:  Oral  Ensure Plus High Protein Cans or Servings Per Day:  1       Frequency:  Daily  Entered: Mar 13 2024  5:29PM  
This patient has been assessed with a concern for Malnutrition and has been determined to have a diagnosis/diagnoses of Severe protein-calorie malnutrition.    This patient is being managed with:   Diet Regular-  Supplement Feeding Modality:  Oral  Ensure Plus High Protein Cans or Servings Per Day:  1       Frequency:  Daily  Entered: Mar 13 2024  5:29PM  
This patient has been assessed with a concern for Malnutrition and has been determined to have a diagnosis/diagnoses of Severe protein-calorie malnutrition.    This patient is being managed with:   Diet Regular-  1200mL Fluid Restriction ([id number])  Supplement Feeding Modality:  Oral  Ensure Plus High Protein Cans or Servings Per Day:  1       Frequency:  Daily  Entered: Mar 30 2024  3:46PM  
This patient has been assessed with a concern for Malnutrition and has been determined to have a diagnosis/diagnoses of Severe protein-calorie malnutrition.    This patient is being managed with:   Diet Regular-  Entered: Mar 20 2024 10:39AM  
This patient has been assessed with a concern for Malnutrition and has been determined to have a diagnosis/diagnoses of Severe protein-calorie malnutrition.    This patient is being managed with:   Diet NPO after Midnight-     NPO Start Date: 25-Mar-2024   NPO Start Time: 23:59  Entered: Mar 25 2024  3:10PM    Diet Regular-  Entered: Mar 20 2024 10:39AM  
This patient has been assessed with a concern for Malnutrition and has been determined to have a diagnosis/diagnoses of Severe protein-calorie malnutrition.    This patient is being managed with:   Diet Regular-  Entered: Mar 20 2024 10:39AM  
This patient has been assessed with a concern for Malnutrition and has been determined to have a diagnosis/diagnoses of Severe protein-calorie malnutrition.    This patient is being managed with:   Diet Regular-  Supplement Feeding Modality:  Oral  Ensure Plus High Protein Cans or Servings Per Day:  1       Frequency:  Daily  Entered: Mar 13 2024  5:29PM  
This patient has been assessed with a concern for Malnutrition and has been determined to have a diagnosis/diagnoses of Severe protein-calorie malnutrition.    This patient is being managed with:   Diet Regular-  1200mL Fluid Restriction ([id number])  Supplement Feeding Modality:  Oral  Ensure Plus High Protein Cans or Servings Per Day:  1       Frequency:  Daily  Entered: Mar 30 2024  3:46PM  
This patient has been assessed with a concern for Malnutrition and has been determined to have a diagnosis/diagnoses of Severe protein-calorie malnutrition.    This patient is being managed with:   Diet Regular-  1200mL Fluid Restriction ([id number])  Supplement Feeding Modality:  Oral  Ensure Plus High Protein Cans or Servings Per Day:  1       Frequency:  Daily  Entered: Mar 30 2024  3:46PM  
This patient has been assessed with a concern for Malnutrition and has been determined to have a diagnosis/diagnoses of Severe protein-calorie malnutrition.    This patient is being managed with:   Diet Regular-  Supplement Feeding Modality:  Oral  Ensure Plus High Protein Cans or Servings Per Day:  1       Frequency:  Daily  Entered: Mar 27 2024  9:38AM  
This patient has been assessed with a concern for Malnutrition and has been determined to have a diagnosis/diagnoses of Severe protein-calorie malnutrition.    This patient is being managed with:   Diet Regular-  Supplement Feeding Modality:  Oral  Ensure Plus High Protein Cans or Servings Per Day:  1       Frequency:  Daily  Entered: Mar 27 2024  9:38AM  
This patient has been assessed with a concern for Malnutrition and has been determined to have a diagnosis/diagnoses of Severe protein-calorie malnutrition.    This patient is being managed with:   Diet NPO after Midnight-     NPO Start Date: 18-Mar-2024   NPO Start Time: 23:59  Entered: Mar 18 2024  5:14PM    Diet Regular-  Supplement Feeding Modality:  Oral  Ensure Plus High Protein Cans or Servings Per Day:  1       Frequency:  Daily  Entered: Mar 13 2024  5:29PM  
This patient has been assessed with a concern for Malnutrition and has been determined to have a diagnosis/diagnoses of Severe protein-calorie malnutrition.    This patient is being managed with:   Diet Regular-  Entered: Mar 20 2024 10:39AM  
This patient has been assessed with a concern for Malnutrition and has been determined to have a diagnosis/diagnoses of Severe protein-calorie malnutrition.    This patient is being managed with:   Diet Regular-  1200mL Fluid Restriction ([id number])  Supplement Feeding Modality:  Oral  Ensure Plus High Protein Cans or Servings Per Day:  1       Frequency:  Daily  Entered: Mar 30 2024  3:46PM

## 2024-04-03 NOTE — PROGRESS NOTE ADULT - PROBLEM SELECTOR PLAN 4
Pt remains pt on 4-6 L NC, s/p R chest tube by thoracic. Resp status currently stable  - cont chest tube output monitoring  - Currently draining up to 500ml qd  - transfer to [facility] for urgent radiation for SVC syndrome Pt remains pt on 4-6 L NC, s/p R chest tube by thoracic. Resp status currently stable  - cont chest tube output monitoring  - Currently draining up to 500ml qd  - transfer to [facility] for urgent radiation for SVC syndrome. No bed remains available as per discussion again with transfer center and logistics at [facility]

## 2024-04-03 NOTE — DISCHARGE NOTE NURSING/CASE MANAGEMENT/SOCIAL WORK - PATIENT PORTAL LINK FT
You can access the FollowMyHealth Patient Portal offered by [facility] by registering at the following website: [email or web address]. By joining [facility]’s FollowMyHealth portal, you will also be able to view your health information using other applications (apps) compatible with our system.

## 2024-04-03 NOTE — PROGRESS NOTE ADULT - PROVIDER SPECIALTY LIST ADULT
CT Surgery
CT Surgery
Cardiology
ENT
Heme/Onc
Hospitalist
Infectious Disease
Infectious Disease
Nephrology
Thoracic Surgery
Thoracic Surgery
Cardiology
Electrophysiology
Heme/Onc
Infectious Disease
Nephrology
Thoracic Surgery
Cardiology
Heme/Onc
Heme/Onc
Infectious Disease
Nephrology
Palliative Care
Heme/Onc
Hospitalist
Infectious Disease
Nephrology
Palliative Care
CT Surgery
Hospitalist
Nephrology
Palliative Care
Thoracic Surgery
Palliative Care
Thoracic Surgery
Hospitalist
Internal Medicine
Hospitalist
Palliative Care
Hospitalist
Internal Medicine
Hospitalist
Internal Medicine
Hospitalist
Internal Medicine
Hospitalist
Palliative Care
Internal Medicine
Hospitalist

## 2024-04-03 NOTE — PROGRESS NOTE ADULT - NS ATTEND AMEND GEN_ALL_CORE FT
Patient care and plan discussed and reviewed with Advanced Care Provider. Plan as outlined above edited by me to reflect our discussion.
patient with pericardial effusion and echo concerning for tamponade physiology -discussed with her plan for subxiphoid window tentatively tomorrow.   patient expressed sadness regarding her overall condition but understood reason for window and was agreeable to proceed. consent obtained after discussing risks including and not limited to bleeding, infection, scarring, injury to surrounding structures. all questions answered.
Patient care and plan discussed and reviewed with Advanced Care Provider. Plan as outlined above edited by me to reflect our discussion.
Patient care and plan discussed and reviewed with Advanced Care Provider. Plan as outlined above edited by me to reflect our discussion.
ENT consulted for sore throat, hoarse voice, and right neck swelling    58F w/Hx of RCC s/p R total nephrectomy, S/p hysterectomy, R-sided glaucoma, Fibromyalgia, Anxiety, GERD, 1ppd smoker presents due to R breast swelling, redness and pain.     Patient reportedly had lymph nodes removed in her neck in the past now with lymphoedema in right neck. Patient currently being worked up for metastatic disease to chest by thoracic surgery. Patient s/p Right supraclavicular node biopsy 3/15 by IR    3/14/24 As per radiology CT Neck with contrast shows Extensive cellulitis/myositis along the right anterior lateral neck and right upper chest wall with inflammatory fat induration the deep soft tissue of the neck. Large supraclavicular/mediastinal mass lesion, presumably conglomerate of lymph nodes with mass effect and complete occlusion of the right internal jugular vein with associated surrounding inflammatory changes in the deep neck, concerning for infectious/inflammatory thrombophlebitis. Clinical/laboratory correlation and serial ultrasound follow-up is recommended. 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 No masslike lesions or abnormal enhancement in aerodigestive mucosa. Airways are patent. Cervical adenopathy.    Physical exam shows right eye with scleral injection, periorbital swelling and erythema, right neck swelling noted, oropharynx appears grossly normal. Indirect laryngoscopy was offered to the patient however she is refusing at this time.        Recommend:  Neck swelling  -Antibiotics as per ID   -Continue Oncology/thoracic management regarding findings of large supraclavicular/mediastinal mass lesion with mass effect and complete occlusion of the right IJ on CT.   -Pt information sent to providers  to coordinate apt.  -Patient instructed that she will need to follow up with Head and Neck Surgeon Dr. Talha Lira [phone] [address] [place], [place] [id number]

## 2024-04-03 NOTE — BH CONSULTATION LIAISON PROGRESS NOTE - NSBHCHARTREVIEWVS_PSY_A_CORE FT
Vital Signs Last 24 Hrs  T(C): 36.5 (03 Apr 2024 12:07), Max: 36.8 (03 Apr 2024 04:00)  T(F): 97.7 (03 Apr 2024 12:07), Max: 98.3 (03 Apr 2024 04:00)  HR: 65 (03 Apr 2024 12:07) (57 - 68)  BP: 126/80 (03 Apr 2024 12:07) (108/66 - 126/80)  BP(mean): --  RR: 18 (03 Apr 2024 12:07) (18 - 18)  SpO2: 98% (03 Apr 2024 12:07) (98% - 99%)    Parameters below as of 03 Apr 2024 12:07  Patient On (Oxygen Delivery Method): nasal cannula  O2 Flow (L/min): 6

## 2024-04-03 NOTE — PROGRESS NOTE ADULT - ASSESSMENT
58-yo F w/ PMH of RCC s/p R total nephrectomy, s/p hysterectomy, fibromyalgia, and concern for breast malignancy currently undergoing workup, admitted with R breast swelling, erythema, and pain. ID was consulted for extensive cellulitis/myositis along the R anterior lateral neck and R upper chest wall. 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 LN biopsy 3/14. CT chest w/ mediastinal and supraclavicular lymphadenopathy w/ necrosis. 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 RRT 3/22-23 ON for afib w/ RVR. Afebrile. New leukocytosis. Breast erythema and tenderness improving while on antibiotics. Another RRT 3/26 w/ tachycardia. Currently being monitored off antibiotics (~3 wks of abx coverage). WBC uptrending i/s/o steroid use.    #Neck edema  #Neck pain  #Pneumonia  #Abnormal CT scan  #Myositis  #Thrombophlebitis  #Leukocytosis  #Afib w/ RVR  - VSS. No leukocytosis. Significant R neck lymphadenopathy, s/p biopsy. F/u path  - No sore throat or dysphagia. No odynophagia. No fever or rigors.  - Unclear if patient's presentation represents infectious disease process. Labs and physical exam do not support septic thrombophlebitis.  - BCx NGTD. Quantiferon neg.  - CT chest reviewed. Necrotic lymph nodes noted. Path from neck concern for malignancy. Breast erythema and pain could be from inflammation from mass burden.  - RRT 3/22-23 ON w/ afib w/ RVR. Afebrile. New leukocytosis. Probably from cancer burden? Broadened antibiotics to cefepime/metroniazole/vancomycin.  - RRT 3/26 for tachycardia. Increased pleural effusion on CXR.  - CT chest repeat on 3/27 w/ possible PNA. Would continue with antibiotics for now.  - CT neck also reviewed (3/27). Neck/chest wall pain and neck swelling likely from evolving thrombus (a/w malignancy?) then infectious process (e.g. Lemierre's disease). Not febrile. Never been bacteremic this admission.  - S/p ~3 wks of antibiotic coverage (last dose cefepime 4/1, metronidazole 4/2)  - Leukocytosis likely a/w dexamethasone use (3/27- ). On exam, no new physical findings suggestive of acute infection. Not in respiratory distress on exam. Afebrile. Bartolo monitor. If febrile, start a new fever workup (e.g. BCx x2 sets, UA/UCx, CXR)  - ENT and Onc f/u. CT surgery follow-up. Pulm f/u    #L hand phlebitis  - L dorsal hand w/ phlebitis 3/26. ABx as above. Warm compress    Plan discussed with primary team ACP.  Thank you for this consult. Inpatient ID team will peripherally follow.    Edwar Petersen MD, PhD  Attending Physician  Division of Infectious Diseases  Department of Medicine    Please contact through MS Teams message.  Office: [phone] (after 5 PM or weekend)

## 2024-04-03 NOTE — PROGRESS NOTE ADULT - SUBJECTIVE AND OBJECTIVE BOX
Patient is a 58y old  Female who presents with a chief complaint of Mastitis, breast malignancy (03 Apr 2024 16:49)       Pt is seen and examined  pt is awake and lying in bed  Patient on NC 6L endorsing right neck/upper chest pain w/ swelling of the R supraclavicular region.       MEDICATIONS  (STANDING):  acetaminophen   IVPB .. 750 milliGRAM(s) IV Intermittent once  aMIOdarone    Tablet   Oral   aMIOdarone    Tablet 400 milliGRAM(s) Oral three times a day  chlorhexidine 4% Liquid 1 Application(s) Topical <User Schedule>  dexAMETHasone  Injectable 4 milliGRAM(s) IV Push every 8 hours  enoxaparin Injectable 50 milliGRAM(s) SubCutaneous every 12 hours  influenza   Vaccine 0.5 milliLiter(s) IntraMuscular once  lactobacillus acidophilus 1 Tablet(s) Oral daily  metoprolol tartrate 25 milliGRAM(s) Oral two times a day  naloxegol 25 milliGRAM(s) Oral before breakfast  nicotine -  14 mG/24Hr(s) Patch 1 Patch Transdermal daily  oxyCODONE  ER Tablet 30 milliGRAM(s) Oral <User Schedule>  pantoprazole    Tablet 40 milliGRAM(s) Oral before breakfast  polyethylene glycol 3350 17 Gram(s) Oral two times a day  senna 2 Tablet(s) Oral at bedtime    MEDICATIONS  (PRN):  albuterol/ipratropium for Nebulization 3 milliLiter(s) Nebulizer every 6 hours PRN Shortness of Breath and/or Wheezing  aluminum hydroxide/magnesium hydroxide/simethicone Suspension 30 milliLiter(s) Oral every 6 hours PRN Dyspepsia  benzocaine/menthol Lozenge 1 Lozenge Oral two times a day PRN Sore Throat  Biotene Dry Mouth Oral Rinse 15 milliLiter(s) Swish and Spit every 4 hours PRN Mouth Care  bisacodyl 5 milliGRAM(s) Oral every 12 hours PRN Constipation  diazepam    Tablet 5 milliGRAM(s) Oral two times a day PRN for anxiety  guaiFENesin Oral Liquid (Sugar-Free) 200 milliGRAM(s) Oral every 6 hours PRN Cough  HYDROmorphone  Injectable 0.5 milliGRAM(s) IV Push every 3 hours PRN Moderate Pain (4 - 6)  HYDROmorphone  Injectable 1 milliGRAM(s) IV Push every 3 hours PRN Severe Pain (7 - 10)  melatonin 3 milliGRAM(s) Oral at bedtime PRN Insomnia  naloxone Injectable 0.1 milliGRAM(s) IV Push every 3 minutes PRN Obtundation, respiratory suppression  oxyCODONE    IR 10 milliGRAM(s) Oral every 4 hours PRN Moderate Pain (4 - 6)  sodium chloride 0.9% lock flush 10 milliLiter(s) IV Push every 1 hour PRN Pre/post blood products, medications, blood draw, and to maintain line patency      Allergies    Cipro (Headache; Vomiting; Rash)  penicillin (Headache; Vomiting; Rash)  erythromycin (Headache; Vomiting; Rash)    Intolerances        Vital Signs Last 24 Hrs  T(C): 36.5 (03 Apr 2024 12:07), Max: 36.8 (03 Apr 2024 04:00)  T(F): 97.7 (03 Apr 2024 12:07), Max: 98.3 (03 Apr 2024 04:00)  HR: 65 (03 Apr 2024 12:07) (57 - 68)  BP: 126/80 (03 Apr 2024 12:07) (108/66 - 126/80)  BP(mean): --  RR: 18 (03 Apr 2024 12:07) (18 - 18)  SpO2: 98% (03 Apr 2024 12:07) (98% - 99%)    Parameters below as of 03 Apr 2024 12:07  Patient On (Oxygen Delivery Method): nasal cannula  O2 Flow (L/min): 6      PHYSICAL EXAM  GENERAL: Anxious  HEAD:  Atraumatic, Normocephalic  EYES: EOMI, PERRLA, conjunctiva and sclera clear  CHEST/LUNG: Clear to auscultation bilaterally; No wheeze  HEART: Regular rate and rhythm; No murmurs, rubs, or gallops  ABDOMEN: Soft, Nontender, Nondistended; Bowel sounds present  EXTREMITIES:  RUE swelling    LABS:                          10.2   12.77 )-----------( 579      ( 03 Apr 2024 07:25 )             32.2         Mean Cell Volume : 93.1 fl  Mean Cell Hemoglobin : 29.5 pg  Mean Cell Hemoglobin Concentration : 31.7 gm/dL  Auto Neutrophil # : x  Auto Lymphocyte # : x  Auto Monocyte # : x  Auto Eosinophil # : x  Auto Basophil # : x  Auto Neutrophil % : x  Auto Lymphocyte % : x  Auto Monocyte % : x  Auto Eosinophil % : x  Auto Basophil % : x    Serial CBC's  04-03 @ 07:25  Hct-32.2 / Hgb-10.2 / Plat-579 / RBC-3.46 / WBC-12.77          Serial CBC's  04-02 @ 07:02  Hct-29.4 / Hgb-9.4 / Plat-542 / RBC-3.21 / WBC-11.51          Serial CBC's  04-01 @ 06:25  Hct-29.0 / Hgb-9.1 / Plat-473 / RBC-3.13 / WBC-9.86          Serial CBC's  03-31 @ 07:00  Hct-28.4 / Hgb-9.2 / Plat-489 / RBC-3.14 / WBC-8.63            04-03    138  |  103  |  43<H>  ----------------------------<  130<H>  4.9   |  25  |  0.81    Ca    9.4      03 Apr 2024 07:23  Mg     2.1     04-03            WBC Count: 12.77 K/uL (04-03-24 @ 07:25)  Hemoglobin: 10.2 g/dL (04-03-24 @ 07:25)  Hematocrit: 32.2 % (04-03-24 @ 07:25)  Platelet Count - Automated: 579 K/uL (04-03-24 @ 07:25)  WBC Count: 11.51 K/uL (04-02-24 @ 07:02)  Hemoglobin: 9.4 g/dL (04-02-24 @ 07:02)  Hematocrit: 29.4 % (04-02-24 @ 07:02)  Platelet Count - Automated: 542 K/uL (04-02-24 @ 07:02)  WBC Count: 9.86 K/uL (04-01-24 @ 06:25)  Hemoglobin: 9.1 g/dL (04-01-24 @ 06:25)  Hematocrit: 29.0 % (04-01-24 @ 06:25)  Platelet Count - Automated: 473 K/uL (04-01-24 @ 06:25)  WBC Count: 8.63 K/uL (03-31-24 @ 07:00)  Hemoglobin: 9.2 g/dL (03-31-24 @ 07:00)  Hematocrit: 28.4 % (03-31-24 @ 07:00)  Platelet Count - Automated: 489 K/uL (03-31-24 @ 07:00)  WBC Count: 8.12 K/uL (03-30-24 @ 07:08)  Hemoglobin: 8.5 g/dL (03-30-24 @ 07:08)  Hematocrit: 26.6 % (03-30-24 @ 07:08)  Platelet Count - Automated: 457 K/uL (03-30-24 @ 07:08)  WBC Count: 8.16 K/uL (03-29-24 @ 07:34)  Hemoglobin: 8.4 g/dL (03-29-24 @ 07:34)  Hematocrit: 26.1 % (03-29-24 @ 07:34)  Platelet Count - Automated: 425 K/uL (03-29-24 @ 07:34)  WBC Count: 7.07 K/uL (03-28-24 @ 06:12)  Hemoglobin: 8.9 g/dL (03-28-24 @ 06:12)  Hematocrit: 28.5 % (03-28-24 @ 06:12)  Platelet Count - Automated: 371 K/uL (03-28-24 @ 06:12)  WBC Count: 14.19 K/uL (03-27-24 @ 06:59)  Hemoglobin: 10.1 g/dL (03-27-24 @ 06:59)  Hematocrit: 31.9 % (03-27-24 @ 06:59)  Platelet Count - Automated: 470 K/uL (03-27-24 @ 06:59)  WBC Count: 10.29 K/uL (03-26-24 @ 04:06)  Hemoglobin: 10.0 g/dL (03-26-24 @ 04:06)  Hematocrit: 31.1 % (03-26-24 @ 04:06)  Platelet Count - Automated: 429 K/uL (03-26-24 @ 04:06)  WBC Count: 8.20 K/uL (03-26-24 @ 00:57)  Hemoglobin: 9.4 g/dL (03-26-24 @ 00:57)  Hematocrit: 29.9 % (03-26-24 @ 00:57)  Platelet Count - Automated: 405 K/uL (03-26-24 @ 00:57)  WBC Count: 9.82 K/uL (03-25-24 @ 09:16)  Hemoglobin: 10.5 g/dL (03-25-24 @ 09:16)  Hematocrit: 33.2 % (03-25-24 @ 09:16)  Platelet Count - Automated: 487 K/uL (03-25-24 @ 09:16)

## 2024-04-03 NOTE — PROGRESS NOTE ADULT - SUBJECTIVE AND OBJECTIVE BOX
Eric Hickey MD  Division of Hospital Medicine  Available via MS teams  ---------------------------------------------------------    GADIEL GENAO  58y  Female      Patient is a 58y old  Female who presents with a chief complaint of Mastitis, breast malignancy (02 Apr 2024 18:25)      INTERVAL HPI/OVERNIGHT EVENTS:        REVIEW OF SYSTEMS: 10 point ROS negative unless listed above    T(C): 36.8 (04-03-24 @ 04:00), Max: 36.8 (04-03-24 @ 04:00)  HR: 57 (04-03-24 @ 04:00) (57 - 68)  BP: 111/70 (04-03-24 @ 04:00) (108/66 - 116/70)  RR: 18 (04-03-24 @ 04:00) (18 - 18)  SpO2: 98% (04-03-24 @ 04:00) (97% - 99%)  Wt(kg): --Vital Signs Last 24 Hrs  T(C): 36.8 (03 Apr 2024 04:00), Max: 36.8 (03 Apr 2024 04:00)  T(F): 98.3 (03 Apr 2024 04:00), Max: 98.3 (03 Apr 2024 04:00)  HR: 57 (03 Apr 2024 04:00) (57 - 68)  BP: 111/70 (03 Apr 2024 04:00) (108/66 - 116/70)  BP(mean): --  RR: 18 (03 Apr 2024 04:00) (18 - 18)  SpO2: 98% (03 Apr 2024 04:00) (97% - 99%)    Parameters below as of 03 Apr 2024 04:00  Patient On (Oxygen Delivery Method): nasal cannula  O2 Flow (L/min): 6      PHYSICAL EXAM:  GENERAL: Anxious  HEAD:  Atraumatic, Normocephalic  EYES: EOMI, PERRLA, conjunctiva and sclera clear  CHEST/LUNG: Clear to auscultation bilaterally; No wheeze  HEART: Regular rate and rhythm; No murmurs, rubs, or gallops  ABDOMEN: Soft, Nontender, Nondistended; Bowel sounds present  EXTREMITIES:  2+ Peripheral Pulses, No clubbing, cyanosis, or edema        LABS:                        10.2   12.77 )-----------( 579      ( 03 Apr 2024 07:25 )             32.2     04-02    138  |  102  |  47<H>  ----------------------------<  123<H>  4.7   |  25  |  0.87    Ca    9.5      02 Apr 2024 07:02  Mg     1.8     04-02        Urinalysis Basic - ( 02 Apr 2024 07:02 )    Color: x / Appearance: x / SG: x / pH: x  Gluc: 123 mg/dL / Ketone: x  / Bili: x / Urobili: x   Blood: x / Protein: x / Nitrite: x   Leuk Esterase: x / RBC: x / WBC x   Sq Epi: x / Non Sq Epi: x / Bacteria: x      CAPILLARY BLOOD GLUCOSE            Urinalysis Basic - ( 02 Apr 2024 07:02 )    Color: x / Appearance: x / SG: x / pH: x  Gluc: 123 mg/dL / Ketone: x  / Bili: x / Urobili: x   Blood: x / Protein: x / Nitrite: x   Leuk Esterase: x / RBC: x / WBC x   Sq Epi: x / Non Sq Epi: x / Bacteria: x        RADIOLOGY & ADDITIONAL TESTS:    Imaging Personally Reviewed:  [ ] YES  [ ] NO Eric Hickey MD  Division of Hospital Medicine  Available via MS teams  ---------------------------------------------------------    CHADWICKGADIEL  58y  Female      Patient is a 58y old  Female who presents with a chief complaint of Mastitis, breast malignancy (02 Apr 2024 18:25)      INTERVAL HPI/OVERNIGHT EVENTS:  Seen at bedside. Remains status quo. Is still anxious about diagnosis and what lies ahead regarding further medical testing      REVIEW OF SYSTEMS: 10 point ROS negative unless listed above    T(C): 36.8 (04-03-24 @ 04:00), Max: 36.8 (04-03-24 @ 04:00)  HR: 57 (04-03-24 @ 04:00) (57 - 68)  BP: 111/70 (04-03-24 @ 04:00) (108/66 - 116/70)  RR: 18 (04-03-24 @ 04:00) (18 - 18)  SpO2: 98% (04-03-24 @ 04:00) (97% - 99%)  Wt(kg): --Vital Signs Last 24 Hrs  T(C): 36.8 (03 Apr 2024 04:00), Max: 36.8 (03 Apr 2024 04:00)  T(F): 98.3 (03 Apr 2024 04:00), Max: 98.3 (03 Apr 2024 04:00)  HR: 57 (03 Apr 2024 04:00) (57 - 68)  BP: 111/70 (03 Apr 2024 04:00) (108/66 - 116/70)  BP(mean): --  RR: 18 (03 Apr 2024 04:00) (18 - 18)  SpO2: 98% (03 Apr 2024 04:00) (97% - 99%)    Parameters below as of 03 Apr 2024 04:00  Patient On (Oxygen Delivery Method): nasal cannula  O2 Flow (L/min): 6      PHYSICAL EXAM:  GENERAL: Anxious  HEAD:  Atraumatic, Normocephalic  EYES: EOMI, PERRLA, conjunctiva and sclera clear  CHEST/LUNG: Clear to auscultation bilaterally; No wheeze  HEART: Regular rate and rhythm; No murmurs, rubs, or gallops  ABDOMEN: Soft, Nontender, Nondistended; Bowel sounds present  EXTREMITIES:  RUE swelling        LABS:                        10.2   12.77 )-----------( 579      ( 03 Apr 2024 07:25 )             32.2     04-02    138  |  102  |  47<H>  ----------------------------<  123<H>  4.7   |  25  |  0.87    Ca    9.5      02 Apr 2024 07:02  Mg     1.8     04-02        Urinalysis Basic - ( 02 Apr 2024 07:02 )    Color: x / Appearance: x / SG: x / pH: x  Gluc: 123 mg/dL / Ketone: x  / Bili: x / Urobili: x   Blood: x / Protein: x / Nitrite: x   Leuk Esterase: x / RBC: x / WBC x   Sq Epi: x / Non Sq Epi: x / Bacteria: x      CAPILLARY BLOOD GLUCOSE            Urinalysis Basic - ( 02 Apr 2024 07:02 )    Color: x / Appearance: x / SG: x / pH: x  Gluc: 123 mg/dL / Ketone: x  / Bili: x / Urobili: x   Blood: x / Protein: x / Nitrite: x   Leuk Esterase: x / RBC: x / WBC x   Sq Epi: x / Non Sq Epi: x / Bacteria: x        RADIOLOGY & ADDITIONAL TESTS:    Imaging Personally Reviewed:  [ ] YES  [ ] NO

## 2024-04-03 NOTE — DISCHARGE NOTE NURSING/CASE MANAGEMENT/SOCIAL WORK - NSDCPEEMAIL_GEN_ALL_CORE
[facility] for Tobacco Control email [email or web address]

## 2024-04-03 NOTE — BH CONSULTATION LIAISON PROGRESS NOTE - NSBHASSESSMENTFT_PSY_ALL_CORE
Pt is a 58F domiciled with boyfriend disabled non caregiver pphx anxiety on valium no hx admissions smoker admitted to medicine for chest mass, psych consulted for anxiety. Pt was admitted for workup of metastatic cancer, pt endorses anxiety regarding current prognosis, her future, and treatment. She endorses a long hx of anxiety managed with valium 5mg bd from her PCP. Pt endorses anxiety regarding an upcoming MRI, because she is worried that she will not be able to lie down for that long due to her breathing.

## 2024-04-03 NOTE — BH CONSULTATION LIAISON PROGRESS NOTE - NSBHCONSULTRECOMMENDOTHER_PSY_A_CORE FT
-pt no longer interested in taking zoloft, despite it being started two days ago, therefore consider d/c  -continue valium PRN as ordered for anxiety

## 2024-04-03 NOTE — PROGRESS NOTE ADULT - PROBLEM SELECTOR PLAN 3
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 Not candidate for IV on R due to mass, but also has SVC obliteration on CT neck to doubt patient can have PICC or central line placed on R  - MICU team placed R femoral TLC

## 2024-04-03 NOTE — PROGRESS NOTE ADULT - NS ATTEND OPT1 GEN_ALL_CORE

## 2024-04-03 NOTE — BH CONSULTATION LIAISON PROGRESS NOTE - CURRENT MEDICATION
MEDICATIONS  (STANDING):  acetaminophen   IVPB .. 750 milliGRAM(s) IV Intermittent once  aMIOdarone    Tablet   Oral   aMIOdarone    Tablet 400 milliGRAM(s) Oral three times a day  chlorhexidine 4% Liquid 1 Application(s) Topical <User Schedule>  dexAMETHasone  Injectable 4 milliGRAM(s) IV Push every 8 hours  enoxaparin Injectable 50 milliGRAM(s) SubCutaneous every 12 hours  influenza   Vaccine 0.5 milliLiter(s) IntraMuscular once  lactobacillus acidophilus 1 Tablet(s) Oral daily  metoprolol tartrate 25 milliGRAM(s) Oral two times a day  naloxegol 25 milliGRAM(s) Oral before breakfast  nicotine -  14 mG/24Hr(s) Patch 1 Patch Transdermal daily  oxyCODONE  ER Tablet 30 milliGRAM(s) Oral <User Schedule>  pantoprazole    Tablet 40 milliGRAM(s) Oral before breakfast  polyethylene glycol 3350 17 Gram(s) Oral two times a day  senna 2 Tablet(s) Oral at bedtime  sertraline 25 milliGRAM(s) Oral daily    MEDICATIONS  (PRN):  albuterol/ipratropium for Nebulization 3 milliLiter(s) Nebulizer every 6 hours PRN Shortness of Breath and/or Wheezing  aluminum hydroxide/magnesium hydroxide/simethicone Suspension 30 milliLiter(s) Oral every 6 hours PRN Dyspepsia  benzocaine/menthol Lozenge 1 Lozenge Oral two times a day PRN Sore Throat  Biotene Dry Mouth Oral Rinse 15 milliLiter(s) Swish and Spit every 4 hours PRN Mouth Care  bisacodyl 5 milliGRAM(s) Oral every 12 hours PRN Constipation  diazepam    Tablet 5 milliGRAM(s) Oral two times a day PRN for anxiety  guaiFENesin Oral Liquid (Sugar-Free) 200 milliGRAM(s) Oral every 6 hours PRN Cough  HYDROmorphone  Injectable 0.5 milliGRAM(s) IV Push every 3 hours PRN Moderate Pain (4 - 6)  HYDROmorphone  Injectable 1 milliGRAM(s) IV Push every 3 hours PRN Severe Pain (7 - 10)  melatonin 3 milliGRAM(s) Oral at bedtime PRN Insomnia  naloxone Injectable 0.1 milliGRAM(s) IV Push every 3 minutes PRN Obtundation, respiratory suppression  oxyCODONE    IR 10 milliGRAM(s) Oral every 4 hours PRN Moderate Pain (4 - 6)  sodium chloride 0.9% lock flush 10 milliLiter(s) IV Push every 1 hour PRN Pre/post blood products, medications, blood draw, and to maintain line patency

## 2024-04-03 NOTE — PROGRESS NOTE ADULT - PROBLEM SELECTOR PROBLEM 9
Smoker
Prophylactic measure
Smoker
History of fibromyalgia
Smoker
History of fibromyalgia
Prophylactic measure
Prophylactic measure
Smoker
Smoker

## 2024-04-03 NOTE — CHART NOTE - NSCHARTNOTEFT_GEN_A_CORE
YO continues to follow this case. LCSW met with patient at bedside today for follow up supportive intervention.     LCSW reintroduced self and role of [place]  for support. Patient verbalized understanding and was receptive to visit today. LCSW next inquired into patient coping with current medical status and patient notes continuing to struggle with her care and status. Patient states that she remains in shock and disheartened at her prognosis, noting that she is aware she has limited time and cannot be cured of her cancer. Patient states that she continues to process this information but feels much fear and apprehension regarding next steps, noting an increase in anxiety at nighttime. LCSW validated these apprehensions today and patient's overall coping, and much emotional support and active listening provided. LCSW inquired into patient's significant other visiting and assisting in support, and patient notes that her significant other visited yesterday PM but also continues to process patient's status.     LCSW discussed methods of decompression that patient can utilize during particularly stressful moments, and patient was receptive today. LCSW to attempt to utilize [employer] for a therapeutic immersive experience tomorrow, 4/4, and patient was again receptive to this. LCSW assured ongoing availability and support throughout this process, and patient verbalized appreciation of visit and support today.    YO and RAMON will continue to follow this case.

## 2024-04-03 NOTE — PROGRESS NOTE ADULT - ASSESSMENT
58F w/Hx of RCC s/p R total nephrectomy, S/p hysterectomy, R-sided glaucoma, Fibromyalgia, Anxiety, GERD, smoker presents due to R breast swelling, redness and pain. Admitted for right breast swelling and cellulitis found to have imaging evidence concerning for metastatic malignancy w/ lung nodules, and axillary, supraclavicular, and mediastinal adenopathy. 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 Also s/p pericardial window for moderate pericardial effusion now with chest tube. Now found to be in Afib with RVR, s/p R pleurx placement with minimal improvement in respiratory status- now concerned for worsening SVC syndrome pending transfer to [facility] for urgent radiation

## 2024-04-03 NOTE — PROGRESS NOTE ADULT - ATTENDING COMMENTS
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 She is still on steroids, can be tapered after RT.

## 2024-04-03 NOTE — DISCHARGE NOTE NURSING/CASE MANAGEMENT/SOCIAL WORK - NSDCPEWEB_GEN_ALL_CORE
[facility] for Tobacco Control website --- [email or web address] website --- [email or web address]

## 2024-04-03 NOTE — BH CONSULTATION LIAISON PROGRESS NOTE - NSBHFUPINTERVALHXFT_PSY_A_CORE
pt seen, remains anxious over medical issues, has been taking valium and now states she no longer wants to take zoloft. denies si/hi, has mild depression and anxiety, sleep fragmented with limited appetite. compliant with treatments. no manic or psychotic symptoms present.

## 2024-04-03 NOTE — PROGRESS NOTE ADULT - PROBLEM SELECTOR PROBLEM 7
History of fibromyalgia
Palliative care encounter
History of fibromyalgia
Goals of care, counseling/discussion
Goals of care, counseling/discussion
Permanent atrial fibrillation
Smoker
Anxiety
Permanent atrial fibrillation
History of fibromyalgia
Goals of care, counseling/discussion
History of fibromyalgia
Anxiety
Palliative care encounter
History of fibromyalgia
Smoker
History of fibromyalgia
Goals of care, counseling/discussion
History of fibromyalgia
History of fibromyalgia
Smoker
Smoker
Palliative care encounter
History of fibromyalgia

## 2024-04-03 NOTE — PROGRESS NOTE ADULT - SUBJECTIVE AND OBJECTIVE BOX
Follow Up:    Leukocytosis    Interval History/ROS:  Monitored off antibiotics. Last dose cefepime 4/1/24 and Flagyl 4/2/24. Monitored off antibiotics. WBC uptrending. On dexamethasone since 3/27. Patient was seen and examined at bedside. Anxious. No fever. No SOB or cough.    Allergies  Cipro (Headache; Vomiting; Rash)  penicillin (Headache; Vomiting; Rash)  erythromycin (Headache; Vomiting; Rash)        ANTIMICROBIALS:      OTHER MEDS:  MEDICATIONS  (STANDING):  acetaminophen   IVPB .. 750 once  albuterol/ipratropium for Nebulization 3 every 6 hours PRN  aluminum hydroxide/magnesium hydroxide/simethicone Suspension 30 every 6 hours PRN  aMIOdarone    Tablet    aMIOdarone    Tablet 400 three times a day  bisacodyl 5 every 12 hours PRN  dexAMETHasone  Injectable 4 every 8 hours  diazepam    Tablet 5 two times a day PRN  enoxaparin Injectable 50 every 12 hours  guaiFENesin Oral Liquid (Sugar-Free) 200 every 6 hours PRN  HYDROmorphone  Injectable 1 every 3 hours PRN  HYDROmorphone  Injectable 0.5 every 3 hours PRN  influenza   Vaccine 0.5 once  melatonin 3 at bedtime PRN  metoprolol tartrate 25 two times a day  naloxegol 25 before breakfast  oxyCODONE    IR 10 every 4 hours PRN  oxyCODONE  ER Tablet 30 <User Schedule>  pantoprazole    Tablet 40 before breakfast  polyethylene glycol 3350 17 two times a day  senna 2 at bedtime      Vital Signs Last 24 Hrs  T(C): 36.5 (03 Apr 2024 12:07), Max: 36.8 (03 Apr 2024 04:00)  T(F): 97.7 (03 Apr 2024 12:07), Max: 98.3 (03 Apr 2024 04:00)  HR: 65 (03 Apr 2024 12:07) (57 - 68)  BP: 126/80 (03 Apr 2024 12:07) (108/66 - 126/80)  BP(mean): --  RR: 18 (03 Apr 2024 12:07) (18 - 18)  SpO2: 98% (03 Apr 2024 12:07) (98% - 99%)    Parameters below as of 03 Apr 2024 12:07  Patient On (Oxygen Delivery Method): nasal cannula  O2 Flow (L/min): 6        PHYSICAL EXAM:  Constitutional: NAD  HEENT:  R erythematous sclera; R neck edema and tenderness; clear oropharynx  Cardiovascular:   normal S1, S2, no murmur, RRR  Respiratory:  clear BS bilaterally, no wheezes  Musculoskeletal:  no BLE edema  Neurologic: awake and oriented x3  Skin: R breast w/ erythematous skin no longer appreciated; moderate tenderness to palpation to R upper chest                                10.2   12.77 )-----------( 579      ( 03 Apr 2024 07:25 )             32.2       04-03    138  |  103  |  43<H>  ----------------------------<  130<H>  4.9   |  25  |  0.81    Ca    9.4      03 Apr 2024 07:23  Mg     2.1     04-03        Urinalysis Basic - ( 03 Apr 2024 07:23 )    Color: x / Appearance: x / SG: x / pH: x  Gluc: 130 mg/dL / Ketone: x  / Bili: x / Urobili: x   Blood: x / Protein: x / Nitrite: x   Leuk Esterase: x / RBC: x / WBC x   Sq Epi: x / Non Sq Epi: x / Bacteria: x        MICROBIOLOGY:  v  .Blood Blood-Peripheral  03-25-24   No growth at 5 days  --  --      .Blood Blood-Peripheral  03-23-24   No growth at 5 days  --  --      .Blood Blood  03-15-24   No growth at 5 days  --  --      .Blood Blood-Peripheral  03-12-24   No growth at 5 days  --  --      .Blood Blood-Peripheral  03-12-24   No growth at 5 days  --  --      Clean Catch Clean Catch (Midstream)  03-10-24   <10,000 CFU/mL Normal Urogenital Tammie  --  --      .Blood Blood-Peripheral  03-10-24   No growth at 5 days  --  --                RADIOLOGY:  Imaging below independently reviewed.  < from: Xray Chest 1 View- PORTABLE-Routine (Xray Chest 1 View- PORTABLE-Routine in AM.) (03.29.24 @ 09:41) >    ACC: [id number] EXAM:  XR CHEST PORTABLE ROUTINE 1V   ORDERED BY: MICHAEL BECK     PROCEDURE DATE:  03/29/2024          INTERPRETATION:  DATE OF STUDY: 3/29/24    PRIOR: 3/27/24    CLINICAL INDICATION: Reassess right-sided pneumothorax    TECHNIQUE: AP radiograph of the chest.    FINDINGS:  Right-sided chest tube remains in place.  Difficult to assess heart size on this AP projection.  Decrease in left pleural effusion. No left-sided focal infiltrate.  Interval decrease in right pleural effusion.  No discernible pneumothorax.    IMPRESSION:  Decrease in right pleural effusion.  No discernible right-sided pneumothorax.    --- End of Report ---            CHRISTOPHE ALLEN MD; Attending Radiologist  This document has been electronically signed. Mar 29 2024  1:11PM    < end of copied text >

## 2024-04-04 ENCOUNTER — OUTPATIENT (OUTPATIENT)
Dept: OUTPATIENT SERVICES | Facility: HOSPITAL | Age: 59
LOS: 1 days | Discharge: HOME | End: 2024-04-04

## 2024-04-04 ENCOUNTER — TRANSCRIPTION ENCOUNTER (OUTPATIENT)
Age: 59
End: 2024-04-04

## 2024-04-04 DIAGNOSIS — I48.20 CHRONIC ATRIAL FIBRILLATION, UNSPECIFIED: ICD-10-CM

## 2024-04-04 DIAGNOSIS — I82.629 ACUTE EMBOLISM AND THROMBOSIS OF DEEP VEINS OF UNSPECIFIED UPPER EXTREMITY: ICD-10-CM

## 2024-04-04 DIAGNOSIS — J90 PLEURAL EFFUSION, NOT ELSEWHERE CLASSIFIED: ICD-10-CM

## 2024-04-04 DIAGNOSIS — Z51.5 ENCOUNTER FOR PALLIATIVE CARE: ICD-10-CM

## 2024-04-04 DIAGNOSIS — C79.9 SECONDARY MALIGNANT NEOPLASM OF UNSPECIFIED SITE: ICD-10-CM

## 2024-04-04 DIAGNOSIS — Z71.89 OTHER SPECIFIED COUNSELING: ICD-10-CM

## 2024-04-04 DIAGNOSIS — G89.3 NEOPLASM RELATED PAIN (ACUTE) (CHRONIC): ICD-10-CM

## 2024-04-04 DIAGNOSIS — C34.11 MALIGNANT NEOPLASM OF UPPER LOBE, RIGHT BRONCHUS OR LUNG: ICD-10-CM

## 2024-04-04 DIAGNOSIS — F41.9 ANXIETY DISORDER, UNSPECIFIED: ICD-10-CM

## 2024-04-04 DIAGNOSIS — I87.1 COMPRESSION OF VEIN: ICD-10-CM

## 2024-04-04 LAB
ALBUMIN SERPL ELPH-MCNC: 3.2 G/DL — LOW (ref 3.3–5)
ALP SERPL-CCNC: 71 U/L — SIGNIFICANT CHANGE UP (ref 40–120)
ALT FLD-CCNC: 15 U/L — SIGNIFICANT CHANGE UP (ref 4–33)
ANION GAP SERPL CALC-SCNC: 9 MMOL/L — SIGNIFICANT CHANGE UP (ref 7–14)
APTT BLD: 29.5 SEC — SIGNIFICANT CHANGE UP (ref 24.5–35.6)
AST SERPL-CCNC: 9 U/L — SIGNIFICANT CHANGE UP (ref 4–32)
BILIRUB SERPL-MCNC: <0.2 MG/DL — SIGNIFICANT CHANGE UP (ref 0.2–1.2)
BLD GP AB SCN SERPL QL: NEGATIVE — SIGNIFICANT CHANGE UP
BUN SERPL-MCNC: 38 MG/DL — HIGH (ref 7–23)
CALCIUM SERPL-MCNC: 9.2 MG/DL — SIGNIFICANT CHANGE UP (ref 8.4–10.5)
CHLORIDE SERPL-SCNC: 105 MMOL/L — SIGNIFICANT CHANGE UP (ref 98–107)
CO2 SERPL-SCNC: 27 MMOL/L — SIGNIFICANT CHANGE UP (ref 22–31)
CREAT SERPL-MCNC: 0.85 MG/DL — SIGNIFICANT CHANGE UP (ref 0.5–1.3)
EGFR: 78 ML/MIN/1.73M2 — SIGNIFICANT CHANGE UP
GLUCOSE SERPL-MCNC: 118 MG/DL — HIGH (ref 70–99)
HCT VFR BLD CALC: 29.6 % — LOW (ref 34.5–45)
HGB BLD-MCNC: 9.5 G/DL — LOW (ref 11.5–15.5)
INR BLD: 0.97 RATIO — SIGNIFICANT CHANGE UP (ref 0.85–1.18)
LDH SERPL L TO P-CCNC: 135 U/L — SIGNIFICANT CHANGE UP (ref 135–225)
MAGNESIUM SERPL-MCNC: 2.1 MG/DL — SIGNIFICANT CHANGE UP (ref 1.6–2.6)
MCHC RBC-ENTMCNC: 29.5 PG — SIGNIFICANT CHANGE UP (ref 27–34)
MCHC RBC-ENTMCNC: 32.1 GM/DL — SIGNIFICANT CHANGE UP (ref 32–36)
MCV RBC AUTO: 91.9 FL — SIGNIFICANT CHANGE UP (ref 80–100)
NRBC # BLD: 0 /100 WBCS — SIGNIFICANT CHANGE UP (ref 0–0)
NRBC # FLD: 0 K/UL — SIGNIFICANT CHANGE UP (ref 0–0)
PHOSPHATE SERPL-MCNC: 3.2 MG/DL — SIGNIFICANT CHANGE UP (ref 2.5–4.5)
PLATELET # BLD AUTO: 642 K/UL — HIGH (ref 150–400)
POTASSIUM SERPL-MCNC: 5 MMOL/L — SIGNIFICANT CHANGE UP (ref 3.5–5.3)
POTASSIUM SERPL-SCNC: 5 MMOL/L — SIGNIFICANT CHANGE UP (ref 3.5–5.3)
PROT SERPL-MCNC: 5.7 G/DL — LOW (ref 6–8.3)
PROTHROM AB SERPL-ACNC: 11 SEC — SIGNIFICANT CHANGE UP (ref 9.5–13)
RBC # BLD: 3.22 M/UL — LOW (ref 3.8–5.2)
RBC # FLD: 14.3 % — SIGNIFICANT CHANGE UP (ref 10.3–14.5)
RH IG SCN BLD-IMP: POSITIVE — SIGNIFICANT CHANGE UP
SODIUM SERPL-SCNC: 141 MMOL/L — SIGNIFICANT CHANGE UP (ref 135–145)
URATE SERPL-MCNC: 2.3 MG/DL — LOW (ref 2.5–7)
WBC # BLD: 14.56 K/UL — HIGH (ref 3.8–10.5)
WBC # FLD AUTO: 14.56 K/UL — HIGH (ref 3.8–10.5)

## 2024-04-04 PROCEDURE — 77263 THER RADIOLOGY TX PLNG CPLX: CPT

## 2024-04-04 PROCEDURE — 71045 X-RAY EXAM CHEST 1 VIEW: CPT | Mod: 26

## 2024-04-04 PROCEDURE — 77307 TELETHX ISODOSE PLAN CPLX: CPT | Mod: 26

## 2024-04-04 PROCEDURE — 99223 1ST HOSP IP/OBS HIGH 75: CPT

## 2024-04-04 PROCEDURE — 77290 THER RAD SIMULAJ FIELD CPLX: CPT | Mod: 26

## 2024-04-04 PROCEDURE — 77334 RADIATION TREATMENT AID(S): CPT | Mod: 26

## 2024-04-04 PROCEDURE — 77427 RADIATION TX MANAGEMENT X5: CPT

## 2024-04-04 PROCEDURE — 99232 SBSQ HOSP IP/OBS MODERATE 35: CPT | Mod: 57

## 2024-04-04 PROCEDURE — 77280 THER RAD SIMULAJ FIELD SMPL: CPT | Mod: 26,59

## 2024-04-04 PROCEDURE — 77333 RADIATION TREATMENT AID(S): CPT | Mod: 26,59

## 2024-04-04 RX ORDER — DIAZEPAM 5 MG
5 TABLET ORAL
Refills: 0 | Status: DISCONTINUED | OUTPATIENT
Start: 2024-04-04 | End: 2024-04-11

## 2024-04-04 RX ORDER — POLYETHYLENE GLYCOL 3350 17 G/17G
17 POWDER, FOR SOLUTION ORAL EVERY 12 HOURS
Refills: 0 | Status: DISCONTINUED | OUTPATIENT
Start: 2024-04-04 | End: 2024-05-13

## 2024-04-04 RX ORDER — METOPROLOL TARTRATE 50 MG
25 TABLET ORAL EVERY 12 HOURS
Refills: 0 | Status: DISCONTINUED | OUTPATIENT
Start: 2024-04-04 | End: 2024-05-13

## 2024-04-04 RX ORDER — DEXAMETHASONE 0.5 MG/5ML
4 ELIXIR ORAL EVERY 8 HOURS
Refills: 0 | Status: DISCONTINUED | OUTPATIENT
Start: 2024-04-04 | End: 2024-04-11

## 2024-04-04 RX ORDER — ACETAMINOPHEN 500 MG
650 TABLET ORAL EVERY 6 HOURS
Refills: 0 | Status: DISCONTINUED | OUTPATIENT
Start: 2024-04-04 | End: 2024-04-07

## 2024-04-04 RX ORDER — HYDROMORPHONE HYDROCHLORIDE 2 MG/ML
0.5 INJECTION INTRAMUSCULAR; INTRAVENOUS; SUBCUTANEOUS
Refills: 0 | Status: DISCONTINUED | OUTPATIENT
Start: 2024-04-04 | End: 2024-04-04

## 2024-04-04 RX ORDER — NICOTINE POLACRILEX 2 MG
1 GUM BUCCAL EVERY 24 HOURS
Refills: 0 | Status: DISCONTINUED | OUTPATIENT
Start: 2024-04-04 | End: 2024-05-13

## 2024-04-04 RX ORDER — HYDROMORPHONE HYDROCHLORIDE 2 MG/ML
1 INJECTION INTRAMUSCULAR; INTRAVENOUS; SUBCUTANEOUS
Refills: 0 | Status: DISCONTINUED | OUTPATIENT
Start: 2024-04-04 | End: 2024-04-11

## 2024-04-04 RX ORDER — ALBUTEROL 90 UG/1
2 AEROSOL, METERED ORAL EVERY 6 HOURS
Refills: 0 | Status: DISCONTINUED | OUTPATIENT
Start: 2024-04-04 | End: 2024-05-01

## 2024-04-04 RX ORDER — ENOXAPARIN SODIUM 100 MG/ML
50 INJECTION SUBCUTANEOUS EVERY 12 HOURS
Refills: 0 | Status: DISCONTINUED | OUTPATIENT
Start: 2024-04-04 | End: 2024-04-04

## 2024-04-04 RX ORDER — IPRATROPIUM/ALBUTEROL SULFATE 18-103MCG
3 AEROSOL WITH ADAPTER (GRAM) INHALATION EVERY 6 HOURS
Refills: 0 | Status: DISCONTINUED | OUTPATIENT
Start: 2024-04-04 | End: 2024-05-01

## 2024-04-04 RX ORDER — NALOXEGOL OXALATE 12.5 MG/1
25 TABLET, FILM COATED ORAL DAILY
Refills: 0 | Status: DISCONTINUED | OUTPATIENT
Start: 2024-04-04 | End: 2024-05-13

## 2024-04-04 RX ORDER — DEXAMETHASONE 0.5 MG/5ML
4 ELIXIR ORAL EVERY 8 HOURS
Refills: 0 | Status: DISCONTINUED | OUTPATIENT
Start: 2024-04-04 | End: 2024-04-04

## 2024-04-04 RX ORDER — OXYCODONE HYDROCHLORIDE 5 MG/1
30 TABLET ORAL
Refills: 0 | Status: DISCONTINUED | OUTPATIENT
Start: 2024-04-04 | End: 2024-04-11

## 2024-04-04 RX ORDER — CHLORHEXIDINE GLUCONATE 213 G/1000ML
1 SOLUTION TOPICAL
Refills: 0 | Status: DISCONTINUED | OUTPATIENT
Start: 2024-04-04 | End: 2024-04-06

## 2024-04-04 RX ORDER — AMIODARONE HYDROCHLORIDE 400 MG/1
200 TABLET ORAL DAILY
Refills: 0 | Status: DISCONTINUED | OUTPATIENT
Start: 2024-04-04 | End: 2024-05-13

## 2024-04-04 RX ORDER — POLYETHYLENE GLYCOL 3350 17 G/17G
17 POWDER, FOR SOLUTION ORAL DAILY
Refills: 0 | Status: DISCONTINUED | OUTPATIENT
Start: 2024-04-04 | End: 2024-04-04

## 2024-04-04 RX ORDER — SODIUM CHLORIDE 9 MG/ML
10 INJECTION INTRAMUSCULAR; INTRAVENOUS; SUBCUTANEOUS
Refills: 0 | Status: DISCONTINUED | OUTPATIENT
Start: 2024-04-04 | End: 2024-05-13

## 2024-04-04 RX ORDER — OXYCODONE HYDROCHLORIDE 5 MG/1
10 TABLET ORAL EVERY 4 HOURS
Refills: 0 | Status: DISCONTINUED | OUTPATIENT
Start: 2024-04-04 | End: 2024-04-11

## 2024-04-04 RX ORDER — ENOXAPARIN SODIUM 100 MG/ML
50 INJECTION SUBCUTANEOUS EVERY 12 HOURS
Refills: 0 | Status: DISCONTINUED | OUTPATIENT
Start: 2024-04-04 | End: 2024-04-11

## 2024-04-04 RX ORDER — SALIVA SUBSTITUTE COMB NO.11 351 MG
15 POWDER IN PACKET (EA) MUCOUS MEMBRANE
Refills: 0 | Status: DISCONTINUED | OUTPATIENT
Start: 2024-04-04 | End: 2024-05-13

## 2024-04-04 RX ORDER — PANTOPRAZOLE SODIUM 20 MG/1
40 TABLET, DELAYED RELEASE ORAL DAILY
Refills: 0 | Status: DISCONTINUED | OUTPATIENT
Start: 2024-04-04 | End: 2024-04-13

## 2024-04-04 RX ORDER — HYDROMORPHONE HYDROCHLORIDE 2 MG/ML
5 INJECTION INTRAMUSCULAR; INTRAVENOUS; SUBCUTANEOUS ONCE
Refills: 0 | Status: DISCONTINUED | OUTPATIENT
Start: 2024-04-04 | End: 2024-04-05

## 2024-04-04 RX ADMIN — NALOXEGOL OXALATE 25 MILLIGRAM(S): 12.5 TABLET, FILM COATED ORAL at 16:47

## 2024-04-04 RX ADMIN — AMIODARONE HYDROCHLORIDE 200 MILLIGRAM(S): 400 TABLET ORAL at 05:41

## 2024-04-04 RX ADMIN — Medication 4 MILLIGRAM(S): at 05:41

## 2024-04-04 RX ADMIN — ENOXAPARIN SODIUM 50 MILLIGRAM(S): 100 INJECTION SUBCUTANEOUS at 17:29

## 2024-04-04 RX ADMIN — OXYCODONE HYDROCHLORIDE 30 MILLIGRAM(S): 5 TABLET ORAL at 14:21

## 2024-04-04 RX ADMIN — HYDROMORPHONE HYDROCHLORIDE 1 MILLIGRAM(S): 2 INJECTION INTRAMUSCULAR; INTRAVENOUS; SUBCUTANEOUS at 02:53

## 2024-04-04 RX ADMIN — ENOXAPARIN SODIUM 50 MILLIGRAM(S): 100 INJECTION SUBCUTANEOUS at 05:41

## 2024-04-04 RX ADMIN — OXYCODONE HYDROCHLORIDE 30 MILLIGRAM(S): 5 TABLET ORAL at 06:20

## 2024-04-04 RX ADMIN — OXYCODONE HYDROCHLORIDE 30 MILLIGRAM(S): 5 TABLET ORAL at 15:31

## 2024-04-04 RX ADMIN — OXYCODONE HYDROCHLORIDE 30 MILLIGRAM(S): 5 TABLET ORAL at 06:50

## 2024-04-04 RX ADMIN — Medication 4 MILLIGRAM(S): at 16:47

## 2024-04-04 RX ADMIN — Medication 25 MILLIGRAM(S): at 05:41

## 2024-04-04 RX ADMIN — OXYCODONE HYDROCHLORIDE 30 MILLIGRAM(S): 5 TABLET ORAL at 21:43

## 2024-04-04 RX ADMIN — Medication 5 MILLIGRAM(S): at 08:51

## 2024-04-04 RX ADMIN — Medication 25 MILLIGRAM(S): at 17:29

## 2024-04-04 RX ADMIN — Medication 3 MILLILITER(S): at 21:26

## 2024-04-04 RX ADMIN — Medication 4 MILLIGRAM(S): at 21:42

## 2024-04-04 RX ADMIN — Medication 15 MILLILITER(S): at 22:26

## 2024-04-04 RX ADMIN — PANTOPRAZOLE SODIUM 40 MILLIGRAM(S): 20 TABLET, DELAYED RELEASE ORAL at 08:55

## 2024-04-04 RX ADMIN — HYDROMORPHONE HYDROCHLORIDE 1 MILLIGRAM(S): 2 INJECTION INTRAMUSCULAR; INTRAVENOUS; SUBCUTANEOUS at 02:23

## 2024-04-04 NOTE — CONSULT NOTE ADULT - SUBJECTIVE AND OBJECTIVE BOX
CC: Patient is a 58y old  Female who presents with a chief complaint of SVC syndrome, DVT, mediastinal mass (04 Apr 2024 08:58)    HPI:  [facility] transfer.  58F w/ F hx tobacco use, RCC right nephrectomy, right sided glaucoma, fibromyalgia, anxiety, GERD, was at [facility] w/ concern for breast inflammation, lung nodules/mediastinal mass w/ biopsy concern for metastatic GI cancer. Had pleural effusion s/p chest tube and pleurx, had pericardial window for effusion. Has had poor IV access, has triple lumen in right groin. Has UE DVT on lovenox. Also has SVC syndrome, transfer from [facility] for [facility] RT treatment.     ROS otherwise negative. On oxygen.        (04 Apr 2024 03:38)    PAST MEDICAL & SURGICAL HISTORY:  Anxiety      Acid reflux disease      Umbilical hernia      Uterine mass  Benign      Fibromyalgia  Joint pains      Glaucoma  Right eye      Herniated cervical disc      Cancer of kidney  right kidney      S/P partial hysterectomy  8 years ago      S/P knee surgery  knee arthroscopy right x 2 ( 2011 & 2012)      S/P hernia repair  umbilical Hernia Repair - 2011      H/O unilateral nephrectomy  Right Laparoscopic Nephrectomy - 45523      Glaucoma following surgery  Right Eyr - 2012      History of tonsillectomy        SOCIAL HISTORY:  Tobacco Usage:  (   ) never smoked   (   ) former smoker   (   ) current smoker  (     ) pack years    Tobacco Quit Date:  Substance Use (Street drugs): (  ) never used  (  ) other:  Alcohol Usage:    Family history reviewed and otherwise non-contributory  ALLERGIES:  erythromycin (Headache; Vomiting; Rash)  penicillin (Headache; Vomiting; Rash)  Cipro (Headache; Vomiting; Rash)    MEDICATIONS:  acetaminophen     Tablet .. 650 milliGRAM(s) Oral every 6 hours PRN  albuterol/ipratropium for Nebulization 3 milliLiter(s) Nebulizer every 6 hours PRN  aMIOdarone    Tablet 200 milliGRAM(s) Oral daily  chlorhexidine 4% Liquid 1 Application(s) Topical <User Schedule>  dexAMETHasone  Injectable 4 milliGRAM(s) IV Push every 8 hours  diazepam    Tablet 5 milliGRAM(s) Oral two times a day PRN  enoxaparin Injectable 50 milliGRAM(s) SubCutaneous every 12 hours  HYDROmorphone  Injectable 1 milliGRAM(s) IV Push every 3 hours PRN  metoprolol tartrate 25 milliGRAM(s) Oral every 12 hours  naloxegol 25 milliGRAM(s) Oral daily  nicotine -  14 mG/24Hr(s) Patch 1 Patch Transdermal every 24 hours  oxyCODONE    IR 10 milliGRAM(s) Oral every 4 hours PRN  oxyCODONE  ER Tablet 30 milliGRAM(s) Oral <User Schedule>  pantoprazole   Suspension 40 milliGRAM(s) Oral daily  polyethylene glycol 3350 17 Gram(s) Oral every 12 hours  sodium chloride 0.9% lock flush 10 milliLiter(s) IV Push every 1 hour PRN      REVIEW OF SYSTEMS:  Negative except as above in HPI.    Vital Signs Last 24 Hrs  T(F): 98.8 (04 Apr 2024 05:15), Max: 98.8 (04 Apr 2024 05:15)  HR: 63 (04 Apr 2024 05:15) (62 - 68)  BP: 116/60 (04 Apr 2024 05:15) (110/66 - 129/79)  RR: 18 (04 Apr 2024 05:15) (18 - 18)  SpO2: 96% (04 Apr 2024 05:15) (96% - 99%)  I&O's Summary    PHYSICAL EXAM:  GENERAL: NAD, well-groomed  HEAD:  Atraumatic, Normocephalic  EYES: PERRLA, normal conjunctiva, anicteric  ENMT: Moist mucous membranes, no mucositis  NECK: Supple, No JVD  CHEST/LUNG: Clear to auscultation bilaterally; No rales, rhonchi, wheezing, or rubs  HEART: Regular rate and rhythm; S1/S2, No murmurs, rubs, or gallops  ABDOMEN: Soft, Nontender, Nondistended; Bowel sounds present  VASCULAR: Normal pulses, Normal capillary refill  EXTREMITIES:  2+ Peripheral Pulses, No cyanosis, No edema  LYMPH: No lymphadenopathy noted  SKIN: Warm, Intact  PSYCH: Normal mood and affect  NERVOUS SYSTEM:  A/O x3, CN 2-12 intact, No focal deficits    LABS:                        9.5    14.56 )-----------( 642      ( 04 Apr 2024 05:54 )             29.6     04-04    141  |  105  |  38  ----------------------------<  118  5.0   |  27  |  0.85    Ca    9.2      04 Apr 2024 05:54  Phos  3.2     04-04  Mg     2.10     04-04    TPro  5.7  /  Alb  3.2  /  TBili  <0.2  /  DBili  x   /  AST  9   /  ALT  15  /  AlkPhos  71  04-04  PT/INR - ( 04 Apr 2024 05:54 )   PT: 11.0 sec;   INR: 0.97 ratio    PTT - ( 04 Apr 2024 05:54 )  PTT:29.5 sec                        Urinalysis Basic - ( 04 Apr 2024 05:54 )    Color: x / Appearance: x / SG: x / pH: x  Gluc: 118 mg/dL / Ketone: x  / Bili: x / Urobili: x   Blood: x / Protein: x / Nitrite: x   Leuk Esterase: x / RBC: x / WBC x   Sq Epi: x / Non Sq Epi: x / Bacteria: x            RADIOLOGY & ADDITIONAL TESTS:    Care Discussed with Consultants/Other Providers:

## 2024-04-04 NOTE — H&P ADULT - PROBLEM SELECTOR PLAN 3
-has pleurx in place, get chest xray make sure pleural effusion is not reaccumulating  -c/w oxygenation support, may need HFNC if decompensating

## 2024-04-04 NOTE — PROGRESS NOTE ADULT - PROBLEM SELECTOR PLAN 4
currently on 4L NC  - followed by thoracic at [facility] s/p pleurX  - consult thoracic surgery - follow up further recommendations  - monitor oxygen saturations

## 2024-04-04 NOTE — DISCHARGE NOTE PROVIDER - NSDCMRMEDTOKEN_GEN_ALL_CORE_FT
aluminum hydroxide-magnesium hydroxide 200 mg-200 mg/5 mL oral suspension: 30 milliliter(s) orally every 6 hours As needed Dyspepsia  bisacodyl 5 mg oral delayed release tablet: 1 tab(s) orally every 12 hours As needed Constipation  guaiFENesin 100 mg/5 mL oral liquid: 10 milliliter(s) orally every 6 hours As needed Cough  ipratropium-albuterol 0.5 mg-2.5 mg/3 mL inhalation solution: 3 milliliter(s) inhaled every 6 hours As needed Shortness of Breath and/or Wheezing  lactobacillus acidophilus oral capsule: 1 orally once a day  melatonin 3 mg oral tablet: 1 tab(s) orally once a day (at bedtime) As needed Insomnia  metoprolol tartrate 25 mg oral tablet: 1 tab(s) orally 2 times a day  naloxegol 25 mg oral tablet: 1 tab(s) orally once a day (before a meal)  naloxone: 0.1 milligram(s) intravenous every 3 minutes as needed for Obtundation  nicotine 14 mg/24 hr transdermal film, extended release: 1 patch transdermal once a day  pantoprazole 40 mg oral delayed release tablet: 1 tab(s) orally once a day (before a meal)  polyethylene glycol 3350 oral powder for reconstitution: 17 gram(s) orally 2 times a day  saliva substitutes oral solution: 1 orally prn  senna leaf extract oral tablet: 2 tab(s) orally once a day (at bedtime)   aluminum hydroxide-magnesium hydroxide 200 mg-200 mg/5 mL oral suspension: 30 milliliter(s) orally every 6 hours As needed Dyspepsia  amiodarone 200 mg oral tablet: 1 tab(s) orally once a day  budesonide-formoterol 160 mcg-4.5 mcg/inh inhalation aerosol: 2 puff(s) inhaled 2 times a day  dexAMETHasone 4 mg oral tablet: 1 tab(s) orally every 12 hours  diazePAM 5 mg oral tablet: 1 tab(s) orally 2 times a day As needed anxiety  ipratropium-albuterol 0.5 mg-2.5 mg/3 mL inhalation solution: 3 milliliter(s) inhaled every 6 hours  lactobacillus acidophilus oral capsule: 1 orally once a day  melatonin 3 mg oral tablet: 1 tab(s) orally once a day (at bedtime) As needed Insomnia  metoprolol tartrate 25 mg oral tablet: 1 tab(s) orally every 12 hours  morphine 20 mg/mL oral concentrate: 20 milligram(s) sublingual every 3 hours as needed for  severe pain/dyspnea  naloxegol 25 mg oral tablet: 1 tab(s) orally once a day (before a meal)  naloxone: 0.1 milligram(s) intravenous every 3 minutes as needed for Obtundation  nicotine 14 mg/24 hr transdermal film, extended release: 1 patch transdermal once a day  ondansetron 4 mg oral tablet: 1 tab(s) orally every 8 hours As needed Nausea and/or Vomiting  oxyCODONE 30 mg oral tablet, extended release: 1 tab(s) orally 3 times a day 6:00, 14:00, 22:00  pantoprazole 40 mg oral delayed release tablet: 1 tab(s) orally once a day (before a meal)  petrolatum topical ointment: 1 Apply topically to affected area 2 times a day  polyethylene glycol 3350 oral powder for reconstitution: 17 gram(s) orally 2 times a day  saliva substitutes oral solution: 15 milliliter(s) orally 2 times a day as needed for dry mouth  senna leaf extract oral tablet: 2 tab(s) orally once a day (at bedtime)

## 2024-04-04 NOTE — DISCHARGE NOTE PROVIDER - NSDCFUSCHEDAPPT_GEN_ALL_CORE_FT
Talha Lira  [facility]  [facility] [address] [facility]  Scheduled Appointment: 04/08/2024

## 2024-04-04 NOTE — CONSULT NOTE ADULT - PROBLEM SELECTOR RECOMMENDATION 5
For pain management     In the event of worsening symptoms, please contact the Palliative Medicine team via pager (if the patient is at [facility] #[phone] or if the patient is at [facility] #[phone]) The Geriatric and Palliative Medicine service has coverage 24 hours a day/ 7 days a week to provide medical recommendations regarding symptom management needs via telephone - MOLST completed in [facility]- DNR/DNI - patient reaffirmed   - HCP done in [facility]- patient's boyfriend Saeid Peña

## 2024-04-04 NOTE — CONSULT NOTE ADULT - PROBLEM SELECTOR RECOMMENDATION 4
- MOLST completed in [facility]- DNR/DNI   - HCP done in [facility]- patient's boyfriend Saeid - hx of anxiety   - on valium 5 mg BID (home med)

## 2024-04-04 NOTE — PROGRESS NOTE ADULT - SUBJECTIVE AND OBJECTIVE BOX
See #2. prior rad onc consult 3/27/24- was at [facility].     Transferred overnight to [facility] , planned for palliative RT for SVC syndrome.     pathology: 3/14/24: metastatic carcinoma.     Cytopathology - Non Gyn Report:   ACCESSION No:  [id number]  Patient:     GADIEL GENAO    Accession:                             [id number]    Collected Date/Time:                   3/14/2024 15:50 EDT  Received Date/Time:                    3/14/2024 21:58 EDT    FineNeedle Aspiration Addendum Report - Auth (Verified)  _________________________________________________________________    Fine Needle Aspiration Report - Auth (Verified)    Specimen(s) Submitted  LYMPH NODE, SUPRACLAVICULAR, RIGHT, US GUIDED CORE BIOPSY AND FNA    Final Diagnosis  LYMPH NODE, SUPRACLAVICULAR, RIGHT, US GUIDED CORE BIOPSY AND FNA  POSITIVE FOR MALIGNANT CELLS.  Carcinoma    _________________________________________________________________        HPI:  58F w/Hx of RCC s/p R total nephrectomy, S/p hysterectomy, R-sided glaucoma, Fibromyalgia, Anxiety, GERD, smoker presents due to R breast swelling, redness and pain. Pt was initially evaluated by [facility] yesterday and was going to be admitted but left AMA. Was prescribed clindamycin for presumed breast cellulitis/mastitis on discharge, but was also told about concerning findings on CT Chest related to possible breast malignancy and metastasis. Pt has not had a mammogram or colonoscopy in years. Reports that she had a lidocaine injection in her R shoulder on 3/8/24 and since then has had worsening swelling, redness and pain in her R breast. Also still has R shoulder pain, which had mild improvement since the injection. Pt consistently takes oxycodone q6h and believes this may have masked her pain earlier. Reports over 40 pound weight loss in the last year and loss of appetite.      ·  Problem: Pericardial effusion.   ·  Plan: 3/19 underwent Subxiphoid pericardial window with Dr Ruiz    pericardial drain  to water seal document output q shift    Heme onc: Dr. Bowden:   57 y/o woman presenting to hospital with right breast swelling and cellulitis found to have imaging evidence concerning for metastatic malignancy w/ lung nodules, and axillary, supraclavicular, and mediastinal adenopathy. CT CAP w/co shows compression of IVC as well as R IJ. There is conglomerate radha mass in the neck, supraclavicular area. 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 Biliary dilatation noted as well. Recommend MRCP to eval further, advance GI to consider EUS/EGD to r/o gastric malignancy, pancreatic cancer or cholangio. Further treatment recommendations pending above work up. d/w pt at bedside.     Thoracic surgery:   . Additional comments: patient had supraclavicular lymph node biopsy - pending pathology - asked to evaluate for mediastinal adenopathy BUT concerns for svc syndrome - recommend CT with IV contrast.   as IR already involved - recommend management once pathology is final for svc syndrome.         IR consulted for SVC stenting.    - At this time and after reviewing imaging from 3/14, no adequate landing for SVC stenting given size and invasiveness of tumor. Recommend SBIRT and chemotherapy.  Glaucoma following surgery      < from: CT Chest w/ IV Cont (03.27.24 @ 17:10) >  In comparison with 3/14/2024, new consolidations throughout the right   lung more severely within right lower lobe likely representing pneumonia.    Interval insertion of right Pleurx catheter and decrease of right pleural   effusion. Small/moderate left pleural effusion is increased.    Redemonstrated extensive DVT within thoracic and lower neck. The upper   SVC remains obliterated.    Large right supraclavicular mass infiltrating into the mediastinum is   unchanged.    Trace right pneumothorax.    These findings were discussed with Dr. SACHA Mcgregor, on 3/28/2024 11:17 AM   with read back.              ASSESSMENT/PLAN    GADIEL GENAO is a 58y woman with unknown primary, RCC s/p R total nephrectomy, S/p hysterectomy, R-sided glaucoma, R supraclavicular node biopsy + for malignant cells, also h/o  Fibromyalgia, Anxiety, GERD, smoker presents due to R breast swelling, redness and pain went to [facility], signed out and then came to [facility] for further care, concern is for SVC syndrome.    CT CAP w/co shows compression of IVC as well as R IJ and SVC syndrome suspected.  IR consulted and cannot stent.  Dr. Sterling spoke to referring Dr. Nolen and agreed to transfer care from [facility] to [facility].   Now at [facility], transfer is complete.  Will see and evaluate for palliative RT.        prior rad onc consult 3/27/24- was at [facility].     Transferred overnight to [facility] , planned for palliative RT for SVC syndrome.     pathology: 3/14/24: metastatic carcinoma.     Cytopathology - Non Gyn Report:   ACCESSION No:  [id number]  Patient:     GADIEL GENAO    Accession:                             [id number]    Collected Date/Time:                   3/14/2024 15:50 EDT  Received Date/Time:                    3/14/2024 21:58 EDT    FineNeedle Aspiration Addendum Report - Auth (Verified)  _________________________________________________________________    Fine Needle Aspiration Report - Auth (Verified)    Specimen(s) Submitted  LYMPH NODE, SUPRACLAVICULAR, RIGHT, US GUIDED CORE BIOPSY AND FNA    Final Diagnosis  LYMPH NODE, SUPRACLAVICULAR, RIGHT, US GUIDED CORE BIOPSY AND FNA  POSITIVE FOR MALIGNANT CELLS.  Carcinoma    _________________________________________________________________        HPI:  58F w/Hx of RCC s/p R total nephrectomy, S/p hysterectomy, R-sided glaucoma, Fibromyalgia, Anxiety, GERD, smoker presents due to R breast swelling, redness and pain. Pt was initially evaluated by [facility] yesterday and was going to be admitted but left AMA. Was prescribed clindamycin for presumed breast cellulitis/mastitis on discharge, but was also told about concerning findings on CT Chest related to possible breast malignancy and metastasis. Pt has not had a mammogram or colonoscopy in years. Reports that she had a lidocaine injection in her R shoulder on 3/8/24 and since then has had worsening swelling, redness and pain in her R breast. Also still has R shoulder pain, which had mild improvement since the injection. Pt consistently takes oxycodone q6h and believes this may have masked her pain earlier. Reports over 40 pound weight loss in the last year and loss of appetite.      ·  Problem: Pericardial effusion.   ·  Plan: 3/19 underwent Subxiphoid pericardial window with Dr Ruiz    pericardial drain  to water seal document output q shift    Heme onc: Dr. Bowden:   57 y/o woman presenting to hospital with right breast swelling and cellulitis found to have imaging evidence concerning for metastatic malignancy w/ lung nodules, and axillary, supraclavicular, and mediastinal adenopathy. CT CAP w/co shows compression of IVC as well as R IJ. There is conglomerate radha mass in the neck, supraclavicular area. 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 Biliary dilatation noted as well. Recommend MRCP to eval further, advance GI to consider EUS/EGD to r/o gastric malignancy, pancreatic cancer or cholangio. Further treatment recommendations pending above work up. d/w pt at bedside.     Thoracic surgery:   . Additional comments: patient had supraclavicular lymph node biopsy - pending pathology - asked to evaluate for mediastinal adenopathy BUT concerns for svc syndrome - recommend CT with IV contrast.   as IR already involved - recommend management once pathology is final for svc syndrome.         IR consulted for SVC stenting.    - At this time and after reviewing imaging from 3/14, no adequate landing for SVC stenting given size and invasiveness of tumor. Recommend SBIRT and chemotherapy.  Glaucoma following surgery      < from: CT Chest w/ IV Cont (03.27.24 @ 17:10) >  In comparison with 3/14/2024, new consolidations throughout the right   lung more severely within right lower lobe likely representing pneumonia.    Interval insertion of right Pleurx catheter and decrease of right pleural   effusion. Small/moderate left pleural effusion is increased.    Redemonstrated extensive DVT within thoracic and lower neck. The upper   SVC remains obliterated.    Large right supraclavicular mass infiltrating into the mediastinum is   unchanged.    Trace right pneumothorax.    These findings were discussed with Dr. SACHA Mcgregor, on 3/28/2024 11:17 AM   with read back.      p/E :   KPS 50  on nasal cannula 4-5LPM  sits upright but tested to lay flat for 5 minutes and did but was very anxious.   right chest tube port seen, no Pleurex to floor, just right chest tube.  also right femoral line noted.   a.o. x 3, cooperative, talkative, wants care but is very anxious.           ASSESSMENT/PLAN    GADIEL GENAO is a 58y woman with unknown primary, RCC s/p R total nephrectomy, S/p hysterectomy, R-sided glaucoma, R supraclavicular node biopsy + for malignant cells, also h/o  Fibromyalgia, Anxiety, GERD, smoker presents due to R breast swelling, redness and pain went to [facility], signed out and then came to [facility] for further care, concern is for SVC syndrome.    CT CAP w/co shows compression of IVC as well as R IJ and SVC syndrome suspected.  IR consulted and cannot stent.  Dr. Sterling spoke to referring Dr. Nolen and agreed to transfer care from [facility] to [facility].   Now at [facility], transfer is complete.  She signed consent and agreed to palliative neck/chest RT for SVC syndrome, on oxygen at present, tested ability to lay flat for 5 minutes, ++ anxiety,   Plan for CT simulation today and to soon follow with a course of 5 fractions.   Discussed with Dr. Sterling.

## 2024-04-04 NOTE — H&P ADULT - ASSESSMENT
58F w/ F hx tobacco use, RCC right nephrectomy, right sided glaucoma, fibromyalgia, anxiety, GERD, was at [facility] w/ concern for breast inflammation, lung nodules/mediastinal mass w/ biopsy concern for metastatic GI cancer.

## 2024-04-04 NOTE — DISCHARGE NOTE PROVIDER - NSDCCPCAREPLAN_GEN_ALL_CORE_FT
PRINCIPAL DISCHARGE DIAGNOSIS  Diagnosis: SVC syndrome  Assessment and Plan of Treatment: You have a mass in your chest that is pressing down on the vessles in the area and causing shortness of breath and arm swelling. You were transferred to [facility] for radiation which you completed. Please take your blood thinners as prescribed. Please follow up with onoclogy teams (medical oncology and radiation oncology).      SECONDARY DISCHARGE DIAGNOSES  Diagnosis: Pleural effusion  Assessment and Plan of Treatment: Thoracic surgery placed a catheter to help drain fluid from your lung, please follow up with thoracic surgery.     PRINCIPAL DISCHARGE DIAGNOSIS  Diagnosis: SVC syndrome  Assessment and Plan of Treatment: You have a mass in your chest that is pressing down on the vessles in the area and causing shortness of breath and arm swelling. You were transferred to [facility] for radiation which you completed. You have finished the radiation however, your respiratory status worsened likely due to progression of the disease. Please continue your care at inpatient hospice at [facility].      SECONDARY DISCHARGE DIAGNOSES  Diagnosis: Metastatic cancer  Assessment and Plan of Treatment: You are no longer a candidate for systemic therapy for cancer. Please continue with comfort measures and care with inpatient hospice program.    Diagnosis: Pleural effusion  Assessment and Plan of Treatment: Thoracic surgery placed a catheter to help drain fluid from your lung, please follow up with thoracic surgery. Please continue to drain up to 500c MWF as tolerated and needed for breathing.

## 2024-04-04 NOTE — CONSULT NOTE ADULT - PROBLEM SELECTOR RECOMMENDATION 9
- Source:   - Home regimen:   - Hospital regimen:   - Pain currently   [ ] improved   [ ] uncontrolled    - non-opioid analgesics:   - adjuvants:   - Bowel regimen, goal BM every 2 to 3 days to prevent opioid induced constipation, hold for loose stool    vallium? - Source: R sided chest pain, radiating. Large right supraclavicular infiltrative mass, extending into the inferior mediastinum, causing RUE and chest swelling, SVC syndrome   - regimen started at [facility]   - c/w oxycontin 30 mg TID   - c/w oxycodone 10 mg q4h PRN for moderate pain   - c/w IV dilaudid 1 mg q3h PRN for severe pain   - Bowel regimen, goal BM every 2 to 3 days to prevent opioid induced constipation, hold for loose stool

## 2024-04-04 NOTE — PROGRESS NOTE ADULT - PROBLEM SELECTOR PLAN 2
respiratory status currently stable   - CT neck with large supraclavicular/mediastinal mass lesion with mass effect and complete occlusion of the RIJ/SVC  - IR was consulted at [facility] for possible stenting; not feasible due to size/location  - transferred to [facility] for urgent radiation - s/p simulation 4/4, awaiting 5 sessions, c/w decadron 4mg TID   - monitor respiratory status closely

## 2024-04-04 NOTE — H&P ADULT - PROBLEM SELECTOR PLAN 1
-email sent to rad onc and onc  -plan for RT  -c/w decadron  -c/w oxycodone for pain   -check Qtc on EKG  -c/w miralax daily  -tobacco use hx, duonebs Q6 prn sob or wheezing

## 2024-04-04 NOTE — PROGRESS NOTE ADULT - PROBLEM SELECTOR PLAN 1
hx of RCC s/p nephrectomy (Urology Dr Jacky Adkins at [facility]), lymph node dissection 2 years ago.   - found to have axillary, subclavicular and mediastinal lymphadenopathy, pulmonary nodules and inflammatory changes of breast seen on CT with possible effect on IVC  - CT neck with large supraclavicular/mediastinal mass lesion with mass effect and complete occlusion of the RIJ/SVC  - s/p supraclavicular LN biopsy by IR on 3/14 - path consistent with primary of GI origin  - [facility] team discussed with GI re: MRI abdomen and MRCP - currently deferred due to respiratory status and svc syndrome pending urgent radiation  - appreciate radonc: s/p simulation 4/4, awaiting 5 sessions, c/w decadron 4mg TID - taper per radonc  - hemeonc consulted - follow up recommendations   - palliative care consulted for cancer related pain - follow up recommendations

## 2024-04-04 NOTE — CONSULT NOTE ADULT - PROBLEM SELECTOR RECOMMENDATION 6
For pain management     In the event of worsening symptoms, please contact the Palliative Medicine team via pager (if the patient is at [facility] #[phone] or if the patient is at [facility] #[phone]) The Geriatric and Palliative Medicine service has coverage 24 hours a day/ 7 days a week to provide medical recommendations regarding symptom management needs via telephone

## 2024-04-04 NOTE — CONSULT NOTE ADULT - TIME BILLING
Reviewing medical chart and results, symptom assessment and management, counseling patient/decision makers/caregivers, coordination of care, documentation.

## 2024-04-04 NOTE — PROGRESS NOTE ADULT - PROBLEM SELECTOR PLAN 3
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 Discussed with IV nurse team, reviewed US 3/28 - patient with clots on bilateral upper extremities, recommending repeat LUE sonogram, if clots improved can consider PIV or midline

## 2024-04-04 NOTE — CHART NOTE - NSCHARTNOTEFT_GEN_A_CORE
Patient complained of sudden onset of shortness of breath for a few minutes that has now resolved. SpO2 stable. Lung exam notable fo diminished breath sounds throughout right hemithorax. STAT CXR ordered. Instructed RN to drain pleurx.

## 2024-04-04 NOTE — H&P ADULT - NSHPPHYSICALEXAM_GEN_ALL_CORE
PHYSICAL EXAM:  GENERAL: NAD, well-developed  HEAD:  Atraumatic, Normocephalic  EYES: EOMI, PERRLA, conjunctiva and sclera clear  NECK: Supple, No JVD  CHEST/LUNG: diminished breath sounds lower lung fields, pleurx noted  HEART: Regular rate and rhythm; No murmurs, rubs, or gallops  ABDOMEN: Soft, Nontender, Nondistended; Bowel sounds present  EXTREMITIES:  2+ Peripheral Pulses, No clubbing, cyanosis, or edema  PSYCH: AAOx3  NEUROLOGY: non-focal  SKIN: No rashes or lesions, left groin central line intact

## 2024-04-04 NOTE — H&P ADULT - NSICDXPASTSURGICALHX_GEN_ALL_CORE_FT
PAST SURGICAL HISTORY:  Glaucoma following surgery Right Eyr - 2012    H/O unilateral nephrectomy Right Laparoscopic Nephrectomy - 27004    History of tonsillectomy     S/P hernia repair umbilical Hernia Repair - 2011    S/P knee surgery knee arthroscopy right x 2 ( 2011 & 2012)    S/P partial hysterectomy 8 years ago

## 2024-04-04 NOTE — CONSULT NOTE ADULT - SUBJECTIVE AND OBJECTIVE BOX
HPI:  [facility] transfer.  58F w/ F hx tobacco use, RCC right nephrectomy, right sided glaucoma, fibromyalgia, anxiety, GERD, was at [facility] w/ concern for breast inflammation, lung nodules/mediastinal mass w/ biopsy concern for metastatic GI cancer. Had pleural effusion s/p chest tube and pleurx, had pericardial window for effusion. Has had poor IV access, has triple lumen in right groin. Has UE DVT on lovenox. Also has SVC syndrome, transfer from [facility] for LIJ RT treatment. ROS otherwise negative. On oxygen.       PERTINENT PM/SXH:   Anxiety    Acid reflux disease    Renal cancer, left    Umbilical hernia    Uterine mass    Fibromyalgia    Glaucoma    Herniated cervical disc    FH: kidney cancer    Cancer of kidney      S/P partial hysterectomy    S/P knee surgery    S/P hernia repair    H/O unilateral nephrectomy    Glaucoma following surgery    History of tonsillectomy      FAMILY HISTORY:  Family history of lung cancer (Mother)    Family history of pancreatic cancer (Father)      ------------------------------------------------------------------------------------------------------------  ITEMS NOT CHECKED ARE NOT PRESENT    SOCIAL HISTORY:   Living Situation: [ ]Home  [ ]Long term care  [ ]Rehab [ ]Other  Support:     Substance hx:  [ ]   Tobacco hx:  [ ]   Alcohol hx: [ ]   Family Hx substance abuse [ ]yes [ ]no    Buddhism/Spirituality:  PCSSQ[Palliative Care Spiritual Screening Question]   Severity (0-10): 0  Score of 4 or > indicate consideration of Chaplaincy referral.  Chaplaincy Referral: [ ] yes [X ] refused [ ] following    Anticipatory Grief present?:  [ ] yes [X ] no  [ ] Deferred                      Other Referrals:    [ ]Hospice   [ ]Caregiver [place] Support  [ ]Child Life    [ ]Patient & Family Centered Care Referral  [ ]Holistic Therapy     ------------------------------------------------------------------------------------------------------------    PRESENT SYMPTOMS:     [ ] Unable to self-report      [ ] PAINADS     [ ] RDOS    [ ] No     [ ] Yes     Source if other than patient:  [ ]Family   [ ]Team     PAIN:   If blank, patient unable to specify     [ ]yes [ ]no    1. Location-   2. Radiation-    3. Quality-   4. Timing-   5. Minimal acceptable level/pain goal-   6. Aggravating factors-   7. QOL impact-     SYMPTOMS:   Dyspnea:                           [ ]Mild [ ]Moderate [ ]Severe  Anxiety:                             [ ]Mild [ ]Moderate [ ]Severe  Fatigue:                             [ ]Mild [ ]Moderate [ ]Severe  Nausea/Vomiting:              [ ]Mild [ ]Moderate [ ]Severe  Loss of appetite:                [ ]Mild [ ]Moderate [ ]Severe  Constipation:                     [ ]Mild [ ]Moderate [ ]Severe    Other Symptoms:  [X ]All other review of systems negative     ------------------------------------------------------------------------------------------------------------      I-Stop Reference No:     ------------------------------------------------------------------------------------------------------------    FUNCTIONAL STATUS:     Baseline ADL (prior to admission):  [ ] Independent  [ ] Moderate Assistance [ ] Dependent    Palliative Performance Score (current):     [ ]PPSV2 < or = to 30%   [ ]artificial nutrition      NUTRITIONAL STATUS:     Protein Calorie Malnutrition Present:   [ ]mild   [ ]moderate or severe    Weight:   [ ]underweight (BMI 18.5 or less)   [ ]morbid obesity (BMI 30 or higher)   [ ]anasarca  [ ]significant weight loss     Height (cm): 152.4 (04-04-24 @ 11:33), 154.9 (03-26-24 @ 16:20), 154.9 (03-10-24 @ 16:32)  Weight (kg): 45.4 (04-04-24 @ 11:33), 49.9 (03-26-24 @ 16:20), 49.9 (03-10-24 @ 16:32)  BMI (kg/m2): 19.5 (04-04-24 @ 11:33), 20.8 (03-26-24 @ 16:20), 20.8 (03-10-24 @ 16:32)    ------------------------------------------------------------------------------------------------------------    PRIOR ADVANCE DIRECTIVES:    [ ] DNR/MOLST    [ ] Living Will    [ ] Health Care Proxy(s)    DECISION MAKER(s):  [ ] Patient    [ ] Surrogate(s)  [ ] HCP   [ ] Guardian             ------------------------------------------------------------------------------------------------------------  PHYSICAL EXAM:  Vital Signs Last 24 Hrs  T(C): 36.8 (04 Apr 2024 13:15), Max: 37.1 (04 Apr 2024 05:15)  T(F): 98.3 (04 Apr 2024 13:15), Max: 98.8 (04 Apr 2024 05:15)  HR: 61 (04 Apr 2024 13:15) (60 - 68)  BP: 102/54 (04 Apr 2024 13:15) (102/54 - 129/79)  BP(mean): --  RR: 18 (04 Apr 2024 13:15) (18 - 18)  SpO2: 99% (04 Apr 2024 13:15) (96% - 99%)    Parameters below as of 04 Apr 2024 13:15  Patient On (Oxygen Delivery Method): nasal cannula  O2 Flow (L/min): 4   I&O's Summary      GENERAL:  [ ]Cachexia  [ ] Frail  [ ]Awake  [ ]Oriented x   [ ]Lethargic  [ ]Unarousable  [ ]Verbal  [ ]Non-Verbal    BEHAVIORAL:   [ ] Anxiety  [ ] Delirium [ ] Agitation [ ] Other    HEENT:   [ ]Normal   [ ]Dry mouth   [ ]ET Tube/Trach  [ ]Oral lesions    PULMONARY:   [ ]Clear              [ ]Tachypnea  [ ]Audible excessive secretions   [ ]Rhonchi        [ ]Right [ ]Left [ ]Bilateral  [ ]Crackles        [ ]Right [ ]Left [ ]Bilateral  [ ]Wheezing     [ ]Right [ ]Left [ ]Bilateral  [ ]Diminished breath sounds [ ]right [ ]left [ ]bilateral    CARDIOVASCULAR:    [ ]Regular [ ]Irregular [ ]Tachy  [ ]Malik [ ]Murmur [ ]Other    GASTROINTESTINAL:  [ ]Soft  [ ]Distended   [ ]+BS  [ ]Non tender [ ]Tender  [ ]Other [ ]PEG [ ]OGT/ NGT      GENITOURINARY:  [ ]Normal [ ] Incontinent   [ ]Oliguria/Anuria   [ ]Che    MUSCULOSKELETAL:   [ ]Normal   [ ]Weakness  [ ]Bed/Wheelchair bound [ ]Edema    NEUROLOGIC:   [ ]No focal deficits  [ ]Cognitive impairment  [ ]Dysphagia [ ]Dysarthria [ ]Paresis [ ]Other     SKIN:   [ ]Normal  [ ]Rash  [ ]Other  [ ]Pressure ulcer(s)       Present on admission [ ]y [ ]n    ------------------------------------------------------------------------------------------------------------    LABS:                        9.5    14.56 )-----------( 642      ( 04 Apr 2024 05:54 )             29.6   04-04    141  |  105  |  38<H>  ----------------------------<  118<H>  5.0   |  27  |  0.85    Ca    9.2      04 Apr 2024 05:54  Phos  3.2     04-04  Mg     2.10     04-04    TPro  5.7<L>  /  Alb  3.2<L>  /  TBili  <0.2  /  DBili  x   /  AST  9   /  ALT  15  /  AlkPhos  71  04-04  PT/INR - ( 04 Apr 2024 05:54 )   PT: 11.0 sec;   INR: 0.97 ratio         PTT - ( 04 Apr 2024 05:54 )  PTT:29.5 sec    Urinalysis Basic - ( 04 Apr 2024 05:54 )    Color: x / Appearance: x / SG: x / pH: x  Gluc: 118 mg/dL / Ketone: x  / Bili: x / Urobili: x   Blood: x / Protein: x / Nitrite: x   Leuk Esterase: x / RBC: x / WBC x   Sq Epi: x / Non Sq Epi: x / Bacteria: x        ------------------------------------------------------------------------------------------------------------    CRITICAL CARE:  [ ]Shock Present  [ ]Septic [ ]Cardiogenic [ ]Neurologic [ ]Hypovolemic [ ] Undifferentiated/Mixed   [ ]Vasopressors [ ]Inotropes     [ ]Respiratory failure present: [ ]Acute  [ ]Chronic [ ]Hypoxic  [ ]Hypercarbic   [ ]Mechanical ventilation   [ ]Trach collar   [ ]Non-invasive ventilatory support   [ ]High flow    [ ]Non-rebreather/Venti     [ ]Other organ failure     ------------------------------------------------------------------------------------------------------------    RADIOLOGY & ADDITIONAL STUDIES:      ------------------------------------------------------------------------------------------------------------    ALLERGIES:  Allergies    erythromycin (Headache; Vomiting; Rash)  penicillin (Headache; Vomiting; Rash)  Cipro (Headache; Vomiting; Rash)    Intolerances    MEDICATIONS  (STANDING):  aMIOdarone    Tablet 200 milliGRAM(s) Oral daily  chlorhexidine 4% Liquid 1 Application(s) Topical <User Schedule>  dexAMETHasone     Tablet 4 milliGRAM(s) Oral every 8 hours  enoxaparin Injectable 50 milliGRAM(s) SubCutaneous every 12 hours  HYDROmorphone   Tablet 5 milliGRAM(s) Oral once  metoprolol tartrate 25 milliGRAM(s) Oral every 12 hours  naloxegol 25 milliGRAM(s) Oral daily  nicotine -  14 mG/24Hr(s) Patch 1 Patch Transdermal every 24 hours  oxyCODONE  ER Tablet 30 milliGRAM(s) Oral <User Schedule>  pantoprazole   Suspension 40 milliGRAM(s) Oral daily  polyethylene glycol 3350 17 Gram(s) Oral every 12 hours    MEDICATIONS  (PRN):  acetaminophen     Tablet .. 650 milliGRAM(s) Oral every 6 hours PRN Temp greater or equal to 38C (100.4F), Mild Pain (1 - 3)  albuterol/ipratropium for Nebulization 3 milliLiter(s) Nebulizer every 6 hours PRN Shortness of Breath and/or Wheezing  diazepam    Tablet 5 milliGRAM(s) Oral two times a day PRN Anxiety  HYDROmorphone  Injectable 1 milliGRAM(s) IV Push every 3 hours PRN Severe Pain (7 - 10)  oxyCODONE    IR 10 milliGRAM(s) Oral every 4 hours PRN Moderate Pain (4 - 6)  sodium chloride 0.9% lock flush 10 milliLiter(s) IV Push every 1 hour PRN Pre/post blood products, medications, blood draw, and to maintain line patency           HPI:  [facility] transfer.  58F w/ F hx tobacco use, RCC right nephrectomy, right sided glaucoma, fibromyalgia, anxiety, GERD, was at [facility] w/ concern for breast inflammation, lung nodules/mediastinal mass w/ biopsy concern for metastatic GI cancer. Had pleural effusion s/p chest tube and pleurx, had pericardial window for effusion. Has had poor IV access, has triple lumen in right groin. Has UE DVT on lovenox. Also has SVC syndrome, transfer from [facility] for [facility] RT treatment. ROS otherwise negative. On oxygen.       PERTINENT PM/SXH:   Anxiety    Acid reflux disease    Renal cancer, left    Umbilical hernia    Uterine mass    Fibromyalgia    Glaucoma    Herniated cervical disc    FH: kidney cancer    Cancer of kidney      S/P partial hysterectomy    S/P knee surgery    S/P hernia repair    H/O unilateral nephrectomy    Glaucoma following surgery    History of tonsillectomy      FAMILY HISTORY:  Family history of lung cancer (Mother)    Family history of pancreatic cancer (Father)    ------------------------------------------------------------------------------------------------------------  ITEMS NOT CHECKED ARE NOT PRESENT    SOCIAL HISTORY:   Living Situation: [X ]Home  [ ]Long term care  [ ]Rehab [ ]Other  Support: Boyfriend     Substance hx:  [ ]   Tobacco hx:  [ ]   Alcohol hx: [ ]   Family Hx substance abuse [ ]yes [ ]no    Catholic/Spirituality:  PCSSQ[Palliative Care Spiritual Screening Question]   Severity (0-10): 0  Score of 4 or > indicate consideration of Chaplaincy referral.  Chaplaincy Referral: [ ] yes [X ] refused [ ] following    Anticipatory Grief present?:  [ ] yes [X ] no  [ ] Deferred                      Other Referrals:    [ ]Hospice   [ ]Caregiver [place] Support  [ ]Child Life    [ ]Patient & Family Centered Care Referral  [ ]Holistic Therapy     ------------------------------------------------------------------------------------------------------------    PRESENT SYMPTOMS:     [ ] Unable to self-report      [ ] PAINADS     [ ] RDOS    [ ] No     [X ] Yes     Source if other than patient:  [ ]Family   [ ]Team     PAIN:   If blank, patient unable to specify     [X ]yes [ ]no    1. Location- R side of body- chest, abdomen, shoulder neck   2. Radiation- as above    3. Quality- sharp, burning, electric  4. Timing- Constant   5. Minimal acceptable level/pain goal- 5/10   6. Aggravating factors-   7. QOL impact- Severe     SYMPTOMS:   Dyspnea:                           [ ]Mild [ ]Moderate [ ]Severe  Anxiety:                             [ ]Mild [ ]Moderate [X ]Severe  Fatigue:                             [ ]Mild [ ]Moderate [ ]Severe  Nausea/Vomiting:              [ ]Mild [ ]Moderate [ ]Severe  Loss of appetite:                [ ]Mild [ ]Moderate [ ]Severe  Constipation:                     [ ]Mild [ ]Moderate [ ]Severe    Other Symptoms:  [X ]All other review of systems negative     ------------------------------------------------------------------------------------------------------------    I-Stop Reference No: [id number]    PDI	Current Rx	Drug Type	Rx Written	Rx Dispensed	Drug	Quantity	Days Supply	Prescriber Name	Prescriber KVNG #	Payment Method	Dispenser  A	Y	O	03/08/2024	03/11/2024	oxycodone hcl (ir) 10 mg tab	120	30	Bakari Morales	[id number]	Medicare	[facility]  A	N	B	02/14/2024	02/14/2024	diazepam 5 mg tablet	60	30	Neha Arreaga	[id number]	Medicare	[facility]  A	N	O	02/09/2024	02/09/2024	oxycodone hcl (ir) 10 mg tab	120	30	Replogle, Bakari	[id number]	Medicare	[facility]  A	N	B	01/11/2024	01/12/2024	diazepam 5 mg tablet	60	30	Pulatov, Pulat	[id number]	Medicare	[facility]  A	N	O	01/08/2024	01/09/2024	oxycodone hcl (ir) 10 mg tab	120	30	Replogle, Bakari	[id number]	Medicare	[facility]  A	N	B	12/12/2023	12/12/2023	diazepam 5 mg tablet	60	30	Pulatov, Pulat	[id number]	Cash	[facility]  A	N	O	12/11/2023	12/11/2023	oxycodone hcl (ir) 10 mg tab	120	30	Replogle, Bakari	[id number]	Medicare	[facility]  A	N	O	11/13/2023	11/13/2023	oxycodone hcl (ir) 10 mg tab	120	30	José Miguel Caro	[id number]	Medicare	[facility]  A	N	B	10/19/2023	10/19/2023	diazepam 5 mg tablet	60	30	Pulatov, Pulat	[id number]	Cash	[facility]  A	N	O	10/13/2023	10/13/2023	oxycodone hcl (ir) 10 mg tab	120	30	José Miguel Caro	[id number]	Medicare	[facility]  A	N	B	09/11/2023	09/16/2023	diazepam 5 mg tablet	60	30	Pulatov, Pulat	[id number]	Medicare	[facility]  A	N	O	09/15/2023	09/16/2023	oxycodone hcl (ir) 10 mg tab	120	30	Sanjiv Persaud	[id number]	Medicare	[facility]  A	N	O	08/18/2023	08/19/2023	oxycodone hcl (ir) 10 mg tab	120	30	Sanjiv Persaud	[id number]	Medicare	[facility]  A	N	B	07/30/2023	08/03/2023	diazepam 5 mg tablet	60	30	Pulatov, Pulat	[id number]	Medicare	[facility]  A	N	O	07/21/2023	07/21/2023	oxycodone hcl (ir) 10 mg tab	120	30	Sanjiv Persaud	[id number]	Medicare	[facility]  A	N	B	06/25/2023	07/12/2023	diazepam 5 mg tablet	60	30	PulTorrie don	[id number]	Medicare	[facility]  A	N	O	06/23/2023	06/24/2023	oxycodone hcl (ir) 10 mg tab	120	30	Sanjiv Persaud	[id number]	Medicare	[facility]      ------------------------------------------------------------------------------------------------------------    FUNCTIONAL STATUS:     Baseline ADL (prior to admission):  [X ] Independent  [ ] Moderate Assistance [ ] Dependent    Palliative Performance Score (current): 50%     [ ]PPSV2 < or = to 30%   [ ]artificial nutrition      NUTRITIONAL STATUS:     Protein Calorie Malnutrition Present:   [ ]mild   [ ]moderate or severe    Weight:   [ ]underweight (BMI 18.5 or less)   [ ]morbid obesity (BMI 30 or higher)   [ ]anasarca  [X ]significant weight loss     Height (cm): 152.4 (04-04-24 @ 11:33), 154.9 (03-26-24 @ 16:20), 154.9 (03-10-24 @ 16:32)  Weight (kg): 45.4 (04-04-24 @ 11:33), 49.9 (03-26-24 @ 16:20), 49.9 (03-10-24 @ 16:32)  BMI (kg/m2): 19.5 (04-04-24 @ 11:33), 20.8 (03-26-24 @ 16:20), 20.8 (03-10-24 @ 16:32)    ------------------------------------------------------------------------------------------------------------    PRIOR ADVANCE DIRECTIVES:    [X ] DNR/MOLST    [ ] Living Will    [X ] Health Care Proxy(s)- completed in [facility]     DECISION MAKER(s):  [ ] Patient    [ ] Surrogate(s)  [ ] HCP   [ ] Guardian             ------------------------------------------------------------------------------------------------------------  PHYSICAL EXAM:  Vital Signs Last 24 Hrs  T(C): 36.8 (04 Apr 2024 13:15), Max: 37.1 (04 Apr 2024 05:15)  T(F): 98.3 (04 Apr 2024 13:15), Max: 98.8 (04 Apr 2024 05:15)  HR: 61 (04 Apr 2024 13:15) (60 - 68)  BP: 102/54 (04 Apr 2024 13:15) (102/54 - 129/79)  BP(mean): --  RR: 18 (04 Apr 2024 13:15) (18 - 18)  SpO2: 99% (04 Apr 2024 13:15) (96% - 99%)    Parameters below as of 04 Apr 2024 13:15  Patient On (Oxygen Delivery Method): nasal cannula  O2 Flow (L/min): 4   I&O's Summary      GENERAL:  [ ]Cachexia  [ ] Frail  [X ]Awake  [X ]Oriented x 3  [ ]Lethargic  [ ]Unarousable  [ ]Verbal  [ ]Non-Verbal    BEHAVIORAL:   [ ] Anxiety  [ ] Delirium [ ] Agitation [ ] Other    HEENT:   [ ]Normal   [ ]Dry mouth   [ ]ET Tube/Trach  [ ]Oral lesions    PULMONARY:   [X ]Clear              [ ]Tachypnea  [ ]Audible excessive secretions   [ ]Rhonchi        [ ]Right [ ]Left [ ]Bilateral  [ ]Crackles        [ ]Right [ ]Left [ ]Bilateral  [ ]Wheezing     [ ]Right [ ]Left [ ]Bilateral  [ ]Diminished breath sounds [ ]right [ ]left [ ]bilateral    CARDIOVASCULAR:    [X ]Regular [ ]Irregular [ ]Tachy  [ ]Malik [ ]Murmur [ ]Other    GASTROINTESTINAL:  [X ]Soft  [ ]Distended   [ ]+BS  [X ]Non tender [ ]Tender  [ ]Other [ ]PEG [ ]OGT/ NGT      GENITOURINARY:  [X ]Normal [ ] Incontinent   [ ]Oliguria/Anuria   [ ]Che    MUSCULOSKELETAL:   [ ]Normal   [X ]Weakness  [ ]Bed/Wheelchair bound [ ]Edema    NEUROLOGIC:   [X ]No focal deficits  [ ]Cognitive impairment  [ ]Dysphagia [ ]Dysarthria [ ]Paresis [ ]Other     SKIN:   [X ]Normal  [ ]Rash  [ ]Other  [ ]Pressure ulcer(s)       Present on admission [ ]y [ ]n    ------------------------------------------------------------------------------------------------------------    LABS:                        9.5    14.56 )-----------( 642      ( 04 Apr 2024 05:54 )             29.6   04-04    141  |  105  |  38<H>  ----------------------------<  118<H>  5.0   |  27  |  0.85    Ca    9.2      04 Apr 2024 05:54  Phos  3.2     04-04  Mg     2.10     04-04    TPro  5.7<L>  /  Alb  3.2<L>  /  TBili  <0.2  /  DBili  x   /  AST  9   /  ALT  15  /  AlkPhos  71  04-04  PT/INR - ( 04 Apr 2024 05:54 )   PT: 11.0 sec;   INR: 0.97 ratio         PTT - ( 04 Apr 2024 05:54 )  PTT:29.5 sec    Urinalysis Basic - ( 04 Apr 2024 05:54 )    Color: x / Appearance: x / SG: x / pH: x  Gluc: 118 mg/dL / Ketone: x  / Bili: x / Urobili: x   Blood: x / Protein: x / Nitrite: x   Leuk Esterase: x / RBC: x / WBC x   Sq Epi: x / Non Sq Epi: x / Bacteria: x        ------------------------------------------------------------------------------------------------------------    CRITICAL CARE:  [ ]Shock Present  [ ]Septic [ ]Cardiogenic [ ]Neurologic [ ]Hypovolemic [ ] Undifferentiated/Mixed   [ ]Vasopressors [ ]Inotropes     [ ]Respiratory failure present: [ ]Acute  [ ]Chronic [ ]Hypoxic  [ ]Hypercarbic   [ ]Mechanical ventilation   [ ]Trach collar   [ ]Non-invasive ventilatory support   [ ]High flow    [ ]Non-rebreather/Venti     [ ]Other organ failure     ------------------------------------------------------------------------------------------------------------    RADIOLOGY & ADDITIONAL STUDIES:      ------------------------------------------------------------------------------------------------------------    ALLERGIES:  Allergies    erythromycin (Headache; Vomiting; Rash)  penicillin (Headache; Vomiting; Rash)  Cipro (Headache; Vomiting; Rash)    Intolerances    MEDICATIONS  (STANDING):  aMIOdarone    Tablet 200 milliGRAM(s) Oral daily  chlorhexidine 4% Liquid 1 Application(s) Topical <User Schedule>  dexAMETHasone     Tablet 4 milliGRAM(s) Oral every 8 hours  enoxaparin Injectable 50 milliGRAM(s) SubCutaneous every 12 hours  HYDROmorphone   Tablet 5 milliGRAM(s) Oral once  metoprolol tartrate 25 milliGRAM(s) Oral every 12 hours  naloxegol 25 milliGRAM(s) Oral daily  nicotine -  14 mG/24Hr(s) Patch 1 Patch Transdermal every 24 hours  oxyCODONE  ER Tablet 30 milliGRAM(s) Oral <User Schedule>  pantoprazole   Suspension 40 milliGRAM(s) Oral daily  polyethylene glycol 3350 17 Gram(s) Oral every 12 hours    MEDICATIONS  (PRN):  acetaminophen     Tablet .. 650 milliGRAM(s) Oral every 6 hours PRN Temp greater or equal to 38C (100.4F), Mild Pain (1 - 3)  albuterol/ipratropium for Nebulization 3 milliLiter(s) Nebulizer every 6 hours PRN Shortness of Breath and/or Wheezing  diazepam    Tablet 5 milliGRAM(s) Oral two times a day PRN Anxiety  HYDROmorphone  Injectable 1 milliGRAM(s) IV Push every 3 hours PRN Severe Pain (7 - 10)  oxyCODONE    IR 10 milliGRAM(s) Oral every 4 hours PRN Moderate Pain (4 - 6)  sodium chloride 0.9% lock flush 10 milliLiter(s) IV Push every 1 hour PRN Pre/post blood products, medications, blood draw, and to maintain line patency           HPI:  [facility] transfer.  58F w/ F hx tobacco use, RCC right nephrectomy, right sided glaucoma, fibromyalgia, anxiety, GERD, was at [facility] w/ concern for breast inflammation, lung nodules/mediastinal mass w/ biopsy concern for metastatic GI cancer. Had pleural effusion s/p chest tube and pleurx, had pericardial window for effusion. Has had poor IV access, has triple lumen in right groin. Has UE DVT on lovenox. Also has SVC syndrome, transfer from [facility] for LIJ RT treatment. ROS otherwise negative. On oxygen.       PERTINENT PM/SXH:   Anxiety    Acid reflux disease    Renal cancer, left    Umbilical hernia    Uterine mass    Fibromyalgia    Glaucoma    Herniated cervical disc    FH: kidney cancer    Cancer of kidney      S/P partial hysterectomy    S/P knee surgery    S/P hernia repair    H/O unilateral nephrectomy    Glaucoma following surgery    History of tonsillectomy      FAMILY HISTORY:  Family history of lung cancer (Mother)    Family history of pancreatic cancer (Father)    ------------------------------------------------------------------------------------------------------------  ITEMS NOT CHECKED ARE NOT PRESENT    SOCIAL HISTORY:   Living Situation: [X ]Home  [ ]Long term care  [ ]Rehab [ ]Other  Support: Boyfriend     Substance hx:  [ ]   Tobacco hx:  [ ]   Alcohol hx: [ ]   Family Hx substance abuse [ ]yes [ ]no    Church/Spirituality:  PCSSQ[Palliative Care Spiritual Screening Question]   Severity (0-10): 0  Score of 4 or > indicate consideration of Chaplaincy referral.  Chaplaincy Referral: [ ] yes [X ] refused [ ] following    Anticipatory Grief present?:  [ ] yes [X ] no  [ ] Deferred                      Other Referrals:    [ ]Hospice   [ ]Caregiver [place] Support  [ ]Child Life    [ ]Patient & Family Centered Care Referral  [ ]Holistic Therapy     ------------------------------------------------------------------------------------------------------------    PRESENT SYMPTOMS:     [ ] Unable to self-report      [ ] PAINADS     [ ] RDOS    [ ] No     [X ] Yes     Source if other than patient:  [ ]Family   [ ]Team     PAIN:   If blank, patient unable to specify     [X ]yes [ ]no    1. Location- R side of body- chest, abdomen, shoulder neck   2. Radiation- as above    3. Quality- sharp, burning, electric  4. Timing- Constant   5. Minimal acceptable level/pain goal- 5/10   6. Aggravating factors-   7. QOL impact- Severe     SYMPTOMS:   Dyspnea:                           [ ]Mild [ ]Moderate [ ]Severe  Anxiety:                             [ ]Mild [ ]Moderate [X ]Severe  Fatigue:                             [ ]Mild [ ]Moderate [ ]Severe  Nausea/Vomiting:              [ ]Mild [ ]Moderate [ ]Severe  Loss of appetite:                [ ]Mild [ ]Moderate [ ]Severe  Constipation:                     [ ]Mild [ ]Moderate [ ]Severe    Other Symptoms:  [X ]All other review of systems negative     ------------------------------------------------------------------------------------------------------------    I-Stop Reference No: [id number]    PDI	Current Rx	Drug Type	Rx Written	Rx Dispensed	Drug	Quantity	Days Supply	Prescriber Name	Prescriber KVNG #	Payment Method	Dispenser  A	Y	O	03/08/2024	03/11/2024	oxycodone hcl (ir) 10 mg tab	120	30	Bakari Morales	[id number]	Medicare	[facility]  A	N	B	02/14/2024	02/14/2024	diazepam 5 mg tablet	60	30	Neha Arreaga	[id number]	Medicare	[facility]  A	N	O	02/09/2024	02/09/2024	oxycodone hcl (ir) 10 mg tab	120	30	Replogle, Bakari	[id number]	Medicare	[facility]  A	N	B	01/11/2024	01/12/2024	diazepam 5 mg tablet	60	30	Pulatov, Pulat	[id number]	Medicare	[facility]  A	N	O	01/08/2024	01/09/2024	oxycodone hcl (ir) 10 mg tab	120	30	Replogle, Bakari	[id number]	Medicare	[facility]  A	N	B	12/12/2023	12/12/2023	diazepam 5 mg tablet	60	30	Pulatov, Pulat	[id number]	Cash	[facility]  A	N	O	12/11/2023	12/11/2023	oxycodone hcl (ir) 10 mg tab	120	30	Replogle, Bakari	[id number]	Medicare	[facility]  A	N	O	11/13/2023	11/13/2023	oxycodone hcl (ir) 10 mg tab	120	30	José Miguel Caro	[id number]	Medicare	[facility]  A	N	B	10/19/2023	10/19/2023	diazepam 5 mg tablet	60	30	Pulatov, Pulat	[id number]	Cash	[facility]  A	N	O	10/13/2023	10/13/2023	oxycodone hcl (ir) 10 mg tab	120	30	José Miguel Caro	[id number]	Medicare	[facility]  A	N	B	09/11/2023	09/16/2023	diazepam 5 mg tablet	60	30	Pulatov, Pulat	[id number]	Medicare	[facility]  A	N	O	09/15/2023	09/16/2023	oxycodone hcl (ir) 10 mg tab	120	30	Sanjiv Persaud	[id number]	Medicare	[facility]  A	N	O	08/18/2023	08/19/2023	oxycodone hcl (ir) 10 mg tab	120	30	Sanjiv Persaud	[id number]	Medicare	[facility]  A	N	B	07/30/2023	08/03/2023	diazepam 5 mg tablet	60	30	Pulatov, Pulat	[id number]	Medicare	[facility]  A	N	O	07/21/2023	07/21/2023	oxycodone hcl (ir) 10 mg tab	120	30	Sanjiv Persaud	[id number]	Medicare	[facility]  A	N	B	06/25/2023	07/12/2023	diazepam 5 mg tablet	60	30	PulTorrie don	[id number]	Medicare	[facility]  A	N	O	06/23/2023	06/24/2023	oxycodone hcl (ir) 10 mg tab	120	30	Sanjiv Persaud	[id number]	Medicare	[facility]      ------------------------------------------------------------------------------------------------------------    FUNCTIONAL STATUS:     Baseline ADL (prior to admission):  [X ] Independent  [ ] Moderate Assistance [ ] Dependent    Palliative Performance Score (current): 50%     [ ]PPSV2 < or = to 30%   [ ]artificial nutrition      NUTRITIONAL STATUS:     Protein Calorie Malnutrition Present:   [ ]mild   [ ]moderate or severe    Weight:   [ ]underweight (BMI 18.5 or less)   [ ]morbid obesity (BMI 30 or higher)   [ ]anasarca  [X ]significant weight loss     Height (cm): 152.4 (04-04-24 @ 11:33), 154.9 (03-26-24 @ 16:20), 154.9 (03-10-24 @ 16:32)  Weight (kg): 45.4 (04-04-24 @ 11:33), 49.9 (03-26-24 @ 16:20), 49.9 (03-10-24 @ 16:32)  BMI (kg/m2): 19.5 (04-04-24 @ 11:33), 20.8 (03-26-24 @ 16:20), 20.8 (03-10-24 @ 16:32)    ------------------------------------------------------------------------------------------------------------    PRIOR ADVANCE DIRECTIVES:    [X ] DNR/MOLST    [ ] Living Will    [X ] Health Care Proxy(s)- completed in [facility]     DECISION MAKER(s):  [ ] Patient    [ ] Surrogate(s)  [ ] HCP   [ ] Guardian             ------------------------------------------------------------------------------------------------------------  PHYSICAL EXAM:  Vital Signs Last 24 Hrs  T(C): 36.8 (04 Apr 2024 13:15), Max: 37.1 (04 Apr 2024 05:15)  T(F): 98.3 (04 Apr 2024 13:15), Max: 98.8 (04 Apr 2024 05:15)  HR: 61 (04 Apr 2024 13:15) (60 - 68)  BP: 102/54 (04 Apr 2024 13:15) (102/54 - 129/79)  BP(mean): --  RR: 18 (04 Apr 2024 13:15) (18 - 18)  SpO2: 99% (04 Apr 2024 13:15) (96% - 99%)    Parameters below as of 04 Apr 2024 13:15  Patient On (Oxygen Delivery Method): nasal cannula  O2 Flow (L/min): 4   I&O's Summary      GENERAL:  [ ]Cachexia  [ ] Frail  [X ]Awake  [X ]Oriented x 3  [ ]Lethargic  [ ]Unarousable  [ ]Verbal  [ ]Non-Verbal    BEHAVIORAL:   [ ] Anxiety  [ ] Delirium [ ] Agitation [ ] Other    HEENT:   [ ]Normal   [ ]Dry mouth   [ ]ET Tube/Trach  [ ]Oral lesions  R eye glaucoma     PULMONARY:   [X ]Clear              [ ]Tachypnea  [ ]Audible excessive secretions   [ ]Rhonchi        [ ]Right [ ]Left [ ]Bilateral  [ ]Crackles        [ ]Right [ ]Left [ ]Bilateral  [ ]Wheezing     [ ]Right [ ]Left [ ]Bilateral  [ ]Diminished breath sounds [ ]right [ ]left [ ]bilateral    CARDIOVASCULAR:    [X ]Regular [ ]Irregular [ ]Tachy  [ ]Malik [ ]Murmur [ ]Other    GASTROINTESTINAL:  [X ]Soft  [ ]Distended   [ ]+BS  [X ]Non tender [ ]Tender  [ ]Other [ ]PEG [ ]OGT/ NGT      GENITOURINARY:  [X ]Normal [ ] Incontinent   [ ]Oliguria/Anuria   [ ]Che    MUSCULOSKELETAL:   [ ]Normal   [X ]Weakness  [ ]Bed/Wheelchair bound [ ]Edema    NEUROLOGIC:   [X ]No focal deficits  [ ]Cognitive impairment  [ ]Dysphagia [ ]Dysarthria [ ]Paresis [ ]Other     SKIN:   [X ]Normal  [ ]Rash  [ ]Other  [ ]Pressure ulcer(s)       Present on admission [ ]y [ ]n    ------------------------------------------------------------------------------------------------------------    LABS:                        9.5    14.56 )-----------( 642      ( 04 Apr 2024 05:54 )             29.6   04-04    141  |  105  |  38<H>  ----------------------------<  118<H>  5.0   |  27  |  0.85    Ca    9.2      04 Apr 2024 05:54  Phos  3.2     04-04  Mg     2.10     04-04    TPro  5.7<L>  /  Alb  3.2<L>  /  TBili  <0.2  /  DBili  x   /  AST  9   /  ALT  15  /  AlkPhos  71  04-04  PT/INR - ( 04 Apr 2024 05:54 )   PT: 11.0 sec;   INR: 0.97 ratio         PTT - ( 04 Apr 2024 05:54 )  PTT:29.5 sec    Urinalysis Basic - ( 04 Apr 2024 05:54 )    Color: x / Appearance: x / SG: x / pH: x  Gluc: 118 mg/dL / Ketone: x  / Bili: x / Urobili: x   Blood: x / Protein: x / Nitrite: x   Leuk Esterase: x / RBC: x / WBC x   Sq Epi: x / Non Sq Epi: x / Bacteria: x    ------------------------------------------------------------------------------------------------------------    CRITICAL CARE:  [ ]Shock Present  [ ]Septic [ ]Cardiogenic [ ]Neurologic [ ]Hypovolemic [ ] Undifferentiated/Mixed   [ ]Vasopressors [ ]Inotropes     [ ]Respiratory failure present: [ ]Acute  [ ]Chronic [ ]Hypoxic  [ ]Hypercarbic   [ ]Mechanical ventilation   [ ]Trach collar   [ ]Non-invasive ventilatory support   [ ]High flow    [ ]Non-rebreather/Venti     [ ]Other organ failure     ------------------------------------------------------------------------------------------------------------    RADIOLOGY & ADDITIONAL STUDIES:      ------------------------------------------------------------------------------------------------------------    ALLERGIES:  Allergies    erythromycin (Headache; Vomiting; Rash)  penicillin (Headache; Vomiting; Rash)  Cipro (Headache; Vomiting; Rash)    Intolerances    MEDICATIONS  (STANDING):  aMIOdarone    Tablet 200 milliGRAM(s) Oral daily  chlorhexidine 4% Liquid 1 Application(s) Topical <User Schedule>  dexAMETHasone     Tablet 4 milliGRAM(s) Oral every 8 hours  enoxaparin Injectable 50 milliGRAM(s) SubCutaneous every 12 hours  HYDROmorphone   Tablet 5 milliGRAM(s) Oral once  metoprolol tartrate 25 milliGRAM(s) Oral every 12 hours  naloxegol 25 milliGRAM(s) Oral daily  nicotine -  14 mG/24Hr(s) Patch 1 Patch Transdermal every 24 hours  oxyCODONE  ER Tablet 30 milliGRAM(s) Oral <User Schedule>  pantoprazole   Suspension 40 milliGRAM(s) Oral daily  polyethylene glycol 3350 17 Gram(s) Oral every 12 hours    MEDICATIONS  (PRN):  acetaminophen     Tablet .. 650 milliGRAM(s) Oral every 6 hours PRN Temp greater or equal to 38C (100.4F), Mild Pain (1 - 3)  albuterol/ipratropium for Nebulization 3 milliLiter(s) Nebulizer every 6 hours PRN Shortness of Breath and/or Wheezing  diazepam    Tablet 5 milliGRAM(s) Oral two times a day PRN Anxiety  HYDROmorphone  Injectable 1 milliGRAM(s) IV Push every 3 hours PRN Severe Pain (7 - 10)  oxyCODONE    IR 10 milliGRAM(s) Oral every 4 hours PRN Moderate Pain (4 - 6)  sodium chloride 0.9% lock flush 10 milliLiter(s) IV Push every 1 hour PRN Pre/post blood products, medications, blood draw, and to maintain line patency           HPI:  [facility] transfer.  58F w/ F hx tobacco use, RCC right nephrectomy, right sided glaucoma, fibromyalgia, anxiety, GERD, was at [facility] w/ concern for breast inflammation, lung nodules/mediastinal mass w/ biopsy concern for metastatic GI cancer. Had pleural effusion s/p chest tube and pleurx, had pericardial window for effusion. Has had poor IV access, has triple lumen in right groin. Has UE DVT on lovenox. Also has SVC syndrome, transfer from [facility] for [facility] RT treatment. ROS otherwise negative. On oxygen.     Patient seen this AM. She is having pain in R chest radiating to RUE, neck, abdomen. Pain was severe pain but decreasing as she took pain medication recently. Patient laments on her condition, worried about the future. She is usually active. Provided empathetic listening. She reaffirmed DNR/DNI.     PERTINENT PM/SXH:   Anxiety    Acid reflux disease    Renal cancer, left    Umbilical hernia    Uterine mass    Fibromyalgia    Glaucoma    Herniated cervical disc    FH: kidney cancer    Cancer of kidney      S/P partial hysterectomy    S/P knee surgery    S/P hernia repair    H/O unilateral nephrectomy    Glaucoma following surgery    History of tonsillectomy      FAMILY HISTORY:  Family history of lung cancer (Mother)    Family history of pancreatic cancer (Father)    ------------------------------------------------------------------------------------------------------------  ITEMS NOT CHECKED ARE NOT PRESENT    SOCIAL HISTORY:   Living Situation: [X ]Home  [ ]Long term care  [ ]Rehab [ ]Other  Support: Boyfriend     Substance hx:  [ ]   Tobacco hx:  [X ]   Alcohol hx: [ ]   Family Hx substance abuse [ ]yes [ ]no    Latter-day/Spirituality:  PCSSQ[Palliative Care Spiritual Screening Question]   Severity (0-10): 0  Score of 4 or > indicate consideration of Chaplaincy referral.  Chaplaincy Referral: [ ] yes [X ] refused [ ] following    Anticipatory Grief present?:  [ ] yes [X ] no  [ ] Deferred                      Other Referrals:    [ ]Hospice   [ ]Caregiver [facility] Support  [ ]Child Life    [ ]Patient & Family Centered Care Referral  [ ]Holistic Therapy     ------------------------------------------------------------------------------------------------------------    PRESENT SYMPTOMS:     [ ] Unable to self-report      [ ] PAINADS     [ ] RDOS    [ ] No     [X ] Yes     Source if other than patient:  [ ]Family   [ ]Team     PAIN:   If blank, patient unable to specify     [X ]yes [ ]no    1. Location- R side of body- chest, abdomen, shoulder neck   2. Radiation- as above    3. Quality- sharp, burning, electric  4. Timing- Constant   5. Minimal acceptable level/pain goal- 5/10   6. Aggravating factors-   7. QOL impact- Severe     SYMPTOMS:   Dyspnea:                           [ ]Mild [ ]Moderate [ ]Severe  Anxiety:                             [ ]Mild [ ]Moderate [X ]Severe  Fatigue:                             [ ]Mild [ ]Moderate [ ]Severe  Nausea/Vomiting:              [ ]Mild [ ]Moderate [ ]Severe  Loss of appetite:                [ ]Mild [ ]Moderate [ ]Severe  Constipation:                     [ ]Mild [ ]Moderate [ ]Severe    Other Symptoms:  [X ]All other review of systems negative     ------------------------------------------------------------------------------------------------------------    [employer] Reference No: [id number]    PDI	Current Rx	Drug Type	Rx Written	Rx Dispensed	Drug	Quantity	Days Supply	Prescriber Name	Prescriber KVNG #	Payment Method	Dispenser  A	Y	O	03/08/2024	03/11/2024	oxycodone hcl (ir) 10 mg tab	120	30	Replogle, Bakari	[id number]	Medicare	[facility]  A	N	B	02/14/2024	02/14/2024	diazepam 5 mg tablet	60	30	Neha Arreaga	[id number]	Medicare	[facility]  A	N	O	02/09/2024	02/09/2024	oxycodone hcl (ir) 10 mg tab	120	30	Replogle, Bakari	[id number]	Medicare	[facility]  A	N	B	01/11/2024	01/12/2024	diazepam 5 mg tablet	60	30	Pulatov, Pulat	[id number]	Medicare	[facility]  A	N	O	01/08/2024	01/09/2024	oxycodone hcl (ir) 10 mg tab	120	30	Replogle, Bakari	[id number]	Medicare	[facility]  A	N	B	12/12/2023	12/12/2023	diazepam 5 mg tablet	60	30	Pulatov, Pulat	[id number]	Cash	[facility]  A	N	O	12/11/2023	12/11/2023	oxycodone hcl (ir) 10 mg tab	120	30	Replogle, Bakari	[id number]	Medicare	[facility]  A	N	O	11/13/2023	11/13/2023	oxycodone hcl (ir) 10 mg tab	120	30	José Miguel Caro	[id number]	Medicare	[facility]  A	N	B	10/19/2023	10/19/2023	diazepam 5 mg tablet	60	30	Pulatov, Pulat	[id number]	Cash	[facility]  A	N	O	10/13/2023	10/13/2023	oxycodone hcl (ir) 10 mg tab	120	30	José Miguel Caro	[id number]	Medicare	[facility]  A	N	B	09/11/2023	09/16/2023	diazepam 5 mg tablet	60	30	Pulatov, Pulat	[id number]	Medicare	[facility]  A	N	O	09/15/2023	09/16/2023	oxycodone hcl (ir) 10 mg tab	120	30	Sanjiv Persaud	[id number]	Medicare	[facility]  A	N	O	08/18/2023	08/19/2023	oxycodone hcl (ir) 10 mg tab	120	30	Sanjiv Persaud	[id number]	Medicare	[facility]  A	N	B	07/30/2023	08/03/2023	diazepam 5 mg tablet	60	30	Pulatov, Pulat	[id number]	Medicare	[facility]  A	N	O	07/21/2023	07/21/2023	oxycodone hcl (ir) 10 mg tab	120	30	Sanjiv Persaud	[id number]	Medicare	[facility]  A	N	B	06/25/2023	07/12/2023	diazepam 5 mg tablet	60	30	Pulatov, Pulat	[id number]	Medicare	[facility]  A	N	O	06/23/2023	06/24/2023	oxycodone hcl (ir) 10 mg tab	120	30	Sanjiv Persaud	[id number]	Medicare	[facility]      ------------------------------------------------------------------------------------------------------------    FUNCTIONAL STATUS:     Baseline ADL (prior to admission):  [X ] Independent  [ ] Moderate Assistance [ ] Dependent    Palliative Performance Score (current): 50%     [ ]PPSV2 < or = to 30%   [ ]artificial nutrition      NUTRITIONAL STATUS:     Protein Calorie Malnutrition Present:   [ ]mild   [ ]moderate or severe    Weight:   [ ]underweight (BMI 18.5 or less)   [ ]morbid obesity (BMI 30 or higher)   [ ]anasarca  [X ]significant weight loss     Height (cm): 152.4 (04-04-24 @ 11:33), 154.9 (03-26-24 @ 16:20), 154.9 (03-10-24 @ 16:32)  Weight (kg): 45.4 (04-04-24 @ 11:33), 49.9 (03-26-24 @ 16:20), 49.9 (03-10-24 @ 16:32)  BMI (kg/m2): 19.5 (04-04-24 @ 11:33), 20.8 (03-26-24 @ 16:20), 20.8 (03-10-24 @ 16:32)    ------------------------------------------------------------------------------------------------------------    PRIOR ADVANCE DIRECTIVES:    [X ] DNR/MOLST    [ ] Living Will    [X ] Health Care Proxy(s)- completed in [facility]     DECISION MAKER(s):  [X ] Patient    [ ] Surrogate(s)  [ ] HCP   [ ] Guardian             ------------------------------------------------------------------------------------------------------------  PHYSICAL EXAM:  Vital Signs Last 24 Hrs  T(C): 36.8 (04 Apr 2024 13:15), Max: 37.1 (04 Apr 2024 05:15)  T(F): 98.3 (04 Apr 2024 13:15), Max: 98.8 (04 Apr 2024 05:15)  HR: 61 (04 Apr 2024 13:15) (60 - 68)  BP: 102/54 (04 Apr 2024 13:15) (102/54 - 129/79)  BP(mean): --  RR: 18 (04 Apr 2024 13:15) (18 - 18)  SpO2: 99% (04 Apr 2024 13:15) (96% - 99%)    Parameters below as of 04 Apr 2024 13:15  Patient On (Oxygen Delivery Method): nasal cannula  O2 Flow (L/min): 4   I&O's Summary      GENERAL:  [ ]Cachexia  [ ] Frail  [X ]Awake  [X ]Oriented x 3  [ ]Lethargic  [ ]Unarousable  [ ]Verbal  [ ]Non-Verbal    BEHAVIORAL:   [ ] Anxiety  [ ] Delirium [ ] Agitation [ ] Other    HEENT:   [ ]Normal   [ ]Dry mouth   [ ]ET Tube/Trach  [ ]Oral lesions  R eye glaucoma     PULMONARY:   [X ]Clear              [ ]Tachypnea  [ ]Audible excessive secretions   [ ]Rhonchi        [ ]Right [ ]Left [ ]Bilateral  [ ]Crackles        [ ]Right [ ]Left [ ]Bilateral  [ ]Wheezing     [ ]Right [ ]Left [ ]Bilateral  [ ]Diminished breath sounds [ ]right [ ]left [ ]bilateral    CARDIOVASCULAR:    [X ]Regular [ ]Irregular [ ]Tachy  [ ]Malik [ ]Murmur [ ]Other    GASTROINTESTINAL:  [X ]Soft  [ ]Distended   [ ]+BS  [X ]Non tender [ ]Tender  [ ]Other [ ]PEG [ ]OGT/ NGT      GENITOURINARY:  [X ]Normal [ ] Incontinent   [ ]Oliguria/Anuria   [ ]Che    MUSCULOSKELETAL:   [ ]Normal   [X ]Weakness  [ ]Bed/Wheelchair bound [ ]Edema    NEUROLOGIC:   [X ]No focal deficits  [ ]Cognitive impairment  [ ]Dysphagia [ ]Dysarthria [ ]Paresis [ ]Other     SKIN:   [X ]Normal  [ ]Rash  [X ]Other- edematous RUE, R breast   [ ]Pressure ulcer(s)       Present on admission [ ]y [ ]n    ------------------------------------------------------------------------------------------------------------    LABS:                        9.5    14.56 )-----------( 642      ( 04 Apr 2024 05:54 )             29.6   04-04    141  |  105  |  38<H>  ----------------------------<  118<H>  5.0   |  27  |  0.85    Ca    9.2      04 Apr 2024 05:54  Phos  3.2     04-04  Mg     2.10     04-04    TPro  5.7<L>  /  Alb  3.2<L>  /  TBili  <0.2  /  DBili  x   /  AST  9   /  ALT  15  /  AlkPhos  71  04-04  PT/INR - ( 04 Apr 2024 05:54 )   PT: 11.0 sec;   INR: 0.97 ratio         PTT - ( 04 Apr 2024 05:54 )  PTT:29.5 sec    Urinalysis Basic - ( 04 Apr 2024 05:54 )    Color: x / Appearance: x / SG: x / pH: x  Gluc: 118 mg/dL / Ketone: x  / Bili: x / Urobili: x   Blood: x / Protein: x / Nitrite: x   Leuk Esterase: x / RBC: x / WBC x   Sq Epi: x / Non Sq Epi: x / Bacteria: x    ------------------------------------------------------------------------------------------------------------    CRITICAL CARE:  [ ]Shock Present  [ ]Septic [ ]Cardiogenic [ ]Neurologic [ ]Hypovolemic [ ] Undifferentiated/Mixed   [ ]Vasopressors [ ]Inotropes     [ ]Respiratory failure present: [ ]Acute  [ ]Chronic [ ]Hypoxic  [ ]Hypercarbic   [ ]Mechanical ventilation   [ ]Trach collar   [ ]Non-invasive ventilatory support   [ ]High flow    [ ]Non-rebreather/Venti     [ ]Other organ failure     ------------------------------------------------------------------------------------------------------------    RADIOLOGY & ADDITIONAL STUDIES:    < from: CT Neck Soft Tissue w/ IV Cont (03.27.24 @ 17:10) >  Bulky and conglomerate lymphadenopathy at right level 4 invades internal   jugular vein with thrombus extending up to hyoid level. 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 IJV otherwise patent up to skull base.    New left IJV nonocclusive thrombus. Otherwise, no significant change from   3/14/24.    < end of copied text >    < from: CT Chest w/ IV Cont (03.27.24 @ 17:10) >  IMPRESSION:    In comparison with 3/14/2024, new consolidations throughout the right   lung more severely within right lower lobe likely representing pneumonia.    Interval insertion of right Pleurx catheter and decrease of right pleural   effusion. Small/moderate left pleural effusion is increased.    Redemonstrated extensive DVT within thoracic and lower neck. The upper   SVC remains obliterated.    Large right supraclavicular mass infiltrating into the mediastinum is   unchanged.    Trace right pneumothorax.    < end of copied text >    < from: CT Neck Soft Tissue w/ IV Cont (03.14.24 @ 11:24) >  IMPRESSION:    Extensive cellulitis/myositis along the right anterior lateral neck and   right upper chest wall with inflammatory fat induration the deep soft   tissue of the neck.    Large supraclavicular/mediastinal mass lesion, presumably conglomerate of   lymph nodes with mass effect and complete occlusion of the right internal   jugular vein with associated surrounding inflammatory changes in the deep   neck, concerning for infectious/inflammatory thrombophlebitis.   Clinical/laboratory correlation and serial ultrasound follow-up is   recommended.    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    No masslike lesions or abnormal enhancement in aerodigestive mucosa.   Airways are patent.    Cervical adenopathy.    < end of copied text >    < from: CT Abdomen and Pelvis w/ IV Cont (03.12.24 @ 19:42) >  IMPRESSION:  Status post right nephrectomy. No lymphadenopathy or evidence of new   metastatic lesion.  A new 1.6 mL pancreatic neck cystic lesion without main pancreatic ductal   dilatation. Differentials include side branch IPMN.  Interval increase in small right and trace left pleural effusions.    < end of copied text >    ------------------------------------------------------------------------------------------------------------    ALLERGIES:  Allergies    erythromycin (Headache; Vomiting; Rash)  penicillin (Headache; Vomiting; Rash)  Cipro (Headache; Vomiting; Rash)    Intolerances    MEDICATIONS  (STANDING):  aMIOdarone    Tablet 200 milliGRAM(s) Oral daily  chlorhexidine 4% Liquid 1 Application(s) Topical <User Schedule>  dexAMETHasone     Tablet 4 milliGRAM(s) Oral every 8 hours  enoxaparin Injectable 50 milliGRAM(s) SubCutaneous every 12 hours  HYDROmorphone   Tablet 5 milliGRAM(s) Oral once  metoprolol tartrate 25 milliGRAM(s) Oral every 12 hours  naloxegol 25 milliGRAM(s) Oral daily  nicotine -  14 mG/24Hr(s) Patch 1 Patch Transdermal every 24 hours  oxyCODONE  ER Tablet 30 milliGRAM(s) Oral <User Schedule>  pantoprazole   Suspension 40 milliGRAM(s) Oral daily  polyethylene glycol 3350 17 Gram(s) Oral every 12 hours    MEDICATIONS  (PRN):  acetaminophen     Tablet .. 650 milliGRAM(s) Oral every 6 hours PRN Temp greater or equal to 38C (100.4F), Mild Pain (1 - 3)  albuterol/ipratropium for Nebulization 3 milliLiter(s) Nebulizer every 6 hours PRN Shortness of Breath and/or Wheezing  diazepam    Tablet 5 milliGRAM(s) Oral two times a day PRN Anxiety  HYDROmorphone  Injectable 1 milliGRAM(s) IV Push every 3 hours PRN Severe Pain (7 - 10)  oxyCODONE    IR 10 milliGRAM(s) Oral every 4 hours PRN Moderate Pain (4 - 6)  sodium chloride 0.9% lock flush 10 milliLiter(s) IV Push every 1 hour PRN Pre/post blood products, medications, blood draw, and to maintain line patency

## 2024-04-04 NOTE — PROGRESS NOTE ADULT - SUBJECTIVE AND OBJECTIVE BOX
Roberta Troncoso MD  [facility] Division of Hospital Medicine  Pager [phone] (M-F 8AM-5PM)  Other Times: [id number]    Patient is a 58y old  Female who presents with a chief complaint of SVC syndrome, DVT, mediastinal mass (04 Apr 2024 09:42)    SUBJECTIVE / OVERNIGHT EVENTS: s/p RT simulation today     MEDICATIONS  (STANDING):  aMIOdarone    Tablet 200 milliGRAM(s) Oral daily  chlorhexidine 4% Liquid 1 Application(s) Topical <User Schedule>  dexAMETHasone     Tablet 4 milliGRAM(s) Oral every 8 hours  enoxaparin Injectable 50 milliGRAM(s) SubCutaneous every 12 hours  metoprolol tartrate 25 milliGRAM(s) Oral every 12 hours  naloxegol 25 milliGRAM(s) Oral daily  nicotine -  14 mG/24Hr(s) Patch 1 Patch Transdermal every 24 hours  oxyCODONE  ER Tablet 30 milliGRAM(s) Oral <User Schedule>  pantoprazole   Suspension 40 milliGRAM(s) Oral daily  polyethylene glycol 3350 17 Gram(s) Oral every 12 hours    MEDICATIONS  (PRN):  acetaminophen     Tablet .. 650 milliGRAM(s) Oral every 6 hours PRN Temp greater or equal to 38C (100.4F), Mild Pain (1 - 3)  albuterol/ipratropium for Nebulization 3 milliLiter(s) Nebulizer every 6 hours PRN Shortness of Breath and/or Wheezing  diazepam    Tablet 5 milliGRAM(s) Oral two times a day PRN Anxiety  HYDROmorphone  Injectable 1 milliGRAM(s) IV Push every 3 hours PRN Severe Pain (7 - 10)  oxyCODONE    IR 10 milliGRAM(s) Oral every 4 hours PRN Moderate Pain (4 - 6)  sodium chloride 0.9% lock flush 10 milliLiter(s) IV Push every 1 hour PRN Pre/post blood products, medications, blood draw, and to maintain line patency      PHYSICAL EXAM:  Vital Signs Last 24 Hrs  T(C): 36.8 (04 Apr 2024 13:15), Max: 37.1 (04 Apr 2024 05:15)  T(F): 98.3 (04 Apr 2024 13:15), Max: 98.8 (04 Apr 2024 05:15)  HR: 61 (04 Apr 2024 13:15) (60 - 68)  BP: 102/54 (04 Apr 2024 13:15) (102/54 - 129/79)  RR: 18 (04 Apr 2024 13:15) (18 - 18)  SpO2: 99% (04 Apr 2024 13:15) (96% - 99%)    Parameters below as of 04 Apr 2024 13:15  Patient On (Oxygen Delivery Method): nasal cannula  O2 Flow (L/min): 4      CONSTITUTIONAL: resting in bed comfortably   RESPIRATORY: Normal respiratory effort; lungs are clear to auscultation bilaterally  CARDIOVASCULAR: Regular rate and rhythm, normal S1 and S2, no murmur/rub/gallop; No lower extremity edema  GASTROINTESTINAL: Nontender to palpation, normoactive bowel sounds, no rebound/guarding; No hepatosplenomegaly  MUSCULOSKELETAL: no clubbing or cyanosis of digits; no joint swelling or tenderness to palpation  NEUROLOGY: non-focal; no gross sensory deficits   PSYCH: A+O to person, place, and time; affect appropriate  SKIN: RUE edema    LABS:                        9.5    14.56 )-----------( 642      ( 04 Apr 2024 05:54 )             29.6     04-04    141  |  105  |  38<H>  ----------------------------<  118<H>  5.0   |  27  |  0.85    Ca    9.2      04 Apr 2024 05:54  Phos  3.2     04-04  Mg     2.10     04-04    TPro  5.7<L>  /  Alb  3.2<L>  /  TBili  <0.2  /  DBili  x   /  AST  9   /  ALT  15  /  AlkPhos  71  04-04    PT/INR - ( 04 Apr 2024 05:54 )   PT: 11.0 sec;   INR: 0.97 ratio         PTT - ( 04 Apr 2024 05:54 )  PTT:29.5 sec      Urinalysis Basic - ( 04 Apr 2024 05:54 )    Color: x / Appearance: x / SG: x / pH: x  Gluc: 118 mg/dL / Ketone: x  / Bili: x / Urobili: x   Blood: x / Protein: x / Nitrite: x   Leuk Esterase: x / RBC: x / WBC x   Sq Epi: x / Non Sq Epi: x / Bacteria: x          RADIOLOGY & ADDITIONAL TESTS:  Results Reviewed:   Imaging Personally Reviewed:  Electrocardiogram Personally Reviewed:    COORDINATION OF CARE:  Care Discussed with Consultants/Other Providers [Y/N]:  Prior or Outpatient Records Reviewed [Y/N]:

## 2024-04-04 NOTE — PROGRESS NOTE ADULT - PROBLEM SELECTOR PLAN 5
BPs stable, respiratory status stable  - pericardial effusion on CT chest   - s/p pericardial window 3/26  - monitor on tele

## 2024-04-04 NOTE — H&P ADULT - NSHPLABSRESULTS_GEN_ALL_CORE
.  LABS:                         10.2   12.77 )-----------( [id number]      ( 03 Apr 2024 07:25 )             32.2     04-03    138  |  103  |  43<H>  ----------------------------<  130<H>  4.9   |  25  |  0.81    Ca    9.4      03 Apr 2024 07:23  Mg     2.1     04-03        Urinalysis Basic - ( 03 Apr 2024 07:23 )    Color: x / Appearance: x / SG: x / pH: x  Gluc: 130 mg/dL / Ketone: x  / Bili: x / Urobili: x   Blood: x / Protein: x / Nitrite: x   Leuk Esterase: x / RBC: x / WBC x   Sq Epi: x / Non Sq Epi: x / Bacteria: x                RADIOLOGY, EKG & ADDITIONAL TESTS: Reviewed.

## 2024-04-04 NOTE — CONSULT NOTE ADULT - PROBLEM SELECTOR RECOMMENDATION 3
- seen on imaging  - transferred to [facility] for RT   - no stent per IR   - Undergoing RT  - on Dexa 4 mg TID - seen on imaging  - no stent per IR   - transferred to [facility] for RT   - Undergoing RT  - on Dexa 4 mg TID

## 2024-04-04 NOTE — CHART NOTE - NSCHARTNOTEFT_GEN_A_CORE
Patient seen by fellow earlier today, however patient not available for evaluation by attending as patient out of the room for RT simulation.    59 y/o woman presenting to hospital with right breast/chest wall swelling and cellulitis with imaging evidence concerning for metastatic malignancy w/ lung nodules, and axillary, supraclavicular, and mediastinal adenopathy.  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 Course complicated with SVC syndrome, started on steroids and s/p transfer from [facility] for urgent RT.      Pt endorses persistent difficulty breathing, especially when laying down but no worse than previous.     #Metastatic Carcinoma of unknown etiology - suspecting of GI origin  - Path from 3/14/24 IR biopsy of R Supraclavicular LN showing carcinoma, positive for CK7 and CDX2 immunostains, while negative for CK20, TTF1, GATA3, TRPS1 and PAX8. The immunoprofile is in favor of carcinoma of upper GI or pancreaticobiliary origin.  - S/p pericardiocentesis and drain on 3/19 for malignant pericardial effusion  >> Cytology from pericardial and pleural fluid negative for malignant cells   - CT Neck c/f SVC syndrome w/ thrombus that patient is on full dose anticoagulation for.   - Given CT A/P findings of pancreatic cyst, with RUQ US results of possible gastric thickening  >> Recommend MR Abdomen pancreas protocol and MRCP to eval both the pancreatic vasculature and the hepatobiliary system.   >> Pending results of MR abdomen would recommend GI consult to eval if patient would need EGD/EUS +/- FNA for further pathologic evaluation.   >> This is currently on hold due to patient's respiratory status, but please obtain as soon as possible. Would favor inpatient eval given aggressive disease.  - Pain control/anxiety/Supportive care  per primary team or palliative care team if necessary; continue O2 supplement.   - Rad Onc on board, recs appreciated. Will received 5 fractions.  - C/w Dexamethasone 4mg q8hr for SVC syndrome.   - Closely monitor for worsening of signs/symptoms of SVC syndrome.   - Check daily tumor lysis lab including CMP, Ph, LDH, uric acid - no current evidence of tumor lysis  - Onc team will f/u, full consult note to follow tomorrow    #Anemia  #Thrombocytosis  - Possible anemia of chronic disease/inflammation but cannot r/o nutritional deficiencies. Unlikely hemolysis given stable hemoglobin and normal Tbili.   - Thrombocytosis likely reactive to ongoing illness as patient had normal platelets earlier  - Check iron studies, ferritin, b12, folate level, retic    Case discussed with Dr. Esparza.

## 2024-04-04 NOTE — CHART NOTE - NSCHARTNOTEFT_GEN_A_CORE
4/4/24: Pt seen at bedside by Dr Ruiz. PleurX in place.     Patient is s/p subxiphoid pericardial window and drain 3/19/24 followed by FB Right PleurX catheter placement 3/26/24 by Dr Ruiz at [facility].     PMH/Hospital Course: 58F with hx of RCC s/p R total nephrectomy, s/p hysterectomy, R-sided glaucoma, Fibromyalgia, Anxiety, GERD, smoker initially admitted to [facility] for right breast swelling and cellulitis, found to have supraclavicular/mediastinal mass lesion with mass effect with metastatic involvement and complete occlusion of the right IJ and SVC (on AC), s/p supraclavicular LN biopsy showing metastatic cancer of GI origin. Course c/b moderate pericardial effusion s/p pericardial window, pleural effusions s/p pleurX, afib with RVR, worsening respiratory status c/f SVC syndrome (IR unable to stent) transferred to [facility] for urgent radiation (s/p sim 4/4 plan for 5 sessions).      Plan:   Cont care per primary team and radiation by Ran Onc  Right PleurX remains in place, capped   Drain PleurX 3x a week, no more than 1000cc/session or as needed.   Obtain AM CXR    Discussed with Dr Ruiz  Thoracic   [id number] 4/4/24: Pt seen at bedside by Dr Ruiz. PleurX in place.     Patient is s/p subxiphoid pericardial window and drain 3/19/24 followed by FB Right PleurX catheter placement 3/26/24 by Dr Ruiz at [facility].     PMH/Hospital Course: 58F with hx of RCC s/p R total nephrectomy, s/p hysterectomy, R-sided glaucoma, Fibromyalgia, Anxiety, GERD, smoker initially admitted to [facility] for right breast swelling and cellulitis, found to have supraclavicular/mediastinal mass lesion with mass effect with metastatic involvement and complete occlusion of the right IJ and SVC (on AC), s/p supraclavicular LN biopsy showing metastatic cancer of GI origin. Course c/b moderate pericardial effusion s/p pericardial window, pleural effusions s/p pleurX, afib with RVR, worsening respiratory status c/f SVC syndrome (IR unable to stent) transferred to [facility] for urgent radiation (s/p sim 4/4 plan for 5 sessions).      Plan:   Cont care per primary team and radiation by Ran Onc  Right PleurX remains in place, capped   Drain PleurX 3x a week, no more than 1000cc/session or as needed.   Obtain AM CXR    Discussed with Dr Ruiz  Thoracic   [id number]      patient with pleurx in place, still draining 3x a week.   continue drainage as needed. no acute thoracic issues at this time. reconsult prn

## 2024-04-04 NOTE — DISCHARGE NOTE PROVIDER - ATTENDING DISCHARGE PHYSICAL EXAMINATION:
Vital Signs Last 24 Hrs  T(C): 36.9 (31 May 2024 11:57), Max: 36.9 (31 May 2024 11:57)  T(F): 98.4 (31 May 2024 11:57), Max: 98.4 (31 May 2024 11:57)  HR: 116 (31 May 2024 11:57) (82 - 116)  BP: 118/69 (31 May 2024 11:57) (118/69 - 142/77)  RR: 20 (31 May 2024 11:57) (20 - 24)  SpO2: 95% (31 May 2024 11:57) (94% - 96%)  Parameters below as of 31 May 2024 11:01  Patient On (Oxygen Delivery Method): nasal cannula, high flow  O2 Flow (L/min): 50  O2 Concentration (%): 100  Cachectic, ill-appearing woman, alert/interactive, communicates via writing due to chronic dysphonia, nad  Anicteric sclera  HF NC in tact; breaths non-labored  Pt moves all extremities spontaneously

## 2024-04-04 NOTE — PROGRESS NOTE ADULT - PROBLEM SELECTOR PLAN 12
DVT ppx: therapeutically anticoagulated on lovenox  DIET: DASH  DISPO: TBD DVT ppx: therapeutically anticoagulated on lovenox  DIET: DASH  DISPO: TBD  GOC: DNR/DNI

## 2024-04-04 NOTE — DISCHARGE NOTE PROVIDER - HOSPITAL COURSE
58F with hx of RCC s/p R total nephrectomy, s/p hysterectomy, R-sided glaucoma, Fibromyalgia, Anxiety, GERD, smoker initially admitted to [facility] for right breast swelling and cellulitis, found to have supraclavicular/mediastinal mass lesion with mass effect with metastatic involvement and complete occlusion of the right IJ and SVC (on AC), s/p supraclavicular LN biopsy showing metastatic cancer of GI origin. Course c/b moderate pericardial effusion s/p pericardial window, pleural effusions s/p pleurX, afib with RVR, worsening respiratory status c/f SVC syndrome (IR unable to stent) transferred to [facility] for urgent radiation (s/p sim 4/4 plan for 5 sessions). 59 yo woman, former smoker, with h/o R eye glaucoma, Fibromyalgia, Anxiety, GERD, and RCC s/p R total nephrectomy/hysterectomy; she was initially admitted to [facility] on 3/11 w/ R breast swelling c/f mastitis- imaging [facility] noted supraclavicular/mediastinal mass lesion which encased the SVC and multiple other veins resulting in obliteration of the SVC and thrombosis of the R IJ, and a pancreatic neck cystic lesion.  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  Her disease/hospital course has also been c/b pericardial effusion s/p pericardial window w/ neg cytology, R pleural effusion, also negative cytology) s/p Pleurx, Afib w/ RVR and acute hypoxic resp failure 2/2 SVC syndrome- not dennis to IR-guided stenting; pt was ultimately started on Dex and transferred to [facility] on 4/4 for urgent palliative RT which she completed on 4/18. Her hospital course has also been c/b pericardial effusion with tamponade s/p window and non malignant R pleural effusion s/p pleurex, acute b/l UE and RLE dvts, anxiety, and more recently, recurrent hypoxia. Pt received her first cycle of palliative FOLFOX 5/16-18, c/b rapid clinical decline including acute-on-chronic hypoxic resp failure from which she is now dependent on HF NC. After GOC discussion on 5/24, pt was transitioned to full comfort measures, pending inpt hospice placement.     Hospital course:   Metastatic CUP (carcinoma of unknown primary)  - Comfort measures only now. Pending inpatient hospice placement   - Pt is no longer a candidate for systemic cancer treatment given her progressive cancer-related comorbidities, including HF NC-dependent hypoxic resp failure, severe malnutrition and poor functional status (ECOG PS 4)  - Pt's HCP Saeid consented to full comfort measures and inpt hospice placement   * Pt accepted at [facility], which can accommodate her HF NC O2 requirment; however, pt's HCP Saeid is not amenable to transfer to [place] as it is too far for him to get to and he is not confident that she will get good care there based on a report from a family member   * Pt's case denied by [facility]   * SW referrals to other in hospice facilities (ie [facility]) are in progress  - Pt's long term prognosis remains poor: days to short weeks; she is dnr/dni    Acute hypercapneic + hypoxic resp failure  - R pleural effusion s/p pleurex (non malignant)  - Suspect underlying disease progression (pt became too unstable for repeat imaging)  - Pt is dependent on HF NC (near-max settings)  - Continuing supportive resp care as prescribed  - Continuing R pleurex drainage- up to 500cc q48h/prn    Cancer related pain  - Currently well controlled  - Continuing Oxy ER 30mg q12 q8  - Continuing Morphine SL 20mg q3/prn for breakthrough pain  - Continuing Dilaudid IV prn for sev breakthrough pain  - Continuing bowel regimen to prevent OIC (including Movantic)    Anxiety/emotional lability  - Continuing Diazepam 5mg BID/prn and nightly  - Appreciate  consult: pt lacks medical decision making capacity; medical decision making will be deferred to pt's surrogate decision maker- Saeid Peña ([phone])    Extensive b/l UE DVTs  - Acute RLE DVT a/w RLE ecchymosis  - Hypercoag state  - Continuing therapeutic lovenox, benefits > risks    pAfib  - Pt currently SR, HR controlled  - Likely precipitated by malignancy, pericardial effusion, SVC syndrome  - Continuing Metoprolol 25mg q12 with holding parameters  - Continuing Amio 200mg daily (monitoring for toxicities)  - Telemetry dced today as pt has transitioned to comfort care  - Patient is refusing meds at this time     SVC syndrome: completed 10fxs of palliative RT on 4/18    Dysphonia with R VC hypomobility  - S/p Flex ellis on 5/4 showing R VC hypomobility  - Continuing soft/bite-sized diet with Ensure BID  - Completed 7d course of Fluconazole on 5/12 for oral thrush    On 5/31/24, pt is planned to discharge to [place] to continue inpatient hospice program. Case was discussed with Dr. Gamboa. 59 yo woman, former smoker, with h/o R eye glaucoma, Fibromyalgia, Anxiety, GERD, and RCC s/p R total nephrectomy/hysterectomy; she was initially admitted to [facility] on 3/11 w/ R breast swelling c/f mastitis- imaging [facility] noted supraclavicular/mediastinal mass lesion which encased the SVC and multiple other veins resulting in obliteration of the SVC and thrombosis of the R IJ, and a pancreatic neck cystic lesion.  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  Her disease/hospital course has also been c/b pericardial effusion s/p pericardial window w/ neg cytology, R pleural effusion, also negative cytology) s/p Pleurx, Afib w/ RVR and acute hypoxic resp failure 2/2 SVC syndrome- not dennis to IR-guided stenting; pt was ultimately started on Dex and transferred to [facility] on 4/4 for urgent palliative RT which she completed on 4/18. Her hospital course has also been c/b pericardial effusion with tamponade s/p window and non malignant R pleural effusion s/p pleurex, acute b/l UE and RLE dvts, anxiety, and more recently, recurrent hypoxia. Pt received her first cycle of palliative FOLFOX 5/16-18, c/b rapid clinical decline including acute-on-chronic hypoxic resp failure from which she is now dependent on HF NC. After GOC discussion on 5/24, pt was transitioned to full comfort measures, pending inpt hospice placement.     Hospital course:   Metastatic CUP (carcinoma of unknown primary)  - Comfort measures only now. Pending inpatient hospice placement   - Pt is no longer a candidate for systemic cancer treatment given her progressive cancer-related comorbidities, including HF NC-dependent hypoxic resp failure, severe malnutrition and poor functional status (ECOG PS 4)  - Pt's HCP Saeid consented to full comfort measures and inpt hospice placement   * Pt accepted at [facility], which can accommodate her HF NC O2 requirment  - Pt's long term prognosis remains poor: days to short weeks; she is dnr/dni    Acute hypercapneic + hypoxic resp failure  - R pleural effusion s/p pleurex (non malignant)  - Suspect underlying disease progression (pt became too unstable for repeat imaging)  - Pt is dependent on HF NC (near-max settings)  - Continuing supportive resp care as prescribed  - Continuing R pleurex drainage- up to 500cc q48h/prn    Cancer related pain  - Currently well controlled  - Continuing Oxy ER 30mg q12 q8  - Continuing Morphine SL 20mg q3/prn for breakthrough pain  - Continuing Dilaudid IV prn for sev breakthrough pain  - Continuing bowel regimen to prevent OIC (including Movantic)    Anxiety/emotional lability  - Continuing Diazepam 5mg BID/prn and nightly  - Appreciate  consult: pt lacks medical decision making capacity; medical decision making will be deferred to pt's surrogate decision maker- Saeid Peña ([phone])    Extensive b/l UE DVTs  - Acute RLE DVT a/w RLE ecchymosis  - Hypercoag state  - Continuing therapeutic lovenox, benefits > risks    pAfib  - Pt currently SR, HR controlled  - Likely precipitated by malignancy, pericardial effusion, SVC syndrome  - Continuing Metoprolol 25mg q12 with holding parameters  - Continuing Amio 200mg daily (monitoring for toxicities)  - Telemetry dced today as pt has transitioned to comfort care  - Patient is refusing meds at this time     SVC syndrome: completed 10fxs of palliative RT on 4/18    Dysphonia with R VC hypomobility  - S/p Flex ellis on 5/4 showing R VC hypomobility  - Continuing soft/bite-sized diet with Ensure BID  - Completed 7d course of Fluconazole on 5/12 for oral thrush

## 2024-04-04 NOTE — PROGRESS NOTE ADULT - PROBLEM SELECTOR PLAN 6
erythema and tenderness of right breast improving  - followed by ID at [facility] [[email or web address] - completed abx (Cefepime/Flagyl 4/1)  - continue to monitor

## 2024-04-04 NOTE — CONSULT NOTE ADULT - PROBLEM SELECTOR RECOMMENDATION 2
-  Presenting to [facility] with right breast/chest wall swelling and cellulitis with imaging evidence concerning for metastatic malignancy w/ lung nodules, and axillary, supraclavicular, and mediastinal adenopathy.    - S/p R Supraclavicular LN biopsy w/ IR on 3/14/24 w/ path suggesting metastatic carcinoma, favoring upper GI or pancreaticobiliary origin   - Course complicated with SVC syndrome    - MRI? GI? - hx of RCC s/p R nephrectomy   - Presenting to [facility] with right breast/chest wall swelling and cellulitis with imaging evidence concerning for metastatic malignancy w/ lung nodules, and axillary, supraclavicular, and mediastinal adenopathy.    - S/p R Supraclavicular LN biopsy w/ IR on 3/14/24 w/ path suggesting metastatic carcinoma, favoring upper GI or pancreaticobiliary origin.      -> imaging shows pancreatic neck cystic lesion measuring 1.6 cm  - pleural effusions s.p R pleurex, pericardial effusion   - Course complicated with SVC syndrome    - Oncology recommends MRI/MRCP - deferred for now until SVC treatment

## 2024-04-04 NOTE — H&P ADULT - HISTORY OF PRESENT ILLNESS
INCOMPLETE [facility] transfer.  58F w/ F hx tobacco use, RCC right nephrectomy, right sided glaucoma, fibromyalgia, anxiety, GERD, was at [facility] w/ concern for breast inflammation, lung nodules/mediastinal mass w/ biopsy concern for metastatic GI cancer. Had pleural effusion s/p chest tube and pleurx, had pericardial window for effusion. Has had poor IV access, has triple lumen in right groin. Has UE DVT on lovenox. Also has SVC syndrome, transfer from [facility] for LIJ RT treatment.     ROS otherwise negative. On oxygen.        04-Feb-2019 20:18

## 2024-04-04 NOTE — CHART NOTE - NSCHARTNOTEFT_GEN_A_CORE
Patient with R femoral CVC placed at [facility] for difficult upper extremity access. Given risk for infection, femoral line removed at bedside. Hemostasis achieved.

## 2024-04-04 NOTE — PROGRESS NOTE ADULT - ASSESSMENT
58F with hx of RCC s/p R total nephrectomy, s/p hysterectomy, R-sided glaucoma, Fibromyalgia, Anxiety, GERD, smoker initially admitted to [facility] for right breast swelling and cellulitis, found to have supraclavicular/mediastinal mass lesion with mass effect with metastatic involvement and complete occlusion of the right IJ and SVC (on AC), s/p supraclavicular LN biopsy showing metastatic cancer of GI origin. Course c/b moderate pericardial effusion s/p pericardial window, pleural effusions s/p pleurX, afib with RVR, worsening respiratory status c/f SVC syndrome (IR unable to stent) transferred to [facility] for urgent radiation (s/p sim 4/4 plan for 5 sessions).

## 2024-04-04 NOTE — DISCHARGE NOTE PROVIDER - DETAILS OF MALNUTRITION DIAGNOSIS/DIAGNOSES
This patient has been assessed with a concern for Malnutrition and was treated during this hospitalization for the following Nutrition diagnosis/diagnoses:     -  04/07/2024: Severe protein-calorie malnutrition

## 2024-04-05 ENCOUNTER — RESULT REVIEW (OUTPATIENT)
Age: 59
End: 2024-04-05

## 2024-04-05 DIAGNOSIS — R05.9 COUGH, UNSPECIFIED: ICD-10-CM

## 2024-04-05 LAB
ALBUMIN SERPL ELPH-MCNC: 3.2 G/DL — LOW (ref 3.3–5)
ALP SERPL-CCNC: 77 U/L — SIGNIFICANT CHANGE UP (ref 40–120)
ALT FLD-CCNC: 22 U/L — SIGNIFICANT CHANGE UP (ref 4–33)
ANION GAP SERPL CALC-SCNC: 12 MMOL/L — SIGNIFICANT CHANGE UP (ref 7–14)
AST SERPL-CCNC: 12 U/L — SIGNIFICANT CHANGE UP (ref 4–32)
BILIRUB DIRECT SERPL-MCNC: <0.2 MG/DL — SIGNIFICANT CHANGE UP (ref 0–0.3)
BILIRUB INDIRECT FLD-MCNC: >0.1 MG/DL — SIGNIFICANT CHANGE UP (ref 0–1)
BILIRUB SERPL-MCNC: 0.3 MG/DL — SIGNIFICANT CHANGE UP (ref 0.2–1.2)
BUN SERPL-MCNC: 38 MG/DL — HIGH (ref 7–23)
CALCIUM SERPL-MCNC: 9.2 MG/DL — SIGNIFICANT CHANGE UP (ref 8.4–10.5)
CHLORIDE SERPL-SCNC: 105 MMOL/L — SIGNIFICANT CHANGE UP (ref 98–107)
CO2 SERPL-SCNC: 24 MMOL/L — SIGNIFICANT CHANGE UP (ref 22–31)
CREAT SERPL-MCNC: 0.9 MG/DL — SIGNIFICANT CHANGE UP (ref 0.5–1.3)
EGFR: 73 ML/MIN/1.73M2 — SIGNIFICANT CHANGE UP
FERRITIN SERPL-MCNC: 249 NG/ML — SIGNIFICANT CHANGE UP (ref 13–330)
FOLATE SERPL-MCNC: 7.8 NG/ML — SIGNIFICANT CHANGE UP (ref 3.1–17.5)
GLUCOSE SERPL-MCNC: 137 MG/DL — HIGH (ref 70–99)
HCT VFR BLD CALC: 33.7 % — LOW (ref 34.5–45)
HGB BLD-MCNC: 10.7 G/DL — LOW (ref 11.5–15.5)
IRON SATN MFR SERPL: 25 % — SIGNIFICANT CHANGE UP (ref 14–50)
IRON SATN MFR SERPL: 57 UG/DL — SIGNIFICANT CHANGE UP (ref 30–160)
LDH SERPL L TO P-CCNC: 169 U/L — SIGNIFICANT CHANGE UP (ref 135–225)
MAGNESIUM SERPL-MCNC: 2.2 MG/DL — SIGNIFICANT CHANGE UP (ref 1.6–2.6)
MCHC RBC-ENTMCNC: 29.6 PG — SIGNIFICANT CHANGE UP (ref 27–34)
MCHC RBC-ENTMCNC: 31.8 GM/DL — LOW (ref 32–36)
MCV RBC AUTO: 93.4 FL — SIGNIFICANT CHANGE UP (ref 80–100)
MRSA PCR RESULT.: SIGNIFICANT CHANGE UP
NRBC # BLD: 0 /100 WBCS — SIGNIFICANT CHANGE UP (ref 0–0)
NRBC # FLD: 0 K/UL — SIGNIFICANT CHANGE UP (ref 0–0)
PHOSPHATE SERPL-MCNC: 3.8 MG/DL — SIGNIFICANT CHANGE UP (ref 2.5–4.5)
PLATELET # BLD AUTO: 656 K/UL — HIGH (ref 150–400)
POTASSIUM SERPL-MCNC: 5.3 MMOL/L — SIGNIFICANT CHANGE UP (ref 3.5–5.3)
POTASSIUM SERPL-SCNC: 5.3 MMOL/L — SIGNIFICANT CHANGE UP (ref 3.5–5.3)
PROT SERPL-MCNC: 6 G/DL — SIGNIFICANT CHANGE UP (ref 6–8.3)
RBC # BLD: 3.61 M/UL — LOW (ref 3.8–5.2)
RBC # BLD: 3.61 M/UL — LOW (ref 3.8–5.2)
RBC # FLD: 14.4 % — SIGNIFICANT CHANGE UP (ref 10.3–14.5)
RETICS #: 92.4 K/UL — SIGNIFICANT CHANGE UP (ref 25–125)
RETICS/RBC NFR: 2.6 % — HIGH (ref 0.5–2.5)
S AUREUS DNA NOSE QL NAA+PROBE: SIGNIFICANT CHANGE UP
SODIUM SERPL-SCNC: 141 MMOL/L — SIGNIFICANT CHANGE UP (ref 135–145)
TIBC SERPL-MCNC: 228 UG/DL — SIGNIFICANT CHANGE UP (ref 220–430)
UIBC SERPL-MCNC: 171 UG/DL — SIGNIFICANT CHANGE UP (ref 110–370)
URATE SERPL-MCNC: 2.4 MG/DL — LOW (ref 2.5–7)
VIT B12 SERPL-MCNC: 1051 PG/ML — HIGH (ref 200–900)
WBC # BLD: 13.41 K/UL — HIGH (ref 3.8–10.5)
WBC # FLD AUTO: 13.41 K/UL — HIGH (ref 3.8–10.5)

## 2024-04-05 PROCEDURE — 99233 SBSQ HOSP IP/OBS HIGH 50: CPT

## 2024-04-05 PROCEDURE — 99223 1ST HOSP IP/OBS HIGH 75: CPT | Mod: GC

## 2024-04-05 PROCEDURE — 93971 EXTREMITY STUDY: CPT | Mod: 26,LT

## 2024-04-05 RX ORDER — INFLUENZA VIRUS VACCINE 15; 15; 15; 15 UG/.5ML; UG/.5ML; UG/.5ML; UG/.5ML
0.5 SUSPENSION INTRAMUSCULAR ONCE
Refills: 0 | Status: DISCONTINUED | OUTPATIENT
Start: 2024-04-04 | End: 2024-05-31

## 2024-04-05 RX ADMIN — Medication 25 MILLIGRAM(S): at 05:55

## 2024-04-05 RX ADMIN — NALOXEGOL OXALATE 25 MILLIGRAM(S): 12.5 TABLET, FILM COATED ORAL at 12:24

## 2024-04-05 RX ADMIN — PANTOPRAZOLE SODIUM 40 MILLIGRAM(S): 20 TABLET, DELAYED RELEASE ORAL at 12:24

## 2024-04-05 RX ADMIN — OXYCODONE HYDROCHLORIDE 30 MILLIGRAM(S): 5 TABLET ORAL at 21:35

## 2024-04-05 RX ADMIN — Medication 15 MILLILITER(S): at 23:44

## 2024-04-05 RX ADMIN — ENOXAPARIN SODIUM 50 MILLIGRAM(S): 100 INJECTION SUBCUTANEOUS at 17:45

## 2024-04-05 RX ADMIN — Medication 100 MILLIGRAM(S): at 21:35

## 2024-04-05 RX ADMIN — HYDROMORPHONE HYDROCHLORIDE 5 MILLIGRAM(S): 2 INJECTION INTRAMUSCULAR; INTRAVENOUS; SUBCUTANEOUS at 09:14

## 2024-04-05 RX ADMIN — Medication 100 MILLIGRAM(S): at 17:43

## 2024-04-05 RX ADMIN — Medication 30 MILLILITER(S): at 08:47

## 2024-04-05 RX ADMIN — Medication 4 MILLIGRAM(S): at 21:35

## 2024-04-05 RX ADMIN — OXYCODONE HYDROCHLORIDE 30 MILLIGRAM(S): 5 TABLET ORAL at 14:32

## 2024-04-05 RX ADMIN — Medication 5 MILLIGRAM(S): at 00:58

## 2024-04-05 RX ADMIN — OXYCODONE HYDROCHLORIDE 30 MILLIGRAM(S): 5 TABLET ORAL at 05:55

## 2024-04-05 RX ADMIN — ENOXAPARIN SODIUM 50 MILLIGRAM(S): 100 INJECTION SUBCUTANEOUS at 05:56

## 2024-04-05 RX ADMIN — Medication 1 PATCH: at 12:25

## 2024-04-05 RX ADMIN — OXYCODONE HYDROCHLORIDE 10 MILLIGRAM(S): 5 TABLET ORAL at 03:04

## 2024-04-05 RX ADMIN — AMIODARONE HYDROCHLORIDE 200 MILLIGRAM(S): 400 TABLET ORAL at 05:55

## 2024-04-05 RX ADMIN — Medication 25 MILLIGRAM(S): at 17:44

## 2024-04-05 RX ADMIN — Medication 15 MILLILITER(S): at 14:41

## 2024-04-05 RX ADMIN — Medication 4 MILLIGRAM(S): at 05:55

## 2024-04-05 RX ADMIN — Medication 4 MILLIGRAM(S): at 14:37

## 2024-04-05 NOTE — PROGRESS NOTE ADULT - ASSESSMENT
58F w/ F hx tobacco use, RCC right nephrectomy, right sided glaucoma, fibromyalgia, anxiety, GERD, was at [facility] w/ concern for breast inflammation, lung nodules/mediastinal mass w/ biopsy concern for metastatic GI cancer. Had pleural effusion s/p chest tube and pleurx, had pericardial window for effusion. Has had poor IV access, has triple lumen in right groin. Has UE DVT on lovenox. Also has SVC syndrome, transfer from [facility] for LIJ RT treatment. ROS otherwise negative. On oxygen.

## 2024-04-05 NOTE — PROGRESS NOTE ADULT - PROBLEM SELECTOR PLAN 2
hx of RCC s/p R nephrectomy   - Presenting to [facility] with right breast/chest wall swelling and cellulitis with imaging evidence concerning for metastatic malignancy w/ lung nodules, and axillary, supraclavicular, and mediastinal adenopathy.    - S/p R Supraclavicular LN biopsy w/ IR on 3/14/24 w/ path suggesting metastatic carcinoma, favoring upper GI or pancreaticobiliary origin.      -> imaging shows pancreatic neck cystic lesion measuring 1.6 cm  - pleural effusions s.p R pleurex, pericardial effusion   - Course complicated with SVC syndrome    - Oncology recommends MRI/MRCP - deferred for now until SVC treatment hx of RCC s/p R nephrectomy   - Presenting to [facility] with right breast/chest wall swelling and cellulitis with imaging evidence concerning for metastatic malignancy w/ lung nodules, and axillary, supraclavicular, and mediastinal adenopathy.    - S/p R Supraclavicular LN biopsy w/ IR on 3/14/24 w/ path suggesting metastatic carcinoma, favoring upper GI or pancreaticobiliary origin.      -> imaging shows pancreatic neck cystic lesion measuring 1.6 cm  - pleural effusions s/p R pleurex, pericardial effusion   - Course complicated with SVC syndrome    - Oncology recommends MRI/MRCP - deferred for now until after SVC treatment - recommend benzonanate 200 mg TID PRN for cough

## 2024-04-05 NOTE — PROGRESS NOTE ADULT - PROBLEM SELECTOR PLAN 1
hx of RCC s/p nephrectomy (Urology Dr Jacky Adkins at [facility]), lymph node dissection 2 years ago.   - found to have axillary, subclavicular and mediastinal lymphadenopathy, pulmonary nodules and inflammatory changes of breast seen on CT with possible effect on IVC  - CT neck with large supraclavicular/mediastinal mass lesion with mass effect and complete occlusion of the RIJ/SVC  - s/p supraclavicular LN biopsy by IR on 3/14 - path consistent with primary of GI origin  - [facility] team discussed with GI re: MRI abdomen and MRCP - currently deferred due to respiratory status and svc syndrome pending urgent radiation  - appreciate radonc: s/p sim 4/4, 1st/5 RT today, thru 4/11, c/w decadron 4mg TID - taper per radonc, PPI while on steroids  - hemeonc consulted - apprec recs, MR Abdomen as able  - palliative care consulted for cancer related pain, apprec recs and d/w them

## 2024-04-05 NOTE — PROGRESS NOTE ADULT - PROBLEM SELECTOR PLAN 3
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 Per IV nurse team, reviewed US 3/28 - patient with clots on bilateral upper extremities, recommended to repeat LUE sonogram, multiple clots in LIJ, subclavian and axillary vein, per IR, based on most recent duplex, no UE access can be safely obtained due to extensive bilateral UE DVTs. We might need to place again a new femoral line next week for IV access, to attempt a foot IV over the weekend if needed

## 2024-04-05 NOTE — CONSULT NOTE ADULT - SUBJECTIVE AND OBJECTIVE BOX
CC: Patient is a 58y old  Female who presents with a chief complaint of SVC syndrome, DVT, mediastinal mass (04 Apr 2024 14:44)    HPI:  [facility] transfer.  58F w/ F hx tobacco use, RCC right nephrectomy, right sided glaucoma, fibromyalgia, anxiety, GERD, was at [facility] w/ concern for breast inflammation, lung nodules/mediastinal mass w/ biopsy concern for metastatic GI cancer. Had pleural effusion s/p chest tube and pleurx, had pericardial window for effusion. Has had poor IV access, has triple lumen in right groin. Has UE DVT on lovenox. Also has SVC syndrome, transfer from [facility] for LIJ RT treatment.     ROS otherwise negative. On oxygen.        (04 Apr 2024 03:38)    PAST MEDICAL & SURGICAL HISTORY:  Anxiety      Acid reflux disease      Umbilical hernia      Uterine mass  Benign      Fibromyalgia  Joint pains      Glaucoma  Right eye      Herniated cervical disc      Cancer of kidney  right kidney      S/P partial hysterectomy  8 years ago      S/P knee surgery  knee arthroscopy right x 2 ( 2011 & 2012)      S/P hernia repair  umbilical Hernia Repair - 2011      H/O unilateral nephrectomy  Right Laparoscopic Nephrectomy - 60060      Glaucoma following surgery  Right Eyr - 2012      History of tonsillectomy        SOCIAL HISTORY:  Tobacco Usage:  (   ) never smoked   (   ) former smoker   (   ) current smoker  (     ) pack years    Tobacco Quit Date:  Substance Use (Street drugs): (  ) never used  (  ) other:  Alcohol Usage:    Family history reviewed and otherwise non-contributory  ALLERGIES:  erythromycin (Headache; Vomiting; Rash)  penicillin (Headache; Vomiting; Rash)  Cipro (Headache; Vomiting; Rash)    MEDICATIONS:  acetaminophen     Tablet .. 650 milliGRAM(s) Oral every 6 hours PRN  albuterol    90 MICROgram(s) HFA Inhaler 2 Puff(s) Inhalation every 6 hours PRN  albuterol/ipratropium for Nebulization 3 milliLiter(s) Nebulizer every 6 hours PRN  aluminum hydroxide/magnesium hydroxide/simethicone Suspension 30 milliLiter(s) Oral every 6 hours PRN  aMIOdarone    Tablet 200 milliGRAM(s) Oral daily  Biotene Dry Mouth Oral Rinse 15 milliLiter(s) Swish and Spit two times a day PRN  chlorhexidine 4% Liquid 1 Application(s) Topical <User Schedule>  dexAMETHasone     Tablet 4 milliGRAM(s) Oral every 8 hours  diazepam    Tablet 5 milliGRAM(s) Oral two times a day PRN  enoxaparin Injectable 50 milliGRAM(s) SubCutaneous every 12 hours  HYDROmorphone  Injectable 1 milliGRAM(s) IV Push every 3 hours PRN  influenza   Vaccine 0.5 milliLiter(s) IntraMuscular once  metoprolol tartrate 25 milliGRAM(s) Oral every 12 hours  naloxegol 25 milliGRAM(s) Oral daily  nicotine -  14 mG/24Hr(s) Patch 1 Patch Transdermal every 24 hours  oxyCODONE    IR 10 milliGRAM(s) Oral every 4 hours PRN  oxyCODONE  ER Tablet 30 milliGRAM(s) Oral <User Schedule>  pantoprazole   Suspension 40 milliGRAM(s) Oral daily  polyethylene glycol 3350 17 Gram(s) Oral every 12 hours  sodium chloride 0.9% lock flush 10 milliLiter(s) IV Push every 1 hour PRN      REVIEW OF SYSTEMS:  Negative except as above in HPI.    Vital Signs Last 24 Hrs  T(F): 97.8 (05 Apr 2024 09:00), Max: 98.3 (04 Apr 2024 13:15)  HR: 74 (05 Apr 2024 09:00) (61 - 76)  BP: 143/70 (05 Apr 2024 09:00) (102/54 - 148/81)  RR: 19 (05 Apr 2024 09:00) (17 - 19)  SpO2: 98% (05 Apr 2024 09:00) (98% - 100%)  I&O's Summary    04 Apr 2024 07:01  -  05 Apr 2024 07:00  --------------------------------------------------------  IN: 0 mL / OUT: 100 mL / NET: -100 mL      PHYSICAL EXAM:  GENERAL: NAD, well-groomed  HEAD:  Atraumatic, Normocephalic  EYES: PERRLA, normal conjunctiva, anicteric  ENMT: Moist mucous membranes, no mucositis  NECK: Supple, No JVD  CHEST/LUNG: Clear to auscultation bilaterally; No rales, rhonchi, wheezing, or rubs  HEART: Regular rate and rhythm; S1/S2, No murmurs, rubs, or gallops  ABDOMEN: Soft, Nontender, Nondistended; Bowel sounds present  VASCULAR: Normal pulses, Normal capillary refill  EXTREMITIES:  2+ Peripheral Pulses, No cyanosis, No edema  LYMPH: No lymphadenopathy noted  SKIN: Warm, Intact  PSYCH: Normal mood and affect  NERVOUS SYSTEM:  A/O x3, CN 2-12 intact, No focal deficits    LABS:                        10.7   13.41 )-----------( 656      ( 05 Apr 2024 05:35 )             33.7     04-05    141  |  105  |  38  ----------------------------<  137  5.3   |  24  |  0.90    Ca    9.2      05 Apr 2024 05:35  Phos  3.8     04-05  Mg     2.20     04-05    TPro  6.0  /  Alb  3.2  /  TBili  0.3  /  DBili  <0.2  /  AST  12  /  ALT  22  /  AlkPhos  77  04-05      PT/INR - ( 04 Apr 2024 05:54 )   PT: 11.0 sec;   INR: 0.97 ratio         PTT - ( 04 Apr 2024 05:54 )  PTT:29.5 sec                        Urinalysis Basic - ( 05 Apr 2024 05:35 )    Color: x / Appearance: x / SG: x / pH: x  Gluc: 137 mg/dL / Ketone: x  / Bili: x / Urobili: x   Blood: x / Protein: x / Nitrite: x   Leuk Esterase: x / RBC: x / WBC x   Sq Epi: x / Non Sq Epi: x / Bacteria: x            RADIOLOGY & ADDITIONAL TESTS:    Care Discussed with Consultants/Other Providers: CC: Patient is a 58y old  Female who presents with a chief complaint of SVC syndrome, DVT, mediastinal mass (04 Apr 2024 14:44)    HPI as per chart review:  58F with hx of RCC s/p R total nephrectomy, s/p hysterectomy, R-sided glaucoma, Fibromyalgia, Anxiety, GERD, smoker initially admitted to [facility] for right breast swelling and cellulitis, found to have supraclavicular/mediastinal mass lesion with mass effect with metastatic involvement and complete occlusion of the right IJ and SVC (on AC), s/p supraclavicular LN biopsy (3/14/24) showing metastatic cancer, suspected to be of GI origin. Course c/b moderate pericardial effusion s/p pericardial window (3/19), pleural effusions s/p pleurX (3/19), afib with RVR, worsening respiratory status c/f SVC syndrome (IR unable to stent) transferred to [facility] for urgent radiation. She underwent sim on 4/4/24 and is planned to receive 5 fractions.    Oncology consulted for ongoing work up and management for metastatic carcinoma.  Patient endorses she is still having difficulty breathing especially when laying down. She is unable to lay flat. Also endorses a lot of anxiety and depression given ongoing issues. Is tolerating food, but reports early satiety, which has been ongoing for some months prior to presentation.    Heme/Onc History per chart review:  History of right sided RCC managed by urologist w/ nephrectomy at [place] reported ~10 years ago and has been in remission. Patient states having a right neck lymph node dissection for suspected malignancy ~1-2 years ago which was reportedly equivocal per pathologist at OSH however patient never received any official diagnosis or therapy and was not told to follow up. She is a smoker, and noted the swelling in her breast ~1 week ago. She denies any recent trauma to the breast. Has significant family hx of malignancy w/ father having pancreatic cancer and mother having lung cancer. She is not UTD w/ her routine breast cancer screening and GYN/CRC screening. Denies any worsening shortness of breath, nausea or vomiting. Endorses right shoulder and back pain that she attributes to her fibromyalgia.         ROS otherwise negative except as above in HPI.    PAST MEDICAL & SURGICAL HISTORY:  Anxiety  Acid reflux disease  Umbilical hernia  Uterine mass Benign  Fibromyalgia  Joint pains  Glaucoma Right eye  Herniated cervical disc  Cancer of kidney right kidney  S/P partial hysterectomy  S/P knee surgery knee arthroscopy right x 2 ( 2011 & 2012)  S/P hernia repair umbilical Hernia Repair - 2011  H/O unilateral nephrectomy  Glaucoma following surgery Right Eyr - 2012  History of tonsillectomy    SOCIAL HISTORY:  Tobacco Usage:  (   ) never smoked   (   ) former smoker   (X) current smoker  Lives with boyfriend, no children    Family history as reviewed in HPI    ALLERGIES:  erythromycin (Headache; Vomiting; Rash)  penicillin (Headache; Vomiting; Rash)  Cipro (Headache; Vomiting; Rash)    MEDICATIONS:  acetaminophen Tablet .. 650 milliGRAM(s) Oral every 6 hours PRN  albuterol  90 MICROgram(s) HFA Inhaler 2 Puff(s) Inhalation every 6 hours PRN  albuterol/ipratropium for Nebulization 3 milliLiter(s) Nebulizer every 6 hours PRN  aluminum hydroxide/magnesium hydroxide/simethicone Suspension 30 milliLiter(s) Oral every 6 hours PRN  aMIOdarone    Tablet 200 milliGRAM(s) Oral daily  Biotene Dry Mouth Oral Rinse 15 milliLiter(s) Swish and Spit two times a day PRN  chlorhexidine 4% Liquid 1 Application(s) Topical <User Schedule>  dexAMETHasone     Tablet 4 milliGRAM(s) Oral every 8 hours  diazepam    Tablet 5 milliGRAM(s) Oral two times a day PRN  enoxaparin Injectable 50 milliGRAM(s) SubCutaneous every 12 hours  HYDROmorphone  Injectable 1 milliGRAM(s) IV Push every 3 hours PRN  influenza   Vaccine 0.5 milliLiter(s) IntraMuscular once  metoprolol tartrate 25 milliGRAM(s) Oral every 12 hours  naloxegol 25 milliGRAM(s) Oral daily  nicotine -  14 mG/24Hr(s) Patch 1 Patch Transdermal every 24 hours  oxyCODONE    IR 10 milliGRAM(s) Oral every 4 hours PRN  oxyCODONE  ER Tablet 30 milliGRAM(s) Oral <User Schedule>  pantoprazole   Suspension 40 milliGRAM(s) Oral daily  polyethylene glycol 3350 17 Gram(s) Oral every 12 hours  sodium chloride 0.9% lock flush 10 milliLiter(s) IV Push every 1 hour PRN    REVIEW OF SYSTEMS:  Negative except as above in HPI.    Vital Signs Last 24 Hrs  T(F): 97.8 (05 Apr 2024 09:00), Max: 98.3 (04 Apr 2024 13:15)  HR: 74 (05 Apr 2024 09:00) (61 - 76)  BP: 143/70 (05 Apr 2024 09:00) (102/54 - 148/81)  RR: 19 (05 Apr 2024 09:00) (17 - 19)  SpO2: 98% (05 Apr 2024 09:00) (98% - 100%)  I&O's Summary    04 Apr 2024 07:01  -  05 Apr 2024 07:00  --------------------------------------------------------  IN: 0 mL / OUT: 100 mL / NET: -100 mL      PHYSICAL EXAM:  GENERAL: NAD  EYES: R eye ptosis, normal conjunctiva, anicteric  ENMT: Moist mucous membranes,   CHEST/LUNG: prominent veins on chest, decreased breath sounds b/l, on NC, no increased work of breathing, speaking in full sentences  HEART: Regular rate and rhythm; S1/S2, No murmurs  ABDOMEN: Soft, Nontender, Nondistended  EXTREMITIES:  No edema  SKIN: Warm, Intact  PSYCH: appropriately upset, anxious  NERVOUS SYSTEM:  awake, alert    LABS:                        10.7   13.41 )-----------( 656      ( 05 Apr 2024 05:35 )             33.7     04-05    141  |  105  |  38  ----------------------------<  137  5.3   |  24  |  0.90    Ca    9.2      05 Apr 2024 05:35  Phos  3.8     04-05  Mg     2.20     04-05    TPro  6.0  /  Alb  3.2  /  TBili  0.3  /  DBili  <0.2  /  AST  12  /  ALT  22  /  AlkPhos  77  04-05    PT/INR - ( 04 Apr 2024 05:54 )   PT: 11.0 sec;   INR: 0.97 ratio    PTT - ( 04 Apr 2024 05:54 )  PTT:29.5 sec    Urinalysis Basic - ( 05 Apr 2024 05:35 )    Color: x / Appearance: x / SG: x / pH: x  Gluc: 137 mg/dL / Ketone: x  / Bili: x / Urobili: x   Blood: x / Protein: x / Nitrite: x   Leuk Esterase: x / RBC: x / WBC x   Sq Epi: x / Non Sq Epi: x / Bacteria: x    RADIOLOGY & ADDITIONAL TESTS:

## 2024-04-05 NOTE — CHART NOTE - NSCHARTNOTEFT_GEN_A_CORE
Interventional Radiology Pre-Procedure Checklist     Patient Age: 58 years     Patient Gender: F     Procedure (including site / side if known): Midline     Diagnosis / Indication: Multiple upper extremity DVTs. Needs IV access     Interventional Radiology Attending Physician: Per Interventional Radiology     Ordering Attending Physician: Elly DC     Pertinent medical history: 58F with hx of RCC s/p R total nephrectomy, s/p hysterectomy, R-sided glaucoma, Fibromyalgia, Anxiety, GERD, smoker initially admitted to [facility] for right breast swelling and cellulitis, found to have supraclavicular/mediastinal mass lesion with mass effect with metastatic involvement and complete occlusion of the right IJ and SVC (on AC), s/p supraclavicular LN biopsy showing metastatic cancer of GI origin. Course c/b moderate pericardial effusion s/p pericardial window, pleural effusions s/p pleurX, afib with RVR, worsening respiratory status c/f SVC syndrome (IR unable to stent) transferred to [facility] for urgent radiation (s/p sim 4/4 plan for 5 sessions).     Pertinent labs:                        10.7   13.41 )-----------( 656      ( 05 Apr 2024 05:35 )             33.7   04-05    141  |  105  |  38<H>  ----------------------------<  137<H>  5.3   |  24  |  0.90    Ca    9.2      05 Apr 2024 05:35  Phos  3.8     04-05  Mg     2.20     04-05    TPro  6.0  /  Alb  3.2<L>  /  TBili  0.3  /  DBili  <0.2  /  AST  12  /  ALT  22  /  AlkPhos  77  04-05      Patient and Family aware? Yes

## 2024-04-05 NOTE — PROGRESS NOTE ADULT - PROBLEM SELECTOR PLAN 6
For pain management     In the event of worsening symptoms, please contact the Palliative Medicine team via pager (if the patient is at [facility] #[phone] or if the patient is at [facility] #[phone]) The Geriatric and Palliative Medicine service has coverage 24 hours a day/ 7 days a week to provide medical recommendations regarding symptom management needs via telephone. - MOLST completed in [facility]- DNR/DNI - patient reaffirmed   - HCP done in [facility]- patient's boyfriend Saeid Peña

## 2024-04-05 NOTE — CONSULT NOTE ADULT - SUBJECTIVE AND OBJECTIVE BOX
Interventional Radiology      HPI: 58y Female with RCC s/p right nephrectomy presented to [facility] w/ concern for breast inflammation, lung nodules/mediastinal mass w/ biopsy concern for metastatic GI cancer. Had pleural effusion s/p chest tube and pleurx, had pericardial window for effusion. Has had poor IV access, has triple lumen in right groin. Has bilateral UE DVT on lovenox and SVC syndrome. Pt transferred to [facility] for RT.     Allergies: erythromycin (Headache; Vomiting; Rash)  penicillin (Headache; Vomiting; Rash)  Cipro (Headache; Vomiting; Rash)    Medications (Abx/Cardiac/Anticoagulation/Blood Products)  aMIOdarone    Tablet: 200 milliGRAM(s) Oral (04-05 @ 05:55)  aMIOdarone    Tablet: 400 milliGRAM(s) Oral (04-03 @ 21:49)  enoxaparin Injectable: 50 milliGRAM(s) SubCutaneous (04-05 @ 05:56)  enoxaparin Injectable: 50 milliGRAM(s) SubCutaneous (04-03 @ 18:28)  metoprolol tartrate: 25 milliGRAM(s) Oral (04-03 @ 18:28)  metoprolol tartrate: 25 milliGRAM(s) Oral (04-05 @ 05:55)    Home Medications  aluminum hydroxide-magnesium hydroxide 200 mg-200 mg/5 mL oral suspension: 30 milliliter(s) orally every 6 hours As needed Dyspepsia  bisacodyl 5 mg oral delayed release tablet: 1 tab(s) orally every 12 hours As needed Constipation  guaiFENesin 100 mg/5 mL oral liquid: 10 milliliter(s) orally every 6 hours As needed Cough  ipratropium-albuterol 0.5 mg-2.5 mg/3 mL inhalation solution: 3 milliliter(s) inhaled every 6 hours As needed Shortness of Breath and/or Wheezing  lactobacillus acidophilus oral capsule: 1 orally once a day  melatonin 3 mg oral tablet: 1 tab(s) orally once a day (at bedtime) As needed Insomnia  metoprolol tartrate 25 mg oral tablet: 1 tab(s) orally 2 times a day  naloxegol 25 mg oral tablet: 1 tab(s) orally once a day (before a meal)  naloxone: 0.1 milligram(s) intravenous every 3 minutes as needed for Obtundation  nicotine 14 mg/24 hr transdermal film, extended release: 1 patch transdermal once a day  pantoprazole 40 mg oral delayed release tablet: 1 tab(s) orally once a day (before a meal)  polyethylene glycol 3350 oral powder for reconstitution: 17 gram(s) orally 2 times a day  saliva substitutes oral solution: 1 orally prn  senna leaf extract oral tablet: 2 tab(s) orally once a day (at bedtime)    Data:  T(C): 36.6  HR: 74  BP: 143/70  RR: 19  SpO2: 98%    -WBC 13.41 / HgB 10.7 / Hct 33.7 / Plt 656  -Na 141 / Cl 105 / BUN 38 / Glucose 137  -K 5.3 / CO2 24 / Cr 0.90  -ALT 22 / Alk Phos 77 / T.Bili 0.3  -INR 0.97 / PTT 29.5    Radiology: reviewed    Assessment/Plan:   1y Female with RCC s/p right nephrectomy presented to [facility] w/ concern for breast inflammation, lung nodules/mediastinal mass w/ biopsy concern for metastatic GI cancer. Had pleural effusion s/p chest tube and pleurx, had pericardial window for effusion. Has had poor IV access, has triple lumen in right groin. Has bilateral UE DVT on lovenox and SVC syndrome. Pt transferred to [facility] for RT.     Case reviewed with Dr. Whitney. Based on most recent upper extremity doppler, no upper extremity access can be safely obtained due to extensive bilateral UE DVTs. Per team, patient has functioning right femoral catheter, which should be maintained for current venous access.    Please re-consult IR as necessary.     Josefina Cook MD   Interventional Radiology     Contact on [employer] Teams for nonemergent issues    - Nonemergent consults:  [facility] order "Consult- Interventional Radiology", no page required  - Emergent issues (pager): [facility] [phone]; [facility] [phone]; [phone]; DO NOT PAGE FOR SCHEDULING QUESTIONS  - Scheduling questions 8am-6pm : [facility] [phone]; [facility] [phone],   - Clinic/outpatient booking: [facility] [phone]; [facility] [phone]  Interventional Radiology      HPI: 58y Female with RCC s/p right nephrectomy presented to [facility] w/ concern for breast inflammation, lung nodules/mediastinal mass w/ biopsy concern for metastatic GI cancer. Had pleural effusion s/p chest tube and pleurx, had pericardial window for effusion. Has had poor IV access, has triple lumen in right groin. Has bilateral UE DVT on lovenox and SVC syndrome. Pt transferred to [facility] for RT.     Allergies: erythromycin (Headache; Vomiting; Rash)  penicillin (Headache; Vomiting; Rash)  Cipro (Headache; Vomiting; Rash)    Medications (Abx/Cardiac/Anticoagulation/Blood Products)  aMIOdarone    Tablet: 200 milliGRAM(s) Oral (04-05 @ 05:55)  aMIOdarone    Tablet: 400 milliGRAM(s) Oral (04-03 @ 21:49)  enoxaparin Injectable: 50 milliGRAM(s) SubCutaneous (04-05 @ 05:56)  enoxaparin Injectable: 50 milliGRAM(s) SubCutaneous (04-03 @ 18:28)  metoprolol tartrate: 25 milliGRAM(s) Oral (04-03 @ 18:28)  metoprolol tartrate: 25 milliGRAM(s) Oral (04-05 @ 05:55)    Home Medications  aluminum hydroxide-magnesium hydroxide 200 mg-200 mg/5 mL oral suspension: 30 milliliter(s) orally every 6 hours As needed Dyspepsia  bisacodyl 5 mg oral delayed release tablet: 1 tab(s) orally every 12 hours As needed Constipation  guaiFENesin 100 mg/5 mL oral liquid: 10 milliliter(s) orally every 6 hours As needed Cough  ipratropium-albuterol 0.5 mg-2.5 mg/3 mL inhalation solution: 3 milliliter(s) inhaled every 6 hours As needed Shortness of Breath and/or Wheezing  lactobacillus acidophilus oral capsule: 1 orally once a day  melatonin 3 mg oral tablet: 1 tab(s) orally once a day (at bedtime) As needed Insomnia  metoprolol tartrate 25 mg oral tablet: 1 tab(s) orally 2 times a day  naloxegol 25 mg oral tablet: 1 tab(s) orally once a day (before a meal)  naloxone: 0.1 milligram(s) intravenous every 3 minutes as needed for Obtundation  nicotine 14 mg/24 hr transdermal film, extended release: 1 patch transdermal once a day  pantoprazole 40 mg oral delayed release tablet: 1 tab(s) orally once a day (before a meal)  polyethylene glycol 3350 oral powder for reconstitution: 17 gram(s) orally 2 times a day  saliva substitutes oral solution: 1 orally prn  senna leaf extract oral tablet: 2 tab(s) orally once a day (at bedtime)    Data:  T(C): 36.6  HR: 74  BP: 143/70  RR: 19  SpO2: 98%    -WBC 13.41 / HgB 10.7 / Hct 33.7 / Plt 656  -Na 141 / Cl 105 / BUN 38 / Glucose 137  -K 5.3 / CO2 24 / Cr 0.90  -ALT 22 / Alk Phos 77 / T.Bili 0.3  -INR 0.97 / PTT 29.5    Radiology: reviewed    Assessment/Plan:   58y Female with RCC s/p right nephrectomy presented to [facility] w/ concern for breast inflammation, lung nodules/mediastinal mass w/ biopsy concerning for metastatic GI cancer. Had pleural effusion s/p chest tube and pleurx. Has had poor IV access, has triple lumen in right groin. Found to have bilateral UE DVT (started on lovenox) and SVC syndrome. Pt transferred to [facility] for RT.     Case reviewed with Dr. Whitney. Based on most recent upper extremity doppler, no upper extremity access can be safely obtained due to extensive bilateral UE DVTs. Per team, patient has functioning right femoral catheter, which should be maintained for current venous access.    Please re-consult IR as necessary.     Josefina Cook MD   Interventional Radiology     Contact on [employer] Teams for nonemergent issues    - Nonemergent consults:  place [facility] order "Consult- Interventional Radiology", no page required  - Emergent issues (pager): [facility] [phone]; [facility] [phone]; [phone]; DO NOT PAGE FOR SCHEDULING QUESTIONS  - Scheduling questions 8am-6pm : [facility] [phone]; [facility] [phone],   - Clinic/outpatient booking: [facility] [phone]; [facility] [phone]

## 2024-04-05 NOTE — PROGRESS NOTE ADULT - PROBLEM SELECTOR PLAN 2
respiratory status currently stable  - Acute hypoxemic respiratory failure  - CT neck with large supraclavicular/mediastinal mass lesion with mass effect and complete occlusion of the RIJ/SVC  - IR was consulted at [facility] for possible stenting; not feasible due to size/location  - transferred to [facility] for urgent radiation - s/p sim 4/4, 1st/5 RT today, thru 4/11, c/w decadron 4mg TID  - monitor respiratory status closely  - Wean off O2 as tolerated, on 4L NC

## 2024-04-05 NOTE — PROGRESS NOTE ADULT - SUBJECTIVE AND OBJECTIVE BOX
Patient is a 58y old  Female who presents with a chief complaint of SVC syndrome, DVT, mediastinal mass (05 Apr 2024 13:07)      INTERVAL HPI/OVERNIGHT EVENTS:  Seen by me this afternoon, resting comfortably in bed, received 1st session of RT earlier today. Decreased appetite. +SOB on NC4L. No chest pain.    Review of Systems: 12 point review of systems otherwise negative    MEDICATIONS  (STANDING):  aMIOdarone    Tablet 200 milliGRAM(s) Oral daily  chlorhexidine 4% Liquid 1 Application(s) Topical <User Schedule>  dexAMETHasone     Tablet 4 milliGRAM(s) Oral every 8 hours  enoxaparin Injectable 50 milliGRAM(s) SubCutaneous every 12 hours  influenza   Vaccine 0.5 milliLiter(s) IntraMuscular once  metoprolol tartrate 25 milliGRAM(s) Oral every 12 hours  naloxegol 25 milliGRAM(s) Oral daily  nicotine -  14 mG/24Hr(s) Patch 1 Patch Transdermal every 24 hours  oxyCODONE  ER Tablet 30 milliGRAM(s) Oral <User Schedule>  pantoprazole   Suspension 40 milliGRAM(s) Oral daily  polyethylene glycol 3350 17 Gram(s) Oral every 12 hours    MEDICATIONS  (PRN):  acetaminophen     Tablet .. 650 milliGRAM(s) Oral every 6 hours PRN Temp greater or equal to 38C (100.4F), Mild Pain (1 - 3)  albuterol    90 MICROgram(s) HFA Inhaler 2 Puff(s) Inhalation every 6 hours PRN Shortness of Breath and/or Wheezing  albuterol/ipratropium for Nebulization 3 milliLiter(s) Nebulizer every 6 hours PRN Shortness of Breath and/or Wheezing  aluminum hydroxide/magnesium hydroxide/simethicone Suspension 30 milliLiter(s) Oral every 6 hours PRN Dyspepsia  Biotene Dry Mouth Oral Rinse 15 milliLiter(s) Swish and Spit two times a day PRN dry mouth  diazepam    Tablet 5 milliGRAM(s) Oral two times a day PRN Anxiety  HYDROmorphone  Injectable 1 milliGRAM(s) IV Push every 3 hours PRN Severe Pain (7 - 10)  oxyCODONE    IR 10 milliGRAM(s) Oral every 4 hours PRN Moderate Pain (4 - 6)  sodium chloride 0.9% lock flush 10 milliLiter(s) IV Push every 1 hour PRN Pre/post blood products, medications, blood draw, and to maintain line patency      Allergies    erythromycin (Headache; Vomiting; Rash)  penicillin (Headache; Vomiting; Rash)  Cipro (Headache; Vomiting; Rash)    Intolerances          Vital Signs Last 24 Hrs  T(C): 36.6 (05 Apr 2024 09:00), Max: 36.7 (04 Apr 2024 17:18)  T(F): 97.8 (05 Apr 2024 09:00), Max: 98.1 (04 Apr 2024 17:18)  HR: 74 (05 Apr 2024 09:00) (64 - 76)  BP: 143/70 (05 Apr 2024 09:00) (129/56 - 148/81)  BP(mean): --  RR: 19 (05 Apr 2024 09:00) (17 - 19)  SpO2: 98% (05 Apr 2024 09:00) (98% - 100%)    Parameters below as of 05 Apr 2024 09:00  Patient On (Oxygen Delivery Method): nasal cannula  O2 Flow (L/min): 4    CAPILLARY BLOOD GLUCOSE          04-04 @ 07:01  -  04-05 @ 07:00  --------------------------------------------------------  IN: 0 mL / OUT: 100 mL / NET: -100 mL    04-05 @ 07:01  -  04-05 @ 16:05  --------------------------------------------------------  IN: 500 mL / OUT: 100 mL / NET: 400 mL        Physical Exam:    Daily     Daily   General:  Well appearing, NAD, cachetic, saturating well on NC 4L  HEENT:  Nonicteric, PERRLA  CV:  Irreg Irreg, no murmur, no JVD  Lungs:  CTA B/L, no wheezes, rales, rhonchi  Abdomen:  Soft, non-tender, no distended, positive BS, no hepatosplenomegaly  Extremities:  2+ pulses, no c/c, RUE edema  Skin:  Warm and dry, no rashes  :  No nelson  Neuro:  AAOx3, non-focal, CN II-XII grossly intact  No Restraints    LABS:                        10.7   13.41 )-----------( 656      ( 05 Apr 2024 05:35 )             33.7     04-05    141  |  105  |  38<H>  ----------------------------<  137<H>  5.3   |  24  |  0.90    Ca    9.2      05 Apr 2024 05:35  Phos  3.8     04-05  Mg     2.20     04-05    TPro  6.0  /  Alb  3.2<L>  /  TBili  0.3  /  DBili  <0.2  /  AST  12  /  ALT  22  /  AlkPhos  77  04-05    PT/INR - ( 04 Apr 2024 05:54 )   PT: 11.0 sec;   INR: 0.97 ratio         PTT - ( 04 Apr 2024 05:54 )  PTT:29.5 sec  Urinalysis Basic - ( 05 Apr 2024 05:35 )    Color: x / Appearance: x / SG: x / pH: x  Gluc: 137 mg/dL / Ketone: x  / Bili: x / Urobili: x   Blood: x / Protein: x / Nitrite: x   Leuk Esterase: x / RBC: x / WBC x   Sq Epi: x / Non Sq Epi: x / Bacteria: x          RADIOLOGY & ADDITIONAL TESTS:  Reviewed by me

## 2024-04-05 NOTE — PROGRESS NOTE ADULT - PROBLEM SELECTOR PLAN 6
erythema and tenderness of right breast improving  - followed by ID at [facility] - completed abx (Cefepime/Flagyl 4/1)  - continue to monitor

## 2024-04-05 NOTE — PROGRESS NOTE ADULT - PROBLEM SELECTOR PLAN 3
- seen on imaging  - no stent per IR   - transferred to [facility] for RT   - Undergoing RT  - on Dexa hx of RCC s/p R nephrectomy   - Presenting to [facility] with right breast/chest wall swelling and cellulitis with imaging evidence concerning for metastatic malignancy unknown primary w/ lung nodules, and axillary, supraclavicular, and mediastinal adenopathy.    - S/p R Supraclavicular LN biopsy w/ IR on 3/14/24 w/ path suggesting metastatic carcinoma, favoring upper GI or pancreaticobiliary origin.      -> imaging shows pancreatic neck cystic lesion measuring 1.6 cm  - pleural effusions s/p R pleurex, pericardial effusion   - Course complicated with SVC syndrome    - Oncology recommends MRI/MRCP - deferred for now until after SVC treatment

## 2024-04-05 NOTE — PROGRESS NOTE ADULT - PROBLEM SELECTOR PLAN 12
DVT ppx: therapeutically anticoagulated on lovenox, ambulating  GOC: DNR/DNI  Dietitian consultation, added Ensure HP for now

## 2024-04-05 NOTE — PROGRESS NOTE ADULT - PROBLEM SELECTOR PLAN 1
- Source: R sided chest pain, radiating. Large right supraclavicular infiltrative mass, extending into the inferior mediastinum, causing RUE and chest swelling, SVC syndrome   - regimen started at [facility]   - c/w oxycontin 30 mg TID   - c/w oxycodone 10 mg q4h PRN for moderate pain   - c/w IV dilaudid 1 mg q3h PRN for severe pain   - Bowel regimen, goal BM every 2 to 3 days to prevent opioid induced constipation, hold for loose stool. - Source: R sided chest pain, radiating. Large right supraclavicular infiltrative mass, extending into the inferior mediastinum, causing RUE and chest swelling, SVC syndrome   - regimen started at [facility]   - PRN use in past 24 hours from 8 AM to 8 AM: 10 mg x 1 dose   - c/w oxycontin 30 mg TID   - c/w oxycodone 10 mg q4h PRN for moderate pain   - c/w IV dilaudid 1 mg q3h PRN for severe pain   - Bowel regimen, goal BM every 2 to 3 days to prevent opioid induced constipation, hold for loose stool.

## 2024-04-05 NOTE — PROGRESS NOTE ADULT - PROBLEM SELECTOR PLAN 5
- MOLST completed in [facility]- DNR/DNI - patient reaffirmed   - HCP done in [facility]- patient's boyfriend Saeid Peña - on valium 5 mg BID (home med)

## 2024-04-05 NOTE — PROGRESS NOTE ADULT - PROBLEM SELECTOR PLAN 4
- on valium 5 mg BID (home med) - seen on imaging  - no stent per IR   - transferred to [facility] for RT   - Undergoing RT  - on Dexa

## 2024-04-05 NOTE — PROGRESS NOTE ADULT - SUBJECTIVE AND OBJECTIVE BOX
Indication of Geriatrics and Palliative Medicine Services:  [  ] Complex Medical Decision Making   [  ] Symptom/Pain management     DNR on chart:  DNI    INTERVAL EVENTS:     -------------------------------------------------------------------------------------------------------    PRESENT SYMPTOMS:     [ ] Unable to self-report      [ ] PAINADS     [ ] RDOS    [ ] No     [X ] Yes     Source if other than patient:  [ ]Family   [ ]Team     PAIN:   If blank, patient unable to specify     [X ]yes [ ]no    1. Location- R side of body- chest, abdomen, shoulder neck   2. Radiation- as above    3. Quality- sharp, burning, electric; also pressure   4. Timing- Constant   5. Minimal acceptable level/pain goal- 5/10   6. Aggravating factors-   7. QOL impact- Severe     SYMPTOMS:   Dyspnea:                           [ ]Mild [ ]Moderate [ ]Severe  Anxiety:                             [ ]Mild [ ]Moderate [X ]Severe  Fatigue:                             [ ]Mild [ ]Moderate [ ]Severe  Nausea/Vomiting:              [ ]Mild [ ]Moderate [ ]Severe  Loss of appetite:                [ ]Mild [ ]Moderate [ ]Severe  Constipation:                     [ ]Mild [ ]Moderate [ ]Severe    Other Symptoms:  [X ]All other review of systems negative     -------------------------------------------------------------------------------------------------------    I STOP: [id number]    PDI	Current Rx	Drug Type	Rx Written	Rx Dispensed	Drug	Quantity	Days Supply	Prescriber Name	Prescriber KVNG #	Payment Method	Dispenser  A	Y	O	03/08/2024	03/11/2024	oxycodone hcl (ir) 10 mg tab	120	30	Bakari Morales	[id number]	Medicare	[facility]  A	N	B	02/14/2024	02/14/2024	diazepam 5 mg tablet	60	30	Neha Arreaga	[id number]	Medicare	[facility]  A	N	O	02/09/2024	02/09/2024	oxycodone hcl (ir) 10 mg tab	120	30	Replogle, Bakari	[id number]	Medicare	[facility]  A	N	B	01/11/2024	01/12/2024	diazepam 5 mg tablet	60	30	Pulatov, Pulat	[id number]	Medicare	[facility]  A	N	O	01/08/2024	01/09/2024	oxycodone hcl (ir) 10 mg tab	120	30	Replogle, Bakari	[id number]	Medicare	[facility]  A	N	B	12/12/2023	12/12/2023	diazepam 5 mg tablet	60	30	Pulatov, Pulat	[id number]	[facility]  A	N	O	12/11/2023	12/11/2023	oxycodone hcl (ir) 10 mg tab	120	30	Replogle, Bakari	[id number]	Medicare	[facility]  A	N	O	11/13/2023	11/13/2023	oxycodone hcl (ir) 10 mg tab	120	30	José Miguel Caro	[id number]	Medicare	[facility]  A	N	B	10/19/2023	10/19/2023	diazepam 5 mg tablet	60	30	Pulatov, Pulat	[id number]	Cash	[facility]  A	N	O	10/13/2023	10/13/2023	oxycodone hcl (ir) 10 mg tab	120	30	José Miguel Caro	[id number]	Medicare	[facility]  A	N	B	09/11/2023	09/16/2023	diazepam 5 mg tablet	60	30	Pulatov, Pulat	[id number]	Medicare	[facility]  A	N	O	09/15/2023	09/16/2023	oxycodone hcl (ir) 10 mg tab	120	30	Sanjiv Persaud	[id number]	Medicare	[facility]  A	N	O	08/18/2023	08/19/2023	oxycodone hcl (ir) 10 mg tab	120	30	Sanjiv Persaud	[id number]	Medicare	[facility]  A	N	B	07/30/2023	08/03/2023	diazepam 5 mg tablet	60	30	Pulatov, Pulat	[id number]	Medicare	[facility]  A	N	O	07/21/2023	07/21/2023	oxycodone hcl (ir) 10 mg tab	120	30	Sanjiv Persaud	[id number]	Medicare	[facility]  A	N	B	06/25/2023	07/12/2023	diazepam 5 mg tablet	60	30	Pulatov, Pulat	[id number]	Medicare	[facility]  A	N	O	06/23/2023	06/24/2023	oxycodone hcl (ir) 10 mg tab	120	30	Sanjiv Persaud	[id number]	Medicare	[facility]      -------------------------------------------------------------------------------------------------------    ITEMS UNCHECKED ARE NOT PRESENT    PHYSICAL:  Vital Signs Last 24 Hrs  T(C): 36.6 (05 Apr 2024 09:00), Max: 36.8 (04 Apr 2024 13:15)  T(F): 97.8 (05 Apr 2024 09:00), Max: 98.3 (04 Apr 2024 13:15)  HR: 74 (05 Apr 2024 09:00) (61 - 76)  BP: 143/70 (05 Apr 2024 09:00) (102/54 - 148/81)  BP(mean): --  RR: 19 (05 Apr 2024 09:00) (17 - 19)  SpO2: 98% (05 Apr 2024 09:00) (98% - 100%)    Parameters below as of 05 Apr 2024 09:00  Patient On (Oxygen Delivery Method): nasal cannula  O2 Flow (L/min): 4   I&O's Summary    04 Apr 2024 07:01  -  05 Apr 2024 07:00  --------------------------------------------------------  IN: 0 mL / OUT: 100 mL / NET: -100 mL    05 Apr 2024 07:01  -  05 Apr 2024 12:56  --------------------------------------------------------  IN: 500 mL / OUT: 100 mL / NET: 400 mL    GENERAL:  [ ]Cachexia  [ ] Frail  [X ]Awake  [X ]Oriented x 3  [ ]Lethargic  [ ]Unarousable  [ ]Verbal  [ ]Non-Verbal    BEHAVIORAL:   [ ] Anxiety  [ ] Delirium [ ] Agitation [ ] Other    HEENT:   [ ]Normal   [ ]Dry mouth   [ ]ET Tube/Trach  [ ]Oral lesions  R eye glaucoma     PULMONARY:   [X ]Clear              [ ]Tachypnea  [ ]Audible excessive secretions   [ ]Rhonchi        [ ]Right [ ]Left [ ]Bilateral  [ ]Crackles        [ ]Right [ ]Left [ ]Bilateral  [ ]Wheezing     [ ]Right [ ]Left [ ]Bilateral  [ ]Diminished breath sounds [ ]right [ ]left [ ]bilateral    CARDIOVASCULAR:    [X ]Regular [ ]Irregular [ ]Tachy  [ ]Malik [ ]Murmur [ ]Other    GASTROINTESTINAL:  [X ]Soft  [ ]Distended   [ ]+BS  [X ]Non tender [ ]Tender  [ ]Other [ ]PEG [ ]OGT/ NGT      GENITOURINARY:  [X ]Normal [ ] Incontinent   [ ]Oliguria/Anuria   [ ]Che    MUSCULOSKELETAL:   [ ]Normal   [X ]Weakness  [ ]Bed/Wheelchair bound [ ]Edema    NEUROLOGIC:   [X ]No focal deficits  [ ]Cognitive impairment  [ ]Dysphagia [ ]Dysarthria [ ]Paresis [ ]Other     SKIN:   [X ]Normal  [ ]Rash  [X ]Other- edematous RUE, R breast   [ ]Pressure ulcer(s)       Present on admission [ ]y [ ]n      -------------------------------------------------------------------------------------------------------    LABS:                        10.7   13.41 )-----------( 656      ( 05 Apr 2024 05:35 )             33.7   04-05    141  |  105  |  38<H>  ----------------------------<  137<H>  5.3   |  24  |  0.90    Ca    9.2      05 Apr 2024 05:35  Phos  3.8     04-05  Mg     2.20     04-05    TPro  6.0  /  Alb  3.2<L>  /  TBili  0.3  /  DBili  <0.2  /  AST  12  /  ALT  22  /  AlkPhos  77  04-05  PT/INR - ( 04 Apr 2024 05:54 )   PT: 11.0 sec;   INR: 0.97 ratio         PTT - ( 04 Apr 2024 05:54 )  PTT:29.5 sec    Urinalysis Basic - ( 05 Apr 2024 05:35 )    Color: x / Appearance: x / SG: x / pH: x  Gluc: 137 mg/dL / Ketone: x  / Bili: x / Urobili: x   Blood: x / Protein: x / Nitrite: x   Leuk Esterase: x / RBC: x / WBC x   Sq Epi: x / Non Sq Epi: x / Bacteria: x    Ferritin: 249 ng/mL (04-05-24 @ 05:35)    -------------------------------------------------------------------------------------------------------    CRITICAL CARE:  [ ]Shock Present  [ ]Septic [ ]Cardiogenic [ ]Neurologic [ ]Hypovolemic [ ]Undifferentiated    [ ]Vasopressors [ ]Inotropes    [ ]Respiratory failure present [ ]Acute  [ ]Chronic [ ]Hypoxic  [ ]Hypercarbic [ ]Mixed   [ ]Mechanical Ventilation  [ ]Trach collar   [ ]Non-invasive ventilatory support   [ ]High-Flow   [ ]Oxygen mask/venti     [ ]Other organ failure     -------------------------------------------------------------------------------------------------------    RADIOLOGY & ADDITIONAL STUDIES:       < from: CT Neck Soft Tissue w/ IV Cont (03.27.24 @ 17:10) >  Bulky and conglomerate lymphadenopathy at right level 4 invades internal   jugular vein with thrombus extending up to hyoid level. 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 IJV otherwise patent up to skull base.    New left IJV nonocclusive thrombus. Otherwise, no significant change from   3/14/24.    < end of copied text >    < from: CT Chest w/ IV Cont (03.27.24 @ 17:10) >  IMPRESSION:    In comparison with 3/14/2024, new consolidations throughout the right   lung more severely within right lower lobe likely representing pneumonia.    Interval insertion of right Pleurx catheter and decrease of right pleural   effusion. Small/moderate left pleural effusion is increased.    Redemonstrated extensive DVT within thoracic and lower neck. The upper   SVC remains obliterated.    Large right supraclavicular mass infiltrating into the mediastinum is   unchanged.    Trace right pneumothorax.    < end of copied text >    < from: CT Neck Soft Tissue w/ IV Cont (03.14.24 @ 11:24) >  IMPRESSION:    Extensive cellulitis/myositis along the right anterior lateral neck and   right upper chest wall with inflammatory fat induration the deep soft   tissue of the neck.    Large supraclavicular/mediastinal mass lesion, presumably conglomerate of   lymph nodes with mass effect and complete occlusion of the right internal   jugular vein with associated surrounding inflammatory changes in the deep   neck, concerning for infectious/inflammatory thrombophlebitis.   Clinical/laboratory correlation and serial ultrasound follow-up is   recommended.    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    No masslike lesions or abnormal enhancement in aerodigestive mucosa.   Airways are patent.    Cervical adenopathy.    < end of copied text >    < from: CT Abdomen and Pelvis w/ IV Cont (03.12.24 @ 19:42) >  IMPRESSION:  Status post right nephrectomy. No lymphadenopathy or evidence of new   metastatic lesion.  A new 1.6 mL pancreatic neck cystic lesion without main pancreatic ductal   dilatation. Differentials include side branch IPMN.  Interval increase in small right and trace left pleural effusions.    < end of copied text >    -------------------------------------------------------------------------------------------------------  MEDICATIONS:     MEDICATIONS  (STANDING):  aMIOdarone    Tablet 200 milliGRAM(s) Oral daily  chlorhexidine 4% Liquid 1 Application(s) Topical <User Schedule>  dexAMETHasone     Tablet 4 milliGRAM(s) Oral every 8 hours  enoxaparin Injectable 50 milliGRAM(s) SubCutaneous every 12 hours  influenza   Vaccine 0.5 milliLiter(s) IntraMuscular once  metoprolol tartrate 25 milliGRAM(s) Oral every 12 hours  naloxegol 25 milliGRAM(s) Oral daily  nicotine -  14 mG/24Hr(s) Patch 1 Patch Transdermal every 24 hours  oxyCODONE  ER Tablet 30 milliGRAM(s) Oral <User Schedule>  pantoprazole   Suspension 40 milliGRAM(s) Oral daily  polyethylene glycol 3350 17 Gram(s) Oral every 12 hours    MEDICATIONS  (PRN):  acetaminophen     Tablet .. 650 milliGRAM(s) Oral every 6 hours PRN Temp greater or equal to 38C (100.4F), Mild Pain (1 - 3)  albuterol    90 MICROgram(s) HFA Inhaler 2 Puff(s) Inhalation every 6 hours PRN Shortness of Breath and/or Wheezing  albuterol/ipratropium for Nebulization 3 milliLiter(s) Nebulizer every 6 hours PRN Shortness of Breath and/or Wheezing  aluminum hydroxide/magnesium hydroxide/simethicone Suspension 30 milliLiter(s) Oral every 6 hours PRN Dyspepsia  Biotene Dry Mouth Oral Rinse 15 milliLiter(s) Swish and Spit two times a day PRN dry mouth  diazepam    Tablet 5 milliGRAM(s) Oral two times a day PRN Anxiety  HYDROmorphone  Injectable 1 milliGRAM(s) IV Push every 3 hours PRN Severe Pain (7 - 10)  oxyCODONE    IR 10 milliGRAM(s) Oral every 4 hours PRN Moderate Pain (4 - 6)  sodium chloride 0.9% lock flush 10 milliLiter(s) IV Push every 1 hour PRN Pre/post blood products, medications, blood draw, and to maintain line patency         Indication of Geriatrics and Palliative Medicine Services:  [  ] Complex Medical Decision Making   [X  ] Symptom/Pain management     DNR on chart:  DNI    INTERVAL EVENTS: Patient seen this AM, comfortable in no distress. Patient having on and off pain, reinforced current pain regimen, can take PRNS. Patient feels sometimes having sensation of not being able to breathe. Also having episodes of coughing that can also affect breathing.   Recommended chaplaincy, patient deferring for now.     -------------------------------------------------------------------------------------------------------    PRESENT SYMPTOMS:     [ ] Unable to self-report      [ ] PAINADS     [ ] RDOS    [ ] No     [X ] Yes     Source if other than patient:  [ ]Family   [ ]Team     PAIN:   If blank, patient unable to specify     [X ]yes [ ]no    1. Location- R side of body- chest, abdomen, shoulder neck   2. Radiation- as above    3. Quality- sharp, burning, electric; also pressure   4. Timing- Constant   5. Minimal acceptable level/pain goal- 5/10   6. Aggravating factors-   7. QOL impact- Severe     SYMPTOMS:   Dyspnea:                           [ ]Mild [ ]Moderate [ ]Severe  Anxiety:                             [ ]Mild [ ]Moderate [X ]Severe  Fatigue:                             [ ]Mild [ ]Moderate [ ]Severe  Nausea/Vomiting:              [ ]Mild [ ]Moderate [ ]Severe  Loss of appetite:                [ ]Mild [ ]Moderate [ ]Severe  Constipation:                     [ ]Mild [ ]Moderate [ ]Severe    Other Symptoms:  [X ]All other review of systems negative     -------------------------------------------------------------------------------------------------------    I STOP: [id number]    PDI	Current Rx	Drug Type	Rx Written	Rx Dispensed	Drug	Quantity	Days Supply	Prescriber Name	Prescriber KVNG #	Payment Method	Dispenser  A	Y	O	03/08/2024	03/11/2024	oxycodone hcl (ir) 10 mg tab	120	30	ReplBakari cotto	[id number]	Medicare	[facility]  A	N	B	02/14/2024	02/14/2024	diazepam 5 mg tablet	60	30	Neha Arreaga	[id number]	Medicare	[facility]  A	N	O	02/09/2024	02/09/2024	oxycodone hcl (ir) 10 mg tab	120	30	ReplBakari cotto	[id number]	Medicare	[facility]  A	N	B	01/11/2024	01/12/2024	diazepam 5 mg tablet	60	30	Pulatov, Pulat	[id number]	Medicare	[facility]  A	N	O	01/08/2024	01/09/2024	oxycodone hcl (ir) 10 mg tab	120	30	Replyadiel Bakari	[id number]	Medicare	[facility]  A	N	B	12/12/2023	12/12/2023	diazepam 5 mg tablet	60	30	Pulatov, Pulat	[id number]	[facility]  A	N	O	12/11/2023	12/11/2023	oxycodone hcl (ir) 10 mg tab	120	30	Replyadiel Bakari	[id number]	Medicare	[facility]  A	N	O	11/13/2023	11/13/2023	oxycodone hcl (ir) 10 mg tab	120	30	José Miguel Caro	[id number]	Medicare	[facility]  A	N	B	10/19/2023	10/19/2023	diazepam 5 mg tablet	60	30	Pulatov, Pulat	[id number]	Cash	[facility]  A	N	O	10/13/2023	10/13/2023	oxycodone hcl (ir) 10 mg tab	120	30	José Miguel Caro	[id number]	Medicare	[facility]  A	N	B	09/11/2023	09/16/2023	diazepam 5 mg tablet	60	30	Pulatov, Pulat	[id number]	Medicare	[facility]  A	N	O	09/15/2023	09/16/2023	oxycodone hcl (ir) 10 mg tab	120	30	Sanjiv Persaud	[id number]	Medicare	[facility]  A	N	O	08/18/2023	08/19/2023	oxycodone hcl (ir) 10 mg tab	120	30	Sanjiv Persaud	[id number]	Medicare	[facility]  A	N	B	07/30/2023	08/03/2023	diazepam 5 mg tablet	60	30	Pulatov, Pulat	[id number]	Medicare	[facility]  A	N	O	07/21/2023	07/21/2023	oxycodone hcl (ir) 10 mg tab	120	30	Sanjiv Persaud	[id number]	Medicare	[facility]  A	N	B	06/25/2023	07/12/2023	diazepam 5 mg tablet	60	30	Pulatov, Pulat	[id number]	Medicare	[facility]  A	N	O	06/23/2023	06/24/2023	oxycodone hcl (ir) 10 mg tab	120	30	Sanjiv Persaud	[id number]	Medicare	[facility]      -------------------------------------------------------------------------------------------------------    ITEMS UNCHECKED ARE NOT PRESENT    PHYSICAL:  Vital Signs Last 24 Hrs  T(C): 36.6 (05 Apr 2024 09:00), Max: 36.8 (04 Apr 2024 13:15)  T(F): 97.8 (05 Apr 2024 09:00), Max: 98.3 (04 Apr 2024 13:15)  HR: 74 (05 Apr 2024 09:00) (61 - 76)  BP: 143/70 (05 Apr 2024 09:00) (102/54 - 148/81)  BP(mean): --  RR: 19 (05 Apr 2024 09:00) (17 - 19)  SpO2: 98% (05 Apr 2024 09:00) (98% - 100%)    Parameters below as of 05 Apr 2024 09:00  Patient On (Oxygen Delivery Method): nasal cannula  O2 Flow (L/min): 4   I&O's Summary    04 Apr 2024 07:01  -  05 Apr 2024 07:00  --------------------------------------------------------  IN: 0 mL / OUT: 100 mL / NET: -100 mL    05 Apr 2024 07:01  -  05 Apr 2024 12:56  --------------------------------------------------------  IN: 500 mL / OUT: 100 mL / NET: 400 mL    GENERAL:  [ ]Cachexia  [ ] Frail  [X ]Awake  [X ]Oriented x 3  [ ]Lethargic  [ ]Unarousable  [ ]Verbal  [ ]Non-Verbal    BEHAVIORAL:   [ ] Anxiety  [ ] Delirium [ ] Agitation [ ] Other    HEENT:   [ ]Normal   [ ]Dry mouth   [ ]ET Tube/Trach  [ ]Oral lesions  R eye glaucoma     PULMONARY:   [X ]Clear              [ ]Tachypnea  [ ]Audible excessive secretions   [ ]Rhonchi        [ ]Right [ ]Left [ ]Bilateral  [ ]Crackles        [ ]Right [ ]Left [ ]Bilateral  [ ]Wheezing     [ ]Right [ ]Left [ ]Bilateral  [ ]Diminished breath sounds [ ]right [ ]left [ ]bilateral    CARDIOVASCULAR:    [X ]Regular [ ]Irregular [ ]Tachy  [ ]Malik [ ]Murmur [ ]Other    GASTROINTESTINAL:  [X ]Soft  [ ]Distended   [ ]+BS  [X ]Non tender [ ]Tender  [ ]Other [ ]PEG [ ]OGT/ NGT      GENITOURINARY:  [X ]Normal [ ] Incontinent   [ ]Oliguria/Anuria   [ ]Che    MUSCULOSKELETAL:   [ ]Normal   [X ]Weakness  [ ]Bed/Wheelchair bound [ ]Edema    NEUROLOGIC:   [X ]No focal deficits  [ ]Cognitive impairment  [ ]Dysphagia [ ]Dysarthria [ ]Paresis [ ]Other     SKIN:   [X ]Normal  [ ]Rash  [X ]Other- edematous RUE, R breast   [ ]Pressure ulcer(s)       Present on admission [ ]y [ ]n      -------------------------------------------------------------------------------------------------------    LABS:                        10.7   13.41 )-----------( 656      ( 05 Apr 2024 05:35 )             33.7   04-05    141  |  105  |  38<H>  ----------------------------<  137<H>  5.3   |  24  |  0.90    Ca    9.2      05 Apr 2024 05:35  Phos  3.8     04-05  Mg     2.20     04-05    TPro  6.0  /  Alb  3.2<L>  /  TBili  0.3  /  DBili  <0.2  /  AST  12  /  ALT  22  /  AlkPhos  77  04-05  PT/INR - ( 04 Apr 2024 05:54 )   PT: 11.0 sec;   INR: 0.97 ratio         PTT - ( 04 Apr 2024 05:54 )  PTT:29.5 sec    Urinalysis Basic - ( 05 Apr 2024 05:35 )    Color: x / Appearance: x / SG: x / pH: x  Gluc: 137 mg/dL / Ketone: x  / Bili: x / Urobili: x   Blood: x / Protein: x / Nitrite: x   Leuk Esterase: x / RBC: x / WBC x   Sq Epi: x / Non Sq Epi: x / Bacteria: x    Ferritin: 249 ng/mL (04-05-24 @ 05:35)    -------------------------------------------------------------------------------------------------------    CRITICAL CARE:  [ ]Shock Present  [ ]Septic [ ]Cardiogenic [ ]Neurologic [ ]Hypovolemic [ ]Undifferentiated    [ ]Vasopressors [ ]Inotropes    [ ]Respiratory failure present [ ]Acute  [ ]Chronic [ ]Hypoxic  [ ]Hypercarbic [ ]Mixed   [ ]Mechanical Ventilation  [ ]Trach collar   [ ]Non-invasive ventilatory support   [ ]High-Flow   [ ]Oxygen mask/venti     [ ]Other organ failure     -------------------------------------------------------------------------------------------------------    RADIOLOGY & ADDITIONAL STUDIES:       < from: CT Neck Soft Tissue w/ IV Cont (03.27.24 @ 17:10) >  Bulky and conglomerate lymphadenopathy at right level 4 invades internal   jugular vein with thrombus extending up to hyoid level. 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 IJV otherwise patent up to skull base.    New left IJV nonocclusive thrombus. Otherwise, no significant change from   3/14/24.    < end of copied text >    < from: CT Chest w/ IV Cont (03.27.24 @ 17:10) >  IMPRESSION:    In comparison with 3/14/2024, new consolidations throughout the right   lung more severely within right lower lobe likely representing pneumonia.    Interval insertion of right Pleurx catheter and decrease of right pleural   effusion. Small/moderate left pleural effusion is increased.    Redemonstrated extensive DVT within thoracic and lower neck. The upper   SVC remains obliterated.    Large right supraclavicular mass infiltrating into the mediastinum is   unchanged.    Trace right pneumothorax.    < end of copied text >    < from: CT Neck Soft Tissue w/ IV Cont (03.14.24 @ 11:24) >  IMPRESSION:    Extensive cellulitis/myositis along the right anterior lateral neck and   right upper chest wall with inflammatory fat induration the deep soft   tissue of the neck.    Large supraclavicular/mediastinal mass lesion, presumably conglomerate of   lymph nodes with mass effect and complete occlusion of the right internal   jugular vein with associated surrounding inflammatory changes in the deep   neck, concerning for infectious/inflammatory thrombophlebitis.   Clinical/laboratory correlation and serial ultrasound follow-up is   recommended.    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    No masslike lesions or abnormal enhancement in aerodigestive mucosa.   Airways are patent.    Cervical adenopathy.    < end of copied text >    < from: CT Abdomen and Pelvis w/ IV Cont (03.12.24 @ 19:42) >  IMPRESSION:  Status post right nephrectomy. No lymphadenopathy or evidence of new   metastatic lesion.  A new 1.6 mL pancreatic neck cystic lesion without main pancreatic ductal   dilatation. Differentials include side branch IPMN.  Interval increase in small right and trace left pleural effusions.    < end of copied text >    -------------------------------------------------------------------------------------------------------  MEDICATIONS:     MEDICATIONS  (STANDING):  aMIOdarone    Tablet 200 milliGRAM(s) Oral daily  chlorhexidine 4% Liquid 1 Application(s) Topical <User Schedule>  dexAMETHasone     Tablet 4 milliGRAM(s) Oral every 8 hours  enoxaparin Injectable 50 milliGRAM(s) SubCutaneous every 12 hours  influenza   Vaccine 0.5 milliLiter(s) IntraMuscular once  metoprolol tartrate 25 milliGRAM(s) Oral every 12 hours  naloxegol 25 milliGRAM(s) Oral daily  nicotine -  14 mG/24Hr(s) Patch 1 Patch Transdermal every 24 hours  oxyCODONE  ER Tablet 30 milliGRAM(s) Oral <User Schedule>  pantoprazole   Suspension 40 milliGRAM(s) Oral daily  polyethylene glycol 3350 17 Gram(s) Oral every 12 hours    MEDICATIONS  (PRN):  acetaminophen     Tablet .. 650 milliGRAM(s) Oral every 6 hours PRN Temp greater or equal to 38C (100.4F), Mild Pain (1 - 3)  albuterol    90 MICROgram(s) HFA Inhaler 2 Puff(s) Inhalation every 6 hours PRN Shortness of Breath and/or Wheezing  albuterol/ipratropium for Nebulization 3 milliLiter(s) Nebulizer every 6 hours PRN Shortness of Breath and/or Wheezing  aluminum hydroxide/magnesium hydroxide/simethicone Suspension 30 milliLiter(s) Oral every 6 hours PRN Dyspepsia  Biotene Dry Mouth Oral Rinse 15 milliLiter(s) Swish and Spit two times a day PRN dry mouth  diazepam    Tablet 5 milliGRAM(s) Oral two times a day PRN Anxiety  HYDROmorphone  Injectable 1 milliGRAM(s) IV Push every 3 hours PRN Severe Pain (7 - 10)  oxyCODONE    IR 10 milliGRAM(s) Oral every 4 hours PRN Moderate Pain (4 - 6)  sodium chloride 0.9% lock flush 10 milliLiter(s) IV Push every 1 hour PRN Pre/post blood products, medications, blood draw, and to maintain line patency         Indication of Geriatrics and Palliative Medicine Services:  [  ] Complex Medical Decision Making   [X  ] Symptom/Pain management     DNR on chart:  DNI    INTERVAL EVENTS: Patient seen this AM, comfortable in no distress. Patient having on and off pain, reinforced current pain regimen, can take PRNS. Patient feels sometimes having sensation of not being able to breathe. Also having episodes of coughing that can also affect breathing, coughs can come on suddenly.   Recommended chaplaincy, patient deferring for now.     -------------------------------------------------------------------------------------------------------    PRESENT SYMPTOMS:     [ ] Unable to self-report      [ ] PAINADS     [ ] RDOS    [ ] No     [X ] Yes     Source if other than patient:  [ ]Family   [ ]Team     PAIN:   If blank, patient unable to specify     [X ]yes [ ]no    1. Location- R side of body- chest, abdomen, shoulder neck   2. Radiation- as above    3. Quality- sharp, burning, electric; also pressure   4. Timing- Constant   5. Minimal acceptable level/pain goal- 5/10   6. Aggravating factors-   7. QOL impact- Severe     SYMPTOMS:   Dyspnea:                           [ ]Mild [ ]Moderate [ ]Severe  Anxiety:                             [ ]Mild [ ]Moderate [X ]Severe  Fatigue:                             [ ]Mild [ ]Moderate [ ]Severe  Nausea/Vomiting:              [ ]Mild [ ]Moderate [ ]Severe  Loss of appetite:                [ ]Mild [ ]Moderate [ ]Severe  Constipation:                     [ ]Mild [ ]Moderate [ ]Severe    Other Symptoms:  [ ]All other review of systems negative - cough     -------------------------------------------------------------------------------------------------------    I STOP: [id number]    PDI	Current Rx	Drug Type	Rx Written	Rx Dispensed	Drug	Quantity	Days Supply	Prescriber Name	Prescriber KVNG #	Payment Method	Dispenser  A	Y	O	03/08/2024	03/11/2024	oxycodone hcl (ir) 10 mg tab	120	30	Bakari Morales	[id number]	Medicare	[facility]  A	N	B	02/14/2024	02/14/2024	diazepam 5 mg tablet	60	30	Neha Arreaga	[id number]	Medicare	[facility]  A	N	O	02/09/2024	02/09/2024	oxycodone hcl (ir) 10 mg tab	120	30	Bakari Morales	[id number]	Medicare	[facility]  A	N	B	01/11/2024	01/12/2024	diazepam 5 mg tablet	60	30	Pulatov, Pulat	[id number]	Medicare	[facility]  A	N	O	01/08/2024	01/09/2024	oxycodone hcl (ir) 10 mg tab	120	30	Bakari Morales	[id number]	Medicare	[facility]  A	N	B	12/12/2023	12/12/2023	diazepam 5 mg tablet	60	30	Pulatov, Pulat	[id number]	[facility]  A	N	O	12/11/2023	12/11/2023	oxycodone hcl (ir) 10 mg tab	120	30	Bakari Morales	[id number]	Medicare	[facility]  A	N	O	11/13/2023	11/13/2023	oxycodone hcl (ir) 10 mg tab	120	30	José Miguel Caro	[id number]	Medicare	[facility]  A	N	B	10/19/2023	10/19/2023	diazepam 5 mg tablet	60	30	Pulatov, Pulat	[id number]	Cash	[facility]  A	N	O	10/13/2023	10/13/2023	oxycodone hcl (ir) 10 mg tab	120	30	José Miguel Caro	[id number]	Medicare	[facility]  A	N	B	09/11/2023	09/16/2023	diazepam 5 mg tablet	60	30	Pulatov, Pulat	[id number]	Medicare	[facility]  A	N	O	09/15/2023	09/16/2023	oxycodone hcl (ir) 10 mg tab	120	30	Sanjiv Persaud	[id number]	Medicare	[facility]  A	N	O	08/18/2023	08/19/2023	oxycodone hcl (ir) 10 mg tab	120	30	Sanjiv Persaud	[id number]	Medicare	[facility]  A	N	B	07/30/2023	08/03/2023	diazepam 5 mg tablet	60	30	Pulatov, Pulat	[id number]	Medicare	[facility]  A	N	O	07/21/2023	07/21/2023	oxycodone hcl (ir) 10 mg tab	120	30	Sanjiv Persaud	[id number]	Medicare	[facility]  A	N	B	06/25/2023	07/12/2023	diazepam 5 mg tablet	60	30	Pulatov, Pulat	[id number]	Medicare	[facility]  A	N	O	06/23/2023	06/24/2023	oxycodone hcl (ir) 10 mg tab	120	30	Sanjiv Persaud	[id number]	Medicare	[facility]      -------------------------------------------------------------------------------------------------------    ITEMS UNCHECKED ARE NOT PRESENT    PHYSICAL:  Vital Signs Last 24 Hrs  T(C): 36.6 (05 Apr 2024 09:00), Max: 36.8 (04 Apr 2024 13:15)  T(F): 97.8 (05 Apr 2024 09:00), Max: 98.3 (04 Apr 2024 13:15)  HR: 74 (05 Apr 2024 09:00) (61 - 76)  BP: 143/70 (05 Apr 2024 09:00) (102/54 - 148/81)  BP(mean): --  RR: 19 (05 Apr 2024 09:00) (17 - 19)  SpO2: 98% (05 Apr 2024 09:00) (98% - 100%)    Parameters below as of 05 Apr 2024 09:00  Patient On (Oxygen Delivery Method): nasal cannula  O2 Flow (L/min): 4   I&O's Summary    04 Apr 2024 07:01  -  05 Apr 2024 07:00  --------------------------------------------------------  IN: 0 mL / OUT: 100 mL / NET: -100 mL    05 Apr 2024 07:01  -  05 Apr 2024 12:56  --------------------------------------------------------  IN: 500 mL / OUT: 100 mL / NET: 400 mL    GENERAL:  [ ]Cachexia  [ ] Frail  [X ]Awake  [X ]Oriented x 3  [ ]Lethargic  [ ]Unarousable  [ ]Verbal  [ ]Non-Verbal    BEHAVIORAL:   [ ] Anxiety  [ ] Delirium [ ] Agitation [ ] Other    HEENT:   [ ]Normal   [ ]Dry mouth   [ ]ET Tube/Trach  [ ]Oral lesions  R eye glaucoma     PULMONARY:   [X ]Clear              [ ]Tachypnea  [ ]Audible excessive secretions   [ ]Rhonchi        [ ]Right [ ]Left [ ]Bilateral  [ ]Crackles        [ ]Right [ ]Left [ ]Bilateral  [ ]Wheezing     [ ]Right [ ]Left [ ]Bilateral  [ ]Diminished breath sounds [ ]right [ ]left [ ]bilateral    CARDIOVASCULAR:    [X ]Regular [ ]Irregular [ ]Tachy  [ ]Malik [ ]Murmur [ ]Other    GASTROINTESTINAL:  [X ]Soft  [ ]Distended   [ ]+BS  [X ]Non tender [ ]Tender  [ ]Other [ ]PEG [ ]OGT/ NGT      GENITOURINARY:  [X ]Normal [ ] Incontinent   [ ]Oliguria/Anuria   [ ]Che    MUSCULOSKELETAL:   [ ]Normal   [X ]Weakness  [ ]Bed/Wheelchair bound [ ]Edema    NEUROLOGIC:   [X ]No focal deficits  [ ]Cognitive impairment  [ ]Dysphagia [ ]Dysarthria [ ]Paresis [ ]Other     SKIN:   [X ]Normal  [ ]Rash  [X ]Other- edematous RUE, R breast   [ ]Pressure ulcer(s)       Present on admission [ ]y [ ]n      -------------------------------------------------------------------------------------------------------    LABS:                        10.7   13.41 )-----------( 656      ( 05 Apr 2024 05:35 )             33.7   04-05    141  |  105  |  38<H>  ----------------------------<  137<H>  5.3   |  24  |  0.90    Ca    9.2      05 Apr 2024 05:35  Phos  3.8     04-05  Mg     2.20     04-05    TPro  6.0  /  Alb  3.2<L>  /  TBili  0.3  /  DBili  <0.2  /  AST  12  /  ALT  22  /  AlkPhos  77  04-05  PT/INR - ( 04 Apr 2024 05:54 )   PT: 11.0 sec;   INR: 0.97 ratio         PTT - ( 04 Apr 2024 05:54 )  PTT:29.5 sec    Urinalysis Basic - ( 05 Apr 2024 05:35 )    Color: x / Appearance: x / SG: x / pH: x  Gluc: 137 mg/dL / Ketone: x  / Bili: x / Urobili: x   Blood: x / Protein: x / Nitrite: x   Leuk Esterase: x / RBC: x / WBC x   Sq Epi: x / Non Sq Epi: x / Bacteria: x    Ferritin: 249 ng/mL (04-05-24 @ 05:35)    -------------------------------------------------------------------------------------------------------    CRITICAL CARE:  [ ]Shock Present  [ ]Septic [ ]Cardiogenic [ ]Neurologic [ ]Hypovolemic [ ]Undifferentiated    [ ]Vasopressors [ ]Inotropes    [ ]Respiratory failure present [ ]Acute  [ ]Chronic [ ]Hypoxic  [ ]Hypercarbic [ ]Mixed   [ ]Mechanical Ventilation  [ ]Trach collar   [ ]Non-invasive ventilatory support   [ ]High-Flow   [ ]Oxygen mask/venti     [ ]Other organ failure     -------------------------------------------------------------------------------------------------------    RADIOLOGY & ADDITIONAL STUDIES:       < from: CT Neck Soft Tissue w/ IV Cont (03.27.24 @ 17:10) >  Bulky and conglomerate lymphadenopathy at right level 4 invades internal   jugular vein with thrombus extending up to hyoid level. 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 IJV otherwise patent up to skull base.    New left IJV nonocclusive thrombus. Otherwise, no significant change from   3/14/24.    < end of copied text >    < from: CT Chest w/ IV Cont (03.27.24 @ 17:10) >  IMPRESSION:    In comparison with 3/14/2024, new consolidations throughout the right   lung more severely within right lower lobe likely representing pneumonia.    Interval insertion of right Pleurx catheter and decrease of right pleural   effusion. Small/moderate left pleural effusion is increased.    Redemonstrated extensive DVT within thoracic and lower neck. The upper   SVC remains obliterated.    Large right supraclavicular mass infiltrating into the mediastinum is   unchanged.    Trace right pneumothorax.    < end of copied text >    < from: CT Neck Soft Tissue w/ IV Cont (03.14.24 @ 11:24) >  IMPRESSION:    Extensive cellulitis/myositis along the right anterior lateral neck and   right upper chest wall with inflammatory fat induration the deep soft   tissue of the neck.    Large supraclavicular/mediastinal mass lesion, presumably conglomerate of   lymph nodes with mass effect and complete occlusion of the right internal   jugular vein with associated surrounding inflammatory changes in the deep   neck, concerning for infectious/inflammatory thrombophlebitis.   Clinical/laboratory correlation and serial ultrasound follow-up is   recommended.    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    No masslike lesions or abnormal enhancement in aerodigestive mucosa.   Airways are patent.    Cervical adenopathy.    < end of copied text >    < from: CT Abdomen and Pelvis w/ IV Cont (03.12.24 @ 19:42) >  IMPRESSION:  Status post right nephrectomy. No lymphadenopathy or evidence of new   metastatic lesion.  A new 1.6 mL pancreatic neck cystic lesion without main pancreatic ductal   dilatation. Differentials include side branch IPMN.  Interval increase in small right and trace left pleural effusions.    < end of copied text >    -------------------------------------------------------------------------------------------------------  MEDICATIONS:     MEDICATIONS  (STANDING):  aMIOdarone    Tablet 200 milliGRAM(s) Oral daily  chlorhexidine 4% Liquid 1 Application(s) Topical <User Schedule>  dexAMETHasone     Tablet 4 milliGRAM(s) Oral every 8 hours  enoxaparin Injectable 50 milliGRAM(s) SubCutaneous every 12 hours  influenza   Vaccine 0.5 milliLiter(s) IntraMuscular once  metoprolol tartrate 25 milliGRAM(s) Oral every 12 hours  naloxegol 25 milliGRAM(s) Oral daily  nicotine -  14 mG/24Hr(s) Patch 1 Patch Transdermal every 24 hours  oxyCODONE  ER Tablet 30 milliGRAM(s) Oral <User Schedule>  pantoprazole   Suspension 40 milliGRAM(s) Oral daily  polyethylene glycol 3350 17 Gram(s) Oral every 12 hours    MEDICATIONS  (PRN):  acetaminophen     Tablet .. 650 milliGRAM(s) Oral every 6 hours PRN Temp greater or equal to 38C (100.4F), Mild Pain (1 - 3)  albuterol    90 MICROgram(s) HFA Inhaler 2 Puff(s) Inhalation every 6 hours PRN Shortness of Breath and/or Wheezing  albuterol/ipratropium for Nebulization 3 milliLiter(s) Nebulizer every 6 hours PRN Shortness of Breath and/or Wheezing  aluminum hydroxide/magnesium hydroxide/simethicone Suspension 30 milliLiter(s) Oral every 6 hours PRN Dyspepsia  Biotene Dry Mouth Oral Rinse 15 milliLiter(s) Swish and Spit two times a day PRN dry mouth  diazepam    Tablet 5 milliGRAM(s) Oral two times a day PRN Anxiety  HYDROmorphone  Injectable 1 milliGRAM(s) IV Push every 3 hours PRN Severe Pain (7 - 10)  oxyCODONE    IR 10 milliGRAM(s) Oral every 4 hours PRN Moderate Pain (4 - 6)  sodium chloride 0.9% lock flush 10 milliLiter(s) IV Push every 1 hour PRN Pre/post blood products, medications, blood draw, and to maintain line patency

## 2024-04-05 NOTE — PROGRESS NOTE ADULT - PROBLEM SELECTOR PLAN 4
currently on 4L NC, Acute hypoxemic respiratory failure  - followed by thoracic at [facility] s/p pleurX  - thoracic surgery recs apprec - to drain PleurX 3x a week, no more than 1000cc/session or as needed  - monitor oxygen saturations, on 4L NC

## 2024-04-05 NOTE — PROGRESS NOTE ADULT - ASSESSMENT
58F with hx of RCC s/p R total nephrectomy, s/p hysterectomy, R-sided glaucoma, Fibromyalgia, Anxiety, GERD, smoker initially admitted to [facility] for right breast swelling and cellulitis, found to have supraclavicular/mediastinal mass lesion with mass effect with metastatic involvement and complete occlusion of the right IJ and SVC (on AC), s/p supraclavicular LN biopsy showing metastatic cancer of GI origin. Course c/b moderate pericardial effusion s/p pericardial window, pleural effusions s/p pleurX, afib with RVR, worsening respiratory status c/f SVC syndrome (IR unable to stent) transferred to [facility] for urgent radiation (s/p sim 4/4 plan for 5 sessions).

## 2024-04-06 LAB
ANION GAP SERPL CALC-SCNC: 11 MMOL/L — SIGNIFICANT CHANGE UP (ref 7–14)
BUN SERPL-MCNC: 34 MG/DL — HIGH (ref 7–23)
CALCIUM SERPL-MCNC: 8.9 MG/DL — SIGNIFICANT CHANGE UP (ref 8.4–10.5)
CHLORIDE SERPL-SCNC: 106 MMOL/L — SIGNIFICANT CHANGE UP (ref 98–107)
CO2 SERPL-SCNC: 23 MMOL/L — SIGNIFICANT CHANGE UP (ref 22–31)
CREAT SERPL-MCNC: 0.75 MG/DL — SIGNIFICANT CHANGE UP (ref 0.5–1.3)
EGFR: 91 ML/MIN/1.73M2 — SIGNIFICANT CHANGE UP
GLUCOSE SERPL-MCNC: 129 MG/DL — HIGH (ref 70–99)
HCT VFR BLD CALC: 30.4 % — LOW (ref 34.5–45)
HGB BLD-MCNC: 9.7 G/DL — LOW (ref 11.5–15.5)
MAGNESIUM SERPL-MCNC: 2.1 MG/DL — SIGNIFICANT CHANGE UP (ref 1.6–2.6)
MCHC RBC-ENTMCNC: 29.1 PG — SIGNIFICANT CHANGE UP (ref 27–34)
MCHC RBC-ENTMCNC: 31.9 GM/DL — LOW (ref 32–36)
MCV RBC AUTO: 91.3 FL — SIGNIFICANT CHANGE UP (ref 80–100)
NRBC # BLD: 0 /100 WBCS — SIGNIFICANT CHANGE UP (ref 0–0)
NRBC # FLD: 0 K/UL — SIGNIFICANT CHANGE UP (ref 0–0)
PHOSPHATE SERPL-MCNC: 3.2 MG/DL — SIGNIFICANT CHANGE UP (ref 2.5–4.5)
PLATELET # BLD AUTO: 575 K/UL — HIGH (ref 150–400)
POTASSIUM SERPL-MCNC: 5.1 MMOL/L — SIGNIFICANT CHANGE UP (ref 3.5–5.3)
POTASSIUM SERPL-SCNC: 5.1 MMOL/L — SIGNIFICANT CHANGE UP (ref 3.5–5.3)
RBC # BLD: 3.33 M/UL — LOW (ref 3.8–5.2)
RBC # FLD: 14.5 % — SIGNIFICANT CHANGE UP (ref 10.3–14.5)
SODIUM SERPL-SCNC: 140 MMOL/L — SIGNIFICANT CHANGE UP (ref 135–145)
WBC # BLD: 13.4 K/UL — HIGH (ref 3.8–10.5)
WBC # FLD AUTO: 13.4 K/UL — HIGH (ref 3.8–10.5)

## 2024-04-06 PROCEDURE — 99233 SBSQ HOSP IP/OBS HIGH 50: CPT

## 2024-04-06 RX ORDER — CHLORHEXIDINE GLUCONATE 213 G/1000ML
1 SOLUTION TOPICAL DAILY
Refills: 0 | Status: DISCONTINUED | OUTPATIENT
Start: 2024-04-06 | End: 2024-05-13

## 2024-04-06 RX ADMIN — Medication 25 MILLIGRAM(S): at 05:03

## 2024-04-06 RX ADMIN — Medication 1 PATCH: at 07:30

## 2024-04-06 RX ADMIN — Medication 1 PATCH: at 11:29

## 2024-04-06 RX ADMIN — Medication 4 MILLIGRAM(S): at 21:47

## 2024-04-06 RX ADMIN — Medication 1 PATCH: at 11:28

## 2024-04-06 RX ADMIN — Medication 4 MILLIGRAM(S): at 05:02

## 2024-04-06 RX ADMIN — Medication 25 MILLIGRAM(S): at 17:12

## 2024-04-06 RX ADMIN — Medication 100 MILLIGRAM(S): at 05:03

## 2024-04-06 RX ADMIN — OXYCODONE HYDROCHLORIDE 30 MILLIGRAM(S): 5 TABLET ORAL at 15:07

## 2024-04-06 RX ADMIN — ENOXAPARIN SODIUM 50 MILLIGRAM(S): 100 INJECTION SUBCUTANEOUS at 17:12

## 2024-04-06 RX ADMIN — OXYCODONE HYDROCHLORIDE 30 MILLIGRAM(S): 5 TABLET ORAL at 22:47

## 2024-04-06 RX ADMIN — Medication 30 MILLILITER(S): at 22:31

## 2024-04-06 RX ADMIN — Medication 4 MILLIGRAM(S): at 14:07

## 2024-04-06 RX ADMIN — OXYCODONE HYDROCHLORIDE 30 MILLIGRAM(S): 5 TABLET ORAL at 14:07

## 2024-04-06 RX ADMIN — PANTOPRAZOLE SODIUM 40 MILLIGRAM(S): 20 TABLET, DELAYED RELEASE ORAL at 11:29

## 2024-04-06 RX ADMIN — NALOXEGOL OXALATE 25 MILLIGRAM(S): 12.5 TABLET, FILM COATED ORAL at 11:34

## 2024-04-06 RX ADMIN — AMIODARONE HYDROCHLORIDE 200 MILLIGRAM(S): 400 TABLET ORAL at 05:02

## 2024-04-06 RX ADMIN — Medication 1 PATCH: at 19:30

## 2024-04-06 RX ADMIN — Medication 100 MILLIGRAM(S): at 14:07

## 2024-04-06 RX ADMIN — OXYCODONE HYDROCHLORIDE 30 MILLIGRAM(S): 5 TABLET ORAL at 05:02

## 2024-04-06 RX ADMIN — Medication 100 MILLIGRAM(S): at 21:47

## 2024-04-06 RX ADMIN — OXYCODONE HYDROCHLORIDE 30 MILLIGRAM(S): 5 TABLET ORAL at 21:47

## 2024-04-06 RX ADMIN — ENOXAPARIN SODIUM 50 MILLIGRAM(S): 100 INJECTION SUBCUTANEOUS at 05:03

## 2024-04-06 NOTE — PROGRESS NOTE ADULT - PROBLEM SELECTOR PLAN 4
currently on 4L NC, Acute hypoxemic respiratory failure  - followed by thoracic at [facility] s/p pleurX  - thoracic surgery recs apprec - to drain PleurX 3x a week, no more than 1000cc/session or as needed  - monitor oxygen saturations, on 4L NC, wean off O2 as tolerated

## 2024-04-06 NOTE — PROGRESS NOTE ADULT - SUBJECTIVE AND OBJECTIVE BOX
Patient is a 58y old  Female who presents with a chief complaint of SVC syndrome, DVT, mediastinal mass (05 Apr 2024 16:05)      INTERVAL HPI/OVERNIGHT EVENTS:  Seen by me this afternoon, doing a bit better today, decreased appetite, denies pain at time of visit. Remains on 4L NC.    Review of Systems: 12 point review of systems otherwise negative    MEDICATIONS  (STANDING):  aMIOdarone    Tablet 200 milliGRAM(s) Oral daily  benzonatate 100 milliGRAM(s) Oral three times a day  chlorhexidine 4% Liquid 1 Application(s) Topical <User Schedule>  dexAMETHasone     Tablet 4 milliGRAM(s) Oral every 8 hours  enoxaparin Injectable 50 milliGRAM(s) SubCutaneous every 12 hours  influenza   Vaccine 0.5 milliLiter(s) IntraMuscular once  metoprolol tartrate 25 milliGRAM(s) Oral every 12 hours  multivitamin 1 Tablet(s) Oral daily  naloxegol 25 milliGRAM(s) Oral daily  nicotine -  14 mG/24Hr(s) Patch 1 Patch Transdermal every 24 hours  oxyCODONE  ER Tablet 30 milliGRAM(s) Oral <User Schedule>  pantoprazole   Suspension 40 milliGRAM(s) Oral daily  polyethylene glycol 3350 17 Gram(s) Oral every 12 hours    MEDICATIONS  (PRN):  acetaminophen     Tablet .. 650 milliGRAM(s) Oral every 6 hours PRN Temp greater or equal to 38C (100.4F), Mild Pain (1 - 3)  albuterol    90 MICROgram(s) HFA Inhaler 2 Puff(s) Inhalation every 6 hours PRN Shortness of Breath and/or Wheezing  albuterol/ipratropium for Nebulization 3 milliLiter(s) Nebulizer every 6 hours PRN Shortness of Breath and/or Wheezing  aluminum hydroxide/magnesium hydroxide/simethicone Suspension 30 milliLiter(s) Oral every 6 hours PRN Dyspepsia  Biotene Dry Mouth Oral Rinse 15 milliLiter(s) Swish and Spit two times a day PRN dry mouth  diazepam    Tablet 5 milliGRAM(s) Oral two times a day PRN Anxiety  HYDROmorphone  Injectable 1 milliGRAM(s) IV Push every 3 hours PRN Severe Pain (7 - 10)  oxyCODONE    IR 10 milliGRAM(s) Oral every 4 hours PRN Moderate Pain (4 - 6)  sodium chloride 0.9% lock flush 10 milliLiter(s) IV Push every 1 hour PRN Pre/post blood products, medications, blood draw, and to maintain line patency      Allergies    erythromycin (Headache; Vomiting; Rash)  penicillin (Headache; Vomiting; Rash)  Cipro (Headache; Vomiting; Rash)    Intolerances          Vital Signs Last 24 Hrs  T(C): 36.8 (06 Apr 2024 13:00), Max: 37 (05 Apr 2024 21:30)  T(F): 98.3 (06 Apr 2024 13:00), Max: 98.6 (05 Apr 2024 21:30)  HR: 72 (06 Apr 2024 13:00) (64 - 72)  BP: 150/90 (06 Apr 2024 13:00) (143/63 - 157/61)  BP(mean): --  RR: 18 (06 Apr 2024 13:00) (17 - 18)  SpO2: 98% (06 Apr 2024 13:00) (98% - 100%)    Parameters below as of 06 Apr 2024 13:00  Patient On (Oxygen Delivery Method): nasal cannula  O2 Flow (L/min): 4    CAPILLARY BLOOD GLUCOSE          04-05 @ 07:01  -  04-06 @ 07:00  --------------------------------------------------------  IN: 500 mL / OUT: 100 mL / NET: 400 mL    04-06 @ 07:01  -  04-06 @ 15:54  --------------------------------------------------------  IN: 320 mL / OUT: 200 mL / NET: 120 mL        Physical Exam:    Daily     Daily   General:  Well appearing, NAD, cachetic, on 4L NC  HEENT:  Nonicteric, PERRLA  CV:  Irreg Irreg, no murmur, no JVD  Lungs:  CTA B/L, no wheezes, rales, rhonchi  Abdomen:  Soft, non-tender, no distended, positive BS, no hepatosplenomegaly  Extremities:  2+ pulses, no c/c, RUE edema  Skin:  Warm and dry, no rashes  :  No nelson  Neuro:  AAOx3, non-focal, CN II-XII grossly intact  No Restraints    LABS:                        9.7    13.40 )-----------( 575      ( 06 Apr 2024 03:45 )             30.4     04-06    140  |  106  |  34<H>  ----------------------------<  129<H>  5.1   |  23  |  0.75    Ca    8.9      06 Apr 2024 03:45  Phos  3.2     04-06  Mg     2.10     04-06    TPro  6.0  /  Alb  3.2<L>  /  TBili  0.3  /  DBili  <0.2  /  AST  12  /  ALT  22  /  AlkPhos  77  04-05      Urinalysis Basic - ( 06 Apr 2024 03:45 )    Color: x / Appearance: x / SG: x / pH: x  Gluc: 129 mg/dL / Ketone: x  / Bili: x / Urobili: x   Blood: x / Protein: x / Nitrite: x   Leuk Esterase: x / RBC: x / WBC x   Sq Epi: x / Non Sq Epi: x / Bacteria: x          RADIOLOGY & ADDITIONAL TESTS:  Reviewed by me

## 2024-04-06 NOTE — PROGRESS NOTE ADULT - PROBLEM SELECTOR PLAN 2
respiratory status currently stable  - Acute hypoxemic respiratory failure  - CT neck with large supraclavicular/mediastinal mass lesion with mass effect and complete occlusion of the RIJ/SVC  - IR was consulted at [facility] for possible stenting; not feasible due to size/location  - transferred to [facility] for urgent radiation - s/p sim 4/4, 5 RT sessions thru 4/11, c/w decadron 4mg TID  - monitor respiratory status closely  - Wean off O2 as tolerated, on 4L NC

## 2024-04-06 NOTE — PROGRESS NOTE ADULT - PROBLEM SELECTOR PLAN 1
hx of RCC s/p nephrectomy (Urology Dr Jacky Adkins at [facility]), lymph node dissection 2 years ago.   - found to have axillary, subclavicular and mediastinal lymphadenopathy, pulmonary nodules and inflammatory changes of breast seen on CT with possible effect on IVC  - CT neck with large supraclavicular/mediastinal mass lesion with mass effect and complete occlusion of the RIJ/SVC  - s/p supraclavicular LN biopsy by IR on 3/14 - path consistent with primary of GI origin  - [facility] team discussed with GI re: MRI abdomen and MRCP - currently deferred due to respiratory status and svc syndrome pending urgent radiation  - appreciate radonc: s/p sim 4/4, 5 RT sessions thru 4/11, c/w decadron 4mg TID - taper per radonc, PPI while on steroids, monitor BS closely given A1C of 6.6 and on steroids  - hemeonc consulted - apprec recs, MR Abdomen as able  - palliative care consulted for cancer related pain, apprec recs

## 2024-04-06 NOTE — PROGRESS NOTE ADULT - PROBLEM SELECTOR PLAN 6
erythema and tenderness of right breast improving  - followed by ID at [facility] - completed abx (Cefepime/Flagyl 4/1)  - continue to monitor

## 2024-04-06 NOTE — PROGRESS NOTE ADULT - PROBLEM SELECTOR PLAN 9
now also with cancer related pain  - appreciate [facility] recs
now also with cancer related pain  - appreciate pall care recs - pain control per pall care
now also with cancer related pain  - appreciate [facility] recs and d/w them

## 2024-04-06 NOTE — PROGRESS NOTE ADULT - ASSESSMENT
58F with hx of RCC s/p R total nephrectomy, s/p hysterectomy, R-sided glaucoma, Fibromyalgia, Anxiety, GERD, smoker initially admitted to [facility] for right breast swelling and cellulitis, found to have supraclavicular/mediastinal mass lesion with mass effect with metastatic involvement and complete occlusion of the right IJ and SVC (on AC), s/p supraclavicular LN biopsy showing metastatic cancer of GI origin. Course c/b moderate pericardial effusion s/p pericardial window, pleural effusions s/p pleurX, afib with RVR, worsening respiratory status c/f SVC syndrome (IR unable to stent) transferred to [facility] for urgent radiation (s/p sim 4/4 plan for 5 sessions).

## 2024-04-06 NOTE — PROGRESS NOTE ADULT - PROBLEM SELECTOR PLAN 8
Resolved  - likely SIADH, observing off urena per [facility] documenation   - trend sodium

## 2024-04-06 NOTE — PROGRESS NOTE ADULT - PROBLEM SELECTOR PLAN 10
anxious this morning, now better  - c/w Valium PRN   - seen by BH at [facility] - offered sertraline 25mg qd (patient declined)
- c/w Valium PRN   - seen by COLEMAN at [facility] - offered sertraline 25mg qd (patient declined)  - Will get Psych involved if anxiety is not well controlled with valium prn
- c/w Valium PRN   - seen by COLEMAN at [facility] - offered sertraline 25mg qd (patient declined)  - Will get Psych involved if anxiety is not well controlled with valium prn

## 2024-04-06 NOTE — PROGRESS NOTE ADULT - PROBLEM SELECTOR PLAN 11
1 ppd smoker  - c/w Nicotine patch

## 2024-04-06 NOTE — PROGRESS NOTE ADULT - PROBLEM SELECTOR PLAN 3
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 Per IV nurse team, reviewed US 3/28 - patient with clots on bilateral upper extremities, rec to repeat LUE sonogram, multiple clots in [facility], subclavian and axillary vein, per IR, based on most recent duplex from 4/5, no UE access can be safely obtained due to extensive bilateral UE DVTs. We might need to place again a new femoral line next week for IV access, will attempt a foot IV over the weekend if needed

## 2024-04-07 LAB
ADD ON TEST-SPECIMEN IN LAB: SIGNIFICANT CHANGE UP
ALBUMIN SERPL ELPH-MCNC: 3.1 G/DL — LOW (ref 3.3–5)
ALP SERPL-CCNC: 69 U/L — SIGNIFICANT CHANGE UP (ref 40–120)
ALT FLD-CCNC: 24 U/L — SIGNIFICANT CHANGE UP (ref 4–33)
ANION GAP SERPL CALC-SCNC: 11 MMOL/L — SIGNIFICANT CHANGE UP (ref 7–14)
AST SERPL-CCNC: 10 U/L — SIGNIFICANT CHANGE UP (ref 4–32)
BILIRUB DIRECT SERPL-MCNC: <0.2 MG/DL — SIGNIFICANT CHANGE UP (ref 0–0.3)
BILIRUB INDIRECT FLD-MCNC: >0 MG/DL — SIGNIFICANT CHANGE UP (ref 0–1)
BILIRUB SERPL-MCNC: 0.2 MG/DL — SIGNIFICANT CHANGE UP (ref 0.2–1.2)
BUN SERPL-MCNC: 40 MG/DL — HIGH (ref 7–23)
CALCIUM SERPL-MCNC: 9 MG/DL — SIGNIFICANT CHANGE UP (ref 8.4–10.5)
CHLORIDE SERPL-SCNC: 105 MMOL/L — SIGNIFICANT CHANGE UP (ref 98–107)
CO2 SERPL-SCNC: 24 MMOL/L — SIGNIFICANT CHANGE UP (ref 22–31)
CREAT SERPL-MCNC: 0.83 MG/DL — SIGNIFICANT CHANGE UP (ref 0.5–1.3)
EGFR: 81 ML/MIN/1.73M2 — SIGNIFICANT CHANGE UP
GLUCOSE SERPL-MCNC: 135 MG/DL — HIGH (ref 70–99)
HCT VFR BLD CALC: 30 % — LOW (ref 34.5–45)
HGB BLD-MCNC: 9.5 G/DL — LOW (ref 11.5–15.5)
MAGNESIUM SERPL-MCNC: 2.2 MG/DL — SIGNIFICANT CHANGE UP (ref 1.6–2.6)
MCHC RBC-ENTMCNC: 29.1 PG — SIGNIFICANT CHANGE UP (ref 27–34)
MCHC RBC-ENTMCNC: 31.7 GM/DL — LOW (ref 32–36)
MCV RBC AUTO: 91.7 FL — SIGNIFICANT CHANGE UP (ref 80–100)
NRBC # BLD: 0 /100 WBCS — SIGNIFICANT CHANGE UP (ref 0–0)
NRBC # FLD: 0 K/UL — SIGNIFICANT CHANGE UP (ref 0–0)
PHOSPHATE SERPL-MCNC: 3 MG/DL — SIGNIFICANT CHANGE UP (ref 2.5–4.5)
PLATELET # BLD AUTO: 545 K/UL — HIGH (ref 150–400)
POTASSIUM SERPL-MCNC: 4.9 MMOL/L — SIGNIFICANT CHANGE UP (ref 3.5–5.3)
POTASSIUM SERPL-SCNC: 4.9 MMOL/L — SIGNIFICANT CHANGE UP (ref 3.5–5.3)
PROT SERPL-MCNC: 5.8 G/DL — LOW (ref 6–8.3)
RBC # BLD: 3.27 M/UL — LOW (ref 3.8–5.2)
RBC # FLD: 14.6 % — HIGH (ref 10.3–14.5)
SODIUM SERPL-SCNC: 140 MMOL/L — SIGNIFICANT CHANGE UP (ref 135–145)
WBC # BLD: 14.65 K/UL — HIGH (ref 3.8–10.5)
WBC # FLD AUTO: 14.65 K/UL — HIGH (ref 3.8–10.5)

## 2024-04-07 PROCEDURE — 99233 SBSQ HOSP IP/OBS HIGH 50: CPT

## 2024-04-07 RX ADMIN — NALOXEGOL OXALATE 25 MILLIGRAM(S): 12.5 TABLET, FILM COATED ORAL at 11:21

## 2024-04-07 RX ADMIN — Medication 4 MILLIGRAM(S): at 22:27

## 2024-04-07 RX ADMIN — Medication 25 MILLIGRAM(S): at 05:11

## 2024-04-07 RX ADMIN — Medication 1 TABLET(S): at 11:21

## 2024-04-07 RX ADMIN — OXYCODONE HYDROCHLORIDE 30 MILLIGRAM(S): 5 TABLET ORAL at 06:01

## 2024-04-07 RX ADMIN — OXYCODONE HYDROCHLORIDE 30 MILLIGRAM(S): 5 TABLET ORAL at 13:40

## 2024-04-07 RX ADMIN — Medication 100 MILLIGRAM(S): at 13:40

## 2024-04-07 RX ADMIN — Medication 25 MILLIGRAM(S): at 17:59

## 2024-04-07 RX ADMIN — ENOXAPARIN SODIUM 50 MILLIGRAM(S): 100 INJECTION SUBCUTANEOUS at 05:11

## 2024-04-07 RX ADMIN — CHLORHEXIDINE GLUCONATE 1 APPLICATION(S): 213 SOLUTION TOPICAL at 11:22

## 2024-04-07 RX ADMIN — Medication 3 MILLILITER(S): at 22:27

## 2024-04-07 RX ADMIN — Medication 30 MILLILITER(S): at 21:41

## 2024-04-07 RX ADMIN — OXYCODONE HYDROCHLORIDE 30 MILLIGRAM(S): 5 TABLET ORAL at 21:41

## 2024-04-07 RX ADMIN — Medication 1 PATCH: at 13:40

## 2024-04-07 RX ADMIN — Medication 15 MILLILITER(S): at 00:11

## 2024-04-07 RX ADMIN — Medication 5 MILLIGRAM(S): at 00:11

## 2024-04-07 RX ADMIN — Medication 4 MILLIGRAM(S): at 13:41

## 2024-04-07 RX ADMIN — ENOXAPARIN SODIUM 50 MILLIGRAM(S): 100 INJECTION SUBCUTANEOUS at 18:00

## 2024-04-07 RX ADMIN — Medication 1 PATCH: at 18:06

## 2024-04-07 RX ADMIN — Medication 100 MILLIGRAM(S): at 21:41

## 2024-04-07 RX ADMIN — Medication 100 MILLIGRAM(S): at 05:11

## 2024-04-07 RX ADMIN — Medication 4 MILLIGRAM(S): at 05:12

## 2024-04-07 RX ADMIN — OXYCODONE HYDROCHLORIDE 30 MILLIGRAM(S): 5 TABLET ORAL at 05:11

## 2024-04-07 RX ADMIN — PANTOPRAZOLE SODIUM 40 MILLIGRAM(S): 20 TABLET, DELAYED RELEASE ORAL at 11:21

## 2024-04-07 RX ADMIN — AMIODARONE HYDROCHLORIDE 200 MILLIGRAM(S): 400 TABLET ORAL at 05:12

## 2024-04-07 NOTE — DIETITIAN INITIAL EVALUATION ADULT - OTHER INFO
58F PMH tobacco use, RCC right nephrectomy, right sided glaucoma, fibromyalia, anxiety, GERD admitted for malignant neoplasm.     Patient met bedside in fair spirits. Patient voiced concern for weight/muscle loss in recent times, unable to quantify time frame. Patient UBW is 130 pounds (59 kg) but now believes she is 100 pounds (45.5 kg). Per HIE; 49.9 kg (03/26/24) -> (45.4 kg (04/04/24); -9% weight loss x ~2 weeks. Patient agreeable to trial more nutritionally dense oral nutrition supplement. Patient verbalized food requests (strawberry yogurt, banana, and vanilla ice cream). Noted to dislike orange and pineapple. Endorsed overall, she is enjoying the food during hospitalization. Denied food allergies. Denied nausea/vomitting/diarrhea/constipation; last bowel movement was yesterday.

## 2024-04-07 NOTE — DIETITIAN NUTRITION RISK NOTIFICATION - TREATMENT: THE FOLLOWING DIET HAS BEEN RECOMMENDED
Diet, DASH/TLC:   Sodium & Cholesterol Restricted  Supplement Feeding Modality:  Oral  Ensure Plus High Protein Cans or Servings Per Day:  1       Frequency:  Three Times a day (04-06-24 @ 13:51) [Active]

## 2024-04-07 NOTE — PROGRESS NOTE ADULT - ASSESSMENT
59 yo woman, former smoker, with h/o R eye glaucoma, Fibromyalgia, Anxiety, GERD, and RCC s/p R total nephrectomy/hysterectomys; she initially presented to [facility] w/ R breast swelling c/f cellulitis, w/ imaging noted supraclavicular/mediastinal mass lesion and complete occlusion of RIJ and SVC.  She subseuqently underwent supraclavicular LN bx c/f ca of GI origin, CA 19-9 27.  C/b pericardial effusion s/p pericardial window w/ neg cytology, and pleural effusion s/p Pleurx.  C/c/b A. fib w/ RVR and worsening resp status likely 2/2 SVC syndrome, unable to stent by IR, started on Dex and transferred to [facility] for urgent radiation.    ACTIVE PROBLEMS  Metastatic cancer  SVC syndrome  Extensive b/l UE DVTs  Hypercoag state  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)  pAfib, with RVR on this admission  Pericardial effusion with tamp physiology s/p pericardial window  R pleural effusion s/p pleurex    - Metastatic cancer    - SVC syndrome    - Extensive b/l UE DVTs  - Hypercoag state    - 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)    - pAfib    - Pericardial effusion with tamp physiology s/p pericardial window  - R pleural effusion s/p pleurex   57 yo woman, former smoker, with h/o R eye glaucoma, Fibromyalgia, Anxiety, GERD, and RCC s/p R total nephrectomy/hysterectomy; she was initially admitted to [facility] on 3/11 w/ R breast swelling c/f mastitis- imaging brooks noted supraclavicular/mediastinal mass lesion which encased the SVC and multiple other veins resulting in obliteration of the SVC and thrombosis of the R IJ, and a pancreatic neck cystic lesion.  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  Her disease/hospital course has also been c/b pericardial effusion s/p pericardial window w/ neg cytology, R pleural effusion, also negative cytology) s/p Pleurx, Afib w/ RVR and acute hypoxic resp failure 2/2 SVC syndrome- not dennis to IR-guided stenting; pt was ultimately started on Dex and transferred to [facility] on 4/4 for urgent palliative RT.    ACTIVE PROBLEMS  Metastatic cancer  SVC syndrome  Extensive b/l UE DVTs  Hypercoag state  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)  pAfib, with RVR on this admission  Pericardial effusion with tamp physiology s/p pericardial window  R pleural effusion s/p pleurex  Acute hypoxic resp failure  Cancer-related pain, anxiety    - Metastatic cancer  Based on 3/14 SC LN path, ddx includes primary UGI vs pancreaticobiliary primary (breast edema is likely 2/2 SVC syndrome)  CA 19-9/CEA not markedly elevated; AFP, Ca-125, Ca 27.29, Ca 15-3 pending  Pt will need additional diagnostic brooks- planning to pursue EGD/EUS and MRCP when pt is less symptomatic when laying down flat  Options for systemic cancer treatment are TBD  Pt's prognosis is guarded    - SVC syndrome  Appreciate Rad Onc eval/recs  Continuing Dex PO 4mg q8 with pjp/ppi ppx  Continuing palliative RT- 5fxs planned    - Extensive b/l UE DVTs  - Hypercoag state  Continuing therapeutic lovenox  OT consult for lymphedema wrapping is pending   * Of note: pt has poor UE IV access and currently required peripheral IV in her LE extremities for admin of medication; can consider FV central line for urgent/emergent IV needs    - 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)  Pt with h/o R eyeglaucoma, but miosis can also be associated with Pancoast syndrome i/s/o extensive R upper lung disease  Pt denies visual deficits  Pt should have a Brain MRI with/without contrast when she is able to lay down flat  Will continue to monitor closely    - pAfib  Pt currently SR, HR controlled  Likely precipitated by malignancy, pericardial effusion, SVC syndrome  Continuing Metoprolol 25mg q12 with holding parameters  Continuing Amio 200mg daily (monitoring for toxicities)  Continuing telemetry    - Pericardial effusion with tamp physiology s/p pericardial window  - R pleural effusion s/p pleurex  Likely inflammatory; both pleural and pericardial fluid cytology is negative for malignant cells  Surveillance TTE ordered  Continuing R pleurex drainage- up to 500cc q48h/prn    - Acute hypoxic respiratory failure  2/2 all of the above  Continuing supportive resp care:   * duonebs q6   * Tessalon 100mg q8  Weaning supplemental O2 via NC as tolerate    - Cancer related pain, anxiety  Continuing Oxy ER 30mg q12 q8  Continuing Oxy IR 10mg q4/prn  Continuing Dilaudid IV prn for sev breakthrough pain  Continuing bowel regimen to prevent OIC (including Movantic)  Continuing Diazepam 5mg BID/prn 57 yo woman, former smoker, with h/o R eye glaucoma, Fibromyalgia, Anxiety, GERD, and RCC s/p R total nephrectomy/hysterectomy; she was initially admitted to [facility] on 3/11 w/ R breast swelling c/f mastitis- imaging brooks noted supraclavicular/mediastinal mass lesion which encased the SVC and multiple other veins resulting in obliteration of the SVC and thrombosis of the R IJ, and a pancreatic neck cystic lesion.  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  Her disease/hospital course has also been c/b pericardial effusion s/p pericardial window w/ neg cytology, R pleural effusion, also negative cytology) s/p Pleurx, Afib w/ RVR and acute hypoxic resp failure 2/2 SVC syndrome- not dennis to IR-guided stenting; pt was ultimately started on Dex and transferred to [facility] on 4/4 for urgent palliative RT.    ACTIVE PROBLEMS  Metastatic cancer  SVC syndrome  Extensive b/l UE DVTs  Hypercoag state  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)  pAfib, with RVR on this admission  Pericardial effusion with tamp physiology s/p pericardial window  R pleural effusion s/p pleurex  Acute hypoxic resp failure  Cancer-related pain, anxiety    - Metastatic cancer  Based on 3/14 SC LN path, ddx includes primary UGI vs pancreaticobiliary primary (breast edema is likely 2/2 SVC syndrome)  CA 19-9/CEA not markedly elevated; AFP, Ca-125, Ca 27.29, Ca 15-3 pending  Pt will need additional diagnostic brooks- planning to pursue EGD/EUS and MRCP when pt is less symptomatic when laying down flat  Options for systemic cancer treatment are TBD  Pt's prognosis is guarded    - SVC syndrome  Appreciate Rad Onc eval/recs  Continuing Dex PO 4mg q8 with pjp/ppi ppx  Continuing palliative RT- 5fxs planned    - Extensive b/l UE DVTs  - Hypercoag state  Continuing therapeutic lovenox  OT consult for lymphedema wrapping is pending   * Of note: pt has poor UE IV access and currently required peripheral IV in her LE extremities for admin of medication; can consider FV central line for urgent/emergent IV needs    - 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)  Pt with h/o R eyeglaucoma, but miosis can also be associated with Pancoast syndrome i/s/o extensive R upper lung disease  Pt denies visual deficits  Pt should have a Brain MRI with/without contrast when she is able to lay down flat  Will continue to monitor closely    - pAfib  Pt currently SR, HR controlled  Likely precipitated by malignancy, pericardial effusion, SVC syndrome  Continuing Metoprolol 25mg q12 with holding parameters  Continuing Amio 200mg daily (monitoring for toxicities)  Continuing telemetry    - Pericardial effusion with tamp physiology s/p pericardial window  - R pleural effusion s/p pleurex  Likely inflammatory; both pleural and pericardial fluid cytology is negative for malignant cells  Surveillance TTE ordered  Continuing R pleurex drainage- up to 500cc q48h/prn    - Acute hypoxic respiratory failure  2/2 all of the above  Continuing supportive resp care:   * duonebs q6   * Tessalon 100mg q8   * steroids as above  Weaning supplemental O2 via NC as tolerate    - Cancer related pain, anxiety  Continuing Oxy ER 30mg q12 q8  Continuing Oxy IR 10mg q4/prn  Continuing Dilaudid IV prn for sev breakthrough pain  Continuing bowel regimen to prevent OIC (including Movantic)  Continuing Diazepam 5mg BID/prn

## 2024-04-07 NOTE — DIETITIAN INITIAL EVALUATION ADULT - ORAL NUTRITION SUPPLEMENTS
Hormel Vital 500 Shake 1xd (520 calories, 22g protein)   Magic Cup 1xd (290 calories, 9g protein)

## 2024-04-07 NOTE — DIETITIAN INITIAL EVALUATION ADULT - OTHER CALCULATIONS
IBW calculated for 5'0 woman + 10%. Estimated needs with consideration for malnutrition and wound healing.

## 2024-04-07 NOTE — DIETITIAN INITIAL EVALUATION ADULT - NSFNSGIIOFT_GEN_A_CORE
04-06-24 @ 07:01  -  04-07-24 @ 07:00  --------------------------------------------------------  OUT:    Chest Tube (mL): 200 mL  Total OUT: 200 mL    Total NET: -200 mL

## 2024-04-07 NOTE — DIETITIAN INITIAL EVALUATION ADULT - NS FNS DIET ORDER
Diet, DASH/TLC:   Sodium & Cholesterol Restricted  Supplement Feeding Modality:  Oral  Ensure Plus High Protein Cans or Servings Per Day:  1       Frequency:  Three Times a day (04-06-24 @ 13:51)

## 2024-04-07 NOTE — DIETITIAN INITIAL EVALUATION ADULT - PERTINENT LABORATORY DATA
04-07    140  |  105  |  40<H>  ----------------------------<  135<H>  4.9   |  24  |  0.83    Ca    9.0      07 Apr 2024 04:32  Phos  3.0     04-07  Mg     2.20     04-07    TPro  5.8<L>  /  Alb  3.1<L>  /  TBili  0.2  /  DBili  <0.2  /  AST  10  /  ALT  24  /  AlkPhos  69  04-07  A1C with Estimated Average Glucose Result: 6.6 % (03-12-24 @ 07:01)

## 2024-04-07 NOTE — DIETITIAN INITIAL EVALUATION ADULT - NSICDXPASTSURGICALHX_GEN_ALL_CORE_FT
PAST SURGICAL HISTORY:  Glaucoma following surgery Right Eyr - 2012    H/O unilateral nephrectomy Right Laparoscopic Nephrectomy - 03929    History of tonsillectomy     S/P hernia repair umbilical Hernia Repair - 2011    S/P knee surgery knee arthroscopy right x 2 ( 2011 & 2012)    S/P partial hysterectomy 8 years ago

## 2024-04-07 NOTE — PROGRESS NOTE ADULT - SUBJECTIVE AND OBJECTIVE BOX
SOLID TUMOR ONCOLOGY HOSPITALIST PROGRESS NOTE    S: No acute events overnight    CURRENT MEDICATIONS  MEDICATIONS  (STANDING):  aMIOdarone    Tablet 200 milliGRAM(s) Oral daily  benzonatate 100 milliGRAM(s) Oral three times a day  chlorhexidine 2% Cloths 1 Application(s) Topical daily  dexAMETHasone     Tablet 4 milliGRAM(s) Oral every 8 hours  enoxaparin Injectable 50 milliGRAM(s) SubCutaneous every 12 hours  influenza   Vaccine 0.5 milliLiter(s) IntraMuscular once  metoprolol tartrate 25 milliGRAM(s) Oral every 12 hours  multivitamin 1 Tablet(s) Oral daily  naloxegol 25 milliGRAM(s) Oral daily  nicotine -  14 mG/24Hr(s) Patch 1 Patch Transdermal every 24 hours  oxyCODONE  ER Tablet 30 milliGRAM(s) Oral <User Schedule>  pantoprazole   Suspension 40 milliGRAM(s) Oral daily  polyethylene glycol 3350 17 Gram(s) Oral every 12 hours    MEDICATIONS  (PRN):  albuterol    90 MICROgram(s) HFA Inhaler 2 Puff(s) Inhalation every 6 hours PRN Shortness of Breath and/or Wheezing  albuterol/ipratropium for Nebulization 3 milliLiter(s) Nebulizer every 6 hours PRN Shortness of Breath and/or Wheezing  aluminum hydroxide/magnesium hydroxide/simethicone Suspension 30 milliLiter(s) Oral every 6 hours PRN Dyspepsia  Biotene Dry Mouth Oral Rinse 15 milliLiter(s) Swish and Spit two times a day PRN dry mouth  diazepam    Tablet 5 milliGRAM(s) Oral two times a day PRN Anxiety  HYDROmorphone  Injectable 1 milliGRAM(s) IV Push every 3 hours PRN Severe Pain (7 - 10)  oxyCODONE    IR 10 milliGRAM(s) Oral every 4 hours PRN Moderate Pain (4 - 6)  sodium chloride 0.9% lock flush 10 milliLiter(s) IV Push every 1 hour PRN Pre/post blood products, medications, blood draw, and to maintain line patency      PHYSICAL EXAM  T(C): 36.9 (04-07-24 @ 09:00), Max: 36.9 (04-07-24 @ 01:00)  HR: 70 (04-07-24 @ 09:00) (69 - 76)  BP: 137/77 (04-07-24 @ 09:00) (137/77 - 159/86)  RR: 18 (04-07-24 @ 09:00) (17 - 18)  SpO2: 98% (04-07-24 @ 09:00) (98% - 100%)    04-06-24 @ 07:01  -  04-07-24 @ 07:00  --------------------------------------------------------  IN: 370 mL / OUT: 200 mL / NET: 170 mL        LABS                        9.5    14.65 )-----------( 545      ( 07 Apr 2024 04:32 )             30.0     04-07    140  |  105  |  40<H>  ----------------------------<  135<H>  4.9   |  24  |  0.83    Ca    9.0      07 Apr 2024 04:32  Phos  3.0     04-07  Mg     2.20     04-07    TPro  5.8<L>  /  Alb  3.1<L>  /  TBili  0.2  /  DBili  <0.2  /  AST  10  /  ALT  24  /  AlkPhos  69  04-07      CAPILLARY BLOOD GLUCOSE            MICROBIOLOGY  Urinalysis Basic - ( 07 Apr 2024 04:32 )    Color: x / Appearance: x / SG: x / pH: x  Gluc: 135 mg/dL / Ketone: x  / Bili: x / Urobili: x   Blood: x / Protein: x / Nitrite: x   Leuk Esterase: x / RBC: x / WBC x   Sq Epi: x / Non Sq Epi: x / Bacteria: x            PERTINENT RADIOLOGY         SOLID TUMOR ONCOLOGY HOSPITALIST PROGRESS NOTE    S: No acute events overnight.  Pt complained of intermittent dyspnea, relieved when she takes her inhaler or neb treatment.    CURRENT MEDICATIONS  MEDICATIONS  (STANDING):  aMIOdarone    Tablet 200 milliGRAM(s) Oral daily  benzonatate 100 milliGRAM(s) Oral three times a day  chlorhexidine 2% Cloths 1 Application(s) Topical daily  dexAMETHasone     Tablet 4 milliGRAM(s) Oral every 8 hours  enoxaparin Injectable 50 milliGRAM(s) SubCutaneous every 12 hours  influenza   Vaccine 0.5 milliLiter(s) IntraMuscular once  metoprolol tartrate 25 milliGRAM(s) Oral every 12 hours  multivitamin 1 Tablet(s) Oral daily  naloxegol 25 milliGRAM(s) Oral daily  nicotine -  14 mG/24Hr(s) Patch 1 Patch Transdermal every 24 hours  oxyCODONE  ER Tablet 30 milliGRAM(s) Oral <User Schedule>  pantoprazole   Suspension 40 milliGRAM(s) Oral daily  polyethylene glycol 3350 17 Gram(s) Oral every 12 hours  MEDICATIONS  (PRN):  albuterol    90 MICROgram(s) HFA Inhaler 2 Puff(s) Inhalation every 6 hours PRN Shortness of Breath and/or Wheezing  albuterol/ipratropium for Nebulization 3 milliLiter(s) Nebulizer every 6 hours PRN Shortness of Breath and/or Wheezing  aluminum hydroxide/magnesium hydroxide/simethicone Suspension 30 milliLiter(s) Oral every 6 hours PRN Dyspepsia  Biotene Dry Mouth Oral Rinse 15 milliLiter(s) Swish and Spit two times a day PRN dry mouth  diazepam    Tablet 5 milliGRAM(s) Oral two times a day PRN Anxiety  HYDROmorphone  Injectable 1 milliGRAM(s) IV Push every 3 hours PRN Severe Pain (7 - 10)  oxyCODONE    IR 10 milliGRAM(s) Oral every 4 hours PRN Moderate Pain (4 - 6)  sodium chloride 0.9% lock flush 10 milliLiter(s) IV Push every 1 hour PRN Pre/post blood products, medications, blood draw, and to maintain line patency      PHYSICAL EXAM  T(C): 36.9 (04-07-24 @ 09:00), Max: 36.9 (04-07-24 @ 01:00)  HR: 70 (04-07-24 @ 09:00) (69 - 76)  BP: 137/77 (04-07-24 @ 09:00) (137/77 - 159/86)  RR: 18 (04-07-24 @ 09:00) (17 - 18)  SpO2: 98% (04-07-24 @ 09:00) (98% - 100%)    04-06-24 @ 07:01  -  04-07-24 @ 07:00  --------------------------------------------------------  IN: 370 mL / OUT: 200 mL / NET: 170 mL  Non-toxic appearing woman, sitting up in bed, appears comfortable but flat affect, nad  R eyelid ptosis and R pupil miosis present; anicteric sclera, no oral lesions/thrush  RRR, no m/r/g  Diminshed breath sounds in all R lung zones; LL zones CTA  Abd soft/nt/nd, normoactive bowel sounds  No peripheral edema; generalized muscular atrophy  CN 2-12 grossly intact; no gross focal neuro deficits    LABS                        9.5    14.65 )-----------( 545      ( 07 Apr 2024 04:32 )             30.0     04-07    140  |  105  |  40<H>  ----------------------------<  135<H>  4.9   |  24  |  0.83    Ca    9.0      07 Apr 2024 04:32  Phos  3.0     04-07  Mg     2.20     04-07    TPro  5.8<L>  /  Alb  3.1<L>  /  TBili  0.2  /  DBili  <0.2  /  AST  10  /  ALT  24  /  AlkPhos  69  04-07    PATHOLOGY  3/26 pleural fluid cytology: Neg for malignant cells  3/26 Pericardium excision: Neg for malignancy  3/20 Pericardial fl: Neg for malignant cells  S/p R supraclavicular LN bx by IR 3/14 c/w GI origin      MICROBIOLOGY  Abx: none.    Cx Data  4/5 MRSA swab: Not detected  3/16 Quant plus TB: Negative      PERTINENT RADIOLOGY  4/4 CXR: Clear lungs w/ R pleurx catheter in place.  4/4 CXR AM: Small R pleural effusion w/ pleurx cath improved    OSH Imaging ([facility])  3/29 CXR: Decrease in R pleural effusion.     3/28 B/L UE Dupplers: Acute DVT involving R IJ, innominate vein, subclavian, brachial, and L IJ.  Superficial venous thrombosis involving B/L cephalic veins.  + Edema of superficial soft tissue of UE R>L.    3/27 CT neck: Bulky and conglomerate LAD invading RIJ w/ thrombus extending up to hyoid level.  Thrombus is likely combination tumor and bland.  New LIJ nonocclusive thrombus.    3/27 CT Chest: New consolidations throughout the R lung worse RLL c/f aspiration PNA.  + R pleurx cath w/ decrease in R pleural effusion.  Extensive DVT w/in thoracic and lower neck, upper SVC remains obliterated.  Large R supraclavicular mass infiltrating into the mediastinum.      3/27 CXR: Progression of R consolidation/effusion.    3/21 TTE: LV grossly normal.  Thickened pericardium.  no pericardial effusion.    3/18 TTE: Mod pericardial effusion w/ e/o hemodynamic compromise w/ diasolic collapse of RA that exceeds 1/3 of cardiac cycle >30% resp variation across MV E. wave and early diastolic inversion of RV.    3/14 CT Chest: Conglomerate soft tissue in superior mediastinum and R supraclavicular region 2/2 LAD w/ internal hypodensity c/f necrosis.  Mass encases SVC and multiple great veins resulting in oblieration of the SVC and thrombosis of the RIJ.  Multiple R sided collateral vessels and R soft tissue edema.  B/L pulm nodules.    3/14 CT neck: Extensive cellulitis/myositis along R anterior lateral neck and R upper chest wall w/ inflammatory fat induration the deep soft tissue of neck.  Large supraclavicular/mediastinal mass lesion, presumably conglomerate of LN w/ mass effect and complete occlusion of RIJ w/ associated inflammation.  Complete occlusion of R subclavian vein and nearly occlusive thrombosis in the proximal L brachiocephalic vein.    3/13 RUQ Abd US: Cholelithiasis w/o e/o cholecystitis.  Dilated CBD w/o e/o intraductal stone or other obstruction. 1.4cm pancreatic cyst w/ mild duct dilatation.    3/12 CT abd/pelv: S/p R nephrectomy. no LAD.  New 1.6cm  pancreatic neck cystic lesion w/o main pancreatic ductal dilatation.    3/10 R breast US: No e/o breast abdscess SOLID TUMOR ONCOLOGY HOSPITALIST PROGRESS NOTE    S: No acute events overnight.  Pt complained of intermittent dyspnea, relieved when she takes her inhaler or neb treatment.    CURRENT MEDICATIONS  MEDICATIONS  (STANDING):  aMIOdarone    Tablet 200 milliGRAM(s) Oral daily  benzonatate 100 milliGRAM(s) Oral three times a day  chlorhexidine 2% Cloths 1 Application(s) Topical daily  dexAMETHasone     Tablet 4 milliGRAM(s) Oral every 8 hours  enoxaparin Injectable 50 milliGRAM(s) SubCutaneous every 12 hours  influenza   Vaccine 0.5 milliLiter(s) IntraMuscular once  metoprolol tartrate 25 milliGRAM(s) Oral every 12 hours  multivitamin 1 Tablet(s) Oral daily  naloxegol 25 milliGRAM(s) Oral daily  nicotine -  14 mG/24Hr(s) Patch 1 Patch Transdermal every 24 hours  oxyCODONE  ER Tablet 30 milliGRAM(s) Oral <User Schedule>  pantoprazole   Suspension 40 milliGRAM(s) Oral daily  polyethylene glycol 3350 17 Gram(s) Oral every 12 hours  MEDICATIONS  (PRN):  albuterol    90 MICROgram(s) HFA Inhaler 2 Puff(s) Inhalation every 6 hours PRN Shortness of Breath and/or Wheezing  albuterol/ipratropium for Nebulization 3 milliLiter(s) Nebulizer every 6 hours PRN Shortness of Breath and/or Wheezing  aluminum hydroxide/magnesium hydroxide/simethicone Suspension 30 milliLiter(s) Oral every 6 hours PRN Dyspepsia  Biotene Dry Mouth Oral Rinse 15 milliLiter(s) Swish and Spit two times a day PRN dry mouth  diazepam    Tablet 5 milliGRAM(s) Oral two times a day PRN Anxiety  HYDROmorphone  Injectable 1 milliGRAM(s) IV Push every 3 hours PRN Severe Pain (7 - 10)  oxyCODONE    IR 10 milliGRAM(s) Oral every 4 hours PRN Moderate Pain (4 - 6)  sodium chloride 0.9% lock flush 10 milliLiter(s) IV Push every 1 hour PRN Pre/post blood products, medications, blood draw, and to maintain line patency      PHYSICAL EXAM  T(C): 36.9 (04-07-24 @ 09:00), Max: 36.9 (04-07-24 @ 01:00)  HR: 70 (04-07-24 @ 09:00) (69 - 76)  BP: 137/77 (04-07-24 @ 09:00) (137/77 - 159/86)  RR: 18 (04-07-24 @ 09:00) (17 - 18)  SpO2: 98% (04-07-24 @ 09:00) (98% - 100%)    04-06-24 @ 07:01  -  04-07-24 @ 07:00  --------------------------------------------------------  IN: 370 mL / OUT: 200 mL / NET: 170 mL  Non-toxic appearing woman, sitting up in bed, appears comfortable but flat affect, nad  R eyelid ptosis and R pupil miosis present; anicteric sclera, no oral lesions/thrush  RRR, no m/r/g  Diminshed breath sounds in all R lung zones; LL zones CTA  Abd soft/nt/nd, normoactive bowel sounds  No peripheral edema; generalized muscular atrophy  CN 2-12 grossly intact; no gross focal neuro deficits    LABS                        9.5    14.65 )-----------( 545      ( 07 Apr 2024 04:32 )             30.0     04-07    140  |  105  |  40<H>  ----------------------------<  135<H>  4.9   |  24  |  0.83    Ca    9.0      07 Apr 2024 04:32  Phos  3.0     04-07  Mg     2.20     04-07    TPro  5.8<L>  /  Alb  3.1<L>  /  TBili  0.2  /  DBili  <0.2  /  AST  10  /  ALT  24  /  AlkPhos  69  04-07    PATHOLOGY  3/26 pleural fluid cytology: Neg for malignant cells  3/26 Pericardium excision: Neg for malignancy  3/20 Pericardial fl: Neg for malignant cells  S/p R supraclavicular LN bx by IR 3/14 c/w GI origin      TUMOR MARKERS  3/13 CEA 7.1  3/24 Ca 19-9 27  AFP, Ca-125, Ca 27.29, Ca 15-3      MICROBIOLOGY  Abx: none.    Cx Data  4/5 MRSA swab: Not detected  3/16 Quant plus TB: Negative      PERTINENT RADIOLOGY  4/4 CXR: Clear lungs w/ R pleurx catheter in place.  4/4 CXR AM: Small R pleural effusion w/ pleurx cath improved    OSH Imaging ([facility])  3/29 CXR: Decrease in R pleural effusion.     3/28 B/L UE Dupplers: Acute DVT involving R IJ, innominate vein, subclavian, brachial, and L IJ.  Superficial venous thrombosis involving B/L cephalic veins.  + Edema of superficial soft tissue of UE R>L.    3/27 CT neck: Bulky and conglomerate LAD invading RIJ w/ thrombus extending up to hyoid level.  Thrombus is likely combination tumor and bland.  New LIJ nonocclusive thrombus.    3/27 CT Chest: New consolidations throughout the R lung worse RLL c/f aspiration PNA.  + R pleurx cath w/ decrease in R pleural effusion.  Extensive DVT w/in thoracic and lower neck, upper SVC remains obliterated.  Large R supraclavicular mass infiltrating into the mediastinum.      3/27 CXR: Progression of R consolidation/effusion.    3/21 TTE: LV grossly normal.  Thickened pericardium.  no pericardial effusion.    3/18 TTE: Mod pericardial effusion w/ e/o hemodynamic compromise w/ diasolic collapse of RA that exceeds 1/3 of cardiac cycle >30% resp variation across MV E. wave and early diastolic inversion of RV.    3/14 CT Chest: Conglomerate soft tissue in superior mediastinum and R supraclavicular region 2/2 LAD w/ internal hypodensity c/f necrosis.  Mass encases SVC and multiple great veins resulting in oblieration of the SVC and thrombosis of the RIJ.  Multiple R sided collateral vessels and R soft tissue edema.  B/L pulm nodules.    3/14 CT neck: Extensive cellulitis/myositis along R anterior lateral neck and R upper chest wall w/ inflammatory fat induration the deep soft tissue of neck.  Large supraclavicular/mediastinal mass lesion, presumably conglomerate of LN w/ mass effect and complete occlusion of RIJ w/ associated inflammation.  Complete occlusion of R subclavian vein and nearly occlusive thrombosis in the proximal L brachiocephalic vein.    3/13 RUQ Abd US: Cholelithiasis w/o e/o cholecystitis.  Dilated CBD w/o e/o intraductal stone or other obstruction. 1.4cm pancreatic cyst w/ mild duct dilatation.    3/12 CT abd/pelv: S/p R nephrectomy. no LAD.  New 1.6cm  pancreatic neck cystic lesion w/o main pancreatic ductal dilatation.    3/10 R breast US: No e/o breast abdscess SOLID TUMOR ONCOLOGY HOSPITALIST PROGRESS NOTE    S: No acute events overnight.  Pt complained of intermittent dyspnea, relieved when she takes her inhaler or neb treatment.    CURRENT MEDICATIONS  MEDICATIONS  (STANDING):  aMIOdarone    Tablet 200 milliGRAM(s) Oral daily  benzonatate 100 milliGRAM(s) Oral three times a day  chlorhexidine 2% Cloths 1 Application(s) Topical daily  dexAMETHasone     Tablet 4 milliGRAM(s) Oral every 8 hours  enoxaparin Injectable 50 milliGRAM(s) SubCutaneous every 12 hours  influenza   Vaccine 0.5 milliLiter(s) IntraMuscular once  metoprolol tartrate 25 milliGRAM(s) Oral every 12 hours  multivitamin 1 Tablet(s) Oral daily  naloxegol 25 milliGRAM(s) Oral daily  nicotine -  14 mG/24Hr(s) Patch 1 Patch Transdermal every 24 hours  oxyCODONE  ER Tablet 30 milliGRAM(s) Oral <User Schedule>  pantoprazole   Suspension 40 milliGRAM(s) Oral daily  polyethylene glycol 3350 17 Gram(s) Oral every 12 hours  MEDICATIONS  (PRN):  albuterol    90 MICROgram(s) HFA Inhaler 2 Puff(s) Inhalation every 6 hours PRN Shortness of Breath and/or Wheezing  albuterol/ipratropium for Nebulization 3 milliLiter(s) Nebulizer every 6 hours PRN Shortness of Breath and/or Wheezing  aluminum hydroxide/magnesium hydroxide/simethicone Suspension 30 milliLiter(s) Oral every 6 hours PRN Dyspepsia  Biotene Dry Mouth Oral Rinse 15 milliLiter(s) Swish and Spit two times a day PRN dry mouth  diazepam    Tablet 5 milliGRAM(s) Oral two times a day PRN Anxiety  HYDROmorphone  Injectable 1 milliGRAM(s) IV Push every 3 hours PRN Severe Pain (7 - 10)  oxyCODONE    IR 10 milliGRAM(s) Oral every 4 hours PRN Moderate Pain (4 - 6)  sodium chloride 0.9% lock flush 10 milliLiter(s) IV Push every 1 hour PRN Pre/post blood products, medications, blood draw, and to maintain line patency      PHYSICAL EXAM  T(C): 36.9 (04-07-24 @ 09:00), Max: 36.9 (04-07-24 @ 01:00)  HR: 70 (04-07-24 @ 09:00) (69 - 76)  BP: 137/77 (04-07-24 @ 09:00) (137/77 - 159/86)  RR: 18 (04-07-24 @ 09:00) (17 - 18)  SpO2: 98% (04-07-24 @ 09:00) (98% - 100%)    04-06-24 @ 07:01  -  04-07-24 @ 07:00  --------------------------------------------------------  IN: 370 mL / OUT: 200 mL / NET: 170 mL  Non-toxic appearing woman, sitting up in bed, appears comfortable but flat affect, nad  R eyelid ptosis and R pupil miosis present; anicteric sclera, no oral lesions/thrush  RRR, no m/r/g  Diminshed breath sounds in all R lung zones; LL zones CTA  Abd soft/nt/nd, normoactive bowel sounds  Trace RUE non-pitting edema present; generalized muscular atrophy in all 4 extremities  CN 2-12 grossly intact; no gross focal neuro deficits    LABS                        9.5    14.65 )-----------( 545      ( 07 Apr 2024 04:32 )             30.0     04-07    140  |  105  |  40<H>  ----------------------------<  135<H>  4.9   |  24  |  0.83    Ca    9.0      07 Apr 2024 04:32  Phos  3.0     04-07  Mg     2.20     04-07    TPro  5.8<L>  /  Alb  3.1<L>  /  TBili  0.2  /  DBili  <0.2  /  AST  10  /  ALT  24  /  AlkPhos  69  04-07    PATHOLOGY  3/26 pleural fluid cytology: Neg for malignant cells.    3/26 Pericardium excision: Neg for malignancy.    3/20 Pericardial fluid cytology: Neg for malignant cells.    3/14 LYMPH NODE, SUPRACLAVICULAR, RIGHT, US GUIDED CORE BIOPSY AND FNA   POSITIVE FOR MALIGNANT CELLS.   Carcinoma   Touch Prep slides display crowded groups and single lying malignant   cells with enlarged nuclei containing prominent nucleoli and vacuolated   cytoplasm. Core biopsies show neoplastic cells positive for CK7 and CDX2   immunostains, while negative for CK20, TTF1, GATA3, TRPS1 and PAX8. 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 Suggest correlation with clinical information and imaging to   assist with next step management.   Moleculars: pMMR, PD-L1 TPS 1%; Her2 pending      TUMOR MARKERS  3/13 CEA 7.1  3/24 Ca 19-9 27  AFP, Ca-125, Ca 27.29, Ca 15-3 pending      MICROBIOLOGY  Abx: none.    Cx Data  4/5 MRSA swab: Not detected  3/16 Quant plus TB: Negative      PERTINENT RADIOLOGY  4/4 CXR: Clear lungs w/ R pleurx catheter in place.  4/4 CXR AM: Small R pleural effusion w/ pleurx cath improved    [facility] Imaging ([facility])  3/29 CXR: Decrease in R pleural effusion.     3/28 B/L UE Dupplers: Acute DVT involving R IJ, innominate vein, subclavian, brachial, and L IJ.  Superficial venous thrombosis involving B/L cephalic veins.  + Edema of superficial soft tissue of UE R>L.    3/27 CT neck: Bulky and conglomerate LAD invading RIJ w/ thrombus extending up to hyoid level.  Thrombus is likely combination tumor and bland.  New LIJ nonocclusive thrombus.    3/27 CT Chest: New consolidations throughout the R lung worse RLL c/f aspiration PNA.  + R pleurx cath w/ decrease in R pleural effusion.  Extensive DVT w/in thoracic and lower neck, upper SVC remains obliterated.  Large R supraclavicular mass infiltrating into the mediastinum.      3/27 CXR: Progression of R consolidation/effusion.    3/21 TTE: LV grossly normal.  Thickened pericardium.  no pericardial effusion.    3/18 TTE: Mod pericardial effusion w/ e/o hemodynamic compromise w/ diasolic collapse of RA that exceeds 1/3 of cardiac cycle >30% resp variation across MV E. wave and early diastolic inversion of RV.    3/14 CT Chest: Conglomerate soft tissue in superior mediastinum and R supraclavicular region 2/2 LAD w/ internal hypodensity c/f necrosis.  Mass encases SVC and multiple great veins resulting in obliteration of the SVC and thrombosis of the RIJ.  Multiple R sided collateral vessels and R soft tissue edema.  B/L pulm nodules.    3/14 CT neck: Extensive cellulitis/myositis along R anterior lateral neck and R upper chest wall w/ inflammatory fat induration the deep soft tissue of neck.  Large supraclavicular/mediastinal mass lesion, presumably conglomerate of LN w/ mass effect and complete occlusion of RIJ w/ associated inflammation.  Complete occlusion of R subclavian vein and nearly occlusive thrombosis in the proximal L brachiocephalic vein.    3/13 RUQ Abd US: Cholelithiasis w/o e/o cholecystitis.  Dilated CBD w/o e/o intraductal stone or other obstruction. 1.4cm pancreatic cyst w/ mild duct dilatation.    3/12 CT abd/pelv: S/p R nephrectomy. no LAD.  New 1.6cm  pancreatic neck cystic lesion w/o main pancreatic ductal dilatation.    3/10 R breast US: No e/o breast abscess

## 2024-04-07 NOTE — DIETITIAN INITIAL EVALUATION ADULT - PERTINENT MEDS FT
MEDICATIONS  (STANDING):  aMIOdarone    Tablet 200 milliGRAM(s) Oral daily  benzonatate 100 milliGRAM(s) Oral three times a day  chlorhexidine 2% Cloths 1 Application(s) Topical daily  dexAMETHasone     Tablet 4 milliGRAM(s) Oral every 8 hours  enoxaparin Injectable 50 milliGRAM(s) SubCutaneous every 12 hours  influenza   Vaccine 0.5 milliLiter(s) IntraMuscular once  metoprolol tartrate 25 milliGRAM(s) Oral every 12 hours  multivitamin 1 Tablet(s) Oral daily  naloxegol 25 milliGRAM(s) Oral daily  nicotine -  14 mG/24Hr(s) Patch 1 Patch Transdermal every 24 hours  oxyCODONE  ER Tablet 30 milliGRAM(s) Oral <User Schedule>  pantoprazole   Suspension 40 milliGRAM(s) Oral daily  polyethylene glycol 3350 17 Gram(s) Oral every 12 hours    MEDICATIONS  (PRN):  albuterol    90 MICROgram(s) HFA Inhaler 2 Puff(s) Inhalation every 6 hours PRN Shortness of Breath and/or Wheezing  albuterol/ipratropium for Nebulization 3 milliLiter(s) Nebulizer every 6 hours PRN Shortness of Breath and/or Wheezing  aluminum hydroxide/magnesium hydroxide/simethicone Suspension 30 milliLiter(s) Oral every 6 hours PRN Dyspepsia  Biotene Dry Mouth Oral Rinse 15 milliLiter(s) Swish and Spit two times a day PRN dry mouth  diazepam    Tablet 5 milliGRAM(s) Oral two times a day PRN Anxiety  HYDROmorphone  Injectable 1 milliGRAM(s) IV Push every 3 hours PRN Severe Pain (7 - 10)  oxyCODONE    IR 10 milliGRAM(s) Oral every 4 hours PRN Moderate Pain (4 - 6)  sodium chloride 0.9% lock flush 10 milliLiter(s) IV Push every 1 hour PRN Pre/post blood products, medications, blood draw, and to maintain line patency

## 2024-04-07 NOTE — DIETITIAN INITIAL EVALUATION ADULT - ADD RECOMMEND
1. Monitor PO intake/tolerance, labs, weights, BMs, and skin integrity  2. Please document % PO intake in nursing flowsheets to assess nutritional intake/monitor need for further intervention(s).   3. Please obtain updated wt/weekly wt  4. Provide feeding assistance prn   5. Encourage PO intake at meal times & supplements

## 2024-04-07 NOTE — DIETITIAN INITIAL EVALUATION ADULT - NUTRITIONGOAL OUTCOME1
Adequate oral intake (>75% at most meals, 100% oral nutrition supplement)   Maintain weight (+-2%) / Gradual wt gain (+2%)  Minimize signs and symptoms of malnutrition

## 2024-04-08 ENCOUNTER — APPOINTMENT (OUTPATIENT)
Dept: OTOLARYNGOLOGY | Facility: CLINIC | Age: 59
End: 2024-04-08

## 2024-04-08 LAB
ADD ON TEST-SPECIMEN IN LAB: SIGNIFICANT CHANGE UP
AFP-TM SERPL-MCNC: 5.2 NG/ML — SIGNIFICANT CHANGE UP
ALBUMIN SERPL ELPH-MCNC: 3.2 G/DL — LOW (ref 3.3–5)
ALP SERPL-CCNC: 72 U/L — SIGNIFICANT CHANGE UP (ref 40–120)
ALT FLD-CCNC: 23 U/L — SIGNIFICANT CHANGE UP (ref 4–33)
ANION GAP SERPL CALC-SCNC: 10 MMOL/L — SIGNIFICANT CHANGE UP (ref 7–14)
AST SERPL-CCNC: 21 U/L — SIGNIFICANT CHANGE UP (ref 4–32)
BCA 255 TISS QL IMSTN: 18.9 U/ML — SIGNIFICANT CHANGE UP
BILIRUB DIRECT SERPL-MCNC: <0.2 MG/DL — SIGNIFICANT CHANGE UP (ref 0–0.3)
BILIRUB INDIRECT FLD-MCNC: >0.1 MG/DL — SIGNIFICANT CHANGE UP (ref 0–1)
BILIRUB SERPL-MCNC: 0.3 MG/DL — SIGNIFICANT CHANGE UP (ref 0.2–1.2)
BUN SERPL-MCNC: 36 MG/DL — HIGH (ref 7–23)
CALCIUM SERPL-MCNC: 9.1 MG/DL — SIGNIFICANT CHANGE UP (ref 8.4–10.5)
CANCER AG125 SERPL-ACNC: 187 U/ML — HIGH
CANCER AG27-29 SERPL-ACNC: 16.9 U/ML — SIGNIFICANT CHANGE UP (ref 0–38.6)
CHLORIDE SERPL-SCNC: 105 MMOL/L — SIGNIFICANT CHANGE UP (ref 98–107)
CO2 SERPL-SCNC: 25 MMOL/L — SIGNIFICANT CHANGE UP (ref 22–31)
CREAT SERPL-MCNC: 0.72 MG/DL — SIGNIFICANT CHANGE UP (ref 0.5–1.3)
EGFR: 96 ML/MIN/1.73M2 — SIGNIFICANT CHANGE UP
GLUCOSE SERPL-MCNC: 143 MG/DL — HIGH (ref 70–99)
HCT VFR BLD CALC: 32.1 % — LOW (ref 34.5–45)
HGB BLD-MCNC: 10.1 G/DL — LOW (ref 11.5–15.5)
MAGNESIUM SERPL-MCNC: 2.2 MG/DL — SIGNIFICANT CHANGE UP (ref 1.6–2.6)
MCHC RBC-ENTMCNC: 29 PG — SIGNIFICANT CHANGE UP (ref 27–34)
MCHC RBC-ENTMCNC: 31.5 GM/DL — LOW (ref 32–36)
MCV RBC AUTO: 92.2 FL — SIGNIFICANT CHANGE UP (ref 80–100)
NRBC # BLD: 0 /100 WBCS — SIGNIFICANT CHANGE UP (ref 0–0)
NRBC # FLD: 0 K/UL — SIGNIFICANT CHANGE UP (ref 0–0)
PHOSPHATE SERPL-MCNC: 3.5 MG/DL — SIGNIFICANT CHANGE UP (ref 2.5–4.5)
PLATELET # BLD AUTO: 555 K/UL — HIGH (ref 150–400)
POTASSIUM SERPL-MCNC: 4.8 MMOL/L — SIGNIFICANT CHANGE UP (ref 3.5–5.3)
POTASSIUM SERPL-SCNC: 4.8 MMOL/L — SIGNIFICANT CHANGE UP (ref 3.5–5.3)
PROT SERPL-MCNC: 5.9 G/DL — LOW (ref 6–8.3)
RBC # BLD: 3.48 M/UL — LOW (ref 3.8–5.2)
RBC # FLD: 14.7 % — HIGH (ref 10.3–14.5)
SODIUM SERPL-SCNC: 140 MMOL/L — SIGNIFICANT CHANGE UP (ref 135–145)
WBC # BLD: 12.77 K/UL — HIGH (ref 3.8–10.5)
WBC # FLD AUTO: 12.77 K/UL — HIGH (ref 3.8–10.5)

## 2024-04-08 PROCEDURE — 99232 SBSQ HOSP IP/OBS MODERATE 35: CPT

## 2024-04-08 PROCEDURE — 99233 SBSQ HOSP IP/OBS HIGH 50: CPT

## 2024-04-08 RX ORDER — PANTOPRAZOLE SODIUM 20 MG/1
40 TABLET, DELAYED RELEASE ORAL ONCE
Refills: 0 | Status: COMPLETED | OUTPATIENT
Start: 2024-04-08 | End: 2024-04-08

## 2024-04-08 RX ADMIN — Medication 5 MILLIGRAM(S): at 23:01

## 2024-04-08 RX ADMIN — OXYCODONE HYDROCHLORIDE 30 MILLIGRAM(S): 5 TABLET ORAL at 14:21

## 2024-04-08 RX ADMIN — Medication 100 MILLIGRAM(S): at 14:17

## 2024-04-08 RX ADMIN — OXYCODONE HYDROCHLORIDE 10 MILLIGRAM(S): 5 TABLET ORAL at 12:54

## 2024-04-08 RX ADMIN — CHLORHEXIDINE GLUCONATE 1 APPLICATION(S): 213 SOLUTION TOPICAL at 11:25

## 2024-04-08 RX ADMIN — Medication 15 MILLILITER(S): at 07:03

## 2024-04-08 RX ADMIN — ENOXAPARIN SODIUM 50 MILLIGRAM(S): 100 INJECTION SUBCUTANEOUS at 18:06

## 2024-04-08 RX ADMIN — Medication 1 TABLET(S): at 11:25

## 2024-04-08 RX ADMIN — NALOXEGOL OXALATE 25 MILLIGRAM(S): 12.5 TABLET, FILM COATED ORAL at 11:25

## 2024-04-08 RX ADMIN — PANTOPRAZOLE SODIUM 40 MILLIGRAM(S): 20 TABLET, DELAYED RELEASE ORAL at 11:24

## 2024-04-08 RX ADMIN — Medication 1 TABLET(S): at 11:23

## 2024-04-08 RX ADMIN — Medication 4 MILLIGRAM(S): at 22:08

## 2024-04-08 RX ADMIN — Medication 5 MILLIGRAM(S): at 03:07

## 2024-04-08 RX ADMIN — Medication 1 PATCH: at 14:16

## 2024-04-08 RX ADMIN — Medication 100 MILLIGRAM(S): at 05:37

## 2024-04-08 RX ADMIN — Medication 25 MILLIGRAM(S): at 18:06

## 2024-04-08 RX ADMIN — Medication 4 MILLIGRAM(S): at 05:36

## 2024-04-08 RX ADMIN — OXYCODONE HYDROCHLORIDE 30 MILLIGRAM(S): 5 TABLET ORAL at 22:08

## 2024-04-08 RX ADMIN — OXYCODONE HYDROCHLORIDE 30 MILLIGRAM(S): 5 TABLET ORAL at 05:37

## 2024-04-08 RX ADMIN — Medication 100 MILLIGRAM(S): at 22:08

## 2024-04-08 RX ADMIN — Medication 30 MILLILITER(S): at 22:08

## 2024-04-08 RX ADMIN — Medication 25 MILLIGRAM(S): at 05:37

## 2024-04-08 RX ADMIN — OXYCODONE HYDROCHLORIDE 30 MILLIGRAM(S): 5 TABLET ORAL at 23:08

## 2024-04-08 RX ADMIN — AMIODARONE HYDROCHLORIDE 200 MILLIGRAM(S): 400 TABLET ORAL at 05:36

## 2024-04-08 RX ADMIN — Medication 4 MILLIGRAM(S): at 14:17

## 2024-04-08 RX ADMIN — ENOXAPARIN SODIUM 50 MILLIGRAM(S): 100 INJECTION SUBCUTANEOUS at 05:37

## 2024-04-08 RX ADMIN — PANTOPRAZOLE SODIUM 40 MILLIGRAM(S): 20 TABLET, DELAYED RELEASE ORAL at 05:37

## 2024-04-08 RX ADMIN — Medication 1 PATCH: at 13:00

## 2024-04-08 RX ADMIN — OXYCODONE HYDROCHLORIDE 10 MILLIGRAM(S): 5 TABLET ORAL at 11:52

## 2024-04-08 NOTE — PROGRESS NOTE ADULT - SUBJECTIVE AND OBJECTIVE BOX
Reno Doe MD  Academic Hospitalist  Pager [phone]  Email: [email or web address]  Available on [employer] Teams        PROGRESS NOTE:     Patient is a 58y old  Female who presents with a chief complaint of SVC syndrome, DVT, mediastinal mass (08 Apr 2024 11:59)      SUBJECTIVE / OVERNIGHT EVENTS:  Patient seen and examined this morning. Unable to complete RT today due to anxiety.   ADDITIONAL REVIEW OF SYSTEMS  No reports of f/n/v      MEDICATIONS  (STANDING):  aMIOdarone    Tablet 200 milliGRAM(s) Oral daily  benzonatate 100 milliGRAM(s) Oral three times a day  chlorhexidine 2% Cloths 1 Application(s) Topical daily  dexAMETHasone     Tablet 4 milliGRAM(s) Oral every 8 hours  enoxaparin Injectable 50 milliGRAM(s) SubCutaneous every 12 hours  influenza   Vaccine 0.5 milliLiter(s) IntraMuscular once  metoprolol tartrate 25 milliGRAM(s) Oral every 12 hours  multivitamin 1 Tablet(s) Oral daily  naloxegol 25 milliGRAM(s) Oral daily  nicotine -  14 mG/24Hr(s) Patch 1 Patch Transdermal every 24 hours  oxyCODONE  ER Tablet 30 milliGRAM(s) Oral <User Schedule>  pantoprazole   Suspension 40 milliGRAM(s) Oral daily  polyethylene glycol 3350 17 Gram(s) Oral every 12 hours  trimethoprim  160 mG/sulfamethoxazole 800 mG 1 Tablet(s) Oral <User Schedule>    MEDICATIONS  (PRN):  albuterol    90 MICROgram(s) HFA Inhaler 2 Puff(s) Inhalation every 6 hours PRN Shortness of Breath and/or Wheezing  albuterol/ipratropium for Nebulization 3 milliLiter(s) Nebulizer every 6 hours PRN Shortness of Breath and/or Wheezing  aluminum hydroxide/magnesium hydroxide/simethicone Suspension 30 milliLiter(s) Oral every 6 hours PRN Dyspepsia  Biotene Dry Mouth Oral Rinse 15 milliLiter(s) Swish and Spit two times a day PRN dry mouth  diazepam    Tablet 5 milliGRAM(s) Oral two times a day PRN Anxiety  HYDROmorphone  Injectable 1 milliGRAM(s) IV Push every 3 hours PRN Severe Pain (7 - 10)  oxyCODONE    IR 10 milliGRAM(s) Oral every 4 hours PRN Moderate Pain (4 - 6)  sodium chloride 0.9% lock flush 10 milliLiter(s) IV Push every 1 hour PRN Pre/post blood products, medications, blood draw, and to maintain line patency      CAPILLARY BLOOD GLUCOSE        I&O's Summary    07 Apr 2024 07:01  -  08 Apr 2024 07:00  --------------------------------------------------------  IN: 0 mL / OUT: 200 mL / NET: -200 mL        PHYSICAL EXAM:  Vital Signs Last 24 Hrs  T(C): 36.6 (08 Apr 2024 09:35), Max: 36.9 (07 Apr 2024 17:30)  T(F): 97.9 (08 Apr 2024 09:35), Max: 98.5 (08 Apr 2024 01:30)  HR: 67 (08 Apr 2024 09:35) (67 - 83)  BP: 149/91 (08 Apr 2024 09:35) (149/91 - 161/88)  BP(mean): --  RR: 18 (08 Apr 2024 09:35) (18 - 18)  SpO2: 100% (08 Apr 2024 09:35) (97% - 100%)    Parameters below as of 08 Apr 2024 09:35  Patient On (Oxygen Delivery Method): nasal cannula  O2 Flow (L/min): 4      CONSTITUTIONAL: NAD,  sitting up in bed, appears comfortable but flat affect, nad  R eyelid ptosis and R pupil miosis present; anicteric sclera,  RESPIRATORY: Normal respiratory effort; lungs are clear to auscultation bilaterally  CARDIOVASCULAR: Regular rate and rhythm, normal S1 and S2, no murmur/rub/gallop; No lower extremity edema; Peripheral pulses are 2+ bilaterally  ABDOMEN: Nontender to palpation, normoactive bowel sounds, no rebound/guarding;   MUSCLOSKELETAL: no clubbing or cyanosis of digits; no joint swelling or tenderness to palpation  PSYCH: A+O to person, place, and time; affect appropriate    LABS:                        10.1   12.77 )-----------( 555      ( 08 Apr 2024 06:25 )             32.1     04-08    140  |  105  |  36<H>  ----------------------------<  143<H>  4.8   |  25  |  0.72    Ca    9.1      08 Apr 2024 06:25  Phos  3.5     04-08  Mg     2.20     04-08    TPro  5.9<L>  /  Alb  3.2<L>  /  TBili  0.3  /  DBili  <0.2  /  AST  21  /  ALT  23  /  AlkPhos  72  04-08          Urinalysis Basic - ( 08 Apr 2024 06:25 )    Color: x / Appearance: x / SG: x / pH: x  Gluc: 143 mg/dL / Ketone: x  / Bili: x / Urobili: x   Blood: x / Protein: x / Nitrite: x   Leuk Esterase: x / RBC: x / WBC x   Sq Epi: x / Non Sq Epi: x / Bacteria: x          RADIOLOGY & ADDITIONAL TESTS:  Results Reviewed:   Imaging Personally Reviewed:  Electrocardiogram Personally Reviewed:    COORDINATION OF CARE:  Care Discussed with Consultants/Other Providers [Y/N]:   Prior or Outpatient Records Reviewed [Y/N]:

## 2024-04-08 NOTE — PROGRESS NOTE ADULT - ASSESSMENT
57 yo woman, former smoker, with h/o R eye glaucoma, Fibromyalgia, Anxiety, GERD, and RCC s/p R total nephrectomy/hysterectomy; she was initially admitted to [facility] on 3/11 w/ R breast swelling c/f mastitis- imaging brooks noted supraclavicular/mediastinal mass lesion which encased the SVC and multiple other veins resulting in obliteration of the SVC and thrombosis of the R IJ, and a pancreatic neck cystic lesion.  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  Her disease/hospital course has also been c/b pericardial effusion s/p pericardial window w/ neg cytology, R pleural effusion, also negative cytology) s/p Pleurx, Afib w/ RVR and acute hypoxic resp failure 2/2 SVC syndrome- not dennis to IR-guided stenting; pt was ultimately started on Dex and transferred to [facility] on 4/4 for urgent palliative RT.    ACTIVE PROBLEMS  Metastatic cancer  SVC syndrome  Extensive b/l UE DVTs  Hypercoag state  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)  pAfib, with RVR on this admission  Pericardial effusion with tamp physiology s/p pericardial window  R pleural effusion s/p pleurex  Acute hypoxic resp failure  Cancer-related pain, anxiety    - Metastatic cancer  Based on 3/14 SC LN path, ddx includes primary UGI vs pancreaticobiliary primary (breast edema is likely 2/2 SVC syndrome)  CA 19-9/CEA not markedly elevated; AFP, Ca-125, Ca 27.29, Ca 15-3 pending  Pt will need additional diagnostic brooks- planning to pursue EGD/EUS and MRCP when pt is less symptomatic when laying down flat  Options for systemic cancer treatment are TBD  Pt's prognosis is guarded    - SVC syndrome  Appreciate Rad Onc eval/recs  Continuing Dex PO 4mg q8 with pjp/ppi ppx  Continuing palliative RT- 5fxs planned    - Extensive b/l UE DVTs  - Hypercoag state  Continuing therapeutic lovenox  OT consult for lymphedema wrapping is pending   * Of note: pt has poor UE IV access and currently required peripheral IV in her LE extremities for admin of medication; can consider FV central line for urgent/emergent IV needs    - 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)  Pt with h/o R eyeglaucoma, but miosis can also be associated with Pancoast syndrome i/s/o extensive R upper lung disease  Pt denies visual deficits  Pt should have a Brain MRI with/without contrast when she is able to lay down flat  Will continue to monitor closely    - pAfib  Pt currently SR, HR controlled  Likely precipitated by malignancy, pericardial effusion, SVC syndrome  Continuing Metoprolol 25mg q12 with holding parameters  Continuing Amio 200mg daily (monitoring for toxicities)  Continuing telemetry    - Pericardial effusion with tamp physiology s/p pericardial window  - R pleural effusion s/p pleurex  Likely inflammatory; both pleural and pericardial fluid cytology is negative for malignant cells  Surveillance TTE ordered  Continuing R pleurex drainage- up to 500cc q48h/prn    - Acute hypoxic respiratory failure  2/2 all of the above  Continuing supportive resp care:   * duonebs q6   * Tessalon 100mg q8   * steroids as above  Weaning supplemental O2 via NC as tolerate    - Cancer related pain, anxiety  Continuing Oxy ER 30mg q12 q8  Continuing Oxy IR 10mg q4/prn  Continuing Dilaudid IV prn for sev breakthrough pain  Continuing bowel regimen to prevent OIC (including Movantic)  Continuing Diazepam 5mg BID/prn

## 2024-04-08 NOTE — PROGRESS NOTE ADULT - ASSESSMENT
58F w/ F hx tobacco use, RCC right nephrectomy, right sided glaucoma, fibromyalgia, anxiety, GERD, was at [facility] w/ concern for breast inflammation, lung nodules/mediastinal mass w/ biopsy concern for metastatic GI cancer. Had pleural effusion s/p chest tube and pleurx, had pericardial window for effusion. Has had poor IV access, has triple lumen in right groin. Has UE DVT on lovenox. Also has SVC syndrome, transfer from [facility] for LIJ RT treatment. ROS otherwise negative. On oxygen.

## 2024-04-08 NOTE — PROGRESS NOTE ADULT - PROBLEM SELECTOR PLAN 1
- Source: R sided chest pain, radiating. Large right supraclavicular infiltrative mass, extending into the inferior mediastinum, causing RUE and chest swelling, SVC syndrome   - regimen started at [facility]   - PRN use in past 48 hours from 8 AM to 8 AM: none   - c/w oxycontin 30 mg TID   - c/w oxycodone 10 mg q4h PRN for moderate pain   - c/w IV dilaudid 1 mg q3h PRN for severe pain   - Bowel regimen, goal BM every 2 to 3 days to prevent opioid induced constipation, hold for loose stool.

## 2024-04-08 NOTE — PROGRESS NOTE ADULT - PROBLEM SELECTOR PLAN 3
hx of RCC s/p R nephrectomy   - Presenting to [facility] with right breast/chest wall swelling and cellulitis with imaging evidence concerning for metastatic malignancy unknown primary w/ lung nodules, and axillary, supraclavicular, and mediastinal adenopathy.    - S/p R Supraclavicular LN biopsy w/ IR on 3/14/24 w/ path suggesting metastatic carcinoma, favoring upper GI or pancreaticobiliary origin.      -> imaging shows pancreatic neck cystic lesion measuring 1.6 cm  - pleural effusions s/p R pleurex, pericardial effusion   - Course complicated with SVC syndrome    - Oncology recommends MRI/MRCP - deferred for now until after SVC treatment

## 2024-04-08 NOTE — PROGRESS NOTE ADULT - SUBJECTIVE AND OBJECTIVE BOX
Indication of Geriatrics and Palliative Medicine Services:  [  ] Complex Medical Decision Making   [X  ] Symptom/Pain management     DNR on chart:  DNI    INTERVAL EVENTS: Patient seen this AM with primary team. Patient has multiple questions regarding her care, and cancer workup. She is frustrated by her decreased mobility unable to get out of bed to use a commode. Patient feels like she is "melting away." Patient not complaining of pain currently, no prn usage over the weekend.     -------------------------------------------------------------------------------------------------------    PRESENT SYMPTOMS:     [ ] Unable to self-report      [ ] PAINADS     [ ] RDOS    [ ] No     [X ] Yes     Source if other than patient:  [ ]Family   [ ]Team     PAIN:   If blank, patient unable to specify     [X ]yes [ ]no    1. Location- R side of body- chest, abdomen, shoulder neck   2. Radiation- as above    3. Quality- sharp, burning, electric; also pressure   4. Timing- Constant   5. Minimal acceptable level/pain goal- 5/10   6. Aggravating factors-   7. QOL impact- Severe     SYMPTOMS:   Dyspnea:                           [ ]Mild [ ]Moderate [ ]Severe  Anxiety:                             [ ]Mild [ ]Moderate [X ]Severe  Fatigue:                             [ ]Mild [ ]Moderate [ ]Severe  Nausea/Vomiting:              [ ]Mild [ ]Moderate [ ]Severe  Loss of appetite:                [ ]Mild [ ]Moderate [ ]Severe  Constipation:                     [ ]Mild [ ]Moderate [ ]Severe    Other Symptoms:  [ ]All other review of systems negative - cough     -------------------------------------------------------------------------------------------------------    I STOP: [id number]    PDI	Current Rx	Drug Type	Rx Written	Rx Dispensed	Drug	Quantity	Days Supply	Prescriber Name	Prescriber KVNG #	Payment Method	Dispenser  A	Y	O	03/08/2024	03/11/2024	oxycodone hcl (ir) 10 mg tab	120	30	Bakari Morales	[id number]	Medicare	[facility]  A	N	B	02/14/2024	02/14/2024	diazepam 5 mg tablet	60	30	Neha Arreaga	[id number]	Medicare	[facility]  A	N	O	02/09/2024	02/09/2024	oxycodone hcl (ir) 10 mg tab	120	30	ReplBakari cotto	[id number]	Medicare	[facility]  A	N	B	01/11/2024	01/12/2024	diazepam 5 mg tablet	60	30	Pulatov, Pulat	[id number]	Medicare	[facility]  A	N	O	01/08/2024	01/09/2024	oxycodone hcl (ir) 10 mg tab	120	30	Bakari Morales	[id number]	Medicare	[facility]  A	N	B	12/12/2023	12/12/2023	diazepam 5 mg tablet	60	30	Pulatov, Pulat	[id number]	[facility]  A	N	O	12/11/2023	12/11/2023	oxycodone hcl (ir) 10 mg tab	120	30	ReplBakari cotto	[id number]	Medicare	[facility]  A	N	O	11/13/2023	11/13/2023	oxycodone hcl (ir) 10 mg tab	120	30	José Miguel Caro	[id number]	Medicare	[facility]  A	N	B	10/19/2023	10/19/2023	diazepam 5 mg tablet	60	30	Pulatov, Pulat	[id number]	Cash	[facility]  A	N	O	10/13/2023	10/13/2023	oxycodone hcl (ir) 10 mg tab	120	30	José Miguel Caro	[id number]	Medicare	[facility]  A	N	B	09/11/2023	09/16/2023	diazepam 5 mg tablet	60	30	Pulatov, Pulat	[id number]	Medicare	[facility]  A	N	O	09/15/2023	09/16/2023	oxycodone hcl (ir) 10 mg tab	120	30	Sanjiv Persaud	[id number]	Medicare	[facility]  A	N	O	08/18/2023	08/19/2023	oxycodone hcl (ir) 10 mg tab	120	30	Sanjiv Persaud	[id number]	Medicare	[facility]  A	N	B	07/30/2023	08/03/2023	diazepam 5 mg tablet	60	30	Pulatov, Pulat	[id number]	Medicare	[facility]  A	N	O	07/21/2023	07/21/2023	oxycodone hcl (ir) 10 mg tab	120	30	Sanjiv Persaud	[id number]	Medicare	[facility]  A	N	B	06/25/2023	07/12/2023	diazepam 5 mg tablet	60	30	Pulatov, Pulat	[id number]	Medicare	[facility]  A	N	O	06/23/2023	06/24/2023	oxycodone hcl (ir) 10 mg tab	120	30	Sanjiv Persaud	[id number]	Medicare	[facility]      -------------------------------------------------------------------------------------------------------    ITEMS UNCHECKED ARE NOT PRESENT    PHYSICAL:  Vital Signs Last 24 Hrs  T(C): 36.6 (08 Apr 2024 09:35), Max: 36.9 (07 Apr 2024 17:30)  T(F): 97.9 (08 Apr 2024 09:35), Max: 98.5 (08 Apr 2024 01:30)  HR: 67 (08 Apr 2024 09:35) (67 - 83)  BP: 149/91 (08 Apr 2024 09:35) (130/74 - 161/88)  BP(mean): --  RR: 18 (08 Apr 2024 09:35) (18 - 18)  SpO2: 100% (08 Apr 2024 09:35) (97% - 100%)    Parameters below as of 08 Apr 2024 09:35  Patient On (Oxygen Delivery Method): nasal cannula  O2 Flow (L/min): 4      GENERAL:  [ ]Cachexia  [ ] Frail  [X ]Awake  [X ]Oriented x 3  [ ]Lethargic  [ ]Unarousable  [ ]Verbal  [ ]Non-Verbal    BEHAVIORAL:   [ ] Anxiety  [ ] Delirium [ ] Agitation [ ] Other    HEENT:   [ ]Normal   [ ]Dry mouth   [ ]ET Tube/Trach  [ ]Oral lesions  R eye glaucoma     PULMONARY:   [X ]Clear              [ ]Tachypnea  [ ]Audible excessive secretions   [ ]Rhonchi        [ ]Right [ ]Left [ ]Bilateral  [ ]Crackles        [ ]Right [ ]Left [ ]Bilateral  [ ]Wheezing     [ ]Right [ ]Left [ ]Bilateral  [ ]Diminished breath sounds [ ]right [ ]left [ ]bilateral    CARDIOVASCULAR:    [X ]Regular [ ]Irregular [ ]Tachy  [ ]Malik [ ]Murmur [ ]Other    GASTROINTESTINAL:  [X ]Soft  [ ]Distended   [ ]+BS  [X ]Non tender [ ]Tender  [ ]Other [ ]PEG [ ]OGT/ NGT      GENITOURINARY:  [X ]Normal [ ] Incontinent   [ ]Oliguria/Anuria   [ ]Che    MUSCULOSKELETAL:   [ ]Normal   [X ]Weakness  [ ]Bed/Wheelchair bound [ ]Edema    NEUROLOGIC:   [X ]No focal deficits  [ ]Cognitive impairment  [ ]Dysphagia [ ]Dysarthria [ ]Paresis [ ]Other     SKIN:   [X ]Normal  [ ]Rash  [X ]Other- edematous RUE, R breast   [ ]Pressure ulcer(s)       Present on admission [ ]y [ ]n      -------------------------------------------------------------------------------------------------------    LABS:                          10.1   12.77 )-----------( 555      ( 08 Apr 2024 06:25 )             32.1     04-08    140  |  105  |  36<H>  ----------------------------<  143<H>  4.8   |  25  |  0.72    Ca    9.1      08 Apr 2024 06:25  Phos  3.5     04-08  Mg     2.20     04-08    TPro  5.9<L>  /  Alb  3.2<L>  /  TBili  0.3  /  DBili  <0.2  /  AST  21  /  ALT  23  /  AlkPhos  72  04-08      -------------------------------------------------------------------------------------------------------    CRITICAL CARE:  [ ]Shock Present  [ ]Septic [ ]Cardiogenic [ ]Neurologic [ ]Hypovolemic [ ]Undifferentiated    [ ]Vasopressors [ ]Inotropes    [ ]Respiratory failure present [ ]Acute  [ ]Chronic [ ]Hypoxic  [ ]Hypercarbic [ ]Mixed   [ ]Mechanical Ventilation  [ ]Trach collar   [ ]Non-invasive ventilatory support   [ ]High-Flow   [ ]Oxygen mask/venti     [ ]Other organ failure     -------------------------------------------------------------------------------------------------------    RADIOLOGY & ADDITIONAL STUDIES:       < from: CT Neck Soft Tissue w/ IV Cont (03.27.24 @ 17:10) >  Bulky and conglomerate lymphadenopathy at right level 4 invades internal   jugular vein with thrombus extending up to hyoid level. 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 IJV otherwise patent up to skull base.    New left IJV nonocclusive thrombus. Otherwise, no significant change from   3/14/24.    < end of copied text >    < from: CT Chest w/ IV Cont (03.27.24 @ 17:10) >  IMPRESSION:    In comparison with 3/14/2024, new consolidations throughout the right   lung more severely within right lower lobe likely representing pneumonia.    Interval insertion of right Pleurx catheter and decrease of right pleural   effusion. Small/moderate left pleural effusion is increased.    Redemonstrated extensive DVT within thoracic and lower neck. The upper   SVC remains obliterated.    Large right supraclavicular mass infiltrating into the mediastinum is   unchanged.    Trace right pneumothorax.    < end of copied text >    < from: CT Neck Soft Tissue w/ IV Cont (03.14.24 @ 11:24) >  IMPRESSION:    Extensive cellulitis/myositis along the right anterior lateral neck and   right upper chest wall with inflammatory fat induration the deep soft   tissue of the neck.    Large supraclavicular/mediastinal mass lesion, presumably conglomerate of   lymph nodes with mass effect and complete occlusion of the right internal   jugular vein with associated surrounding inflammatory changes in the deep   neck, concerning for infectious/inflammatory thrombophlebitis.   Clinical/laboratory correlation and serial ultrasound follow-up is   recommended.    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    No masslike lesions or abnormal enhancement in aerodigestive mucosa.   Airways are patent.    Cervical adenopathy.    < end of copied text >    < from: CT Abdomen and Pelvis w/ IV Cont (03.12.24 @ 19:42) >  IMPRESSION:  Status post right nephrectomy. No lymphadenopathy or evidence of new   metastatic lesion.  A new 1.6 mL pancreatic neck cystic lesion without main pancreatic ductal   dilatation. Differentials include side branch IPMN.  Interval increase in small right and trace left pleural effusions.    < end of copied text >    -------------------------------------------------------------------------------------------------------  MEDICATIONS:     MEDICATIONS  (STANDING):  aMIOdarone    Tablet 200 milliGRAM(s) Oral daily  benzonatate 100 milliGRAM(s) Oral three times a day  chlorhexidine 2% Cloths 1 Application(s) Topical daily  dexAMETHasone     Tablet 4 milliGRAM(s) Oral every 8 hours  enoxaparin Injectable 50 milliGRAM(s) SubCutaneous every 12 hours  influenza   Vaccine 0.5 milliLiter(s) IntraMuscular once  metoprolol tartrate 25 milliGRAM(s) Oral every 12 hours  multivitamin 1 Tablet(s) Oral daily  naloxegol 25 milliGRAM(s) Oral daily  nicotine -  14 mG/24Hr(s) Patch 1 Patch Transdermal every 24 hours  oxyCODONE  ER Tablet 30 milliGRAM(s) Oral <User Schedule>  pantoprazole   Suspension 40 milliGRAM(s) Oral daily  polyethylene glycol 3350 17 Gram(s) Oral every 12 hours  trimethoprim  160 mG/sulfamethoxazole 800 mG 1 Tablet(s) Oral <User Schedule>    MEDICATIONS  (PRN):  albuterol    90 MICROgram(s) HFA Inhaler 2 Puff(s) Inhalation every 6 hours PRN Shortness of Breath and/or Wheezing  albuterol/ipratropium for Nebulization 3 milliLiter(s) Nebulizer every 6 hours PRN Shortness of Breath and/or Wheezing  aluminum hydroxide/magnesium hydroxide/simethicone Suspension 30 milliLiter(s) Oral every 6 hours PRN Dyspepsia  Biotene Dry Mouth Oral Rinse 15 milliLiter(s) Swish and Spit two times a day PRN dry mouth  diazepam    Tablet 5 milliGRAM(s) Oral two times a day PRN Anxiety  HYDROmorphone  Injectable 1 milliGRAM(s) IV Push every 3 hours PRN Severe Pain (7 - 10)  oxyCODONE    IR 10 milliGRAM(s) Oral every 4 hours PRN Moderate Pain (4 - 6)  sodium chloride 0.9% lock flush 10 milliLiter(s) IV Push every 1 hour PRN Pre/post blood products, medications, blood draw, and to maintain line patency

## 2024-04-08 NOTE — PROGRESS NOTE ADULT - PROBLEM SELECTOR PLAN 6
- MOLST completed in [facility]- DNR/DNI - patient reaffirmed   - HCP done in [facility]- patient's boyfriend Saeid Peña

## 2024-04-08 NOTE — PROGRESS NOTE ADULT - PROBLEM SELECTOR PLAN 7
rate controlled  - seen by EP at [facility]  - c/w metoprolol and amiodarone  - on AC with therapeutic lovenox as above
For pain management - Patient using no or minimal PRNs. Pain controlled on current regimen. Palliative signing off. Can re-consult if having worsening uncontrolled pain.     In the event of worsening symptoms, please contact the Palliative Medicine team via pager (if the patient is at [facility] #[phone] or if the patient is at [facility] #[phone]) The Geriatric and Palliative Medicine service has coverage 24 hours a day/ 7 days a week to provide medical recommendations regarding symptom management needs via telephone.
rate controlled  - seen by EP at [facility]  - c/w metoprolol and amiodarone  - on AC with therapeutic lovenox as above
For pain management     In the event of worsening symptoms, please contact the Palliative Medicine team via pager (if the patient is at [facility] #[phone] or if the patient is at [facility] #[phone]) The Geriatric and Palliative Medicine service has coverage 24 hours a day/ 7 days a week to provide medical recommendations regarding symptom management needs via telephone.
rate controlled  - seen by EP at [facility]  - c/w metoprolol and amiodarone  - on AC with therapeutic lovenox as above

## 2024-04-08 NOTE — CHART NOTE - NSCHARTNOTEFT_GEN_A_CORE
Due to anxiety, Ms. Zhang could not complete RT today.  Will re-attempt tomorrow.    Chart shows valium was given at 3am.  Given that RT will proceed between 8am-12pm likely,  I would ensure anti-anxiety medications are given to her around 8am or prior to RT and transport taking  her down to RT.

## 2024-04-08 NOTE — PROGRESS NOTE ADULT - PROBLEM SELECTOR PLAN 4
- seen on imaging  - no stent per IR   - transferred to [facility] for RT   - Undergoing RT  - on Dexa

## 2024-04-09 ENCOUNTER — RESULT REVIEW (OUTPATIENT)
Age: 59
End: 2024-04-09

## 2024-04-09 LAB
ALBUMIN SERPL ELPH-MCNC: 3.4 G/DL — SIGNIFICANT CHANGE UP (ref 3.3–5)
ALP SERPL-CCNC: 70 U/L — SIGNIFICANT CHANGE UP (ref 40–120)
ALT FLD-CCNC: 27 U/L — SIGNIFICANT CHANGE UP (ref 4–33)
ANION GAP SERPL CALC-SCNC: 11 MMOL/L — SIGNIFICANT CHANGE UP (ref 7–14)
AST SERPL-CCNC: 10 U/L — SIGNIFICANT CHANGE UP (ref 4–32)
BASOPHILS # BLD AUTO: 0.01 K/UL — SIGNIFICANT CHANGE UP (ref 0–0.2)
BASOPHILS NFR BLD AUTO: 0.1 % — SIGNIFICANT CHANGE UP (ref 0–2)
BILIRUB SERPL-MCNC: 0.3 MG/DL — SIGNIFICANT CHANGE UP (ref 0.2–1.2)
BUN SERPL-MCNC: 42 MG/DL — HIGH (ref 7–23)
CALCIUM SERPL-MCNC: 9.3 MG/DL — SIGNIFICANT CHANGE UP (ref 8.4–10.5)
CHLORIDE SERPL-SCNC: 105 MMOL/L — SIGNIFICANT CHANGE UP (ref 98–107)
CO2 SERPL-SCNC: 25 MMOL/L — SIGNIFICANT CHANGE UP (ref 22–31)
CREAT SERPL-MCNC: 0.79 MG/DL — SIGNIFICANT CHANGE UP (ref 0.5–1.3)
EGFR: 86 ML/MIN/1.73M2 — SIGNIFICANT CHANGE UP
EOSINOPHIL # BLD AUTO: 0 K/UL — SIGNIFICANT CHANGE UP (ref 0–0.5)
EOSINOPHIL NFR BLD AUTO: 0 % — SIGNIFICANT CHANGE UP (ref 0–6)
G6PD RBC-CCNC: 21.6 U/G HGB — HIGH (ref 7–20.5)
GLUCOSE BLDC GLUCOMTR-MCNC: 154 MG/DL — HIGH (ref 70–99)
GLUCOSE SERPL-MCNC: 137 MG/DL — HIGH (ref 70–99)
HCT VFR BLD CALC: 32.3 % — LOW (ref 34.5–45)
HGB BLD-MCNC: 9.9 G/DL — LOW (ref 11.5–15.5)
IANC: 16.08 K/UL — HIGH (ref 1.8–7.4)
IMM GRANULOCYTES NFR BLD AUTO: 1.1 % — HIGH (ref 0–0.9)
LYMPHOCYTES # BLD AUTO: 0.28 K/UL — LOW (ref 1–3.3)
LYMPHOCYTES # BLD AUTO: 1.6 % — LOW (ref 13–44)
MAGNESIUM SERPL-MCNC: 2.3 MG/DL — SIGNIFICANT CHANGE UP (ref 1.6–2.6)
MCHC RBC-ENTMCNC: 28 PG — SIGNIFICANT CHANGE UP (ref 27–34)
MCHC RBC-ENTMCNC: 30.7 GM/DL — LOW (ref 32–36)
MCV RBC AUTO: 91.5 FL — SIGNIFICANT CHANGE UP (ref 80–100)
MONOCYTES # BLD AUTO: 0.43 K/UL — SIGNIFICANT CHANGE UP (ref 0–0.9)
MONOCYTES NFR BLD AUTO: 2.5 % — SIGNIFICANT CHANGE UP (ref 2–14)
NEUTROPHILS # BLD AUTO: 16.08 K/UL — HIGH (ref 1.8–7.4)
NEUTROPHILS NFR BLD AUTO: 94.7 % — HIGH (ref 43–77)
NRBC # BLD: 0 /100 WBCS — SIGNIFICANT CHANGE UP (ref 0–0)
NRBC # FLD: 0 K/UL — SIGNIFICANT CHANGE UP (ref 0–0)
PHOSPHATE SERPL-MCNC: 3.6 MG/DL — SIGNIFICANT CHANGE UP (ref 2.5–4.5)
PLATELET # BLD AUTO: 536 K/UL — HIGH (ref 150–400)
POTASSIUM SERPL-MCNC: 5.2 MMOL/L — SIGNIFICANT CHANGE UP (ref 3.5–5.3)
POTASSIUM SERPL-SCNC: 5.2 MMOL/L — SIGNIFICANT CHANGE UP (ref 3.5–5.3)
PROT SERPL-MCNC: 5.8 G/DL — LOW (ref 6–8.3)
RBC # BLD: 3.53 M/UL — LOW (ref 3.8–5.2)
RBC # FLD: 14.9 % — HIGH (ref 10.3–14.5)
SODIUM SERPL-SCNC: 141 MMOL/L — SIGNIFICANT CHANGE UP (ref 135–145)
WBC # BLD: 16.98 K/UL — HIGH (ref 3.8–10.5)
WBC # FLD AUTO: 16.98 K/UL — HIGH (ref 3.8–10.5)

## 2024-04-09 PROCEDURE — 93306 TTE W/DOPPLER COMPLETE: CPT | Mod: 26

## 2024-04-09 PROCEDURE — 99232 SBSQ HOSP IP/OBS MODERATE 35: CPT

## 2024-04-09 RX ORDER — SALIVA SUBSTITUTE COMB NO.11 351 MG
15 POWDER IN PACKET (EA) MUCOUS MEMBRANE EVERY 6 HOURS
Refills: 0 | Status: DISCONTINUED | OUTPATIENT
Start: 2024-04-09 | End: 2024-05-13

## 2024-04-09 RX ADMIN — ENOXAPARIN SODIUM 50 MILLIGRAM(S): 100 INJECTION SUBCUTANEOUS at 17:07

## 2024-04-09 RX ADMIN — Medication 25 MILLIGRAM(S): at 17:06

## 2024-04-09 RX ADMIN — Medication 100 MILLIGRAM(S): at 05:51

## 2024-04-09 RX ADMIN — Medication 100 MILLIGRAM(S): at 22:30

## 2024-04-09 RX ADMIN — PANTOPRAZOLE SODIUM 40 MILLIGRAM(S): 20 TABLET, DELAYED RELEASE ORAL at 13:13

## 2024-04-09 RX ADMIN — Medication 100 MILLIGRAM(S): at 13:14

## 2024-04-09 RX ADMIN — OXYCODONE HYDROCHLORIDE 30 MILLIGRAM(S): 5 TABLET ORAL at 13:12

## 2024-04-09 RX ADMIN — CHLORHEXIDINE GLUCONATE 1 APPLICATION(S): 213 SOLUTION TOPICAL at 16:53

## 2024-04-09 RX ADMIN — OXYCODONE HYDROCHLORIDE 30 MILLIGRAM(S): 5 TABLET ORAL at 23:01

## 2024-04-09 RX ADMIN — Medication 30 MILLILITER(S): at 09:45

## 2024-04-09 RX ADMIN — Medication 4 MILLIGRAM(S): at 05:51

## 2024-04-09 RX ADMIN — Medication 15 MILLILITER(S): at 17:07

## 2024-04-09 RX ADMIN — Medication 1 PATCH: at 19:23

## 2024-04-09 RX ADMIN — OXYCODONE HYDROCHLORIDE 30 MILLIGRAM(S): 5 TABLET ORAL at 06:02

## 2024-04-09 RX ADMIN — Medication 4 MILLIGRAM(S): at 13:13

## 2024-04-09 RX ADMIN — Medication 25 MILLIGRAM(S): at 05:50

## 2024-04-09 RX ADMIN — NALOXEGOL OXALATE 25 MILLIGRAM(S): 12.5 TABLET, FILM COATED ORAL at 13:15

## 2024-04-09 RX ADMIN — Medication 1 PATCH: at 07:54

## 2024-04-09 RX ADMIN — AMIODARONE HYDROCHLORIDE 200 MILLIGRAM(S): 400 TABLET ORAL at 05:50

## 2024-04-09 RX ADMIN — Medication 1 PATCH: at 12:00

## 2024-04-09 RX ADMIN — Medication 15 MILLILITER(S): at 17:06

## 2024-04-09 RX ADMIN — Medication 1 TABLET(S): at 13:14

## 2024-04-09 RX ADMIN — ENOXAPARIN SODIUM 50 MILLIGRAM(S): 100 INJECTION SUBCUTANEOUS at 05:50

## 2024-04-09 RX ADMIN — Medication 4 MILLIGRAM(S): at 22:30

## 2024-04-09 RX ADMIN — Medication 1 PATCH: at 13:13

## 2024-04-09 NOTE — PROGRESS NOTE ADULT - SUBJECTIVE AND OBJECTIVE BOX
Reon Doe MD  Academic Hospitalist  Pager [phone]  Email: [email or web address]  Available on [employer] Teams        PROGRESS NOTE:     Patient is a 58y old  Female who presents with a chief complaint of SVC syndrome, DVT, mediastinal mass (08 Apr 2024 16:48)      SUBJECTIVE / OVERNIGHT EVENTS:  Patient seen and examined this morning. Anxious regarding her diagnosis, workup and treatment.   ADDITIONAL REVIEW OF SYSTEMS:  No f/c    MEDICATIONS  (STANDING):  aMIOdarone    Tablet 200 milliGRAM(s) Oral daily  benzonatate 100 milliGRAM(s) Oral three times a day  Biotene Dry Mouth Oral Rinse 15 milliLiter(s) Swish and Spit every 6 hours  chlorhexidine 2% Cloths 1 Application(s) Topical daily  dexAMETHasone     Tablet 4 milliGRAM(s) Oral every 8 hours  enoxaparin Injectable 50 milliGRAM(s) SubCutaneous every 12 hours  influenza   Vaccine 0.5 milliLiter(s) IntraMuscular once  metoprolol tartrate 25 milliGRAM(s) Oral every 12 hours  multivitamin 1 Tablet(s) Oral daily  naloxegol 25 milliGRAM(s) Oral daily  nicotine -  14 mG/24Hr(s) Patch 1 Patch Transdermal every 24 hours  oxyCODONE  ER Tablet 30 milliGRAM(s) Oral <User Schedule>  pantoprazole   Suspension 40 milliGRAM(s) Oral daily  polyethylene glycol 3350 17 Gram(s) Oral every 12 hours  trimethoprim  160 mG/sulfamethoxazole 800 mG 1 Tablet(s) Oral <User Schedule>    MEDICATIONS  (PRN):  albuterol    90 MICROgram(s) HFA Inhaler 2 Puff(s) Inhalation every 6 hours PRN Shortness of Breath and/or Wheezing  albuterol/ipratropium for Nebulization 3 milliLiter(s) Nebulizer every 6 hours PRN Shortness of Breath and/or Wheezing  aluminum hydroxide/magnesium hydroxide/simethicone Suspension 30 milliLiter(s) Oral every 6 hours PRN Dyspepsia  Biotene Dry Mouth Oral Rinse 15 milliLiter(s) Swish and Spit two times a day PRN dry mouth  diazepam    Tablet 5 milliGRAM(s) Oral two times a day PRN Anxiety  HYDROmorphone  Injectable 1 milliGRAM(s) IV Push every 3 hours PRN Severe Pain (7 - 10)  oxyCODONE    IR 10 milliGRAM(s) Oral every 4 hours PRN Moderate Pain (4 - 6)  sodium chloride 0.9% lock flush 10 milliLiter(s) IV Push every 1 hour PRN Pre/post blood products, medications, blood draw, and to maintain line patency      CAPILLARY BLOOD GLUCOSE        I&O's Summary      PHYSICAL EXAM:  Vital Signs Last 24 Hrs  T(C): 36.6 (09 Apr 2024 12:00), Max: 36.6 (08 Apr 2024 18:00)  T(F): 97.9 (09 Apr 2024 12:00), Max: 97.9 (08 Apr 2024 22:00)  HR: 74 (09 Apr 2024 12:00) (68 - 83)  BP: 142/61 (09 Apr 2024 12:00) (142/61 - 162/86)  BP(mean): --  RR: 18 (09 Apr 2024 12:00) (18 - 18)  SpO2: 97% (09 Apr 2024 12:00) (96% - 100%)    Parameters below as of 09 Apr 2024 12:00  Patient On (Oxygen Delivery Method): nasal cannula  O2 Flow (L/min): 2        CONSTITUTIONAL: NAD,  sitting up in bed, appears comfortable but flat affect, nad  R eyelid ptosis and R pupil miosis present; anicteric sclera,  RESPIRATORY: Normal respiratory effort; lungs are clear to auscultation bilaterally  CARDIOVASCULAR: Regular rate and rhythm, normal S1 and S2, no murmur/rub/gallop; No lower extremity edema; Peripheral pulses are 2+ bilaterally  ABDOMEN: Nontender to palpation, normoactive bowel sounds, no rebound/guarding;   MUSCLOSKELETAL: no clubbing or cyanosis of digits; no joint swelling or tenderness to palpation  PSYCH: A+O to person, place, and time; affect appropriate      LABS:                        9.9    16.98 )-----------( 536      ( 09 Apr 2024 08:00 )             32.3     04-09    141  |  105  |  42<H>  ----------------------------<  137<H>  5.2   |  25  |  0.79    Ca    9.3      09 Apr 2024 08:00  Phos  3.6     04-09  Mg     2.30     04-09    TPro  5.8<L>  /  Alb  3.4  /  TBili  0.3  /  DBili  x   /  AST  10  /  ALT  27  /  AlkPhos  70  04-09          Urinalysis Basic - ( 09 Apr 2024 08:00 )    Color: x / Appearance: x / SG: x / pH: x  Gluc: 137 mg/dL / Ketone: x  / Bili: x / Urobili: x   Blood: x / Protein: x / Nitrite: x   Leuk Esterase: x / RBC: x / WBC x   Sq Epi: x / Non Sq Epi: x / Bacteria: x          RADIOLOGY & ADDITIONAL TESTS:  Results Reviewed:   Imaging Personally Reviewed:  Electrocardiogram Personally Reviewed:    COORDINATION OF CARE:  Care Discussed with Consultants/Other Providers [Y/N]:  Prior or Outpatient Records Reviewed [Y/N]:

## 2024-04-09 NOTE — PROGRESS NOTE ADULT - ASSESSMENT
57 yo woman, former smoker, with h/o R eye glaucoma, Fibromyalgia, Anxiety, GERD, and RCC s/p R total nephrectomy/hysterectomy; she was initially admitted to [facility] on 3/11 w/ R breast swelling c/f mastitis- imaging brooks noted supraclavicular/mediastinal mass lesion which encased the SVC and multiple other veins resulting in obliteration of the SVC and thrombosis of the R IJ, and a pancreatic neck cystic lesion.  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  Her disease/hospital course has also been c/b pericardial effusion s/p pericardial window w/ neg cytology, R pleural effusion, also negative cytology) s/p Pleurx, Afib w/ RVR and acute hypoxic resp failure 2/2 SVC syndrome- not dennis to IR-guided stenting; pt was ultimately started on Dex and transferred to [facility] on 4/4 for urgent palliative RT.    ACTIVE PROBLEMS  Metastatic cancer  SVC syndrome  Extensive b/l UE DVTs  Hypercoag state  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)  pAfib, with RVR on this admission  Pericardial effusion with tamp physiology s/p pericardial window  R pleural effusion s/p pleurex  Acute hypoxic resp failure  Cancer-related pain, anxiety    - Metastatic cancer  Based on 3/14 SC LN path, ddx includes primary UGI vs pancreaticobiliary primary (breast edema is likely 2/2 SVC syndrome)  CA 19-9/CEA not markedly elevated; AFP, Ca-125, Ca 27.29, Ca 15-3 pending  Pt will need additional diagnostic brooks- planning to pursue EGD/EUS and MRCP when pt is less symptomatic when laying down flat  Options for systemic cancer treatment are TBD  Pt's prognosis is guarded    - SVC syndrome  Appreciate Rad Onc eval/recs  Continuing Dex PO 4mg q8 with pjp/ppi ppx  Continuing palliative RT- 5fxs planned    - Extensive b/l UE DVTs  - Hypercoag state  Continuing therapeutic lovenox  OT consult for lymphedema wrapping is pending   Of note: pt has poor UE IV access and currently required peripheral IV in her LE extremities for admin of medication; can consider FV central line for urgent/emergent IV needs    - 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)  Pt with h/o R eyeglaucoma, but miosis can also be associated with Pancoast syndrome i/s/o extensive R upper lung disease  Pt denies visual deficits  Pt should have a Brain MRI with/without contrast when she is able to lay down flat  Will continue to monitor closely    - pAfib  Pt currently SR, HR controlled  Likely precipitated by malignancy, pericardial effusion, SVC syndrome  Continuing Metoprolol 25mg q12 with holding parameters  Continuing Amio 200mg daily (monitoring for toxicities)  Continuing telemetry    - Pericardial effusion with tamp physiology s/p pericardial window  - R pleural effusion s/p pleurex  Likely inflammatory; both pleural and pericardial fluid cytology is negative for malignant cells  Surveillance TTE ordered  Continuing R pleurex drainage- up to 500cc q48h/prn    - Acute hypoxic respiratory failure  2/2 all of the above  Continuing supportive resp care:   * duonebs q6   * Tessalon 100mg q8   * steroids as above  Weaning supplemental O2 via NC as tolerate    - Cancer related pain, anxiety  Continuing Oxy ER 30mg q12 q8  Continuing Oxy IR 10mg q4/prn  Continuing Dilaudid IV prn for sev breakthrough pain  Continuing bowel regimen to prevent OIC (including Movantic)  Continuing Diazepam 5mg BID/prn

## 2024-04-10 LAB
ALBUMIN SERPL ELPH-MCNC: 3.2 G/DL — LOW (ref 3.3–5)
ALP SERPL-CCNC: 70 U/L — SIGNIFICANT CHANGE UP (ref 40–120)
ALT FLD-CCNC: 26 U/L — SIGNIFICANT CHANGE UP (ref 4–33)
ANION GAP SERPL CALC-SCNC: 10 MMOL/L — SIGNIFICANT CHANGE UP (ref 7–14)
AST SERPL-CCNC: 10 U/L — SIGNIFICANT CHANGE UP (ref 4–32)
BASOPHILS # BLD AUTO: 0.02 K/UL — SIGNIFICANT CHANGE UP (ref 0–0.2)
BASOPHILS NFR BLD AUTO: 0.1 % — SIGNIFICANT CHANGE UP (ref 0–2)
BILIRUB SERPL-MCNC: 0.3 MG/DL — SIGNIFICANT CHANGE UP (ref 0.2–1.2)
BUN SERPL-MCNC: 40 MG/DL — HIGH (ref 7–23)
CALCIUM SERPL-MCNC: 9.3 MG/DL — SIGNIFICANT CHANGE UP (ref 8.4–10.5)
CHLORIDE SERPL-SCNC: 105 MMOL/L — SIGNIFICANT CHANGE UP (ref 98–107)
CO2 SERPL-SCNC: 25 MMOL/L — SIGNIFICANT CHANGE UP (ref 22–31)
CREAT SERPL-MCNC: 0.75 MG/DL — SIGNIFICANT CHANGE UP (ref 0.5–1.3)
EGFR: 91 ML/MIN/1.73M2 — SIGNIFICANT CHANGE UP
EOSINOPHIL # BLD AUTO: 0 K/UL — SIGNIFICANT CHANGE UP (ref 0–0.5)
EOSINOPHIL NFR BLD AUTO: 0 % — SIGNIFICANT CHANGE UP (ref 0–6)
GLUCOSE SERPL-MCNC: 143 MG/DL — HIGH (ref 70–99)
HCT VFR BLD CALC: 30.7 % — LOW (ref 34.5–45)
HGB BLD-MCNC: 9.6 G/DL — LOW (ref 11.5–15.5)
IANC: 13.07 K/UL — HIGH (ref 1.8–7.4)
IMM GRANULOCYTES NFR BLD AUTO: 1.3 % — HIGH (ref 0–0.9)
LYMPHOCYTES # BLD AUTO: 0.22 K/UL — LOW (ref 1–3.3)
LYMPHOCYTES # BLD AUTO: 1.6 % — LOW (ref 13–44)
MAGNESIUM SERPL-MCNC: 2.2 MG/DL — SIGNIFICANT CHANGE UP (ref 1.6–2.6)
MCHC RBC-ENTMCNC: 28.7 PG — SIGNIFICANT CHANGE UP (ref 27–34)
MCHC RBC-ENTMCNC: 31.3 GM/DL — LOW (ref 32–36)
MCV RBC AUTO: 91.9 FL — SIGNIFICANT CHANGE UP (ref 80–100)
MONOCYTES # BLD AUTO: 0.42 K/UL — SIGNIFICANT CHANGE UP (ref 0–0.9)
MONOCYTES NFR BLD AUTO: 3 % — SIGNIFICANT CHANGE UP (ref 2–14)
NEUTROPHILS # BLD AUTO: 13.07 K/UL — HIGH (ref 1.8–7.4)
NEUTROPHILS NFR BLD AUTO: 94 % — HIGH (ref 43–77)
NRBC # BLD: 0 /100 WBCS — SIGNIFICANT CHANGE UP (ref 0–0)
NRBC # FLD: 0 K/UL — SIGNIFICANT CHANGE UP (ref 0–0)
PHOSPHATE SERPL-MCNC: 3.2 MG/DL — SIGNIFICANT CHANGE UP (ref 2.5–4.5)
PLATELET # BLD AUTO: 512 K/UL — HIGH (ref 150–400)
POTASSIUM SERPL-MCNC: 4.9 MMOL/L — SIGNIFICANT CHANGE UP (ref 3.5–5.3)
POTASSIUM SERPL-SCNC: 4.9 MMOL/L — SIGNIFICANT CHANGE UP (ref 3.5–5.3)
PROT SERPL-MCNC: 5.7 G/DL — LOW (ref 6–8.3)
RBC # BLD: 3.34 M/UL — LOW (ref 3.8–5.2)
RBC # FLD: 14.9 % — HIGH (ref 10.3–14.5)
SODIUM SERPL-SCNC: 140 MMOL/L — SIGNIFICANT CHANGE UP (ref 135–145)
WBC # BLD: 13.91 K/UL — HIGH (ref 3.8–10.5)
WBC # FLD AUTO: 13.91 K/UL — HIGH (ref 3.8–10.5)

## 2024-04-10 PROCEDURE — 99232 SBSQ HOSP IP/OBS MODERATE 35: CPT

## 2024-04-10 RX ORDER — HYDROMORPHONE HYDROCHLORIDE 2 MG/ML
2.5 INJECTION INTRAMUSCULAR; INTRAVENOUS; SUBCUTANEOUS ONCE
Refills: 0 | Status: DISCONTINUED | OUTPATIENT
Start: 2024-04-10 | End: 2024-04-10

## 2024-04-10 RX ADMIN — Medication 4 MILLIGRAM(S): at 06:38

## 2024-04-10 RX ADMIN — Medication 30 MILLILITER(S): at 09:49

## 2024-04-10 RX ADMIN — Medication 15 MILLILITER(S): at 00:49

## 2024-04-10 RX ADMIN — ENOXAPARIN SODIUM 50 MILLIGRAM(S): 100 INJECTION SUBCUTANEOUS at 06:37

## 2024-04-10 RX ADMIN — PANTOPRAZOLE SODIUM 40 MILLIGRAM(S): 20 TABLET, DELAYED RELEASE ORAL at 12:08

## 2024-04-10 RX ADMIN — AMIODARONE HYDROCHLORIDE 200 MILLIGRAM(S): 400 TABLET ORAL at 06:37

## 2024-04-10 RX ADMIN — Medication 1 PATCH: at 09:06

## 2024-04-10 RX ADMIN — Medication 1 PATCH: at 12:08

## 2024-04-10 RX ADMIN — Medication 25 MILLIGRAM(S): at 17:50

## 2024-04-10 RX ADMIN — HYDROMORPHONE HYDROCHLORIDE 2.5 MILLIGRAM(S): 2 INJECTION INTRAMUSCULAR; INTRAVENOUS; SUBCUTANEOUS at 12:06

## 2024-04-10 RX ADMIN — OXYCODONE HYDROCHLORIDE 30 MILLIGRAM(S): 5 TABLET ORAL at 21:15

## 2024-04-10 RX ADMIN — Medication 5 MILLIGRAM(S): at 02:46

## 2024-04-10 RX ADMIN — Medication 100 MILLIGRAM(S): at 06:38

## 2024-04-10 RX ADMIN — OXYCODONE HYDROCHLORIDE 30 MILLIGRAM(S): 5 TABLET ORAL at 16:03

## 2024-04-10 RX ADMIN — HYDROMORPHONE HYDROCHLORIDE 2.5 MILLIGRAM(S): 2 INJECTION INTRAMUSCULAR; INTRAVENOUS; SUBCUTANEOUS at 13:00

## 2024-04-10 RX ADMIN — OXYCODONE HYDROCHLORIDE 30 MILLIGRAM(S): 5 TABLET ORAL at 00:01

## 2024-04-10 RX ADMIN — ENOXAPARIN SODIUM 50 MILLIGRAM(S): 100 INJECTION SUBCUTANEOUS at 17:49

## 2024-04-10 RX ADMIN — Medication 5 MILLIGRAM(S): at 14:49

## 2024-04-10 RX ADMIN — Medication 4 MILLIGRAM(S): at 14:50

## 2024-04-10 RX ADMIN — Medication 15 MILLILITER(S): at 12:07

## 2024-04-10 RX ADMIN — Medication 1 TABLET(S): at 12:09

## 2024-04-10 RX ADMIN — NALOXEGOL OXALATE 25 MILLIGRAM(S): 12.5 TABLET, FILM COATED ORAL at 12:09

## 2024-04-10 RX ADMIN — Medication 1 PATCH: at 18:43

## 2024-04-10 RX ADMIN — CHLORHEXIDINE GLUCONATE 1 APPLICATION(S): 213 SOLUTION TOPICAL at 12:19

## 2024-04-10 RX ADMIN — Medication 4 MILLIGRAM(S): at 21:15

## 2024-04-10 RX ADMIN — Medication 25 MILLIGRAM(S): at 06:38

## 2024-04-10 RX ADMIN — OXYCODONE HYDROCHLORIDE 30 MILLIGRAM(S): 5 TABLET ORAL at 06:39

## 2024-04-10 RX ADMIN — Medication 1 TABLET(S): at 09:09

## 2024-04-10 RX ADMIN — Medication 100 MILLIGRAM(S): at 14:49

## 2024-04-10 RX ADMIN — Medication 15 MILLILITER(S): at 06:40

## 2024-04-10 RX ADMIN — Medication 1 PATCH: at 12:30

## 2024-04-10 RX ADMIN — Medication 15 MILLILITER(S): at 18:46

## 2024-04-10 NOTE — CHART NOTE - NSCHARTNOTEFT_GEN_A_CORE
58y.o. F, Ms. Zhang, h/o primary RCC, transferred from [facility] to [facility] for SVC syndrome, dyspnea,  on nasal cannula 24/7 at [facility], is undergoing planned RT to the chest.    The original plan was for 5 fractions but was amended to 10 fractions given the size of the field.   She also missed one treatment due to severe anxiety and respiratory distress as a result.    At present, her 10 fraction course ends on 4/18/24. (she started on 4/4/24.)           Fine Needle Aspiration Report - Auth (Verified)    Specimen(s) Submitted  LYMPH NODE, SUPRACLAVICULAR, RIGHT, US GUIDED CORE BIOPSY AND FNA    Final Diagnosis  LYMPH NODE, SUPRACLAVICULAR, RIGHT, US GUIDED CORE BIOPSY AND FNA  POSITIVE FOR MALIGNANT CELLS.  Carcinoma

## 2024-04-10 NOTE — PROGRESS NOTE ADULT - ASSESSMENT
57 yo woman, former smoker, with h/o R eye glaucoma, Fibromyalgia, Anxiety, GERD, and RCC s/p R total nephrectomy/hysterectomy; she was initially admitted to [facility] on 3/11 w/ R breast swelling c/f mastitis- imaging brooks noted supraclavicular/mediastinal mass lesion which encased the SVC and multiple other veins resulting in obliteration of the SVC and thrombosis of the R IJ, and a pancreatic neck cystic lesion.  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  Her disease/hospital course has also been c/b pericardial effusion s/p pericardial window w/ neg cytology, R pleural effusion, also negative cytology) s/p Pleurx, Afib w/ RVR and acute hypoxic resp failure 2/2 SVC syndrome- not dennis to IR-guided stenting; pt was ultimately started on Dex and transferred to [facility] on 4/4 for urgent palliative RT.    ACTIVE PROBLEMS  Metastatic cancer  SVC syndrome  Extensive b/l UE DVTs  Hypercoag state  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)  pAfib, with RVR on this admission  Pericardial effusion with tamp physiology s/p pericardial window  R pleural effusion s/p pleurex  Acute hypoxic resp failure  Cancer-related pain, anxiety    - Metastatic cancer  Based on 3/14 SC LN path, ddx includes primary UGI vs pancreaticobiliary primary (breast edema is likely 2/2 SVC syndrome)  CA 19-9/CEA not markedly elevated; AFP, Ca-125, Ca 27.29, Ca 15-3 pending  Pt will need additional diagnostic brooks- planning to pursue EGD/EUS and MRCP when pt is less symptomatic when laying down flat  Options for systemic cancer treatment are TBD  Pt's prognosis is guarded  Given ongoing RT and AC, will get another UE duplex u/s to assess clot burden and availability for IV access so above procedures can get done    - SVC syndrome  Appreciate Rad Onc eval/recs  Continuing Dex PO 4mg q8 with pjp/ppi ppx  Continuing palliative RT- 5fxs planned    - Extensive b/l UE DVTs  - Hypercoag state  Continuing therapeutic lovenox  OT consult for lymphedema wrapping is pending   Of note: pt has poor UE IV access and currently required peripheral IV in her LE extremities for admin of medication; can consider FV central line for urgent/emergent IV needs    - 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)  Pt with h/o R eyeglaucoma, but miosis can also be associated with Pancoast syndrome i/s/o extensive R upper lung disease  Pt denies visual deficits  Pt should have a Brain MRI with/without contrast when she is able to lay down flat  Will continue to monitor closely    - pAfib  Pt currently SR, HR controlled  Likely precipitated by malignancy, pericardial effusion, SVC syndrome  Continuing Metoprolol 25mg q12 with holding parameters  Continuing Amio 200mg daily (monitoring for toxicities)  Continuing telemetry    - Pericardial effusion with tamp physiology s/p pericardial window  - R pleural effusion s/p pleurex  Likely inflammatory; both pleural and pericardial fluid cytology is negative for malignant cells  Surveillance TTE ordered  Continuing R pleurex drainage- up to 500cc q48h/prn    - Acute hypoxic respiratory failure  2/2 all of the above  Continuing supportive resp care:   * duonebs q6   * Tessalon 100mg q8   * steroids as above  Weaning supplemental O2 via NC as tolerate    - Cancer related pain, anxiety  Continuing Oxy ER 30mg q12 q8  Continuing Oxy IR 10mg q4/prn  Continuing Dilaudid IV prn for sev breakthrough pain  Continuing bowel regimen to prevent OIC (including Movantic)  Continuing Diazepam 5mg BID/prn

## 2024-04-10 NOTE — PROGRESS NOTE ADULT - SUBJECTIVE AND OBJECTIVE BOX
Reno Doe MD  Academic Hospitalist  Pager [phone]  Email: [email or web address]  Available on [employer] Teams        PROGRESS NOTE:     Patient is a 58y old  Female who presents with a chief complaint of SVC syndrome, DVT, mediastinal mass (09 Apr 2024 15:35)      SUBJECTIVE / OVERNIGHT EVENTS:  Patient seen and examined this morning. She continues to expresses concerns and anxiety. States that her pain is uncontrolled now for RT and is apprehensive about taking more pain medications. After extensive discussions, agrees to take.  ADDITIONAL REVIEW OF SYSTEMS:  No f/c/n/v    MEDICATIONS  (STANDING):  aMIOdarone    Tablet 200 milliGRAM(s) Oral daily  benzonatate 100 milliGRAM(s) Oral three times a day  Biotene Dry Mouth Oral Rinse 15 milliLiter(s) Swish and Spit every 6 hours  chlorhexidine 2% Cloths 1 Application(s) Topical daily  dexAMETHasone     Tablet 4 milliGRAM(s) Oral every 8 hours  enoxaparin Injectable 50 milliGRAM(s) SubCutaneous every 12 hours  influenza   Vaccine 0.5 milliLiter(s) IntraMuscular once  metoprolol tartrate 25 milliGRAM(s) Oral every 12 hours  multivitamin 1 Tablet(s) Oral daily  naloxegol 25 milliGRAM(s) Oral daily  nicotine -  14 mG/24Hr(s) Patch 1 Patch Transdermal every 24 hours  oxyCODONE  ER Tablet 30 milliGRAM(s) Oral <User Schedule>  pantoprazole   Suspension 40 milliGRAM(s) Oral daily  polyethylene glycol 3350 17 Gram(s) Oral every 12 hours  trimethoprim  160 mG/sulfamethoxazole 800 mG 1 Tablet(s) Oral <User Schedule>    MEDICATIONS  (PRN):  albuterol    90 MICROgram(s) HFA Inhaler 2 Puff(s) Inhalation every 6 hours PRN Shortness of Breath and/or Wheezing  albuterol/ipratropium for Nebulization 3 milliLiter(s) Nebulizer every 6 hours PRN Shortness of Breath and/or Wheezing  aluminum hydroxide/magnesium hydroxide/simethicone Suspension 30 milliLiter(s) Oral every 6 hours PRN Dyspepsia  Biotene Dry Mouth Oral Rinse 15 milliLiter(s) Swish and Spit two times a day PRN dry mouth  diazepam    Tablet 5 milliGRAM(s) Oral two times a day PRN Anxiety  HYDROmorphone  Injectable 1 milliGRAM(s) IV Push every 3 hours PRN Severe Pain (7 - 10)  oxyCODONE    IR 10 milliGRAM(s) Oral every 4 hours PRN Moderate Pain (4 - 6)  sodium chloride 0.9% lock flush 10 milliLiter(s) IV Push every 1 hour PRN Pre/post blood products, medications, blood draw, and to maintain line patency      CAPILLARY BLOOD GLUCOSE      POCT Blood Glucose.: 154 mg/dL (09 Apr 2024 21:21)    I&O's Summary    10 Apr 2024 07:01  -  10 Apr 2024 13:39  --------------------------------------------------------  IN: 0 mL / OUT: 125 mL / NET: -125 mL        PHYSICAL EXAM:  Vital Signs Last 24 Hrs  T(C): 36.9 (10 Apr 2024 11:27), Max: 36.9 (09 Apr 2024 16:00)  T(F): 98.5 (10 Apr 2024 11:27), Max: 98.5 (09 Apr 2024 16:00)  HR: 71 (10 Apr 2024 11:27) (71 - 80)  BP: 157/88 (10 Apr 2024 11:27) (149/81 - 157/91)  BP(mean): --  RR: 18 (10 Apr 2024 11:27) (18 - 19)  SpO2: 97% (10 Apr 2024 11:27) (94% - 97%)    Parameters below as of 10 Apr 2024 06:00  Patient On (Oxygen Delivery Method): nasal cannula, 2      CONSTITUTIONAL: NAD,  sitting up in bed, appears comfortable with some anxiety, nad  R eyelid ptosis and R pupil miosis present; anicteric sclera,  RESPIRATORY: Normal respiratory effort; lungs are clear to auscultation bilaterally  CARDIOVASCULAR: Regular rate and rhythm, normal S1 and S2, no murmur/rub/gallop; No lower extremity edema; Peripheral pulses are 2+ bilaterally  ABDOMEN: Nontender to palpation, normoactive bowel sounds, no rebound/guarding;   MUSCLOSKELETAL: no clubbing or cyanosis of digits; no joint swelling or tenderness to palpation  PSYCH: A+O to person, place, and time; affect appropriate    LABS:                        9.6    13.91 )-----------( 512      ( 10 Apr 2024 05:40 )             30.7     04-10    140  |  105  |  40<H>  ----------------------------<  143<H>  4.9   |  25  |  0.75    Ca    9.3      10 Apr 2024 05:40  Phos  3.2     04-10  Mg     2.20     04-10    TPro  5.7<L>  /  Alb  3.2<L>  /  TBili  0.3  /  DBili  x   /  AST  10  /  ALT  26  /  AlkPhos  70  04-10          Urinalysis Basic - ( 10 Apr 2024 05:40 )    Color: x / Appearance: x / SG: x / pH: x  Gluc: 143 mg/dL / Ketone: x  / Bili: x / Urobili: x   Blood: x / Protein: x / Nitrite: x   Leuk Esterase: x / RBC: x / WBC x   Sq Epi: x / Non Sq Epi: x / Bacteria: x          RADIOLOGY & ADDITIONAL TESTS:  Results Reviewed:   Imaging Personally Reviewed:  Electrocardiogram Personally Reviewed:    COORDINATION OF CARE:  Care Discussed with Consultants/Other Providers [Y/N]: Case discussed during interdisciplinary rounds with social work and case management  Prior or Outpatient Records Reviewed [Y/N]:

## 2024-04-11 ENCOUNTER — RESULT REVIEW (OUTPATIENT)
Age: 59
End: 2024-04-11

## 2024-04-11 LAB
ALBUMIN SERPL ELPH-MCNC: 3.2 G/DL — LOW (ref 3.3–5)
ALP SERPL-CCNC: 83 U/L — SIGNIFICANT CHANGE UP (ref 40–120)
ALT FLD-CCNC: 27 U/L — SIGNIFICANT CHANGE UP (ref 4–33)
ANION GAP SERPL CALC-SCNC: 14 MMOL/L — SIGNIFICANT CHANGE UP (ref 7–14)
ANION GAP SERPL CALC-SCNC: 17 MMOL/L — HIGH (ref 7–14)
AST SERPL-CCNC: 20 U/L — SIGNIFICANT CHANGE UP (ref 4–32)
BASOPHILS # BLD AUTO: 0.04 K/UL — SIGNIFICANT CHANGE UP (ref 0–0.2)
BASOPHILS NFR BLD AUTO: 0.3 % — SIGNIFICANT CHANGE UP (ref 0–2)
BILIRUB SERPL-MCNC: 0.4 MG/DL — SIGNIFICANT CHANGE UP (ref 0.2–1.2)
BUN SERPL-MCNC: 38 MG/DL — HIGH (ref 7–23)
BUN SERPL-MCNC: 39 MG/DL — HIGH (ref 7–23)
CALCIUM SERPL-MCNC: 9.1 MG/DL — SIGNIFICANT CHANGE UP (ref 8.4–10.5)
CALCIUM SERPL-MCNC: 9.2 MG/DL — SIGNIFICANT CHANGE UP (ref 8.4–10.5)
CHLORIDE SERPL-SCNC: 103 MMOL/L — SIGNIFICANT CHANGE UP (ref 98–107)
CHLORIDE SERPL-SCNC: 104 MMOL/L — SIGNIFICANT CHANGE UP (ref 98–107)
CO2 SERPL-SCNC: 16 MMOL/L — LOW (ref 22–31)
CO2 SERPL-SCNC: 23 MMOL/L — SIGNIFICANT CHANGE UP (ref 22–31)
CREAT SERPL-MCNC: 0.71 MG/DL — SIGNIFICANT CHANGE UP (ref 0.5–1.3)
CREAT SERPL-MCNC: 0.72 MG/DL — SIGNIFICANT CHANGE UP (ref 0.5–1.3)
EGFR: 96 ML/MIN/1.73M2 — SIGNIFICANT CHANGE UP
EGFR: 97 ML/MIN/1.73M2 — SIGNIFICANT CHANGE UP
EOSINOPHIL # BLD AUTO: 0 K/UL — SIGNIFICANT CHANGE UP (ref 0–0.5)
EOSINOPHIL NFR BLD AUTO: 0 % — SIGNIFICANT CHANGE UP (ref 0–6)
GLUCOSE SERPL-MCNC: 119 MG/DL — HIGH (ref 70–99)
GLUCOSE SERPL-MCNC: 127 MG/DL — HIGH (ref 70–99)
HCT VFR BLD CALC: 34.4 % — LOW (ref 34.5–45)
HGB BLD-MCNC: 10.4 G/DL — LOW (ref 11.5–15.5)
IANC: 12.97 K/UL — HIGH (ref 1.8–7.4)
IMM GRANULOCYTES NFR BLD AUTO: 1.5 % — HIGH (ref 0–0.9)
LYMPHOCYTES # BLD AUTO: 0.27 K/UL — LOW (ref 1–3.3)
LYMPHOCYTES # BLD AUTO: 1.9 % — LOW (ref 13–44)
MAGNESIUM SERPL-MCNC: 2.2 MG/DL — SIGNIFICANT CHANGE UP (ref 1.6–2.6)
MCHC RBC-ENTMCNC: 29.5 PG — SIGNIFICANT CHANGE UP (ref 27–34)
MCHC RBC-ENTMCNC: 30.2 GM/DL — LOW (ref 32–36)
MCV RBC AUTO: 97.5 FL — SIGNIFICANT CHANGE UP (ref 80–100)
MONOCYTES # BLD AUTO: 0.43 K/UL — SIGNIFICANT CHANGE UP (ref 0–0.9)
MONOCYTES NFR BLD AUTO: 3.1 % — SIGNIFICANT CHANGE UP (ref 2–14)
NEUTROPHILS # BLD AUTO: 12.97 K/UL — HIGH (ref 1.8–7.4)
NEUTROPHILS NFR BLD AUTO: 93.2 % — HIGH (ref 43–77)
NRBC # BLD: 0 /100 WBCS — SIGNIFICANT CHANGE UP (ref 0–0)
NRBC # FLD: 0 K/UL — SIGNIFICANT CHANGE UP (ref 0–0)
PHOSPHATE SERPL-MCNC: 3.8 MG/DL — SIGNIFICANT CHANGE UP (ref 2.5–4.5)
PLATELET # BLD AUTO: 529 K/UL — HIGH (ref 150–400)
POTASSIUM SERPL-MCNC: 5 MMOL/L — SIGNIFICANT CHANGE UP (ref 3.5–5.3)
POTASSIUM SERPL-MCNC: 5.7 MMOL/L — HIGH (ref 3.5–5.3)
POTASSIUM SERPL-SCNC: 5 MMOL/L — SIGNIFICANT CHANGE UP (ref 3.5–5.3)
POTASSIUM SERPL-SCNC: 5.7 MMOL/L — HIGH (ref 3.5–5.3)
PROT SERPL-MCNC: 6.5 G/DL — SIGNIFICANT CHANGE UP (ref 6–8.3)
RBC # BLD: 3.53 M/UL — LOW (ref 3.8–5.2)
RBC # FLD: 15.1 % — HIGH (ref 10.3–14.5)
SODIUM SERPL-SCNC: 136 MMOL/L — SIGNIFICANT CHANGE UP (ref 135–145)
SODIUM SERPL-SCNC: 141 MMOL/L — SIGNIFICANT CHANGE UP (ref 135–145)
WBC # BLD: 13.92 K/UL — HIGH (ref 3.8–10.5)
WBC # FLD AUTO: 13.92 K/UL — HIGH (ref 3.8–10.5)

## 2024-04-11 PROCEDURE — 93970 EXTREMITY STUDY: CPT | Mod: 26

## 2024-04-11 PROCEDURE — 99232 SBSQ HOSP IP/OBS MODERATE 35: CPT

## 2024-04-11 RX ORDER — DEXAMETHASONE 0.5 MG/5ML
4 ELIXIR ORAL EVERY 8 HOURS
Refills: 0 | Status: DISCONTINUED | OUTPATIENT
Start: 2024-04-11 | End: 2024-04-22

## 2024-04-11 RX ORDER — HYDROMORPHONE HYDROCHLORIDE 2 MG/ML
1 INJECTION INTRAMUSCULAR; INTRAVENOUS; SUBCUTANEOUS
Refills: 0 | Status: DISCONTINUED | OUTPATIENT
Start: 2024-04-11 | End: 2024-04-18

## 2024-04-11 RX ORDER — ENOXAPARIN SODIUM 100 MG/ML
50 INJECTION SUBCUTANEOUS EVERY 12 HOURS
Refills: 0 | Status: DISCONTINUED | OUTPATIENT
Start: 2024-04-11 | End: 2024-04-17

## 2024-04-11 RX ORDER — OXYCODONE HYDROCHLORIDE 5 MG/1
30 TABLET ORAL
Refills: 0 | Status: DISCONTINUED | OUTPATIENT
Start: 2024-04-11 | End: 2024-04-18

## 2024-04-11 RX ORDER — DIAZEPAM 5 MG
5 TABLET ORAL
Refills: 0 | Status: DISCONTINUED | OUTPATIENT
Start: 2024-04-11 | End: 2024-04-13

## 2024-04-11 RX ORDER — OXYCODONE HYDROCHLORIDE 5 MG/1
10 TABLET ORAL EVERY 4 HOURS
Refills: 0 | Status: DISCONTINUED | OUTPATIENT
Start: 2024-04-11 | End: 2024-04-18

## 2024-04-11 RX ADMIN — Medication 5 MILLIGRAM(S): at 05:52

## 2024-04-11 RX ADMIN — Medication 1 TABLET(S): at 14:16

## 2024-04-11 RX ADMIN — OXYCODONE HYDROCHLORIDE 30 MILLIGRAM(S): 5 TABLET ORAL at 22:54

## 2024-04-11 RX ADMIN — Medication 1 PATCH: at 07:30

## 2024-04-11 RX ADMIN — Medication 15 MILLILITER(S): at 14:17

## 2024-04-11 RX ADMIN — PANTOPRAZOLE SODIUM 40 MILLIGRAM(S): 20 TABLET, DELAYED RELEASE ORAL at 14:15

## 2024-04-11 RX ADMIN — OXYCODONE HYDROCHLORIDE 10 MILLIGRAM(S): 5 TABLET ORAL at 11:25

## 2024-04-11 RX ADMIN — OXYCODONE HYDROCHLORIDE 30 MILLIGRAM(S): 5 TABLET ORAL at 22:19

## 2024-04-11 RX ADMIN — Medication 15 MILLILITER(S): at 23:58

## 2024-04-11 RX ADMIN — ENOXAPARIN SODIUM 50 MILLIGRAM(S): 100 INJECTION SUBCUTANEOUS at 18:41

## 2024-04-11 RX ADMIN — Medication 4 MILLIGRAM(S): at 14:55

## 2024-04-11 RX ADMIN — POLYETHYLENE GLYCOL 3350 17 GRAM(S): 17 POWDER, FOR SOLUTION ORAL at 05:42

## 2024-04-11 RX ADMIN — OXYCODONE HYDROCHLORIDE 30 MILLIGRAM(S): 5 TABLET ORAL at 05:51

## 2024-04-11 RX ADMIN — CHLORHEXIDINE GLUCONATE 1 APPLICATION(S): 213 SOLUTION TOPICAL at 14:14

## 2024-04-11 RX ADMIN — Medication 1 PATCH: at 14:17

## 2024-04-11 RX ADMIN — Medication 15 MILLILITER(S): at 23:10

## 2024-04-11 RX ADMIN — Medication 4 MILLIGRAM(S): at 05:44

## 2024-04-11 RX ADMIN — ENOXAPARIN SODIUM 50 MILLIGRAM(S): 100 INJECTION SUBCUTANEOUS at 05:43

## 2024-04-11 RX ADMIN — Medication 15 MILLILITER(S): at 05:42

## 2024-04-11 RX ADMIN — NALOXEGOL OXALATE 25 MILLIGRAM(S): 12.5 TABLET, FILM COATED ORAL at 14:16

## 2024-04-11 RX ADMIN — Medication 25 MILLIGRAM(S): at 05:43

## 2024-04-11 RX ADMIN — OXYCODONE HYDROCHLORIDE 10 MILLIGRAM(S): 5 TABLET ORAL at 12:20

## 2024-04-11 RX ADMIN — Medication 1 PATCH: at 13:00

## 2024-04-11 RX ADMIN — Medication 4 MILLIGRAM(S): at 22:20

## 2024-04-11 RX ADMIN — POLYETHYLENE GLYCOL 3350 17 GRAM(S): 17 POWDER, FOR SOLUTION ORAL at 18:39

## 2024-04-11 RX ADMIN — OXYCODONE HYDROCHLORIDE 30 MILLIGRAM(S): 5 TABLET ORAL at 14:20

## 2024-04-11 RX ADMIN — AMIODARONE HYDROCHLORIDE 200 MILLIGRAM(S): 400 TABLET ORAL at 05:43

## 2024-04-11 RX ADMIN — Medication 15 MILLILITER(S): at 05:44

## 2024-04-11 RX ADMIN — Medication 15 MILLILITER(S): at 18:39

## 2024-04-11 RX ADMIN — Medication 25 MILLIGRAM(S): at 18:40

## 2024-04-11 NOTE — PHYSICAL THERAPY INITIAL EVALUATION ADULT - PERTINENT HX OF CURRENT PROBLEM, REHAB EVAL
Pt is a 58 year old female transferred from [facility] for inpatient radiation. Pt intiially presented to the ED for right breast swelling c/f mastitis. Imaging revealed supraclavicular/mediastinal mass lesion which encased the SVC and multiple other veins resulting in obliteration of the SVC and thrombosis of the R IJ, and a pancreatic neck cystic lesion. Pt underwent supraclavicular LN bx revealing carcinoma. Hospital course was further c/b pericardial effusion s/p pericardial window, R pleural effusion s/p Pleurx, Afib w/ RVR, and AHRF secondary to SVC syndrome. Follow-up VA duplex bilateral UE 04/11 revealed acute, occlusive DVT in the right internal jugular, innominate, and subclavian veins, and left subclavian, axillary, and proximal brachial veins, acute, non-occlusive DVT in the right axillary vein and the left internal jugular vein, and superficial vein thromboses in the right and left cephalic veins; however, no significant interval change in comparison to 4/5/24.

## 2024-04-11 NOTE — PROGRESS NOTE ADULT - ASSESSMENT
59 yo woman, former smoker, with h/o R eye glaucoma, Fibromyalgia, Anxiety, GERD, and RCC s/p R total nephrectomy/hysterectomy; she was initially admitted to [facility] on 3/11 w/ R breast swelling c/f mastitis- imaging brooks noted supraclavicular/mediastinal mass lesion which encased the SVC and multiple other veins resulting in obliteration of the SVC and thrombosis of the R IJ, and a pancreatic neck cystic lesion.  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  Her disease/hospital course has also been c/b pericardial effusion s/p pericardial window w/ neg cytology, R pleural effusion, also negative cytology) s/p Pleurx, Afib w/ RVR and acute hypoxic resp failure 2/2 SVC syndrome- not dennis to IR-guided stenting; pt was ultimately started on Dex and transferred to [facility] on 4/4 for urgent palliative RT.    ACTIVE PROBLEMS  Metastatic cancer  SVC syndrome  Extensive b/l UE DVTs  Hypercoag state  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)  pAfib, with RVR on this admission  Pericardial effusion with tamp physiology s/p pericardial window  R pleural effusion s/p pleurex  Acute hypoxic resp failure  Cancer-related pain, anxiety    - Metastatic cancer  Based on 3/14 SC LN path, ddx includes primary UGI vs pancreaticobiliary primary (breast edema is likely 2/2 SVC syndrome)  CA 19-9/CEA not markedly elevated; AFP, Ca-125, Ca 27.29, Ca 15-3 pending  Pt will need additional diagnostic brooks- planning to pursue EGD/EUS and MRCP when pt is less symptomatic when laying down flat  Options for systemic cancer treatment are TBD  Pt's prognosis is guarded  Given ongoing RT and AC, w another UE duplex u/s to assess clot burden and availability for IV access so above procedures can get done, unfortunately -multiple areas of blood clots noted in the upper extremity making any line placement impossible. Will reach out to IR and discuss.     - SVC syndrome  Appreciate Rad Onc eval/recs  Continuing Dex PO 4mg q8 with pjp/ppi ppx  Continuing palliative RT- 5fxs planned    - Extensive b/l UE DVTs  - Hypercoag state  Continuing therapeutic lovenox  OT consult for lymphedema wrapping is pending   Of note: pt has poor UE IV access and currently required peripheral IV in her LE extremities for admin of medication; can consider FV central line for urgent/emergent IV needs    - 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)  Pt with h/o R eyeglaucoma, but miosis can also be associated with Pancoast syndrome i/s/o extensive R upper lung disease  Pt denies visual deficits  Pt should have a Brain MRI with/without contrast when she is able to lay down flat  Will continue to monitor closely    - pAfib  Pt currently SR, HR controlled  Likely precipitated by malignancy, pericardial effusion, SVC syndrome  Continuing Metoprolol 25mg q12 with holding parameters  Continuing Amio 200mg daily (monitoring for toxicities)  Continuing telemetry    - Pericardial effusion with tamp physiology s/p pericardial window  - R pleural effusion s/p pleurex  Likely inflammatory; both pleural and pericardial fluid cytology is negative for malignant cells  Surveillance TTE ordered  Continuing R pleurex drainage- up to 500cc q48h/prn    - Acute hypoxic respiratory failure  2/2 all of the above  Continuing supportive resp care:   * duonebs q6   * Tessalon 100mg q8   * steroids as above  Weaning supplemental O2 via NC as tolerate    - Cancer related pain, anxiety  Continuing Oxy ER 30mg q12 q8  Continuing Oxy IR 10mg q4/prn  Continuing Dilaudid IV prn for sev breakthrough pain  Continuing bowel regimen to prevent OIC (including Movantic)  Continuing Diazepam 5mg BID/prn

## 2024-04-11 NOTE — PHYSICAL THERAPY INITIAL EVALUATION ADULT - GENERAL OBSERVATIONS, REHAB EVAL
Upon entry, pt seated on EOB in NAD; + telemetry, + nasal cannula. HR 67 SpO2 97% on 3L O2. Pt left seated in bedside chair with all tubes/lines intact, call bell in reach and in NAD.

## 2024-04-11 NOTE — PHYSICAL THERAPY INITIAL EVALUATION ADULT - GAIT DEVIATIONS NOTED, PT EVAL
decreased abdi/decreased velocity of limb motion/decreased step length/decreased stride length/decreased weight-shifting ability

## 2024-04-11 NOTE — PROGRESS NOTE ADULT - SUBJECTIVE AND OBJECTIVE BOX
Reno Doe MD  Academic Hospitalist  Pager [phone]  Email: [email or web address]  Available on [employer] Teams        PROGRESS NOTE:     Patient is a 58y old  Female who presents with a chief complaint of SVC syndrome, DVT, mediastinal mass (10 Apr 2024 13:38)      SUBJECTIVE / OVERNIGHT EVENTS:  Patient seen and examined this morning. Patient is concerned regarding what she can eat, appears anxious, however reports feeling better.  ADDITIONAL REVIEW OF SYSTEMS:  No f/c      MEDICATIONS  (STANDING):  aMIOdarone    Tablet 200 milliGRAM(s) Oral daily  Biotene Dry Mouth Oral Rinse 15 milliLiter(s) Swish and Spit every 6 hours  chlorhexidine 2% Cloths 1 Application(s) Topical daily  dexAMETHasone     Tablet 4 milliGRAM(s) Oral every 8 hours  enoxaparin Injectable 50 milliGRAM(s) SubCutaneous every 12 hours  influenza   Vaccine 0.5 milliLiter(s) IntraMuscular once  metoprolol tartrate 25 milliGRAM(s) Oral every 12 hours  multivitamin 1 Tablet(s) Oral daily  naloxegol 25 milliGRAM(s) Oral daily  nicotine -  14 mG/24Hr(s) Patch 1 Patch Transdermal every 24 hours  oxyCODONE  ER Tablet 30 milliGRAM(s) Oral <User Schedule>  pantoprazole   Suspension 40 milliGRAM(s) Oral daily  polyethylene glycol 3350 17 Gram(s) Oral every 12 hours  trimethoprim  160 mG/sulfamethoxazole 800 mG 1 Tablet(s) Oral <User Schedule>    MEDICATIONS  (PRN):  albuterol    90 MICROgram(s) HFA Inhaler 2 Puff(s) Inhalation every 6 hours PRN Shortness of Breath and/or Wheezing  albuterol/ipratropium for Nebulization 3 milliLiter(s) Nebulizer every 6 hours PRN Shortness of Breath and/or Wheezing  aluminum hydroxide/magnesium hydroxide/simethicone Suspension 30 milliLiter(s) Oral every 6 hours PRN Dyspepsia  Biotene Dry Mouth Oral Rinse 15 milliLiter(s) Swish and Spit two times a day PRN dry mouth  diazepam    Tablet 5 milliGRAM(s) Oral two times a day PRN Anxiety  HYDROmorphone  Injectable 1 milliGRAM(s) IV Push every 3 hours PRN Severe Pain (7 - 10)  oxyCODONE    IR 10 milliGRAM(s) Oral every 4 hours PRN Moderate Pain (4 - 6)  sodium chloride 0.9% lock flush 10 milliLiter(s) IV Push every 1 hour PRN Pre/post blood products, medications, blood draw, and to maintain line patency      CAPILLARY BLOOD GLUCOSE        I&O's Summary    10 Apr 2024 07:01  -  11 Apr 2024 07:00  --------------------------------------------------------  IN: 0 mL / OUT: 125 mL / NET: -125 mL        PHYSICAL EXAM:  Vital Signs Last 24 Hrs  T(C): 36.6 (11 Apr 2024 11:20), Max: 36.8 (10 Apr 2024 15:30)  T(F): 97.8 (11 Apr 2024 11:20), Max: 98.3 (10 Apr 2024 15:30)  HR: 65 (11 Apr 2024 11:20) (65 - 85)  BP: 150/81 (11 Apr 2024 11:20) (150/81 - 159/84)  BP(mean): --  RR: 18 (11 Apr 2024 11:20) (16 - 18)  SpO2: 100% (11 Apr 2024 11:20) (97% - 100%)    Parameters below as of 11 Apr 2024 11:20  Patient On (Oxygen Delivery Method): nasal cannula      CONSTITUTIONAL: NAD,  sitting up in bed, appears comfortable with some anxiety, nad  R eyelid ptosis and R pupil miosis present; anicteric sclera,  RESPIRATORY: Normal respiratory effort; lungs are clear to auscultation bilaterally  CARDIOVASCULAR: Regular rate and rhythm, normal S1 and S2, no murmur/rub/gallop; No lower extremity edema; Peripheral pulses are 2+ bilaterally  ABDOMEN: Nontender to palpation, normoactive bowel sounds, no rebound/guarding;   MUSCLOSKELETAL: no clubbing or cyanosis of digits; no joint swelling or tenderness to palpation  PSYCH: A+O to person, place, and time; affect appropriate    LABS:                        10.4   13.92 )-----------( 529      ( 11 Apr 2024 07:00 )             34.4     04-11    141  |  104  |  38<H>  ----------------------------<  127<H>  5.0   |  23  |  0.72    Ca    9.1      11 Apr 2024 11:45  Phos  3.8     04-11  Mg     2.20     04-11    TPro  6.5  /  Alb  3.2<L>  /  TBili  0.4  /  DBili  x   /  AST  20  /  ALT  27  /  AlkPhos  83  04-11          Urinalysis Basic - ( 11 Apr 2024 11:45 )    Color: x / Appearance: x / SG: x / pH: x  Gluc: 127 mg/dL / Ketone: x  / Bili: x / Urobili: x   Blood: x / Protein: x / Nitrite: x   Leuk Esterase: x / RBC: x / WBC x   Sq Epi: x / Non Sq Epi: x / Bacteria: x          RADIOLOGY & ADDITIONAL TESTS:  Results Reviewed:   Imaging Personally Reviewed:   1. Extensive acute, occlusive deep vein thromboses are noted in the right internal jugular, innominate, and subclavian veins.   2. Acute, non-occlusive deep vein thrombosis is noted within the right axillary vein.   3. Extensive acute, occlusive deep vein thromboses are noted in the left subclavian, axillary, and proximal brachial veins.   4. Acute, non-occlusive deep vein thrombosis is noted in the left internal jugular vein.   5. Superficial vein thromboses are noted in the right and left cephalic veins.   6. No significant interval change in comparison to left upper extremity venous duplex study performed on 4/5/24.  Electrocardiogram Personally Reviewed:    COORDINATION OF CARE:  Care Discussed with Consultants/Other Providers [Y/N]: Case discussed during interdisciplinary rounds with social work and case management  Prior or Outpatient Records Reviewed [Y/N]:

## 2024-04-12 LAB
ALBUMIN SERPL ELPH-MCNC: 3.2 G/DL — LOW (ref 3.3–5)
ALP SERPL-CCNC: 80 U/L — SIGNIFICANT CHANGE UP (ref 40–120)
ALT FLD-CCNC: 29 U/L — SIGNIFICANT CHANGE UP (ref 4–33)
ANION GAP SERPL CALC-SCNC: 16 MMOL/L — HIGH (ref 7–14)
ANISOCYTOSIS BLD QL: SLIGHT — SIGNIFICANT CHANGE UP
AST SERPL-CCNC: 13 U/L — SIGNIFICANT CHANGE UP (ref 4–32)
BASOPHILS # BLD AUTO: 0 K/UL — SIGNIFICANT CHANGE UP (ref 0–0.2)
BASOPHILS NFR BLD AUTO: 0 % — SIGNIFICANT CHANGE UP (ref 0–2)
BILIRUB SERPL-MCNC: 0.3 MG/DL — SIGNIFICANT CHANGE UP (ref 0.2–1.2)
BUN SERPL-MCNC: 37 MG/DL — HIGH (ref 7–23)
CALCIUM SERPL-MCNC: 8.9 MG/DL — SIGNIFICANT CHANGE UP (ref 8.4–10.5)
CHLORIDE SERPL-SCNC: 101 MMOL/L — SIGNIFICANT CHANGE UP (ref 98–107)
CO2 SERPL-SCNC: 21 MMOL/L — LOW (ref 22–31)
CREAT SERPL-MCNC: 0.72 MG/DL — SIGNIFICANT CHANGE UP (ref 0.5–1.3)
EGFR: 96 ML/MIN/1.73M2 — SIGNIFICANT CHANGE UP
EOSINOPHIL # BLD AUTO: 0 K/UL — SIGNIFICANT CHANGE UP (ref 0–0.5)
EOSINOPHIL NFR BLD AUTO: 0 % — SIGNIFICANT CHANGE UP (ref 0–6)
GLUCOSE SERPL-MCNC: 143 MG/DL — HIGH (ref 70–99)
HCT VFR BLD CALC: 32.6 % — LOW (ref 34.5–45)
HGB BLD-MCNC: 10.3 G/DL — LOW (ref 11.5–15.5)
IANC: 19.17 K/UL — HIGH (ref 1.8–7.4)
LYMPHOCYTES # BLD AUTO: 0 % — LOW (ref 13–44)
LYMPHOCYTES # BLD AUTO: 0 K/UL — LOW (ref 1–3.3)
MACROCYTES BLD QL: SLIGHT — SIGNIFICANT CHANGE UP
MAGNESIUM SERPL-MCNC: 2 MG/DL — SIGNIFICANT CHANGE UP (ref 1.6–2.6)
MANUAL SMEAR VERIFICATION: SIGNIFICANT CHANGE UP
MCHC RBC-ENTMCNC: 29.1 PG — SIGNIFICANT CHANGE UP (ref 27–34)
MCHC RBC-ENTMCNC: 31.6 GM/DL — LOW (ref 32–36)
MCV RBC AUTO: 92.1 FL — SIGNIFICANT CHANGE UP (ref 80–100)
MONOCYTES # BLD AUTO: 0.39 K/UL — SIGNIFICANT CHANGE UP (ref 0–0.9)
MONOCYTES NFR BLD AUTO: 1.9 % — LOW (ref 2–14)
NEUTROPHILS # BLD AUTO: 19.96 K/UL — HIGH (ref 1.8–7.4)
NEUTROPHILS NFR BLD AUTO: 98.1 % — HIGH (ref 43–77)
OVALOCYTES BLD QL SMEAR: SLIGHT — SIGNIFICANT CHANGE UP
PHOSPHATE SERPL-MCNC: 3.1 MG/DL — SIGNIFICANT CHANGE UP (ref 2.5–4.5)
PLAT MORPH BLD: NORMAL — SIGNIFICANT CHANGE UP
PLATELET # BLD AUTO: 462 K/UL — HIGH (ref 150–400)
PLATELET COUNT - ESTIMATE: NORMAL — SIGNIFICANT CHANGE UP
POIKILOCYTOSIS BLD QL AUTO: SLIGHT — SIGNIFICANT CHANGE UP
POLYCHROMASIA BLD QL SMEAR: SLIGHT — SIGNIFICANT CHANGE UP
POTASSIUM SERPL-MCNC: 5.2 MMOL/L — SIGNIFICANT CHANGE UP (ref 3.5–5.3)
POTASSIUM SERPL-SCNC: 5.2 MMOL/L — SIGNIFICANT CHANGE UP (ref 3.5–5.3)
PROT SERPL-MCNC: 5.4 G/DL — LOW (ref 6–8.3)
RBC # BLD: 3.54 M/UL — LOW (ref 3.8–5.2)
RBC # FLD: 14.6 % — HIGH (ref 10.3–14.5)
RBC BLD AUTO: ABNORMAL
SMUDGE CELLS # BLD: PRESENT — SIGNIFICANT CHANGE UP
SODIUM SERPL-SCNC: 138 MMOL/L — SIGNIFICANT CHANGE UP (ref 135–145)
WBC # BLD: 20.35 K/UL — HIGH (ref 3.8–10.5)
WBC # FLD AUTO: 20.35 K/UL — HIGH (ref 3.8–10.5)

## 2024-04-12 PROCEDURE — 77427 RADIATION TX MANAGEMENT X5: CPT

## 2024-04-12 PROCEDURE — 99222 1ST HOSP IP/OBS MODERATE 55: CPT | Mod: GC

## 2024-04-12 PROCEDURE — 99232 SBSQ HOSP IP/OBS MODERATE 35: CPT

## 2024-04-12 RX ADMIN — Medication 30 MILLILITER(S): at 06:48

## 2024-04-12 RX ADMIN — Medication 30 MILLILITER(S): at 20:17

## 2024-04-12 RX ADMIN — Medication 4 MILLIGRAM(S): at 13:38

## 2024-04-12 RX ADMIN — Medication 15 MILLILITER(S): at 13:36

## 2024-04-12 RX ADMIN — Medication 1 PATCH: at 13:36

## 2024-04-12 RX ADMIN — NALOXEGOL OXALATE 25 MILLIGRAM(S): 12.5 TABLET, FILM COATED ORAL at 13:37

## 2024-04-12 RX ADMIN — Medication 15 MILLILITER(S): at 05:02

## 2024-04-12 RX ADMIN — OXYCODONE HYDROCHLORIDE 30 MILLIGRAM(S): 5 TABLET ORAL at 06:37

## 2024-04-12 RX ADMIN — ENOXAPARIN SODIUM 50 MILLIGRAM(S): 100 INJECTION SUBCUTANEOUS at 04:55

## 2024-04-12 RX ADMIN — Medication 15 MILLILITER(S): at 17:17

## 2024-04-12 RX ADMIN — Medication 1 TABLET(S): at 13:37

## 2024-04-12 RX ADMIN — Medication 1 PATCH: at 12:00

## 2024-04-12 RX ADMIN — CHLORHEXIDINE GLUCONATE 1 APPLICATION(S): 213 SOLUTION TOPICAL at 13:38

## 2024-04-12 RX ADMIN — OXYCODONE HYDROCHLORIDE 30 MILLIGRAM(S): 5 TABLET ORAL at 23:24

## 2024-04-12 RX ADMIN — Medication 1 PATCH: at 05:03

## 2024-04-12 RX ADMIN — PANTOPRAZOLE SODIUM 40 MILLIGRAM(S): 20 TABLET, DELAYED RELEASE ORAL at 13:37

## 2024-04-12 RX ADMIN — Medication 25 MILLIGRAM(S): at 17:17

## 2024-04-12 RX ADMIN — Medication 25 MILLIGRAM(S): at 04:53

## 2024-04-12 RX ADMIN — Medication 1 TABLET(S): at 09:23

## 2024-04-12 RX ADMIN — ENOXAPARIN SODIUM 50 MILLIGRAM(S): 100 INJECTION SUBCUTANEOUS at 17:15

## 2024-04-12 RX ADMIN — Medication 4 MILLIGRAM(S): at 22:21

## 2024-04-12 RX ADMIN — Medication 4 MILLIGRAM(S): at 04:54

## 2024-04-12 RX ADMIN — OXYCODONE HYDROCHLORIDE 30 MILLIGRAM(S): 5 TABLET ORAL at 13:35

## 2024-04-12 RX ADMIN — POLYETHYLENE GLYCOL 3350 17 GRAM(S): 17 POWDER, FOR SOLUTION ORAL at 04:54

## 2024-04-12 RX ADMIN — POLYETHYLENE GLYCOL 3350 17 GRAM(S): 17 POWDER, FOR SOLUTION ORAL at 17:15

## 2024-04-12 RX ADMIN — AMIODARONE HYDROCHLORIDE 200 MILLIGRAM(S): 400 TABLET ORAL at 06:37

## 2024-04-12 RX ADMIN — Medication 1 PATCH: at 20:00

## 2024-04-12 RX ADMIN — OXYCODONE HYDROCHLORIDE 30 MILLIGRAM(S): 5 TABLET ORAL at 22:24

## 2024-04-12 RX ADMIN — Medication 5 MILLIGRAM(S): at 04:53

## 2024-04-12 NOTE — PROGRESS NOTE ADULT - ASSESSMENT
57 yo woman, former smoker, with h/o R eye glaucoma, Fibromyalgia, Anxiety, GERD, and RCC s/p R total nephrectomy/hysterectomy; she was initially admitted to [facility] on 3/11 w/ R breast swelling c/f mastitis- imaging brooks noted supraclavicular/mediastinal mass lesion which encased the SVC and multiple other veins resulting in obliteration of the SVC and thrombosis of the R IJ, and a pancreatic neck cystic lesion.  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  Her disease/hospital course has also been c/b pericardial effusion s/p pericardial window w/ neg cytology, R pleural effusion, also negative cytology) s/p Pleurx, Afib w/ RVR and acute hypoxic resp failure 2/2 SVC syndrome- not dennis to IR-guided stenting; pt was ultimately started on Dex and transferred to [facility] on 4/4 for urgent palliative RT.    ACTIVE PROBLEMS  Metastatic cancer  SVC syndrome  Extensive b/l UE DVTs  Hypercoag state  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)  pAfib, with RVR on this admission  Pericardial effusion with tamp physiology s/p pericardial window  R pleural effusion s/p pleurex  Acute hypoxic resp failure  Cancer-related pain, anxiety    - Metastatic cancer  Based on 3/14 SC LN path, ddx includes primary UGI vs pancreaticobiliary primary (breast edema is likely 2/2 SVC syndrome)  CA 19-9/CEA not markedly elevated; AFP, Ca-125, Ca 27.29, Ca 15-3 pending  Pt will need additional diagnostic brooks- planning to pursue EGD/EUS and MRCP when pt is less symptomatic when laying down flat  Options for systemic cancer treatment are TBD  Pt's prognosis is guarded  Given ongoing RT and AC, w another UE duplex u/s to assess clot burden and availability for IV access so above procedures can get done, unfortunately -multiple areas of blood clots noted in the upper extremity making any line placement impossible. Will reach out to IR and discuss.     - SVC syndrome  Appreciate Rad Onc eval/recs  Continuing Dex PO 4mg q8 with pjp/ppi ppx  Continuing palliative RT- 5fxs planned    - Extensive b/l UE DVTs  - Hypercoag state  Continuing therapeutic lovenox  Of note: pt has poor UE IV access and currently required peripheral IV in her LE extremities for admin of medication; can consider FV central line for urgent/emergent IV needs  Vascular consulted    - 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)  Pt with h/o R eyeglaucoma, but miosis can also be associated with Pancoast syndrome i/s/o extensive R upper lung disease  Pt denies visual deficits  Pt should have a Brain MRI with/without contrast when she is able to lay down flat  Will continue to monitor closely    - pAfib  Pt currently SR, HR controlled  Likely precipitated by malignancy, pericardial effusion, SVC syndrome  Continuing Metoprolol 25mg q12 with holding parameters  Continuing Amio 200mg daily (monitoring for toxicities)  Continuing telemetry    - Pericardial effusion with tamp physiology s/p pericardial window  - R pleural effusion s/p pleurex  Likely inflammatory; both pleural and pericardial fluid cytology is negative for malignant cells  Surveillance TTE ordered  Continuing R pleurex drainage- up to 500cc q48h/prn    - Acute hypoxic respiratory failure  2/2 all of the above  Continuing supportive resp care:   * duonebs q6   * Tessalon 100mg q8   * steroids as above  Weaning supplemental O2 via NC as tolerate    - Cancer related pain, anxiety  Continuing Oxy ER 30mg q12 q8  Continuing Oxy IR 10mg q4/prn  Continuing Dilaudid IV prn for sev breakthrough pain  Continuing bowel regimen to prevent OIC (including Movantic)  Continuing Diazepam 5mg BID/prn 59 yo woman, former smoker, with h/o R eye glaucoma, Fibromyalgia, Anxiety, GERD, and RCC s/p R total nephrectomy/hysterectomy; she was initially admitted to [facility] on 3/11 w/ R breast swelling c/f mastitis- imaging brooks noted supraclavicular/mediastinal mass lesion which encased the SVC and multiple other veins resulting in obliteration of the SVC and thrombosis of the R IJ, and a pancreatic neck cystic lesion.  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  Her disease/hospital course has also been c/b pericardial effusion s/p pericardial window w/ neg cytology, R pleural effusion, also negative cytology) s/p Pleurx, Afib w/ RVR and acute hypoxic resp failure 2/2 SVC syndrome- not dennis to IR-guided stenting; pt was ultimately started on Dex and transferred to [facility] on 4/4 for urgent palliative RT.    ACTIVE PROBLEMS  Metastatic cancer  SVC syndrome  Extensive b/l UE DVTs  Hypercoag state  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)  pAfib, with RVR on this admission  Pericardial effusion with tamp physiology s/p pericardial window  R pleural effusion s/p pleurex  Acute hypoxic resp failure  Cancer-related pain, anxiety    - Metastatic cancer  Based on 3/14 SC LN path, ddx includes primary UGI vs pancreaticobiliary primary (breast edema is likely 2/2 SVC syndrome)  CA 19-9/CEA not markedly elevated; AFP, Ca-125, Ca 27.29, Ca 15-3 pending  Pt will need additional diagnostic brooks- planning to pursue EGD/EUS and MRCP when pt is less symptomatic when laying down flat  Options for systemic cancer treatment are TBD  Pt's prognosis is guarded  Given ongoing RT and AC, w another UE duplex u/s to assess clot burden and availability for IV access so above procedures can get done, unfortunately -multiple areas of blood clots noted in the upper extremity making any line placement impossible. Vascular surgery consulted.       - SVC syndrome  Appreciate Rad Onc eval/recs  Continuing Dex PO 4mg q8 with pjp/ppi ppx  Continuing palliative RT- 5fxs planned    - Extensive b/l UE DVTs  - Hypercoag state  Continuing therapeutic lovenox  Of note: pt has poor UE IV access and currently required peripheral IV in her LE extremities for admin of medication; can consider FV central line for urgent/emergent IV needs  Vascular consulted    - 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)  Pt with h/o R eyeglaucoma, but miosis can also be associated with Pancoast syndrome i/s/o extensive R upper lung disease  Pt denies visual deficits  Pt should have a Brain MRI with/without contrast when she is able to lay down flat  Will continue to monitor closely    - pAfib  Pt currently SR, HR controlled  Likely precipitated by malignancy, pericardial effusion, SVC syndrome  Continuing Metoprolol 25mg q12 with holding parameters  Continuing Amio 200mg daily (monitoring for toxicities)  Continuing telemetry    - Pericardial effusion with tamp physiology s/p pericardial window  - R pleural effusion s/p pleurex  Likely inflammatory; both pleural and pericardial fluid cytology is negative for malignant cells  Surveillance TTE ordered  Continuing R pleurex drainage- up to 500cc q48h/prn    - Acute hypoxic respiratory failure  2/2 all of the above  Continuing supportive resp care:   * duonebs q6   * Tessalon 100mg q8   * steroids as above  Weaning supplemental O2 via NC as tolerate    - Cancer related pain, anxiety  Continuing Oxy ER 30mg q12 q8  Continuing Oxy IR 10mg q4/prn  Continuing Dilaudid IV prn for sev breakthrough pain  Continuing bowel regimen to prevent OIC (including Movantic)  Continuing Diazepam 5mg BID/prn

## 2024-04-12 NOTE — PROGRESS NOTE ADULT - SUBJECTIVE AND OBJECTIVE BOX
Reno Doe MD  Academic Hospitalist  Pager [phone]  Email: [email or web address]  Available on [employer] Teams        PROGRESS NOTE:     Patient is a 58y old  Female who presents with a chief complaint of SVC syndrome, DVT, mediastinal mass (11 Apr 2024 13:39)      SUBJECTIVE / OVERNIGHT EVENTS:  Patient seen and examined this morning.   ADDITIONAL REVIEW OF SYSTEMS:    MEDICATIONS  (STANDING):  aMIOdarone    Tablet 200 milliGRAM(s) Oral daily  Biotene Dry Mouth Oral Rinse 15 milliLiter(s) Swish and Spit every 6 hours  chlorhexidine 2% Cloths 1 Application(s) Topical daily  dexAMETHasone     Tablet 4 milliGRAM(s) Oral every 8 hours  enoxaparin Injectable 50 milliGRAM(s) SubCutaneous every 12 hours  influenza   Vaccine 0.5 milliLiter(s) IntraMuscular once  metoprolol tartrate 25 milliGRAM(s) Oral every 12 hours  multivitamin 1 Tablet(s) Oral daily  naloxegol 25 milliGRAM(s) Oral daily  nicotine -  14 mG/24Hr(s) Patch 1 Patch Transdermal every 24 hours  oxyCODONE  ER Tablet 30 milliGRAM(s) Oral <User Schedule>  pantoprazole   Suspension 40 milliGRAM(s) Oral daily  polyethylene glycol 3350 17 Gram(s) Oral every 12 hours  trimethoprim  160 mG/sulfamethoxazole 800 mG 1 Tablet(s) Oral <User Schedule>    MEDICATIONS  (PRN):  albuterol    90 MICROgram(s) HFA Inhaler 2 Puff(s) Inhalation every 6 hours PRN Shortness of Breath and/or Wheezing  albuterol/ipratropium for Nebulization 3 milliLiter(s) Nebulizer every 6 hours PRN Shortness of Breath and/or Wheezing  aluminum hydroxide/magnesium hydroxide/simethicone Suspension 30 milliLiter(s) Oral every 6 hours PRN Dyspepsia  Biotene Dry Mouth Oral Rinse 15 milliLiter(s) Swish and Spit two times a day PRN dry mouth  diazepam    Tablet 5 milliGRAM(s) Oral two times a day PRN Anxiety  HYDROmorphone  Injectable 1 milliGRAM(s) IV Push every 3 hours PRN Severe Pain (7 - 10)  oxyCODONE    IR 10 milliGRAM(s) Oral every 4 hours PRN Moderate Pain (4 - 6)  sodium chloride 0.9% lock flush 10 milliLiter(s) IV Push every 1 hour PRN Pre/post blood products, medications, blood draw, and to maintain line patency      CAPILLARY BLOOD GLUCOSE        I&O's Summary      PHYSICAL EXAM:  Vital Signs Last 24 Hrs  T(C): 36.4 (12 Apr 2024 11:15), Max: 36.9 (11 Apr 2024 19:56)  T(F): 97.5 (12 Apr 2024 11:15), Max: 98.4 (11 Apr 2024 19:56)  HR: 79 (12 Apr 2024 11:15) (71 - 92)  BP: 126/95 (12 Apr 2024 11:15) (126/95 - 160/91)  BP(mean): --  RR: 18 (12 Apr 2024 11:15) (18 - 18)  SpO2: 100% (12 Apr 2024 11:15) (99% - 100%)    Parameters below as of 12 Apr 2024 11:15  Patient On (Oxygen Delivery Method): nasal cannula        CONSTITUTIONAL: NAD,  RESPIRATORY: Normal respiratory effort; lungs are clear to auscultation bilaterally  CARDIOVASCULAR: Regular rate and rhythm, normal S1 and S2, no murmur/rub/gallop; No lower extremity edema; Peripheral pulses are 2+ bilaterally  ABDOMEN: Nontender to palpation, normoactive bowel sounds, no rebound/guarding;   MUSCLOSKELETAL: no clubbing or cyanosis of digits; no joint swelling or tenderness to palpation  PSYCH: A+O to person, place, and time; affect appropriate    LABS:                        10.3   20.35 )-----------( 462      ( 12 Apr 2024 07:17 )             32.6     04-12    138  |  101  |  37<H>  ----------------------------<  143<H>  5.2   |  21<L>  |  0.72    Ca    8.9      12 Apr 2024 07:17  Phos  3.1     04-12  Mg     2.00     04-12    TPro  5.4<L>  /  Alb  3.2<L>  /  TBili  0.3  /  DBili  x   /  AST  13  /  ALT  29  /  AlkPhos  80  04-12          Urinalysis Basic - ( 12 Apr 2024 07:17 )    Color: x / Appearance: x / SG: x / pH: x  Gluc: 143 mg/dL / Ketone: x  / Bili: x / Urobili: x   Blood: x / Protein: x / Nitrite: x   Leuk Esterase: x / RBC: x / WBC x   Sq Epi: x / Non Sq Epi: x / Bacteria: x          RADIOLOGY & ADDITIONAL TESTS:  Results Reviewed:   Imaging Personally Reviewed:  1. Extensive acute, occlusive deep vein thromboses are noted in the right internal jugular, innominate, and subclavian veins.   2. Acute, non-occlusive deep vein thrombosis is noted within the right axillary vein.   3. Extensive acute, occlusive deep vein thromboses are noted in the left subclavian, axillary, and proximal brachial veins.   4. Acute, non-occlusive deep vein thrombosis is noted in the left internal jugular vein.   5. Superficial vein thromboses are noted in the right and left cephalic veins.   6. No significant interval change in comparison to left upper extremity venous duplex study performed on 4/5/24.  Electrocardiogram Personally Reviewed:    COORDINATION OF CARE:  Care Discussed with Consultants/Other Providers [Y/N]:  Prior or Outpatient Records Reviewed [Y/N]:   Reno Doe MD  Academic Hospitalist  Pager [phone]  Email: [email or web address]  Available on [employer] Teams        PROGRESS NOTE:     Patient is a 58y old  Female who presents with a chief complaint of SVC syndrome, DVT, mediastinal mass (11 Apr 2024 13:39)      SUBJECTIVE / OVERNIGHT EVENTS:  Patient seen and examined this morning. Patient continues to have multiple complaints and feeling anxious.   ADDITIONAL REVIEW OF SYSTEMS:  No f/c     MEDICATIONS  (STANDING):  aMIOdarone    Tablet 200 milliGRAM(s) Oral daily  Biotene Dry Mouth Oral Rinse 15 milliLiter(s) Swish and Spit every 6 hours  chlorhexidine 2% Cloths 1 Application(s) Topical daily  dexAMETHasone     Tablet 4 milliGRAM(s) Oral every 8 hours  enoxaparin Injectable 50 milliGRAM(s) SubCutaneous every 12 hours  influenza   Vaccine 0.5 milliLiter(s) IntraMuscular once  metoprolol tartrate 25 milliGRAM(s) Oral every 12 hours  multivitamin 1 Tablet(s) Oral daily  naloxegol 25 milliGRAM(s) Oral daily  nicotine -  14 mG/24Hr(s) Patch 1 Patch Transdermal every 24 hours  oxyCODONE  ER Tablet 30 milliGRAM(s) Oral <User Schedule>  pantoprazole   Suspension 40 milliGRAM(s) Oral daily  polyethylene glycol 3350 17 Gram(s) Oral every 12 hours  trimethoprim  160 mG/sulfamethoxazole 800 mG 1 Tablet(s) Oral <User Schedule>    MEDICATIONS  (PRN):  albuterol    90 MICROgram(s) HFA Inhaler 2 Puff(s) Inhalation every 6 hours PRN Shortness of Breath and/or Wheezing  albuterol/ipratropium for Nebulization 3 milliLiter(s) Nebulizer every 6 hours PRN Shortness of Breath and/or Wheezing  aluminum hydroxide/magnesium hydroxide/simethicone Suspension 30 milliLiter(s) Oral every 6 hours PRN Dyspepsia  Biotene Dry Mouth Oral Rinse 15 milliLiter(s) Swish and Spit two times a day PRN dry mouth  diazepam    Tablet 5 milliGRAM(s) Oral two times a day PRN Anxiety  HYDROmorphone  Injectable 1 milliGRAM(s) IV Push every 3 hours PRN Severe Pain (7 - 10)  oxyCODONE    IR 10 milliGRAM(s) Oral every 4 hours PRN Moderate Pain (4 - 6)  sodium chloride 0.9% lock flush 10 milliLiter(s) IV Push every 1 hour PRN Pre/post blood products, medications, blood draw, and to maintain line patency      CAPILLARY BLOOD GLUCOSE        I&O's Summary      PHYSICAL EXAM:  Vital Signs Last 24 Hrs  T(C): 36.4 (12 Apr 2024 11:15), Max: 36.9 (11 Apr 2024 19:56)  T(F): 97.5 (12 Apr 2024 11:15), Max: 98.4 (11 Apr 2024 19:56)  HR: 79 (12 Apr 2024 11:15) (71 - 92)  BP: 126/95 (12 Apr 2024 11:15) (126/95 - 160/91)  BP(mean): --  RR: 18 (12 Apr 2024 11:15) (18 - 18)  SpO2: 100% (12 Apr 2024 11:15) (99% - 100%)    Parameters below as of 12 Apr 2024 11:15  Patient On (Oxygen Delivery Method): nasal cannula        CONSTITUTIONAL: NAD,  sitting up in bed, appears comfortable with some anxiety, nad  R eyelid ptosis and R pupil miosis present; anicteric sclera,  RESPIRATORY: Normal respiratory effort; lungs are clear to auscultation bilaterally  CARDIOVASCULAR: Regular rate and rhythm, normal S1 and S2, no murmur/rub/gallop;   ABDOMEN: Nontender to palpation, normoactive bowel sounds, no rebound/guarding;   MUSCLOSKELETAL: no clubbing or cyanosis of digits; no joint swelling or tenderness to palpation  PSYCH: A+O to person, place, and time; very anxious     LABS:                        10.3   20.35 )-----------( 462      ( 12 Apr 2024 07:17 )             32.6     04-12    138  |  101  |  37<H>  ----------------------------<  143<H>  5.2   |  21<L>  |  0.72    Ca    8.9      12 Apr 2024 07:17  Phos  3.1     04-12  Mg     2.00     04-12    TPro  5.4<L>  /  Alb  3.2<L>  /  TBili  0.3  /  DBili  x   /  AST  13  /  ALT  29  /  AlkPhos  80  04-12          Urinalysis Basic - ( 12 Apr 2024 07:17 )    Color: x / Appearance: x / SG: x / pH: x  Gluc: 143 mg/dL / Ketone: x  / Bili: x / Urobili: x   Blood: x / Protein: x / Nitrite: x   Leuk Esterase: x / RBC: x / WBC x   Sq Epi: x / Non Sq Epi: x / Bacteria: x          RADIOLOGY & ADDITIONAL TESTS:  Results Reviewed:   Imaging Personally Reviewed:  1. Extensive acute, occlusive deep vein thromboses are noted in the right internal jugular, innominate, and subclavian veins.   2. Acute, non-occlusive deep vein thrombosis is noted within the right axillary vein.   3. Extensive acute, occlusive deep vein thromboses are noted in the left subclavian, axillary, and proximal brachial veins.   4. Acute, non-occlusive deep vein thrombosis is noted in the left internal jugular vein.   5. Superficial vein thromboses are noted in the right and left cephalic veins.   6. No significant interval change in comparison to left upper extremity venous duplex study performed on 4/5/24.  Electrocardiogram Personally Reviewed:    COORDINATION OF CARE:  Care Discussed with Consultants/Other Providers [Y/N]: D/W Vascular surgery resident. Case discussed during interdisciplinary rounds with social work and case management  Prior or Outpatient Records Reviewed [Y/N]:

## 2024-04-12 NOTE — CONSULT NOTE ADULT - SUBJECTIVE AND OBJECTIVE BOX
Patient is a 58y old  Female who presents with a chief complaint of SVC syndrome, DVT, mediastinal mass (12 Apr 2024 14:41)      HPI:  [facility] transfer.  58F w/ F hx tobacco use, RCC right nephrectomy, right sided glaucoma, fibromyalgia, anxiety, GERD, was at [facility] w/ concern for breast inflammation, lung nodules/mediastinal mass w/ biopsy concern for metastatic GI cancer. Had pleural effusion s/p chest tube and pleurx, had pericardial window for effusion. Has had poor IV access, has triple lumen in right groin. Has UE DVT on lovenox. Also has SVC syndrome, transfer from [facility] for [facility] RT treatment.           PAST MEDICAL & SURGICAL HISTORY:  Anxiety      Acid reflux disease      Umbilical hernia      Uterine mass  Benign      Fibromyalgia  Joint pains      Glaucoma  Right eye      Herniated cervical disc      Cancer of kidney  right kidney      S/P partial hysterectomy  8 years ago      S/P knee surgery  knee arthroscopy right x 2 ( 2011 & 2012)      S/P hernia repair  umbilical Hernia Repair - 2011      H/O unilateral nephrectomy  Right Laparoscopic Nephrectomy - [id number]      Glaucoma following surgery  Right Eyr - 2012      History of tonsillectomy    Allergies    erythromycin (Headache; Vomiting; Rash)  penicillin (Headache; Vomiting; Rash)  Cipro (Headache; Vomiting; Rash)    Intolerances      FAMILY HISTORY:  Family history of lung cancer (Mother)    Family history of pancreatic cancer (Father)        Vital Signs Last 24 Hrs  T(C): 36.6 (12 Apr 2024 15:10), Max: 36.9 (11 Apr 2024 19:56)  T(F): 97.8 (12 Apr 2024 15:10), Max: 98.4 (11 Apr 2024 19:56)  HR: 70 (12 Apr 2024 15:10) (70 - 92)  BP: 138/87 (12 Apr 2024 15:10) (126/95 - 160/91)  BP(mean): --  RR: 19 (12 Apr 2024 15:10) (18 - 19)  SpO2: 98% (12 Apr 2024 15:10) (98% - 100%)    Parameters below as of 12 Apr 2024 15:10  Patient On (Oxygen Delivery Method): nasal cannula  O2 Flow (L/min): 2      PHYSICAL EXAM:  Constitutional: well developed, well nourished, NAD  Respiratory: Unlabored breathing   Extremities: FROM, warm. No edema, no phlegmasia. +radial  pulse.           LABS:                        10.3   20.35 )-----------( 462      ( 12 Apr 2024 07:17 )             32.6     04-12    138  |  101  |  37<H>  ----------------------------<  143<H>  5.2   |  21<L>  |  0.72    Ca    8.9      12 Apr 2024 07:17  Phos  3.1     04-12  Mg     2.00     04-12    TPro  5.4<L>  /  Alb  3.2<L>  /  TBili  0.3  /  DBili  x   /  AST  13  /  ALT  29  /  AlkPhos  80  04-12      Urinalysis Basic - ( 12 Apr 2024 07:17 )    Color: x / Appearance: x / SG: x / pH: x  Gluc: 143 mg/dL / Ketone: x  / Bili: x / Urobili: x   Blood: x / Protein: x / Nitrite: x   Leuk Esterase: x / RBC: x / WBC x   Sq Epi: x / Non Sq Epi: x / Bacteria: x        RADIOLOGY & ADDITIONAL STUDIES:

## 2024-04-13 DIAGNOSIS — D68.59 OTHER PRIMARY THROMBOPHILIA: ICD-10-CM

## 2024-04-13 LAB
ALBUMIN SERPL ELPH-MCNC: 3.2 G/DL — LOW (ref 3.3–5)
ALP SERPL-CCNC: 76 U/L — SIGNIFICANT CHANGE UP (ref 40–120)
ALT FLD-CCNC: 33 U/L — SIGNIFICANT CHANGE UP (ref 4–33)
ANION GAP SERPL CALC-SCNC: 12 MMOL/L — SIGNIFICANT CHANGE UP (ref 7–14)
AST SERPL-CCNC: 11 U/L — SIGNIFICANT CHANGE UP (ref 4–32)
BASOPHILS # BLD AUTO: 0.03 K/UL — SIGNIFICANT CHANGE UP (ref 0–0.2)
BASOPHILS NFR BLD AUTO: 0.2 % — SIGNIFICANT CHANGE UP (ref 0–2)
BILIRUB SERPL-MCNC: 0.3 MG/DL — SIGNIFICANT CHANGE UP (ref 0.2–1.2)
BUN SERPL-MCNC: 37 MG/DL — HIGH (ref 7–23)
CALCIUM SERPL-MCNC: 9 MG/DL — SIGNIFICANT CHANGE UP (ref 8.4–10.5)
CHLORIDE SERPL-SCNC: 100 MMOL/L — SIGNIFICANT CHANGE UP (ref 98–107)
CO2 SERPL-SCNC: 25 MMOL/L — SIGNIFICANT CHANGE UP (ref 22–31)
CREAT SERPL-MCNC: 0.72 MG/DL — SIGNIFICANT CHANGE UP (ref 0.5–1.3)
EGFR: 96 ML/MIN/1.73M2 — SIGNIFICANT CHANGE UP
EOSINOPHIL # BLD AUTO: 0 K/UL — SIGNIFICANT CHANGE UP (ref 0–0.5)
EOSINOPHIL NFR BLD AUTO: 0 % — SIGNIFICANT CHANGE UP (ref 0–6)
GLUCOSE SERPL-MCNC: 135 MG/DL — HIGH (ref 70–99)
HCT VFR BLD CALC: 35.3 % — SIGNIFICANT CHANGE UP (ref 34.5–45)
HGB BLD-MCNC: 11 G/DL — LOW (ref 11.5–15.5)
IANC: 18.4 K/UL — HIGH (ref 1.8–7.4)
IMM GRANULOCYTES NFR BLD AUTO: 1.1 % — HIGH (ref 0–0.9)
LYMPHOCYTES # BLD AUTO: 0.22 K/UL — LOW (ref 1–3.3)
LYMPHOCYTES # BLD AUTO: 1.1 % — LOW (ref 13–44)
MAGNESIUM SERPL-MCNC: 2.2 MG/DL — SIGNIFICANT CHANGE UP (ref 1.6–2.6)
MCHC RBC-ENTMCNC: 28.8 PG — SIGNIFICANT CHANGE UP (ref 27–34)
MCHC RBC-ENTMCNC: 31.2 GM/DL — LOW (ref 32–36)
MCV RBC AUTO: 92.4 FL — SIGNIFICANT CHANGE UP (ref 80–100)
MONOCYTES # BLD AUTO: 0.77 K/UL — SIGNIFICANT CHANGE UP (ref 0–0.9)
MONOCYTES NFR BLD AUTO: 3.9 % — SIGNIFICANT CHANGE UP (ref 2–14)
NEUTROPHILS # BLD AUTO: 18.4 K/UL — HIGH (ref 1.8–7.4)
NEUTROPHILS NFR BLD AUTO: 93.7 % — HIGH (ref 43–77)
NRBC # BLD: 0 /100 WBCS — SIGNIFICANT CHANGE UP (ref 0–0)
NRBC # FLD: 0 K/UL — SIGNIFICANT CHANGE UP (ref 0–0)
PHOSPHATE SERPL-MCNC: 3.7 MG/DL — SIGNIFICANT CHANGE UP (ref 2.5–4.5)
PLATELET # BLD AUTO: 356 K/UL — SIGNIFICANT CHANGE UP (ref 150–400)
POTASSIUM SERPL-MCNC: 5.1 MMOL/L — SIGNIFICANT CHANGE UP (ref 3.5–5.3)
POTASSIUM SERPL-SCNC: 5.1 MMOL/L — SIGNIFICANT CHANGE UP (ref 3.5–5.3)
PROT SERPL-MCNC: 5.4 G/DL — LOW (ref 6–8.3)
RBC # BLD: 3.82 M/UL — SIGNIFICANT CHANGE UP (ref 3.8–5.2)
RBC # FLD: 14.9 % — HIGH (ref 10.3–14.5)
SODIUM SERPL-SCNC: 137 MMOL/L — SIGNIFICANT CHANGE UP (ref 135–145)
WBC # BLD: 19.64 K/UL — HIGH (ref 3.8–10.5)
WBC # FLD AUTO: 19.64 K/UL — HIGH (ref 3.8–10.5)

## 2024-04-13 PROCEDURE — 99232 SBSQ HOSP IP/OBS MODERATE 35: CPT

## 2024-04-13 RX ORDER — DIAZEPAM 5 MG
5 TABLET ORAL DAILY
Refills: 0 | Status: DISCONTINUED | OUTPATIENT
Start: 2024-04-13 | End: 2024-04-18

## 2024-04-13 RX ORDER — ONDANSETRON 8 MG/1
4 TABLET, FILM COATED ORAL ONCE
Refills: 0 | Status: COMPLETED | OUTPATIENT
Start: 2024-04-13 | End: 2024-04-13

## 2024-04-13 RX ORDER — PANTOPRAZOLE SODIUM 20 MG/1
40 TABLET, DELAYED RELEASE ORAL
Refills: 0 | Status: DISCONTINUED | OUTPATIENT
Start: 2024-04-13 | End: 2024-04-17

## 2024-04-13 RX ADMIN — NALOXEGOL OXALATE 25 MILLIGRAM(S): 12.5 TABLET, FILM COATED ORAL at 13:35

## 2024-04-13 RX ADMIN — Medication 15 MILLILITER(S): at 06:31

## 2024-04-13 RX ADMIN — OXYCODONE HYDROCHLORIDE 30 MILLIGRAM(S): 5 TABLET ORAL at 06:27

## 2024-04-13 RX ADMIN — Medication 1 PATCH: at 12:00

## 2024-04-13 RX ADMIN — Medication 1 PATCH: at 13:36

## 2024-04-13 RX ADMIN — Medication 15 MILLILITER(S): at 13:36

## 2024-04-13 RX ADMIN — Medication 25 MILLIGRAM(S): at 06:26

## 2024-04-13 RX ADMIN — AMIODARONE HYDROCHLORIDE 200 MILLIGRAM(S): 400 TABLET ORAL at 06:27

## 2024-04-13 RX ADMIN — Medication 1 TABLET(S): at 13:36

## 2024-04-13 RX ADMIN — PANTOPRAZOLE SODIUM 40 MILLIGRAM(S): 20 TABLET, DELAYED RELEASE ORAL at 22:11

## 2024-04-13 RX ADMIN — Medication 1 PATCH: at 07:14

## 2024-04-13 RX ADMIN — OXYCODONE HYDROCHLORIDE 30 MILLIGRAM(S): 5 TABLET ORAL at 13:36

## 2024-04-13 RX ADMIN — Medication 4 MILLIGRAM(S): at 06:28

## 2024-04-13 RX ADMIN — Medication 5 MILLIGRAM(S): at 04:49

## 2024-04-13 RX ADMIN — Medication 4 MILLIGRAM(S): at 13:36

## 2024-04-13 RX ADMIN — Medication 15 MILLILITER(S): at 19:07

## 2024-04-13 RX ADMIN — ENOXAPARIN SODIUM 50 MILLIGRAM(S): 100 INJECTION SUBCUTANEOUS at 19:08

## 2024-04-13 RX ADMIN — CHLORHEXIDINE GLUCONATE 1 APPLICATION(S): 213 SOLUTION TOPICAL at 13:37

## 2024-04-13 RX ADMIN — ENOXAPARIN SODIUM 50 MILLIGRAM(S): 100 INJECTION SUBCUTANEOUS at 06:29

## 2024-04-13 RX ADMIN — Medication 5 MILLIGRAM(S): at 22:11

## 2024-04-13 RX ADMIN — Medication 30 MILLILITER(S): at 14:48

## 2024-04-13 RX ADMIN — Medication 25 MILLIGRAM(S): at 19:08

## 2024-04-13 RX ADMIN — Medication 4 MILLIGRAM(S): at 22:10

## 2024-04-13 NOTE — PROGRESS NOTE ADULT - ASSESSMENT
57 yo woman, former smoker, with h/o R eye glaucoma, Fibromyalgia, Anxiety, GERD, and RCC s/p R total nephrectomy/hysterectomy; she was initially admitted to [facility] on 3/11 w/ R breast swelling c/f mastitis- imaging brooks noted supraclavicular/mediastinal mass lesion which encased the SVC and multiple other veins resulting in obliteration of the SVC and thrombosis of the R IJ, and a pancreatic neck cystic lesion.  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  Her disease/hospital course has also been c/b pericardial effusion s/p pericardial window w/ neg cytology, R pleural effusion, also negative cytology) s/p Pleurx, Afib w/ RVR and acute hypoxic resp failure 2/2 SVC syndrome- not amenable to IR-guided stenting; pt was ultimately started on Dex and transferred to [facility] on 4/4 for urgent palliative RT.    ACTIVE PROBLEMS  Metastatic cancer  SVC syndrome  Extensive b/l UE DVTs  Hypercoag state  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)  pAfib, with RVR on this admission  Pericardial effusion with tamp physiology s/p pericardial window  R pleural effusion s/p pleurex  Acute hypoxic resp failure  Cancer-related pain, anxiety    - Metastatic cancer  Based on 3/14 SC LN path, ddx includes primary UGI vs pancreaticobiliary primary (breast edema is likely 2/2 SVC syndrome)  CA 19-9/CEA not markedly elevated; AFP, Ca-125, Ca 27.29, Ca 15-3 pending  Pt will need additional diagnostic brooks- planning to pursue EGD/EUS and MRCP when pt is less symptomatic when laying down flat  Options for systemic cancer treatment are TBD  Pt's prognosis is guarded  Given ongoing RT and AC, w another UE duplex u/s to assess clot burden and availability for IV access so above procedures can get done, unfortunately -multiple areas of blood clots noted in the upper extremity making any line placement impossible. Vascular surgery consulted.       - SVC syndrome  Appreciate Rad Onc eval/recs  Continuing Dex PO 4mg q8 with pjp/ppi ppx  Continuing palliative RT    - Extensive b/l UE DVTs  - Hypercoag state  Continuing therapeutic lovenox  Of note: pt has poor UE IV access and currently required peripheral IV in her LE extremities for admin of medication; can consider [facility]V central line for urgent/emergent IV needs  Vascular consulted    - 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)  Pt with h/o R eyeglaucoma, but miosis can also be associated with Pancoast syndrome i/s/o extensive R upper lung disease  Pt denies visual deficits  Pt should have a Brain MRI with/without contrast when she is able to lay down flat  Will continue to monitor closely    - pAfib  Pt currently SR, HR controlled  Likely precipitated by malignancy, pericardial effusion, SVC syndrome  Continuing Metoprolol 25mg q12 with holding parameters  Continuing Amio 200mg daily (monitoring for toxicities)      - Pericardial effusion with tamp physiology s/p pericardial window  - R pleural effusion s/p pleurex  Likely inflammatory; both pleural and pericardial fluid cytology is negative for malignant cells  ECHO unremarkable      - Acute hypoxic respiratory failure  2/2 all of the above  Continuing supportive resp care:   * duonebs q6   * Tessalon 100mg q8   * steroids as above  Weaning supplemental O2 via NC as tolerate    - Cancer related pain, anxiety  Continuing Oxy ER 30mg q12 q8  Continuing Oxy IR 10mg q4/prn  Continuing Dilaudid IV prn for sev breakthrough pain  Continuing bowel regimen to prevent OIC (including Movantic)  Continuing Diazepam 5mg BID/prn

## 2024-04-13 NOTE — PROGRESS NOTE ADULT - SUBJECTIVE AND OBJECTIVE BOX
[facility] Division of Hospital Medicine  Hanna Marks MD  Pager [phone]    Patient is a 58y old  Female who presents with a chief complaint of SVC syndrome, DVT, mediastinal mass       SUBJECTIVE / OVERNIGHT EVENTS: upon my arrival , pt on phone with the kitchen asking for her protein shakes; pt upset they way her meds are being given; clarified with pt about her valium as this was her concern; she takes it nightly but has it available to take BID should she need it; d/w pt will order valium for qHS as and additional 1 time daily dose PRN; she was agreeable to this; tearful at times, asks if she'll ever walk again; doesn't understand how her vitals are stable if she is so sick      MEDICATIONS  (STANDING):  aMIOdarone    Tablet 200 milliGRAM(s) Oral daily  Biotene Dry Mouth Oral Rinse 15 milliLiter(s) Swish and Spit every 6 hours  chlorhexidine 2% Cloths 1 Application(s) Topical daily  dexAMETHasone     Tablet 4 milliGRAM(s) Oral every 8 hours  enoxaparin Injectable 50 milliGRAM(s) SubCutaneous every 12 hours  influenza   Vaccine 0.5 milliLiter(s) IntraMuscular once  metoprolol tartrate 25 milliGRAM(s) Oral every 12 hours  multivitamin 1 Tablet(s) Oral daily  naloxegol 25 milliGRAM(s) Oral daily  nicotine -  14 mG/24Hr(s) Patch 1 Patch Transdermal every 24 hours  oxyCODONE  ER Tablet 30 milliGRAM(s) Oral <User Schedule>  pantoprazole   Suspension 40 milliGRAM(s) Oral daily  polyethylene glycol 3350 17 Gram(s) Oral every 12 hours  trimethoprim  160 mG/sulfamethoxazole 800 mG 1 Tablet(s) Oral <User Schedule>    MEDICATIONS  (PRN):  albuterol    90 MICROgram(s) HFA Inhaler 2 Puff(s) Inhalation every 6 hours PRN Shortness of Breath and/or Wheezing  albuterol/ipratropium for Nebulization 3 milliLiter(s) Nebulizer every 6 hours PRN Shortness of Breath and/or Wheezing  aluminum hydroxide/magnesium hydroxide/simethicone Suspension 30 milliLiter(s) Oral every 6 hours PRN Dyspepsia  Biotene Dry Mouth Oral Rinse 15 milliLiter(s) Swish and Spit two times a day PRN dry mouth  diazepam    Tablet 5 milliGRAM(s) Oral two times a day PRN Anxiety  HYDROmorphone  Injectable 1 milliGRAM(s) IV Push every 3 hours PRN Severe Pain (7 - 10)  oxyCODONE    IR 10 milliGRAM(s) Oral every 4 hours PRN Moderate Pain (4 - 6)  sodium chloride 0.9% lock flush 10 milliLiter(s) IV Push every 1 hour PRN Pre/post blood products, medications, blood draw, and to maintain line patency        PHYSICAL EXAM:  Vital Signs Last 24 Hrs  T(F): 98.2 (13 Apr 2024 09:00), Max: 98.2 (13 Apr 2024 09:00)  HR: 67 (13 Apr 2024 09:00) (67 - 92)  BP: 138/65 (13 Apr 2024 09:00) (134/81 - 153/71)  RR: 18 (13 Apr 2024 09:00) (17 - 19)  SpO2: 99% (13 Apr 2024 09:00) (98% - 99%)    Parameters below as of 13 Apr 2024 09:00  Patient On (Oxygen Delivery Method): nasal cannula  O2 Flow (L/min): 2      CONSTITUTIONAL: NAD, appears comfortable  EYES: PERRLA; conjunctiva and sclera clear  ENMT: Moist oral mucosa; normal dentition  RESPIRATORY: Normal respiratory effort; grossly b/l AE  CARDIOVASCULAR: Regular rate and rhythm; No lower extremity edema  ABDOMEN: Nontender to palpation, normoactive bowel sounds  MUSCULOSKELETAL: no clubbing or cyanosis of digits; no joint swelling or tenderness to palpation  PSYCH: A+O to person, place, and time; affect appropriate  NEUROLOGY: CN 2-12 are intact and symmetric; no gross sensory deficits   SKIN: No rashes; no palpable lesions    LABS:             P

## 2024-04-13 NOTE — PROGRESS NOTE ADULT - PROBLEM SELECTOR PLAN 6
likely due to malignancy  b/l UE with DVTs, as such, poor venous access  vasc consult apprec, no interventions rec  if access needed, will need femoral venous access

## 2024-04-13 NOTE — PROGRESS NOTE ADULT - PROBLEM SELECTOR PLAN 1
-plan for RT thru 4/18  cont decadron  cont oxycodone for pain; ER, and IR for breakthru  cont miralax daily  -tobacco use hx, duonebs Q6 prn sob or wheezing

## 2024-04-14 LAB
ALBUMIN SERPL ELPH-MCNC: 3 G/DL — LOW (ref 3.3–5)
ALP SERPL-CCNC: 78 U/L — SIGNIFICANT CHANGE UP (ref 40–120)
ALT FLD-CCNC: 34 U/L — HIGH (ref 4–33)
ANION GAP SERPL CALC-SCNC: 14 MMOL/L — SIGNIFICANT CHANGE UP (ref 7–14)
AST SERPL-CCNC: 24 U/L — SIGNIFICANT CHANGE UP (ref 4–32)
BASOPHILS # BLD AUTO: 0.03 K/UL — SIGNIFICANT CHANGE UP (ref 0–0.2)
BASOPHILS NFR BLD AUTO: 0.1 % — SIGNIFICANT CHANGE UP (ref 0–2)
BILIRUB SERPL-MCNC: 0.7 MG/DL — SIGNIFICANT CHANGE UP (ref 0.2–1.2)
BUN SERPL-MCNC: 45 MG/DL — HIGH (ref 7–23)
CALCIUM SERPL-MCNC: 8.4 MG/DL — SIGNIFICANT CHANGE UP (ref 8.4–10.5)
CHLORIDE SERPL-SCNC: 100 MMOL/L — SIGNIFICANT CHANGE UP (ref 98–107)
CO2 SERPL-SCNC: 19 MMOL/L — LOW (ref 22–31)
CREAT SERPL-MCNC: 0.82 MG/DL — SIGNIFICANT CHANGE UP (ref 0.5–1.3)
EGFR: 82 ML/MIN/1.73M2 — SIGNIFICANT CHANGE UP
EOSINOPHIL # BLD AUTO: 0.01 K/UL — SIGNIFICANT CHANGE UP (ref 0–0.5)
EOSINOPHIL NFR BLD AUTO: 0 % — SIGNIFICANT CHANGE UP (ref 0–6)
GLUCOSE SERPL-MCNC: 112 MG/DL — HIGH (ref 70–99)
HCT VFR BLD CALC: 35.6 % — SIGNIFICANT CHANGE UP (ref 34.5–45)
HGB BLD-MCNC: 11 G/DL — LOW (ref 11.5–15.5)
IANC: 25.54 K/UL — HIGH (ref 1.8–7.4)
IMM GRANULOCYTES NFR BLD AUTO: 1.2 % — HIGH (ref 0–0.9)
LYMPHOCYTES # BLD AUTO: 0.23 K/UL — LOW (ref 1–3.3)
LYMPHOCYTES # BLD AUTO: 0.8 % — LOW (ref 13–44)
MAGNESIUM SERPL-MCNC: 2.3 MG/DL — SIGNIFICANT CHANGE UP (ref 1.6–2.6)
MCHC RBC-ENTMCNC: 29.6 PG — SIGNIFICANT CHANGE UP (ref 27–34)
MCHC RBC-ENTMCNC: 30.9 GM/DL — LOW (ref 32–36)
MCV RBC AUTO: 95.7 FL — SIGNIFICANT CHANGE UP (ref 80–100)
MONOCYTES # BLD AUTO: 1.28 K/UL — HIGH (ref 0–0.9)
MONOCYTES NFR BLD AUTO: 4.7 % — SIGNIFICANT CHANGE UP (ref 2–14)
NEUTROPHILS # BLD AUTO: 25.54 K/UL — HIGH (ref 1.8–7.4)
NEUTROPHILS NFR BLD AUTO: 93.2 % — HIGH (ref 43–77)
NRBC # BLD: 0 /100 WBCS — SIGNIFICANT CHANGE UP (ref 0–0)
NRBC # FLD: 0 K/UL — SIGNIFICANT CHANGE UP (ref 0–0)
PHOSPHATE SERPL-MCNC: 3.3 MG/DL — SIGNIFICANT CHANGE UP (ref 2.5–4.5)
PLATELET # BLD AUTO: 364 K/UL — SIGNIFICANT CHANGE UP (ref 150–400)
POTASSIUM SERPL-MCNC: 5.6 MMOL/L — HIGH (ref 3.5–5.3)
POTASSIUM SERPL-SCNC: 5.6 MMOL/L — HIGH (ref 3.5–5.3)
PROT SERPL-MCNC: 5.2 G/DL — LOW (ref 6–8.3)
RBC # BLD: 3.72 M/UL — LOW (ref 3.8–5.2)
RBC # FLD: 15.1 % — HIGH (ref 10.3–14.5)
SODIUM SERPL-SCNC: 133 MMOL/L — LOW (ref 135–145)
WBC # BLD: 27.42 K/UL — HIGH (ref 3.8–10.5)
WBC # FLD AUTO: 27.42 K/UL — HIGH (ref 3.8–10.5)

## 2024-04-14 PROCEDURE — 99232 SBSQ HOSP IP/OBS MODERATE 35: CPT

## 2024-04-14 RX ORDER — ONDANSETRON 8 MG/1
4 TABLET, FILM COATED ORAL EVERY 8 HOURS
Refills: 0 | Status: DISCONTINUED | OUTPATIENT
Start: 2024-04-14 | End: 2024-05-16

## 2024-04-14 RX ORDER — ONDANSETRON 8 MG/1
4 TABLET, FILM COATED ORAL EVERY 8 HOURS
Refills: 0 | Status: DISCONTINUED | OUTPATIENT
Start: 2024-04-14 | End: 2024-04-14

## 2024-04-14 RX ADMIN — OXYCODONE HYDROCHLORIDE 30 MILLIGRAM(S): 5 TABLET ORAL at 13:37

## 2024-04-14 RX ADMIN — NALOXEGOL OXALATE 25 MILLIGRAM(S): 12.5 TABLET, FILM COATED ORAL at 12:10

## 2024-04-14 RX ADMIN — Medication 15 MILLILITER(S): at 12:08

## 2024-04-14 RX ADMIN — Medication 5 MILLIGRAM(S): at 09:51

## 2024-04-14 RX ADMIN — ONDANSETRON 4 MILLIGRAM(S): 8 TABLET, FILM COATED ORAL at 18:07

## 2024-04-14 RX ADMIN — Medication 4 MILLIGRAM(S): at 21:03

## 2024-04-14 RX ADMIN — Medication 1 PATCH: at 12:12

## 2024-04-14 RX ADMIN — ENOXAPARIN SODIUM 50 MILLIGRAM(S): 100 INJECTION SUBCUTANEOUS at 05:23

## 2024-04-14 RX ADMIN — Medication 1 PATCH: at 13:15

## 2024-04-14 RX ADMIN — OXYCODONE HYDROCHLORIDE 30 MILLIGRAM(S): 5 TABLET ORAL at 21:03

## 2024-04-14 RX ADMIN — Medication 15 MILLILITER(S): at 18:10

## 2024-04-14 RX ADMIN — ONDANSETRON 4 MILLIGRAM(S): 8 TABLET, FILM COATED ORAL at 09:13

## 2024-04-14 RX ADMIN — Medication 1 TABLET(S): at 12:13

## 2024-04-14 RX ADMIN — AMIODARONE HYDROCHLORIDE 200 MILLIGRAM(S): 400 TABLET ORAL at 06:05

## 2024-04-14 RX ADMIN — Medication 4 MILLIGRAM(S): at 14:32

## 2024-04-14 RX ADMIN — ONDANSETRON 4 MILLIGRAM(S): 8 TABLET, FILM COATED ORAL at 01:51

## 2024-04-14 RX ADMIN — Medication 15 MILLILITER(S): at 06:05

## 2024-04-14 RX ADMIN — Medication 25 MILLIGRAM(S): at 18:10

## 2024-04-14 RX ADMIN — Medication 4 MILLIGRAM(S): at 06:05

## 2024-04-14 RX ADMIN — Medication 15 MILLILITER(S): at 12:12

## 2024-04-14 RX ADMIN — ENOXAPARIN SODIUM 50 MILLIGRAM(S): 100 INJECTION SUBCUTANEOUS at 18:08

## 2024-04-14 RX ADMIN — CHLORHEXIDINE GLUCONATE 1 APPLICATION(S): 213 SOLUTION TOPICAL at 12:49

## 2024-04-14 RX ADMIN — OXYCODONE HYDROCHLORIDE 30 MILLIGRAM(S): 5 TABLET ORAL at 01:51

## 2024-04-14 RX ADMIN — Medication 25 MILLIGRAM(S): at 06:06

## 2024-04-14 RX ADMIN — Medication 15 MILLILITER(S): at 01:52

## 2024-04-14 RX ADMIN — ONDANSETRON 4 MILLIGRAM(S): 8 TABLET, FILM COATED ORAL at 10:02

## 2024-04-14 RX ADMIN — Medication 1 PATCH: at 19:27

## 2024-04-14 RX ADMIN — OXYCODONE HYDROCHLORIDE 30 MILLIGRAM(S): 5 TABLET ORAL at 06:20

## 2024-04-14 RX ADMIN — PANTOPRAZOLE SODIUM 40 MILLIGRAM(S): 20 TABLET, DELAYED RELEASE ORAL at 12:11

## 2024-04-14 NOTE — PROGRESS NOTE ADULT - PROBLEM SELECTOR PROBLEM 1
SVC syndrome Cimetidine Counseling:  I discussed with the patient the risks of Cimetidine including but not limited to gynecomastia, headache, diarrhea, nausea, drowsiness, arrhythmias, pancreatitis, skin rashes, psychosis, bone marrow suppression and kidney toxicity.

## 2024-04-14 NOTE — PROGRESS NOTE ADULT - SUBJECTIVE AND OBJECTIVE BOX
[facility] Division of Hospital Medicine  Hanna Marks MD  Pager [phone]    Patient is a 58y old  Female who presents with a chief complaint of SVC syndrome, DVT, mediastinal mass       SUBJECTIVE / OVERNIGHT EVENTS: pt moved to 4N overnight; pt upset at the way she was treated overnight by the staff and now her stomach is in knots; offered pt support and informed pt I would discuss her concerns with the day nurse to make sure today goes smoothly      MEDICATIONS  (STANDING):  aMIOdarone    Tablet 200 milliGRAM(s) Oral daily  Biotene Dry Mouth Oral Rinse 15 milliLiter(s) Swish and Spit every 6 hours  chlorhexidine 2% Cloths 1 Application(s) Topical daily  dexAMETHasone     Tablet 4 milliGRAM(s) Oral every 8 hours  diazepam    Tablet 5 milliGRAM(s) Oral daily  enoxaparin Injectable 50 milliGRAM(s) SubCutaneous every 12 hours  influenza   Vaccine 0.5 milliLiter(s) IntraMuscular once  metoprolol tartrate 25 milliGRAM(s) Oral every 12 hours  multivitamin 1 Tablet(s) Oral daily  naloxegol 25 milliGRAM(s) Oral daily  nicotine -  14 mG/24Hr(s) Patch 1 Patch Transdermal every 24 hours  oxyCODONE  ER Tablet 30 milliGRAM(s) Oral <User Schedule>  pantoprazole    Tablet 40 milliGRAM(s) Oral before breakfast  polyethylene glycol 3350 17 Gram(s) Oral every 12 hours  trimethoprim  160 mG/sulfamethoxazole 800 mG 1 Tablet(s) Oral <User Schedule>    MEDICATIONS  (PRN):  albuterol    90 MICROgram(s) HFA Inhaler 2 Puff(s) Inhalation every 6 hours PRN Shortness of Breath and/or Wheezing  albuterol/ipratropium for Nebulization 3 milliLiter(s) Nebulizer every 6 hours PRN Shortness of Breath and/or Wheezing  aluminum hydroxide/magnesium hydroxide/simethicone Suspension 30 milliLiter(s) Oral every 6 hours PRN Dyspepsia  Biotene Dry Mouth Oral Rinse 15 milliLiter(s) Swish and Spit two times a day PRN dry mouth  diazepam    Tablet 5 milliGRAM(s) Oral daily PRN Anxiety  HYDROmorphone  Injectable 1 milliGRAM(s) IV Push every 3 hours PRN Severe Pain (7 - 10)  ondansetron    Tablet 4 milliGRAM(s) Oral every 8 hours PRN Nausea and/or Vomiting  oxyCODONE    IR 10 milliGRAM(s) Oral every 4 hours PRN Moderate Pain (4 - 6)  sodium chloride 0.9% lock flush 10 milliLiter(s) IV Push every 1 hour PRN Pre/post blood products, medications, blood draw, and to maintain line patency    PHYSICAL EXAM:  Vital Signs Last 24 Hrs  T(F): 97.8 (14 Apr 2024 04:36), Max: 98.1 (13 Apr 2024 19:05)  HR: 84 (14 Apr 2024 04:36) (70 - 84)  BP: 141/86 (14 Apr 2024 04:36) (132/70 - 145/81)  RR: 19 (14 Apr 2024 04:36) (18 - 19)  SpO2: 91% (14 Apr 2024 04:36) (91% - 98%)    Parameters below as of 14 Apr 2024 08:00  Patient On (Oxygen Delivery Method): nasal cannula        CONSTITUTIONAL: NAD, appears comfortable  EYES: PERRLA; conjunctiva and sclera clear  ENMT: Moist oral mucosa; normal dentition  RESPIRATORY: Normal respiratory effort; grossly b/l AE  CARDIOVASCULAR: Regular rate and rhythm; No lower extremity edema  ABDOMEN: Nontender to palpation, normoactive bowel sounds  MUSCULOSKELETAL:  no clubbing or cyanosis of digits; no joint swelling or tenderness to palpation  PSYCH: A+O to person, place, and time; affect appropriate  NEUROLOGY: CN 2-12 are intact and symmetric; no gross sensory deficits   SKIN: No rashes; no palpable lesions    LABS:                        11.0   19.64 )-----------( 356      ( 13 Apr 2024 13:43 )             35.3     04-13    137  |  100  |  37<H>  ----------------------------<  135<H>  5.1   |  25  |  0.72    Ca    9.0      13 Apr 2024 14:23  Phos  3.7     04-13  Mg     2.20     04-13    TPro  5.4<L>  /  Alb  3.2<L>  /  TBili  0.3  /  DBili  x   /  AST  11  /  ALT  33  /  AlkPhos  76  04-13

## 2024-04-14 NOTE — PROGRESS NOTE ADULT - ASSESSMENT
57 yo woman, former smoker, with h/o R eye glaucoma, Fibromyalgia, Anxiety, GERD, and RCC s/p R total nephrectomy/hysterectomy; she was initially admitted to [facility] on 3/11 w/ R breast swelling c/f mastitis- imaging brooks noted supraclavicular/mediastinal mass lesion which encased the SVC and multiple other veins resulting in obliteration of the SVC and thrombosis of the R IJ, and a pancreatic neck cystic lesion.  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  Her disease/hospital course has also been c/b pericardial effusion s/p pericardial window w/ neg cytology, R pleural effusion, also negative cytology) s/p Pleurx, Afib w/ RVR and acute hypoxic resp failure 2/2 SVC syndrome- not amenable to IR-guided stenting; pt was ultimately started on Dex and transferred to [facility] on 4/4 for urgent palliative RT.    ACTIVE PROBLEMS  Metastatic cancer  SVC syndrome  Extensive b/l UE DVTs  Hypercoag state  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)  pAfib, with RVR on this admission  Pericardial effusion with tamp physiology s/p pericardial window  R pleural effusion s/p pleurex  Acute hypoxic resp failure  Cancer-related pain, anxiety    - Metastatic cancer  Based on 3/14 SC LN path, ddx includes primary UGI vs pancreaticobiliary primary (breast edema is likely 2/2 SVC syndrome)  CA 19-9/CEA not markedly elevated; AFP, Ca-125, Ca 27.29, Ca 15-3 pending  Pt will need additional diagnostic brooks- planning to pursue EGD/EUS and MRCP when pt is less symptomatic when laying down flat  Options for systemic cancer treatment are TBD  Pt's prognosis is guarded  Given ongoing RT and AC, w another UE duplex u/s to assess clot burden and availability for IV access so above procedures can get done, unfortunately -multiple areas of blood clots noted in the upper extremity making any line placement impossible. Vascular surgery consulted.       - SVC syndrome  Appreciate Rad Onc eval/recs  Continuing Dex PO 4mg q8 with pjp/ppi ppx  Continuing palliative RT    - Extensive b/l UE DVTs  - Hypercoag state  Continuing therapeutic lovenox  Of note: pt has poor UE IV access and currently required peripheral IV in her LE extremities for admin of medication; can consider [facility] central line for urgent/emergent IV needs  Vascular consulted    - 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)  Pt with h/o R eyeglaucoma, but miosis can also be associated with Pancoast syndrome i/s/o extensive R upper lung disease  Pt denies visual deficits  Pt should have a Brain MRI with/without contrast when she is able to lay down flat  Will continue to monitor closely    - pAfib  Pt currently SR, HR controlled  Likely precipitated by malignancy, pericardial effusion, SVC syndrome  Continuing Metoprolol 25mg q12 with holding parameters  Continuing Amio 200mg daily (monitoring for toxicities)      - Pericardial effusion with tamp physiology s/p pericardial window  - R pleural effusion s/p pleurex  Likely inflammatory; both pleural and pericardial fluid cytology is negative for malignant cells  ECHO unremarkable      - Acute hypoxic respiratory failure  2/2 all of the above  Continuing supportive resp care:   * duonebs q6   * Tessalon 100mg q8   * steroids as above  Weaning supplemental O2 via NC as tolerate    - Cancer related pain, anxiety  Continuing Oxy ER 30mg q12 q8  Continuing Oxy IR 10mg q4/prn  Continuing Dilaudid IV prn for sev breakthrough pain  Continuing bowel regimen to prevent OIC (including Movantic)  Continuing Diazepam 5mg BID/prn

## 2024-04-14 NOTE — CHART NOTE - NSCHARTNOTEFT_GEN_A_CORE
Thoracic team called regarding persistent leaking of pleural fluid around pleurX catheter  Pt seen at bedside, extra-pleurX leakage noted  A 2.0 prolene suture was placed in the space next to the tube and was then attached to the pleurX to anchor it  The initial suture holding the pleurX in place was removed    Please call back with any issues   Thoracic [phone]

## 2024-04-14 NOTE — PROGRESS NOTE ADULT - PROBLEM SELECTOR PLAN 3
pleurX in place  drain TIW  RN informed me that pleurX seems to be leaking as dressing is getting saturated  CTS f/u for pleurX leaking

## 2024-04-15 LAB
ALBUMIN SERPL ELPH-MCNC: 2.9 G/DL — LOW (ref 3.3–5)
ALP SERPL-CCNC: 72 U/L — SIGNIFICANT CHANGE UP (ref 40–120)
ALT FLD-CCNC: 27 U/L — SIGNIFICANT CHANGE UP (ref 4–33)
ANION GAP SERPL CALC-SCNC: 11 MMOL/L — SIGNIFICANT CHANGE UP (ref 7–14)
AST SERPL-CCNC: 12 U/L — SIGNIFICANT CHANGE UP (ref 4–32)
BASOPHILS # BLD AUTO: 0.02 K/UL — SIGNIFICANT CHANGE UP (ref 0–0.2)
BASOPHILS NFR BLD AUTO: 0.1 % — SIGNIFICANT CHANGE UP (ref 0–2)
BILIRUB SERPL-MCNC: 0.5 MG/DL — SIGNIFICANT CHANGE UP (ref 0.2–1.2)
BUN SERPL-MCNC: 42 MG/DL — HIGH (ref 7–23)
CALCIUM SERPL-MCNC: 8.2 MG/DL — LOW (ref 8.4–10.5)
CHLORIDE SERPL-SCNC: 101 MMOL/L — SIGNIFICANT CHANGE UP (ref 98–107)
CO2 SERPL-SCNC: 22 MMOL/L — SIGNIFICANT CHANGE UP (ref 22–31)
CREAT SERPL-MCNC: 0.83 MG/DL — SIGNIFICANT CHANGE UP (ref 0.5–1.3)
EGFR: 81 ML/MIN/1.73M2 — SIGNIFICANT CHANGE UP
EOSINOPHIL # BLD AUTO: 0 K/UL — SIGNIFICANT CHANGE UP (ref 0–0.5)
EOSINOPHIL NFR BLD AUTO: 0 % — SIGNIFICANT CHANGE UP (ref 0–6)
GLUCOSE SERPL-MCNC: 124 MG/DL — HIGH (ref 70–99)
HCT VFR BLD CALC: 26.5 % — LOW (ref 34.5–45)
HGB BLD-MCNC: 8.8 G/DL — LOW (ref 11.5–15.5)
IANC: 14.15 K/UL — HIGH (ref 1.8–7.4)
IMM GRANULOCYTES NFR BLD AUTO: 1.1 % — HIGH (ref 0–0.9)
LYMPHOCYTES # BLD AUTO: 0.12 K/UL — LOW (ref 1–3.3)
LYMPHOCYTES # BLD AUTO: 0.8 % — LOW (ref 13–44)
MAGNESIUM SERPL-MCNC: 1.9 MG/DL — SIGNIFICANT CHANGE UP (ref 1.6–2.6)
MCHC RBC-ENTMCNC: 30.2 PG — SIGNIFICANT CHANGE UP (ref 27–34)
MCHC RBC-ENTMCNC: 33.2 GM/DL — SIGNIFICANT CHANGE UP (ref 32–36)
MCV RBC AUTO: 91.1 FL — SIGNIFICANT CHANGE UP (ref 80–100)
MONOCYTES # BLD AUTO: 0.32 K/UL — SIGNIFICANT CHANGE UP (ref 0–0.9)
MONOCYTES NFR BLD AUTO: 2.2 % — SIGNIFICANT CHANGE UP (ref 2–14)
NEUTROPHILS # BLD AUTO: 14.15 K/UL — HIGH (ref 1.8–7.4)
NEUTROPHILS NFR BLD AUTO: 95.8 % — HIGH (ref 43–77)
NRBC # BLD: 0 /100 WBCS — SIGNIFICANT CHANGE UP (ref 0–0)
NRBC # FLD: 0 K/UL — SIGNIFICANT CHANGE UP (ref 0–0)
PHOSPHATE SERPL-MCNC: 3.6 MG/DL — SIGNIFICANT CHANGE UP (ref 2.5–4.5)
PLATELET # BLD AUTO: 263 K/UL — SIGNIFICANT CHANGE UP (ref 150–400)
POTASSIUM SERPL-MCNC: 4.6 MMOL/L — SIGNIFICANT CHANGE UP (ref 3.5–5.3)
POTASSIUM SERPL-SCNC: 4.6 MMOL/L — SIGNIFICANT CHANGE UP (ref 3.5–5.3)
PROT SERPL-MCNC: 4.9 G/DL — LOW (ref 6–8.3)
RBC # BLD: 2.91 M/UL — LOW (ref 3.8–5.2)
RBC # FLD: 14.9 % — HIGH (ref 10.3–14.5)
SODIUM SERPL-SCNC: 134 MMOL/L — LOW (ref 135–145)
WBC # BLD: 14.77 K/UL — HIGH (ref 3.8–10.5)
WBC # FLD AUTO: 14.77 K/UL — HIGH (ref 3.8–10.5)

## 2024-04-15 PROCEDURE — 99233 SBSQ HOSP IP/OBS HIGH 50: CPT

## 2024-04-15 RX ADMIN — Medication 5 MILLIGRAM(S): at 09:02

## 2024-04-15 RX ADMIN — Medication 25 MILLIGRAM(S): at 05:38

## 2024-04-15 RX ADMIN — Medication 15 MILLILITER(S): at 11:53

## 2024-04-15 RX ADMIN — Medication 15 MILLILITER(S): at 17:29

## 2024-04-15 RX ADMIN — Medication 4 MILLIGRAM(S): at 13:50

## 2024-04-15 RX ADMIN — Medication 1 PATCH: at 11:54

## 2024-04-15 RX ADMIN — CHLORHEXIDINE GLUCONATE 1 APPLICATION(S): 213 SOLUTION TOPICAL at 12:00

## 2024-04-15 RX ADMIN — OXYCODONE HYDROCHLORIDE 30 MILLIGRAM(S): 5 TABLET ORAL at 13:50

## 2024-04-15 RX ADMIN — PANTOPRAZOLE SODIUM 40 MILLIGRAM(S): 20 TABLET, DELAYED RELEASE ORAL at 05:37

## 2024-04-15 RX ADMIN — Medication 15 MILLILITER(S): at 23:45

## 2024-04-15 RX ADMIN — Medication 4 MILLIGRAM(S): at 22:05

## 2024-04-15 RX ADMIN — Medication 1 TABLET(S): at 11:58

## 2024-04-15 RX ADMIN — OXYCODONE HYDROCHLORIDE 30 MILLIGRAM(S): 5 TABLET ORAL at 05:41

## 2024-04-15 RX ADMIN — Medication 1 TABLET(S): at 09:01

## 2024-04-15 RX ADMIN — Medication 30 MILLILITER(S): at 13:50

## 2024-04-15 RX ADMIN — AMIODARONE HYDROCHLORIDE 200 MILLIGRAM(S): 400 TABLET ORAL at 05:38

## 2024-04-15 RX ADMIN — Medication 15 MILLILITER(S): at 11:58

## 2024-04-15 RX ADMIN — Medication 1 PATCH: at 08:00

## 2024-04-15 RX ADMIN — Medication 15 MILLILITER(S): at 05:49

## 2024-04-15 RX ADMIN — OXYCODONE HYDROCHLORIDE 10 MILLIGRAM(S): 5 TABLET ORAL at 11:57

## 2024-04-15 RX ADMIN — ENOXAPARIN SODIUM 50 MILLIGRAM(S): 100 INJECTION SUBCUTANEOUS at 05:46

## 2024-04-15 RX ADMIN — Medication 5 MILLIGRAM(S): at 17:25

## 2024-04-15 RX ADMIN — ENOXAPARIN SODIUM 50 MILLIGRAM(S): 100 INJECTION SUBCUTANEOUS at 17:26

## 2024-04-15 RX ADMIN — Medication 25 MILLIGRAM(S): at 17:27

## 2024-04-15 RX ADMIN — OXYCODONE HYDROCHLORIDE 10 MILLIGRAM(S): 5 TABLET ORAL at 12:57

## 2024-04-15 RX ADMIN — OXYCODONE HYDROCHLORIDE 30 MILLIGRAM(S): 5 TABLET ORAL at 22:04

## 2024-04-15 RX ADMIN — Medication 4 MILLIGRAM(S): at 05:38

## 2024-04-15 NOTE — CHART NOTE - NSCHARTNOTEFT_GEN_A_CORE
How Severe Are Your Spot(S)?: mild Please consult Radonc once patient is at [facility]   We await transfer What Type Of Note Output Would You Prefer (Optional)?: Standard Output What Is The Reason For Today's Visit?: Full Body Skin Examination What Is The Reason For Today's Visit? (Being Monitored For X): concerning skin lesions on an annual basis

## 2024-04-15 NOTE — CHART NOTE - NSCHARTNOTEFT_GEN_A_CORE
Patient is a 58y old  Female who presents with a chief complaint of SVC syndrome, DVT, mediastinal mass,  now undergoing RT to the chest, 10 fractions planned to end on 4/18/24 as of today.  she did miss one treatment thus far due to refusal.    Often upset, anxious, and c/o trouble breathing at times.  KPS 50.  nasal cannula in use.      + LN supraclavicular noted after biopsy for malignant cells.   Fine Needle Aspiration Report - Auth (Verified)    Final Diagnosis  LYMPH NODE, SUPRACLAVICULAR, RIGHT, US GUIDED CORE BIOPSY AND FNA  POSITIVE FOR MALIGNANT CELLS.  Carcinoma

## 2024-04-15 NOTE — PROGRESS NOTE ADULT - SUBJECTIVE AND OBJECTIVE BOX
SOLID TUMOR ONCOLOGY HOSPITALIST PROGRESS NOTE    S: No acute events overnight.  Pt reported that she was feeling frustrated and upset because RT 'wipes her out for the entire day', and also because she didn't take the opportunity to eat her breakfast before going down to RT today. She initial refused to go to RT this am, but agreed after being provided with reassurance that she could eat breakfast when she returned from RT.    CURRENT MEDICATIONS  MEDICATIONS  (STANDING):  aMIOdarone    Tablet 200 milliGRAM(s) Oral daily  Biotene Dry Mouth Oral Rinse 15 milliLiter(s) Swish and Spit every 6 hours  chlorhexidine 2% Cloths 1 Application(s) Topical daily  dexAMETHasone     Tablet 4 milliGRAM(s) Oral every 8 hours  diazepam    Tablet 5 milliGRAM(s) Oral daily  enoxaparin Injectable 50 milliGRAM(s) SubCutaneous every 12 hours  influenza   Vaccine 0.5 milliLiter(s) IntraMuscular once  metoprolol tartrate 25 milliGRAM(s) Oral every 12 hours  multivitamin 1 Tablet(s) Oral daily  naloxegol 25 milliGRAM(s) Oral daily  nicotine -  14 mG/24Hr(s) Patch 1 Patch Transdermal every 24 hours  oxyCODONE  ER Tablet 30 milliGRAM(s) Oral <User Schedule>  pantoprazole    Tablet 40 milliGRAM(s) Oral before breakfast  polyethylene glycol 3350 17 Gram(s) Oral every 12 hours  trimethoprim  160 mG/sulfamethoxazole 800 mG 1 Tablet(s) Oral <User Schedule>  MEDICATIONS  (PRN):  albuterol    90 MICROgram(s) HFA Inhaler 2 Puff(s) Inhalation every 6 hours PRN Shortness of Breath and/or Wheezing  albuterol/ipratropium for Nebulization 3 milliLiter(s) Nebulizer every 6 hours PRN Shortness of Breath and/or Wheezing  aluminum hydroxide/magnesium hydroxide/simethicone Suspension 30 milliLiter(s) Oral every 6 hours PRN Dyspepsia  Biotene Dry Mouth Oral Rinse 15 milliLiter(s) Swish and Spit two times a day PRN dry mouth  diazepam    Tablet 5 milliGRAM(s) Oral daily PRN Anxiety  HYDROmorphone  Injectable 1 milliGRAM(s) IV Push every 3 hours PRN Severe Pain (7 - 10)  ondansetron    Tablet 4 milliGRAM(s) Oral every 8 hours PRN Nausea and/or Vomiting  oxyCODONE    IR 10 milliGRAM(s) Oral every 4 hours PRN Moderate Pain (4 - 6)  sodium chloride 0.9% lock flush 10 milliLiter(s) IV Push every 1 hour PRN Pre/post blood products, medications, blood draw, and to maintain line patency      PHYSICAL EXAM  T(C): 36.7 (04-15-24 @ 11:51), Max: 37 (04-14-24 @ 20:40)  HR: 73 (04-15-24 @ 11:51) (65 - 102)  BP: 152/89 (04-15-24 @ 11:51) (123/71 - 152/89)  RR: 18 (04-15-24 @ 11:51) (18 - 18)  SpO2: 95% (04-15-24 @ 11:51) (95% - 98%)    04-14-24 @ 07:01  -  04-15-24 @ 07:00  --------------------------------------------------------  IN: 0 mL / OUT: 50 mL / NET: -50 mL  Non-toxic appearing woman, sitting up in bed, appears comfortable, but agitated at times (thrashing her arms and shaking her head) during conversation  R eyelid ptosis and R pupil miosis present; anicteric sclera, no oral lesions/thrush  RRR, no m/r/g  Diminshed breath sounds in all R lung zones; LL zones CTA  Abd soft/nt/nd, normoactive bowel sounds  Trace RUE non-pitting edema present; generalized muscular atrophy in all 4 extremities  CN 2-12 grossly intact; no gross focal neuro deficits      LABS                        8.8    14.77 )-----------( 263      ( 15 Apr 2024 06:53 )             26.5     04-15    134<L>  |  101  |  42<H>  ----------------------------<  124<H>  4.6   |  22  |  0.83    Ca    8.2<L>      15 Apr 2024 06:53  Phos  3.6     04-15  Mg     1.90     04-15    TPro  4.9<L>  /  Alb  2.9<L>  /  TBili  0.5  /  DBili  x   /  AST  12  /  ALT  27  /  AlkPhos  72  04-15      PATHOLOGY  3/26 pleural fluid cytology: Neg for malignant cells.    3/26 Pericardium excision: Neg for malignancy.    3/20 Pericardial fluid cytology: Neg for malignant cells.    3/14 LYMPH NODE, SUPRACLAVICULAR, RIGHT, US GUIDED CORE BIOPSY AND FNA   POSITIVE FOR MALIGNANT CELLS.   Carcinoma   Touch Prep slides display crowded groups and single lying malignant   cells with enlarged nuclei containing prominent nucleoli and vacuolated   cytoplasm. Core biopsies show neoplastic cells positive for CK7 and CDX2   immunostains, while negative for CK20, TTF1, GATA3, TRPS1 and PAX8. 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 Suggest correlation with clinical information and imaging to   assist with next step management.   Moleculars: pMMR, PD-L1 TPS 1%; Her2 pending      TUMOR MARKERS  3/13 CEA 7.1  3/24 Ca 19-9 27  4/8 Ca-125 187, Ca 27.29 16.9, Ca 15-3 18.9, AFP 5.2      MICROBIOLOGY  Abx  Bactrim 4/8-present    Cx Data  4/5 MRSA swab: Not detected  3/16 Quant plus TB: Negative      PERTINENT RADIOLOGY  4/10 VA dopplers B/L UE: Extensive acute, occlusive DVT RIJ, innominate and subclavian veins.  Acute non-occlusive DVT w/in R axillary vein.  Extensive acute, occlusive DVT noted in L subclavian. axillary, and proximal brachial veins.  Acute non-occlusive DVT LIJ.  Superficial vein thromboses are noted in the B/L cephalic veins.    4/9 Echocardiogram: Normal LV systolic function.  Normal mitral valve w/ normal leaflet excursion.  No pericardial effusion seen    4/4 CXR: Clear lungs w/ R pleurx catheter in place.    4/4 CXR AM: Small R pleural effusion w/ pleurx cath improved    OSH Imaging ([facility])  3/29 CXR: Decrease in R pleural effusion.     3/28 B/L UE Dupplers: Acute DVT involving R IJ, innominate vein, subclavian, brachial, and L IJ.  Superficial venous thrombosis involving B/L cephalic veins.  + Edema of superficial soft tissue of UE R>L.    3/27 CT neck: Bulky and conglomerate LAD invading RIJ w/ thrombus extending up to hyoid level.  Thrombus is likely combination tumor and bland.  New LIJ nonocclusive thrombus.    3/27 CT Chest: New consolidations throughout the R lung worse RLL c/f aspiration PNA.  + R pleurx cath w/ decrease in R pleural effusion.  Extensive DVT w/in thoracic and lower neck, upper SVC remains obliterated.  Large R supraclavicular mass infiltrating into the mediastinum.      3/27 CXR: Progression of R consolidation/effusion.    3/21 TTE: LV grossly normal.  Thickened pericardium.  no pericardial effusion.    3/18 TTE: Mod pericardial effusion w/ e/o hemodynamic compromise w/ diasolic collapse of RA that exceeds 1/3 of cardiac cycle >30% resp variation across MV E. wave and early diastolic inversion of RV.    3/14 CT Chest: Conglomerate soft tissue in superior mediastinum and R supraclavicular region 2/2 LAD w/ internal hypodensity c/f necrosis.  Mass encases SVC and multiple great veins resulting in obliteration of the SVC and thrombosis of the RIJ.  Multiple R sided collateral vessels and R soft tissue edema.  B/L pulm nodules.    3/14 CT neck: Extensive cellulitis/myositis along R anterior lateral neck and R upper chest wall w/ inflammatory fat induration the deep soft tissue of neck.  Large supraclavicular/mediastinal mass lesion, presumably conglomerate of LN w/ mass effect and complete occlusion of RIJ w/ associated inflammation.  Complete occlusion of R subclavian vein and nearly occlusive thrombosis in the proximal L brachiocephalic vein.    3/13 RUQ Abd US: Cholelithiasis w/o e/o cholecystitis.  Dilated CBD w/o e/o intraductal stone or other obstruction. 1.4cm pancreatic cyst w/ mild duct dilatation.    3/12 CT abd/pelv: S/p R nephrectomy. no LAD.  New 1.6cm  pancreatic neck cystic lesion w/o main pancreatic ductal dilatation.    3/10 R breast US: No e/o breast abscess

## 2024-04-15 NOTE — PROGRESS NOTE ADULT - ASSESSMENT
59 yo woman, former smoker, with h/o R eye glaucoma, Fibromyalgia, Anxiety, GERD, and RCC s/p R total nephrectomy/hysterectomy; she was initially admitted to [facility] on 3/11 w/ R breast swelling c/f mastitis- imaging brooks noted supraclavicular/mediastinal mass lesion which encased the SVC and multiple other veins resulting in obliteration of the SVC and thrombosis of the R IJ, and a pancreatic neck cystic lesion.  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  Her disease/hospital course has also been c/b pericardial effusion s/p pericardial window w/ neg cytology, R pleural effusion, also negative cytology) s/p Pleurx, Afib w/ RVR and acute hypoxic resp failure 2/2 SVC syndrome- not dennis to IR-guided stenting; pt was ultimately started on Dex and transferred to [facility] on 4/4 for urgent palliative RT.    ACTIVE PROBLEMS  Metastatic cancer  SVC syndrome  Extensive b/l UE DVTs  Hypercoag state  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)  pAfib, with RVR on this admission  Pericardial effusion with tamp physiology s/p pericardial window  R pleural effusion s/p pleurex  Acute hypoxic resp failure  Cancer-related pain, anxiety  Severe prot-ghazala malnutrition    - Metastatic cancer  Based on 3/14 SC LN path, ddx includes primary UGI vs pancreaticobiliary primary (breast edema is likely 2/2 SVC syndrome)  Ca 125 moderately elevated at 125; other tumor markers not significantly elevated  Pt will need additional diagnostic brooks- planning to pursue EGD/EUS and MRCP when pt completes RT  Options for systemic cancer treatment are TBD  Pt's prognosis is guarded    - SVC syndrome  Appreciate Rad Onc eval/recs  Continuing palliative RT x10fxs, to be completed on 4/18 (pt missed one fx thus far)  Continuing Dex PO 4mg q8 with pjp/ppi ppx, with plan to taper off over 2 weeks after the completion of RT    - Extensive b/l UE DVTs  - Hypercoag state  Continuing therapeutic lovenox  OT consult for lymphedema wrapping is pending   * Of note: pt has poor UE IV access and currently required peripheral IV in her LE extremities for admin of medication; can consider [facility] central line for urgent/emergent IV needs    - 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)  Pt with h/o R eyeglaucoma, but miosis can also be associated with Pancoast syndrome i/s/o extensive R upper lung disease  Pt denies visual deficits  Planning for MRI Brain after pt completes RT  Will continue to monitor closely    - pAfib  Pt currently SR, HR controlled  Likely precipitated by malignancy, pericardial effusion, SVC syndrome  Continuing Metoprolol 25mg q12 with holding parameters  Continuing Amio 200mg daily (monitoring for toxicities)  Continuing telemetry    - Pericardial effusion with tamp physiology s/p pericardial window  - R pleural effusion s/p pleurex  Likely inflammatory; both pleural and pericardial fluid cytology is negative for malignant cells  4/9 surveillance TTE with pEF and no WMAs  Continuing R pleurex drainage- up to 500cc q48h/prn    - Acute hypoxic respiratory failure  2/2 all of the above  Continuing supportive resp care:   * duonebs q6   * Tessalon 100mg q8   * steroids as above  Weaning supplemental O2 via NC as tolerate    - Cancer related pain, anxiety  Continuing Oxy ER 30mg q12 q8  Continuing Oxy IR 10mg q4/prn  Continuing Dilaudid IV prn for sev breakthrough pain  Continuing bowel regimen to prevent OIC (including Movantic)  Continuing Diazepam 5mg BID/prn    - Severe prot-ghazala malnutrition: appreciate RD recs; continuing regular diet with protein shake supplement BID

## 2024-04-16 LAB
ALBUMIN SERPL ELPH-MCNC: 2.9 G/DL — LOW (ref 3.3–5)
ALP SERPL-CCNC: 65 U/L — SIGNIFICANT CHANGE UP (ref 40–120)
ALT FLD-CCNC: 30 U/L — SIGNIFICANT CHANGE UP (ref 4–33)
ANION GAP SERPL CALC-SCNC: 12 MMOL/L — SIGNIFICANT CHANGE UP (ref 7–14)
AST SERPL-CCNC: 12 U/L — SIGNIFICANT CHANGE UP (ref 4–32)
BASOPHILS # BLD AUTO: 0.01 K/UL — SIGNIFICANT CHANGE UP (ref 0–0.2)
BASOPHILS NFR BLD AUTO: 0.1 % — SIGNIFICANT CHANGE UP (ref 0–2)
BILIRUB SERPL-MCNC: 0.4 MG/DL — SIGNIFICANT CHANGE UP (ref 0.2–1.2)
BUN SERPL-MCNC: 40 MG/DL — HIGH (ref 7–23)
CALCIUM SERPL-MCNC: 8.2 MG/DL — LOW (ref 8.4–10.5)
CHLORIDE SERPL-SCNC: 100 MMOL/L — SIGNIFICANT CHANGE UP (ref 98–107)
CO2 SERPL-SCNC: 21 MMOL/L — LOW (ref 22–31)
CREAT SERPL-MCNC: 0.86 MG/DL — SIGNIFICANT CHANGE UP (ref 0.5–1.3)
EGFR: 77 ML/MIN/1.73M2 — SIGNIFICANT CHANGE UP
EOSINOPHIL # BLD AUTO: 0 K/UL — SIGNIFICANT CHANGE UP (ref 0–0.5)
EOSINOPHIL NFR BLD AUTO: 0 % — SIGNIFICANT CHANGE UP (ref 0–6)
GLUCOSE SERPL-MCNC: 125 MG/DL — HIGH (ref 70–99)
HCT VFR BLD CALC: 24.6 % — LOW (ref 34.5–45)
HGB BLD-MCNC: 8.3 G/DL — LOW (ref 11.5–15.5)
IANC: 14.39 K/UL — HIGH (ref 1.8–7.4)
IMM GRANULOCYTES NFR BLD AUTO: 1.1 % — HIGH (ref 0–0.9)
LYMPHOCYTES # BLD AUTO: 0.12 K/UL — LOW (ref 1–3.3)
LYMPHOCYTES # BLD AUTO: 0.8 % — LOW (ref 13–44)
MAGNESIUM SERPL-MCNC: 1.8 MG/DL — SIGNIFICANT CHANGE UP (ref 1.6–2.6)
MCHC RBC-ENTMCNC: 30.7 PG — SIGNIFICANT CHANGE UP (ref 27–34)
MCHC RBC-ENTMCNC: 33.7 GM/DL — SIGNIFICANT CHANGE UP (ref 32–36)
MCV RBC AUTO: 91.1 FL — SIGNIFICANT CHANGE UP (ref 80–100)
MONOCYTES # BLD AUTO: 0.49 K/UL — SIGNIFICANT CHANGE UP (ref 0–0.9)
MONOCYTES NFR BLD AUTO: 3.2 % — SIGNIFICANT CHANGE UP (ref 2–14)
NEUTROPHILS # BLD AUTO: 14.39 K/UL — HIGH (ref 1.8–7.4)
NEUTROPHILS NFR BLD AUTO: 94.8 % — HIGH (ref 43–77)
NRBC # BLD: 0 /100 WBCS — SIGNIFICANT CHANGE UP (ref 0–0)
NRBC # FLD: 0 K/UL — SIGNIFICANT CHANGE UP (ref 0–0)
PHOSPHATE SERPL-MCNC: 2.9 MG/DL — SIGNIFICANT CHANGE UP (ref 2.5–4.5)
PLATELET # BLD AUTO: 213 K/UL — SIGNIFICANT CHANGE UP (ref 150–400)
POTASSIUM SERPL-MCNC: 4.8 MMOL/L — SIGNIFICANT CHANGE UP (ref 3.5–5.3)
POTASSIUM SERPL-SCNC: 4.8 MMOL/L — SIGNIFICANT CHANGE UP (ref 3.5–5.3)
PROT SERPL-MCNC: 4.8 G/DL — LOW (ref 6–8.3)
RBC # BLD: 2.7 M/UL — LOW (ref 3.8–5.2)
RBC # FLD: 15.1 % — HIGH (ref 10.3–14.5)
SODIUM SERPL-SCNC: 133 MMOL/L — LOW (ref 135–145)
WBC # BLD: 15.17 K/UL — HIGH (ref 3.8–10.5)
WBC # FLD AUTO: 15.17 K/UL — HIGH (ref 3.8–10.5)

## 2024-04-16 PROCEDURE — 90792 PSYCH DIAG EVAL W/MED SRVCS: CPT

## 2024-04-16 PROCEDURE — 99233 SBSQ HOSP IP/OBS HIGH 50: CPT

## 2024-04-16 RX ORDER — LANOLIN ALCOHOL/MO/W.PET/CERES
3 CREAM (GRAM) TOPICAL ONCE
Refills: 0 | Status: COMPLETED | OUTPATIENT
Start: 2024-04-16 | End: 2024-04-16

## 2024-04-16 RX ADMIN — Medication 1 PATCH: at 12:43

## 2024-04-16 RX ADMIN — Medication 1 TABLET(S): at 12:42

## 2024-04-16 RX ADMIN — ENOXAPARIN SODIUM 50 MILLIGRAM(S): 100 INJECTION SUBCUTANEOUS at 17:28

## 2024-04-16 RX ADMIN — Medication 4 MILLIGRAM(S): at 13:20

## 2024-04-16 RX ADMIN — CHLORHEXIDINE GLUCONATE 1 APPLICATION(S): 213 SOLUTION TOPICAL at 12:43

## 2024-04-16 RX ADMIN — Medication 30 MILLILITER(S): at 09:18

## 2024-04-16 RX ADMIN — Medication 15 MILLILITER(S): at 12:42

## 2024-04-16 RX ADMIN — Medication 15 MILLILITER(S): at 12:43

## 2024-04-16 RX ADMIN — Medication 4 MILLIGRAM(S): at 22:21

## 2024-04-16 RX ADMIN — OXYCODONE HYDROCHLORIDE 30 MILLIGRAM(S): 5 TABLET ORAL at 22:21

## 2024-04-16 RX ADMIN — Medication 5 MILLIGRAM(S): at 10:00

## 2024-04-16 RX ADMIN — Medication 15 MILLILITER(S): at 17:28

## 2024-04-16 RX ADMIN — PANTOPRAZOLE SODIUM 40 MILLIGRAM(S): 20 TABLET, DELAYED RELEASE ORAL at 05:41

## 2024-04-16 RX ADMIN — NALOXEGOL OXALATE 25 MILLIGRAM(S): 12.5 TABLET, FILM COATED ORAL at 12:42

## 2024-04-16 RX ADMIN — Medication 25 MILLIGRAM(S): at 16:55

## 2024-04-16 RX ADMIN — OXYCODONE HYDROCHLORIDE 30 MILLIGRAM(S): 5 TABLET ORAL at 13:21

## 2024-04-16 RX ADMIN — OXYCODONE HYDROCHLORIDE 30 MILLIGRAM(S): 5 TABLET ORAL at 05:39

## 2024-04-16 RX ADMIN — Medication 15 MILLILITER(S): at 05:44

## 2024-04-16 RX ADMIN — Medication 25 MILLIGRAM(S): at 05:42

## 2024-04-16 RX ADMIN — Medication 4 MILLIGRAM(S): at 05:41

## 2024-04-16 RX ADMIN — AMIODARONE HYDROCHLORIDE 200 MILLIGRAM(S): 400 TABLET ORAL at 05:42

## 2024-04-16 RX ADMIN — Medication 5 MILLIGRAM(S): at 23:41

## 2024-04-16 RX ADMIN — ENOXAPARIN SODIUM 50 MILLIGRAM(S): 100 INJECTION SUBCUTANEOUS at 05:44

## 2024-04-16 RX ADMIN — Medication 30 MILLILITER(S): at 18:17

## 2024-04-16 RX ADMIN — Medication 1 PATCH: at 02:43

## 2024-04-16 NOTE — PROGRESS NOTE ADULT - SUBJECTIVE AND OBJECTIVE BOX
SOLID TUMOR ONCOLOGY HOSPITALIST PROGRESS NOTE    S: No acute events overnight.  Pt expressed her fears of growing weaker and losing her ability to walk; RN endorsed that pt has been oob and ambulated twice during the day today.    CURRENT MEDICATIONS  MEDICATIONS  (STANDING):  aMIOdarone    Tablet 200 milliGRAM(s) Oral daily  Biotene Dry Mouth Oral Rinse 15 milliLiter(s) Swish and Spit every 6 hours  chlorhexidine 2% Cloths 1 Application(s) Topical daily  dexAMETHasone     Tablet 4 milliGRAM(s) Oral every 8 hours  diazepam    Tablet 5 milliGRAM(s) Oral daily  enoxaparin Injectable 50 milliGRAM(s) SubCutaneous every 12 hours  influenza   Vaccine 0.5 milliLiter(s) IntraMuscular once  metoprolol tartrate 25 milliGRAM(s) Oral every 12 hours  multivitamin 1 Tablet(s) Oral daily  naloxegol 25 milliGRAM(s) Oral daily  nicotine -  14 mG/24Hr(s) Patch 1 Patch Transdermal every 24 hours  oxyCODONE  ER Tablet 30 milliGRAM(s) Oral <User Schedule>  pantoprazole    Tablet 40 milliGRAM(s) Oral before breakfast  polyethylene glycol 3350 17 Gram(s) Oral every 12 hours  trimethoprim  160 mG/sulfamethoxazole 800 mG 1 Tablet(s) Oral <User Schedule>  MEDICATIONS  (PRN):  albuterol    90 MICROgram(s) HFA Inhaler 2 Puff(s) Inhalation every 6 hours PRN Shortness of Breath and/or Wheezing  albuterol/ipratropium for Nebulization 3 milliLiter(s) Nebulizer every 6 hours PRN Shortness of Breath and/or Wheezing  aluminum hydroxide/magnesium hydroxide/simethicone Suspension 30 milliLiter(s) Oral every 6 hours PRN Dyspepsia  Biotene Dry Mouth Oral Rinse 15 milliLiter(s) Swish and Spit two times a day PRN dry mouth  diazepam    Tablet 5 milliGRAM(s) Oral daily PRN Anxiety  HYDROmorphone  Injectable 1 milliGRAM(s) IV Push every 3 hours PRN Severe Pain (7 - 10)  ondansetron    Tablet 4 milliGRAM(s) Oral every 8 hours PRN Nausea and/or Vomiting  oxyCODONE    IR 10 milliGRAM(s) Oral every 4 hours PRN Moderate Pain (4 - 6)  sodium chloride 0.9% lock flush 10 milliLiter(s) IV Push every 1 hour PRN Pre/post blood products, medications, blood draw, and to maintain line patency      PHYSICAL EXAM  T(C): 36.8 (04-16-24 @ 12:00), Max: 37.1 (04-15-24 @ 20:16)  HR: 67 (04-16-24 @ 12:00) (67 - 80)  BP: 134/75 (04-16-24 @ 12:00) (120/68 - 152/73)  RR: 18 (04-16-24 @ 12:00) (18 - 18)  SpO2: 97% (04-16-24 @ 12:00) (95% - 99%)  Non-toxic appearing woman, sitting up in bed, appears comfortable, but agitated at times (thrashing her arms and shaking her head) during conversation  R eyelid ptosis and R pupil miosis present; anicteric sclera, no oral lesions/thrush  RRR, no m/r/g  Diminshed breath sounds in all R lung zones; LL zones CTA  Abd soft/nt/nd, normoactive bowel sounds  Trace RUE non-pitting edema present; generalized muscular atrophy in all 4 extremities  CN 2-12 grossly intact; no gross focal neuro deficits; pt ambulates with walker    LABS                        8.3    15.17 )-----------( 213      ( 16 Apr 2024 07:53 )             24.6     04-16    133<L>  |  100  |  40<H>  ----------------------------<  125<H>  4.8   |  21<L>  |  0.86    Ca    8.2<L>      16 Apr 2024 07:53  Phos  2.9     04-16  Mg     1.80     04-16    TPro  4.8<L>  /  Alb  2.9<L>  /  TBili  0.4  /  DBili  x   /  AST  12  /  ALT  30  /  AlkPhos  65  04-16    PATHOLOGY  3/26 pleural fluid cytology: Neg for malignant cells.    3/26 Pericardium excision: Neg for malignancy.    3/20 Pericardial fluid cytology: Neg for malignant cells.    3/14 LYMPH NODE, SUPRACLAVICULAR, RIGHT, US GUIDED CORE BIOPSY AND FNA   POSITIVE FOR MALIGNANT CELLS.   Carcinoma   Touch Prep slides display crowded groups and single lying malignant   cells with enlarged nuclei containing prominent nucleoli and vacuolated   cytoplasm. Core biopsies show neoplastic cells positive for CK7 and CDX2   immunostains, while negative for CK20, TTF1, GATA3, TRPS1 and PAX8. 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 Suggest correlation with clinical information and imaging to   assist with next step management.   Moleculars: pMMR, PD-L1 TPS 1%; Her2 pending      TUMOR MARKERS  3/13 CEA 7.1  3/24 Ca 19-9 27  4/8 Ca-125 187, Ca 27.29 16.9, Ca 15-3 18.9, AFP 5.2      MICROBIOLOGY  Abx  Bactrim 4/8-present    Cx Data  4/5 MRSA swab: Not detected  3/16 Quant plus TB: Negative      PERTINENT RADIOLOGY  4/10 VA dopplers B/L UE: Extensive acute, occlusive DVT RIJ, innominate and subclavian veins.  Acute non-occlusive DVT w/in R axillary vein.  Extensive acute, occlusive DVT noted in L subclavian. axillary, and proximal brachial veins.  Acute non-occlusive DVT LIJ.  Superficial vein thromboses are noted in the B/L cephalic veins.    4/9 Echocardiogram: Normal LV systolic function.  Normal mitral valve w/ normal leaflet excursion.  No pericardial effusion seen    4/4 CXR: Clear lungs w/ R pleurx catheter in place.    4/4 CXR AM: Small R pleural effusion w/ pleurx cath improved    OSH Imaging ([facility])  3/29 CXR: Decrease in R pleural effusion.     3/28 B/L UE Dupplers: Acute DVT involving R IJ, innominate vein, subclavian, brachial, and L IJ.  Superficial venous thrombosis involving B/L cephalic veins.  + Edema of superficial soft tissue of UE R>L.    3/27 CT neck: Bulky and conglomerate LAD invading RIJ w/ thrombus extending up to hyoid level.  Thrombus is likely combination tumor and bland.  New LIJ nonocclusive thrombus.    3/27 CT Chest: New consolidations throughout the R lung worse RLL c/f aspiration PNA.  + R pleurx cath w/ decrease in R pleural effusion.  Extensive DVT w/in thoracic and lower neck, upper SVC remains obliterated.  Large R supraclavicular mass infiltrating into the mediastinum.      3/27 CXR: Progression of R consolidation/effusion.    3/21 TTE: LV grossly normal.  Thickened pericardium.  no pericardial effusion.    3/18 TTE: Mod pericardial effusion w/ e/o hemodynamic compromise w/ diasolic collapse of RA that exceeds 1/3 of cardiac cycle >30% resp variation across MV E. wave and early diastolic inversion of RV.    3/14 CT Chest: Conglomerate soft tissue in superior mediastinum and R supraclavicular region 2/2 LAD w/ internal hypodensity c/f necrosis.  Mass encases SVC and multiple great veins resulting in obliteration of the SVC and thrombosis of the RIJ.  Multiple R sided collateral vessels and R soft tissue edema.  B/L pulm nodules.    3/14 CT neck: Extensive cellulitis/myositis along R anterior lateral neck and R upper chest wall w/ inflammatory fat induration the deep soft tissue of neck.  Large supraclavicular/mediastinal mass lesion, presumably conglomerate of LN w/ mass effect and complete occlusion of RIJ w/ associated inflammation.  Complete occlusion of R subclavian vein and nearly occlusive thrombosis in the proximal L brachiocephalic vein.    3/13 RUQ Abd US: Cholelithiasis w/o e/o cholecystitis.  Dilated CBD w/o e/o intraductal stone or other obstruction. 1.4cm pancreatic cyst w/ mild duct dilatation.    3/12 CT abd/pelv: S/p R nephrectomy. no LAD.  New 1.6cm  pancreatic neck cystic lesion w/o main pancreatic ductal dilatation.    3/10 R breast US: No e/o breast abscess

## 2024-04-16 NOTE — BH CONSULTATION LIAISON ASSESSMENT NOTE - NSBHMSETHTPROC_PSY_A_CORE
Noted.  Unfortunately cannot prescribe an antibiotic without evaluation of the patient.  Please let patient know Dr. Cobos is back tomorrow.    Linear/Overinclusive/Tangential/Other

## 2024-04-16 NOTE — CHART NOTE - NSCHARTNOTEFT_GEN_A_CORE
Called to evaluate with drainage around pleurx cath.  Pleurx cath site with serous drainage on gauze, no active site of drainage noted.  Pleurx cath drained with only about 5-10cc of drainage.  Patient complaining of pain at Pleurx site.  Pleurx redressed and will continue to monitor drainage.  Above to be discussed with Dr. Ruiz.     Zaynab Vann, ANP-C  [id number]

## 2024-04-16 NOTE — BH CONSULTATION LIAISON ASSESSMENT NOTE - HPI (INCLUDE ILLNESS QUALITY, SEVERITY, DURATION, TIMING, CONTEXT, MODIFYING FACTORS, ASSOCIATED SIGNS AND SYMPTOMS)
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  Pt describes how this illness took her by surprise, and now she's worried about all of the things she won't be able to do because of the radiation. She's most concerned about her loss of mobility, saying her legs feel like lead. She's discouraged by lack of progress in PT so far. She cites the loss of independence as a major reason for losing positivity. She feels that people at the hospital are annoyed with her, she wants to hire an additional aide but is frustrated by a lack of funds. Pt endorses support from her , but recognizes there's only so much that he can do to help her too. Pt previously employed as a direct care aide, and expressed that she didn't understand why the people she took care of received second chances, and feels that she hasn't received the same chances from God. Pt recounts having anxiety for her whole life. She has had a Valium prescription for the past few years prescribed by her PCP. She's never seen a psychiatrist, but has had a therapist for the past 6 years, who has been able to continue her care with telehealth since she's been hospitalized. She states she's seeking direction and coping skills. She's amenable to talking to holistic nursing and a rabbi. She's not interested in adjusting psych medications at this time as she feels she's on enough meds as is. Pt denies family psych or suicide history, denies history of SI/SA or psych hospitalization, endorses no SI/HI/hallucinations/delusions.  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 Consulted for anxiety    Pt describes how this illness took her by surprise, and now she's worried about all of the things she won't be able to do because of the radiation. She's most concerned about her loss of mobility, saying her legs feel like lead. She's discouraged by lack of progress in PT so far. She cites the loss of independence as a major reason for losing positivity. She wants to hire an additional aide but is frustrated by a lack of funds. Pt endorses support from her , but recognizes there's only so much that he can do to help her too. Pt previously employed as a direct care aide, and expressed that she didn't understand why the people she took care of received second chances, and feels that she hasn't received the same chances from God. Pt recounts having anxiety for her whole life. She has had a Valium prescription for the past few years prescribed by her PCP. She's never seen a psychiatrist, but has had a therapist for the past 6 years, who has been able to continue her care with telehealth since she's been hospitalized. She states she's seeking direction and coping skills. She's amenable to talking to holistic nursing and a rabbi. She's not interested in adjusting psych medications at this time as she feels she is on enough meds as is. Pt denies family psych or suicide history, denies history of SI/SA or psych hospitalization, endorses no SI/HI/hallucinations/delusions.

## 2024-04-16 NOTE — BH CONSULTATION LIAISON ASSESSMENT NOTE - OTHER
- preoccupied by loss/lack of mobility in relation to loss of autonomy  Pt uncomfortable, resting in bed, hospital gown Primarily linear and goal-directed, then would shift topic suddenly - preoccupied by loss/lack of mobility in the setting of medical issues

## 2024-04-16 NOTE — BH CONSULTATION LIAISON ASSESSMENT NOTE - CURRENT MEDICATION
MEDICATIONS  (STANDING):  aMIOdarone    Tablet 200 milliGRAM(s) Oral daily  Biotene Dry Mouth Oral Rinse 15 milliLiter(s) Swish and Spit every 6 hours  chlorhexidine 2% Cloths 1 Application(s) Topical daily  dexAMETHasone     Tablet 4 milliGRAM(s) Oral every 8 hours  diazepam    Tablet 5 milliGRAM(s) Oral daily  enoxaparin Injectable 50 milliGRAM(s) SubCutaneous every 12 hours  influenza   Vaccine 0.5 milliLiter(s) IntraMuscular once  metoprolol tartrate 25 milliGRAM(s) Oral every 12 hours  multivitamin 1 Tablet(s) Oral daily  naloxegol 25 milliGRAM(s) Oral daily  nicotine -  14 mG/24Hr(s) Patch 1 Patch Transdermal every 24 hours  oxyCODONE  ER Tablet 30 milliGRAM(s) Oral <User Schedule>  pantoprazole    Tablet 40 milliGRAM(s) Oral before breakfast  polyethylene glycol 3350 17 Gram(s) Oral every 12 hours  trimethoprim  160 mG/sulfamethoxazole 800 mG 1 Tablet(s) Oral <User Schedule>    MEDICATIONS  (PRN):  albuterol    90 MICROgram(s) HFA Inhaler 2 Puff(s) Inhalation every 6 hours PRN Shortness of Breath and/or Wheezing  albuterol/ipratropium for Nebulization 3 milliLiter(s) Nebulizer every 6 hours PRN Shortness of Breath and/or Wheezing  aluminum hydroxide/magnesium hydroxide/simethicone Suspension 30 milliLiter(s) Oral every 6 hours PRN Dyspepsia  Biotene Dry Mouth Oral Rinse 15 milliLiter(s) Swish and Spit two times a day PRN dry mouth  diazepam    Tablet 5 milliGRAM(s) Oral daily PRN Anxiety  HYDROmorphone  Injectable 1 milliGRAM(s) IV Push every 3 hours PRN Severe Pain (7 - 10)  ondansetron    Tablet 4 milliGRAM(s) Oral every 8 hours PRN Nausea and/or Vomiting  oxyCODONE    IR 10 milliGRAM(s) Oral every 4 hours PRN Moderate Pain (4 - 6)  sodium chloride 0.9% lock flush 10 milliLiter(s) IV Push every 1 hour PRN Pre/post blood products, medications, blood draw, and to maintain line patency

## 2024-04-16 NOTE — BH CONSULTATION LIAISON ASSESSMENT NOTE - DESCRIPTION (FIRST USE, LAST USE, QUANTITY, FREQUENCY, DURATION)
2 pack/day since age 13. Stopped "when I got sick" - unclear to interviewer whether referring to RCC or now.  2 pack/day since age 13. Stopped "when I got sick" -

## 2024-04-16 NOTE — BH CONSULTATION LIAISON ASSESSMENT NOTE - NSBHCHARTREVIEWVS_PSY_A_CORE FT
Vital Signs Last 24 Hrs  T(C): 36.8 (16 Apr 2024 12:00), Max: 37.2 (15 Apr 2024 15:51)  T(F): 98.3 (16 Apr 2024 12:00), Max: 98.9 (15 Apr 2024 15:51)  HR: 67 (16 Apr 2024 12:00) (67 - 80)  BP: 134/75 (16 Apr 2024 12:00) (109/63 - 152/73)  BP(mean): --  RR: 18 (16 Apr 2024 12:00) (18 - 18)  SpO2: 97% (16 Apr 2024 12:00) (95% - 99%)    Parameters below as of 16 Apr 2024 12:00  Patient On (Oxygen Delivery Method): nasal cannula  O2 Flow (L/min): 2

## 2024-04-16 NOTE — BH CONSULTATION LIAISON ASSESSMENT NOTE - NSICDXPASTSURGICALHX_GEN_ALL_CORE_FT
PAST SURGICAL HISTORY:  Glaucoma following surgery Right Eyr - 2012    H/O unilateral nephrectomy Right Laparoscopic Nephrectomy - 94458    History of tonsillectomy     S/P hernia repair umbilical Hernia Repair - 2011    S/P knee surgery knee arthroscopy right x 2 ( 2011 & 2012)    S/P partial hysterectomy 8 years ago

## 2024-04-16 NOTE — BH CONSULTATION LIAISON ASSESSMENT NOTE - NSBHCONSULTPRIMARYDISCUSSYES_PSY_A_CORE FT
Touched base with pt manager reviewing pt's concerns, options for additional support, and her desire for no med changes/additions. Touched base with staff reviewing pt's concerns, options for additional support, and her desire for no med changes/additions.

## 2024-04-16 NOTE — CHART NOTE - NSCHARTNOTEFT_GEN_A_CORE
drainage noted around pleurx site. pt previously had leaking from pleurx site. thoracic re-called to evaluate. will continue to monitor overnight.    Arielle Motta, [facility]  Medicine [phone]

## 2024-04-16 NOTE — BH CONSULTATION LIAISON ASSESSMENT NOTE - NSSUICPROTFACT_PSY_ALL_CORE
Responsibility to children, family, or others/Supportive social network of family or friends Responsibility to children, family, or others/Identifies reasons for living/Supportive social network of family or friends/Cultural, spiritual and/or moral attitudes against suicide/Moravian beliefs

## 2024-04-16 NOTE — BH CONSULTATION LIAISON ASSESSMENT NOTE - NSPRESENTSXS_PSY_ALL_CORE
Depressed mood/Anhedonia/Hopelessness or despair/Severe anxiety, agitation or panic Depressed mood/Anhedonia

## 2024-04-16 NOTE — BH CONSULTATION LIAISON ASSESSMENT NOTE - NSACTIVEVENT_PSY_ALL_CORE
Current or pending social isolation/Chronic pain or other acute medical condition/Perceived burden on family or others Chronic pain or other acute medical condition/Perceived burden on family or others

## 2024-04-16 NOTE — BH CONSULTATION LIAISON ASSESSMENT NOTE - NSBHCONSULTFOLLOWAFTERCARE_PSY_A_CORE FT
[facility] Outpatient services  For acute crisis: [facility]  [phone] [place] [address], First Floor, [place] [address]  [phone]  [email or web address]    [facility] [phone]

## 2024-04-16 NOTE — BH CONSULTATION LIAISON ASSESSMENT NOTE - SUMMARY
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    Pt seen at bedside. Pt tearful at times expressing anxiety and distress regarding her current condition, life change, and loss of autonomy. She feels helpless and perceives herself as an annoyance/burden to staff. Pt has an existing relationship with outpt therapist of 6 years who she sees for telehealth. She has a supportive  but recognizes that he has to work and look after his needs too. She can't afford a private aide. She wants additional direction and coping skills and is open to being seen by holistic nursing and a . She has a history of anxiety treated with Valium which has been continued this admission. Pt isn't interested in psych med management at this time because she is on a lot of medications already. Pt counseled that psych consult available if she changes her mind. No SI/HI/hallucinations/delusions. Pt is not an acute risk to self or others and does not require monitoring or inpt psych intervention.     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 Consulted for anxiety    Pt seen at bedside. Pt tearful at times expressing anxiety and distress regarding her current condition, life change, and loss of autonomy. She feels helpless at times. Pt has an existing relationship with outpt therapist of 6 years who she sees for telehealth. She has a supportive . She wants additional direction and coping skills and is open to being seen by holistic nursing and a . She has a history of anxiety treated with Valium which has been continued this admission. Pt isn't interested in psych med management at this time because she is on a lot of medications already. Pt counseled that psych consult available if she changes her mind. No SI/HI/hallucinations/delusions. Pt is not an acute risk to self or others and does not require  inpt psych admission    # Anxiety  - c/w standing Valium 5 mg PO daily  - c/w Valium 5 mg PO PRN once daily   - consider f/u holistic nursing (pt. amenable)  - consider f/u  chaplaincy department (pt. amenable)  -Dispo: No role for inpatient psych.

## 2024-04-16 NOTE — BH CONSULTATION LIAISON ASSESSMENT NOTE - NSBHATTESTCOMMENTATTENDFT_PSY_A_CORE
Chart reviewed, pt. seen/evaluated with the student, I agree with above assessment/plan. Patient calm/cooperative, visibly anxious/depressed in the setting of acute medical issues, denies si and hi, no psychosis, reports support from ,  not interested in medication changes at this time. Plan as above. Call with questions

## 2024-04-16 NOTE — BH CONSULTATION LIAISON ASSESSMENT NOTE - NSBHCHARTREVIEWLAB_PSY_A_CORE FT
8.3    15.17 )-----------( 213      ( 16 Apr 2024 07:53 )             24.6   04-16    133<L>  |  100  |  40<H>  ----------------------------<  125<H>  4.8   |  21<L>  |  0.86    Ca    8.2<L>      16 Apr 2024 07:53  Phos  2.9     04-16  Mg     1.80     04-16    TPro  4.8<L>  /  Alb  2.9<L>  /  TBili  0.4  /  DBili  x   /  AST  12  /  ALT  30  /  AlkPhos  65  04-16

## 2024-04-16 NOTE — PROGRESS NOTE ADULT - ASSESSMENT
57 yo woman, former smoker, with h/o R eye glaucoma, Fibromyalgia, Anxiety, GERD, and RCC s/p R total nephrectomy/hysterectomy; she was initially admitted to [facility] on 3/11 w/ R breast swelling c/f mastitis- imaging brooks noted supraclavicular/mediastinal mass lesion which encased the SVC and multiple other veins resulting in obliteration of the SVC and thrombosis of the R IJ, and a pancreatic neck cystic lesion.  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  Her disease/hospital course has also been c/b pericardial effusion s/p pericardial window w/ neg cytology, R pleural effusion, also negative cytology) s/p Pleurx, Afib w/ RVR and acute hypoxic resp failure 2/2 SVC syndrome- not dennis to IR-guided stenting; pt was ultimately started on Dex and transferred to [facility] on 4/4 for urgent palliative RT.    ACTIVE PROBLEMS  Metastatic cancer  SVC syndrome  Extensive b/l UE DVTs  Hypercoag state  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)  pAfib, with RVR on this admission  Pericardial effusion with tamp physiology s/p pericardial window  R pleural effusion s/p pleurex  Acute hypoxic resp failure  Cancer-related pain, anxiety  Severe prot-ghazala malnutrition    - Metastatic cancer  Based on 3/14 SC LN path, ddx includes primary UGI vs pancreaticobiliary primary (breast edema is likely 2/2 SVC syndrome)  Ca 125 moderately elevated at 125; other tumor markers not significantly elevated  Pt will need additional diagnostic brooks- planning to pursue EGD/EUS and MRCP when pt completes RT  Options for systemic cancer treatment are TBD  Pt's prognosis is guarded    - SVC syndrome  Appreciate Rad Onc eval/recs  Continuing palliative RT x10fxs, to be completed on 4/18 (pt missed one fx thus far)  Continuing Dex PO 4mg q8 with pjp/ppi ppx, with plan to taper off over 2 weeks after the completion of RT    - Extensive b/l UE DVTs  - Hypercoag state  Continuing therapeutic lovenox   * Of note: pt has poor UE IV access and currently required peripheral IV in her LE extremities for admin of medication; can consider FV central line for urgent/emergent IV needs    - 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)  Pt with h/o R eyeglaucoma, but miosis can also be associated with Pancoast syndrome i/s/o extensive R upper lung disease  Pt denies visual deficits  Planning for MRI Brain after pt completes RT  Will continue to monitor closely    - pAfib  Pt currently SR, HR controlled  Likely precipitated by malignancy, pericardial effusion, SVC syndrome  Continuing Metoprolol 25mg q12 with holding parameters  Continuing Amio 200mg daily (monitoring for toxicities)  Continuing telemetry    - Pericardial effusion with tamp physiology s/p pericardial window  - R pleural effusion s/p pleurex  Likely inflammatory; both pleural and pericardial fluid cytology is negative for malignant cells  4/9 surveillance TTE with pEF and no WMAs  Continuing R pleurex drainage- up to 500cc q48h/prn    - Acute hypoxic respiratory failure  2/2 all of the above  Continuing supportive resp care:   * duonebs q6   * Tessalon 100mg q8   * steroids as above  Weaning supplemental O2 via NC as tolerate    - Cancer related pain, anxiety  Currently well controlled  Continuing Oxy ER 30mg q12 q8  Continuing Oxy IR 10mg q4/prn  Continuing Dilaudid IV prn for sev breakthrough pain  Continuing bowel regimen to prevent OIC (including Movantic)  Continuing Diazepam 5mg BID/prn    - Severe prot-ghazala malnutrition: appreciate RD recs; continuing regular diet with protein shake supplement BID

## 2024-04-16 NOTE — BH CONSULTATION LIAISON ASSESSMENT NOTE - RISK ASSESSMENT
Risk factors: recent serious illness and sequelae    Protective factors: no current SIIP/HIIP, no h/o SA/SIB, no h/o psych admissions, no access to weapons, no active substance abuse, no psychosis, domiciled, intact marriage, social supports, positive therapeutic relationship, engaged in treatment, compliant with treatment, help-seeking behaviors    Overall, pt is a low risk of harm to self/others and does not meet criteria for psychiatric admission. Risk factors: recent serious illness and sequelae    Protective factors: no current SIIP/HIIP, no h/o SA/SIB, no h/o psych admissions, no access to weapons, no active substance abuse, no psychosis, domiciled, intact marriage, social supports, positive therapeutic relationship, engaged in treatment, compliant with treatment, help-seeking behaviors, jed    Overall, pt is a low risk of harm to self/others and does not meet criteria for psychiatric admission.

## 2024-04-17 LAB
ADD ON TEST-SPECIMEN IN LAB: SIGNIFICANT CHANGE UP
ALBUMIN SERPL ELPH-MCNC: 2.9 G/DL — LOW (ref 3.3–5)
ALP SERPL-CCNC: 65 U/L — SIGNIFICANT CHANGE UP (ref 40–120)
ALT FLD-CCNC: 34 U/L — HIGH (ref 4–33)
ANION GAP SERPL CALC-SCNC: 10 MMOL/L — SIGNIFICANT CHANGE UP (ref 7–14)
AST SERPL-CCNC: 16 U/L — SIGNIFICANT CHANGE UP (ref 4–32)
BASOPHILS # BLD AUTO: 0.01 K/UL — SIGNIFICANT CHANGE UP (ref 0–0.2)
BASOPHILS # BLD AUTO: 0.01 K/UL — SIGNIFICANT CHANGE UP (ref 0–0.2)
BASOPHILS NFR BLD AUTO: 0.1 % — SIGNIFICANT CHANGE UP (ref 0–2)
BASOPHILS NFR BLD AUTO: 0.1 % — SIGNIFICANT CHANGE UP (ref 0–2)
BILIRUB SERPL-MCNC: 0.4 MG/DL — SIGNIFICANT CHANGE UP (ref 0.2–1.2)
BUN SERPL-MCNC: 31 MG/DL — HIGH (ref 7–23)
CALCIUM SERPL-MCNC: 8.2 MG/DL — LOW (ref 8.4–10.5)
CHLORIDE SERPL-SCNC: 102 MMOL/L — SIGNIFICANT CHANGE UP (ref 98–107)
CO2 SERPL-SCNC: 22 MMOL/L — SIGNIFICANT CHANGE UP (ref 22–31)
CREAT SERPL-MCNC: 0.67 MG/DL — SIGNIFICANT CHANGE UP (ref 0.5–1.3)
EGFR: 100 ML/MIN/1.73M2 — SIGNIFICANT CHANGE UP
EOSINOPHIL # BLD AUTO: 0 K/UL — SIGNIFICANT CHANGE UP (ref 0–0.5)
EOSINOPHIL # BLD AUTO: 0 K/UL — SIGNIFICANT CHANGE UP (ref 0–0.5)
EOSINOPHIL NFR BLD AUTO: 0 % — SIGNIFICANT CHANGE UP (ref 0–6)
EOSINOPHIL NFR BLD AUTO: 0 % — SIGNIFICANT CHANGE UP (ref 0–6)
GLUCOSE SERPL-MCNC: 133 MG/DL — HIGH (ref 70–99)
HAPTOGLOB SERPL-MCNC: 263 MG/DL — HIGH (ref 34–200)
HCT VFR BLD CALC: 23 % — LOW (ref 34.5–45)
HCT VFR BLD CALC: 23.5 % — LOW (ref 34.5–45)
HGB BLD-MCNC: 7.5 G/DL — LOW (ref 11.5–15.5)
HGB BLD-MCNC: 7.6 G/DL — LOW (ref 11.5–15.5)
IANC: 10.78 K/UL — HIGH (ref 1.8–7.4)
IANC: 12.43 K/UL — HIGH (ref 1.8–7.4)
IMM GRANULOCYTES NFR BLD AUTO: 1 % — HIGH (ref 0–0.9)
IMM GRANULOCYTES NFR BLD AUTO: 1.1 % — HIGH (ref 0–0.9)
LDH SERPL L TO P-CCNC: 282 U/L — HIGH (ref 135–225)
LYMPHOCYTES # BLD AUTO: 0.08 K/UL — LOW (ref 1–3.3)
LYMPHOCYTES # BLD AUTO: 0.09 K/UL — LOW (ref 1–3.3)
LYMPHOCYTES # BLD AUTO: 0.7 % — LOW (ref 13–44)
LYMPHOCYTES # BLD AUTO: 0.7 % — LOW (ref 13–44)
MAGNESIUM SERPL-MCNC: 2 MG/DL — SIGNIFICANT CHANGE UP (ref 1.6–2.6)
MCHC RBC-ENTMCNC: 29.8 PG — SIGNIFICANT CHANGE UP (ref 27–34)
MCHC RBC-ENTMCNC: 29.9 PG — SIGNIFICANT CHANGE UP (ref 27–34)
MCHC RBC-ENTMCNC: 32.3 GM/DL — SIGNIFICANT CHANGE UP (ref 32–36)
MCHC RBC-ENTMCNC: 32.6 GM/DL — SIGNIFICANT CHANGE UP (ref 32–36)
MCV RBC AUTO: 91.6 FL — SIGNIFICANT CHANGE UP (ref 80–100)
MCV RBC AUTO: 92.2 FL — SIGNIFICANT CHANGE UP (ref 80–100)
MONOCYTES # BLD AUTO: 0.32 K/UL — SIGNIFICANT CHANGE UP (ref 0–0.9)
MONOCYTES # BLD AUTO: 0.34 K/UL — SIGNIFICANT CHANGE UP (ref 0–0.9)
MONOCYTES NFR BLD AUTO: 2.6 % — SIGNIFICANT CHANGE UP (ref 2–14)
MONOCYTES NFR BLD AUTO: 2.8 % — SIGNIFICANT CHANGE UP (ref 2–14)
NEUTROPHILS # BLD AUTO: 10.78 K/UL — HIGH (ref 1.8–7.4)
NEUTROPHILS # BLD AUTO: 12.43 K/UL — HIGH (ref 1.8–7.4)
NEUTROPHILS NFR BLD AUTO: 95.3 % — HIGH (ref 43–77)
NEUTROPHILS NFR BLD AUTO: 95.6 % — HIGH (ref 43–77)
NRBC # BLD: 0 /100 WBCS — SIGNIFICANT CHANGE UP (ref 0–0)
NRBC # BLD: 0 /100 WBCS — SIGNIFICANT CHANGE UP (ref 0–0)
NRBC # FLD: 0 K/UL — SIGNIFICANT CHANGE UP (ref 0–0)
NRBC # FLD: 0 K/UL — SIGNIFICANT CHANGE UP (ref 0–0)
PHOSPHATE SERPL-MCNC: 2.4 MG/DL — LOW (ref 2.5–4.5)
PLATELET # BLD AUTO: 189 K/UL — SIGNIFICANT CHANGE UP (ref 150–400)
PLATELET # BLD AUTO: 199 K/UL — SIGNIFICANT CHANGE UP (ref 150–400)
POTASSIUM SERPL-MCNC: 4.6 MMOL/L — SIGNIFICANT CHANGE UP (ref 3.5–5.3)
POTASSIUM SERPL-SCNC: 4.6 MMOL/L — SIGNIFICANT CHANGE UP (ref 3.5–5.3)
PROT SERPL-MCNC: 4.7 G/DL — LOW (ref 6–8.3)
RBC # BLD: 2.51 M/UL — LOW (ref 3.8–5.2)
RBC # BLD: 2.55 M/UL — LOW (ref 3.8–5.2)
RBC # FLD: 15.3 % — HIGH (ref 10.3–14.5)
RBC # FLD: 15.7 % — HIGH (ref 10.3–14.5)
RETICS #: 55.6 K/UL — SIGNIFICANT CHANGE UP (ref 25–125)
RETICS/RBC NFR: 2.2 % — SIGNIFICANT CHANGE UP (ref 0.5–2.5)
SODIUM SERPL-SCNC: 134 MMOL/L — LOW (ref 135–145)
WBC # BLD: 11.32 K/UL — HIGH (ref 3.8–10.5)
WBC # BLD: 13 K/UL — HIGH (ref 3.8–10.5)
WBC # FLD AUTO: 11.32 K/UL — HIGH (ref 3.8–10.5)
WBC # FLD AUTO: 13 K/UL — HIGH (ref 3.8–10.5)

## 2024-04-17 PROCEDURE — 71045 X-RAY EXAM CHEST 1 VIEW: CPT | Mod: 26

## 2024-04-17 PROCEDURE — 99233 SBSQ HOSP IP/OBS HIGH 50: CPT

## 2024-04-17 PROCEDURE — 99222 1ST HOSP IP/OBS MODERATE 55: CPT | Mod: GC

## 2024-04-17 RX ORDER — SODIUM,POTASSIUM PHOSPHATES 278-250MG
1 POWDER IN PACKET (EA) ORAL ONCE
Refills: 0 | Status: COMPLETED | OUTPATIENT
Start: 2024-04-17 | End: 2024-04-17

## 2024-04-17 RX ORDER — PANTOPRAZOLE SODIUM 20 MG/1
40 TABLET, DELAYED RELEASE ORAL
Refills: 0 | Status: DISCONTINUED | OUTPATIENT
Start: 2024-04-17 | End: 2024-04-17

## 2024-04-17 RX ORDER — PANTOPRAZOLE SODIUM 20 MG/1
40 TABLET, DELAYED RELEASE ORAL
Refills: 0 | Status: DISCONTINUED | OUTPATIENT
Start: 2024-04-17 | End: 2024-05-19

## 2024-04-17 RX ORDER — LIDOCAINE 4 G/100G
1 CREAM TOPICAL ONCE
Refills: 0 | Status: COMPLETED | OUTPATIENT
Start: 2024-04-17 | End: 2024-04-17

## 2024-04-17 RX ADMIN — Medication 4 MILLIGRAM(S): at 13:36

## 2024-04-17 RX ADMIN — Medication 25 MILLIGRAM(S): at 18:35

## 2024-04-17 RX ADMIN — PANTOPRAZOLE SODIUM 40 MILLIGRAM(S): 20 TABLET, DELAYED RELEASE ORAL at 05:12

## 2024-04-17 RX ADMIN — Medication 1 TABLET(S): at 09:24

## 2024-04-17 RX ADMIN — OXYCODONE HYDROCHLORIDE 30 MILLIGRAM(S): 5 TABLET ORAL at 21:23

## 2024-04-17 RX ADMIN — NALOXEGOL OXALATE 25 MILLIGRAM(S): 12.5 TABLET, FILM COATED ORAL at 13:35

## 2024-04-17 RX ADMIN — Medication 1 PATCH: at 13:36

## 2024-04-17 RX ADMIN — Medication 4 MILLIGRAM(S): at 21:23

## 2024-04-17 RX ADMIN — ENOXAPARIN SODIUM 50 MILLIGRAM(S): 100 INJECTION SUBCUTANEOUS at 05:13

## 2024-04-17 RX ADMIN — OXYCODONE HYDROCHLORIDE 30 MILLIGRAM(S): 5 TABLET ORAL at 06:31

## 2024-04-17 RX ADMIN — Medication 1 TABLET(S): at 13:35

## 2024-04-17 RX ADMIN — AMIODARONE HYDROCHLORIDE 200 MILLIGRAM(S): 400 TABLET ORAL at 05:12

## 2024-04-17 RX ADMIN — ONDANSETRON 4 MILLIGRAM(S): 8 TABLET, FILM COATED ORAL at 13:34

## 2024-04-17 RX ADMIN — Medication 5 MILLIGRAM(S): at 09:24

## 2024-04-17 RX ADMIN — CHLORHEXIDINE GLUCONATE 1 APPLICATION(S): 213 SOLUTION TOPICAL at 13:39

## 2024-04-17 RX ADMIN — Medication 1 PACKET(S): at 16:12

## 2024-04-17 RX ADMIN — OXYCODONE HYDROCHLORIDE 30 MILLIGRAM(S): 5 TABLET ORAL at 14:31

## 2024-04-17 RX ADMIN — PANTOPRAZOLE SODIUM 40 MILLIGRAM(S): 20 TABLET, DELAYED RELEASE ORAL at 18:35

## 2024-04-17 RX ADMIN — Medication 15 MILLILITER(S): at 05:12

## 2024-04-17 RX ADMIN — Medication 4 MILLIGRAM(S): at 05:12

## 2024-04-17 RX ADMIN — Medication 30 MILLILITER(S): at 10:12

## 2024-04-17 RX ADMIN — Medication 5 MILLIGRAM(S): at 22:38

## 2024-04-17 RX ADMIN — Medication 25 MILLIGRAM(S): at 05:12

## 2024-04-17 NOTE — PROGRESS NOTE ADULT - ASSESSMENT
59 yo woman, former smoker, with h/o R eye glaucoma, Fibromyalgia, Anxiety, GERD, and RCC s/p R total nephrectomy/hysterectomy; she was initially admitted to [facility] on 3/11 w/ R breast swelling c/f mastitis- imaging brooks noted supraclavicular/mediastinal mass lesion which encased the SVC and multiple other veins resulting in obliteration of the SVC and thrombosis of the R IJ, and a pancreatic neck cystic lesion.  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  Her disease/hospital course has also been c/b pericardial effusion s/p pericardial window w/ neg cytology, R pleural effusion, also negative cytology) s/p Pleurx, Afib w/ RVR and acute hypoxic resp failure 2/2 SVC syndrome- not dennis to IR-guided stenting; pt was ultimately started on Dex and transferred to [facility] on 4/4 for urgent palliative RT.    ACTIVE PROBLEMS  Metastatic cancer  SVC syndrome  Extensive b/l UE DVTs  Hypercoag state  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)  pAfib, with RVR on this admission  Pericardial effusion with tamp physiology s/p pericardial window  R pleural effusion s/p pleurex  Acute hypoxic resp failure  Cancer-related pain, anxiety  Severe prot-ghazala malnutrition    - Metastatic cancer  Based on 3/14 SC LN path, ddx includes primary UGI vs pancreaticobiliary primary (breast edema is likely 2/2 SVC syndrome)  Ca-125 moderately elevated at 125; other tumor markers not significantly elevated  Pt will need additional diagnostic brooks- MRCP ordered and GI consulted for EUS/ERCP with biopsy of pancreatic neck lesion  Options for systemic cancer treatment are TBD  Pt's prognosis is guarded    - SVC syndrome  Appreciate Rad Onc eval/recs  Completing 10fxs of palliative RT on 10/18 (pt missed one fx thus far)  Continuing Dex PO 4mg q8 with pjp/ppi ppx, with plan to taper off over 2 weeks after the completion of RT    - Anemia  Hgb down to 7.6 from prior baseline of 10-11  4/5 iron studies not c/w iron deficiency  ? GI blood loss vs RT induced marrow suppression  VSS and pt without clinical s/s of active bleeding  AC on hold for now  PO PPI daily transitioned to IV PPI BID for now  FOBT, LDH/retic/haptoglobin, and pm cbc ordered for further eval  Will transfuse supportively prn (goal hgb > 7; plts > 10K)    - Extensive b/l UE DVTs  - Hypercoag state  AC on hold for anemia brooks as above  Will plan to resume therapeutic lovenox if FOBT is negative   * Of note: pt has poor UE IV access and currently required peripheral IV in her LE extremities for admin of medication; can consider FV central line for urgent/emergent IV needs    - 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)  Pt with h/o R eyeglaucoma, but miosis can also be associated with Pancoast syndrome i/s/o extensive R upper lung disease  Pt denies visual deficits  Planning for MRI Brain after pt completes RT  Will continue to monitor closely    - pAfib  Pt currently SR, HR controlled  Likely precipitated by malignancy, pericardial effusion, SVC syndrome  Continuing Metoprolol 25mg q12 with holding parameters  Continuing Amio 200mg daily (monitoring for toxicities)  Continuing telemetry    - Pericardial effusion with tamp physiology s/p pericardial window  - R pleural effusion s/p pleurex  Likely inflammatory; both pleural and pericardial fluid cytology is negative for malignant cells  4/9 surveillance TTE with pEF and no WMAs  Continuing R pleurex drainage- up to 500cc q48h/prn    - Acute hypoxic respiratory failure  Resolved  2/2 all of the above  Continuing supportive resp care:   * duonebs q6   * Tessalon 100mg q8   * steroids as above  Weaning supplemental O2 via NC as tolerate    - Cancer related pain, anxiety  Currently well controlled  Continuing Oxy ER 30mg q12 q8  Continuing Oxy IR 10mg q4/prn  Continuing Dilaudid IV prn for sev breakthrough pain  Continuing bowel regimen to prevent OIC (including Movantic)  Continuing Diazepam 5mg BID/prn    - Severe prot-ghazala malnutrition: appreciate RD recs; continuing regular diet with protein shake supplement BID

## 2024-04-17 NOTE — CHART NOTE - NSCHARTNOTEFT_GEN_A_CORE
Pt seen and examined.   Pleurx site continues to leak around pleurx cath  despite new suture in place.   Pt expressing frustration related to prolonged hospitalization    Vital Signs Last 24 Hrs  T(C): 36.6 (17 Apr 2024 12:00), Max: 36.7 (16 Apr 2024 16:45)  T(F): 97.9 (17 Apr 2024 12:00), Max: 98 (16 Apr 2024 16:45)  HR: 77 (17 Apr 2024 12:00) (74 - 84)  BP: 123/56 (17 Apr 2024 12:00) (123/56 - 142/78)  BP(mean): --  RR: 18 (17 Apr 2024 12:00) (17 - 18)  SpO2: 100% (17 Apr 2024 12:00) (96% - 100%)    Parameters below as of 17 Apr 2024 12:00  Patient On (Oxygen Delivery Method): nasal cannula    A+O x 3  Pleurx site without signs of infection, leaking serous fluid. Capped  CXR done today shows no significant effusion.   Above d/w and reviewed w Dr. Ruiz  Unclear if leakage is pleural fluid or leaking of tissue edema    Pleurx flushed easily.  Placed to Pleurvac on suction to see if reduces leakage.   Pt can ambulate off suction as needed.   Will follow

## 2024-04-17 NOTE — PROGRESS NOTE ADULT - SUBJECTIVE AND OBJECTIVE BOX
SOLID TUMOR ONCOLOGY HOSPITALIST PROGRESS NOTE    S: No acute events overnight.  She complained of feeling "off" this morning but denied dyspnea.  She denied melena/hematochezia, hematuria; she reported that she had a bm this am that was light brown.    CURRENT MEDICATIONS  MEDICATIONS  (STANDING):  aMIOdarone    Tablet 200 milliGRAM(s) Oral daily  Biotene Dry Mouth Oral Rinse 15 milliLiter(s) Swish and Spit every 6 hours  chlorhexidine 2% Cloths 1 Application(s) Topical daily  dexAMETHasone     Tablet 4 milliGRAM(s) Oral every 8 hours  diazepam    Tablet 5 milliGRAM(s) Oral daily  enoxaparin Injectable 50 milliGRAM(s) SubCutaneous every 12 hours  influenza   Vaccine 0.5 milliLiter(s) IntraMuscular once  metoprolol tartrate 25 milliGRAM(s) Oral every 12 hours  multivitamin 1 Tablet(s) Oral daily  naloxegol 25 milliGRAM(s) Oral daily  nicotine -  14 mG/24Hr(s) Patch 1 Patch Transdermal every 24 hours  oxyCODONE  ER Tablet 30 milliGRAM(s) Oral <User Schedule>  pantoprazole    Tablet 40 milliGRAM(s) Oral before breakfast  polyethylene glycol 3350 17 Gram(s) Oral every 12 hours  trimethoprim  160 mG/sulfamethoxazole 800 mG 1 Tablet(s) Oral <User Schedule>  MEDICATIONS  (PRN):  albuterol    90 MICROgram(s) HFA Inhaler 2 Puff(s) Inhalation every 6 hours PRN Shortness of Breath and/or Wheezing  albuterol/ipratropium for Nebulization 3 milliLiter(s) Nebulizer every 6 hours PRN Shortness of Breath and/or Wheezing  aluminum hydroxide/magnesium hydroxide/simethicone Suspension 30 milliLiter(s) Oral every 6 hours PRN Dyspepsia  Biotene Dry Mouth Oral Rinse 15 milliLiter(s) Swish and Spit two times a day PRN dry mouth  diazepam    Tablet 5 milliGRAM(s) Oral daily PRN Anxiety  HYDROmorphone  Injectable 1 milliGRAM(s) IV Push every 3 hours PRN Severe Pain (7 - 10)  ondansetron    Tablet 4 milliGRAM(s) Oral every 8 hours PRN Nausea and/or Vomiting  oxyCODONE    IR 10 milliGRAM(s) Oral every 4 hours PRN Moderate Pain (4 - 6)  sodium chloride 0.9% lock flush 10 milliLiter(s) IV Push every 1 hour PRN Pre/post blood products, medications, blood draw, and to maintain line patency      PHYSICAL EXAM  T(C): 36.6 (04-17-24 @ 12:00), Max: 36.7 (04-16-24 @ 16:45)  HR: 77 (04-17-24 @ 12:00) (74 - 84)  BP: 123/56 (04-17-24 @ 12:00) (123/56 - 142/78)  RR: 18 (04-17-24 @ 12:00) (17 - 18)  SpO2: 100% (04-17-24 @ 12:00) (96% - 100%)    04-16-24 @ 07:01  -  04-17-24 @ 07:00  --------------------------------------------------------  IN: 0 mL / OUT: 7 mL / NET: -7 mL  Non-toxic appearing woman, sitting up in bed, appears comfortable, but agitated at times (thrashing her arms and shaking her head) during conversation  R eyelid ptosis and R pupil miosis present; anicteric sclera, no oral lesions/thrush  RRR, no m/r/g  Diminshed breath sounds in all R lung zones; LL zones CTA  Abd soft/nt/nd, normoactive bowel sounds  Trace RUE non-pitting edema present; generalized muscular atrophy in all 4 extremities  CN 2-12 grossly intact; no gross focal neuro deficits; pt ambulates with walker      LABS                        7.6    11.32 )-----------( 199      ( 17 Apr 2024 06:35 )             23.5     04-17    134<L>  |  102  |  31<H>  ----------------------------<  133<H>  4.6   |  22  |  0.67    Ca    8.2<L>      17 Apr 2024 06:35  Phos  2.4     04-17  Mg     2.00     04-17    TPro  4.7<L>  /  Alb  2.9<L>  /  TBili  0.4  /  DBili  x   /  AST  16  /  ALT  34<H>  /  AlkPhos  65  04-17      PATHOLOGY  3/26 pleural fluid cytology: Neg for malignant cells.    3/26 Pericardium excision: Neg for malignancy.    3/20 Pericardial fluid cytology: Neg for malignant cells.    3/14 LYMPH NODE, SUPRACLAVICULAR, RIGHT, US GUIDED CORE BIOPSY AND FNA   POSITIVE FOR MALIGNANT CELLS.   Carcinoma   Touch Prep slides display crowded groups and single lying malignant   cells with enlarged nuclei containing prominent nucleoli and vacuolated   cytoplasm. Core biopsies show neoplastic cells positive for CK7 and CDX2   immunostains, while negative for CK20, TTF1, GATA3, TRPS1 and PAX8. 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 Suggest correlation with clinical information and imaging to   assist with next step management.   Moleculars: pMMR, PD-L1 TPS 1%; Her2 pending      TUMOR MARKERS  3/13 CEA 7.1  3/24 Ca 19-9 27  4/8 Ca-125 187, Ca 27.29 16.9, Ca 15-3 18.9, AFP 5.2      MICROBIOLOGY  Abx  Bactrim 4/8-present    Cx Data  4/5 MRSA swab: Not detected  3/16 Quant plus TB: Negative      PERTINENT RADIOLOGY  4/10 VA dopplers B/L UE: Extensive acute, occlusive DVT RIJ, innominate and subclavian veins.  Acute non-occlusive DVT w/in R axillary vein.  Extensive acute, occlusive DVT noted in L subclavian. axillary, and proximal brachial veins.  Acute non-occlusive DVT LIJ.  Superficial vein thromboses are noted in the B/L cephalic veins.    4/9 Echocardiogram: Normal LV systolic function.  Normal mitral valve w/ normal leaflet excursion.  No pericardial effusion seen    4/4 CXR: Clear lungs w/ R pleurx catheter in place.    4/4 CXR AM: Small R pleural effusion w/ pleurx cath improved    OSH Imaging ([facility])  3/29 CXR: Decrease in R pleural effusion.     3/28 B/L UE Dupplers: Acute DVT involving R IJ, innominate vein, subclavian, brachial, and L IJ.  Superficial venous thrombosis involving B/L cephalic veins.  + Edema of superficial soft tissue of UE R>L.    3/27 CT neck: Bulky and conglomerate LAD invading RIJ w/ thrombus extending up to hyoid level.  Thrombus is likely combination tumor and bland.  New LIJ nonocclusive thrombus.    3/27 CT Chest: New consolidations throughout the R lung worse RLL c/f aspiration PNA.  + R pleurx cath w/ decrease in R pleural effusion.  Extensive DVT w/in thoracic and lower neck, upper SVC remains obliterated.  Large R supraclavicular mass infiltrating into the mediastinum.      3/27 CXR: Progression of R consolidation/effusion.    3/21 TTE: LV grossly normal.  Thickened pericardium.  no pericardial effusion.    3/18 TTE: Mod pericardial effusion w/ e/o hemodynamic compromise w/ diasolic collapse of RA that exceeds 1/3 of cardiac cycle >30% resp variation across MV E. wave and early diastolic inversion of RV.    3/14 CT Chest: Conglomerate soft tissue in superior mediastinum and R supraclavicular region 2/2 LAD w/ internal hypodensity c/f necrosis.  Mass encases SVC and multiple great veins resulting in obliteration of the SVC and thrombosis of the RIJ.  Multiple R sided collateral vessels and R soft tissue edema.  B/L pulm nodules.    3/14 CT neck: Extensive cellulitis/myositis along R anterior lateral neck and R upper chest wall w/ inflammatory fat induration the deep soft tissue of neck.  Large supraclavicular/mediastinal mass lesion, presumably conglomerate of LN w/ mass effect and complete occlusion of RIJ w/ associated inflammation.  Complete occlusion of R subclavian vein and nearly occlusive thrombosis in the proximal L brachiocephalic vein.    3/13 RUQ Abd US: Cholelithiasis w/o e/o cholecystitis.  Dilated CBD w/o e/o intraductal stone or other obstruction. 1.4cm pancreatic cyst w/ mild duct dilatation.    3/12 CT abd/pelv: S/p R nephrectomy. no LAD.  New 1.6cm  pancreatic neck cystic lesion w/o main pancreatic ductal dilatation.    3/10 R breast US: No e/o breast abscess

## 2024-04-17 NOTE — CONSULT NOTE ADULT - SUBJECTIVE AND OBJECTIVE BOX
Chief Complaint:  Patient is a 58y old  Female who presents with a chief complaint of SVC syndrome, DVT, mediastinal mass (14 Apr 2024 11:16)      HPI:GADIEL GENAO is a 58y Femaleformer smoker with fhx of pancreatic cancer (father) and lung cancer (mother), with h/o R eye glaucoma, Fibromyalgia, Anxiety, GERD, and RCC s/p R total nephrectomy/hysterectomy; she was initially admitted to [facility] on 3/11 w/ R breast swelling c/f mastitis- imaging [facility] noted supraclavicular/mediastinal mass lesion which encased the SVC and multiple other veins resulting in obliteration of the SVC and thrombosis of the R IJ, and a pancreatic neck cystic lesion.  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  Her disease/hospital course has also been c/b pericardial effusion s/p pericardial window w/ neg cytology, R pleural effusion, also negative cytology) s/p Pleurx, Afib w/ RVR and acute hypoxic resp failure 2/2 SVC syndrome- not dennis to IR-guided stenting; pt was ultimately started on Dex and transferred to [facility] on 4/4 for urgent palliative RT. GI consulted for bx of 1.6 cm pancreatic neck cystic lesion noted on CTAP 3/12 given concerns of primary UGI/pancreaticobiliary malignancy.    PMHX/PSHX:  Anxiety    Acid reflux disease    Renal cancer, left    Umbilical hernia    Uterine mass    Fibromyalgia    Glaucoma    Herniated cervical disc    FH: kidney cancer    Cancer of kidney    S/P partial hysterectomy    S/P knee surgery    S/P hernia repair    H/O unilateral nephrectomy    Glaucoma following surgery    History of tonsillectomy      Allergies:  erythromycin (Headache; Vomiting; Rash)  penicillin (Headache; Vomiting; Rash)  Cipro (Headache; Vomiting; Rash)      Home Medications: reviewed  Hospital Medications:  albuterol    90 MICROgram(s) HFA Inhaler 2 Puff(s) Inhalation every 6 hours PRN  albuterol/ipratropium for Nebulization 3 milliLiter(s) Nebulizer every 6 hours PRN  aluminum hydroxide/magnesium hydroxide/simethicone Suspension 30 milliLiter(s) Oral every 6 hours PRN  aMIOdarone    Tablet 200 milliGRAM(s) Oral daily  Biotene Dry Mouth Oral Rinse 15 milliLiter(s) Swish and Spit every 6 hours  Biotene Dry Mouth Oral Rinse 15 milliLiter(s) Swish and Spit two times a day PRN  chlorhexidine 2% Cloths 1 Application(s) Topical daily  dexAMETHasone     Tablet 4 milliGRAM(s) Oral every 8 hours  diazepam    Tablet 5 milliGRAM(s) Oral daily  diazepam    Tablet 5 milliGRAM(s) Oral daily PRN  HYDROmorphone  Injectable 1 milliGRAM(s) IV Push every 3 hours PRN  influenza   Vaccine 0.5 milliLiter(s) IntraMuscular once  metoprolol tartrate 25 milliGRAM(s) Oral every 12 hours  multivitamin 1 Tablet(s) Oral daily  naloxegol 25 milliGRAM(s) Oral daily  nicotine -  14 mG/24Hr(s) Patch 1 Patch Transdermal every 24 hours  ondansetron    Tablet 4 milliGRAM(s) Oral every 8 hours PRN  oxyCODONE    IR 10 milliGRAM(s) Oral every 4 hours PRN  oxyCODONE  ER Tablet 30 milliGRAM(s) Oral <User Schedule>  pantoprazole    Tablet 40 milliGRAM(s) Oral two times a day  polyethylene glycol 3350 17 Gram(s) Oral every 12 hours  sodium chloride 0.9% lock flush 10 milliLiter(s) IV Push every 1 hour PRN  trimethoprim  160 mG/sulfamethoxazole 800 mG 1 Tablet(s) Oral <User Schedule>      Social History:   Tob: Denies  EtOH: Denies  Illicit Drugs: Denies    Family history:  Family history of lung cancer (Mother)    Family history of pancreatic cancer (Father)       ROS:   Complete and normal except as mentioned above.    PHYSICAL EXAM:   Vital Signs:  Vital Signs Last 24 Hrs  T(C): 36.6 (17 Apr 2024 12:00), Max: 36.7 (16 Apr 2024 16:45)  T(F): 97.9 (17 Apr 2024 12:00), Max: 98 (16 Apr 2024 16:45)  HR: 77 (17 Apr 2024 12:00) (74 - 84)  BP: 123/56 (17 Apr 2024 12:00) (123/56 - 142/78)  BP(mean): --  RR: 18 (17 Apr 2024 12:00) (17 - 18)  SpO2: 100% (17 Apr 2024 12:00) (96% - 100%)    Parameters below as of 17 Apr 2024 12:00  Patient On (Oxygen Delivery Method): nasal cannula      Daily     Daily     GENERAL: frail appearing  NEURO: aox3  HEENT: anicteric sclera, no conjunctival pallor appreciated  CHEST: no respiratory distress, no accessory muscle use  CARDIAC: regular rate  ABDOMEN: soft, non-tender, non-distended, no rebound or guarding  EXTREMITIES: warm, well perfused   SKIN: no lesions noted    LABS:                          7.6    11.32 )-----------( 199      ( 17 Apr 2024 06:35 )             23.5     04-17    134<L>  |  102  |  31<H>  ----------------------------<  133<H>  4.6   |  22  |  0.67    Ca    8.2<L>      17 Apr 2024 06:35  Phos  2.4     04-17  Mg     2.00     04-17    TPro  4.7<L>  /  Alb  2.9<L>  /  TBili  0.4  /  DBili  x   /  AST  16  /  ALT  34<H>  /  AlkPhos  65  04-17    LIVER FUNCTIONS - ( 17 Apr 2024 06:35 )  Alb: 2.9 g/dL / Pro: 4.7 g/dL / ALK PHOS: 65 U/L / ALT: 34 U/L / AST: 16 U/L / GGT: x               Diagnostic Studies:   CTAP 3/12/24  LIVER: Within normal limits.  BILE DUCTS: Normal caliber.  GALLBLADDER: Within normal limits.  SPLEEN: Within normal limits.  PANCREAS: A pancreatic neck cystic lesion measuring 1.6 cm, new since   2015. No main pancreatic ductal dilatation.

## 2024-04-18 LAB
ALBUMIN SERPL ELPH-MCNC: 3 G/DL — LOW (ref 3.3–5)
ALP SERPL-CCNC: 69 U/L — SIGNIFICANT CHANGE UP (ref 40–120)
ALT FLD-CCNC: 41 U/L — HIGH (ref 4–33)
ANION GAP SERPL CALC-SCNC: 9 MMOL/L — SIGNIFICANT CHANGE UP (ref 7–14)
AST SERPL-CCNC: 16 U/L — SIGNIFICANT CHANGE UP (ref 4–32)
BASOPHILS # BLD AUTO: 0.01 K/UL — SIGNIFICANT CHANGE UP (ref 0–0.2)
BASOPHILS NFR BLD AUTO: 0.1 % — SIGNIFICANT CHANGE UP (ref 0–2)
BILIRUB SERPL-MCNC: 0.2 MG/DL — SIGNIFICANT CHANGE UP (ref 0.2–1.2)
BUN SERPL-MCNC: 32 MG/DL — HIGH (ref 7–23)
CALCIUM SERPL-MCNC: 8.5 MG/DL — SIGNIFICANT CHANGE UP (ref 8.4–10.5)
CHLORIDE SERPL-SCNC: 104 MMOL/L — SIGNIFICANT CHANGE UP (ref 98–107)
CO2 SERPL-SCNC: 26 MMOL/L — SIGNIFICANT CHANGE UP (ref 22–31)
CREAT SERPL-MCNC: 0.73 MG/DL — SIGNIFICANT CHANGE UP (ref 0.5–1.3)
EGFR: 94 ML/MIN/1.73M2 — SIGNIFICANT CHANGE UP
EOSINOPHIL # BLD AUTO: 0 K/UL — SIGNIFICANT CHANGE UP (ref 0–0.5)
EOSINOPHIL NFR BLD AUTO: 0 % — SIGNIFICANT CHANGE UP (ref 0–6)
GLUCOSE SERPL-MCNC: 157 MG/DL — HIGH (ref 70–99)
HCT VFR BLD CALC: 23.1 % — LOW (ref 34.5–45)
HGB BLD-MCNC: 7.4 G/DL — LOW (ref 11.5–15.5)
IANC: 10.44 K/UL — HIGH (ref 1.8–7.4)
IMM GRANULOCYTES NFR BLD AUTO: 0.8 % — SIGNIFICANT CHANGE UP (ref 0–0.9)
LYMPHOCYTES # BLD AUTO: 0.09 K/UL — LOW (ref 1–3.3)
LYMPHOCYTES # BLD AUTO: 0.8 % — LOW (ref 13–44)
MAGNESIUM SERPL-MCNC: 1.9 MG/DL — SIGNIFICANT CHANGE UP (ref 1.6–2.6)
MCHC RBC-ENTMCNC: 30.1 PG — SIGNIFICANT CHANGE UP (ref 27–34)
MCHC RBC-ENTMCNC: 32 GM/DL — SIGNIFICANT CHANGE UP (ref 32–36)
MCV RBC AUTO: 93.9 FL — SIGNIFICANT CHANGE UP (ref 80–100)
MONOCYTES # BLD AUTO: 0.44 K/UL — SIGNIFICANT CHANGE UP (ref 0–0.9)
MONOCYTES NFR BLD AUTO: 4 % — SIGNIFICANT CHANGE UP (ref 2–14)
NEUTROPHILS # BLD AUTO: 10.44 K/UL — HIGH (ref 1.8–7.4)
NEUTROPHILS NFR BLD AUTO: 94.3 % — HIGH (ref 43–77)
NRBC # BLD: 0 /100 WBCS — SIGNIFICANT CHANGE UP (ref 0–0)
NRBC # FLD: 0 K/UL — SIGNIFICANT CHANGE UP (ref 0–0)
OB PNL STL: NEGATIVE — SIGNIFICANT CHANGE UP
OB PNL STL: NEGATIVE — SIGNIFICANT CHANGE UP
PHOSPHATE SERPL-MCNC: 2.9 MG/DL — SIGNIFICANT CHANGE UP (ref 2.5–4.5)
PLATELET # BLD AUTO: 157 K/UL — SIGNIFICANT CHANGE UP (ref 150–400)
POTASSIUM SERPL-MCNC: 4.6 MMOL/L — SIGNIFICANT CHANGE UP (ref 3.5–5.3)
POTASSIUM SERPL-SCNC: 4.6 MMOL/L — SIGNIFICANT CHANGE UP (ref 3.5–5.3)
PROT SERPL-MCNC: 4.9 G/DL — LOW (ref 6–8.3)
RBC # BLD: 2.46 M/UL — LOW (ref 3.8–5.2)
RBC # FLD: 15.9 % — HIGH (ref 10.3–14.5)
SODIUM SERPL-SCNC: 139 MMOL/L — SIGNIFICANT CHANGE UP (ref 135–145)
WBC # BLD: 11.07 K/UL — HIGH (ref 3.8–10.5)
WBC # FLD AUTO: 11.07 K/UL — HIGH (ref 3.8–10.5)

## 2024-04-18 PROCEDURE — 99232 SBSQ HOSP IP/OBS MODERATE 35: CPT

## 2024-04-18 PROCEDURE — 99233 SBSQ HOSP IP/OBS HIGH 50: CPT

## 2024-04-18 PROCEDURE — 93970 EXTREMITY STUDY: CPT | Mod: 26

## 2024-04-18 RX ORDER — OXYCODONE HYDROCHLORIDE 5 MG/1
10 TABLET ORAL EVERY 4 HOURS
Refills: 0 | Status: DISCONTINUED | OUTPATIENT
Start: 2024-04-18 | End: 2024-04-23

## 2024-04-18 RX ORDER — DIAZEPAM 5 MG
5 TABLET ORAL DAILY
Refills: 0 | Status: DISCONTINUED | OUTPATIENT
Start: 2024-04-18 | End: 2024-04-24

## 2024-04-18 RX ORDER — ENOXAPARIN SODIUM 100 MG/ML
50 INJECTION SUBCUTANEOUS EVERY 12 HOURS
Refills: 0 | Status: DISCONTINUED | OUTPATIENT
Start: 2024-04-18 | End: 2024-04-23

## 2024-04-18 RX ORDER — OXYCODONE HYDROCHLORIDE 5 MG/1
30 TABLET ORAL
Refills: 0 | Status: DISCONTINUED | OUTPATIENT
Start: 2024-04-18 | End: 2024-04-18

## 2024-04-18 RX ORDER — OXYCODONE HYDROCHLORIDE 5 MG/1
30 TABLET ORAL
Refills: 0 | Status: DISCONTINUED | OUTPATIENT
Start: 2024-04-18 | End: 2024-04-23

## 2024-04-18 RX ORDER — HYDROMORPHONE HYDROCHLORIDE 2 MG/ML
1 INJECTION INTRAMUSCULAR; INTRAVENOUS; SUBCUTANEOUS
Refills: 0 | Status: DISCONTINUED | OUTPATIENT
Start: 2024-04-18 | End: 2024-04-24

## 2024-04-18 RX ORDER — SENNA PLUS 8.6 MG/1
2 TABLET ORAL AT BEDTIME
Refills: 0 | Status: DISCONTINUED | OUTPATIENT
Start: 2024-04-18 | End: 2024-05-07

## 2024-04-18 RX ADMIN — Medication 15 MILLILITER(S): at 18:12

## 2024-04-18 RX ADMIN — CHLORHEXIDINE GLUCONATE 1 APPLICATION(S): 213 SOLUTION TOPICAL at 13:12

## 2024-04-18 RX ADMIN — Medication 4 MILLIGRAM(S): at 22:31

## 2024-04-18 RX ADMIN — Medication 25 MILLIGRAM(S): at 05:16

## 2024-04-18 RX ADMIN — Medication 15 MILLILITER(S): at 13:00

## 2024-04-18 RX ADMIN — PANTOPRAZOLE SODIUM 40 MILLIGRAM(S): 20 TABLET, DELAYED RELEASE ORAL at 05:16

## 2024-04-18 RX ADMIN — ENOXAPARIN SODIUM 50 MILLIGRAM(S): 100 INJECTION SUBCUTANEOUS at 18:08

## 2024-04-18 RX ADMIN — NALOXEGOL OXALATE 25 MILLIGRAM(S): 12.5 TABLET, FILM COATED ORAL at 13:01

## 2024-04-18 RX ADMIN — Medication 1 TABLET(S): at 13:01

## 2024-04-18 RX ADMIN — PANTOPRAZOLE SODIUM 40 MILLIGRAM(S): 20 TABLET, DELAYED RELEASE ORAL at 18:08

## 2024-04-18 RX ADMIN — Medication 15 MILLILITER(S): at 05:16

## 2024-04-18 RX ADMIN — OXYCODONE HYDROCHLORIDE 30 MILLIGRAM(S): 5 TABLET ORAL at 06:41

## 2024-04-18 RX ADMIN — Medication 30 MILLILITER(S): at 13:12

## 2024-04-18 RX ADMIN — AMIODARONE HYDROCHLORIDE 200 MILLIGRAM(S): 400 TABLET ORAL at 05:16

## 2024-04-18 RX ADMIN — Medication 5 MILLIGRAM(S): at 22:31

## 2024-04-18 RX ADMIN — Medication 25 MILLIGRAM(S): at 18:08

## 2024-04-18 RX ADMIN — Medication 1 PATCH: at 13:07

## 2024-04-18 RX ADMIN — Medication 1 PATCH: at 13:01

## 2024-04-18 RX ADMIN — Medication 5 MILLIGRAM(S): at 09:46

## 2024-04-18 RX ADMIN — Medication 1 PATCH: at 07:30

## 2024-04-18 RX ADMIN — Medication 4 MILLIGRAM(S): at 05:16

## 2024-04-18 RX ADMIN — OXYCODONE HYDROCHLORIDE 30 MILLIGRAM(S): 5 TABLET ORAL at 13:07

## 2024-04-18 RX ADMIN — Medication 4 MILLIGRAM(S): at 13:03

## 2024-04-18 NOTE — PROVIDER CONTACT NOTE (OTHER) - BACKGROUND
58F sinitially presented to [facility] w/ R breast swelling c/f cellulitis, w/ imaging noted supraclavicular/mediastinal mass lesion and complete occlusion of RIJ and SVC, , and pleural effusion s/p Pleurx.

## 2024-04-18 NOTE — PROVIDER CONTACT NOTE (OTHER) - ACTION/TREATMENT ORDERED:
ACP Yomi Boston made aware that ACP has to draw AM labs. As per policy, RN does not draw labs or put IV access in the feet. Monitor signs and symptoms of bleeding.

## 2024-04-18 NOTE — BH CONSULTATION LIAISON PROGRESS NOTE - NSBHASSESSMENTFT_PSY_ALL_CORE
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 Consulted for anxiety    Pt seen at bedside. Pt tearful at times expressing anxiety and distress regarding her current condition, life change, and loss of autonomy. She feels helpless at times. Pt has an existing relationship with outpt therapist of 6 years who she sees for telehealth. She has a supportive . She wants additional direction and coping skills and is open to being seen by holistic nursing and a rabbi. She has a history of anxiety treated with Valium which has been continued this admission. Pt isn't interested in psych med management at this time because she is on a lot of medications already. Pt counseled that psych consult available if she changes her mind. No SI/HI/hallucinations/delusions. Pt is not an acute risk to self or others and does not require  inpt psych admission    4/18: Patient visibly anxious/distressed and frustrated, no si or hi, no psychosis. In terms of capacity, I agree with medical team that at this time patient lacks capacity to refuse IV line placement in LE for possible blood transfusion.       PLAN:  # Anxiety  - c/w standing Valium 5 mg PO daily  - Valium 5 mg PO PRN BID   - consider f/u holistic nursing (pt. amenable)  - consider f/u chaplaincy department (pt. amenable)  -Dispo: No role for inpatient psych.

## 2024-04-18 NOTE — CHART NOTE - NSCHARTNOTEFT_GEN_A_CORE
Pt seen with DR Paz  Leaking still noted despite Pleurx to Pleuravac.  Suction increased to -40cm.   Dressing removed.   Will place additional suture around Pleurx site at bedside  today to help resolve leaking  Will monitor.

## 2024-04-18 NOTE — PROVIDER CONTACT NOTE (OTHER) - REASON
No AM labs drawn. Provider has to come to bedside to draw AM labs as patient is B/L upper extremity precautions.

## 2024-04-18 NOTE — BH CONSULTATION LIAISON PROGRESS NOTE - NSBHHPIREASONCLOTHER_PSY_A_CORE FT
As per discussion with primary team ACP Sonali, pt. might need blood transfusion for low HB, pt. cannot have IV line placed in UE due to  b/l UE DVTs and IV line needs to be placed in LE, which patient has been refusing.  Asking for capacity to refuse IV line in LE, primary team does not think patient has capacity.

## 2024-04-18 NOTE — PROGRESS NOTE ADULT - SUBJECTIVE AND OBJECTIVE BOX
SOLID TUMOR ONCOLOGY HOSPITALIST PROGRESS NOTE    S: No acute events overnight.  Pt expressed feeling frustrated at the thought of needing to be bedbound if an IV has to be placed in her foot.    CURRENT MEDICATIONS  MEDICATIONS  (STANDING):  aMIOdarone    Tablet 200 milliGRAM(s) Oral daily  Biotene Dry Mouth Oral Rinse 15 milliLiter(s) Swish and Spit every 6 hours  chlorhexidine 2% Cloths 1 Application(s) Topical daily  dexAMETHasone     Tablet 4 milliGRAM(s) Oral every 8 hours  diazepam    Tablet 5 milliGRAM(s) Oral daily  influenza   Vaccine 0.5 milliLiter(s) IntraMuscular once  metoprolol tartrate 25 milliGRAM(s) Oral every 12 hours  multivitamin 1 Tablet(s) Oral daily  naloxegol 25 milliGRAM(s) Oral daily  nicotine -  14 mG/24Hr(s) Patch 1 Patch Transdermal every 24 hours  oxyCODONE  ER Tablet 30 milliGRAM(s) Oral <User Schedule>  pantoprazole    Tablet 40 milliGRAM(s) Oral two times a day  polyethylene glycol 3350 17 Gram(s) Oral every 12 hours  trimethoprim  160 mG/sulfamethoxazole 800 mG 1 Tablet(s) Oral <User Schedule>  MEDICATIONS  (PRN):  albuterol    90 MICROgram(s) HFA Inhaler 2 Puff(s) Inhalation every 6 hours PRN Shortness of Breath and/or Wheezing  albuterol/ipratropium for Nebulization 3 milliLiter(s) Nebulizer every 6 hours PRN Shortness of Breath and/or Wheezing  aluminum hydroxide/magnesium hydroxide/simethicone Suspension 30 milliLiter(s) Oral every 6 hours PRN Dyspepsia  Biotene Dry Mouth Oral Rinse 15 milliLiter(s) Swish and Spit two times a day PRN dry mouth  diazepam    Tablet 5 milliGRAM(s) Oral daily PRN Anxiety  HYDROmorphone  Injectable 1 milliGRAM(s) IV Push every 3 hours PRN Severe Pain (7 - 10)  ondansetron    Tablet 4 milliGRAM(s) Oral every 8 hours PRN Nausea and/or Vomiting  oxyCODONE    IR 10 milliGRAM(s) Oral every 4 hours PRN Moderate Pain (4 - 6)  sodium chloride 0.9% lock flush 10 milliLiter(s) IV Push every 1 hour PRN Pre/post blood products, medications, blood draw, and to maintain line patency      PHYSICAL EXAM  T(C): 36.6 (04-18-24 @ 11:15), Max: 36.8 (04-18-24 @ 01:22)  HR: 75 (04-18-24 @ 11:15) (75 - 89)  BP: 127/74 (04-18-24 @ 05:15) (121/55 - 160/85)  RR: 18 (04-18-24 @ 11:15) (17 - 18)  SpO2: 100% (04-18-24 @ 11:15) (96% - 100%)    04-17-24 @ 07:01  -  04-18-24 @ 07:00  --------------------------------------------------------  IN: 0 mL / OUT: 45 mL / NET: -45 mL  Non-toxic appearing woman, sitting up in bed, appears comfortable, but agitated at times (thrashing her arms and shaking her head) during conversation  R eyelid ptosis and R pupil miosis present; anicteric sclera, no oral lesions/thrush  RRR, no m/r/g  Diminshed breath sounds in all R lung zones; LL zones CTA  Abd soft/nt/nd, normoactive bowel sounds  Trace LUE non-pitting edema present; large area of ecchymosis extending from the post R calf to post R thigh; generalized muscular atrophy in all 4 extremities  CN 2-12 grossly intact; no gross focal neuro deficits; pt ambulates with walker      LABS                        7.5    13.00 )-----------( 189      ( 17 Apr 2024 20:38 )             23.0     04-17    134<L>  |  102  |  31<H>  ----------------------------<  133<H>  4.6   |  22  |  0.67    Ca    8.2<L>      17 Apr 2024 06:35  Phos  2.4     04-17  Mg     2.00     04-17    TPro  4.7<L>  /  Alb  2.9<L>  /  TBili  0.4  /  DBili  x   /  AST  16  /  ALT  34<H>  /  AlkPhos  65  04-17      PATHOLOGY  3/26 pleural fluid cytology: Neg for malignant cells.    3/26 Pericardium excision: Neg for malignancy.    3/20 Pericardial fluid cytology: Neg for malignant cells.    3/14 LYMPH NODE, SUPRACLAVICULAR, RIGHT, US GUIDED CORE BIOPSY AND FNA   POSITIVE FOR MALIGNANT CELLS.   Carcinoma   Touch Prep slides display crowded groups and single lying malignant   cells with enlarged nuclei containing prominent nucleoli and vacuolated   cytoplasm. Core biopsies show neoplastic cells positive for CK7 and CDX2   immunostains, while negative for CK20, TTF1, GATA3, TRPS1 and PAX8. 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 Suggest correlation with clinical information and imaging to   assist with next step management.   Moleculars: pMMR, PD-L1 TPS 1%; Her2 pending      TUMOR MARKERS  3/13 CEA 7.1  3/24 Ca 19-9 27  4/8 Ca-125 187, Ca 27.29 16.9, Ca 15-3 18.9, AFP 5.2      MICROBIOLOGY  Abx  Bactrim 4/8-present    Cx Data  4/5 MRSA swab: Not detected  3/16 Quant plus TB: Negative      PERTINENT RADIOLOGY  4/18 B/L LE Doppler: Acute deep venous thrombosis: above the knee.  Acute nonocclusive deep venous thrombosis in the right common femoral vein.    417 CXR: Clear lungs with minimal effusion and Pleurx catheter.    4/10 VA dopplers B/L UE: Extensive acute, occlusive DVT RIJ, innominate and subclavian veins.  Acute non-occlusive DVT w/in R axillary vein.  Extensive acute, occlusive DVT noted in L subclavian. axillary, and proximal brachial veins.  Acute non-occlusive DVT LIJ.  Superficial vein thromboses are noted in the B/L cephalic veins.    4/9 Echocardiogram: Normal LV systolic function.  Normal mitral valve w/ normal leaflet excursion.  No pericardial effusion seen    4/4 CXR: Clear lungs w/ R pleurx catheter in place.    ok4/4 CXR AM: Small R pleural effusion w/ pleurx cath improved    OSH Imaging ([facility])  3/29 CXR: Decrease in R pleural effusion.     3/28 B/L UE Dupplers: Acute DVT involving R IJ, innominate vein, subclavian, brachial, and L IJ.  Superficial venous thrombosis involving B/L cephalic veins.  + Edema of superficial soft tissue of UE R>L.    3/27 CT neck: Bulky and conglomerate LAD invading RIJ w/ thrombus extending up to hyoid level.  Thrombus is likely combination tumor and bland.  New LIJ nonocclusive thrombus.    3/27 CT Chest: New consolidations throughout the R lung worse RLL c/f aspiration PNA.  + R pleurx cath w/ decrease in R pleural effusion.  Extensive DVT w/in thoracic and lower neck, upper SVC remains obliterated.  Large R supraclavicular mass infiltrating into the mediastinum.      3/27 CXR: Progression of R consolidation/effusion.    3/21 TTE: LV grossly normal.  Thickened pericardium.  no pericardial effusion.    3/18 TTE: Mod pericardial effusion w/ e/o hemodynamic compromise w/ diasolic collapse of RA that exceeds 1/3 of cardiac cycle >30% resp variation across MV E. wave and early diastolic inversion of RV.    3/14 CT Chest: Conglomerate soft tissue in superior mediastinum and R supraclavicular region 2/2 LAD w/ internal hypodensity c/f necrosis.  Mass encases SVC and multiple great veins resulting in obliteration of the SVC and thrombosis of the RIJ.  Multiple R sided collateral vessels and R soft tissue edema.  B/L pulm nodules.    3/14 CT neck: Extensive cellulitis/myositis along R anterior lateral neck and R upper chest wall w/ inflammatory fat induration the deep soft tissue of neck.  Large supraclavicular/mediastinal mass lesion, presumably conglomerate of LN w/ mass effect and complete occlusion of RIJ w/ associated inflammation.  Complete occlusion of R subclavian vein and nearly occlusive thrombosis in the proximal L brachiocephalic vein.    3/13 RUQ Abd US: Cholelithiasis w/o e/o cholecystitis.  Dilated CBD w/o e/o intraductal stone or other obstruction. 1.4cm pancreatic cyst w/ mild duct dilatation.    3/12 CT abd/pelv: S/p R nephrectomy. no LAD.  New 1.6cm  pancreatic neck cystic lesion w/o main pancreatic ductal dilatation.    3/10 R breast US: No e/o breast abscess       SOLID TUMOR ONCOLOGY HOSPITALIST PROGRESS NOTE    S: No acute events overnight.  Pt expressed feeling frustrated at the thought of needing to be bedbound if an IV has to be placed in her foot.  She also began to cry and slamming things on her food tray.  She was unable to give an answer as to why the foot IV was necessary or what active medical conditions she had that necessitate use of IV medications.    CURRENT MEDICATIONS  MEDICATIONS  (STANDING):  aMIOdarone    Tablet 200 milliGRAM(s) Oral daily  Biotene Dry Mouth Oral Rinse 15 milliLiter(s) Swish and Spit every 6 hours  chlorhexidine 2% Cloths 1 Application(s) Topical daily  dexAMETHasone     Tablet 4 milliGRAM(s) Oral every 8 hours  diazepam    Tablet 5 milliGRAM(s) Oral daily  influenza   Vaccine 0.5 milliLiter(s) IntraMuscular once  metoprolol tartrate 25 milliGRAM(s) Oral every 12 hours  multivitamin 1 Tablet(s) Oral daily  naloxegol 25 milliGRAM(s) Oral daily  nicotine -  14 mG/24Hr(s) Patch 1 Patch Transdermal every 24 hours  oxyCODONE  ER Tablet 30 milliGRAM(s) Oral <User Schedule>  pantoprazole    Tablet 40 milliGRAM(s) Oral two times a day  polyethylene glycol 3350 17 Gram(s) Oral every 12 hours  trimethoprim  160 mG/sulfamethoxazole 800 mG 1 Tablet(s) Oral <User Schedule>  MEDICATIONS  (PRN):  albuterol    90 MICROgram(s) HFA Inhaler 2 Puff(s) Inhalation every 6 hours PRN Shortness of Breath and/or Wheezing  albuterol/ipratropium for Nebulization 3 milliLiter(s) Nebulizer every 6 hours PRN Shortness of Breath and/or Wheezing  aluminum hydroxide/magnesium hydroxide/simethicone Suspension 30 milliLiter(s) Oral every 6 hours PRN Dyspepsia  Biotene Dry Mouth Oral Rinse 15 milliLiter(s) Swish and Spit two times a day PRN dry mouth  diazepam    Tablet 5 milliGRAM(s) Oral daily PRN Anxiety  HYDROmorphone  Injectable 1 milliGRAM(s) IV Push every 3 hours PRN Severe Pain (7 - 10)  ondansetron    Tablet 4 milliGRAM(s) Oral every 8 hours PRN Nausea and/or Vomiting  oxyCODONE    IR 10 milliGRAM(s) Oral every 4 hours PRN Moderate Pain (4 - 6)  sodium chloride 0.9% lock flush 10 milliLiter(s) IV Push every 1 hour PRN Pre/post blood products, medications, blood draw, and to maintain line patency      PHYSICAL EXAM  T(C): 36.6 (04-18-24 @ 11:15), Max: 36.8 (04-18-24 @ 01:22)  HR: 75 (04-18-24 @ 11:15) (75 - 89)  BP: 127/74 (04-18-24 @ 05:15) (121/55 - 160/85)  RR: 18 (04-18-24 @ 11:15) (17 - 18)  SpO2: 100% (04-18-24 @ 11:15) (96% - 100%)    04-17-24 @ 07:01  -  04-18-24 @ 07:00  --------------------------------------------------------  IN: 0 mL / OUT: 45 mL / NET: -45 mL  Non-toxic appearing woman, sitting up in bed, appears comfortable, but agitated at times (thrashing her arms and shaking her head) during conversation  R eyelid ptosis and R pupil miosis present; anicteric sclera, no oral lesions/thrush  RRR, no m/r/g  Diminshed breath sounds in all R lung zones; LL zones CTA  Abd soft/nt/nd, normoactive bowel sounds  Trace LUE non-pitting edema present; large area of ecchymosis extending from the post R calf to post R thigh; generalized muscular atrophy in all 4 extremities  CN 2-12 grossly intact; no gross focal neuro deficits; pt ambulates with walker      LABS                        7.5    13.00 )-----------( 189      ( 17 Apr 2024 20:38 )             23.0     04-17    134<L>  |  102  |  31<H>  ----------------------------<  133<H>  4.6   |  22  |  0.67    Ca    8.2<L>      17 Apr 2024 06:35  Phos  2.4     04-17  Mg     2.00     04-17    TPro  4.7<L>  /  Alb  2.9<L>  /  TBili  0.4  /  DBili  x   /  AST  16  /  ALT  34<H>  /  AlkPhos  65  04-17      PATHOLOGY  3/26 pleural fluid cytology: Neg for malignant cells.    3/26 Pericardium excision: Neg for malignancy.    3/20 Pericardial fluid cytology: Neg for malignant cells.    3/14 LYMPH NODE, SUPRACLAVICULAR, RIGHT, US GUIDED CORE BIOPSY AND FNA   POSITIVE FOR MALIGNANT CELLS.   Carcinoma   Touch Prep slides display crowded groups and single lying malignant   cells with enlarged nuclei containing prominent nucleoli and vacuolated   cytoplasm. Core biopsies show neoplastic cells positive for CK7 and CDX2   immunostains, while negative for CK20, TTF1, GATA3, TRPS1 and PAX8. 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 Suggest correlation with clinical information and imaging to   assist with next step management.   Moleculars: pMMR, PD-L1 TPS 1%; Her2 pending      TUMOR MARKERS  3/13 CEA 7.1  3/24 Ca 19-9 27  4/8 Ca-125 187, Ca 27.29 16.9, Ca 15-3 18.9, AFP 5.2      MICROBIOLOGY  Abx  Bactrim 4/8-present    Cx Data  4/5 MRSA swab: Not detected  3/16 Quant plus TB: Negative      PERTINENT RADIOLOGY  4/18 B/L LE Doppler: Acute deep venous thrombosis: above the knee.  Acute nonocclusive deep venous thrombosis in the right common femoral vein.    417 CXR: Clear lungs with minimal effusion and Pleurx catheter.    4/10 VA dopplers B/L UE: Extensive acute, occlusive DVT RIJ, innominate and subclavian veins.  Acute non-occlusive DVT w/in R axillary vein.  Extensive acute, occlusive DVT noted in L subclavian. axillary, and proximal brachial veins.  Acute non-occlusive DVT LIJ.  Superficial vein thromboses are noted in the B/L cephalic veins.    4/9 Echocardiogram: Normal LV systolic function.  Normal mitral valve w/ normal leaflet excursion.  No pericardial effusion seen    4/4 CXR: Clear lungs w/ R pleurx catheter in place.    ok4/4 CXR AM: Small R pleural effusion w/ pleurx cath improved    OSH Imaging ([facility])  3/29 CXR: Decrease in R pleural effusion.     3/28 B/L UE Dupplers: Acute DVT involving R IJ, innominate vein, subclavian, brachial, and L IJ.  Superficial venous thrombosis involving B/L cephalic veins.  + Edema of superficial soft tissue of UE R>L.    3/27 CT neck: Bulky and conglomerate LAD invading RIJ w/ thrombus extending up to hyoid level.  Thrombus is likely combination tumor and bland.  New LIJ nonocclusive thrombus.    3/27 CT Chest: New consolidations throughout the R lung worse RLL c/f aspiration PNA.  + R pleurx cath w/ decrease in R pleural effusion.  Extensive DVT w/in thoracic and lower neck, upper SVC remains obliterated.  Large R supraclavicular mass infiltrating into the mediastinum.      3/27 CXR: Progression of R consolidation/effusion.    3/21 TTE: LV grossly normal.  Thickened pericardium.  no pericardial effusion.    3/18 TTE: Mod pericardial effusion w/ e/o hemodynamic compromise w/ diasolic collapse of RA that exceeds 1/3 of cardiac cycle >30% resp variation across MV E. wave and early diastolic inversion of RV.    3/14 CT Chest: Conglomerate soft tissue in superior mediastinum and R supraclavicular region 2/2 LAD w/ internal hypodensity c/f necrosis.  Mass encases SVC and multiple great veins resulting in obliteration of the SVC and thrombosis of the RIJ.  Multiple R sided collateral vessels and R soft tissue edema.  B/L pulm nodules.    3/14 CT neck: Extensive cellulitis/myositis along R anterior lateral neck and R upper chest wall w/ inflammatory fat induration the deep soft tissue of neck.  Large supraclavicular/mediastinal mass lesion, presumably conglomerate of LN w/ mass effect and complete occlusion of RIJ w/ associated inflammation.  Complete occlusion of R subclavian vein and nearly occlusive thrombosis in the proximal L brachiocephalic vein.    3/13 RUQ Abd US: Cholelithiasis w/o e/o cholecystitis.  Dilated CBD w/o e/o intraductal stone or other obstruction. 1.4cm pancreatic cyst w/ mild duct dilatation.    3/12 CT abd/pelv: S/p R nephrectomy. no LAD.  New 1.6cm  pancreatic neck cystic lesion w/o main pancreatic ductal dilatation.    3/10 R breast US: No e/o breast abscess

## 2024-04-18 NOTE — BH CONSULTATION LIAISON PROGRESS NOTE - [id number]_PSY_A_CORE
Chart reviewed, patient alert/oriented, visibly distressed/anxious, no si or hi, no psychosis.  Patient evaluated for capacity assessment with primary team ACP Sonali, who explained to the patient in detail indication/risk//benefit of need of IV line placement in LE, explained the reason for possible blood transfusion, pt. kept on saying  "I want to use the commode, I cannot stay in bed with IV". Despite extensive discussion/education, pt. unable to verbalize understanding of the indication/risk/benefits of accepting/refusing the proposed procedure/treatment, unable to appreciate her current acute medical condition, unable to manipulate the information in a logical manner when provided to her. I agree with medical team that at this time patient lacks capacity to refuse IV line placement in LE for possible blood transfusion.

## 2024-04-18 NOTE — PROGRESS NOTE ADULT - ASSESSMENT
59 yo woman, former smoker, with h/o R eye glaucoma, Fibromyalgia, Anxiety, GERD, and RCC s/p R total nephrectomy/hysterectomy; she was initially admitted to [facility] on 3/11 w/ R breast swelling c/f mastitis- imaging brooks noted supraclavicular/mediastinal mass lesion which encased the SVC and multiple other veins resulting in obliteration of the SVC and thrombosis of the R IJ, and a pancreatic neck cystic lesion.  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  Her disease/hospital course has also been c/b pericardial effusion s/p pericardial window w/ neg cytology, R pleural effusion, also negative cytology) s/p Pleurx, Afib w/ RVR and acute hypoxic resp failure 2/2 SVC syndrome- not dennis to IR-guided stenting; pt was ultimately started on Dex and transferred to [facility] on 4/4 for urgent palliative RT.    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)  pAfib, with RVR on this admission  Pericardial effusion with tamp physiology s/p pericardial window  R pleural effusion s/p pleurex  Acute hypoxic resp failure  Cancer-related pain, anxiety  Severe prot-ghazala malnutrition    - Metastatic cancer  Based on 3/14 SC LN path, ddx includes primary UGI vs pancreaticobiliary primary (breast edema is likely 2/2 SVC syndrome)  Ca-125 moderately elevated at 125; other tumor markers not significantly elevated  Pt will need additional diagnostic brooks- MRCP ordered and GI consulted for EUS/ERCP with biopsy of pancreatic neck lesion  Options for systemic cancer treatment are TBD  Pt's prognosis is guarded    - SVC syndrome  Appreciate Rad Onc eval/recs  Completing 10fxs of palliative RT on 10/18 (pt missed one fx thus far)  Continuing Dex PO 4mg q8 with pjp/ppi ppx, with plan to taper off over 2 weeks after the completion of RT    - Anemia  Hgb stable today at 7.5 from 7.6 on 4/17, prior baseline of 10-11 4/5 iron studies not c/w iron deficiency  ? GI blood loss vs RT induced marrow suppression  VSS and pt without clinical s/s of active bleeding  AC still on hold for now  FOBT pending; 4/17 hemolysis labs negative  PO PPI daily transitioned to PO PPI BID for now  Still trending BID cbcs; continuing supportive transfusions prn (goal hgb > 7; plts > 10K)    - Extensive b/l UE DVTs  - Acute RLE DVT a/w RLE ecchymosis  - Hypercoag state  AC still on hold for anemia brooks as above  Will plan to resume therapeutic lovenox if FOBT is negative   * Of note: pt has poor UE IV access and currently required peripheral IV in her LE extremities for admin of medication; can consider [facility] central line for urgent/emergent IV needs    - 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)  Pt with h/o R eye glaucoma, but miosis can also be associated with Pancoast syndrome i/s/o extensive R upper lung disease  Pt denies visual deficits  Planning for MRI Brain after pt completes RT  Will continue to monitor closely    - pAfib  Pt currently SR, HR controlled  Likely precipitated by malignancy, pericardial effusion, SVC syndrome  Continuing Metoprolol 25mg q12 with holding parameters  Continuing Amio 200mg daily (monitoring for toxicities)  Continuing telemetry    - Pericardial effusion with tamp physiology s/p pericardial window  - R pleural effusion s/p pleurex  Likely inflammatory; both pleural and pericardial fluid cytology is negative for malignant cells  4/9 surveillance TTE with pEF and no WMAs  Continuing R pleurex drainage- up to 500cc q48h/prn    - Acute hypoxic respiratory failure  Resolved  2/2 all of the above  Continuing supportive resp care:   * duonebs q6   * Tessalon 100mg q8   * steroids as above  Weaning supplemental O2 via NC as tolerate    - Cancer related pain, anxiety  Currently well controlled  Continuing Oxy ER 30mg q12 q8  Continuing Oxy IR 10mg q4/prn  Continuing Dilaudid IV prn for sev breakthrough pain  Continuing bowel regimen to prevent OIC (including Movantic)  Continuing Diazepam 5mg BID/prn    - Severe prot-ghazala malnutrition: appreciate RD recs; continuing regular diet with protein shake supplement BID 59 yo woman, former smoker, with h/o R eye glaucoma, Fibromyalgia, Anxiety, GERD, and RCC s/p R total nephrectomy/hysterectomy; she was initially admitted to [facility] on 3/11 w/ R breast swelling c/f mastitis- imaging brooks noted supraclavicular/mediastinal mass lesion which encased the SVC and multiple other veins resulting in obliteration of the SVC and thrombosis of the R IJ, and a pancreatic neck cystic lesion.  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  Her disease/hospital course has also been c/b pericardial effusion s/p pericardial window w/ neg cytology, R pleural effusion, also negative cytology) s/p Pleurx, Afib w/ RVR and acute hypoxic resp failure 2/2 SVC syndrome- not dennis to IR-guided stenting; pt was ultimately started on Dex and transferred to [facility] on 4/4 for urgent palliative RT.    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)  pAfib, with RVR on this admission  Pericardial effusion with tamp physiology s/p pericardial window  R pleural effusion s/p pleurex  Acute hypoxic resp failure  Anxiety/emotional lability  Cancer-related pain, anxiety  Severe prot-ghazala malnutrition    - Metastatic cancer  Based on 3/14 SC LN path, ddx includes primary UGI vs pancreaticobiliary primary (breast edema is likely 2/2 SVC syndrome); PD-L1 TPs 1%, pMMR, Her2 pending  Ca-125 moderately elevated at 125; other tumor markers not significantly elevated  Pt will need additional diagnostic brooks- MRCP ordered and GI consulted for EUS/ERCP with biopsy of pancreatic neck lesion  Options for systemic cancer treatment are TBD  Pt's prognosis is guarded    - SVC syndrome  Appreciate Rad Onc eval/recs  Completing 10fxs of palliative RT on 10/18 (pt missed one fx thus far)  Continuing Dex PO 4mg q8 with pjp/ppi ppx, with plan to taper off over 2 weeks after the completion of RT    - Anemia  Hgb stable today at 7.5 from 7.6 on 4/17, prior baseline of 10-11 4/5 iron studies not c/w iron deficiency  ? GI blood loss vs RT induced marrow suppression  VSS and pt without clinical s/s of active bleeding  AC still on hold for now  FOBT pending; 4/17 hemolysis labs negative  PO PPI daily transitioned to PO PPI BID for now  Still trending BID cbcs; continuing supportive transfusions prn (goal hgb > 7; plts > 10K)    - Extensive b/l UE DVTs  - Acute RLE DVT a/w RLE ecchymosis  - Hypercoag state  AC still on hold for anemia brooks as above  Will plan to resume therapeutic lovenox if FOBT is negative   * Of note: pt has poor UE IV access and currently required peripheral IV in her LE extremities for admin of medication; can consider [facility] central line for urgent/emergent IV needs    - Anxiety/emotional lability  Continuing Diazepam 5mg daily and daily/prn  Pt has poor health literacy and clinical insight which is negatively impacting her medical decision making- when ask if she wanted to defer medical decision making to her boyfriend or if she had a designated HCP she replied "he's already sick of me"   consulted for further eval    - 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)  Pt with h/o R eye glaucoma, but miosis can also be associated with Pancoast syndrome i/s/o extensive R upper lung disease  Pt denies visual deficits or other related symptoms   MRI Brain without contrast ordered for further eval  Will continue to monitor closely    - pAfib  Pt currently SR, HR controlled  Likely precipitated by malignancy, pericardial effusion, SVC syndrome  Continuing Metoprolol 25mg q12 with holding parameters  Continuing Amio 200mg daily (monitoring for toxicities)  Continuing telemetry    - Pericardial effusion with tamp physiology s/p pericardial window  - R pleural effusion s/p pleurex  Likely inflammatory; both pleural and pericardial fluid cytology is negative for malignant cells  4/9 surveillance TTE with pEF and no WMAs  Continuing R pleurex drainage- up to 500cc q48h/prn    - Acute hypoxic respiratory failure  Resolved  2/2 all of the above  Continuing supportive resp care:   * duonebs q6   * Tessalon 100mg q8   * steroids as above  Weaning supplemental O2 via NC as tolerate    - Cancer related pain, anxiety  Currently well controlled  Continuing Oxy ER 30mg q12 q8  Continuing Oxy IR 10mg q4/prn  Continuing Dilaudid IV prn for sev breakthrough pain  Continuing bowel regimen to prevent OIC (including Movantic)    - Severe prot-ghazala malnutrition: appreciate RD recs; continuing regular diet with protein shake supplement BID

## 2024-04-18 NOTE — PROVIDER CONTACT NOTE (OTHER) - ACTION/TREATMENT ORDERED:
PAT [place] made aware. Monitor size of hematoma. US duplex ordered. Pt found to have DVT in RLE. Lovenox restarted.

## 2024-04-18 NOTE — PROVIDER CONTACT NOTE (OTHER) - BACKGROUND
58F initially presented to [facility] w/ R breast swelling c/f cellulitis, w/ imaging noted supraclavicular/mediastinal mass lesion and complete occlusion of RIJ and SVC, pleural effusion s/p Pleurx.

## 2024-04-19 LAB
ALBUMIN SERPL ELPH-MCNC: 3.2 G/DL — LOW (ref 3.3–5)
ALP SERPL-CCNC: 73 U/L — SIGNIFICANT CHANGE UP (ref 40–120)
ALT FLD-CCNC: 42 U/L — HIGH (ref 4–33)
ANION GAP SERPL CALC-SCNC: 12 MMOL/L — SIGNIFICANT CHANGE UP (ref 7–14)
APTT BLD: 28.9 SEC — SIGNIFICANT CHANGE UP (ref 24.5–35.6)
AST SERPL-CCNC: 15 U/L — SIGNIFICANT CHANGE UP (ref 4–32)
BASOPHILS # BLD AUTO: 0.01 K/UL — SIGNIFICANT CHANGE UP (ref 0–0.2)
BASOPHILS NFR BLD AUTO: 0.1 % — SIGNIFICANT CHANGE UP (ref 0–2)
BILIRUB SERPL-MCNC: 0.4 MG/DL — SIGNIFICANT CHANGE UP (ref 0.2–1.2)
BUN SERPL-MCNC: 28 MG/DL — HIGH (ref 7–23)
CALCIUM SERPL-MCNC: 8.6 MG/DL — SIGNIFICANT CHANGE UP (ref 8.4–10.5)
CHLORIDE SERPL-SCNC: 102 MMOL/L — SIGNIFICANT CHANGE UP (ref 98–107)
CO2 SERPL-SCNC: 23 MMOL/L — SIGNIFICANT CHANGE UP (ref 22–31)
CREAT SERPL-MCNC: 0.66 MG/DL — SIGNIFICANT CHANGE UP (ref 0.5–1.3)
EGFR: 100 ML/MIN/1.73M2 — SIGNIFICANT CHANGE UP
EOSINOPHIL # BLD AUTO: 0.01 K/UL — SIGNIFICANT CHANGE UP (ref 0–0.5)
EOSINOPHIL NFR BLD AUTO: 0.1 % — SIGNIFICANT CHANGE UP (ref 0–6)
GLUCOSE SERPL-MCNC: 125 MG/DL — HIGH (ref 70–99)
HCT VFR BLD CALC: 24.8 % — LOW (ref 34.5–45)
HGB BLD-MCNC: 8 G/DL — LOW (ref 11.5–15.5)
IANC: 12.23 K/UL — HIGH (ref 1.8–7.4)
IMM GRANULOCYTES NFR BLD AUTO: 0.8 % — SIGNIFICANT CHANGE UP (ref 0–0.9)
INR BLD: 0.92 RATIO — SIGNIFICANT CHANGE UP (ref 0.85–1.18)
LYMPHOCYTES # BLD AUTO: 0.11 K/UL — LOW (ref 1–3.3)
LYMPHOCYTES # BLD AUTO: 0.9 % — LOW (ref 13–44)
MAGNESIUM SERPL-MCNC: 1.9 MG/DL — SIGNIFICANT CHANGE UP (ref 1.6–2.6)
MCHC RBC-ENTMCNC: 30.1 PG — SIGNIFICANT CHANGE UP (ref 27–34)
MCHC RBC-ENTMCNC: 32.3 GM/DL — SIGNIFICANT CHANGE UP (ref 32–36)
MCV RBC AUTO: 93.2 FL — SIGNIFICANT CHANGE UP (ref 80–100)
MONOCYTES # BLD AUTO: 0.47 K/UL — SIGNIFICANT CHANGE UP (ref 0–0.9)
MONOCYTES NFR BLD AUTO: 3.6 % — SIGNIFICANT CHANGE UP (ref 2–14)
NEUTROPHILS # BLD AUTO: 12.23 K/UL — HIGH (ref 1.8–7.4)
NEUTROPHILS NFR BLD AUTO: 94.5 % — HIGH (ref 43–77)
NRBC # BLD: 0 /100 WBCS — SIGNIFICANT CHANGE UP (ref 0–0)
NRBC # FLD: 0 K/UL — SIGNIFICANT CHANGE UP (ref 0–0)
PHOSPHATE SERPL-MCNC: 2.6 MG/DL — SIGNIFICANT CHANGE UP (ref 2.5–4.5)
PLATELET # BLD AUTO: 185 K/UL — SIGNIFICANT CHANGE UP (ref 150–400)
POTASSIUM SERPL-MCNC: 4.6 MMOL/L — SIGNIFICANT CHANGE UP (ref 3.5–5.3)
POTASSIUM SERPL-SCNC: 4.6 MMOL/L — SIGNIFICANT CHANGE UP (ref 3.5–5.3)
PROT SERPL-MCNC: 5 G/DL — LOW (ref 6–8.3)
PROTHROM AB SERPL-ACNC: 10.3 SEC — SIGNIFICANT CHANGE UP (ref 9.5–13)
RBC # BLD: 2.66 M/UL — LOW (ref 3.8–5.2)
RBC # FLD: 16.1 % — HIGH (ref 10.3–14.5)
SODIUM SERPL-SCNC: 137 MMOL/L — SIGNIFICANT CHANGE UP (ref 135–145)
WBC # BLD: 12.93 K/UL — HIGH (ref 3.8–10.5)
WBC # FLD AUTO: 12.93 K/UL — HIGH (ref 3.8–10.5)

## 2024-04-19 PROCEDURE — 74183 MRI ABD W/O CNTR FLWD CNTR: CPT | Mod: 26

## 2024-04-19 PROCEDURE — 99233 SBSQ HOSP IP/OBS HIGH 50: CPT

## 2024-04-19 RX ADMIN — CHLORHEXIDINE GLUCONATE 1 APPLICATION(S): 213 SOLUTION TOPICAL at 11:44

## 2024-04-19 RX ADMIN — Medication 25 MILLIGRAM(S): at 05:41

## 2024-04-19 RX ADMIN — Medication 30 MILLILITER(S): at 18:26

## 2024-04-19 RX ADMIN — ONDANSETRON 4 MILLIGRAM(S): 8 TABLET, FILM COATED ORAL at 10:03

## 2024-04-19 RX ADMIN — SENNA PLUS 2 TABLET(S): 8.6 TABLET ORAL at 21:31

## 2024-04-19 RX ADMIN — Medication 1 PATCH: at 11:44

## 2024-04-19 RX ADMIN — OXYCODONE HYDROCHLORIDE 30 MILLIGRAM(S): 5 TABLET ORAL at 13:50

## 2024-04-19 RX ADMIN — Medication 4 MILLIGRAM(S): at 21:31

## 2024-04-19 RX ADMIN — Medication 4 MILLIGRAM(S): at 05:42

## 2024-04-19 RX ADMIN — OXYCODONE HYDROCHLORIDE 30 MILLIGRAM(S): 5 TABLET ORAL at 21:30

## 2024-04-19 RX ADMIN — NALOXEGOL OXALATE 25 MILLIGRAM(S): 12.5 TABLET, FILM COATED ORAL at 11:44

## 2024-04-19 RX ADMIN — AMIODARONE HYDROCHLORIDE 200 MILLIGRAM(S): 400 TABLET ORAL at 05:42

## 2024-04-19 RX ADMIN — OXYCODONE HYDROCHLORIDE 30 MILLIGRAM(S): 5 TABLET ORAL at 00:14

## 2024-04-19 RX ADMIN — Medication 15 MILLILITER(S): at 05:38

## 2024-04-19 RX ADMIN — Medication 25 MILLIGRAM(S): at 18:30

## 2024-04-19 RX ADMIN — Medication 5 MILLIGRAM(S): at 18:23

## 2024-04-19 RX ADMIN — Medication 15 MILLILITER(S): at 18:29

## 2024-04-19 RX ADMIN — PANTOPRAZOLE SODIUM 40 MILLIGRAM(S): 20 TABLET, DELAYED RELEASE ORAL at 05:41

## 2024-04-19 RX ADMIN — Medication 15 MILLILITER(S): at 05:48

## 2024-04-19 RX ADMIN — Medication 15 MILLILITER(S): at 11:43

## 2024-04-19 RX ADMIN — Medication 5 MILLIGRAM(S): at 22:21

## 2024-04-19 RX ADMIN — Medication 15 MILLILITER(S): at 05:43

## 2024-04-19 RX ADMIN — Medication 4 MILLIGRAM(S): at 13:51

## 2024-04-19 RX ADMIN — POLYETHYLENE GLYCOL 3350 17 GRAM(S): 17 POWDER, FOR SOLUTION ORAL at 05:42

## 2024-04-19 RX ADMIN — OXYCODONE HYDROCHLORIDE 30 MILLIGRAM(S): 5 TABLET ORAL at 05:48

## 2024-04-19 RX ADMIN — Medication 30 MILLILITER(S): at 10:04

## 2024-04-19 RX ADMIN — PANTOPRAZOLE SODIUM 40 MILLIGRAM(S): 20 TABLET, DELAYED RELEASE ORAL at 18:29

## 2024-04-19 RX ADMIN — ENOXAPARIN SODIUM 50 MILLIGRAM(S): 100 INJECTION SUBCUTANEOUS at 05:42

## 2024-04-19 RX ADMIN — ENOXAPARIN SODIUM 50 MILLIGRAM(S): 100 INJECTION SUBCUTANEOUS at 18:30

## 2024-04-19 RX ADMIN — Medication 1 TABLET(S): at 11:44

## 2024-04-19 RX ADMIN — Medication 1 TABLET(S): at 10:04

## 2024-04-19 NOTE — PROGRESS NOTE ADULT - SUBJECTIVE AND OBJECTIVE BOX
SOLID TUMOR ONCOLOGY HOSPITALIST PROGRESS NOTE    S: No acute events overnight.  Pt had no new complaints this morning.  She reported that she no longer has shortness of breath and her phlegm production has subsided.  She also asked if she could have some "down time" before she goes for MRI because she's claustrophobic; she also asked if an open MRI is available in the hospital.    CURRENT MEDICATIONS  MEDICATIONS  (STANDING):  aMIOdarone    Tablet 200 milliGRAM(s) Oral daily  Biotene Dry Mouth Oral Rinse 15 milliLiter(s) Swish and Spit every 6 hours  chlorhexidine 2% Cloths 1 Application(s) Topical daily  dexAMETHasone     Tablet 4 milliGRAM(s) Oral every 8 hours  diazepam    Tablet 5 milliGRAM(s) Oral daily  enoxaparin Injectable 50 milliGRAM(s) SubCutaneous every 12 hours  influenza   Vaccine 0.5 milliLiter(s) IntraMuscular once  metoprolol tartrate 25 milliGRAM(s) Oral every 12 hours  multivitamin 1 Tablet(s) Oral daily  naloxegol 25 milliGRAM(s) Oral daily  nicotine -  14 mG/24Hr(s) Patch 1 Patch Transdermal every 24 hours  oxyCODONE  ER Tablet 30 milliGRAM(s) Oral <User Schedule>  pantoprazole    Tablet 40 milliGRAM(s) Oral two times a day  polyethylene glycol 3350 17 Gram(s) Oral every 12 hours  senna 2 Tablet(s) Oral at bedtime  trimethoprim  160 mG/sulfamethoxazole 800 mG 1 Tablet(s) Oral <User Schedule>  MEDICATIONS  (PRN):  albuterol    90 MICROgram(s) HFA Inhaler 2 Puff(s) Inhalation every 6 hours PRN Shortness of Breath and/or Wheezing  albuterol/ipratropium for Nebulization 3 milliLiter(s) Nebulizer every 6 hours PRN Shortness of Breath and/or Wheezing  aluminum hydroxide/magnesium hydroxide/simethicone Suspension 30 milliLiter(s) Oral every 6 hours PRN Dyspepsia  Biotene Dry Mouth Oral Rinse 15 milliLiter(s) Swish and Spit two times a day PRN dry mouth  diazepam    Tablet 5 milliGRAM(s) Oral daily PRN Anxiety  HYDROmorphone  Injectable 1 milliGRAM(s) IV Push every 3 hours PRN Severe Pain (7 - 10)  ondansetron    Tablet 4 milliGRAM(s) Oral every 8 hours PRN Nausea and/or Vomiting  oxyCODONE    IR 10 milliGRAM(s) Oral every 4 hours PRN Moderate Pain (4 - 6)  sodium chloride 0.9% lock flush 10 milliLiter(s) IV Push every 1 hour PRN Pre/post blood products, medications, blood draw, and to maintain line patency      PHYSICAL EXAM  T(C): 36.7 (04-19-24 @ 11:20), Max: 36.7 (04-18-24 @ 15:39)  HR: 77 (04-19-24 @ 11:20) (72 - 79)  BP: 143/89 (04-19-24 @ 11:20) (122/62 - 143/89)  RR: 18 (04-19-24 @ 11:20) (17 - 18)  SpO2: 98% (04-19-24 @ 11:20) (98% - 100%)    04-18-24 @ 07:01  -  04-19-24 @ 07:00  --------------------------------------------------------  IN: 0 mL / OUT: 90 mL / NET: -90 mL  Non-toxic appearing woman, sitting up in bed, appears comfortable, but agitated at times (thrashing her arms and shaking her head) during conversation  R eyelid ptosis and R pupil miosis present; anicteric sclera, no oral lesions/thrush  RRR, no m/r/g  Diminshed breath sounds in all R lung zones; LL zones CTA  Abd soft/nt/nd, normoactive bowel sounds  Trace LUE non-pitting edema present; large area of ecchymosis extending from the post R calf to post R thigh; generalized muscular atrophy in all 4 extremities  CN 2-12 grossly intact; no gross focal neuro deficits; pt ambulates with walker      LABS                        8.0    12.93 )-----------( 185      ( 19 Apr 2024 06:50 )             24.8     04-19    137  |  102  |  28<H>  ----------------------------<  125<H>  4.6   |  23  |  0.66    Ca    8.6      19 Apr 2024 06:50  Phos  2.6     04-19  Mg     1.90     04-19    TPro  5.0<L>  /  Alb  3.2<L>  /  TBili  0.4  /  DBili  x   /  AST  15  /  ALT  42<H>  /  AlkPhos  73  04-19    PT/INR - ( 19 Apr 2024 06:50 )   PT: 10.3 sec;   INR: 0.92 ratio    PTT - ( 19 Apr 2024 06:50 )  PTT:28.9 sec      PATHOLOGY  3/26 pleural fluid cytology: Neg for malignant cells.    3/26 Pericardium excision: Neg for malignancy.    3/20 Pericardial fluid cytology: Neg for malignant cells.    3/14 LYMPH NODE, SUPRACLAVICULAR, RIGHT, US GUIDED CORE BIOPSY AND FNA   POSITIVE FOR MALIGNANT CELLS.   Carcinoma   Touch Prep slides display crowded groups and single lying malignant   cells with enlarged nuclei containing prominent nucleoli and vacuolated   cytoplasm. Core biopsies show neoplastic cells positive for CK7 and CDX2   immunostains, while negative for CK20, TTF1, GATA3, TRPS1 and PAX8. 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 Suggest correlation with clinical information and imaging to   assist with next step management.   Moleculars: pMMR, PD-L1 TPS 1%; Her2 pending      TUMOR MARKERS  3/13 CEA 7.1  3/24 Ca 19-9 27  4/8 Ca-125 187, Ca 27.29 16.9, Ca 15-3 18.9, AFP 5.2      MICROBIOLOGY  Abx  Bactrim 4/8-present    Cx Data  4/5 MRSA swab: Not detected  3/16 Quant plus TB: Negative      PERTINENT RADIOLOGY  4/18 B/L LE Doppler: Acute deep venous thrombosis: above the knee.  Acute nonocclusive deep venous thrombosis in the right common femoral vein.    417 CXR: Clear lungs with minimal effusion and Pleurx catheter.    4/10 VA dopplers B/L UE: Extensive acute, occlusive DVT RIJ, innominate and subclavian veins.  Acute non-occlusive DVT w/in R axillary vein.  Extensive acute, occlusive DVT noted in L subclavian. axillary, and proximal brachial veins.  Acute non-occlusive DVT LIJ.  Superficial vein thromboses are noted in the B/L cephalic veins.    4/9 Echocardiogram: Normal LV systolic function.  Normal mitral valve w/ normal leaflet excursion.  No pericardial effusion seen    4/4 CXR: Clear lungs w/ R pleurx catheter in place.    ok4/4 CXR AM: Small R pleural effusion w/ pleurx cath improved    OSH Imaging ([facility])  3/29 CXR: Decrease in R pleural effusion.     3/28 B/L UE Dupplers: Acute DVT involving R IJ, innominate vein, subclavian, brachial, and L IJ.  Superficial venous thrombosis involving B/L cephalic veins.  + Edema of superficial soft tissue of UE R>L.    3/27 CT neck: Bulky and conglomerate LAD invading RIJ w/ thrombus extending up to hyoid level.  Thrombus is likely combination tumor and bland.  New LIJ nonocclusive thrombus.    3/27 CT Chest: New consolidations throughout the R lung worse RLL c/f aspiration PNA.  + R pleurx cath w/ decrease in R pleural effusion.  Extensive DVT w/in thoracic and lower neck, upper SVC remains obliterated.  Large R supraclavicular mass infiltrating into the mediastinum.      3/27 CXR: Progression of R consolidation/effusion.    3/21 TTE: LV grossly normal.  Thickened pericardium.  no pericardial effusion.    3/18 TTE: Mod pericardial effusion w/ e/o hemodynamic compromise w/ diasolic collapse of RA that exceeds 1/3 of cardiac cycle >30% resp variation across MV E. wave and early diastolic inversion of RV.    3/14 CT Chest: Conglomerate soft tissue in superior mediastinum and R supraclavicular region 2/2 LAD w/ internal hypodensity c/f necrosis.  Mass encases SVC and multiple great veins resulting in obliteration of the SVC and thrombosis of the RIJ.  Multiple R sided collateral vessels and R soft tissue edema.  B/L pulm nodules.    3/14 CT neck: Extensive cellulitis/myositis along R anterior lateral neck and R upper chest wall w/ inflammatory fat induration the deep soft tissue of neck.  Large supraclavicular/mediastinal mass lesion, presumably conglomerate of LN w/ mass effect and complete occlusion of RIJ w/ associated inflammation.  Complete occlusion of R subclavian vein and nearly occlusive thrombosis in the proximal L brachiocephalic vein.    3/13 RUQ Abd US: Cholelithiasis w/o e/o cholecystitis.  Dilated CBD w/o e/o intraductal stone or other obstruction. 1.4cm pancreatic cyst w/ mild duct dilatation.    3/12 CT abd/pelv: S/p R nephrectomy. no LAD.  New 1.6cm  pancreatic neck cystic lesion w/o main pancreatic ductal dilatation.    3/10 R breast US: No e/o breast abscess

## 2024-04-19 NOTE — CHART NOTE - NSCHARTNOTEFT_GEN_A_CORE
Brief Update Note    Please call back once MRCP resulted.   See prior note for full recs.  Rest of care per primary    Thank you for involving us in this patient's care. Please reach out if any further questions or concerns.    Angela Coffey MD  Gastroenterology/Hepatology Fellow, PGY-7    NON-URGENT CONSULTS:  Please email [email or web address]  AT NIGHT AND ON WEEKENDS:  Contact on-call GI fellow via answering service ([phone]) from 5pm-8am and on weekends/holidays  MONDAY-FRIDAY 8AM-5PM:  Pager: [phone] ([facility])  Pager: [phone] ([facility])

## 2024-04-19 NOTE — PROGRESS NOTE ADULT - ASSESSMENT
59 yo woman, former smoker, with h/o R eye glaucoma, Fibromyalgia, Anxiety, GERD, and RCC s/p R total nephrectomy/hysterectomy; she was initially admitted to [facility] on 3/11 w/ R breast swelling c/f mastitis- imaging brooks noted supraclavicular/mediastinal mass lesion which encased the SVC and multiple other veins resulting in obliteration of the SVC and thrombosis of the R IJ, and a pancreatic neck cystic lesion.  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  Her disease/hospital course has also been c/b pericardial effusion s/p pericardial window w/ neg cytology, R pleural effusion, also negative cytology) s/p Pleurx, Afib w/ RVR and acute hypoxic resp failure 2/2 SVC syndrome- not dennis to IR-guided stenting; pt was ultimately started on Dex and transferred to [facility] on 4/4 for urgent palliative RT.    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)  pAfib, with RVR on this admission  Pericardial effusion with tamp physiology s/p pericardial window  R pleural effusion s/p pleurex  Acute hypoxic resp failure  Anxiety/emotional lability  Cancer-related pain, anxiety  Severe prot-ghazala malnutrition    - Metastatic cancer  Based on 3/14 SC LN path, ddx includes primary UGI vs pancreaticobiliary primary (breast edema is likely 2/2 SVC syndrome); PD-L1 TPs 1%, pMMR, Her2 pending  Ca-125 moderately elevated at 125; other tumor markers not significantly elevated  Pt will need additional diagnostic brooks- MRCP ordered and GI consulted for EUS/ERCP with biopsy of pancreatic neck lesion (requesting Pulm and Cardiac clearance prior to EGD)  Options for systemic cancer treatment are TBD  Pt's prognosis is guarded    - SVC syndrome  Appreciate Rad Onc eval/recs  Completed 10fxs of palliative RT on 4/18   Continuing Dex PO 4mg q8 with pjp/ppi ppx, with plan to taper off over 2 weeks after the completion of RT    - Anemia  Hgb improved today, 8.0; prior baseline of 10-11 4/5 iron studies not c/w iron deficiency  Possibly due to RT- vs med-induced marrow suppression  VSS and pt without clinical s/s of active bleeding  4/17 hemolysis labs negative; 4/18 FOBT negative x2  Trending daily cbcs; continuing supportive transfusions prn (goal hgb > 7; plts > 10K)    - Extensive b/l UE DVTs  - Acute RLE DVT a/w RLE ecchymosis  - Hypercoag state  Continuing therapeutic lovenox (resumed on 4/18 given negative GIB brooks), benefits > risks   * Of note: pt has poor UE IV access and currently required peripheral IV in her LE extremities for admin of medication; can consider [facility] central line for urgent/emergent IV needs    - Anxiety/emotional lability  Continuing Diazepam 5mg daily and daily/prn  Pt has poor health literacy and clinical insight which is negatively impacting her medical decision making- when ask if she wanted to defer medical decision making to her boyfriend or if she had a designated HCP she replied "he's already sick of me"  Appreciate  consult: pt lacks medical decision making capacity; medical decision making will be deferred to pt's surrogate decision maker- Saeid Peña ([phone])    - 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)  Pt with h/o R eye glaucoma, but miosis can also be associated with Pancoast syndrome i/s/o extensive R upper lung tumor  Pt denies visual deficits or other related symptoms   MRI Brain without contrast ordered for further eval  Will continue to monitor closely    - pAfib  Pt currently SR, HR controlled  Likely precipitated by malignancy, pericardial effusion, SVC syndrome  Continuing Metoprolol 25mg q12 with holding parameters  Continuing Amio 200mg daily (monitoring for toxicities)  Continuing telemetry    - Pericardial effusion with tamp physiology s/p pericardial window  - R pleural effusion s/p pleurex  Likely inflammatory; both pleural and pericardial fluid cytology is negative for malignant cells  4/9 surveillance TTE with pEF and no WMAs  Continuing R pleurex drainage- up to 500cc q48h/prn    - Acute hypoxic respiratory failure  Resolved  2/2 all of the above  Continuing supportive resp care:   * duonebs q6   * Tessalon 100mg q8   * steroids as above  Weaning supplemental O2 via NC as tolerate    - Cancer related pain  Currently well controlled  Continuing Oxy ER 30mg q12 q8  Continuing Oxy IR 10mg q4/prn  Continuing Dilaudid IV prn for sev breakthrough pain  Continuing bowel regimen to prevent OIC (including Movantic)    - Severe prot-ghazala malnutrition: appreciate RD recs; continuing regular diet with protein shake supplement BID

## 2024-04-20 LAB
ALBUMIN SERPL ELPH-MCNC: 2.9 G/DL — LOW (ref 3.3–5)
ALP SERPL-CCNC: 70 U/L — SIGNIFICANT CHANGE UP (ref 40–120)
ALT FLD-CCNC: 40 U/L — HIGH (ref 4–33)
ANION GAP SERPL CALC-SCNC: 10 MMOL/L — SIGNIFICANT CHANGE UP (ref 7–14)
AST SERPL-CCNC: 15 U/L — SIGNIFICANT CHANGE UP (ref 4–32)
BASOPHILS # BLD AUTO: 0 K/UL — SIGNIFICANT CHANGE UP (ref 0–0.2)
BASOPHILS NFR BLD AUTO: 0 % — SIGNIFICANT CHANGE UP (ref 0–2)
BILIRUB SERPL-MCNC: 0.4 MG/DL — SIGNIFICANT CHANGE UP (ref 0.2–1.2)
BLD GP AB SCN SERPL QL: NEGATIVE — SIGNIFICANT CHANGE UP
BUN SERPL-MCNC: 28 MG/DL — HIGH (ref 7–23)
CALCIUM SERPL-MCNC: 8.5 MG/DL — SIGNIFICANT CHANGE UP (ref 8.4–10.5)
CHLORIDE SERPL-SCNC: 105 MMOL/L — SIGNIFICANT CHANGE UP (ref 98–107)
CO2 SERPL-SCNC: 24 MMOL/L — SIGNIFICANT CHANGE UP (ref 22–31)
CREAT SERPL-MCNC: 0.68 MG/DL — SIGNIFICANT CHANGE UP (ref 0.5–1.3)
EGFR: 100 ML/MIN/1.73M2 — SIGNIFICANT CHANGE UP
EOSINOPHIL # BLD AUTO: 0 K/UL — SIGNIFICANT CHANGE UP (ref 0–0.5)
EOSINOPHIL NFR BLD AUTO: 0 % — SIGNIFICANT CHANGE UP (ref 0–6)
GLUCOSE SERPL-MCNC: 112 MG/DL — HIGH (ref 70–99)
HCT VFR BLD CALC: 22 % — LOW (ref 34.5–45)
HCT VFR BLD CALC: 24.6 % — LOW (ref 34.5–45)
HGB BLD-MCNC: 7 G/DL — CRITICAL LOW (ref 11.5–15.5)
HGB BLD-MCNC: 7.8 G/DL — LOW (ref 11.5–15.5)
IANC: 8.41 K/UL — HIGH (ref 1.8–7.4)
LYMPHOCYTES # BLD AUTO: 0 % — LOW (ref 13–44)
LYMPHOCYTES # BLD AUTO: 0 K/UL — LOW (ref 1–3.3)
MAGNESIUM SERPL-MCNC: 2 MG/DL — SIGNIFICANT CHANGE UP (ref 1.6–2.6)
MANUAL SMEAR VERIFICATION: SIGNIFICANT CHANGE UP
MCHC RBC-ENTMCNC: 30.2 PG — SIGNIFICANT CHANGE UP (ref 27–34)
MCHC RBC-ENTMCNC: 30.2 PG — SIGNIFICANT CHANGE UP (ref 27–34)
MCHC RBC-ENTMCNC: 31.7 GM/DL — LOW (ref 32–36)
MCHC RBC-ENTMCNC: 31.8 GM/DL — LOW (ref 32–36)
MCV RBC AUTO: 94.8 FL — SIGNIFICANT CHANGE UP (ref 80–100)
MCV RBC AUTO: 95.3 FL — SIGNIFICANT CHANGE UP (ref 80–100)
MONOCYTES # BLD AUTO: 0.16 K/UL — SIGNIFICANT CHANGE UP (ref 0–0.9)
MONOCYTES NFR BLD AUTO: 1.8 % — LOW (ref 2–14)
NEUTROPHILS # BLD AUTO: 8.71 K/UL — HIGH (ref 1.8–7.4)
NEUTROPHILS NFR BLD AUTO: 96.4 % — HIGH (ref 43–77)
NEUTS BAND # BLD: 1.8 % — SIGNIFICANT CHANGE UP (ref 0–6)
NRBC # BLD: 0 /100 WBCS — SIGNIFICANT CHANGE UP (ref 0–0)
NRBC # FLD: 0 K/UL — SIGNIFICANT CHANGE UP (ref 0–0)
PHOSPHATE SERPL-MCNC: 2.5 MG/DL — SIGNIFICANT CHANGE UP (ref 2.5–4.5)
PLAT MORPH BLD: NORMAL — SIGNIFICANT CHANGE UP
PLATELET # BLD AUTO: 158 K/UL — SIGNIFICANT CHANGE UP (ref 150–400)
PLATELET # BLD AUTO: 164 K/UL — SIGNIFICANT CHANGE UP (ref 150–400)
PLATELET COUNT - ESTIMATE: NORMAL — SIGNIFICANT CHANGE UP
POTASSIUM SERPL-MCNC: 4.7 MMOL/L — SIGNIFICANT CHANGE UP (ref 3.5–5.3)
POTASSIUM SERPL-SCNC: 4.7 MMOL/L — SIGNIFICANT CHANGE UP (ref 3.5–5.3)
PROT SERPL-MCNC: 4.8 G/DL — LOW (ref 6–8.3)
RBC # BLD: 2.32 M/UL — LOW (ref 3.8–5.2)
RBC # BLD: 2.58 M/UL — LOW (ref 3.8–5.2)
RBC # FLD: 16.1 % — HIGH (ref 10.3–14.5)
RBC # FLD: 16.1 % — HIGH (ref 10.3–14.5)
RBC BLD AUTO: NORMAL — SIGNIFICANT CHANGE UP
RH IG SCN BLD-IMP: POSITIVE — SIGNIFICANT CHANGE UP
SODIUM SERPL-SCNC: 139 MMOL/L — SIGNIFICANT CHANGE UP (ref 135–145)
WBC # BLD: 10.91 K/UL — HIGH (ref 3.8–10.5)
WBC # BLD: 8.87 K/UL — SIGNIFICANT CHANGE UP (ref 3.8–10.5)
WBC # FLD AUTO: 10.91 K/UL — HIGH (ref 3.8–10.5)
WBC # FLD AUTO: 8.87 K/UL — SIGNIFICANT CHANGE UP (ref 3.8–10.5)

## 2024-04-20 PROCEDURE — 99232 SBSQ HOSP IP/OBS MODERATE 35: CPT

## 2024-04-20 RX ORDER — METOCLOPRAMIDE HCL 10 MG
10 TABLET ORAL EVERY 8 HOURS
Refills: 0 | Status: DISCONTINUED | OUTPATIENT
Start: 2024-04-20 | End: 2024-04-29

## 2024-04-20 RX ADMIN — OXYCODONE HYDROCHLORIDE 30 MILLIGRAM(S): 5 TABLET ORAL at 13:32

## 2024-04-20 RX ADMIN — AMIODARONE HYDROCHLORIDE 200 MILLIGRAM(S): 400 TABLET ORAL at 06:21

## 2024-04-20 RX ADMIN — Medication 4 MILLIGRAM(S): at 06:21

## 2024-04-20 RX ADMIN — Medication 4 MILLIGRAM(S): at 22:37

## 2024-04-20 RX ADMIN — CHLORHEXIDINE GLUCONATE 1 APPLICATION(S): 213 SOLUTION TOPICAL at 13:10

## 2024-04-20 RX ADMIN — Medication 1 PATCH: at 13:05

## 2024-04-20 RX ADMIN — Medication 30 MILLILITER(S): at 06:51

## 2024-04-20 RX ADMIN — Medication 15 MILLILITER(S): at 06:22

## 2024-04-20 RX ADMIN — Medication 15 MILLILITER(S): at 23:58

## 2024-04-20 RX ADMIN — Medication 25 MILLIGRAM(S): at 06:22

## 2024-04-20 RX ADMIN — Medication 1 PATCH: at 18:22

## 2024-04-20 RX ADMIN — Medication 30 MILLILITER(S): at 22:44

## 2024-04-20 RX ADMIN — Medication 1 PATCH: at 08:17

## 2024-04-20 RX ADMIN — SENNA PLUS 2 TABLET(S): 8.6 TABLET ORAL at 22:38

## 2024-04-20 RX ADMIN — ENOXAPARIN SODIUM 50 MILLIGRAM(S): 100 INJECTION SUBCUTANEOUS at 18:05

## 2024-04-20 RX ADMIN — Medication 15 MILLILITER(S): at 13:10

## 2024-04-20 RX ADMIN — Medication 1 PATCH: at 13:07

## 2024-04-20 RX ADMIN — PANTOPRAZOLE SODIUM 40 MILLIGRAM(S): 20 TABLET, DELAYED RELEASE ORAL at 18:05

## 2024-04-20 RX ADMIN — NALOXEGOL OXALATE 25 MILLIGRAM(S): 12.5 TABLET, FILM COATED ORAL at 18:06

## 2024-04-20 RX ADMIN — POLYETHYLENE GLYCOL 3350 17 GRAM(S): 17 POWDER, FOR SOLUTION ORAL at 06:23

## 2024-04-20 RX ADMIN — OXYCODONE HYDROCHLORIDE 30 MILLIGRAM(S): 5 TABLET ORAL at 22:36

## 2024-04-20 RX ADMIN — OXYCODONE HYDROCHLORIDE 30 MILLIGRAM(S): 5 TABLET ORAL at 06:21

## 2024-04-20 RX ADMIN — Medication 1 TABLET(S): at 18:06

## 2024-04-20 RX ADMIN — ENOXAPARIN SODIUM 50 MILLIGRAM(S): 100 INJECTION SUBCUTANEOUS at 06:22

## 2024-04-20 RX ADMIN — Medication 5 MILLIGRAM(S): at 23:57

## 2024-04-20 RX ADMIN — Medication 30 MILLILITER(S): at 13:06

## 2024-04-20 RX ADMIN — Medication 15 MILLILITER(S): at 18:04

## 2024-04-20 RX ADMIN — PANTOPRAZOLE SODIUM 40 MILLIGRAM(S): 20 TABLET, DELAYED RELEASE ORAL at 06:22

## 2024-04-20 RX ADMIN — Medication 25 MILLIGRAM(S): at 18:05

## 2024-04-20 RX ADMIN — Medication 4 MILLIGRAM(S): at 13:32

## 2024-04-20 NOTE — PROGRESS NOTE ADULT - ASSESSMENT
59 yo woman, former smoker, with h/o R eye glaucoma, Fibromyalgia, Anxiety, GERD, and RCC s/p R total nephrectomy/hysterectomy; she was initially admitted to [facility] on 3/11 w/ R breast swelling c/f mastitis- imaging brooks noted supraclavicular/mediastinal mass lesion which encased the SVC and multiple other veins resulting in obliteration of the SVC and thrombosis of the R IJ, and a pancreatic neck cystic lesion.  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  Her disease/hospital course has also been c/b pericardial effusion s/p pericardial window w/ neg cytology, R pleural effusion, also negative cytology) s/p Pleurx, Afib w/ RVR and acute hypoxic resp failure 2/2 SVC syndrome- not dennis to IR-guided stenting; pt was ultimately started on Dex and transferred to [facility] on 4/4 for urgent palliative RT.    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)  pAfib, with RVR on this admission  Pericardial effusion with tamp physiology s/p pericardial window  R pleural effusion s/p pleurex  Acute hypoxic resp failure  Anxiety/emotional lability  Cancer-related pain, anxiety  Severe prot-ghazala malnutrition    - Metastatic cancer  Based on 3/14 SC LN path, ddx includes primary UGI vs pancreaticobiliary primary (breast edema is likely 2/2 SVC syndrome); PD-L1 TPs 1%, pMMR, Her2 pending  Ca-125 moderately elevated at 125; other tumor markers not significantly elevated  Pt will need additional diagnostic brooks- MRCP ordered and GI consulted for EUS/ERCP with biopsy of pancreatic neck lesion (requesting Pulm and Cardiac clearance prior to EGD)  Options for systemic cancer treatment are TBD  Pt's prognosis is guarded    - SVC syndrome  Appreciate Rad Onc eval/recs  Completed 10fxs of palliative RT on 4/18   Continuing Dex PO 4mg q8 with pjp/ppi ppx, with plan to taper off over 2 weeks after the completion of RT    - Anemia  Hgb improved today, 8.0; prior baseline of 10-11 4/5 iron studies not c/w iron deficiency  Possibly due to RT- vs med-induced marrow suppression  VSS and pt without clinical s/s of active bleeding  4/17 hemolysis labs negative; 4/18 FOBT negative x2  Trending daily cbcs; continuing supportive transfusions prn (goal hgb > 7; plts > 10K)    - Extensive b/l UE DVTs  - Acute RLE DVT a/w RLE ecchymosis  - Hypercoag state  Continuing therapeutic lovenox (resumed on 4/18 given negative GIB brooks), benefits > risks   * Of note: pt has poor UE IV access and currently required peripheral IV in her LE extremities for admin of medication; can consider [facility] central line for urgent/emergent IV needs    - Anxiety/emotional lability  Continuing Diazepam 5mg daily and daily/prn  Pt has poor health literacy and clinical insight which is negatively impacting her medical decision making- when ask if she wanted to defer medical decision making to her boyfriend or if she had a designated HCP she replied "he's already sick of me"  Appreciate  consult: pt lacks medical decision making capacity; medical decision making will be deferred to pt's surrogate decision maker- Saeid Peña ([phone])    - 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)  Pt with h/o R eye glaucoma, but miosis can also be associated with Pancoast syndrome i/s/o extensive R upper lung tumor  Pt denies visual deficits or other related symptoms   MRI Brain without contrast ordered for further eval  Will continue to monitor closely    - pAfib  Pt currently SR, HR controlled  Likely precipitated by malignancy, pericardial effusion, SVC syndrome  Continuing Metoprolol 25mg q12 with holding parameters  Continuing Amio 200mg daily (monitoring for toxicities)  Continuing telemetry    - Pericardial effusion with tamp physiology s/p pericardial window  - R pleural effusion s/p pleurex  Likely inflammatory; both pleural and pericardial fluid cytology is negative for malignant cells  4/9 surveillance TTE with pEF and no WMAs  Continuing R pleurex drainage- up to 500cc q48h/prn    - Acute hypoxic respiratory failure  Resolved  2/2 all of the above  Continuing supportive resp care:   * duonebs q6   * Tessalon 100mg q8   * steroids as above  Weaning supplemental O2 via NC as tolerate    - Cancer related pain  Currently well controlled  Continuing Oxy ER 30mg q12 q8  Continuing Oxy IR 10mg q4/prn  Continuing Dilaudid IV prn for sev breakthrough pain  Continuing bowel regimen to prevent OIC (including Movantic)    - Severe prot-ghazala malnutrition: appreciate RD recs; continuing regular diet with protein shake supplement BID 59 yo woman, former smoker, with h/o R eye glaucoma, Fibromyalgia, Anxiety, GERD, and RCC s/p R total nephrectomy/hysterectomy; she was initially admitted to [facility] on 3/11 w/ R breast swelling c/f mastitis- imaging brooks noted supraclavicular/mediastinal mass lesion which encased the SVC and multiple other veins resulting in obliteration of the SVC and thrombosis of the R IJ, and a pancreatic neck cystic lesion.  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  Her disease/hospital course has also been c/b pericardial effusion s/p pericardial window w/ neg cytology, R pleural effusion, also negative cytology) s/p Pleurx, Afib w/ RVR and acute hypoxic resp failure 2/2 SVC syndrome- not amenable to IR-guided stenting; pt was ultimately started on Dex and transferred to [facility] on 4/4 for urgent palliative RT.    # Metastatic cancer  Based on 3/14 SC LN path, ddx includes primary UGI vs pancreaticobiliary primary (breast edema is likely 2/2 SVC syndrome); PD-L1 TPs 1%, pMMR, Her2 pending  - Ca-125 moderately elevated at 125; other tumor markers not significantly elevated  - MRCP completed /u radiology - reconsult GI consulted for EUS/ERCP after resulted  Pt's prognosis is guarded    # SVC syndrome  Appreciate Rad Onc eval/recs  Completed 10fxs of palliative RT on 4/18   Continuing Dex PO 4mg q8 with pjp/ppi ppx, with plan to taper off over 2 weeks after the completion of RT    # Anemia  Hgb stable around 8 (initial AM labs repeated)  - continuing supportive transfusions prn (goal hgb > 7; plts > 10K)    # Extensive b/l UE DVTs  # Acute RLE DVT a/w RLE ecchymosis  # Hypercoaguable state  Continuing therapeutic lovenox (resumed on 4/18 given negative GIB brooks), benefits > risks   * Of note: pt has poor UE IV access and currently required peripheral IV in her LE extremities for admin of medication; can consider [facility] central line for urgent/emergent IV needs    # Anxiety/emotional lability  Continuing Diazepam 5mg daily and daily/prn  Appreciate  consult: pt lacks medical decision making capacity; medical decision making will be deferred to pt's surrogate decision maker- Saeid Peña ([phone])    # 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)  Pt with h/o R eye glaucoma, but miosis can also be associated with Pancoast syndrome i/s/o extensive R upper lung tumor  Pt denies visual deficits or other related symptoms   [ ] MRI Brain ordered    # pAfib  Pt currently SR, HR controlled  Likely precipitated by malignancy, pericardial effusion, SVC syndrome  Continuing Metoprolol 25mg q12 with holding parameters  Continuing Amio 200mg daily (monitoring for toxicities)  on therapeutic lovenox as above   monitor telemetry    # Pericardial effusion with tamp physiology s/p pericardial window  # R pleural effusion s/p pleurex  Likely inflammatory; both pleural and pericardial fluid cytology is negative for malignant cells  4/9 surveillance TTE with pEF and no WMAs  Continuing R pleurex drainage- up to 500cc q48h/prn    # Acute hypoxic respiratory failure  2/2 all of the above  Continuing supportive care  Weaning supplemental O2 via NC as tolerate    # Cancer related pain  Currently well controlled  Continuing Oxy ER 30mg q12 q8  Continuing Oxy IR 10mg q4/prn  Continuing Dilaudid IV prn for sev breakthrough pain  Continuing bowel regimen to prevent OIC (including Movantic)    # Severe prot-ghazala malnutrition: appreciate RD recs; continuing regular diet with protein shake supplement BID

## 2024-04-20 NOTE — PROGRESS NOTE ADULT - SUBJECTIVE AND OBJECTIVE BOX
Patient is a 58y old  Female who presents with a chief complaint of SVC syndrome, DVT, mediastinal mass (17 Apr 2024 15:53)    Trevor Salazar MD   [facility] Division of Hospital Medicine   Pager [phone]  Reachable on [employer] Teams     SUBJECTIVE / OVERNIGHT EVENTS:  Patient seen and examined this morning.  States that she is feeling nauseous.   No events overnight. Hgb falsely low this morning at 7.0 as repeat without intervention was 7.8    MEDICATIONS  (STANDING):  aMIOdarone    Tablet 200 milliGRAM(s) Oral daily  Biotene Dry Mouth Oral Rinse 15 milliLiter(s) Swish and Spit every 6 hours  chlorhexidine 2% Cloths 1 Application(s) Topical daily  dexAMETHasone     Tablet 4 milliGRAM(s) Oral every 8 hours  diazepam    Tablet 5 milliGRAM(s) Oral daily  enoxaparin Injectable 50 milliGRAM(s) SubCutaneous every 12 hours  influenza   Vaccine 0.5 milliLiter(s) IntraMuscular once  metoprolol tartrate 25 milliGRAM(s) Oral every 12 hours  multivitamin 1 Tablet(s) Oral daily  naloxegol 25 milliGRAM(s) Oral daily  nicotine -  14 mG/24Hr(s) Patch 1 Patch Transdermal every 24 hours  oxyCODONE  ER Tablet 30 milliGRAM(s) Oral <User Schedule>  pantoprazole    Tablet 40 milliGRAM(s) Oral two times a day  polyethylene glycol 3350 17 Gram(s) Oral every 12 hours  senna 2 Tablet(s) Oral at bedtime  trimethoprim  160 mG/sulfamethoxazole 800 mG 1 Tablet(s) Oral <User Schedule>    MEDICATIONS  (PRN):  albuterol    90 MICROgram(s) HFA Inhaler 2 Puff(s) Inhalation every 6 hours PRN Shortness of Breath and/or Wheezing  albuterol/ipratropium for Nebulization 3 milliLiter(s) Nebulizer every 6 hours PRN Shortness of Breath and/or Wheezing  aluminum hydroxide/magnesium hydroxide/simethicone Suspension 30 milliLiter(s) Oral every 6 hours PRN Dyspepsia  Biotene Dry Mouth Oral Rinse 15 milliLiter(s) Swish and Spit two times a day PRN dry mouth  diazepam    Tablet 5 milliGRAM(s) Oral daily PRN Anxiety  HYDROmorphone  Injectable 1 milliGRAM(s) IV Push every 3 hours PRN Severe Pain (7 - 10)  metoclopramide Injectable 10 milliGRAM(s) IV Push every 8 hours PRN nausea, vomiting  ondansetron    Tablet 4 milliGRAM(s) Oral every 8 hours PRN Nausea and/or Vomiting  oxyCODONE    IR 10 milliGRAM(s) Oral every 4 hours PRN Moderate Pain (4 - 6)  sodium chloride 0.9% lock flush 10 milliLiter(s) IV Push every 1 hour PRN Pre/post blood products, medications, blood draw, and to maintain line patency      Vital Signs Last 24 Hrs  T(C): 36.8 (20 Apr 2024 11:29), Max: 36.8 (19 Apr 2024 21:08)  T(F): 98.2 (20 Apr 2024 11:29), Max: 98.3 (19 Apr 2024 21:08)  HR: 78 (20 Apr 2024 11:29) (77 - 91)  BP: 158/87 (20 Apr 2024 11:29) (122/62 - 158/87)  BP(mean): --  RR: 18 (20 Apr 2024 11:29) (17 - 19)  SpO2: 100% (20 Apr 2024 11:29) (95% - 100%)  CAPILLARY BLOOD GLUCOSE        I&O's Summary    20 Apr 2024 07:01  -  20 Apr 2024 12:50  --------------------------------------------------------  IN: 0 mL / OUT: 100 mL / NET: -100 mL        General: woman laying in bed appears comfortable in NAD, awake and alert  Eyes: RT eye ptosis and conjunctival injection   HENMT: NCAT, MMM   Respiratory: No respiratory distress, CTABL,   Cardiovascular: S1,S2; Regular rate and rhythm; No m/g/r.   Gastrointestinal: Soft, Nontender, Nondistended; +BS.   Extremities: Trace bilateral edema of BLEs, No c/c; warm to touch  Neurological: non -focal Moving all 4 extremities;   Skin: No rashes, No erythema   Psych:  appropriate mood and affect    LABS:                        7.8    10.91 )-----------( 164      ( 20 Apr 2024 08:10 )             24.6     04-20    139  |  105  |  28<H>  ----------------------------<  112<H>  4.7   |  24  |  0.68    Ca    8.5      20 Apr 2024 06:00  Phos  2.5     04-20  Mg     2.00     04-20    TPro  4.8<L>  /  Alb  2.9<L>  /  TBili  0.4  /  DBili  x   /  AST  15  /  ALT  40<H>  /  AlkPhos  70  04-20    PT/INR - ( 19 Apr 2024 06:50 )   PT: 10.3 sec;   INR: 0.92 ratio         PTT - ( 19 Apr 2024 06:50 )  PTT:28.9 sec      Urinalysis Basic - ( 20 Apr 2024 06:00 )    Color: x / Appearance: x / SG: x / pH: x  Gluc: 112 mg/dL / Ketone: x  / Bili: x / Urobili: x   Blood: x / Protein: x / Nitrite: x   Leuk Esterase: x / RBC: x / WBC x   Sq Epi: x / Non Sq Epi: x / Bacteria: x        RADIOLOGY & ADDITIONAL TESTS:    Imaging Personally Reviewed:    Consultant(s) Notes Reviewed:      Care Discussed with Consultants/Other Providers:   Patient is a 58y old  Female who presents with a chief complaint of SVC syndrome, DVT, mediastinal mass (17 Apr 2024 15:53)    Trevor Salazar MD   [facility] Division of Hospital Medicine   Pager [phone]  Reachable on Microsoft Teams     SUBJECTIVE / OVERNIGHT EVENTS:  Patient seen and examined this morning.  States that she is feeling nauseous. Had one episode of vomiting.  No events overnight. Hgb falsely low this morning at 7.0 as repeat without intervention was 7.8    MEDICATIONS  (STANDING):  aMIOdarone    Tablet 200 milliGRAM(s) Oral daily  Biotene Dry Mouth Oral Rinse 15 milliLiter(s) Swish and Spit every 6 hours  chlorhexidine 2% Cloths 1 Application(s) Topical daily  dexAMETHasone     Tablet 4 milliGRAM(s) Oral every 8 hours  diazepam    Tablet 5 milliGRAM(s) Oral daily  enoxaparin Injectable 50 milliGRAM(s) SubCutaneous every 12 hours  influenza   Vaccine 0.5 milliLiter(s) IntraMuscular once  metoprolol tartrate 25 milliGRAM(s) Oral every 12 hours  multivitamin 1 Tablet(s) Oral daily  naloxegol 25 milliGRAM(s) Oral daily  nicotine -  14 mG/24Hr(s) Patch 1 Patch Transdermal every 24 hours  oxyCODONE  ER Tablet 30 milliGRAM(s) Oral <User Schedule>  pantoprazole    Tablet 40 milliGRAM(s) Oral two times a day  polyethylene glycol 3350 17 Gram(s) Oral every 12 hours  senna 2 Tablet(s) Oral at bedtime  trimethoprim  160 mG/sulfamethoxazole 800 mG 1 Tablet(s) Oral <User Schedule>    MEDICATIONS  (PRN):  albuterol    90 MICROgram(s) HFA Inhaler 2 Puff(s) Inhalation every 6 hours PRN Shortness of Breath and/or Wheezing  albuterol/ipratropium for Nebulization 3 milliLiter(s) Nebulizer every 6 hours PRN Shortness of Breath and/or Wheezing  aluminum hydroxide/magnesium hydroxide/simethicone Suspension 30 milliLiter(s) Oral every 6 hours PRN Dyspepsia  Biotene Dry Mouth Oral Rinse 15 milliLiter(s) Swish and Spit two times a day PRN dry mouth  diazepam    Tablet 5 milliGRAM(s) Oral daily PRN Anxiety  HYDROmorphone  Injectable 1 milliGRAM(s) IV Push every 3 hours PRN Severe Pain (7 - 10)  metoclopramide Injectable 10 milliGRAM(s) IV Push every 8 hours PRN nausea, vomiting  ondansetron    Tablet 4 milliGRAM(s) Oral every 8 hours PRN Nausea and/or Vomiting  oxyCODONE    IR 10 milliGRAM(s) Oral every 4 hours PRN Moderate Pain (4 - 6)  sodium chloride 0.9% lock flush 10 milliLiter(s) IV Push every 1 hour PRN Pre/post blood products, medications, blood draw, and to maintain line patency      Vital Signs Last 24 Hrs  T(C): 36.8 (20 Apr 2024 11:29), Max: 36.8 (19 Apr 2024 21:08)  T(F): 98.2 (20 Apr 2024 11:29), Max: 98.3 (19 Apr 2024 21:08)  HR: 78 (20 Apr 2024 11:29) (77 - 91)  BP: 158/87 (20 Apr 2024 11:29) (122/62 - 158/87)  BP(mean): --  RR: 18 (20 Apr 2024 11:29) (17 - 19)  SpO2: 100% (20 Apr 2024 11:29) (95% - 100%)  CAPILLARY BLOOD GLUCOSE        I&O's Summary    20 Apr 2024 07:01  -  20 Apr 2024 12:50  --------------------------------------------------------  IN: 0 mL / OUT: 100 mL / NET: -100 mL        General: woman laying in bed appears comfortable in NAD, awake and alert  Eyes: RT eye ptosis and conjunctival injection   HENMT: NCAT, MMM   Respiratory: No respiratory distress, CTABL,   Cardiovascular: S1,S2; Regular rate and rhythm; No m/g/r.   Gastrointestinal: Soft, Nontender, Nondistended; +BS.   Extremities: Trace bilateral edema of BLEs, No c/c; warm to touch  Neurological: non -focal Moving all 4 extremities;   Skin: No rashes, No erythema   Psych:  appropriate mood and affect    LABS:                        7.8    10.91 )-----------( 164      ( 20 Apr 2024 08:10 )             24.6     04-20    139  |  105  |  28<H>  ----------------------------<  112<H>  4.7   |  24  |  0.68    Ca    8.5      20 Apr 2024 06:00  Phos  2.5     04-20  Mg     2.00     04-20    TPro  4.8<L>  /  Alb  2.9<L>  /  TBili  0.4  /  DBili  x   /  AST  15  /  ALT  40<H>  /  AlkPhos  70  04-20    PT/INR - ( 19 Apr 2024 06:50 )   PT: 10.3 sec;   INR: 0.92 ratio         PTT - ( 19 Apr 2024 06:50 )  PTT:28.9 sec      Urinalysis Basic - ( 20 Apr 2024 06:00 )    Color: x / Appearance: x / SG: x / pH: x  Gluc: 112 mg/dL / Ketone: x  / Bili: x / Urobili: x   Blood: x / Protein: x / Nitrite: x   Leuk Esterase: x / RBC: x / WBC x   Sq Epi: x / Non Sq Epi: x / Bacteria: x        RADIOLOGY & ADDITIONAL TESTS:    Imaging Personally Reviewed:    Consultant(s) Notes Reviewed:      Care Discussed with Consultants/Other Providers:

## 2024-04-21 LAB
ALBUMIN SERPL ELPH-MCNC: 3 G/DL — LOW (ref 3.3–5)
ALP SERPL-CCNC: 81 U/L — SIGNIFICANT CHANGE UP (ref 40–120)
ALT FLD-CCNC: 48 U/L — HIGH (ref 4–33)
ANION GAP SERPL CALC-SCNC: 13 MMOL/L — SIGNIFICANT CHANGE UP (ref 7–14)
AST SERPL-CCNC: 17 U/L — SIGNIFICANT CHANGE UP (ref 4–32)
BASOPHILS # BLD AUTO: 0.01 K/UL — SIGNIFICANT CHANGE UP (ref 0–0.2)
BASOPHILS NFR BLD AUTO: 0.1 % — SIGNIFICANT CHANGE UP (ref 0–2)
BILIRUB SERPL-MCNC: 0.4 MG/DL — SIGNIFICANT CHANGE UP (ref 0.2–1.2)
BUN SERPL-MCNC: 30 MG/DL — HIGH (ref 7–23)
CALCIUM SERPL-MCNC: 8.9 MG/DL — SIGNIFICANT CHANGE UP (ref 8.4–10.5)
CHLORIDE SERPL-SCNC: 104 MMOL/L — SIGNIFICANT CHANGE UP (ref 98–107)
CO2 SERPL-SCNC: 24 MMOL/L — SIGNIFICANT CHANGE UP (ref 22–31)
CREAT SERPL-MCNC: 0.66 MG/DL — SIGNIFICANT CHANGE UP (ref 0.5–1.3)
EGFR: 100 ML/MIN/1.73M2 — SIGNIFICANT CHANGE UP
EOSINOPHIL # BLD AUTO: 0.01 K/UL — SIGNIFICANT CHANGE UP (ref 0–0.5)
EOSINOPHIL NFR BLD AUTO: 0.1 % — SIGNIFICANT CHANGE UP (ref 0–6)
GLUCOSE SERPL-MCNC: 126 MG/DL — HIGH (ref 70–99)
HCT VFR BLD CALC: 25.6 % — LOW (ref 34.5–45)
HGB BLD-MCNC: 7.9 G/DL — LOW (ref 11.5–15.5)
IANC: 9.03 K/UL — HIGH (ref 1.8–7.4)
IMM GRANULOCYTES NFR BLD AUTO: 1.5 % — HIGH (ref 0–0.9)
LYMPHOCYTES # BLD AUTO: 0.11 K/UL — LOW (ref 1–3.3)
LYMPHOCYTES # BLD AUTO: 1.1 % — LOW (ref 13–44)
MAGNESIUM SERPL-MCNC: 2.3 MG/DL — SIGNIFICANT CHANGE UP (ref 1.6–2.6)
MCHC RBC-ENTMCNC: 29.5 PG — SIGNIFICANT CHANGE UP (ref 27–34)
MCHC RBC-ENTMCNC: 30.9 GM/DL — LOW (ref 32–36)
MCV RBC AUTO: 95.5 FL — SIGNIFICANT CHANGE UP (ref 80–100)
MONOCYTES # BLD AUTO: 0.28 K/UL — SIGNIFICANT CHANGE UP (ref 0–0.9)
MONOCYTES NFR BLD AUTO: 2.9 % — SIGNIFICANT CHANGE UP (ref 2–14)
NEUTROPHILS # BLD AUTO: 9.03 K/UL — HIGH (ref 1.8–7.4)
NEUTROPHILS NFR BLD AUTO: 94.3 % — HIGH (ref 43–77)
NRBC # BLD: 0 /100 WBCS — SIGNIFICANT CHANGE UP (ref 0–0)
NRBC # FLD: 0 K/UL — SIGNIFICANT CHANGE UP (ref 0–0)
PHOSPHATE SERPL-MCNC: 2.7 MG/DL — SIGNIFICANT CHANGE UP (ref 2.5–4.5)
PLATELET # BLD AUTO: 165 K/UL — SIGNIFICANT CHANGE UP (ref 150–400)
POTASSIUM SERPL-MCNC: 4.6 MMOL/L — SIGNIFICANT CHANGE UP (ref 3.5–5.3)
POTASSIUM SERPL-SCNC: 4.6 MMOL/L — SIGNIFICANT CHANGE UP (ref 3.5–5.3)
PROT SERPL-MCNC: 5.2 G/DL — LOW (ref 6–8.3)
RBC # BLD: 2.68 M/UL — LOW (ref 3.8–5.2)
RBC # FLD: 16.5 % — HIGH (ref 10.3–14.5)
SODIUM SERPL-SCNC: 141 MMOL/L — SIGNIFICANT CHANGE UP (ref 135–145)
WBC # BLD: 9.58 K/UL — SIGNIFICANT CHANGE UP (ref 3.8–10.5)
WBC # FLD AUTO: 9.58 K/UL — SIGNIFICANT CHANGE UP (ref 3.8–10.5)

## 2024-04-21 PROCEDURE — 99232 SBSQ HOSP IP/OBS MODERATE 35: CPT

## 2024-04-21 RX ORDER — DIPHENHYDRAMINE HYDROCHLORIDE AND LIDOCAINE HYDROCHLORIDE AND ALUMINUM HYDROXIDE AND MAGNESIUM HYDRO
5 KIT
Refills: 0 | Status: DISCONTINUED | OUTPATIENT
Start: 2024-04-21 | End: 2024-05-31

## 2024-04-21 RX ORDER — NYSTATIN 500MM UNIT
500000 POWDER (EA) MISCELLANEOUS
Refills: 0 | Status: COMPLETED | OUTPATIENT
Start: 2024-04-21 | End: 2024-04-28

## 2024-04-21 RX ADMIN — PANTOPRAZOLE SODIUM 40 MILLIGRAM(S): 20 TABLET, DELAYED RELEASE ORAL at 05:26

## 2024-04-21 RX ADMIN — OXYCODONE HYDROCHLORIDE 30 MILLIGRAM(S): 5 TABLET ORAL at 05:25

## 2024-04-21 RX ADMIN — ENOXAPARIN SODIUM 50 MILLIGRAM(S): 100 INJECTION SUBCUTANEOUS at 18:46

## 2024-04-21 RX ADMIN — Medication 1 TABLET(S): at 12:18

## 2024-04-21 RX ADMIN — Medication 30 MILLILITER(S): at 19:35

## 2024-04-21 RX ADMIN — Medication 4 MILLIGRAM(S): at 22:05

## 2024-04-21 RX ADMIN — Medication 500000 UNIT(S): at 17:44

## 2024-04-21 RX ADMIN — Medication 1 PATCH: at 12:17

## 2024-04-21 RX ADMIN — Medication 15 MILLILITER(S): at 12:23

## 2024-04-21 RX ADMIN — Medication 30 MILLILITER(S): at 05:27

## 2024-04-21 RX ADMIN — Medication 1 PATCH: at 07:53

## 2024-04-21 RX ADMIN — ONDANSETRON 4 MILLIGRAM(S): 8 TABLET, FILM COATED ORAL at 12:16

## 2024-04-21 RX ADMIN — Medication 15 MILLILITER(S): at 05:26

## 2024-04-21 RX ADMIN — OXYCODONE HYDROCHLORIDE 30 MILLIGRAM(S): 5 TABLET ORAL at 22:05

## 2024-04-21 RX ADMIN — Medication 25 MILLIGRAM(S): at 05:26

## 2024-04-21 RX ADMIN — OXYCODONE HYDROCHLORIDE 30 MILLIGRAM(S): 5 TABLET ORAL at 15:26

## 2024-04-21 RX ADMIN — CHLORHEXIDINE GLUCONATE 1 APPLICATION(S): 213 SOLUTION TOPICAL at 12:17

## 2024-04-21 RX ADMIN — PANTOPRAZOLE SODIUM 40 MILLIGRAM(S): 20 TABLET, DELAYED RELEASE ORAL at 17:25

## 2024-04-21 RX ADMIN — ENOXAPARIN SODIUM 50 MILLIGRAM(S): 100 INJECTION SUBCUTANEOUS at 05:26

## 2024-04-21 RX ADMIN — NALOXEGOL OXALATE 25 MILLIGRAM(S): 12.5 TABLET, FILM COATED ORAL at 18:46

## 2024-04-21 RX ADMIN — Medication 4 MILLIGRAM(S): at 05:25

## 2024-04-21 RX ADMIN — Medication 500000 UNIT(S): at 12:23

## 2024-04-21 RX ADMIN — AMIODARONE HYDROCHLORIDE 200 MILLIGRAM(S): 400 TABLET ORAL at 05:26

## 2024-04-21 RX ADMIN — Medication 1 PATCH: at 18:12

## 2024-04-21 RX ADMIN — Medication 15 MILLILITER(S): at 17:44

## 2024-04-21 RX ADMIN — Medication 25 MILLIGRAM(S): at 17:24

## 2024-04-21 RX ADMIN — Medication 1 PATCH: at 12:39

## 2024-04-21 RX ADMIN — Medication 4 MILLIGRAM(S): at 15:25

## 2024-04-21 RX ADMIN — SENNA PLUS 2 TABLET(S): 8.6 TABLET ORAL at 22:05

## 2024-04-21 NOTE — CHART NOTE - NSCHARTNOTEFT_GEN_A_CORE
Patient with pancreatic cystic neck lesion, s/p MRCP. Discussed with GI Fellow, with tentative plan for EUS/FNB 4/22. Informed patient of the plan, patient requesting to discuss MRCP results and formal plan with GI on Monday morning before planning or scheduling tentative procedure. Attending updated.

## 2024-04-21 NOTE — PROGRESS NOTE ADULT - ASSESSMENT
57 yo woman, former smoker, with h/o R eye glaucoma, Fibromyalgia, Anxiety, GERD, and RCC s/p R total nephrectomy/hysterectomy; she was initially admitted to [facility] on 3/11 w/ R breast swelling c/f mastitis- imaging brooks noted supraclavicular/mediastinal mass lesion which encased the SVC and multiple other veins resulting in obliteration of the SVC and thrombosis of the R IJ, and a pancreatic neck cystic lesion.  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  Her disease/hospital course has also been c/b pericardial effusion s/p pericardial window w/ neg cytology, R pleural effusion, also negative cytology) s/p Pleurx, Afib w/ RVR and acute hypoxic resp failure 2/2 SVC syndrome- not amenable to IR-guided stenting; pt was ultimately started on Dex and transferred to [facility] on 4/4 for urgent palliative RT.    # Metastatic cancer  Based on 3/14 SC LN path, ddx includes primary UGI vs pancreaticobiliary primary (breast edema is likely 2/2 SVC syndrome); PD-L1 TPs 1%, pMMR, Her2 pending  - Ca-125 moderately elevated at 125; other tumor markers not significantly elevated  - MRCP suboptimal study-> GI notified of results, f/u recs  Pt's prognosis is guarded    # SVC syndrome  Appreciate Rad Onc eval/recs  Completed 10fxs of palliative RT on 4/18   Continuing Dex PO 4mg q8 with pjp/ppi ppx, with plan to taper off over 2 weeks after the completion of RT    # Oral candidiasis   - reporting sore throat   - will start nystatin S&S  - GI following, follow up recs     # Anemia  Hgb stable around 8 (initial AM labs repeated)  - continuing supportive transfusions prn (goal hgb > 7; plts > 10K)    # Extensive b/l UE DVTs  # Acute RLE DVT a/w RLE ecchymosis  # Hypercoagulable state  Continuing therapeutic lovenox (resumed on 4/18 given negative GIB brooks), benefits > risks   * Of note: pt has poor UE IV access and currently required peripheral IV in her LE extremities for admin of medication; can consider [facility] central line for urgent/emergent IV needs    # Anxiety/emotional lability  Continuing Diazepam 5mg daily and daily/prn  Appreciate  consult: pt lacks medical decision making capacity; medical decision making will be deferred to pt's surrogate decision maker- Saeid Peña ([phone])    # 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)  Pt with h/o R eye glaucoma, but miosis can also be associated with Pancoast syndrome i/s/o extensive R upper lung tumor  Pt denies visual deficits or other related symptoms   [ ] MRI Brain ordered    # pAfib  Pt currently SR, HR controlled  Likely precipitated by malignancy, pericardial effusion, SVC syndrome  Continuing Metoprolol 25mg q12 with holding parameters  Continuing Amio 200mg daily (monitoring for toxicities)  on therapeutic lovenox as above   monitor telemetry    # Pericardial effusion with tamp physiology s/p pericardial window  # R pleural effusion s/p pleurex  Likely inflammatory; both pleural and pericardial fluid cytology is negative for malignant cells  4/9 surveillance TTE with pEF and no WMAs  Continuing R Pleurx drainage- up to 500cc q48h/prn    # Acute hypoxic respiratory failure  2/2 all of the above  Continuing supportive care  Weaning supplemental O2 via NC as tolerate    # Cancer related pain  Currently well controlled  Continuing Oxy ER 30mg q12 q8  Continuing Oxy IR 10mg q4/prn  Continuing Dilaudid IV prn for sev breakthrough pain  Continuing bowel regimen to prevent OIC (including Movantic)    # Severe prot-ghazala malnutrition: appreciate RD recs; continuing regular diet with protein shake supplement BID

## 2024-04-21 NOTE — PROGRESS NOTE ADULT - SUBJECTIVE AND OBJECTIVE BOX
Patient is a 58y old  Female who presents with a chief complaint of SVC syndrome, DVT, mediastinal mass (20 Apr 2024 12:50)    Trevor Salazar MD   [facility] Division of Hospital Medicine   Pager [phone]  Reachable on Microsoft Teams     SUBJECTIVE / OVERNIGHT EVENTS:  Patient seen and examined this morning. No events overnight.   States that her throat is sore and has been having some pain on swallowing.     MEDICATIONS  (STANDING):  aMIOdarone    Tablet 200 milliGRAM(s) Oral daily  Biotene Dry Mouth Oral Rinse 15 milliLiter(s) Swish and Spit every 6 hours  chlorhexidine 2% Cloths 1 Application(s) Topical daily  dexAMETHasone     Tablet 4 milliGRAM(s) Oral every 8 hours  diazepam    Tablet 5 milliGRAM(s) Oral daily  enoxaparin Injectable 50 milliGRAM(s) SubCutaneous every 12 hours  influenza   Vaccine 0.5 milliLiter(s) IntraMuscular once  metoprolol tartrate 25 milliGRAM(s) Oral every 12 hours  multivitamin 1 Tablet(s) Oral daily  naloxegol 25 milliGRAM(s) Oral daily  nicotine -  14 mG/24Hr(s) Patch 1 Patch Transdermal every 24 hours  nystatin    Suspension 202923 Unit(s) Swish and Swallow four times a day  oxyCODONE  ER Tablet 30 milliGRAM(s) Oral <User Schedule>  pantoprazole    Tablet 40 milliGRAM(s) Oral two times a day  polyethylene glycol 3350 17 Gram(s) Oral every 12 hours  senna 2 Tablet(s) Oral at bedtime  trimethoprim  160 mG/sulfamethoxazole 800 mG 1 Tablet(s) Oral <User Schedule>    MEDICATIONS  (PRN):  albuterol    90 MICROgram(s) HFA Inhaler 2 Puff(s) Inhalation every 6 hours PRN Shortness of Breath and/or Wheezing  albuterol/ipratropium for Nebulization 3 milliLiter(s) Nebulizer every 6 hours PRN Shortness of Breath and/or Wheezing  aluminum hydroxide/magnesium hydroxide/simethicone Suspension 30 milliLiter(s) Oral every 6 hours PRN Dyspepsia  Biotene Dry Mouth Oral Rinse 15 milliLiter(s) Swish and Spit two times a day PRN dry mouth  diazepam    Tablet 5 milliGRAM(s) Oral daily PRN Anxiety  FIRST- Mouthwash  BLM 5 milliLiter(s) Swish and Spit four times a day PRN Mouth Care  HYDROmorphone  Injectable 1 milliGRAM(s) IV Push every 3 hours PRN Severe Pain (7 - 10)  metoclopramide Injectable 10 milliGRAM(s) IV Push every 8 hours PRN nausea, vomiting  ondansetron    Tablet 4 milliGRAM(s) Oral every 8 hours PRN Nausea and/or Vomiting  oxyCODONE    IR 10 milliGRAM(s) Oral every 4 hours PRN Moderate Pain (4 - 6)  sodium chloride 0.9% lock flush 10 milliLiter(s) IV Push every 1 hour PRN Pre/post blood products, medications, blood draw, and to maintain line patency      Vital Signs Last 24 Hrs  T(C): 36.3 (21 Apr 2024 12:00), Max: 36.7 (20 Apr 2024 15:15)  T(F): 97.4 (21 Apr 2024 12:00), Max: 98 (20 Apr 2024 15:15)  HR: 73 (21 Apr 2024 12:00) (70 - 74)  BP: 146/57 (21 Apr 2024 12:00) (135/70 - 150/90)  BP(mean): --  RR: 18 (21 Apr 2024 12:00) (18 - 19)  SpO2: 100% (21 Apr 2024 12:00) (98% - 100%)  CAPILLARY BLOOD GLUCOSE        I&O's Summary    20 Apr 2024 07:01  -  21 Apr 2024 07:00  --------------------------------------------------------  IN: 150 mL / OUT: 100 mL / NET: 50 mL      General: woman laying in bed appears comfortable in NAD, awake and alert  Eyes: RT eye ptosis and conjunctival injection   HENMT: MMM, multiple white plaques on the tounge and oropharynx  Respiratory: No respiratory distress, CTABL,   Cardiovascular: S1,S2; Regular rate and rhythm; No m/g/r.   Gastrointestinal: Soft, Nontender, Nondistended; +BS.   Extremities: Trace bilateral edema of BLEs, No c/c; warm to touch  Neurological: non -focal Moving all 4 extremities;   Psych:  appropriate mood and affect      LABS:                        7.9    9.58  )-----------( 165      ( 21 Apr 2024 06:35 )             25.6     04-21    141  |  104  |  30<H>  ----------------------------<  126<H>  4.6   |  24  |  0.66    Ca    8.9      21 Apr 2024 06:35  Phos  2.7     04-21  Mg     2.30     04-21    TPro  5.2<L>  /  Alb  3.0<L>  /  TBili  0.4  /  DBili  x   /  AST  17  /  ALT  48<H>  /  AlkPhos  81  04-21          Urinalysis Basic - ( 21 Apr 2024 06:35 )    Color: x / Appearance: x / SG: x / pH: x  Gluc: 126 mg/dL / Ketone: x  / Bili: x / Urobili: x   Blood: x / Protein: x / Nitrite: x   Leuk Esterase: x / RBC: x / WBC x   Sq Epi: x / Non Sq Epi: x / Bacteria: x        RADIOLOGY & ADDITIONAL TESTS:    Imaging Personally Reviewed:    Consultant(s) Notes Reviewed:      Care Discussed with Consultants/Other Providers:

## 2024-04-22 LAB
ALBUMIN SERPL ELPH-MCNC: 3.1 G/DL — LOW (ref 3.3–5)
ALP SERPL-CCNC: 80 U/L — SIGNIFICANT CHANGE UP (ref 40–120)
ALT FLD-CCNC: 48 U/L — HIGH (ref 4–33)
ANION GAP SERPL CALC-SCNC: 10 MMOL/L — SIGNIFICANT CHANGE UP (ref 7–14)
APTT BLD: 29.3 SEC — SIGNIFICANT CHANGE UP (ref 24.5–35.6)
AST SERPL-CCNC: 15 U/L — SIGNIFICANT CHANGE UP (ref 4–32)
BASOPHILS # BLD AUTO: 0.01 K/UL — SIGNIFICANT CHANGE UP (ref 0–0.2)
BASOPHILS NFR BLD AUTO: 0.1 % — SIGNIFICANT CHANGE UP (ref 0–2)
BILIRUB SERPL-MCNC: 0.3 MG/DL — SIGNIFICANT CHANGE UP (ref 0.2–1.2)
BUN SERPL-MCNC: 34 MG/DL — HIGH (ref 7–23)
CALCIUM SERPL-MCNC: 8.5 MG/DL — SIGNIFICANT CHANGE UP (ref 8.4–10.5)
CHLORIDE SERPL-SCNC: 103 MMOL/L — SIGNIFICANT CHANGE UP (ref 98–107)
CO2 SERPL-SCNC: 26 MMOL/L — SIGNIFICANT CHANGE UP (ref 22–31)
CREAT SERPL-MCNC: 0.67 MG/DL — SIGNIFICANT CHANGE UP (ref 0.5–1.3)
EGFR: 100 ML/MIN/1.73M2 — SIGNIFICANT CHANGE UP
EOSINOPHIL # BLD AUTO: 0.02 K/UL — SIGNIFICANT CHANGE UP (ref 0–0.5)
EOSINOPHIL NFR BLD AUTO: 0.2 % — SIGNIFICANT CHANGE UP (ref 0–6)
GLUCOSE SERPL-MCNC: 142 MG/DL — HIGH (ref 70–99)
HCT VFR BLD CALC: 25.2 % — LOW (ref 34.5–45)
HGB BLD-MCNC: 7.9 G/DL — LOW (ref 11.5–15.5)
IANC: 8.8 K/UL — HIGH (ref 1.8–7.4)
IMM GRANULOCYTES NFR BLD AUTO: 1.6 % — HIGH (ref 0–0.9)
INR BLD: 0.93 RATIO — SIGNIFICANT CHANGE UP (ref 0.85–1.18)
LYMPHOCYTES # BLD AUTO: 0.08 K/UL — LOW (ref 1–3.3)
LYMPHOCYTES # BLD AUTO: 0.9 % — LOW (ref 13–44)
MAGNESIUM SERPL-MCNC: 2.3 MG/DL — SIGNIFICANT CHANGE UP (ref 1.6–2.6)
MCHC RBC-ENTMCNC: 30.2 PG — SIGNIFICANT CHANGE UP (ref 27–34)
MCHC RBC-ENTMCNC: 31.3 GM/DL — LOW (ref 32–36)
MCV RBC AUTO: 96.2 FL — SIGNIFICANT CHANGE UP (ref 80–100)
MONOCYTES # BLD AUTO: 0.29 K/UL — SIGNIFICANT CHANGE UP (ref 0–0.9)
MONOCYTES NFR BLD AUTO: 3.1 % — SIGNIFICANT CHANGE UP (ref 2–14)
NEUTROPHILS # BLD AUTO: 8.8 K/UL — HIGH (ref 1.8–7.4)
NEUTROPHILS NFR BLD AUTO: 94.1 % — HIGH (ref 43–77)
NRBC # BLD: 0 /100 WBCS — SIGNIFICANT CHANGE UP (ref 0–0)
NRBC # FLD: 0 K/UL — SIGNIFICANT CHANGE UP (ref 0–0)
PHOSPHATE SERPL-MCNC: 2.3 MG/DL — LOW (ref 2.5–4.5)
PLATELET # BLD AUTO: 168 K/UL — SIGNIFICANT CHANGE UP (ref 150–400)
POTASSIUM SERPL-MCNC: 4.9 MMOL/L — SIGNIFICANT CHANGE UP (ref 3.5–5.3)
POTASSIUM SERPL-SCNC: 4.9 MMOL/L — SIGNIFICANT CHANGE UP (ref 3.5–5.3)
PROT SERPL-MCNC: 4.8 G/DL — LOW (ref 6–8.3)
PROTHROM AB SERPL-ACNC: 10.5 SEC — SIGNIFICANT CHANGE UP (ref 9.5–13)
RBC # BLD: 2.62 M/UL — LOW (ref 3.8–5.2)
RBC # FLD: 16.5 % — HIGH (ref 10.3–14.5)
SODIUM SERPL-SCNC: 139 MMOL/L — SIGNIFICANT CHANGE UP (ref 135–145)
WBC # BLD: 9.35 K/UL — SIGNIFICANT CHANGE UP (ref 3.8–10.5)
WBC # FLD AUTO: 9.35 K/UL — SIGNIFICANT CHANGE UP (ref 3.8–10.5)

## 2024-04-22 PROCEDURE — 99232 SBSQ HOSP IP/OBS MODERATE 35: CPT

## 2024-04-22 PROCEDURE — 99233 SBSQ HOSP IP/OBS HIGH 50: CPT

## 2024-04-22 PROCEDURE — 99223 1ST HOSP IP/OBS HIGH 75: CPT | Mod: GC

## 2024-04-22 PROCEDURE — 99222 1ST HOSP IP/OBS MODERATE 55: CPT | Mod: GC

## 2024-04-22 RX ORDER — DEXAMETHASONE 0.5 MG/5ML
4 ELIXIR ORAL DAILY
Refills: 0 | Status: DISCONTINUED | OUTPATIENT
Start: 2024-04-27 | End: 2024-05-01

## 2024-04-22 RX ORDER — DEXAMETHASONE 0.5 MG/5ML
4 ELIXIR ORAL
Refills: 0 | Status: COMPLETED | OUTPATIENT
Start: 2024-04-23 | End: 2024-04-26

## 2024-04-22 RX ADMIN — Medication 25 MILLIGRAM(S): at 17:43

## 2024-04-22 RX ADMIN — Medication 15 MILLILITER(S): at 05:14

## 2024-04-22 RX ADMIN — ENOXAPARIN SODIUM 50 MILLIGRAM(S): 100 INJECTION SUBCUTANEOUS at 17:44

## 2024-04-22 RX ADMIN — ENOXAPARIN SODIUM 50 MILLIGRAM(S): 100 INJECTION SUBCUTANEOUS at 05:14

## 2024-04-22 RX ADMIN — Medication 1 PATCH: at 18:25

## 2024-04-22 RX ADMIN — DIPHENHYDRAMINE HYDROCHLORIDE AND LIDOCAINE HYDROCHLORIDE AND ALUMINUM HYDROXIDE AND MAGNESIUM HYDRO 5 MILLILITER(S): KIT at 09:36

## 2024-04-22 RX ADMIN — Medication 15 MILLILITER(S): at 00:32

## 2024-04-22 RX ADMIN — Medication 1 TABLET(S): at 12:13

## 2024-04-22 RX ADMIN — Medication 500000 UNIT(S): at 05:13

## 2024-04-22 RX ADMIN — Medication 4 MILLIGRAM(S): at 05:13

## 2024-04-22 RX ADMIN — Medication 500000 UNIT(S): at 00:36

## 2024-04-22 RX ADMIN — SENNA PLUS 2 TABLET(S): 8.6 TABLET ORAL at 23:01

## 2024-04-22 RX ADMIN — Medication 1 PATCH: at 11:45

## 2024-04-22 RX ADMIN — Medication 15 MILLILITER(S): at 17:45

## 2024-04-22 RX ADMIN — AMIODARONE HYDROCHLORIDE 200 MILLIGRAM(S): 400 TABLET ORAL at 05:12

## 2024-04-22 RX ADMIN — Medication 5 MILLIGRAM(S): at 00:31

## 2024-04-22 RX ADMIN — OXYCODONE HYDROCHLORIDE 30 MILLIGRAM(S): 5 TABLET ORAL at 23:01

## 2024-04-22 RX ADMIN — Medication 15 MILLILITER(S): at 11:47

## 2024-04-22 RX ADMIN — NALOXEGOL OXALATE 25 MILLIGRAM(S): 12.5 TABLET, FILM COATED ORAL at 11:46

## 2024-04-22 RX ADMIN — Medication 1 TABLET(S): at 11:47

## 2024-04-22 RX ADMIN — Medication 4 MILLIGRAM(S): at 13:18

## 2024-04-22 RX ADMIN — OXYCODONE HYDROCHLORIDE 30 MILLIGRAM(S): 5 TABLET ORAL at 13:18

## 2024-04-22 RX ADMIN — PANTOPRAZOLE SODIUM 40 MILLIGRAM(S): 20 TABLET, DELAYED RELEASE ORAL at 17:44

## 2024-04-22 RX ADMIN — Medication 25 MILLIGRAM(S): at 05:12

## 2024-04-22 RX ADMIN — OXYCODONE HYDROCHLORIDE 30 MILLIGRAM(S): 5 TABLET ORAL at 05:20

## 2024-04-22 RX ADMIN — Medication 500000 UNIT(S): at 17:43

## 2024-04-22 RX ADMIN — Medication 30 MILLILITER(S): at 10:22

## 2024-04-22 RX ADMIN — Medication 1 PATCH: at 12:07

## 2024-04-22 RX ADMIN — PANTOPRAZOLE SODIUM 40 MILLIGRAM(S): 20 TABLET, DELAYED RELEASE ORAL at 05:12

## 2024-04-22 RX ADMIN — CHLORHEXIDINE GLUCONATE 1 APPLICATION(S): 213 SOLUTION TOPICAL at 11:45

## 2024-04-22 RX ADMIN — Medication 500000 UNIT(S): at 11:44

## 2024-04-22 RX ADMIN — ONDANSETRON 4 MILLIGRAM(S): 8 TABLET, FILM COATED ORAL at 09:35

## 2024-04-22 NOTE — PROGRESS NOTE ADULT - ASSESSMENT
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 Patient without abdominal pain, notable fhx of pancreatic cancer (father) and she is former smoker. Onc following, plans for systemic therapy pending further workup.    # H/o RCC s/p R total nephrectomy/hysterectomy   # Pericardial effusion s/p pericardial windown/negative cytology  # Rt pleural effusion, negative cytoloy s/p pleurx  # Afib/RVR  # Acute hypoxic respiratory failure 2/2 SVC syndrome - not amenable to IR-guided stented    Recommendations:  - Will hold off on EUS/FNB in accordance with patient's wishes; she states she would like to get the procedure done outpatient when she feels better  - Please reach out if patient changes her mind and would like to proceed inhouse with endoscopic evaluation and has been cleared by cardiology/pulm  - Otherwise, patient should follow up with [facility], outpatient for EUS/FNB  - Please provide patient with Gastroenterology Clinic to confirm for outpatient follow up with Dr. Evangelina Whittaker or Dr. Iftikhar Ojeda; [phone] ([facility] at [address] [place] [address])   - Daily CBC, CMP, coags  - Transfuse if Hgb <8  - Maintain working IV   - Ensure adequate hydration  - Patient requesting more frequent PT, defer primary  - Rest of care per primary    Preliminary note until signed by Attending.    Thank you for involving us in this patient's care. Please reach out if any further questions or concerns. Signing off.     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 Patient without abdominal pain, notable fhx of pancreatic cancer (father) and she is former smoker. Onc following, plans for systemic therapy pending further workup.    # H/o RCC s/p R total nephrectomy/hysterectomy   # Pericardial effusion s/p pericardial windown/negative cytology  # Rt pleural effusion, negative cytoloy s/p pleurx  # Afib/RVR  # Acute hypoxic respiratory failure 2/2 SVC syndrome - not amenable to IR-guided stented    Recommendations:  - Discussed with patient's HCP; he would like to proceed with EUS/FNB and states he will discuss this with the patient  - Will tentatively plan for EUS/FNB Wednesday 4/24  - Diet as tolerated  - Obtain documented cardiac and pulm clearance  - Daily CBC, CMP, coags  - Transfuse if Hgb <8  - Maintain working IV   - Ensure adequate hydration  - Patient requesting more frequent PT, defer primary  - medical decision making will be deferred to pt's surrogate decision maker- Saeid Peña ([phone])  - Rest of care per primary    Preliminary note until signed by Attending.    Thank you for involving us in this patient's care. Please reach out if any further questions or concerns.    Angela Coffey MD  Gastroenterology/Hepatology Fellow, PGY-7    NON-URGENT CONSULTS:  Please email [email or web address] [place]  [email or web address]  AT NIGHT AND ON WEEKENDS:  Contact on-call GI fellow via answering service ([phone]) from 5pm-8am and on weekends/holidays  MONDAY-FRIDAY 8AM-5PM:  Pager: [phone] ([facility])  Pager: [phone] ([facility])

## 2024-04-22 NOTE — CONSULT NOTE ADULT - SUBJECTIVE AND OBJECTIVE BOX
Patient seen and evaluated at bedside    Chief Complaint:    HPI:  [facility] transfer.  58F w/ F hx tobacco use, RCC right nephrectomy, right sided glaucoma, fibromyalgia, anxiety, GERD, was at [facility] w/ concern for breast inflammation, lung nodules/mediastinal mass w/ biopsy concern for metastatic GI cancer. Had pleural effusion s/p chest tube and pleurx, had pericardial window for effusion. Has had poor IV access, has triple lumen in right groin. Has UE DVT on lovenox. Also has SVC syndrome, transfer from [facility] for [facility] RT treatment.     ROS otherwise negative. On oxygen.        (04 Apr 2024 03:38)      PMHx:   Anxiety    Acid reflux disease    Renal cancer, left    Umbilical hernia    Uterine mass    Fibromyalgia    Glaucoma    Herniated cervical disc    FH: kidney cancer    Cancer of kidney        PSHx:   S/P partial hysterectomy    S/P knee surgery    S/P hernia repair    H/O unilateral nephrectomy    Glaucoma following surgery    History of tonsillectomy        Allergies:  erythromycin (Headache; Vomiting; Rash)  penicillin (Headache; Vomiting; Rash)  Cipro (Headache; Vomiting; Rash)      Home Meds:  Anxiety    Acid reflux disease    Renal cancer, left    Umbilical hernia    Uterine mass    Fibromyalgia    Glaucoma    Herniated cervical disc    FH: kidney cancer    Cancer of kidney        Current Medications:   albuterol    90 MICROgram(s) HFA Inhaler 2 Puff(s) Inhalation every 6 hours PRN  albuterol/ipratropium for Nebulization 3 milliLiter(s) Nebulizer every 6 hours PRN  aluminum hydroxide/magnesium hydroxide/simethicone Suspension 30 milliLiter(s) Oral every 6 hours PRN  aMIOdarone    Tablet 200 milliGRAM(s) Oral daily  Biotene Dry Mouth Oral Rinse 15 milliLiter(s) Swish and Spit every 6 hours  Biotene Dry Mouth Oral Rinse 15 milliLiter(s) Swish and Spit two times a day PRN  chlorhexidine 2% Cloths 1 Application(s) Topical daily  dexAMETHasone     Tablet 4 milliGRAM(s) Oral every 8 hours  diazepam    Tablet 5 milliGRAM(s) Oral daily PRN  diazepam    Tablet 5 milliGRAM(s) Oral daily  enoxaparin Injectable 50 milliGRAM(s) SubCutaneous every 12 hours  FIRST- Mouthwash  BLM 5 milliLiter(s) Swish and Spit four times a day PRN  HYDROmorphone  Injectable 1 milliGRAM(s) IV Push every 3 hours PRN  influenza   Vaccine 0.5 milliLiter(s) IntraMuscular once  metoclopramide Injectable 10 milliGRAM(s) IV Push every 8 hours PRN  metoprolol tartrate 25 milliGRAM(s) Oral every 12 hours  multivitamin 1 Tablet(s) Oral daily  naloxegol 25 milliGRAM(s) Oral daily  nicotine -  14 mG/24Hr(s) Patch 1 Patch Transdermal every 24 hours  nystatin    Suspension 314336 Unit(s) Swish and Swallow four times a day  ondansetron    Tablet 4 milliGRAM(s) Oral every 8 hours PRN  oxyCODONE    IR 10 milliGRAM(s) Oral every 4 hours PRN  oxyCODONE  ER Tablet 30 milliGRAM(s) Oral <User Schedule>  pantoprazole    Tablet 40 milliGRAM(s) Oral two times a day  polyethylene glycol 3350 17 Gram(s) Oral every 12 hours  senna 2 Tablet(s) Oral at bedtime  sodium chloride 0.9% lock flush 10 milliLiter(s) IV Push every 1 hour PRN  trimethoprim  160 mG/sulfamethoxazole 800 mG 1 Tablet(s) Oral <User Schedule>      FAMILY HISTORY:  Family history of lung cancer (Mother)    Family history of pancreatic cancer (Father)        Social History:  Smoking History:  Alcohol Use:  Drug Use:    REVIEW OF SYSTEMS:  Constitutional:     [ ] negative [ ] fevers [ ] chills [ ] weight loss [ ] weight gain  HEENT:                  [ ] negative [ ] dry eyes [ ] eye irritation [ ] postnasal drip [ ] nasal congestion  CV:                         [ ] negative  [ ] chest pain [ ] orthopnea [ ] palpitations [ ] murmur  Resp:                     [ ] negative [ ] cough [ ] shortness of breath [ ] dyspnea [ ] wheezing [ ] sputum [ ]hemoptysis  GI:                          [ ] negative [ ] nausea [ ] vomiting [ ] diarrhea [ ] constipation [ ] abd pain [ ] dysphagia   :                        [ ] negative [ ] dysuria [ ] nocturia [ ] hematuria [ ] increased urinary frequency  Musculoskeletal: [ ] negative [ ] back pain [ ] myalgias [ ] arthralgias [ ] fracture  Skin:                       [ ] negative [ ] rash [ ] itch  Neurological:        [ ] negative [ ] headache [ ] dizziness [ ] syncope [ ] weakness [ ] numbness  Psychiatric:           [ ] negative [ ] anxiety [ ] depression  Endocrine:            [ ] negative [ ] diabetes [ ] thyroid problem  Heme/Lymph:      [ ] negative [ ] anemia [ ] bleeding problem  Allergic/Immune: [ ] negative [ ] itchy eyes [ ] nasal discharge [ ] hives [ ] angioedema    [ ] All other systems negative  [ ] Unable to assess ROS due to      Physical Exam:  T(F): 97.7 (04-22), Max: 98 (04-21)  HR: 82 (04-22) (73 - 82)  BP: 154/86 (04-22) (133/60 - 154/86)  RR: 18 (04-22)  SpO2: 97% (04-22)  GENERAL: No acute distress, well-developed  HEAD:  Atraumatic, Normocephalic  ENT: EOMI, PERRLA, conjunctiva and sclera clear, Neck supple, No JVD, moist mucosa  CHEST/LUNG: Clear to auscultation bilaterally; No wheeze, equal breath sounds bilaterally   BACK: No spinal tenderness  HEART: Regular rate and rhythm; No murmurs, rubs, or gallops  ABDOMEN: Soft, Nontender, Nondistended; Bowel sounds present  EXTREMITIES:  No clubbing, cyanosis, or edema  PSYCH: Nl behavior, nl affect  NEUROLOGY: AAOx3, non-focal, cranial nerves intact  SKIN: Normal color, No rashes or lesions  LINES:    Cardiovascular Diagnostic Testing:    ECG: Personally reviewed:    Echo: Personally reviewed:    Stress Testing:    Cath:    Imaging:    CXR: Personally reviewed    Labs: Personally reviewed                        7.9    9.35  )-----------( 168      ( 22 Apr 2024 06:40 )             25.2     04-22    139  |  103  |  34<H>  ----------------------------<  142<H>  4.9   |  26  |  0.67    Ca    8.5      22 Apr 2024 06:40  Phos  2.3     04-22  Mg     2.30     04-22    TPro  4.8<L>  /  Alb  3.1<L>  /  TBili  0.3  /  DBili  x   /  AST  15  /  ALT  48<H>  /  AlkPhos  80  04-22    PT/INR - ( 22 Apr 2024 06:40 )   PT: 10.5 sec;   INR: 0.93 ratio         PTT - ( 22 Apr 2024 06:40 )  PTT:29.3 sec                erythromycin (Headache; Vomiting; Rash)  penicillin (Headache; Vomiting; Rash)  Cipro (Headache; Vomiting; Rash)    albuterol    90 MICROgram(s) HFA Inhaler 2 Puff(s) Inhalation every 6 hours PRN  albuterol/ipratropium for Nebulization 3 milliLiter(s) Nebulizer every 6 hours PRN  aluminum hydroxide/magnesium hydroxide/simethicone Suspension 30 milliLiter(s) Oral every 6 hours PRN  aMIOdarone    Tablet 200 milliGRAM(s) Oral daily  Biotene Dry Mouth Oral Rinse 15 milliLiter(s) Swish and Spit every 6 hours  Biotene Dry Mouth Oral Rinse 15 milliLiter(s) Swish and Spit two times a day PRN  chlorhexidine 2% Cloths 1 Application(s) Topical daily  dexAMETHasone     Tablet 4 milliGRAM(s) Oral every 8 hours  diazepam    Tablet 5 milliGRAM(s) Oral daily PRN  diazepam    Tablet 5 milliGRAM(s) Oral daily  enoxaparin Injectable 50 milliGRAM(s) SubCutaneous every 12 hours  FIRST- Mouthwash  BLM 5 milliLiter(s) Swish and Spit four times a day PRN  HYDROmorphone  Injectable 1 milliGRAM(s) IV Push every 3 hours PRN  influenza   Vaccine 0.5 milliLiter(s) IntraMuscular once  metoclopramide Injectable 10 milliGRAM(s) IV Push every 8 hours PRN  metoprolol tartrate 25 milliGRAM(s) Oral every 12 hours  multivitamin 1 Tablet(s) Oral daily  naloxegol 25 milliGRAM(s) Oral daily  nicotine -  14 mG/24Hr(s) Patch 1 Patch Transdermal every 24 hours  nystatin    Suspension 961472 Unit(s) Swish and Swallow four times a day  ondansetron    Tablet 4 milliGRAM(s) Oral every 8 hours PRN  oxyCODONE    IR 10 milliGRAM(s) Oral every 4 hours PRN  oxyCODONE  ER Tablet 30 milliGRAM(s) Oral <User Schedule>  pantoprazole    Tablet 40 milliGRAM(s) Oral two times a day  polyethylene glycol 3350 17 Gram(s) Oral every 12 hours  senna 2 Tablet(s) Oral at bedtime  sodium chloride 0.9% lock flush 10 milliLiter(s) IV Push every 1 hour PRN  trimethoprim  160 mG/sulfamethoxazole 800 mG 1 Tablet(s) Oral <User Schedule>    T(F): 97.7 (04-22), Max: 98 (04-21)  HR: 82 (04-22) (73 - 82)  BP: 154/86 (04-22) (133/60 - 154/86)  RR: 18 (04-22)  SpO2: 97% (04-22)

## 2024-04-22 NOTE — PROGRESS NOTE ADULT - SUBJECTIVE AND OBJECTIVE BOX
Chief Complaint:  Patient is a 58y old  Female who presents with a chief complaint of SVC syndrome, DVT, mediastinal mass (21 Apr 2024 13:16)       Interval Events:   Afebrile and stable  Denying n/v/abdominal pain    Hospital Medications:  albuterol    90 MICROgram(s) HFA Inhaler 2 Puff(s) Inhalation every 6 hours PRN  albuterol/ipratropium for Nebulization 3 milliLiter(s) Nebulizer every 6 hours PRN  aluminum hydroxide/magnesium hydroxide/simethicone Suspension 30 milliLiter(s) Oral every 6 hours PRN  aMIOdarone    Tablet 200 milliGRAM(s) Oral daily  Biotene Dry Mouth Oral Rinse 15 milliLiter(s) Swish and Spit every 6 hours  Biotene Dry Mouth Oral Rinse 15 milliLiter(s) Swish and Spit two times a day PRN  chlorhexidine 2% Cloths 1 Application(s) Topical daily  dexAMETHasone     Tablet 4 milliGRAM(s) Oral every 8 hours  diazepam    Tablet 5 milliGRAM(s) Oral daily PRN  diazepam    Tablet 5 milliGRAM(s) Oral daily  enoxaparin Injectable 50 milliGRAM(s) SubCutaneous every 12 hours  FIRST- Mouthwash  BLM 5 milliLiter(s) Swish and Spit four times a day PRN  HYDROmorphone  Injectable 1 milliGRAM(s) IV Push every 3 hours PRN  influenza   Vaccine 0.5 milliLiter(s) IntraMuscular once  metoclopramide Injectable 10 milliGRAM(s) IV Push every 8 hours PRN  metoprolol tartrate 25 milliGRAM(s) Oral every 12 hours  multivitamin 1 Tablet(s) Oral daily  naloxegol 25 milliGRAM(s) Oral daily  nicotine -  14 mG/24Hr(s) Patch 1 Patch Transdermal every 24 hours  nystatin    Suspension 971584 Unit(s) Swish and Swallow four times a day  ondansetron    Tablet 4 milliGRAM(s) Oral every 8 hours PRN  oxyCODONE    IR 10 milliGRAM(s) Oral every 4 hours PRN  oxyCODONE  ER Tablet 30 milliGRAM(s) Oral <User Schedule>  pantoprazole    Tablet 40 milliGRAM(s) Oral two times a day  polyethylene glycol 3350 17 Gram(s) Oral every 12 hours  senna 2 Tablet(s) Oral at bedtime  sodium chloride 0.9% lock flush 10 milliLiter(s) IV Push every 1 hour PRN  trimethoprim  160 mG/sulfamethoxazole 800 mG 1 Tablet(s) Oral <User Schedule>      ROS:   Complete and normal except as mentioned above.    PHYSICAL EXAM:   Vital Signs:  Vital Signs Last 24 Hrs  T(C): 36.5 (22 Apr 2024 05:10), Max: 36.7 (21 Apr 2024 16:00)  T(F): 97.7 (22 Apr 2024 05:10), Max: 98 (21 Apr 2024 16:00)  HR: 76 (22 Apr 2024 05:10) (73 - 78)  BP: 150/78 (22 Apr 2024 05:10) (133/60 - 150/78)  BP(mean): --  RR: 16 (22 Apr 2024 05:10) (16 - 18)  SpO2: 96% (22 Apr 2024 05:10) (96% - 100%)    Parameters below as of 22 Apr 2024 05:10  Patient On (Oxygen Delivery Method): room air      Daily     Daily     GENERAL: frail appearing  NEURO: aox3  HEENT: anicteric sclera, no conjunctival pallor appreciated  CHEST: no respiratory distress, no accessory muscle use, NC  CARDIAC: regular rate  ABDOMEN: soft, non-tender, non-distended, no rebound or guarding  EXTREMITIES: warm, well perfused   SKIN: no lesions noted      LABS:                          7.9    9.35  )-----------( 168      ( 22 Apr 2024 06:40 )             25.2     04-22    139  |  103  |  34<H>  ----------------------------<  142<H>  4.9   |  26  |  0.67    Ca    8.5      22 Apr 2024 06:40  Phos  2.3     04-22  Mg     2.30     04-22    TPro  4.8<L>  /  Alb  3.1<L>  /  TBili  0.3  /  DBili  x   /  AST  15  /  ALT  48<H>  /  AlkPhos  80  04-22    LIVER FUNCTIONS - ( 22 Apr 2024 06:40 )  Alb: 3.1 g/dL / Pro: 4.8 g/dL / ALK PHOS: 80 U/L / ALT: 48 U/L / AST: 15 U/L / GGT: x             Interval Diagnostic Studies:   MRCP 4/19/24  LIVER: Liver size within normal limits.  BILE DUCTS: Common bile duct distention up to 13 mm, without   choledocholithiasis. Common bile duct distention is likely due to distal   compression from periampullary duodenal diverticulum.  GALLBLADDER: Gallbladder is contracted with cholelithiasis.  SPLEEN: Spleen size within normal limits.  PANCREAS: Pancreatic neck T2 hyperintense lesion measures 16 mm, image 28   series 5, without clear evidence of enhancement on postcontrast phases.   Most likely this is a side branch IPMN. Remainder of the pancreas is   suboptimally evaluated, particularly on postcontrast phases due to   significant artifact.  ADRENALS: Right adrenal is not visualized. Unremarkable left adrenal.  KIDNEYS/URETERS: Right nephrectomy. No left hydronephrosis. T2   hyperintense foci of the left kidney probably reflects cysts, though are   incompletely evaluated due to artifact.    VISUALIZED PORTIONS:  BOWEL: Partially distended stomach. Duodenal diverticulum. No small bowel   distention within the field-of-view. Colon is also partially distended   with mild to moderate stool burden. Correlate clinically.  PERITONEUM: No ascites.  VESSELS: No abdominal aortic aneurysm  RETROPERITONEUM/LYMPH NODES: No enlarged nodes.  ABDOMINAL WALL: Soft tissue edema along the imaged right chest wall and   right abdominal wall. Right-sided pleural catheter is partially imaged.  BONES: Incompletely evaluated on abdominal protocol MRI. Scoliotic spinal   curvature is noted.     Chief Complaint:  Patient is a 58y old  Female who presents with a chief complaint of SVC syndrome, DVT, mediastinal mass (21 Apr 2024 13:16)       Interval Events:   Afebrile and stable  Denying n/v/abdominal pain    Hospital Medications:  albuterol    90 MICROgram(s) HFA Inhaler 2 Puff(s) Inhalation every 6 hours PRN  albuterol/ipratropium for Nebulization 3 milliLiter(s) Nebulizer every 6 hours PRN  aluminum hydroxide/magnesium hydroxide/simethicone Suspension 30 milliLiter(s) Oral every 6 hours PRN  aMIOdarone    Tablet 200 milliGRAM(s) Oral daily  Biotene Dry Mouth Oral Rinse 15 milliLiter(s) Swish and Spit every 6 hours  Biotene Dry Mouth Oral Rinse 15 milliLiter(s) Swish and Spit two times a day PRN  chlorhexidine 2% Cloths 1 Application(s) Topical daily  dexAMETHasone     Tablet 4 milliGRAM(s) Oral every 8 hours  diazepam    Tablet 5 milliGRAM(s) Oral daily PRN  diazepam    Tablet 5 milliGRAM(s) Oral daily  enoxaparin Injectable 50 milliGRAM(s) SubCutaneous every 12 hours  FIRST- Mouthwash  BLM 5 milliLiter(s) Swish and Spit four times a day PRN  HYDROmorphone  Injectable 1 milliGRAM(s) IV Push every 3 hours PRN  influenza   Vaccine 0.5 milliLiter(s) IntraMuscular once  metoclopramide Injectable 10 milliGRAM(s) IV Push every 8 hours PRN  metoprolol tartrate 25 milliGRAM(s) Oral every 12 hours  multivitamin 1 Tablet(s) Oral daily  naloxegol 25 milliGRAM(s) Oral daily  nicotine -  14 mG/24Hr(s) Patch 1 Patch Transdermal every 24 hours  nystatin    Suspension 577184 Unit(s) Swish and Swallow four times a day  ondansetron    Tablet 4 milliGRAM(s) Oral every 8 hours PRN  oxyCODONE    IR 10 milliGRAM(s) Oral every 4 hours PRN  oxyCODONE  ER Tablet 30 milliGRAM(s) Oral <User Schedule>  pantoprazole    Tablet 40 milliGRAM(s) Oral two times a day  polyethylene glycol 3350 17 Gram(s) Oral every 12 hours  senna 2 Tablet(s) Oral at bedtime  sodium chloride 0.9% lock flush 10 milliLiter(s) IV Push every 1 hour PRN  trimethoprim  160 mG/sulfamethoxazole 800 mG 1 Tablet(s) Oral <User Schedule>      ROS:   Complete and normal except as mentioned above.    PHYSICAL EXAM:   Vital Signs:  Vital Signs Last 24 Hrs  T(C): 36.5 (22 Apr 2024 05:10), Max: 36.7 (21 Apr 2024 16:00)  T(F): 97.7 (22 Apr 2024 05:10), Max: 98 (21 Apr 2024 16:00)  HR: 76 (22 Apr 2024 05:10) (73 - 78)  BP: 150/78 (22 Apr 2024 05:10) (133/60 - 150/78)  BP(mean): --  RR: 16 (22 Apr 2024 05:10) (16 - 18)  SpO2: 96% (22 Apr 2024 05:10) (96% - 100%)    Parameters below as of 22 Apr 2024 05:10  Patient On (Oxygen Delivery Method): room air      Daily     Daily     GENERAL: frail appearing  NEURO: alert and oriented   HEENT: anicteric sclera, no conjunctival pallor appreciated  CHEST: no respiratory distress, no accessory muscle use, NC  CARDIAC: regular rate  ABDOMEN: soft, non-tender, non-distended, no rebound or guarding  EXTREMITIES: warm, well perfused   SKIN: no lesions noted      LABS:                          7.9    9.35  )-----------( 168      ( 22 Apr 2024 06:40 )             25.2     04-22    139  |  103  |  34<H>  ----------------------------<  142<H>  4.9   |  26  |  0.67    Ca    8.5      22 Apr 2024 06:40  Phos  2.3     04-22  Mg     2.30     04-22    TPro  4.8<L>  /  Alb  3.1<L>  /  TBili  0.3  /  DBili  x   /  AST  15  /  ALT  48<H>  /  AlkPhos  80  04-22    LIVER FUNCTIONS - ( 22 Apr 2024 06:40 )  Alb: 3.1 g/dL / Pro: 4.8 g/dL / ALK PHOS: 80 U/L / ALT: 48 U/L / AST: 15 U/L / GGT: x             Interval Diagnostic Studies:   MRCP 4/19/24  LIVER: Liver size within normal limits.  BILE DUCTS: Common bile duct distention up to 13 mm, without   choledocholithiasis. Common bile duct distention is likely due to distal   compression from periampullary duodenal diverticulum.  GALLBLADDER: Gallbladder is contracted with cholelithiasis.  SPLEEN: Spleen size within normal limits.  PANCREAS: Pancreatic neck T2 hyperintense lesion measures 16 mm, image 28   series 5, without clear evidence of enhancement on postcontrast phases.   Most likely this is a side branch IPMN. Remainder of the pancreas is   suboptimally evaluated, particularly on postcontrast phases due to   significant artifact.  ADRENALS: Right adrenal is not visualized. Unremarkable left adrenal.  KIDNEYS/URETERS: Right nephrectomy. No left hydronephrosis. T2   hyperintense foci of the left kidney probably reflects cysts, though are   incompletely evaluated due to artifact.    VISUALIZED PORTIONS:  BOWEL: Partially distended stomach. Duodenal diverticulum. No small bowel   distention within the field-of-view. Colon is also partially distended   with mild to moderate stool burden. Correlate clinically.  PERITONEUM: No ascites.  VESSELS: No abdominal aortic aneurysm  RETROPERITONEUM/LYMPH NODES: No enlarged nodes.  ABDOMINAL WALL: Soft tissue edema along the imaged right chest wall and   right abdominal wall. Right-sided pleural catheter is partially imaged.  BONES: Incompletely evaluated on abdominal protocol MRI. Scoliotic spinal   curvature is noted.

## 2024-04-22 NOTE — PROGRESS NOTE ADULT - SUBJECTIVE AND OBJECTIVE BOX
SOLID TUMOR ONCOLOGY HOSPITALIST PROGRESS NOTE    S: No acute events overnight.  Pt had no new complaints this morning, but she admitted that she cancelled her procedure this morning because she was nervous and not feeling well.  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    CURRENT MEDICATIONS  MEDICATIONS  (STANDING):  aMIOdarone    Tablet 200 milliGRAM(s) Oral daily  Biotene Dry Mouth Oral Rinse 15 milliLiter(s) Swish and Spit every 6 hours  chlorhexidine 2% Cloths 1 Application(s) Topical daily  dexAMETHasone     Tablet 4 milliGRAM(s) Oral every 8 hours  diazepam    Tablet 5 milliGRAM(s) Oral daily  enoxaparin Injectable 50 milliGRAM(s) SubCutaneous every 12 hours  influenza   Vaccine 0.5 milliLiter(s) IntraMuscular once  metoprolol tartrate 25 milliGRAM(s) Oral every 12 hours  multivitamin 1 Tablet(s) Oral daily  naloxegol 25 milliGRAM(s) Oral daily  nicotine -  14 mG/24Hr(s) Patch 1 Patch Transdermal every 24 hours  nystatin    Suspension 366233 Unit(s) Swish and Swallow four times a day  oxyCODONE  ER Tablet 30 milliGRAM(s) Oral <User Schedule>  pantoprazole    Tablet 40 milliGRAM(s) Oral two times a day  polyethylene glycol 3350 17 Gram(s) Oral every 12 hours  senna 2 Tablet(s) Oral at bedtime  trimethoprim  160 mG/sulfamethoxazole 800 mG 1 Tablet(s) Oral <User Schedule>  MEDICATIONS  (PRN):  albuterol    90 MICROgram(s) HFA Inhaler 2 Puff(s) Inhalation every 6 hours PRN Shortness of Breath and/or Wheezing  albuterol/ipratropium for Nebulization 3 milliLiter(s) Nebulizer every 6 hours PRN Shortness of Breath and/or Wheezing  aluminum hydroxide/magnesium hydroxide/simethicone Suspension 30 milliLiter(s) Oral every 6 hours PRN Dyspepsia  Biotene Dry Mouth Oral Rinse 15 milliLiter(s) Swish and Spit two times a day PRN dry mouth  diazepam    Tablet 5 milliGRAM(s) Oral daily PRN Anxiety  FIRST- Mouthwash  BLM 5 milliLiter(s) Swish and Spit four times a day PRN Mouth Care  HYDROmorphone  Injectable 1 milliGRAM(s) IV Push every 3 hours PRN Severe Pain (7 - 10)  metoclopramide Injectable 10 milliGRAM(s) IV Push every 8 hours PRN nausea, vomiting  ondansetron    Tablet 4 milliGRAM(s) Oral every 8 hours PRN Nausea and/or Vomiting  oxyCODONE    IR 10 milliGRAM(s) Oral every 4 hours PRN Moderate Pain (4 - 6)  sodium chloride 0.9% lock flush 10 milliLiter(s) IV Push every 1 hour PRN Pre/post blood products, medications, blood draw, and to maintain line patency      PHYSICAL EXAM  T(C): 36.5 (04-22-24 @ 05:10), Max: 36.7 (04-21-24 @ 16:00)  HR: 76 (04-22-24 @ 05:10) (73 - 78)  BP: 150/78 (04-22-24 @ 05:10) (133/60 - 150/78)  RR: 16 (04-22-24 @ 05:10) (16 - 18)  SpO2: 96% (04-22-24 @ 05:10) (96% - 100%)  Non-toxic appearing woman, sitting up in bed, appears comfortable, but agitated at times (thrashing her arms and shaking her head) during conversation  R eyelid ptosis and R pupil miosis present; anicteric sclera, no oral lesions/thrush  RRR, no m/r/g  Diminshed breath sounds in all R lung zones; LL zones CTA  Abd soft/nt/nd, normoactive bowel sounds  Trace LUE non-pitting edema present; large area of ecchymosis extending from the post R calf to post R thigh- improving; generalized muscular atrophy in all 4 extremities  CN 2-12 grossly intact; no gross focal neuro deficits; pt ambulates with walker        LABS                        7.9    9.35  )-----------( 168      ( 22 Apr 2024 06:40 )             25.2     04-22    139  |  103  |  34<H>  ----------------------------<  142<H>  4.9   |  26  |  0.67    Ca    8.5      22 Apr 2024 06:40  Phos  2.3     04-22  Mg     2.30     04-22    TPro  4.8<L>  /  Alb  3.1<L>  /  TBili  0.3  /  DBili  x   /  AST  15  /  ALT  48<H>  /  AlkPhos  80  04-22    PT/INR - ( 22 Apr 2024 06:40 )   PT: 10.5 sec;   INR: 0.93 ratio    PTT - ( 22 Apr 2024 06:40 )  PTT:29.3 sec      PATHOLOGY  3/26 pleural fluid cytology: Neg for malignant cells.    3/26 Pericardium excision: Neg for malignancy.    3/20 Pericardial fluid cytology: Neg for malignant cells.    3/14 LYMPH NODE, SUPRACLAVICULAR, RIGHT, US GUIDED CORE BIOPSY AND FNA   POSITIVE FOR MALIGNANT CELLS.   Carcinoma   Touch Prep slides display crowded groups and single lying malignant   cells with enlarged nuclei containing prominent nucleoli and vacuolated   cytoplasm. Core biopsies show neoplastic cells positive for CK7 and CDX2   immunostains, while negative for CK20, TTF1, GATA3, TRPS1 and PAX8. 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 Suggest correlation with clinical information and imaging to   assist with next step management.   Moleculars: pMMR, PD-L1 TPS 1%; Her2 pending      TUMOR MARKERS  3/13 CEA 7.1  3/24 Ca 19-9 27  4/8 Ca-125 187, Ca 27.29 16.9, Ca 15-3 18.9, AFP 5.2      MICROBIOLOGY  Abx  Bactrim 4/8-present    Cx Data  4/5 MRSA swab: Not detected  3/16 Quant plus TB: Negative      PERTINENT RADIOLOGY  4/19 MRCP: Motion degraded study, particularly on postcontrast sequences.  Pancreatic neck T2 hyperintense lesion measures 16 mm, image 28 series 5,   without clear evidence of enhancement on postcontrast phases. Most likely   this is a side branch IPMN. Remainder of the pancreas is suboptimally   evaluated due to artifact. Follow-up MRI abdomen or pancreas protocol CT   can be considered in 3-6 months for reevaluation. Partially distended stomach. Duodenal diverticulum. Colon is also partially distended with mild to moderate stool burden. Correlate   clinically. Trace pleural effusions. Right-sided pleural catheter is partially   imaged. Lung parenchyma is incompletely characterized on MRI. Soft tissue   of the mediastinum is not adequately imaged on this study.    4/18 B/L LE Doppler: Acute deep venous thrombosis: above the knee.  Acute nonocclusive deep venous thrombosis in the right common femoral vein.    417 CXR: Clear lungs with minimal effusion and Pleurx catheter.    4/10 VA dopplers B/L UE: Extensive acute, occlusive DVT RIJ, innominate and subclavian veins.  Acute non-occlusive DVT w/in R axillary vein.  Extensive acute, occlusive DVT noted in L subclavian. axillary, and proximal brachial veins.  Acute non-occlusive DVT LIJ.  Superficial vein thromboses are noted in the B/L cephalic veins.    4/9 Echocardiogram: Normal LV systolic function.  Normal mitral valve w/ normal leaflet excursion.  No pericardial effusion seen    4/4 CXR: Clear lungs w/ R pleurx catheter in place.    ok4/4 CXR AM: Small R pleural effusion w/ pleurx cath improved    OSH Imaging ([facility])  3/29 CXR: Decrease in R pleural effusion.     3/28 B/L UE Dupplers: Acute DVT involving R IJ, innominate vein, subclavian, brachial, and L IJ.  Superficial venous thrombosis involving B/L cephalic veins.  + Edema of superficial soft tissue of UE R>L.    3/27 CT neck: Bulky and conglomerate LAD invading RIJ w/ thrombus extending up to hyoid level.  Thrombus is likely combination tumor and bland.  New LIJ nonocclusive thrombus.    3/27 CT Chest: New consolidations throughout the R lung worse RLL c/f aspiration PNA.  + R pleurx cath w/ decrease in R pleural effusion.  Extensive DVT w/in thoracic and lower neck, upper SVC remains obliterated.  Large R supraclavicular mass infiltrating into the mediastinum.      3/27 CXR: Progression of R consolidation/effusion.    3/21 TTE: LV grossly normal.  Thickened pericardium.  no pericardial effusion.    3/18 TTE: Mod pericardial effusion w/ e/o hemodynamic compromise w/ diasolic collapse of RA that exceeds 1/3 of cardiac cycle >30% resp variation across MV E. wave and early diastolic inversion of RV.    3/14 CT Chest: Conglomerate soft tissue in superior mediastinum and R supraclavicular region 2/2 LAD w/ internal hypodensity c/f necrosis.  Mass encases SVC and multiple great veins resulting in obliteration of the SVC and thrombosis of the RIJ.  Multiple R sided collateral vessels and R soft tissue edema.  B/L pulm nodules.    3/14 CT neck: Extensive cellulitis/myositis along R anterior lateral neck and R upper chest wall w/ inflammatory fat induration the deep soft tissue of neck.  Large supraclavicular/mediastinal mass lesion, presumably conglomerate of LN w/ mass effect and complete occlusion of RIJ w/ associated inflammation.  Complete occlusion of R subclavian vein and nearly occlusive thrombosis in the proximal L brachiocephalic vein.    3/13 RUQ Abd US: Cholelithiasis w/o e/o cholecystitis.  Dilated CBD w/o e/o intraductal stone or other obstruction. 1.4cm pancreatic cyst w/ mild duct dilatation.    3/12 CT abd/pelv: S/p R nephrectomy. no LAD.  New 1.6cm  pancreatic neck cystic lesion w/o main pancreatic ductal dilatation.    3/10 R breast US: No e/o breast abscess

## 2024-04-22 NOTE — CONSULT NOTE ADULT - SUBJECTIVE AND OBJECTIVE BOX
PULMONARY SERVICE INITIAL CONSULT NOTE    HPI:  59 yo woman, former smoker, with h/o R eye glaucoma, Fibromyalgia, Anxiety, GERD, and RCC s/p R total nephrectomy/hysterectomy; she was initially admitted to [facility] on 3/11 w/ R breast swelling c/f mastitis- imaging brooks noted supraclavicular/mediastinal mass lesion which encased the SVC and multiple other veins resulting in obliteration of the SVC and thrombosis of the R IJ, and a pancreatic neck cystic lesion.  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  Her disease/hospital course has also been c/b pericardial effusion s/p pericardial window w/ neg cytology, R pleural effusion, also negative cytology) s/p Pleurx, Afib w/ RVR and acute hypoxic resp failure 2/2 SVC syndrome- not amenable to IR-guided stenting; pt was ultimately started on Dex and transferred to [facility] on 4/4 for urgent palliative RT. Patient pending EGD/EUS for pancreatic process. Pulm called for preop eval.     Patient seen and examined at bedside.   Denies dyspnea. Denies any complications related to surgery. Reports taking symbicort 160 bid at home.     REVIEW OF SYSTEMS:  All additional ROS negative.    PAST MEDICAL & SURGICAL HISTORY:  Anxiety      Acid reflux disease      Umbilical hernia      Uterine mass  Benign      Fibromyalgia  Joint pains      Glaucoma  Right eye      Herniated cervical disc      Cancer of kidney  right kidney      S/P partial hysterectomy  8 years ago      S/P knee surgery  knee arthroscopy right x 2 ( 2011 & 2012)      S/P hernia repair  umbilical Hernia Repair - 2011      H/O unilateral nephrectomy  Right Laparoscopic Nephrectomy - [id number]      Glaucoma following surgery  Right Eyr - 2012      History of tonsillectomy          FAMILY HISTORY:  Family history of lung cancer (Mother)    Family history of pancreatic cancer (Father)         MEDICATIONS:  Pulmonary:  albuterol    90 MICROgram(s) HFA Inhaler 2 Puff(s) Inhalation every 6 hours PRN  albuterol/ipratropium for Nebulization 3 milliLiter(s) Nebulizer every 6 hours PRN    Antimicrobials:  nystatin    Suspension 208027 Unit(s) Swish and Swallow four times a day  trimethoprim  160 mG/sulfamethoxazole 800 mG 1 Tablet(s) Oral <User Schedule>    Anticoagulants:  enoxaparin Injectable 50 milliGRAM(s) SubCutaneous every 12 hours    Onc:    GI/:  aluminum hydroxide/magnesium hydroxide/simethicone Suspension 30 milliLiter(s) Oral every 6 hours PRN  naloxegol 25 milliGRAM(s) Oral daily  pantoprazole    Tablet 40 milliGRAM(s) Oral two times a day  polyethylene glycol 3350 17 Gram(s) Oral every 12 hours  senna 2 Tablet(s) Oral at bedtime    Endocrine:    Cardiac:  aMIOdarone    Tablet 200 milliGRAM(s) Oral daily  metoprolol tartrate 25 milliGRAM(s) Oral every 12 hours    Other Medications:  Biotene Dry Mouth Oral Rinse 15 milliLiter(s) Swish and Spit every 6 hours  Biotene Dry Mouth Oral Rinse 15 milliLiter(s) Swish and Spit two times a day PRN  chlorhexidine 2% Cloths 1 Application(s) Topical daily  diazepam    Tablet 5 milliGRAM(s) Oral daily PRN  diazepam    Tablet 5 milliGRAM(s) Oral daily  FIRST- Mouthwash  BLM 5 milliLiter(s) Swish and Spit four times a day PRN  HYDROmorphone  Injectable 1 milliGRAM(s) IV Push every 3 hours PRN  influenza   Vaccine 0.5 milliLiter(s) IntraMuscular once  metoclopramide Injectable 10 milliGRAM(s) IV Push every 8 hours PRN  multivitamin 1 Tablet(s) Oral daily  nicotine -  14 mG/24Hr(s) Patch 1 Patch Transdermal every 24 hours  ondansetron    Tablet 4 milliGRAM(s) Oral every 8 hours PRN  oxyCODONE    IR 10 milliGRAM(s) Oral every 4 hours PRN  oxyCODONE  ER Tablet 30 milliGRAM(s) Oral <User Schedule>  sodium chloride 0.9% lock flush 10 milliLiter(s) IV Push every 1 hour PRN      Allergies    erythromycin (Headache; Vomiting; Rash)  penicillin (Headache; Vomiting; Rash)  Cipro (Headache; Vomiting; Rash)    Intolerances        PHYSICAL EXAM  Vital Signs Last 24 Hrs  T(C): 36.5 (22 Apr 2024 12:27), Max: 36.7 (22 Apr 2024 01:30)  T(F): 97.7 (22 Apr 2024 12:27), Max: 98 (22 Apr 2024 01:30)  HR: 82 (22 Apr 2024 12:27) (75 - 82)  BP: 154/86 (22 Apr 2024 12:27) (133/60 - 154/86)  BP(mean): 107 (22 Apr 2024 12:27) (107 - 107)  RR: 18 (22 Apr 2024 12:27) (16 - 18)  SpO2: 97% (22 Apr 2024 12:27) (96% - 100%)    Parameters below as of 22 Apr 2024 12:27  Patient On (Oxygen Delivery Method): room air            CONSTITUTIONAL: No acute distress.   HEENT:  Conjunctiva clear B/L.  Moist oral mucosa.   Cardiovascular: RRR with no murmurs. No JVD noted. No lower extremity edema B/L. Extremities are warm and well perfused.    Respiratory: Lungs CTAB. No wrr. No accessory muscle use.   Gastrointestinal:  Soft, nontender. Non-distended. Non-rigid.    Neurologic:  Alert and awake. Moving all extremities. Following commands.    Skin:  No gross rashes notes.      LABS:      CBC Full  -  ( 22 Apr 2024 06:40 )  WBC Count : 9.35 K/uL  RBC Count : 2.62 M/uL  Hemoglobin : 7.9 g/dL  Hematocrit : 25.2 %  Platelet Count - Automated : 168 K/uL  Mean Cell Volume : 96.2 fL  Mean Cell Hemoglobin : 30.2 pg  Mean Cell Hemoglobin Concentration : 31.3 gm/dL  Auto Neutrophil # : 8.80 K/uL  Auto Lymphocyte # : 0.08 K/uL  Auto Monocyte # : 0.29 K/uL  Auto Eosinophil # : 0.02 K/uL  Auto Basophil # : 0.01 K/uL  Auto Neutrophil % : 94.1 %  Auto Lymphocyte % : 0.9 %  Auto Monocyte % : 3.1 %  Auto Eosinophil % : 0.2 %  Auto Basophil % : 0.1 %    04-22    139  |  103  |  34<H>  ----------------------------<  142<H>  4.9   |  26  |  0.67    Ca    8.5      22 Apr 2024 06:40  Phos  2.3     04-22  Mg     2.30     04-22    TPro  4.8<L>  /  Alb  3.1<L>  /  TBili  0.3  /  DBili  x   /  AST  15  /  ALT  48<H>  /  AlkPhos  80  04-22    PT/INR - ( 22 Apr 2024 06:40 )   PT: 10.5 sec;   INR: 0.93 ratio         PTT - ( 22 Apr 2024 06:40 )  PTT:29.3 sec      Urinalysis Basic - ( 22 Apr 2024 06:40 )    Color: x / Appearance: x / SG: x / pH: x  Gluc: 142 mg/dL / Ketone: x  / Bili: x / Urobili: x   Blood: x / Protein: x / Nitrite: x   Leuk Esterase: x / RBC: x / WBC x   Sq Epi: x / Non Sq Epi: x / Bacteria: x                RADIOLOGY & ADDITIONAL STUDIES

## 2024-04-22 NOTE — PROGRESS NOTE ADULT - ASSESSMENT
59 yo woman, former smoker, with h/o R eye glaucoma, Fibromyalgia, Anxiety, GERD, and RCC s/p R total nephrectomy/hysterectomy; she was initially admitted to [facility] on 3/11 w/ R breast swelling c/f mastitis- imaging brooks noted supraclavicular/mediastinal mass lesion which encased the SVC and multiple other veins resulting in obliteration of the SVC and thrombosis of the R IJ, and a pancreatic neck cystic lesion.  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  Her disease/hospital course has also been c/b pericardial effusion s/p pericardial window w/ neg cytology, R pleural effusion, also negative cytology) s/p Pleurx, Afib w/ RVR and acute hypoxic resp failure 2/2 SVC syndrome- not dennis to IR-guided stenting; pt was ultimately started on Dex and transferred to [facility] on 4/4 for urgent palliative RT.    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)  pAfib, with RVR on this admission  Pericardial effusion with tamp physiology s/p pericardial window  R pleural effusion s/p pleurex  Acute hypoxic resp failure  Anxiety/emotional lability  Cancer-related pain, anxiety  Severe prot-ghazala malnutrition    - Metastatic cancer  Based on 3/14 SC LN path, ddx includes primary UGI vs pancreaticobiliary primary (breast edema is likely 2/2 SVC syndrome); PD-L1 TPs 1%, pMMR, Her2 pending  Ca-125 moderately elevated at 125; other tumor markers not significantly elevated  Additional diagnostic brooks includes:   * 4/19 MRCP showing pancreatic lesion (? IPMN, but pancreas was suboptimally evaluated)     * GI consulted for EUS/ERCP with biopsy of pancreatic neck lesion, tentatively scheduled fo 4/24(requesting Pulm and Cardiac clearance prior to EGD)  Options for systemic cancer treatment are TBD  Pt's prognosis is guarded    - SVC syndrome  Appreciate Rad Onc eval/recs  Completed 10fxs of palliative RT on 4/18   Tapering Dex PO 4mg q12 today with plan to taper off over the next 2 weeks (continuing pjp/ppi ppx until completion of taper)    - Anemia in neoplastic disease  Hgb stable today at 7.9  4/5 iron studies not c/w iron deficiency  VSS and pt without clinical s/s of active bleeding  4/17 hemolysis labs negative; 4/18 FOBT negative x2  Trending daily cbcs; continuing supportive transfusions prn (goal hgb > 7; plts > 10K)    - Extensive b/l UE DVTs  - Acute RLE DVT a/w RLE ecchymosis  - Hypercoag state  Continuing therapeutic lovenox (resumed on 4/18 given negative GIB brooks), benefits > risks   * Of note: pt has poor UE IV access and currently required peripheral IV in her LE extremities for admin of medication; can consider [facility] central line for urgent/emergent IV needs    - Anxiety/emotional lability  Continuing Diazepam 5mg daily and daily/prn  Pt has poor health literacy and clinical insight which is negatively impacting her medical decision making- when ask if she wanted to defer medical decision making to her boyfriend or if she had a designated HCP she replied "he's already sick of me"  Appreciate  consult: pt lacks medical decision making capacity; medical decision making will be deferred to pt's surrogate decision maker- Saeid Peña ([phone])    - 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)  Pt with h/o R eye glaucoma, but miosis can also be associated with Pancoast syndrome i/s/o extensive R upper lung tumor  Pt denies visual deficits or other related symptoms   MRI Brain without contrast ordered for further eval (to be completed after 4/24 EGD/ERCP)  Will continue to monitor closely    - pAfib  Pt currently SR, HR controlled  Likely precipitated by malignancy, pericardial effusion, SVC syndrome  Continuing Metoprolol 25mg q12 with holding parameters  Continuing Amio 200mg daily (monitoring for toxicities)  Continuing telemetry    - Pericardial effusion with tamp physiology s/p pericardial window  - R pleural effusion s/p pleurex  Likely inflammatory; both pleural and pericardial fluid cytology is negative for malignant cells  4/9 surveillance TTE with pEF and no WMAs  Continuing R pleurex drainage- up to 500cc q48h/prn    - Acute hypoxic respiratory failure  Resolved  2/2 all of the above  Continuing supportive resp care:   * duonebs q6   * Tessalon 100mg q8   * steroids as above  Weaning supplemental O2 via NC as tolerate    - Cancer related pain  Currently well controlled  Continuing Oxy ER 30mg q12 q8  Continuing Oxy IR 10mg q4/prn  Continuing Dilaudid IV prn for sev breakthrough pain  Continuing bowel regimen to prevent OIC (including Movantic)    - Severe prot-ghazala malnutrition: appreciate RD recs; continuing regular diet with protein shake supplement BID

## 2024-04-23 LAB
ALBUMIN SERPL ELPH-MCNC: 2.9 G/DL — LOW (ref 3.3–5)
ALP SERPL-CCNC: 84 U/L — SIGNIFICANT CHANGE UP (ref 40–120)
ALT FLD-CCNC: 58 U/L — HIGH (ref 4–33)
ANION GAP SERPL CALC-SCNC: 9 MMOL/L — SIGNIFICANT CHANGE UP (ref 7–14)
AST SERPL-CCNC: 19 U/L — SIGNIFICANT CHANGE UP (ref 4–32)
BASOPHILS # BLD AUTO: 0.02 K/UL — SIGNIFICANT CHANGE UP (ref 0–0.2)
BASOPHILS NFR BLD AUTO: 0.2 % — SIGNIFICANT CHANGE UP (ref 0–2)
BILIRUB SERPL-MCNC: 0.4 MG/DL — SIGNIFICANT CHANGE UP (ref 0.2–1.2)
BUN SERPL-MCNC: 29 MG/DL — HIGH (ref 7–23)
CALCIUM SERPL-MCNC: 8.3 MG/DL — LOW (ref 8.4–10.5)
CHLORIDE SERPL-SCNC: 104 MMOL/L — SIGNIFICANT CHANGE UP (ref 98–107)
CO2 SERPL-SCNC: 26 MMOL/L — SIGNIFICANT CHANGE UP (ref 22–31)
CREAT SERPL-MCNC: 0.68 MG/DL — SIGNIFICANT CHANGE UP (ref 0.5–1.3)
EGFR: 100 ML/MIN/1.73M2 — SIGNIFICANT CHANGE UP
EOSINOPHIL # BLD AUTO: 0.06 K/UL — SIGNIFICANT CHANGE UP (ref 0–0.5)
EOSINOPHIL NFR BLD AUTO: 0.7 % — SIGNIFICANT CHANGE UP (ref 0–6)
GLUCOSE SERPL-MCNC: 95 MG/DL — SIGNIFICANT CHANGE UP (ref 70–99)
HCT VFR BLD CALC: 26.7 % — LOW (ref 34.5–45)
HGB BLD-MCNC: 8.3 G/DL — LOW (ref 11.5–15.5)
IANC: 8.2 K/UL — HIGH (ref 1.8–7.4)
IMM GRANULOCYTES NFR BLD AUTO: 1.7 % — HIGH (ref 0–0.9)
LYMPHOCYTES # BLD AUTO: 0.15 K/UL — LOW (ref 1–3.3)
LYMPHOCYTES # BLD AUTO: 1.7 % — LOW (ref 13–44)
MAGNESIUM SERPL-MCNC: 2.3 MG/DL — SIGNIFICANT CHANGE UP (ref 1.6–2.6)
MCHC RBC-ENTMCNC: 29.6 PG — SIGNIFICANT CHANGE UP (ref 27–34)
MCHC RBC-ENTMCNC: 31.1 GM/DL — LOW (ref 32–36)
MCV RBC AUTO: 95.4 FL — SIGNIFICANT CHANGE UP (ref 80–100)
MONOCYTES # BLD AUTO: 0.36 K/UL — SIGNIFICANT CHANGE UP (ref 0–0.9)
MONOCYTES NFR BLD AUTO: 4 % — SIGNIFICANT CHANGE UP (ref 2–14)
NEUTROPHILS # BLD AUTO: 8.2 K/UL — HIGH (ref 1.8–7.4)
NEUTROPHILS NFR BLD AUTO: 91.7 % — HIGH (ref 43–77)
NRBC # BLD: 0 /100 WBCS — SIGNIFICANT CHANGE UP (ref 0–0)
NRBC # FLD: 0 K/UL — SIGNIFICANT CHANGE UP (ref 0–0)
PHOSPHATE SERPL-MCNC: 2.7 MG/DL — SIGNIFICANT CHANGE UP (ref 2.5–4.5)
PLATELET # BLD AUTO: 181 K/UL — SIGNIFICANT CHANGE UP (ref 150–400)
POTASSIUM SERPL-MCNC: 5 MMOL/L — SIGNIFICANT CHANGE UP (ref 3.5–5.3)
POTASSIUM SERPL-SCNC: 5 MMOL/L — SIGNIFICANT CHANGE UP (ref 3.5–5.3)
PROT SERPL-MCNC: 5.2 G/DL — LOW (ref 6–8.3)
RBC # BLD: 2.8 M/UL — LOW (ref 3.8–5.2)
RBC # FLD: 17.1 % — HIGH (ref 10.3–14.5)
SODIUM SERPL-SCNC: 139 MMOL/L — SIGNIFICANT CHANGE UP (ref 135–145)
WBC # BLD: 8.94 K/UL — SIGNIFICANT CHANGE UP (ref 3.8–10.5)
WBC # FLD AUTO: 8.94 K/UL — SIGNIFICANT CHANGE UP (ref 3.8–10.5)

## 2024-04-23 PROCEDURE — 93010 ELECTROCARDIOGRAM REPORT: CPT

## 2024-04-23 PROCEDURE — 99233 SBSQ HOSP IP/OBS HIGH 50: CPT

## 2024-04-23 RX ORDER — OXYCODONE HYDROCHLORIDE 5 MG/1
10 TABLET ORAL EVERY 4 HOURS
Refills: 0 | Status: DISCONTINUED | OUTPATIENT
Start: 2024-04-23 | End: 2024-04-30

## 2024-04-23 RX ORDER — BUDESONIDE AND FORMOTEROL FUMARATE DIHYDRATE 160; 4.5 UG/1; UG/1
2 AEROSOL RESPIRATORY (INHALATION)
Refills: 0 | Status: DISCONTINUED | OUTPATIENT
Start: 2024-04-23 | End: 2024-05-31

## 2024-04-23 RX ORDER — OXYCODONE HYDROCHLORIDE 5 MG/1
30 TABLET ORAL
Refills: 0 | Status: DISCONTINUED | OUTPATIENT
Start: 2024-04-23 | End: 2024-04-30

## 2024-04-23 RX ADMIN — Medication 500000 UNIT(S): at 23:29

## 2024-04-23 RX ADMIN — Medication 5 MILLIGRAM(S): at 00:15

## 2024-04-23 RX ADMIN — Medication 15 MILLILITER(S): at 23:29

## 2024-04-23 RX ADMIN — OXYCODONE HYDROCHLORIDE 30 MILLIGRAM(S): 5 TABLET ORAL at 06:04

## 2024-04-23 RX ADMIN — OXYCODONE HYDROCHLORIDE 30 MILLIGRAM(S): 5 TABLET ORAL at 14:52

## 2024-04-23 RX ADMIN — Medication 500000 UNIT(S): at 13:26

## 2024-04-23 RX ADMIN — AMIODARONE HYDROCHLORIDE 200 MILLIGRAM(S): 400 TABLET ORAL at 06:05

## 2024-04-23 RX ADMIN — Medication 1 PATCH: at 13:27

## 2024-04-23 RX ADMIN — Medication 5 MILLIGRAM(S): at 22:26

## 2024-04-23 RX ADMIN — Medication 15 MILLILITER(S): at 06:06

## 2024-04-23 RX ADMIN — PANTOPRAZOLE SODIUM 40 MILLIGRAM(S): 20 TABLET, DELAYED RELEASE ORAL at 17:36

## 2024-04-23 RX ADMIN — Medication 15 MILLILITER(S): at 00:15

## 2024-04-23 RX ADMIN — POLYETHYLENE GLYCOL 3350 17 GRAM(S): 17 POWDER, FOR SOLUTION ORAL at 17:36

## 2024-04-23 RX ADMIN — BUDESONIDE AND FORMOTEROL FUMARATE DIHYDRATE 2 PUFF(S): 160; 4.5 AEROSOL RESPIRATORY (INHALATION) at 08:55

## 2024-04-23 RX ADMIN — OXYCODONE HYDROCHLORIDE 30 MILLIGRAM(S): 5 TABLET ORAL at 22:26

## 2024-04-23 RX ADMIN — Medication 1 TABLET(S): at 13:28

## 2024-04-23 RX ADMIN — ENOXAPARIN SODIUM 50 MILLIGRAM(S): 100 INJECTION SUBCUTANEOUS at 06:04

## 2024-04-23 RX ADMIN — Medication 4 MILLIGRAM(S): at 08:50

## 2024-04-23 RX ADMIN — CHLORHEXIDINE GLUCONATE 1 APPLICATION(S): 213 SOLUTION TOPICAL at 13:27

## 2024-04-23 RX ADMIN — Medication 4 MILLIGRAM(S): at 16:59

## 2024-04-23 RX ADMIN — Medication 500000 UNIT(S): at 00:14

## 2024-04-23 RX ADMIN — Medication 25 MILLIGRAM(S): at 06:05

## 2024-04-23 RX ADMIN — Medication 15 MILLILITER(S): at 17:35

## 2024-04-23 RX ADMIN — Medication 500000 UNIT(S): at 17:35

## 2024-04-23 RX ADMIN — Medication 25 MILLIGRAM(S): at 17:36

## 2024-04-23 RX ADMIN — Medication 500000 UNIT(S): at 06:05

## 2024-04-23 RX ADMIN — BUDESONIDE AND FORMOTEROL FUMARATE DIHYDRATE 2 PUFF(S): 160; 4.5 AEROSOL RESPIRATORY (INHALATION) at 22:25

## 2024-04-23 RX ADMIN — Medication 15 MILLILITER(S): at 13:26

## 2024-04-23 RX ADMIN — ONDANSETRON 4 MILLIGRAM(S): 8 TABLET, FILM COATED ORAL at 14:25

## 2024-04-23 RX ADMIN — NALOXEGOL OXALATE 25 MILLIGRAM(S): 12.5 TABLET, FILM COATED ORAL at 14:25

## 2024-04-23 RX ADMIN — PANTOPRAZOLE SODIUM 40 MILLIGRAM(S): 20 TABLET, DELAYED RELEASE ORAL at 06:05

## 2024-04-23 NOTE — CHART NOTE - NSCHARTNOTEFT_GEN_A_CORE
Brief Update Note    Plan for EGD/EUS +/- FNB Wednesday 4/23, if remains medically optimized  Patient has been cleared by cardiology and pulmonology.    As per Cardiology: RCRI 3.9% 30-day risk MI, cardiac arrest, death. Requesting new baseline EKG.  As per Pulm: Given patient's COPD, patient is at higher risk for post-op respiratory failure. However, per ARISCAT score, she is overall at low risk for rafa-procedural pulmonary complication. Recommending patient take her Symbicort on the morning prior to procedure. She can be administered Duonebs if she develops wheezing or dyspnea. Please limit sedation to lowest dose required. Needs close cardiac and respiratory monitoring during and after procedure. Consider CPAP therapy post-procedure if patient develops respiratory distress. No contraindications to proceeding with procedure from pulmonary perspective with above precautions.     Please check EKG today.  Please see pulm notes for pre/post procedure recs.  Diet as tolerated today  NPO after midnight  3 AM CBC, CMP, coags; correct electrolytes via IV  Transfuse if Hgb < 8, given cardiac history.  On BID Lovenox, please hold evening dose 4/23 and AM dose 4/24.  Maintain working IV  Ensure adequate hydration  Rest of care per primary    Thank you for involving us in this patient's care. Please reach out if any further questions or concerns.    Angela Coffey MD  Gastroenterology/Hepatology Fellow, PGY-7    NON-URGENT CONSULTS:  Please email [email or web address]  [email or web address]  AT NIGHT AND ON WEEKENDS:  Contact on-call GI fellow via answering service ([phone]) from 5pm-8am and on weekends/holidays  MONDAY-FRIDAY 8AM-5PM:  Pager: [phone] ([facility])  Pager: [phone] ([facility])

## 2024-04-23 NOTE — PROGRESS NOTE ADULT - ASSESSMENT
57 yo woman, former smoker, with h/o R eye glaucoma, Fibromyalgia, Anxiety, GERD, and RCC s/p R total nephrectomy/hysterectomy; she was initially admitted to [facility] on 3/11 w/ R breast swelling c/f mastitis- imaging brooks noted supraclavicular/mediastinal mass lesion which encased the SVC and multiple other veins resulting in obliteration of the SVC and thrombosis of the R IJ, and a pancreatic neck cystic lesion.  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  Her disease/hospital course has also been c/b pericardial effusion s/p pericardial window w/ neg cytology, R pleural effusion, also negative cytology) s/p Pleurx, Afib w/ RVR and acute hypoxic resp failure 2/2 SVC syndrome- not dennis to IR-guided stenting; pt was ultimately started on Dex and transferred to [facility] on 4/4 for urgent palliative RT.    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)  pAfib, with RVR on this admission  Pericardial effusion with tamp physiology s/p pericardial window  R pleural effusion s/p pleurex  Acute hypoxic resp failure  Anxiety/emotional lability  Cancer-related pain, anxiety  Severe prot-ghazala malnutrition    - Metastatic cancer  Based on 3/14 SC LN path, ddx includes primary UGI vs pancreaticobiliary primary (breast edema is likely 2/2 SVC syndrome); PD-L1 TPs 1%, pMMR, Her2 pending  Ca-125 moderately elevated at 125; other tumor markers not significantly elevated  Additional diagnostic brooks includes:   * 4/19 MRCP showing pancreatic lesion (? IPMN, but pancreas was suboptimally evaluated)     * Pt tentatively scheduled for EGD with EUS and bx of pancreatic lesion on 4/24 (appreciate Cardiac and Pulm pre-op risk assessments)  Options for systemic cancer treatment are TBD  Pt's prognosis is guarded    - Anemia in neoplastic disease  Hgb stable today at 7.9  4/5 iron studies not c/w iron deficiency  VSS and pt without clinical s/s of active bleeding  4/17 hemolysis labs negative; 4/18 FOBT negative x2  Trending daily cbcs; continuing supportive transfusions prn (pt does not have h/o ACS, CAD, MI, goal hgb > 7; plts > 10K)    - SVC syndrome  Appreciate Rad Onc eval/recs  Completed 10fxs of palliative RT on 4/18   Tapering Dex PO 4mg q12 today with plan to taper off over the next 2 weeks (continuing pjp/ppi ppx until completion of taper)    - Extensive b/l UE DVTs  - Acute RLE DVT a/w RLE ecchymosis  - Hypercoag state  Continuing therapeutic lovenox (resumed on 4/18 given negative GIB brooks), benefits > risks   * Of note: pt has poor UE IV access and currently required peripheral IV in her LE extremities for admin of medication; can consider [facility] central line for urgent/emergent IV needs    - Anxiety/emotional lability  Continuing Diazepam 5mg daily and daily/prn  Pt has poor health literacy and clinical insight which is negatively impacting her medical decision making- when ask if she wanted to defer medical decision making to her boyfriend or if she had a designated HCP she replied "he's already sick of me"  Appreciate  consult: pt lacks medical decision making capacity; medical decision making will be deferred to pt's surrogate decision maker- Saeid Peña ([phone])    - 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)  Pt with h/o R eye glaucoma, but miosis can also be associated with Pancoast syndrome i/s/o extensive R upper lung tumor  Pt denies visual deficits or other related symptoms   MRI Brain without contrast ordered for further eval (to be completed after 4/24 EGD/ERCP)  Will continue to monitor closely    - pAfib  Pt currently SR, HR controlled  Likely precipitated by malignancy, pericardial effusion, SVC syndrome  Continuing Metoprolol 25mg q12 with holding parameters  Continuing Amio 200mg daily (monitoring for toxicities)  Continuing telemetry    - Pericardial effusion with tamp physiology s/p pericardial window  - R pleural effusion s/p pleurex  Likely inflammatory; both pleural and pericardial fluid cytology is negative for malignant cells  4/9 surveillance TTE with pEF and no WMAs  Continuing R pleurex drainage- up to 500cc q48h/prn    - Acute hypoxic respiratory failure  Resolved  2/2 all of the above  Continuing supportive resp care:   * duonebs q6   * Tessalon 100mg q8   * steroids as above  Weaning supplemental O2 via NC as tolerate    - Cancer related pain  Currently well controlled  Continuing Oxy ER 30mg q12 q8  Continuing Oxy IR 10mg q4/prn  Continuing Dilaudid IV prn for sev breakthrough pain  Continuing bowel regimen to prevent OIC (including Movantic)    - Severe prot-ghazala malnutrition: appreciate RD recs; continuing regular diet with protein shake supplement BID

## 2024-04-23 NOTE — PROGRESS NOTE ADULT - SUBJECTIVE AND OBJECTIVE BOX
SOLID TUMOR ONCOLOGY HOSPITALIST PROGRESS NOTE    S: No acute events overnight.  Pt had no new complaints today.    CURRENT MEDICATIONS  MEDICATIONS  (STANDING):  aMIOdarone    Tablet 200 milliGRAM(s) Oral daily  Biotene Dry Mouth Oral Rinse 15 milliLiter(s) Swish and Spit every 6 hours  budesonide 160 MICROgram(s)/formoterol 4.5 MICROgram(s) Inhaler 2 Puff(s) Inhalation two times a day  chlorhexidine 2% Cloths 1 Application(s) Topical daily  dexAMETHasone     Tablet 4 milliGRAM(s) Oral <User Schedule>  diazepam    Tablet 5 milliGRAM(s) Oral daily  enoxaparin Injectable 50 milliGRAM(s) SubCutaneous every 12 hours  influenza   Vaccine 0.5 milliLiter(s) IntraMuscular once  metoprolol tartrate 25 milliGRAM(s) Oral every 12 hours  multivitamin 1 Tablet(s) Oral daily  naloxegol 25 milliGRAM(s) Oral daily  nicotine -  14 mG/24Hr(s) Patch 1 Patch Transdermal every 24 hours  nystatin    Suspension 151075 Unit(s) Swish and Swallow four times a day  oxyCODONE  ER Tablet 30 milliGRAM(s) Oral <User Schedule>  pantoprazole    Tablet 40 milliGRAM(s) Oral two times a day  polyethylene glycol 3350 17 Gram(s) Oral every 12 hours  senna 2 Tablet(s) Oral at bedtime  trimethoprim  160 mG/sulfamethoxazole 800 mG 1 Tablet(s) Oral <User Schedule>  MEDICATIONS  (PRN):  albuterol    90 MICROgram(s) HFA Inhaler 2 Puff(s) Inhalation every 6 hours PRN Shortness of Breath and/or Wheezing  albuterol/ipratropium for Nebulization 3 milliLiter(s) Nebulizer every 6 hours PRN Shortness of Breath and/or Wheezing  aluminum hydroxide/magnesium hydroxide/simethicone Suspension 30 milliLiter(s) Oral every 6 hours PRN Dyspepsia  Biotene Dry Mouth Oral Rinse 15 milliLiter(s) Swish and Spit two times a day PRN dry mouth  diazepam    Tablet 5 milliGRAM(s) Oral daily PRN Anxiety  FIRST- Mouthwash  BLM 5 milliLiter(s) Swish and Spit four times a day PRN Mouth Care  HYDROmorphone  Injectable 1 milliGRAM(s) IV Push every 3 hours PRN Severe Pain (7 - 10)  metoclopramide Injectable 10 milliGRAM(s) IV Push every 8 hours PRN nausea, vomiting  ondansetron    Tablet 4 milliGRAM(s) Oral every 8 hours PRN Nausea and/or Vomiting  oxyCODONE    IR 10 milliGRAM(s) Oral every 4 hours PRN Moderate Pain (4 - 6)  sodium chloride 0.9% lock flush 10 milliLiter(s) IV Push every 1 hour PRN Pre/post blood products, medications, blood draw, and to maintain line patency      PHYSICAL EXAM  T(C): 36.3 (04-23-24 @ 10:05), Max: 36.9 (04-22-24 @ 20:57)  HR: 80 (04-23-24 @ 10:05) (75 - 82)  BP: 124/80 (04-23-24 @ 10:05) (114/51 - 147/82)  RR: 17 (04-23-24 @ 10:05) (16 - 18)  SpO2: 95% (04-23-24 @ 10:05) (95% - 96%)  Non-toxic appearing woman, sitting up in bed, appears comfortable, but agitated at times (thrashing her arms and shaking her head) during conversation  R eyelid ptosis and R pupil miosis present; anicteric sclera, no oral lesions/thrush  RRR, no m/r/g  Diminshed breath sounds in all R lung zones; LL zones CTA  Abd soft/nt/nd, normoactive bowel sounds  Trace LUE non-pitting edema present; large area of ecchymosis extending from the post R calf to post R thigh- improving; generalized muscular atrophy in all 4 extremities  CN 2-12 grossly intact; no gross focal neuro deficits; pt ambulates with walker    LABS                        8.3    8.94  )-----------( 181      ( 23 Apr 2024 07:30 )             26.7     04-23    139  |  104  |  29<H>  ----------------------------<  95  5.0   |  26  |  0.68    Ca    8.3<L>      23 Apr 2024 07:30  Phos  2.7     04-23  Mg     2.30     04-23    TPro  5.2<L>  /  Alb  2.9<L>  /  TBili  0.4  /  DBili  x   /  AST  19  /  ALT  58<H>  /  AlkPhos  84  04-23    PT/INR - ( 22 Apr 2024 06:40 )   PT: 10.5 sec;   INR: 0.93 ratio    PTT - ( 22 Apr 2024 06:40 )  PTT:29.3 sec      PATHOLOGY  3/26 pleural fluid cytology: Neg for malignant cells.    3/26 Pericardium excision: Neg for malignancy.    3/20 Pericardial fluid cytology: Neg for malignant cells.    3/14 LYMPH NODE, SUPRACLAVICULAR, RIGHT, US GUIDED CORE BIOPSY AND FNA   POSITIVE FOR MALIGNANT CELLS.   Carcinoma   Touch Prep slides display crowded groups and single lying malignant   cells with enlarged nuclei containing prominent nucleoli and vacuolated   cytoplasm. Core biopsies show neoplastic cells positive for CK7 and CDX2   immunostains, while negative for CK20, TTF1, GATA3, TRPS1 and PAX8. 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 Suggest correlation with clinical information and imaging to   assist with next step management.   Moleculars: pMMR, PD-L1 TPS 1%; Her2 pending      TUMOR MARKERS  3/13 CEA 7.1  3/24 Ca 19-9 27  4/8 Ca-125 187, Ca 27.29 16.9, Ca 15-3 18.9, AFP 5.2      MICROBIOLOGY  Abx  Bactrim 4/8-present    Cx Data  4/5 MRSA swab: Not detected  3/16 Quant plus TB: Negative      PERTINENT RADIOLOGY  4/19 MRCP: Motion degraded study, particularly on postcontrast sequences.  Pancreatic neck T2 hyperintense lesion measures 16 mm, image 28 series 5,   without clear evidence of enhancement on postcontrast phases. Most likely   this is a side branch IPMN. Remainder of the pancreas is suboptimally   evaluated due to artifact. Follow-up MRI abdomen or pancreas protocol CT   can be considered in 3-6 months for reevaluation. Partially distended stomach. Duodenal diverticulum. Colon is also partially distended with mild to moderate stool burden. Correlate   clinically. Trace pleural effusions. Right-sided pleural catheter is partially   imaged. Lung parenchyma is incompletely characterized on MRI. Soft tissue   of the mediastinum is not adequately imaged on this study.    4/18 B/L LE Doppler: Acute deep venous thrombosis: above the knee.  Acute nonocclusive deep venous thrombosis in the right common femoral vein.    417 CXR: Clear lungs with minimal effusion and Pleurx catheter.    4/10 VA dopplers B/L UE: Extensive acute, occlusive DVT RIJ, innominate and subclavian veins.  Acute non-occlusive DVT w/in R axillary vein.  Extensive acute, occlusive DVT noted in L subclavian. axillary, and proximal brachial veins.  Acute non-occlusive DVT LIJ.  Superficial vein thromboses are noted in the B/L cephalic veins.    4/9 Echocardiogram: Normal LV systolic function.  Normal mitral valve w/ normal leaflet excursion.  No pericardial effusion seen    4/4 CXR: Clear lungs w/ R pleurx catheter in place.    ok4/4 CXR AM: Small R pleural effusion w/ pleurx cath improved    OSH Imaging ([facility])  3/29 CXR: Decrease in R pleural effusion.     3/28 B/L UE Dupplers: Acute DVT involving R IJ, innominate vein, subclavian, brachial, and L IJ.  Superficial venous thrombosis involving B/L cephalic veins.  + Edema of superficial soft tissue of UE R>L.    3/27 CT neck: Bulky and conglomerate LAD invading RIJ w/ thrombus extending up to hyoid level.  Thrombus is likely combination tumor and bland.  New LIJ nonocclusive thrombus.    3/27 CT Chest: New consolidations throughout the R lung worse RLL c/f aspiration PNA.  + R pleurx cath w/ decrease in R pleural effusion.  Extensive DVT w/in thoracic and lower neck, upper SVC remains obliterated.  Large R supraclavicular mass infiltrating into the mediastinum.      3/27 CXR: Progression of R consolidation/effusion.    3/21 TTE: LV grossly normal.  Thickened pericardium.  no pericardial effusion.    3/18 TTE: Mod pericardial effusion w/ e/o hemodynamic compromise w/ diasolic collapse of RA that exceeds 1/3 of cardiac cycle >30% resp variation across MV E. wave and early diastolic inversion of RV.    3/14 CT Chest: Conglomerate soft tissue in superior mediastinum and R supraclavicular region 2/2 LAD w/ internal hypodensity c/f necrosis.  Mass encases SVC and multiple great veins resulting in obliteration of the SVC and thrombosis of the RIJ.  Multiple R sided collateral vessels and R soft tissue edema.  B/L pulm nodules.    3/14 CT neck: Extensive cellulitis/myositis along R anterior lateral neck and R upper chest wall w/ inflammatory fat induration the deep soft tissue of neck.  Large supraclavicular/mediastinal mass lesion, presumably conglomerate of LN w/ mass effect and complete occlusion of RIJ w/ associated inflammation.  Complete occlusion of R subclavian vein and nearly occlusive thrombosis in the proximal L brachiocephalic vein.    3/13 RUQ Abd US: Cholelithiasis w/o e/o cholecystitis.  Dilated CBD w/o e/o intraductal stone or other obstruction. 1.4cm pancreatic cyst w/ mild duct dilatation.    3/12 CT abd/pelv: S/p R nephrectomy. no LAD.  New 1.6cm  pancreatic neck cystic lesion w/o main pancreatic ductal dilatation.    3/10 R breast US: No e/o breast abscess

## 2024-04-24 LAB
ALBUMIN SERPL ELPH-MCNC: 2.8 G/DL — LOW (ref 3.3–5)
ALP SERPL-CCNC: 80 U/L — SIGNIFICANT CHANGE UP (ref 40–120)
ALT FLD-CCNC: 57 U/L — HIGH (ref 4–33)
ANION GAP SERPL CALC-SCNC: 12 MMOL/L — SIGNIFICANT CHANGE UP (ref 7–14)
APTT BLD: 23.4 SEC — LOW (ref 24.5–35.6)
AST SERPL-CCNC: 12 U/L — SIGNIFICANT CHANGE UP (ref 4–32)
BASOPHILS # BLD AUTO: 0 K/UL — SIGNIFICANT CHANGE UP (ref 0–0.2)
BASOPHILS NFR BLD AUTO: 0 % — SIGNIFICANT CHANGE UP (ref 0–2)
BILIRUB SERPL-MCNC: 0.3 MG/DL — SIGNIFICANT CHANGE UP (ref 0.2–1.2)
BUN SERPL-MCNC: 30 MG/DL — HIGH (ref 7–23)
CALCIUM SERPL-MCNC: 8.4 MG/DL — SIGNIFICANT CHANGE UP (ref 8.4–10.5)
CHLORIDE SERPL-SCNC: 104 MMOL/L — SIGNIFICANT CHANGE UP (ref 98–107)
CO2 SERPL-SCNC: 23 MMOL/L — SIGNIFICANT CHANGE UP (ref 22–31)
CREAT SERPL-MCNC: 0.83 MG/DL — SIGNIFICANT CHANGE UP (ref 0.5–1.3)
EGFR: 81 ML/MIN/1.73M2 — SIGNIFICANT CHANGE UP
EOSINOPHIL # BLD AUTO: 0.03 K/UL — SIGNIFICANT CHANGE UP (ref 0–0.5)
EOSINOPHIL NFR BLD AUTO: 0.4 % — SIGNIFICANT CHANGE UP (ref 0–6)
GLUCOSE SERPL-MCNC: 122 MG/DL — HIGH (ref 70–99)
HCT VFR BLD CALC: 23.8 % — LOW (ref 34.5–45)
HGB BLD-MCNC: 7.5 G/DL — LOW (ref 11.5–15.5)
IANC: 7.98 K/UL — HIGH (ref 1.8–7.4)
IMM GRANULOCYTES NFR BLD AUTO: 1.5 % — HIGH (ref 0–0.9)
INR BLD: 0.9 RATIO — SIGNIFICANT CHANGE UP (ref 0.85–1.18)
LYMPHOCYTES # BLD AUTO: 0.12 K/UL — LOW (ref 1–3.3)
LYMPHOCYTES # BLD AUTO: 1.4 % — LOW (ref 13–44)
MAGNESIUM SERPL-MCNC: 2 MG/DL — SIGNIFICANT CHANGE UP (ref 1.6–2.6)
MCHC RBC-ENTMCNC: 30 PG — SIGNIFICANT CHANGE UP (ref 27–34)
MCHC RBC-ENTMCNC: 31.5 GM/DL — LOW (ref 32–36)
MCV RBC AUTO: 95.2 FL — SIGNIFICANT CHANGE UP (ref 80–100)
MONOCYTES # BLD AUTO: 0.3 K/UL — SIGNIFICANT CHANGE UP (ref 0–0.9)
MONOCYTES NFR BLD AUTO: 3.5 % — SIGNIFICANT CHANGE UP (ref 2–14)
NEUTROPHILS # BLD AUTO: 7.98 K/UL — HIGH (ref 1.8–7.4)
NEUTROPHILS NFR BLD AUTO: 93.2 % — HIGH (ref 43–77)
NRBC # BLD: 0 /100 WBCS — SIGNIFICANT CHANGE UP (ref 0–0)
NRBC # FLD: 0 K/UL — SIGNIFICANT CHANGE UP (ref 0–0)
PHOSPHATE SERPL-MCNC: 3.8 MG/DL — SIGNIFICANT CHANGE UP (ref 2.5–4.5)
PLATELET # BLD AUTO: 154 K/UL — SIGNIFICANT CHANGE UP (ref 150–400)
POTASSIUM SERPL-MCNC: 4.7 MMOL/L — SIGNIFICANT CHANGE UP (ref 3.5–5.3)
POTASSIUM SERPL-SCNC: 4.7 MMOL/L — SIGNIFICANT CHANGE UP (ref 3.5–5.3)
PROT SERPL-MCNC: 4.9 G/DL — LOW (ref 6–8.3)
PROTHROM AB SERPL-ACNC: 10.2 SEC — SIGNIFICANT CHANGE UP (ref 9.5–13)
RBC # BLD: 2.5 M/UL — LOW (ref 3.8–5.2)
RBC # FLD: 16.9 % — HIGH (ref 10.3–14.5)
SODIUM SERPL-SCNC: 139 MMOL/L — SIGNIFICANT CHANGE UP (ref 135–145)
WBC # BLD: 8.56 K/UL — SIGNIFICANT CHANGE UP (ref 3.8–10.5)
WBC # FLD AUTO: 8.56 K/UL — SIGNIFICANT CHANGE UP (ref 3.8–10.5)

## 2024-04-24 PROCEDURE — 43235 EGD DIAGNOSTIC BRUSH WASH: CPT | Mod: GC

## 2024-04-24 PROCEDURE — 99233 SBSQ HOSP IP/OBS HIGH 50: CPT

## 2024-04-24 RX ORDER — HYDROMORPHONE HYDROCHLORIDE 2 MG/ML
1 INJECTION INTRAMUSCULAR; INTRAVENOUS; SUBCUTANEOUS
Refills: 0 | Status: DISCONTINUED | OUTPATIENT
Start: 2024-04-24 | End: 2024-04-30

## 2024-04-24 RX ORDER — ENOXAPARIN SODIUM 100 MG/ML
50 INJECTION SUBCUTANEOUS EVERY 12 HOURS
Refills: 0 | Status: DISCONTINUED | OUTPATIENT
Start: 2024-04-24 | End: 2024-04-25

## 2024-04-24 RX ORDER — DIAZEPAM 5 MG
5 TABLET ORAL DAILY
Refills: 0 | Status: DISCONTINUED | OUTPATIENT
Start: 2024-04-24 | End: 2024-04-30

## 2024-04-24 RX ADMIN — NALOXEGOL OXALATE 25 MILLIGRAM(S): 12.5 TABLET, FILM COATED ORAL at 16:13

## 2024-04-24 RX ADMIN — OXYCODONE HYDROCHLORIDE 30 MILLIGRAM(S): 5 TABLET ORAL at 16:20

## 2024-04-24 RX ADMIN — ENOXAPARIN SODIUM 50 MILLIGRAM(S): 100 INJECTION SUBCUTANEOUS at 21:50

## 2024-04-24 RX ADMIN — Medication 1 PATCH: at 04:44

## 2024-04-24 RX ADMIN — BUDESONIDE AND FORMOTEROL FUMARATE DIHYDRATE 2 PUFF(S): 160; 4.5 AEROSOL RESPIRATORY (INHALATION) at 22:16

## 2024-04-24 RX ADMIN — Medication 1 TABLET(S): at 16:12

## 2024-04-24 RX ADMIN — OXYCODONE HYDROCHLORIDE 30 MILLIGRAM(S): 5 TABLET ORAL at 22:15

## 2024-04-24 RX ADMIN — Medication 15 MILLILITER(S): at 23:38

## 2024-04-24 RX ADMIN — CHLORHEXIDINE GLUCONATE 1 APPLICATION(S): 213 SOLUTION TOPICAL at 16:16

## 2024-04-24 RX ADMIN — Medication 4 MILLIGRAM(S): at 16:19

## 2024-04-24 RX ADMIN — Medication 1 PATCH: at 16:12

## 2024-04-24 RX ADMIN — SENNA PLUS 2 TABLET(S): 8.6 TABLET ORAL at 22:16

## 2024-04-24 RX ADMIN — AMIODARONE HYDROCHLORIDE 200 MILLIGRAM(S): 400 TABLET ORAL at 16:14

## 2024-04-24 RX ADMIN — Medication 500000 UNIT(S): at 16:12

## 2024-04-24 RX ADMIN — Medication 25 MILLIGRAM(S): at 18:31

## 2024-04-24 RX ADMIN — Medication 4 MILLIGRAM(S): at 08:05

## 2024-04-24 RX ADMIN — Medication 5 MILLIGRAM(S): at 23:38

## 2024-04-24 RX ADMIN — PANTOPRAZOLE SODIUM 40 MILLIGRAM(S): 20 TABLET, DELAYED RELEASE ORAL at 18:31

## 2024-04-24 RX ADMIN — BUDESONIDE AND FORMOTEROL FUMARATE DIHYDRATE 2 PUFF(S): 160; 4.5 AEROSOL RESPIRATORY (INHALATION) at 08:05

## 2024-04-24 RX ADMIN — Medication 1 TABLET(S): at 08:06

## 2024-04-24 RX ADMIN — Medication 500000 UNIT(S): at 23:38

## 2024-04-24 RX ADMIN — POLYETHYLENE GLYCOL 3350 17 GRAM(S): 17 POWDER, FOR SOLUTION ORAL at 18:31

## 2024-04-24 RX ADMIN — Medication 15 MILLILITER(S): at 16:11

## 2024-04-24 NOTE — ASU PREOP CHECKLIST - INTERNAL PROSTHESES
Subjective   Patient ID: Orlando is a 2 year old male who is accompanied by:mother, who helps to provide the history.     Chief Complaint   Patient presents with   • Ear Pain     X3d. RT ear.   • Fever     Max of 102, started today.       HPI   - R ear pain x3 days    - Fever today to 102   - Was seen in urgent care on 10/17, prescribed cefdinir x10 days    - Symptoms initially resolved   - R ear pain then returned and was seen again in urgent care on 10/29    - Was seen again on 10/29    - Found to have effusion but not necessarily infection, prescribed course of augmentin \"in case\"   - Since AUDREY was no longer having symptoms, Mom has not given this yet     Review of Systems   All other systems reviewed and are negative.      Patient's medications, allergies, past medical, surgical, social and family histories were reviewed and updated as appropriate.    Objective   Vitals:Temp 99 °F (37.2 °C) (Temporal)   Wt 19.6 kg (43 lb 5 oz)   Physical Exam  Constitutional:       General: He is not in acute distress.  HENT:      Right Ear: Tympanic membrane is erythematous and bulging.      Left Ear: Tympanic membrane normal.   Eyes:      Conjunctiva/sclera: Conjunctivae normal.   Cardiovascular:      Rate and Rhythm: Normal rate and regular rhythm.      Pulses: Normal pulses.      Heart sounds: Normal heart sounds. No murmur heard.  Pulmonary:      Effort: Pulmonary effort is normal. No respiratory distress.      Breath sounds: Normal breath sounds.   Skin:     Capillary Refill: Capillary refill takes less than 2 seconds.      Findings: No rash.   Neurological:      Mental Status: He is alert.         Assessment   Problem List Items Addressed This Visit    None  Visit Diagnoses     Recurrent acute serous otitis media of right ear    -  Primary      AUDREY is a 2 year-old male with history of autism who presents with fever, R ear pain, recently finished course of cefdinir for AOM. Per Mom, no signs of AOM at urgent care. Will trial  augmentin at this time, can consider CTX if no improvement. Patient is otherwise well-appearing.     Plan   Mom has augmentin Rx from urgent care - to take for 7 days   Tylenol/motrin as needed for fever/discomfort   Follow up if persistent or worsening symptoms     Laura Turner DO  [facility]  [address] [place] [address]  Phone: [phone]  Fax: [phone]         no

## 2024-04-24 NOTE — PROGRESS NOTE ADULT - SUBJECTIVE AND OBJECTIVE BOX
SOLID TUMOR ONCOLOGY HOSPITALIST PROGRESS NOTE    S: No acute events overnight.    CURRENT MEDICATIONS  MEDICATIONS  (STANDING):  aMIOdarone    Tablet 200 milliGRAM(s) Oral daily  Biotene Dry Mouth Oral Rinse 15 milliLiter(s) Swish and Spit every 6 hours  budesonide 160 MICROgram(s)/formoterol 4.5 MICROgram(s) Inhaler 2 Puff(s) Inhalation two times a day  chlorhexidine 2% Cloths 1 Application(s) Topical daily  dexAMETHasone     Tablet 4 milliGRAM(s) Oral <User Schedule>  diazepam    Tablet 5 milliGRAM(s) Oral daily  influenza   Vaccine 0.5 milliLiter(s) IntraMuscular once  metoprolol tartrate 25 milliGRAM(s) Oral every 12 hours  multivitamin 1 Tablet(s) Oral daily  naloxegol 25 milliGRAM(s) Oral daily  nicotine -  14 mG/24Hr(s) Patch 1 Patch Transdermal every 24 hours  nystatin    Suspension 212386 Unit(s) Swish and Swallow four times a day  oxyCODONE  ER Tablet 30 milliGRAM(s) Oral <User Schedule>  pantoprazole    Tablet 40 milliGRAM(s) Oral two times a day  polyethylene glycol 3350 17 Gram(s) Oral every 12 hours  senna 2 Tablet(s) Oral at bedtime  trimethoprim  160 mG/sulfamethoxazole 800 mG 1 Tablet(s) Oral <User Schedule>  MEDICATIONS  (PRN):  albuterol    90 MICROgram(s) HFA Inhaler 2 Puff(s) Inhalation every 6 hours PRN Shortness of Breath and/or Wheezing  albuterol/ipratropium for Nebulization 3 milliLiter(s) Nebulizer every 6 hours PRN Shortness of Breath and/or Wheezing  aluminum hydroxide/magnesium hydroxide/simethicone Suspension 30 milliLiter(s) Oral every 6 hours PRN Dyspepsia  Biotene Dry Mouth Oral Rinse 15 milliLiter(s) Swish and Spit two times a day PRN dry mouth  diazepam    Tablet 5 milliGRAM(s) Oral daily PRN Anxiety  FIRST- Mouthwash  BLM 5 milliLiter(s) Swish and Spit four times a day PRN Mouth Care  HYDROmorphone  Injectable 1 milliGRAM(s) IV Push every 3 hours PRN Severe Pain (7 - 10)  metoclopramide Injectable 10 milliGRAM(s) IV Push every 8 hours PRN nausea, vomiting  ondansetron    Tablet 4 milliGRAM(s) Oral every 8 hours PRN Nausea and/or Vomiting  oxyCODONE    IR 10 milliGRAM(s) Oral every 4 hours PRN Moderate Pain (4 - 6)  sodium chloride 0.9% lock flush 10 milliLiter(s) IV Push every 1 hour PRN Pre/post blood products, medications, blood draw, and to maintain line patency      PHYSICAL EXAM  T(C): 36.1 (04-24-24 @ 13:50), Max: 36.9 (04-23-24 @ 22:08)  HR: 88 (04-24-24 @ 14:50) (80 - 89)  BP: 125/64 (04-24-24 @ 14:50) (99/63 - 156/78)  RR: 15 (04-24-24 @ 14:50) (10 - 20)  SpO2: 97% (04-24-24 @ 14:50) (94% - 100%)  Non-toxic appearing woman, sitting up in bed, appears comfortable, but agitated at times (thrashing her arms and shaking her head) during conversation  R eyelid ptosis and R pupil miosis present; anicteric sclera, no oral lesions/thrush  RRR, no m/r/g  Diminshed breath sounds in all R lung zones; LL zones CTA  Abd soft/nt/nd, normoactive bowel sounds  Trace LUE non-pitting edema present; large area of ecchymosis extending from the post R calf to post R thigh- improving; generalized muscular atrophy in all 4 extremities  CN 2-12 grossly intact; no gross focal neuro deficits; pt ambulates with walker      LABS                        7.5    8.56  )-----------( 154      ( 24 Apr 2024 06:50 )             23.8     04-24    139  |  104  |  30<H>  ----------------------------<  122<H>  4.7   |  23  |  0.83    Ca    8.4      24 Apr 2024 06:50  Phos  3.8     04-24  Mg     2.00     04-24    TPro  4.9<L>  /  Alb  2.8<L>  /  TBili  0.3  /  DBili  x   /  AST  12  /  ALT  57<H>  /  AlkPhos  80  04-24    PT/INR - ( 24 Apr 2024 06:50 )   PT: 10.2 sec;   INR: 0.90 ratio    PTT - ( 24 Apr 2024 06:50 )  PTT:23.4 sec    PATHOLOGY  3/26 pleural fluid cytology: Neg for malignant cells.    3/26 Pericardium excision: Neg for malignancy.    3/20 Pericardial fluid cytology: Neg for malignant cells.    3/14 LYMPH NODE, SUPRACLAVICULAR, RIGHT, US GUIDED CORE BIOPSY AND FNA   POSITIVE FOR MALIGNANT CELLS.   Carcinoma   Touch Prep slides display crowded groups and single lying malignant   cells with enlarged nuclei containing prominent nucleoli and vacuolated   cytoplasm. Core biopsies show neoplastic cells positive for CK7 and CDX2   immunostains, while negative for CK20, TTF1, GATA3, TRPS1 and PAX8. 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 Suggest correlation with clinical information and imaging to   assist with next step management.   Moleculars: pMMR, PD-L1 TPS 1%; Her2 pending      TUMOR MARKERS  3/13 CEA 7.1  3/24 Ca 19-9 27  4/8 Ca-125 187, Ca 27.29 16.9, Ca 15-3 18.9, AFP 5.2      MICROBIOLOGY  Abx  Bactrim 4/8-present    Cx Data  4/5 MRSA swab: Not detected  3/16 Quant plus TB: Negative      PERTINENT RADIOLOGY  4/19 MRCP: Motion degraded study, particularly on postcontrast sequences.  Pancreatic neck T2 hyperintense lesion measures 16 mm, image 28 series 5,   without clear evidence of enhancement on postcontrast phases. Most likely   this is a side branch IPMN. Remainder of the pancreas is suboptimally   evaluated due to artifact. Follow-up MRI abdomen or pancreas protocol CT   can be considered in 3-6 months for reevaluation. Partially distended stomach. Duodenal diverticulum. Colon is also partially distended with mild to moderate stool burden. Correlate   clinically. Trace pleural effusions. Right-sided pleural catheter is partially   imaged. Lung parenchyma is incompletely characterized on MRI. Soft tissue   of the mediastinum is not adequately imaged on this study.    4/18 B/L LE Doppler: Acute deep venous thrombosis: above the knee.  Acute nonocclusive deep venous thrombosis in the right common femoral vein.    417 CXR: Clear lungs with minimal effusion and Pleurx catheter.    4/10 VA dopplers B/L UE: Extensive acute, occlusive DVT RIJ, innominate and subclavian veins.  Acute non-occlusive DVT w/in R axillary vein.  Extensive acute, occlusive DVT noted in L subclavian. axillary, and proximal brachial veins.  Acute non-occlusive DVT LIJ.  Superficial vein thromboses are noted in the B/L cephalic veins.    4/9 Echocardiogram: Normal LV systolic function.  Normal mitral valve w/ normal leaflet excursion.  No pericardial effusion seen    4/4 CXR: Clear lungs w/ R pleurx catheter in place.    ok4/4 CXR AM: Small R pleural effusion w/ pleurx cath improved    [facility]H Imaging ([facility])  3/29 CXR: Decrease in R pleural effusion.     3/28 B/L UE Dupplers: Acute DVT involving R IJ, innominate vein, subclavian, brachial, and L IJ.  Superficial venous thrombosis involving B/L cephalic veins.  + Edema of superficial soft tissue of UE R>L.    3/27 CT neck: Bulky and conglomerate LAD invading RIJ w/ thrombus extending up to hyoid level.  Thrombus is likely combination tumor and bland.  New LIJ nonocclusive thrombus.    3/27 CT Chest: New consolidations throughout the R lung worse RLL c/f aspiration PNA.  + R pleurx cath w/ decrease in R pleural effusion.  Extensive DVT w/in thoracic and lower neck, upper SVC remains obliterated.  Large R supraclavicular mass infiltrating into the mediastinum.      3/27 CXR: Progression of R consolidation/effusion.    3/21 TTE: LV grossly normal.  Thickened pericardium.  no pericardial effusion.    3/18 TTE: Mod pericardial effusion w/ e/o hemodynamic compromise w/ diasolic collapse of RA that exceeds 1/3 of cardiac cycle >30% resp variation across MV E. wave and early diastolic inversion of RV.    3/14 CT Chest: Conglomerate soft tissue in superior mediastinum and R supraclavicular region 2/2 LAD w/ internal hypodensity c/f necrosis.  Mass encases SVC and multiple great veins resulting in obliteration of the SVC and thrombosis of the RIJ.  Multiple R sided collateral vessels and R soft tissue edema.  B/L pulm nodules.    3/14 CT neck: Extensive cellulitis/myositis along R anterior lateral neck and R upper chest wall w/ inflammatory fat induration the deep soft tissue of neck.  Large supraclavicular/mediastinal mass lesion, presumably conglomerate of LN w/ mass effect and complete occlusion of RIJ w/ associated inflammation.  Complete occlusion of R subclavian vein and nearly occlusive thrombosis in the proximal L brachiocephalic vein.    3/13 RUQ Abd US: Cholelithiasis w/o e/o cholecystitis.  Dilated CBD w/o e/o intraductal stone or other obstruction. 1.4cm pancreatic cyst w/ mild duct dilatation.    3/12 CT abd/pelv: S/p R nephrectomy. no LAD.  New 1.6cm  pancreatic neck cystic lesion w/o main pancreatic ductal dilatation.    3/10 R breast US: No e/o breast abscess

## 2024-04-24 NOTE — PROGRESS NOTE ADULT - ASSESSMENT
59 yo woman, former smoker, with h/o R eye glaucoma, Fibromyalgia, Anxiety, GERD, and RCC s/p R total nephrectomy/hysterectomy; she was initially admitted to [facility] on 3/11 w/ R breast swelling c/f mastitis- imaging brooks noted supraclavicular/mediastinal mass lesion which encased the SVC and multiple other veins resulting in obliteration of the SVC and thrombosis of the R IJ, and a pancreatic neck cystic lesion.  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  Her disease/hospital course has also been c/b pericardial effusion s/p pericardial window w/ neg cytology, R pleural effusion, also negative cytology) s/p Pleurx, Afib w/ RVR and acute hypoxic resp failure 2/2 SVC syndrome- not dennis to IR-guided stenting; pt was ultimately started on Dex and transferred to [facility] on 4/4 for urgent palliative RT.    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)  pAfib, with RVR on this admission  Pericardial effusion with tamp physiology s/p pericardial window  R pleural effusion s/p pleurex  Acute hypoxic resp failure  Anxiety/emotional lability  Cancer-related pain, anxiety  Severe prot-ghazala malnutrition    - Metastatic cancer  Based on 3/14 SC LN path, ddx includes primary UGI vs pancreaticobiliary primary (breast edema is likely 2/2 SVC syndrome); PD-L1 TPs 1%, pMMR, Her2 pending  Ca-125 moderately elevated at 125; other tumor markers not significantly elevated  Additional diagnostic brooks includes:   * 4/19 MRCP showing pancreatic lesion (? IPMN, but pancreas was suboptimally evaluated)     * Pt s/p EGD/EUS + bx of pancreatic lesion on 4/24- will fu procedure findings  Options for systemic cancer treatment are TBD  Pt's prognosis is guarded    - Anemia in neoplastic disease  Hgb relatively stable at 7.5  4/5 iron studies not c/w iron deficiency  VSS and pt without clinical s/s of active bleeding  4/17 hemolysis labs negative; 4/18 FOBT negative x2  Trending daily cbcs; continuing supportive transfusions prn (pt does not have h/o ACS, CAD, MI, goal hgb > 7; plts > 10K)    - SVC syndrome  Appreciate Rad Onc eval/recs  Completed 10fxs of palliative RT on 4/18   Completing 2-wk Dex taper, until 5/7 (continuing pjp/ppi ppx until completion of taper)    - Extensive b/l UE DVTs  - Acute RLE DVT a/w RLE ecchymosis  - Hypercoag state  Continuing therapeutic lovenox (resumed on 4/18 given negative GIB brooks), benefits > risks   * Of note: pt has poor UE IV access and currently required peripheral IV in her LE extremities for admin of medication; can consider FV central line for urgent/emergent IV needs    - Anxiety/emotional lability  Continuing Diazepam 5mg daily and daily/prn  Pt has poor health literacy and clinical insight which is negatively impacting her medical decision making- when ask if she wanted to defer medical decision making to her boyfriend or if she had a designated HCP she replied "he's already sick of me"  Appreciate  consult: pt lacks medical decision making capacity; medical decision making will be deferred to pt's surrogate decision maker- Saeid Peña ([phone])   * will ask for  reassessment to determine if pt has any improvement in her decision-making capacity in if she has capacity to participate in discussion re: dispo planning    - 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)  Pt with h/o R eye glaucoma, but miosis can also be associated with Pancoast syndrome i/s/o extensive R upper lung tumor  Pt denies visual deficits or other related symptoms   MRI Brain without contrast ordered for further eval- pt continues to refuse, can be completed as outpt  Will continue to monitor closely    - pAfib  Pt currently SR, HR controlled  Likely precipitated by malignancy, pericardial effusion, SVC syndrome  Continuing Metoprolol 25mg q12 with holding parameters  Continuing Amio 200mg daily (monitoring for toxicities)  Continuing telemetry    - Pericardial effusion with tamp physiology s/p pericardial window  - R pleural effusion s/p pleurex  Likely inflammatory; both pleural and pericardial fluid cytology is negative for malignant cells  4/9 surveillance TTE with pEF and no WMAs  Continuing R pleurex drainage- up to 500cc q48h/prn    - Acute hypoxic respiratory failure  Resolved  2/2 all of the above  Continuing supportive resp care:   * duonebs q6   * Tessalon 100mg q8   * steroids as above  Weaning supplemental O2 via NC as tolerated    - Cancer related pain  Currently well controlled  Continuing Oxy ER 30mg q12 q8  Continuing Oxy IR 10mg q4/prn  Continuing Dilaudid IV prn for sev breakthrough pain  Continuing bowel regimen to prevent OIC (including Movantic)    - Severe prot-ghazala malnutrition: appreciate RD recs; continuing regular diet with protein shake supplement BID

## 2024-04-25 LAB
ALBUMIN SERPL ELPH-MCNC: 2.8 G/DL — LOW (ref 3.3–5)
ALP SERPL-CCNC: 81 U/L — SIGNIFICANT CHANGE UP (ref 40–120)
ALT FLD-CCNC: 62 U/L — HIGH (ref 4–33)
ANION GAP SERPL CALC-SCNC: 12 MMOL/L — SIGNIFICANT CHANGE UP (ref 7–14)
AST SERPL-CCNC: 14 U/L — SIGNIFICANT CHANGE UP (ref 4–32)
BASOPHILS # BLD AUTO: 0.02 K/UL — SIGNIFICANT CHANGE UP (ref 0–0.2)
BASOPHILS NFR BLD AUTO: 0.2 % — SIGNIFICANT CHANGE UP (ref 0–2)
BILIRUB SERPL-MCNC: 0.4 MG/DL — SIGNIFICANT CHANGE UP (ref 0.2–1.2)
BUN SERPL-MCNC: 22 MG/DL — SIGNIFICANT CHANGE UP (ref 7–23)
CALCIUM SERPL-MCNC: 8.6 MG/DL — SIGNIFICANT CHANGE UP (ref 8.4–10.5)
CHLORIDE SERPL-SCNC: 104 MMOL/L — SIGNIFICANT CHANGE UP (ref 98–107)
CO2 SERPL-SCNC: 21 MMOL/L — LOW (ref 22–31)
CREAT SERPL-MCNC: 0.79 MG/DL — SIGNIFICANT CHANGE UP (ref 0.5–1.3)
EGFR: 86 ML/MIN/1.73M2 — SIGNIFICANT CHANGE UP
EOSINOPHIL # BLD AUTO: 0.04 K/UL — SIGNIFICANT CHANGE UP (ref 0–0.5)
EOSINOPHIL NFR BLD AUTO: 0.4 % — SIGNIFICANT CHANGE UP (ref 0–6)
GLUCOSE SERPL-MCNC: 96 MG/DL — SIGNIFICANT CHANGE UP (ref 70–99)
HCT VFR BLD CALC: 26.3 % — LOW (ref 34.5–45)
HGB BLD-MCNC: 8.3 G/DL — LOW (ref 11.5–15.5)
IANC: 10.64 K/UL — HIGH (ref 1.8–7.4)
IMM GRANULOCYTES NFR BLD AUTO: 1.1 % — HIGH (ref 0–0.9)
LYMPHOCYTES # BLD AUTO: 0.22 K/UL — LOW (ref 1–3.3)
LYMPHOCYTES # BLD AUTO: 1.9 % — LOW (ref 13–44)
MAGNESIUM SERPL-MCNC: 1.8 MG/DL — SIGNIFICANT CHANGE UP (ref 1.6–2.6)
MCHC RBC-ENTMCNC: 30.4 PG — SIGNIFICANT CHANGE UP (ref 27–34)
MCHC RBC-ENTMCNC: 31.6 GM/DL — LOW (ref 32–36)
MCV RBC AUTO: 96.3 FL — SIGNIFICANT CHANGE UP (ref 80–100)
MONOCYTES # BLD AUTO: 0.33 K/UL — SIGNIFICANT CHANGE UP (ref 0–0.9)
MONOCYTES NFR BLD AUTO: 2.9 % — SIGNIFICANT CHANGE UP (ref 2–14)
NEUTROPHILS # BLD AUTO: 10.64 K/UL — HIGH (ref 1.8–7.4)
NEUTROPHILS NFR BLD AUTO: 93.5 % — HIGH (ref 43–77)
NRBC # BLD: 0 /100 WBCS — SIGNIFICANT CHANGE UP (ref 0–0)
NRBC # FLD: 0 K/UL — SIGNIFICANT CHANGE UP (ref 0–0)
PHOSPHATE SERPL-MCNC: 3.6 MG/DL — SIGNIFICANT CHANGE UP (ref 2.5–4.5)
PLATELET # BLD AUTO: 144 K/UL — LOW (ref 150–400)
POTASSIUM SERPL-MCNC: 4.7 MMOL/L — SIGNIFICANT CHANGE UP (ref 3.5–5.3)
POTASSIUM SERPL-SCNC: 4.7 MMOL/L — SIGNIFICANT CHANGE UP (ref 3.5–5.3)
PROT SERPL-MCNC: 4.8 G/DL — LOW (ref 6–8.3)
RBC # BLD: 2.73 M/UL — LOW (ref 3.8–5.2)
RBC # FLD: 17 % — HIGH (ref 10.3–14.5)
SODIUM SERPL-SCNC: 137 MMOL/L — SIGNIFICANT CHANGE UP (ref 135–145)
WBC # BLD: 11.38 K/UL — HIGH (ref 3.8–10.5)
WBC # FLD AUTO: 11.38 K/UL — HIGH (ref 3.8–10.5)

## 2024-04-25 PROCEDURE — 99233 SBSQ HOSP IP/OBS HIGH 50: CPT

## 2024-04-25 RX ADMIN — Medication 1 PATCH: at 12:20

## 2024-04-25 RX ADMIN — Medication 1 TABLET(S): at 12:20

## 2024-04-25 RX ADMIN — PANTOPRAZOLE SODIUM 40 MILLIGRAM(S): 20 TABLET, DELAYED RELEASE ORAL at 05:11

## 2024-04-25 RX ADMIN — Medication 4 MILLIGRAM(S): at 08:47

## 2024-04-25 RX ADMIN — Medication 30 MILLILITER(S): at 21:16

## 2024-04-25 RX ADMIN — Medication 15 MILLILITER(S): at 12:19

## 2024-04-25 RX ADMIN — Medication 500000 UNIT(S): at 12:19

## 2024-04-25 RX ADMIN — OXYCODONE HYDROCHLORIDE 30 MILLIGRAM(S): 5 TABLET ORAL at 23:35

## 2024-04-25 RX ADMIN — Medication 500000 UNIT(S): at 17:40

## 2024-04-25 RX ADMIN — POLYETHYLENE GLYCOL 3350 17 GRAM(S): 17 POWDER, FOR SOLUTION ORAL at 17:41

## 2024-04-25 RX ADMIN — Medication 500000 UNIT(S): at 05:11

## 2024-04-25 RX ADMIN — OXYCODONE HYDROCHLORIDE 30 MILLIGRAM(S): 5 TABLET ORAL at 05:11

## 2024-04-25 RX ADMIN — Medication 500000 UNIT(S): at 23:35

## 2024-04-25 RX ADMIN — NALOXEGOL OXALATE 25 MILLIGRAM(S): 12.5 TABLET, FILM COATED ORAL at 12:22

## 2024-04-25 RX ADMIN — AMIODARONE HYDROCHLORIDE 200 MILLIGRAM(S): 400 TABLET ORAL at 05:12

## 2024-04-25 RX ADMIN — CHLORHEXIDINE GLUCONATE 1 APPLICATION(S): 213 SOLUTION TOPICAL at 12:22

## 2024-04-25 RX ADMIN — Medication 25 MILLIGRAM(S): at 17:41

## 2024-04-25 RX ADMIN — BUDESONIDE AND FORMOTEROL FUMARATE DIHYDRATE 2 PUFF(S): 160; 4.5 AEROSOL RESPIRATORY (INHALATION) at 21:18

## 2024-04-25 RX ADMIN — SENNA PLUS 2 TABLET(S): 8.6 TABLET ORAL at 21:20

## 2024-04-25 RX ADMIN — Medication 15 MILLILITER(S): at 05:12

## 2024-04-25 RX ADMIN — ENOXAPARIN SODIUM 50 MILLIGRAM(S): 100 INJECTION SUBCUTANEOUS at 09:03

## 2024-04-25 RX ADMIN — PANTOPRAZOLE SODIUM 40 MILLIGRAM(S): 20 TABLET, DELAYED RELEASE ORAL at 17:41

## 2024-04-25 RX ADMIN — OXYCODONE HYDROCHLORIDE 30 MILLIGRAM(S): 5 TABLET ORAL at 16:36

## 2024-04-25 RX ADMIN — Medication 4 MILLIGRAM(S): at 16:36

## 2024-04-25 RX ADMIN — Medication 15 MILLILITER(S): at 17:40

## 2024-04-25 RX ADMIN — Medication 5 MILLIGRAM(S): at 22:16

## 2024-04-25 RX ADMIN — Medication 25 MILLIGRAM(S): at 05:11

## 2024-04-25 RX ADMIN — BUDESONIDE AND FORMOTEROL FUMARATE DIHYDRATE 2 PUFF(S): 160; 4.5 AEROSOL RESPIRATORY (INHALATION) at 08:44

## 2024-04-25 NOTE — PROGRESS NOTE ADULT - SUBJECTIVE AND OBJECTIVE BOX
ANESTHESIA POSTOP CHECK    58y Female POSTOP DAY 1     Vital Signs Last 24 Hrs  T(C): 36.7 (25 Apr 2024 05:10), Max: 36.8 (24 Apr 2024 12:34)  T(F): 98.1 (25 Apr 2024 05:10), Max: 98.2 (24 Apr 2024 12:34)  HR: 77 (25 Apr 2024 05:10) (76 - 90)  BP: 159/86 (25 Apr 2024 05:10) (99/63 - 159/86)  BP(mean): --  RR: 18 (25 Apr 2024 05:10) (10 - 20)  SpO2: 100% (25 Apr 2024 05:10) (95% - 100%)    Parameters below as of 25 Apr 2024 05:10  Patient On (Oxygen Delivery Method): nasal cannula  O2 Flow (L/min): 2    I&O's Summary    24 Apr 2024 07:01  -  25 Apr 2024 07:00  --------------------------------------------------------  IN: 100 mL / OUT: 25 mL / NET: 75 mL        [X ] NO APPARENT ANESTHESIA COMPLICATIONS      Comments:

## 2024-04-25 NOTE — PROGRESS NOTE ADULT - ASSESSMENT
57 yo woman, former smoker, with h/o R eye glaucoma, Fibromyalgia, Anxiety, GERD, and RCC s/p R total nephrectomy/hysterectomy; she was initially admitted to [facility] on 3/11 w/ R breast swelling c/f mastitis- imaging brooks noted supraclavicular/mediastinal mass lesion which encased the SVC and multiple other veins resulting in obliteration of the SVC and thrombosis of the R IJ, and a pancreatic neck cystic lesion.  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  Her disease/hospital course has also been c/b pericardial effusion s/p pericardial window w/ neg cytology, R pleural effusion, also negative cytology) s/p Pleurx, Afib w/ RVR and acute hypoxic resp failure 2/2 SVC syndrome- not dennis to IR-guided stenting; pt was ultimately started on Dex and transferred to [facility] on 4/4 for urgent palliative RT.    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)  pAfib, with RVR on this admission  Pericardial effusion with tamp physiology s/p pericardial window  R pleural effusion s/p pleurex  Acute hypoxic resp failure  Anxiety/emotional lability  Cancer-related pain, anxiety  Severe prot-ghazala malnutrition    - Metastatic cancer  Based on 3/14 SC LN path, ddx includes primary UGI vs pancreaticobiliary primary (breast edema is likely 2/2 SVC syndrome); PD-L1 TPs 1%, pMMR, Her2 pending  Ca-125 moderately elevated at 125; other tumor markers not significantly elevated  Additional diagnostic brooks includes:   * 4/19 MRCP showing pancreatic lesion (? IPMN, but pancreas was suboptimally evaluated)     * 4/24 EGD/EUS aborted as pt had large amount of food in the stomach that prevented passing of scope (food evacuated)   * Pt planned for 2nd attempt at EGD/EUS +/- FNB on 4/26 (she remains on CLD for now and will be NPO after MN tonight)  Options for systemic cancer treatment are TBD  Pt's prognosis is guarded    - Anemia in neoplastic disease  Hgb remains stable  4/5 iron studies not c/w iron deficiency  VSS and pt without clinical s/s of active bleeding  4/17 hemolysis labs negative; 4/18 FOBT negative x2  Trending daily cbcs; continuing supportive transfusions prn (pt does not have h/o ACS, CAD, MI, goal hgb > 7; plts > 10K)    - SVC syndrome  Appreciate Rad Onc eval/recs  Completed 10fxs of palliative RT on 4/18   Completing 2-wk Dex taper, until 5/7 (continuing pjp/ppi ppx until completion of taper)    - Extensive b/l UE DVTs  - Acute RLE DVT a/w RLE ecchymosis  - Hypercoag state  Continuing therapeutic lovenox (resumed on 4/18 given negative GIB brooks), benefits > risks   * Of note: pt has poor UE IV access and currently required peripheral IV in her LE extremities for admin of medication; can consider FV central line for urgent/emergent IV needs    - Anxiety/emotional lability  Continuing Diazepam 5mg daily and daily/prn  Pt has poor health literacy and clinical insight which is negatively impacting her medical decision making- when ask if she wanted to defer medical decision making to her boyfriend or if she had a designated HCP she replied "he's already sick of me"  Appreciate  consult: pt lacks medical decision making capacity; medical decision making will be deferred to pt's surrogate decision maker- Saeid Peña ([phone])   * will ask for  reassessment to determine if pt has any improvement in her decision-making capacity in if she has capacity to participate in discussion re: dispo planning    - 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)  Pt with h/o R eye glaucoma, but miosis can also be associated with Pancoast syndrome i/s/o extensive R upper lung tumor  Pt denies visual deficits or other related symptoms   MRI Brain without contrast ordered for further eval- pt continues to refuse, can be completed as outpt  Will continue to monitor closely    - pAfib  Pt currently SR, HR controlled  Likely precipitated by malignancy, pericardial effusion, SVC syndrome  Continuing Metoprolol 25mg q12 with holding parameters  Continuing Amio 200mg daily (monitoring for toxicities)  Continuing telemetry    - Pericardial effusion with tamp physiology s/p pericardial window  - R pleural effusion s/p pleurex  Likely inflammatory; both pleural and pericardial fluid cytology is negative for malignant cells  4/9 surveillance TTE with pEF and no WMAs  Continuing R pleurex drainage- up to 500cc q48h/prn    - Acute hypoxic respiratory failure  Resolved  2/2 all of the above  Continuing supportive resp care:   * duonebs q6   * Tessalon 100mg q8   * steroids as above  Weaning supplemental O2 via NC as tolerated    - Cancer related pain  Currently well controlled  Continuing Oxy ER 30mg q12 q8  Continuing Oxy IR 10mg q4/prn  Continuing Dilaudid IV prn for sev breakthrough pain  Continuing bowel regimen to prevent OIC (including Movantic)    - Severe prot-ghazala malnutrition: appreciate RD recs; continuing regular diet with protein shake supplement BID

## 2024-04-25 NOTE — PROGRESS NOTE ADULT - SUBJECTIVE AND OBJECTIVE BOX
SOLID TUMOR ONCOLOGY HOSPITALIST PROGRESS NOTE    S: No acute events overnight.  Pt had no new complaints today but expressed frustration about being kept on a CLD.    CURRENT MEDICATIONS  MEDICATIONS  (STANDING):  aMIOdarone    Tablet 200 milliGRAM(s) Oral daily  Biotene Dry Mouth Oral Rinse 15 milliLiter(s) Swish and Spit every 6 hours  budesonide 160 MICROgram(s)/formoterol 4.5 MICROgram(s) Inhaler 2 Puff(s) Inhalation two times a day  chlorhexidine 2% Cloths 1 Application(s) Topical daily  dexAMETHasone     Tablet 4 milliGRAM(s) Oral <User Schedule>  diazepam    Tablet 5 milliGRAM(s) Oral daily  influenza   Vaccine 0.5 milliLiter(s) IntraMuscular once  metoprolol tartrate 25 milliGRAM(s) Oral every 12 hours  multivitamin 1 Tablet(s) Oral daily  naloxegol 25 milliGRAM(s) Oral daily  nicotine -  14 mG/24Hr(s) Patch 1 Patch Transdermal every 24 hours  nystatin    Suspension 973306 Unit(s) Swish and Swallow four times a day  oxyCODONE  ER Tablet 30 milliGRAM(s) Oral <User Schedule>  pantoprazole    Tablet 40 milliGRAM(s) Oral two times a day  polyethylene glycol 3350 17 Gram(s) Oral every 12 hours  senna 2 Tablet(s) Oral at bedtime  trimethoprim  160 mG/sulfamethoxazole 800 mG 1 Tablet(s) Oral <User Schedule>  MEDICATIONS  (PRN):  albuterol    90 MICROgram(s) HFA Inhaler 2 Puff(s) Inhalation every 6 hours PRN Shortness of Breath and/or Wheezing  albuterol/ipratropium for Nebulization 3 milliLiter(s) Nebulizer every 6 hours PRN Shortness of Breath and/or Wheezing  aluminum hydroxide/magnesium hydroxide/simethicone Suspension 30 milliLiter(s) Oral every 6 hours PRN Dyspepsia  Biotene Dry Mouth Oral Rinse 15 milliLiter(s) Swish and Spit two times a day PRN dry mouth  diazepam    Tablet 5 milliGRAM(s) Oral daily PRN Anxiety  FIRST- Mouthwash  BLM 5 milliLiter(s) Swish and Spit four times a day PRN Mouth Care  HYDROmorphone  Injectable 1 milliGRAM(s) IV Push every 3 hours PRN Severe Pain (7 - 10)  metoclopramide Injectable 10 milliGRAM(s) IV Push every 8 hours PRN nausea, vomiting  ondansetron    Tablet 4 milliGRAM(s) Oral every 8 hours PRN Nausea and/or Vomiting  oxyCODONE    IR 10 milliGRAM(s) Oral every 4 hours PRN Moderate Pain (4 - 6)  sodium chloride 0.9% lock flush 10 milliLiter(s) IV Push every 1 hour PRN Pre/post blood products, medications, blood draw, and to maintain line patency      PHYSICAL EXAM  T(C): 36.3 (04-25-24 @ 11:48), Max: 36.7 (04-25-24 @ 01:20)  HR: 82 (04-25-24 @ 11:48) (76 - 82)  BP: 137/78 (04-25-24 @ 11:48) (119/76 - 159/86)  RR: 18 (04-25-24 @ 11:48) (16 - 18)  SpO2: 99% (04-25-24 @ 11:48) (98% - 100%)    04-24-24 @ 07:01  -  04-25-24 @ 07:00  --------------------------------------------------------  IN: 100 mL / OUT: 25 mL / NET: 75 mL  Non-toxic appearing woman, sitting up in bed, appears comfortable, but agitated at times (thrashing her arms and shaking her head) during conversation  R eyelid ptosis and R pupil miosis present; anicteric sclera, no oral lesions/thrush  RRR, no m/r/g  Diminshed breath sounds in all R lung zones; LL zones CTA  Abd soft/nt/nd, normoactive bowel sounds  Trace LUE non-pitting edema present; large area of ecchymosis extending from the post R calf to post R thigh- improving; generalized muscular atrophy in all 4 extremities  CN 2-12 grossly intact; no gross focal neuro deficits; pt ambulates with walker      LABS                        8.3    11.38 )-----------( 144      ( 25 Apr 2024 07:00 )             26.3     04-25    137  |  104  |  22  ----------------------------<  96  4.7   |  21<L>  |  0.79    Ca    8.6      25 Apr 2024 07:00  Phos  3.6     04-25  Mg     1.80     04-25    TPro  4.8<L>  /  Alb  2.8<L>  /  TBili  0.4  /  DBili  x   /  AST  14  /  ALT  62<H>  /  AlkPhos  81  04-25    PT/INR - ( 24 Apr 2024 06:50 )   PT: 10.2 sec;   INR: 0.90 ratio    PTT - ( 24 Apr 2024 06:50 )  PTT:23.4 sec    PATHOLOGY  3/26 pleural fluid cytology: Neg for malignant cells.    3/26 Pericardium excision: Neg for malignancy.    3/20 Pericardial fluid cytology: Neg for malignant cells.    3/14 LYMPH NODE, SUPRACLAVICULAR, RIGHT, US GUIDED CORE BIOPSY AND FNA   POSITIVE FOR MALIGNANT CELLS.   Carcinoma   Touch Prep slides display crowded groups and single lying malignant   cells with enlarged nuclei containing prominent nucleoli and vacuolated   cytoplasm. Core biopsies show neoplastic cells positive for CK7 and CDX2   immunostains, while negative for CK20, TTF1, GATA3, TRPS1 and PAX8. 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 Suggest correlation with clinical information and imaging to   assist with next step management.   Moleculars: pMMR, PD-L1 TPS 1%; Her2 pending      TUMOR MARKERS  3/13 CEA 7.1  3/24 Ca 19-9 27  4/8 Ca-125 187, Ca 27.29 16.9, Ca 15-3 18.9, AFP 5.2      MICROBIOLOGY  Abx  Bactrim 4/8-present    Cx Data  4/5 MRSA swab: Not detected  3/16 Quant plus TB: Negative      PERTINENT RADIOLOGY  4/19 MRCP: Motion degraded study, particularly on postcontrast sequences.  Pancreatic neck T2 hyperintense lesion measures 16 mm, image 28 series 5,   without clear evidence of enhancement on postcontrast phases. Most likely   this is a side branch IPMN. Remainder of the pancreas is suboptimally   evaluated due to artifact. Follow-up MRI abdomen or pancreas protocol CT   can be considered in 3-6 months for reevaluation. Partially distended stomach. Duodenal diverticulum. Colon is also partially distended with mild to moderate stool burden. Correlate   clinically. Trace pleural effusions. Right-sided pleural catheter is partially   imaged. Lung parenchyma is incompletely characterized on MRI. Soft tissue   of the mediastinum is not adequately imaged on this study.    4/18 B/L LE Doppler: Acute deep venous thrombosis: above the knee.  Acute nonocclusive deep venous thrombosis in the right common femoral vein.    417 CXR: Clear lungs with minimal effusion and Pleurx catheter.    4/10 VA dopplers B/L UE: Extensive acute, occlusive DVT RIJ, innominate and subclavian veins.  Acute non-occlusive DVT w/in R axillary vein.  Extensive acute, occlusive DVT noted in L subclavian. axillary, and proximal brachial veins.  Acute non-occlusive DVT LIJ.  Superficial vein thromboses are noted in the B/L cephalic veins.    4/9 Echocardiogram: Normal LV systolic function.  Normal mitral valve w/ normal leaflet excursion.  No pericardial effusion seen    4/4 CXR: Clear lungs w/ R pleurx catheter in place.    ok4/4 CXR AM: Small R pleural effusion w/ pleurx cath improved    [facility]H Imaging ([facility])  3/29 CXR: Decrease in R pleural effusion.     3/28 B/L UE Dupplers: Acute DVT involving R IJ, innominate vein, subclavian, brachial, and L IJ.  Superficial venous thrombosis involving B/L cephalic veins.  + Edema of superficial soft tissue of UE R>L.    3/27 CT neck: Bulky and conglomerate LAD invading RIJ w/ thrombus extending up to hyoid level.  Thrombus is likely combination tumor and bland.  New LIJ nonocclusive thrombus.    3/27 CT Chest: New consolidations throughout the R lung worse RLL c/f aspiration PNA.  + R pleurx cath w/ decrease in R pleural effusion.  Extensive DVT w/in thoracic and lower neck, upper SVC remains obliterated.  Large R supraclavicular mass infiltrating into the mediastinum.      3/27 CXR: Progression of R consolidation/effusion.    3/21 TTE: LV grossly normal.  Thickened pericardium.  no pericardial effusion.    3/18 TTE: Mod pericardial effusion w/ e/o hemodynamic compromise w/ diasolic collapse of RA that exceeds 1/3 of cardiac cycle >30% resp variation across MV E. wave and early diastolic inversion of RV.    3/14 CT Chest: Conglomerate soft tissue in superior mediastinum and R supraclavicular region 2/2 LAD w/ internal hypodensity c/f necrosis.  Mass encases SVC and multiple great veins resulting in obliteration of the SVC and thrombosis of the RIJ.  Multiple R sided collateral vessels and R soft tissue edema.  B/L pulm nodules.    3/14 CT neck: Extensive cellulitis/myositis along R anterior lateral neck and R upper chest wall w/ inflammatory fat induration the deep soft tissue of neck.  Large supraclavicular/mediastinal mass lesion, presumably conglomerate of LN w/ mass effect and complete occlusion of RIJ w/ associated inflammation.  Complete occlusion of R subclavian vein and nearly occlusive thrombosis in the proximal L brachiocephalic vein.    3/13 RUQ Abd US: Cholelithiasis w/o e/o cholecystitis.  Dilated CBD w/o e/o intraductal stone or other obstruction. 1.4cm pancreatic cyst w/ mild duct dilatation.    3/12 CT abd/pelv: S/p R nephrectomy. no LAD.  New 1.6cm  pancreatic neck cystic lesion w/o main pancreatic ductal dilatation.    3/10 R breast US: No e/o breast abscess      RECENT ENDOSCOPY  4/24 Upper EUS   - Fluid in the middle third of the esophagus and in the lower third of the esophagus.  - A large amount of food (residue) in the stomach so EGD was aborted and EUS was not attempted.  - No specimens collected.

## 2024-04-25 NOTE — CHART NOTE - NSCHARTNOTEFT_GEN_A_CORE
Brief Update Note    Plan for second attempt tomorrow with EGD/EUS +/- FNB.  Continue CLD  NPO after midnight  3 AM CBC, CMP, coags; correct electrolytes via IV  Ensure has working IV access by 6 AM.  Hold PM lovenox dose today and AM lovenox.  Rest of care per primary       Thank you for involving us in this patient's care. Please reach out if any further questions or concerns.    Angela Coffey MD  Gastroenterology/Hepatology Fellow, PGY-7    NON-URGENT CONSULTS:  Please email [email or web address]  AT NIGHT AND ON WEEKENDS:  Contact on-call GI fellow via answering service ([phone]) from 5pm-8am and on weekends/holidays  MONDAY-FRIDAY 8AM-5PM:  Pager: [phone] ([facility])  Pager: [phone] ([facility])

## 2024-04-26 LAB
ALBUMIN SERPL ELPH-MCNC: 2.5 G/DL — LOW (ref 3.3–5)
ALP SERPL-CCNC: 78 U/L — SIGNIFICANT CHANGE UP (ref 40–120)
ALT FLD-CCNC: 65 U/L — HIGH (ref 4–33)
ANION GAP SERPL CALC-SCNC: 11 MMOL/L — SIGNIFICANT CHANGE UP (ref 7–14)
APTT BLD: 20.4 SEC — LOW (ref 24.5–35.6)
AST SERPL-CCNC: 14 U/L — SIGNIFICANT CHANGE UP (ref 4–32)
BASOPHILS # BLD AUTO: 0.01 K/UL — SIGNIFICANT CHANGE UP (ref 0–0.2)
BASOPHILS NFR BLD AUTO: 0.1 % — SIGNIFICANT CHANGE UP (ref 0–2)
BILIRUB SERPL-MCNC: 0.4 MG/DL — SIGNIFICANT CHANGE UP (ref 0.2–1.2)
BLD GP AB SCN SERPL QL: NEGATIVE — SIGNIFICANT CHANGE UP
BUN SERPL-MCNC: 15 MG/DL — SIGNIFICANT CHANGE UP (ref 7–23)
CALCIUM SERPL-MCNC: 7.4 MG/DL — LOW (ref 8.4–10.5)
CHLORIDE SERPL-SCNC: 107 MMOL/L — SIGNIFICANT CHANGE UP (ref 98–107)
CO2 SERPL-SCNC: 20 MMOL/L — LOW (ref 22–31)
CREAT SERPL-MCNC: 0.68 MG/DL — SIGNIFICANT CHANGE UP (ref 0.5–1.3)
EGFR: 100 ML/MIN/1.73M2 — SIGNIFICANT CHANGE UP
EOSINOPHIL # BLD AUTO: 0.01 K/UL — SIGNIFICANT CHANGE UP (ref 0–0.5)
EOSINOPHIL NFR BLD AUTO: 0.1 % — SIGNIFICANT CHANGE UP (ref 0–6)
GLUCOSE SERPL-MCNC: 90 MG/DL — SIGNIFICANT CHANGE UP (ref 70–99)
HCT VFR BLD CALC: 26.2 % — LOW (ref 34.5–45)
HGB BLD-MCNC: 8.5 G/DL — LOW (ref 11.5–15.5)
IANC: 9.94 K/UL — HIGH (ref 1.8–7.4)
IMM GRANULOCYTES NFR BLD AUTO: 1.1 % — HIGH (ref 0–0.9)
INR BLD: 0.91 RATIO — SIGNIFICANT CHANGE UP (ref 0.85–1.18)
LYMPHOCYTES # BLD AUTO: 0.18 K/UL — LOW (ref 1–3.3)
LYMPHOCYTES # BLD AUTO: 1.7 % — LOW (ref 13–44)
MAGNESIUM SERPL-MCNC: 1.7 MG/DL — SIGNIFICANT CHANGE UP (ref 1.6–2.6)
MCHC RBC-ENTMCNC: 30.2 PG — SIGNIFICANT CHANGE UP (ref 27–34)
MCHC RBC-ENTMCNC: 32.4 GM/DL — SIGNIFICANT CHANGE UP (ref 32–36)
MCV RBC AUTO: 93.2 FL — SIGNIFICANT CHANGE UP (ref 80–100)
MONOCYTES # BLD AUTO: 0.35 K/UL — SIGNIFICANT CHANGE UP (ref 0–0.9)
MONOCYTES NFR BLD AUTO: 3.3 % — SIGNIFICANT CHANGE UP (ref 2–14)
NEUTROPHILS # BLD AUTO: 9.94 K/UL — HIGH (ref 1.8–7.4)
NEUTROPHILS NFR BLD AUTO: 93.7 % — HIGH (ref 43–77)
NRBC # BLD: 0 /100 WBCS — SIGNIFICANT CHANGE UP (ref 0–0)
NRBC # FLD: 0 K/UL — SIGNIFICANT CHANGE UP (ref 0–0)
PHOSPHATE SERPL-MCNC: 3.4 MG/DL — SIGNIFICANT CHANGE UP (ref 2.5–4.5)
PLATELET # BLD AUTO: 144 K/UL — LOW (ref 150–400)
POTASSIUM SERPL-MCNC: 3.8 MMOL/L — SIGNIFICANT CHANGE UP (ref 3.5–5.3)
POTASSIUM SERPL-SCNC: 3.8 MMOL/L — SIGNIFICANT CHANGE UP (ref 3.5–5.3)
PROT SERPL-MCNC: 4.4 G/DL — LOW (ref 6–8.3)
PROTHROM AB SERPL-ACNC: 10.3 SEC — SIGNIFICANT CHANGE UP (ref 9.5–13)
RBC # BLD: 2.81 M/UL — LOW (ref 3.8–5.2)
RBC # FLD: 16.4 % — HIGH (ref 10.3–14.5)
RH IG SCN BLD-IMP: POSITIVE — SIGNIFICANT CHANGE UP
SODIUM SERPL-SCNC: 138 MMOL/L — SIGNIFICANT CHANGE UP (ref 135–145)
WBC # BLD: 10.61 K/UL — HIGH (ref 3.8–10.5)
WBC # FLD AUTO: 10.61 K/UL — HIGH (ref 3.8–10.5)

## 2024-04-26 PROCEDURE — 43235 EGD DIAGNOSTIC BRUSH WASH: CPT | Mod: GC

## 2024-04-26 RX ORDER — ENOXAPARIN SODIUM 100 MG/ML
50 INJECTION SUBCUTANEOUS EVERY 12 HOURS
Refills: 0 | Status: DISCONTINUED | OUTPATIENT
Start: 2024-04-26 | End: 2024-05-02

## 2024-04-26 RX ADMIN — Medication 15 MILLILITER(S): at 18:53

## 2024-04-26 RX ADMIN — Medication 1 PATCH: at 15:50

## 2024-04-26 RX ADMIN — PANTOPRAZOLE SODIUM 40 MILLIGRAM(S): 20 TABLET, DELAYED RELEASE ORAL at 17:54

## 2024-04-26 RX ADMIN — SENNA PLUS 2 TABLET(S): 8.6 TABLET ORAL at 21:13

## 2024-04-26 RX ADMIN — BUDESONIDE AND FORMOTEROL FUMARATE DIHYDRATE 2 PUFF(S): 160; 4.5 AEROSOL RESPIRATORY (INHALATION) at 15:51

## 2024-04-26 RX ADMIN — HYDROMORPHONE HYDROCHLORIDE 1 MILLIGRAM(S): 2 INJECTION INTRAMUSCULAR; INTRAVENOUS; SUBCUTANEOUS at 13:40

## 2024-04-26 RX ADMIN — Medication 5 MILLIGRAM(S): at 18:54

## 2024-04-26 RX ADMIN — Medication 4 MILLIGRAM(S): at 16:03

## 2024-04-26 RX ADMIN — Medication 500000 UNIT(S): at 17:52

## 2024-04-26 RX ADMIN — Medication 1 PATCH: at 08:17

## 2024-04-26 RX ADMIN — CHLORHEXIDINE GLUCONATE 1 APPLICATION(S): 213 SOLUTION TOPICAL at 16:06

## 2024-04-26 RX ADMIN — Medication 1 TABLET(S): at 16:04

## 2024-04-26 RX ADMIN — Medication 5 MILLIGRAM(S): at 21:13

## 2024-04-26 RX ADMIN — Medication 25 MILLIGRAM(S): at 17:55

## 2024-04-26 RX ADMIN — Medication 1 PATCH: at 18:09

## 2024-04-26 RX ADMIN — OXYCODONE HYDROCHLORIDE 30 MILLIGRAM(S): 5 TABLET ORAL at 21:13

## 2024-04-26 RX ADMIN — ENOXAPARIN SODIUM 50 MILLIGRAM(S): 100 INJECTION SUBCUTANEOUS at 17:58

## 2024-04-26 RX ADMIN — NALOXEGOL OXALATE 25 MILLIGRAM(S): 12.5 TABLET, FILM COATED ORAL at 16:04

## 2024-04-26 RX ADMIN — Medication 1 PATCH: at 15:20

## 2024-04-26 RX ADMIN — Medication 1 TABLET(S): at 16:05

## 2024-04-26 RX ADMIN — POLYETHYLENE GLYCOL 3350 17 GRAM(S): 17 POWDER, FOR SOLUTION ORAL at 17:53

## 2024-04-26 RX ADMIN — BUDESONIDE AND FORMOTEROL FUMARATE DIHYDRATE 2 PUFF(S): 160; 4.5 AEROSOL RESPIRATORY (INHALATION) at 21:11

## 2024-04-27 LAB
ALBUMIN SERPL ELPH-MCNC: 2.3 G/DL — LOW (ref 3.3–5)
ALP SERPL-CCNC: 90 U/L — SIGNIFICANT CHANGE UP (ref 40–120)
ALT FLD-CCNC: 57 U/L — HIGH (ref 4–33)
ANION GAP SERPL CALC-SCNC: 11 MMOL/L — SIGNIFICANT CHANGE UP (ref 7–14)
AST SERPL-CCNC: 13 U/L — SIGNIFICANT CHANGE UP (ref 4–32)
BASOPHILS # BLD AUTO: 0.01 K/UL — SIGNIFICANT CHANGE UP (ref 0–0.2)
BASOPHILS NFR BLD AUTO: 0.1 % — SIGNIFICANT CHANGE UP (ref 0–2)
BILIRUB SERPL-MCNC: 0.3 MG/DL — SIGNIFICANT CHANGE UP (ref 0.2–1.2)
BUN SERPL-MCNC: 19 MG/DL — SIGNIFICANT CHANGE UP (ref 7–23)
CALCIUM SERPL-MCNC: 7.9 MG/DL — LOW (ref 8.4–10.5)
CHLORIDE SERPL-SCNC: 105 MMOL/L — SIGNIFICANT CHANGE UP (ref 98–107)
CO2 SERPL-SCNC: 23 MMOL/L — SIGNIFICANT CHANGE UP (ref 22–31)
CREAT SERPL-MCNC: 0.81 MG/DL — SIGNIFICANT CHANGE UP (ref 0.5–1.3)
EGFR: 83 ML/MIN/1.73M2 — SIGNIFICANT CHANGE UP
EOSINOPHIL # BLD AUTO: 0.01 K/UL — SIGNIFICANT CHANGE UP (ref 0–0.5)
EOSINOPHIL NFR BLD AUTO: 0.1 % — SIGNIFICANT CHANGE UP (ref 0–6)
GLUCOSE SERPL-MCNC: 142 MG/DL — HIGH (ref 70–99)
HCT VFR BLD CALC: 24.2 % — LOW (ref 34.5–45)
HGB BLD-MCNC: 7.8 G/DL — LOW (ref 11.5–15.5)
IANC: 10.14 K/UL — HIGH (ref 1.8–7.4)
IMM GRANULOCYTES NFR BLD AUTO: 1.1 % — HIGH (ref 0–0.9)
LYMPHOCYTES # BLD AUTO: 0.09 K/UL — LOW (ref 1–3.3)
LYMPHOCYTES # BLD AUTO: 0.8 % — LOW (ref 13–44)
MAGNESIUM SERPL-MCNC: 1.8 MG/DL — SIGNIFICANT CHANGE UP (ref 1.6–2.6)
MCHC RBC-ENTMCNC: 30.2 PG — SIGNIFICANT CHANGE UP (ref 27–34)
MCHC RBC-ENTMCNC: 32.2 GM/DL — SIGNIFICANT CHANGE UP (ref 32–36)
MCV RBC AUTO: 93.8 FL — SIGNIFICANT CHANGE UP (ref 80–100)
MONOCYTES # BLD AUTO: 0.29 K/UL — SIGNIFICANT CHANGE UP (ref 0–0.9)
MONOCYTES NFR BLD AUTO: 2.7 % — SIGNIFICANT CHANGE UP (ref 2–14)
NEUTROPHILS # BLD AUTO: 10.14 K/UL — HIGH (ref 1.8–7.4)
NEUTROPHILS NFR BLD AUTO: 95.2 % — HIGH (ref 43–77)
NRBC # BLD: 0 /100 WBCS — SIGNIFICANT CHANGE UP (ref 0–0)
NRBC # FLD: 0 K/UL — SIGNIFICANT CHANGE UP (ref 0–0)
PHOSPHATE SERPL-MCNC: 3.3 MG/DL — SIGNIFICANT CHANGE UP (ref 2.5–4.5)
PLATELET # BLD AUTO: 139 K/UL — LOW (ref 150–400)
POTASSIUM SERPL-MCNC: 3.8 MMOL/L — SIGNIFICANT CHANGE UP (ref 3.5–5.3)
POTASSIUM SERPL-SCNC: 3.8 MMOL/L — SIGNIFICANT CHANGE UP (ref 3.5–5.3)
PROT SERPL-MCNC: 4.4 G/DL — LOW (ref 6–8.3)
RBC # BLD: 2.58 M/UL — LOW (ref 3.8–5.2)
RBC # FLD: 16.8 % — HIGH (ref 10.3–14.5)
SODIUM SERPL-SCNC: 139 MMOL/L — SIGNIFICANT CHANGE UP (ref 135–145)
WBC # BLD: 10.66 K/UL — HIGH (ref 3.8–10.5)
WBC # FLD AUTO: 10.66 K/UL — HIGH (ref 3.8–10.5)

## 2024-04-27 PROCEDURE — 99232 SBSQ HOSP IP/OBS MODERATE 35: CPT

## 2024-04-27 PROCEDURE — 74018 RADEX ABDOMEN 1 VIEW: CPT | Mod: 26

## 2024-04-27 RX ADMIN — Medication 15 MILLILITER(S): at 23:13

## 2024-04-27 RX ADMIN — OXYCODONE HYDROCHLORIDE 30 MILLIGRAM(S): 5 TABLET ORAL at 05:59

## 2024-04-27 RX ADMIN — POLYETHYLENE GLYCOL 3350 17 GRAM(S): 17 POWDER, FOR SOLUTION ORAL at 05:50

## 2024-04-27 RX ADMIN — Medication 500000 UNIT(S): at 05:50

## 2024-04-27 RX ADMIN — AMIODARONE HYDROCHLORIDE 200 MILLIGRAM(S): 400 TABLET ORAL at 05:51

## 2024-04-27 RX ADMIN — Medication 500000 UNIT(S): at 17:29

## 2024-04-27 RX ADMIN — ENOXAPARIN SODIUM 50 MILLIGRAM(S): 100 INJECTION SUBCUTANEOUS at 17:29

## 2024-04-27 RX ADMIN — Medication 25 MILLIGRAM(S): at 17:29

## 2024-04-27 RX ADMIN — BUDESONIDE AND FORMOTEROL FUMARATE DIHYDRATE 2 PUFF(S): 160; 4.5 AEROSOL RESPIRATORY (INHALATION) at 10:08

## 2024-04-27 RX ADMIN — BUDESONIDE AND FORMOTEROL FUMARATE DIHYDRATE 2 PUFF(S): 160; 4.5 AEROSOL RESPIRATORY (INHALATION) at 23:14

## 2024-04-27 RX ADMIN — OXYCODONE HYDROCHLORIDE 30 MILLIGRAM(S): 5 TABLET ORAL at 14:09

## 2024-04-27 RX ADMIN — CHLORHEXIDINE GLUCONATE 1 APPLICATION(S): 213 SOLUTION TOPICAL at 11:50

## 2024-04-27 RX ADMIN — Medication 1 PATCH: at 11:54

## 2024-04-27 RX ADMIN — Medication 15 MILLILITER(S): at 17:29

## 2024-04-27 RX ADMIN — Medication 1 PATCH: at 07:35

## 2024-04-27 RX ADMIN — Medication 500000 UNIT(S): at 11:46

## 2024-04-27 RX ADMIN — Medication 30 MILLILITER(S): at 21:02

## 2024-04-27 RX ADMIN — NALOXEGOL OXALATE 25 MILLIGRAM(S): 12.5 TABLET, FILM COATED ORAL at 11:49

## 2024-04-27 RX ADMIN — Medication 25 MILLIGRAM(S): at 05:50

## 2024-04-27 RX ADMIN — POLYETHYLENE GLYCOL 3350 17 GRAM(S): 17 POWDER, FOR SOLUTION ORAL at 17:29

## 2024-04-27 RX ADMIN — Medication 1 PATCH: at 11:47

## 2024-04-27 RX ADMIN — PANTOPRAZOLE SODIUM 40 MILLIGRAM(S): 20 TABLET, DELAYED RELEASE ORAL at 17:30

## 2024-04-27 RX ADMIN — Medication 5 MILLIGRAM(S): at 20:52

## 2024-04-27 RX ADMIN — Medication 500000 UNIT(S): at 23:13

## 2024-04-27 RX ADMIN — Medication 15 MILLILITER(S): at 11:47

## 2024-04-27 RX ADMIN — ENOXAPARIN SODIUM 50 MILLIGRAM(S): 100 INJECTION SUBCUTANEOUS at 05:55

## 2024-04-27 RX ADMIN — Medication 500000 UNIT(S): at 00:46

## 2024-04-27 RX ADMIN — Medication 15 MILLILITER(S): at 00:48

## 2024-04-27 RX ADMIN — PANTOPRAZOLE SODIUM 40 MILLIGRAM(S): 20 TABLET, DELAYED RELEASE ORAL at 05:51

## 2024-04-27 RX ADMIN — Medication 1 TABLET(S): at 11:48

## 2024-04-27 RX ADMIN — OXYCODONE HYDROCHLORIDE 30 MILLIGRAM(S): 5 TABLET ORAL at 23:13

## 2024-04-27 RX ADMIN — Medication 15 MILLILITER(S): at 05:48

## 2024-04-27 NOTE — PROGRESS NOTE ADULT - ASSESSMENT
57 yo woman, former smoker, with h/o R eye glaucoma, Fibromyalgia, Anxiety, GERD, and RCC s/p R total nephrectomy/hysterectomy; she was initially admitted to [facility] on 3/11 w/ R breast swelling c/f mastitis- imaging brooks noted supraclavicular/mediastinal mass lesion which encased the SVC and multiple other veins resulting in obliteration of the SVC and thrombosis of the R IJ, and a pancreatic neck cystic lesion.  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  Her disease/hospital course has also been c/b pericardial effusion s/p pericardial window w/ neg cytology, R pleural effusion, also negative cytology) s/p Pleurx, Afib w/ RVR and acute hypoxic resp failure 2/2 SVC syndrome- not dennis to IR-guided stenting; pt was ultimately started on Dex and transferred to [facility] on 4/4 for urgent palliative RT.    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)  pAfib, with RVR on this admission  Pericardial effusion with tamp physiology s/p pericardial window  R pleural effusion s/p pleurex  Acute hypoxic resp failure  Anxiety/emotional lability  Cancer-related pain, anxiety  Severe prot-ghazala malnutrition  Generalized weakness     - Metastatic cancer  Based on 3/14 SC LN path, ddx includes primary UGI vs pancreaticobiliary primary (breast edema is likely 2/2 SVC syndrome); PD-L1 TPs 1%, pMMR, Her2 pending  Ca-125 moderately elevated at 125; other tumor markers not significantly elevated  Additional diagnostic brooks includes:   * 4/19 MRCP showing pancreatic lesion (? IPMN, but pancreas was suboptimally evaluated)     * 4/24 EGD/EUS aborted as pt had large amount of food in the stomach that prevented passing of scope (food evacuated)   * Pt planned for 2nd attempt at EGD/EUS +/- FNB on 4/26 (she remains on CLD for now and will be NPO after MN tonight)  Options for systemic cancer treatment are TBD  Pt's prognosis is guarded    - Anemia in neoplastic disease  Hgb remains stable  4/5 iron studies not c/w iron deficiency  VSS and pt without clinical s/s of active bleeding  4/17 hemolysis labs negative; 4/18 FOBT negative x2  Trending daily cbcs; continuing supportive transfusions prn (pt does not have h/o ACS, CAD, MI, goal hgb > 7; plts > 10K)    - SVC syndrome  Appreciate Rad Onc eval/recs  Completed 10fxs of palliative RT on 4/18   Completing 2-wk Dex taper, until 5/7 (continuing pjp/ppi ppx until completion of taper)    - Extensive b/l UE DVTs  - Acute RLE DVT a/w RLE ecchymosis  - Hypercoag state  Continuing therapeutic lovenox (resumed on 4/18 given negative GIB brooks), benefits > risks   * Of note: pt has poor UE IV access and currently required peripheral IV in her LE extremities for admin of medication; can consider [facility] central line for urgent/emergent IV needs    - Anxiety/emotional lability  Continuing Diazepam 5mg daily and daily/prn  Pt has poor health literacy and clinical insight which is negatively impacting her medical decision making- when ask if she wanted to defer medical decision making to her boyfriend or if she had a designated HCP she replied "he's already sick of me"  Appreciate  consult: pt lacks medical decision making capacity; medical decision making will be deferred to pt's surrogate decision maker- Saeid Peña ([phone])   * will ask for  reassessment to determine if pt has any improvement in her decision-making capacity in if she has capacity to participate in discussion re: dispo planning    - 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)  Pt with h/o R eye glaucoma, but miosis can also be associated with Pancoast syndrome i/s/o extensive R upper lung tumor  Pt denies visual deficits or other related symptoms   MRI Brain without contrast ordered for further eval- pt continues to refuse, can be completed as outpt  Will continue to monitor closely    - pAfib  Pt currently SR, HR controlled  Likely precipitated by malignancy, pericardial effusion, SVC syndrome  Continuing Metoprolol 25mg q12 with holding parameters  Continuing Amio 200mg daily (monitoring for toxicities)  Continuing telemetry    - Pericardial effusion with tamp physiology s/p pericardial window  - R pleural effusion s/p pleurex  Likely inflammatory; both pleural and pericardial fluid cytology is negative for malignant cells  4/9 surveillance TTE with pEF and no WMAs  Continuing R pleurex drainage- up to 500cc q48h/prn    - Acute hypoxic respiratory failure  Resolved  2/2 all of the above  Continuing supportive resp care:   * duonebs q6   * Tessalon 100mg q8   * steroids as above  Weaning supplemental O2 via NC as tolerated    - Cancer related pain  Currently well controlled  Continuing Oxy ER 30mg q12 q8  Continuing Oxy IR 10mg q4/prn  Continuing Dilaudid IV prn for sev breakthrough pain  Continuing bowel regimen to prevent OIC (including Movantic)    - Severe prot-ghazala malnutrition: appreciate RD recs; continuing regular diet with protein shake supplement BID    Generalized weakeners, PT prior said rehab, will consult again as c/o generalized weakness   57 yo woman, former smoker, with h/o R eye glaucoma, Fibromyalgia, Anxiety, GERD, and RCC s/p R total nephrectomy/hysterectomy; she was initially admitted to [facility] on 3/11 w/ R breast swelling c/f mastitis- imaging brooks noted supraclavicular/mediastinal mass lesion which encased the SVC and multiple other veins resulting in obliteration of the SVC and thrombosis of the R IJ, and a pancreatic neck cystic lesion.  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  Her disease/hospital course has also been c/b pericardial effusion s/p pericardial window w/ neg cytology, R pleural effusion, also negative cytology) s/p Pleurx, Afib w/ RVR and acute hypoxic resp failure 2/2 SVC syndrome- not dennis to IR-guided stenting; pt was ultimately started on Dex and transferred to [facility] on 4/4 for urgent palliative RT.    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)  pAfib, with RVR on this admission  Pericardial effusion with tamp physiology s/p pericardial window  R pleural effusion s/p pleurex  Acute hypoxic resp failure  Anxiety/emotional lability  Cancer-related pain, anxiety  Severe prot-ghazala malnutrition  Generalized weakness     - Metastatic cancer  Based on 3/14 SC LN path, ddx includes primary UGI vs pancreaticobiliary primary (breast edema is likely 2/2 SVC syndrome); PD-L1 TPs 1%, pMMR, Her2 pending  Ca-125 moderately elevated at 125; other tumor markers not significantly elevated  Additional diagnostic brooks includes:   * 4/19 MRCP showing pancreatic lesion (? IPMN, but pancreas was suboptimally evaluated)     * 4/24 EGD/EUS aborted as pt had large amount of food in the stomach that prevented passing of scope (food evacuated)   * Pt planned for 2nd attempt at EGD/EUS +/- FNB on 4/26 (she remains on CLD for now and will be NPO after MN tonight)  Options for systemic cancer treatment are TBD  Pt's prognosis is guarded    - Anemia in neoplastic disease  Hgb remains stable  4/5 iron studies not c/w iron deficiency  VSS and pt without clinical s/s of active bleeding  4/17 hemolysis labs negative; 4/18 FOBT negative x2  Trending daily cbcs; continuing supportive transfusions prn (pt does not have h/o ACS, CAD, MI, goal hgb > 7; plts > 10K)    - SVC syndrome  Appreciate Rad Onc eval/recs  Completed 10fxs of palliative RT on 4/18   Completing 2-wk Dex taper, until 5/7 (continuing pjp/ppi ppx until completion of taper)    - Extensive b/l UE DVTs  - Acute RLE DVT a/w RLE ecchymosis  - Hypercoag state  Continuing therapeutic lovenox (resumed on 4/18 given negative GIB brooks), benefits > risks   * Of note: pt has poor UE IV access and currently required peripheral IV in her LE extremities for admin of medication; can consider [facility] central line for urgent/emergent IV needs    - Anxiety/emotional lability  Continuing Diazepam 5mg daily and daily/prn  Pt has poor health literacy and clinical insight which is negatively impacting her medical decision making- when ask if she wanted to defer medical decision making to her boyfriend or if she had a designated HCP she replied "he's already sick of me"  Appreciate  consult: pt lacks medical decision making capacity; medical decision making will be deferred to pt's surrogate decision maker- Saeid Peña ([phone])   * will ask for  reassessment to determine if pt has any improvement in her decision-making capacity in if she has capacity to participate in discussion re: dispo planning    - 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)  Pt with h/o R eye glaucoma, but miosis can also be associated with Pancoast syndrome i/s/o extensive R upper lung tumor  Pt denies visual deficits or other related symptoms   MRI Brain without contrast ordered for further eval- pt continues to refuse, can be completed as outpt  Will continue to monitor closely    - pAfib  Pt currently SR, HR controlled  Likely precipitated by malignancy, pericardial effusion, SVC syndrome  Continuing Metoprolol 25mg q12 with holding parameters  Continuing Amio 200mg daily (monitoring for toxicities)  Continuing telemetry    - Pericardial effusion with tamp physiology s/p pericardial window  - R pleural effusion s/p pleurex  Likely inflammatory; both pleural and pericardial fluid cytology is negative for malignant cells  4/9 surveillance TTE with pEF and no WMAs  Continuing R pleurex drainage- up to 500cc q48h/prn    - Acute hypoxic respiratory failure  Resolved  2/2 all of the above  Continuing supportive resp care:   * duonebs q6   * Tessalon 100mg q8   * steroids as above  Weaning supplemental O2 via NC as tolerated    - Cancer related pain  Currently well controlled  Continuing Oxy ER 30mg q12 q8  Continuing Oxy IR 10mg q4/prn  Continuing Dilaudid IV prn for sev breakthrough pain  Continuing bowel regimen to prevent OIC (including Movantic)    - Severe prot-ghazala malnutrition: appreciate RD recs; continuing regular diet with protein shake supplement BID    Generalized weakness , PT prior said rehab, will consult again as c/o generalized weakness

## 2024-04-27 NOTE — PROGRESS NOTE ADULT - SUBJECTIVE AND OBJECTIVE BOX
Medicine Progress Note    Patient is a 58y old  Female who presents with a chief complaint of SVC syndrome, DVT, mediastinal mass (25 Apr 2024 09:35)      SUBJECTIVE / OVERNIGHT EVENTS: patient c/o weakness , can't get out of bed    ADDITIONAL REVIEW OF SYSTEMS: negative     MEDICATIONS  (STANDING):  aMIOdarone    Tablet 200 milliGRAM(s) Oral daily  Biotene Dry Mouth Oral Rinse 15 milliLiter(s) Swish and Spit every 6 hours  budesonide 160 MICROgram(s)/formoterol 4.5 MICROgram(s) Inhaler 2 Puff(s) Inhalation two times a day  chlorhexidine 2% Cloths 1 Application(s) Topical daily  dexAMETHasone     Tablet 4 milliGRAM(s) Oral daily  diazepam    Tablet 5 milliGRAM(s) Oral daily  enoxaparin Injectable 50 milliGRAM(s) SubCutaneous every 12 hours  influenza   Vaccine 0.5 milliLiter(s) IntraMuscular once  metoprolol tartrate 25 milliGRAM(s) Oral every 12 hours  multivitamin 1 Tablet(s) Oral daily  naloxegol 25 milliGRAM(s) Oral daily  nicotine -  14 mG/24Hr(s) Patch 1 Patch Transdermal every 24 hours  nystatin    Suspension 616406 Unit(s) Swish and Swallow four times a day  oxyCODONE  ER Tablet 30 milliGRAM(s) Oral <User Schedule>  pantoprazole    Tablet 40 milliGRAM(s) Oral two times a day  polyethylene glycol 3350 17 Gram(s) Oral every 12 hours  senna 2 Tablet(s) Oral at bedtime  trimethoprim  160 mG/sulfamethoxazole 800 mG 1 Tablet(s) Oral <User Schedule>    MEDICATIONS  (PRN):  albuterol    90 MICROgram(s) HFA Inhaler 2 Puff(s) Inhalation every 6 hours PRN Shortness of Breath and/or Wheezing  albuterol/ipratropium for Nebulization 3 milliLiter(s) Nebulizer every 6 hours PRN Shortness of Breath and/or Wheezing  aluminum hydroxide/magnesium hydroxide/simethicone Suspension 30 milliLiter(s) Oral every 6 hours PRN Dyspepsia  Biotene Dry Mouth Oral Rinse 15 milliLiter(s) Swish and Spit two times a day PRN dry mouth  diazepam    Tablet 5 milliGRAM(s) Oral daily PRN Anxiety  FIRST- Mouthwash  BLM 5 milliLiter(s) Swish and Spit four times a day PRN Mouth Care  HYDROmorphone  Injectable 1 milliGRAM(s) IV Push every 3 hours PRN Severe Pain (7 - 10)  metoclopramide Injectable 10 milliGRAM(s) IV Push every 8 hours PRN nausea, vomiting  ondansetron    Tablet 4 milliGRAM(s) Oral every 8 hours PRN Nausea and/or Vomiting  oxyCODONE    IR 10 milliGRAM(s) Oral every 4 hours PRN Moderate Pain (4 - 6)  sodium chloride 0.9% lock flush 10 milliLiter(s) IV Push every 1 hour PRN Pre/post blood products, medications, blood draw, and to maintain line patency    CAPILLARY BLOOD GLUCOSE        I&O's Summary    26 Apr 2024 07:01  -  27 Apr 2024 07:00  --------------------------------------------------------  IN: 240 mL / OUT: 460 mL / NET: -220 mL        PHYSICAL EXAM:  Vital Signs Last 24 Hrs  T(C): 36.4 (27 Apr 2024 05:50), Max: 36.6 (26 Apr 2024 09:22)  T(F): 97.6 (27 Apr 2024 05:50), Max: 97.9 (27 Apr 2024 01:00)  HR: 86 (27 Apr 2024 05:50) (81 - 102)  BP: 112/62 (27 Apr 2024 05:50) (87/61 - 131/67)  BP(mean): --  RR: 20 (27 Apr 2024 05:50) (12 - 20)  SpO2: 98% (27 Apr 2024 05:50) (95% - 100%)    Parameters below as of 27 Apr 2024 05:50  Patient On (Oxygen Delivery Method): nasal cannula  O2 Flow (L/min): 2    CONSTITUTIONAL: NAD  ENMT: Moist oral mucosa, no pharyngeal injection or exudates; RT eye lid protosis miosis   RESPIRATORY: Normal respiratory effort; lungs are dim  to auscultation bilaterally, rt Pleurx   CARDIOVASCULAR: Regular rate and rhythm, normal S1 and S2, trace  lower extremity edema;   ABDOMEN: Nontender to palpation, normoactive bowel sounds, no rebound/guarding;   PSYCH: A+O to person, ; affect appropriate  NEUROLOGY:  can't get out of bed , generalized weakness     LABS:                        7.8    10.66 )-----------( 139      ( 27 Apr 2024 07:41 )             24.2     04-27    139  |  105  |  19  ----------------------------<  142<H>  3.8   |  23  |  0.81    Ca    7.9<L>      27 Apr 2024 07:41  Phos  3.3     04-27  Mg     1.80     04-27    TPro  4.4<L>  /  Alb  2.3<L>  /  TBili  0.3  /  DBili  x   /  AST  13  /  ALT  57<H>  /  AlkPhos  90  04-27    PT/INR - ( 26 Apr 2024 03:05 )   PT: 10.3 sec;   INR: 0.91 ratio         PTT - ( 26 Apr 2024 03:05 )  PTT:20.4 sec      Urinalysis Basic - ( 27 Apr 2024 07:41 )    Color: x / Appearance: x / SG: x / pH: x  Gluc: 142 mg/dL / Ketone: x  / Bili: x / Urobili: x   Blood: x / Protein: x / Nitrite: x   Leuk Esterase: x / RBC: x / WBC x   Sq Epi: x / Non Sq Epi: x / Bacteria: x            RADIOLOGY & ADDITIONAL TESTS:  Imaging from Last 24 Hours:    Electrocardiogram/QTc Interval:    COORDINATION OF CARE:  Care Discussed with Consultants/Other Providers: ACP

## 2024-04-27 NOTE — PROGRESS NOTE ADULT - SUBJECTIVE AND OBJECTIVE BOX
ANESTHESIA POSTOP NOTE  58y Female POSTOP DAY 1  No complaints  Vital Signs Last 24 Hrs  T(C): 36.6 (27 Apr 2024 13:50), Max: 36.6 (27 Apr 2024 01:00)  T(F): 97.9 (27 Apr 2024 13:50), Max: 97.9 (27 Apr 2024 01:00)  HR: 89 (27 Apr 2024 13:50) (82 - 102)  BP: 139/74 (27 Apr 2024 13:50) (112/62 - 139/74)  BP(mean): --  RR: 18 (27 Apr 2024 13:50) (18 - 20)  SpO2: 100% (27 Apr 2024 13:50) (96% - 100%)    Parameters below as of 27 Apr 2024 13:50  Patient On (Oxygen Delivery Method): nasal cannula  O2 Flow (L/min): 2    I&O's Summary    26 Apr 2024 07:01  -  27 Apr 2024 07:00  --------------------------------------------------------  IN: 240 mL / OUT: 460 mL / NET: -220 mL        [ X ] NO APPARENT ANESTHESIA COMPLICATIONS      Comments:

## 2024-04-28 PROCEDURE — 99232 SBSQ HOSP IP/OBS MODERATE 35: CPT

## 2024-04-28 RX ADMIN — Medication 15 MILLILITER(S): at 18:46

## 2024-04-28 RX ADMIN — ENOXAPARIN SODIUM 50 MILLIGRAM(S): 100 INJECTION SUBCUTANEOUS at 18:46

## 2024-04-28 RX ADMIN — ENOXAPARIN SODIUM 50 MILLIGRAM(S): 100 INJECTION SUBCUTANEOUS at 06:02

## 2024-04-28 RX ADMIN — Medication 4 MILLIGRAM(S): at 06:02

## 2024-04-28 RX ADMIN — OXYCODONE HYDROCHLORIDE 30 MILLIGRAM(S): 5 TABLET ORAL at 14:02

## 2024-04-28 RX ADMIN — Medication 500000 UNIT(S): at 06:01

## 2024-04-28 RX ADMIN — OXYCODONE HYDROCHLORIDE 30 MILLIGRAM(S): 5 TABLET ORAL at 06:05

## 2024-04-28 RX ADMIN — OXYCODONE HYDROCHLORIDE 30 MILLIGRAM(S): 5 TABLET ORAL at 07:05

## 2024-04-28 RX ADMIN — Medication 25 MILLIGRAM(S): at 06:01

## 2024-04-28 RX ADMIN — SENNA PLUS 2 TABLET(S): 8.6 TABLET ORAL at 22:19

## 2024-04-28 RX ADMIN — BUDESONIDE AND FORMOTEROL FUMARATE DIHYDRATE 2 PUFF(S): 160; 4.5 AEROSOL RESPIRATORY (INHALATION) at 22:17

## 2024-04-28 RX ADMIN — CHLORHEXIDINE GLUCONATE 1 APPLICATION(S): 213 SOLUTION TOPICAL at 12:11

## 2024-04-28 RX ADMIN — OXYCODONE HYDROCHLORIDE 30 MILLIGRAM(S): 5 TABLET ORAL at 00:03

## 2024-04-28 RX ADMIN — Medication 1 PATCH: at 07:34

## 2024-04-28 RX ADMIN — Medication 25 MILLIGRAM(S): at 18:47

## 2024-04-28 RX ADMIN — PANTOPRAZOLE SODIUM 40 MILLIGRAM(S): 20 TABLET, DELAYED RELEASE ORAL at 18:47

## 2024-04-28 RX ADMIN — Medication 1 TABLET(S): at 11:59

## 2024-04-28 RX ADMIN — PANTOPRAZOLE SODIUM 40 MILLIGRAM(S): 20 TABLET, DELAYED RELEASE ORAL at 06:01

## 2024-04-28 RX ADMIN — OXYCODONE HYDROCHLORIDE 30 MILLIGRAM(S): 5 TABLET ORAL at 22:16

## 2024-04-28 RX ADMIN — NALOXEGOL OXALATE 25 MILLIGRAM(S): 12.5 TABLET, FILM COATED ORAL at 11:59

## 2024-04-28 RX ADMIN — Medication 15 MILLILITER(S): at 11:58

## 2024-04-28 RX ADMIN — Medication 5 MILLIGRAM(S): at 11:58

## 2024-04-28 RX ADMIN — Medication 15 MILLILITER(S): at 23:08

## 2024-04-28 RX ADMIN — OXYCODONE HYDROCHLORIDE 30 MILLIGRAM(S): 5 TABLET ORAL at 23:16

## 2024-04-28 RX ADMIN — Medication 1 PATCH: at 12:07

## 2024-04-28 RX ADMIN — OXYCODONE HYDROCHLORIDE 30 MILLIGRAM(S): 5 TABLET ORAL at 15:00

## 2024-04-28 RX ADMIN — Medication 15 MILLILITER(S): at 06:01

## 2024-04-28 RX ADMIN — AMIODARONE HYDROCHLORIDE 200 MILLIGRAM(S): 400 TABLET ORAL at 06:01

## 2024-04-28 RX ADMIN — Medication 1 PATCH: at 11:59

## 2024-04-28 NOTE — PROGRESS NOTE ADULT - ASSESSMENT
59 yo woman, former smoker, with h/o R eye glaucoma, Fibromyalgia, Anxiety, GERD, and RCC s/p R total nephrectomy/hysterectomy; she was initially admitted to [facility] on 3/11 w/ R breast swelling c/f mastitis- imaging brooks noted supraclavicular/mediastinal mass lesion which encased the SVC and multiple other veins resulting in obliteration of the SVC and thrombosis of the R IJ, and a pancreatic neck cystic lesion.  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  Her disease/hospital course has also been c/b pericardial effusion s/p pericardial window w/ neg cytology, R pleural effusion, also negative cytology) s/p Pleurx, Afib w/ RVR and acute hypoxic resp failure 2/2 SVC syndrome- not dennis to IR-guided stenting; pt was ultimately started on Dex and transferred to [facility] on 4/4 for urgent palliative RT.    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)  pAfib, with RVR on this admission  Pericardial effusion with tamp physiology s/p pericardial window  R pleural effusion s/p pleurex  Acute hypoxic resp failure  Anxiety/emotional lability  Cancer-related pain, anxiety  Severe prot-ghazala malnutrition  Generalized weakness     - Metastatic cancer  Based on 3/14 SC LN path, ddx includes primary UGI vs pancreaticobiliary primary (breast edema is likely 2/2 SVC syndrome); PD-L1 TPs 1%, pMMR, Her2 pending  Ca-125 moderately elevated at 125; other tumor markers not significantly elevated  Additional diagnostic brooks includes:   * 4/19 MRCP showing pancreatic lesion (? IPMN, but pancreas was suboptimally evaluated)     * 4/24 EGD/EUS aborted as pt had large amount of food in the stomach that prevented passing of scope (food evacuated)   * Pt planned for 2nd attempt at EGD/EUS +/- FNB on 4/26 (she remains on CLD for now, consider repeat swallow eval  Options for systemic cancer treatment are TBD  Pt's prognosis is guarded    - Anemia in neoplastic disease  Hgb remains stable  4/5 iron studies not c/w iron deficiency  VSS and pt without clinical s/s of active bleeding  4/17 hemolysis labs negative; 4/18 FOBT negative x2  Trending daily cbcs; continuing supportive transfusions prn (pt does not have h/o ACS, CAD, MI, goal hgb > 7; plts > 10K)    - SVC syndrome  Appreciate Rad Onc eval/recs  Completed 10fxs of palliative RT on 4/18   Completing 2-wk Dex taper, until 5/7 (continuing pjp/ppi ppx until completion of taper)    - Extensive b/l UE DVTs  - Acute RLE DVT a/w RLE ecchymosis  - Hypercoag state  Continuing therapeutic lovenox (resumed on 4/18 given negative GIB brooks), benefits > risks   * Of note: pt has poor UE IV access and currently required peripheral IV in her LE extremities for admin of medication; can consider [facility] central line for urgent/emergent IV needs    - Anxiety/emotional lability  Continuing Diazepam 5mg daily and daily/prn  Pt has poor health literacy and clinical insight which is negatively impacting her medical decision making- when ask if she wanted to defer medical decision making to her boyfriend or if she had a designated HCP she replied "he's already sick of me"  Appreciate  consult: pt lacks medical decision making capacity; medical decision making will be deferred to pt's surrogate decision maker- Saeid Peña ([phone])   * will ask for  reassessment to determine if pt has any improvement in her decision-making capacity in if she has capacity to participate in discussion re: dispo planning    - 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)  Pt with h/o R eye glaucoma, but miosis can also be associated with Pancoast syndrome i/s/o extensive R upper lung tumor  Pt denies visual deficits or other related symptoms   MRI Brain without contrast ordered for further eval- pt continues to refuse, can be completed as outpt  Will continue to monitor closely    - pAfib  Pt currently SR, HR controlled  Likely precipitated by malignancy, pericardial effusion, SVC syndrome  Continuing Metoprolol 25mg q12 with holding parameters  Continuing Amio 200mg daily (monitoring for toxicities)  Continuing telemetry    - Pericardial effusion with tamp physiology s/p pericardial window  - R pleural effusion s/p pleurex  Likely inflammatory; both pleural and pericardial fluid cytology is negative for malignant cells  4/9 surveillance TTE with pEF and no WMAs  Continuing R pleurex drainage- up to 500cc q48h/prn    - Acute hypoxic respiratory failure  Resolved  2/2 all of the above  Continuing supportive resp care:   * duonebs q6   * Tessalon 100mg q8   * steroids as above  Weaning supplemental O2 via NC as tolerated    - Cancer related pain  Currently well controlled  Continuing Oxy ER 30mg q12 q8  Continuing Oxy IR 10mg q4/prn  Continuing Dilaudid IV prn for sev breakthrough pain  Continuing bowel regimen to prevent OIC (including Movantic)    - Severe prot-ghazala malnutrition: appreciate RD recs; continuing regular diet with protein shake supplement BID    Generalized weakness , PT prior said rehab, will consult again as c/o generalized weakness    Sacral wound , wound consult

## 2024-04-28 NOTE — PROGRESS NOTE ADULT - SUBJECTIVE AND OBJECTIVE BOX
Medicine Progress Note    Patient is a 58y old  Female who presents with a chief complaint of SVC syndrome, DVT, mediastinal mass (27 Apr 2024 17:57)      SUBJECTIVE / OVERNIGHT EVENTS: patient is not happy about diet, she c/o radiation did not help, wants increased nursing visits at night     ADDITIONAL REVIEW OF SYSTEMS: negative     MEDICATIONS  (STANDING):  aMIOdarone    Tablet 200 milliGRAM(s) Oral daily  Biotene Dry Mouth Oral Rinse 15 milliLiter(s) Swish and Spit every 6 hours  budesonide 160 MICROgram(s)/formoterol 4.5 MICROgram(s) Inhaler 2 Puff(s) Inhalation two times a day  chlorhexidine 2% Cloths 1 Application(s) Topical daily  dexAMETHasone     Tablet 4 milliGRAM(s) Oral daily  diazepam    Tablet 5 milliGRAM(s) Oral daily  enoxaparin Injectable 50 milliGRAM(s) SubCutaneous every 12 hours  influenza   Vaccine 0.5 milliLiter(s) IntraMuscular once  metoprolol tartrate 25 milliGRAM(s) Oral every 12 hours  multivitamin 1 Tablet(s) Oral daily  naloxegol 25 milliGRAM(s) Oral daily  nicotine -  14 mG/24Hr(s) Patch 1 Patch Transdermal every 24 hours  oxyCODONE  ER Tablet 30 milliGRAM(s) Oral <User Schedule>  pantoprazole    Tablet 40 milliGRAM(s) Oral two times a day  polyethylene glycol 3350 17 Gram(s) Oral every 12 hours  senna 2 Tablet(s) Oral at bedtime  trimethoprim  160 mG/sulfamethoxazole 800 mG 1 Tablet(s) Oral <User Schedule>    MEDICATIONS  (PRN):  albuterol    90 MICROgram(s) HFA Inhaler 2 Puff(s) Inhalation every 6 hours PRN Shortness of Breath and/or Wheezing  albuterol/ipratropium for Nebulization 3 milliLiter(s) Nebulizer every 6 hours PRN Shortness of Breath and/or Wheezing  aluminum hydroxide/magnesium hydroxide/simethicone Suspension 30 milliLiter(s) Oral every 6 hours PRN Dyspepsia  Biotene Dry Mouth Oral Rinse 15 milliLiter(s) Swish and Spit two times a day PRN dry mouth  diazepam    Tablet 5 milliGRAM(s) Oral daily PRN Anxiety  FIRST- Mouthwash  BLM 5 milliLiter(s) Swish and Spit four times a day PRN Mouth Care  HYDROmorphone  Injectable 1 milliGRAM(s) IV Push every 3 hours PRN Severe Pain (7 - 10)  metoclopramide Injectable 10 milliGRAM(s) IV Push every 8 hours PRN nausea, vomiting  ondansetron    Tablet 4 milliGRAM(s) Oral every 8 hours PRN Nausea and/or Vomiting  oxyCODONE    IR 10 milliGRAM(s) Oral every 4 hours PRN Moderate Pain (4 - 6)  sodium chloride 0.9% lock flush 10 milliLiter(s) IV Push every 1 hour PRN Pre/post blood products, medications, blood draw, and to maintain line patency    CAPILLARY BLOOD GLUCOSE        I&O's Summary      PHYSICAL EXAM:  Vital Signs Last 24 Hrs  T(C): 36.8 (28 Apr 2024 10:53), Max: 37.1 (28 Apr 2024 05:15)  T(F): 98.3 (28 Apr 2024 10:53), Max: 98.7 (28 Apr 2024 05:15)  HR: 82 (28 Apr 2024 10:53) (74 - 98)  BP: 124/65 (28 Apr 2024 10:53) (114/83 - 139/74)  BP(mean): 90 (28 Apr 2024 05:15) (90 - 90)  RR: 18 (28 Apr 2024 10:53) (17 - 18)  SpO2: 94% (28 Apr 2024 10:53) (94% - 100%)    Parameters below as of 28 Apr 2024 10:53  Patient On (Oxygen Delivery Method): nasal cannula  O2 Flow (L/min): 2    CONSTITUTIONAL: NAD,   ENMT: Moist oral mucosa, no pharyngeal injection or exudates;   RESPIRATORY: Normal respiratory effort; lungs are dim  to auscultation bilaterally, rt Pleurx   CARDIOVASCULAR: Regular rate and rhythm, normal S1 and S2, ; No lower extremity edema;   ABDOMEN: Nontender to palpation, normoactive bowel sounds, no rebound/guarding;   PSYCH: A+O to person, place, and time; affect appropriate  NEUROLOGY: CN 2-12 are intact and symmetric; no gross sensory deficits   SKIN: No rashes; sacral wound     LABS:                        7.8    10.66 )-----------( 139      ( 27 Apr 2024 07:41 )             24.2     04-27    139  |  105  |  19  ----------------------------<  142<H>  3.8   |  23  |  0.81    Ca    7.9<L>      27 Apr 2024 07:41  Phos  3.3     04-27  Mg     1.80     04-27    TPro  4.4<L>  /  Alb  2.3<L>  /  TBili  0.3  /  DBili  x   /  AST  13  /  ALT  57<H>  /  AlkPhos  90  04-27          Urinalysis Basic - ( 27 Apr 2024 07:41 )    Color: x / Appearance: x / SG: x / pH: x  Gluc: 142 mg/dL / Ketone: x  / Bili: x / Urobili: x   Blood: x / Protein: x / Nitrite: x   Leuk Esterase: x / RBC: x / WBC x   Sq Epi: x / Non Sq Epi: x / Bacteria: x            RADIOLOGY & ADDITIONAL TESTS:  Imaging from Last 24 Hours:    Electrocardiogram/QTc Interval:    COORDINATION OF CARE:  Care Discussed with Consultants/Other Providers: PAT, RN

## 2024-04-29 LAB
ALBUMIN SERPL ELPH-MCNC: 2.3 G/DL — LOW (ref 3.3–5)
ALP SERPL-CCNC: 85 U/L — SIGNIFICANT CHANGE UP (ref 40–120)
ALT FLD-CCNC: 56 U/L — HIGH (ref 4–33)
ANION GAP SERPL CALC-SCNC: 8 MMOL/L — SIGNIFICANT CHANGE UP (ref 7–14)
AST SERPL-CCNC: 11 U/L — SIGNIFICANT CHANGE UP (ref 4–32)
BASOPHILS # BLD AUTO: 0 K/UL — SIGNIFICANT CHANGE UP (ref 0–0.2)
BASOPHILS NFR BLD AUTO: 0 % — SIGNIFICANT CHANGE UP (ref 0–2)
BILIRUB SERPL-MCNC: 0.4 MG/DL — SIGNIFICANT CHANGE UP (ref 0.2–1.2)
BUN SERPL-MCNC: 13 MG/DL — SIGNIFICANT CHANGE UP (ref 7–23)
CALCIUM SERPL-MCNC: 8.1 MG/DL — LOW (ref 8.4–10.5)
CHLORIDE SERPL-SCNC: 103 MMOL/L — SIGNIFICANT CHANGE UP (ref 98–107)
CO2 SERPL-SCNC: 25 MMOL/L — SIGNIFICANT CHANGE UP (ref 22–31)
CREAT SERPL-MCNC: 0.73 MG/DL — SIGNIFICANT CHANGE UP (ref 0.5–1.3)
EGFR: 94 ML/MIN/1.73M2 — SIGNIFICANT CHANGE UP
EOSINOPHIL # BLD AUTO: 0.13 K/UL — SIGNIFICANT CHANGE UP (ref 0–0.5)
EOSINOPHIL NFR BLD AUTO: 1.7 % — SIGNIFICANT CHANGE UP (ref 0–6)
GLUCOSE SERPL-MCNC: 79 MG/DL — SIGNIFICANT CHANGE UP (ref 70–99)
HCT VFR BLD CALC: 22.7 % — LOW (ref 34.5–45)
HGB BLD-MCNC: 7.4 G/DL — LOW (ref 11.5–15.5)
IANC: 6.75 K/UL — SIGNIFICANT CHANGE UP (ref 1.8–7.4)
IMM GRANULOCYTES NFR BLD AUTO: 1.3 % — HIGH (ref 0–0.9)
LYMPHOCYTES # BLD AUTO: 0.24 K/UL — LOW (ref 1–3.3)
LYMPHOCYTES # BLD AUTO: 3.2 % — LOW (ref 13–44)
MAGNESIUM SERPL-MCNC: 1.8 MG/DL — SIGNIFICANT CHANGE UP (ref 1.6–2.6)
MCHC RBC-ENTMCNC: 30.5 PG — SIGNIFICANT CHANGE UP (ref 27–34)
MCHC RBC-ENTMCNC: 32.6 GM/DL — SIGNIFICANT CHANGE UP (ref 32–36)
MCV RBC AUTO: 93.4 FL — SIGNIFICANT CHANGE UP (ref 80–100)
MONOCYTES # BLD AUTO: 0.29 K/UL — SIGNIFICANT CHANGE UP (ref 0–0.9)
MONOCYTES NFR BLD AUTO: 3.9 % — SIGNIFICANT CHANGE UP (ref 2–14)
NEUTROPHILS # BLD AUTO: 6.75 K/UL — SIGNIFICANT CHANGE UP (ref 1.8–7.4)
NEUTROPHILS NFR BLD AUTO: 89.9 % — HIGH (ref 43–77)
NRBC # BLD: 0 /100 WBCS — SIGNIFICANT CHANGE UP (ref 0–0)
NRBC # FLD: 0 K/UL — SIGNIFICANT CHANGE UP (ref 0–0)
PHOSPHATE SERPL-MCNC: 3 MG/DL — SIGNIFICANT CHANGE UP (ref 2.5–4.5)
PLATELET # BLD AUTO: 182 K/UL — SIGNIFICANT CHANGE UP (ref 150–400)
POTASSIUM SERPL-MCNC: 4.8 MMOL/L — SIGNIFICANT CHANGE UP (ref 3.5–5.3)
POTASSIUM SERPL-SCNC: 4.8 MMOL/L — SIGNIFICANT CHANGE UP (ref 3.5–5.3)
PROT SERPL-MCNC: 4.3 G/DL — LOW (ref 6–8.3)
RBC # BLD: 2.43 M/UL — LOW (ref 3.8–5.2)
RBC # FLD: 16.6 % — HIGH (ref 10.3–14.5)
SODIUM SERPL-SCNC: 136 MMOL/L — SIGNIFICANT CHANGE UP (ref 135–145)
WBC # BLD: 7.51 K/UL — SIGNIFICANT CHANGE UP (ref 3.8–10.5)
WBC # FLD AUTO: 7.51 K/UL — SIGNIFICANT CHANGE UP (ref 3.8–10.5)

## 2024-04-29 PROCEDURE — 71045 X-RAY EXAM CHEST 1 VIEW: CPT | Mod: 26

## 2024-04-29 PROCEDURE — 99233 SBSQ HOSP IP/OBS HIGH 50: CPT

## 2024-04-29 RX ORDER — MAGNESIUM SULFATE 500 MG/ML
1 VIAL (ML) INJECTION ONCE
Refills: 0 | Status: COMPLETED | OUTPATIENT
Start: 2024-04-29 | End: 2024-04-29

## 2024-04-29 RX ORDER — METOCLOPRAMIDE HCL 10 MG
10 TABLET ORAL EVERY 8 HOURS
Refills: 0 | Status: DISCONTINUED | OUTPATIENT
Start: 2024-04-29 | End: 2024-05-30

## 2024-04-29 RX ADMIN — PANTOPRAZOLE SODIUM 40 MILLIGRAM(S): 20 TABLET, DELAYED RELEASE ORAL at 06:25

## 2024-04-29 RX ADMIN — Medication 4 MILLIGRAM(S): at 06:25

## 2024-04-29 RX ADMIN — Medication 25 MILLIGRAM(S): at 17:23

## 2024-04-29 RX ADMIN — OXYCODONE HYDROCHLORIDE 30 MILLIGRAM(S): 5 TABLET ORAL at 21:23

## 2024-04-29 RX ADMIN — BUDESONIDE AND FORMOTEROL FUMARATE DIHYDRATE 2 PUFF(S): 160; 4.5 AEROSOL RESPIRATORY (INHALATION) at 22:41

## 2024-04-29 RX ADMIN — Medication 1 PATCH: at 11:34

## 2024-04-29 RX ADMIN — Medication 15 MILLILITER(S): at 11:33

## 2024-04-29 RX ADMIN — Medication 1 TABLET(S): at 09:05

## 2024-04-29 RX ADMIN — OXYCODONE HYDROCHLORIDE 30 MILLIGRAM(S): 5 TABLET ORAL at 14:20

## 2024-04-29 RX ADMIN — Medication 1 PATCH: at 07:18

## 2024-04-29 RX ADMIN — Medication 15 MILLILITER(S): at 06:26

## 2024-04-29 RX ADMIN — Medication 15 MILLILITER(S): at 17:23

## 2024-04-29 RX ADMIN — Medication 1 TABLET(S): at 11:33

## 2024-04-29 RX ADMIN — OXYCODONE HYDROCHLORIDE 30 MILLIGRAM(S): 5 TABLET ORAL at 06:24

## 2024-04-29 RX ADMIN — PANTOPRAZOLE SODIUM 40 MILLIGRAM(S): 20 TABLET, DELAYED RELEASE ORAL at 17:23

## 2024-04-29 RX ADMIN — Medication 1 PATCH: at 11:15

## 2024-04-29 RX ADMIN — OXYCODONE HYDROCHLORIDE 30 MILLIGRAM(S): 5 TABLET ORAL at 22:20

## 2024-04-29 RX ADMIN — Medication 25 MILLIGRAM(S): at 06:25

## 2024-04-29 RX ADMIN — CHLORHEXIDINE GLUCONATE 1 APPLICATION(S): 213 SOLUTION TOPICAL at 11:34

## 2024-04-29 RX ADMIN — Medication 1 PATCH: at 19:21

## 2024-04-29 RX ADMIN — BUDESONIDE AND FORMOTEROL FUMARATE DIHYDRATE 2 PUFF(S): 160; 4.5 AEROSOL RESPIRATORY (INHALATION) at 10:51

## 2024-04-29 RX ADMIN — Medication 5 MILLIGRAM(S): at 11:33

## 2024-04-29 RX ADMIN — AMIODARONE HYDROCHLORIDE 200 MILLIGRAM(S): 400 TABLET ORAL at 06:25

## 2024-04-29 RX ADMIN — NALOXEGOL OXALATE 25 MILLIGRAM(S): 12.5 TABLET, FILM COATED ORAL at 11:34

## 2024-04-29 RX ADMIN — SENNA PLUS 2 TABLET(S): 8.6 TABLET ORAL at 21:26

## 2024-04-29 RX ADMIN — ENOXAPARIN SODIUM 50 MILLIGRAM(S): 100 INJECTION SUBCUTANEOUS at 17:23

## 2024-04-29 RX ADMIN — OXYCODONE HYDROCHLORIDE 30 MILLIGRAM(S): 5 TABLET ORAL at 15:20

## 2024-04-29 NOTE — PROGRESS NOTE ADULT - SUBJECTIVE AND OBJECTIVE BOX
need pulm and cardiology clearance for eGD need pulm and cardiology clearance for eGD  F/u GI    < from: Xray Abdomen 1 View PORTABLE -Routine (Xray Abdomen 1 View PORTABLE -Routine .) (04.27.24 @ 16:41) >  IMPRESSION:  Nonobstructive bowel gas pattern.       Patient is a 58y old  Female who presents with a chief complaint of SVC syndrome, DVT, mediastinal mass (29 Apr 2024 08:59)      SUBJECTIVE / OVERNIGHT EVENTS:  Resting in bed, upset over having clear liquid diet, wants GI procedure asap    MEDICATIONS  (STANDING):  aMIOdarone    Tablet 200 milliGRAM(s) Oral daily  Biotene Dry Mouth Oral Rinse 15 milliLiter(s) Swish and Spit every 6 hours  budesonide 160 MICROgram(s)/formoterol 4.5 MICROgram(s) Inhaler 2 Puff(s) Inhalation two times a day  chlorhexidine 2% Cloths 1 Application(s) Topical daily  dexAMETHasone     Tablet 4 milliGRAM(s) Oral daily  diazepam    Tablet 5 milliGRAM(s) Oral daily  enoxaparin Injectable 50 milliGRAM(s) SubCutaneous every 12 hours  influenza   Vaccine 0.5 milliLiter(s) IntraMuscular once  magnesium sulfate  IVPB 1 Gram(s) IV Intermittent once  metoclopramide Injectable 10 milliGRAM(s) IV Push every 8 hours  metoprolol tartrate 25 milliGRAM(s) Oral every 12 hours  multivitamin 1 Tablet(s) Oral daily  naloxegol 25 milliGRAM(s) Oral daily  nicotine -  14 mG/24Hr(s) Patch 1 Patch Transdermal every 24 hours  oxyCODONE  ER Tablet 30 milliGRAM(s) Oral <User Schedule>  pantoprazole    Tablet 40 milliGRAM(s) Oral two times a day  polyethylene glycol 3350 17 Gram(s) Oral every 12 hours  senna 2 Tablet(s) Oral at bedtime  trimethoprim  160 mG/sulfamethoxazole 800 mG 1 Tablet(s) Oral <User Schedule>    MEDICATIONS  (PRN):  albuterol    90 MICROgram(s) HFA Inhaler 2 Puff(s) Inhalation every 6 hours PRN Shortness of Breath and/or Wheezing  albuterol/ipratropium for Nebulization 3 milliLiter(s) Nebulizer every 6 hours PRN Shortness of Breath and/or Wheezing  aluminum hydroxide/magnesium hydroxide/simethicone Suspension 30 milliLiter(s) Oral every 6 hours PRN Dyspepsia  Biotene Dry Mouth Oral Rinse 15 milliLiter(s) Swish and Spit two times a day PRN dry mouth  diazepam    Tablet 5 milliGRAM(s) Oral daily PRN Anxiety  FIRST- Mouthwash  BLM 5 milliLiter(s) Swish and Spit four times a day PRN Mouth Care  HYDROmorphone  Injectable 1 milliGRAM(s) IV Push every 3 hours PRN Severe Pain (7 - 10)  ondansetron    Tablet 4 milliGRAM(s) Oral every 8 hours PRN Nausea and/or Vomiting  oxyCODONE    IR 10 milliGRAM(s) Oral every 4 hours PRN Moderate Pain (4 - 6)  sodium chloride 0.9% lock flush 10 milliLiter(s) IV Push every 1 hour PRN Pre/post blood products, medications, blood draw, and to maintain line patency      I&O's Summary    28 Apr 2024 07:01  -  29 Apr 2024 07:00  --------------------------------------------------------  IN: 0 mL / OUT: 550 mL / NET: -550 mL      Vital Signs Last 24 Hrs  T(F): 97.9 (29 Apr 2024 11:46), Max: 98.5 (28 Apr 2024 18:45)  HR: 75 (29 Apr 2024 11:46) (75 - 88)  BP: 101/59 (29 Apr 2024 11:46) (101/59 - 138/82)  RR: 18 (29 Apr 2024 11:46) (18 - 19)  SpO2: 100% (29 Apr 2024 11:46) (95% - 100%)  Parameters below as of 29 Apr 2024 06:20  Patient On (Oxygen Delivery Method): nasal cannula  O2 Flow (L/min): 2    PHYSICAL EXAM:  GENERAL: NAD,   HEAD:  Atraumatic, Normocephalic  EYES: EOMI, PERRLA, conjunctiva and sclera clear  NECK: Supple, No JVD  CHEST/LUNG: Clear to auscultation bilaterally; No wheeze  HEART: Regular rate and rhythm; No murmurs, rubs, or gallops  ABDOMEN: Soft, Nontender, Nondistended; Bowel sounds present  EXTREMITIES:  2+ Peripheral Pulses, No clubbing, cyanosis, or edema  PSYCH: Alert    LABS:                        7.4    7.51  )-----------( 182      ( 29 Apr 2024 06:26 )             22.7     04-29    136  |  103  |  13  ----------------------------<  79  4.8   |  25  |  0.73    Ca    8.1<L>      29 Apr 2024 06:26  Phos  3.0     04-29  Mg     1.80     04-29    TPro  4.3<L>  /  Alb  2.3<L>  /  TBili  0.4  /  DBili  x   /  AST  11  /  ALT  56<H>  /  AlkPhos  85  04-29        Care Discussed with Consultants/Other Providers:  < from: Xray Abdomen 1 View PORTABLE -Routine (Xray Abdomen 1 View PORTABLE -Routine .) (04.27.24 @ 16:41) >  IMPRESSION:  Nonobstructive bowel gas pattern.  d/w patient/ PA

## 2024-04-29 NOTE — CHART NOTE - NSCHARTNOTEFT_GEN_A_CORE
Vital Signs Last 24 Hrs  T(C): 36.6 (29 Apr 2024 06:20), Max: 36.9 (28 Apr 2024 18:45)  T(F): 97.8 (29 Apr 2024 06:20), Max: 98.5 (28 Apr 2024 18:45)  HR: 88 (29 Apr 2024 06:20) (82 - 88)  BP: 138/82 (29 Apr 2024 06:20) (124/65 - 138/82)  BP(mean): --  RR: 18 (29 Apr 2024 06:20) (18 - 19)  SpO2: 95% (29 Apr 2024 06:20) (94% - 96%)    Parameters below as of 29 Apr 2024 06:20  Patient On (Oxygen Delivery Method): nasal cannula  O2 Flow (L/min): 2                          7.4    7.51  )-----------( 182      ( 29 Apr 2024 06:26 )             22.7   04-29    136  |  103  |  13  ----------------------------<  79  4.8   |  25  |  0.73    Ca    8.1<L>      29 Apr 2024 06:26  Phos  3.0     04-29  Mg     1.80     04-29    TPro  4.3<L>  /  Alb  2.3<L>  /  TBili  0.4  /  DBili  x   /  AST  11  /  ALT  56<H>  /  AlkPhos  85  04-29    Received page from GI requesting a UGIS (ordered) for further evaluation, to determine plan for EGD.  Spoke with Thoracic Sx NP Roberto > neli to continue pleureX drainage upto 1L q48h (3x/week), discussed with RN Vital Signs Last 24 Hrs  T(C): 36.6 (29 Apr 2024 06:20), Max: 36.9 (28 Apr 2024 18:45)  T(F): 97.8 (29 Apr 2024 06:20), Max: 98.5 (28 Apr 2024 18:45)  HR: 88 (29 Apr 2024 06:20) (82 - 88)  BP: 138/82 (29 Apr 2024 06:20) (124/65 - 138/82)  BP(mean): --  RR: 18 (29 Apr 2024 06:20) (18 - 19)  SpO2: 95% (29 Apr 2024 06:20) (94% - 96%)    Parameters below as of 29 Apr 2024 06:20  Patient On (Oxygen Delivery Method): nasal cannula  O2 Flow (L/min): 2                          7.4    7.51  )-----------( 182      ( 29 Apr 2024 06:26 )             22.7   04-29    136  |  103  |  13  ----------------------------<  79  4.8   |  25  |  0.73    Ca    8.1<L>      29 Apr 2024 06:26  Phos  3.0     04-29  Mg     1.80     04-29    TPro  4.3<L>  /  Alb  2.3<L>  /  TBili  0.4  /  DBili  x   /  AST  11  /  ALT  56<H>  /  AlkPhos  85  04-29    Received page from GI requesting a UGIS (ordered) for further evaluation, to determine plan for EGD.  Spoke with Thoracic Sx NP Roberto > okay to continue pleureX drainage upto 1L q48h (3x/week), discussed with RN    Addendum: Writer spoke with patient twice about importance of PIV access needed for UGIS contrast study, adamantly refusing to allow, stating "I don't want it", ordered CXR given complaints of dyspnea, no hypoxia noted, on 2L NC. GI Dr. Coffey and Dr. Conklin informed, will re-attempt again to speak with patient, discussed with RN, Psych Dr. Garcia and ANM

## 2024-04-29 NOTE — CHART NOTE - NSCHARTNOTEFT_GEN_A_CORE
Source: Patient [x]     other [x] nurse, medical chart   Diet rx: Clear Liquid (04-24-24 @ 16:11) [Active]    Pt 59 yo female, former smoker, with PMHx of R eye glaucoma, Fibromyalgia, Anxiety, GERD, RCC s/p R total nephrectomy/hysterectomy- per chart review.     At time of visit, Pt awake, somewhat disoriented/forgetful (?). Pt with not much interest to speak to RD. Case discussed with nurse. At time of visit, Pt c/o breathing difficulty -> nurse notified right a way. Limited information obtain from Pt at present.   Of note, Pt on clear liquid diet, GI planning for scope. Per Pt, her appetite not well, but she does not want "liquids"; she wants Regular food/food items. No report of chewing or swallowing difficulty; no report of vomiting or diarrhea @ this time. +Last BM (4/28) per flow sheet. Pt c/o abdominal pain, nurse aware. Unable to discuss food preferences at present. Of note, Pt DNR/DNI. RD remains available.     Pt's height: 60"      IBW: 100#+/-10%    Pt's weights: 45.4 kg (4/26), 45.4 kg (4/4)     Pertinent Medications: Lovenox, Multivitamin, Protonix, Senna, Miralax, Maalox (PRN), Reglan (PRN), Zofran (PRN), Biotene dry mouth oral rinse, First mouth wash     Pertinent Labs: (4/29) H/H 7.4/22.7 L, Albumin 2.3 L, ALT 56 H;  (3/12) HbA1c 6.6% H, HDL 50 L   Skin: Pt with pressure injury to sacrum - stage II, per flow sheet     Estimated Needs: [x] no change since previous assessment  Previous Nutrition Diagnosis: [x] Malnutrition, severe    Nutrition Diagnosis is [x] ongoing      Nutrition Interventions/Recommendations:   1. Once clinically indicates, advance PO diet to Full liquids; If Pt tolerates well, advance PO diet to Regular;  2. Add PO supplement: Ensure Clear (1 can/220 Kcal, 8 gm Protein) - 2x daily, for now;   3. Bowel regimen per MD discretion;   4. Monitor labs, weekly weights, hydration status; Source: Patient [x]     other [x] nurse, medical chart   Diet rx: Clear Liquid (04-24-24 @ 16:11) [Active]    Pt 59 yo female, former smoker, with PMHx of R eye glaucoma, Fibromyalgia, Anxiety, GERD, RCC s/p R total nephrectomy/hysterectomy- per chart review.     At time of visit, Pt awake, somewhat disoriented/forgetful (?). Pt with not much interest to speak to RD. Case discussed with nurse. At time of visit, Pt c/o breathing difficulty -> nurse notified right a way. Limited information obtain from Pt at present.   Of note, Pt on clear liquid diet, GI planning for scope. Per Pt, her appetite not well, but she does not want "liquids"; she wants Regular food/food items. No report of chewing or swallowing difficulty; no report of vomiting or diarrhea @ this time. +Last BM (4/28) per flow sheet. Pt c/o abdominal pain, nurse aware. Unable to discuss food preferences at present. Of note, Pt DNR/DNI. RD remains available.     Pt's height: 60"      IBW: 100#+/-10%    Pt's weights: 45.4 kg (4/26), 45.4 kg (4/4)     Pertinent Medications: Lovenox, Multivitamin, Protonix, Senna, Miralax, Maalox (PRN), Reglan (PRN), Zofran (PRN), Biotene dry mouth oral rinse, First mouth wash     Pertinent Labs: (4/29) H/H 7.4/22.7 L, Albumin 2.3 L, ALT 56 H;  (3/12) HbA1c 6.6% H, HDL 50 L   Skin: Pt with pressure injury to sacrum - stage II, per flow sheet     Estimated Needs: [x] no change since previous assessment  Previous Nutrition Diagnosis: [x] Malnutrition, severe    Nutrition Diagnosis is [x] ongoing      Nutrition Interventions/Recommendations:   1. Once clinically indicates, advance PO diet to Full liquids; If Pt tolerates well, advance PO diet to Regular;  2. Add PO supplement: Ensure Clear (1 can/220 Kcal, 8 gm Protein) - 2x daily, for now;   3. Bowel regimen per MD discretion;   4. Continue Multivitamin as ordered;   5. Monitor labs, weekly weights, hydration status;  6. If unable to advance PO diet, suggest initiating alternative means of nutrition support according to GOC;   Consult nutrition if warranted  RD remains available

## 2024-04-29 NOTE — PROGRESS NOTE ADULT - PROBLEM SELECTOR PROBLEM 7
Bret, RN with [facility] called asking if the biliary drain order for flushing can be changed to as needed. She said it was draining well. If you have questions, please call at [phone].  
Spoke with Bret the RN. Bret was requesting that the biliary tube be flush 3 times weekly or PRN. Writer explained to Bret that it is protocol to flush the tubing daily to keep it patent and prevent sepsis. A sinogram will be done in IR in 6 weeks. Spoke with both Iwona SHERMAN and Dr. Rueda and they want the flushing orders to stay daily.  
Hypercoagulable state
Permanent atrial fibrillation
Permanent atrial fibrillation
Palliative care encounter
Permanent atrial fibrillation
Palliative care encounter

## 2024-04-29 NOTE — PROGRESS NOTE ADULT - ASSESSMENT
57 yo woman, former smoker, with h/o R eye glaucoma, Fibromyalgia, Anxiety, GERD, and RCC s/p R total nephrectomy/hysterectomy; she was initially admitted to [facility] on 3/11 w/ R breast swelling c/f mastitis- imaging brooks noted supraclavicular/mediastinal mass lesion which encased the SVC and multiple other veins resulting in obliteration of the SVC and thrombosis of the R IJ, and a pancreatic neck cystic lesion.  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  Her disease/hospital course has also been c/b pericardial effusion s/p pericardial window w/ neg cytology, R pleural effusion, also negative cytology) s/p Pleurx, Afib w/ RVR and acute hypoxic resp failure 2/2 SVC syndrome- not dennis to IR-guided stenting; pt was ultimately started on Dex and transferred to [facility] on 4/4 for urgent palliative RT.    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)  pAfib, with RVR on this admission  Pericardial effusion with tamp physiology s/p pericardial window  R pleural effusion s/p pleurex  Acute hypoxic resp failure  Anxiety/emotional lability  Cancer-related pain, anxiety  Severe prot-ghazala malnutrition  Generalized weakness     - Metastatic cancer  Based on 3/14 SC LN path, ddx includes primary UGI vs pancreaticobiliary primary (breast edema is likely 2/2 SVC syndrome); PD-L1 TPs 1%, pMMR, Her2 pending  Ca-125 moderately elevated at 125; other tumor markers not significantly elevated  Additional diagnostic brooks includes:   * 4/19 MRCP showing pancreatic lesion (? IPMN, but pancreas was suboptimally evaluated)     * 4/24 EGD/EUS aborted as pt had large amount of food in the stomach that prevented passing of scope (food evacuated)   * Pt planned for 2nd attempt at EGD/EUS +/- FNB on 4/26 (she remains on CLD for now, consider repeat swallow eval), not done due to stool.  4/29 NOW on clear liquid diet , Gi planning for scope  Options for systemic cancer treatment are TBD  Pt's prognosis is guarded    - Anemia in neoplastic disease  Hgb remains stable  4/5 iron studies not c/w iron deficiency  VSS and pt without clinical s/s of active bleeding  4/17 hemolysis labs negative; 4/18 FOBT negative x2  Trending daily cbcs; continuing supportive transfusions prn (pt does not have h/o ACS, CAD, MI, goal hgb > 7; plts > 10K)    - SVC syndrome  Appreciate Rad Onc eval/recs  Completed 10fxs of palliative RT on 4/18   Completing 2-wk Dex taper, until 5/7 (continuing pjp/ppi ppx until completion of taper)    - Extensive b/l UE DVTs  - Acute RLE DVT a/w RLE ecchymosis  - Hypercoag state  Continuing therapeutic lovenox (resumed on 4/18 given negative GIB brooks), benefits > risks   * Of note: pt has poor UE IV access and currently required peripheral IV in her LE extremities for admin of medication; can consider FV central line for urgent/emergent IV needs    - Anxiety/emotional lability  Continuing Diazepam 5mg daily and daily/prn  Pt has poor health literacy and clinical insight which is negatively impacting her medical decision making- when ask if she wanted to defer medical decision making to her boyfriend or if she had a designated HCP she replied "he's already sick of me"  Appreciate  consult: pt lacks medical decision making capacity; medical decision making will be deferred to pt's surrogate decision maker- Saeid Peña ([phone])   * will ask for  reassessment to determine if pt has any improvement in her decision-making capacity in if she has capacity to participate in discussion re: dispo planning    - 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)  Pt with h/o R eye glaucoma, but miosis can also be associated with Pancoast syndrome i/s/o extensive R upper lung tumor  Pt denies visual deficits or other related symptoms   MRI Brain without contrast ordered for further eval- pt continues to refuse, can be completed as outpt  Will continue to monitor closely    - pAfib  Pt currently SR, HR controlled  Likely precipitated by malignancy, pericardial effusion, SVC syndrome  Continuing Metoprolol 25mg q12 with holding parameters  Continuing Amio 200mg daily (monitoring for toxicities)  Continuing telemetry    - Pericardial effusion with tamp physiology s/p pericardial window  - R pleural effusion s/p pleurex  Likely inflammatory; both pleural and pericardial fluid cytology is negative for malignant cells  4/9 surveillance TTE with pEF and no WMAs  Continuing R pleurex drainage- up to 500cc q48h/prn    - Acute hypoxic respiratory failure  Resolved  2/2 all of the above  Continuing supportive resp care:   * duonebs q6   * Tessalon 100mg q8   * steroids as above  Weaning supplemental O2 via NC as tolerated    - Cancer related pain  Currently well controlled  Continuing Oxy ER 30mg q12 q8  Continuing Oxy IR 10mg q4/prn  Continuing Dilaudid IV prn for sev breakthrough pain  Continuing bowel regimen to prevent OIC (including Movantic)    - Severe prot-ghazala malnutrition: appreciate RD recs; continuing regular diet with protein shake supplement BID    Generalized weakness , PT prior said rehab, will consult again as c/o generalized weakness    Sacral wound , wound consult

## 2024-04-30 LAB
ALBUMIN SERPL ELPH-MCNC: 2.6 G/DL — LOW (ref 3.3–5)
ALP SERPL-CCNC: 97 U/L — SIGNIFICANT CHANGE UP (ref 40–120)
ALT FLD-CCNC: 71 U/L — HIGH (ref 4–33)
ANION GAP SERPL CALC-SCNC: 13 MMOL/L — SIGNIFICANT CHANGE UP (ref 7–14)
AST SERPL-CCNC: 13 U/L — SIGNIFICANT CHANGE UP (ref 4–32)
BASOPHILS # BLD AUTO: 0 K/UL — SIGNIFICANT CHANGE UP (ref 0–0.2)
BASOPHILS NFR BLD AUTO: 0 % — SIGNIFICANT CHANGE UP (ref 0–2)
BILIRUB SERPL-MCNC: 0.4 MG/DL — SIGNIFICANT CHANGE UP (ref 0.2–1.2)
BLD GP AB SCN SERPL QL: NEGATIVE — SIGNIFICANT CHANGE UP
BUN SERPL-MCNC: 14 MG/DL — SIGNIFICANT CHANGE UP (ref 7–23)
CALCIUM SERPL-MCNC: 8.4 MG/DL — SIGNIFICANT CHANGE UP (ref 8.4–10.5)
CHLORIDE SERPL-SCNC: 99 MMOL/L — SIGNIFICANT CHANGE UP (ref 98–107)
CO2 SERPL-SCNC: 22 MMOL/L — SIGNIFICANT CHANGE UP (ref 22–31)
CREAT SERPL-MCNC: 0.87 MG/DL — SIGNIFICANT CHANGE UP (ref 0.5–1.3)
EGFR: 76 ML/MIN/1.73M2 — SIGNIFICANT CHANGE UP
EOSINOPHIL # BLD AUTO: 0.08 K/UL — SIGNIFICANT CHANGE UP (ref 0–0.5)
EOSINOPHIL NFR BLD AUTO: 0.9 % — SIGNIFICANT CHANGE UP (ref 0–6)
GLUCOSE SERPL-MCNC: 85 MG/DL — SIGNIFICANT CHANGE UP (ref 70–99)
HCT VFR BLD CALC: 26.1 % — LOW (ref 34.5–45)
HGB BLD-MCNC: 8.3 G/DL — LOW (ref 11.5–15.5)
IANC: 7.59 K/UL — HIGH (ref 1.8–7.4)
IMM GRANULOCYTES NFR BLD AUTO: 1.5 % — HIGH (ref 0–0.9)
LYMPHOCYTES # BLD AUTO: 0.34 K/UL — LOW (ref 1–3.3)
LYMPHOCYTES # BLD AUTO: 4 % — LOW (ref 13–44)
MAGNESIUM SERPL-MCNC: 1.8 MG/DL — SIGNIFICANT CHANGE UP (ref 1.6–2.6)
MCHC RBC-ENTMCNC: 30.1 PG — SIGNIFICANT CHANGE UP (ref 27–34)
MCHC RBC-ENTMCNC: 31.8 GM/DL — LOW (ref 32–36)
MCV RBC AUTO: 94.6 FL — SIGNIFICANT CHANGE UP (ref 80–100)
MONOCYTES # BLD AUTO: 0.43 K/UL — SIGNIFICANT CHANGE UP (ref 0–0.9)
MONOCYTES NFR BLD AUTO: 5 % — SIGNIFICANT CHANGE UP (ref 2–14)
NEUTROPHILS # BLD AUTO: 7.59 K/UL — HIGH (ref 1.8–7.4)
NEUTROPHILS NFR BLD AUTO: 88.6 % — HIGH (ref 43–77)
NRBC # BLD: 0 /100 WBCS — SIGNIFICANT CHANGE UP (ref 0–0)
NRBC # FLD: 0 K/UL — SIGNIFICANT CHANGE UP (ref 0–0)
PHOSPHATE SERPL-MCNC: 3.7 MG/DL — SIGNIFICANT CHANGE UP (ref 2.5–4.5)
PLATELET # BLD AUTO: 224 K/UL — SIGNIFICANT CHANGE UP (ref 150–400)
POTASSIUM SERPL-MCNC: 4.4 MMOL/L — SIGNIFICANT CHANGE UP (ref 3.5–5.3)
POTASSIUM SERPL-SCNC: 4.4 MMOL/L — SIGNIFICANT CHANGE UP (ref 3.5–5.3)
PROT SERPL-MCNC: 4.9 G/DL — LOW (ref 6–8.3)
RBC # BLD: 2.76 M/UL — LOW (ref 3.8–5.2)
RBC # FLD: 16.5 % — HIGH (ref 10.3–14.5)
RH IG SCN BLD-IMP: POSITIVE — SIGNIFICANT CHANGE UP
SODIUM SERPL-SCNC: 134 MMOL/L — LOW (ref 135–145)
WBC # BLD: 8.57 K/UL — SIGNIFICANT CHANGE UP (ref 3.8–10.5)
WBC # FLD AUTO: 8.57 K/UL — SIGNIFICANT CHANGE UP (ref 3.8–10.5)

## 2024-04-30 PROCEDURE — 99233 SBSQ HOSP IP/OBS HIGH 50: CPT

## 2024-04-30 RX ORDER — DIAZEPAM 5 MG
5 TABLET ORAL
Refills: 0 | Status: ACTIVE | OUTPATIENT
Start: 2024-04-30 | End: 2024-05-07

## 2024-04-30 RX ORDER — DIAZEPAM 5 MG
5 TABLET ORAL DAILY
Refills: 0 | Status: DISCONTINUED | OUTPATIENT
Start: 2024-04-30 | End: 2024-04-30

## 2024-04-30 RX ORDER — OXYCODONE HYDROCHLORIDE 5 MG/1
30 TABLET ORAL
Refills: 0 | Status: ACTIVE | OUTPATIENT
Start: 2024-04-30 | End: 2024-05-07

## 2024-04-30 RX ORDER — HYDROMORPHONE HYDROCHLORIDE 2 MG/ML
1 INJECTION INTRAMUSCULAR; INTRAVENOUS; SUBCUTANEOUS
Refills: 0 | Status: DISCONTINUED | OUTPATIENT
Start: 2024-04-30 | End: 2024-04-30

## 2024-04-30 RX ORDER — OXYCODONE HYDROCHLORIDE 5 MG/1
10 TABLET ORAL EVERY 4 HOURS
Refills: 0 | Status: ACTIVE | OUTPATIENT
Start: 2024-04-30 | End: 2024-05-07

## 2024-04-30 RX ORDER — DIAZEPAM 5 MG
5 TABLET ORAL AT BEDTIME
Refills: 0 | Status: ACTIVE | OUTPATIENT
Start: 2024-04-30 | End: 2024-05-07

## 2024-04-30 RX ADMIN — Medication 25 MILLIGRAM(S): at 17:26

## 2024-04-30 RX ADMIN — AMIODARONE HYDROCHLORIDE 200 MILLIGRAM(S): 400 TABLET ORAL at 05:39

## 2024-04-30 RX ADMIN — OXYCODONE HYDROCHLORIDE 30 MILLIGRAM(S): 5 TABLET ORAL at 14:39

## 2024-04-30 RX ADMIN — ENOXAPARIN SODIUM 50 MILLIGRAM(S): 100 INJECTION SUBCUTANEOUS at 17:26

## 2024-04-30 RX ADMIN — OXYCODONE HYDROCHLORIDE 10 MILLIGRAM(S): 5 TABLET ORAL at 09:49

## 2024-04-30 RX ADMIN — Medication 15 MILLILITER(S): at 17:25

## 2024-04-30 RX ADMIN — Medication 15 MILLILITER(S): at 05:39

## 2024-04-30 RX ADMIN — POLYETHYLENE GLYCOL 3350 17 GRAM(S): 17 POWDER, FOR SOLUTION ORAL at 17:25

## 2024-04-30 RX ADMIN — Medication 15 MILLILITER(S): at 11:55

## 2024-04-30 RX ADMIN — Medication 25 MILLIGRAM(S): at 05:38

## 2024-04-30 RX ADMIN — Medication 1 PATCH: at 11:55

## 2024-04-30 RX ADMIN — BUDESONIDE AND FORMOTEROL FUMARATE DIHYDRATE 2 PUFF(S): 160; 4.5 AEROSOL RESPIRATORY (INHALATION) at 09:33

## 2024-04-30 RX ADMIN — Medication 1 PATCH: at 11:59

## 2024-04-30 RX ADMIN — OXYCODONE HYDROCHLORIDE 10 MILLIGRAM(S): 5 TABLET ORAL at 08:50

## 2024-04-30 RX ADMIN — OXYCODONE HYDROCHLORIDE 30 MILLIGRAM(S): 5 TABLET ORAL at 05:37

## 2024-04-30 RX ADMIN — CHLORHEXIDINE GLUCONATE 1 APPLICATION(S): 213 SOLUTION TOPICAL at 11:54

## 2024-04-30 RX ADMIN — PANTOPRAZOLE SODIUM 40 MILLIGRAM(S): 20 TABLET, DELAYED RELEASE ORAL at 17:26

## 2024-04-30 RX ADMIN — NALOXEGOL OXALATE 25 MILLIGRAM(S): 12.5 TABLET, FILM COATED ORAL at 11:59

## 2024-04-30 RX ADMIN — POLYETHYLENE GLYCOL 3350 17 GRAM(S): 17 POWDER, FOR SOLUTION ORAL at 05:39

## 2024-04-30 RX ADMIN — OXYCODONE HYDROCHLORIDE 30 MILLIGRAM(S): 5 TABLET ORAL at 06:30

## 2024-04-30 RX ADMIN — BUDESONIDE AND FORMOTEROL FUMARATE DIHYDRATE 2 PUFF(S): 160; 4.5 AEROSOL RESPIRATORY (INHALATION) at 22:20

## 2024-04-30 RX ADMIN — Medication 5 MILLIGRAM(S): at 17:25

## 2024-04-30 RX ADMIN — Medication 4 MILLIGRAM(S): at 05:39

## 2024-04-30 RX ADMIN — SENNA PLUS 2 TABLET(S): 8.6 TABLET ORAL at 22:24

## 2024-04-30 RX ADMIN — Medication 5 MILLIGRAM(S): at 23:46

## 2024-04-30 RX ADMIN — Medication 10 MILLIGRAM(S): at 22:21

## 2024-04-30 RX ADMIN — Medication 1 PATCH: at 07:41

## 2024-04-30 RX ADMIN — Medication 15 MILLILITER(S): at 00:39

## 2024-04-30 RX ADMIN — Medication 1 TABLET(S): at 11:56

## 2024-04-30 RX ADMIN — OXYCODONE HYDROCHLORIDE 30 MILLIGRAM(S): 5 TABLET ORAL at 15:30

## 2024-04-30 RX ADMIN — ENOXAPARIN SODIUM 50 MILLIGRAM(S): 100 INJECTION SUBCUTANEOUS at 05:38

## 2024-04-30 RX ADMIN — PANTOPRAZOLE SODIUM 40 MILLIGRAM(S): 20 TABLET, DELAYED RELEASE ORAL at 05:39

## 2024-04-30 RX ADMIN — OXYCODONE HYDROCHLORIDE 30 MILLIGRAM(S): 5 TABLET ORAL at 22:20

## 2024-04-30 NOTE — CHART NOTE - NSCHARTNOTEFT_GEN_A_CORE
Called to evaluate leak around Right PleurX catheter.  Found both catheter sutures torn through the skin.  New (2-0 Prolene) U stitch placed to secure the catheter in sterile manner and dressing applied.  Patient tolerated procedure well.

## 2024-04-30 NOTE — PROGRESS NOTE ADULT - ASSESSMENT
59 yo woman, former smoker, with h/o R eye glaucoma, Fibromyalgia, Anxiety, GERD, and RCC s/p R total nephrectomy/hysterectomy; she was initially admitted to [facility] on 3/11 w/ R breast swelling c/f mastitis- imaging brooks noted supraclavicular/mediastinal mass lesion which encased the SVC and multiple other veins resulting in obliteration of the SVC and thrombosis of the R IJ, and a pancreatic neck cystic lesion.  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  Her disease/hospital course has also been c/b pericardial effusion s/p pericardial window w/ neg cytology, R pleural effusion, also negative cytology) s/p Pleurx, Afib w/ RVR and acute hypoxic resp failure 2/2 SVC syndrome- not dennis to IR-guided stenting; pt was ultimately started on Dex and transferred to [facility] on 4/4 for urgent palliative RT.    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)  pAfib, with RVR on this admission  Pericardial effusion with tamp physiology s/p pericardial window  R pleural effusion s/p pleurex  Acute hypoxic resp failure  Anxiety/emotional lability  Cancer-related pain, anxiety  Severe prot-ghazala malnutrition    - Metastatic cancer  Based on 3/14 SC LN path, ddx includes primary UGI vs pancreaticobiliary primary (breast edema is likely 2/2 SVC syndrome); PD-L1 TPs 1%, pMMR, Her2 pending  Ca-125 moderately elevated at 125; other tumor markers not significantly elevated  Additional diagnostic brooks includes:   * 4/19 MRCP showing pancreatic lesion (? IPMN, but pancreas was suboptimally evaluated)     * 4/24 and 4/26 EGD/EUS aborted as pt had large amount of food in the stomach that prevented passing of scope   * 4/30 UGIS aborted as pt felt too short of breath when laying down flat (improved after her pleurex was drained); repeat UGIS tentatively scheduled for 5/1  Options for systemic cancer treatment are TBD  Pt's prognosis is guarded    - Anemia in neoplastic disease  Hgb remains stable  4/5 iron studies not c/w iron deficiency  VSS and pt without clinical s/s of active bleeding  4/17 hemolysis labs negative; 4/18 FOBT negative x2  Trending daily cbcs; continuing supportive transfusions prn (pt does not have h/o ACS, CAD, MI, goal hgb > 7; plts > 10K)    - SVC syndrome  Appreciate Rad Onc eval/recs  Completed 10fxs of palliative RT on 4/18   Completing 2-wk Dex taper, until 5/7 (continuing pjp/ppi ppx until completion of taper)    - Extensive b/l UE DVTs  - Acute RLE DVT a/w RLE ecchymosis  - Hypercoag state  Continuing therapeutic lovenox (resumed on 4/18 given negative GIB brooks), benefits > risks   * Of note: pt has poor UE IV access and currently requires peripheral IV in her LE extremities for admin of medication; can consider [facility] central line for urgent/emergent IV needs    - Anxiety/emotional lability  Continuing Diazepam 5mg daily and daily/prn  Will fu with  re: additional med recs for improvement of pt's anxiety which at time has been a barrier to her inpt care  Pt has poor health literacy and clinical insight which is negatively impacting her medical decision making- when ask if she wanted to defer medical decision making to her boyfriend or if she had a designated HCP she replied "he's already sick of me"  Appreciate  consult: pt lacks medical decision making capacity; medical decision making will be deferred to pt's surrogate decision maker- Saeid Peña ([phone])    - 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)  Pt with h/o R eye glaucoma, but miosis can also be associated with Pancoast syndrome i/s/o extensive R upper lung tumor  Pt denies visual deficits or other related symptoms   MRI Brain without contrast ordered for further eval- pt continues to refuse, can be completed as outpt  Will continue to monitor closely    - pAfib  Pt currently SR, HR controlled  Likely precipitated by malignancy, pericardial effusion, SVC syndrome  Continuing Metoprolol 25mg q12 with holding parameters  Continuing Amio 200mg daily (monitoring for toxicities)  Continuing telemetry    - Pericardial effusion with tamp physiology s/p pericardial window  - R pleural effusion s/p pleurex  Likely inflammatory; both pleural and pericardial fluid cytology is negative for malignant cells  4/9 surveillance TTE with pEF and no WMAs  Continuing R pleurex drainage- up to 500cc q48h/prn    - Acute hypoxic respiratory failure  Resolved  2/2 all of the above  Continuing supportive resp care:   * duonebs q6   * Tessalon 100mg q8   * steroids as above  Weaning supplemental O2 via NC as tolerated    - Cancer related pain  Currently well controlled  Continuing Oxy ER 30mg q12 q8  Continuing Oxy IR 10mg q4/prn  Continuing Dilaudid IV prn for sev breakthrough pain  Continuing bowel regimen to prevent OIC (including Movantic)    - Severe prot-ghazala malnutrition: appreciate RD recs; continuing regular diet with protein shake supplement BID

## 2024-04-30 NOTE — PROGRESS NOTE ADULT - SUBJECTIVE AND OBJECTIVE BOX
SOLID TUMOR ONCOLOGY HOSPITALIST PROGRESS NOTE    S: No acute events overnight.  Pt reported that she felt too short of breath when she was laying down flat and could not complete her UGIS this am.  She reported that her breathing improved when her pleurex was drained when she returned to the floor.  She confirmed that she's been tolerated her CLD, but wishes she could have more nutrients and agreed to try Juice Ensure.    CURRENT MEDICATIONS  MEDICATIONS  (STANDING):  aMIOdarone    Tablet 200 milliGRAM(s) Oral daily  Biotene Dry Mouth Oral Rinse 15 milliLiter(s) Swish and Spit every 6 hours  budesonide 160 MICROgram(s)/formoterol 4.5 MICROgram(s) Inhaler 2 Puff(s) Inhalation two times a day  chlorhexidine 2% Cloths 1 Application(s) Topical daily  dexAMETHasone     Tablet 4 milliGRAM(s) Oral daily  diazepam    Tablet 5 milliGRAM(s) Oral at bedtime  enoxaparin Injectable 50 milliGRAM(s) SubCutaneous every 12 hours  influenza   Vaccine 0.5 milliLiter(s) IntraMuscular once  metoclopramide Injectable 10 milliGRAM(s) IV Push every 8 hours  metoprolol tartrate 25 milliGRAM(s) Oral every 12 hours  multivitamin 1 Tablet(s) Oral daily  naloxegol 25 milliGRAM(s) Oral daily  nicotine -  14 mG/24Hr(s) Patch 1 Patch Transdermal every 24 hours  oxyCODONE  ER Tablet 30 milliGRAM(s) Oral <User Schedule>  pantoprazole    Tablet 40 milliGRAM(s) Oral two times a day  polyethylene glycol 3350 17 Gram(s) Oral every 12 hours  senna 2 Tablet(s) Oral at bedtime  trimethoprim  160 mG/sulfamethoxazole 800 mG 1 Tablet(s) Oral <User Schedule>  MEDICATIONS  (PRN):  albuterol    90 MICROgram(s) HFA Inhaler 2 Puff(s) Inhalation every 6 hours PRN Shortness of Breath and/or Wheezing  albuterol/ipratropium for Nebulization 3 milliLiter(s) Nebulizer every 6 hours PRN Shortness of Breath and/or Wheezing  aluminum hydroxide/magnesium hydroxide/simethicone Suspension 30 milliLiter(s) Oral every 6 hours PRN Dyspepsia  Biotene Dry Mouth Oral Rinse 15 milliLiter(s) Swish and Spit two times a day PRN dry mouth  diazepam    Tablet 5 milliGRAM(s) Oral daily PRN Anxiety  FIRST- Mouthwash  BLM 5 milliLiter(s) Swish and Spit four times a day PRN Mouth Care  HYDROmorphone  Injectable 1 milliGRAM(s) IV Push every 3 hours PRN Severe Pain (7 - 10)  ondansetron    Tablet 4 milliGRAM(s) Oral every 8 hours PRN Nausea and/or Vomiting  oxyCODONE    IR 10 milliGRAM(s) Oral every 4 hours PRN Moderate Pain (4 - 6)  sodium chloride 0.9% lock flush 10 milliLiter(s) IV Push every 1 hour PRN Pre/post blood products, medications, blood draw, and to maintain line patency      PHYSICAL EXAM  T(C): 36.5 (04-30-24 @ 11:15), Max: 36.8 (04-29-24 @ 22:44)  HR: 78 (04-30-24 @ 11:15) (73 - 84)  BP: 130/59 (04-30-24 @ 11:15) (126/65 - 138/72)  RR: 18 (04-30-24 @ 11:15) (16 - 21)  SpO2: 95% (04-30-24 @ 11:15) (94% - 100%)    04-30-24 @ 07:01  -  04-30-24 @ 14:07  --------------------------------------------------------  IN: 0 mL / OUT: 500 mL / NET: -500 mL  Non-toxic appearing woman, sitting up in bed, appears comfortable, but agitated at times (thrashing her arms and shaking her head) during conversation  R eyelid ptosis and R pupil miosis present; anicteric sclera, no oral lesions/thrush  RRR, no m/r/g  Diminshed breath sounds in all R lung zones; LL zones CTA  Abd soft/nt/nd, normoactive bowel sounds  Trace LUE non-pitting edema present; large area of ecchymosis extending from the post R calf to post R thigh- improving; generalized muscular atrophy in all 4 extremities  CN 2-12 grossly intact; no gross focal neuro deficits; pt ambulates with walker      LABS                        8.3    8.57  )-----------( 224      ( 30 Apr 2024 06:46 )             26.1     04-30    134<L>  |  99  |  14  ----------------------------<  85  4.4   |  22  |  0.87    Ca    8.4      30 Apr 2024 06:46  Phos  3.7     04-30  Mg     1.80     04-30    TPro  4.9<L>  /  Alb  2.6<L>  /  TBili  0.4  /  DBili  x   /  AST  13  /  ALT  71<H>  /  AlkPhos  97  04-30      PATHOLOGY  3/26 pleural fluid cytology: Neg for malignant cells.    3/26 Pericardium excision: Neg for malignancy.    3/20 Pericardial fluid cytology: Neg for malignant cells.    3/14 LYMPH NODE, SUPRACLAVICULAR, RIGHT, US GUIDED CORE BIOPSY AND FNA   POSITIVE FOR MALIGNANT CELLS.   Carcinoma   Touch Prep slides display crowded groups and single lying malignant   cells with enlarged nuclei containing prominent nucleoli and vacuolated   cytoplasm. Core biopsies show neoplastic cells positive for CK7 and CDX2   immunostains, while negative for CK20, TTF1, GATA3, TRPS1 and PAX8. 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 Suggest correlation with clinical information and imaging to   assist with next step management.   Moleculars: pMMR, PD-L1 TPS 1%; Her2 pending      TUMOR MARKERS  3/13 CEA 7.1  3/24 Ca 19-9 27  4/8 Ca-125 187, Ca 27.29 16.9, Ca 15-3 18.9, AFP 5.2      MICROBIOLOGY  Abx  Bactrim 4/8-present    Cx Data  4/5 MRSA swab: Not detected  3/16 Quant plus TB: Negative      PERTINENT RADIOLOGY  4/19 MRCP: Motion degraded study, particularly on postcontrast sequences.  Pancreatic neck T2 hyperintense lesion measures 16 mm, image 28 series 5,   without clear evidence of enhancement on postcontrast phases. Most likely   this is a side branch IPMN. Remainder of the pancreas is suboptimally   evaluated due to artifact. Follow-up MRI abdomen or pancreas protocol CT   can be considered in 3-6 months for reevaluation. Partially distended stomach. Duodenal diverticulum. Colon is also partially distended with mild to moderate stool burden. Correlate   clinically. Trace pleural effusions. Right-sided pleural catheter is partially   imaged. Lung parenchyma is incompletely characterized on MRI. Soft tissue   of the mediastinum is not adequately imaged on this study.    4/18 B/L LE Doppler: Acute deep venous thrombosis: above the knee.  Acute nonocclusive deep venous thrombosis in the right common femoral vein.    417 CXR: Clear lungs with minimal effusion and Pleurx catheter.    4/10 VA dopplers B/L UE: Extensive acute, occlusive DVT RIJ, innominate and subclavian veins.  Acute non-occlusive DVT w/in R axillary vein.  Extensive acute, occlusive DVT noted in L subclavian. axillary, and proximal brachial veins.  Acute non-occlusive DVT LIJ.  Superficial vein thromboses are noted in the B/L cephalic veins.    4/9 Echocardiogram: Normal LV systolic function.  Normal mitral valve w/ normal leaflet excursion.  No pericardial effusion seen    4/4 CXR: Clear lungs w/ R pleurx catheter in place.    ok4/4 CXR AM: Small R pleural effusion w/ pleurx cath improved    [facility] ([facility])  3/29 CXR: Decrease in R pleural effusion.     3/28 B/L UE Dupplers: Acute DVT involving R IJ, innominate vein, subclavian, brachial, and L IJ.  Superficial venous thrombosis involving B/L cephalic veins.  + Edema of superficial soft tissue of UE R>L.    3/27 CT neck: Bulky and conglomerate LAD invading RIJ w/ thrombus extending up to hyoid level.  Thrombus is likely combination tumor and bland.  New LIJ nonocclusive thrombus.    3/27 CT Chest: New consolidations throughout the R lung worse RLL c/f aspiration PNA.  + R pleurx cath w/ decrease in R pleural effusion.  Extensive DVT w/in thoracic and lower neck, upper SVC remains obliterated.  Large R supraclavicular mass infiltrating into the mediastinum.      3/27 CXR: Progression of R consolidation/effusion.    3/21 TTE: LV grossly normal.  Thickened pericardium.  no pericardial effusion.    3/18 TTE: Mod pericardial effusion w/ e/o hemodynamic compromise w/ diasolic collapse of RA that exceeds 1/3 of cardiac cycle >30% resp variation across MV E. wave and early diastolic inversion of RV.    3/14 CT Chest: Conglomerate soft tissue in superior mediastinum and R supraclavicular region 2/2 LAD w/ internal hypodensity c/f necrosis.  Mass encases SVC and multiple great veins resulting in obliteration of the SVC and thrombosis of the RIJ.  Multiple R sided collateral vessels and R soft tissue edema.  B/L pulm nodules.    3/14 CT neck: Extensive cellulitis/myositis along R anterior lateral neck and R upper chest wall w/ inflammatory fat induration the deep soft tissue of neck.  Large supraclavicular/mediastinal mass lesion, presumably conglomerate of LN w/ mass effect and complete occlusion of RIJ w/ associated inflammation.  Complete occlusion of R subclavian vein and nearly occlusive thrombosis in the proximal L brachiocephalic vein.    3/13 RUQ Abd US: Cholelithiasis w/o e/o cholecystitis.  Dilated CBD w/o e/o intraductal stone or other obstruction. 1.4cm pancreatic cyst w/ mild duct dilatation.    3/12 CT abd/pelv: S/p R nephrectomy. no LAD.  New 1.6cm  pancreatic neck cystic lesion w/o main pancreatic ductal dilatation.    3/10 R breast US: No e/o breast abscess      RECENT ENDOSCOPY  4/24 Upper EUS   - Fluid in the middle third of the esophagus and in the lower third of the esophagus.  - A large amount of food (residue) in the stomach so EGD was aborted and EUS was not attempted.  - No specimens collected.

## 2024-05-01 LAB
ADD ON TEST-SPECIMEN IN LAB: SIGNIFICANT CHANGE UP
ALBUMIN SERPL ELPH-MCNC: 2.2 G/DL — LOW (ref 3.3–5)
ALP SERPL-CCNC: 97 U/L — SIGNIFICANT CHANGE UP (ref 40–120)
ALT FLD-CCNC: 66 U/L — HIGH (ref 4–33)
ANION GAP SERPL CALC-SCNC: 13 MMOL/L — SIGNIFICANT CHANGE UP (ref 7–14)
AST SERPL-CCNC: 12 U/L — SIGNIFICANT CHANGE UP (ref 4–32)
BASE EXCESS BLDV CALC-SCNC: -0.5 MMOL/L — SIGNIFICANT CHANGE UP (ref -2–3)
BASOPHILS # BLD AUTO: 0 K/UL — SIGNIFICANT CHANGE UP (ref 0–0.2)
BASOPHILS NFR BLD AUTO: 0 % — SIGNIFICANT CHANGE UP (ref 0–2)
BILIRUB SERPL-MCNC: 0.4 MG/DL — SIGNIFICANT CHANGE UP (ref 0.2–1.2)
BLOOD GAS VENOUS COMPREHENSIVE RESULT: SIGNIFICANT CHANGE UP
BUN SERPL-MCNC: 13 MG/DL — SIGNIFICANT CHANGE UP (ref 7–23)
CALCIUM SERPL-MCNC: 8 MG/DL — LOW (ref 8.4–10.5)
CHLORIDE BLDV-SCNC: 101 MMOL/L — SIGNIFICANT CHANGE UP (ref 96–108)
CHLORIDE SERPL-SCNC: 101 MMOL/L — SIGNIFICANT CHANGE UP (ref 98–107)
CO2 BLDV-SCNC: 26.8 MMOL/L — HIGH (ref 22–26)
CO2 SERPL-SCNC: 23 MMOL/L — SIGNIFICANT CHANGE UP (ref 22–31)
CREAT SERPL-MCNC: 0.97 MG/DL — SIGNIFICANT CHANGE UP (ref 0.5–1.3)
EGFR: 67 ML/MIN/1.73M2 — SIGNIFICANT CHANGE UP
EOSINOPHIL # BLD AUTO: 0.07 K/UL — SIGNIFICANT CHANGE UP (ref 0–0.5)
EOSINOPHIL NFR BLD AUTO: 0.9 % — SIGNIFICANT CHANGE UP (ref 0–6)
FLUAV AG NPH QL: SIGNIFICANT CHANGE UP
FLUBV AG NPH QL: SIGNIFICANT CHANGE UP
GAS PNL BLDV: 130 MMOL/L — LOW (ref 136–145)
GAS PNL BLDV: SIGNIFICANT CHANGE UP
GLUCOSE BLDV-MCNC: 213 MG/DL — HIGH (ref 70–99)
GLUCOSE SERPL-MCNC: 71 MG/DL — SIGNIFICANT CHANGE UP (ref 70–99)
HCO3 BLDV-SCNC: 25 MMOL/L — SIGNIFICANT CHANGE UP (ref 22–29)
HCT VFR BLD CALC: 24.1 % — LOW (ref 34.5–45)
HCT VFR BLDA CALC: 23 % — LOW (ref 34.5–46.5)
HGB BLD CALC-MCNC: 7.8 G/DL — LOW (ref 11.7–16.1)
HGB BLD-MCNC: 7.4 G/DL — LOW (ref 11.5–15.5)
IANC: 6.89 K/UL — SIGNIFICANT CHANGE UP (ref 1.8–7.4)
IMM GRANULOCYTES NFR BLD AUTO: 1 % — HIGH (ref 0–0.9)
LACTATE BLDA-MCNC: 3.2 MMOL/L — HIGH (ref 0.5–2)
LACTATE BLDV-MCNC: 2.8 MMOL/L — HIGH (ref 0.5–2)
LYMPHOCYTES # BLD AUTO: 0.31 K/UL — LOW (ref 1–3.3)
LYMPHOCYTES # BLD AUTO: 4 % — LOW (ref 13–44)
MAGNESIUM SERPL-MCNC: 1.8 MG/DL — SIGNIFICANT CHANGE UP (ref 1.6–2.6)
MCHC RBC-ENTMCNC: 29.4 PG — SIGNIFICANT CHANGE UP (ref 27–34)
MCHC RBC-ENTMCNC: 30.7 GM/DL — LOW (ref 32–36)
MCV RBC AUTO: 95.6 FL — SIGNIFICANT CHANGE UP (ref 80–100)
MONOCYTES # BLD AUTO: 0.36 K/UL — SIGNIFICANT CHANGE UP (ref 0–0.9)
MONOCYTES NFR BLD AUTO: 4.7 % — SIGNIFICANT CHANGE UP (ref 2–14)
NEUTROPHILS # BLD AUTO: 6.89 K/UL — SIGNIFICANT CHANGE UP (ref 1.8–7.4)
NEUTROPHILS NFR BLD AUTO: 89.4 % — HIGH (ref 43–77)
NRBC # BLD: 0 /100 WBCS — SIGNIFICANT CHANGE UP (ref 0–0)
NRBC # FLD: 0 K/UL — SIGNIFICANT CHANGE UP (ref 0–0)
PCO2 BLDV: 47 MMHG — SIGNIFICANT CHANGE UP (ref 39–52)
PH BLDV: 7.34 — SIGNIFICANT CHANGE UP (ref 7.32–7.43)
PHOSPHATE SERPL-MCNC: 3.6 MG/DL — SIGNIFICANT CHANGE UP (ref 2.5–4.5)
PLATELET # BLD AUTO: 222 K/UL — SIGNIFICANT CHANGE UP (ref 150–400)
PO2 BLDV: 46 MMHG — HIGH (ref 25–45)
POTASSIUM BLDV-SCNC: 4.4 MMOL/L — SIGNIFICANT CHANGE UP (ref 3.5–5.1)
POTASSIUM SERPL-MCNC: 4.4 MMOL/L — SIGNIFICANT CHANGE UP (ref 3.5–5.3)
POTASSIUM SERPL-SCNC: 4.4 MMOL/L — SIGNIFICANT CHANGE UP (ref 3.5–5.3)
PROCALCITONIN SERPL-MCNC: 0.44 NG/ML — HIGH (ref 0.02–0.1)
PROT SERPL-MCNC: 4.8 G/DL — LOW (ref 6–8.3)
RBC # BLD: 2.52 M/UL — LOW (ref 3.8–5.2)
RBC # FLD: 16.5 % — HIGH (ref 10.3–14.5)
RSV RNA NPH QL NAA+NON-PROBE: SIGNIFICANT CHANGE UP
SAO2 % BLDV: 73.3 % — SIGNIFICANT CHANGE UP (ref 67–88)
SARS-COV-2 RNA SPEC QL NAA+PROBE: SIGNIFICANT CHANGE UP
SODIUM SERPL-SCNC: 137 MMOL/L — SIGNIFICANT CHANGE UP (ref 135–145)
WBC # BLD: 7.71 K/UL — SIGNIFICANT CHANGE UP (ref 3.8–10.5)
WBC # FLD AUTO: 7.71 K/UL — SIGNIFICANT CHANGE UP (ref 3.8–10.5)

## 2024-05-01 PROCEDURE — 99233 SBSQ HOSP IP/OBS HIGH 50: CPT

## 2024-05-01 PROCEDURE — 71045 X-RAY EXAM CHEST 1 VIEW: CPT | Mod: 26

## 2024-05-01 RX ORDER — IPRATROPIUM/ALBUTEROL SULFATE 18-103MCG
3 AEROSOL WITH ADAPTER (GRAM) INHALATION EVERY 6 HOURS
Refills: 0 | Status: DISCONTINUED | OUTPATIENT
Start: 2024-05-01 | End: 2024-05-13

## 2024-05-01 RX ORDER — DEXAMETHASONE 0.5 MG/5ML
4 ELIXIR ORAL
Refills: 0 | Status: DISCONTINUED | OUTPATIENT
Start: 2024-05-01 | End: 2024-05-07

## 2024-05-01 RX ORDER — CEFEPIME 1 G/1
2000 INJECTION, POWDER, FOR SOLUTION INTRAMUSCULAR; INTRAVENOUS EVERY 12 HOURS
Refills: 0 | Status: DISCONTINUED | OUTPATIENT
Start: 2024-05-01 | End: 2024-05-11

## 2024-05-01 RX ORDER — METRONIDAZOLE 500 MG
500 TABLET ORAL EVERY 12 HOURS
Refills: 0 | Status: DISCONTINUED | OUTPATIENT
Start: 2024-05-01 | End: 2024-05-01

## 2024-05-01 RX ORDER — AZTREONAM 2 G
2000 VIAL (EA) INJECTION EVERY 8 HOURS
Refills: 0 | Status: DISCONTINUED | OUTPATIENT
Start: 2024-05-01 | End: 2024-05-01

## 2024-05-01 RX ADMIN — ENOXAPARIN SODIUM 50 MILLIGRAM(S): 100 INJECTION SUBCUTANEOUS at 06:18

## 2024-05-01 RX ADMIN — Medication 1 PATCH: at 07:50

## 2024-05-01 RX ADMIN — CHLORHEXIDINE GLUCONATE 1 APPLICATION(S): 213 SOLUTION TOPICAL at 12:56

## 2024-05-01 RX ADMIN — Medication 15 MILLILITER(S): at 06:20

## 2024-05-01 RX ADMIN — Medication 25 MILLIGRAM(S): at 18:24

## 2024-05-01 RX ADMIN — Medication 5 MILLIGRAM(S): at 18:22

## 2024-05-01 RX ADMIN — Medication 1 PATCH: at 12:50

## 2024-05-01 RX ADMIN — BUDESONIDE AND FORMOTEROL FUMARATE DIHYDRATE 2 PUFF(S): 160; 4.5 AEROSOL RESPIRATORY (INHALATION) at 08:32

## 2024-05-01 RX ADMIN — Medication 1 TABLET(S): at 12:48

## 2024-05-01 RX ADMIN — BUDESONIDE AND FORMOTEROL FUMARATE DIHYDRATE 2 PUFF(S): 160; 4.5 AEROSOL RESPIRATORY (INHALATION) at 22:50

## 2024-05-01 RX ADMIN — Medication 3 MILLILITER(S): at 08:39

## 2024-05-01 RX ADMIN — Medication 15 MILLILITER(S): at 12:48

## 2024-05-01 RX ADMIN — CEFEPIME 100 MILLIGRAM(S): 1 INJECTION, POWDER, FOR SOLUTION INTRAMUSCULAR; INTRAVENOUS at 23:31

## 2024-05-01 RX ADMIN — Medication 3 MILLILITER(S): at 14:59

## 2024-05-01 RX ADMIN — PANTOPRAZOLE SODIUM 40 MILLIGRAM(S): 20 TABLET, DELAYED RELEASE ORAL at 18:24

## 2024-05-01 RX ADMIN — OXYCODONE HYDROCHLORIDE 30 MILLIGRAM(S): 5 TABLET ORAL at 23:18

## 2024-05-01 RX ADMIN — Medication 1 PATCH: at 11:03

## 2024-05-01 RX ADMIN — NALOXEGOL OXALATE 25 MILLIGRAM(S): 12.5 TABLET, FILM COATED ORAL at 12:48

## 2024-05-01 RX ADMIN — AMIODARONE HYDROCHLORIDE 200 MILLIGRAM(S): 400 TABLET ORAL at 06:18

## 2024-05-01 RX ADMIN — OXYCODONE HYDROCHLORIDE 30 MILLIGRAM(S): 5 TABLET ORAL at 06:18

## 2024-05-01 RX ADMIN — ENOXAPARIN SODIUM 50 MILLIGRAM(S): 100 INJECTION SUBCUTANEOUS at 18:23

## 2024-05-01 RX ADMIN — Medication 3 MILLILITER(S): at 21:17

## 2024-05-01 RX ADMIN — Medication 4 MILLIGRAM(S): at 06:19

## 2024-05-01 NOTE — BH CONSULTATION LIAISON PROGRESS NOTE - CURRENT MEDICATION
MEDICATIONS  (STANDING):  aMIOdarone    Tablet 200 milliGRAM(s) Oral daily  Biotene Dry Mouth Oral Rinse 15 milliLiter(s) Swish and Spit every 6 hours  budesonide 160 MICROgram(s)/formoterol 4.5 MICROgram(s) Inhaler 2 Puff(s) Inhalation two times a day  chlorhexidine 2% Cloths 1 Application(s) Topical daily  dexAMETHasone     Tablet 4 milliGRAM(s) Oral daily  diazepam    Tablet 5 milliGRAM(s) Oral at bedtime  enoxaparin Injectable 50 milliGRAM(s) SubCutaneous every 12 hours  influenza   Vaccine 0.5 milliLiter(s) IntraMuscular once  metoclopramide Injectable 10 milliGRAM(s) IV Push every 8 hours  metoprolol tartrate 25 milliGRAM(s) Oral every 12 hours  multivitamin 1 Tablet(s) Oral daily  naloxegol 25 milliGRAM(s) Oral daily  nicotine -  14 mG/24Hr(s) Patch 1 Patch Transdermal every 24 hours  oxyCODONE  ER Tablet 30 milliGRAM(s) Oral <User Schedule>  pantoprazole    Tablet 40 milliGRAM(s) Oral two times a day  polyethylene glycol 3350 17 Gram(s) Oral every 12 hours  senna 2 Tablet(s) Oral at bedtime  trimethoprim  160 mG/sulfamethoxazole 800 mG 1 Tablet(s) Oral <User Schedule>    MEDICATIONS  (PRN):  albuterol    90 MICROgram(s) HFA Inhaler 2 Puff(s) Inhalation every 6 hours PRN Shortness of Breath and/or Wheezing  albuterol/ipratropium for Nebulization 3 milliLiter(s) Nebulizer every 6 hours PRN Shortness of Breath and/or Wheezing  aluminum hydroxide/magnesium hydroxide/simethicone Suspension 30 milliLiter(s) Oral every 6 hours PRN Dyspepsia  Biotene Dry Mouth Oral Rinse 15 milliLiter(s) Swish and Spit two times a day PRN dry mouth  diazepam    Tablet 5 milliGRAM(s) Oral two times a day PRN anxiety  FIRST- Mouthwash  BLM 5 milliLiter(s) Swish and Spit four times a day PRN Mouth Care  HYDROmorphone  Injectable 1 milliGRAM(s) IV Push every 3 hours PRN Severe Pain (7 - 10)  ondansetron    Tablet 4 milliGRAM(s) Oral every 8 hours PRN Nausea and/or Vomiting  oxyCODONE    IR 10 milliGRAM(s) Oral every 4 hours PRN Moderate Pain (4 - 6)  sodium chloride 0.9% lock flush 10 milliLiter(s) IV Push every 1 hour PRN Pre/post blood products, medications, blood draw, and to maintain line patency  
MEDICATIONS  (STANDING):  aMIOdarone    Tablet 200 milliGRAM(s) Oral daily  Biotene Dry Mouth Oral Rinse 15 milliLiter(s) Swish and Spit every 6 hours  chlorhexidine 2% Cloths 1 Application(s) Topical daily  dexAMETHasone     Tablet 4 milliGRAM(s) Oral every 8 hours  diazepam    Tablet 5 milliGRAM(s) Oral daily  enoxaparin Injectable 50 milliGRAM(s) SubCutaneous every 12 hours  influenza   Vaccine 0.5 milliLiter(s) IntraMuscular once  metoprolol tartrate 25 milliGRAM(s) Oral every 12 hours  multivitamin 1 Tablet(s) Oral daily  naloxegol 25 milliGRAM(s) Oral daily  nicotine -  14 mG/24Hr(s) Patch 1 Patch Transdermal every 24 hours  oxyCODONE  ER Tablet 30 milliGRAM(s) Oral <User Schedule>  pantoprazole    Tablet 40 milliGRAM(s) Oral two times a day  polyethylene glycol 3350 17 Gram(s) Oral every 12 hours  trimethoprim  160 mG/sulfamethoxazole 800 mG 1 Tablet(s) Oral <User Schedule>    MEDICATIONS  (PRN):  albuterol    90 MICROgram(s) HFA Inhaler 2 Puff(s) Inhalation every 6 hours PRN Shortness of Breath and/or Wheezing  albuterol/ipratropium for Nebulization 3 milliLiter(s) Nebulizer every 6 hours PRN Shortness of Breath and/or Wheezing  aluminum hydroxide/magnesium hydroxide/simethicone Suspension 30 milliLiter(s) Oral every 6 hours PRN Dyspepsia  Biotene Dry Mouth Oral Rinse 15 milliLiter(s) Swish and Spit two times a day PRN dry mouth  diazepam    Tablet 5 milliGRAM(s) Oral daily PRN Anxiety  HYDROmorphone  Injectable 1 milliGRAM(s) IV Push every 3 hours PRN Severe Pain (7 - 10)  ondansetron    Tablet 4 milliGRAM(s) Oral every 8 hours PRN Nausea and/or Vomiting  oxyCODONE    IR 10 milliGRAM(s) Oral every 4 hours PRN Moderate Pain (4 - 6)  sodium chloride 0.9% lock flush 10 milliLiter(s) IV Push every 1 hour PRN Pre/post blood products, medications, blood draw, and to maintain line patency

## 2024-05-01 NOTE — BH CONSULTATION LIAISON PROGRESS NOTE - NSBHCHARTREVIEWVS_PSY_A_CORE FT
Vital Signs Last 24 Hrs  T(C): 36.6 (18 Apr 2024 11:15), Max: 36.8 (18 Apr 2024 01:22)  T(F): 97.8 (18 Apr 2024 11:15), Max: 98.2 (18 Apr 2024 01:22)  HR: 75 (18 Apr 2024 11:15) (75 - 89)  BP: 127/74 (18 Apr 2024 05:15) (121/55 - 160/85)  BP(mean): --  RR: 18 (18 Apr 2024 11:15) (17 - 18)  SpO2: 100% (18 Apr 2024 11:15) (96% - 100%)    Parameters below as of 18 Apr 2024 11:15  Patient On (Oxygen Delivery Method): room air    
Vital Signs Last 24 Hrs  T(C): 37.3 (01 May 2024 05:57), Max: 37.3 (01 May 2024 05:57)  T(F): 99.1 (01 May 2024 05:57), Max: 99.1 (01 May 2024 05:57)  HR: 81 (01 May 2024 05:57) (72 - 81)  BP: 101/56 (01 May 2024 05:57) (101/56 - 132/64)  BP(mean): --  RR: 19 (01 May 2024 05:57) (18 - 20)  SpO2: 91% (01 May 2024 09:00) (87% - 96%)    Parameters below as of 01 May 2024 09:00  Patient On (Oxygen Delivery Method): nasal cannula  O2 Flow (L/min): 6

## 2024-05-01 NOTE — BH CONSULTATION LIAISON PROGRESS NOTE - NSBHCONSULTFOLLOWAFTERCARE_PSY_A_CORE FT
[facility] Outpatient services  For acute crisis: [facility]  [phone] [place] [address], First Floor, [place] [address]  [phone]  [email or web address]    [facility] [phone]  
[facility] Outpatient services  For acute crisis: [facility]  [phone] [place] [address], First Floor, [place] [address]  [phone]  [email or web address]    [facility] [phone]

## 2024-05-01 NOTE — PROGRESS NOTE ADULT - SUBJECTIVE AND OBJECTIVE BOX
SOLID TUMOR ONCOLOGY HOSPITALIST PROGRESS NOTE    S: No acute events overnight.  However, pt complained of increased dyspnea. She was newly hypoxic- previously >92% on 2L NC but desatted to 87% and her supplemental O2 was subsequently increased to 6L by the RN.  She was given a neb tx x1; CXR was ordered and ABG was completed (results below)- given increase in lactate pt was started on empiric Zosyn. She was also put on HF NC 40lpm/50% given persistent hypoxia (89-93%) on 6L NC O2. CT c/a/p was also ordered given c/f interval disease progression in the chest.    CURRENT MEDICATIONS  MEDICATIONS  (STANDING):  aMIOdarone    Tablet 200 milliGRAM(s) Oral daily  Biotene Dry Mouth Oral Rinse 15 milliLiter(s) Swish and Spit every 6 hours  budesonide 160 MICROgram(s)/formoterol 4.5 MICROgram(s) Inhaler 2 Puff(s) Inhalation two times a day  chlorhexidine 2% Cloths 1 Application(s) Topical daily  dexAMETHasone     Tablet 4 milliGRAM(s) Oral daily  dexAMETHasone  Injectable 4 milliGRAM(s) IV Push two times a day  diazepam    Tablet 5 milliGRAM(s) Oral at bedtime  enoxaparin Injectable 50 milliGRAM(s) SubCutaneous every 12 hours  influenza   Vaccine 0.5 milliLiter(s) IntraMuscular once  metoclopramide Injectable 10 milliGRAM(s) IV Push every 8 hours  metoprolol tartrate 25 milliGRAM(s) Oral every 12 hours  multivitamin 1 Tablet(s) Oral daily  naloxegol 25 milliGRAM(s) Oral daily  nicotine -  14 mG/24Hr(s) Patch 1 Patch Transdermal every 24 hours  oxyCODONE  ER Tablet 30 milliGRAM(s) Oral <User Schedule>  pantoprazole    Tablet 40 milliGRAM(s) Oral two times a day  polyethylene glycol 3350 17 Gram(s) Oral every 12 hours  senna 2 Tablet(s) Oral at bedtime  trimethoprim  160 mG/sulfamethoxazole 800 mG 1 Tablet(s) Oral <User Schedule>  MEDICATIONS  (PRN):  albuterol/ipratropium for Nebulization 3 milliLiter(s) Nebulizer every 6 hours PRN Shortness of Breath and/or Wheezing  aluminum hydroxide/magnesium hydroxide/simethicone Suspension 30 milliLiter(s) Oral every 6 hours PRN Dyspepsia  Biotene Dry Mouth Oral Rinse 15 milliLiter(s) Swish and Spit two times a day PRN dry mouth  diazepam    Tablet 5 milliGRAM(s) Oral two times a day PRN anxiety  FIRST- Mouthwash  BLM 5 milliLiter(s) Swish and Spit four times a day PRN Mouth Care  HYDROmorphone  Injectable 1 milliGRAM(s) IV Push every 3 hours PRN Severe Pain (7 - 10)  ondansetron    Tablet 4 milliGRAM(s) Oral every 8 hours PRN Nausea and/or Vomiting  oxyCODONE    IR 10 milliGRAM(s) Oral every 4 hours PRN Moderate Pain (4 - 6)  sodium chloride 0.9% lock flush 10 milliLiter(s) IV Push every 1 hour PRN Pre/post blood products, medications, blood draw, and to maintain line patency      PHYSICAL EXAM  T(C): 36.9 (05-01-24 @ 11:54), Max: 37.3 (05-01-24 @ 05:57)  HR: 78 (05-01-24 @ 11:12) (72 - 81)  BP: 115/66 (05-01-24 @ 11:54) (101/56 - 132/64)  RR: 15 (05-01-24 @ 11:12) (15 - 20)  SpO2: 92% (05-01-24 @ 11:12) (87% - 96%)    04-30-24 @ 07:01  -  05-01-24 @ 07:00  --------------------------------------------------------  IN: 0 mL / OUT: 560 mL / NET: -560 mL  Non-toxic appearing woman, sitting up in bed, appears comfortable, speaking in full sentences without conversational dyspnea, aaox3  R eyelid ptosis and R pupil miosis present; anicteric sclera, no oral lesions/thrush  RRR, no m/r/g  Breaths non-labored; diminished breath sounds in all R lung zones; LL zones CTA  Abd soft/nt/nd, normoactive bowel sounds  Trace LUE non-pitting edema present; large area of ecchymosis extending from the post R calf to post R thigh- improving; generalized muscular atrophy in all 4 extremities  CN 2-12 grossly intact; no gross focal neuro deficits; pt ambulates with walker      LABS                        7.4    7.71  )-----------( 222      ( 01 May 2024 07:10 )             24.1     05-01    137  |  101  |  13  ----------------------------<  71  4.4   |  23  |  0.97    Ca    8.0<L>      01 May 2024 07:10  Phos  3.6     05-01  Mg     1.80     05-01    TPro  4.8<L>  /  Alb  2.2<L>  /  TBili  0.4  /  DBili  x   /  AST  12  /  ALT  66<H>  /  AlkPhos  97  05-01    PATHOLOGY  3/26 pleural fluid cytology: Neg for malignant cells.    3/26 Pericardium excision: Neg for malignancy.    3/20 Pericardial fluid cytology: Neg for malignant cells.    3/14 LYMPH NODE, SUPRACLAVICULAR, RIGHT, US GUIDED CORE BIOPSY AND FNA   POSITIVE FOR MALIGNANT CELLS.   Carcinoma   Touch Prep slides display crowded groups and single lying malignant   cells with enlarged nuclei containing prominent nucleoli and vacuolated   cytoplasm. Core biopsies show neoplastic cells positive for CK7 and CDX2   immunostains, while negative for CK20, TTF1, GATA3, TRPS1 and PAX8. 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 Suggest correlation with clinical information and imaging to   assist with next step management.   Moleculars: pMMR, PD-L1 TPS 1%; Her2 pending      TUMOR MARKERS  3/13 CEA 7.1  3/24 Ca 19-9 27  4/8 Ca-125 187, Ca 27.29 16.9, Ca 15-3 18.9, AFP 5.2      MICROBIOLOGY  Abx  Zosyn 5/1-present  Bactrim 4/8-present    Cx Data  4/5 MRSA swab: Not detected  3/16 Quant plus TB: Negative      PERTINENT RADIOLOGY  5/1 CXR:  Small right effusion unchanged with Pleurx catheter.    4/19 MRCP: Motion degraded study, particularly on postcontrast sequences.  Pancreatic neck T2 hyperintense lesion measures 16 mm, image 28 series 5,   without clear evidence of enhancement on postcontrast phases. Most likely   this is a side branch IPMN. Remainder of the pancreas is suboptimally   evaluated due to artifact. Follow-up MRI abdomen or pancreas protocol CT   can be considered in 3-6 months for reevaluation. Partially distended stomach. Duodenal diverticulum. Colon is also partially distended with mild to moderate stool burden. Correlate   clinically. Trace pleural effusions. Right-sided pleural catheter is partially   imaged. Lung parenchyma is incompletely characterized on MRI. Soft tissue   of the mediastinum is not adequately imaged on this study.    4/18 B/L LE Doppler: Acute deep venous thrombosis: above the knee.  Acute nonocclusive deep venous thrombosis in the right common femoral vein.    417 CXR: Clear lungs with minimal effusion and Pleurx catheter.    4/10 VA dopplers B/L UE: Extensive acute, occlusive DVT RIJ, innominate and subclavian veins.  Acute non-occlusive DVT w/in R axillary vein.  Extensive acute, occlusive DVT noted in L subclavian. axillary, and proximal brachial veins.  Acute non-occlusive DVT LIJ.  Superficial vein thromboses are noted in the B/L cephalic veins.    4/9 Echocardiogram: Normal LV systolic function.  Normal mitral valve w/ normal leaflet excursion.  No pericardial effusion seen    4/4 CXR: Clear lungs w/ R pleurx catheter in place.    ok4/4 CXR AM: Small R pleural effusion w/ pleurx cath improved    OSH Imaging ([facility])  3/29 CXR: Decrease in R pleural effusion.     3/28 B/L UE Dupplers: Acute DVT involving R IJ, innominate vein, subclavian, brachial, and L IJ.  Superficial venous thrombosis involving B/L cephalic veins.  + Edema of superficial soft tissue of UE R>L.    3/27 CT neck: Bulky and conglomerate LAD invading RIJ w/ thrombus extending up to hyoid level.  Thrombus is likely combination tumor and bland.  New LIJ nonocclusive thrombus.    3/27 CT Chest: New consolidations throughout the R lung worse RLL c/f aspiration PNA.  + R pleurx cath w/ decrease in R pleural effusion.  Extensive DVT w/in thoracic and lower neck, upper SVC remains obliterated.  Large R supraclavicular mass infiltrating into the mediastinum.      3/27 CXR: Progression of R consolidation/effusion.    3/21 TTE: LV grossly normal.  Thickened pericardium.  no pericardial effusion.    3/18 TTE: Mod pericardial effusion w/ e/o hemodynamic compromise w/ diasolic collapse of RA that exceeds 1/3 of cardiac cycle >30% resp variation across MV E. wave and early diastolic inversion of RV.    3/14 CT Chest: Conglomerate soft tissue in superior mediastinum and R supraclavicular region 2/2 LAD w/ internal hypodensity c/f necrosis.  Mass encases SVC and multiple great veins resulting in obliteration of the SVC and thrombosis of the RIJ.  Multiple R sided collateral vessels and R soft tissue edema.  B/L pulm nodules.    3/14 CT neck: Extensive cellulitis/myositis along R anterior lateral neck and R upper chest wall w/ inflammatory fat induration the deep soft tissue of neck.  Large supraclavicular/mediastinal mass lesion, presumably conglomerate of LN w/ mass effect and complete occlusion of RIJ w/ associated inflammation.  Complete occlusion of R subclavian vein and nearly occlusive thrombosis in the proximal L brachiocephalic vein.    3/13 RUQ Abd US: Cholelithiasis w/o e/o cholecystitis.  Dilated CBD w/o e/o intraductal stone or other obstruction. 1.4cm pancreatic cyst w/ mild duct dilatation.    3/12 CT abd/pelv: S/p R nephrectomy. no LAD.  New 1.6cm  pancreatic neck cystic lesion w/o main pancreatic ductal dilatation.    3/10 R breast US: No e/o breast abscess      RECENT ENDOSCOPY  4/26 Upper EUS  - Normal esophagus.  - A large amount of food (residue) inthe stomach. Procedure was aborted.    4/24 Upper EUS   - Fluid in the middle third of the esophagus and in the lower third of the esophagus.  - A large amount of food (residue) in the stomach so EGD was aborted and EUS was not attempted.  - No specimens collected.   SOLID TUMOR ONCOLOGY HOSPITALIST PROGRESS NOTE    S: No acute events overnight.  However, pt complained of increased dyspnea. She was newly hypoxic- previously >92% on 2L NC but desatted to 87% and her supplemental O2 was subsequently increased to 6L by the RN.  She was given a neb tx x1; CXR was ordered and ABG was completed (results below)- given increase in lactate pt was started on empiric Zosyn. She was also put on HF NC 40lpm/50% given persistent hypoxia (89-93%) on 6L NC O2. CT c/a/p was also ordered given c/f interval disease progression in the chest.    CURRENT MEDICATIONS  MEDICATIONS  (STANDING):  aMIOdarone    Tablet 200 milliGRAM(s) Oral daily  Biotene Dry Mouth Oral Rinse 15 milliLiter(s) Swish and Spit every 6 hours  budesonide 160 MICROgram(s)/formoterol 4.5 MICROgram(s) Inhaler 2 Puff(s) Inhalation two times a day  chlorhexidine 2% Cloths 1 Application(s) Topical daily  dexAMETHasone     Tablet 4 milliGRAM(s) Oral daily  dexAMETHasone  Injectable 4 milliGRAM(s) IV Push two times a day  diazepam    Tablet 5 milliGRAM(s) Oral at bedtime  enoxaparin Injectable 50 milliGRAM(s) SubCutaneous every 12 hours  influenza   Vaccine 0.5 milliLiter(s) IntraMuscular once  metoclopramide Injectable 10 milliGRAM(s) IV Push every 8 hours  metoprolol tartrate 25 milliGRAM(s) Oral every 12 hours  multivitamin 1 Tablet(s) Oral daily  naloxegol 25 milliGRAM(s) Oral daily  nicotine -  14 mG/24Hr(s) Patch 1 Patch Transdermal every 24 hours  oxyCODONE  ER Tablet 30 milliGRAM(s) Oral <User Schedule>  pantoprazole    Tablet 40 milliGRAM(s) Oral two times a day  polyethylene glycol 3350 17 Gram(s) Oral every 12 hours  senna 2 Tablet(s) Oral at bedtime  trimethoprim  160 mG/sulfamethoxazole 800 mG 1 Tablet(s) Oral <User Schedule>  MEDICATIONS  (PRN):  albuterol/ipratropium for Nebulization 3 milliLiter(s) Nebulizer every 6 hours PRN Shortness of Breath and/or Wheezing  aluminum hydroxide/magnesium hydroxide/simethicone Suspension 30 milliLiter(s) Oral every 6 hours PRN Dyspepsia  Biotene Dry Mouth Oral Rinse 15 milliLiter(s) Swish and Spit two times a day PRN dry mouth  diazepam    Tablet 5 milliGRAM(s) Oral two times a day PRN anxiety  FIRST- Mouthwash  BLM 5 milliLiter(s) Swish and Spit four times a day PRN Mouth Care  HYDROmorphone  Injectable 1 milliGRAM(s) IV Push every 3 hours PRN Severe Pain (7 - 10)  ondansetron    Tablet 4 milliGRAM(s) Oral every 8 hours PRN Nausea and/or Vomiting  oxyCODONE    IR 10 milliGRAM(s) Oral every 4 hours PRN Moderate Pain (4 - 6)  sodium chloride 0.9% lock flush 10 milliLiter(s) IV Push every 1 hour PRN Pre/post blood products, medications, blood draw, and to maintain line patency      PHYSICAL EXAM  T(C): 36.9 (05-01-24 @ 11:54), Max: 37.3 (05-01-24 @ 05:57)  HR: 78 (05-01-24 @ 11:12) (72 - 81)  BP: 115/66 (05-01-24 @ 11:54) (101/56 - 132/64)  RR: 15 (05-01-24 @ 11:12) (15 - 20)  SpO2: 92% (05-01-24 @ 11:12) (87% - 96%)    04-30-24 @ 07:01  -  05-01-24 @ 07:00  --------------------------------------------------------  IN: 0 mL / OUT: 560 mL / NET: -560 mL  Non-toxic appearing woman, sitting up in bed, appears comfortable, speaking in full sentences without conversational dyspnea, aaox3  R eyelid ptosis and R pupil miosis present; anicteric sclera, no oral lesions/thrush  RRR, no m/r/g  Breaths non-labored; diminished breath sounds in all R lung zones; LL zones CTA  Abd soft/nt/nd, normoactive bowel sounds  Trace LUE non-pitting edema present; large area of ecchymosis extending from the post R calf to post R thigh- improving; generalized muscular atrophy in all 4 extremities  CN 2-12 grossly intact; no gross focal neuro deficits; pt ambulates with walker      LABS                        7.4    7.71  )-----------( 222      ( 01 May 2024 07:10 )             24.1     05-01    137  |  101  |  13  ----------------------------<  71  4.4   |  23  |  0.97    Ca    8.0<L>      01 May 2024 07:10  Phos  3.6     05-01  Mg     1.80     05-01    TPro  4.8<L>  /  Alb  2.2<L>  /  TBili  0.4  /  DBili  x   /  AST  12  /  ALT  66<H>  /  AlkPhos  97  05-01    PATHOLOGY  3/26 pleural fluid cytology: Neg for malignant cells.    3/26 Pericardium excision: Neg for malignancy.    3/20 Pericardial fluid cytology: Neg for malignant cells.    3/14 LYMPH NODE, SUPRACLAVICULAR, RIGHT, US GUIDED CORE BIOPSY AND FNA   POSITIVE FOR MALIGNANT CELLS.   Carcinoma   Touch Prep slides display crowded groups and single lying malignant   cells with enlarged nuclei containing prominent nucleoli and vacuolated   cytoplasm. Core biopsies show neoplastic cells positive for CK7 and CDX2   immunostains, while negative for CK20, TTF1, GATA3, TRPS1 and PAX8. 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 Suggest correlation with clinical information and imaging to   assist with next step management.   Moleculars: pMMR, PD-L1 TPS 1%; Her2 pending      TUMOR MARKERS  3/13 CEA 7.1  3/24 Ca 19-9 27  4/8 Ca-125 187, Ca 27.29 16.9, Ca 15-3 18.9, AFP 5.2      MICROBIOLOGY  Abx  Aztreonam 5/1-present  Flagyl 5/1-present  Bactrim 4/8-present    Cx Data  4/5 MRSA swab: Not detected  3/16 Quant plus TB: Negative      PERTINENT RADIOLOGY  5/1 CXR:  Small right effusion unchanged with Pleurx catheter.    4/19 MRCP: Motion degraded study, particularly on postcontrast sequences.  Pancreatic neck T2 hyperintense lesion measures 16 mm, image 28 series 5,   without clear evidence of enhancement on postcontrast phases. Most likely   this is a side branch IPMN. Remainder of the pancreas is suboptimally   evaluated due to artifact. Follow-up MRI abdomen or pancreas protocol CT   can be considered in 3-6 months for reevaluation. Partially distended stomach. Duodenal diverticulum. Colon is also partially distended with mild to moderate stool burden. Correlate   clinically. Trace pleural effusions. Right-sided pleural catheter is partially   imaged. Lung parenchyma is incompletely characterized on MRI. Soft tissue   of the mediastinum is not adequately imaged on this study.    4/18 B/L LE Doppler: Acute deep venous thrombosis: above the knee.  Acute nonocclusive deep venous thrombosis in the right common femoral vein.    417 CXR: Clear lungs with minimal effusion and Pleurx catheter.    4/10 VA dopplers B/L UE: Extensive acute, occlusive DVT RIJ, innominate and subclavian veins.  Acute non-occlusive DVT w/in R axillary vein.  Extensive acute, occlusive DVT noted in L subclavian. axillary, and proximal brachial veins.  Acute non-occlusive DVT LIJ.  Superficial vein thromboses are noted in the B/L cephalic veins.    4/9 Echocardiogram: Normal LV systolic function.  Normal mitral valve w/ normal leaflet excursion.  No pericardial effusion seen    4/4 CXR: Clear lungs w/ R pleurx catheter in place.    ok4/4 CXR AM: Small R pleural effusion w/ pleurx cath improved    OSH Imaging ([facility])  3/29 CXR: Decrease in R pleural effusion.     3/28 B/L UE Dupplers: Acute DVT involving R IJ, innominate vein, subclavian, brachial, and L IJ.  Superficial venous thrombosis involving B/L cephalic veins.  + Edema of superficial soft tissue of UE R>L.    3/27 CT neck: Bulky and conglomerate LAD invading RIJ w/ thrombus extending up to hyoid level.  Thrombus is likely combination tumor and bland.  New LIJ nonocclusive thrombus.    3/27 CT Chest: New consolidations throughout the R lung worse RLL c/f aspiration PNA.  + R pleurx cath w/ decrease in R pleural effusion.  Extensive DVT w/in thoracic and lower neck, upper SVC remains obliterated.  Large R supraclavicular mass infiltrating into the mediastinum.      3/27 CXR: Progression of R consolidation/effusion.    3/21 TTE: LV grossly normal.  Thickened pericardium.  no pericardial effusion.    3/18 TTE: Mod pericardial effusion w/ e/o hemodynamic compromise w/ diasolic collapse of RA that exceeds 1/3 of cardiac cycle >30% resp variation across MV E. wave and early diastolic inversion of RV.    3/14 CT Chest: Conglomerate soft tissue in superior mediastinum and R supraclavicular region 2/2 LAD w/ internal hypodensity c/f necrosis.  Mass encases SVC and multiple great veins resulting in obliteration of the SVC and thrombosis of the RIJ.  Multiple R sided collateral vessels and R soft tissue edema.  B/L pulm nodules.    3/14 CT neck: Extensive cellulitis/myositis along R anterior lateral neck and R upper chest wall w/ inflammatory fat induration the deep soft tissue of neck.  Large supraclavicular/mediastinal mass lesion, presumably conglomerate of LN w/ mass effect and complete occlusion of RIJ w/ associated inflammation.  Complete occlusion of R subclavian vein and nearly occlusive thrombosis in the proximal L brachiocephalic vein.    3/13 RUQ Abd US: Cholelithiasis w/o e/o cholecystitis.  Dilated CBD w/o e/o intraductal stone or other obstruction. 1.4cm pancreatic cyst w/ mild duct dilatation.    3/12 CT abd/pelv: S/p R nephrectomy. no LAD.  New 1.6cm  pancreatic neck cystic lesion w/o main pancreatic ductal dilatation.    3/10 R breast US: No e/o breast abscess      RECENT ENDOSCOPY  4/26 Upper EUS  - Normal esophagus.  - A large amount of food (residue) inthe stomach. Procedure was aborted.    4/24 Upper EUS   - Fluid in the middle third of the esophagus and in the lower third of the esophagus.  - A large amount of food (residue) in the stomach so EGD was aborted and EUS was not attempted.  - No specimens collected.   SOLID TUMOR ONCOLOGY HOSPITALIST PROGRESS NOTE    S: No acute events overnight.  However, pt complained of increased dyspnea. She was newly hypoxic- previously >92% on 2L NC but desatted to 87% and her supplemental O2 was subsequently increased to 6L by the RN.  She was given a neb tx x1; CXR was ordered and ABG was completed (results below)- given increase in lactate pt was started on empiric Zosyn. She was also put on HF NC 40lpm/50% given persistent hypoxia (89-93%) on 6L NC O2. CT c/a/p was also ordered given c/f interval disease progression in the chest.    CURRENT MEDICATIONS  MEDICATIONS  (STANDING):  aMIOdarone    Tablet 200 milliGRAM(s) Oral daily  Biotene Dry Mouth Oral Rinse 15 milliLiter(s) Swish and Spit every 6 hours  budesonide 160 MICROgram(s)/formoterol 4.5 MICROgram(s) Inhaler 2 Puff(s) Inhalation two times a day  chlorhexidine 2% Cloths 1 Application(s) Topical daily  dexAMETHasone     Tablet 4 milliGRAM(s) Oral daily  dexAMETHasone  Injectable 4 milliGRAM(s) IV Push two times a day  diazepam    Tablet 5 milliGRAM(s) Oral at bedtime  enoxaparin Injectable 50 milliGRAM(s) SubCutaneous every 12 hours  influenza   Vaccine 0.5 milliLiter(s) IntraMuscular once  metoclopramide Injectable 10 milliGRAM(s) IV Push every 8 hours  metoprolol tartrate 25 milliGRAM(s) Oral every 12 hours  multivitamin 1 Tablet(s) Oral daily  naloxegol 25 milliGRAM(s) Oral daily  nicotine -  14 mG/24Hr(s) Patch 1 Patch Transdermal every 24 hours  oxyCODONE  ER Tablet 30 milliGRAM(s) Oral <User Schedule>  pantoprazole    Tablet 40 milliGRAM(s) Oral two times a day  polyethylene glycol 3350 17 Gram(s) Oral every 12 hours  senna 2 Tablet(s) Oral at bedtime  trimethoprim  160 mG/sulfamethoxazole 800 mG 1 Tablet(s) Oral <User Schedule>  MEDICATIONS  (PRN):  albuterol/ipratropium for Nebulization 3 milliLiter(s) Nebulizer every 6 hours PRN Shortness of Breath and/or Wheezing  aluminum hydroxide/magnesium hydroxide/simethicone Suspension 30 milliLiter(s) Oral every 6 hours PRN Dyspepsia  Biotene Dry Mouth Oral Rinse 15 milliLiter(s) Swish and Spit two times a day PRN dry mouth  diazepam    Tablet 5 milliGRAM(s) Oral two times a day PRN anxiety  FIRST- Mouthwash  BLM 5 milliLiter(s) Swish and Spit four times a day PRN Mouth Care  HYDROmorphone  Injectable 1 milliGRAM(s) IV Push every 3 hours PRN Severe Pain (7 - 10)  ondansetron    Tablet 4 milliGRAM(s) Oral every 8 hours PRN Nausea and/or Vomiting  oxyCODONE    IR 10 milliGRAM(s) Oral every 4 hours PRN Moderate Pain (4 - 6)  sodium chloride 0.9% lock flush 10 milliLiter(s) IV Push every 1 hour PRN Pre/post blood products, medications, blood draw, and to maintain line patency      PHYSICAL EXAM  T(C): 36.9 (05-01-24 @ 11:54), Max: 37.3 (05-01-24 @ 05:57)  HR: 78 (05-01-24 @ 11:12) (72 - 81)  BP: 115/66 (05-01-24 @ 11:54) (101/56 - 132/64)  RR: 15 (05-01-24 @ 11:12) (15 - 20)  SpO2: 92% (05-01-24 @ 11:12) (87% - 96%)    04-30-24 @ 07:01  -  05-01-24 @ 07:00  --------------------------------------------------------  IN: 0 mL / OUT: 560 mL / NET: -560 mL  Non-toxic appearing woman, sitting up in bed, appears comfortable, speaking in full sentences without conversational dyspnea, aaox3  R eyelid ptosis and R pupil miosis present; anicteric sclera, no oral lesions/thrush  RRR, no m/r/g  Breaths non-labored; diminished breath sounds in all R lung zones; LL zones CTA  Abd soft/nt/nd, normoactive bowel sounds  Trace LUE non-pitting edema present; large area of ecchymosis extending from the post R calf to post R thigh- improving; generalized muscular atrophy in all 4 extremities  CN 2-12 grossly intact; no gross focal neuro deficits; pt ambulates with walker      LABS                        7.4    7.71  )-----------( 222      ( 01 May 2024 07:10 )             24.1     05-01    137  |  101  |  13  ----------------------------<  71  4.4   |  23  |  0.97    Ca    8.0<L>      01 May 2024 07:10  Phos  3.6     05-01  Mg     1.80     05-01    TPro  4.8<L>  /  Alb  2.2<L>  /  TBili  0.4  /  DBili  x   /  AST  12  /  ALT  66<H>  /  AlkPhos  97  05-01    PATHOLOGY  3/26 pleural fluid cytology: Neg for malignant cells.    3/26 Pericardium excision: Neg for malignancy.    3/20 Pericardial fluid cytology: Neg for malignant cells.    3/14 LYMPH NODE, SUPRACLAVICULAR, RIGHT, US GUIDED CORE BIOPSY AND FNA   POSITIVE FOR MALIGNANT CELLS.   Carcinoma   Touch Prep slides display crowded groups and single lying malignant   cells with enlarged nuclei containing prominent nucleoli and vacuolated   cytoplasm. Core biopsies show neoplastic cells positive for CK7 and CDX2   immunostains, while negative for CK20, TTF1, GATA3, TRPS1 and PAX8. 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 Suggest correlation with clinical information and imaging to   assist with next step management.   Moleculars: pMMR, PD-L1 TPS 1%; Her2 pending      TUMOR MARKERS  3/13 CEA 7.1  3/24 Ca 19-9 27  4/8 Ca-125 187, Ca 27.29 16.9, Ca 15-3 18.9, AFP 5.2      MICROBIOLOGY  Abx  Bactrim 4/8-present    Cx Data  4/5 MRSA swab: Not detected  3/16 Quant plus TB: Negative      PERTINENT RADIOLOGY  5/1 CXR:  Small right effusion unchanged with Pleurx catheter.    4/19 MRCP: Motion degraded study, particularly on postcontrast sequences.  Pancreatic neck T2 hyperintense lesion measures 16 mm, image 28 series 5,   without clear evidence of enhancement on postcontrast phases. Most likely   this is a side branch IPMN. Remainder of the pancreas is suboptimally   evaluated due to artifact. Follow-up MRI abdomen or pancreas protocol CT   can be considered in 3-6 months for reevaluation. Partially distended stomach. Duodenal diverticulum. Colon is also partially distended with mild to moderate stool burden. Correlate   clinically. Trace pleural effusions. Right-sided pleural catheter is partially   imaged. Lung parenchyma is incompletely characterized on MRI. Soft tissue   of the mediastinum is not adequately imaged on this study.    4/18 B/L LE Doppler: Acute deep venous thrombosis: above the knee.  Acute nonocclusive deep venous thrombosis in the right common femoral vein.    417 CXR: Clear lungs with minimal effusion and Pleurx catheter.    4/10 VA dopplers B/L UE: Extensive acute, occlusive DVT RIJ, innominate and subclavian veins.  Acute non-occlusive DVT w/in R axillary vein.  Extensive acute, occlusive DVT noted in L subclavian. axillary, and proximal brachial veins.  Acute non-occlusive DVT LIJ.  Superficial vein thromboses are noted in the B/L cephalic veins.    4/9 Echocardiogram: Normal LV systolic function.  Normal mitral valve w/ normal leaflet excursion.  No pericardial effusion seen    4/4 CXR: Clear lungs w/ R pleurx catheter in place.    ok4/4 CXR AM: Small R pleural effusion w/ pleurx cath improved    OSH Imaging ([facility])  3/29 CXR: Decrease in R pleural effusion.     3/28 B/L UE Dupplers: Acute DVT involving R IJ, innominate vein, subclavian, brachial, and L IJ.  Superficial venous thrombosis involving B/L cephalic veins.  + Edema of superficial soft tissue of UE R>L.    3/27 CT neck: Bulky and conglomerate LAD invading RIJ w/ thrombus extending up to hyoid level.  Thrombus is likely combination tumor and bland.  New LIJ nonocclusive thrombus.    3/27 CT Chest: New consolidations throughout the R lung worse RLL c/f aspiration PNA.  + R pleurx cath w/ decrease in R pleural effusion.  Extensive DVT w/in thoracic and lower neck, upper SVC remains obliterated.  Large R supraclavicular mass infiltrating into the mediastinum.      3/27 CXR: Progression of R consolidation/effusion.    3/21 TTE: LV grossly normal.  Thickened pericardium.  no pericardial effusion.    3/18 TTE: Mod pericardial effusion w/ e/o hemodynamic compromise w/ diasolic collapse of RA that exceeds 1/3 of cardiac cycle >30% resp variation across MV E. wave and early diastolic inversion of RV.    3/14 CT Chest: Conglomerate soft tissue in superior mediastinum and R supraclavicular region 2/2 LAD w/ internal hypodensity c/f necrosis.  Mass encases SVC and multiple great veins resulting in obliteration of the SVC and thrombosis of the RIJ.  Multiple R sided collateral vessels and R soft tissue edema.  B/L pulm nodules.    3/14 CT neck: Extensive cellulitis/myositis along R anterior lateral neck and R upper chest wall w/ inflammatory fat induration the deep soft tissue of neck.  Large supraclavicular/mediastinal mass lesion, presumably conglomerate of LN w/ mass effect and complete occlusion of RIJ w/ associated inflammation.  Complete occlusion of R subclavian vein and nearly occlusive thrombosis in the proximal L brachiocephalic vein.    3/13 RUQ Abd US: Cholelithiasis w/o e/o cholecystitis.  Dilated CBD w/o e/o intraductal stone or other obstruction. 1.4cm pancreatic cyst w/ mild duct dilatation.    3/12 CT abd/pelv: S/p R nephrectomy. no LAD.  New 1.6cm  pancreatic neck cystic lesion w/o main pancreatic ductal dilatation.    3/10 R breast US: No e/o breast abscess      RECENT ENDOSCOPY  4/26 Upper EUS  - Normal esophagus.  - A large amount of food (residue) inthe stomach. Procedure was aborted.    4/24 Upper EUS   - Fluid in the middle third of the esophagus and in the lower third of the esophagus.  - A large amount of food (residue) in the stomach so EGD was aborted and EUS was not attempted.  - No specimens collected.

## 2024-05-01 NOTE — CHART NOTE - NSCHARTNOTEFT_GEN_A_CORE
Brief Update Note     Patient has not been able to get UGIS and now with recurrent episodes of hypoxia.  Will hold off on endoscopic intervention, pending medical optimization and UGIS    Please reach out once UGIS is completed to evaluate for next steps.  See prior note for full recs.  Rest of care per primary    Thank you for involving us in this patient's care. Please reach out if any further questions or concerns.    Angela Coffey MD  Gastroenterology/Hepatology Fellow, PGY-7    NON-URGENT CONSULTS:  Please email [email or web address]  [email or web address]  AT NIGHT AND ON WEEKENDS:  Contact on-call GI fellow via answering service ([phone]) from 5pm-8am and on weekends/holidays  MONDAY-FRIDAY 8AM-5PM:  Pager: [phone] ([facility])  Pager: [phone] ([facility])

## 2024-05-01 NOTE — CHART NOTE - NSCHARTNOTEFT_GEN_A_CORE
Pt seen, Pleurx site remains dry with intact  dry dressing  s/p stitch placement yesterday  Cont to drain pleurx per recommendations  Recall with new questions or concerns.

## 2024-05-01 NOTE — BH CONSULTATION LIAISON PROGRESS NOTE - ATTENDING COMMENTS
Chart reviewed, case d/w Dr. Martin,  I agree with above assessment/plan. I have discussed the case with primary team yesterday.

## 2024-05-01 NOTE — PROGRESS NOTE ADULT - ASSESSMENT
57 yo woman, former smoker, with h/o R eye glaucoma, Fibromyalgia, Anxiety, GERD, and RCC s/p R total nephrectomy/hysterectomy; she was initially admitted to [facility] on 3/11 w/ R breast swelling c/f mastitis- imaging brooks noted supraclavicular/mediastinal mass lesion which encased the SVC and multiple other veins resulting in obliteration of the SVC and thrombosis of the R IJ, and a pancreatic neck cystic lesion.  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  Her disease/hospital course has also been c/b pericardial effusion s/p pericardial window w/ neg cytology, R pleural effusion, also negative cytology) s/p Pleurx, Afib w/ RVR and acute hypoxic resp failure 2/2 SVC syndrome- not dennis to IR-guided stenting; pt was ultimately started on Dex and transferred to [facility] on 4/4 for urgent palliative RT which she completed on 4/18. Her hospital course has also been c/b pericardial effusion with tamponade s/p window and non malignant R pleural effusion s/p pleurex, acute b/l UE and RLE dvts, anxiety, and more recently, recurrent hypoxia.    ACTIVE PROBLEMS  Metastatic cancer  Acute-on-chronic hypoxic respiratory failure  SVC syndrome  Extensive b/l UE DVTs  Acute RLE DVT a/w RLE ecchymosis  Hypercoag state  Pericardial effusion with tamp physiology s/p pericardial window  R pleural effusion s/p pleurex  pAfib, with RVR on this admission  Anxiety/emotional lability  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)  Cancer-related pain, anxiety  Severe prot-ghazala malnutrition    - Metastatic cancer  Based on 3/14 SC LN path, ddx includes primary UGI vs pancreaticobiliary primary (breast edema is likely 2/2 SVC syndrome); PD-L1 TPs 1%, pMMR, Her2 pending  Ca-125 moderately elevated at 125; other tumor markers not significantly elevated  Additional diagnostic brooks includes:   * 4/19 MRCP showing pancreatic lesion (? IPMN, but pancreas was suboptimally evaluated)     * 4/24 and 4/26 EGD/EUS aborted as pt had large amount of food in the stomach that prevented passing of scope   * 4/30 UGIS aborted as pt felt too short of breath when laying down flat (improved after her pleurex was drained); repeat UGIS tentatively scheduled for 5/1  Options for systemic cancer treatment are TBD  Pt's prognosis is guarded    - Acute-on-chronic hypoxic resp failure  Pt with increased O2 requirement this morning- uptitrated from 2L > 6L to HF O2 40lpm/50%  C/f POD in the chest vs acute infection  Lactate 3.2, remaining ABG results pending  RVP and sputum cx ordered; continuing empiric Zosyn for now  Resuming Dex PO 4mg BID for possible inflammatory process  Continuing Symbicort 2 puffs BID  Continuing Duonebs q6/prn  Will continue routing pleurex drainage as below  Will continue weaning O2 as tolerated    - SVC syndrome  Appreciate Rad Onc eval/recs  Completed 10fxs of palliative RT on 4/18   Completing 2-wk Dex taper, until 5/7 (continuing pjp/ppi ppx until completion of taper)    - Extensive b/l UE DVTs  - Acute RLE DVT a/w RLE ecchymosis  - Hypercoag state  Continuing therapeutic lovenox (resumed on 4/18 given negative GIB brooks), benefits > risks   * Of note: pt has poor UE IV access and currently requires peripheral IV in her LE extremities for admin of medication; can consider FV central line for urgent/emergent IV needs    - Pericardial effusion with tamp physiology s/p pericardial window  - R pleural effusion s/p pleurex  Likely inflammatory; both pleural and pericardial fluid cytology is negative for malignant cells  4/9 surveillance TTE with pEF and no WMAs  Continuing R pleurex drainage- up to 500cc q48h/prn    - pAfib  Pt currently SR, HR controlled  Likely precipitated by malignancy, pericardial effusion, SVC syndrome  Continuing Metoprolol 25mg q12 with holding parameters  Continuing Amio 200mg daily (monitoring for toxicities)  Continuing telemetry    - Anxiety/emotional lability  Continuing Diazepam 5mg daily and daily/prn  Will fu with  re: additional med recs for improvement of pt's anxiety which at time has been a barrier to her inpt care  Pt has poor health literacy and clinical insight which is negatively impacting her medical decision making- when ask if she wanted to defer medical decision making to her boyfriend or if she had a designated HCP she replied "he's already sick of me"  Appreciate  consult: pt lacks medical decision making capacity; medical decision making will be deferred to pt's surrogate decision maker- Saeid Peña ([phone])    - 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)  Pt with h/o R eye glaucoma, but miosis can also be associated with Pancoast syndrome i/s/o extensive R upper lung tumor  Pt denies visual deficits or other related symptoms   MRI Brain without contrast ordered for further eval- pt continues to refuse, can be completed as outpt  Will continue to monitor closely    - Anemia in neoplastic disease  Hgb remains relatively stable  4/5 iron studies not c/w iron deficiency  VSS and pt without clinical s/s of active bleeding  4/17 hemolysis labs negative; 4/18 FOBT negative x2  Trending daily cbcs; continuing supportive transfusions prn (pt does not have h/o ACS, CAD, MI, goal hgb > 7; plts > 10K)    - Cancer related pain  Currently well controlled  Continuing Oxy ER 30mg q12 q8  Continuing Oxy IR 10mg q4/prn  Continuing Dilaudid IV prn for sev breakthrough pain  Continuing bowel regimen to prevent OIC (including Movantic)    - Severe prot-ghazala malnutrition: appreciate RD recs; continuing regular diet with protein shake supplement BID 57 yo woman, former smoker, with h/o R eye glaucoma, Fibromyalgia, Anxiety, GERD, and RCC s/p R total nephrectomy/hysterectomy; she was initially admitted to [facility] on 3/11 w/ R breast swelling c/f mastitis- imaging brooks noted supraclavicular/mediastinal mass lesion which encased the SVC and multiple other veins resulting in obliteration of the SVC and thrombosis of the R IJ, and a pancreatic neck cystic lesion.  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  Her disease/hospital course has also been c/b pericardial effusion s/p pericardial window w/ neg cytology, R pleural effusion, also negative cytology) s/p Pleurx, Afib w/ RVR and acute hypoxic resp failure 2/2 SVC syndrome- not dennis to IR-guided stenting; pt was ultimately started on Dex and transferred to [facility] on 4/4 for urgent palliative RT which she completed on 4/18. Her hospital course has also been c/b pericardial effusion with tamponade s/p window and non malignant R pleural effusion s/p pleurex, acute b/l UE and RLE dvts, anxiety, and more recently, recurrent hypoxia.    ACTIVE PROBLEMS  Metastatic cancer  Acute-on-chronic hypoxic respiratory failure  SVC syndrome  Extensive b/l UE DVTs  Acute RLE DVT a/w RLE ecchymosis  Hypercoag state  Pericardial effusion with tamp physiology s/p pericardial window  R pleural effusion s/p pleurex  pAfib, with RVR on this admission  Anxiety/emotional lability  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)  Cancer-related pain, anxiety  Severe prot-ghazala malnutrition    - Metastatic cancer  Based on 3/14 SC LN path, ddx includes primary UGI vs pancreaticobiliary primary (breast edema is likely 2/2 SVC syndrome); PD-L1 TPs 1%, pMMR, Her2 pending  Ca-125 moderately elevated at 125; other tumor markers not significantly elevated  Additional diagnostic brooks includes:   * 4/19 MRCP showing pancreatic lesion (? IPMN, but pancreas was suboptimally evaluated)     * 4/24 and 4/26 EGD/EUS aborted as pt had large amount of food in the stomach that prevented passing of scope   * 4/30 UGIS aborted as pt felt too short of breath when laying down flat (improved after her pleurex was drained); repeat UGIS tentatively scheduled for 5/1  Options for systemic cancer treatment are TBD  Pt's prognosis is guarded    - Acute-on-chronic hypoxic resp failure  Pt with increased O2 requirement this morning- uptitrated from 2L > 6L to HF O2 40lpm/50%  C/f POD in the chest vs acute infection  Lactate 3.2, remaining ABG results pending  RVP, procal, and sputum cx ordered; starting empiric Aztreonam/Flagyl (pt has PCN allergy)  Resuming Dex PO 4mg BID for possible inflammatory process (with ppi/pjp ppx; taper held)  Continuing Symbicort 2 puffs BID  Continuing Duonebs q6/prn  Will continue routing pleurex drainage as below  Will continue weaning O2 as tolerated    - SVC syndrome  Appreciate Rad Onc eval/recs  Completed 10fxs of palliative RT on 4/18   Completing 2-wk Dex taper, until 5/7 (continuing pjp/ppi ppx until completion of taper)    - Extensive b/l UE DVTs  - Acute RLE DVT a/w RLE ecchymosis  - Hypercoag state  Continuing therapeutic lovenox (resumed on 4/18 given negative GIB brooks), benefits > risks   * Of note: pt has poor UE IV access and currently requires peripheral IV in her LE extremities for admin of medication; can consider [facility]V central line for urgent/emergent IV needs    - Pericardial effusion with tamp physiology s/p pericardial window  - R pleural effusion s/p pleurex  Likely inflammatory; both pleural and pericardial fluid cytology is negative for malignant cells  4/9 surveillance TTE with pEF and no WMAs  Continuing R pleurex drainage- up to 500cc q48h/prn    - pAfib  Pt currently SR, HR controlled  Likely precipitated by malignancy, pericardial effusion, SVC syndrome  Continuing Metoprolol 25mg q12 with holding parameters  Continuing Amio 200mg daily (monitoring for toxicities)  Continuing telemetry    - Anxiety/emotional lability  Continuing Diazepam 5mg daily and daily/prn  Will fu with  re: additional med recs for improvement of pt's anxiety which at time has been a barrier to her inpt care  Pt has poor health literacy and clinical insight which is negatively impacting her medical decision making- when ask if she wanted to defer medical decision making to her boyfriend or if she had a designated HCP she replied "he's already sick of me"  Appreciate  consult: pt lacks medical decision making capacity; medical decision making will be deferred to pt's surrogate decision maker- Saeid Peña ([phone])    - 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)  Pt with h/o R eye glaucoma, but miosis can also be associated with Pancoast syndrome i/s/o extensive R upper lung tumor  Pt denies visual deficits or other related symptoms   MRI Brain without contrast ordered for further eval- pt continues to refuse, can be completed as outpt  Will continue to monitor closely    - Anemia in neoplastic disease  Hgb remains relatively stable  4/5 iron studies not c/w iron deficiency  VSS and pt without clinical s/s of active bleeding  4/17 hemolysis labs negative; 4/18 FOBT negative x2  Trending daily cbcs; continuing supportive transfusions prn (pt does not have h/o ACS, CAD, MI, goal hgb > 7; plts > 10K)    - Cancer related pain  Currently well controlled  Continuing Oxy ER 30mg q12 q8  Continuing Oxy IR 10mg q4/prn  Continuing Dilaudid IV prn for sev breakthrough pain  Continuing bowel regimen to prevent OIC (including Movantic)    - Severe prot-ghazala malnutrition: appreciate RD recs; continuing regular diet with protein shake supplement BID 59 yo woman, former smoker, with h/o R eye glaucoma, Fibromyalgia, Anxiety, GERD, and RCC s/p R total nephrectomy/hysterectomy; she was initially admitted to [facility] on 3/11 w/ R breast swelling c/f mastitis- imaging brooks noted supraclavicular/mediastinal mass lesion which encased the SVC and multiple other veins resulting in obliteration of the SVC and thrombosis of the R IJ, and a pancreatic neck cystic lesion.  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  Her disease/hospital course has also been c/b pericardial effusion s/p pericardial window w/ neg cytology, R pleural effusion, also negative cytology) s/p Pleurx, Afib w/ RVR and acute hypoxic resp failure 2/2 SVC syndrome- not dennis to IR-guided stenting; pt was ultimately started on Dex and transferred to [facility] on 4/4 for urgent palliative RT which she completed on 4/18. Her hospital course has also been c/b pericardial effusion with tamponade s/p window and non malignant R pleural effusion s/p pleurex, acute b/l UE and RLE dvts, anxiety, and more recently, recurrent hypoxia.    ACTIVE PROBLEMS  Metastatic cancer  Acute-on-chronic hypoxic respiratory failure  SVC syndrome  Extensive b/l UE DVTs  Acute RLE DVT a/w RLE ecchymosis  Hypercoag state  Pericardial effusion with tamp physiology s/p pericardial window  R pleural effusion s/p pleurex  pAfib, with RVR on this admission  Anxiety/emotional lability  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)  Cancer-related pain, anxiety  Severe prot-ghazala malnutrition    - Metastatic cancer  Based on 3/14 SC LN path, ddx includes primary UGI vs pancreaticobiliary primary (breast edema is likely 2/2 SVC syndrome); PD-L1 TPs 1%, pMMR, Her2 pending  Ca-125 moderately elevated at 125; other tumor markers not significantly elevated  Additional diagnostic brooks includes:   * 4/19 MRCP showing pancreatic lesion (? IPMN, but pancreas was suboptimally evaluated)     * 4/24 and 4/26 EGD/EUS aborted as pt had large amount of food in the stomach that prevented passing of scope   * 4/30 UGIS aborted as pt felt too short of breath when laying down flat (improved after her pleurex was drained); repeat UGIS tentatively scheduled for 5/1  Options for systemic cancer treatment are TBD  Pt's prognosis is guarded    - Acute-on-chronic hypoxic resp failure  Pt with increased O2 requirement this morning- uptitrated from 2L > 6L to HF O2 40lpm/50%  C/f POD in the chest vs acute infection  Lactate 3.2, remaining ABG results pending  RVP, procal, and sputum cx ordered  Resuming Dex PO 4mg BID for possible inflammatory process (with ppi/pjp ppx; taper held)  Planning to initiate empiric abx- will discuss options with ID as pt does not have IV access and is PCN/FQ allergic  Continuing Symbicort 2 puffs BID  Continuing Duonebs q6/prn  Will continue routing pleurex drainage as below  Will continue weaning O2 as tolerated    - SVC syndrome  Appreciate Rad Onc eval/recs  Completed 10fxs of palliative RT on 4/18   Completing 2-wk Dex taper, until 5/7 (continuing pjp/ppi ppx until completion of taper)    - Extensive b/l UE DVTs  - Acute RLE DVT a/w RLE ecchymosis  - Hypercoag state  Continuing therapeutic lovenox (resumed on 4/18 given negative GIB brooks), benefits > risks   * Of note: pt has poor UE IV access and currently requires peripheral IV in her LE extremities for admin of medication; can consider FV central line for urgent/emergent IV needs    - Pericardial effusion with tamp physiology s/p pericardial window  - R pleural effusion s/p pleurex  Likely inflammatory; both pleural and pericardial fluid cytology is negative for malignant cells  4/9 surveillance TTE with pEF and no WMAs  Continuing R pleurex drainage- up to 500cc q48h/prn    - pAfib  Pt currently SR, HR controlled  Likely precipitated by malignancy, pericardial effusion, SVC syndrome  Continuing Metoprolol 25mg q12 with holding parameters  Continuing Amio 200mg daily (monitoring for toxicities)  Continuing telemetry    - Anxiety/emotional lability  Continuing Diazepam 5mg daily and daily/prn  Will fu with  re: additional med recs for improvement of pt's anxiety which at time has been a barrier to her inpt care  Pt has poor health literacy and clinical insight which is negatively impacting her medical decision making- when ask if she wanted to defer medical decision making to her boyfriend or if she had a designated HCP she replied "he's already sick of me"  Appreciate  consult: pt lacks medical decision making capacity; medical decision making will be deferred to pt's surrogate decision maker- Saeid Peña ([phone])    - 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)  Pt with h/o R eye glaucoma, but miosis can also be associated with Pancoast syndrome i/s/o extensive R upper lung tumor  Pt denies visual deficits or other related symptoms   MRI Brain without contrast ordered for further eval- pt continues to refuse, can be completed as outpt  Will continue to monitor closely    - Anemia in neoplastic disease  Hgb remains relatively stable  4/5 iron studies not c/w iron deficiency  VSS and pt without clinical s/s of active bleeding  4/17 hemolysis labs negative; 4/18 FOBT negative x2  Trending daily cbcs; continuing supportive transfusions prn (pt does not have h/o ACS, CAD, MI, goal hgb > 7; plts > 10K)    - Cancer related pain  Currently well controlled  Continuing Oxy ER 30mg q12 q8  Continuing Oxy IR 10mg q4/prn  Continuing Dilaudid IV prn for sev breakthrough pain  Continuing bowel regimen to prevent OIC (including Movantic)    - Severe prot-ghazala malnutrition: appreciate RD recs; continuing regular diet with protein shake supplement BID

## 2024-05-01 NOTE — BH CONSULTATION LIAISON PROGRESS NOTE - NSBHASSESSMENTFT_PSY_ALL_CORE
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 Consulted for anxiety  Pt seen at bedside. Pt tearful at times expressing anxiety and distress regarding her current condition, life change, and loss of autonomy. She feels helpless at times. Pt has an existing relationship with outpt therapist of 6 years who she sees for telehealth. She has a supportive . She wants additional direction and coping skills and is open to being seen by holistic nursing and a rabbi. She has a history of anxiety treated with Valium which has been continued this admission. Pt isn't interested in psych med management at this time because she is on a lot of medications already. Pt counseled that psych consult available if she changes her mind. No SI/HI/hallucinations/delusions. Pt is not an acute risk to self or others and does not require  inpt psych admission  4/18: Patient visibly anxious/distressed and frustrated, no si or hi, no psychosis. In terms of capacity, I agree with medical team that at this time patient lacks capacity to refuse IV line placement in LE for possible blood transfusion.    5/1: CL Psych f/u for anxiety. Patient not anxious or in distress on exam, frustrated with her care and prolonged hospitalization.  Denied AH, VH, paranoia, SI/HI/I/P.    PLAN:  # Anxiety  - c/w standing Valium 5 mg PO daily  - Valium 5 mg PO PRN BID   - consider f/u chaplaincy department (pt. amenable)  -Dispo: No role for inpatient psych.   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 Consulted for anxiety  Pt seen at bedside. Pt tearful at times expressing anxiety and distress regarding her current condition, life change, and loss of autonomy. She feels helpless at times. Pt has an existing relationship with outpt therapist of 6 years who she sees for telehealth. She has a supportive . She wants additional direction and coping skills and is open to being seen by holistic nursing and a rabbi. She has a history of anxiety treated with Valium which has been continued this admission. Pt isn't interested in psych med management at this time because she is on a lot of medications already. Pt counseled that psych consult available if she changes her mind. No SI/HI/hallucinations/delusions. Pt is not an acute risk to self or others and does not require  inpt psych admission  4/18: Patient visibly anxious/distressed and frustrated, no si or hi, no psychosis. In terms of capacity, I agree with medical team that at this time patient lacks capacity to refuse IV line placement in LE for possible blood transfusion.    5/1: CL Psych f/u for anxiety. Patient not anxious or in distress on exam, frustrated with her care and prolonged hospitalization.  Denied AH, VH, paranoia, SI/HI/I/P.  PLEASE NOTE THAT ON 4/18 CL PSYCH RECOMMENDED that "patient lacks capacity to refuse IV line placement in LE for possible blood transfusion" ONLY.    PLAN:  # Anxiety  - C/w standing Valium 5 mg PO daily  - Valium 5 mg PO PRN BID   - Consider f/u chaplaincy department (pt. amenable)  -Dispo: No role for inpatient psych.   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 Consulted for anxiety  Pt seen at bedside. Pt tearful at times expressing anxiety and distress regarding her current condition, life change, and loss of autonomy. She feels helpless at times. Pt has an existing relationship with outpt therapist of 6 years who she sees for telehealth. She has a supportive . She wants additional direction and coping skills and is open to being seen by holistic nursing and a rabbi. She has a history of anxiety treated with Valium which has been continued this admission. Pt isn't interested in psych med management at this time because she is on a lot of medications already. Pt counseled that psych consult available if she changes her mind. No SI/HI/hallucinations/delusions. Pt is not an acute risk to self or others and does not require  inpt psych admission  4/18: Patient visibly anxious/distressed and frustrated, no si or hi, no psychosis. In terms of capacity, I agree with medical team that at this time patient lacks capacity to refuse IV line placement in LE for possible blood transfusion.    5/1: CL Psych f/u for anxiety. Patient not anxious or in distress on exam, frustrated with her care and prolonged hospitalization.  Denied AH, VH, paranoia, SI/HI/I/P. Does not want any changes in her medications.   PLEASE NOTE THAT ON 4/18 CL PSYCH RECOMMENDED that "patient lacks capacity to refuse IV line placement in LE for possible blood transfusion" ONLY.    PLAN:  # Anxiety  - C/w standing Valium 5 mg PO daily  - Valium 5 mg PO PRN BID   - Consider f/u chaplaincy department (pt. amenable)  -Dispo: No role for inpatient psych.

## 2024-05-01 NOTE — BH CONSULTATION LIAISON PROGRESS NOTE - NSBHFUPINTERVALHXFT_PSY_A_CORE
Chart reviewed, received Valium PRN yesterday. Patient alert/oriented on exam, doesn't seem to be anxious on current evaluation, pleasant and cooperative overall. Said that she is frustrated with her medical team, wishes for better care and "solid food". She discussed her medical h/o, said that she had been getting RT for a mass in her chest and is "trying her best to do everything to get better". Remains future oriented. C/o SOB, remains on nasal canula. When asked if she feels anxious, she said that "it isn't anxiety but the shortness of breath which makes her anxious". Denied any mya panic attacks lately. Also denied feeling acutely depressed. Denied SI/HI/I/P. Denied AH, VH, paranoia. Endorsed good social support from her .   Discussed that she is on Valium 5 mg for anxiety and said that it has been working well for her. Doesn't want to make any changes to her regimen due to SOB. Advised that she has prn Valium available as well, and she was appreciative of the information saying she didn't know about PRN meds.  Chart reviewed, received Valium PRN yesterday. Patient alert/oriented on exam, doesn't seem to be anxious on current evaluation, pleasant and cooperative overall. Said that she is frustrated with her treatment, wishes for better care and "solid food". She discussed her medical history, said that she had been getting RT for a mass in her chest and is "trying her best to do everything to get better". Remains future oriented. C/o SOB, remains on nasal canula. When asked if she feels anxious, she said that "it isn't anxiety but the shortness of breath which makes her anxious". Denied any mya panic attacks lately. Also denied feeling acutely depressed. Denied SI/HI/I/P. Denied AH, VH, paranoia. Endorsed good social support from her .   Discussed that she is on Valium 5 mg for anxiety and said that it has been working well for her. Doesn't want to make any changes to her regimen due to SOB. Advised that she has prn Valium available as well, and she was appreciative of the information saying she didn't know about PRN meds.   Does not want SSRIs or SNRIs for anxiety

## 2024-05-01 NOTE — BH CONSULTATION LIAISON PROGRESS NOTE - NSBHCHARTREVIEWLAB_PSY_A_CORE FT
7.4    7.71  )-----------( 222      ( 01 May 2024 07:10 )             24.1     05-01    137  |  101  |  13  ----------------------------<  71  4.4   |  23  |  0.97    Ca    8.0<L>      01 May 2024 07:10  Phos  3.6     05-01  Mg     1.80     05-01    TPro  4.8<L>  /  Alb  2.2<L>  /  TBili  0.4  /  DBili  x   /  AST  12  /  ALT  66<H>  /  AlkPhos  97  05-01    Urinalysis Basic - ( 01 May 2024 07:10 )    Color: x / Appearance: x / SG: x / pH: x  Gluc: 71 mg/dL / Ketone: x  / Bili: x / Urobili: x   Blood: x / Protein: x / Nitrite: x   Leuk Esterase: x / RBC: x / WBC x   Sq Epi: x / Non Sq Epi: x / Bacteria: x

## 2024-05-02 LAB
ALBUMIN SERPL ELPH-MCNC: 2.1 G/DL — LOW (ref 3.3–5)
ALP SERPL-CCNC: 103 U/L — SIGNIFICANT CHANGE UP (ref 40–120)
ALT FLD-CCNC: 57 U/L — HIGH (ref 4–33)
ANION GAP SERPL CALC-SCNC: 10 MMOL/L — SIGNIFICANT CHANGE UP (ref 7–14)
AST SERPL-CCNC: 13 U/L — SIGNIFICANT CHANGE UP (ref 4–32)
BASOPHILS # BLD AUTO: 0 K/UL — SIGNIFICANT CHANGE UP (ref 0–0.2)
BASOPHILS NFR BLD AUTO: 0 % — SIGNIFICANT CHANGE UP (ref 0–2)
BILIRUB SERPL-MCNC: 0.4 MG/DL — SIGNIFICANT CHANGE UP (ref 0.2–1.2)
BUN SERPL-MCNC: 12 MG/DL — SIGNIFICANT CHANGE UP (ref 7–23)
CALCIUM SERPL-MCNC: 8.2 MG/DL — LOW (ref 8.4–10.5)
CHLORIDE SERPL-SCNC: 100 MMOL/L — SIGNIFICANT CHANGE UP (ref 98–107)
CO2 SERPL-SCNC: 23 MMOL/L — SIGNIFICANT CHANGE UP (ref 22–31)
CREAT SERPL-MCNC: 0.75 MG/DL — SIGNIFICANT CHANGE UP (ref 0.5–1.3)
EGFR: 91 ML/MIN/1.73M2 — SIGNIFICANT CHANGE UP
EOSINOPHIL # BLD AUTO: 0.08 K/UL — SIGNIFICANT CHANGE UP (ref 0–0.5)
EOSINOPHIL NFR BLD AUTO: 1.4 % — SIGNIFICANT CHANGE UP (ref 0–6)
GLUCOSE SERPL-MCNC: 82 MG/DL — SIGNIFICANT CHANGE UP (ref 70–99)
HCT VFR BLD CALC: 21.5 % — LOW (ref 34.5–45)
HCT VFR BLD CALC: 23.8 % — LOW (ref 34.5–45)
HGB BLD-MCNC: 6.9 G/DL — CRITICAL LOW (ref 11.5–15.5)
HGB BLD-MCNC: 7.3 G/DL — LOW (ref 11.5–15.5)
IANC: 5.39 K/UL — SIGNIFICANT CHANGE UP (ref 1.8–7.4)
IMM GRANULOCYTES NFR BLD AUTO: 1 % — HIGH (ref 0–0.9)
LYMPHOCYTES # BLD AUTO: 0.14 K/UL — LOW (ref 1–3.3)
LYMPHOCYTES # BLD AUTO: 2.4 % — LOW (ref 13–44)
MAGNESIUM SERPL-MCNC: 1.8 MG/DL — SIGNIFICANT CHANGE UP (ref 1.6–2.6)
MCHC RBC-ENTMCNC: 29.9 PG — SIGNIFICANT CHANGE UP (ref 27–34)
MCHC RBC-ENTMCNC: 30.5 PG — SIGNIFICANT CHANGE UP (ref 27–34)
MCHC RBC-ENTMCNC: 30.7 GM/DL — LOW (ref 32–36)
MCHC RBC-ENTMCNC: 32.1 GM/DL — SIGNIFICANT CHANGE UP (ref 32–36)
MCV RBC AUTO: 95.1 FL — SIGNIFICANT CHANGE UP (ref 80–100)
MCV RBC AUTO: 97.5 FL — SIGNIFICANT CHANGE UP (ref 80–100)
MONOCYTES # BLD AUTO: 0.15 K/UL — SIGNIFICANT CHANGE UP (ref 0–0.9)
MONOCYTES NFR BLD AUTO: 2.6 % — SIGNIFICANT CHANGE UP (ref 2–14)
NEUTROPHILS # BLD AUTO: 5.39 K/UL — SIGNIFICANT CHANGE UP (ref 1.8–7.4)
NEUTROPHILS NFR BLD AUTO: 92.6 % — HIGH (ref 43–77)
NRBC # BLD: 0 /100 WBCS — SIGNIFICANT CHANGE UP (ref 0–0)
NRBC # BLD: 0 /100 WBCS — SIGNIFICANT CHANGE UP (ref 0–0)
NRBC # FLD: 0 K/UL — SIGNIFICANT CHANGE UP (ref 0–0)
NRBC # FLD: 0 K/UL — SIGNIFICANT CHANGE UP (ref 0–0)
PHOSPHATE SERPL-MCNC: 3.2 MG/DL — SIGNIFICANT CHANGE UP (ref 2.5–4.5)
PLATELET # BLD AUTO: 228 K/UL — SIGNIFICANT CHANGE UP (ref 150–400)
PLATELET # BLD AUTO: 234 K/UL — SIGNIFICANT CHANGE UP (ref 150–400)
POTASSIUM SERPL-MCNC: 4.3 MMOL/L — SIGNIFICANT CHANGE UP (ref 3.5–5.3)
POTASSIUM SERPL-SCNC: 4.3 MMOL/L — SIGNIFICANT CHANGE UP (ref 3.5–5.3)
PROT SERPL-MCNC: 4.7 G/DL — LOW (ref 6–8.3)
RBC # BLD: 2.26 M/UL — LOW (ref 3.8–5.2)
RBC # BLD: 2.44 M/UL — LOW (ref 3.8–5.2)
RBC # FLD: 16.2 % — HIGH (ref 10.3–14.5)
RBC # FLD: 16.2 % — HIGH (ref 10.3–14.5)
SODIUM SERPL-SCNC: 133 MMOL/L — LOW (ref 135–145)
WBC # BLD: 5.82 K/UL — SIGNIFICANT CHANGE UP (ref 3.8–10.5)
WBC # BLD: 8.04 K/UL — SIGNIFICANT CHANGE UP (ref 3.8–10.5)
WBC # FLD AUTO: 5.82 K/UL — SIGNIFICANT CHANGE UP (ref 3.8–10.5)
WBC # FLD AUTO: 8.04 K/UL — SIGNIFICANT CHANGE UP (ref 3.8–10.5)

## 2024-05-02 PROCEDURE — 99232 SBSQ HOSP IP/OBS MODERATE 35: CPT

## 2024-05-02 RX ORDER — ENOXAPARIN SODIUM 100 MG/ML
50 INJECTION SUBCUTANEOUS EVERY 12 HOURS
Refills: 0 | Status: DISCONTINUED | OUTPATIENT
Start: 2024-05-02 | End: 2024-05-13

## 2024-05-02 RX ORDER — OXYCODONE HYDROCHLORIDE 5 MG/1
30 TABLET ORAL
Refills: 0 | Status: DISCONTINUED | OUTPATIENT
Start: 2024-05-02 | End: 2024-05-09

## 2024-05-02 RX ORDER — DIAZEPAM 5 MG
5 TABLET ORAL AT BEDTIME
Refills: 0 | Status: DISCONTINUED | OUTPATIENT
Start: 2024-05-02 | End: 2024-05-09

## 2024-05-02 RX ORDER — DIAZEPAM 5 MG
5 TABLET ORAL
Refills: 0 | Status: DISCONTINUED | OUTPATIENT
Start: 2024-05-02 | End: 2024-05-02

## 2024-05-02 RX ORDER — OXYCODONE HYDROCHLORIDE 5 MG/1
10 TABLET ORAL EVERY 4 HOURS
Refills: 0 | Status: DISCONTINUED | OUTPATIENT
Start: 2024-05-02 | End: 2024-05-02

## 2024-05-02 RX ADMIN — Medication 3 MILLILITER(S): at 15:12

## 2024-05-02 RX ADMIN — Medication 15 MILLILITER(S): at 17:26

## 2024-05-02 RX ADMIN — OXYCODONE HYDROCHLORIDE 30 MILLIGRAM(S): 5 TABLET ORAL at 06:28

## 2024-05-02 RX ADMIN — Medication 4 MILLIGRAM(S): at 17:26

## 2024-05-02 RX ADMIN — PANTOPRAZOLE SODIUM 40 MILLIGRAM(S): 20 TABLET, DELAYED RELEASE ORAL at 06:19

## 2024-05-02 RX ADMIN — OXYCODONE HYDROCHLORIDE 30 MILLIGRAM(S): 5 TABLET ORAL at 15:07

## 2024-05-02 RX ADMIN — Medication 5 MILLIGRAM(S): at 17:24

## 2024-05-02 RX ADMIN — AMIODARONE HYDROCHLORIDE 200 MILLIGRAM(S): 400 TABLET ORAL at 06:19

## 2024-05-02 RX ADMIN — Medication 3 MILLILITER(S): at 08:57

## 2024-05-02 RX ADMIN — Medication 3 MILLILITER(S): at 20:27

## 2024-05-02 RX ADMIN — Medication 15 MILLILITER(S): at 06:14

## 2024-05-02 RX ADMIN — Medication 4 MILLIGRAM(S): at 06:28

## 2024-05-02 RX ADMIN — OXYCODONE HYDROCHLORIDE 30 MILLIGRAM(S): 5 TABLET ORAL at 22:32

## 2024-05-02 RX ADMIN — SENNA PLUS 2 TABLET(S): 8.6 TABLET ORAL at 22:29

## 2024-05-02 RX ADMIN — OXYCODONE HYDROCHLORIDE 30 MILLIGRAM(S): 5 TABLET ORAL at 23:32

## 2024-05-02 RX ADMIN — BUDESONIDE AND FORMOTEROL FUMARATE DIHYDRATE 2 PUFF(S): 160; 4.5 AEROSOL RESPIRATORY (INHALATION) at 09:04

## 2024-05-02 RX ADMIN — PANTOPRAZOLE SODIUM 40 MILLIGRAM(S): 20 TABLET, DELAYED RELEASE ORAL at 17:25

## 2024-05-02 RX ADMIN — Medication 3 MILLILITER(S): at 03:23

## 2024-05-02 RX ADMIN — Medication 1 PATCH: at 19:16

## 2024-05-02 RX ADMIN — ENOXAPARIN SODIUM 50 MILLIGRAM(S): 100 INJECTION SUBCUTANEOUS at 06:15

## 2024-05-02 RX ADMIN — CEFEPIME 100 MILLIGRAM(S): 1 INJECTION, POWDER, FOR SOLUTION INTRAMUSCULAR; INTRAVENOUS at 06:35

## 2024-05-02 RX ADMIN — OXYCODONE HYDROCHLORIDE 30 MILLIGRAM(S): 5 TABLET ORAL at 16:00

## 2024-05-02 RX ADMIN — Medication 1 PATCH: at 17:38

## 2024-05-02 RX ADMIN — Medication 1 PATCH: at 11:00

## 2024-05-02 RX ADMIN — Medication 25 MILLIGRAM(S): at 17:25

## 2024-05-02 RX ADMIN — BUDESONIDE AND FORMOTEROL FUMARATE DIHYDRATE 2 PUFF(S): 160; 4.5 AEROSOL RESPIRATORY (INHALATION) at 22:32

## 2024-05-02 NOTE — PROVIDER CONTACT NOTE (FALL NOTIFICATION) - SITUATION
pt found sitting pretzel style on floor.  sitting up, A&Ox4. with minimal assistance able to stand and get back to bed.

## 2024-05-02 NOTE — CONSULT NOTE ADULT - SUBJECTIVE AND OBJECTIVE BOX
Interventional Radiology      HPI: 58y Female with R eye glaucoma, Fibromyalgia, Anxiety, GERD, and RCC s/p R total nephrectomy/hysterectomy; she was initially admitted to [facility] on 3/11 w/ R breast swelling c/f mastitis- imaging brooks noted supraclavicular/mediastinal mass lesion which encased the SVC and multiple other veins resulting in obliteration of the SVC and thrombosis of the R IJ, and a pancreatic neck cystic lesion.  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  Her disease/hospital course has also been c/b pericardial effusion s/p pericardial window w/ neg cytology, R pleural effusion, also negative cytology) s/p Pleurx, Afib w/ RVR and acute hypoxic resp failure 2/2 SVC syndrome- not dennis to IR-guided stenting; pt was ultimately started on Dex and transferred to [facility] on 4/4 for urgent palliative RT which she completed on 4/18. Her hospital course has also been c/b pericardial effusion with tamponade s/p window and non malignant R pleural effusion s/p pleurex, acute b/l UE and RLE dvts, anxiety, and more recently, recurrent hypoxia.    Allergies: erythromycin (Headache; Vomiting; Rash)  penicillin (Headache; Vomiting; Rash)  Cipro (Headache; Vomiting; Rash)    Medications (Abx/Cardiac/Anticoagulation/Blood Products)    aMIOdarone    Tablet: 200 milliGRAM(s) Oral (05-02 @ 06:19)  cefepime   IVPB: 100 mL/Hr IV Intermittent (05-02 @ 06:35)  enoxaparin Injectable: 50 milliGRAM(s) SubCutaneous (05-02 @ 06:15)  metoprolol tartrate: 25 milliGRAM(s) Oral (05-01 @ 18:24)    Data:    T(C): 36.7  HR: 93  BP: 100/77  RR: 20  SpO2: 100%    -WBC 8.04 / HgB 7.3 / Hct 23.8 / Plt 228  -Na 133 / Cl 100 / BUN 12 / Glucose 82  -K 4.3 / CO2 23 / Cr 0.75  -ALT 57 / Alk Phos 103 / T.Bili 0.4  -INR 0.91 / PTT 20.4      Radiology:   Imaging reviewed.    Assessment/Plan:   58y Female with RCC s/p R nephrectomy/hysterectomy, R eye glaucoma, Fibromyalgia, Anxiety, and GERD initially admitted to [facility] on 3/11 w/ R breast swelling. Imaging demonstrated supraclavicular/mediastinal mass lesion which encased the SVC and multiple other veins. Patient currently has a left peripheral IV. IR was consulted for femoral CVC placement for access for IV contrast CT study.    -- case discussed with Dr. Sherman  -- femoral central venous catheter placement is not indicated. CT C/A/P with IV contrast can be obtained using current peripheral IV.  -- no IR intervention is indicated at this time    --  Albaro Olmos MD PGY-3  Available on Microsoft Teams    - Non-emergent consults: Place IR consult order in [place]  - Emergent issues (pager): [facility] [phone]; [facility] [phone]; [phone]  - Scheduling questions: [facility] [phone]; [facility] [phone]  - Clinic/outpatient booking: [facility] [phone]; [facility] [phone] Interventional Radiology      HPI: 58y Female with R eye glaucoma, Fibromyalgia, Anxiety, GERD, and RCC s/p R total nephrectomy/hysterectomy; she was initially admitted to [facility] on 3/11 w/ R breast swelling c/f mastitis- imaging brooks noted supraclavicular/mediastinal mass lesion which encased the SVC and multiple other veins resulting in obliteration of the SVC and thrombosis of the R IJ, and a pancreatic neck cystic lesion.  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  Her disease/hospital course has also been c/b pericardial effusion s/p pericardial window w/ neg cytology, R pleural effusion, also negative cytology) s/p Pleurx, Afib w/ RVR and acute hypoxic resp failure 2/2 SVC syndrome- not dennis to IR-guided stenting; pt was ultimately started on Dex and transferred to [facility] on 4/4 for urgent palliative RT which she completed on 4/18. Her hospital course has also been c/b pericardial effusion with tamponade s/p window and non malignant R pleural effusion s/p pleurex, acute b/l UE and RLE dvts, anxiety, and more recently, recurrent hypoxia.    Allergies: erythromycin (Headache; Vomiting; Rash)  penicillin (Headache; Vomiting; Rash)  Cipro (Headache; Vomiting; Rash)    Medications (Abx/Cardiac/Anticoagulation/Blood Products)    aMIOdarone    Tablet: 200 milliGRAM(s) Oral (05-02 @ 06:19)  cefepime   IVPB: 100 mL/Hr IV Intermittent (05-02 @ 06:35)  enoxaparin Injectable: 50 milliGRAM(s) SubCutaneous (05-02 @ 06:15)  metoprolol tartrate: 25 milliGRAM(s) Oral (05-01 @ 18:24)    Data:    T(C): 36.7  HR: 93  BP: 100/77  RR: 20  SpO2: 100%    -WBC 8.04 / HgB 7.3 / Hct 23.8 / Plt 228  -Na 133 / Cl 100 / BUN 12 / Glucose 82  -K 4.3 / CO2 23 / Cr 0.75  -ALT 57 / Alk Phos 103 / T.Bili 0.4  -INR 0.91 / PTT 20.4      Radiology:   Imaging reviewed.    Assessment/Plan:   58y Female with RCC s/p R nephrectomy/hysterectomy, R eye glaucoma, Fibromyalgia, Anxiety, and GERD initially admitted to [facility] on 3/11 w/ R breast swelling. Imaging demonstrated supraclavicular/mediastinal mass lesion which encased the SVC and multiple other veins. Patient currently has a left peripheral IV. IR was consulted for femoral CVC placement for access for IV contrast CT study.    -- case discussed with Dr. Sherman  -- femoral central venous catheter placement is not indicated. CT C/A/P with IV contrast can be obtained using current peripheral IV.  -- no IR intervention is indicated at this time     --  Albaro Olmos MD PGY-3  Available on Microsoft Teams    - Non-emergent consults: Place IR consult order in [place]  - Emergent issues (pager): [facility] [phone]; [facility] [phone]; [phone]  - Scheduling questions: [facility] [phone]; [facility] [phone]  - Clinic/outpatient booking: [facility] [phone]; [facility] [phone]

## 2024-05-02 NOTE — PROGRESS NOTE ADULT - SUBJECTIVE AND OBJECTIVE BOX
Now on Emperic Aztreona/ Flagyl   ct a/p ord for staging Patient is a 58y old  Female who presents with a chief complaint of SVC syndrome, DVT, mediastinal mass (02 May 2024 08:52)      SUBJECTIVE / OVERNIGHT EVENTS:  explained need  line to get ct with contrast  also repeat cbc  Patient seen with PA  explained we need to do     MEDICATIONS  (STANDING):  albuterol/ipratropium for Nebulization 3 milliLiter(s) Nebulizer every 6 hours  aMIOdarone    Tablet 200 milliGRAM(s) Oral daily  Biotene Dry Mouth Oral Rinse 15 milliLiter(s) Swish and Spit every 6 hours  budesonide 160 MICROgram(s)/formoterol 4.5 MICROgram(s) Inhaler 2 Puff(s) Inhalation two times a day  cefepime   IVPB 2000 milliGRAM(s) IV Intermittent every 12 hours  chlorhexidine 2% Cloths 1 Application(s) Topical daily  dexAMETHasone  Injectable 4 milliGRAM(s) IV Push two times a day  diazepam    Tablet 5 milliGRAM(s) Oral at bedtime  influenza   Vaccine 0.5 milliLiter(s) IntraMuscular once  metoclopramide Injectable 10 milliGRAM(s) IV Push every 8 hours  metoprolol tartrate 25 milliGRAM(s) Oral every 12 hours  multivitamin 1 Tablet(s) Oral daily  naloxegol 25 milliGRAM(s) Oral daily  nicotine -  14 mG/24Hr(s) Patch 1 Patch Transdermal every 24 hours  oxyCODONE  ER Tablet 30 milliGRAM(s) Oral <User Schedule>  pantoprazole    Tablet 40 milliGRAM(s) Oral two times a day  polyethylene glycol 3350 17 Gram(s) Oral every 12 hours  senna 2 Tablet(s) Oral at bedtime  trimethoprim  160 mG/sulfamethoxazole 800 mG 1 Tablet(s) Oral <User Schedule>    MEDICATIONS  (PRN):  aluminum hydroxide/magnesium hydroxide/simethicone Suspension 30 milliLiter(s) Oral every 6 hours PRN Dyspepsia  Biotene Dry Mouth Oral Rinse 15 milliLiter(s) Swish and Spit two times a day PRN dry mouth  diazepam    Tablet 5 milliGRAM(s) Oral two times a day PRN anxiety  FIRST- Mouthwash  BLM 5 milliLiter(s) Swish and Spit four times a day PRN Mouth Care  HYDROmorphone  Injectable 1 milliGRAM(s) IV Push every 3 hours PRN Severe Pain (7 - 10)  ondansetron    Tablet 4 milliGRAM(s) Oral every 8 hours PRN Nausea and/or Vomiting  oxyCODONE    IR 10 milliGRAM(s) Oral every 4 hours PRN Moderate Pain (4 - 6)  sodium chloride 0.9% lock flush 10 milliLiter(s) IV Push every 1 hour PRN Pre/post blood products, medications, blood draw, and to maintain line patency      Vital Signs Last 24 Hrs  T(C): 36.7 (02 May 2024 12:00), Max: 36.7 (02 May 2024 12:00)  T(F): 98.1 (02 May 2024 12:00), Max: 98.1 (02 May 2024 12:00)  HR: 93 (02 May 2024 12:00) (78 - 93)  BP: 100/77 (02 May 2024 12:00) (90/52 - 147/74)  RR: 20 (02 May 2024 12:00) (13 - 20)  SpO2: 100% (02 May 2024 12:00) (96% - 100%)  Parameters below as of 02 May 2024 12:00  Patient On (Oxygen Delivery Method): nasal cannula  O2 Flow (L/min): 35  O2 Concentration (%): 65    PHYSICAL EXAM:  GENERAL: NAD,   HEAD:  Atraumatic, Normocephalic  EYES: EOMI, PERRLA, conjunctiva and sclera clear  NECK: Supple, No JVD  CHEST/LUNG: Clear to auscultation bilaterally; No wheeze  HEART: Regular rate and rhythm; No murmurs, rubs, or gallops  ABDOMEN: Soft, Nontender, Nondistended; Bowel sounds present  EXTREMITIES:  2+ Peripheral Pulses, No clubbing, cyanosis, or edema  PSYCH: Alert    LABS:                        7.3    8.04  )-----------( 228      ( 02 May 2024 11:10 )             23.8     05-02    133<L>  |  100  |  12  ----------------------------<  82  4.3   |  23  |  0.75    Ca    8.2<L>      02 May 2024 06:50  Phos  3.2     05-02  Mg     1.80     05-02    TPro  4.7<L>  /  Alb  2.1<L>  /  TBili  0.4  /  DBili  x   /  AST  13  /  ALT  57<H>  /  AlkPhos  103  05-02    repeat cbc hgb 6.9---> 7.3    Care Discussed with Consultants/Other Providers:  d/w patient/pa/bradley/sw  Now on Emperic Aztreona/ Flagyl   ct a/p ord for staging Patient is a 58y old  Female who presents with a chief complaint of SVC syndrome, DVT, mediastinal mass (02 May 2024 08:52)      SUBJECTIVE / OVERNIGHT EVENTS:  explained need  line to get ct with contrast  also repeat cbc  Patient seen with PA  explained we need to do Line to get CT with contrast    MEDICATIONS  (STANDING):  albuterol/ipratropium for Nebulization 3 milliLiter(s) Nebulizer every 6 hours  aMIOdarone    Tablet 200 milliGRAM(s) Oral daily  Biotene Dry Mouth Oral Rinse 15 milliLiter(s) Swish and Spit every 6 hours  budesonide 160 MICROgram(s)/formoterol 4.5 MICROgram(s) Inhaler 2 Puff(s) Inhalation two times a day  cefepime   IVPB 2000 milliGRAM(s) IV Intermittent every 12 hours  chlorhexidine 2% Cloths 1 Application(s) Topical daily  dexAMETHasone  Injectable 4 milliGRAM(s) IV Push two times a day  diazepam    Tablet 5 milliGRAM(s) Oral at bedtime  influenza   Vaccine 0.5 milliLiter(s) IntraMuscular once  metoclopramide Injectable 10 milliGRAM(s) IV Push every 8 hours  metoprolol tartrate 25 milliGRAM(s) Oral every 12 hours  multivitamin 1 Tablet(s) Oral daily  naloxegol 25 milliGRAM(s) Oral daily  nicotine -  14 mG/24Hr(s) Patch 1 Patch Transdermal every 24 hours  oxyCODONE  ER Tablet 30 milliGRAM(s) Oral <User Schedule>  pantoprazole    Tablet 40 milliGRAM(s) Oral two times a day  polyethylene glycol 3350 17 Gram(s) Oral every 12 hours  senna 2 Tablet(s) Oral at bedtime  trimethoprim  160 mG/sulfamethoxazole 800 mG 1 Tablet(s) Oral <User Schedule>    MEDICATIONS  (PRN):  aluminum hydroxide/magnesium hydroxide/simethicone Suspension 30 milliLiter(s) Oral every 6 hours PRN Dyspepsia  Biotene Dry Mouth Oral Rinse 15 milliLiter(s) Swish and Spit two times a day PRN dry mouth  diazepam    Tablet 5 milliGRAM(s) Oral two times a day PRN anxiety  FIRST- Mouthwash  BLM 5 milliLiter(s) Swish and Spit four times a day PRN Mouth Care  HYDROmorphone  Injectable 1 milliGRAM(s) IV Push every 3 hours PRN Severe Pain (7 - 10)  ondansetron    Tablet 4 milliGRAM(s) Oral every 8 hours PRN Nausea and/or Vomiting  oxyCODONE    IR 10 milliGRAM(s) Oral every 4 hours PRN Moderate Pain (4 - 6)  sodium chloride 0.9% lock flush 10 milliLiter(s) IV Push every 1 hour PRN Pre/post blood products, medications, blood draw, and to maintain line patency      Vital Signs Last 24 Hrs  T(C): 36.7 (02 May 2024 12:00), Max: 36.7 (02 May 2024 12:00)  T(F): 98.1 (02 May 2024 12:00), Max: 98.1 (02 May 2024 12:00)  HR: 93 (02 May 2024 12:00) (78 - 93)  BP: 100/77 (02 May 2024 12:00) (90/52 - 147/74)  RR: 20 (02 May 2024 12:00) (13 - 20)  SpO2: 100% (02 May 2024 12:00) (96% - 100%)  Parameters below as of 02 May 2024 12:00  Patient On (Oxygen Delivery Method): nasal cannula  O2 Flow (L/min): 35  O2 Concentration (%): 65    PHYSICAL EXAM:  GENERAL: NAD,   HEAD:  Atraumatic, Normocephalic  EYES: EOMI, PERRLA, conjunctiva and sclera clear  NECK: Supple, No JVD  CHEST/LUNG: Clear to auscultation bilaterally; No wheeze  HEART: Regular rate and rhythm; No murmurs, rubs, or gallops  ABDOMEN: Soft, Nontender, Nondistended; Bowel sounds present  EXTREMITIES:  2+ Peripheral Pulses, No clubbing, cyanosis, or edema  PSYCH: Alert    LABS:                        7.3    8.04  )-----------( 228      ( 02 May 2024 11:10 )             23.8     05-02    133<L>  |  100  |  12  ----------------------------<  82  4.3   |  23  |  0.75    Ca    8.2<L>      02 May 2024 06:50  Phos  3.2     05-02  Mg     1.80     05-02    TPro  4.7<L>  /  Alb  2.1<L>  /  TBili  0.4  /  DBili  x   /  AST  13  /  ALT  57<H>  /  AlkPhos  103  05-02    repeat cbc hgb 6.9---> 7.3    Care Discussed with Consultants/Other Providers:  d/w patient/pa/bradley/patty  Now on Emperic Aztreona/ Flagyl   ct a/p ord for staging

## 2024-05-02 NOTE — PROGRESS NOTE ADULT - ASSESSMENT
57 yo woman, former smoker, with h/o R eye glaucoma, Fibromyalgia, Anxiety, GERD, and RCC s/p R total nephrectomy/hysterectomy; she was initially admitted to [facility] on 3/11 w/ R breast swelling c/f mastitis- imaging brooks noted supraclavicular/mediastinal mass lesion which encased the SVC and multiple other veins resulting in obliteration of the SVC and thrombosis of the R IJ, and a pancreatic neck cystic lesion.  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  Her disease/hospital course has also been c/b pericardial effusion s/p pericardial window w/ neg cytology, R pleural effusion, also negative cytology) s/p Pleurx, Afib w/ RVR and acute hypoxic resp failure 2/2 SVC syndrome- not dennis to IR-guided stenting; pt was ultimately started on Dex and transferred to [facility] on 4/4 for urgent palliative RT which she completed on 4/18. Her hospital course has also been c/b pericardial effusion with tamponade s/p window and non malignant R pleural effusion s/p pleurex, acute b/l UE and RLE dvts, anxiety, and more recently, recurrent hypoxia.    ACTIVE PROBLEMS  Metastatic cancer  Acute-on-chronic hypoxic respiratory failure  SVC syndrome  Extensive b/l UE DVTs  Acute RLE DVT a/w RLE ecchymosis  Hypercoag state  Pericardial effusion with tamp physiology s/p pericardial window  R pleural effusion s/p pleurex  pAfib, with RVR on this admission  Anxiety/emotional lability  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)  Cancer-related pain, anxiety  Severe prot-ghazala malnutrition    - Metastatic cancer  Based on 3/14 SC LN path, ddx includes primary UGI vs pancreaticobiliary primary (breast edema is likely 2/2 SVC syndrome); PD-L1 TPs 1%, pMMR, Her2 pending  Ca-125 moderately elevated at 125; other tumor markers not significantly elevated  Additional diagnostic brooks includes:   * 4/19 MRCP showing pancreatic lesion (? IPMN, but pancreas was suboptimally evaluated)     * 4/24 and 4/26 EGD/EUS aborted as pt had large amount of food in the stomach that prevented passing of scope   * 4/30 UGIS aborted as pt felt too short of breath when laying down flat (improved after her pleurex was drained); repeat UGIS tentatively scheduled for 5/1  Options for systemic cancer treatment are TBD  Pt's prognosis is guarded    - Acute-on-chronic hypoxic resp failure  Pt with increased O2 requirement this morning- uptitrated from 2L > 6L to HF O2 40lpm/50%  C/f POD in the chest vs acute infection  Lactate 3.2, remaining ABG results pending  RVP, procal, and sputum cx ordered  Resuming Dex PO 4mg BID for possible inflammatory process (with ppi/pjp ppx; taper held)  Planning to initiate empiric abx- will discuss options with ID as pt does not have IV access and is PCN/FQ allergic  Continuing Symbicort 2 puffs BID  Continuing Duonebs q6/prn  Will continue routing pleurex drainage as below  Will continue weaning O2 as tolerated    - SVC syndrome  Appreciate Rad Onc eval/recs  Completed 10fxs of palliative RT on 4/18   Completing 2-wk Dex taper, until 5/7 (continuing pjp/ppi ppx until completion of taper)    - Extensive b/l UE DVTs  - Acute RLE DVT a/w RLE ecchymosis  - Hypercoag state  Continuing therapeutic lovenox (resumed on 4/18 given negative GIB brooks), benefits > risks   * Of note: pt has poor UE IV access and currently requires peripheral IV in her LE extremities for admin of medication; can consider [facility] central line for urgent/emergent IV needs    - Pericardial effusion with tamp physiology s/p pericardial window  - R pleural effusion s/p pleurex  Likely inflammatory; both pleural and pericardial fluid cytology is negative for malignant cells  4/9 surveillance TTE with pEF and no WMAs  Continuing R pleurex drainage- up to 500cc q48h/prn    - pAfib  Pt currently SR, HR controlled  Likely precipitated by malignancy, pericardial effusion, SVC syndrome  Continuing Metoprolol 25mg q12 with holding parameters  Continuing Amio 200mg daily (monitoring for toxicities)  Continuing telemetry    - Anxiety/emotional lability  Continuing Diazepam 5mg daily and daily/prn  Will fu with  re: additional med recs for improvement of pt's anxiety which at time has been a barrier to her inpt care  Pt has poor health literacy and clinical insight which is negatively impacting her medical decision making- when ask if she wanted to defer medical decision making to her boyfriend or if she had a designated HCP she replied "he's already sick of me"  Appreciate  consult: pt lacks medical decision making capacity; medical decision making will be deferred to pt's surrogate decision maker- Saeid Peña ([phone])    - 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)  Pt with h/o R eye glaucoma, but miosis can also be associated with Pancoast syndrome i/s/o extensive R upper lung tumor  Pt denies visual deficits or other related symptoms   MRI Brain without contrast ordered for further eval- pt continues to refuse, can be completed as outpt  Will continue to monitor closely    - Anemia in neoplastic disease  Hgb remains relatively stable  4/5 iron studies not c/w iron deficiency  VSS and pt without clinical s/s of active bleeding  4/17 hemolysis labs negative; 4/18 FOBT negative x2  Trending daily cbcs; continuing supportive transfusions prn (pt does not have h/o ACS, CAD, MI, goal hgb > 7; plts > 10K)    - Cancer related pain  Currently well controlled  Continuing Oxy ER 30mg q12 q8  Continuing Oxy IR 10mg q4/prn  Continuing Dilaudid IV prn for sev breakthrough pain  Continuing bowel regimen to prevent OIC (including Movantic)    - Severe prot-ghazala malnutrition: appreciate RD recs; continuing regular diet with protein shake supplement BID 59 yo woman, former smoker, with h/o R eye glaucoma, Fibromyalgia, Anxiety, GERD, and RCC s/p R total nephrectomy/hysterectomy; she was initially admitted to [facility] on 3/11 w/ R breast swelling c/f mastitis- imaging brooks noted supraclavicular/mediastinal mass lesion which encased the SVC and multiple other veins resulting in obliteration of the SVC and thrombosis of the R IJ, and a pancreatic neck cystic lesion.  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  Her disease/hospital course has also been c/b pericardial effusion s/p pericardial window w/ neg cytology, R pleural effusion, also negative cytology) s/p Pleurx, Afib w/ RVR and acute hypoxic resp failure 2/2 SVC syndrome- not dennis to IR-guided stenting; pt was ultimately started on Dex and transferred to [facility] on 4/4 for urgent palliative RT which she completed on 4/18. Her hospital course has also been c/b pericardial effusion with tamponade s/p window and non malignant R pleural effusion s/p pleurex, acute b/l UE and RLE dvts, anxiety, and more recently, recurrent hypoxia.    ACTIVE PROBLEMS  Metastatic cancer  Acute-on-chronic hypoxic respiratory failure  SVC syndrome  Extensive b/l UE DVTs  Acute RLE DVT a/w RLE ecchymosis  Hypercoag state  Pericardial effusion with tamp physiology s/p pericardial window  R pleural effusion s/p pleurex  pAfib, with RVR on this admission  Anxiety/emotional lability  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)  Cancer-related pain, anxiety  Severe prot-ghazala malnutrition      # ID  ----> Now on epimeric ----> Now on Emperic Aztreona/ Flagyl   # Onc need ct a/p for staging    - Metastatic cancer  Based on 3/14 SC LN path, ddx includes primary UGI vs pancreaticobiliary primary (breast edema is likely 2/2 SVC syndrome); PD-L1 TPs 1%, pMMR, Her2 pending  Ca-125 moderately elevated at 125; other tumor markers not significantly elevated  Additional diagnostic brooks includes:   * 4/19 MRCP showing pancreatic lesion (? IPMN, but pancreas was suboptimally evaluated)     * 4/24 and 4/26 EGD/EUS aborted as pt had large amount of food in the stomach that prevented passing of scope   * 4/30 UGIS aborted as pt felt too short of breath when laying down flat (improved after her pleurex was drained); repeat UGIS tentatively scheduled for 5/1  Options for systemic cancer treatment are TBD  Pt's prognosis is guarded    - Acute-on-chronic hypoxic resp failure  Pt with increased O2 requirement this morning- uptitrated from 2L > 6L to HF O2 40lpm/50%  C/f POD in the chest vs acute infection  Lactate 3.2, remaining ABG results pending  RVP, procal, and sputum cx ordered  Resuming Dex PO 4mg BID for possible inflammatory process (with ppi/pjp ppx; taper held)  Planning to initiate empiric abx- will discuss options with ID as pt does not have IV access and is PCN/FQ allergic  Continuing Symbicort 2 puffs BID  Continuing Duonebs q6/prn  Will continue routing pleurex drainage as below  Will continue weaning O2 as tolerated    - SVC syndrome  Appreciate Rad Onc eval/recs  Completed 10fxs of palliative RT on 4/18   Completing 2-wk Dex taper, until 5/7 (continuing pjp/ppi ppx until completion of taper)    - Extensive b/l UE DVTs  - Acute RLE DVT a/w RLE ecchymosis  - Hypercoag state  Continuing therapeutic lovenox (resumed on 4/18 given negative GIB brooks), benefits > risks   * Of note: pt has poor UE IV access and currently requires peripheral IV in her LE extremities for admin of medication; can consider [facility] central line for urgent/emergent IV needs    - Pericardial effusion with tamp physiology s/p pericardial window  - R pleural effusion s/p pleurex  Likely inflammatory; both pleural and pericardial fluid cytology is negative for malignant cells  4/9 surveillance TTE with pEF and no WMAs  Continuing R pleurex drainage- up to 500cc q48h/prn    - pAfib  Pt currently SR, HR controlled  Likely precipitated by malignancy, pericardial effusion, SVC syndrome  Continuing Metoprolol 25mg q12 with holding parameters  Continuing Amio 200mg daily (monitoring for toxicities)  Continuing telemetry    - Anxiety/emotional lability  Continuing Diazepam 5mg daily and daily/prn  Will fu with  re: additional med recs for improvement of pt's anxiety which at time has been a barrier to her inpt care  Pt has poor health literacy and clinical insight which is negatively impacting her medical decision making- when ask if she wanted to defer medical decision making to her boyfriend or if she had a designated HCP she replied "he's already sick of me"  Appreciate  consult: pt lacks medical decision making capacity; medical decision making will be deferred to pt's surrogate decision maker- Saeid Peña ([phone])    - 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)  Pt with h/o R eye glaucoma, but miosis can also be associated with Pancoast syndrome i/s/o extensive R upper lung tumor  Pt denies visual deficits or other related symptoms   MRI Brain without contrast ordered for further eval- pt continues to refuse, can be completed as outpt  Will continue to monitor closely    - Anemia in neoplastic disease  Hgb remains relatively stable  4/5 iron studies not c/w iron deficiency  VSS and pt without clinical s/s of active bleeding  4/17 hemolysis labs negative; 4/18 FOBT negative x2  Trending daily cbcs; continuing supportive transfusions prn (pt does not have h/o ACS, CAD, MI, goal hgb > 7; plts > 10K)    - Cancer related pain  Currently well controlled  Continuing Oxy ER 30mg q12 q8  Continuing Oxy IR 10mg q4/prn  Continuing Dilaudid IV prn for sev breakthrough pain  Continuing bowel regimen to prevent OIC (including Movantic)    - Severe prot-ghazala malnutrition: appreciate RD recs; continuing regular diet with protein shake supplement BID

## 2024-05-02 NOTE — PROVIDER CONTACT NOTE (FALL NOTIFICATION) - ASSESSMENT
no signs of injury noted, no bleeding.  A&Ox4, baseline anxiety present.  patient not complaining of pain.

## 2024-05-03 ENCOUNTER — RESULT REVIEW (OUTPATIENT)
Age: 59
End: 2024-05-03

## 2024-05-03 LAB
ALBUMIN SERPL ELPH-MCNC: 2.4 G/DL — LOW (ref 3.3–5)
ALP SERPL-CCNC: 97 U/L — SIGNIFICANT CHANGE UP (ref 40–120)
ALT FLD-CCNC: 59 U/L — HIGH (ref 4–33)
ANION GAP SERPL CALC-SCNC: 10 MMOL/L — SIGNIFICANT CHANGE UP (ref 7–14)
AST SERPL-CCNC: 6 U/L — SIGNIFICANT CHANGE UP (ref 4–32)
BASOPHILS # BLD AUTO: 0 K/UL — SIGNIFICANT CHANGE UP (ref 0–0.2)
BASOPHILS NFR BLD AUTO: 0 % — SIGNIFICANT CHANGE UP (ref 0–2)
BILIRUB SERPL-MCNC: 0.3 MG/DL — SIGNIFICANT CHANGE UP (ref 0.2–1.2)
BUN SERPL-MCNC: 11 MG/DL — SIGNIFICANT CHANGE UP (ref 7–23)
CALCIUM SERPL-MCNC: 8.4 MG/DL — SIGNIFICANT CHANGE UP (ref 8.4–10.5)
CHLORIDE SERPL-SCNC: 100 MMOL/L — SIGNIFICANT CHANGE UP (ref 98–107)
CO2 SERPL-SCNC: 25 MMOL/L — SIGNIFICANT CHANGE UP (ref 22–31)
CREAT SERPL-MCNC: 0.92 MG/DL — SIGNIFICANT CHANGE UP (ref 0.5–1.3)
EGFR: 71 ML/MIN/1.73M2 — SIGNIFICANT CHANGE UP
EOSINOPHIL # BLD AUTO: 0 K/UL — SIGNIFICANT CHANGE UP (ref 0–0.5)
EOSINOPHIL NFR BLD AUTO: 0 % — SIGNIFICANT CHANGE UP (ref 0–6)
GLUCOSE SERPL-MCNC: 138 MG/DL — HIGH (ref 70–99)
HCT VFR BLD CALC: 22.5 % — LOW (ref 34.5–45)
HGB BLD-MCNC: 7.2 G/DL — LOW (ref 11.5–15.5)
IANC: 5.85 K/UL — SIGNIFICANT CHANGE UP (ref 1.8–7.4)
IMM GRANULOCYTES NFR BLD AUTO: 1.4 % — HIGH (ref 0–0.9)
LYMPHOCYTES # BLD AUTO: 0.11 K/UL — LOW (ref 1–3.3)
LYMPHOCYTES # BLD AUTO: 1.8 % — LOW (ref 13–44)
MAGNESIUM SERPL-MCNC: 1.6 MG/DL — SIGNIFICANT CHANGE UP (ref 1.6–2.6)
MCHC RBC-ENTMCNC: 30.1 PG — SIGNIFICANT CHANGE UP (ref 27–34)
MCHC RBC-ENTMCNC: 32 GM/DL — SIGNIFICANT CHANGE UP (ref 32–36)
MCV RBC AUTO: 94.1 FL — SIGNIFICANT CHANGE UP (ref 80–100)
MONOCYTES # BLD AUTO: 0.21 K/UL — SIGNIFICANT CHANGE UP (ref 0–0.9)
MONOCYTES NFR BLD AUTO: 3.4 % — SIGNIFICANT CHANGE UP (ref 2–14)
NEUTROPHILS # BLD AUTO: 5.85 K/UL — SIGNIFICANT CHANGE UP (ref 1.8–7.4)
NEUTROPHILS NFR BLD AUTO: 93.4 % — HIGH (ref 43–77)
NRBC # BLD: 0 /100 WBCS — SIGNIFICANT CHANGE UP (ref 0–0)
NRBC # FLD: 0 K/UL — SIGNIFICANT CHANGE UP (ref 0–0)
PHOSPHATE SERPL-MCNC: 3.6 MG/DL — SIGNIFICANT CHANGE UP (ref 2.5–4.5)
PLATELET # BLD AUTO: 258 K/UL — SIGNIFICANT CHANGE UP (ref 150–400)
POTASSIUM SERPL-MCNC: 4 MMOL/L — SIGNIFICANT CHANGE UP (ref 3.5–5.3)
POTASSIUM SERPL-SCNC: 4 MMOL/L — SIGNIFICANT CHANGE UP (ref 3.5–5.3)
PROT SERPL-MCNC: 4.9 G/DL — LOW (ref 6–8.3)
RBC # BLD: 2.39 M/UL — LOW (ref 3.8–5.2)
RBC # FLD: 16.2 % — HIGH (ref 10.3–14.5)
SODIUM SERPL-SCNC: 135 MMOL/L — SIGNIFICANT CHANGE UP (ref 135–145)
WBC # BLD: 6.26 K/UL — SIGNIFICANT CHANGE UP (ref 3.8–10.5)
WBC # FLD AUTO: 6.26 K/UL — SIGNIFICANT CHANGE UP (ref 3.8–10.5)

## 2024-05-03 PROCEDURE — 76376 3D RENDER W/INTRP POSTPROCES: CPT | Mod: 26

## 2024-05-03 PROCEDURE — 74177 CT ABD & PELVIS W/CONTRAST: CPT | Mod: 26

## 2024-05-03 PROCEDURE — 93356 MYOCRD STRAIN IMG SPCKL TRCK: CPT

## 2024-05-03 PROCEDURE — 71260 CT THORAX DX C+: CPT | Mod: 26

## 2024-05-03 PROCEDURE — 99233 SBSQ HOSP IP/OBS HIGH 50: CPT

## 2024-05-03 PROCEDURE — 93306 TTE W/DOPPLER COMPLETE: CPT | Mod: 26

## 2024-05-03 RX ORDER — BENZOCAINE AND MENTHOL 5; 1 G/100ML; G/100ML
1 LIQUID ORAL ONCE
Refills: 0 | Status: COMPLETED | OUTPATIENT
Start: 2024-05-03 | End: 2024-05-03

## 2024-05-03 RX ADMIN — Medication 25 MILLIGRAM(S): at 18:07

## 2024-05-03 RX ADMIN — Medication 4 MILLIGRAM(S): at 17:59

## 2024-05-03 RX ADMIN — Medication 3 MILLILITER(S): at 04:46

## 2024-05-03 RX ADMIN — ENOXAPARIN SODIUM 50 MILLIGRAM(S): 100 INJECTION SUBCUTANEOUS at 18:06

## 2024-05-03 RX ADMIN — Medication 25 MILLIGRAM(S): at 05:06

## 2024-05-03 RX ADMIN — CEFEPIME 100 MILLIGRAM(S): 1 INJECTION, POWDER, FOR SOLUTION INTRAMUSCULAR; INTRAVENOUS at 05:07

## 2024-05-03 RX ADMIN — SENNA PLUS 2 TABLET(S): 8.6 TABLET ORAL at 21:01

## 2024-05-03 RX ADMIN — NALOXEGOL OXALATE 25 MILLIGRAM(S): 12.5 TABLET, FILM COATED ORAL at 12:44

## 2024-05-03 RX ADMIN — Medication 1 PATCH: at 07:42

## 2024-05-03 RX ADMIN — POLYETHYLENE GLYCOL 3350 17 GRAM(S): 17 POWDER, FOR SOLUTION ORAL at 18:06

## 2024-05-03 RX ADMIN — CEFEPIME 100 MILLIGRAM(S): 1 INJECTION, POWDER, FOR SOLUTION INTRAMUSCULAR; INTRAVENOUS at 17:59

## 2024-05-03 RX ADMIN — Medication 15 MILLILITER(S): at 12:43

## 2024-05-03 RX ADMIN — BUDESONIDE AND FORMOTEROL FUMARATE DIHYDRATE 2 PUFF(S): 160; 4.5 AEROSOL RESPIRATORY (INHALATION) at 09:23

## 2024-05-03 RX ADMIN — PANTOPRAZOLE SODIUM 40 MILLIGRAM(S): 20 TABLET, DELAYED RELEASE ORAL at 18:08

## 2024-05-03 RX ADMIN — BENZOCAINE AND MENTHOL 1 LOZENGE: 5; 1 LIQUID ORAL at 22:27

## 2024-05-03 RX ADMIN — Medication 15 MILLILITER(S): at 18:06

## 2024-05-03 RX ADMIN — Medication 15 MILLILITER(S): at 05:11

## 2024-05-03 RX ADMIN — Medication 3 MILLILITER(S): at 15:22

## 2024-05-03 RX ADMIN — Medication 5 MILLIGRAM(S): at 00:25

## 2024-05-03 RX ADMIN — OXYCODONE HYDROCHLORIDE 30 MILLIGRAM(S): 5 TABLET ORAL at 14:19

## 2024-05-03 RX ADMIN — Medication 1 TABLET(S): at 09:23

## 2024-05-03 RX ADMIN — Medication 5 MILLIGRAM(S): at 21:01

## 2024-05-03 RX ADMIN — CHLORHEXIDINE GLUCONATE 1 APPLICATION(S): 213 SOLUTION TOPICAL at 12:42

## 2024-05-03 RX ADMIN — Medication 15 MILLILITER(S): at 00:26

## 2024-05-03 RX ADMIN — PANTOPRAZOLE SODIUM 40 MILLIGRAM(S): 20 TABLET, DELAYED RELEASE ORAL at 05:06

## 2024-05-03 RX ADMIN — AMIODARONE HYDROCHLORIDE 200 MILLIGRAM(S): 400 TABLET ORAL at 05:06

## 2024-05-03 RX ADMIN — ENOXAPARIN SODIUM 50 MILLIGRAM(S): 100 INJECTION SUBCUTANEOUS at 05:06

## 2024-05-03 RX ADMIN — OXYCODONE HYDROCHLORIDE 30 MILLIGRAM(S): 5 TABLET ORAL at 05:10

## 2024-05-03 RX ADMIN — Medication 4 MILLIGRAM(S): at 05:07

## 2024-05-03 RX ADMIN — Medication 3 MILLILITER(S): at 21:32

## 2024-05-03 RX ADMIN — Medication 1 PATCH: at 12:42

## 2024-05-03 RX ADMIN — Medication 1 PATCH: at 19:52

## 2024-05-03 RX ADMIN — Medication 1 TABLET(S): at 12:44

## 2024-05-03 RX ADMIN — BUDESONIDE AND FORMOTEROL FUMARATE DIHYDRATE 2 PUFF(S): 160; 4.5 AEROSOL RESPIRATORY (INHALATION) at 21:01

## 2024-05-03 RX ADMIN — Medication 3 MILLILITER(S): at 07:28

## 2024-05-03 RX ADMIN — POLYETHYLENE GLYCOL 3350 17 GRAM(S): 17 POWDER, FOR SOLUTION ORAL at 05:06

## 2024-05-03 NOTE — CHART NOTE - NSCHARTNOTEFT_GEN_A_CORE
< from: CT Abdomen and Pelvis w/ IV Cont (05.03.24 @ 12:32) >    IMPRESSION:  Since the prior chest CT 3/27/2024:  Persistent right middle lobe consolidation and decreased right lower lobe   consolidation.  New patchy nodular opacities in the right upper lobe and increased patchy   and groundglass opacities in the left lung.  Small right pleural effusion, unchanged. Trace left pleural effusion,   decreased.  No evidence of metastatic disease in the abdomen or pelvis.    < end of copied text >    D/w Sig other Hermin. Patient did not see Onc prior to hospital visit, will ask Onc in hosp to f/u CT

## 2024-05-03 NOTE — PROGRESS NOTE ADULT - ASSESSMENT
57 yo woman, former smoker, with h/o R eye glaucoma, Fibromyalgia, Anxiety, GERD, and RCC s/p R total nephrectomy/hysterectomy; she was initially admitted to [facility] on 3/11 w/ R breast swelling c/f mastitis- imaging brooks noted supraclavicular/mediastinal mass lesion which encased the SVC and multiple other veins resulting in obliteration of the SVC and thrombosis of the R IJ, and a pancreatic neck cystic lesion.  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  Her disease/hospital course has also been c/b pericardial effusion s/p pericardial window w/ neg cytology, R pleural effusion, also negative cytology) s/p Pleurx, Afib w/ RVR and acute hypoxic resp failure 2/2 SVC syndrome- not dennis to IR-guided stenting; pt was ultimately started on Dex and transferred to [facility] on 4/4 for urgent palliative RT which she completed on 4/18. Her hospital course has also been c/b pericardial effusion with tamponade s/p window and non malignant R pleural effusion s/p pleurex, acute b/l UE and RLE dvts, anxiety, and more recently, recurrent hypoxia.    ACTIVE PROBLEMS  Metastatic cancer  Acute-on-chronic hypoxic respiratory failure  SVC syndrome  Extensive b/l UE DVTs  Acute RLE DVT a/w RLE ecchymosis  Hypercoag state  Pericardial effusion with tamp physiology s/p pericardial window  R pleural effusion s/p pleurex  pAfib, with RVR on this admission  Anxiety/emotional lability  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)  Cancer-related pain, anxiety  Severe prot-ghazala malnutrition      # ID  ----> Now on epimeric ----> Now on Emperic Aztreona/ Flagyl   # Onc need ct a/p for staging    - Metastatic cancer  Based on 3/14 SC LN path, ddx includes primary UGI vs pancreaticobiliary primary (breast edema is likely 2/2 SVC syndrome); PD-L1 TPs 1%, pMMR, Her2 pending  Ca-125 moderately elevated at 125; other tumor markers not significantly elevated  Additional diagnostic brooks includes:   * 4/19 MRCP showing pancreatic lesion (? IPMN, but pancreas was suboptimally evaluated)     * 4/24 and 4/26 EGD/EUS aborted as pt had large amount of food in the stomach that prevented passing of scope   * 4/30 UGIS aborted as pt felt too short of breath when laying down flat (improved after her pleurex was drained); repeat UGIS tentatively scheduled for 5/1  Options for systemic cancer treatment are TBD  Pt's prognosis is guarded    - Acute-on-chronic hypoxic resp failure  Pt with increased O2 requirement this morning- uptitrated from 2L > 6L to HF O2 40lpm/50%  C/f POD in the chest vs acute infection  Lactate 3.2, remaining ABG results pending  RVP, procal, and sputum cx ordered  Resuming Dex PO 4mg BID for possible inflammatory process (with ppi/pjp ppx; taper held)  Planning to initiate empiric abx- will discuss options with ID as pt does not have IV access and is PCN/FQ allergic  Continuing Symbicort 2 puffs BID  Continuing Duonebs q6/prn  Will continue routing pleurex drainage as below  Will continue weaning O2 as tolerated    - SVC syndrome  Appreciate Rad Onc eval/recs  Completed 10fxs of palliative RT on 4/18   Completing 2-wk Dex taper, until 5/7 (continuing pjp/ppi ppx until completion of taper)    - Extensive b/l UE DVTs  - Acute RLE DVT a/w RLE ecchymosis  - Hypercoag state  Continuing therapeutic lovenox (resumed on 4/18 given negative GIB brooks), benefits > risks   * Of note: pt has poor UE IV access and currently requires peripheral IV in her LE extremities for admin of medication; can consider [facility] central line for urgent/emergent IV needs    - Pericardial effusion with tamp physiology s/p pericardial window  - R pleural effusion s/p pleurex  Likely inflammatory; both pleural and pericardial fluid cytology is negative for malignant cells  4/9 surveillance TTE with pEF and no WMAs  Continuing R pleurex drainage- up to 500cc q48h/prn    - pAfib  Pt currently SR, HR controlled  Likely precipitated by malignancy, pericardial effusion, SVC syndrome  Continuing Metoprolol 25mg q12 with holding parameters  Continuing Amio 200mg daily (monitoring for toxicities)  Continuing telemetry    - Anxiety/emotional lability  Continuing Diazepam 5mg daily and daily/prn  Will fu with  re: additional med recs for improvement of pt's anxiety which at time has been a barrier to her inpt care  Pt has poor health literacy and clinical insight which is negatively impacting her medical decision making- when ask if she wanted to defer medical decision making to her boyfriend or if she had a designated HCP she replied "he's already sick of me"  Appreciate  consult: pt lacks medical decision making capacity; medical decision making will be deferred to pt's surrogate decision maker- Saeid Peña ([phone])    - 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)  Pt with h/o R eye glaucoma, but miosis can also be associated with Pancoast syndrome i/s/o extensive R upper lung tumor  Pt denies visual deficits or other related symptoms   MRI Brain without contrast ordered for further eval- pt continues to refuse, can be completed as outpt  Will continue to monitor closely    - Anemia in neoplastic disease  Hgb remains relatively stable  4/5 iron studies not c/w iron deficiency  VSS and pt without clinical s/s of active bleeding  4/17 hemolysis labs negative; 4/18 FOBT negative x2  Trending daily cbcs; continuing supportive transfusions prn (pt does not have h/o ACS, CAD, MI, goal hgb > 7; plts > 10K)    - Cancer related pain  Currently well controlled  Continuing Oxy ER 30mg q12 q8  Continuing Oxy IR 10mg q4/prn  Continuing Dilaudid IV prn for sev breakthrough pain  Continuing bowel regimen to prevent OIC (including Movantic)    - Severe prot-ghazala malnutrition: appreciate RD recs; continuing regular diet with protein shake supplement BID 59 yo woman, former smoker, with h/o R eye glaucoma, Fibromyalgia, Anxiety, GERD, and RCC s/p R total nephrectomy/hysterectomy; she was initially admitted to [facility] on 3/11 w/ R breast swelling c/f mastitis- imaging brooks noted supraclavicular/mediastinal mass lesion which encased the SVC and multiple other veins resulting in obliteration of the SVC and thrombosis of the R IJ, and a pancreatic neck cystic lesion.  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  Her disease/hospital course has also been c/b pericardial effusion s/p pericardial window w/ neg cytology, R pleural effusion, also negative cytology) s/p Pleurx, Afib w/ RVR and acute hypoxic resp failure 2/2 SVC syndrome- not dennis to IR-guided stenting; pt was ultimately started on Dex and transferred to [facility] on 4/4 for urgent palliative RT which she completed on 4/18. Her hospital course has also been c/b pericardial effusion with tamponade s/p window and non malignant R pleural effusion s/p pleurex, acute b/l UE and RLE dvts, anxiety, and more recently, recurrent hypoxia.    ACTIVE PROBLEMS  Metastatic cancer  Acute-on-chronic hypoxic respiratory failure  SVC syndrome  Extensive b/l UE DVTs  Acute RLE DVT a/w RLE ecchymosis  Hypercoag state  Pericardial effusion with tamp physiology s/p pericardial window  R pleural effusion s/p pleurex  pAfib, with RVR on this admission  Anxiety/emotional lability  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)  Cancer-related pain, anxiety  Severe prot-ghazala malnutrition      # ID  ----> Now on epimeric ----> Now on Emperic Aztreona/ Flagyl   # Onc need ct a/p for staging    - Metastatic cancer  Based on 3/14 SC LN path, ddx includes primary UGI vs pancreaticobiliary primary (breast edema is likely 2/2 SVC syndrome); PD-L1 TPs 1%, pMMR, Her2 pending  Ca-125 moderately elevated at 125; other tumor markers not significantly elevated  Additional diagnostic brooks includes:   * 4/19 MRCP showing pancreatic lesion (? IPMN, but pancreas was suboptimally evaluated)     * 4/24 and 4/26 EGD/EUS aborted as pt had large amount of food in the stomach that prevented passing of scope   * 4/30 UGIS aborted as pt felt too short of breath when laying down flat (improved after her pleurex was drained); repeat UGIS tentatively scheduled for 5/1  Options for systemic cancer treatment are TBD  Pt's prognosis is guarded    - Acute-on-chronic hypoxic resp failure  Pt with increased O2 requirement this morning- uptitrated from 2L > 6L to HF O2 40lpm/50%  C/f POD in the chest vs acute infection  Lactate 3.2, remaining ABG results pending  RVP, procal, and sputum cx ordered  Resuming Dex PO 4mg BID for possible inflammatory process (with ppi/pjp ppx; taper held)  Planning to initiate empiric abx- will discuss options with ID as pt does not have IV access and is PCN/FQ allergic  Continuing Symbicort 2 puffs BID  Continuing Duonebs q6/prn  Will continue routing pleurex drainage as below  Will continue weaning O2 as tolerated  5/3 still on HF    - SVC syndrome  Appreciate Rad Onc eval/recs  Completed 10fxs of palliative RT on 4/18   Completing 2-wk Dex taper, until 5/7 (continuing pjp/ppi ppx until completion of taper)    - Extensive b/l UE DVTs  - Acute RLE DVT a/w RLE ecchymosis  - Hypercoag state  Continuing therapeutic lovenox (resumed on 4/18 given negative GIB brooks), benefits > risks   * Of note: pt has poor UE IV access and currently requires peripheral IV in her LE extremities for admin of medication; can consider [facility] central line for urgent/emergent IV needs    - Pericardial effusion with tamp physiology s/p pericardial window  - R pleural effusion s/p pleurex  Likely inflammatory; both pleural and pericardial fluid cytology is negative for malignant cells  4/9 surveillance TTE with pEF and no WMAs  Continuing R pleurex drainage- up to 500cc q48h/prn    - pAfib  Pt currently SR, HR controlled  Likely precipitated by malignancy, pericardial effusion, SVC syndrome  Continuing Metoprolol 25mg q12 with holding parameters  Continuing Amio 200mg daily (monitoring for toxicities)  Continuing telemetry    - Anxiety/emotional lability  Continuing Diazepam 5mg daily and daily/prn  Will fu with  re: additional med recs for improvement of pt's anxiety which at time has been a barrier to her inpt care  Pt has poor health literacy and clinical insight which is negatively impacting her medical decision making- when ask if she wanted to defer medical decision making to her boyfriend or if she had a designated HCP she replied "he's already sick of me"  Appreciate  consult: pt lacks medical decision making capacity; medical decision making will be deferred to pt's surrogate decision maker- Saeid Peña ([phone])    - 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)  Pt with h/o R eye glaucoma, but miosis can also be associated with Pancoast syndrome i/s/o extensive R upper lung tumor  Pt denies visual deficits or other related symptoms   MRI Brain without contrast ordered for further eval- pt continues to refuse, can be completed as outpt  Will continue to monitor closely    - Anemia in neoplastic disease  Hgb remains relatively stable  4/5 iron studies not c/w iron deficiency  VSS and pt without clinical s/s of active bleeding  4/17 hemolysis labs negative; 4/18 FOBT negative x2  Trending daily cbcs; continuing supportive transfusions prn (pt does not have h/o ACS, CAD, MI, goal hgb > 7; plts > 10K)    - Cancer related pain  Currently well controlled  Continuing Oxy ER 30mg q12 q8  Continuing Oxy IR 10mg q4/prn  Continuing Dilaudid IV prn for sev breakthrough pain  Continuing bowel regimen to prevent OIC (including Movantic)    - Severe prot-ghazala malnutrition: appreciate  recs; continuing regular diet with protein shake supplement BID 59 yo woman, former smoker, with h/o R eye glaucoma, Fibromyalgia, Anxiety, GERD, and RCC s/p R total nephrectomy/hysterectomy; she was initially admitted to [facility] on 3/11 w/ R breast swelling c/f mastitis- imaging brooks noted supraclavicular/mediastinal mass lesion which encased the SVC and multiple other veins resulting in obliteration of the SVC and thrombosis of the R IJ, and a pancreatic neck cystic lesion.  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  Her disease/hospital course has also been c/b pericardial effusion s/p pericardial window w/ neg cytology, R pleural effusion, also negative cytology) s/p Pleurx, Afib w/ RVR and acute hypoxic resp failure 2/2 SVC syndrome- not dennis to IR-guided stenting; pt was ultimately started on Dex and transferred to [facility] on 4/4 for urgent palliative RT which she completed on 4/18. Her hospital course has also been c/b pericardial effusion with tamponade s/p window and non malignant R pleural effusion s/p pleurex, acute b/l UE and RLE dvts, anxiety, and more recently, recurrent hypoxia.    ACTIVE PROBLEMS  Metastatic cancer  Acute-on-chronic hypoxic respiratory failure  SVC syndrome  Extensive b/l UE DVTs  Acute RLE DVT a/w RLE ecchymosis  Hypercoag state  Pericardial effusion with tamp physiology s/p pericardial window  R pleural effusion s/p pleurex  pAfib, with RVR on this admission  Anxiety/emotional lability  Anemia of Chronic Dz---> Hgb 7.2---> cbc and t/s in AM. also ferritin / b12/ tibc  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)  Cancer-related pain, anxiety  Severe prot-ghazala malnutrition      # ID  ----> Now on epimeric ----> Now on Emperic Aztreona/ Flagyl   # Onc need ct a/p for staging    - Metastatic cancer  Based on 3/14 SC LN path, ddx includes primary UGI vs pancreaticobiliary primary (breast edema is likely 2/2 SVC syndrome); PD-L1 TPs 1%, pMMR, Her2 pending  Ca-125 moderately elevated at 125; other tumor markers not significantly elevated  Additional diagnostic brooks includes:   * 4/19 MRCP showing pancreatic lesion (? IPMN, but pancreas was suboptimally evaluated)     * 4/24 and 4/26 EGD/EUS aborted as pt had large amount of food in the stomach that prevented passing of scope   * 4/30 UGIS aborted as pt felt too short of breath when laying down flat (improved after her pleurex was drained); repeat UGIS tentatively scheduled for 5/1  Options for systemic cancer treatment are TBD  Pt's prognosis is guarded    - Acute-on-chronic hypoxic resp failure  Pt with increased O2 requirement this morning- uptitrated from 2L > 6L to HF O2 40lpm/50%  C/f POD in the chest vs acute infection  Lactate 3.2, remaining ABG results pending  RVP, procal, and sputum cx ordered  Resuming Dex PO 4mg BID for possible inflammatory process (with ppi/pjp ppx; taper held)  Planning to initiate empiric abx- will discuss options with ID as pt does not have IV access and is PCN/FQ allergic  Continuing Symbicort 2 puffs BID  Continuing Duonebs q6/prn  Will continue routing pleurex drainage as below  Will continue weaning O2 as tolerated  5/3 still on HF    - SVC syndrome  Appreciate Rad Onc eval/recs  Completed 10fxs of palliative RT on 4/18   Completing 2-wk Dex taper, until 5/7 (continuing pjp/ppi ppx until completion of taper)    - Extensive b/l UE DVTs  - Acute RLE DVT a/w RLE ecchymosis  - Hypercoag state  Continuing therapeutic lovenox (resumed on 4/18 given negative GIB brooks), benefits > risks   * Of note: pt has poor UE IV access and currently requires peripheral IV in her LE extremities for admin of medication; can consider [facility] central line for urgent/emergent IV needs    - Pericardial effusion with tamp physiology s/p pericardial window  - R pleural effusion s/p pleurex  Likely inflammatory; both pleural and pericardial fluid cytology is negative for malignant cells  4/9 surveillance TTE with pEF and no WMAs  Continuing R pleurex drainage- up to 500cc q48h/prn    - pAfib  Pt currently SR, HR controlled  Likely precipitated by malignancy, pericardial effusion, SVC syndrome  Continuing Metoprolol 25mg q12 with holding parameters  Continuing Amio 200mg daily (monitoring for toxicities)  Continuing telemetry    - Anxiety/emotional lability  Continuing Diazepam 5mg daily and daily/prn  Will fu with  re: additional med recs for improvement of pt's anxiety which at time has been a barrier to her inpt care  Pt has poor health literacy and clinical insight which is negatively impacting her medical decision making- when ask if she wanted to defer medical decision making to her boyfriend or if she had a designated HCP she replied "he's already sick of me"  Appreciate  consult: pt lacks medical decision making capacity; medical decision making will be deferred to pt's surrogate decision maker- Saeid Peña ([phone])    - 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)  Pt with h/o R eye glaucoma, but miosis can also be associated with Pancoast syndrome i/s/o extensive R upper lung tumor  Pt denies visual deficits or other related symptoms   MRI Brain without contrast ordered for further eval- pt continues to refuse, can be completed as outpt  Will continue to monitor closely    - Anemia in neoplastic disease  Hgb remains relatively stable  4/5 iron studies not c/w iron deficiency  VSS and pt without clinical s/s of active bleeding  4/17 hemolysis labs negative; 4/18 FOBT negative x2  Trending daily cbcs; continuing supportive transfusions prn (pt does not have h/o ACS, CAD, MI, goal hgb > 7; plts > 10K)    - Cancer related pain  Currently well controlled  Continuing Oxy ER 30mg q12 q8  Continuing Oxy IR 10mg q4/prn  Continuing Dilaudid IV prn for sev breakthrough pain  Continuing bowel regimen to prevent OIC (including Movantic)    - Severe prot-ghazala malnutrition: appreciate RD recs; continuing regular diet with protein shake supplement BID 57 yo woman, former smoker, with h/o R eye glaucoma, Fibromyalgia, Anxiety, GERD, and RCC s/p R total nephrectomy/hysterectomy; she was initially admitted to [facility] on 3/11 w/ R breast swelling c/f mastitis- imaging brooks noted supraclavicular/mediastinal mass lesion which encased the SVC and multiple other veins resulting in obliteration of the SVC and thrombosis of the R IJ, and a pancreatic neck cystic lesion.  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  Her disease/hospital course has also been c/b pericardial effusion s/p pericardial window w/ neg cytology, R pleural effusion, also negative cytology) s/p Pleurx, Afib w/ RVR and acute hypoxic resp failure 2/2 SVC syndrome- not dennis to IR-guided stenting; pt was ultimately started on Dex and transferred to [facility] on 4/4 for urgent palliative RT which she completed on 4/18. Her hospital course has also been c/b pericardial effusion with tamponade s/p window and non malignant R pleural effusion s/p pleurex, acute b/l UE and RLE dvts, anxiety, and more recently, recurrent hypoxia.    ACTIVE PROBLEMS  Metastatic cancer  Acute-on-chronic hypoxic respiratory failure  SVC syndrome  Extensive b/l UE DVTs  Acute RLE DVT a/w RLE ecchymosis  Hypercoag state  Pericardial effusion with tamp physiology s/p pericardial window  R pleural effusion s/p pleurex  pAfib, with RVR on this admission  Anxiety/emotional lability  Anemia of Chronic Dz---> Hgb 7.2---> cbc and t/s in AM. also ferritin / b12/ tibc  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)  Cancer-related pain, anxiety  Severe prot-ghazala malnutrition      # ID  ----> Now on epimeric ----> Now on Emperic Aztreona/ Flagyl   # Onc need ct a/p for staging    - Metastatic cancer  Based on 3/14 SC LN path, ddx includes primary UGI vs pancreaticobiliary primary (breast edema is likely 2/2 SVC syndrome); PD-L1 TPs 1%, pMMR, Her2 pending  Ca-125 moderately elevated at 125; other tumor markers not significantly elevated  Additional diagnostic brooks includes:   * 4/19 MRCP showing pancreatic lesion (? IPMN, but pancreas was suboptimally evaluated)     * 4/24 and 4/26 EGD/EUS aborted as pt had large amount of food in the stomach that prevented passing of scope   * 4/30 UGIS aborted as pt felt too short of breath when laying down flat (improved after her pleurex was drained); repeat UGIS tentatively scheduled for 5/1  Options for systemic cancer treatment are TBD  Pt's prognosis is guarded    - Acute-on-chronic hypoxic resp failure  Pt with increased O2 requirement this morning- uptitrated from 2L > 6L to HF O2 40lpm/50%  C/f POD in the chest vs acute infection  Lactate 3.2, remaining ABG results pending  RVP, procal, and sputum cx ordered  Resuming Dex PO 4mg BID for possible inflammatory process (with ppi/pjp ppx; taper held)  Planning to initiate empiric abx- will discuss options with ID as pt does not have IV access and is PCN/FQ allergic  Continuing Symbicort 2 puffs BID  Continuing Duonebs q6/prn  Will continue routing pleurex drainage as below  Will continue weaning O2 as tolerated  5/3 still on HF    - SVC syndrome  Appreciate Rad Onc eval/recs  Completed 10fxs of palliative RT on 4/18   Completing 2-wk Dex taper, until 5/7 (continuing pjp/ppi ppx until completion of taper)    - Extensive b/l UE DVTs  - Acute RLE DVT a/w RLE ecchymosis  - Hypercoag state  Continuing therapeutic lovenox (resumed on 4/18 given negative GIB brooks), benefits > risks   * Of note: pt has poor UE IV access and currently requires peripheral IV in her LE extremities for admin of medication; can consider [facility] central line for urgent/emergent IV needs    - Pericardial effusion with tamp physiology s/p pericardial window  - R pleural effusion s/p pleurex  Likely inflammatory; both pleural and pericardial fluid cytology is negative for malignant cells  4/9 surveillance TTE with pEF and no WMAs  Continuing R pleurex drainage- up to 500cc q48h/prn    - pAfib  Pt currently SR, HR controlled  Likely precipitated by malignancy, pericardial effusion, SVC syndrome  Continuing Metoprolol 25mg q12 with holding parameters  Continuing Amio 200mg daily (monitoring for toxicities)  Continuing telemetry    - Anxiety/emotional lability  Continuing Diazepam 5mg daily and daily/prn  Will fu with  re: additional med recs for improvement of pt's anxiety which at time has been a barrier to her inpt care  Pt has poor health literacy and clinical insight which is negatively impacting her medical decision making- when ask if she wanted to defer medical decision making to her boyfriend or if she had a designated HCP she replied "he's already sick of me"  Appreciate  consult: pt lacks medical decision making capacity; medical decision making will be deferred to pt's surrogate decision maker- Saeid Peña ([phone])    - 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)  Pt with h/o R eye glaucoma, but miosis can also be associated with Pancoast syndrome i/s/o extensive R upper lung tumor  Pt denies visual deficits or other related symptoms   MRI Brain without contrast ordered for further eval- pt continues to refuse, can be completed as outpt  Will continue to monitor closely    - Anemia in neoplastic disease  Hgb remains relatively stable  4/5 iron studies not c/w iron deficiency  VSS and pt without clinical s/s of active bleeding  4/17 hemolysis labs negative; 4/18 FOBT negative x2  Trending daily cbcs; continuing supportive transfusions prn (pt does not have h/o ACS, CAD, MI, goal hgb > 7; plts > 10K)    - Cancer related pain  Currently well controlled  Continuing Oxy ER 30mg q12 q8  Continuing Oxy IR 10mg q4/prn  Continuing Dilaudid IV prn for sev breakthrough pain  Continuing bowel regimen to prevent OIC (including Movantic)    - Severe prot-ghazala malnutrition: appreciate RD recs; continuing regular diet with protein shake supplement BID    -5/3 Keesha (Niurka) #[phone] Updated   5/3 Called sig chuck Han [phone] and updated. he noted she never Saw Oncology out patient.  Will ask Onc in Hospital  to see [facility] a/p/c

## 2024-05-03 NOTE — PROGRESS NOTE ADULT - SUBJECTIVE AND OBJECTIVE BOX
awaiting Ct   Elbow xray Patient is a 58y old  Female who presents with a chief complaint of SVC syndrome, DVT, mediastinal mass (03 May 2024 08:47)      SUBJECTIVE / OVERNIGHT EVENTS:  resting in bed  had fall 5/2, will get R elbow xray    MEDICATIONS  (STANDING):  albuterol/ipratropium for Nebulization 3 milliLiter(s) Nebulizer every 6 hours  aMIOdarone    Tablet 200 milliGRAM(s) Oral daily  Biotene Dry Mouth Oral Rinse 15 milliLiter(s) Swish and Spit every 6 hours  budesonide 160 MICROgram(s)/formoterol 4.5 MICROgram(s) Inhaler 2 Puff(s) Inhalation two times a day  cefepime   IVPB 2000 milliGRAM(s) IV Intermittent every 12 hours  chlorhexidine 2% Cloths 1 Application(s) Topical daily  dexAMETHasone  Injectable 4 milliGRAM(s) IV Push two times a day  diazepam    Tablet 5 milliGRAM(s) Oral at bedtime  enoxaparin Injectable 50 milliGRAM(s) SubCutaneous every 12 hours  influenza   Vaccine 0.5 milliLiter(s) IntraMuscular once  metoclopramide Injectable 10 milliGRAM(s) IV Push every 8 hours  metoprolol tartrate 25 milliGRAM(s) Oral every 12 hours  multivitamin 1 Tablet(s) Oral daily  naloxegol 25 milliGRAM(s) Oral daily  nicotine -  14 mG/24Hr(s) Patch 1 Patch Transdermal every 24 hours  oxyCODONE  ER Tablet 30 milliGRAM(s) Oral <User Schedule>  pantoprazole    Tablet 40 milliGRAM(s) Oral two times a day  polyethylene glycol 3350 17 Gram(s) Oral every 12 hours  senna 2 Tablet(s) Oral at bedtime  trimethoprim  160 mG/sulfamethoxazole 800 mG 1 Tablet(s) Oral <User Schedule>    MEDICATIONS  (PRN):  aluminum hydroxide/magnesium hydroxide/simethicone Suspension 30 milliLiter(s) Oral every 6 hours PRN Dyspepsia  Biotene Dry Mouth Oral Rinse 15 milliLiter(s) Swish and Spit two times a day PRN dry mouth  diazepam    Tablet 5 milliGRAM(s) Oral two times a day PRN anxiety  FIRST- Mouthwash  BLM 5 milliLiter(s) Swish and Spit four times a day PRN Mouth Care  HYDROmorphone  Injectable 1 milliGRAM(s) IV Push every 3 hours PRN Severe Pain (7 - 10)  ondansetron    Tablet 4 milliGRAM(s) Oral every 8 hours PRN Nausea and/or Vomiting  oxyCODONE    IR 10 milliGRAM(s) Oral every 4 hours PRN Moderate Pain (4 - 6)  sodium chloride 0.9% lock flush 10 milliLiter(s) IV Push every 1 hour PRN Pre/post blood products, medications, blood draw, and to maintain line patency        CAPILLARY BLOOD GLUCOSE        I&O's Summary    02 May 2024 07:01  -  03 May 2024 07:00  --------------------------------------------------------  IN: 620 mL / OUT: 600 mL / NET: 20 mL        PHYSICAL EXAM:  GENERAL: NAD, HF Oxygen  HEAD:  Atraumatic, Normocephalic  EYES: EOMI, PERRLA, conjunctiva and sclera clear  NECK: Supple, No JVD  CHEST/LUNG: Clear to auscultation bilaterally; No wheeze  HEART: Regular rate and rhythm; No murmurs, rubs, or gallops  ABDOMEN: Soft, Nontender, Nondistended; Bowel sounds present  EXTREMITIES:  2+ Peripheral Pulses, No clubbing, cyanosis, or edema  PSYCH: Alert  NEUROLOGY: non-focal      LABS:                        7.2    6.26  )-----------( 258      ( 03 May 2024 06:00 )             22.5     05-03    135  |  100  |  11  ----------------------------<  138<H>  4.0   |  25  |  0.92    Ca    8.4      03 May 2024 06:00  Phos  3.6     05-03  Mg     1.60     05-03    TPro  4.9<L>  /  Alb  2.4<L>  /  TBili  0.3  /  DBili  x   /  AST  6   /  ALT  59<H>  /  AlkPhos  97  05-03        Care Discussed with Consultants/Other Providers:  Patient/ radiology / pa  Radiology wisamson do ct today   will get cta of lungs

## 2024-05-04 LAB
ALBUMIN SERPL ELPH-MCNC: 2.4 G/DL — LOW (ref 3.3–5)
ALP SERPL-CCNC: 99 U/L — SIGNIFICANT CHANGE UP (ref 40–120)
ALT FLD-CCNC: 57 U/L — HIGH (ref 4–33)
ANION GAP SERPL CALC-SCNC: 11 MMOL/L — SIGNIFICANT CHANGE UP (ref 7–14)
AST SERPL-CCNC: 6 U/L — SIGNIFICANT CHANGE UP (ref 4–32)
BASOPHILS # BLD AUTO: 0.01 K/UL — SIGNIFICANT CHANGE UP (ref 0–0.2)
BASOPHILS NFR BLD AUTO: 0.1 % — SIGNIFICANT CHANGE UP (ref 0–2)
BILIRUB SERPL-MCNC: 0.2 MG/DL — SIGNIFICANT CHANGE UP (ref 0.2–1.2)
BLD GP AB SCN SERPL QL: NEGATIVE — SIGNIFICANT CHANGE UP
BUN SERPL-MCNC: 13 MG/DL — SIGNIFICANT CHANGE UP (ref 7–23)
CALCIUM SERPL-MCNC: 8.6 MG/DL — SIGNIFICANT CHANGE UP (ref 8.4–10.5)
CHLORIDE SERPL-SCNC: 101 MMOL/L — SIGNIFICANT CHANGE UP (ref 98–107)
CO2 SERPL-SCNC: 24 MMOL/L — SIGNIFICANT CHANGE UP (ref 22–31)
CREAT SERPL-MCNC: 0.9 MG/DL — SIGNIFICANT CHANGE UP (ref 0.5–1.3)
EGFR: 73 ML/MIN/1.73M2 — SIGNIFICANT CHANGE UP
EOSINOPHIL # BLD AUTO: 0.02 K/UL — SIGNIFICANT CHANGE UP (ref 0–0.5)
EOSINOPHIL NFR BLD AUTO: 0.3 % — SIGNIFICANT CHANGE UP (ref 0–6)
FERRITIN SERPL-MCNC: 569 NG/ML — HIGH (ref 13–330)
GLUCOSE SERPL-MCNC: 106 MG/DL — HIGH (ref 70–99)
HCT VFR BLD CALC: 23.1 % — LOW (ref 34.5–45)
HGB BLD-MCNC: 7.1 G/DL — LOW (ref 11.5–15.5)
IANC: 6.29 K/UL — SIGNIFICANT CHANGE UP (ref 1.8–7.4)
IMM GRANULOCYTES NFR BLD AUTO: 1 % — HIGH (ref 0–0.9)
IRON SATN MFR SERPL: 6 % — LOW (ref 14–50)
IRON SATN MFR SERPL: 9 UG/DL — LOW (ref 30–160)
LYMPHOCYTES # BLD AUTO: 0.1 K/UL — LOW (ref 1–3.3)
LYMPHOCYTES # BLD AUTO: 1.5 % — LOW (ref 13–44)
MAGNESIUM SERPL-MCNC: 1.7 MG/DL — SIGNIFICANT CHANGE UP (ref 1.6–2.6)
MCHC RBC-ENTMCNC: 28.9 PG — SIGNIFICANT CHANGE UP (ref 27–34)
MCHC RBC-ENTMCNC: 30.7 GM/DL — LOW (ref 32–36)
MCV RBC AUTO: 93.9 FL — SIGNIFICANT CHANGE UP (ref 80–100)
MONOCYTES # BLD AUTO: 0.22 K/UL — SIGNIFICANT CHANGE UP (ref 0–0.9)
MONOCYTES NFR BLD AUTO: 3.3 % — SIGNIFICANT CHANGE UP (ref 2–14)
NEUTROPHILS # BLD AUTO: 6.29 K/UL — SIGNIFICANT CHANGE UP (ref 1.8–7.4)
NEUTROPHILS NFR BLD AUTO: 93.8 % — HIGH (ref 43–77)
NRBC # BLD: 0 /100 WBCS — SIGNIFICANT CHANGE UP (ref 0–0)
NRBC # FLD: 0 K/UL — SIGNIFICANT CHANGE UP (ref 0–0)
PHOSPHATE SERPL-MCNC: 3.7 MG/DL — SIGNIFICANT CHANGE UP (ref 2.5–4.5)
PLATELET # BLD AUTO: 302 K/UL — SIGNIFICANT CHANGE UP (ref 150–400)
POTASSIUM SERPL-MCNC: 4.2 MMOL/L — SIGNIFICANT CHANGE UP (ref 3.5–5.3)
POTASSIUM SERPL-SCNC: 4.2 MMOL/L — SIGNIFICANT CHANGE UP (ref 3.5–5.3)
PROT SERPL-MCNC: 5 G/DL — LOW (ref 6–8.3)
RBC # BLD: 2.46 M/UL — LOW (ref 3.8–5.2)
RBC # FLD: 16 % — HIGH (ref 10.3–14.5)
RH IG SCN BLD-IMP: POSITIVE — SIGNIFICANT CHANGE UP
SODIUM SERPL-SCNC: 136 MMOL/L — SIGNIFICANT CHANGE UP (ref 135–145)
TIBC SERPL-MCNC: 146 UG/DL — LOW (ref 220–430)
UIBC SERPL-MCNC: 137 UG/DL — SIGNIFICANT CHANGE UP (ref 110–370)
VIT B12 SERPL-MCNC: >2000 PG/ML — HIGH (ref 200–900)
WBC # BLD: 6.71 K/UL — SIGNIFICANT CHANGE UP (ref 3.8–10.5)
WBC # FLD AUTO: 6.71 K/UL — SIGNIFICANT CHANGE UP (ref 3.8–10.5)

## 2024-05-04 PROCEDURE — 99233 SBSQ HOSP IP/OBS HIGH 50: CPT | Mod: GC

## 2024-05-04 PROCEDURE — 99233 SBSQ HOSP IP/OBS HIGH 50: CPT

## 2024-05-04 PROCEDURE — 71045 X-RAY EXAM CHEST 1 VIEW: CPT | Mod: 26

## 2024-05-04 PROCEDURE — 73080 X-RAY EXAM OF ELBOW: CPT | Mod: 26,RT

## 2024-05-04 RX ORDER — NYSTATIN 500MM UNIT
500000 POWDER (EA) MISCELLANEOUS
Refills: 0 | Status: DISCONTINUED | OUTPATIENT
Start: 2024-05-04 | End: 2024-05-07

## 2024-05-04 RX ORDER — FUROSEMIDE 40 MG
20 TABLET ORAL ONCE
Refills: 0 | Status: COMPLETED | OUTPATIENT
Start: 2024-05-04 | End: 2024-05-04

## 2024-05-04 RX ORDER — SODIUM CHLORIDE 0.65 %
1 AEROSOL, SPRAY (ML) NASAL
Refills: 0 | Status: DISCONTINUED | OUTPATIENT
Start: 2024-05-04 | End: 2024-05-30

## 2024-05-04 RX ORDER — FLUCONAZOLE 150 MG/1
TABLET ORAL
Refills: 0 | Status: DISCONTINUED | OUTPATIENT
Start: 2024-05-04 | End: 2024-05-05

## 2024-05-04 RX ORDER — BENZOCAINE AND MENTHOL 5; 1 G/100ML; G/100ML
1 LIQUID ORAL
Refills: 0 | Status: DISCONTINUED | OUTPATIENT
Start: 2024-05-04 | End: 2024-05-31

## 2024-05-04 RX ADMIN — Medication 15 MILLILITER(S): at 06:11

## 2024-05-04 RX ADMIN — Medication 5 MILLIGRAM(S): at 23:43

## 2024-05-04 RX ADMIN — Medication 15 MILLILITER(S): at 12:44

## 2024-05-04 RX ADMIN — Medication 3 MILLILITER(S): at 22:41

## 2024-05-04 RX ADMIN — BUDESONIDE AND FORMOTEROL FUMARATE DIHYDRATE 2 PUFF(S): 160; 4.5 AEROSOL RESPIRATORY (INHALATION) at 08:38

## 2024-05-04 RX ADMIN — NALOXEGOL OXALATE 25 MILLIGRAM(S): 12.5 TABLET, FILM COATED ORAL at 12:45

## 2024-05-04 RX ADMIN — PANTOPRAZOLE SODIUM 40 MILLIGRAM(S): 20 TABLET, DELAYED RELEASE ORAL at 17:13

## 2024-05-04 RX ADMIN — Medication 15 MILLILITER(S): at 17:15

## 2024-05-04 RX ADMIN — POLYETHYLENE GLYCOL 3350 17 GRAM(S): 17 POWDER, FOR SOLUTION ORAL at 06:10

## 2024-05-04 RX ADMIN — Medication 20 MILLIGRAM(S): at 17:12

## 2024-05-04 RX ADMIN — Medication 15 MILLILITER(S): at 01:22

## 2024-05-04 RX ADMIN — Medication 1 PATCH: at 12:45

## 2024-05-04 RX ADMIN — Medication 3 MILLILITER(S): at 03:27

## 2024-05-04 RX ADMIN — Medication 25 MILLIGRAM(S): at 17:13

## 2024-05-04 RX ADMIN — CEFEPIME 100 MILLIGRAM(S): 1 INJECTION, POWDER, FOR SOLUTION INTRAMUSCULAR; INTRAVENOUS at 17:15

## 2024-05-04 RX ADMIN — ENOXAPARIN SODIUM 50 MILLIGRAM(S): 100 INJECTION SUBCUTANEOUS at 06:11

## 2024-05-04 RX ADMIN — OXYCODONE HYDROCHLORIDE 30 MILLIGRAM(S): 5 TABLET ORAL at 21:13

## 2024-05-04 RX ADMIN — Medication 20 MILLIGRAM(S): at 13:05

## 2024-05-04 RX ADMIN — SENNA PLUS 2 TABLET(S): 8.6 TABLET ORAL at 22:05

## 2024-05-04 RX ADMIN — Medication 1 PATCH: at 19:34

## 2024-05-04 RX ADMIN — Medication 4 MILLIGRAM(S): at 06:10

## 2024-05-04 RX ADMIN — Medication 3 MILLILITER(S): at 15:07

## 2024-05-04 RX ADMIN — Medication 4 MILLIGRAM(S): at 17:12

## 2024-05-04 RX ADMIN — PANTOPRAZOLE SODIUM 40 MILLIGRAM(S): 20 TABLET, DELAYED RELEASE ORAL at 06:10

## 2024-05-04 RX ADMIN — OXYCODONE HYDROCHLORIDE 30 MILLIGRAM(S): 5 TABLET ORAL at 07:02

## 2024-05-04 RX ADMIN — CEFEPIME 100 MILLIGRAM(S): 1 INJECTION, POWDER, FOR SOLUTION INTRAMUSCULAR; INTRAVENOUS at 06:10

## 2024-05-04 RX ADMIN — Medication 1 PATCH: at 08:00

## 2024-05-04 RX ADMIN — AMIODARONE HYDROCHLORIDE 200 MILLIGRAM(S): 400 TABLET ORAL at 06:11

## 2024-05-04 RX ADMIN — Medication 25 MILLIGRAM(S): at 06:10

## 2024-05-04 RX ADMIN — BUDESONIDE AND FORMOTEROL FUMARATE DIHYDRATE 2 PUFF(S): 160; 4.5 AEROSOL RESPIRATORY (INHALATION) at 21:13

## 2024-05-04 RX ADMIN — CHLORHEXIDINE GLUCONATE 1 APPLICATION(S): 213 SOLUTION TOPICAL at 12:44

## 2024-05-04 RX ADMIN — Medication 3 MILLILITER(S): at 08:12

## 2024-05-04 RX ADMIN — ENOXAPARIN SODIUM 50 MILLIGRAM(S): 100 INJECTION SUBCUTANEOUS at 17:15

## 2024-05-04 RX ADMIN — OXYCODONE HYDROCHLORIDE 30 MILLIGRAM(S): 5 TABLET ORAL at 22:13

## 2024-05-04 RX ADMIN — OXYCODONE HYDROCHLORIDE 30 MILLIGRAM(S): 5 TABLET ORAL at 14:56

## 2024-05-04 RX ADMIN — Medication 1 TABLET(S): at 12:45

## 2024-05-04 NOTE — PROVIDER CONTACT NOTE (OTHER) - BACKGROUND
58F smoker to [facility] w/ R breast swelling c/f cellulitis, w/ imaging noted supraclavicular/mediastinal mass lesion and complete occlusion of RIJ and SVC.

## 2024-05-04 NOTE — PROVIDER CONTACT NOTE (OTHER) - ACTION/TREATMENT ORDERED:
PAT Boston made aware that no consent is in the chart. PAT Boston got consent for transfusion at 1647. Awaiting for Provider to RN that is okay to give blood transfusion via LLE PIV

## 2024-05-04 NOTE — CHART NOTE - NSCHARTNOTEFT_GEN_A_CORE
Patient seen again    Vital Signs Last 24 Hrs  T(C): 36.8 (04 May 2024 13:04), Max: 36.8 (04 May 2024 13:04)  T(F): 98.3 (04 May 2024 13:04), Max: 98.3 (04 May 2024 13:04)  HR: 84 (04 May 2024 13:04) (70 - 89)  BP: 137/85 (04 May 2024 13:04) (126/62 - 137/87)  BP(mean): --  RR: 20 (04 May 2024 14:55) (18 - 20)  SpO2: 98% (04 May 2024 14:55) (96% - 100%)    Parameters below as of 04 May 2024 14:55  Patient On (Oxygen Delivery Method): nasal cannula, high flow  O2 Flow (L/min): 35  O2 Concentration (%): 70    rad< from: Xray Chest 1 View- PORTABLE-Urgent (Xray Chest 1 View- PORTABLE-Urgent .) (05.04.24 @ 09:22) >    TECHNIQUE: Portable frontal view of the chest, 2 films.  COMPARISON: 5/1/2024.  FINDINGS/  IMPRESSION:  There is a catheter at the right base.  HEART: normal in size.  LUNGS: There is opacity at the right base compatible with   effusion/infiltrate.. Prominent interstitial markings, likely secondary   to chronic underlying lung disease..  BONES: degenerative changes      Got lasix 20 mg at 1 pm  had good urine out put as per RN  will give lasix 20 mg iv x1 again.  Keep K 3.5    mon i/o  Planning on prbc 1 units in 1/2 units with lasix 20 mg between hold sbp <100.  Pulmonary Following.

## 2024-05-04 NOTE — CHART NOTE - NSCHARTNOTEFT_GEN_A_CORE
Hmg is noted to be 7.1 today. Discussed with Dr. Conklin will give 1 unit RBC , 1/2 units at a time with lasix in btw.    Consent obtained from HCP Saeid.

## 2024-05-04 NOTE — CHART NOTE - NSCHARTNOTEFT_GEN_A_CORE
Notified in regards to CT C/A/P ordered by Dr. Gamboa's team. Based on chart review, CT C/A/P obtained to evaluate for metastatic disease, no metastatic dz is noted. Patient to f/u with with Dr. Cedeño's team Notified in regards to CT C/A/P ordered by Dr. Gamboa's team, no exact question posed from primary team. Based on chart review, CT C/A/P obtained to evaluate for metastatic disease, no metastatic dz is noted. Patient to f/u with Dr. Cedeño's team

## 2024-05-04 NOTE — PROGRESS NOTE ADULT - ASSESSMENT
59 yo woman, former smoker, with h/o R eye glaucoma, Fibromyalgia, Anxiety, GERD, and RCC s/p R total nephrectomy/hysterectomy; she was initially admitted to [facility] on 3/11 w/ R breast swelling c/f mastitis- imaging brooks noted supraclavicular/mediastinal mass lesion which encased the SVC and multiple other veins resulting in obliteration of the SVC and thrombosis of the R IJ, and a pancreatic neck cystic lesion.  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  Her disease/hospital course has also been c/b pericardial effusion s/p pericardial window w/ neg cytology, R pleural effusion, also negative cytology) s/p Pleurx, Afib w/ RVR and acute hypoxic resp failure 2/2 SVC syndrome- not dennis to IR-guided stenting; pt was ultimately started on Dex and transferred to [facility] on 4/4 for urgent palliative RT which she completed on 4/18. Her hospital course has also been c/b pericardial effusion with tamponade s/p window and non malignant R pleural effusion s/p pleurex, acute b/l UE and RLE dvts, anxiety, and more recently, recurrent hypoxia.    ACTIVE PROBLEMS  Metastatic cancer  Acute-on-chronic hypoxic respiratory failure  SVC syndrome  Extensive b/l UE DVTs  Acute RLE DVT a/w RLE ecchymosis  Hypercoag state  Pericardial effusion with tamp physiology s/p pericardial window  R pleural effusion s/p pleurex  pAfib, with RVR on this admission  Anxiety/emotional lability  Anemia of Chronic Dz---> Hgb 7.2---> cbc and t/s in AM. also ferritin / b12/ tibc  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)  Cancer-related pain, anxiety  Severe prot-ghazala malnutrition    # Hypoxia--> HF oxygen not improving PULM / MICU eval  # ID  ----> Now on epimeric ----> Now on Emperic Aztreona/ Flagyl   # Onc need ct a/p for staging--> await onc f/u  # Anemia hgb 7.1---> will give 1 unit prbc in 1/2 units each 3 hrs.  - Metastatic cancer  Based on 3/14 SC LN path, ddx includes primary UGI vs pancreaticobiliary primary (breast edema is likely 2/2 SVC syndrome); PD-L1 TPs 1%, pMMR, Her2 pending  Ca-125 moderately elevated at 125; other tumor markers not significantly elevated  Additional diagnostic brooks includes:   * 4/19 MRCP showing pancreatic lesion (? IPMN, but pancreas was suboptimally evaluated)     * 4/24 and 4/26 EGD/EUS aborted as pt had large amount of food in the stomach that prevented passing of scope   * 4/30 UGIS aborted as pt felt too short of breath when laying down flat (improved after her pleurex was drained); repeat UGIS tentatively scheduled for 5/1  Options for systemic cancer treatment are TBD  Pt's prognosis is guarded    - Acute-on-chronic hypoxic resp failure  Pt with increased O2 requirement this morning- uptitrated from 2L > 6L to HF O2 40lpm/50%  C/f POD in the chest vs acute infection  Lactate 3.2, remaining ABG results pending  RVP, procal, and sputum cx ordered  Resuming Dex PO 4mg BID for possible inflammatory process (with ppi/pjp ppx; taper held)  Planning to initiate empiric abx- will discuss options with ID as pt does not have IV access and is PCN/FQ allergic  Continuing Symbicort 2 puffs BID  Continuing Duonebs q6/prn  Will continue routing pleurex drainage as below  Will continue weaning O2 as tolerated  5/3 still on HF    - SVC syndrome  Appreciate Rad Onc eval/recs  Completed 10fxs of palliative RT on 4/18   Completing 2-wk Dex taper, until 5/7 (continuing pjp/ppi ppx until completion of taper)    - Extensive b/l UE DVTs  - Acute RLE DVT a/w RLE ecchymosis  - Hypercoag state  Continuing therapeutic lovenox (resumed on 4/18 given negative GIB brooks), benefits > risks   * Of note: pt has poor UE IV access and currently requires peripheral IV in her LE extremities for admin of medication; can consider [facility] central line for urgent/emergent IV needs    - Pericardial effusion with tamp physiology s/p pericardial window  - R pleural effusion s/p pleurex  Likely inflammatory; both pleural and pericardial fluid cytology is negative for malignant cells  4/9 surveillance TTE with pEF and no WMAs  Continuing R pleurex drainage- up to 500cc q48h/prn    - pAfib  Pt currently SR, HR controlled  Likely precipitated by malignancy, pericardial effusion, SVC syndrome  Continuing Metoprolol 25mg q12 with holding parameters  Continuing Amio 200mg daily (monitoring for toxicities)  Continuing telemetry    - Anxiety/emotional lability  Continuing Diazepam 5mg daily and daily/prn  Will fu with  re: additional med recs for improvement of pt's anxiety which at time has been a barrier to her inpt care  Pt has poor health literacy and clinical insight which is negatively impacting her medical decision making- when ask if she wanted to defer medical decision making to her boyfriend or if she had a designated HCP she replied "he's already sick of me"  Appreciate  consult: pt lacks medical decision making capacity; medical decision making will be deferred to pt's surrogate decision maker- Saeid Peña ([phone])    - 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)  Pt with h/o R eye glaucoma, but miosis can also be associated with Pancoast syndrome i/s/o extensive R upper lung tumor  Pt denies visual deficits or other related symptoms   MRI Brain without contrast ordered for further eval- pt continues to refuse, can be completed as outpt  Will continue to monitor closely    - Anemia in neoplastic disease  Hgb remains relatively stable  4/5 iron studies not c/w iron deficiency  VSS and pt without clinical s/s of active bleeding  4/17 hemolysis labs negative; 4/18 FOBT negative x2  Trending daily cbcs; continuing supportive transfusions prn (pt does not have h/o ACS, CAD, MI, goal hgb > 7; plts > 10K)    - Cancer related pain  Currently well controlled  Continuing Oxy ER 30mg q12 q8  Continuing Oxy IR 10mg q4/prn  Continuing Dilaudid IV prn for sev breakthrough pain  Continuing bowel regimen to prevent OIC (including Movantic)    - Severe prot-ghazala malnutrition: appreciate RD recs; continuing regular diet with protein shake supplement BID    -5/3 Keesha Sheets) #[phone] Updated   5/3 Called enriqueta Han [phone] and updated. he noted she never Saw Oncology out patient.  Will ask Onc in Hospital  to see Ree [facility] a/p/c 57 yo woman, former smoker, with h/o R eye glaucoma, Fibromyalgia, Anxiety, GERD, and RCC s/p R total nephrectomy/hysterectomy; she was initially admitted to [facility] on 3/11 w/ R breast swelling c/f mastitis- imaging brooks noted supraclavicular/mediastinal mass lesion which encased the SVC and multiple other veins resulting in obliteration of the SVC and thrombosis of the R IJ, and a pancreatic neck cystic lesion.  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  Her disease/hospital course has also been c/b pericardial effusion s/p pericardial window w/ neg cytology, R pleural effusion, also negative cytology) s/p Pleurx, Afib w/ RVR and acute hypoxic resp failure 2/2 SVC syndrome- not dennis to IR-guided stenting; pt was ultimately started on Dex and transferred to [facility] on 4/4 for urgent palliative RT which she completed on 4/18. Her hospital course has also been c/b pericardial effusion with tamponade s/p window and non malignant R pleural effusion s/p pleurex, acute b/l UE and RLE dvts, anxiety, and more recently, recurrent hypoxia.    ACTIVE PROBLEMS  Metastatic cancer  Acute-on-chronic hypoxic respiratory failure  SVC syndrome  Extensive b/l UE DVTs  Acute RLE DVT a/w RLE ecchymosis  Hypercoag state  Pericardial effusion with tamp physiology s/p pericardial window  R pleural effusion s/p pleurex  pAfib, with RVR on this admission  Anxiety/emotional lability  Anemia of Chronic Dz---> Hgb 7.2---> cbc and t/s in AM. also ferritin / b12/ tibc  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)  Cancer-related pain, anxiety  Severe prot-ghazala malnutrition    # Hypoxia--> HF oxygen not improving PULM CONSULT REQUESTED  # ID  ----> Now on epimeric ----> Now on Emperic Aztreona/ Flagyl   # Onc need ct a/p for staging--> await onc f/u  # Anemia hgb 7.1---> will give 1 unit prbc in 1/2 units each 3 hrs.  - Metastatic cancer  Based on 3/14 SC LN path, ddx includes primary UGI vs pancreaticobiliary primary (breast edema is likely 2/2 SVC syndrome); PD-L1 TPs 1%, pMMR, Her2 pending  Ca-125 moderately elevated at 125; other tumor markers not significantly elevated  Additional diagnostic brooks includes:   * 4/19 MRCP showing pancreatic lesion (? IPMN, but pancreas was suboptimally evaluated)     * 4/24 and 4/26 EGD/EUS aborted as pt had large amount of food in the stomach that prevented passing of scope   * 4/30 UGIS aborted as pt felt too short of breath when laying down flat (improved after her pleurex was drained); repeat UGIS tentatively scheduled for 5/1  Options for systemic cancer treatment are TBD  Pt's prognosis is guarded    - Acute-on-chronic hypoxic resp failure  Pt with increased O2 requirement this morning- uptitrated from 2L > 6L to HF O2 40lpm/50%  C/f POD in the chest vs acute infection  Lactate 3.2, remaining ABG results pending  RVP, procal, and sputum cx ordered  Resuming Dex PO 4mg BID for possible inflammatory process (with ppi/pjp ppx; taper held)  Planning to initiate empiric abx- will discuss options with ID as pt does not have IV access and is PCN/FQ allergic  Continuing Symbicort 2 puffs BID  Continuing Duonebs q6/prn  Will continue routing pleurex drainage as below  Will continue weaning O2 as tolerated  5/3 still on HF    - SVC syndrome  Appreciate Rad Onc eval/recs  Completed 10fxs of palliative RT on 4/18   Completing 2-wk Dex taper, until 5/7 (continuing pjp/ppi ppx until completion of taper)    - Extensive b/l UE DVTs  - Acute RLE DVT a/w RLE ecchymosis  - Hypercoag state  Continuing therapeutic lovenox (resumed on 4/18 given negative GIB brooks), benefits > risks   * Of note: pt has poor UE IV access and currently requires peripheral IV in her LE extremities for admin of medication; can consider [facility] central line for urgent/emergent IV needs    - Pericardial effusion with tamp physiology s/p pericardial window  - R pleural effusion s/p pleurex  Likely inflammatory; both pleural and pericardial fluid cytology is negative for malignant cells  4/9 surveillance TTE with pEF and no WMAs  Continuing R pleurex drainage- up to 500cc q48h/prn    - pAfib  Pt currently SR, HR controlled  Likely precipitated by malignancy, pericardial effusion, SVC syndrome  Continuing Metoprolol 25mg q12 with holding parameters  Continuing Amio 200mg daily (monitoring for toxicities)  Continuing telemetry    - Anxiety/emotional lability  Continuing Diazepam 5mg daily and daily/prn  Will fu with  re: additional med recs for improvement of pt's anxiety which at time has been a barrier to her inpt care  Pt has poor health literacy and clinical insight which is negatively impacting her medical decision making- when ask if she wanted to defer medical decision making to her boyfriend or if she had a designated HCP she replied "he's already sick of me"  Appreciate  consult: pt lacks medical decision making capacity; medical decision making will be deferred to pt's surrogate decision maker- Saeid Peña ([phone])    - 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)  Pt with h/o R eye glaucoma, but miosis can also be associated with Pancoast syndrome i/s/o extensive R upper lung tumor  Pt denies visual deficits or other related symptoms   MRI Brain without contrast ordered for further eval- pt continues to refuse, can be completed as outpt  Will continue to monitor closely    - Anemia in neoplastic disease  Hgb remains relatively stable  4/5 iron studies not c/w iron deficiency  VSS and pt without clinical s/s of active bleeding  4/17 hemolysis labs negative; 4/18 FOBT negative x2  Trending daily cbcs; continuing supportive transfusions prn (pt does not have h/o ACS, CAD, MI, goal hgb > 7; plts > 10K)    - Cancer related pain  Currently well controlled  Continuing Oxy ER 30mg q12 q8  Continuing Oxy IR 10mg q4/prn  Continuing Dilaudid IV prn for sev breakthrough pain  Continuing bowel regimen to prevent OIC (including Movantic)    - Severe prot-ghazala malnutrition: appreciate RD recs; continuing regular diet with protein shake supplement BID    -5/3 Keesha Sheets) #[phone] Updated   5/3 Called sig chuck Han [phone] and updated. he noted she never Saw Oncology out patient.  Will ask Onc in Hospital  to see Ree Ct a/p/c 59 yo woman, former smoker, with h/o R eye glaucoma, Fibromyalgia, Anxiety, GERD, and RCC s/p R total nephrectomy/hysterectomy; she was initially admitted to [facility] on 3/11 w/ R breast swelling c/f mastitis- imaging brooks noted supraclavicular/mediastinal mass lesion which encased the SVC and multiple other veins resulting in obliteration of the SVC and thrombosis of the R IJ, and a pancreatic neck cystic lesion.  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  Her disease/hospital course has also been c/b pericardial effusion s/p pericardial window w/ neg cytology, R pleural effusion, also negative cytology) s/p Pleurx, Afib w/ RVR and acute hypoxic resp failure 2/2 SVC syndrome- not dennis to IR-guided stenting; pt was ultimately started on Dex and transferred to [facility] on 4/4 for urgent palliative RT which she completed on 4/18. Her hospital course has also been c/b pericardial effusion with tamponade s/p window and non malignant R pleural effusion s/p pleurex, acute b/l UE and RLE dvts, anxiety, and more recently, recurrent hypoxia.    ACTIVE PROBLEMS  Metastatic cancer  Acute-on-chronic hypoxic respiratory failure  SVC syndrome  Extensive b/l UE DVTs  Acute RLE DVT a/w RLE ecchymosis  Hypercoag state  Pericardial effusion with tamp physiology s/p pericardial window  R pleural effusion s/p pleurex  pAfib, with RVR on this admission  Anxiety/emotional lability  Anemia of Chronic Dz---> Hgb 7.2---> cbc and t/s in AM. also ferritin / b12/ tibc  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)  Cancer-related pain, anxiety  Severe prot-ghazala malnutrition    # Hypoxia--> HF oxygen not improving PULM CONSULT REQUESTED # [id number]  # ID  ----> Now on epimeric ----> Now on Emperic Aztreona/ Flagyl   # Onc need ct a/p for staging--> await onc f/u  # Anemia hgb 7.1---> will give 1 unit prbc in 1/2 units each 3 hrs.  - Metastatic cancer  Based on 3/14 SC LN path, ddx includes primary UGI vs pancreaticobiliary primary (breast edema is likely 2/2 SVC syndrome); PD-L1 TPs 1%, pMMR, Her2 pending  Ca-125 moderately elevated at 125; other tumor markers not significantly elevated  Additional diagnostic brooks includes:   * 4/19 MRCP showing pancreatic lesion (? IPMN, but pancreas was suboptimally evaluated)     * 4/24 and 4/26 EGD/EUS aborted as pt had large amount of food in the stomach that prevented passing of scope   * 4/30 UGIS aborted as pt felt too short of breath when laying down flat (improved after her pleurex was drained); repeat UGIS tentatively scheduled for 5/1  Options for systemic cancer treatment are TBD  Pt's prognosis is guarded    - Acute-on-chronic hypoxic resp failure  Pt with increased O2 requirement this morning- uptitrated from 2L > 6L to HF O2 40lpm/50%  C/f POD in the chest vs acute infection  Lactate 3.2, remaining ABG results pending  RVP, procal, and sputum cx ordered  Resuming Dex PO 4mg BID for possible inflammatory process (with ppi/pjp ppx; taper held)  Planning to initiate empiric abx- will discuss options with ID as pt does not have IV access and is PCN/FQ allergic  Continuing Symbicort 2 puffs BID  Continuing Duonebs q6/prn  Will continue routing pleurex drainage as below  Will continue weaning O2 as tolerated  5/3 still on HF    - SVC syndrome  Appreciate Rad Onc eval/recs  Completed 10fxs of palliative RT on 4/18   Completing 2-wk Dex taper, until 5/7 (continuing pjp/ppi ppx until completion of taper)    - Extensive b/l UE DVTs  - Acute RLE DVT a/w RLE ecchymosis  - Hypercoag state  Continuing therapeutic lovenox (resumed on 4/18 given negative GIB brooks), benefits > risks   * Of note: pt has poor UE IV access and currently requires peripheral IV in her LE extremities for admin of medication; can consider [facility] central line for urgent/emergent IV needs    - Pericardial effusion with tamp physiology s/p pericardial window  - R pleural effusion s/p pleurex  Likely inflammatory; both pleural and pericardial fluid cytology is negative for malignant cells  4/9 surveillance TTE with pEF and no WMAs  Continuing R pleurex drainage- up to 500cc q48h/prn    - pAfib  Pt currently SR, HR controlled  Likely precipitated by malignancy, pericardial effusion, SVC syndrome  Continuing Metoprolol 25mg q12 with holding parameters  Continuing Amio 200mg daily (monitoring for toxicities)  Continuing telemetry    - Anxiety/emotional lability  Continuing Diazepam 5mg daily and daily/prn  Will fu with  re: additional med recs for improvement of pt's anxiety which at time has been a barrier to her inpt care  Pt has poor health literacy and clinical insight which is negatively impacting her medical decision making- when ask if she wanted to defer medical decision making to her boyfriend or if she had a designated HCP she replied "he's already sick of me"  Appreciate  consult: pt lacks medical decision making capacity; medical decision making will be deferred to pt's surrogate decision maker- Saeid Peña ([phone])    - 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)  Pt with h/o R eye glaucoma, but miosis can also be associated with Pancoast syndrome i/s/o extensive R upper lung tumor  Pt denies visual deficits or other related symptoms   MRI Brain without contrast ordered for further eval- pt continues to refuse, can be completed as outpt  Will continue to monitor closely    - Anemia in neoplastic disease  Hgb remains relatively stable  4/5 iron studies not c/w iron deficiency  VSS and pt without clinical s/s of active bleeding  4/17 hemolysis labs negative; 4/18 FOBT negative x2  Trending daily cbcs; continuing supportive transfusions prn (pt does not have h/o ACS, CAD, MI, goal hgb > 7; plts > 10K)    - Cancer related pain  Currently well controlled  Continuing Oxy ER 30mg q12 q8  Continuing Oxy IR 10mg q4/prn  Continuing Dilaudid IV prn for sev breakthrough pain  Continuing bowel regimen to prevent OIC (including Movantic)    - Severe prot-ghazala malnutrition: appreciate RD recs; continuing regular diet with protein shake supplement BID    -5/3 Keesha Sheets) #[phone] Updated   5/3 Called enriqueta Han [phone] and updated. he noted she never Saw Oncology out patient.  Will ask Onc in Hospital  to see Ree [facility] a/p/c 59 yo woman, former smoker, with h/o R eye glaucoma, Fibromyalgia, Anxiety, GERD, and RCC s/p R total nephrectomy/hysterectomy; she was initially admitted to [facility] on 3/11 w/ R breast swelling c/f mastitis- imaging brooks noted supraclavicular/mediastinal mass lesion which encased the SVC and multiple other veins resulting in obliteration of the SVC and thrombosis of the R IJ, and a pancreatic neck cystic lesion.  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  Her disease/hospital course has also been c/b pericardial effusion s/p pericardial window w/ neg cytology, R pleural effusion, also negative cytology) s/p Pleurx, Afib w/ RVR and acute hypoxic resp failure 2/2 SVC syndrome- not dennis to IR-guided stenting; pt was ultimately started on Dex and transferred to [facility] on 4/4 for urgent palliative RT which she completed on 4/18. Her hospital course has also been c/b pericardial effusion with tamponade s/p window and non malignant R pleural effusion s/p pleurex, acute b/l UE and RLE dvts, anxiety, and more recently, recurrent hypoxia.    ACTIVE PROBLEMS  Metastatic cancer  Acute-on-chronic hypoxic respiratory failure  SVC syndrome  Extensive b/l UE DVTs  Acute RLE DVT a/w RLE ecchymosis  Hypercoag state  Pericardial effusion with tamp physiology s/p pericardial window  R pleural effusion s/p pleurex  pAfib, with RVR on this admission  Anxiety/emotional lability  Anemia of Chronic Dz---> Hgb 7.2---> cbc and t/s in AM. also ferritin / b12/ tibc  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)  Cancer-related pain, anxiety  Severe prot-ghazala malnutrition    # Hypoxia--> HF oxygen not improving PULM CONSULT REQUESTED # [id number]  # ID  ----> Now on epimeric ----> Now on Emperic Aztreona/ Flagyl   # Onc need ct a/p for staging--> await onc f/u # [id number]   # Anemia hgb 7.1---> will give 1 unit prbc in 1/2 units each 3 hrs.  - Metastatic cancer  Based on 3/14 SC LN path, ddx includes primary UGI vs pancreaticobiliary primary (breast edema is likely 2/2 SVC syndrome); PD-L1 TPs 1%, pMMR, Her2 pending  Ca-125 moderately elevated at 125; other tumor markers not significantly elevated  Additional diagnostic brooks includes:   * 4/19 MRCP showing pancreatic lesion (? IPMN, but pancreas was suboptimally evaluated)     * 4/24 and 4/26 EGD/EUS aborted as pt had large amount of food in the stomach that prevented passing of scope   * 4/30 UGIS aborted as pt felt too short of breath when laying down flat (improved after her pleurex was drained); repeat UGIS tentatively scheduled for 5/1  Options for systemic cancer treatment are TBD  Pt's prognosis is guarded    - Acute-on-chronic hypoxic resp failure  Pt with increased O2 requirement this morning- uptitrated from 2L > 6L to HF O2 40lpm/50%  C/f POD in the chest vs acute infection  Lactate 3.2, remaining ABG results pending  RVP, procal, and sputum cx ordered  Resuming Dex PO 4mg BID for possible inflammatory process (with ppi/pjp ppx; taper held)  Planning to initiate empiric abx- will discuss options with ID as pt does not have IV access and is PCN/FQ allergic  Continuing Symbicort 2 puffs BID  Continuing Duonebs q6/prn  Will continue routing pleurex drainage as below  Will continue weaning O2 as tolerated  5/3 still on HF    - SVC syndrome  Appreciate Rad Onc eval/recs  Completed 10fxs of palliative RT on 4/18   Completing 2-wk Dex taper, until 5/7 (continuing pjp/ppi ppx until completion of taper)    - Extensive b/l UE DVTs  - Acute RLE DVT a/w RLE ecchymosis  - Hypercoag state  Continuing therapeutic lovenox (resumed on 4/18 given negative GIB brooks), benefits > risks   * Of note: pt has poor UE IV access and currently requires peripheral IV in her LE extremities for admin of medication; can consider [facility] central line for urgent/emergent IV needs    - Pericardial effusion with tamp physiology s/p pericardial window  - R pleural effusion s/p pleurex  Likely inflammatory; both pleural and pericardial fluid cytology is negative for malignant cells  4/9 surveillance TTE with pEF and no WMAs  Continuing R pleurex drainage- up to 500cc q48h/prn    - pAfib  Pt currently SR, HR controlled  Likely precipitated by malignancy, pericardial effusion, SVC syndrome  Continuing Metoprolol 25mg q12 with holding parameters  Continuing Amio 200mg daily (monitoring for toxicities)  Continuing telemetry    - Anxiety/emotional lability  Continuing Diazepam 5mg daily and daily/prn  Will fu with  re: additional med recs for improvement of pt's anxiety which at time has been a barrier to her inpt care  Pt has poor health literacy and clinical insight which is negatively impacting her medical decision making- when ask if she wanted to defer medical decision making to her boyfriend or if she had a designated HCP she replied "he's already sick of me"  Appreciate  consult: pt lacks medical decision making capacity; medical decision making will be deferred to pt's surrogate decision maker- Saeid Peña ([phone])    - 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)  Pt with h/o R eye glaucoma, but miosis can also be associated with Pancoast syndrome i/s/o extensive R upper lung tumor  Pt denies visual deficits or other related symptoms   MRI Brain without contrast ordered for further eval- pt continues to refuse, can be completed as outpt  Will continue to monitor closely    - Anemia in neoplastic disease  Hgb remains relatively stable  4/5 iron studies not c/w iron deficiency  VSS and pt without clinical s/s of active bleeding  4/17 hemolysis labs negative; 4/18 FOBT negative x2  Trending daily cbcs; continuing supportive transfusions prn (pt does not have h/o ACS, CAD, MI, goal hgb > 7; plts > 10K)    - Cancer related pain  Currently well controlled  Continuing Oxy ER 30mg q12 q8  Continuing Oxy IR 10mg q4/prn  Continuing Dilaudid IV prn for sev breakthrough pain  Continuing bowel regimen to prevent OIC (including Movantic)    - Severe prot-ghazala malnutrition: appreciate RD recs; continuing regular diet with protein shake supplement BID    -5/3 Keesha Sheets) #[phone] Updated   5/3 Called sig chuck Han [phone] and updated. he noted she never Saw Oncology out patient.  Will ask Onc in Hospital  to see Ree Ct a/p/c 57 yo woman, former smoker, with h/o R eye glaucoma, Fibromyalgia, Anxiety, GERD, and RCC s/p R total nephrectomy/hysterectomy; she was initially admitted to [facility] on 3/11 w/ R breast swelling c/f mastitis- imaging brooks noted supraclavicular/mediastinal mass lesion which encased the SVC and multiple other veins resulting in obliteration of the SVC and thrombosis of the R IJ, and a pancreatic neck cystic lesion.  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  Her disease/hospital course has also been c/b pericardial effusion s/p pericardial window w/ neg cytology, R pleural effusion, also negative cytology) s/p Pleurx, Afib w/ RVR and acute hypoxic resp failure 2/2 SVC syndrome- not dennis to IR-guided stenting; pt was ultimately started on Dex and [facility] for urgent palliative RT which she completed on 4/18. Her hospital course has also been c/b pericardial effusion with tamponade s/p window and non malignant R pleural effusion s/p pleurex, acute b/l UE and RLE dvts, anxiety, and more recently, recurrent hypoxia.    ACTIVE PROBLEMS  Metastatic cancer  Acute-on-chronic hypoxic respiratory failure---> on HF oxygen CXR 5/4 shows effusion. will do lasix 20 mg iv x1 hold sbp <100, hr <60, awaith Pulm eval as well.  SVC syndrome  Extensive b/l UE DVTs  Acute RLE DVT a/w RLE ecchymosis  Hypercoag state  Pericardial effusion with tamp physiology s/p pericardial window  R pleural effusion s/p pleurex  pAfib, with RVR on this admission  Anxiety/emotional lability  Anemia of Chronic Dz---> Hgb 7.2---> cbc and t/s in AM. also ferritin / b12/ tibc  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)  Cancer-related pain, anxiety  Severe prot-ghazala malnutrition    # Hypoxia--> HF oxygen not improving PULM CONSULT REQUESTED # [id number]  # ID  ----> Now on epimeric ----> Now on Emperic Aztreona/ Flagyl   # Onc need ct a/p for staging--> await onc f/u # [id number]   # Anemia hgb 7.1---> will give 1 unit prbc in 1/2 units each 3 hrs.  - Metastatic cancer  Based on 3/14 SC LN path, ddx includes primary UGI vs pancreaticobiliary primary (breast edema is likely 2/2 SVC syndrome); PD-L1 TPs 1%, pMMR, Her2 pending  Ca-125 moderately elevated at 125; other tumor markers not significantly elevated  Additional diagnostic brooks includes:   * 4/19 MRCP showing pancreatic lesion (? IPMN, but pancreas was suboptimally evaluated)     * 4/24 and 4/26 EGD/EUS aborted as pt had large amount of food in the stomach that prevented passing of scope   * 4/30 UGIS aborted as pt felt too short of breath when laying down flat (improved after her pleurex was drained); repeat UGIS tentatively scheduled for 5/1  Options for systemic cancer treatment are TBD  Pt's prognosis is guarded    - Acute-on-chronic hypoxic resp failure  Pt with increased O2 requirement this morning- uptitrated from 2L > 6L to HF O2 40lpm/50%  C/f POD in the chest vs acute infection  Lactate 3.2, remaining ABG results pending  RVP, procal, and sputum cx ordered  Resuming Dex PO 4mg BID for possible inflammatory process (with ppi/pjp ppx; taper held)  Planning to initiate empiric abx- will discuss options with ID as pt does not have IV access and is PCN/FQ allergic  Continuing Symbicort 2 puffs BID  Continuing Duonebs q6/prn  Will continue routing pleurex drainage as below  Will continue weaning O2 as tolerated  5/3 still on HF  5/4 Acute-on-chronic hypoxic respiratory failure---> on HF oxygen CXR 5/4 shows effusion. will do lasix 20 mg iv x1 hold sbp <100, hr <60, awaith Pulm eval as well.      - SVC syndrome  Appreciate Rad Onc eval/recs  Completed 10fxs of palliative RT on 4/18   Completing 2-wk Dex taper, until 5/7 (continuing pjp/ppi ppx until completion of taper)    - Extensive b/l UE DVTs  - Acute RLE DVT a/w RLE ecchymosis  - Hypercoag state  Continuing therapeutic lovenox (resumed on 4/18 given negative GIB brooks), benefits > risks   * Of note: pt has poor UE IV access and currently requires peripheral IV in her LE extremities for admin of medication; can consider FV central line for urgent/emergent IV needs    - Pericardial effusion with tamp physiology s/p pericardial window  - R pleural effusion s/p pleurex  Likely inflammatory; both pleural and pericardial fluid cytology is negative for malignant cells  4/9 surveillance TTE with pEF and no WMAs  Continuing R pleurex drainage- up to 500cc q48h/prn    - pAfib  Pt currently SR, HR controlled  Likely precipitated by malignancy, pericardial effusion, SVC syndrome  Continuing Metoprolol 25mg q12 with holding parameters  Continuing Amio 200mg daily (monitoring for toxicities)  Continuing telemetry    - Anxiety/emotional lability  Continuing Diazepam 5mg daily and daily/prn  Will fu with  re: additional med recs for improvement of pt's anxiety which at time has been a barrier to her inpt care  Pt has poor health literacy and clinical insight which is negatively impacting her medical decision making- when ask if she wanted to defer medical decision making to her boyfriend or if she had a designated HCP she replied "he's already sick of me"  Appreciate  consult: pt lacks medical decision making capacity; medical decision making will be deferred to pt's surrogate decision maker- Saeid Peña ([phone])    - 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)  Pt with h/o R eye glaucoma, but miosis can also be associated with Pancoast syndrome i/s/o extensive R upper lung tumor  Pt denies visual deficits or other related symptoms   MRI Brain without contrast ordered for further eval- pt continues to refuse, can be completed as outpt  Will continue to monitor closely    - Anemia in neoplastic disease  Hgb remains relatively stable  4/5 iron studies not c/w iron deficiency  VSS and pt without clinical s/s of active bleeding  4/17 hemolysis labs negative; 4/18 FOBT negative x2  Trending daily cbcs; continuing supportive transfusions prn (pt does not have h/o ACS, CAD, MI, goal hgb > 7; plts > 10K)    - Cancer related pain  Currently well controlled  Continuing Oxy ER 30mg q12 q8  Continuing Oxy IR 10mg q4/prn  Continuing Dilaudid IV prn for sev breakthrough pain  Continuing bowel regimen to prevent OIC (including Movantic)    - Severe prot-ghazala malnutrition: appreciate RD recs; continuing regular diet with protein shake supplement BID    -5/3 Keesha Sheets) #[phone] Updated   5/3 Called enriqueta Han [phone] and updated. he noted she never Saw Oncology out patient.  Will ask Onc in Hospital  to see Ree Ct a/p/c

## 2024-05-04 NOTE — CONSULT NOTE ADULT - ASSESSMENT
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 Current ly on T lovenox.     Vascular surgery consulted for possible intervention for  Extensive Upper extremity DVTs      PLAN:  - Extensive DVTs in setting of malignancy  - Recommend Continuing AC  - no acute vascular surgery intervention    Discussed with Dr Indiana Galvez PGY2  C Team surgery  [id number]  
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 Patient without abdominal pain, notable fhx of pancreatic cancer (father) and she is former smoker. Onc following, plans for systemic therapy pending further workup.    # H/o RCC s/p R total nephrectomy/hysterectomy   # Pericardial effusion s/p pericardial windown/negative cytology  # Rt pleural effusion, negative cytoloy s/p pleurx  # Afib/RVR  # Acute hypoxic respiratory failure 2/2 SVC syndrome - not amenable to IR-guided stented    Reasonable to pursue EUS/FNB if patient becomes amenable to procedure as she would like time to think about it.    Recommendations:  - Please check MRCP  - Tentatively plan for EUS +/- FNB if patient medically optimized, pending MRCP and patient's final decision  - Will need cardiac and pulmonary clearance  - Daily CBC, CMP, coags  - Transfuse if Hgb <8  - Maintain working IV   - Ensure adequate hydration  - Patient requesting more frequent PT, defer primary  - Rest of care per primary    Preliminary note until signed by Attending.    Thank you for involving us in this patient's care. Please reach out if any further questions or concerns.    Angela Coffey MD  Gastroenterology/Hepatology Fellow, PGY-7    NON-URGENT CONSULTS:  Please email [email or web address]  [email or web address]  AT NIGHT AND ON WEEKENDS:  Contact on-call GI fellow via answering service ([phone]) from 5pm-8am and on weekends/holidays  MONDAY-FRIDAY 8AM-5PM:  Pager: [phone] ([facility])  Pager: [phone] ([facility])  
58F w/ F hx tobacco use, RCC right nephrectomy, right sided glaucoma, fibromyalgia, anxiety, GERD, was at [facility] w/ concern for breast inflammation, lung nodules/mediastinal mass w/ biopsy concern for metastatic GI cancer. Had pleural effusion s/p chest tube and pleurx, had pericardial window for effusion. Has had poor IV access, has triple lumen in right groin. Has UE DVT on lovenox. Also has SVC syndrome, transfer from [facility] for LIJ RT treatment. As per GI plan for MRCP. Cardio consulted for clearance.     #Pre op risk assessment   Pt states before hospitalization was able to walk more than a block without getting short of breath   RCRI- 0 points Class I Risk 3.9 % 30-day risk of death, MI, or cardiac arrest  Amador- 1.0 % Risk of myocardial infarction or cardiac arrest, intraoperatively or up to 30 days post-op  Euvolemic on exam, lungs clear b/l, no LE edema   Not actively in ACS, not actively in HF   May proceed with procedure from cardiology standpoint   Please obtain EKG prior to procedure      #Afib   Tele reviewed sinus rhythm   c/w Amio 200mg oral daily, metoprolol tartrate 25mg bid po  c/w full dose therapeutic Lovenox   c/w Tele monitoring     #SVC   As per rad onc 
57 yo woman, former smoker, with h/o R eye glaucoma, Fibromyalgia, Anxiety, GERD, and RCC s/p R total nephrectomy/hysterectomy; she was initially admitted to [facility] on 3/11 w/ R breast swelling c/f mastitis- imaging brooks noted supraclavicular/mediastinal mass lesion which encased the SVC and multiple other veins resulting in obliteration of the SVC and thrombosis of the R IJ, and a pancreatic neck cystic lesion.  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  Her disease/hospital course has also been c/b pericardial effusion s/p pericardial window w/ neg cytology, R pleural effusion, also negative cytology) s/p Pleurx, Afib w/ RVR and acute hypoxic resp failure 2/2 SVC syndrome- not amenable to IR-guided stenting; pt was ultimately started on Dex and transferred to [facility] on 4/4 for urgent palliative RT. Patient pending EGD/EUS for pancreatic process. Pulm called for preop eval.     RECS  -Denies dyspnea. Denies any complications related to surgery.   -Reports taking symbicort 160 bid at home. Please continue while inpatient.   -ARISCAT score suggestive of low risk of pulmonary complication.   -R pleurx was placed by CTS. Can continue drainage per her prior instructions. Stop for pain. No more than 1L at a time. May be beneficial to drain the pleurX tomorrow prior to OR on Wednesday.   -No absolute pulmonary contraindication to proceed.     Dr. Fei Lamb, DO  Pulmonary and Critical Care Medicine Fellow   Available via Microsoft Teams - **Preferred**  Pulmonary Spectra #[id number] ([facility]) / [phone] ([facility])  Pager:  [phone]
59 yo woman, former smoker, with h/o R eye glaucoma, Fibromyalgia, Anxiety, GERD, and RCC s/p R total nephrectomy/hysterectomy; she was initially admitted to [facility] on 3/11 w/ R breast swelling c/f mastitis- imaging brooks noted supraclavicular/mediastinal mass lesion which encased the SVC and multiple other veins resulting in obliteration of the SVC and thrombosis of the R IJ, and a pancreatic neck cystic lesion.  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  Her disease/hospital course has also been c/b pericardial effusion s/p pericardial window w/ neg cytology, R pleural effusion, also negative cytology) s/p Pleurx, Afib w/ RVR and acute hypoxic resp failure 2/2 SVC syndrome- not amenable to IR-guided stenting; pt was ultimately started on Dex and transferred to [facility] on 4/4 for urgent palliative RT. Patient pending EGD/EUS for pancreatic process. Now with AHRF requiring HFNC and dyspnea and hoarseness and exam with thrush    # Acute hypoxemic respiratory failure   # dyspnea  # Oral candidiasis  # Hoarseness      RECS  -c/w standing duonebs and pulmicort nebs  -c/w systemic steroids  -c/w drainage of pleurX tiw, can increase frequency if dyspneic and effusion has recurred quickly on xray  -patient appears euvolemic on exam, monitor response to lasix given today  -please call ENT for laryngoscopy - unclear cause of acute hoarseness - ?involvement of oropharyngeal candidiasis vs metastatic lesion   -would start nystatin treatment for thrush
59 y/o woman presenting to hospital with right breast/chest wall swelling and cellulitis with imaging evidence concerning for metastatic malignancy w/ lung nodules, and axillary, supraclavicular, and mediastinal adenopathy.  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 Course complicated with SVC syndrome, started on steroids and s/p transfer from [facility] for urgent RT.      Pt endorses persistent difficulty breathing, especially when laying down but no worse than previous.     #Metastatic Carcinoma of unknown etiology - suspecting of GI origin  - Path from 3/14/24 IR biopsy of R Supraclavicular LN showing carcinoma, positive for CK7 and CDX2 immunostains, while negative for CK20, TTF1, GATA3, TRPS1 and PAX8. The immunoprofile is in favor of carcinoma of upper GI or pancreaticobiliary origin.  - S/p pericardiocentesis and drain on 3/19 for malignant pericardial effusion  >> Cytology from pericardial and pleural fluid negative for malignant cells   - CT Neck c/f SVC syndrome w/ thrombus that patient is on full dose anticoagulation for   >> Duplex of b/l UE 3/28 showing thrombi of right internal jugular vein, right innominate vein, right subclavian vein, right brachial vein, and left internal jugular vein  >> Duplex of LUE showing additional thrombi of L subclavian vein, L axillary vein, and SVT of L cephalic vein  - Given CT A/P findings of pancreatic cyst, with RUQ US results of possible gastric thickening:  >> Recommend MR Abdomen pancreas protocol and MRCP to eval both the pancreatic vasculature and the hepatobiliary system.   >> Pending results of MR abdomen would recommend GI consult to eval if patient would need EGD/EUS +/- FNA for further pathologic evaluation.   >> This is currently on hold due to patient's respiratory status, but please obtain as soon as possible. Would favor inpatient eval given aggressive disease.  - Pain control/anxiety/Supportive care  per primary team or palliative care team if necessary; continue O2 supplement.   - Rad Onc on board, recs appreciated. Will receive 5 fractions.  - C/w Dexamethasone 4mg q8hr for SVC syndrome  - Closely monitor for worsening of signs/symptoms of SVC syndrome.   - Check daily tumor lysis lab including CMP, Ph, LDH, uric acid - no current evidence of tumor lysis    #Anemia  #Thrombocytosis  - Possible anemia of chronic disease/inflammation but cannot r/o nutritional deficiencies. Unlikely hemolysis given stable hemoglobin and normal Tbili.   - Thrombocytosis likely reactive to ongoing illness as patient had normal platelets earlier  - Check iron studies, ferritin, b12, folate level, retic  >> Iron studies, ferritin, and folate within normal limits, B12 elevated, retic showing hypoproliferative anemia, suspect marrow suppression in acute illness  - Maintain Hgb >7 or transfuse if symptomatic    Rest of care as per primary team. Thank you for the consult, oncology will continue to follow.    **Please be aware note/recommendations not finalized until attending addendum complete.**    Elza Cuba DO, MPH  Medical Oncology Fellow, PGY-8  *Can be reached via Teams, but please contact the on-call fellow after 5pm-8am on weekdays and on weekends.
58F w/ F hx tobacco use, RCC right nephrectomy, right sided glaucoma, fibromyalgia, anxiety, GERD, was at [facility] w/ concern for breast inflammation, lung nodules/mediastinal mass w/ biopsy concern for metastatic GI cancer. Had pleural effusion s/p chest tube and pleurx, had pericardial window for effusion. Has had poor IV access, has triple lumen in right groin. Has UE DVT on lovenox. Also has SVC syndrome, transfer from [facility] for LIJ RT treatment. ROS otherwise negative. On oxygen.

## 2024-05-04 NOTE — CONSULT NOTE ADULT - SUBJECTIVE AND OBJECTIVE BOX
PULMONARY CONSULTATION NOTE    NAME: GADIEL GENAO  MEDICAL RECORD NUMBER: [id number]    CHIEF COMPLAINT: Patient is a 58y old  Female who presents with a chief complaint of SVC syndrome, DVT, mediastinal mass (04 May 2024 11:36)      HISTORY OF PRESENT ILLNESS:   HPI:  [facility] transfer.  58F w/ F hx tobacco use, RCC right nephrectomy, right sided glaucoma, fibromyalgia, anxiety, GERD, was at [facility] w/ concern for breast inflammation, lung nodules/mediastinal mass w/ biopsy concern for metastatic GI cancer. Had pleural effusion s/p chest tube and pleurx, had pericardial window for effusion. Has had poor IV access, has triple lumen in right groin. Has UE DVT on lovenox. Also has SVC syndrome, transfer from [facility] for [facility] RT treatment.     Now s/p RT and with worsening hypoxemic resp failure requiring HFNC. Also with dyspnea that is worse today and hoarseness. Patient needs to straighten neck to speak.        (04 Apr 2024 03:38)      ====================BACKGROUND INFORMATION====================    FAMILY HISTORY: FAMILY HISTORY:  Family history of lung cancer (Mother)    Family history of pancreatic cancer (Father)        PAST MEDICAL AND SURGICAL HISTORY: PAST MEDICAL & SURGICAL HISTORY:  Anxiety      Acid reflux disease      Umbilical hernia      Uterine mass  Benign      Fibromyalgia  Joint pains      Glaucoma  Right eye      Herniated cervical disc      Cancer of kidney  right kidney      S/P partial hysterectomy  8 years ago      S/P knee surgery  knee arthroscopy right x 2 ( 2011 & 2012)      S/P hernia repair  umbilical Hernia Repair - 2011      H/O unilateral nephrectomy  Right Laparoscopic Nephrectomy - [id number]      Glaucoma following surgery  Right Eyr - 2012      History of tonsillectomy          ALLERGIES:Allergies    erythromycin (Headache; Vomiting; Rash)  penicillin (Headache; Vomiting; Rash)  Cipro (Headache; Vomiting; Rash)    Intolerances        HOME MEDICATIONS:     OUTPATIENT PULMONARY DOCTOR:     PFTs:     SOCIAL HISTORY:  TOBACCO USE:  ALCOHOL:  DRUGS:  MARITAL STATUS:  EMPLOYMENT:  EXPOSURES:  RECENT TRAVELS:  PETS:  CODE STATUS:    ========================REVIEW OF SYSTEMS========================  [x] ROS negative except as per HPI    ========================MEDICATIONS=============================  NEURO  diazepam    Tablet 5 milliGRAM(s) Oral at bedtime  metoclopramide Injectable 10 milliGRAM(s) IV Push every 8 hours  oxyCODONE  ER Tablet 30 milliGRAM(s) Oral <User Schedule>    CARDIO  aMIOdarone    Tablet 200 milliGRAM(s) Oral daily  metoprolol tartrate 25 milliGRAM(s) Oral every 12 hours    PULM  albuterol/ipratropium for Nebulization 3 milliLiter(s) Nebulizer every 6 hours  budesonide 160 MICROgram(s)/formoterol 4.5 MICROgram(s) Inhaler 2 Puff(s) Inhalation two times a day    FEN/GI  multivitamin 1 Tablet(s) Oral daily  naloxegol 25 milliGRAM(s) Oral daily  pantoprazole    Tablet 40 milliGRAM(s) Oral two times a day  polyethylene glycol 3350 17 Gram(s) Oral every 12 hours  senna 2 Tablet(s) Oral at bedtime    HEME/ONC  enoxaparin Injectable 50 milliGRAM(s) SubCutaneous every 12 hours    ANTIMICROBIALS  cefepime   IVPB 2000 milliGRAM(s) IV Intermittent every 12 hours  trimethoprim  160 mG/sulfamethoxazole 800 mG 1 Tablet(s) Oral <User Schedule>    ENDO  dexAMETHasone  Injectable 4 milliGRAM(s) IV Push two times a day    OTHER  Biotene Dry Mouth Oral Rinse 15 milliLiter(s) Swish and Spit every 6 hours  chlorhexidine 2% Cloths 1 Application(s) Topical daily  influenza   Vaccine 0.5 milliLiter(s) IntraMuscular once  nicotine -  14 mG/24Hr(s) Patch 1 Patch Transdermal every 24 hours      PRN      Drips      ========================PHYSICAL EXAM============================    VITALS: Vital Signs Last 24 Hrs  T(C): 36.8 (04 May 2024 13:04), Max: 36.8 (04 May 2024 13:04)  T(F): 98.3 (04 May 2024 13:04), Max: 98.3 (04 May 2024 13:04)  HR: 68 (04 May 2024 15:07) (68 - 86)  BP: 137/85 (04 May 2024 13:04) (126/62 - 137/85)  BP(mean): --  RR: 20 (04 May 2024 14:55) (18 - 20)  SpO2: 98% (04 May 2024 14:55) (96% - 100%)    Parameters below as of 04 May 2024 14:55  Patient On (Oxygen Delivery Method): nasal cannula, high flow  O2 Flow (L/min): 35  O2 Concentration (%): 70    INTAKE and OUTPUT: I&O's Detail      WEIGHT    VENTILATOR SETTINGS:     Physical Exam  CONST:     chronically ill appearing  ENMT:       white plaques in oropharynx  RESP :       mostly clear but with scattered rhonci  CHEST:      distended veins noted  CARDS:    RRR no MRG  VASC:         no edema, 2+ pulses, +JVD  ABD:          soft nontender  MSK:          no clubbing or cyanosis  NEURO:     awake and alert, PINA        ======================LABORATORY RESULTS AND IMAGING=============                        7.1    6.71  )-----------( 302      ( 04 May 2024 06:30 )             23.1                                  05-04    136  |  101  |  13  ----------------------------<  106<H>  4.2   |  24  |  0.90    Ca    8.6      04 May 2024 06:30  Phos  3.7     05-04  Mg     1.70     05-04    TPro  5.0<L>  /  Alb  2.4<L>  /  TBili  0.2  /  DBili  x   /  AST  6   /  ALT  57<H>  /  AlkPhos  99  05-04    N:6.29/L:0.10= __ 05-04-24 @ 06:30  N:5.85/L:0.11= __ 05-03-24 @ 06:00  N:5.39/L:0.14= __ 05-02-24 @ 06:50  N:6.89/L:0.31= __ 05-01-24 @ 07:10  N:7.59/L:0.34= __ 04-30-24 @ 06:46    Ref-range: <3 normal, >9 elevated, >18 very elevated    ABG Trend  03-26-24 @ 03:48 FiO2--  - 7.38/41/73/24/96.8 Lactate --  03-24-24 @ 19:18 FiO2--  - 7.36/47/37/27/59.8 Lactate --  03-23-24 @ 22:14 [id number]  - 7.45/31/185/22/99.8 Lactate --    VBG Trend  05-01-24 @ 17:14 FiO2--  - 7.34/47/46/25/73.3 Lactate 2.8  03-23-24 @ 02:47 FiO2--  - 7.47/35/76/26/97.5 Lactate 1.1      Creatinine Trend: 0.90<--, 0.92<--, 0.75<--, 0.97<--, 0.87<--, 0.73<--    [x] RADIOLOGY REVIEWED AND INTERPRETED BY ME

## 2024-05-04 NOTE — PROGRESS NOTE ADULT - SUBJECTIVE AND OBJECTIVE BOX
Patient is a 58y old  Female who presents with a chief complaint of SVC syndrome, DVT, mediastinal mass (03 May 2024 08:47)      SUBJECTIVE / OVERNIGHT EVENTS:  Still will ask Pulmonary/ MIC[facility] for help    MEDICATIONS  (STANDING):  albuterol/ipratropium for Nebulization 3 milliLiter(s) Nebulizer every 6 hours  aMIOdarone    Tablet 200 milliGRAM(s) Oral daily  Biotene Dry Mouth Oral Rinse 15 milliLiter(s) Swish and Spit every 6 hours  budesonide 160 MICROgram(s)/formoterol 4.5 MICROgram(s) Inhaler 2 Puff(s) Inhalation two times a day  cefepime   IVPB 2000 milliGRAM(s) IV Intermittent every 12 hours  chlorhexidine 2% Cloths 1 Application(s) Topical daily  dexAMETHasone  Injectable 4 milliGRAM(s) IV Push two times a day  diazepam    Tablet 5 milliGRAM(s) Oral at bedtime  enoxaparin Injectable 50 milliGRAM(s) SubCutaneous every 12 hours  influenza   Vaccine 0.5 milliLiter(s) IntraMuscular once  metoclopramide Injectable 10 milliGRAM(s) IV Push every 8 hours  metoprolol tartrate 25 milliGRAM(s) Oral every 12 hours  multivitamin 1 Tablet(s) Oral daily  naloxegol 25 milliGRAM(s) Oral daily  nicotine -  14 mG/24Hr(s) Patch 1 Patch Transdermal every 24 hours  oxyCODONE  ER Tablet 30 milliGRAM(s) Oral <User Schedule>  pantoprazole    Tablet 40 milliGRAM(s) Oral two times a day  polyethylene glycol 3350 17 Gram(s) Oral every 12 hours  senna 2 Tablet(s) Oral at bedtime  trimethoprim  160 mG/sulfamethoxazole 800 mG 1 Tablet(s) Oral <User Schedule>    MEDICATIONS  (PRN):  aluminum hydroxide/magnesium hydroxide/simethicone Suspension 30 milliLiter(s) Oral every 6 hours PRN Dyspepsia  Biotene Dry Mouth Oral Rinse 15 milliLiter(s) Swish and Spit two times a day PRN dry mouth  diazepam    Tablet 5 milliGRAM(s) Oral two times a day PRN anxiety  FIRST- Mouthwash  BLM 5 milliLiter(s) Swish and Spit four times a day PRN Mouth Care  HYDROmorphone  Injectable 1 milliGRAM(s) IV Push every 3 hours PRN Severe Pain (7 - 10)  ondansetron    Tablet 4 milliGRAM(s) Oral every 8 hours PRN Nausea and/or Vomiting  oxyCODONE    IR 10 milliGRAM(s) Oral every 4 hours PRN Moderate Pain (4 - 6)  sodium chloride 0.9% lock flush 10 milliLiter(s) IV Push every 1 hour PRN Pre/post blood products, medications, blood draw, and to maintain line patency        CAPILLARY BLOOD GLUCOSE        I&O's Summary    Vital Signs Last 24 Hrs  T(C): 36.6 (04 May 2024 05:00), Max: 36.7 (03 May 2024 17:59)  T(F): 97.8 (04 May 2024 05:00), Max: 98.1 (03 May 2024 17:59)  HR: 70 (04 May 2024 08:12) (70 - 89)  BP: 126/62 (04 May 2024 05:00) (126/62 - 137/87)  BP(mean): --  RR: 20 (04 May 2024 11:35) (18 - 20)  SpO2: 96% (04 May 2024 11:35) (96% - 100%)    Parameters below as of 04 May 2024 11:35  Patient On (Oxygen Delivery Method): nasal cannula, high flow  O2 Flow (L/min): 35  O2 Concentration (%): 70  PHYSICAL EXAM:  GENERAL: NAD,   HEAD:  Atraumatic, Normocephalic  EYES: EOMI, PERRLA, conjunctiva and sclera clear  NECK: Supple, No JVD  CHEST/LUNG: Clear to auscultation bilaterally; No wheeze  DEc BS with inc rr  HEART: Regular rate and rhythm; No murmurs, rubs, or gallops  ABDOMEN: Soft, Nontender, Nondistended; Bowel sounds present  EXTREMITIES:  2+ Peripheral Pulses, No clubbing, cyanosis, or edema  PSYCH: AAOx3  NEUROLOGY: non-focal      LABS:                        7.1    6.71  )-----------( 302      ( 04 May 2024 06:30 )             23.1     05-04    136  |  101  |  13  ----------------------------<  106<H>  4.2   |  24  |  0.90    Ca    8.6      04 May 2024 06:30  Phos  3.7     05-04  Mg     1.70     05-04    TPro  5.0<L>  /  Alb  2.4<L>  /  TBili  0.2  /  DBili  x   /  AST  6   /  ALT  57<H>  /  AlkPhos  99  05-04      RADIOLOGY & ADDITIONAL TESTS:  ra< from: CT Abdomen and Pelvis w/ IV Cont (05.03.24 @ 12:32) >  IMPRESSION:  Since the prior chest CT 3/27/2024:    Persistent right middle lobe consolidation and decreased right lower lobe   consolidation.    New patchy nodular opacities in the right upper lobe and increased patchy   and groundglass opacities in the left lung.    Small right pleural effusion, unchanged. Trace left pleural effusion,   decreased.    No evidence of metastatic disease in the abdomen or pelvis.          < end of copied text >      Care Discussed with Consultants/Other Providers:   Patient is a 58y old  Female who presents with a chief complaint of SVC syndrome, DVT, mediastinal mass (03 May 2024 08:47)      SUBJECTIVE / OVERNIGHT EVENTS:  Still will ask Pulmonary for HELP    MEDICATIONS  (STANDING):  albuterol/ipratropium for Nebulization 3 milliLiter(s) Nebulizer every 6 hours  aMIOdarone    Tablet 200 milliGRAM(s) Oral daily  Biotene Dry Mouth Oral Rinse 15 milliLiter(s) Swish and Spit every 6 hours  budesonide 160 MICROgram(s)/formoterol 4.5 MICROgram(s) Inhaler 2 Puff(s) Inhalation two times a day  cefepime   IVPB 2000 milliGRAM(s) IV Intermittent every 12 hours  chlorhexidine 2% Cloths 1 Application(s) Topical daily  dexAMETHasone  Injectable 4 milliGRAM(s) IV Push two times a day  diazepam    Tablet 5 milliGRAM(s) Oral at bedtime  enoxaparin Injectable 50 milliGRAM(s) SubCutaneous every 12 hours  influenza   Vaccine 0.5 milliLiter(s) IntraMuscular once  metoclopramide Injectable 10 milliGRAM(s) IV Push every 8 hours  metoprolol tartrate 25 milliGRAM(s) Oral every 12 hours  multivitamin 1 Tablet(s) Oral daily  naloxegol 25 milliGRAM(s) Oral daily  nicotine -  14 mG/24Hr(s) Patch 1 Patch Transdermal every 24 hours  oxyCODONE  ER Tablet 30 milliGRAM(s) Oral <User Schedule>  pantoprazole    Tablet 40 milliGRAM(s) Oral two times a day  polyethylene glycol 3350 17 Gram(s) Oral every 12 hours  senna 2 Tablet(s) Oral at bedtime  trimethoprim  160 mG/sulfamethoxazole 800 mG 1 Tablet(s) Oral <User Schedule>    MEDICATIONS  (PRN):  aluminum hydroxide/magnesium hydroxide/simethicone Suspension 30 milliLiter(s) Oral every 6 hours PRN Dyspepsia  Biotene Dry Mouth Oral Rinse 15 milliLiter(s) Swish and Spit two times a day PRN dry mouth  diazepam    Tablet 5 milliGRAM(s) Oral two times a day PRN anxiety  FIRST- Mouthwash  BLM 5 milliLiter(s) Swish and Spit four times a day PRN Mouth Care  HYDROmorphone  Injectable 1 milliGRAM(s) IV Push every 3 hours PRN Severe Pain (7 - 10)  ondansetron    Tablet 4 milliGRAM(s) Oral every 8 hours PRN Nausea and/or Vomiting  oxyCODONE    IR 10 milliGRAM(s) Oral every 4 hours PRN Moderate Pain (4 - 6)  sodium chloride 0.9% lock flush 10 milliLiter(s) IV Push every 1 hour PRN Pre/post blood products, medications, blood draw, and to maintain line patency        Vital Signs Last 24 Hrs  T(C): 36.6 (04 May 2024 05:00), Max: 36.7 (03 May 2024 17:59)  T(F): 97.8 (04 May 2024 05:00), Max: 98.1 (03 May 2024 17:59)  HR: 70 (04 May 2024 08:12) (70 - 89)  BP: 126/62 (04 May 2024 05:00) (126/62 - 137/87)  BP(mean): --  RR: 20 (04 May 2024 11:35) (18 - 20)  SpO2: 96% (04 May 2024 11:35) (96% - 100%)    Parameters below as of 04 May 2024 11:35  Patient On (Oxygen Delivery Method): nasal cannula, high flow  O2 Flow (L/min): 35  O2 Concentration (%): 70  PHYSICAL EXAM:  GENERAL: NAD,   HEAD:  Atraumatic, Normocephalic  EYES: EOMI, PERRLA, conjunctiva and sclera clear  NECK: Supple, No JVD  CHEST/LUNG: Clear to auscultation bilaterally; No wheeze  DEc BS with inc rr  HEART: Regular rate and rhythm; No murmurs, rubs, or gallops  ABDOMEN: Soft, Nontender, Nondistended; Bowel sounds present  EXTREMITIES:  2+ Peripheral Pulses, No clubbing, cyanosis, or edema  PSYCH: AAOx3  NEUROLOGY: non-focal      LABS:                        7.1    6.71  )-----------( 302      ( 04 May 2024 06:30 )             23.1     05-04    136  |  101  |  13  ----------------------------<  106<H>  4.2   |  24  |  0.90    Ca    8.6      04 May 2024 06:30  Phos  3.7     05-04  Mg     1.70     05-04    TPro  5.0<L>  /  Alb  2.4<L>  /  TBili  0.2  /  DBili  x   /  AST  6   /  ALT  57<H>  /  AlkPhos  99  05-04      RADIOLOGY & ADDITIONAL TESTS:  ra< from: CT Abdomen and Pelvis w/ IV Cont (05.03.24 @ 12:32) >  IMPRESSION:  Since the prior chest CT 3/27/2024:    Persistent right middle lobe consolidation and decreased right lower lobe   consolidation.    New patchy nodular opacities in the right upper lobe and increased patchy   and groundglass opacities in the left lung.    Small right pleural effusion, unchanged. Trace left pleural effusion,   decreased.    No evidence of metastatic disease in the abdomen or pelvis.          < end of copied text >      Care Discussed with Consultants/Other Providers:   Patient is a 58y old  Female who presents with a chief complaint of SVC syndrome, DVT, mediastinal mass (03 May 2024 08:47)      SUBJECTIVE / OVERNIGHT EVENTS:  Still will ask Pulmonary for HELP    MEDICATIONS  (STANDING):  albuterol/ipratropium for Nebulization 3 milliLiter(s) Nebulizer every 6 hours  aMIOdarone    Tablet 200 milliGRAM(s) Oral daily  Biotene Dry Mouth Oral Rinse 15 milliLiter(s) Swish and Spit every 6 hours  budesonide 160 MICROgram(s)/formoterol 4.5 MICROgram(s) Inhaler 2 Puff(s) Inhalation two times a day  cefepime   IVPB 2000 milliGRAM(s) IV Intermittent every 12 hours  chlorhexidine 2% Cloths 1 Application(s) Topical daily  dexAMETHasone  Injectable 4 milliGRAM(s) IV Push two times a day  diazepam    Tablet 5 milliGRAM(s) Oral at bedtime  enoxaparin Injectable 50 milliGRAM(s) SubCutaneous every 12 hours  influenza   Vaccine 0.5 milliLiter(s) IntraMuscular once  metoclopramide Injectable 10 milliGRAM(s) IV Push every 8 hours  metoprolol tartrate 25 milliGRAM(s) Oral every 12 hours  multivitamin 1 Tablet(s) Oral daily  naloxegol 25 milliGRAM(s) Oral daily  nicotine -  14 mG/24Hr(s) Patch 1 Patch Transdermal every 24 hours  oxyCODONE  ER Tablet 30 milliGRAM(s) Oral <User Schedule>  pantoprazole    Tablet 40 milliGRAM(s) Oral two times a day  polyethylene glycol 3350 17 Gram(s) Oral every 12 hours  senna 2 Tablet(s) Oral at bedtime  trimethoprim  160 mG/sulfamethoxazole 800 mG 1 Tablet(s) Oral <User Schedule>    MEDICATIONS  (PRN):  aluminum hydroxide/magnesium hydroxide/simethicone Suspension 30 milliLiter(s) Oral every 6 hours PRN Dyspepsia  Biotene Dry Mouth Oral Rinse 15 milliLiter(s) Swish and Spit two times a day PRN dry mouth  diazepam    Tablet 5 milliGRAM(s) Oral two times a day PRN anxiety  FIRST- Mouthwash  BLM 5 milliLiter(s) Swish and Spit four times a day PRN Mouth Care  HYDROmorphone  Injectable 1 milliGRAM(s) IV Push every 3 hours PRN Severe Pain (7 - 10)  ondansetron    Tablet 4 milliGRAM(s) Oral every 8 hours PRN Nausea and/or Vomiting  oxyCODONE    IR 10 milliGRAM(s) Oral every 4 hours PRN Moderate Pain (4 - 6)  sodium chloride 0.9% lock flush 10 milliLiter(s) IV Push every 1 hour PRN Pre/post blood products, medications, blood draw, and to maintain line patency        Vital Signs Last 24 Hrs  T(C): 36.6 (04 May 2024 05:00), Max: 36.7 (03 May 2024 17:59)  T(F): 97.8 (04 May 2024 05:00), Max: 98.1 (03 May 2024 17:59)  HR: 70 (04 May 2024 08:12) (70 - 89)  BP: 126/62 (04 May 2024 05:00) (126/62 - 137/87)  BP(mean): --  RR: 20 (04 May 2024 11:35) (18 - 20)  SpO2: 96% (04 May 2024 11:35) (96% - 100%)    Parameters below as of 04 May 2024 11:35  Patient On (Oxygen Delivery Method): nasal cannula, high flow  O2 Flow (L/min): 35  O2 Concentration (%): 70  PHYSICAL EXAM:  GENERAL: NAD,   HEAD:  Atraumatic, Normocephalic  EYES: EOMI, PERRLA, conjunctiva and sclera clear  NECK: Supple, No JVD  CHEST/LUNG: Clear to auscultation bilaterally; No wheeze  DEc BS with inc rr  HEART: Regular rate and rhythm; No murmurs, rubs, or gallops  ABDOMEN: Soft, Nontender, Nondistended; Bowel sounds present  EXTREMITIES:  2+ Peripheral Pulses, No clubbing, cyanosis, or edema  PSYCH: AAOx3  NEUROLOGY: non-focal      LABS:                        7.1    6.71  )-----------( 302      ( 04 May 2024 06:30 )             23.1     05-04    136  |  101  |  13  ----------------------------<  106<H>  4.2   |  24  |  0.90    Ca    8.6      04 May 2024 06:30  Phos  3.7     05-04  Mg     1.70     05-04    TPro  5.0<L>  /  Alb  2.4<L>  /  TBili  0.2  /  DBili  x   /  AST  6   /  ALT  57<H>  /  AlkPhos  99  05-04      RADIOLOGY & ADDITIONAL TESTS:  ra< from: CT Abdomen and Pelvis w/ IV Cont (05.03.24 @ 12:32) >  IMPRESSION:  Since the prior chest CT 3/27/2024:    Persistent right middle lobe consolidation and decreased right lower lobe   consolidation.    New patchy nodular opacities in the right upper lobe and increased patchy   and groundglass opacities in the left lung.    Small right pleural effusion, unchanged. Trace left pleural effusion,   decreased.    No evidence of metastatic disease in the abdomen or pelvis.        Care Discussed with Consultants/Other Providers:   Patient is a 58y old  Female who presents with a chief complaint of SVC syndrome, DVT, mediastinal mass (03 May 2024 08:47)      SUBJECTIVE / OVERNIGHT EVENTS:  Still will ask Pulmonary for HELP  also Onc [id number] to see   MEDICATIONS  (STANDING):  albuterol/ipratropium for Nebulization 3 milliLiter(s) Nebulizer every 6 hours  aMIOdarone    Tablet 200 milliGRAM(s) Oral daily  Biotene Dry Mouth Oral Rinse 15 milliLiter(s) Swish and Spit every 6 hours  budesonide 160 MICROgram(s)/formoterol 4.5 MICROgram(s) Inhaler 2 Puff(s) Inhalation two times a day  cefepime   IVPB 2000 milliGRAM(s) IV Intermittent every 12 hours  chlorhexidine 2% Cloths 1 Application(s) Topical daily  dexAMETHasone  Injectable 4 milliGRAM(s) IV Push two times a day  diazepam    Tablet 5 milliGRAM(s) Oral at bedtime  enoxaparin Injectable 50 milliGRAM(s) SubCutaneous every 12 hours  influenza   Vaccine 0.5 milliLiter(s) IntraMuscular once  metoclopramide Injectable 10 milliGRAM(s) IV Push every 8 hours  metoprolol tartrate 25 milliGRAM(s) Oral every 12 hours  multivitamin 1 Tablet(s) Oral daily  naloxegol 25 milliGRAM(s) Oral daily  nicotine -  14 mG/24Hr(s) Patch 1 Patch Transdermal every 24 hours  oxyCODONE  ER Tablet 30 milliGRAM(s) Oral <User Schedule>  pantoprazole    Tablet 40 milliGRAM(s) Oral two times a day  polyethylene glycol 3350 17 Gram(s) Oral every 12 hours  senna 2 Tablet(s) Oral at bedtime  trimethoprim  160 mG/sulfamethoxazole 800 mG 1 Tablet(s) Oral <User Schedule>    MEDICATIONS  (PRN):  aluminum hydroxide/magnesium hydroxide/simethicone Suspension 30 milliLiter(s) Oral every 6 hours PRN Dyspepsia  Biotene Dry Mouth Oral Rinse 15 milliLiter(s) Swish and Spit two times a day PRN dry mouth  diazepam    Tablet 5 milliGRAM(s) Oral two times a day PRN anxiety  FIRST- Mouthwash  BLM 5 milliLiter(s) Swish and Spit four times a day PRN Mouth Care  HYDROmorphone  Injectable 1 milliGRAM(s) IV Push every 3 hours PRN Severe Pain (7 - 10)  ondansetron    Tablet 4 milliGRAM(s) Oral every 8 hours PRN Nausea and/or Vomiting  oxyCODONE    IR 10 milliGRAM(s) Oral every 4 hours PRN Moderate Pain (4 - 6)  sodium chloride 0.9% lock flush 10 milliLiter(s) IV Push every 1 hour PRN Pre/post blood products, medications, blood draw, and to maintain line patency        Vital Signs Last 24 Hrs  T(C): 36.6 (04 May 2024 05:00), Max: 36.7 (03 May 2024 17:59)  T(F): 97.8 (04 May 2024 05:00), Max: 98.1 (03 May 2024 17:59)  HR: 70 (04 May 2024 08:12) (70 - 89)  BP: 126/62 (04 May 2024 05:00) (126/62 - 137/87)  BP(mean): --  RR: 20 (04 May 2024 11:35) (18 - 20)  SpO2: 96% (04 May 2024 11:35) (96% - 100%)    Parameters below as of 04 May 2024 11:35  Patient On (Oxygen Delivery Method): nasal cannula, high flow  O2 Flow (L/min): 35  O2 Concentration (%): 70  PHYSICAL EXAM:  GENERAL: NAD,   HEAD:  Atraumatic, Normocephalic  EYES: EOMI, PERRLA, conjunctiva and sclera clear  NECK: Supple, No JVD  CHEST/LUNG: Clear to auscultation bilaterally; No wheeze  DEc BS with inc rr  HEART: Regular rate and rhythm; No murmurs, rubs, or gallops  ABDOMEN: Soft, Nontender, Nondistended; Bowel sounds present  EXTREMITIES:  2+ Peripheral Pulses, No clubbing, cyanosis, or edema  PSYCH: AAOx3  NEUROLOGY: non-focal      LABS:                        7.1    6.71  )-----------( 302      ( 04 May 2024 06:30 )             23.1     05-04    136  |  101  |  13  ----------------------------<  106<H>  4.2   |  24  |  0.90    Ca    8.6      04 May 2024 06:30  Phos  3.7     05-04  Mg     1.70     05-04    TPro  5.0<L>  /  Alb  2.4<L>  /  TBili  0.2  /  DBili  x   /  AST  6   /  ALT  57<H>  /  AlkPhos  99  05-04      RADIOLOGY & ADDITIONAL TESTS:  ra< from: CT Abdomen and Pelvis w/ IV Cont (05.03.24 @ 12:32) >  IMPRESSION:  Since the prior chest CT 3/27/2024:    Persistent right middle lobe consolidation and decreased right lower lobe   consolidation.    New patchy nodular opacities in the right upper lobe and increased patchy   and groundglass opacities in the left lung.    Small right pleural effusion, unchanged. Trace left pleural effusion,   decreased.    No evidence of metastatic disease in the abdomen or pelvis.        Care Discussed with Consultants/Other Providers:   Patient is a 58y old  Female who presents with a chief complaint of SVC syndrome, DVT, mediastinal mass (03 May 2024 08:47)      SUBJECTIVE / OVERNIGHT EVENTS:  Still will ask Pulmonary for HELP  also Onc [id number] to see   MEDICATIONS  (STANDING):  albuterol/ipratropium for Nebulization 3 milliLiter(s) Nebulizer every 6 hours  aMIOdarone    Tablet 200 milliGRAM(s) Oral daily  Biotene Dry Mouth Oral Rinse 15 milliLiter(s) Swish and Spit every 6 hours  budesonide 160 MICROgram(s)/formoterol 4.5 MICROgram(s) Inhaler 2 Puff(s) Inhalation two times a day  cefepime   IVPB 2000 milliGRAM(s) IV Intermittent every 12 hours  chlorhexidine 2% Cloths 1 Application(s) Topical daily  dexAMETHasone  Injectable 4 milliGRAM(s) IV Push two times a day  diazepam    Tablet 5 milliGRAM(s) Oral at bedtime  enoxaparin Injectable 50 milliGRAM(s) SubCutaneous every 12 hours  influenza   Vaccine 0.5 milliLiter(s) IntraMuscular once  metoclopramide Injectable 10 milliGRAM(s) IV Push every 8 hours  metoprolol tartrate 25 milliGRAM(s) Oral every 12 hours  multivitamin 1 Tablet(s) Oral daily  naloxegol 25 milliGRAM(s) Oral daily  nicotine -  14 mG/24Hr(s) Patch 1 Patch Transdermal every 24 hours  oxyCODONE  ER Tablet 30 milliGRAM(s) Oral <User Schedule>  pantoprazole    Tablet 40 milliGRAM(s) Oral two times a day  polyethylene glycol 3350 17 Gram(s) Oral every 12 hours  senna 2 Tablet(s) Oral at bedtime  trimethoprim  160 mG/sulfamethoxazole 800 mG 1 Tablet(s) Oral <User Schedule>    MEDICATIONS  (PRN):  aluminum hydroxide/magnesium hydroxide/simethicone Suspension 30 milliLiter(s) Oral every 6 hours PRN Dyspepsia  Biotene Dry Mouth Oral Rinse 15 milliLiter(s) Swish and Spit two times a day PRN dry mouth  diazepam    Tablet 5 milliGRAM(s) Oral two times a day PRN anxiety  FIRST- Mouthwash  BLM 5 milliLiter(s) Swish and Spit four times a day PRN Mouth Care  HYDROmorphone  Injectable 1 milliGRAM(s) IV Push every 3 hours PRN Severe Pain (7 - 10)  ondansetron    Tablet 4 milliGRAM(s) Oral every 8 hours PRN Nausea and/or Vomiting  oxyCODONE    IR 10 milliGRAM(s) Oral every 4 hours PRN Moderate Pain (4 - 6)  sodium chloride 0.9% lock flush 10 milliLiter(s) IV Push every 1 hour PRN Pre/post blood products, medications, blood draw, and to maintain line patency        Vital Signs Last 24 Hrs  T(C): 36.6 (04 May 2024 05:00), Max: 36.7 (03 May 2024 17:59)  T(F): 97.8 (04 May 2024 05:00), Max: 98.1 (03 May 2024 17:59)  HR: 70 (04 May 2024 08:12) (70 - 89)  BP: 126/62 (04 May 2024 05:00) (126/62 - 137/87)  BP(mean): --  RR: 20 (04 May 2024 11:35) (18 - 20)  SpO2: 96% (04 May 2024 11:35) (96% - 100%)    Parameters below as of 04 May 2024 11:35  Patient On (Oxygen Delivery Method): nasal cannula, high flow  O2 Flow (L/min): 35  O2 Concentration (%): 70  PHYSICAL EXAM:  GENERAL: NAD,   HEAD:  Atraumatic, Normocephalic  EYES: EOMI, PERRLA, conjunctiva and sclera clear  NECK: Supple, No JVD  CHEST/LUNG: Clear to auscultation bilaterally; No wheeze  DEc BS with inc rr  HEART: Regular rate and rhythm; No murmurs, rubs, or gallops  ABDOMEN: Soft, Nontender, Nondistended; Bowel sounds present  EXTREMITIES:  2+ Peripheral Pulses, No clubbing, cyanosis, or edema  PSYCH: AAOx3  NEUROLOGY: non-focal      LABS:                        7.1    6.71  )-----------( 302      ( 04 May 2024 06:30 )             23.1     05-04    136  |  101  |  13  ----------------------------<  106<H>  4.2   |  24  |  0.90    Ca    8.6      04 May 2024 06:30  Phos  3.7     05-04  Mg     1.70     05-04    TPro  5.0<L>  /  Alb  2.4<L>  /  TBili  0.2  /  DBili  x   /  AST  6   /  ALT  57<H>  /  AlkPhos  99  05-04    ra< from: Xray Chest 1 View- PORTABLE-Urgent (Xray Chest 1 View- PORTABLE-Urgent .) (05.04.24 @ 09:22) >  INTERPRETATION:  HISTORY: Admitting Dxs: C80.1 C80.1; ; SOB;  TECHNIQUE: Portable frontal view of the chest, 2 films.  COMPARISON: 5/1/2024.  FINDINGS/  IMPRESSION:  There is a catheter at the right base.  HEART: normal in size.  LUNGS: There is opacity at the right base compatible with   effusion/infiltrate.. Prominent interstitial markings, likely secondary   to chronic underlying lung disease..  BONES: degenerative changes    < end of copied text >      RADIOLOGY & ADDITIONAL TESTS:  ra< from: CT Abdomen and Pelvis w/ IV Cont (05.03.24 @ 12:32) >  IMPRESSION:  Since the prior chest CT 3/27/2024:  Persistent right middle lobe consolidation and decreased right lower lobe   consolidation.  New patchy nodular opacities in the right upper lobe and increased patchy   and groundglass opacities in the left lung.  Small right pleural effusion, unchanged. Trace left pleural effusion,   decreased.  No evidence of metastatic disease in the abdomen or pelvis.        Care Discussed with Consultants/Other Providers:   Patient is a 58y old  Female who presents with a chief complaint of SVC syndrome, DVT, mediastinal mass (03 May 2024 08:47)      SUBJECTIVE / OVERNIGHT EVENTS:  Still will ask Pulmonary for HELP  also Onc [id number] to see   MEDICATIONS  (STANDING):  albuterol/ipratropium for Nebulization 3 milliLiter(s) Nebulizer every 6 hours  aMIOdarone    Tablet 200 milliGRAM(s) Oral daily  Biotene Dry Mouth Oral Rinse 15 milliLiter(s) Swish and Spit every 6 hours  budesonide 160 MICROgram(s)/formoterol 4.5 MICROgram(s) Inhaler 2 Puff(s) Inhalation two times a day  cefepime   IVPB 2000 milliGRAM(s) IV Intermittent every 12 hours  chlorhexidine 2% Cloths 1 Application(s) Topical daily  dexAMETHasone  Injectable 4 milliGRAM(s) IV Push two times a day  diazepam    Tablet 5 milliGRAM(s) Oral at bedtime  enoxaparin Injectable 50 milliGRAM(s) SubCutaneous every 12 hours  influenza   Vaccine 0.5 milliLiter(s) IntraMuscular once  metoclopramide Injectable 10 milliGRAM(s) IV Push every 8 hours  metoprolol tartrate 25 milliGRAM(s) Oral every 12 hours  multivitamin 1 Tablet(s) Oral daily  naloxegol 25 milliGRAM(s) Oral daily  nicotine -  14 mG/24Hr(s) Patch 1 Patch Transdermal every 24 hours  oxyCODONE  ER Tablet 30 milliGRAM(s) Oral <User Schedule>  pantoprazole    Tablet 40 milliGRAM(s) Oral two times a day  polyethylene glycol 3350 17 Gram(s) Oral every 12 hours  senna 2 Tablet(s) Oral at bedtime  trimethoprim  160 mG/sulfamethoxazole 800 mG 1 Tablet(s) Oral <User Schedule>    MEDICATIONS  (PRN):  aluminum hydroxide/magnesium hydroxide/simethicone Suspension 30 milliLiter(s) Oral every 6 hours PRN Dyspepsia  Biotene Dry Mouth Oral Rinse 15 milliLiter(s) Swish and Spit two times a day PRN dry mouth  diazepam    Tablet 5 milliGRAM(s) Oral two times a day PRN anxiety  FIRST- Mouthwash  BLM 5 milliLiter(s) Swish and Spit four times a day PRN Mouth Care  HYDROmorphone  Injectable 1 milliGRAM(s) IV Push every 3 hours PRN Severe Pain (7 - 10)  ondansetron    Tablet 4 milliGRAM(s) Oral every 8 hours PRN Nausea and/or Vomiting  oxyCODONE    IR 10 milliGRAM(s) Oral every 4 hours PRN Moderate Pain (4 - 6)  sodium chloride 0.9% lock flush 10 milliLiter(s) IV Push every 1 hour PRN Pre/post blood products, medications, blood draw, and to maintain line patency        Vital Signs Last 24 Hrs  T(C): 36.6 (04 May 2024 05:00), Max: 36.7 (03 May 2024 17:59)  T(F): 97.8 (04 May 2024 05:00), Max: 98.1 (03 May 2024 17:59)  HR: 70 (04 May 2024 08:12) (70 - 89)  BP: 126/62 (04 May 2024 05:00) (126/62 - 137/87)  BP(mean): --  RR: 20 (04 May 2024 11:35) (18 - 20)  SpO2: 96% (04 May 2024 11:35) (96% - 100%)    Parameters below as of 04 May 2024 11:35  Patient On (Oxygen Delivery Method): nasal cannula, high flow  O2 Flow (L/min): 35  O2 Concentration (%): 70  PHYSICAL EXAM:  GENERAL: ON HF with inc work of breathing  HEAD:  Atraumatic, Normocephalic  EYES: EOMI, PERRLA, conjunctiva and sclera clear  NECK: Supple, No JVD  CHEST/LUNG: Clear to auscultation bilaterally; No wheeze  DEc BS with inc rr  HEART: Regular rate and rhythm; No murmurs, rubs, or gallops  ABDOMEN: Soft, Nontender, Nondistended; Bowel sounds present  EXTREMITIES:  2+ Peripheral Pulses, No clubbing, cyanosis, or edema  PSYCH: AAOx3  NEUROLOGY: non-focal      LABS:                        7.1    6.71  )-----------( 302      ( 04 May 2024 06:30 )             23.1     05-04    136  |  101  |  13  ----------------------------<  106<H>  4.2   |  24  |  0.90    Ca    8.6      04 May 2024 06:30  Phos  3.7     05-04  Mg     1.70     05-04    TPro  5.0<L>  /  Alb  2.4<L>  /  TBili  0.2  /  DBili  x   /  AST  6   /  ALT  57<H>  /  AlkPhos  99  05-04    ra< from: Xray Chest 1 View- PORTABLE-Urgent (Xray Chest 1 View- PORTABLE-Urgent .) (05.04.24 @ 09:22) >  INTERPRETATION:  HISTORY: Admitting Dxs: C80.1 C80.1; ; SOB;  TECHNIQUE: Portable frontal view of the chest, 2 films.  COMPARISON: 5/1/2024.  FINDINGS/  IMPRESSION:  There is a catheter at the right base.  HEART: normal in size.  LUNGS: There is opacity at the right base compatible with   effusion/infiltrate.. Prominent interstitial markings, likely secondary   to chronic underlying lung disease..  BONES: degenerative changes    < end of copied text >      RADIOLOGY & ADDITIONAL TESTS:  ra< from: CT Abdomen and Pelvis w/ IV Cont (05.03.24 @ 12:32) >  IMPRESSION:  Since the prior chest CT 3/27/2024:  Persistent right middle lobe consolidation and decreased right lower lobe   consolidation.  New patchy nodular opacities in the right upper lobe and increased patchy   and groundglass opacities in the left lung.  Small right pleural effusion, unchanged. Trace left pleural effusion,   decreased.  No evidence of metastatic disease in the abdomen or pelvis.        Care Discussed with Consultants/Other Providers:

## 2024-05-04 NOTE — CONSULT NOTE ADULT - SUBJECTIVE AND OBJECTIVE BOX
HPI: 58y Female w hx of tobacco use, RCC s/p R nephrectomy, right sided glaucoma, fibromyalgia, anxiety, GERD, admitted at [facility] on 3/11 for R breast swelling c/f mastitis at which point imaging showed supraclavicular/mediastinal mass encasing SVC and causing obliteration of SBC and thrombosis of R IJ. At that time also found to have pancreatic neck cystic lesion. 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 Hospital course c/b pericardial effusion s/p pericardial window, R pleural effusion s/p Pleurx, Afib w RVR, and acute hypoxic resp failure 2/2 SVC syndrmome, transferred to [facility] on 4/4 for urgent palliative RT. Over past week pt has been complaining of progressive dysphonia, until today when pt states she became completely aphonic. Has had worsening respiratory distress as well but denies difficulty swallowing or pain on swallowing. Over past week has also had pain in mouth. Was evaluated by pulmonology earlier today, rec continuing standing duonebs and pulmicort nebs, and steroids.       Allergies    erythromycin (Headache; Vomiting; Rash)  penicillin (Headache; Vomiting; Rash)  Cipro (Headache; Vomiting; Rash)    Intolerances        PAST MEDICAL & SURGICAL HISTORY:  Anxiety      Acid reflux disease      Umbilical hernia      Uterine mass  Benign      Fibromyalgia  Joint pains      Glaucoma  Right eye      Herniated cervical disc      Cancer of kidney  right kidney      S/P partial hysterectomy  8 years ago      S/P knee surgery  knee arthroscopy right x 2 ( 2011 & 2012)      S/P hernia repair  umbilical Hernia Repair - 2011      H/O unilateral nephrectomy  Right Laparoscopic Nephrectomy - [id number]      Glaucoma following surgery  Right Eyr - 2012      History of tonsillectomy    FAMILY HISTORY:  Family history of lung cancer (Mother)    Family history of pancreatic cancer (Father)        REVIEW OF SYSTEMS    General:	  As per HPI      MEDICATIONS:        Vital Signs Last 24 Hrs  T(C): 37.1 (04 May 2024 17:12), Max: 37.1 (04 May 2024 17:12)  T(F): 98.8 (04 May 2024 17:12), Max: 98.8 (04 May 2024 17:12)  HR: 88 (04 May 2024 17:12) (68 - 88)  BP: 130/81 (04 May 2024 17:12) (126/62 - 137/85)  BP(mean): --  RR: 20 (04 May 2024 17:12) (18 - 20)  SpO2: 100% (04 May 2024 17:12) (96% - 100%)    Parameters below as of 04 May 2024 17:12  Patient On (Oxygen Delivery Method): nasal cannula, high flow  O2 Flow (L/min): 35  O2 Concentration (%): 70    LABS:  CBC-    05-04    136  |  101  |  13  ----------------------------<  106<H>  4.2   |  24  |  0.90    Ca    8.6      04 May 2024 06:30  Phos  3.7     05-04  Mg     1.70     05-04    TPro  5.0<L>  /  Alb  2.4<L>  /  TBili  0.2  /  DBili  x   /  AST  6   /  ALT  57<H>  /  AlkPhos  99  05-04    Coagulation Studies-    Endocrine Panel-  --  --  8.6 mg/dL  --  --  8.4 mg/dL        PHYSICAL EXAM:    ENT EXAM-   Constitutional: in NAD  No stridor or stertor on HFNC   Voice: severely breathy and strained     Head:  normocephalic, atraumatic.   Ears:  External ears normal  Nose:  dried blood along anterior septum with excoriations  OC/OP: diffuse oral/oropharyngeal thrush  Neck:  soft/flat       LARYNGOSCOPY EXAM:     -Verbal consent was obtained from patient prior to procedure.    Indication:    Anesthesia: Afrin spray was applied to the nasal cavities.    Flexible laryngoscopy was performed and revealed the following:    -- Nasopharynx had no mass or exudate.    -- Base of tongue was symmetric and not enlarged.    -- Vallecula was clear    -- Epiglottis, both aryepiglottic folds and both false vocal folds were normal    -- Arytenoids both without edema and erythema     -- True vocal folds were fully mobile and without lesions. VC atrophy R>L Incomplete glottic closure    -- Post cricoid area was clear.    -- Interarytenoid edema was absent    The patient tolerated the procedure well.    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 Now with progressive dysphonia x7d, until today when pt states she became completely aphonic with worsening respiratory distress. Physical exam significant for diffuse oral/oropharyngeal thrush and scope exam significant for R>L VC atrophy and incomplete glottic closure though there is good movement of the VCs.  Nystatin swish and spit rec by pulm and started today.    - c/w nystatin 5 cc QID  - can add fluconazole 200 mg orally once, then 100 mg orally once daily for 7 to 14 days for oral thrush  - would rec humidification for bilateral nares and topical nasal saline gel for each nare BID for anterior dried blood from dryness  - will discuss with laryngology re interventions for VC atrophy/incomplete glottic closure            HPI: 58y Female w hx of tobacco use, RCC s/p R nephrectomy, right sided glaucoma, fibromyalgia, anxiety, GERD, admitted at [facility] on 3/11 for R breast swelling c/f mastitis at which point imaging showed supraclavicular/mediastinal mass encasing SVC and causing obliteration of SBC and thrombosis of R IJ. At that time also found to have pancreatic neck cystic lesion. 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 Hospital course c/b pericardial effusion s/p pericardial window, R pleural effusion s/p Pleurx, Afib w RVR, and acute hypoxic resp failure 2/2 SVC syndrmome, transferred to [facility] on 4/4 for urgent palliative RT. Over past week pt has been complaining of progressive dysphonia, until today when pt states she became completely aphonic. Has had worsening respiratory distress as well but denies difficulty swallowing or pain on swallowing. Over past week has also had pain in mouth. Was evaluated by pulmonology earlier today, rec continuing standing duonebs and pulmicort nebs, and steroids.   Not yet evaluated by SLP, eval canceled.     Allergies    erythromycin (Headache; Vomiting; Rash)  penicillin (Headache; Vomiting; Rash)  Cipro (Headache; Vomiting; Rash)    Intolerances        PAST MEDICAL & SURGICAL HISTORY:  Anxiety      Acid reflux disease      Umbilical hernia      Uterine mass  Benign      Fibromyalgia  Joint pains      Glaucoma  Right eye      Herniated cervical disc      Cancer of kidney  right kidney      S/P partial hysterectomy  8 years ago      S/P knee surgery  knee arthroscopy right x 2 ( 2011 & 2012)      S/P hernia repair  umbilical Hernia Repair - 2011      H/O unilateral nephrectomy  Right Laparoscopic Nephrectomy - [id number]      Glaucoma following surgery  Right Eyr - 2012      History of tonsillectomy    FAMILY HISTORY:  Family history of lung cancer (Mother)    Family history of pancreatic cancer (Father)        REVIEW OF SYSTEMS    General:	  As per HPI      MEDICATIONS:        Vital Signs Last 24 Hrs  T(C): 37.1 (04 May 2024 17:12), Max: 37.1 (04 May 2024 17:12)  T(F): 98.8 (04 May 2024 17:12), Max: 98.8 (04 May 2024 17:12)  HR: 88 (04 May 2024 17:12) (68 - 88)  BP: 130/81 (04 May 2024 17:12) (126/62 - 137/85)  BP(mean): --  RR: 20 (04 May 2024 17:12) (18 - 20)  SpO2: 100% (04 May 2024 17:12) (96% - 100%)    Parameters below as of 04 May 2024 17:12  Patient On (Oxygen Delivery Method): nasal cannula, high flow  O2 Flow (L/min): 35  O2 Concentration (%): 70    LABS:  CBC-    05-04    136  |  101  |  13  ----------------------------<  106<H>  4.2   |  24  |  0.90    Ca    8.6      04 May 2024 06:30  Phos  3.7     05-04  Mg     1.70     05-04    TPro  5.0<L>  /  Alb  2.4<L>  /  TBili  0.2  /  DBili  x   /  AST  6   /  ALT  57<H>  /  AlkPhos  99  05-04    Coagulation Studies-    Endocrine Panel-  --  --  8.6 mg/dL  --  --  8.4 mg/dL        PHYSICAL EXAM:    ENT EXAM-   Constitutional: in NAD  No stridor or stertor on HFNC   Voice: severely breathy and strained     Head:  normocephalic, atraumatic.   Ears:  External ears normal  Nose:  dried blood along anterior septum with excoriations  OC/OP: diffuse oral/oropharyngeal thrush  Neck:  soft/flat       LARYNGOSCOPY EXAM:     -Verbal consent was obtained from patient prior to procedure.    Indication:    Anesthesia: Afrin spray was applied to the nasal cavities.    Flexible laryngoscopy was performed and revealed the following:    -- Nasopharynx had no mass or exudate.    -- Base of tongue was symmetric and not enlarged.    -- Vallecula was clear    -- Epiglottis, both aryepiglottic folds and both false vocal folds were normal    -- Arytenoids both without edema and erythema     -- True vocal folds were fully mobile and without lesions. possible R VC hypomobility, Incomplete glottic closure    -- Post cricoid area was clear.    -- Interarytenoid edema was absent    The patient tolerated the procedure well.    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 Now with progressive dysphonia x7d, until today when pt states she became completely aphonic with worsening respiratory distress. Physical exam significant for diffuse oral/oropharyngeal thrush and scope exam significant for R>L VC atrophy and incomplete glottic closure though there is good movement of the VCs and airway is widely patent.   Nystatin swish and spit rec by pulm and started today.    - c/w nystatin 5 cc QID  - can add fluconazole 200 mg orally once, then 100 mg orally once daily for 7 to 14 days for oral thrush  - would rec SLP eval for pt   - would rec humidification for bilateral nares and topical nasal saline gel for each nare BID for anterior dried blood from dryness  - will discuss with laryngology re interventions for incomplete glottic closure

## 2024-05-05 LAB
ALBUMIN SERPL ELPH-MCNC: 2.6 G/DL — LOW (ref 3.3–5)
ALP SERPL-CCNC: 119 U/L — SIGNIFICANT CHANGE UP (ref 40–120)
ALT FLD-CCNC: 57 U/L — HIGH (ref 4–33)
ANION GAP SERPL CALC-SCNC: 12 MMOL/L — SIGNIFICANT CHANGE UP (ref 7–14)
ANISOCYTOSIS BLD QL: SLIGHT — SIGNIFICANT CHANGE UP
AST SERPL-CCNC: 10 U/L — SIGNIFICANT CHANGE UP (ref 4–32)
BASOPHILS # BLD AUTO: 0 K/UL — SIGNIFICANT CHANGE UP (ref 0–0.2)
BASOPHILS NFR BLD AUTO: 0 % — SIGNIFICANT CHANGE UP (ref 0–2)
BILIRUB SERPL-MCNC: 0.6 MG/DL — SIGNIFICANT CHANGE UP (ref 0.2–1.2)
BUN SERPL-MCNC: 17 MG/DL — SIGNIFICANT CHANGE UP (ref 7–23)
BURR CELLS BLD QL SMEAR: PRESENT — SIGNIFICANT CHANGE UP
CALCIUM SERPL-MCNC: 8.8 MG/DL — SIGNIFICANT CHANGE UP (ref 8.4–10.5)
CHLORIDE SERPL-SCNC: 97 MMOL/L — LOW (ref 98–107)
CO2 SERPL-SCNC: 29 MMOL/L — SIGNIFICANT CHANGE UP (ref 22–31)
CREAT SERPL-MCNC: 1.04 MG/DL — SIGNIFICANT CHANGE UP (ref 0.5–1.3)
EGFR: 62 ML/MIN/1.73M2 — SIGNIFICANT CHANGE UP
EOSINOPHIL # BLD AUTO: 0.09 K/UL — SIGNIFICANT CHANGE UP (ref 0–0.5)
EOSINOPHIL NFR BLD AUTO: 0.9 % — SIGNIFICANT CHANGE UP (ref 0–6)
GIANT PLATELETS BLD QL SMEAR: PRESENT — SIGNIFICANT CHANGE UP
GLUCOSE SERPL-MCNC: 90 MG/DL — SIGNIFICANT CHANGE UP (ref 70–99)
HCT VFR BLD CALC: 27 % — LOW (ref 34.5–45)
HGB BLD-MCNC: 9 G/DL — LOW (ref 11.5–15.5)
IANC: 8.88 K/UL — HIGH (ref 1.8–7.4)
LYMPHOCYTES # BLD AUTO: 0.25 K/UL — LOW (ref 1–3.3)
LYMPHOCYTES # BLD AUTO: 2.6 % — LOW (ref 13–44)
MAGNESIUM SERPL-MCNC: 1.5 MG/DL — LOW (ref 1.6–2.6)
MCHC RBC-ENTMCNC: 29.3 PG — SIGNIFICANT CHANGE UP (ref 27–34)
MCHC RBC-ENTMCNC: 33.3 GM/DL — SIGNIFICANT CHANGE UP (ref 32–36)
MCV RBC AUTO: 87.9 FL — SIGNIFICANT CHANGE UP (ref 80–100)
MONOCYTES # BLD AUTO: 0.09 K/UL — SIGNIFICANT CHANGE UP (ref 0–0.9)
MONOCYTES NFR BLD AUTO: 0.9 % — LOW (ref 2–14)
NEUTROPHILS # BLD AUTO: 9.06 K/UL — HIGH (ref 1.8–7.4)
NEUTROPHILS NFR BLD AUTO: 93 % — HIGH (ref 43–77)
NEUTS BAND # BLD: 2.6 % — SIGNIFICANT CHANGE UP (ref 0–6)
PHOSPHATE SERPL-MCNC: 3.9 MG/DL — SIGNIFICANT CHANGE UP (ref 2.5–4.5)
PLAT MORPH BLD: NORMAL — SIGNIFICANT CHANGE UP
PLATELET # BLD AUTO: 350 K/UL — SIGNIFICANT CHANGE UP (ref 150–400)
PLATELET COUNT - ESTIMATE: NORMAL — SIGNIFICANT CHANGE UP
POIKILOCYTOSIS BLD QL AUTO: SLIGHT — SIGNIFICANT CHANGE UP
POLYCHROMASIA BLD QL SMEAR: SLIGHT — SIGNIFICANT CHANGE UP
POTASSIUM SERPL-MCNC: 3.7 MMOL/L — SIGNIFICANT CHANGE UP (ref 3.5–5.3)
POTASSIUM SERPL-SCNC: 3.7 MMOL/L — SIGNIFICANT CHANGE UP (ref 3.5–5.3)
PROT SERPL-MCNC: 5.4 G/DL — LOW (ref 6–8.3)
RBC # BLD: 3.07 M/UL — LOW (ref 3.8–5.2)
RBC # FLD: 17.8 % — HIGH (ref 10.3–14.5)
RBC BLD AUTO: NORMAL — SIGNIFICANT CHANGE UP
SODIUM SERPL-SCNC: 138 MMOL/L — SIGNIFICANT CHANGE UP (ref 135–145)
WBC # BLD: 9.48 K/UL — SIGNIFICANT CHANGE UP (ref 3.8–10.5)
WBC # FLD AUTO: 9.48 K/UL — SIGNIFICANT CHANGE UP (ref 3.8–10.5)

## 2024-05-05 PROCEDURE — 99233 SBSQ HOSP IP/OBS HIGH 50: CPT

## 2024-05-05 RX ORDER — FLUCONAZOLE 150 MG/1
200 TABLET ORAL ONCE
Refills: 0 | Status: COMPLETED | OUTPATIENT
Start: 2024-05-05 | End: 2024-05-05

## 2024-05-05 RX ORDER — FUROSEMIDE 40 MG
20 TABLET ORAL ONCE
Refills: 0 | Status: COMPLETED | OUTPATIENT
Start: 2024-05-05 | End: 2024-05-05

## 2024-05-05 RX ORDER — FLUCONAZOLE 150 MG/1
100 TABLET ORAL DAILY
Refills: 0 | Status: COMPLETED | OUTPATIENT
Start: 2024-05-06 | End: 2024-05-12

## 2024-05-05 RX ORDER — MAGNESIUM SULFATE 500 MG/ML
2 VIAL (ML) INJECTION ONCE
Refills: 0 | Status: COMPLETED | OUTPATIENT
Start: 2024-05-05 | End: 2024-05-05

## 2024-05-05 RX ADMIN — SENNA PLUS 2 TABLET(S): 8.6 TABLET ORAL at 21:06

## 2024-05-05 RX ADMIN — Medication 25 MILLIGRAM(S): at 06:08

## 2024-05-05 RX ADMIN — AMIODARONE HYDROCHLORIDE 200 MILLIGRAM(S): 400 TABLET ORAL at 06:07

## 2024-05-05 RX ADMIN — Medication 4 MILLIGRAM(S): at 06:07

## 2024-05-05 RX ADMIN — BUDESONIDE AND FORMOTEROL FUMARATE DIHYDRATE 2 PUFF(S): 160; 4.5 AEROSOL RESPIRATORY (INHALATION) at 08:30

## 2024-05-05 RX ADMIN — CEFEPIME 100 MILLIGRAM(S): 1 INJECTION, POWDER, FOR SOLUTION INTRAMUSCULAR; INTRAVENOUS at 18:51

## 2024-05-05 RX ADMIN — OXYCODONE HYDROCHLORIDE 30 MILLIGRAM(S): 5 TABLET ORAL at 14:30

## 2024-05-05 RX ADMIN — CEFEPIME 100 MILLIGRAM(S): 1 INJECTION, POWDER, FOR SOLUTION INTRAMUSCULAR; INTRAVENOUS at 06:07

## 2024-05-05 RX ADMIN — Medication 500000 UNIT(S): at 23:49

## 2024-05-05 RX ADMIN — Medication 1 PATCH: at 13:03

## 2024-05-05 RX ADMIN — Medication 3 MILLILITER(S): at 21:28

## 2024-05-05 RX ADMIN — Medication 25 MILLIGRAM(S): at 18:49

## 2024-05-05 RX ADMIN — ENOXAPARIN SODIUM 50 MILLIGRAM(S): 100 INJECTION SUBCUTANEOUS at 06:08

## 2024-05-05 RX ADMIN — CHLORHEXIDINE GLUCONATE 1 APPLICATION(S): 213 SOLUTION TOPICAL at 13:15

## 2024-05-05 RX ADMIN — POLYETHYLENE GLYCOL 3350 17 GRAM(S): 17 POWDER, FOR SOLUTION ORAL at 06:09

## 2024-05-05 RX ADMIN — OXYCODONE HYDROCHLORIDE 30 MILLIGRAM(S): 5 TABLET ORAL at 21:17

## 2024-05-05 RX ADMIN — Medication 3 MILLILITER(S): at 08:52

## 2024-05-05 RX ADMIN — Medication 25 GRAM(S): at 09:41

## 2024-05-05 RX ADMIN — Medication 1 SPRAY(S): at 06:09

## 2024-05-05 RX ADMIN — PANTOPRAZOLE SODIUM 40 MILLIGRAM(S): 20 TABLET, DELAYED RELEASE ORAL at 18:50

## 2024-05-05 RX ADMIN — Medication 3 MILLILITER(S): at 15:18

## 2024-05-05 RX ADMIN — Medication 4 MILLIGRAM(S): at 18:48

## 2024-05-05 RX ADMIN — OXYCODONE HYDROCHLORIDE 30 MILLIGRAM(S): 5 TABLET ORAL at 07:08

## 2024-05-05 RX ADMIN — Medication 15 MILLILITER(S): at 13:03

## 2024-05-05 RX ADMIN — Medication 1 PATCH: at 21:32

## 2024-05-05 RX ADMIN — BENZOCAINE AND MENTHOL 1 LOZENGE: 5; 1 LIQUID ORAL at 14:31

## 2024-05-05 RX ADMIN — ENOXAPARIN SODIUM 50 MILLIGRAM(S): 100 INJECTION SUBCUTANEOUS at 18:50

## 2024-05-05 RX ADMIN — Medication 1 TABLET(S): at 13:03

## 2024-05-05 RX ADMIN — Medication 500000 UNIT(S): at 18:48

## 2024-05-05 RX ADMIN — Medication 500000 UNIT(S): at 00:22

## 2024-05-05 RX ADMIN — Medication 20 MILLIGRAM(S): at 00:22

## 2024-05-05 RX ADMIN — Medication 3 MILLILITER(S): at 03:34

## 2024-05-05 RX ADMIN — Medication 15 MILLILITER(S): at 06:08

## 2024-05-05 RX ADMIN — Medication 15 MILLILITER(S): at 23:49

## 2024-05-05 RX ADMIN — POLYETHYLENE GLYCOL 3350 17 GRAM(S): 17 POWDER, FOR SOLUTION ORAL at 18:50

## 2024-05-05 RX ADMIN — Medication 500000 UNIT(S): at 06:09

## 2024-05-05 RX ADMIN — OXYCODONE HYDROCHLORIDE 30 MILLIGRAM(S): 5 TABLET ORAL at 06:08

## 2024-05-05 RX ADMIN — BUDESONIDE AND FORMOTEROL FUMARATE DIHYDRATE 2 PUFF(S): 160; 4.5 AEROSOL RESPIRATORY (INHALATION) at 21:13

## 2024-05-05 RX ADMIN — Medication 1 PATCH: at 08:19

## 2024-05-05 RX ADMIN — Medication 15 MILLILITER(S): at 00:02

## 2024-05-05 RX ADMIN — OXYCODONE HYDROCHLORIDE 30 MILLIGRAM(S): 5 TABLET ORAL at 22:00

## 2024-05-05 RX ADMIN — PANTOPRAZOLE SODIUM 40 MILLIGRAM(S): 20 TABLET, DELAYED RELEASE ORAL at 06:08

## 2024-05-05 RX ADMIN — NALOXEGOL OXALATE 25 MILLIGRAM(S): 12.5 TABLET, FILM COATED ORAL at 13:03

## 2024-05-05 RX ADMIN — Medication 15 MILLILITER(S): at 18:48

## 2024-05-05 RX ADMIN — FLUCONAZOLE 200 MILLIGRAM(S): 150 TABLET ORAL at 05:51

## 2024-05-05 RX ADMIN — Medication 500000 UNIT(S): at 13:12

## 2024-05-05 RX ADMIN — Medication 5 MILLIGRAM(S): at 23:49

## 2024-05-05 NOTE — PROGRESS NOTE ADULT - ASSESSMENT
57 yo woman, former smoker, with h/o R eye glaucoma, Fibromyalgia, Anxiety, GERD, and RCC s/p R total nephrectomy/hysterectomy; she was initially admitted to [facility] on 3/11 w/ R breast swelling c/f mastitis- imaging [facility] noted supraclavicular/mediastinal mass lesion which encased the SVC and multiple other veins resulting in obliteration of the SVC and thrombosis of the R IJ, and a pancreatic neck cystic lesion.  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  Her disease/hospital course has also been c/b pericardial effusion s/p pericardial window w/ neg cytology, R pleural effusion, also negative cytology) s/p Pleurx, Afib w/ RVR and acute hypoxic resp failure 2/2 SVC syndrome- not amenable to IR-guided stenting; pt was ultimately started on Dex and transferred to [facility] on 4/4 for urgent palliative RT. Patient pending EGD/EUS for pancreatic process. Now with AHRF requiring HFNC and dyspnea and hoarseness and exam with thrush    # Acute hypoxemic respiratory failure   # dyspnea  # Oral candidiasis  # Hoarseness    Dyspnea mildly improved today    RECS  -c/w standing duonebs and pulmicort nebs  -c/w systemic steroids  -c/w drainage of pleurX tiw, can increase frequency if dyspneic and effusion has recurred quickly on xray  -c/w nystatin and fluconazole  -c/w steroids and pjp ppx  -patient improved with lasix yesterday, would cautiously continue with lasix prn for dyspnea related to volume overload  -f/u ENT for incomplete closure of glottis

## 2024-05-05 NOTE — PROGRESS NOTE ADULT - SUBJECTIVE AND OBJECTIVE BOX
PULMONARY PROGRESS NOTE    PATIENT INFORMATION:  NAME: GADIEL GENAO:  MRN: [id number]    CHIEF COMPLAINT: Patient is a 58y old  Female who presents with a chief complaint of SVC syndrome, DVT, mediastinal mass (05 May 2024 08:46)      [x] INITIAL CONSULT, H&P, FAMILY HISTORY and PAST MEDICAL AND SURGICAL HISTORY REVIEWED    OVERNIGHT EVENTS, CHANGES TO HPI, SUBJECTIVE:   - Patient seen and examined at bedside  - No overnight events  - Looked more comfortable today.   - Complained of leakage from pleurX site. Attempted drainage but patient had pain almost immediately so stoppered and redressed.     ========================REVIEW OF SYSTEMS========================  [x] ROS negative except as per HPI    ========================MEDICATIONS=============================  NEURO  diazepam    Tablet 5 milliGRAM(s) Oral at bedtime  metoclopramide Injectable 10 milliGRAM(s) IV Push every 8 hours  oxyCODONE  ER Tablet 30 milliGRAM(s) Oral <User Schedule>    CARDIO  aMIOdarone    Tablet 200 milliGRAM(s) Oral daily  metoprolol tartrate 25 milliGRAM(s) Oral every 12 hours    PULM  albuterol/ipratropium for Nebulization 3 milliLiter(s) Nebulizer every 6 hours  budesonide 160 MICROgram(s)/formoterol 4.5 MICROgram(s) Inhaler 2 Puff(s) Inhalation two times a day    FEN/GI  multivitamin 1 Tablet(s) Oral daily  naloxegol 25 milliGRAM(s) Oral daily  pantoprazole    Tablet 40 milliGRAM(s) Oral two times a day  polyethylene glycol 3350 17 Gram(s) Oral every 12 hours  senna 2 Tablet(s) Oral at bedtime    HEME/ONC  enoxaparin Injectable 50 milliGRAM(s) SubCutaneous every 12 hours    ANTIMICROBIALS  cefepime   IVPB 2000 milliGRAM(s) IV Intermittent every 12 hours  nystatin    Suspension 966652 Unit(s) Swish and Swallow four times a day  trimethoprim  160 mG/sulfamethoxazole 800 mG 1 Tablet(s) Oral <User Schedule>    ENDO  dexAMETHasone  Injectable 4 milliGRAM(s) IV Push two times a day    OTHER  Biotene Dry Mouth Oral Rinse 15 milliLiter(s) Swish and Spit every 6 hours  chlorhexidine 2% Cloths 1 Application(s) Topical daily  influenza   Vaccine 0.5 milliLiter(s) IntraMuscular once  nicotine -  14 mG/24Hr(s) Patch 1 Patch Transdermal every 24 hours  sodium chloride 0.65% Nasal 1 Spray(s) Both Nostrils two times a day      PRN      Drips      ========================PHYSICAL EXAM============================    VITALS: Vital Signs Last 24 Hrs  T(C): 36.3 (05 May 2024 11:35), Max: 36.6 (04 May 2024 20:15)  T(F): 97.4 (05 May 2024 11:35), Max: 97.9 (04 May 2024 20:15)  HR: 83 (05 May 2024 15:45) (67 - 87)  BP: 116/59 (05 May 2024 11:35) (115/69 - 129/68)  BP(mean): --  RR: 18 (05 May 2024 15:44) (18 - 21)  SpO2: 98% (05 May 2024 15:45) (96% - 100%)    Parameters below as of 05 May 2024 15:45  Patient On (Oxygen Delivery Method): nasal cannula, high flow        INTAKE and OUTPUT: I&O's Detail    04 May 2024 07:01  -  05 May 2024 07:00  --------------------------------------------------------  IN:  Total IN: 0 mL    OUT:    Chest Tube (mL): 600 mL  Total OUT: 600 mL    Total NET: -600 mL          VENTILATOR SETTINGS:     Physical Exam  CONST:     chronically ill appearing  ENMT:       no oral lesions  RESP :       mostly clear but with scattered rhonci  CHEST:      distended veins noted, +R sided pleurX  CARDS:    RRR no MRG  VASC:         no edema, 2+ pulses, +JVD  ABD:          soft nontender  MSK:          no clubbing or cyanosis  NEURO:     awake and alert, PINA      ======================LABORATORY RESULTS AND IMAGING=============                        9.0    9.48  )-----------( 350      ( 05 May 2024 05:20 )             27.0                                  05-05    138  |  97<L>  |  17  ----------------------------<  90  3.7   |  29  |  1.04    Ca    8.8      05 May 2024 05:20  Phos  3.9     05-05  Mg     1.50     05-05    TPro  5.4<L>  /  Alb  2.6<L>  /  TBili  0.6  /  DBili  x   /  AST  10  /  ALT  57<H>  /  AlkPhos  119  05-05    N:9.06/L:0.25= __ 05-05-24 @ 05:20  N:6.29/L:0.10= __ 05-04-24 @ 06:30  N:5.85/L:0.11= __ 05-03-24 @ 06:00  N:5.39/L:0.14= __ 05-02-24 @ 06:50  N:6.89/L:0.31= __ 05-01-24 @ 07:10    Ref-range: <3 normal, >9 elevated, >18 very elevated    Procalcitonin: 0.44 ng/mL (05-01-24 @ 07:10)        ABG Trend  03-26-24 @ 03:48 FiO2--  - 7.38/41/73/24/96.8 Lactate --  03-24-24 @ 19:18 FiO2--  - 7.36/47/37/27/59.8 Lactate --  03-23-24 @ 22:14 [id number]  - 7.45/31/185/22/99.8 Lactate --    VBG Trend  05-01-24 @ 17:14 FiO2--  - 7.34/47/46/25/73.3 Lactate 2.8  03-23-24 @ 02:47 FiO2--  - 7.47/35/76/26/97.5 Lactate 1.1      Creatinine Trend: 1.04<--, 0.90<--, 0.92<--, 0.75<--, 0.97<--, 0.87<--    [x] RADIOLOGY REVIEWED AND INTERPRETED BY ME

## 2024-05-05 NOTE — PROGRESS NOTE ADULT - SUBJECTIVE AND OBJECTIVE BOX
Patient is a 58y old  Female who presents with a chief complaint of SVC syndrome, DVT, mediastinal mass (04 May 2024 21:12)      SUBJECTIVE / OVERNIGHT EVENTS:    MEDICATIONS  (STANDING):  albuterol/ipratropium for Nebulization 3 milliLiter(s) Nebulizer every 6 hours  aMIOdarone    Tablet 200 milliGRAM(s) Oral daily  Biotene Dry Mouth Oral Rinse 15 milliLiter(s) Swish and Spit every 6 hours  budesonide 160 MICROgram(s)/formoterol 4.5 MICROgram(s) Inhaler 2 Puff(s) Inhalation two times a day  cefepime   IVPB 2000 milliGRAM(s) IV Intermittent every 12 hours  chlorhexidine 2% Cloths 1 Application(s) Topical daily  dexAMETHasone  Injectable 4 milliGRAM(s) IV Push two times a day  diazepam    Tablet 5 milliGRAM(s) Oral at bedtime  enoxaparin Injectable 50 milliGRAM(s) SubCutaneous every 12 hours  influenza   Vaccine 0.5 milliLiter(s) IntraMuscular once  magnesium sulfate  IVPB 2 Gram(s) IV Intermittent once  metoclopramide Injectable 10 milliGRAM(s) IV Push every 8 hours  metoprolol tartrate 25 milliGRAM(s) Oral every 12 hours  multivitamin 1 Tablet(s) Oral daily  naloxegol 25 milliGRAM(s) Oral daily  nicotine -  14 mG/24Hr(s) Patch 1 Patch Transdermal every 24 hours  nystatin    Suspension 465324 Unit(s) Swish and Swallow four times a day  oxyCODONE  ER Tablet 30 milliGRAM(s) Oral <User Schedule>  pantoprazole    Tablet 40 milliGRAM(s) Oral two times a day  polyethylene glycol 3350 17 Gram(s) Oral every 12 hours  senna 2 Tablet(s) Oral at bedtime  sodium chloride 0.65% Nasal 1 Spray(s) Both Nostrils two times a day  trimethoprim  160 mG/sulfamethoxazole 800 mG 1 Tablet(s) Oral <User Schedule>    MEDICATIONS  (PRN):  aluminum hydroxide/magnesium hydroxide/simethicone Suspension 30 milliLiter(s) Oral every 6 hours PRN Dyspepsia  benzocaine/menthol Lozenge 1 Lozenge Oral two times a day PRN Sore Throat  Biotene Dry Mouth Oral Rinse 15 milliLiter(s) Swish and Spit two times a day PRN dry mouth  diazepam    Tablet 5 milliGRAM(s) Oral two times a day PRN anxiety  FIRST- Mouthwash  BLM 5 milliLiter(s) Swish and Spit four times a day PRN Mouth Care  HYDROmorphone  Injectable 1 milliGRAM(s) IV Push every 3 hours PRN Severe Pain (7 - 10)  ondansetron    Tablet 4 milliGRAM(s) Oral every 8 hours PRN Nausea and/or Vomiting  oxyCODONE    IR 10 milliGRAM(s) Oral every 4 hours PRN Moderate Pain (4 - 6)  sodium chloride 0.9% lock flush 10 milliLiter(s) IV Push every 1 hour PRN Pre/post blood products, medications, blood draw, and to maintain line patency    Vital Signs Last 24 Hrs  T(C): 36.3 (05 May 2024 05:25), Max: 37.1 (04 May 2024 17:12)  T(F): 97.4 (05 May 2024 05:25), Max: 98.8 (04 May 2024 17:12)  HR: 81 (05 May 2024 07:20) (67 - 88)  BP: 117/56 (05 May 2024 05:25) (115/69 - 137/85)  BP(mean): --  RR: 18 (05 May 2024 07:20) (18 - 21)  SpO2: 100% (05 May 2024 07:20) (96% - 100%)    Parameters below as of 05 May 2024 07:20  Patient On (Oxygen Delivery Method): nasal cannula, high flow  O2 Flow (L/min): 35  O2 Concentration (%): 60    PHYSICAL EXAM:  GENERAL: Still on HF oxygen  HEAD:  Atraumatic, Normocephalic  EYES: EOMI, PERRLA, conjunctiva and sclera clear  NECK: Supple, No JVD  CHEST/LUNG: Clear to auscultation bilaterally; No wheeze  HEART: Regular rate and rhythm; No murmurs, rubs, or gallops  ABDOMEN: Soft, Nontender, Nondistended; Bowel sounds present  EXTREMITIES:  2+ Peripheral Pulses, No clubbing, cyanosis, or edema  PSYCH: Alert  NEUROLOGY: non-focal      LABS:                        9.0    9.48  )-----------( 350      ( 05 May 2024 05:20 )             27.0     05-05    138  |  97<L>  |  17  ----------------------------<  90  3.7   |  29  |  1.04    Ca    8.8      05 May 2024 05:20  Phos  3.9     05-05  Mg     1.50     05-05    TPro  5.4<L>  /  Alb  2.6<L>  /  TBili  0.6  /  DBili  x   /  AST  10  /  ALT  57<H>  /  AlkPhos  119  05-05        Care Discussed with Consultants/Other Providers:  pa   Patient is a 58y old  Female who presents with a chief complaint of SVC syndrome, DVT, mediastinal mass (04 May 2024 21:12)      SUBJECTIVE / OVERNIGHT EVENTS:    MEDICATIONS  (STANDING):  albuterol/ipratropium for Nebulization 3 milliLiter(s) Nebulizer every 6 hours  aMIOdarone    Tablet 200 milliGRAM(s) Oral daily  Biotene Dry Mouth Oral Rinse 15 milliLiter(s) Swish and Spit every 6 hours  budesonide 160 MICROgram(s)/formoterol 4.5 MICROgram(s) Inhaler 2 Puff(s) Inhalation two times a day  cefepime   IVPB 2000 milliGRAM(s) IV Intermittent every 12 hours  chlorhexidine 2% Cloths 1 Application(s) Topical daily  dexAMETHasone  Injectable 4 milliGRAM(s) IV Push two times a day  diazepam    Tablet 5 milliGRAM(s) Oral at bedtime  enoxaparin Injectable 50 milliGRAM(s) SubCutaneous every 12 hours  influenza   Vaccine 0.5 milliLiter(s) IntraMuscular once  magnesium sulfate  IVPB 2 Gram(s) IV Intermittent once  metoclopramide Injectable 10 milliGRAM(s) IV Push every 8 hours  metoprolol tartrate 25 milliGRAM(s) Oral every 12 hours  multivitamin 1 Tablet(s) Oral daily  naloxegol 25 milliGRAM(s) Oral daily  nicotine -  14 mG/24Hr(s) Patch 1 Patch Transdermal every 24 hours  nystatin    Suspension 885962 Unit(s) Swish and Swallow four times a day  oxyCODONE  ER Tablet 30 milliGRAM(s) Oral <User Schedule>  pantoprazole    Tablet 40 milliGRAM(s) Oral two times a day  polyethylene glycol 3350 17 Gram(s) Oral every 12 hours  senna 2 Tablet(s) Oral at bedtime  sodium chloride 0.65% Nasal 1 Spray(s) Both Nostrils two times a day  trimethoprim  160 mG/sulfamethoxazole 800 mG 1 Tablet(s) Oral <User Schedule>    MEDICATIONS  (PRN):  aluminum hydroxide/magnesium hydroxide/simethicone Suspension 30 milliLiter(s) Oral every 6 hours PRN Dyspepsia  benzocaine/menthol Lozenge 1 Lozenge Oral two times a day PRN Sore Throat  Biotene Dry Mouth Oral Rinse 15 milliLiter(s) Swish and Spit two times a day PRN dry mouth  diazepam    Tablet 5 milliGRAM(s) Oral two times a day PRN anxiety  FIRST- Mouthwash  BLM 5 milliLiter(s) Swish and Spit four times a day PRN Mouth Care  HYDROmorphone  Injectable 1 milliGRAM(s) IV Push every 3 hours PRN Severe Pain (7 - 10)  ondansetron    Tablet 4 milliGRAM(s) Oral every 8 hours PRN Nausea and/or Vomiting  oxyCODONE    IR 10 milliGRAM(s) Oral every 4 hours PRN Moderate Pain (4 - 6)  sodium chloride 0.9% lock flush 10 milliLiter(s) IV Push every 1 hour PRN Pre/post blood products, medications, blood draw, and to maintain line patency    Vital Signs Last 24 Hrs  T(C): 36.3 (05 May 2024 05:25), Max: 37.1 (04 May 2024 17:12)  T(F): 97.4 (05 May 2024 05:25), Max: 98.8 (04 May 2024 17:12)  HR: 81 (05 May 2024 07:20) (67 - 88)  BP: 117/56 (05 May 2024 05:25) (115/69 - 137/85)  BP(mean): --  RR: 18 (05 May 2024 07:20) (18 - 21)  SpO2: 100% (05 May 2024 07:20) (96% - 100%)    Parameters below as of 05 May 2024 07:20  Patient On (Oxygen Delivery Method): nasal cannula, high flow  O2 Flow (L/min): 35  O2 Concentration (%): 60    PHYSICAL EXAM:  GENERAL: Still on HF oxygen  HEAD:  Atraumatic, Normocephalic  EYES: EOMI, PERRLA, conjunctiva and sclera clear  NECK: Supple, No JVD  CHEST/LUNG: Clear to auscultation bilaterally; No wheeze  HEART: Regular rate and rhythm; No murmurs, rubs, or gallops  ABDOMEN: Soft, Nontender, Nondistended; Bowel sounds present  EXTREMITIES:  2+ Peripheral Pulses, No clubbing, cyanosis, or edema  PSYCH: Alert  NEUROLOGY: non-focal      LABS:                        9.0    9.48  )-----------( 350      ( 05 May 2024 05:20 )             27.0     05-05    138  |  97<L>  |  17  ----------------------------<  90  3.7   |  29  |  1.04    Ca    8.8      05 May 2024 05:20  Phos  3.9     05-05  Mg     1.50     05-05    TPro  5.4<L>  /  Alb  2.6<L>  /  TBili  0.6  /  DBili  x   /  AST  10  /  ALT  57<H>  /  AlkPhos  119  05-05        Care Discussed with Consultants/Other Providers:  pa  Seen by Pulm 5/4:  "# dyspnea  # Oral candidiasis  # Hoarseness      RECS  -c/w standing duonebs and pulmicort nebs  -c/w systemic steroids  -c/w drainage of pleurX tiw, can increase frequency if dyspneic and effusion has recurred quickly on xray  -patient appears euvolemic on exam, monitor response to lasix given today  -please call ENT for laryngoscopy - unclear cause of acute hoarseness - ?involvement of oropharyngeal candidiasis vs metastatic lesion   -would start nystatin treatment for thrush    ENT called 5/4--->      Patient is a 58y old  Female who presents with a chief complaint of SVC syndrome, DVT, mediastinal mass (04 May 2024 21:12)      SUBJECTIVE / OVERNIGHT EVENTS:    MEDICATIONS  (STANDING):  albuterol/ipratropium for Nebulization 3 milliLiter(s) Nebulizer every 6 hours  aMIOdarone    Tablet 200 milliGRAM(s) Oral daily  Biotene Dry Mouth Oral Rinse 15 milliLiter(s) Swish and Spit every 6 hours  budesonide 160 MICROgram(s)/formoterol 4.5 MICROgram(s) Inhaler 2 Puff(s) Inhalation two times a day  cefepime   IVPB 2000 milliGRAM(s) IV Intermittent every 12 hours  chlorhexidine 2% Cloths 1 Application(s) Topical daily  dexAMETHasone  Injectable 4 milliGRAM(s) IV Push two times a day  diazepam    Tablet 5 milliGRAM(s) Oral at bedtime  enoxaparin Injectable 50 milliGRAM(s) SubCutaneous every 12 hours  influenza   Vaccine 0.5 milliLiter(s) IntraMuscular once  magnesium sulfate  IVPB 2 Gram(s) IV Intermittent once  metoclopramide Injectable 10 milliGRAM(s) IV Push every 8 hours  metoprolol tartrate 25 milliGRAM(s) Oral every 12 hours  multivitamin 1 Tablet(s) Oral daily  naloxegol 25 milliGRAM(s) Oral daily  nicotine -  14 mG/24Hr(s) Patch 1 Patch Transdermal every 24 hours  nystatin    Suspension 993806 Unit(s) Swish and Swallow four times a day  oxyCODONE  ER Tablet 30 milliGRAM(s) Oral <User Schedule>  pantoprazole    Tablet 40 milliGRAM(s) Oral two times a day  polyethylene glycol 3350 17 Gram(s) Oral every 12 hours  senna 2 Tablet(s) Oral at bedtime  sodium chloride 0.65% Nasal 1 Spray(s) Both Nostrils two times a day  trimethoprim  160 mG/sulfamethoxazole 800 mG 1 Tablet(s) Oral <User Schedule>    MEDICATIONS  (PRN):  aluminum hydroxide/magnesium hydroxide/simethicone Suspension 30 milliLiter(s) Oral every 6 hours PRN Dyspepsia  benzocaine/menthol Lozenge 1 Lozenge Oral two times a day PRN Sore Throat  Biotene Dry Mouth Oral Rinse 15 milliLiter(s) Swish and Spit two times a day PRN dry mouth  diazepam    Tablet 5 milliGRAM(s) Oral two times a day PRN anxiety  FIRST- Mouthwash  BLM 5 milliLiter(s) Swish and Spit four times a day PRN Mouth Care  HYDROmorphone  Injectable 1 milliGRAM(s) IV Push every 3 hours PRN Severe Pain (7 - 10)  ondansetron    Tablet 4 milliGRAM(s) Oral every 8 hours PRN Nausea and/or Vomiting  oxyCODONE    IR 10 milliGRAM(s) Oral every 4 hours PRN Moderate Pain (4 - 6)  sodium chloride 0.9% lock flush 10 milliLiter(s) IV Push every 1 hour PRN Pre/post blood products, medications, blood draw, and to maintain line patency    Vital Signs Last 24 Hrs  T(C): 36.3 (05 May 2024 05:25), Max: 37.1 (04 May 2024 17:12)  T(F): 97.4 (05 May 2024 05:25), Max: 98.8 (04 May 2024 17:12)  HR: 81 (05 May 2024 07:20) (67 - 88)  BP: 117/56 (05 May 2024 05:25) (115/69 - 137/85)  BP(mean): --  RR: 18 (05 May 2024 07:20) (18 - 21)  SpO2: 100% (05 May 2024 07:20) (96% - 100%)    Parameters below as of 05 May 2024 07:20  Patient On (Oxygen Delivery Method): nasal cannula, high flow  O2 Flow (L/min): 35  O2 Concentration (%): 60    PHYSICAL EXAM:  GENERAL: Still on HF oxygen  HEAD:  Atraumatic, Normocephalic  EYES: EOMI, PERRLA, conjunctiva and sclera clear  NECK: Supple, No JVD  CHEST/LUNG: Clear to auscultation bilaterally; No wheeze  HEART: Regular rate and rhythm; No murmurs, rubs, or gallops  ABDOMEN: Soft, Nontender, Nondistended; Bowel sounds present  EXTREMITIES:  2+ Peripheral Pulses, No clubbing, cyanosis, or edema  PSYCH: Alert  NEUROLOGY: non-focal      LABS:                        9.0    9.48  )-----------( 350      ( 05 May 2024 05:20 )             27.0     05-05    138  |  97<L>  |  17  ----------------------------<  90  3.7   |  29  |  1.04    Ca    8.8      05 May 2024 05:20  Phos  3.9     05-05  Mg     1.50     05-05    TPro  5.4<L>  /  Alb  2.6<L>  /  TBili  0.6  /  DBili  x   /  AST  10  /  ALT  57<H>  /  AlkPhos  119  05-05        Care Discussed with Consultants/Other Providers:  pa  Seen by Pulm 5/4:  "# dyspnea  # Oral candidiasis  # Hoarseness      RECS  -c/w standing duonebs and pulmicort nebs  -c/w systemic steroids  -c/w drainage of pleurX tiw, can increase frequency if dyspneic and effusion has recurred quickly on xray  -patient appears euvolemic on exam, monitor response to lasix given today  -please call ENT for laryngoscopy - unclear cause of acute hoarseness - ?involvement of oropharyngeal candidiasis vs metastatic lesion   -would start nystatin treatment for thrush    ENT called 5/4--->   Flexible laryngoscopy was performed and revealed the following:    -- Nasopharynx had no mass or exudate.    -- Base of tongue was symmetric and not enlarged.    -- Vallecula was clear    -- Epiglottis, both aryepiglottic folds and both false vocal folds were normal    -- Arytenoids both without edema and erythema     -- True vocal folds were fully mobile and without lesions. possible R VC hypomobility, Incomplete glottic closure    -- Post cricoid area was clear.    -- Interarytenoid edema was absent  The patient tolerated the procedure well.  Recs:         Patient is a 58y old  Female who presents with a chief complaint of SVC syndrome, DVT, mediastinal mass (04 May 2024 21:12)      SUBJECTIVE / OVERNIGHT EVENTS:    MEDICATIONS  (STANDING):  albuterol/ipratropium for Nebulization 3 milliLiter(s) Nebulizer every 6 hours  aMIOdarone    Tablet 200 milliGRAM(s) Oral daily  Biotene Dry Mouth Oral Rinse 15 milliLiter(s) Swish and Spit every 6 hours  budesonide 160 MICROgram(s)/formoterol 4.5 MICROgram(s) Inhaler 2 Puff(s) Inhalation two times a day  cefepime   IVPB 2000 milliGRAM(s) IV Intermittent every 12 hours  chlorhexidine 2% Cloths 1 Application(s) Topical daily  dexAMETHasone  Injectable 4 milliGRAM(s) IV Push two times a day  diazepam    Tablet 5 milliGRAM(s) Oral at bedtime  enoxaparin Injectable 50 milliGRAM(s) SubCutaneous every 12 hours  influenza   Vaccine 0.5 milliLiter(s) IntraMuscular once  magnesium sulfate  IVPB 2 Gram(s) IV Intermittent once  metoclopramide Injectable 10 milliGRAM(s) IV Push every 8 hours  metoprolol tartrate 25 milliGRAM(s) Oral every 12 hours  multivitamin 1 Tablet(s) Oral daily  naloxegol 25 milliGRAM(s) Oral daily  nicotine -  14 mG/24Hr(s) Patch 1 Patch Transdermal every 24 hours  nystatin    Suspension 708289 Unit(s) Swish and Swallow four times a day  oxyCODONE  ER Tablet 30 milliGRAM(s) Oral <User Schedule>  pantoprazole    Tablet 40 milliGRAM(s) Oral two times a day  polyethylene glycol 3350 17 Gram(s) Oral every 12 hours  senna 2 Tablet(s) Oral at bedtime  sodium chloride 0.65% Nasal 1 Spray(s) Both Nostrils two times a day  trimethoprim  160 mG/sulfamethoxazole 800 mG 1 Tablet(s) Oral <User Schedule>    MEDICATIONS  (PRN):  aluminum hydroxide/magnesium hydroxide/simethicone Suspension 30 milliLiter(s) Oral every 6 hours PRN Dyspepsia  benzocaine/menthol Lozenge 1 Lozenge Oral two times a day PRN Sore Throat  Biotene Dry Mouth Oral Rinse 15 milliLiter(s) Swish and Spit two times a day PRN dry mouth  diazepam    Tablet 5 milliGRAM(s) Oral two times a day PRN anxiety  FIRST- Mouthwash  BLM 5 milliLiter(s) Swish and Spit four times a day PRN Mouth Care  HYDROmorphone  Injectable 1 milliGRAM(s) IV Push every 3 hours PRN Severe Pain (7 - 10)  ondansetron    Tablet 4 milliGRAM(s) Oral every 8 hours PRN Nausea and/or Vomiting  oxyCODONE    IR 10 milliGRAM(s) Oral every 4 hours PRN Moderate Pain (4 - 6)  sodium chloride 0.9% lock flush 10 milliLiter(s) IV Push every 1 hour PRN Pre/post blood products, medications, blood draw, and to maintain line patency    Vital Signs Last 24 Hrs  T(C): 36.3 (05 May 2024 05:25), Max: 37.1 (04 May 2024 17:12)  T(F): 97.4 (05 May 2024 05:25), Max: 98.8 (04 May 2024 17:12)  HR: 81 (05 May 2024 07:20) (67 - 88)  BP: 117/56 (05 May 2024 05:25) (115/69 - 137/85)  BP(mean): --  RR: 18 (05 May 2024 07:20) (18 - 21)  SpO2: 100% (05 May 2024 07:20) (96% - 100%)    Parameters below as of 05 May 2024 07:20  Patient On (Oxygen Delivery Method): nasal cannula, high flow  O2 Flow (L/min): 35  O2 Concentration (%): 60    PHYSICAL EXAM:  GENERAL: Still on HF oxygen  HEAD:  Atraumatic, Normocephalic  EYES: EOMI, PERRLA, conjunctiva and sclera clear  NECK: Supple, No JVD  CHEST/LUNG: Clear to auscultation bilaterally; No wheeze  HEART: Regular rate and rhythm; No murmurs, rubs, or gallops  ABDOMEN: Soft, Nontender, Nondistended; Bowel sounds present  EXTREMITIES:  2+ Peripheral Pulses, No clubbing, cyanosis, or edema  PSYCH: Alert  NEUROLOGY: non-focal      LABS:                        9.0    9.48  )-----------( 350      ( 05 May 2024 05:20 )             27.0     05-05    138  |  97<L>  |  17  ----------------------------<  90  3.7   |  29  |  1.04    Ca    8.8      05 May 2024 05:20  Phos  3.9     05-05  Mg     1.50     05-05    TPro  5.4<L>  /  Alb  2.6<L>  /  TBili  0.6  /  DBili  x   /  AST  10  /  ALT  57<H>  /  AlkPhos  119  05-05        Care Discussed with Consultants/Other Providers:  pa  Seen by Pulm 5/4:  "# dyspnea  # Oral candidiasis  # Hoarseness      RECS  -c/w standing duonebs and pulmicort nebs  -c/w systemic steroids  -c/w drainage of pleurX tiw, can increase frequency if dyspneic and effusion has recurred quickly on xray  -patient appears euvolemic on exam, monitor response to lasix given today  -please call ENT for laryngoscopy - unclear cause of acute hoarseness - ?involvement of oropharyngeal candidiasis vs metastatic lesion   -would start nystatin treatment for thrush    ENT called 5/4--->   Flexible laryngoscopy was performed and revealed the following:    -- Nasopharynx had no mass or exudate.    -- Base of tongue was symmetric and not enlarged.    -- Vallecula was clear    -- Epiglottis, both aryepiglottic folds and both false vocal folds were normal    -- Arytenoids both without edema and erythema     -- True vocal folds were fully mobile and without lesions. possible R VC hypomobility, Incomplete glottic closure    -- Post cricoid area was clear.    -- Interarytenoid edema was absent  The patient tolerated the procedure well.  Recs:  c/w nystatin 5 cc QID  - can add fluconazole 200 mg orally once, then 100 mg orally once daily for 7 to 14 days for oral thrush  - would rec SLP eval for pt   - would rec humidification for bilateral nares and topical nasal saline gel for each nare BID for anterior dried blood from dryness  - will discuss with laryngology re interventions for incomplete glottic closure          Patient is a 58y old  Female who presents with a chief complaint of SVC syndrome, DVT, mediastinal mass (04 May 2024 21:12)      SUBJECTIVE / OVERNIGHT EVENTS:  Patient is breathing easier today.  got iv lasix and 500 cc drained from R pluerx 5/4  patient is asking for regular diet  RN notes she is in for s/s eval.      MEDICATIONS  (STANDING):  albuterol/ipratropium for Nebulization 3 milliLiter(s) Nebulizer every 6 hours  aMIOdarone    Tablet 200 milliGRAM(s) Oral daily  Biotene Dry Mouth Oral Rinse 15 milliLiter(s) Swish and Spit every 6 hours  budesonide 160 MICROgram(s)/formoterol 4.5 MICROgram(s) Inhaler 2 Puff(s) Inhalation two times a day  cefepime   IVPB 2000 milliGRAM(s) IV Intermittent every 12 hours  chlorhexidine 2% Cloths 1 Application(s) Topical daily  dexAMETHasone  Injectable 4 milliGRAM(s) IV Push two times a day  diazepam    Tablet 5 milliGRAM(s) Oral at bedtime  enoxaparin Injectable 50 milliGRAM(s) SubCutaneous every 12 hours  influenza   Vaccine 0.5 milliLiter(s) IntraMuscular once  magnesium sulfate  IVPB 2 Gram(s) IV Intermittent once  metoclopramide Injectable 10 milliGRAM(s) IV Push every 8 hours  metoprolol tartrate 25 milliGRAM(s) Oral every 12 hours  multivitamin 1 Tablet(s) Oral daily  naloxegol 25 milliGRAM(s) Oral daily  nicotine -  14 mG/24Hr(s) Patch 1 Patch Transdermal every 24 hours  nystatin    Suspension 745351 Unit(s) Swish and Swallow four times a day  oxyCODONE  ER Tablet 30 milliGRAM(s) Oral <User Schedule>  pantoprazole    Tablet 40 milliGRAM(s) Oral two times a day  polyethylene glycol 3350 17 Gram(s) Oral every 12 hours  senna 2 Tablet(s) Oral at bedtime  sodium chloride 0.65% Nasal 1 Spray(s) Both Nostrils two times a day  trimethoprim  160 mG/sulfamethoxazole 800 mG 1 Tablet(s) Oral <User Schedule>    MEDICATIONS  (PRN):  aluminum hydroxide/magnesium hydroxide/simethicone Suspension 30 milliLiter(s) Oral every 6 hours PRN Dyspepsia  benzocaine/menthol Lozenge 1 Lozenge Oral two times a day PRN Sore Throat  Biotene Dry Mouth Oral Rinse 15 milliLiter(s) Swish and Spit two times a day PRN dry mouth  diazepam    Tablet 5 milliGRAM(s) Oral two times a day PRN anxiety  FIRST- Mouthwash  BLM 5 milliLiter(s) Swish and Spit four times a day PRN Mouth Care  HYDROmorphone  Injectable 1 milliGRAM(s) IV Push every 3 hours PRN Severe Pain (7 - 10)  ondansetron    Tablet 4 milliGRAM(s) Oral every 8 hours PRN Nausea and/or Vomiting  oxyCODONE    IR 10 milliGRAM(s) Oral every 4 hours PRN Moderate Pain (4 - 6)  sodium chloride 0.9% lock flush 10 milliLiter(s) IV Push every 1 hour PRN Pre/post blood products, medications, blood draw, and to maintain line patency    Vital Signs Last 24 Hrs  T(C): 36.3 (05 May 2024 05:25), Max: 37.1 (04 May 2024 17:12)  T(F): 97.4 (05 May 2024 05:25), Max: 98.8 (04 May 2024 17:12)  HR: 81 (05 May 2024 07:20) (67 - 88)  BP: 117/56 (05 May 2024 05:25) (115/69 - 137/85)  BP(mean): --  RR: 18 (05 May 2024 07:20) (18 - 21)  SpO2: 100% (05 May 2024 07:20) (96% - 100%)    Parameters below as of 05 May 2024 07:20  Patient On (Oxygen Delivery Method): nasal cannula, high flow  O2 Flow (L/min): 35  O2 Concentration (%): 60    PHYSICAL EXAM:  GENERAL: Still on HF oxygen  HEAD:  Atraumatic, Normocephalic  EYES: EOMI, PERRLA, conjunctiva and sclera clear  NECK: Supple, No JVD  CHEST/LUNG: Clear to auscultation bilaterally; No wheeze  R pleurx  HEART: Regular rate and rhythm; No murmurs, rubs, or gallops  ABDOMEN: Soft, Nontender, Nondistended; Bowel sounds present  EXTREMITIES:  2+ Peripheral Pulses, No clubbing, cyanosis, or edema  PSYCH: Alert  NEUROLOGY: non-focal      LABS:                        9.0    9.48  )-----------( 350      ( 05 May 2024 05:20 )             27.0     05-05    138  |  97<L>  |  17  ----------------------------<  90  3.7   |  29  |  1.04    Ca    8.8      05 May 2024 05:20  Phos  3.9     05-05  Mg     1.50     05-05    TPro  5.4<L>  /  Alb  2.6<L>  /  TBili  0.6  /  DBili  x   /  AST  10  /  ALT  57<H>  /  AlkPhos  119  05-05        Care Discussed with Consultants/Other Providers:  pa  Seen by Pulm 5/4:  "# dyspnea  # Oral candidiasis  # Hoarseness  RECS  -c/w standing duonebs and pulmicort nebs  -c/w systemic steroids  -c/w drainage of pleurX tiw, can increase frequency if dyspneic and effusion has recurred quickly on xray  -patient appears euvolemic on exam, monitor response to lasix given today  -please call ENT for laryngoscopy - unclear cause of acute hoarseness - ?involvement of oropharyngeal candidiasis vs metastatic lesion   -would start nystatin treatment for thrush    ENT called 5/4--->   Flexible laryngoscopy was performed and revealed the following:    -- Nasopharynx had no mass or exudate.    -- Base of tongue was symmetric and not enlarged.    -- Vallecula was clear    -- Epiglottis, both aryepiglottic folds and both false vocal folds were normal    -- Arytenoids both without edema and erythema     -- True vocal folds were fully mobile and without lesions. possible R VC hypomobility, Incomplete glottic closure    -- Post cricoid area was clear.    -- Interarytenoid edema was absent  The patient tolerated the procedure well.  Recs:  c/w nystatin 5 cc QID  - can add fluconazole 200 mg orally once, then 100 mg orally once daily for 7 to 14 days for oral thrush  - would rec SLP eval for pt   - would rec humidification for bilateral nares and topical nasal saline gel for each nare BID for anterior dried blood from dryness  - will discuss with laryngology re interventions for incomplete glottic closure

## 2024-05-05 NOTE — PROVIDER CONTACT NOTE (OTHER) - ACTION/TREATMENT ORDERED:
ACP Sonali Boston made aware. As per ACP, patient said it is okay to extend IV as long as IV has no swelling, erythema and flushes well. Have IV team place new IV on monday 5/6.

## 2024-05-05 NOTE — PROVIDER CONTACT NOTE (OTHER) - BACKGROUND
58F smoker  presented to [facility] w/ R breast swelling c/f cellulitis, w/ imaging noted supraclavicular/mediastinal mass lesion and complete occlusion of RIJ and SVC

## 2024-05-05 NOTE — PROGRESS NOTE ADULT - ASSESSMENT
57 yo woman, former smoker, with h/o R eye glaucoma, Fibromyalgia, Anxiety, GERD, and RCC s/p R total nephrectomy/hysterectomy; she was initially admitted to [facility] on 3/11 w/ R breast swelling c/f mastitis- imaging brooks noted supraclavicular/mediastinal mass lesion which encased the SVC and multiple other veins resulting in obliteration of the SVC and thrombosis of the R IJ, and a pancreatic neck cystic lesion.  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  Her disease/hospital course has also been c/b pericardial effusion s/p pericardial window w/ neg cytology, R pleural effusion, also negative cytology) s/p Pleurx, Afib w/ RVR and acute hypoxic resp failure 2/2 SVC syndrome- not dennis to IR-guided stenting; pt was ultimately started on Dex and transferred to [facility] on 4/4 for urgent palliative RT which she completed on 4/18. Her hospital course has also been c/b pericardial effusion with tamponade s/p window and non malignant R pleural effusion s/p pleurex, acute b/l UE and RLE dvts, anxiety, and more recently, recurrent hypoxia.    ACTIVE PROBLEMS  Acute-on-chronic hypoxic respiratory failure---> on HF oxygen CXR 5/4 shows effusion. will do lasix 20 mg iv x1 hold sbp <100, hr <60, awaith Pulm eval as well.  SVC syndrome  Metastatic cancer  Extensive b/l UE DVTs  Acute RLE DVT a/w RLE ecchymosis  Hypercoag state  Pericardial effusion with tamp physiology s/p pericardial window  R pleural effusion s/p pleurex  pAfib, with RVR on this admission  Anxiety/emotional lability  Anemia of Chronic Dz---> Hgb 7.2---> cbc and t/s in AM. also ferritin / b12/ tibc  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)  Cancer-related pain, anxiety  Severe prot-ghazala malnutrition    # Hypoxia--> HF oxygen not improving PULM CONSULT REQUESTED # [id number]. GOT IV Lasix with improving Work of breathing  # ID  ----> c/w  epimeric ----> Now on Emperic Aztreona/ Flagyl   # Onc need ct a/p for staging--> await onc f/u # [id number] ---> stated no met dz seen f/u Dr Gamboa  # Anemia 5/4 hgb 7.1---> will give 1 unit prbc in 1/2 units each 3 hrs.---> 5/5 Hgb 9.0  - Metastatic cancer  Based on 3/14 SC LN path, ddx includes primary UGI vs pancreaticobiliary primary (breast edema is likely 2/2 SVC syndrome); PD-L1 TPs 1%, pMMR, Her2 pending  Ca-125 moderately elevated at 125; other tumor markers not significantly elevated  Additional diagnostic brooks includes:   * 4/19 MRCP showing pancreatic lesion (? IPMN, but pancreas was suboptimally evaluated)     * 4/24 and 4/26 EGD/EUS aborted as pt had large amount of food in the stomach that prevented passing of scope   * 4/30 UGIS aborted as pt felt too short of breath when laying down flat (improved after her pleurex was drained); repeat UGIS tentatively scheduled for 5/1  Options for systemic cancer treatment are TBD  Pt's prognosis is guarded    - Acute-on-chronic hypoxic resp failure  Pt with increased O2 requirement this morning- uptitrated from 2L > 6L to HF O2 40lpm/50%  C/f POD in the chest vs acute infection  Lactate 3.2, remaining ABG results pending  RVP, procal, and sputum cx ordered  Resuming Dex PO 4mg BID for possible inflammatory process (with ppi/pjp ppx; taper held)  Planning to initiate empiric abx- will discuss options with ID as pt does not have IV access and is PCN/FQ allergic  Continuing Symbicort 2 puffs BID  Continuing Duonebs q6/prn  Will continue routing pleurex drainage as below  Will continue weaning O2 as tolerated  5/3 still on HF  5/4 Acute-on-chronic hypoxic respiratory failure---> on HF oxygen CXR 5/4 shows effusion. will do lasix 20 mg iv x1 hold sbp <100, hr <60, awaith Pulm eval as well.      - SVC syndrome  Appreciate Rad Onc eval/recs  Completed 10fxs of palliative RT on 4/18   Completing 2-wk Dex taper, until 5/7 (continuing pjp/ppi ppx until completion of taper)    - Extensive b/l UE DVTs  - Acute RLE DVT a/w RLE ecchymosis  - Hypercoag state  Continuing therapeutic lovenox (resumed on 4/18 given negative GIB brooks), benefits > risks   * Of note: pt has poor UE IV access and currently requires peripheral IV in her LE extremities for admin of medication; can consider FV central line for urgent/emergent IV needs    - Pericardial effusion with tamp physiology s/p pericardial window  - R pleural effusion s/p pleurex  Likely inflammatory; both pleural and pericardial fluid cytology is negative for malignant cells  4/9 surveillance TTE with pEF and no WMAs  Continuing R pleurex drainage- up to 500cc q48h/prn    - pAfib  Pt currently SR, HR controlled  Likely precipitated by malignancy, pericardial effusion, SVC syndrome  Continuing Metoprolol 25mg q12 with holding parameters  Continuing Amio 200mg daily (monitoring for toxicities)  Continuing telemetry    - Anxiety/emotional lability  Continuing Diazepam 5mg daily and daily/prn  Will fu with  re: additional med recs for improvement of pt's anxiety which at time has been a barrier to her inpt care  Pt has poor health literacy and clinical insight which is negatively impacting her medical decision making- when ask if she wanted to defer medical decision making to her boyfriend or if she had a designated HCP she replied "he's already sick of me"  Appreciate  consult: pt lacks medical decision making capacity; medical decision making will be deferred to pt's surrogate decision maker- Saeid Peña ([phone])    - 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)  Pt with h/o R eye glaucoma, but miosis can also be associated with Pancoast syndrome i/s/o extensive R upper lung tumor  Pt denies visual deficits or other related symptoms   MRI Brain without contrast ordered for further eval- pt continues to refuse, can be completed as outpt  Will continue to monitor closely    - Anemia in neoplastic disease  Hgb remains relatively stable  4/5 iron studies not c/w iron deficiency  VSS and pt without clinical s/s of active bleeding  4/17 hemolysis labs negative; 4/18 FOBT negative x2  Trending daily cbcs; continuing supportive transfusions prn (pt does not have h/o ACS, CAD, MI, goal hgb > 7; plts > 10K)    - Cancer related pain  Currently well controlled  Continuing Oxy ER 30mg q12 q8  Continuing Oxy IR 10mg q4/prn  Continuing Dilaudid IV prn for sev breakthrough pain  Continuing bowel regimen to prevent OIC (including Movantic)    - Severe prot-ghazala malnutrition: appreciate RD recs; continuing regular diet with protein shake supplement BID    -5/3 Keesha Sheets) #[phone] Updated   5/3 Called enriqueta Han [phone] and updated. he noted she never Saw Oncology out patient.  Will ask Onc in Hospital  to see Ree Ct a/p/c 59 yo woman, former smoker, with h/o R eye glaucoma, Fibromyalgia, Anxiety, GERD, and RCC s/p R total nephrectomy/hysterectomy; she was initially admitted to [facility] on 3/11 w/ R breast swelling c/f mastitis- imaging brooks noted supraclavicular/mediastinal mass lesion which encased the SVC and multiple other veins resulting in obliteration of the SVC and thrombosis of the R IJ, and a pancreatic neck cystic lesion.  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  Her disease/hospital course has also been c/b pericardial effusion s/p pericardial window w/ neg cytology, R pleural effusion, also negative cytology) s/p Pleurx, Afib w/ RVR and acute hypoxic resp failure 2/2 SVC syndrome- not dennis to IR-guided stenting; pt was ultimately started on Dex and transferred to [facility] on 4/4 for urgent palliative RT which she completed on 4/18. Her hospital course has also been c/b pericardial effusion with tamponade s/p window and non malignant R pleural effusion s/p pleurex, acute b/l UE and RLE dvts, anxiety, and more recently, recurrent hypoxia.    ACTIVE PROBLEMS  Acute-on-chronic hypoxic respiratory failure---> on HF oxygen CXR 5/4 shows effusion. will do lasix 20 mg iv x1 hold sbp <100, hr <60,  Pulm eval appreciated.  Thrush started on Fluconazole--> ENT eval appreciated   SVC syndrome  Metastatic cancer  Extensive b/l UE DVTs  Acute RLE DVT a/w RLE ecchymosis  Hypercoag state  Pericardial effusion with tamp physiology s/p pericardial window  R pleural effusion s/p pleurex  pAfib, with RVR on this admission  Anxiety/emotional lability  Anemia of Chronic Dz---> Hgb 7.2---> cbc and t/s in AM. also ferritin / b12/ tibc  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)  Cancer-related pain, anxiety  Severe prot-ghazala malnutrition    # Hypoxia--> HF oxygen not improving PULM CONSULT REQUESTED # [id number]. GOT IV Lasix with improving Work of breathing  # ID  ----> c/w  epimeric ----> Now on Emperic Aztreona/ Flagyl   # Onc need ct a/p for staging--> await onc f/u # [id number] ---> stated no met dz seen f/u Dr Gamboa  # Anemia 5/4 hgb 7.1---> will give 1 unit prbc in 1/2 units each 3 hrs.---> 5/5 Hgb 9.0  - Metastatic cancer  Based on 3/14 SC LN path, ddx includes primary UGI vs pancreaticobiliary primary (breast edema is likely 2/2 SVC syndrome); PD-L1 TPs 1%, pMMR, Her2 pending  Ca-125 moderately elevated at 125; other tumor markers not significantly elevated  Additional diagnostic brooks includes:   * 4/19 MRCP showing pancreatic lesion (? IPMN, but pancreas was suboptimally evaluated)     * 4/24 and 4/26 EGD/EUS aborted as pt had large amount of food in the stomach that prevented passing of scope   * 4/30 UGIS aborted as pt felt too short of breath when laying down flat (improved after her pleurex was drained); repeat UGIS tentatively scheduled for 5/1  Options for systemic cancer treatment are TBD  Pt's prognosis is guarded    - Acute-on-chronic hypoxic resp failure  Pt with increased O2 requirement this morning- uptitrated from 2L > 6L to HF O2 40lpm/50%  C/f POD in the chest vs acute infection  Lactate 3.2, remaining ABG results pending  RVP, procal, and sputum cx ordered  Resuming Dex PO 4mg BID for possible inflammatory process (with ppi/pjp ppx; taper held)  Planning to initiate empiric abx- will discuss options with ID as pt does not have IV access and is PCN/FQ allergic  Continuing Symbicort 2 puffs BID  Continuing Duonebs q6/prn  Will continue routing pleurex drainage as below  Will continue weaning O2 as tolerated  5/3 still on HF  5/4 Acute-on-chronic hypoxic respiratory failure---> on HF oxygen CXR 5/4 shows effusion. will do lasix 20 mg iv x1 hold sbp <100, hr <60, awaith Pulm eval as well.      - SVC syndrome  Appreciate Rad Onc eval/recs  Completed 10fxs of palliative RT on 4/18   Completing 2-wk Dex taper, until 5/7 (continuing pjp/ppi ppx until completion of taper)    - Extensive b/l UE DVTs  - Acute RLE DVT a/w RLE ecchymosis  - Hypercoag state  Continuing therapeutic lovenox (resumed on 4/18 given negative GIB brooks), benefits > risks   * Of note: pt has poor UE IV access and currently requires peripheral IV in her LE extremities for admin of medication; can consider [facility] central line for urgent/emergent IV needs    - Pericardial effusion with tamp physiology s/p pericardial window  - R pleural effusion s/p pleurex  Likely inflammatory; both pleural and pericardial fluid cytology is negative for malignant cells  4/9 surveillance TTE with pEF and no WMAs  Continuing R pleurex drainage- up to 500cc q48h/prn    - pAfib  Pt currently SR, HR controlled  Likely precipitated by malignancy, pericardial effusion, SVC syndrome  Continuing Metoprolol 25mg q12 with holding parameters  Continuing Amio 200mg daily (monitoring for toxicities)  Continuing telemetry    - Anxiety/emotional lability  Continuing Diazepam 5mg daily and daily/prn  Will fu with  re: additional med recs for improvement of pt's anxiety which at time has been a barrier to her inpt care  Pt has poor health literacy and clinical insight which is negatively impacting her medical decision making- when ask if she wanted to defer medical decision making to her boyfriend or if she had a designated HCP she replied "he's already sick of me"  Appreciate  consult: pt lacks medical decision making capacity; medical decision making will be deferred to pt's surrogate decision maker- Saeid Peña ([phone])    - 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)  Pt with h/o R eye glaucoma, but miosis can also be associated with Pancoast syndrome i/s/o extensive R upper lung tumor  Pt denies visual deficits or other related symptoms   MRI Brain without contrast ordered for further eval- pt continues to refuse, can be completed as outpt  Will continue to monitor closely    - Anemia in neoplastic disease  Hgb remains relatively stable  4/5 iron studies not c/w iron deficiency  VSS and pt without clinical s/s of active bleeding  4/17 hemolysis labs negative; 4/18 FOBT negative x2  Trending daily cbcs; continuing supportive transfusions prn (pt does not have h/o ACS, CAD, MI, goal hgb > 7; plts > 10K)    - Cancer related pain  Currently well controlled  Continuing Oxy ER 30mg q12 q8  Continuing Oxy IR 10mg q4/prn  Continuing Dilaudid IV prn for sev breakthrough pain  Continuing bowel regimen to prevent OIC (including Movantic)    - Severe prot-ghazala malnutrition: appreciate RD recs; continuing regular diet with protein shake supplement BID    -5/3 Keesha Sheets) #[phone] Updated   5/3 Called enriqueta Han [phone] and updated. he noted she never Saw Oncology out patient.  Will ask Onc in Hospital  to see Ree Ct a/p/c 59 yo woman, former smoker, with h/o R eye glaucoma, Fibromyalgia, Anxiety, GERD, and RCC s/p R total nephrectomy/hysterectomy; she was initially admitted to [facility] on 3/11 w/ R breast swelling c/f mastitis- imaging brooks noted supraclavicular/mediastinal mass lesion which encased the SVC and multiple other veins resulting in obliteration of the SVC and thrombosis of the R IJ, and a pancreatic neck cystic lesion.  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  Her disease/hospital course has also been c/b pericardial effusion s/p pericardial window w/ neg cytology, R pleural effusion, also negative cytology) s/p Pleurx, Afib w/ RVR and acute hypoxic resp failure 2/2 SVC syndrome- not dennis to IR-guided stenting; pt was ultimately started on Dex and transferred to [facility] on 4/4 for urgent palliative RT which she completed on 4/18. Her hospital course has also been c/b pericardial effusion with tamponade s/p window and non malignant R pleural effusion s/p pleurex, acute b/l UE and RLE dvts, anxiety, and more recently, recurrent hypoxia.    ACTIVE PROBLEMS  Acute-on-chronic hypoxic respiratory failure---> on HF oxygen CXR 5/4 shows effusion. will do lasix 20 mg iv x1 hold sbp <100, hr <60,  Pulm eval appreciated.  Thrush started on Fluconazole--> ENT eval appreciated   SVC syndrome  Metastatic cancer  Extensive b/l UE DVTs  Acute RLE DVT a/w RLE ecchymosis  Hypercoag state  Pericardial effusion with tamp physiology s/p pericardial window  R pleural effusion s/p pleurex  pAfib, with RVR on this admission  Anxiety/emotional lability  Anemia of Chronic Dz---> Hgb 7.2---> cbc and t/s in AM. also ferritin / b12/ tibc  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)  Cancer-related pain, anxiety  Severe prot-ghazala malnutrition    # Hypoxia--> HF oxygen not improving PULM CONSULT REQUESTED # [id number]. GOT IV Lasix with improving Work of breathing  5/5 dec HF to 40/40 and observe. Pul and ENT eval appreciated  # ID  ----> c/w  epimeric ----> Now on Emperic Aztreona/ Flagyl   # Onc need ct a/p for staging--> await onc f/u # [id number] ---> stated no met dz seen f/u Dr Gamboa  # Anemia 5/4 hgb 7.1---> will give 1 unit prbc in 1/2 units each 3 hrs.---> 5/5 Hgb 9.0  - Metastatic cancer  Based on 3/14 SC LN path, ddx includes primary UGI vs pancreaticobiliary primary (breast edema is likely 2/2 SVC syndrome); PD-L1 TPs 1%, pMMR, Her2 pending  Ca-125 moderately elevated at 125; other tumor markers not significantly elevated  Additional diagnostic brooks includes:   * 4/19 MRCP showing pancreatic lesion (? IPMN, but pancreas was suboptimally evaluated)     * 4/24 and 4/26 EGD/EUS aborted as pt had large amount of food in the stomach that prevented passing of scope   * 4/30 UGIS aborted as pt felt too short of breath when laying down flat (improved after her pleurex was drained); repeat UGIS tentatively scheduled for 5/1  Options for systemic cancer treatment are TBD  Pt's prognosis is guarded    - Acute-on-chronic hypoxic resp failure  Pt with increased O2 requirement this morning- uptitrated from 2L > 6L to HF O2 40lpm/50%  C/f POD in the chest vs acute infection  Lactate 3.2, remaining ABG results pending  RVP, procal, and sputum cx ordered  Resuming Dex PO 4mg BID for possible inflammatory process (with ppi/pjp ppx; taper held)  Planning to initiate empiric abx- will discuss options with ID as pt does not have IV access and is PCN/FQ allergic  Continuing Symbicort 2 puffs BID  Continuing Duonebs q6/prn  Will continue routing pleurex drainage as below  Will continue weaning O2 as tolerated  5/3 still on HF  5/4 Acute-on-chronic hypoxic respiratory failure---> on HF oxygen CXR 5/4 shows effusion. will do lasix 20 mg iv x1 hold sbp <100, hr <60, awaith Pulm eval as well.  also had 600 cc draineed from R pleurx  5/5 dec HF to 40/40 and observe     - SVC syndrome  Appreciate Rad Onc eval/recs  Completed 10fxs of palliative RT on 4/18   Completing 2-wk Dex taper, until 5/7 (continuing pjp/ppi ppx until completion of taper)    - Extensive b/l UE DVTs  - Acute RLE DVT a/w RLE ecchymosis  - Hypercoag state  Continuing therapeutic lovenox (resumed on 4/18 given negative GIB brooks), benefits > risks   * Of note: pt has poor UE IV access and currently requires peripheral IV in her LE extremities for admin of medication; can consider FV central line for urgent/emergent IV needs    - Pericardial effusion with tamp physiology s/p pericardial window  - R pleural effusion s/p pleurex  Likely inflammatory; both pleural and pericardial fluid cytology is negative for malignant cells  4/9 surveillance TTE with pEF and no WMAs  Continuing R pleurex drainage- up to 500cc q48h/prn    - pAfib  Pt currently SR, HR controlled  Likely precipitated by malignancy, pericardial effusion, SVC syndrome  Continuing Metoprolol 25mg q12 with holding parameters  Continuing Amio 200mg daily (monitoring for toxicities)  Continuing telemetry    - Anxiety/emotional lability  Continuing Diazepam 5mg daily and daily/prn  Will fu with  re: additional med recs for improvement of pt's anxiety which at time has been a barrier to her inpt care  Pt has poor health literacy and clinical insight which is negatively impacting her medical decision making- when ask if she wanted to defer medical decision making to her boyfriend or if she had a designated HCP she replied "he's already sick of me"  Appreciate  consult: pt lacks medical decision making capacity; medical decision making will be deferred to pt's surrogate decision maker- Saeid Peña ([phone])    - 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)  Pt with h/o R eye glaucoma, but miosis can also be associated with Pancoast syndrome i/s/o extensive R upper lung tumor  Pt denies visual deficits or other related symptoms   MRI Brain without contrast ordered for further eval- pt continues to refuse, can be completed as outpt  Will continue to monitor closely    - Anemia in neoplastic disease  Hgb remains relatively stable  4/5 iron studies not c/w iron deficiency  VSS and pt without clinical s/s of active bleeding  4/17 hemolysis labs negative; 4/18 FOBT negative x2  Trending daily cbcs; continuing supportive transfusions prn (pt does not have h/o ACS, CAD, MI, goal hgb > 7; plts > 10K)    - Cancer related pain  Currently well controlled  Continuing Oxy ER 30mg q12 q8  Continuing Oxy IR 10mg q4/prn  Continuing Dilaudid IV prn for sev breakthrough pain  Continuing bowel regimen to prevent OIC (including Movantic)    - Severe prot-ghazala malnutrition: appreciate RD recs; continuing regular diet with protein shake supplement BID    -5/3 Keesha (Niurka) #[phone] Updated   5/3 Called enriqueta Han [phone] and updated. he noted she never Saw Oncology out patient.  Will ask Onc in Hospital  to see Ree Ct a/p/c

## 2024-05-06 LAB
ALBUMIN SERPL ELPH-MCNC: 2.4 G/DL — LOW (ref 3.3–5)
ALP SERPL-CCNC: 115 U/L — SIGNIFICANT CHANGE UP (ref 40–120)
ALT FLD-CCNC: 48 U/L — HIGH (ref 4–33)
ANION GAP SERPL CALC-SCNC: 13 MMOL/L — SIGNIFICANT CHANGE UP (ref 7–14)
AST SERPL-CCNC: 7 U/L — SIGNIFICANT CHANGE UP (ref 4–32)
BASOPHILS # BLD AUTO: 0.03 K/UL — SIGNIFICANT CHANGE UP (ref 0–0.2)
BASOPHILS NFR BLD AUTO: 0.3 % — SIGNIFICANT CHANGE UP (ref 0–2)
BILIRUB SERPL-MCNC: 0.3 MG/DL — SIGNIFICANT CHANGE UP (ref 0.2–1.2)
BUN SERPL-MCNC: 16 MG/DL — SIGNIFICANT CHANGE UP (ref 7–23)
CALCIUM SERPL-MCNC: 8.7 MG/DL — SIGNIFICANT CHANGE UP (ref 8.4–10.5)
CHLORIDE SERPL-SCNC: 98 MMOL/L — SIGNIFICANT CHANGE UP (ref 98–107)
CO2 SERPL-SCNC: 26 MMOL/L — SIGNIFICANT CHANGE UP (ref 22–31)
CREAT SERPL-MCNC: 0.92 MG/DL — SIGNIFICANT CHANGE UP (ref 0.5–1.3)
EGFR: 71 ML/MIN/1.73M2 — SIGNIFICANT CHANGE UP
EOSINOPHIL # BLD AUTO: 0.01 K/UL — SIGNIFICANT CHANGE UP (ref 0–0.5)
EOSINOPHIL NFR BLD AUTO: 0.1 % — SIGNIFICANT CHANGE UP (ref 0–6)
GLUCOSE SERPL-MCNC: 87 MG/DL — SIGNIFICANT CHANGE UP (ref 70–99)
HCT VFR BLD CALC: 28.6 % — LOW (ref 34.5–45)
HGB BLD-MCNC: 9.4 G/DL — LOW (ref 11.5–15.5)
IANC: 9.22 K/UL — HIGH (ref 1.8–7.4)
IMM GRANULOCYTES NFR BLD AUTO: 6.3 % — HIGH (ref 0–0.9)
LYMPHOCYTES # BLD AUTO: 0.22 K/UL — LOW (ref 1–3.3)
LYMPHOCYTES # BLD AUTO: 2.1 % — LOW (ref 13–44)
MAGNESIUM SERPL-MCNC: 2 MG/DL — SIGNIFICANT CHANGE UP (ref 1.6–2.6)
MCHC RBC-ENTMCNC: 29 PG — SIGNIFICANT CHANGE UP (ref 27–34)
MCHC RBC-ENTMCNC: 32.9 GM/DL — SIGNIFICANT CHANGE UP (ref 32–36)
MCV RBC AUTO: 88.3 FL — SIGNIFICANT CHANGE UP (ref 80–100)
MONOCYTES # BLD AUTO: 0.25 K/UL — SIGNIFICANT CHANGE UP (ref 0–0.9)
MONOCYTES NFR BLD AUTO: 2.4 % — SIGNIFICANT CHANGE UP (ref 2–14)
NEUTROPHILS # BLD AUTO: 9.22 K/UL — HIGH (ref 1.8–7.4)
NEUTROPHILS NFR BLD AUTO: 88.8 % — HIGH (ref 43–77)
NRBC # BLD: 0 /100 WBCS — SIGNIFICANT CHANGE UP (ref 0–0)
NRBC # FLD: 0 K/UL — SIGNIFICANT CHANGE UP (ref 0–0)
PHOSPHATE SERPL-MCNC: 2.8 MG/DL — SIGNIFICANT CHANGE UP (ref 2.5–4.5)
PLATELET # BLD AUTO: 336 K/UL — SIGNIFICANT CHANGE UP (ref 150–400)
POTASSIUM SERPL-MCNC: 4.2 MMOL/L — SIGNIFICANT CHANGE UP (ref 3.5–5.3)
POTASSIUM SERPL-SCNC: 4.2 MMOL/L — SIGNIFICANT CHANGE UP (ref 3.5–5.3)
PROT SERPL-MCNC: 5.3 G/DL — LOW (ref 6–8.3)
RBC # BLD: 3.24 M/UL — LOW (ref 3.8–5.2)
RBC # FLD: 18 % — HIGH (ref 10.3–14.5)
SODIUM SERPL-SCNC: 137 MMOL/L — SIGNIFICANT CHANGE UP (ref 135–145)
WBC # BLD: 10.38 K/UL — SIGNIFICANT CHANGE UP (ref 3.8–10.5)
WBC # FLD AUTO: 10.38 K/UL — SIGNIFICANT CHANGE UP (ref 3.8–10.5)

## 2024-05-06 PROCEDURE — 71045 X-RAY EXAM CHEST 1 VIEW: CPT | Mod: 26

## 2024-05-06 PROCEDURE — 99233 SBSQ HOSP IP/OBS HIGH 50: CPT

## 2024-05-06 RX ORDER — FUROSEMIDE 40 MG
20 TABLET ORAL ONCE
Refills: 0 | Status: COMPLETED | OUTPATIENT
Start: 2024-05-06 | End: 2024-05-06

## 2024-05-06 RX ADMIN — Medication 4 MILLIGRAM(S): at 18:32

## 2024-05-06 RX ADMIN — Medication 30 MILLILITER(S): at 10:47

## 2024-05-06 RX ADMIN — Medication 25 MILLIGRAM(S): at 18:31

## 2024-05-06 RX ADMIN — Medication 15 MILLILITER(S): at 18:31

## 2024-05-06 RX ADMIN — Medication 4 MILLIGRAM(S): at 06:47

## 2024-05-06 RX ADMIN — Medication 1 PATCH: at 09:10

## 2024-05-06 RX ADMIN — OXYCODONE HYDROCHLORIDE 30 MILLIGRAM(S): 5 TABLET ORAL at 22:28

## 2024-05-06 RX ADMIN — ENOXAPARIN SODIUM 50 MILLIGRAM(S): 100 INJECTION SUBCUTANEOUS at 06:50

## 2024-05-06 RX ADMIN — BUDESONIDE AND FORMOTEROL FUMARATE DIHYDRATE 2 PUFF(S): 160; 4.5 AEROSOL RESPIRATORY (INHALATION) at 22:28

## 2024-05-06 RX ADMIN — CEFEPIME 100 MILLIGRAM(S): 1 INJECTION, POWDER, FOR SOLUTION INTRAMUSCULAR; INTRAVENOUS at 06:48

## 2024-05-06 RX ADMIN — SENNA PLUS 2 TABLET(S): 8.6 TABLET ORAL at 22:28

## 2024-05-06 RX ADMIN — Medication 1 TABLET(S): at 09:25

## 2024-05-06 RX ADMIN — OXYCODONE HYDROCHLORIDE 30 MILLIGRAM(S): 5 TABLET ORAL at 13:43

## 2024-05-06 RX ADMIN — Medication 3 MILLILITER(S): at 19:22

## 2024-05-06 RX ADMIN — Medication 3 MILLILITER(S): at 08:12

## 2024-05-06 RX ADMIN — CHLORHEXIDINE GLUCONATE 1 APPLICATION(S): 213 SOLUTION TOPICAL at 10:50

## 2024-05-06 RX ADMIN — PANTOPRAZOLE SODIUM 40 MILLIGRAM(S): 20 TABLET, DELAYED RELEASE ORAL at 18:32

## 2024-05-06 RX ADMIN — Medication 500000 UNIT(S): at 18:32

## 2024-05-06 RX ADMIN — CEFEPIME 100 MILLIGRAM(S): 1 INJECTION, POWDER, FOR SOLUTION INTRAMUSCULAR; INTRAVENOUS at 18:28

## 2024-05-06 RX ADMIN — Medication 15 MILLILITER(S): at 06:53

## 2024-05-06 RX ADMIN — Medication 20 MILLIGRAM(S): at 15:19

## 2024-05-06 RX ADMIN — Medication 15 MILLILITER(S): at 10:47

## 2024-05-06 RX ADMIN — ENOXAPARIN SODIUM 50 MILLIGRAM(S): 100 INJECTION SUBCUTANEOUS at 18:31

## 2024-05-06 RX ADMIN — Medication 3 MILLILITER(S): at 03:09

## 2024-05-06 RX ADMIN — OXYCODONE HYDROCHLORIDE 30 MILLIGRAM(S): 5 TABLET ORAL at 14:30

## 2024-05-06 RX ADMIN — Medication 1 PATCH: at 11:36

## 2024-05-06 RX ADMIN — Medication 25 MILLIGRAM(S): at 06:48

## 2024-05-06 RX ADMIN — NALOXEGOL OXALATE 25 MILLIGRAM(S): 12.5 TABLET, FILM COATED ORAL at 10:48

## 2024-05-06 RX ADMIN — FLUCONAZOLE 100 MILLIGRAM(S): 150 TABLET ORAL at 10:48

## 2024-05-06 RX ADMIN — Medication 500000 UNIT(S): at 10:47

## 2024-05-06 RX ADMIN — PANTOPRAZOLE SODIUM 40 MILLIGRAM(S): 20 TABLET, DELAYED RELEASE ORAL at 06:48

## 2024-05-06 RX ADMIN — OXYCODONE HYDROCHLORIDE 30 MILLIGRAM(S): 5 TABLET ORAL at 23:28

## 2024-05-06 RX ADMIN — Medication 3 MILLILITER(S): at 15:06

## 2024-05-06 RX ADMIN — Medication 1 PATCH: at 10:48

## 2024-05-06 RX ADMIN — BENZOCAINE AND MENTHOL 1 LOZENGE: 5; 1 LIQUID ORAL at 10:54

## 2024-05-06 RX ADMIN — Medication 1 TABLET(S): at 10:48

## 2024-05-06 RX ADMIN — OXYCODONE HYDROCHLORIDE 30 MILLIGRAM(S): 5 TABLET ORAL at 07:40

## 2024-05-06 RX ADMIN — POLYETHYLENE GLYCOL 3350 17 GRAM(S): 17 POWDER, FOR SOLUTION ORAL at 06:49

## 2024-05-06 RX ADMIN — Medication 1 PATCH: at 19:19

## 2024-05-06 RX ADMIN — BUDESONIDE AND FORMOTEROL FUMARATE DIHYDRATE 2 PUFF(S): 160; 4.5 AEROSOL RESPIRATORY (INHALATION) at 09:26

## 2024-05-06 RX ADMIN — OXYCODONE HYDROCHLORIDE 30 MILLIGRAM(S): 5 TABLET ORAL at 06:48

## 2024-05-06 RX ADMIN — AMIODARONE HYDROCHLORIDE 200 MILLIGRAM(S): 400 TABLET ORAL at 06:47

## 2024-05-06 NOTE — PROGRESS NOTE ADULT - SUBJECTIVE AND OBJECTIVE BOX
PATIENT:  GADIEL GENAO  [id number]    CHIEF COMPLAINT:  Patient is a 58y old  Female who presents with a chief complaint of SVC syndrome, DVT, mediastinal mass (06 May 2024 08:07)      INTERVAL HISTORY/OVERNIGHT EVENTS:  - no acute events    MEDICATIONS:  MEDICATIONS  (STANDING):  albuterol/ipratropium for Nebulization 3 milliLiter(s) Nebulizer every 6 hours  aMIOdarone    Tablet 200 milliGRAM(s) Oral daily  Biotene Dry Mouth Oral Rinse 15 milliLiter(s) Swish and Spit every 6 hours  budesonide 160 MICROgram(s)/formoterol 4.5 MICROgram(s) Inhaler 2 Puff(s) Inhalation two times a day  cefepime   IVPB 2000 milliGRAM(s) IV Intermittent every 12 hours  chlorhexidine 2% Cloths 1 Application(s) Topical daily  dexAMETHasone  Injectable 4 milliGRAM(s) IV Push two times a day  diazepam    Tablet 5 milliGRAM(s) Oral at bedtime  enoxaparin Injectable 50 milliGRAM(s) SubCutaneous every 12 hours  fluconAZOLE   Tablet 100 milliGRAM(s) Oral daily  influenza   Vaccine 0.5 milliLiter(s) IntraMuscular once  metoclopramide Injectable 10 milliGRAM(s) IV Push every 8 hours  metoprolol tartrate 25 milliGRAM(s) Oral every 12 hours  multivitamin 1 Tablet(s) Oral daily  naloxegol 25 milliGRAM(s) Oral daily  nicotine -  14 mG/24Hr(s) Patch 1 Patch Transdermal every 24 hours  nystatin    Suspension [id number] Unit(s) Swish and Swallow four times a day  oxyCODONE  ER Tablet 30 milliGRAM(s) Oral <User Schedule>  pantoprazole    Tablet 40 milliGRAM(s) Oral two times a day  polyethylene glycol 3350 17 Gram(s) Oral every 12 hours  senna 2 Tablet(s) Oral at bedtime  sodium chloride 0.65% Nasal 1 Spray(s) Both Nostrils two times a day  trimethoprim  160 mG/sulfamethoxazole 800 mG 1 Tablet(s) Oral <User Schedule>    MEDICATIONS  (PRN):  aluminum hydroxide/magnesium hydroxide/simethicone Suspension 30 milliLiter(s) Oral every 6 hours PRN Dyspepsia  benzocaine/menthol Lozenge 1 Lozenge Oral two times a day PRN Sore Throat  Biotene Dry Mouth Oral Rinse 15 milliLiter(s) Swish and Spit two times a day PRN dry mouth  diazepam    Tablet 5 milliGRAM(s) Oral two times a day PRN anxiety  FIRST- Mouthwash  BLM 5 milliLiter(s) Swish and Spit four times a day PRN Mouth Care  HYDROmorphone  Injectable 1 milliGRAM(s) IV Push every 3 hours PRN Severe Pain (7 - 10)  ondansetron    Tablet 4 milliGRAM(s) Oral every 8 hours PRN Nausea and/or Vomiting  oxyCODONE    IR 10 milliGRAM(s) Oral every 4 hours PRN Moderate Pain (4 - 6)  sodium chloride 0.9% lock flush 10 milliLiter(s) IV Push every 1 hour PRN Pre/post blood products, medications, blood draw, and to maintain line patency      ALLERGIES:  Allergies    erythromycin (Headache; Vomiting; Rash)  penicillin (Headache; Vomiting; Rash)  Cipro (Headache; Vomiting; Rash)    Intolerances        OBJECTIVE:  ICU Vital Signs Last 24 Hrs  T(C): 36.4 (06 May 2024 05:51), Max: 36.4 (05 May 2024 18:48)  T(F): 97.5 (06 May 2024 05:51), Max: 97.5 (05 May 2024 18:48)  HR: 78 (06 May 2024 09:08) (76 - 87)  BP: 148/57 (06 May 2024 05:51) (116/59 - 148/57)  BP(mean): --  ABP: --  ABP(mean): --  RR: 18 (06 May 2024 07:25) (18 - 20)  SpO2: 100% (06 May 2024 09:08) (93% - 100%)    O2 Parameters below as of 06 May 2024 09:08  Patient On (Oxygen Delivery Method): nasal cannula, high flow            Adult Advanced Hemodynamics Last 24 Hrs  CVP(mm Hg): --  CVP(cm H2O): --  CO: --  CI: --  PA: --  PA(mean): --  PCWP: --  SVR: --  SVRI: --  PVR: --  PVRI: --  CAPILLARY BLOOD GLUCOSE        CAPILLARY BLOOD GLUCOSE        I&O's Summary    05 May 2024 07:01  -  06 May 2024 07:00  --------------------------------------------------------  IN: 0 mL / OUT: 25 mL / NET: -25 mL      Daily     Daily     PHYSICAL EXAMINATION:  General: WN/WD NAD  HEENT: PERRLA, EOMI, moist mucous membranes  Neurology: A&Ox3, nonfocal, PINA x 4  Respiratory: CTA B/L, normal respiratory effort, no wheezes, crackles, rales  CV: RRR, S1S2, no murmurs, rubs or gallops  Abdominal: Soft, NT, ND +BS, Last BM  Extremities: No edema, + peripheral pulses  Incisions:   Tubes:    LABS:                          9.4    10.38 )-----------( 336      ( 06 May 2024 07:24 )             28.6     05-06    137  |  98  |  16  ----------------------------<  87  4.2   |  26  |  0.92    Ca    8.7      06 May 2024 07:24  Phos  2.8     05-06  Mg     2.00     05-06    TPro  5.3<L>  /  Alb  2.4<L>  /  TBili  0.3  /  DBili  x   /  AST  7   /  ALT  48<H>  /  AlkPhos  115  05-06    LIVER FUNCTIONS - ( 06 May 2024 07:24 )  Alb: 2.4 g/dL / Pro: 5.3 g/dL / ALK PHOS: 115 U/L / ALT: 48 U/L / AST: 7 U/L / GGT: x                   Urinalysis Basic - ( 06 May 2024 07:24 )    Color: x / Appearance: x / SG: x / pH: x  Gluc: 87 mg/dL / Ketone: x  / Bili: x / Urobili: x   Blood: x / Protein: x / Nitrite: x   Leuk Esterase: x / RBC: x / WBC x   Sq Epi: x / Non Sq Epi: x / Bacteria: x        TELEMETRY:     EKG:     IMAGING:       PATIENT:  GADIEL GENAO  [id number]    CHIEF COMPLAINT:  Patient is a 58y old  Female who presents with a chief complaint of SVC syndrome, DVT, mediastinal mass (06 May 2024 08:07)    INTERVAL HISTORY/OVERNIGHT EVENTS:  - no acute events    MEDICATIONS:  MEDICATIONS  (STANDING):  albuterol/ipratropium for Nebulization 3 milliLiter(s) Nebulizer every 6 hours  aMIOdarone    Tablet 200 milliGRAM(s) Oral daily  Biotene Dry Mouth Oral Rinse 15 milliLiter(s) Swish and Spit every 6 hours  budesonide 160 MICROgram(s)/formoterol 4.5 MICROgram(s) Inhaler 2 Puff(s) Inhalation two times a day  cefepime   IVPB 2000 milliGRAM(s) IV Intermittent every 12 hours  chlorhexidine 2% Cloths 1 Application(s) Topical daily  dexAMETHasone  Injectable 4 milliGRAM(s) IV Push two times a day  diazepam    Tablet 5 milliGRAM(s) Oral at bedtime  enoxaparin Injectable 50 milliGRAM(s) SubCutaneous every 12 hours  fluconAZOLE   Tablet 100 milliGRAM(s) Oral daily  influenza   Vaccine 0.5 milliLiter(s) IntraMuscular once  metoclopramide Injectable 10 milliGRAM(s) IV Push every 8 hours  metoprolol tartrate 25 milliGRAM(s) Oral every 12 hours  multivitamin 1 Tablet(s) Oral daily  naloxegol 25 milliGRAM(s) Oral daily  nicotine -  14 mG/24Hr(s) Patch 1 Patch Transdermal every 24 hours  nystatin    Suspension [id number] Unit(s) Swish and Swallow four times a day  oxyCODONE  ER Tablet 30 milliGRAM(s) Oral <User Schedule>  pantoprazole    Tablet 40 milliGRAM(s) Oral two times a day  polyethylene glycol 3350 17 Gram(s) Oral every 12 hours  senna 2 Tablet(s) Oral at bedtime  sodium chloride 0.65% Nasal 1 Spray(s) Both Nostrils two times a day  trimethoprim  160 mG/sulfamethoxazole 800 mG 1 Tablet(s) Oral <User Schedule>    MEDICATIONS  (PRN):  aluminum hydroxide/magnesium hydroxide/simethicone Suspension 30 milliLiter(s) Oral every 6 hours PRN Dyspepsia  benzocaine/menthol Lozenge 1 Lozenge Oral two times a day PRN Sore Throat  Biotene Dry Mouth Oral Rinse 15 milliLiter(s) Swish and Spit two times a day PRN dry mouth  diazepam    Tablet 5 milliGRAM(s) Oral two times a day PRN anxiety  FIRST- Mouthwash  BLM 5 milliLiter(s) Swish and Spit four times a day PRN Mouth Care  HYDROmorphone  Injectable 1 milliGRAM(s) IV Push every 3 hours PRN Severe Pain (7 - 10)  ondansetron    Tablet 4 milliGRAM(s) Oral every 8 hours PRN Nausea and/or Vomiting  oxyCODONE    IR 10 milliGRAM(s) Oral every 4 hours PRN Moderate Pain (4 - 6)  sodium chloride 0.9% lock flush 10 milliLiter(s) IV Push every 1 hour PRN Pre/post blood products, medications, blood draw, and to maintain line patency      ALLERGIES:  Allergies    erythromycin (Headache; Vomiting; Rash)  penicillin (Headache; Vomiting; Rash)  Cipro (Headache; Vomiting; Rash)    OBJECTIVE:  ICU Vital Signs Last 24 Hrs  T(C): 36.4 (06 May 2024 05:51), Max: 36.4 (05 May 2024 18:48)  T(F): 97.5 (06 May 2024 05:51), Max: 97.5 (05 May 2024 18:48)  HR: 78 (06 May 2024 09:08) (76 - 87)  BP: 148/57 (06 May 2024 05:51) (116/59 - 148/57)  RR: 18 (06 May 2024 07:25) (18 - 20)  SpO2: 100% (06 May 2024 09:08) (93% - 100%)    O2 Parameters below as of 06 May 2024 09:08  Patient On (Oxygen Delivery Method): nasal cannula, high flow    I&O's Summary    05 May 2024 07:01  -  06 May 2024 07:00  --------------------------------------------------------  IN: 0 mL / OUT: 25 mL / NET: -25 mL      PHYSICAL EXAMINATION:  General: WN/WD NAD  HEENT: EOMI, moist mucous membranes  Neurology: A&Ox3, nonfocal, PINA x 4  Respiratory: R diminshment  CV: RRR, S1S2, no murmurs, rubs or gallops  Abdominal: Soft, NT, ND +BS, Last BM  Extremities: No edema, + peripheral pulses    LABS:                          9.4    10.38 )-----------( 336      ( 06 May 2024 07:24 )             28.6     05-06    137  |  98  |  16  ----------------------------<  87  4.2   |  26  |  0.92    Ca    8.7      06 May 2024 07:24  Phos  2.8     05-06  Mg     2.00     05-06    TPro  5.3<L>  /  Alb  2.4<L>  /  TBili  0.3  /  DBili  x   /  AST  7   /  ALT  48<H>  /  AlkPhos  115  05-06    LIVER FUNCTIONS - ( 06 May 2024 07:24 )  Alb: 2.4 g/dL / Pro: 5.3 g/dL / ALK PHOS: 115 U/L / ALT: 48 U/L / AST: 7 U/L / GGT: x                   Urinalysis Basic - ( 06 May 2024 07:24 )    Color: x / Appearance: x / SG: x / pH: x  Gluc: 87 mg/dL / Ketone: x  / Bili: x / Urobili: x   Blood: x / Protein: x / Nitrite: x   Leuk Esterase: x / RBC: x / WBC x   Sq Epi: x / Non Sq Epi: x / Bacteria: x        TELEMETRY:     EKG:     IMAGING:

## 2024-05-06 NOTE — PROGRESS NOTE ADULT - SUBJECTIVE AND OBJECTIVE BOX
SOLID TUMOR ONCOLOGY HOSPITALIST PROGRESS NOTE    S: No acute events overnight    CURRENT MEDICATIONS  MEDICATIONS  (STANDING):  albuterol/ipratropium for Nebulization 3 milliLiter(s) Nebulizer every 6 hours  aMIOdarone    Tablet 200 milliGRAM(s) Oral daily  Biotene Dry Mouth Oral Rinse 15 milliLiter(s) Swish and Spit every 6 hours  budesonide 160 MICROgram(s)/formoterol 4.5 MICROgram(s) Inhaler 2 Puff(s) Inhalation two times a day  cefepime   IVPB 2000 milliGRAM(s) IV Intermittent every 12 hours  chlorhexidine 2% Cloths 1 Application(s) Topical daily  dexAMETHasone  Injectable 4 milliGRAM(s) IV Push two times a day  diazepam    Tablet 5 milliGRAM(s) Oral at bedtime  enoxaparin Injectable 50 milliGRAM(s) SubCutaneous every 12 hours  fluconAZOLE   Tablet 100 milliGRAM(s) Oral daily  influenza   Vaccine 0.5 milliLiter(s) IntraMuscular once  metoclopramide Injectable 10 milliGRAM(s) IV Push every 8 hours  metoprolol tartrate 25 milliGRAM(s) Oral every 12 hours  multivitamin 1 Tablet(s) Oral daily  naloxegol 25 milliGRAM(s) Oral daily  nicotine -  14 mG/24Hr(s) Patch 1 Patch Transdermal every 24 hours  nystatin    Suspension 186982 Unit(s) Swish and Swallow four times a day  oxyCODONE  ER Tablet 30 milliGRAM(s) Oral <User Schedule>  pantoprazole    Tablet 40 milliGRAM(s) Oral two times a day  polyethylene glycol 3350 17 Gram(s) Oral every 12 hours  senna 2 Tablet(s) Oral at bedtime  sodium chloride 0.65% Nasal 1 Spray(s) Both Nostrils two times a day  trimethoprim  160 mG/sulfamethoxazole 800 mG 1 Tablet(s) Oral <User Schedule>    MEDICATIONS  (PRN):  aluminum hydroxide/magnesium hydroxide/simethicone Suspension 30 milliLiter(s) Oral every 6 hours PRN Dyspepsia  benzocaine/menthol Lozenge 1 Lozenge Oral two times a day PRN Sore Throat  Biotene Dry Mouth Oral Rinse 15 milliLiter(s) Swish and Spit two times a day PRN dry mouth  diazepam    Tablet 5 milliGRAM(s) Oral two times a day PRN anxiety  FIRST- Mouthwash  BLM 5 milliLiter(s) Swish and Spit four times a day PRN Mouth Care  HYDROmorphone  Injectable 1 milliGRAM(s) IV Push every 3 hours PRN Severe Pain (7 - 10)  ondansetron    Tablet 4 milliGRAM(s) Oral every 8 hours PRN Nausea and/or Vomiting  oxyCODONE    IR 10 milliGRAM(s) Oral every 4 hours PRN Moderate Pain (4 - 6)  sodium chloride 0.9% lock flush 10 milliLiter(s) IV Push every 1 hour PRN Pre/post blood products, medications, blood draw, and to maintain line patency      PHYSICAL EXAM  T(C): 36.3 (05-06-24 @ 11:31), Max: 36.4 (05-06-24 @ 05:51)  HR: 77 (05-06-24 @ 19:35) (76 - 85)  BP: 145/68 (05-06-24 @ 15:14) (120/69 - 148/57)  RR: 20 (05-06-24 @ 19:34) (18 - 20)  SpO2: 100% (05-06-24 @ 19:35) (97% - 100%)    05-05-24 @ 07:01  -  05-06-24 @ 07:00  --------------------------------------------------------  IN: 0 mL / OUT: 25 mL / NET: -25 mL    05-06-24 @ 07:01  -  05-06-24 @ 19:52  --------------------------------------------------------  IN: 50 mL / OUT: 0 mL / NET: 50 mL        LABS                        9.4    10.38 )-----------( 336      ( 06 May 2024 07:24 )             28.6     05-06    137  |  98  |  16  ----------------------------<  87  4.2   |  26  |  0.92    Ca    8.7      06 May 2024 07:24  Phos  2.8     05-06  Mg     2.00     05-06    TPro  5.3<L>  /  Alb  2.4<L>  /  TBili  0.3  /  DBili  x   /  AST  7   /  ALT  48<H>  /  AlkPhos  115  05-06    PATHOLOGY  3/26 pleural fluid cytology: Neg for malignant cells.    3/26 Pericardium excision: Neg for malignancy.    3/20 Pericardial fluid cytology: Neg for malignant cells.    3/14 LYMPH NODE, SUPRACLAVICULAR, RIGHT, US GUIDED CORE BIOPSY AND FNA   POSITIVE FOR MALIGNANT CELLS.   Carcinoma   Touch Prep slides display crowded groups and single lying malignant   cells with enlarged nuclei containing prominent nucleoli and vacuolated   cytoplasm. Core biopsies show neoplastic cells positive for CK7 and CDX2   immunostains, while negative for CK20, TTF1, GATA3, TRPS1 and PAX8. 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 Suggest correlation with clinical information and imaging to   assist with next step management.   Moleculars: pMMR, PD-L1 TPS 1%; Her2 pending      TUMOR MARKERS  3/13 CEA 7.1  3/24 Ca 19-9 27  4/8 Ca-125 187, Ca 27.29 16.9, Ca 15-3 18.9, AFP 5.2      MICROBIOLOGY  Abx  Bactrim 4/8-present    Cx Data  4/5 MRSA swab: Not detected  3/16 Quant plus TB: Negative      PERTINENT RADIOLOGY  5/1 CXR:  Small right effusion unchanged with Pleurx catheter.    4/19 MRCP: Motion degraded study, particularly on postcontrast sequences.  Pancreatic neck T2 hyperintense lesion measures 16 mm, image 28 series 5,   without clear evidence of enhancement on postcontrast phases. Most likely   this is a side branch IPMN. Remainder of the pancreas is suboptimally   evaluated due to artifact. Follow-up MRI abdomen or pancreas protocol CT   can be considered in 3-6 months for reevaluation. Partially distended stomach. Duodenal diverticulum. Colon is also partially distended with mild to moderate stool burden. Correlate   clinically. Trace pleural effusions. Right-sided pleural catheter is partially   imaged. Lung parenchyma is incompletely characterized on MRI. Soft tissue   of the mediastinum is not adequately imaged on this study.    4/18 B/L LE Doppler: Acute deep venous thrombosis: above the knee.  Acute nonocclusive deep venous thrombosis in the right common femoral vein.    417 CXR: Clear lungs with minimal effusion and Pleurx catheter.    4/10 VA dopplers B/L UE: Extensive acute, occlusive DVT RIJ, innominate and subclavian veins.  Acute non-occlusive DVT w/in R axillary vein.  Extensive acute, occlusive DVT noted in L subclavian. axillary, and proximal brachial veins.  Acute non-occlusive DVT LIJ.  Superficial vein thromboses are noted in the B/L cephalic veins.    4/9 Echocardiogram: Normal LV systolic function.  Normal mitral valve w/ normal leaflet excursion.  No pericardial effusion seen    4/4 CXR: Clear lungs w/ R pleurx catheter in place.    ok4/4 CXR AM: Small R pleural effusion w/ pleurx cath improved    OSH Imaging ([facility])  3/29 CXR: Decrease in R pleural effusion.     3/28 B/L UE Dupplers: Acute DVT involving R IJ, innominate vein, subclavian, brachial, and L IJ.  Superficial venous thrombosis involving B/L cephalic veins.  + Edema of superficial soft tissue of UE R>L.    3/27 CT neck: Bulky and conglomerate LAD invading RIJ w/ thrombus extending up to hyoid level.  Thrombus is likely combination tumor and bland.  New LIJ nonocclusive thrombus.    3/27 CT Chest: New consolidations throughout the R lung worse RLL c/f aspiration PNA.  + R pleurx cath w/ decrease in R pleural effusion.  Extensive DVT w/in thoracic and lower neck, upper SVC remains obliterated.  Large R supraclavicular mass infiltrating into the mediastinum.      3/27 CXR: Progression of R consolidation/effusion.    3/21 TTE: LV grossly normal.  Thickened pericardium.  no pericardial effusion.    3/18 TTE: Mod pericardial effusion w/ e/o hemodynamic compromise w/ diasolic collapse of RA that exceeds 1/3 of cardiac cycle >30% resp variation across MV E. wave and early diastolic inversion of RV.    3/14 CT Chest: Conglomerate soft tissue in superior mediastinum and R supraclavicular region 2/2 LAD w/ internal hypodensity c/f necrosis.  Mass encases SVC and multiple great veins resulting in obliteration of the SVC and thrombosis of the RIJ.  Multiple R sided collateral vessels and R soft tissue edema.  B/L pulm nodules.    3/14 CT neck: Extensive cellulitis/myositis along R anterior lateral neck and R upper chest wall w/ inflammatory fat induration the deep soft tissue of neck.  Large supraclavicular/mediastinal mass lesion, presumably conglomerate of LN w/ mass effect and complete occlusion of RIJ w/ associated inflammation.  Complete occlusion of R subclavian vein and nearly occlusive thrombosis in the proximal L brachiocephalic vein.    3/13 RUQ Abd US: Cholelithiasis w/o e/o cholecystitis.  Dilated CBD w/o e/o intraductal stone or other obstruction. 1.4cm pancreatic cyst w/ mild duct dilatation.    3/12 CT abd/pelv: S/p R nephrectomy. no LAD.  New 1.6cm  pancreatic neck cystic lesion w/o main pancreatic ductal dilatation.    3/10 R breast US: No e/o breast abscess      RECENT ENDOSCOPY  4/26 Upper EUS  - Normal esophagus.  - A large amount of food (residue) inthe stomach. Procedure was aborted.    4/24 Upper EUS   - Fluid in the middle third of the esophagus and in the lower third of the esophagus.  - A large amount of food (residue) in the stomach so EGD was aborted and EUS was not attempted.  - No specimens collected.   SOLID TUMOR ONCOLOGY HOSPITALIST PROGRESS NOTE    S: No acute events overnight.  Pt complained about her diet being restricted to liquids only and she expressed concern about her becoming more malnourished.    CURRENT MEDICATIONS  MEDICATIONS  (STANDING):  albuterol/ipratropium for Nebulization 3 milliLiter(s) Nebulizer every 6 hours  aMIOdarone    Tablet 200 milliGRAM(s) Oral daily  Biotene Dry Mouth Oral Rinse 15 milliLiter(s) Swish and Spit every 6 hours  budesonide 160 MICROgram(s)/formoterol 4.5 MICROgram(s) Inhaler 2 Puff(s) Inhalation two times a day  cefepime   IVPB 2000 milliGRAM(s) IV Intermittent every 12 hours  chlorhexidine 2% Cloths 1 Application(s) Topical daily  dexAMETHasone  Injectable 4 milliGRAM(s) IV Push two times a day  diazepam    Tablet 5 milliGRAM(s) Oral at bedtime  enoxaparin Injectable 50 milliGRAM(s) SubCutaneous every 12 hours  fluconAZOLE   Tablet 100 milliGRAM(s) Oral daily  influenza   Vaccine 0.5 milliLiter(s) IntraMuscular once  metoclopramide Injectable 10 milliGRAM(s) IV Push every 8 hours  metoprolol tartrate 25 milliGRAM(s) Oral every 12 hours  multivitamin 1 Tablet(s) Oral daily  naloxegol 25 milliGRAM(s) Oral daily  nicotine -  14 mG/24Hr(s) Patch 1 Patch Transdermal every 24 hours  nystatin    Suspension 803728 Unit(s) Swish and Swallow four times a day  oxyCODONE  ER Tablet 30 milliGRAM(s) Oral <User Schedule>  pantoprazole    Tablet 40 milliGRAM(s) Oral two times a day  polyethylene glycol 3350 17 Gram(s) Oral every 12 hours  senna 2 Tablet(s) Oral at bedtime  sodium chloride 0.65% Nasal 1 Spray(s) Both Nostrils two times a day  trimethoprim  160 mG/sulfamethoxazole 800 mG 1 Tablet(s) Oral <User Schedule>    MEDICATIONS  (PRN):  aluminum hydroxide/magnesium hydroxide/simethicone Suspension 30 milliLiter(s) Oral every 6 hours PRN Dyspepsia  benzocaine/menthol Lozenge 1 Lozenge Oral two times a day PRN Sore Throat  Biotene Dry Mouth Oral Rinse 15 milliLiter(s) Swish and Spit two times a day PRN dry mouth  diazepam    Tablet 5 milliGRAM(s) Oral two times a day PRN anxiety  FIRST- Mouthwash  BLM 5 milliLiter(s) Swish and Spit four times a day PRN Mouth Care  HYDROmorphone  Injectable 1 milliGRAM(s) IV Push every 3 hours PRN Severe Pain (7 - 10)  ondansetron    Tablet 4 milliGRAM(s) Oral every 8 hours PRN Nausea and/or Vomiting  oxyCODONE    IR 10 milliGRAM(s) Oral every 4 hours PRN Moderate Pain (4 - 6)  sodium chloride 0.9% lock flush 10 milliLiter(s) IV Push every 1 hour PRN Pre/post blood products, medications, blood draw, and to maintain line patency      PHYSICAL EXAM  T(C): 36.3 (05-06-24 @ 11:31), Max: 36.4 (05-06-24 @ 05:51)  HR: 77 (05-06-24 @ 19:35) (76 - 85)  BP: 145/68 (05-06-24 @ 15:14) (120/69 - 148/57)  RR: 20 (05-06-24 @ 19:34) (18 - 20)  SpO2: 100% (05-06-24 @ 19:35) (97% - 100%)    05-05-24 @ 07:01  -  05-06-24 @ 07:00  --------------------------------------------------------  IN: 0 mL / OUT: 25 mL / NET: -25 mL    05-06-24 @ 07:01  -  05-06-24 @ 19:52  --------------------------------------------------------  IN: 50 mL / OUT: 0 mL / NET: 50 mL        LABS                        9.4    10.38 )-----------( 336      ( 06 May 2024 07:24 )             28.6     05-06    137  |  98  |  16  ----------------------------<  87  4.2   |  26  |  0.92    Ca    8.7      06 May 2024 07:24  Phos  2.8     05-06  Mg     2.00     05-06    TPro  5.3<L>  /  Alb  2.4<L>  /  TBili  0.3  /  DBili  x   /  AST  7   /  ALT  48<H>  /  AlkPhos  115  05-06    PATHOLOGY  3/26 pleural fluid cytology: Neg for malignant cells.    3/26 Pericardium excision: Neg for malignancy.    3/20 Pericardial fluid cytology: Neg for malignant cells.    3/14 LYMPH NODE, SUPRACLAVICULAR, RIGHT, US GUIDED CORE BIOPSY AND FNA   POSITIVE FOR MALIGNANT CELLS.   Carcinoma   Touch Prep slides display crowded groups and single lying malignant   cells with enlarged nuclei containing prominent nucleoli and vacuolated   cytoplasm. Core biopsies show neoplastic cells positive for CK7 and CDX2   immunostains, while negative for CK20, TTF1, GATA3, TRPS1 and PAX8. 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 Suggest correlation with clinical information and imaging to   assist with next step management.   Moleculars: pMMR, PD-L1 TPS 1%; Her2 pending      TUMOR MARKERS  3/13 CEA 7.1  3/24 Ca 19-9 27  4/8 Ca-125 187, Ca 27.29 16.9, Ca 15-3 18.9, AFP 5.2      MICROBIOLOGY  Abx  Bactrim 4/8-present    Cx Data  4/5 MRSA swab: Not detected  3/16 Quant plus TB: Negative      PERTINENT RADIOLOGY  5/1 CXR:  Small right effusion unchanged with Pleurx catheter.    4/19 MRCP: Motion degraded study, particularly on postcontrast sequences.  Pancreatic neck T2 hyperintense lesion measures 16 mm, image 28 series 5,   without clear evidence of enhancement on postcontrast phases. Most likely   this is a side branch IPMN. Remainder of the pancreas is suboptimally   evaluated due to artifact. Follow-up MRI abdomen or pancreas protocol CT   can be considered in 3-6 months for reevaluation. Partially distended stomach. Duodenal diverticulum. Colon is also partially distended with mild to moderate stool burden. Correlate   clinically. Trace pleural effusions. Right-sided pleural catheter is partially   imaged. Lung parenchyma is incompletely characterized on MRI. Soft tissue   of the mediastinum is not adequately imaged on this study.    4/18 B/L LE Doppler: Acute deep venous thrombosis: above the knee.  Acute nonocclusive deep venous thrombosis in the right common femoral vein.    417 CXR: Clear lungs with minimal effusion and Pleurx catheter.    4/10 VA dopplers B/L UE: Extensive acute, occlusive DVT RIJ, innominate and subclavian veins.  Acute non-occlusive DVT w/in R axillary vein.  Extensive acute, occlusive DVT noted in L subclavian. axillary, and proximal brachial veins.  Acute non-occlusive DVT LIJ.  Superficial vein thromboses are noted in the B/L cephalic veins.    4/9 Echocardiogram: Normal LV systolic function.  Normal mitral valve w/ normal leaflet excursion.  No pericardial effusion seen    4/4 CXR: Clear lungs w/ R pleurx catheter in place.    ok4/4 CXR AM: Small R pleural effusion w/ pleurx cath improved    OSH Imaging ([facility])  3/29 CXR: Decrease in R pleural effusion.     3/28 B/L UE Dupplers: Acute DVT involving R IJ, innominate vein, subclavian, brachial, and L IJ.  Superficial venous thrombosis involving B/L cephalic veins.  + Edema of superficial soft tissue of UE R>L.    3/27 CT neck: Bulky and conglomerate LAD invading RIJ w/ thrombus extending up to hyoid level.  Thrombus is likely combination tumor and bland.  New LIJ nonocclusive thrombus.    3/27 CT Chest: New consolidations throughout the R lung worse RLL c/f aspiration PNA.  + R pleurx cath w/ decrease in R pleural effusion.  Extensive DVT w/in thoracic and lower neck, upper SVC remains obliterated.  Large R supraclavicular mass infiltrating into the mediastinum.      3/27 CXR: Progression of R consolidation/effusion.    3/21 TTE: LV grossly normal.  Thickened pericardium.  no pericardial effusion.    3/18 TTE: Mod pericardial effusion w/ e/o hemodynamic compromise w/ diasolic collapse of RA that exceeds 1/3 of cardiac cycle >30% resp variation across MV E. wave and early diastolic inversion of RV.    3/14 CT Chest: Conglomerate soft tissue in superior mediastinum and R supraclavicular region 2/2 LAD w/ internal hypodensity c/f necrosis.  Mass encases SVC and multiple great veins resulting in obliteration of the SVC and thrombosis of the RIJ.  Multiple R sided collateral vessels and R soft tissue edema.  B/L pulm nodules.    3/14 CT neck: Extensive cellulitis/myositis along R anterior lateral neck and R upper chest wall w/ inflammatory fat induration the deep soft tissue of neck.  Large supraclavicular/mediastinal mass lesion, presumably conglomerate of LN w/ mass effect and complete occlusion of RIJ w/ associated inflammation.  Complete occlusion of R subclavian vein and nearly occlusive thrombosis in the proximal L brachiocephalic vein.    3/13 RUQ Abd US: Cholelithiasis w/o e/o cholecystitis.  Dilated CBD w/o e/o intraductal stone or other obstruction. 1.4cm pancreatic cyst w/ mild duct dilatation.    3/12 CT abd/pelv: S/p R nephrectomy. no LAD.  New 1.6cm  pancreatic neck cystic lesion w/o main pancreatic ductal dilatation.    3/10 R breast US: No e/o breast abscess      RECENT ENDOSCOPY  4/26 Upper EUS  - Normal esophagus.  - A large amount of food (residue) inthe stomach. Procedure was aborted.    4/24 Upper EUS   - Fluid in the middle third of the esophagus and in the lower third of the esophagus.  - A large amount of food (residue) in the stomach so EGD was aborted and EUS was not attempted.  - No specimens collected.   SOLID TUMOR ONCOLOGY HOSPITALIST PROGRESS NOTE    S: No acute events overnight.  Pt complained about her diet being restricted to liquids only and she expressed concern about her becoming more malnourished.    CURRENT MEDICATIONS  MEDICATIONS  (STANDING):  albuterol/ipratropium for Nebulization 3 milliLiter(s) Nebulizer every 6 hours  aMIOdarone    Tablet 200 milliGRAM(s) Oral daily  Biotene Dry Mouth Oral Rinse 15 milliLiter(s) Swish and Spit every 6 hours  budesonide 160 MICROgram(s)/formoterol 4.5 MICROgram(s) Inhaler 2 Puff(s) Inhalation two times a day  cefepime   IVPB 2000 milliGRAM(s) IV Intermittent every 12 hours  chlorhexidine 2% Cloths 1 Application(s) Topical daily  dexAMETHasone  Injectable 4 milliGRAM(s) IV Push two times a day  diazepam    Tablet 5 milliGRAM(s) Oral at bedtime  enoxaparin Injectable 50 milliGRAM(s) SubCutaneous every 12 hours  fluconAZOLE   Tablet 100 milliGRAM(s) Oral daily  influenza   Vaccine 0.5 milliLiter(s) IntraMuscular once  metoclopramide Injectable 10 milliGRAM(s) IV Push every 8 hours  metoprolol tartrate 25 milliGRAM(s) Oral every 12 hours  multivitamin 1 Tablet(s) Oral daily  naloxegol 25 milliGRAM(s) Oral daily  nicotine -  14 mG/24Hr(s) Patch 1 Patch Transdermal every 24 hours  nystatin    Suspension 759509 Unit(s) Swish and Swallow four times a day  oxyCODONE  ER Tablet 30 milliGRAM(s) Oral <User Schedule>  pantoprazole    Tablet 40 milliGRAM(s) Oral two times a day  polyethylene glycol 3350 17 Gram(s) Oral every 12 hours  senna 2 Tablet(s) Oral at bedtime  sodium chloride 0.65% Nasal 1 Spray(s) Both Nostrils two times a day  trimethoprim  160 mG/sulfamethoxazole 800 mG 1 Tablet(s) Oral <User Schedule>    MEDICATIONS  (PRN):  aluminum hydroxide/magnesium hydroxide/simethicone Suspension 30 milliLiter(s) Oral every 6 hours PRN Dyspepsia  benzocaine/menthol Lozenge 1 Lozenge Oral two times a day PRN Sore Throat  Biotene Dry Mouth Oral Rinse 15 milliLiter(s) Swish and Spit two times a day PRN dry mouth  diazepam    Tablet 5 milliGRAM(s) Oral two times a day PRN anxiety  FIRST- Mouthwash  BLM 5 milliLiter(s) Swish and Spit four times a day PRN Mouth Care  HYDROmorphone  Injectable 1 milliGRAM(s) IV Push every 3 hours PRN Severe Pain (7 - 10)  ondansetron    Tablet 4 milliGRAM(s) Oral every 8 hours PRN Nausea and/or Vomiting  oxyCODONE    IR 10 milliGRAM(s) Oral every 4 hours PRN Moderate Pain (4 - 6)  sodium chloride 0.9% lock flush 10 milliLiter(s) IV Push every 1 hour PRN Pre/post blood products, medications, blood draw, and to maintain line patency      PHYSICAL EXAM  T(C): 36.3 (05-06-24 @ 11:31), Max: 36.4 (05-06-24 @ 05:51)  HR: 77 (05-06-24 @ 19:35) (76 - 85)  BP: 145/68 (05-06-24 @ 15:14) (120/69 - 148/57)  RR: 20 (05-06-24 @ 19:34) (18 - 20)  SpO2: 100% (05-06-24 @ 19:35) (97% - 100%)    05-05-24 @ 07:01  -  05-06-24 @ 07:00  --------------------------------------------------------  IN: 0 mL / OUT: 25 mL / NET: -25 mL    05-06-24 @ 07:01  -  05-06-24 @ 19:52  --------------------------------------------------------  IN: 50 mL / OUT: 0 mL / NET: 50 mL      LABS                        9.4    10.38 )-----------( 336      ( 06 May 2024 07:24 )             28.6     05-06    137  |  98  |  16  ----------------------------<  87  4.2   |  26  |  0.92    Ca    8.7      06 May 2024 07:24  Phos  2.8     05-06  Mg     2.00     05-06    TPro  5.3<L>  /  Alb  2.4<L>  /  TBili  0.3  /  DBili  x   /  AST  7   /  ALT  48<H>  /  AlkPhos  115  05-06    PATHOLOGY  3/26 pleural fluid cytology: Neg for malignant cells.    3/26 Pericardium excision: Neg for malignancy.    3/20 Pericardial fluid cytology: Neg for malignant cells.    3/14 LYMPH NODE, SUPRACLAVICULAR, RIGHT, US GUIDED CORE BIOPSY AND FNA   POSITIVE FOR MALIGNANT CELLS.   Carcinoma   Touch Prep slides display crowded groups and single lying malignant   cells with enlarged nuclei containing prominent nucleoli and vacuolated   cytoplasm. Core biopsies show neoplastic cells positive for CK7 and CDX2   immunostains, while negative for CK20, TTF1, GATA3, TRPS1 and PAX8. 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 Suggest correlation with clinical information and imaging to   assist with next step management.   Moleculars: pMMR, PD-L1 TPS 1%; Her2 pending      TUMOR MARKERS  3/13 CEA 7.1  3/24 Ca 19-9 27  4/8 Ca-125 187, Ca 27.29 16.9, Ca 15-3 18.9, AFP 5.2      MICROBIOLOGY  Abx  Bactrim 4/8-present    Cx Data  4/5 MRSA swab: Not detected  3/16 Quant plus TB: Negative      PERTINENT RADIOLOGY  5/1 CXR:  Small right effusion unchanged with Pleurx catheter.    4/19 MRCP: Motion degraded study, particularly on postcontrast sequences.  Pancreatic neck T2 hyperintense lesion measures 16 mm, image 28 series 5,   without clear evidence of enhancement on postcontrast phases. Most likely   this is a side branch IPMN. Remainder of the pancreas is suboptimally   evaluated due to artifact. Follow-up MRI abdomen or pancreas protocol CT   can be considered in 3-6 months for reevaluation. Partially distended stomach. Duodenal diverticulum. Colon is also partially distended with mild to moderate stool burden. Correlate   clinically. Trace pleural effusions. Right-sided pleural catheter is partially   imaged. Lung parenchyma is incompletely characterized on MRI. Soft tissue   of the mediastinum is not adequately imaged on this study.    4/18 B/L LE Doppler: Acute deep venous thrombosis: above the knee.  Acute nonocclusive deep venous thrombosis in the right common femoral vein.    417 CXR: Clear lungs with minimal effusion and Pleurx catheter.    4/10 VA dopplers B/L UE: Extensive acute, occlusive DVT RIJ, innominate and subclavian veins.  Acute non-occlusive DVT w/in R axillary vein.  Extensive acute, occlusive DVT noted in L subclavian. axillary, and proximal brachial veins.  Acute non-occlusive DVT LIJ.  Superficial vein thromboses are noted in the B/L cephalic veins.    4/9 Echocardiogram: Normal LV systolic function.  Normal mitral valve w/ normal leaflet excursion.  No pericardial effusion seen    4/4 CXR: Clear lungs w/ R pleurx catheter in place.    ok4/4 CXR AM: Small R pleural effusion w/ pleurx cath improved    OSH Imaging ([facility])  3/29 CXR: Decrease in R pleural effusion.     3/28 B/L UE Dupplers: Acute DVT involving R IJ, innominate vein, subclavian, brachial, and L IJ.  Superficial venous thrombosis involving B/L cephalic veins.  + Edema of superficial soft tissue of UE R>L.    3/27 CT neck: Bulky and conglomerate LAD invading RIJ w/ thrombus extending up to hyoid level.  Thrombus is likely combination tumor and bland.  New LIJ nonocclusive thrombus.    3/27 CT Chest: New consolidations throughout the R lung worse RLL c/f aspiration PNA.  + R pleurx cath w/ decrease in R pleural effusion.  Extensive DVT w/in thoracic and lower neck, upper SVC remains obliterated.  Large R supraclavicular mass infiltrating into the mediastinum.      3/27 CXR: Progression of R consolidation/effusion.    3/21 TTE: LV grossly normal.  Thickened pericardium.  no pericardial effusion.    3/18 TTE: Mod pericardial effusion w/ e/o hemodynamic compromise w/ diasolic collapse of RA that exceeds 1/3 of cardiac cycle >30% resp variation across MV E. wave and early diastolic inversion of RV.    3/14 CT Chest: Conglomerate soft tissue in superior mediastinum and R supraclavicular region 2/2 LAD w/ internal hypodensity c/f necrosis.  Mass encases SVC and multiple great veins resulting in obliteration of the SVC and thrombosis of the RIJ.  Multiple R sided collateral vessels and R soft tissue edema.  B/L pulm nodules.    3/14 CT neck: Extensive cellulitis/myositis along R anterior lateral neck and R upper chest wall w/ inflammatory fat induration the deep soft tissue of neck.  Large supraclavicular/mediastinal mass lesion, presumably conglomerate of LN w/ mass effect and complete occlusion of RIJ w/ associated inflammation.  Complete occlusion of R subclavian vein and nearly occlusive thrombosis in the proximal L brachiocephalic vein.    3/13 RUQ Abd US: Cholelithiasis w/o e/o cholecystitis.  Dilated CBD w/o e/o intraductal stone or other obstruction. 1.4cm pancreatic cyst w/ mild duct dilatation.    3/12 CT abd/pelv: S/p R nephrectomy. no LAD.  New 1.6cm  pancreatic neck cystic lesion w/o main pancreatic ductal dilatation.    3/10 R breast US: No e/o breast abscess      RECENT ENDOSCOPY  4/26 Upper EUS  - Normal esophagus.  - A large amount of food (residue) inthe stomach. Procedure was aborted.    4/24 Upper EUS   - Fluid in the middle third of the esophagus and in the lower third of the esophagus.  - A large amount of food (residue) in the stomach so EGD was aborted and EUS was not attempted.  - No specimens collected.   SOLID TUMOR ONCOLOGY HOSPITALIST PROGRESS NOTE    S: No acute events overnight.  Pt complained about her diet being restricted to liquids only and she expressed concern about her becoming more malnourished.    CURRENT MEDICATIONS  MEDICATIONS  (STANDING):  albuterol/ipratropium for Nebulization 3 milliLiter(s) Nebulizer every 6 hours  aMIOdarone    Tablet 200 milliGRAM(s) Oral daily  Biotene Dry Mouth Oral Rinse 15 milliLiter(s) Swish and Spit every 6 hours  budesonide 160 MICROgram(s)/formoterol 4.5 MICROgram(s) Inhaler 2 Puff(s) Inhalation two times a day  cefepime   IVPB 2000 milliGRAM(s) IV Intermittent every 12 hours  chlorhexidine 2% Cloths 1 Application(s) Topical daily  dexAMETHasone  Injectable 4 milliGRAM(s) IV Push two times a day  diazepam    Tablet 5 milliGRAM(s) Oral at bedtime  enoxaparin Injectable 50 milliGRAM(s) SubCutaneous every 12 hours  fluconAZOLE   Tablet 100 milliGRAM(s) Oral daily  influenza   Vaccine 0.5 milliLiter(s) IntraMuscular once  metoclopramide Injectable 10 milliGRAM(s) IV Push every 8 hours  metoprolol tartrate 25 milliGRAM(s) Oral every 12 hours  multivitamin 1 Tablet(s) Oral daily  naloxegol 25 milliGRAM(s) Oral daily  nicotine -  14 mG/24Hr(s) Patch 1 Patch Transdermal every 24 hours  nystatin    Suspension [id number] Unit(s) Swish and Swallow four times a day  oxyCODONE  ER Tablet 30 milliGRAM(s) Oral <User Schedule>  pantoprazole    Tablet 40 milliGRAM(s) Oral two times a day  polyethylene glycol 3350 17 Gram(s) Oral every 12 hours  senna 2 Tablet(s) Oral at bedtime  sodium chloride 0.65% Nasal 1 Spray(s) Both Nostrils two times a day  trimethoprim  160 mG/sulfamethoxazole 800 mG 1 Tablet(s) Oral <User Schedule>    MEDICATIONS  (PRN):  aluminum hydroxide/magnesium hydroxide/simethicone Suspension 30 milliLiter(s) Oral every 6 hours PRN Dyspepsia  benzocaine/menthol Lozenge 1 Lozenge Oral two times a day PRN Sore Throat  Biotene Dry Mouth Oral Rinse 15 milliLiter(s) Swish and Spit two times a day PRN dry mouth  diazepam    Tablet 5 milliGRAM(s) Oral two times a day PRN anxiety  FIRST- Mouthwash  BLM 5 milliLiter(s) Swish and Spit four times a day PRN Mouth Care  HYDROmorphone  Injectable 1 milliGRAM(s) IV Push every 3 hours PRN Severe Pain (7 - 10)  ondansetron    Tablet 4 milliGRAM(s) Oral every 8 hours PRN Nausea and/or Vomiting  oxyCODONE    IR 10 milliGRAM(s) Oral every 4 hours PRN Moderate Pain (4 - 6)  sodium chloride 0.9% lock flush 10 milliLiter(s) IV Push every 1 hour PRN Pre/post blood products, medications, blood draw, and to maintain line patency      PHYSICAL EXAM  T(C): 36.3 (05-06-24 @ 11:31), Max: 36.4 (05-06-24 @ 05:51)  HR: 77 (05-06-24 @ 19:35) (76 - 85)  BP: 145/68 (05-06-24 @ 15:14) (120/69 - 148/57)  RR: 20 (05-06-24 @ 19:34) (18 - 20)  SpO2: 100% (05-06-24 @ 19:35) (97% - 100%)    05-05-24 @ 07:01  -  05-06-24 @ 07:00  --------------------------------------------------------  IN: 0 mL / OUT: 25 mL / NET: -25 mL    05-06-24 @ 07:01  -  05-06-24 @ 19:52  --------------------------------------------------------  IN: 50 mL / OUT: 0 mL / NET: 50 mL  Non-toxic appearing woman, sitting up in bed, appears comfortable, speaking in full sentences without conversational dyspnea, aaox3  Voice hoarse  R eyelid ptosis and R pupil miosis present; anicteric sclera, no oral lesions/thrush  RRR, no m/r/g  Breaths non-labored; diminished breath sounds in all R lung zones; LL zones CTA  Abd soft/nt/nd, normoactive bowel sounds  Trace LUE non-pitting edema present; large area of ecchymosis extending from the post R calf to post R thigh- improving; generalized muscular atrophy in all 4 extremities  CN 2-12 grossly intact; no gross focal neuro deficits; pt ambulates with walker    LABS                        9.4    10.38 )-----------( 336      ( 06 May 2024 07:24 )             28.6     05-06    137  |  98  |  16  ----------------------------<  87  4.2   |  26  |  0.92    Ca    8.7      06 May 2024 07:24  Phos  2.8     05-06  Mg     2.00     05-06    TPro  5.3<L>  /  Alb  2.4<L>  /  TBili  0.3  /  DBili  x   /  AST  7   /  ALT  48<H>  /  AlkPhos  115  05-06    PATHOLOGY  3/26 pleural fluid cytology: Neg for malignant cells.    3/26 Pericardium excision: Neg for malignancy.    3/20 Pericardial fluid cytology: Neg for malignant cells.    3/14 LYMPH NODE, SUPRACLAVICULAR, RIGHT, US GUIDED CORE BIOPSY AND FNA   POSITIVE FOR MALIGNANT CELLS.   Carcinoma   Touch Prep slides display crowded groups and single lying malignant   cells with enlarged nuclei containing prominent nucleoli and vacuolated   cytoplasm. Core biopsies show neoplastic cells positive for CK7 and CDX2   immunostains, while negative for CK20, TTF1, GATA3, TRPS1 and PAX8. 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 Suggest correlation with clinical information and imaging to   assist with next step management.   Moleculars: pMMR, PD-L1 TPS 1%; Her2 pending      TUMOR MARKERS  3/13 CEA 7.1  3/24 Ca 19-9 27  4/8 Ca-125 187, Ca 27.29 16.9, Ca 15-3 18.9, AFP 5.2      MICROBIOLOGY  5/1 Procal 0.44  Abx  Cefepime 5/1-present  Fluconazole 5/6-present  Nystatin 5/4-6  Bactrim 4/8-present    Cx Data  4/5 MRSA swab: Not detected  3/16 Quant plus TB: Negative      PERTINENT RADIOLOGY  5/6 CXR: Right-sided Pleurx catheter with decreased effusion.    5/4 CXR: There is a catheter at the right base.  HEART: normal in size.  LUNGS: There is opacity at the right base compatible with   effusion/infiltrate.. Prominent interstitial markings, likely secondary   to chronic underlying lung disease..  BONES: degenerative changes    5/4 R elbow XR: Linear lucency in the coronoid process seen only on the lateral view   concerning for nondisplaced fracture. Consider cross-sectional imaging of   the elbow with CT or MRI for further evaluation.    5/3 CT c/a/p with IV contrast: Persistent right middle lobe consolidation and decreased right lower lobe consolidation. New patchy nodular opacities in the right upper lobe and increased patchy and groundglass opacities in the left lung. Small right pleural effusion, unchanged. Trace left pleural effusion, decreased. No evidence of metastatic disease in the abdomen or pelvis.    5/1 CXR:  Small right effusion unchanged with Pleurx catheter.    4/19 MRCP: Motion degraded study, particularly on postcontrast sequences.  Pancreatic neck T2 hyperintense lesion measures 16 mm, image 28 series 5,   without clear evidence of enhancement on postcontrast phases. Most likely   this is a side branch IPMN. Remainder of the pancreas is suboptimally   evaluated due to artifact. Follow-up MRI abdomen or pancreas protocol CT   can be considered in 3-6 months for reevaluation. Partially distended stomach. Duodenal diverticulum. Colon is also partially distended with mild to moderate stool burden. Correlate   clinically. Trace pleural effusions. Right-sided pleural catheter is partially   imaged. Lung parenchyma is incompletely characterized on MRI. Soft tissue   of the mediastinum is not adequately imaged on this study.    4/18 B/L LE Doppler: Acute deep venous thrombosis: above the knee.  Acute nonocclusive deep venous thrombosis in the right common femoral vein.    417 CXR: Clear lungs with minimal effusion and Pleurx catheter.    4/10 VA dopplers B/L UE: Extensive acute, occlusive DVT RIJ, innominate and subclavian veins.  Acute non-occlusive DVT w/in R axillary vein.  Extensive acute, occlusive DVT noted in L subclavian. axillary, and proximal brachial veins.  Acute non-occlusive DVT LIJ.  Superficial vein thromboses are noted in the B/L cephalic veins.    4/9 Echocardiogram: Normal LV systolic function.  Normal mitral valve w/ normal leaflet excursion.  No pericardial effusion seen    4/4 CXR: Clear lungs w/ R pleurx catheter in place.    ok4/4 CXR AM: Small R pleural effusion w/ pleurx cath improved    OSH Imaging ([facility])  3/29 CXR: Decrease in R pleural effusion.     3/28 B/L UE Dupplers: Acute DVT involving R IJ, innominate vein, subclavian, brachial, and L IJ.  Superficial venous thrombosis involving B/L cephalic veins.  + Edema of superficial soft tissue of UE R>L.    3/27 CT neck: Bulky and conglomerate LAD invading RIJ w/ thrombus extending up to hyoid level.  Thrombus is likely combination tumor and bland.  New LIJ nonocclusive thrombus.    3/27 CT Chest: New consolidations throughout the R lung worse RLL c/f aspiration PNA.  + R pleurx cath w/ decrease in R pleural effusion.  Extensive DVT w/in thoracic and lower neck, upper SVC remains obliterated.  Large R supraclavicular mass infiltrating into the mediastinum.      3/27 CXR: Progression of R consolidation/effusion.    3/21 TTE: LV grossly normal.  Thickened pericardium.  no pericardial effusion.    3/18 TTE: Mod pericardial effusion w/ e/o hemodynamic compromise w/ diasolic collapse of RA that exceeds 1/3 of cardiac cycle >30% resp variation across MV E. wave and early diastolic inversion of RV.    3/14 CT Chest: Conglomerate soft tissue in superior mediastinum and R supraclavicular region 2/2 LAD w/ internal hypodensity c/f necrosis.  Mass encases SVC and multiple great veins resulting in obliteration of the SVC and thrombosis of the RIJ.  Multiple R sided collateral vessels and R soft tissue edema.  B/L pulm nodules.    3/14 CT neck: Extensive cellulitis/myositis along R anterior lateral neck and R upper chest wall w/ inflammatory fat induration the deep soft tissue of neck.  Large supraclavicular/mediastinal mass lesion, presumably conglomerate of LN w/ mass effect and complete occlusion of RIJ w/ associated inflammation.  Complete occlusion of R subclavian vein and nearly occlusive thrombosis in the proximal L brachiocephalic vein.    3/13 RUQ Abd US: Cholelithiasis w/o e/o cholecystitis.  Dilated CBD w/o e/o intraductal stone or other obstruction. 1.4cm pancreatic cyst w/ mild duct dilatation.    3/12 CT abd/pelv: S/p R nephrectomy. no LAD.  New 1.6cm  pancreatic neck cystic lesion w/o main pancreatic ductal dilatation.    3/10 R breast US: No e/o breast abscess      RECENT ENDOSCOPY  5/4 Flex laryngoscopy    -- Nasopharynx had no mass or exudate.    -- Base of tongue was symmetric and not enlarged.    -- Vallecula was clear    -- Epiglottis, both aryepiglottic folds and both false vocal folds were normal    -- Arytenoids both without edema and erythema     -- True vocal folds were fully mobile and without lesions. possible R VC hypomobility, Incomplete glottic closure    -- Post cricoid area was clear.    -- Interarytenoid edema was absent    4/26 Upper EUS  - Normal esophagus.  - A large amount of food (residue) inthe stomach. Procedure was aborted.    4/24 Upper EUS   - Fluid in the middle third of the esophagus and in the lower third of the esophagus.  - A large amount of food (residue) in the stomach so EGD was aborted and EUS was not attempted.  - No specimens collected.

## 2024-05-06 NOTE — CHART NOTE - NSCHARTNOTEFT_GEN_A_CORE
Primary team reached out to advanced GI team regarding repeat EGD/EUS. Patient is currently hypoxic requiring HFNC, not currently optimized for endoscopic procedure given worsening respiratory status. Would repeat EGD/EUS once respiratory status improves and patient is off HFNC. Until then, would keep patient on liquid diet (can advance to full liquid), but would not advanced past that given persistent presence of solid food in stomach on prior endoscopies.    Abril Swartz PGY6  GI/Hepatology Fellow

## 2024-05-06 NOTE — PROGRESS NOTE ADULT - ASSESSMENT
57 yo woman, former smoker, with h/o R eye glaucoma, Fibromyalgia, Anxiety, GERD, and RCC s/p R total nephrectomy/hysterectomy; she was initially admitted to [facility] on 3/11 w/ R breast swelling c/f mastitis- imaging brooks noted supraclavicular/mediastinal mass lesion which encased the SVC and multiple other veins resulting in obliteration of the SVC and thrombosis of the R IJ, and a pancreatic neck cystic lesion.  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  Her disease/hospital course has also been c/b pericardial effusion s/p pericardial window w/ neg cytology, R pleural effusion, also negative cytology) s/p Pleurx, Afib w/ RVR and acute hypoxic resp failure 2/2 SVC syndrome- not dennis to IR-guided stenting; pt was ultimately started on Dex and transferred to [facility] on 4/4 for urgent palliative RT which she completed on 4/18. Her hospital course has also been c/b pericardial effusion with tamponade s/p window and non malignant R pleural effusion s/p pleurex, acute b/l UE and RLE dvts, anxiety, and more recently, recurrent hypoxia.    ACTIVE PROBLEMS  Metastatic cancer  Acute-on-chronic hypoxic respiratory failure  Dysphonia  SVC syndrome  Extensive b/l UE DVTs  Acute RLE DVT a/w RLE ecchymosis  Hypercoag state  Pericardial effusion with tamp physiology s/p pericardial window  R pleural effusion s/p pleurex  pAfib, with RVR on this admission  Anxiety/emotional lability  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)  Cancer-related pain, anxiety  Severe prot-ghazala malnutrition    - Metastatic cancer  Based on 3/14 SC LN path, ddx includes primary UGI vs pancreaticobiliary primary (breast edema is likely 2/2 SVC syndrome); PD-L1 TPs 1%, pMMR, Her2 pending  Ca-125 moderately elevated at 125; other tumor markers not significantly elevated  Additional diagnostic brooks includes:   * 4/19 MRCP showing pancreatic lesion (? IPMN, but pancreas was suboptimally evaluated)     * 4/24 and 4/26 EGD/EUS aborted as pt had large amount of food in the stomach that prevented passing of scope   * 4/30 UGIS aborted as pt felt too short of breath when laying down flat (improved after her pleurex was drained); repeat UGIS tentatively scheduled for 5/1  Options for systemic cancer treatment are TBD  Pt's prognosis is guarded    - Acute-on-chronic hypoxic resp failure  Pt with increased O2 requirement this morning- uptitrated from 2L > 6L to HF O2 40lpm/50%  C/f POD in the chest vs acute infection  Lactate 3.2, remaining ABG results pending  RVP, procal, and sputum cx ordered  Resuming Dex PO 4mg BID for possible inflammatory process (with ppi/pjp ppx; taper held)  Planning to initiate empiric abx- will discuss options with ID as pt does not have IV access and is PCN/FQ allergic  Continuing Symbicort 2 puffs BID  Continuing Duonebs q6/prn  Will continue routing pleurex drainage as below  Will continue weaning O2 as tolerated    - Dysphonia    - SVC syndrome  Appreciate Rad Onc eval/recs  Completed 10fxs of palliative RT on 4/18   Completing 2-wk Dex taper, until 5/7 (continuing pjp/ppi ppx until completion of taper)    - Extensive b/l UE DVTs  - Acute RLE DVT a/w RLE ecchymosis  - Hypercoag state  Continuing therapeutic lovenox (resumed on 4/18 given negative GIB brooks), benefits > risks   * Of note: pt has poor UE IV access and currently requires peripheral IV in her LE extremities for admin of medication; can consider FV central line for urgent/emergent IV needs    - Pericardial effusion with tamp physiology s/p pericardial window  - R pleural effusion s/p pleurex  Likely inflammatory; both pleural and pericardial fluid cytology is negative for malignant cells  4/9 surveillance TTE with pEF and no WMAs  Continuing R pleurex drainage- up to 500cc q48h/prn    - pAfib  Pt currently SR, HR controlled  Likely precipitated by malignancy, pericardial effusion, SVC syndrome  Continuing Metoprolol 25mg q12 with holding parameters  Continuing Amio 200mg daily (monitoring for toxicities)  Continuing telemetry    - Anxiety/emotional lability  Continuing Diazepam 5mg daily and daily/prn  Will fu with  re: additional med recs for improvement of pt's anxiety which at time has been a barrier to her inpt care  Pt has poor health literacy and clinical insight which is negatively impacting her medical decision making- when ask if she wanted to defer medical decision making to her boyfriend or if she had a designated HCP she replied "he's already sick of me"  Appreciate  consult: pt lacks medical decision making capacity; medical decision making will be deferred to pt's surrogate decision maker- Saeid Peña ([phone])    - 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)  Pt with h/o R eye glaucoma, but miosis can also be associated with Pancoast syndrome i/s/o extensive R upper lung tumor  Pt denies visual deficits or other related symptoms   MRI Brain without contrast ordered for further eval- pt continues to refuse, can be completed as outpt  Will continue to monitor closely    - Anemia in neoplastic disease  Hgb remains relatively stable  4/5 iron studies not c/w iron deficiency  VSS and pt without clinical s/s of active bleeding  4/17 hemolysis labs negative; 4/18 FOBT negative x2  Trending daily cbcs; continuing supportive transfusions prn (pt does not have h/o ACS, CAD, MI, goal hgb > 7; plts > 10K)    - Cancer related pain  Currently well controlled  Continuing Oxy ER 30mg q12 q8  Continuing Oxy IR 10mg q4/prn  Continuing Dilaudid IV prn for sev breakthrough pain  Continuing bowel regimen to prevent OIC (including Movantic)    - Severe prot-ghazala malnutrition: appreciate RD recs; continuing regular diet with protein shake supplement BID 59 yo woman, former smoker, with h/o R eye glaucoma, Fibromyalgia, Anxiety, GERD, and RCC s/p R total nephrectomy/hysterectomy; she was initially admitted to [facility] on 3/11 w/ R breast swelling c/f mastitis- imaging brooks noted supraclavicular/mediastinal mass lesion which encased the SVC and multiple other veins resulting in obliteration of the SVC and thrombosis of the R IJ, and a pancreatic neck cystic lesion.  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  Her disease/hospital course has also been c/b pericardial effusion s/p pericardial window w/ neg cytology, R pleural effusion, also negative cytology) s/p Pleurx, Afib w/ RVR and acute hypoxic resp failure 2/2 SVC syndrome- not dennis to IR-guided stenting; pt was ultimately started on Dex and transferred to [facility] on 4/4 for urgent palliative RT which she completed on 4/18. Her hospital course has also been c/b pericardial effusion with tamponade s/p window and non malignant R pleural effusion s/p pleurex, acute b/l UE and RLE dvts, anxiety, and more recently, recurrent hypoxia.    ACTIVE PROBLEMS  Metastatic cancer  Acute-on-chronic hypoxic respiratory failure  Dysphonia with R VC hypomobility   Oral thrush  SVC syndrome  Extensive b/l UE DVTs  Acute RLE DVT a/w RLE ecchymosis  Hypercoag state  Pericardial effusion with tamp physiology s/p pericardial window  R pleural effusion s/p pleurex  pAfib, with RVR on this admission  Anxiety/emotional lability  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)  Cancer-related pain, anxiety  Severe prot-ghazala malnutrition    - Metastatic cancer  Based on 3/14 SC LN path, ddx includes primary UGI vs pancreaticobiliary primary (breast edema is likely 2/2 SVC syndrome); PD-L1 TPs 1%, pMMR, Her2 pending  Ca-125 moderately elevated at 125; other tumor markers not significantly elevated  Additional diagnostic brooks includes:   * 4/19 MRCP showing pancreatic lesion (? IPMN, but pancreas was suboptimally evaluated)     * 4/24 and 4/26 EGD/EUS aborted as pt had large amount of food in the stomach that prevented passing of scope   * 4/30 UGIS aborted as pt felt too short of breath when laying down flat (improved after her pleurex was drained   * GI considering pt for 3rd EGD attempt, pending resolution of hypoxia  Options for systemic cancer treatment are TBD  Pt's prognosis is guarded    - Acute-on-chronic hypoxic resp failure  Pt with increased O2 requirement this morning- uptitrated from 2L > 6L to HF O2 40lpm/50%  Chest imaging from 5/1, 5/4 c/f infectious process- aspiration vs HAP  RVP, procal, and sputum cx ordered  Continuing Dex PO 4mg BID for possible inflammatory process (with ppi/pjp ppx; taper held)  Continuing empiric Cefepime for projected 7-10d course; repeat procal pending  Continuing Symbicort 2 puffs BID  Continuing Duonebs q6/prn  Will continue routing pleurex drainage as below  Will continue weaning HF O2 as tolerated    - Dysphonia with R VC hypomobility  - Oral thrush  Appreciate ENT eval/recs: 5/4 Flex ellis pt with R VC hypomobility  SLP eval pending for diet texture recs  Nystatin dced today; continuing Fluconazole x7d course, until 5/12 (trending transaminases)    - SVC syndrome  Appreciate Rad Onc eval/recs  Completed 10fxs of palliative RT on 4/18   Completing 2-wk Dex taper, until 5/7 (continuing pjp/ppi ppx until completion of taper)    - Extensive b/l UE DVTs  - Acute RLE DVT a/w RLE ecchymosis  - Hypercoag state  Continuing therapeutic lovenox (resumed on 4/18 given negative GIB brooks), benefits > risks   * Of note: pt has poor UE IV access and currently requires peripheral IV in her LE extremities for admin of medication; can consider FV central line for urgent/emergent IV needs    - Pericardial effusion with tamp physiology s/p pericardial window  - R pleural effusion s/p pleurex  Likely inflammatory; both pleural and pericardial fluid cytology is negative for malignant cells  4/9 surveillance TTE with pEF and no WMAs  Continuing R pleurex drainage- up to 500cc q48h/prn    - pAfib  Pt currently SR, HR controlled  Likely precipitated by malignancy, pericardial effusion, SVC syndrome  Continuing Metoprolol 25mg q12 with holding parameters  Continuing Amio 200mg daily (monitoring for toxicities)  Continuing telemetry    - Anxiety/emotional lability  Continuing Diazepam 5mg BID/prn and nightly  Pt has poor health literacy and clinical insight which is negatively impacting her medical decision making- when ask if she wanted to defer medical decision making to her boyfriend or if she had a designated HCP she replied "he's already sick of me"  Appreciate  consult: pt lacks medical decision making capacity; medical decision making will be deferred to pt's surrogate decision maker- Saeid Peña ([phone])    - 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)  Pt with h/o R eye glaucoma, but miosis can also be associated with Pancoast syndrome i/s/o extensive R upper lung tumor  Pt denies visual deficits or other related symptoms   MRI Brain without contrast ordered for further eval- pt continues to refuse, can be completed as outpt  Will continue to monitor closely    - Anemia in neoplastic disease  Hgb remains relatively stable  4/5 iron studies not c/w iron deficiency  VSS and pt without clinical s/s of active bleeding  4/17 hemolysis labs negative; 4/18 FOBT negative x2  Trending daily cbcs; continuing supportive transfusions prn (pt does not have h/o ACS, CAD, MI, goal hgb > 7; plts > 10K)    - Cancer related pain  Currently well controlled  Continuing Oxy ER 30mg q12 q8  Continuing Oxy IR 10mg q4/prn  Continuing Dilaudid IV prn for sev breakthrough pain  Continuing bowel regimen to prevent OIC (including Movantic)    - Severe prot-ghazala malnutrition: appreciate RD recs; continuing regular diet with protein shake supplement BID (SLP eval pending as above)

## 2024-05-06 NOTE — CHART NOTE - NSCHARTNOTEFT_GEN_A_CORE
Nutrition Follow-Up/Chart Note         Source: Patient A&Ox 3-4   Family [ ]     RN [x ]    Chart [x ]    Hospital Course:    Per 5/5/24 hospitalist attending chart review, Patient is a 59 yo woman, former smoker, with h/o R eye glaucoma, Fibromyalgia, Anxiety, GERD, and RCC s/p R total nephrectomy/hysterectomy; she was initially admitted to [facility] on 3/11 w/ R breast swelling c/f mastitis- imaging brooks noted supraclavicular/mediastinal mass lesion which encased the SVC and multiple other veins resulting in obliteration of the SVC and thrombosis of the R IJ, and a pancreatic neck cystic lesion.  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  Her disease/hospital course has also been c/b pericardial effusion s/p pericardial window w/ neg cytology, R pleural effusion, also negative cytology) s/p Pleurx, Afib w/ RVR and acute hypoxic resp failure 2/2 SVC syndrome- not dennis to IR-guided stenting; pt was ultimately started on Dex and transferred to [facility] on 4/4 for urgent palliative RT which she completed on 4/18. Her hospital course has also been c/b pericardial effusion with tamponade s/p window and non malignant R pleural effusion s/p pleurex, acute b/l UE and RLE dvts, anxiety, and more recently, recurrent hypoxia.    Diet, Clear Liquid:   Supplement Feeding Modality:  Oral  Ensure Clear Cans or Servings Per Day:  1       Frequency:  Three Times a day (04-30-24 @ 15:45)    Nutrition Course:  Patient is followed 2/2 severe malnutrition, per protocol.  Patient seen in her room.  Patient continues on CLD since 4/30 (> 7 days), s/p incomplete closure of glottis per chart review.  Tolerating CLD diet well.  Patient made aware of current diet order, expressed very frustrated and unhappy about CLD.  Confirmed with unit RN, patient waiting for diet advancement from GI consult.  Patient is a limited historian, not interest in conversation with RD at time of visit.  Noted oral thrush s/p Pleurx on 5/6 chart review.  Patient has been on intermitting NPO, CLD diet in house.  PO intake is meeting < 75% nutrition > 30 days during hospital stay.  No reported GI issues such as nausea/vomiting/diarrhea/constipation, on bowel regimen.  Noted last BM reported 5/4 per RN Flowsheet.  Noted on multivitamin, dexamethasone per medicine chart review.  Patient continues to meet criteria for severe malnutrition with inadequate oral intake, at high risk for further nutritional declines.  RDN to remain available for further nutrition intervention as indicated.      Anthropometrics:   Height (cm): 152.4 (04-26), 154.9 (03-26), 154.9 (03-10)  Weight (kg): 45.4 (04-26), 49.9 (03-26), 49.9 (03-10)  BMI (kg/m2): 19.5 (04-26), 20.8 (03-26), 20.8 (03-10)  IBW: 100 lbs/45.4kg +/-10%    Weight Assessment: per RN flowsheet in KG  5/1: 45.3 kg/98.3 lbs  4/4: 45.4 kg/100 lbs    Edema: noted 1+ right arm edema per RN flowsheet    Skin: noted Sacrum stage II (4.5x3cm),  per RN Flowsheet    __________________ Pertinent Medications__________________   MEDICATIONS  (STANDING):  albuterol/ipratropium for Nebulization 3 milliLiter(s) Nebulizer every 6 hours  aMIOdarone    Tablet 200 milliGRAM(s) Oral daily  Biotene Dry Mouth Oral Rinse 15 milliLiter(s) Swish and Spit every 6 hours  budesonide 160 MICROgram(s)/formoterol 4.5 MICROgram(s) Inhaler 2 Puff(s) Inhalation two times a day  cefepime   IVPB 2000 milliGRAM(s) IV Intermittent every 12 hours  chlorhexidine 2% Cloths 1 Application(s) Topical daily  dexAMETHasone  Injectable 4 milliGRAM(s) IV Push two times a day  diazepam    Tablet 5 milliGRAM(s) Oral at bedtime  enoxaparin Injectable 50 milliGRAM(s) SubCutaneous every 12 hours  fluconAZOLE   Tablet 100 milliGRAM(s) Oral daily  influenza   Vaccine 0.5 milliLiter(s) IntraMuscular once  metoclopramide Injectable 10 milliGRAM(s) IV Push every 8 hours  metoprolol tartrate 25 milliGRAM(s) Oral every 12 hours  multivitamin 1 Tablet(s) Oral daily  naloxegol 25 milliGRAM(s) Oral daily  nicotine -  14 mG/24Hr(s) Patch 1 Patch Transdermal every 24 hours  nystatin    Suspension 524521 Unit(s) Swish and Swallow four times a day  oxyCODONE  ER Tablet 30 milliGRAM(s) Oral <User Schedule>  pantoprazole    Tablet 40 milliGRAM(s) Oral two times a day  polyethylene glycol 3350 17 Gram(s) Oral every 12 hours  senna 2 Tablet(s) Oral at bedtime  sodium chloride 0.65% Nasal 1 Spray(s) Both Nostrils two times a day  trimethoprim  160 mG/sulfamethoxazole 800 mG 1 Tablet(s) Oral <User Schedule>    MEDICATIONS  (PRN):  aluminum hydroxide/magnesium hydroxide/simethicone Suspension 30 milliLiter(s) Oral every 6 hours PRN Dyspepsia  benzocaine/menthol Lozenge 1 Lozenge Oral two times a day PRN Sore Throat  Biotene Dry Mouth Oral Rinse 15 milliLiter(s) Swish and Spit two times a day PRN dry mouth  diazepam    Tablet 5 milliGRAM(s) Oral two times a day PRN anxiety  FIRST- Mouthwash  BLM 5 milliLiter(s) Swish and Spit four times a day PRN Mouth Care  HYDROmorphone  Injectable 1 milliGRAM(s) IV Push every 3 hours PRN Severe Pain (7 - 10)  ondansetron    Tablet 4 milliGRAM(s) Oral every 8 hours PRN Nausea and/or Vomiting  oxyCODONE    IR 10 milliGRAM(s) Oral every 4 hours PRN Moderate Pain (4 - 6)  sodium chloride 0.9% lock flush 10 milliLiter(s) IV Push every 1 hour PRN Pre/post blood products, medications, blood draw, and to maintain line patency    __________________ Pertinent Labs__________________   05-06 Na137 mmol/L Glu 87 mg/dL K+ 4.2 mmol/L Cr  0.92 mg/dL BUN 16 mg/dL 05-06 Phos 2.8 mg/dL 05-06 Alb 2.4 g/dL<L>      Estimated Needs Assessment: [ x ] No change in need assessment    Previous Nutrition Diagnosis: Severe malnutrition    Nutrition Diagnosis is : [ x ] ongoing      New Nutrition Diagnosis :  [x ] not applicable     Education:  [ x ] Not applicable 2/2 current prognosis  [ x ] Not warranted at present    Recommendations:  1. Continue present diet order as it remains appropriate at this time, per MD and team discretion  2. Once clinically indicates, advance PO diet to full liquids; If Pt tolerates well, advance PO diet to regular solids;  3. If Patient is unable to advance PO diet, suggest to initiate alternative means of nutrition support according to [facility] decision.  4. Monitor PO intake, skin integrity, bowel regimen, weight trends, and nutrition pertinent labs.  5. RD to remain available for further nutritional intervention.

## 2024-05-06 NOTE — CHART NOTE - NSCHARTNOTEFT_GEN_A_CORE
Pt seen with DR. Ruiz  Pleurx site dressing saturated  Previous suture intact. No large opening around  Pleurx noted.  Additional suture around Pleurx placed by Dr. Ruiz to try to further alleviate leaking at the site.   Pleurx working well with no other issues  Pt tolerated procedure well.  New dressing placed.   Will monitor for leaking  Continue daily Pleurx draining to minimize any accumulating pleural effusion.

## 2024-05-06 NOTE — PROGRESS NOTE ADULT - SUBJECTIVE AND OBJECTIVE BOX
Patient with Oral thrush. Can treat as per medicine. Waiting on Laryngologist recs. Possible observation if patient is tolerating oral diet without risk of aspiration.

## 2024-05-06 NOTE — PROGRESS NOTE ADULT - ASSESSMENT
57 yo woman, former smoker, with h/o R eye glaucoma, Fibromyalgia, Anxiety, GERD, and RCC s/p R total nephrectomy/hysterectomy; she was initially admitted to [facility] on 3/11 w/ R breast swelling c/f mastitis- imaging brooks noted supraclavicular/mediastinal mass lesion which encased the SVC and multiple other veins resulting in obliteration of the SVC and thrombosis of the R IJ, and a pancreatic neck cystic lesion.  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  Her disease/hospital course has also been c/b pericardial effusion s/p pericardial window w/ neg cytology, R pleural effusion, also negative cytology) s/p Pleurx, Afib w/ RVR and acute hypoxic resp failure 2/2 SVC syndrome- not amenable to IR-guided stenting; pt was ultimately started on Dex and transferred to [facility] on 4/4 for urgent palliative RT. Patient pending EGD/EUS for pancreatic process. Now with AHRF requiring HFNC and dyspnea and hoarseness and exam with thrush    # Acute hypoxemic respiratory failure   # dyspnea  # Oral candidiasis  # Hoarseness    - c/w standing duonebs and pulmicort nebs  - c/w systemic steroids  - c/w drainage of pleurX tiw, can increase frequency if dyspneic and effusion has recurred quickly on xray  - patient appears euvolemic on exam, monitor response to lasix given today  - possible R VC hypomobility, Incomplete glottic closure as per ENT  - oral thrush treatment as per ENT - nystatin and fluconazole  - would follow up GOC

## 2024-05-07 LAB
ADD ON TEST-SPECIMEN IN LAB: SIGNIFICANT CHANGE UP
ALBUMIN SERPL ELPH-MCNC: 2.4 G/DL — LOW (ref 3.3–5)
ALP SERPL-CCNC: 112 U/L — SIGNIFICANT CHANGE UP (ref 40–120)
ALT FLD-CCNC: 46 U/L — HIGH (ref 4–33)
ANION GAP SERPL CALC-SCNC: 9 MMOL/L — SIGNIFICANT CHANGE UP (ref 7–14)
AST SERPL-CCNC: 8 U/L — SIGNIFICANT CHANGE UP (ref 4–32)
BASOPHILS # BLD AUTO: 0.06 K/UL — SIGNIFICANT CHANGE UP (ref 0–0.2)
BASOPHILS NFR BLD AUTO: 0.5 % — SIGNIFICANT CHANGE UP (ref 0–2)
BILIRUB SERPL-MCNC: <0.2 MG/DL — SIGNIFICANT CHANGE UP (ref 0.2–1.2)
BUN SERPL-MCNC: 19 MG/DL — SIGNIFICANT CHANGE UP (ref 7–23)
CALCIUM SERPL-MCNC: 8.7 MG/DL — SIGNIFICANT CHANGE UP (ref 8.4–10.5)
CHLORIDE SERPL-SCNC: 96 MMOL/L — LOW (ref 98–107)
CO2 SERPL-SCNC: 28 MMOL/L — SIGNIFICANT CHANGE UP (ref 22–31)
CREAT SERPL-MCNC: 1.28 MG/DL — SIGNIFICANT CHANGE UP (ref 0.5–1.3)
EGFR: 48 ML/MIN/1.73M2 — LOW
EOSINOPHIL # BLD AUTO: 0.02 K/UL — SIGNIFICANT CHANGE UP (ref 0–0.5)
EOSINOPHIL NFR BLD AUTO: 0.2 % — SIGNIFICANT CHANGE UP (ref 0–6)
GLUCOSE SERPL-MCNC: 88 MG/DL — SIGNIFICANT CHANGE UP (ref 70–99)
HCT VFR BLD CALC: 26.9 % — LOW (ref 34.5–45)
HGB BLD-MCNC: 8.9 G/DL — LOW (ref 11.5–15.5)
IANC: 9.79 K/UL — HIGH (ref 1.8–7.4)
IMM GRANULOCYTES NFR BLD AUTO: 9.9 % — HIGH (ref 0–0.9)
LYMPHOCYTES # BLD AUTO: 0.31 K/UL — LOW (ref 1–3.3)
LYMPHOCYTES # BLD AUTO: 2.7 % — LOW (ref 13–44)
MAGNESIUM SERPL-MCNC: 1.8 MG/DL — SIGNIFICANT CHANGE UP (ref 1.6–2.6)
MCHC RBC-ENTMCNC: 29.3 PG — SIGNIFICANT CHANGE UP (ref 27–34)
MCHC RBC-ENTMCNC: 33.1 GM/DL — SIGNIFICANT CHANGE UP (ref 32–36)
MCV RBC AUTO: 88.5 FL — SIGNIFICANT CHANGE UP (ref 80–100)
MONOCYTES # BLD AUTO: 0.31 K/UL — SIGNIFICANT CHANGE UP (ref 0–0.9)
MONOCYTES NFR BLD AUTO: 2.7 % — SIGNIFICANT CHANGE UP (ref 2–14)
NEUTROPHILS # BLD AUTO: 9.79 K/UL — HIGH (ref 1.8–7.4)
NEUTROPHILS NFR BLD AUTO: 84 % — HIGH (ref 43–77)
NRBC # BLD: 0 /100 WBCS — SIGNIFICANT CHANGE UP (ref 0–0)
NRBC # FLD: 0 K/UL — SIGNIFICANT CHANGE UP (ref 0–0)
PHOSPHATE SERPL-MCNC: 3 MG/DL — SIGNIFICANT CHANGE UP (ref 2.5–4.5)
PLATELET # BLD AUTO: 318 K/UL — SIGNIFICANT CHANGE UP (ref 150–400)
POTASSIUM SERPL-MCNC: 4.5 MMOL/L — SIGNIFICANT CHANGE UP (ref 3.5–5.3)
POTASSIUM SERPL-SCNC: 4.5 MMOL/L — SIGNIFICANT CHANGE UP (ref 3.5–5.3)
PROCALCITONIN SERPL-MCNC: 0.48 NG/ML — HIGH (ref 0.02–0.1)
PROT SERPL-MCNC: 5.2 G/DL — LOW (ref 6–8.3)
RBC # BLD: 3.04 M/UL — LOW (ref 3.8–5.2)
RBC # FLD: 17.2 % — HIGH (ref 10.3–14.5)
SODIUM SERPL-SCNC: 133 MMOL/L — LOW (ref 135–145)
WBC # BLD: 11.64 K/UL — HIGH (ref 3.8–10.5)
WBC # FLD AUTO: 11.64 K/UL — HIGH (ref 3.8–10.5)

## 2024-05-07 PROCEDURE — 99233 SBSQ HOSP IP/OBS HIGH 50: CPT | Mod: GC

## 2024-05-07 PROCEDURE — 99233 SBSQ HOSP IP/OBS HIGH 50: CPT

## 2024-05-07 RX ORDER — SENNA PLUS 8.6 MG/1
2 TABLET ORAL
Refills: 0 | Status: DISCONTINUED | OUTPATIENT
Start: 2024-05-07 | End: 2024-05-07

## 2024-05-07 RX ORDER — DEXAMETHASONE 0.5 MG/5ML
4 ELIXIR ORAL DAILY
Refills: 0 | Status: COMPLETED | OUTPATIENT
Start: 2024-05-08 | End: 2024-05-10

## 2024-05-07 RX ORDER — DEXAMETHASONE 0.5 MG/5ML
2 ELIXIR ORAL DAILY
Refills: 0 | Status: COMPLETED | OUTPATIENT
Start: 2024-05-11 | End: 2024-05-14

## 2024-05-07 RX ADMIN — Medication 3 MILLILITER(S): at 07:57

## 2024-05-07 RX ADMIN — AMIODARONE HYDROCHLORIDE 200 MILLIGRAM(S): 400 TABLET ORAL at 07:04

## 2024-05-07 RX ADMIN — CEFEPIME 100 MILLIGRAM(S): 1 INJECTION, POWDER, FOR SOLUTION INTRAMUSCULAR; INTRAVENOUS at 17:39

## 2024-05-07 RX ADMIN — Medication 3 MILLILITER(S): at 03:19

## 2024-05-07 RX ADMIN — Medication 15 MILLILITER(S): at 17:41

## 2024-05-07 RX ADMIN — FLUCONAZOLE 100 MILLIGRAM(S): 150 TABLET ORAL at 12:35

## 2024-05-07 RX ADMIN — Medication 3 MILLILITER(S): at 14:44

## 2024-05-07 RX ADMIN — BUDESONIDE AND FORMOTEROL FUMARATE DIHYDRATE 2 PUFF(S): 160; 4.5 AEROSOL RESPIRATORY (INHALATION) at 22:22

## 2024-05-07 RX ADMIN — Medication 1 PATCH: at 14:43

## 2024-05-07 RX ADMIN — NALOXEGOL OXALATE 25 MILLIGRAM(S): 12.5 TABLET, FILM COATED ORAL at 12:36

## 2024-05-07 RX ADMIN — OXYCODONE HYDROCHLORIDE 30 MILLIGRAM(S): 5 TABLET ORAL at 22:24

## 2024-05-07 RX ADMIN — OXYCODONE HYDROCHLORIDE 30 MILLIGRAM(S): 5 TABLET ORAL at 08:00

## 2024-05-07 RX ADMIN — Medication 1 PATCH: at 09:08

## 2024-05-07 RX ADMIN — Medication 5 MILLIGRAM(S): at 00:09

## 2024-05-07 RX ADMIN — Medication 500000 UNIT(S): at 07:04

## 2024-05-07 RX ADMIN — OXYCODONE HYDROCHLORIDE 30 MILLIGRAM(S): 5 TABLET ORAL at 07:05

## 2024-05-07 RX ADMIN — Medication 4 MILLIGRAM(S): at 17:35

## 2024-05-07 RX ADMIN — Medication 5 MILLIGRAM(S): at 22:24

## 2024-05-07 RX ADMIN — Medication 4 MILLIGRAM(S): at 07:04

## 2024-05-07 RX ADMIN — Medication 15 MILLILITER(S): at 07:03

## 2024-05-07 RX ADMIN — Medication 15 MILLILITER(S): at 00:09

## 2024-05-07 RX ADMIN — Medication 25 MILLIGRAM(S): at 17:41

## 2024-05-07 RX ADMIN — ENOXAPARIN SODIUM 50 MILLIGRAM(S): 100 INJECTION SUBCUTANEOUS at 07:03

## 2024-05-07 RX ADMIN — PANTOPRAZOLE SODIUM 40 MILLIGRAM(S): 20 TABLET, DELAYED RELEASE ORAL at 07:04

## 2024-05-07 RX ADMIN — PANTOPRAZOLE SODIUM 40 MILLIGRAM(S): 20 TABLET, DELAYED RELEASE ORAL at 17:41

## 2024-05-07 RX ADMIN — OXYCODONE HYDROCHLORIDE 30 MILLIGRAM(S): 5 TABLET ORAL at 15:47

## 2024-05-07 RX ADMIN — Medication 500000 UNIT(S): at 00:09

## 2024-05-07 RX ADMIN — OXYCODONE HYDROCHLORIDE 30 MILLIGRAM(S): 5 TABLET ORAL at 23:24

## 2024-05-07 RX ADMIN — POLYETHYLENE GLYCOL 3350 17 GRAM(S): 17 POWDER, FOR SOLUTION ORAL at 07:01

## 2024-05-07 RX ADMIN — SENNA PLUS 2 TABLET(S): 8.6 TABLET ORAL at 12:35

## 2024-05-07 RX ADMIN — Medication 1 PATCH: at 19:00

## 2024-05-07 RX ADMIN — CEFEPIME 100 MILLIGRAM(S): 1 INJECTION, POWDER, FOR SOLUTION INTRAMUSCULAR; INTRAVENOUS at 06:59

## 2024-05-07 RX ADMIN — Medication 1 PATCH: at 12:37

## 2024-05-07 RX ADMIN — OXYCODONE HYDROCHLORIDE 30 MILLIGRAM(S): 5 TABLET ORAL at 15:06

## 2024-05-07 RX ADMIN — Medication 3 MILLILITER(S): at 20:10

## 2024-05-07 RX ADMIN — CHLORHEXIDINE GLUCONATE 1 APPLICATION(S): 213 SOLUTION TOPICAL at 12:37

## 2024-05-07 RX ADMIN — BUDESONIDE AND FORMOTEROL FUMARATE DIHYDRATE 2 PUFF(S): 160; 4.5 AEROSOL RESPIRATORY (INHALATION) at 08:52

## 2024-05-07 RX ADMIN — Medication 25 MILLIGRAM(S): at 07:05

## 2024-05-07 RX ADMIN — Medication 15 MILLILITER(S): at 12:35

## 2024-05-07 RX ADMIN — Medication 1 TABLET(S): at 12:36

## 2024-05-07 RX ADMIN — ENOXAPARIN SODIUM 50 MILLIGRAM(S): 100 INJECTION SUBCUTANEOUS at 17:41

## 2024-05-07 NOTE — SWALLOW BEDSIDE ASSESSMENT ADULT - ASR SWALLOW REFERRAL
4. Recommend RD consult/follow-up to ensure adequate nutrition; Patient may benefit from additional PO supplements to meet adequate daily caloric intake./Registered Dietitian

## 2024-05-07 NOTE — SWALLOW BEDSIDE ASSESSMENT ADULT - SWALLOW EVAL: DIAGNOSIS
1. Functional oral stage with thin, mildly-thick, and moderately-thick liquids as characterized by adequate bolus acceptance/containment, adequate anterior-posterior transfer, and adequate oral clearance. 2. Functional pharyngeal stage with aforementioned consistencies as characterized by suspected timely initiation of pharyngeal swallow trigger and present hyolaryngeal excursion upon digital palpation. No overt s/sx aspiration/penetration observed with thin, mildly-thick, and moderately-thick liquids. Patient denied globus/stuck sensation with presented PO trials.

## 2024-05-07 NOTE — SWALLOW BEDSIDE ASSESSMENT ADULT - COMMENTS
Progress Note-Internal Medicine 4/30: "59 yo woman, former smoker, with h/o R eye glaucoma, Fibromyalgia, Anxiety, GERD, and RCC s/p R total nephrectomy/hysterectomy; she was initially admitted to [facility] on 3/11 w/ R breast swelling c/f mastitis- imaging brooks noted supraclavicular/mediastinal mass lesion which encased the SVC and multiple other veins resulting in obliteration of the SVC and thrombosis of the R IJ, and a pancreatic neck cystic lesion.  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  Her disease/hospital course has also been c/b pericardial effusion s/p pericardial window w/ neg cytology, R pleural effusion, also negative cytology) s/p Pleurx, Afib w/ RVR and acute hypoxic resp failure 2/2 SVC syndrome- not dennis to IR-guided stenting; pt was ultimately started on Dex and transferred to [facility] on 4/4 for urgent palliative RT."    CXR 4/29: "Small right-sided pleural effusion with surrounding atelectasis and questionable left infiltrate."    Clinical swallow evaluation ordered and attempted. Upon chart review, patient on Clear Liquid Diet pending GI scope. As per ACP via Teams, patient to remain on Clear Liquid Diet. Order for clinical swallow evaluation cancelled as per team. Medical team advised to reconsult this service if there is change in patient's medical status/diet tolerance.
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 Now with progressive dysphonia x7d, until today when pt states she became completely aphonic with worsening respiratory distress. Physical exam significant for diffuse oral/oropharyngeal thrush and scope exam significant for R>L VC atrophy and incomplete glottic closure though there is good movement of the VCs and airway is widely patent. Nystatin swish and spit rec by pulm and started today."    CXR Chest 5/4: "LUNGS: There is opacity at the right base compatible with effusion/infiltrate.. Prominent interstitial markings, likely secondary to chronic underlying lung disease."    As per communication with ACP via teams, patient cleared for PO trials with liquids at bedside. Patient received awake, alert, and upright in bed with supplemental O2 via NC. Patient demonstrating dysphonia in connected speech, which she stated began, "about a year ago." ENT consulted (see notes for details). Able to follow simple directives and make wants/needs known. Patient denied current difficulties swallowing and/or history of dysphagia. However, stated she does cough, "every once in a while." Agreeable to PO trials with liquids at bedside.

## 2024-05-07 NOTE — SWALLOW BEDSIDE ASSESSMENT ADULT - ADDITIONAL RECOMMENDATIONS
5. Medical team advised to reconsult this service if there is change in patient's medical status/observed tolerance of PO diet.  6. This service to follow as schedule permits for diet tolerance.

## 2024-05-07 NOTE — SWALLOW BEDSIDE ASSESSMENT ADULT - H & P REVIEW
yes
GI Note 5/6: "Primary team reached out to advanced GI team regarding repeat EGD/EUS. Patient is currently hypoxic requiring HFNC, not currently optimized for endoscopic procedure given worsening respiratory status. Would repeat EGD/EUS once respiratory status improves and patient is off HFNC. Until then, would keep patient on liquid diet (can advance to full liquid), but would not advanced past that given persistent presence of solid food in stomach on prior endoscopies."

## 2024-05-07 NOTE — SWALLOW BEDSIDE ASSESSMENT ADULT - ASR SWALLOW RECOMMEND DIAG
3. Recommend consideration of Cinesophagram given findings on recent chest imaging (5/4/24) and to rule-out silent aspiration./VFSS/MBS

## 2024-05-07 NOTE — PROGRESS NOTE ADULT - SUBJECTIVE AND OBJECTIVE BOX
PATIENT:  GADIEL GENAO  [id number]    CHIEF COMPLAINT:  Patient is a 58y old  Female who presents with a chief complaint of SVC syndrome, DVT, mediastinal mass (07 May 2024 08:34)      INTERVAL HISTORY/OVERNIGHT EVENTS:  - no acute events    MEDICATIONS:  MEDICATIONS  (STANDING):  albuterol/ipratropium for Nebulization 3 milliLiter(s) Nebulizer every 6 hours  aMIOdarone    Tablet 200 milliGRAM(s) Oral daily  Biotene Dry Mouth Oral Rinse 15 milliLiter(s) Swish and Spit every 6 hours  budesonide 160 MICROgram(s)/formoterol 4.5 MICROgram(s) Inhaler 2 Puff(s) Inhalation two times a day  cefepime   IVPB 2000 milliGRAM(s) IV Intermittent every 12 hours  chlorhexidine 2% Cloths 1 Application(s) Topical daily  dexAMETHasone  Injectable 4 milliGRAM(s) IV Push two times a day  diazepam    Tablet 5 milliGRAM(s) Oral at bedtime  enoxaparin Injectable 50 milliGRAM(s) SubCutaneous every 12 hours  fluconAZOLE   Tablet 100 milliGRAM(s) Oral daily  influenza   Vaccine 0.5 milliLiter(s) IntraMuscular once  metoclopramide Injectable 10 milliGRAM(s) IV Push every 8 hours  metoprolol tartrate 25 milliGRAM(s) Oral every 12 hours  multivitamin 1 Tablet(s) Oral daily  naloxegol 25 milliGRAM(s) Oral daily  nicotine -  14 mG/24Hr(s) Patch 1 Patch Transdermal every 24 hours  oxyCODONE  ER Tablet 30 milliGRAM(s) Oral <User Schedule>  pantoprazole    Tablet 40 milliGRAM(s) Oral two times a day  polyethylene glycol 3350 17 Gram(s) Oral every 12 hours  sodium chloride 0.65% Nasal 1 Spray(s) Both Nostrils two times a day  trimethoprim  160 mG/sulfamethoxazole 800 mG 1 Tablet(s) Oral <User Schedule>    MEDICATIONS  (PRN):  aluminum hydroxide/magnesium hydroxide/simethicone Suspension 30 milliLiter(s) Oral every 6 hours PRN Dyspepsia  benzocaine/menthol Lozenge 1 Lozenge Oral two times a day PRN Sore Throat  Biotene Dry Mouth Oral Rinse 15 milliLiter(s) Swish and Spit two times a day PRN dry mouth  diazepam    Tablet 5 milliGRAM(s) Oral two times a day PRN anxiety  FIRST- Mouthwash  BLM 5 milliLiter(s) Swish and Spit four times a day PRN Mouth Care  HYDROmorphone  Injectable 1 milliGRAM(s) IV Push every 3 hours PRN Severe Pain (7 - 10)  ondansetron    Tablet 4 milliGRAM(s) Oral every 8 hours PRN Nausea and/or Vomiting  oxyCODONE    IR 10 milliGRAM(s) Oral every 4 hours PRN Moderate Pain (4 - 6)  sodium chloride 0.9% lock flush 10 milliLiter(s) IV Push every 1 hour PRN Pre/post blood products, medications, blood draw, and to maintain line patency      ALLERGIES:  Allergies    erythromycin (Headache; Vomiting; Rash)  penicillin (Headache; Vomiting; Rash)  Cipro (Headache; Vomiting; Rash)    Intolerances        OBJECTIVE:  ICU Vital Signs Last 24 Hrs  T(C): 36.7 (07 May 2024 14:16), Max: 36.7 (07 May 2024 14:16)  T(F): 98 (07 May 2024 14:16), Max: 98 (07 May 2024 14:16)  HR: 90 (07 May 2024 14:16) (75 - 90)  BP: 119/59 (07 May 2024 14:16) (119/59 - 128/70)  BP(mean): --  ABP: --  ABP(mean): --  RR: 20 (07 May 2024 14:16) (18 - 20)  SpO2: 92% (07 May 2024 14:16) (92% - 100%)    O2 Parameters below as of 07 May 2024 14:16  Patient On (Oxygen Delivery Method): nasal cannula  O2 Flow (L/min): 6          Adult Advanced Hemodynamics Last 24 Hrs  CVP(mm Hg): --  CVP(cm H2O): --  CO: --  CI: --  PA: --  PA(mean): --  PCWP: --  SVR: --  SVRI: --  PVR: --  PVRI: --  CAPILLARY BLOOD GLUCOSE        CAPILLARY BLOOD GLUCOSE        I&O's Summary    06 May 2024 07:01  -  07 May 2024 07:00  --------------------------------------------------------  IN: 50 mL / OUT: 0 mL / NET: 50 mL    07 May 2024 07:01  -  07 May 2024 16:57  --------------------------------------------------------  IN: 0 mL / OUT: 125 mL / NET: -125 mL      Daily     Daily     PHYSICAL EXAMINATION:  General: WN/WD NAD  HEENT: PERRLA, EOMI, moist mucous membranes  Neurology: A&Ox3, nonfocal, PINA x 4  Respiratory: CTA B/L, normal respiratory effort, no wheezes, crackles, rales  CV: RRR, S1S2, no murmurs, rubs or gallops  Abdominal: Soft, NT, ND +BS, Last BM  Extremities: No edema, + peripheral pulses  Incisions:   Tubes:    LABS:                          8.9    11.64 )-----------( 318      ( 07 May 2024 06:35 )             26.9     05-07    133<L>  |  96<L>  |  19  ----------------------------<  88  4.5   |  28  |  1.28    Ca    8.7      07 May 2024 06:35  Phos  3.0     05-07  Mg     1.80     05-07    TPro  5.2<L>  /  Alb  2.4<L>  /  TBili  <0.2  /  DBili  x   /  AST  8   /  ALT  46<H>  /  AlkPhos  112  05-07    LIVER FUNCTIONS - ( 07 May 2024 06:35 )  Alb: 2.4 g/dL / Pro: 5.2 g/dL / ALK PHOS: 112 U/L / ALT: 46 U/L / AST: 8 U/L / GGT: x                   Urinalysis Basic - ( 07 May 2024 06:35 )    Color: x / Appearance: x / SG: x / pH: x  Gluc: 88 mg/dL / Ketone: x  / Bili: x / Urobili: x   Blood: x / Protein: x / Nitrite: x   Leuk Esterase: x / RBC: x / WBC x   Sq Epi: x / Non Sq Epi: x / Bacteria: x        TELEMETRY:     EKG:     IMAGING:

## 2024-05-07 NOTE — PROGRESS NOTE ADULT - ASSESSMENT
59 yo woman, former smoker, with h/o R eye glaucoma, Fibromyalgia, Anxiety, GERD, and RCC s/p R total nephrectomy/hysterectomy; she was initially admitted to [facility] on 3/11 w/ R breast swelling c/f mastitis- imaging brooks noted supraclavicular/mediastinal mass lesion which encased the SVC and multiple other veins resulting in obliteration of the SVC and thrombosis of the R IJ, and a pancreatic neck cystic lesion.  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  Her disease/hospital course has also been c/b pericardial effusion s/p pericardial window w/ neg cytology, R pleural effusion, also negative cytology) s/p Pleurx, Afib w/ RVR and acute hypoxic resp failure 2/2 SVC syndrome- not dennis to IR-guided stenting; pt was ultimately started on Dex and transferred to [facility] on 4/4 for urgent palliative RT which she completed on 4/18. Her hospital course has also been c/b pericardial effusion with tamponade s/p window and non malignant R pleural effusion s/p pleurex, acute b/l UE and RLE dvts, anxiety, and more recently, recurrent hypoxia.    ACTIVE PROBLEMS  Metastatic cancer  Acute-on-chronic hypoxic respiratory failure  Dysphonia with R VC hypomobility   Oral thrush  SVC syndrome  Extensive b/l UE DVTs  Acute RLE DVT a/w RLE ecchymosis  Hypercoag state  Pericardial effusion with tamp physiology s/p pericardial window  R pleural effusion s/p pleurex  pAfib, with RVR on this admission  Anxiety/emotional lability  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)  Cancer-related pain, anxiety  Severe prot-ghazala malnutrition    - Metastatic cancer  Based on 3/14 SC LN path, ddx includes primary UGI vs pancreaticobiliary primary (breast edema is likely 2/2 SVC syndrome); PD-L1 TPs 1%, pMMR, Her2 pending  Ca-125 moderately elevated at 125; other tumor markers not significantly elevated  Additional diagnostic brooks includes:   * 4/19 MRCP showing pancreatic lesion (? IPMN, but pancreas was suboptimally evaluated)     * 4/24 and 4/26 EGD/EUS aborted as pt had large amount of food in the stomach that prevented passing of scope   * 4/30 UGIS aborted as pt felt too short of breath when laying down flat (improved after her pleurex was drained   * GI considering pt for 3rd EGD attempt, pending resolution of hypoxia  Options for systemic cancer treatment are TBD  Pt's prognosis is guarded    - Acute-on-chronic hypoxic resp failure  - Multifocal aspiration vs hosp-acquired pneumonia  Improved- pt weaned off HF NC to 6L NC today  Chest imaging from 5/1, 5/4 more c/w and infectious process  Pt remains afebrile; procal relatively stable  Continuing empiric Cefepime for 10d course, until 5/10  Tapering off Dex over the next week (continuing pjp/ppi ppx until taper is complete)  Continuing Symbicort 2 puffs BID  Continuing Duonebs q6/prn  Will continue routine pleurex drainage as below    - Dysphonia with R VC hypomobility  - Oral thrush  Appreciate ENT eval/recs: 5/4 Flex ellis pt with R VC hypomobility  SLP eval pending for diet texture recs  Continuing Fluconazole x7d course, until 5/12 (trending transaminases)    - SVC syndrome  Appreciate Rad Onc eval/recs  Completed 10fxs of palliative RT on 4/18   Tapering steroids as above     - Extensive b/l UE DVTs  - Acute RLE DVT a/w RLE ecchymosis  - Hypercoag state  Continuing therapeutic lovenox (resumed on 4/18 given negative GIB brooks), benefits > risks   * Of note: pt has poor UE IV access and currently requires peripheral IV in her LE extremities for admin of medication; can consider FV central line for urgent/emergent IV needs    - Pericardial effusion with tamp physiology s/p pericardial window  - R pleural effusion s/p pleurex  Likely inflammatory; both pleural and pericardial fluid cytology are negative for malignant cells  4/9 surveillance TTE with pEF and no WMAs  Continuing R pleurex drainage- up to 500cc q48h/prn    - pAfib  Pt currently SR, HR controlled  Likely precipitated by malignancy, pericardial effusion, SVC syndrome  Continuing Metoprolol 25mg q12 with holding parameters  Continuing Amio 200mg daily (monitoring for toxicities)  Continuing telemetry    - Anxiety/emotional lability  Continuing Diazepam 5mg BID/prn and nightly  Pt has poor health literacy and clinical insight which is negatively impacting her medical decision making- when ask if she wanted to defer medical decision making to her boyfriend or if she had a designated HCP she replied "he's already sick of me"  Appreciate  consult: pt lacks medical decision making capacity; medical decision making will be deferred to pt's surrogate decision maker- Saeid Peña ([phone])    - 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)  Pt with h/o R eye glaucoma, but miosis can also be associated with Pancoast syndrome i/s/o extensive R upper lung tumor  Pt denies visual deficits or other related symptoms   MRI Brain without contrast ordered for further eval- pt continues to refuse, can be completed as outpt  Will continue to monitor closely    - Anemia in neoplastic disease  Hgb remains relatively stable  4/5 iron studies not c/w iron deficiency  VSS and pt without clinical s/s of active bleeding  4/17 hemolysis labs negative; 4/18 FOBT negative x2  Trending daily cbcs; continuing supportive transfusions prn (pt does not have h/o ACS, CAD, MI, goal hgb > 7; plts > 10K)    - Cancer related pain  Currently well controlled  Continuing Oxy ER 30mg q12 q8  Continuing Oxy IR 10mg q4/prn  Continuing Dilaudid IV prn for sev breakthrough pain  Continuing bowel regimen to prevent OIC (including Movantic)    - Severe prot-ghazala malnutrition: appreciate RD recs; continuing regular diet with protein shake supplement BID (SLP eval pending as above)

## 2024-05-07 NOTE — SWALLOW BEDSIDE ASSESSMENT ADULT - SLP PATIENT PROFILE REVIEW
yes
Progress Note-Hospitalist 5/5: "57 yo woman, former smoker, with h/o R eye glaucoma, Fibromyalgia, Anxiety, GERD, and RCC s/p R total nephrectomy/hysterectomy; she was initially admitted to [facility] on 3/11 w/ R breast swelling c/f mastitis- imaging brooks noted supraclavicular/mediastinal mass lesion which encased the SVC and multiple other veins resulting in obliteration of the SVC and thrombosis of the R IJ, and a pancreatic neck cystic lesion.  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  Her disease/hospital course has also been c/b pericardial effusion s/p pericardial window w/ neg cytology, R pleural effusion, also negative cytology) s/p Pleurx, Afib w/ RVR and acute hypoxic resp failure 2/2 SVC syndrome- not dennis to IR-guided stenting; pt was ultimately started on Dex and transferred to [facility] on 4/4 for urgent palliative RT which she completed on 4/18. Her hospital course has also been c/b pericardial effusion with tamponade s/p window and non malignant R pleural effusion s/p pleurex, acute b/l UE and RLE dvts, anxiety, and more recently, recurrent hypoxia."

## 2024-05-07 NOTE — CHART NOTE - NSCHARTNOTEFT_GEN_A_CORE
R pleurX site examined  dressing in place  site clean and dry  will sign off at this time  Please reconsult w concerns    Mely DAVIS [id number]

## 2024-05-07 NOTE — SWALLOW BEDSIDE ASSESSMENT ADULT - CONSISTENCIES ADMINISTERED
2 ounces, each; Patient declined additional trials with mildly-thick and moderately-thick liquids despite verbal education and encouragement provided./moderately thick/mildly thick Ice chips x2 4 ounces; teaspoon and single cup sips/thin liquid

## 2024-05-07 NOTE — PROGRESS NOTE ADULT - SUBJECTIVE AND OBJECTIVE BOX
SOLID TUMOR ONCOLOGY HOSPITALIST PROGRESS NOTE    S: No acute events overnight.  Pt expressed concern about her VC, but otherwise had no complaints.  She was weaned off HF NC to 4L NC.    CURRENT MEDICATIONS  MEDICATIONS  (STANDING):  albuterol/ipratropium for Nebulization 3 milliLiter(s) Nebulizer every 6 hours  aMIOdarone    Tablet 200 milliGRAM(s) Oral daily  Biotene Dry Mouth Oral Rinse 15 milliLiter(s) Swish and Spit every 6 hours  budesonide 160 MICROgram(s)/formoterol 4.5 MICROgram(s) Inhaler 2 Puff(s) Inhalation two times a day  cefepime   IVPB 2000 milliGRAM(s) IV Intermittent every 12 hours  chlorhexidine 2% Cloths 1 Application(s) Topical daily  dexAMETHasone  Injectable 4 milliGRAM(s) IV Push two times a day  diazepam    Tablet 5 milliGRAM(s) Oral at bedtime  enoxaparin Injectable 50 milliGRAM(s) SubCutaneous every 12 hours  fluconAZOLE   Tablet 100 milliGRAM(s) Oral daily  influenza   Vaccine 0.5 milliLiter(s) IntraMuscular once  metoclopramide Injectable 10 milliGRAM(s) IV Push every 8 hours  metoprolol tartrate 25 milliGRAM(s) Oral every 12 hours  multivitamin 1 Tablet(s) Oral daily  naloxegol 25 milliGRAM(s) Oral daily  nicotine -  14 mG/24Hr(s) Patch 1 Patch Transdermal every 24 hours  oxyCODONE  ER Tablet 30 milliGRAM(s) Oral <User Schedule>  pantoprazole    Tablet 40 milliGRAM(s) Oral two times a day  polyethylene glycol 3350 17 Gram(s) Oral every 12 hours  senna 2 Tablet(s) Oral two times a day  sodium chloride 0.65% Nasal 1 Spray(s) Both Nostrils two times a day  trimethoprim  160 mG/sulfamethoxazole 800 mG 1 Tablet(s) Oral <User Schedule>  MEDICATIONS  (PRN):  aluminum hydroxide/magnesium hydroxide/simethicone Suspension 30 milliLiter(s) Oral every 6 hours PRN Dyspepsia  benzocaine/menthol Lozenge 1 Lozenge Oral two times a day PRN Sore Throat  Biotene Dry Mouth Oral Rinse 15 milliLiter(s) Swish and Spit two times a day PRN dry mouth  diazepam    Tablet 5 milliGRAM(s) Oral two times a day PRN anxiety  FIRST- Mouthwash  BLM 5 milliLiter(s) Swish and Spit four times a day PRN Mouth Care  HYDROmorphone  Injectable 1 milliGRAM(s) IV Push every 3 hours PRN Severe Pain (7 - 10)  ondansetron    Tablet 4 milliGRAM(s) Oral every 8 hours PRN Nausea and/or Vomiting  oxyCODONE    IR 10 milliGRAM(s) Oral every 4 hours PRN Moderate Pain (4 - 6)  sodium chloride 0.9% lock flush 10 milliLiter(s) IV Push every 1 hour PRN Pre/post blood products, medications, blood draw, and to maintain line patency      PHYSICAL EXAM  T(C): 36.4 (05-07-24 @ 06:20), Max: 36.4 (05-07-24 @ 06:20)  HR: 79 (05-07-24 @ 07:56) (75 - 85)  BP: 128/70 (05-07-24 @ 06:20) (128/70 - 145/68)  RR: 18 (05-07-24 @ 07:56) (18 - 20)  SpO2: 100% (05-07-24 @ 07:56) (97% - 100%)    05-06-24 @ 07:01  -  05-07-24 @ 07:00  --------------------------------------------------------  IN: 50 mL / OUT: 0 mL / NET: 50 mL    05-07-24 @ 07:01  -  05-07-24 @ 14:30  --------------------------------------------------------  IN: 0 mL / OUT: 125 mL / NET: -125 mL  Non-toxic appearing woman, sitting up in bed, appears comfortable, speaking in full sentences without conversational dyspnea, aaox3  Voice hoarse  R eyelid ptosis and R pupil miosis present; anicteric sclera, no oral lesions/thrush  RRR, no m/r/g  Breaths non-labored; diminished breath sounds in all R lung zones; LL zones CTA  Abd soft/nt/nd, normoactive bowel sounds  Trace LUE non-pitting edema present; large area of ecchymosis extending from the post R calf to post R thigh- improving; generalized muscular atrophy in all 4 extremities  CN 2-12 grossly intact; no gross focal neuro deficits; pt ambulates with walker      LABS                        8.9    11.64 )-----------( 318      ( 07 May 2024 06:35 )             26.9     05-07    133<L>  |  96<L>  |  19  ----------------------------<  88  4.5   |  28  |  1.28    Ca    8.7      07 May 2024 06:35  Phos  3.0     05-07  Mg     1.80     05-07    TPro  5.2<L>  /  Alb  2.4<L>  /  TBili  <0.2  /  DBili  x   /  AST  8   /  ALT  46<H>  /  AlkPhos  112  05-07      PATHOLOGY  3/26 pleural fluid cytology: Neg for malignant cells.    3/26 Pericardium excision: Neg for malignancy.    3/20 Pericardial fluid cytology: Neg for malignant cells.    3/14 LYMPH NODE, SUPRACLAVICULAR, RIGHT, US GUIDED CORE BIOPSY AND FNA   POSITIVE FOR MALIGNANT CELLS.   Carcinoma   Touch Prep slides display crowded groups and single lying malignant   cells with enlarged nuclei containing prominent nucleoli and vacuolated   cytoplasm. Core biopsies show neoplastic cells positive for CK7 and CDX2   immunostains, while negative for CK20, TTF1, GATA3, TRPS1 and PAX8. 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 Suggest correlation with clinical information and imaging to   assist with next step management.   Moleculars: pMMR, PD-L1 TPS 1%; Her2 pending      TUMOR MARKERS  3/13 CEA 7.1  3/24 Ca 19-9 27  4/8 Ca-125 187, Ca 27.29 16.9, Ca 15-3 18.9, AFP 5.2      MICROBIOLOGY  5/1 Procal 0.44  Abx  Cefepime 5/1-present  Fluconazole 5/6-present  Nystatin 5/4-6  Bactrim 4/8-present    Cx Data  4/5 MRSA swab: Not detected  3/16 Quant plus TB: Negative      PERTINENT RADIOLOGY  5/6 CXR: Right-sided Pleurx catheter with decreased effusion.    5/4 CXR: There is a catheter at the right base.  HEART: normal in size.  LUNGS: There is opacity at the right base compatible with   effusion/infiltrate.. Prominent interstitial markings, likely secondary   to chronic underlying lung disease..  BONES: degenerative changes    5/4 R elbow XR: Linear lucency in the coronoid process seen only on the lateral view   concerning for nondisplaced fracture. Consider cross-sectional imaging of   the elbow with CT or MRI for further evaluation.    5/3 CT c/a/p with IV contrast: Persistent right middle lobe consolidation and decreased right lower lobe consolidation. New patchy nodular opacities in the right upper lobe and increased patchy and groundglass opacities in the left lung. Small right pleural effusion, unchanged. Trace left pleural effusion, decreased. No evidence of metastatic disease in the abdomen or pelvis.    5/1 CXR:  Small right effusion unchanged with Pleurx catheter.    4/19 MRCP: Motion degraded study, particularly on postcontrast sequences.  Pancreatic neck T2 hyperintense lesion measures 16 mm, image 28 series 5,   without clear evidence of enhancement on postcontrast phases. Most likely   this is a side branch IPMN. Remainder of the pancreas is suboptimally   evaluated due to artifact. Follow-up MRI abdomen or pancreas protocol CT   can be considered in 3-6 months for reevaluation. Partially distended stomach. Duodenal diverticulum. Colon is also partially distended with mild to moderate stool burden. Correlate   clinically. Trace pleural effusions. Right-sided pleural catheter is partially   imaged. Lung parenchyma is incompletely characterized on MRI. Soft tissue   of the mediastinum is not adequately imaged on this study.    4/18 B/L LE Doppler: Acute deep venous thrombosis: above the knee.  Acute nonocclusive deep venous thrombosis in the right common femoral vein.    417 CXR: Clear lungs with minimal effusion and Pleurx catheter.    4/10 VA dopplers B/L UE: Extensive acute, occlusive DVT RIJ, innominate and subclavian veins.  Acute non-occlusive DVT w/in R axillary vein.  Extensive acute, occlusive DVT noted in L subclavian. axillary, and proximal brachial veins.  Acute non-occlusive DVT LIJ.  Superficial vein thromboses are noted in the B/L cephalic veins.    4/9 Echocardiogram: Normal LV systolic function.  Normal mitral valve w/ normal leaflet excursion.  No pericardial effusion seen    4/4 CXR: Clear lungs w/ R pleurx catheter in place.    ok4/4 CXR AM: Small R pleural effusion w/ pleurx cath improved    OSH Imaging ([facility])  3/29 CXR: Decrease in R pleural effusion.     3/28 B/L UE Dupplers: Acute DVT involving R IJ, innominate vein, subclavian, brachial, and L IJ.  Superficial venous thrombosis involving B/L cephalic veins.  + Edema of superficial soft tissue of UE R>L.    3/27 CT neck: Bulky and conglomerate LAD invading RIJ w/ thrombus extending up to hyoid level.  Thrombus is likely combination tumor and bland.  New LIJ nonocclusive thrombus.    3/27 CT Chest: New consolidations throughout the R lung worse RLL c/f aspiration PNA.  + R pleurx cath w/ decrease in R pleural effusion.  Extensive DVT w/in thoracic and lower neck, upper SVC remains obliterated.  Large R supraclavicular mass infiltrating into the mediastinum.      3/27 CXR: Progression of R consolidation/effusion.    3/21 TTE: LV grossly normal.  Thickened pericardium.  no pericardial effusion.    3/18 TTE: Mod pericardial effusion w/ e/o hemodynamic compromise w/ diasolic collapse of RA that exceeds 1/3 of cardiac cycle >30% resp variation across MV E. wave and early diastolic inversion of RV.    3/14 CT Chest: Conglomerate soft tissue in superior mediastinum and R supraclavicular region 2/2 LAD w/ internal hypodensity c/f necrosis.  Mass encases SVC and multiple great veins resulting in obliteration of the SVC and thrombosis of the RIJ.  Multiple R sided collateral vessels and R soft tissue edema.  B/L pulm nodules.    3/14 CT neck: Extensive cellulitis/myositis along R anterior lateral neck and R upper chest wall w/ inflammatory fat induration the deep soft tissue of neck.  Large supraclavicular/mediastinal mass lesion, presumably conglomerate of LN w/ mass effect and complete occlusion of RIJ w/ associated inflammation.  Complete occlusion of R subclavian vein and nearly occlusive thrombosis in the proximal L brachiocephalic vein.    3/13 RUQ Abd US: Cholelithiasis w/o e/o cholecystitis.  Dilated CBD w/o e/o intraductal stone or other obstruction. 1.4cm pancreatic cyst w/ mild duct dilatation.    3/12 CT abd/pelv: S/p R nephrectomy. no LAD.  New 1.6cm  pancreatic neck cystic lesion w/o main pancreatic ductal dilatation.    3/10 R breast US: No e/o breast abscess      RECENT ENDOSCOPY  5/4 Flex laryngoscopy    -- Nasopharynx had no mass or exudate.    -- Base of tongue was symmetric and not enlarged.    -- Vallecula was clear    -- Epiglottis, both aryepiglottic folds and both false vocal folds were normal    -- Arytenoids both without edema and erythema     -- True vocal folds were fully mobile and without lesions. possible R VC hypomobility, Incomplete glottic closure    -- Post cricoid area was clear.    -- Interarytenoid edema was absent    4/26 Upper EUS  - Normal esophagus.  - A large amount of food (residue) inthe stomach. Procedure was aborted.    4/24 Upper EUS   - Fluid in the middle third of the esophagus and in the lower third of the esophagus.  - A large amount of food (residue) in the stomach so EGD was aborted and EUS was not attempted.  - No specimens collected.

## 2024-05-07 NOTE — SWALLOW BEDSIDE ASSESSMENT ADULT - SWALLOW EVAL: RECOMMENDED DIET
1. From an oropharyngeal standpoint, recommend continuation of Full-Liquid Diet. 2. Advance diet pending GI clearance to Puree/Solids given patient currently on Full-Liquid Diet, only.

## 2024-05-07 NOTE — PROGRESS NOTE ADULT - ASSESSMENT
59 yo woman, former smoker, with h/o R eye glaucoma, Fibromyalgia, Anxiety, GERD, and RCC s/p R total nephrectomy/hysterectomy; she was initially admitted to [facility] on 3/11 w/ R breast swelling c/f mastitis- imaging [facility] noted supraclavicular/mediastinal mass lesion which encased the SVC and multiple other veins resulting in obliteration of the SVC and thrombosis of the R IJ, and a pancreatic neck cystic lesion.  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  Her disease/hospital course has also been c/b pericardial effusion s/p pericardial window w/ neg cytology, R pleural effusion, also negative cytology) s/p Pleurx, Afib w/ RVR and acute hypoxic resp failure 2/2 SVC syndrome- not amenable to IR-guided stenting; pt was ultimately started on Dex and transferred to [facility] on 4/4 for urgent palliative RT. Patient pending EGD/EUS for pancreatic process. Now with AHRF requiring HFNC and dyspnea and hoarseness and exam with thrush    # Acute hypoxemic respiratory failure   # dyspnea  # Oral candidiasis  # Hoarseness    - c/w standing duonebs and pulmicort nebs  - c/w systemic steroids  - c/w drainage of pleurX tiw, can increase frequency if dyspneic and effusion has recurred quickly on xray  - patient appears euvolemic on exam, monitor response to lasix given today  - possible R VC hypomobility, Incomplete glottic closure as per ENT  - oral thrush treatment as per ENT - nystatin and fluconazole  - would follow up GOC

## 2024-05-08 LAB
ALBUMIN SERPL ELPH-MCNC: 2.5 G/DL — LOW (ref 3.3–5)
ALP SERPL-CCNC: 105 U/L — SIGNIFICANT CHANGE UP (ref 40–120)
ALT FLD-CCNC: 45 U/L — HIGH (ref 4–33)
ANION GAP SERPL CALC-SCNC: 12 MMOL/L — SIGNIFICANT CHANGE UP (ref 7–14)
AST SERPL-CCNC: 12 U/L — SIGNIFICANT CHANGE UP (ref 4–32)
BASOPHILS # BLD AUTO: 0.13 K/UL — SIGNIFICANT CHANGE UP (ref 0–0.2)
BASOPHILS NFR BLD AUTO: 0.8 % — SIGNIFICANT CHANGE UP (ref 0–2)
BILIRUB SERPL-MCNC: 0.2 MG/DL — SIGNIFICANT CHANGE UP (ref 0.2–1.2)
BUN SERPL-MCNC: 19 MG/DL — SIGNIFICANT CHANGE UP (ref 7–23)
CALCIUM SERPL-MCNC: 8.7 MG/DL — SIGNIFICANT CHANGE UP (ref 8.4–10.5)
CHLORIDE SERPL-SCNC: 98 MMOL/L — SIGNIFICANT CHANGE UP (ref 98–107)
CO2 SERPL-SCNC: 25 MMOL/L — SIGNIFICANT CHANGE UP (ref 22–31)
CREAT SERPL-MCNC: 1.12 MG/DL — SIGNIFICANT CHANGE UP (ref 0.5–1.3)
EGFR: 56 ML/MIN/1.73M2 — LOW
EOSINOPHIL # BLD AUTO: 0.02 K/UL — SIGNIFICANT CHANGE UP (ref 0–0.5)
EOSINOPHIL NFR BLD AUTO: 0.1 % — SIGNIFICANT CHANGE UP (ref 0–6)
GLUCOSE SERPL-MCNC: 85 MG/DL — SIGNIFICANT CHANGE UP (ref 70–99)
HCT VFR BLD CALC: 29.5 % — LOW (ref 34.5–45)
HGB BLD-MCNC: 9.4 G/DL — LOW (ref 11.5–15.5)
IANC: 13.68 K/UL — HIGH (ref 1.8–7.4)
IMM GRANULOCYTES NFR BLD AUTO: 11 % — HIGH (ref 0–0.9)
LYMPHOCYTES # BLD AUTO: 0.52 K/UL — LOW (ref 1–3.3)
LYMPHOCYTES # BLD AUTO: 3.1 % — LOW (ref 13–44)
MAGNESIUM SERPL-MCNC: 1.8 MG/DL — SIGNIFICANT CHANGE UP (ref 1.6–2.6)
MCHC RBC-ENTMCNC: 29.1 PG — SIGNIFICANT CHANGE UP (ref 27–34)
MCHC RBC-ENTMCNC: 31.9 GM/DL — LOW (ref 32–36)
MCV RBC AUTO: 91.3 FL — SIGNIFICANT CHANGE UP (ref 80–100)
MONOCYTES # BLD AUTO: 0.73 K/UL — SIGNIFICANT CHANGE UP (ref 0–0.9)
MONOCYTES NFR BLD AUTO: 4.3 % — SIGNIFICANT CHANGE UP (ref 2–14)
NEUTROPHILS # BLD AUTO: 13.68 K/UL — HIGH (ref 1.8–7.4)
NEUTROPHILS NFR BLD AUTO: 80.7 % — HIGH (ref 43–77)
NRBC # BLD: 0 /100 WBCS — SIGNIFICANT CHANGE UP (ref 0–0)
NRBC # FLD: 0 K/UL — SIGNIFICANT CHANGE UP (ref 0–0)
PHOSPHATE SERPL-MCNC: 2.9 MG/DL — SIGNIFICANT CHANGE UP (ref 2.5–4.5)
PLATELET # BLD AUTO: 389 K/UL — SIGNIFICANT CHANGE UP (ref 150–400)
POTASSIUM SERPL-MCNC: 4.4 MMOL/L — SIGNIFICANT CHANGE UP (ref 3.5–5.3)
POTASSIUM SERPL-SCNC: 4.4 MMOL/L — SIGNIFICANT CHANGE UP (ref 3.5–5.3)
PROT SERPL-MCNC: 4.8 G/DL — LOW (ref 6–8.3)
RBC # BLD: 3.23 M/UL — LOW (ref 3.8–5.2)
RBC # FLD: 16.8 % — HIGH (ref 10.3–14.5)
SODIUM SERPL-SCNC: 135 MMOL/L — SIGNIFICANT CHANGE UP (ref 135–145)
WBC # BLD: 16.94 K/UL — HIGH (ref 3.8–10.5)
WBC # FLD AUTO: 16.94 K/UL — HIGH (ref 3.8–10.5)

## 2024-05-08 PROCEDURE — 99233 SBSQ HOSP IP/OBS HIGH 50: CPT

## 2024-05-08 PROCEDURE — 74230 X-RAY XM SWLNG FUNCJ C+: CPT | Mod: 26

## 2024-05-08 RX ADMIN — Medication 3 MILLILITER(S): at 15:05

## 2024-05-08 RX ADMIN — Medication 1 TABLET(S): at 12:15

## 2024-05-08 RX ADMIN — ENOXAPARIN SODIUM 50 MILLIGRAM(S): 100 INJECTION SUBCUTANEOUS at 18:12

## 2024-05-08 RX ADMIN — CHLORHEXIDINE GLUCONATE 1 APPLICATION(S): 213 SOLUTION TOPICAL at 12:32

## 2024-05-08 RX ADMIN — PANTOPRAZOLE SODIUM 40 MILLIGRAM(S): 20 TABLET, DELAYED RELEASE ORAL at 18:11

## 2024-05-08 RX ADMIN — OXYCODONE HYDROCHLORIDE 30 MILLIGRAM(S): 5 TABLET ORAL at 14:08

## 2024-05-08 RX ADMIN — CEFEPIME 100 MILLIGRAM(S): 1 INJECTION, POWDER, FOR SOLUTION INTRAMUSCULAR; INTRAVENOUS at 06:42

## 2024-05-08 RX ADMIN — OXYCODONE HYDROCHLORIDE 30 MILLIGRAM(S): 5 TABLET ORAL at 06:41

## 2024-05-08 RX ADMIN — BUDESONIDE AND FORMOTEROL FUMARATE DIHYDRATE 2 PUFF(S): 160; 4.5 AEROSOL RESPIRATORY (INHALATION) at 22:13

## 2024-05-08 RX ADMIN — Medication 1 PATCH: at 12:16

## 2024-05-08 RX ADMIN — FLUCONAZOLE 100 MILLIGRAM(S): 150 TABLET ORAL at 12:15

## 2024-05-08 RX ADMIN — Medication 15 MILLILITER(S): at 12:15

## 2024-05-08 RX ADMIN — BUDESONIDE AND FORMOTEROL FUMARATE DIHYDRATE 2 PUFF(S): 160; 4.5 AEROSOL RESPIRATORY (INHALATION) at 09:18

## 2024-05-08 RX ADMIN — PANTOPRAZOLE SODIUM 40 MILLIGRAM(S): 20 TABLET, DELAYED RELEASE ORAL at 06:42

## 2024-05-08 RX ADMIN — ENOXAPARIN SODIUM 50 MILLIGRAM(S): 100 INJECTION SUBCUTANEOUS at 06:41

## 2024-05-08 RX ADMIN — Medication 3 MILLILITER(S): at 20:13

## 2024-05-08 RX ADMIN — Medication 5 MILLIGRAM(S): at 22:13

## 2024-05-08 RX ADMIN — Medication 1 SPRAY(S): at 18:12

## 2024-05-08 RX ADMIN — AMIODARONE HYDROCHLORIDE 200 MILLIGRAM(S): 400 TABLET ORAL at 06:42

## 2024-05-08 RX ADMIN — Medication 1 PATCH: at 07:05

## 2024-05-08 RX ADMIN — OXYCODONE HYDROCHLORIDE 30 MILLIGRAM(S): 5 TABLET ORAL at 22:13

## 2024-05-08 RX ADMIN — OXYCODONE HYDROCHLORIDE 30 MILLIGRAM(S): 5 TABLET ORAL at 15:08

## 2024-05-08 RX ADMIN — Medication 3 MILLILITER(S): at 08:23

## 2024-05-08 RX ADMIN — Medication 1 TABLET(S): at 09:17

## 2024-05-08 RX ADMIN — CEFEPIME 100 MILLIGRAM(S): 1 INJECTION, POWDER, FOR SOLUTION INTRAMUSCULAR; INTRAVENOUS at 18:07

## 2024-05-08 RX ADMIN — Medication 1 PATCH: at 12:26

## 2024-05-08 RX ADMIN — Medication 1 PATCH: at 19:00

## 2024-05-08 RX ADMIN — OXYCODONE HYDROCHLORIDE 30 MILLIGRAM(S): 5 TABLET ORAL at 07:30

## 2024-05-08 RX ADMIN — Medication 15 MILLILITER(S): at 18:12

## 2024-05-08 RX ADMIN — Medication 4 MILLIGRAM(S): at 06:42

## 2024-05-08 RX ADMIN — Medication 3 MILLILITER(S): at 02:08

## 2024-05-08 RX ADMIN — Medication 25 MILLIGRAM(S): at 18:12

## 2024-05-08 RX ADMIN — Medication 25 MILLIGRAM(S): at 06:42

## 2024-05-08 NOTE — PROGRESS NOTE ADULT - SUBJECTIVE AND OBJECTIVE BOX
SOLID TUMOR ONCOLOGY HOSPITALIST PROGRESS NOTE    S: No acute events overnight.  Pt expressed frustration with the fact that she was going for a procedure this morning but was not informed about the time and did not know that it was going to be early.  She also complained of dry mouth and asked for biotene.    CURRENT MEDICATIONS  MEDICATIONS  (STANDING):  albuterol/ipratropium for Nebulization 3 milliLiter(s) Nebulizer every 6 hours  aMIOdarone    Tablet 200 milliGRAM(s) Oral daily  Biotene Dry Mouth Oral Rinse 15 milliLiter(s) Swish and Spit every 6 hours  budesonide 160 MICROgram(s)/formoterol 4.5 MICROgram(s) Inhaler 2 Puff(s) Inhalation two times a day  cefepime   IVPB 2000 milliGRAM(s) IV Intermittent every 12 hours  chlorhexidine 2% Cloths 1 Application(s) Topical daily  dexAMETHasone     Tablet 4 milliGRAM(s) Oral daily  diazepam    Tablet 5 milliGRAM(s) Oral at bedtime  enoxaparin Injectable 50 milliGRAM(s) SubCutaneous every 12 hours  fluconAZOLE   Tablet 100 milliGRAM(s) Oral daily  influenza   Vaccine 0.5 milliLiter(s) IntraMuscular once  metoclopramide Injectable 10 milliGRAM(s) IV Push every 8 hours  metoprolol tartrate 25 milliGRAM(s) Oral every 12 hours  multivitamin 1 Tablet(s) Oral daily  naloxegol 25 milliGRAM(s) Oral daily  nicotine -  14 mG/24Hr(s) Patch 1 Patch Transdermal every 24 hours  oxyCODONE  ER Tablet 30 milliGRAM(s) Oral <User Schedule>  pantoprazole    Tablet 40 milliGRAM(s) Oral two times a day  polyethylene glycol 3350 17 Gram(s) Oral every 12 hours  sodium chloride 0.65% Nasal 1 Spray(s) Both Nostrils two times a day  trimethoprim  160 mG/sulfamethoxazole 800 mG 1 Tablet(s) Oral <User Schedule>  MEDICATIONS  (PRN):  aluminum hydroxide/magnesium hydroxide/simethicone Suspension 30 milliLiter(s) Oral every 6 hours PRN Dyspepsia  benzocaine/menthol Lozenge 1 Lozenge Oral two times a day PRN Sore Throat  Biotene Dry Mouth Oral Rinse 15 milliLiter(s) Swish and Spit two times a day PRN dry mouth  diazepam    Tablet 5 milliGRAM(s) Oral two times a day PRN anxiety  FIRST- Mouthwash  BLM 5 milliLiter(s) Swish and Spit four times a day PRN Mouth Care  ondansetron    Tablet 4 milliGRAM(s) Oral every 8 hours PRN Nausea and/or Vomiting  oxyCODONE    IR 10 milliGRAM(s) Oral every 4 hours PRN Moderate Pain (4 - 6)  sodium chloride 0.9% lock flush 10 milliLiter(s) IV Push every 1 hour PRN Pre/post blood products, medications, blood draw, and to maintain line patency      PHYSICAL EXAM  T(C): 37.1 (05-08-24 @ 06:55), Max: 37.1 (05-08-24 @ 06:55)  HR: 85 (05-08-24 @ 08:55) (75 - 90)  BP: 128/77 (05-08-24 @ 06:55) (119/59 - 128/77)  RR: 18 (05-08-24 @ 06:55) (18 - 20)  SpO2: 98% (05-08-24 @ 08:55) (84% - 98%)    05-07-24 @ 07:01  -  05-08-24 @ 07:00  --------------------------------------------------------  IN: 0 mL / OUT: 125 mL / NET: -125 mL  Non-toxic appearing woman, sitting up in bed, appears comfortable, speaking in full sentences without conversational dyspnea, aaox3  Voice hoarse  R eyelid ptosis and R pupil miosis present; anicteric sclera, no oral lesions/thrush  RRR, no m/r/g  Breaths non-labored; diminished breath sounds in all R lung zones; LL zones CTA  Abd soft/nt/nd, normoactive bowel sounds  Trace LUE non-pitting edema present; large area of ecchymosis extending from the post R calf to post R thigh- improving; generalized muscular atrophy in all 4 extremities  CN 2-12 grossly intact; no gross focal neuro deficits; pt ambulates with walker        LABS                        9.4    16.94 )-----------( 389      ( 08 May 2024 07:15 )             29.5     05-08    135  |  98  |  19  ----------------------------<  85  4.4   |  25  |  1.12    Ca    8.7      08 May 2024 07:15  Phos  2.9     05-08  Mg     1.80     05-08    TPro  4.8<L>  /  Alb  2.5<L>  /  TBili  0.2  /  DBili  x   /  AST  12  /  ALT  45<H>  /  AlkPhos  105  05-08      PATHOLOGY  3/26 pleural fluid cytology: Neg for malignant cells.    3/26 Pericardium excision: Neg for malignancy.    3/20 Pericardial fluid cytology: Neg for malignant cells.    3/14 LYMPH NODE, SUPRACLAVICULAR, RIGHT, US GUIDED CORE BIOPSY AND FNA   POSITIVE FOR MALIGNANT CELLS.   Carcinoma   Touch Prep slides display crowded groups and single lying malignant   cells with enlarged nuclei containing prominent nucleoli and vacuolated   cytoplasm. Core biopsies show neoplastic cells positive for CK7 and CDX2   immunostains, while negative for CK20, TTF1, GATA3, TRPS1 and PAX8. 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 Suggest correlation with clinical information and imaging to   assist with next step management.   Moleculars: pMMR, PD-L1 TPS 1%; Her2 pending      TUMOR MARKERS  3/13 CEA 7.1  3/24 Ca 19-9 27  4/8 Ca-125 187, Ca 27.29 16.9, Ca 15-3 18.9, AFP 5.2      MICROBIOLOGY  5/1 Procal 0.44  Abx  Cefepime 5/1-present  Fluconazole 5/6-present  Nystatin 5/4-6  Bactrim 4/8-present    Cx Data  4/5 MRSA swab: Not detected  3/16 Quant plus TB: Negative      PERTINENT RADIOLOGY  5/6 CXR: Right-sided Pleurx catheter with decreased effusion.    5/4 CXR: There is a catheter at the right base.  HEART: normal in size.  LUNGS: There is opacity at the right base compatible with   effusion/infiltrate.. Prominent interstitial markings, likely secondary   to chronic underlying lung disease..  BONES: degenerative changes    5/4 R elbow XR: Linear lucency in the coronoid process seen only on the lateral view   concerning for nondisplaced fracture. Consider cross-sectional imaging of   the elbow with CT or MRI for further evaluation.    5/3 CT c/a/p with IV contrast: Persistent right middle lobe consolidation and decreased right lower lobe consolidation. New patchy nodular opacities in the right upper lobe and increased patchy and groundglass opacities in the left lung. Small right pleural effusion, unchanged. Trace left pleural effusion, decreased. No evidence of metastatic disease in the abdomen or pelvis.    5/1 CXR:  Small right effusion unchanged with Pleurx catheter.    4/19 MRCP: Motion degraded study, particularly on postcontrast sequences.  Pancreatic neck T2 hyperintense lesion measures 16 mm, image 28 series 5,   without clear evidence of enhancement on postcontrast phases. Most likely   this is a side branch IPMN. Remainder of the pancreas is suboptimally   evaluated due to artifact. Follow-up MRI abdomen or pancreas protocol CT   can be considered in 3-6 months for reevaluation. Partially distended stomach. Duodenal diverticulum. Colon is also partially distended with mild to moderate stool burden. Correlate   clinically. Trace pleural effusions. Right-sided pleural catheter is partially   imaged. Lung parenchyma is incompletely characterized on MRI. Soft tissue   of the mediastinum is not adequately imaged on this study.    4/18 B/L LE Doppler: Acute deep venous thrombosis: above the knee.  Acute nonocclusive deep venous thrombosis in the right common femoral vein.    417 CXR: Clear lungs with minimal effusion and Pleurx catheter.    4/10 VA dopplers B/L UE: Extensive acute, occlusive DVT RIJ, innominate and subclavian veins.  Acute non-occlusive DVT w/in R axillary vein.  Extensive acute, occlusive DVT noted in L subclavian. axillary, and proximal brachial veins.  Acute non-occlusive DVT LIJ.  Superficial vein thromboses are noted in the B/L cephalic veins.    4/9 Echocardiogram: Normal LV systolic function.  Normal mitral valve w/ normal leaflet excursion.  No pericardial effusion seen    4/4 CXR: Clear lungs w/ R pleurx catheter in place.    ok4/4 CXR AM: Small R pleural effusion w/ pleurx cath improved    OSH Imaging ([facility])  3/29 CXR: Decrease in R pleural effusion.     3/28 B/L UE Dupplers: Acute DVT involving R IJ, innominate vein, subclavian, brachial, and L IJ.  Superficial venous thrombosis involving B/L cephalic veins.  + Edema of superficial soft tissue of UE R>L.    3/27 CT neck: Bulky and conglomerate LAD invading RIJ w/ thrombus extending up to hyoid level.  Thrombus is likely combination tumor and bland.  New LIJ nonocclusive thrombus.    3/27 CT Chest: New consolidations throughout the R lung worse RLL c/f aspiration PNA.  + R pleurx cath w/ decrease in R pleural effusion.  Extensive DVT w/in thoracic and lower neck, upper SVC remains obliterated.  Large R supraclavicular mass infiltrating into the mediastinum.      3/27 CXR: Progression of R consolidation/effusion.    3/21 TTE: LV grossly normal.  Thickened pericardium.  no pericardial effusion.    3/18 TTE: Mod pericardial effusion w/ e/o hemodynamic compromise w/ diasolic collapse of RA that exceeds 1/3 of cardiac cycle >30% resp variation across MV E. wave and early diastolic inversion of RV.    3/14 CT Chest: Conglomerate soft tissue in superior mediastinum and R supraclavicular region 2/2 LAD w/ internal hypodensity c/f necrosis.  Mass encases SVC and multiple great veins resulting in obliteration of the SVC and thrombosis of the RIJ.  Multiple R sided collateral vessels and R soft tissue edema.  B/L pulm nodules.    3/14 CT neck: Extensive cellulitis/myositis along R anterior lateral neck and R upper chest wall w/ inflammatory fat induration the deep soft tissue of neck.  Large supraclavicular/mediastinal mass lesion, presumably conglomerate of LN w/ mass effect and complete occlusion of RIJ w/ associated inflammation.  Complete occlusion of R subclavian vein and nearly occlusive thrombosis in the proximal L brachiocephalic vein.    3/13 RUQ Abd US: Cholelithiasis w/o e/o cholecystitis.  Dilated CBD w/o e/o intraductal stone or other obstruction. 1.4cm pancreatic cyst w/ mild duct dilatation.    3/12 CT abd/pelv: S/p R nephrectomy. no LAD.  New 1.6cm  pancreatic neck cystic lesion w/o main pancreatic ductal dilatation.    3/10 R breast US: No e/o breast abscess      RECENT ENDOSCOPY  5/4 Flex laryngoscopy    -- Nasopharynx had no mass or exudate.    -- Base of tongue was symmetric and not enlarged.    -- Vallecula was clear    -- Epiglottis, both aryepiglottic folds and both false vocal folds were normal    -- Arytenoids both without edema and erythema     -- True vocal folds were fully mobile and without lesions. possible R VC hypomobility, Incomplete glottic closure    -- Post cricoid area was clear.    -- Interarytenoid edema was absent    4/26 Upper EUS  - Normal esophagus.  - A large amount of food (residue) inthe stomach. Procedure was aborted.    4/24 Upper EUS   - Fluid in the middle third of the esophagus and in the lower third of the esophagus.  - A large amount of food (residue) in the stomach so EGD was aborted and EUS was not attempted.  - No specimens collected.

## 2024-05-08 NOTE — SWALLOW VFSS/MBS ASSESSMENT ADULT - DIAGNOSTIC IMPRESSIONS
1. Mild Oral Stage for Mildly Thick and Thin Liquids characterized by adequate oral containment, adequate bolus manipulation, adequate anterior to posterior transfer, premature spillage to the hypopharynx (vallecular/pyriforms) due to reduced tongue to palate seal, and adequate oral clearance.   2. Mild Pharyngeal Stage for Mildly Thick and Thin Liquids characterized by delayed initiation of the pharyngeal swallow (Bolus head at the vallecular/pyriforms), adequate laryngeal elevation, adequate laryngeal vestibule closure, reduced base of tongue retraction, adequate epiglottic deflection, and reduced pharyngeal contractility. There are Trace Pharyngeal Clearance deficits located at the vallecular and pyriforms post primary swallow greater for Mildly Thick Liquids. Spontaneous reswallow benefits to improve pharyngeal clearance. There is Trace Laryngeal Penetration during the swallow with retrieval for Thin Liquids with airway protection maintained. There is No Aspiration before, during, or after the swallow for Mildly Thick Liquids and Thin Liquids.

## 2024-05-08 NOTE — CHART NOTE - NSCHARTNOTEFT_GEN_A_CORE
Case d/w Dr. Esparza (Pancreatic Onc)- 3rd EGD attempt may not provide any additional diagnostic benefit; will defer for now, especially as pt is still recovering from HAP.  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 Pt is a suboptimal candidate i/s/o her progressive debility and malnutrition (ECOG PS 3); will discuss proceed with chemo (inpt) vs best supportive care with her HCP.

## 2024-05-08 NOTE — PROGRESS NOTE ADULT - ASSESSMENT
57 yo woman, former smoker, with h/o R eye glaucoma, Fibromyalgia, Anxiety, GERD, and RCC s/p R total nephrectomy/hysterectomy; she was initially admitted to [facility] on 3/11 w/ R breast swelling c/f mastitis- imaging brooks noted supraclavicular/mediastinal mass lesion which encased the SVC and multiple other veins resulting in obliteration of the SVC and thrombosis of the R IJ, and a pancreatic neck cystic lesion.  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  Her disease/hospital course has also been c/b pericardial effusion s/p pericardial window w/ neg cytology, R pleural effusion, also negative cytology) s/p Pleurx, Afib w/ RVR and acute hypoxic resp failure 2/2 SVC syndrome- not dennis to IR-guided stenting; pt was ultimately started on Dex and transferred to [facility] on 4/4 for urgent palliative RT which she completed on 4/18. Her hospital course has also been c/b pericardial effusion with tamponade s/p window and non malignant R pleural effusion s/p pleurex, acute b/l UE and RLE dvts, anxiety, and more recently, recurrent hypoxia.    ACTIVE PROBLEMS  Metastatic cancer  Acute-on-chronic hypoxic respiratory failure  Dysphonia with R VC hypomobility   Oral thrush  SVC syndrome  Extensive b/l UE DVTs  Acute RLE DVT a/w RLE ecchymosis  Hypercoag state  Pericardial effusion with tamp physiology s/p pericardial window  R pleural effusion s/p pleurex  pAfib, with RVR on this admission  Anxiety/emotional lability  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)  Cancer-related pain, anxiety  Severe prot-ghazala malnutrition    - Metastatic cancer  Based on 3/14 SC LN path, ddx includes primary UGI vs pancreaticobiliary primary (breast edema is likely 2/2 SVC syndrome); PD-L1 TPs 1%, pMMR, Her2 2+, FISH pending, Ki-67 30%  Ca-125 moderately elevated at 125; other tumor markers not significantly elevated  Additional diagnostic brooks includes:   * 4/19 MRCP showing pancreatic lesion (? IPMN, but pancreas was suboptimally evaluated)     * 4/24 and 4/26 EGD/EUS aborted as pt had large amount of food in the stomach that prevented passing of scope   * 4/30 UGIS aborted as pt felt too short of breath when laying down flat (improved after her pleurex was drained   * GI considering pt for 3rd EGD attempt, pending resolution of hypoxia  Options for systemic cancer treatment are TBD- will d/w Dr. Esparza (may be feasible to begin pt on FOLFOX which has activity in tumors of both UGI and pancreatic origin)  Pt's prognosis is guarded    - Acute-on-chronic hypoxic resp failure  - Multifocal aspiration vs hosp-acquired pneumonia  Improved- pt weaned off HF NC to 6L NC today  Chest imaging from 5/1, 5/4 more c/w and infectious process  Pt remains afebrile; procal relatively stable  Continuing empiric Cefepime for 10d course, until 5/10  Tapering off Dex over the next week (continuing pjp/ppi ppx until taper is complete)  Continuing Symbicort 2 puffs BID  Continuing Duonebs q6/prn  Will continue routine pleurex drainage as below    - Dysphonia with R VC hypomobility  - Oral thrush  Appreciate ENT eval/recs: 5/4 Flex ellis pt with R VC hypomobility  Pt going for cinesophagram today; will fu with SLP recs  Continuing Fluconazole x7d course, until 5/12 (trending transaminases)    - SVC syndrome  Appreciate Rad Onc eval/recs  Completed 10fxs of palliative RT on 4/18   Tapering steroids as above     - Extensive b/l UE DVTs  - Acute RLE DVT a/w RLE ecchymosis  - Hypercoag state  Continuing therapeutic lovenox, benefits > risks   * Of note: pt has poor UE IV access and currently requires peripheral IV in her LE extremities for admin of medication; can consider FV central line for urgent/emergent IV needs    - Pericardial effusion with tamp physiology s/p pericardial window  - R pleural effusion s/p pleurex  Likely inflammatory; both pleural and pericardial fluid cytology are negative for malignant cells  4/9 surveillance TTE with pEF and no WMAs  Continuing R pleurex drainage- up to 500cc q48h/prn    - pAfib  Pt currently SR, HR controlled  Likely precipitated by malignancy, pericardial effusion, SVC syndrome  Continuing Metoprolol 25mg q12 with holding parameters  Continuing Amio 200mg daily (monitoring for toxicities)  Continuing telemetry    - Anxiety/emotional lability  Continuing Diazepam 5mg BID/prn and nightly  Pt has poor health literacy and clinical insight which is negatively impacting her medical decision making- when ask if she wanted to defer medical decision making to her boyfriend or if she had a designated HCP she replied "he's already sick of me"  Appreciate  consult: pt lacks medical decision making capacity; medical decision making will be deferred to pt's surrogate decision maker- Saeid Peña ([phone])    - 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)  Pt with h/o R eye glaucoma, but miosis can also be associated with Pancoast syndrome i/s/o extensive R upper lung tumor  Pt denies visual deficits or other related symptoms   MRI Brain without contrast ordered for further eval- pt continues to refuse, can be completed as outpt  Will continue to monitor closely    - Anemia in neoplastic disease  Hgb remains relatively stable  4/5 iron studies not c/w iron deficiency  VSS and pt without clinical s/s of active bleeding  4/17 hemolysis labs negative; 4/18 FOBT negative x2  Trending daily cbcs; continuing supportive transfusions prn (pt does not have h/o ACS, CAD, MI, goal hgb > 7; plts > 10K)    - Cancer related pain  Currently well controlled  Continuing Oxy ER 30mg q12 q8  Continuing Oxy IR 10mg q4/prn  Continuing Dilaudid IV prn for sev breakthrough pain  Continuing bowel regimen to prevent OIC (including Movantic)    - Severe prot-ghazala malnutrition: appreciate RD recs; continuing regular diet with protein shake supplement BID (SLP eval pending as above)

## 2024-05-08 NOTE — SWALLOW VFSS/MBS ASSESSMENT ADULT - RECOMMENDED CONSISTENCY
1. From an Oropharyngeal Standpoint, Thin Liquids. Advance diet pending GI Clearance.   2. Swallowing Guidelines: Seated Upright during Mealtimes; Slow Pacing; Single/Small Sips  3. Maintain Adequate Oral Hygiene   4. Aspiration and Reflux Precautions

## 2024-05-08 NOTE — SWALLOW VFSS/MBS ASSESSMENT ADULT - COMMENTS
Progress Note- Heme/Onc 5/7: "57 yo woman, former smoker, with h/o R eye glaucoma, Fibromyalgia, Anxiety, GERD, and RCC s/p R total nephrectomy/hysterectomy; she was initially admitted to [facility] on 3/11 w/ R breast swelling c/f mastitis- imaging brooks noted supraclavicular/mediastinal mass lesion which encased the SVC and multiple other veins resulting in obliteration of the SVC and thrombosis of the R IJ, and a pancreatic neck cystic lesion.  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  Her disease/hospital course has also been c/b pericardial effusion s/p pericardial window w/ neg cytology, R pleural effusion, also negative cytology) s/p Pleurx, Afib w/ RVR and acute hypoxic resp failure 2/2 SVC syndrome- not dennis to IR-guided stenting; pt was ultimately started on Dex and transferred to [facility] on 4/4 for urgent palliative RT which she completed on 4/18. Her hospital course has also been c/b pericardial effusion with tamponade s/p window and non malignant R pleural effusion s/p pleurex, acute b/l UE and RLE dvts, anxiety, and more recently, recurrent hypoxia."    Of note, patient seen for a clinical swallow evaluation on 5/7 with recommendations of "1. From an oropharyngeal standpoint, recommend continuation of Full-Liquid Diet. 2. Advance diet pending GI clearance to Puree/Solids given patient currently on Full-Liquid Diet, only" and a Cinesophagram (see report for details)     Clearance for administration for Liquid trials (thin/mild/moderate) only per team.     Patient received in Radiology Suite this AM for a Cinesophagram. Patient transferred to a specialized seating unit with lateral view projection.

## 2024-05-08 NOTE — SWALLOW VFSS/MBS ASSESSMENT ADULT - ADDITIONAL RECOMMENDATIONS
This department to follow up as schedule permits to assess for diet tolerance/advancement. Medical team further advised to reconsult if there is a change in medical status and/or observed change in patient's tolerance of recommended PO diet.

## 2024-05-09 LAB
ALBUMIN SERPL ELPH-MCNC: 1.8 G/DL — LOW (ref 3.3–5)
ALP SERPL-CCNC: 84 U/L — SIGNIFICANT CHANGE UP (ref 40–120)
ALT FLD-CCNC: 33 U/L — SIGNIFICANT CHANGE UP (ref 4–33)
ANION GAP SERPL CALC-SCNC: 12 MMOL/L — SIGNIFICANT CHANGE UP (ref 7–14)
ANISOCYTOSIS BLD QL: SLIGHT — SIGNIFICANT CHANGE UP
AST SERPL-CCNC: 18 U/L — SIGNIFICANT CHANGE UP (ref 4–32)
BASOPHILS # BLD AUTO: 0 K/UL — SIGNIFICANT CHANGE UP (ref 0–0.2)
BASOPHILS NFR BLD AUTO: 0 % — SIGNIFICANT CHANGE UP (ref 0–2)
BILIRUB SERPL-MCNC: <0.2 MG/DL — SIGNIFICANT CHANGE UP (ref 0.2–1.2)
BUN SERPL-MCNC: 17 MG/DL — SIGNIFICANT CHANGE UP (ref 7–23)
CALCIUM SERPL-MCNC: 6.3 MG/DL — CRITICAL LOW (ref 8.4–10.5)
CHLORIDE SERPL-SCNC: 103 MMOL/L — SIGNIFICANT CHANGE UP (ref 98–107)
CO2 SERPL-SCNC: 16 MMOL/L — LOW (ref 22–31)
CREAT SERPL-MCNC: 0.91 MG/DL — SIGNIFICANT CHANGE UP (ref 0.5–1.3)
EGFR: 72 ML/MIN/1.73M2 — SIGNIFICANT CHANGE UP
EOSINOPHIL # BLD AUTO: 0.2 K/UL — SIGNIFICANT CHANGE UP (ref 0–0.5)
EOSINOPHIL NFR BLD AUTO: 1 % — SIGNIFICANT CHANGE UP (ref 0–6)
GIANT PLATELETS BLD QL SMEAR: PRESENT — SIGNIFICANT CHANGE UP
GLUCOSE SERPL-MCNC: 58 MG/DL — LOW (ref 70–99)
HCT VFR BLD CALC: 31.7 % — LOW (ref 34.5–45)
HGB BLD-MCNC: 9.9 G/DL — LOW (ref 11.5–15.5)
IANC: 16.59 K/UL — HIGH (ref 1.8–7.4)
LYMPHOCYTES # BLD AUTO: 0.4 K/UL — LOW (ref 1–3.3)
LYMPHOCYTES # BLD AUTO: 2 % — LOW (ref 13–44)
MACROCYTES BLD QL: SLIGHT — SIGNIFICANT CHANGE UP
MAGNESIUM SERPL-MCNC: 1.2 MG/DL — LOW (ref 1.6–2.6)
MANUAL SMEAR VERIFICATION: SIGNIFICANT CHANGE UP
MCHC RBC-ENTMCNC: 29.3 PG — SIGNIFICANT CHANGE UP (ref 27–34)
MCHC RBC-ENTMCNC: 31.2 GM/DL — LOW (ref 32–36)
MCV RBC AUTO: 93.8 FL — SIGNIFICANT CHANGE UP (ref 80–100)
METAMYELOCYTES # FLD: 1 % — SIGNIFICANT CHANGE UP (ref 0–1)
MONOCYTES # BLD AUTO: 1.01 K/UL — HIGH (ref 0–0.9)
MONOCYTES NFR BLD AUTO: 5 % — SIGNIFICANT CHANGE UP (ref 2–14)
MYELOCYTES NFR BLD: 5 % — HIGH (ref 0–0)
NEUTROPHILS # BLD AUTO: 17.32 K/UL — HIGH (ref 1.8–7.4)
NEUTROPHILS NFR BLD AUTO: 77 % — SIGNIFICANT CHANGE UP (ref 43–77)
NEUTS BAND # BLD: 9 % — HIGH (ref 0–6)
NRBC # BLD: 0 /100 WBCS — SIGNIFICANT CHANGE UP (ref 0–0)
PHOSPHATE SERPL-MCNC: 2.3 MG/DL — LOW (ref 2.5–4.5)
PLAT MORPH BLD: NORMAL — SIGNIFICANT CHANGE UP
PLATELET # BLD AUTO: 280 K/UL — SIGNIFICANT CHANGE UP (ref 150–400)
PLATELET COUNT - ESTIMATE: NORMAL — SIGNIFICANT CHANGE UP
POIKILOCYTOSIS BLD QL AUTO: SLIGHT — SIGNIFICANT CHANGE UP
POLYCHROMASIA BLD QL SMEAR: SLIGHT — SIGNIFICANT CHANGE UP
POTASSIUM SERPL-MCNC: 4.2 MMOL/L — SIGNIFICANT CHANGE UP (ref 3.5–5.3)
POTASSIUM SERPL-SCNC: 4.2 MMOL/L — SIGNIFICANT CHANGE UP (ref 3.5–5.3)
PROT SERPL-MCNC: 3.9 G/DL — LOW (ref 6–8.3)
RBC # BLD: 3.38 M/UL — LOW (ref 3.8–5.2)
RBC # FLD: 16.3 % — HIGH (ref 10.3–14.5)
RBC BLD AUTO: ABNORMAL
SODIUM SERPL-SCNC: 131 MMOL/L — LOW (ref 135–145)
WBC # BLD: 20.14 K/UL — HIGH (ref 3.8–10.5)
WBC # FLD AUTO: 20.14 K/UL — HIGH (ref 3.8–10.5)

## 2024-05-09 PROCEDURE — 99233 SBSQ HOSP IP/OBS HIGH 50: CPT

## 2024-05-09 PROCEDURE — 99233 SBSQ HOSP IP/OBS HIGH 50: CPT | Mod: GC

## 2024-05-09 RX ORDER — CALCIUM GLUCONATE 100 MG/ML
1 VIAL (ML) INTRAVENOUS ONCE
Refills: 0 | Status: COMPLETED | OUTPATIENT
Start: 2024-05-09 | End: 2024-05-09

## 2024-05-09 RX ORDER — MAGNESIUM SULFATE 500 MG/ML
2 VIAL (ML) INJECTION ONCE
Refills: 0 | Status: COMPLETED | OUTPATIENT
Start: 2024-05-09 | End: 2024-05-09

## 2024-05-09 RX ORDER — SODIUM,POTASSIUM PHOSPHATES 278-250MG
1 POWDER IN PACKET (EA) ORAL ONCE
Refills: 0 | Status: COMPLETED | OUTPATIENT
Start: 2024-05-09 | End: 2024-05-09

## 2024-05-09 RX ADMIN — Medication 15 MILLILITER(S): at 02:04

## 2024-05-09 RX ADMIN — Medication 1 PATCH: at 07:57

## 2024-05-09 RX ADMIN — Medication 1 TABLET(S): at 12:55

## 2024-05-09 RX ADMIN — Medication 25 MILLIGRAM(S): at 06:06

## 2024-05-09 RX ADMIN — Medication 4 MILLIGRAM(S): at 07:46

## 2024-05-09 RX ADMIN — OXYCODONE HYDROCHLORIDE 30 MILLIGRAM(S): 5 TABLET ORAL at 06:06

## 2024-05-09 RX ADMIN — CHLORHEXIDINE GLUCONATE 1 APPLICATION(S): 213 SOLUTION TOPICAL at 13:56

## 2024-05-09 RX ADMIN — PANTOPRAZOLE SODIUM 40 MILLIGRAM(S): 20 TABLET, DELAYED RELEASE ORAL at 17:36

## 2024-05-09 RX ADMIN — Medication 1 SPRAY(S): at 17:38

## 2024-05-09 RX ADMIN — Medication 1 PATCH: at 19:14

## 2024-05-09 RX ADMIN — Medication 15 MILLILITER(S): at 17:35

## 2024-05-09 RX ADMIN — Medication 1 PATCH: at 12:50

## 2024-05-09 RX ADMIN — CEFEPIME 100 MILLIGRAM(S): 1 INJECTION, POWDER, FOR SOLUTION INTRAMUSCULAR; INTRAVENOUS at 17:41

## 2024-05-09 RX ADMIN — OXYCODONE HYDROCHLORIDE 30 MILLIGRAM(S): 5 TABLET ORAL at 22:03

## 2024-05-09 RX ADMIN — CEFEPIME 100 MILLIGRAM(S): 1 INJECTION, POWDER, FOR SOLUTION INTRAMUSCULAR; INTRAVENOUS at 07:47

## 2024-05-09 RX ADMIN — BUDESONIDE AND FORMOTEROL FUMARATE DIHYDRATE 2 PUFF(S): 160; 4.5 AEROSOL RESPIRATORY (INHALATION) at 07:46

## 2024-05-09 RX ADMIN — Medication 3 MILLILITER(S): at 20:00

## 2024-05-09 RX ADMIN — ENOXAPARIN SODIUM 50 MILLIGRAM(S): 100 INJECTION SUBCUTANEOUS at 17:38

## 2024-05-09 RX ADMIN — Medication 25 GRAM(S): at 13:49

## 2024-05-09 RX ADMIN — Medication 3 MILLILITER(S): at 02:52

## 2024-05-09 RX ADMIN — ENOXAPARIN SODIUM 50 MILLIGRAM(S): 100 INJECTION SUBCUTANEOUS at 07:46

## 2024-05-09 RX ADMIN — Medication 15 MILLILITER(S): at 12:56

## 2024-05-09 RX ADMIN — OXYCODONE HYDROCHLORIDE 30 MILLIGRAM(S): 5 TABLET ORAL at 15:03

## 2024-05-09 RX ADMIN — Medication 15 MILLILITER(S): at 06:07

## 2024-05-09 RX ADMIN — Medication 25 MILLIGRAM(S): at 17:36

## 2024-05-09 RX ADMIN — Medication 15 MILLILITER(S): at 23:01

## 2024-05-09 RX ADMIN — Medication 100 GRAM(S): at 13:21

## 2024-05-09 RX ADMIN — FLUCONAZOLE 100 MILLIGRAM(S): 150 TABLET ORAL at 12:55

## 2024-05-09 RX ADMIN — Medication 1 PATCH: at 12:55

## 2024-05-09 RX ADMIN — AMIODARONE HYDROCHLORIDE 200 MILLIGRAM(S): 400 TABLET ORAL at 07:44

## 2024-05-09 RX ADMIN — Medication 3 MILLILITER(S): at 08:20

## 2024-05-09 RX ADMIN — Medication 5 MILLIGRAM(S): at 21:03

## 2024-05-09 RX ADMIN — BUDESONIDE AND FORMOTEROL FUMARATE DIHYDRATE 2 PUFF(S): 160; 4.5 AEROSOL RESPIRATORY (INHALATION) at 21:03

## 2024-05-09 RX ADMIN — OXYCODONE HYDROCHLORIDE 30 MILLIGRAM(S): 5 TABLET ORAL at 07:07

## 2024-05-09 RX ADMIN — Medication 3 MILLILITER(S): at 15:11

## 2024-05-09 RX ADMIN — OXYCODONE HYDROCHLORIDE 30 MILLIGRAM(S): 5 TABLET ORAL at 14:03

## 2024-05-09 RX ADMIN — OXYCODONE HYDROCHLORIDE 30 MILLIGRAM(S): 5 TABLET ORAL at 21:03

## 2024-05-09 RX ADMIN — Medication 1 PACKET(S): at 13:49

## 2024-05-09 NOTE — PROGRESS NOTE ADULT - ASSESSMENT
57 yo woman, former smoker, with h/o R eye glaucoma, Fibromyalgia, Anxiety, GERD, and RCC s/p R total nephrectomy/hysterectomy; she was initially admitted to [facility] on 3/11 w/ R breast swelling c/f mastitis- imaging brooks noted supraclavicular/mediastinal mass lesion which encased the SVC and multiple other veins resulting in obliteration of the SVC and thrombosis of the R IJ, and a pancreatic neck cystic lesion.  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  Her disease/hospital course has also been c/b pericardial effusion s/p pericardial window w/ neg cytology, R pleural effusion, also negative cytology) s/p Pleurx, Afib w/ RVR and acute hypoxic resp failure 2/2 SVC syndrome- not amenable to IR-guided stenting; pt was ultimately started on Dex and transferred to [facility] on 4/4 for urgent palliative RT. Patient pending EGD/EUS for pancreatic process. Now with AHRF requiring HFNC and dyspnea and hoarseness and exam with thrush    # Acute hypoxemic respiratory failure   # dyspnea  # Oral candidiasis  # Hoarseness    - c/w standing duonebs and pulmicort nebs  - c/w systemic steroids - taper as tolerated  - c/w drainage of pleurX tiw, can increase frequency if dyspneic and effusion has recurred quickly on xray  - possible R VC hypomobility, Incomplete glottic closure as per ENT  - oral thrush treatment as per ENT - nystatin and fluconazole  - would follow up GOC  - now downtitrating O2, on 6L NC, multifactorial hypoxemia  - pulmonary will sign off, please reconsult as needed    Please notify pulmonary prior to discharge and email [email or web address] to schedule an appointment; please provide appointment info to patient    Follow up at  [address], [address]  [place] [address]  tel: [phone]  fax: [phone]

## 2024-05-09 NOTE — PROGRESS NOTE ADULT - ASSESSMENT
59 yo woman, former smoker, with h/o R eye glaucoma, Fibromyalgia, Anxiety, GERD, and RCC s/p R total nephrectomy/hysterectomy; she was initially admitted to [facility] on 3/11 w/ R breast swelling c/f mastitis- imaging brooks noted supraclavicular/mediastinal mass lesion which encased the SVC and multiple other veins resulting in obliteration of the SVC and thrombosis of the R IJ, and a pancreatic neck cystic lesion.  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  Her disease/hospital course has also been c/b pericardial effusion s/p pericardial window w/ neg cytology, R pleural effusion, also negative cytology) s/p Pleurx, Afib w/ RVR and acute hypoxic resp failure 2/2 SVC syndrome- not dennis to IR-guided stenting; pt was ultimately started on Dex and transferred to [facility] on 4/4 for urgent palliative RT which she completed on 4/18. Her hospital course has also been c/b pericardial effusion with tamponade s/p window and non malignant R pleural effusion s/p pleurex, acute b/l UE and RLE dvts, anxiety, and more recently, recurrent hypoxia.    ACTIVE PROBLEMS  Metastatic CUP (carcinoma of unknown primary)  Goals of care discussion, counseling  Acute-on-chronic hypoxic respiratory failure  Dysphonia with R VC hypomobility   Oral thrush  SVC syndrome  Extensive b/l UE DVTs  Acute RLE DVT a/w RLE ecchymosis  Hypercoag state  Pericardial effusion with tamp physiology s/p pericardial window  R pleural effusion s/p pleurex  pAfib, with RVR on this admission  Anxiety/emotional lability  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)  Cancer-related pain, anxiety  Severe prot-ghazala malnutrition    - Metastatic CUP (carcinoma of unknown primary)  - Goals of care discussion, counseling  Based on 3/14 SC LN path, ddx includes primary UGI vs pancreaticobiliary primary (breast edema is likely 2/2 SVC syndrome); PD-L1 TPs 1%, pMMR, Her2 2+, FISH pending, Ki-67 30%  Ca-125 moderately elevated at 125; other tumor markers not significantly elevated  Additional diagnostic brooks includes:   * 4/19 MRCP showing pancreatic lesion (? IPMN, but pancreas was suboptimally evaluated)     * 4/24 and 4/26 EGD/EUS aborted as pt had large amount of food in the stomach that prevented passing of scope   * 4/30 UGIS aborted as pt felt too short of breath when laying down flat (improved after her pleurex was drained  Case d/w Dr. 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 Pt is a suboptimal candidate i/s/o her progressive debility and malnutrition (ECOG PS 3); will discuss risks/benefits of proceeding with palliative chemo (inpt) vs best supportive care with her HCP.  Pt's long term prognosis remains guarded    - Acute-on-chronic hypoxic resp failure  - Multifocal aspiration vs hosp-acquired pneumonia  Improved- pt weaned off HF NC to 6L NC today  Chest imaging from 5/1, 5/4 more c/w and infectious process  Pt remains afebrile; procal relatively stable  Continuing empiric Cefepime for 10d course, until 5/10  Tapering off Dex over the next week (continuing pjp/ppi ppx until taper is complete)  Continuing Symbicort 2 puffs BID  Continuing Duonebs q6/prn  Will continue routine pleurex drainage as below    - Dysphonia with R VC hypomobility  - Oral thrush  Appreciate ENT eval/recs: 5/4 Flex ellis pt with R VC hypomobility  Pt going for cinesophagram today; will fu with SLP recs  Continuing Fluconazole x7d course, until 5/12 (trending transaminases)    - SVC syndrome  Appreciate Rad Onc eval/recs  Completed 10fxs of palliative RT on 4/18   Tapering steroids as above     - Extensive b/l UE DVTs  - Acute RLE DVT a/w RLE ecchymosis  - Hypercoag state  Continuing therapeutic lovenox, benefits > risks   * Of note: pt has poor UE IV access and currently requires peripheral IV in her LE extremities for admin of medication; can consider FV central line for urgent/emergent IV needs    - Pericardial effusion with tamp physiology s/p pericardial window  - R pleural effusion s/p pleurex  Likely inflammatory; both pleural and pericardial fluid cytology are negative for malignant cells  4/9 surveillance TTE with pEF and no WMAs  Continuing R pleurex drainage- up to 500cc q48h/prn    - pAfib  Pt currently SR, HR controlled  Likely precipitated by malignancy, pericardial effusion, SVC syndrome  Continuing Metoprolol 25mg q12 with holding parameters  Continuing Amio 200mg daily (monitoring for toxicities)  Continuing telemetry    - Anxiety/emotional lability  Continuing Diazepam 5mg BID/prn and nightly  Pt has poor health literacy and clinical insight which is negatively impacting her medical decision making- when ask if she wanted to defer medical decision making to her boyfriend or if she had a designated HCP she replied "he's already sick of me"  Appreciate  consult: pt lacks medical decision making capacity; medical decision making will be deferred to pt's surrogate decision maker- Saeid Peña ([phone])    - 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)  Pt with h/o R eye glaucoma, but miosis can also be associated with Pancoast syndrome i/s/o extensive R upper lung tumor  Pt denies visual deficits or other related symptoms   MRI Brain without contrast ordered for further eval- pt continues to refuse, can be completed as outpt  Will continue to monitor closely    - Anemia in neoplastic disease  Hgb remains relatively stable  4/5 iron studies not c/w iron deficiency  VSS and pt without clinical s/s of active bleeding  4/17 hemolysis labs negative; 4/18 FOBT negative x2  Trending daily cbcs; continuing supportive transfusions prn (pt does not have h/o ACS, CAD, MI, goal hgb > 7; plts > 10K)    - Cancer related pain  Currently well controlled  Continuing Oxy ER 30mg q12 q8  Continuing Oxy IR 10mg q4/prn  Continuing Dilaudid IV prn for sev breakthrough pain  Continuing bowel regimen to prevent OIC (including Movantic)    - Severe prot-ghazala malnutrition: appreciate RD recs; continuing regular diet with protein shake supplement BID (SLP eval pending as above)

## 2024-05-09 NOTE — PROGRESS NOTE ADULT - NS ATTEST RISK GEN_ALL_CORE
EXAM DESCRIPTION: 



CT MAXILLOFACIAL WITH IV CONTRAST



COMPLETED DATE/TME:  09/18/2020 21:23



CLINICAL HISTORY: 



35 years, Female, eval possible facial abscesses



COMPARISON:

None.



TECHNIQUE:

[id number]  Images stored on PACS.

 

All CT scanners at this facility use dose modulation, iterative

reconstruction, and/or weight based dosing when appropriate to

reduce radiation dose to as low as reasonably achievable (ALARA).





[facility]: Dose Right [facility]: CareDose   [facility]: Dose Right    [facility]:

Teradose 4D    [facility]: Smart Technologies



LIMITATIONS:

None.



FINDINGS:



The globes are intact. Limited evaluation of brain parenchyma is

unremarkable. Soft tissue swelling and phlegmon change along the

right supraorbital region and right frontal region. No soft

tissue gas. No discrete enhancing abnormality. No bony

destructive or expansile process. The paranasal sinuses are well

aerated. Scattered nonenlarged cervical chain lymph nodes. The

parotid and submandibular glands are unremarkable.





IMPRESSION:



No CT evidence for abscess. No bony destructive process. There is

soft tissue swelling with phlegmon in the right supraorbital and

right frontal regions.

 

TECHNICAL DOCUMENTATION:



Quality ID # [id number]: Final reports with documentation of one or more

dose reduction techniques (e.g., Automated exposure control,

adjustment of the mA and/or kV according to patient size, use of

iterative reconstruction technique)



copyright 2011 [employer]- All Rights Reserved
Risk Statement (NON-critical care)

## 2024-05-09 NOTE — PROGRESS NOTE ADULT - NS ATTEST RISK PROBLEM GEN_ALL_CORE FT
AHRF, SVC syndrome, multiple DVT"s-No IV access
Medical management as above, review of results/records, discussion with patient and primary team, documentation.
SVC Syndrome, AHRF, No IV access, DVT's

## 2024-05-09 NOTE — PROGRESS NOTE ADULT - SUBJECTIVE AND OBJECTIVE BOX
PATIENT:  GADIEL GENAO  [id number]    CHIEF COMPLAINT:  Patient is a 58y old  Female who presents with a chief complaint of SVC syndrome, DVT, mediastinal mass (07 May 2024 16:56)      INTERVAL HISTORY/OVERNIGHT EVENTS:  - no acute events    MEDICATIONS:  MEDICATIONS  (STANDING):  albuterol/ipratropium for Nebulization 3 milliLiter(s) Nebulizer every 6 hours  aMIOdarone    Tablet 200 milliGRAM(s) Oral daily  Biotene Dry Mouth Oral Rinse 15 milliLiter(s) Swish and Spit every 6 hours  budesonide 160 MICROgram(s)/formoterol 4.5 MICROgram(s) Inhaler 2 Puff(s) Inhalation two times a day  cefepime   IVPB 2000 milliGRAM(s) IV Intermittent every 12 hours  chlorhexidine 2% Cloths 1 Application(s) Topical daily  dexAMETHasone     Tablet 4 milliGRAM(s) Oral daily  diazepam    Tablet 5 milliGRAM(s) Oral at bedtime  enoxaparin Injectable 50 milliGRAM(s) SubCutaneous every 12 hours  fluconAZOLE   Tablet 100 milliGRAM(s) Oral daily  influenza   Vaccine 0.5 milliLiter(s) IntraMuscular once  metoclopramide Injectable 10 milliGRAM(s) IV Push every 8 hours  metoprolol tartrate 25 milliGRAM(s) Oral every 12 hours  multivitamin 1 Tablet(s) Oral daily  naloxegol 25 milliGRAM(s) Oral daily  nicotine -  14 mG/24Hr(s) Patch 1 Patch Transdermal every 24 hours  oxyCODONE  ER Tablet 30 milliGRAM(s) Oral <User Schedule>  pantoprazole    Tablet 40 milliGRAM(s) Oral two times a day  polyethylene glycol 3350 17 Gram(s) Oral every 12 hours  sodium chloride 0.65% Nasal 1 Spray(s) Both Nostrils two times a day  trimethoprim  160 mG/sulfamethoxazole 800 mG 1 Tablet(s) Oral <User Schedule>    MEDICATIONS  (PRN):  aluminum hydroxide/magnesium hydroxide/simethicone Suspension 30 milliLiter(s) Oral every 6 hours PRN Dyspepsia  benzocaine/menthol Lozenge 1 Lozenge Oral two times a day PRN Sore Throat  Biotene Dry Mouth Oral Rinse 15 milliLiter(s) Swish and Spit two times a day PRN dry mouth  diazepam    Tablet 5 milliGRAM(s) Oral two times a day PRN anxiety  FIRST- Mouthwash  BLM 5 milliLiter(s) Swish and Spit four times a day PRN Mouth Care  ondansetron    Tablet 4 milliGRAM(s) Oral every 8 hours PRN Nausea and/or Vomiting  oxyCODONE    IR 10 milliGRAM(s) Oral every 4 hours PRN Moderate Pain (4 - 6)  sodium chloride 0.9% lock flush 10 milliLiter(s) IV Push every 1 hour PRN Pre/post blood products, medications, blood draw, and to maintain line patency      ALLERGIES:  Allergies    erythromycin (Headache; Vomiting; Rash)  penicillin (Headache; Vomiting; Rash)  Cipro (Headache; Vomiting; Rash)    Intolerances        OBJECTIVE:  ICU Vital Signs Last 24 Hrs  T(C): 36.7 (09 May 2024 05:57), Max: 36.7 (09 May 2024 05:57)  T(F): 98 (09 May 2024 05:57), Max: 98 (09 May 2024 05:57)  HR: 84 (09 May 2024 08:53) (83 - 101)  BP: 170/96 (09 May 2024 05:57) (146/77 - 170/96)  BP(mean): --  ABP: --  ABP(mean): --  RR: 20 (09 May 2024 05:57) (16 - 20)  SpO2: 98% (09 May 2024 08:53) (94% - 100%)    O2 Parameters below as of 09 May 2024 08:53  Patient On (Oxygen Delivery Method): nasal cannula, 6L            Adult Advanced Hemodynamics Last 24 Hrs  CVP(mm Hg): --  CVP(cm H2O): --  CO: --  CI: --  PA: --  PA(mean): --  PCWP: --  SVR: --  SVRI: --  PVR: --  PVRI: --  CAPILLARY BLOOD GLUCOSE        CAPILLARY BLOOD GLUCOSE        I&O's Summary    Daily     Daily     PHYSICAL EXAMINATION:  General: WN/WD NAD  HEENT: PERRLA, EOMI, moist mucous membranes  Neurology: A&Ox3, nonfocal, PINA x 4  Respiratory: CTA B/L, normal respiratory effort, no wheezes, crackles, rales  CV: RRR, S1S2, no murmurs, rubs or gallops  Abdominal: Soft, NT, ND +BS, Last BM  Extremities: No edema, + peripheral pulses  Incisions:   Tubes:    LABS:                          9.4    16.94 )-----------( 389      ( 08 May 2024 07:15 )             29.5     05-08    135  |  98  |  19  ----------------------------<  85  4.4   |  25  |  1.12    Ca    8.7      08 May 2024 07:15  Phos  2.9     05-08  Mg     1.80     05-08    TPro  4.8<L>  /  Alb  2.5<L>  /  TBili  0.2  /  DBili  x   /  AST  12  /  ALT  45<H>  /  AlkPhos  105  05-08    LIVER FUNCTIONS - ( 08 May 2024 07:15 )  Alb: 2.5 g/dL / Pro: 4.8 g/dL / ALK PHOS: 105 U/L / ALT: 45 U/L / AST: 12 U/L / GGT: x                   Urinalysis Basic - ( 08 May 2024 07:15 )    Color: x / Appearance: x / SG: x / pH: x  Gluc: 85 mg/dL / Ketone: x  / Bili: x / Urobili: x   Blood: x / Protein: x / Nitrite: x   Leuk Esterase: x / RBC: x / WBC x   Sq Epi: x / Non Sq Epi: x / Bacteria: x        TELEMETRY:     EKG:     IMAGING:

## 2024-05-09 NOTE — PROGRESS NOTE ADULT - SUBJECTIVE AND OBJECTIVE BOX
[facility]ID T[facility]OR ONCOLOGY HOSPITALIST PROGRESS NOTE    S: No acute events overnight.  Pt complained about her diet, but also reported that she liked the pureed dinner she had this evening.  We discussed options for future cancer treatment; her niece Tara and niece's preston Kimbrough were both present for the conversation. Prognosis was also reviewed.  Pt expressed that she felt scared about her overall condition and asked how she was going to be able to get through this.     CURRENT MEDICATIONS  MEDICATIONS  (STANDING):  albuterol/ipratropium for Nebulization 3 milliLiter(s) Nebulizer every 6 hours  aMIOdarone    Tablet 200 milliGRAM(s) Oral daily  Biotene Dry Mouth Oral Rinse 15 milliLiter(s) Swish and Spit every 6 hours  budesonide 160 MICROgram(s)/formoterol 4.5 MICROgram(s) Inhaler 2 Puff(s) Inhalation two times a day  cefepime   IVPB 2000 milliGRAM(s) IV Intermittent every 12 hours  chlorhexidine 2% Cloths 1 Application(s) Topical daily  dexAMETHasone     Tablet 4 milliGRAM(s) Oral daily  diazepam    Tablet 5 milliGRAM(s) Oral at bedtime  enoxaparin Injectable 50 milliGRAM(s) SubCutaneous every 12 hours  fluconAZOLE   Tablet 100 milliGRAM(s) Oral daily  influenza   Vaccine 0.5 milliLiter(s) IntraMuscular once  metoclopramide Injectable 10 milliGRAM(s) IV Push every 8 hours  metoprolol tartrate 25 milliGRAM(s) Oral every 12 hours  multivitamin 1 Tablet(s) Oral daily  naloxegol 25 milliGRAM(s) Oral daily  nicotine -  14 mG/24Hr(s) Patch 1 Patch Transdermal every 24 hours  oxyCODONE  ER Tablet 30 milliGRAM(s) Oral <User Schedule>  pantoprazole    Tablet 40 milliGRAM(s) Oral two times a day  polyethylene glycol 3350 17 Gram(s) Oral every 12 hours  sodium chloride 0.65% Nasal 1 Spray(s) Both Nostrils two times a day  trimethoprim  160 mG/sulfamethoxazole 800 mG 1 Tablet(s) Oral <User Schedule>  MEDICATIONS  (PRN):  aluminum hydroxide/magnesium hydroxide/simethicone Suspension 30 milliLiter(s) Oral every 6 hours PRN Dyspepsia  benzocaine/menthol Lozenge 1 Lozenge Oral two times a day PRN Sore Throat  Biotene Dry Mouth Oral Rinse 15 milliLiter(s) Swish and Spit two times a day PRN dry mouth  FIRST- Mouthwash  BLM 5 milliLiter(s) Swish and Spit four times a day PRN Mouth Care  ondansetron    Tablet 4 milliGRAM(s) Oral every 8 hours PRN Nausea and/or Vomiting  oxyCODONE    IR 10 milliGRAM(s) Oral every 4 hours PRN Moderate Pain (4 - 6)  sodium chloride 0.9% lock flush 10 milliLiter(s) IV Push every 1 hour PRN Pre/post blood products, medications, blood draw, and to maintain line patency      PHYSICAL EXAM  T(C): 36.8 (05-09-24 @ 17:00), Max: 36.8 (05-09-24 @ 17:00)  HR: 98 (05-09-24 @ 20:02) (84 - 101)  BP: 134/87 (05-09-24 @ 17:00) (134/87 - 170/96)  RR: 18 (05-09-24 @ 17:00) (18 - 20)  SpO2: 95% (05-09-24 @ 20:02) (94% - 98%)    05-09-24 @ 07:01  -  05-09-24 @ 20:11  --------------------------------------------------------  IN: 0 mL / OUT: 350 mL / NET: -350 mL  Non-toxic appearing woman, sitting up in bed, appears comfortable, speaking in full sentences without conversational dyspnea, aaox3  Voice hoarse  R eyelid ptosis and R pupil miosis present; anicteric sclera, no oral lesions/thrush  RRR, no m/r/g  Breaths non-labored; diminished breath sounds in all R lung zones; LL zones CTA  Abd soft/nt/nd, normoactive bowel sounds  Trace LUE non-pitting edema present; large area of ecchymosis extending from the post R calf to post R thigh- improving; generalized muscular atrophy in all 4 extremities  CN 2-12 grossly intact; no gross focal neuro deficits; pt ambulates with walker    LABS                        9.9    20.14 )-----------( 280      ( 09 May 2024 12:07 )             31.7     05-09    131<L>  |  103  |  17  ----------------------------<  58<L>  4.2   |  16<L>  |  0.91    Ca    6.3<LL>      09 May 2024 12:07  Phos  2.3     05-09  Mg     1.20     05-09    TPro  3.9<L>  /  Alb  1.8<L>  /  TBili  <0.2  /  DBili  x   /  AST  18  /  ALT  33  /  AlkPhos  84  05-09    PATHOLOGY  3/26 pleural fluid cytology: Neg for malignant cells.    3/26 Pericardium excision: Neg for malignancy.    3/20 Pericardial fluid cytology: Neg for malignant cells.    3/14 LYMPH NODE, SUPRACLAVICULAR, RIGHT, US GUIDED CORE BIOPSY AND FNA   POSITIVE FOR MALIGNANT CELLS.   Carcinoma   Touch Prep slides display crowded groups and single lying malignant   cells with enlarged nuclei containing prominent nucleoli and vacuolated   cytoplasm. Core biopsies show neoplastic cells positive for CK7 and CDX2   immunostains, while negative for CK20, TTF1, GATA3, TRPS1 and PAX8. 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 Suggest correlation with clinical information and imaging to   assist with next step management.   Moleculars: pMMR, PD-L1 TPS 1%; Her2 pending      TUMOR MARKERS  3/13 CEA 7.1  3/24 Ca 19-9 27  4/8 Ca-125 187, Ca 27.29 16.9, Ca 15-3 18.9, AFP 5.2      MICROBIOLOGY  5/1 Procal 0.44  Abx  Cefepime 5/1-present  Fluconazole 5/6-present  Nystatin 5/4-6  Bactrim 4/8-present    Cx Data  4/5 MRSA swab: Not detected  3/16 Quant plus TB: Negative      PERTINENT RADIOLOGY  5/6 CXR: Right-sided Pleurx catheter with decreased effusion.    5/4 CXR: There is a catheter at the right base.  HEART: normal in size.  LUNGS: There is opacity at the right base compatible with   effusion/infiltrate.. Prominent interstitial markings, likely secondary   to chronic underlying lung disease..  BONES: degenerative changes    5/4 R elbow XR: Linear lucency in the coronoid process seen only on the lateral view   concerning for nondisplaced fracture. Consider cross-sectional imaging of   the elbow with CT or MRI for further evaluation.    5/3 CT c/a/p with IV contrast: Persistent right middle lobe consolidation and decreased right lower lobe consolidation. New patchy nodular opacities in the right upper lobe and increased patchy and groundglass opacities in the left lung. Small right pleural effusion, unchanged. Trace left pleural effusion, decreased. No evidence of metastatic disease in the abdomen or pelvis.    5/1 CXR:  Small right effusion unchanged with Pleurx catheter.    4/19 MRCP: Motion degraded study, particularly on postcontrast sequences.  Pancreatic neck T2 hyperintense lesion measures 16 mm, image 28 series 5,   without clear evidence of enhancement on postcontrast phases. Most likely   this is a side branch IPMN. Remainder of the pancreas is suboptimally   evaluated due to artifact. Follow-up MRI abdomen or pancreas protocol CT   can be considered in 3-6 months for reevaluation. Partially distended stomach. Duodenal diverticulum. Colon is also partially distended with mild to moderate stool burden. Correlate   clinically. Trace pleural effusions. Right-sided pleural catheter is partially   imaged. Lung parenchyma is incompletely characterized on MRI. Soft tissue   of the mediastinum is not adequately imaged on this study.    4/18 B/L LE Doppler: Acute deep venous thrombosis: above the knee.  Acute nonocclusive deep venous thrombosis in the right common femoral vein.    417 CXR: Clear lungs with minimal effusion and Pleurx catheter.    4/10 VA dopplers B/L UE: Extensive acute, occlusive DVT RIJ, innominate and subclavian veins.  Acute non-occlusive DVT w/in R axillary vein.  Extensive acute, occlusive DVT noted in L subclavian. axillary, and proximal brachial veins.  Acute non-occlusive DVT LIJ.  Superficial vein thromboses are noted in the B/L cephalic veins.    4/9 Echocardiogram: Normal LV systolic function.  Normal mitral valve w/ normal leaflet excursion.  No pericardial effusion seen    4/4 CXR: Clear lungs w/ R pleurx catheter in place.    ok4/4 CXR AM: Small R pleural effusion w/ pleurx cath improved    [facility] Imaging ([facility])  3/29 CXR: Decrease in R pleural effusion.     3/28 B/L UE Dupplers: Acute DVT involving R IJ, innominate vein, subclavian, brachial, and L IJ.  Superficial venous thrombosis involving B/L cephalic veins.  + Edema of superficial soft tissue of UE R>L.    3/27 CT neck: Bulky and conglomerate LAD invading RIJ w/ thrombus extending up to hyoid level.  Thrombus is likely combination tumor and bland.  New LIJ nonocclusive thrombus.    3/27 CT Chest: New consolidations throughout the R lung worse RLL c/f aspiration PNA.  + R pleurx cath w/ decrease in R pleural effusion.  Extensive DVT w/in thoracic and lower neck, upper SVC remains obliterated.  Large R supraclavicular mass infiltrating into the mediastinum.      3/27 CXR: Progression of R consolidation/effusion.    3/21 TTE: LV grossly normal.  Thickened pericardium.  no pericardial effusion.    3/18 TTE: Mod pericardial effusion w/ e/o hemodynamic compromise w/ diasolic collapse of RA that exceeds 1/3 of cardiac cycle >30% resp variation across MV E. wave and early diastolic inversion of RV.    3/14 CT Chest: Conglomerate soft tissue in superior mediastinum and R supraclavicular region 2/2 LAD w/ internal hypodensity c/f necrosis.  Mass encases SVC and multiple great veins resulting in obliteration of the SVC and thrombosis of the RIJ.  Multiple R sided collateral vessels and R soft tissue edema.  B/L pulm nodules.    3/14 CT neck: Extensive cellulitis/myositis along R anterior lateral neck and R upper chest wall w/ inflammatory fat induration the deep soft tissue of neck.  Large supraclavicular/mediastinal mass lesion, presumably conglomerate of LN w/ mass effect and complete occlusion of RIJ w/ associated inflammation.  Complete occlusion of R subclavian vein and nearly occlusive thrombosis in the proximal L brachiocephalic vein.    3/13 RUQ Abd US: Cholelithiasis w/o e/o cholecystitis.  Dilated CBD w/o e/o intraductal stone or other obstruction. 1.4cm pancreatic cyst w/ mild duct dilatation.    3/12 CT abd/pelv: S/p R nephrectomy. no LAD.  New 1.6cm  pancreatic neck cystic lesion w/o main pancreatic ductal dilatation.    3/10 R breast US: No e/o breast abscess      RECENT ENDOSCOPY  5/4 Flex laryngoscopy    -- Nasopharynx had no mass or exudate.    -- Base of tongue was symmetric and not enlarged.    -- Vallecula was clear    -- Epiglottis, both aryepiglottic folds and both false vocal folds were normal    -- Arytenoids both without edema and erythema     -- True vocal folds were fully mobile and without lesions. possible R VC hypomobility, Incomplete glottic closure    -- Post cricoid area was clear.    -- Interarytenoid edema was absent    4/26 Upper EUS  - Normal esophagus.  - A large amount of food (residue) inthe stomach. Procedure was aborted.    4/24 Upper EUS   - Fluid in the middle third of the esophagus and in the lower third of the esophagus.  - A large amount of food (residue) in the stomach so EGD was aborted and EUS was not attempted.  - No specimens collected.

## 2024-05-09 NOTE — PROGRESS NOTE ADULT - ATTENDING COMMENTS
Recommend palliative follow-up for symptom management, as needed morphine for dyspnea, continued goals of care.  Recommend trialing switch from HFNC to nasal cannula.
Agree with above. Try to titrate HFNC to NC if tolerable. Pleurx drainage every other day. Pt is DNR/DNI, continue GOC conversations given poor prognosis, assess hospice candidacy.
Patient weaned off high flow nasal cannula to 6 L.  Agree with above recommendations; pulmonary to sign off, reconsult as needed.

## 2024-05-10 LAB
ALBUMIN SERPL ELPH-MCNC: 2.2 G/DL — LOW (ref 3.3–5)
ALP SERPL-CCNC: 119 U/L — SIGNIFICANT CHANGE UP (ref 40–120)
ALT FLD-CCNC: 42 U/L — HIGH (ref 4–33)
ANION GAP SERPL CALC-SCNC: 14 MMOL/L — SIGNIFICANT CHANGE UP (ref 7–14)
ANION GAP SERPL CALC-SCNC: 18 MMOL/L — HIGH (ref 7–14)
AST SERPL-CCNC: 18 U/L — SIGNIFICANT CHANGE UP (ref 4–32)
BASOPHILS # BLD AUTO: 0.01 K/UL — SIGNIFICANT CHANGE UP (ref 0–0.2)
BASOPHILS NFR BLD AUTO: 0.1 % — SIGNIFICANT CHANGE UP (ref 0–2)
BILIRUB SERPL-MCNC: 0.2 MG/DL — SIGNIFICANT CHANGE UP (ref 0.2–1.2)
BUN SERPL-MCNC: 20 MG/DL — SIGNIFICANT CHANGE UP (ref 7–23)
BUN SERPL-MCNC: 22 MG/DL — SIGNIFICANT CHANGE UP (ref 7–23)
CALCIUM SERPL-MCNC: 8.6 MG/DL — SIGNIFICANT CHANGE UP (ref 8.4–10.5)
CALCIUM SERPL-MCNC: 9 MG/DL — SIGNIFICANT CHANGE UP (ref 8.4–10.5)
CHLORIDE SERPL-SCNC: 89 MMOL/L — LOW (ref 98–107)
CHLORIDE SERPL-SCNC: 90 MMOL/L — LOW (ref 98–107)
CO2 SERPL-SCNC: 19 MMOL/L — LOW (ref 22–31)
CO2 SERPL-SCNC: 22 MMOL/L — SIGNIFICANT CHANGE UP (ref 22–31)
CREAT SERPL-MCNC: 0.95 MG/DL — SIGNIFICANT CHANGE UP (ref 0.5–1.3)
CREAT SERPL-MCNC: 1 MG/DL — SIGNIFICANT CHANGE UP (ref 0.5–1.3)
EGFR: 64 ML/MIN/1.73M2 — SIGNIFICANT CHANGE UP
EGFR: 69 ML/MIN/1.73M2 — SIGNIFICANT CHANGE UP
EOSINOPHIL # BLD AUTO: 0.04 K/UL — SIGNIFICANT CHANGE UP (ref 0–0.5)
EOSINOPHIL NFR BLD AUTO: 0.2 % — SIGNIFICANT CHANGE UP (ref 0–6)
GLUCOSE BLDC GLUCOMTR-MCNC: 145 MG/DL — HIGH (ref 70–99)
GLUCOSE BLDC GLUCOMTR-MCNC: 174 MG/DL — HIGH (ref 70–99)
GLUCOSE SERPL-MCNC: 34 MG/DL — CRITICAL LOW (ref 70–99)
GLUCOSE SERPL-MCNC: 73 MG/DL — SIGNIFICANT CHANGE UP (ref 70–99)
HCT VFR BLD CALC: 32.5 % — LOW (ref 34.5–45)
HGB BLD-MCNC: 9.7 G/DL — LOW (ref 11.5–15.5)
IANC: 16.92 K/UL — HIGH (ref 1.8–7.4)
IMM GRANULOCYTES NFR BLD AUTO: 7.8 % — HIGH (ref 0–0.9)
LYMPHOCYTES # BLD AUTO: 0.48 K/UL — LOW (ref 1–3.3)
LYMPHOCYTES # BLD AUTO: 2.4 % — LOW (ref 13–44)
MAGNESIUM SERPL-MCNC: 2 MG/DL — SIGNIFICANT CHANGE UP (ref 1.6–2.6)
MCHC RBC-ENTMCNC: 28.5 PG — SIGNIFICANT CHANGE UP (ref 27–34)
MCHC RBC-ENTMCNC: 29.8 GM/DL — LOW (ref 32–36)
MCV RBC AUTO: 95.6 FL — SIGNIFICANT CHANGE UP (ref 80–100)
MONOCYTES # BLD AUTO: 0.66 K/UL — SIGNIFICANT CHANGE UP (ref 0–0.9)
MONOCYTES NFR BLD AUTO: 3.4 % — SIGNIFICANT CHANGE UP (ref 2–14)
NEUTROPHILS # BLD AUTO: 16.92 K/UL — HIGH (ref 1.8–7.4)
NEUTROPHILS NFR BLD AUTO: 86.1 % — HIGH (ref 43–77)
NRBC # BLD: 0 /100 WBCS — SIGNIFICANT CHANGE UP (ref 0–0)
NRBC # FLD: 0 K/UL — SIGNIFICANT CHANGE UP (ref 0–0)
PHOSPHATE SERPL-MCNC: 3.3 MG/DL — SIGNIFICANT CHANGE UP (ref 2.5–4.5)
PLATELET # BLD AUTO: 377 K/UL — SIGNIFICANT CHANGE UP (ref 150–400)
POTASSIUM SERPL-MCNC: 4.5 MMOL/L — SIGNIFICANT CHANGE UP (ref 3.5–5.3)
POTASSIUM SERPL-MCNC: 4.9 MMOL/L — SIGNIFICANT CHANGE UP (ref 3.5–5.3)
POTASSIUM SERPL-SCNC: 4.5 MMOL/L — SIGNIFICANT CHANGE UP (ref 3.5–5.3)
POTASSIUM SERPL-SCNC: 4.9 MMOL/L — SIGNIFICANT CHANGE UP (ref 3.5–5.3)
PROT SERPL-MCNC: 4.7 G/DL — LOW (ref 6–8.3)
RBC # BLD: 3.4 M/UL — LOW (ref 3.8–5.2)
RBC # FLD: 16.2 % — HIGH (ref 10.3–14.5)
SODIUM SERPL-SCNC: 123 MMOL/L — LOW (ref 135–145)
SODIUM SERPL-SCNC: 129 MMOL/L — LOW (ref 135–145)
WBC # BLD: 19.64 K/UL — HIGH (ref 3.8–10.5)
WBC # FLD AUTO: 19.64 K/UL — HIGH (ref 3.8–10.5)

## 2024-05-10 PROCEDURE — 99233 SBSQ HOSP IP/OBS HIGH 50: CPT

## 2024-05-10 PROCEDURE — 71045 X-RAY EXAM CHEST 1 VIEW: CPT | Mod: 26

## 2024-05-10 RX ORDER — DIAZEPAM 5 MG
5 TABLET ORAL AT BEDTIME
Refills: 0 | Status: DISCONTINUED | OUTPATIENT
Start: 2024-05-10 | End: 2024-05-16

## 2024-05-10 RX ORDER — OXYCODONE HYDROCHLORIDE 5 MG/1
30 TABLET ORAL
Refills: 0 | Status: DISCONTINUED | OUTPATIENT
Start: 2024-05-10 | End: 2024-05-16

## 2024-05-10 RX ORDER — OXYCODONE HYDROCHLORIDE 5 MG/1
10 TABLET ORAL EVERY 4 HOURS
Refills: 0 | Status: DISCONTINUED | OUTPATIENT
Start: 2024-05-10 | End: 2024-05-16

## 2024-05-10 RX ORDER — DIAZEPAM 5 MG
5 TABLET ORAL
Refills: 0 | Status: DISCONTINUED | OUTPATIENT
Start: 2024-05-10 | End: 2024-05-16

## 2024-05-10 RX ADMIN — ENOXAPARIN SODIUM 50 MILLIGRAM(S): 100 INJECTION SUBCUTANEOUS at 17:34

## 2024-05-10 RX ADMIN — Medication 3 MILLILITER(S): at 07:47

## 2024-05-10 RX ADMIN — NALOXEGOL OXALATE 25 MILLIGRAM(S): 12.5 TABLET, FILM COATED ORAL at 11:39

## 2024-05-10 RX ADMIN — POLYETHYLENE GLYCOL 3350 17 GRAM(S): 17 POWDER, FOR SOLUTION ORAL at 17:34

## 2024-05-10 RX ADMIN — Medication 4 MILLIGRAM(S): at 05:41

## 2024-05-10 RX ADMIN — Medication 5 MILLIGRAM(S): at 22:16

## 2024-05-10 RX ADMIN — Medication 1 TABLET(S): at 09:06

## 2024-05-10 RX ADMIN — Medication 15 MILLILITER(S): at 05:42

## 2024-05-10 RX ADMIN — BUDESONIDE AND FORMOTEROL FUMARATE DIHYDRATE 2 PUFF(S): 160; 4.5 AEROSOL RESPIRATORY (INHALATION) at 11:07

## 2024-05-10 RX ADMIN — Medication 3 MILLILITER(S): at 15:04

## 2024-05-10 RX ADMIN — CEFEPIME 100 MILLIGRAM(S): 1 INJECTION, POWDER, FOR SOLUTION INTRAMUSCULAR; INTRAVENOUS at 05:42

## 2024-05-10 RX ADMIN — FLUCONAZOLE 100 MILLIGRAM(S): 150 TABLET ORAL at 11:29

## 2024-05-10 RX ADMIN — OXYCODONE HYDROCHLORIDE 30 MILLIGRAM(S): 5 TABLET ORAL at 15:25

## 2024-05-10 RX ADMIN — Medication 1 PATCH: at 11:03

## 2024-05-10 RX ADMIN — Medication 1 TABLET(S): at 11:29

## 2024-05-10 RX ADMIN — Medication 3 MILLILITER(S): at 21:03

## 2024-05-10 RX ADMIN — PANTOPRAZOLE SODIUM 40 MILLIGRAM(S): 20 TABLET, DELAYED RELEASE ORAL at 05:41

## 2024-05-10 RX ADMIN — Medication 15 MILLILITER(S): at 11:28

## 2024-05-10 RX ADMIN — CEFEPIME 100 MILLIGRAM(S): 1 INJECTION, POWDER, FOR SOLUTION INTRAMUSCULAR; INTRAVENOUS at 17:34

## 2024-05-10 RX ADMIN — Medication 15 MILLILITER(S): at 18:57

## 2024-05-10 RX ADMIN — OXYCODONE HYDROCHLORIDE 30 MILLIGRAM(S): 5 TABLET ORAL at 22:16

## 2024-05-10 RX ADMIN — ENOXAPARIN SODIUM 50 MILLIGRAM(S): 100 INJECTION SUBCUTANEOUS at 05:41

## 2024-05-10 RX ADMIN — Medication 1 PATCH: at 11:30

## 2024-05-10 RX ADMIN — OXYCODONE HYDROCHLORIDE 30 MILLIGRAM(S): 5 TABLET ORAL at 14:08

## 2024-05-10 RX ADMIN — Medication 1 SPRAY(S): at 17:35

## 2024-05-10 RX ADMIN — Medication 1 PATCH: at 12:24

## 2024-05-10 RX ADMIN — AMIODARONE HYDROCHLORIDE 200 MILLIGRAM(S): 400 TABLET ORAL at 05:41

## 2024-05-10 RX ADMIN — CHLORHEXIDINE GLUCONATE 1 APPLICATION(S): 213 SOLUTION TOPICAL at 11:29

## 2024-05-10 RX ADMIN — PANTOPRAZOLE SODIUM 40 MILLIGRAM(S): 20 TABLET, DELAYED RELEASE ORAL at 17:34

## 2024-05-10 RX ADMIN — BUDESONIDE AND FORMOTEROL FUMARATE DIHYDRATE 2 PUFF(S): 160; 4.5 AEROSOL RESPIRATORY (INHALATION) at 22:16

## 2024-05-10 RX ADMIN — Medication 3 MILLILITER(S): at 03:52

## 2024-05-10 RX ADMIN — Medication 25 MILLIGRAM(S): at 05:41

## 2024-05-10 RX ADMIN — Medication 25 MILLIGRAM(S): at 17:34

## 2024-05-10 NOTE — CHART NOTE - NSCHARTNOTEFT_GEN_A_CORE
Source: Patient [x]     other [x] nurse, medical chart   Diet rx: Diet, Soft and Bite Sized:   DASH/TLC {Sodium & Cholesterol Restricted} (DASH)  Low Sodium (05-08-24 @ 20:01) [Active]          Pt 59 yo female, former smoker, with PMHx of R eye glaucoma, Fibromyalgia, Anxiety, GERD, RCC s/p R total nephrectomy/hysterectomy- per chart review.     At time of visit, Pt awake, somewhat disoriented/forgetful (?). Pt with not much interest to speak to RD. Case discussed with nurse. At time of visit, Pt c/o breathing difficulty -> nurse notified right a way. Limited information obtain from Pt at present.   Of note, Pt on clear liquid diet, GI planning for scope. Per Pt, her appetite not well, but she does not want "liquids"; she wants Regular food/food items. No report of chewing or swallowing difficulty; no report of vomiting or diarrhea @ this time. +Last BM (4/28) per flow sheet. Pt c/o abdominal pain, nurse aware. Unable to discuss food preferences at present. Of note, Pt DNR/DNI. RD remains available.     Pt's height: 60"      IBW: 100#+/-10%    Pt's weights: 45.4 kg (4/26), 45.4 kg (4/4)     Pertinent Medications: Lovenox, Multivitamin, Protonix, Senna, Miralax, Maalox (PRN), Reglan (PRN), Zofran (PRN), Biotene dry mouth oral rinse, First mouth wash     Pertinent Labs: (4/29) H/H 7.4/22.7 L, Albumin 2.3 L, ALT 56 H;  (3/12) HbA1c 6.6% H, HDL 50 L  CAPILLARY BLOOD GLUCOSE         Skin: Pt with pressure injury to sacrum - stage II, per flow sheet     Estimated Needs: [x] no change since previous assessment  Previous Nutrition Diagnosis: [x] Malnutrition, severe    Nutrition Diagnosis is [x] ongoing      Nutrition Interventions/Recommendations:   1. Once clinically indicates, advance PO diet to Full liquids; If Pt tolerates well, advance PO diet to Regular;  2. Add PO supplement: Ensure Clear (1 can/220 Kcal, 8 gm Protein) - 2x daily, for now;   3. Bowel regimen per MD discretion;   4. Continue Multivitamin as ordered;   5. Monitor labs, weekly weights, hydration status;  6. If unable to advance PO diet, suggest initiating alternative means of nutrition support according to GOC;   Consult nutrition if warranted  RD remains available Source: Patient [x]     other [x] medical chart   Diet rx: Soft and Bite Sized: DASH/TLC {Sodium & Cholesterol Restricted} Low Sodium (05-08-24 @ 20:01) [Active]    Pt 59 yo female, former smoker, with PMHx of R eye glaucoma, Fibromyalgia, Anxiety, GERD, RCC s/p R total nephrectomy/hysterectomy - per chart review.     At time of visit, Pt awake, somewhat anxious/restless; Pt with not much interest to speak to RD. Pt's appetite not well reported. Pt eats <50% of meals reported as well. No report of chewing or swallowing difficulty with current diet consistency; no report of vomiting or diarrhea @ this time. +Last BM (5/8) per flow sheet. RD attempted to discuss food preferences, but Pt did not participate. Rec to add PO supplement: Ensure Compact - 2x daily to diet rx. Of note, Pt DNR/DNI. RD remains available.     Pt's height: 60"      IBW: 100#+/-10%    Pt's weights: 45.3 kg (5/1), 45.4 kg (4/4)     Pertinent Medications: Lovenox, Multivitamin, Protonix, Miralax, Biotene dry mouth oral rinse (PRN), First mouth wash (PRN)     Pertinent Labs: (5/10) WBC 19.64 H, H/H 9.7/32.5 L, Na 123 L, Cl 90 L, Albumin 2.2 L, ALT 42 H;  (3/12) HbA1c 6.6% H, HDL 50 L  Skin: per flow sheet -> +pressure injury to sacrum - stage II;   1+edema: generalized      Estimated Needs: [x] no change since previous assessment  Previous Nutrition Diagnosis: [x] Malnutrition, severe    Nutrition Diagnosis is [x] ongoing      Nutrition Interventions/Recommendations:   1. Encourage & assist Pt with meals; Monitor PO diet tolerance;   2. Honor food preferences (if any);  3. Add PO supplement: Ensure Compact (4oz/118 ml -> 9 gm Protein, 220Kcal) - 2x daily;   4. Continue Multivitamin as ordered;   5. Monitor labs, daily weights, hydration status;

## 2024-05-10 NOTE — PROGRESS NOTE ADULT - ASSESSMENT
57 yo woman, former smoker, with h/o R eye glaucoma, Fibromyalgia, Anxiety, GERD, and RCC s/p R total nephrectomy/hysterectomy; she was initially admitted to [facility] on 3/11 w/ R breast swelling c/f mastitis- imaging brooks noted supraclavicular/mediastinal mass lesion which encased the SVC and multiple other veins resulting in obliteration of the SVC and thrombosis of the R IJ, and a pancreatic neck cystic lesion.  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  Her disease/hospital course has also been c/b pericardial effusion s/p pericardial window w/ neg cytology, R pleural effusion, also negative cytology) s/p Pleurx, Afib w/ RVR and acute hypoxic resp failure 2/2 SVC syndrome- not dennis to IR-guided stenting; pt was ultimately started on Dex and transferred to [facility] on 4/4 for urgent palliative RT which she completed on 4/18. Her hospital course has also been c/b pericardial effusion with tamponade s/p window and non malignant R pleural effusion s/p pleurex, acute b/l UE and RLE dvts, anxiety, and more recently, recurrent hypoxia.    ACTIVE PROBLEMS  Metastatic CUP (carcinoma of unknown primary)  Goals of care discussion, counseling  Acute-on-chronic hypoxic respiratory failure  Dysphonia with R VC hypomobility   Oral thrush  SVC syndrome  Extensive b/l UE DVTs  Acute RLE DVT a/w RLE ecchymosis  Hypercoag state  Pericardial effusion with tamp physiology s/p pericardial window  R pleural effusion s/p pleurex  pAfib, with RVR on this admission  Anxiety/emotional lability  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)  Cancer-related pain, anxiety  Severe prot-ghazala malnutrition    - Metastatic CUP (carcinoma of unknown primary)  - Goals of care discussion, counseling  Based on 3/14 SC LN path, ddx includes primary UGI vs pancreaticobiliary primary (breast edema is likely 2/2 SVC syndrome); PD-L1 TPs 1%, pMMR, Her2 2+, FISH pending, Ki-67 30%  Ca-125 moderately elevated at 125; other tumor markers not significantly elevated  Additional diagnostic brooks includes:   * 4/19 MRCP showing pancreatic lesion (? IPMN, but pancreas was suboptimally evaluated)     * 4/24 and 4/26 EGD/EUS aborted as pt had large amount of food in the stomach that prevented passing of scope   * 4/30 UGIS aborted as pt felt too short of breath when laying down flat (improved after her pleurex was drained  Case d/w Dr. 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 Pt is a suboptimal candidate i/s/o her progressive debility and malnutrition (ECOG PS 3)   >> Discussed risks/benefits of proceeding with palliative chemo (inpt) vs best supportive care with pt's HCP Saeid on 5/10- he is amenable to trial of inpt chemotherapy and understands that hospice will be recommended if pt does not tolerate trial of inpt chemo; planning to consent pt and proceed with dose modified FOLFOX on 5/13  Pt's long term prognosis remains guarded    - Acute-on-chronic hypoxic resp failure  - Multifocal aspiration vs hosp-acquired pneumonia  Improved- pt weaned off HF NC to 6L NC today  Chest imaging from 5/1, 5/4 more c/w and infectious process  Pt remains afebrile; procal relatively stable  Continuing empiric Cefepime for 10d course, until 5/10  Tapering off Dex over the next week (continuing pjp/ppi ppx until taper is complete)  Continuing Symbicort 2 puffs BID  Continuing Duonebs q6/prn  Will continue routine pleurex drainage as below    - Dysphonia with R VC hypomobility  - Oral thrush  Appreciate ENT eval/recs: 5/4 Flex ellis pt with R VC hypomobility  Pt going for cinesophagram today; will fu with SLP recs  Continuing Fluconazole x7d course, until 5/12 (trending transaminases)    - SVC syndrome  Appreciate Rad Onc eval/recs  Completed 10fxs of palliative RT on 4/18   Tapering steroids as above     - Extensive b/l UE DVTs  - Acute RLE DVT a/w RLE ecchymosis  - Hypercoag state  Continuing therapeutic lovenox, benefits > risks   * Of note: pt has poor UE IV access and currently requires peripheral IV in her LE extremities for admin of medication; can consider FV central line for urgent/emergent IV needs    - Pericardial effusion with tamp physiology s/p pericardial window  - R pleural effusion s/p pleurex  Likely inflammatory; both pleural and pericardial fluid cytology are negative for malignant cells  4/9 surveillance TTE with pEF and no WMAs  Continuing R pleurex drainage- up to 500cc q48h/prn    - pAfib  Pt currently SR, HR controlled  Likely precipitated by malignancy, pericardial effusion, SVC syndrome  Continuing Metoprolol 25mg q12 with holding parameters  Continuing Amio 200mg daily (monitoring for toxicities)  Continuing telemetry    - Anxiety/emotional lability  Continuing Diazepam 5mg BID/prn and nightly  Pt has poor health literacy and clinical insight which is negatively impacting her medical decision making- when ask if she wanted to defer medical decision making to her boyfriend or if she had a designated HCP she replied "he's already sick of me"  Appreciate  consult: pt lacks medical decision making capacity; medical decision making will be deferred to pt's surrogate decision maker- Saeid Peña ([phone])    - 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)  Pt with h/o R eye glaucoma, but miosis can also be associated with Pancoast syndrome i/s/o extensive R upper lung tumor  Pt denies visual deficits or other related symptoms   MRI Brain without contrast ordered for further eval- pt continues to refuse, can be completed as outpt  Will continue to monitor closely    - Anemia in neoplastic disease  Hgb remains relatively stable  4/5 iron studies not c/w iron deficiency  VSS and pt without clinical s/s of active bleeding  4/17 hemolysis labs negative; 4/18 FOBT negative x2  Trending daily cbcs; continuing supportive transfusions prn (pt does not have h/o ACS, CAD, MI, goal hgb > 7; plts > 10K)    - Cancer related pain  Currently well controlled  Continuing Oxy ER 30mg q12 q8  Continuing Oxy IR 10mg q4/prn  Continuing Dilaudid IV prn for sev breakthrough pain  Continuing bowel regimen to prevent OIC (including Movantic)    - Severe prot-ghazala malnutrition: appreciate RD recs; continuing regular diet with protein shake supplement BID (SLP eval pending as above)

## 2024-05-10 NOTE — PROGRESS NOTE ADULT - SUBJECTIVE AND OBJECTIVE BOX
SOLID TUMOR ONCOLOGY HOSPITALIST PROGRESS NOTE    S: No acute events overnight.  Pt had no new complaints this am.    CURRENT MEDICATIONS  MEDICATIONS  (STANDING):  albuterol/ipratropium for Nebulization 3 milliLiter(s) Nebulizer every 6 hours  aMIOdarone    Tablet 200 milliGRAM(s) Oral daily  Biotene Dry Mouth Oral Rinse 15 milliLiter(s) Swish and Spit every 6 hours  budesonide 160 MICROgram(s)/formoterol 4.5 MICROgram(s) Inhaler 2 Puff(s) Inhalation two times a day  cefepime   IVPB 2000 milliGRAM(s) IV Intermittent every 12 hours  chlorhexidine 2% Cloths 1 Application(s) Topical daily  diazepam    Tablet 5 milliGRAM(s) Oral at bedtime  enoxaparin Injectable 50 milliGRAM(s) SubCutaneous every 12 hours  fluconAZOLE   Tablet 100 milliGRAM(s) Oral daily  influenza   Vaccine 0.5 milliLiter(s) IntraMuscular once  metoclopramide Injectable 10 milliGRAM(s) IV Push every 8 hours  metoprolol tartrate 25 milliGRAM(s) Oral every 12 hours  multivitamin 1 Tablet(s) Oral daily  naloxegol 25 milliGRAM(s) Oral daily  nicotine -  14 mG/24Hr(s) Patch 1 Patch Transdermal every 24 hours  oxyCODONE  ER Tablet 30 milliGRAM(s) Oral <User Schedule>  pantoprazole    Tablet 40 milliGRAM(s) Oral two times a day  polyethylene glycol 3350 17 Gram(s) Oral every 12 hours  sodium chloride 0.65% Nasal 1 Spray(s) Both Nostrils two times a day  trimethoprim  160 mG/sulfamethoxazole 800 mG 1 Tablet(s) Oral <User Schedule>  MEDICATIONS  (PRN):  aluminum hydroxide/magnesium hydroxide/simethicone Suspension 30 milliLiter(s) Oral every 6 hours PRN Dyspepsia  benzocaine/menthol Lozenge 1 Lozenge Oral two times a day PRN Sore Throat  Biotene Dry Mouth Oral Rinse 15 milliLiter(s) Swish and Spit two times a day PRN dry mouth  diazepam    Tablet 5 milliGRAM(s) Oral two times a day PRN anxiety  FIRST- Mouthwash  BLM 5 milliLiter(s) Swish and Spit four times a day PRN Mouth Care  ondansetron    Tablet 4 milliGRAM(s) Oral every 8 hours PRN Nausea and/or Vomiting  oxyCODONE    IR 10 milliGRAM(s) Oral every 4 hours PRN Moderate Pain (4 - 6)  sodium chloride 0.9% lock flush 10 milliLiter(s) IV Push every 1 hour PRN Pre/post blood products, medications, blood draw, and to maintain line patency      PHYSICAL EXAM  T(C): 36.7 (05-10-24 @ 05:30), Max: 36.8 (05-09-24 @ 17:00)  HR: 90 (05-10-24 @ 15:05) (83 - 98)  BP: 113/56 (05-10-24 @ 05:30) (113/56 - 134/87)  RR: 20 (05-10-24 @ 15:05) (18 - 20)  SpO2: 97% (05-10-24 @ 15:05) (94% - 97%)    05-09-24 @ 07:01  -  05-10-24 @ 07:00  --------------------------------------------------------  IN: 0 mL / OUT: 350 mL / NET: -350 mL  Non-toxic appearing woman, sitting up in bed, appears comfortable, speaking in full sentences without conversational dyspnea, aaox3  Voice hoarse  R eyelid ptosis and R pupil miosis present; anicteric sclera, no oral lesions/thrush  RRR, no m/r/g  Breaths non-labored; diminished breath sounds in all R lung zones; LL zones CTA  Abd soft/nt/nd, normoactive bowel sounds  Trace LUE non-pitting edema present; large area of ecchymosis extending from the post R calf to post R thigh- improving; generalized muscular atrophy in all 4 extremities  CN 2-12 grossly intact; no gross focal neuro deficits; pt ambulates with walker      LABS                        9.7    19.64 )-----------( 377      ( 10 May 2024 06:05 )             32.5     05-10    129<L>  |  89<L>  |  20  ----------------------------<  x   4.5   |  22  |  0.95    Ca    9.0      10 May 2024 15:00  Phos  3.3     05-10  Mg     2.00     05-10    TPro  4.7<L>  /  Alb  2.2<L>  /  TBili  0.2  /  DBili  x   /  AST  18  /  ALT  42<H>  /  AlkPhos  119  05-10      PATHOLOGY  3/26 pleural fluid cytology: Neg for malignant cells.    3/26 Pericardium excision: Neg for malignancy.    3/20 Pericardial fluid cytology: Neg for malignant cells.    3/14 LYMPH NODE, SUPRACLAVICULAR, RIGHT, US GUIDED CORE BIOPSY AND FNA   POSITIVE FOR MALIGNANT CELLS.   Carcinoma   Touch Prep slides display crowded groups and single lying malignant   cells with enlarged nuclei containing prominent nucleoli and vacuolated   cytoplasm. Core biopsies show neoplastic cells positive for CK7 and CDX2   immunostains, while negative for CK20, TTF1, GATA3, TRPS1 and PAX8. 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 Suggest correlation with clinical information and imaging to   assist with next step management.   Moleculars: pMMR, PD-L1 TPS 1%; Her2 pending      TUMOR MARKERS  3/13 CEA 7.1  3/24 Ca 19-9 27  4/8 Ca-125 187, Ca 27.29 16.9, Ca 15-3 18.9, AFP 5.2      MICROBIOLOGY  5/1 Procal 0.44  Abx  Cefepime 5/1-present  Fluconazole 5/6-present  Nystatin 5/4-6  Bactrim 4/8-present    Cx Data  4/5 MRSA swab: Not detected  3/16 Quant plus TB: Negative      PERTINENT RADIOLOGY  5/6 CXR: Right-sided Pleurx catheter with decreased effusion.    5/4 CXR: There is a catheter at the right base.  HEART: normal in size.  LUNGS: There is opacity at the right base compatible with   effusion/infiltrate.. Prominent interstitial markings, likely secondary   to chronic underlying lung disease..  BONES: degenerative changes    5/4 R elbow XR: Linear lucency in the coronoid process seen only on the lateral view   concerning for nondisplaced fracture. Consider cross-sectional imaging of   the elbow with CT or MRI for further evaluation.    5/3 CT c/a/p with IV contrast: Persistent right middle lobe consolidation and decreased right lower lobe consolidation. New patchy nodular opacities in the right upper lobe and increased patchy and groundglass opacities in the left lung. Small right pleural effusion, unchanged. Trace left pleural effusion, decreased. No evidence of metastatic disease in the abdomen or pelvis.    5/1 CXR:  Small right effusion unchanged with Pleurx catheter.    4/19 MRCP: Motion degraded study, particularly on postcontrast sequences.  Pancreatic neck T2 hyperintense lesion measures 16 mm, image 28 series 5,   without clear evidence of enhancement on postcontrast phases. Most likely   this is a side branch IPMN. Remainder of the pancreas is suboptimally   evaluated due to artifact. Follow-up MRI abdomen or pancreas protocol CT   can be considered in 3-6 months for reevaluation. Partially distended stomach. Duodenal diverticulum. Colon is also partially distended with mild to moderate stool burden. Correlate   clinically. Trace pleural effusions. Right-sided pleural catheter is partially   imaged. Lung parenchyma is incompletely characterized on MRI. Soft tissue   of the mediastinum is not adequately imaged on this study.    4/18 B/L LE Doppler: Acute deep venous thrombosis: above the knee.  Acute nonocclusive deep venous thrombosis in the right common femoral vein.    417 CXR: Clear lungs with minimal effusion and Pleurx catheter.    4/10 VA dopplers B/L UE: Extensive acute, occlusive DVT RIJ, innominate and subclavian veins.  Acute non-occlusive DVT w/in R axillary vein.  Extensive acute, occlusive DVT noted in L subclavian. axillary, and proximal brachial veins.  Acute non-occlusive DVT LIJ.  Superficial vein thromboses are noted in the B/L cephalic veins.    4/9 Echocardiogram: Normal LV systolic function.  Normal mitral valve w/ normal leaflet excursion.  No pericardial effusion seen    4/4 CXR: Clear lungs w/ R pleurx catheter in place.    ok4/4 CXR AM: Small R pleural effusion w/ pleurx cath improved    [facility]H Imaging ([facility])  3/29 CXR: Decrease in R pleural effusion.     3/28 B/L UE Dupplers: Acute DVT involving R IJ, innominate vein, subclavian, brachial, and L IJ.  Superficial venous thrombosis involving B/L cephalic veins.  + Edema of superficial soft tissue of UE R>L.    3/27 CT neck: Bulky and conglomerate LAD invading RIJ w/ thrombus extending up to hyoid level.  Thrombus is likely combination tumor and bland.  New LIJ nonocclusive thrombus.    3/27 CT Chest: New consolidations throughout the R lung worse RLL c/f aspiration PNA.  + R pleurx cath w/ decrease in R pleural effusion.  Extensive DVT w/in thoracic and lower neck, upper SVC remains obliterated.  Large R supraclavicular mass infiltrating into the mediastinum.      3/27 CXR: Progression of R consolidation/effusion.    3/21 TTE: LV grossly normal.  Thickened pericardium.  no pericardial effusion.    3/18 TTE: Mod pericardial effusion w/ e/o hemodynamic compromise w/ diasolic collapse of RA that exceeds 1/3 of cardiac cycle >30% resp variation across MV E. wave and early diastolic inversion of RV.    3/14 CT Chest: Conglomerate soft tissue in superior mediastinum and R supraclavicular region 2/2 LAD w/ internal hypodensity c/f necrosis.  Mass encases SVC and multiple great veins resulting in obliteration of the SVC and thrombosis of the RIJ.  Multiple R sided collateral vessels and R soft tissue edema.  B/L pulm nodules.    3/14 CT neck: Extensive cellulitis/myositis along R anterior lateral neck and R upper chest wall w/ inflammatory fat induration the deep soft tissue of neck.  Large supraclavicular/mediastinal mass lesion, presumably conglomerate of LN w/ mass effect and complete occlusion of RIJ w/ associated inflammation.  Complete occlusion of R subclavian vein and nearly occlusive thrombosis in the proximal L brachiocephalic vein.    3/13 RUQ Abd US: Cholelithiasis w/o e/o cholecystitis.  Dilated CBD w/o e/o intraductal stone or other obstruction. 1.4cm pancreatic cyst w/ mild duct dilatation.    3/12 CT abd/pelv: S/p R nephrectomy. no LAD.  New 1.6cm  pancreatic neck cystic lesion w/o main pancreatic ductal dilatation.    3/10 R breast US: No e/o breast abscess      RECENT ENDOSCOPY  5/4 Flex laryngoscopy    -- Nasopharynx had no mass or exudate.    -- Base of tongue was symmetric and not enlarged.    -- Vallecula was clear    -- Epiglottis, both aryepiglottic folds and both false vocal folds were normal    -- Arytenoids both without edema and erythema     -- True vocal folds were fully mobile and without lesions. possible R VC hypomobility, Incomplete glottic closure    -- Post cricoid area was clear.    -- Interarytenoid edema was absent    4/26 Upper EUS  - Normal esophagus.  - A large amount of food (residue) inthe stomach. Procedure was aborted.    4/24 Upper EUS   - Fluid in the middle third of the esophagus and in the lower third of the esophagus.  - A large amount of food (residue) in the stomach so EGD was aborted and EUS was not attempted.  - No specimens collected.

## 2024-05-11 LAB
ALBUMIN SERPL ELPH-MCNC: 2.3 G/DL — LOW (ref 3.3–5)
ALP SERPL-CCNC: 113 U/L — SIGNIFICANT CHANGE UP (ref 40–120)
ALT FLD-CCNC: 35 U/L — HIGH (ref 4–33)
ANION GAP SERPL CALC-SCNC: 13 MMOL/L — SIGNIFICANT CHANGE UP (ref 7–14)
AST SERPL-CCNC: 12 U/L — SIGNIFICANT CHANGE UP (ref 4–32)
BASOPHILS # BLD AUTO: 0.13 K/UL — SIGNIFICANT CHANGE UP (ref 0–0.2)
BASOPHILS NFR BLD AUTO: 0.8 % — SIGNIFICANT CHANGE UP (ref 0–2)
BILIRUB SERPL-MCNC: 0.2 MG/DL — SIGNIFICANT CHANGE UP (ref 0.2–1.2)
BUN SERPL-MCNC: 21 MG/DL — SIGNIFICANT CHANGE UP (ref 7–23)
CALCIUM SERPL-MCNC: 8.7 MG/DL — SIGNIFICANT CHANGE UP (ref 8.4–10.5)
CHLORIDE SERPL-SCNC: 96 MMOL/L — LOW (ref 98–107)
CO2 SERPL-SCNC: 21 MMOL/L — LOW (ref 22–31)
CREAT SERPL-MCNC: 0.88 MG/DL — SIGNIFICANT CHANGE UP (ref 0.5–1.3)
EGFR: 75 ML/MIN/1.73M2 — SIGNIFICANT CHANGE UP
EOSINOPHIL # BLD AUTO: 0.03 K/UL — SIGNIFICANT CHANGE UP (ref 0–0.5)
EOSINOPHIL NFR BLD AUTO: 0.2 % — SIGNIFICANT CHANGE UP (ref 0–6)
GLUCOSE SERPL-MCNC: 96 MG/DL — SIGNIFICANT CHANGE UP (ref 70–99)
HCT VFR BLD CALC: 27.1 % — LOW (ref 34.5–45)
HGB BLD-MCNC: 8.5 G/DL — LOW (ref 11.5–15.5)
IANC: 14.7 K/UL — HIGH (ref 1.8–7.4)
IMM GRANULOCYTES NFR BLD AUTO: 8.3 % — HIGH (ref 0–0.9)
LYMPHOCYTES # BLD AUTO: 0.31 K/UL — LOW (ref 1–3.3)
LYMPHOCYTES # BLD AUTO: 1.8 % — LOW (ref 13–44)
MAGNESIUM SERPL-MCNC: 2 MG/DL — SIGNIFICANT CHANGE UP (ref 1.6–2.6)
MCHC RBC-ENTMCNC: 28.6 PG — SIGNIFICANT CHANGE UP (ref 27–34)
MCHC RBC-ENTMCNC: 31.4 GM/DL — LOW (ref 32–36)
MCV RBC AUTO: 91.2 FL — SIGNIFICANT CHANGE UP (ref 80–100)
MONOCYTES # BLD AUTO: 0.45 K/UL — SIGNIFICANT CHANGE UP (ref 0–0.9)
MONOCYTES NFR BLD AUTO: 2.6 % — SIGNIFICANT CHANGE UP (ref 2–14)
NEUTROPHILS # BLD AUTO: 14.7 K/UL — HIGH (ref 1.8–7.4)
NEUTROPHILS NFR BLD AUTO: 86.3 % — HIGH (ref 43–77)
NRBC # BLD: 0 /100 WBCS — SIGNIFICANT CHANGE UP (ref 0–0)
NRBC # FLD: 0 K/UL — SIGNIFICANT CHANGE UP (ref 0–0)
PHOSPHATE SERPL-MCNC: 2.9 MG/DL — SIGNIFICANT CHANGE UP (ref 2.5–4.5)
PLATELET # BLD AUTO: 360 K/UL — SIGNIFICANT CHANGE UP (ref 150–400)
POTASSIUM SERPL-MCNC: 4.1 MMOL/L — SIGNIFICANT CHANGE UP (ref 3.5–5.3)
POTASSIUM SERPL-SCNC: 4.1 MMOL/L — SIGNIFICANT CHANGE UP (ref 3.5–5.3)
PROT SERPL-MCNC: 4.9 G/DL — LOW (ref 6–8.3)
RBC # BLD: 2.97 M/UL — LOW (ref 3.8–5.2)
RBC # FLD: 16.4 % — HIGH (ref 10.3–14.5)
SODIUM SERPL-SCNC: 130 MMOL/L — LOW (ref 135–145)
WBC # BLD: 17.04 K/UL — HIGH (ref 3.8–10.5)
WBC # FLD AUTO: 17.04 K/UL — HIGH (ref 3.8–10.5)

## 2024-05-11 PROCEDURE — 99233 SBSQ HOSP IP/OBS HIGH 50: CPT

## 2024-05-11 RX ADMIN — Medication 1 PATCH: at 04:26

## 2024-05-11 RX ADMIN — Medication 3 MILLILITER(S): at 20:26

## 2024-05-11 RX ADMIN — POLYETHYLENE GLYCOL 3350 17 GRAM(S): 17 POWDER, FOR SOLUTION ORAL at 04:33

## 2024-05-11 RX ADMIN — Medication 25 MILLIGRAM(S): at 17:00

## 2024-05-11 RX ADMIN — PANTOPRAZOLE SODIUM 40 MILLIGRAM(S): 20 TABLET, DELAYED RELEASE ORAL at 04:32

## 2024-05-11 RX ADMIN — OXYCODONE HYDROCHLORIDE 30 MILLIGRAM(S): 5 TABLET ORAL at 01:19

## 2024-05-11 RX ADMIN — Medication 2 MILLIGRAM(S): at 04:36

## 2024-05-11 RX ADMIN — OXYCODONE HYDROCHLORIDE 30 MILLIGRAM(S): 5 TABLET ORAL at 13:59

## 2024-05-11 RX ADMIN — Medication 1 SPRAY(S): at 17:32

## 2024-05-11 RX ADMIN — Medication 15 MILLILITER(S): at 12:33

## 2024-05-11 RX ADMIN — Medication 25 MILLIGRAM(S): at 04:33

## 2024-05-11 RX ADMIN — Medication 5 MILLIGRAM(S): at 23:43

## 2024-05-11 RX ADMIN — POLYETHYLENE GLYCOL 3350 17 GRAM(S): 17 POWDER, FOR SOLUTION ORAL at 17:28

## 2024-05-11 RX ADMIN — OXYCODONE HYDROCHLORIDE 30 MILLIGRAM(S): 5 TABLET ORAL at 04:50

## 2024-05-11 RX ADMIN — BUDESONIDE AND FORMOTEROL FUMARATE DIHYDRATE 2 PUFF(S): 160; 4.5 AEROSOL RESPIRATORY (INHALATION) at 22:36

## 2024-05-11 RX ADMIN — PANTOPRAZOLE SODIUM 40 MILLIGRAM(S): 20 TABLET, DELAYED RELEASE ORAL at 17:27

## 2024-05-11 RX ADMIN — AMIODARONE HYDROCHLORIDE 200 MILLIGRAM(S): 400 TABLET ORAL at 04:33

## 2024-05-11 RX ADMIN — Medication 3 MILLILITER(S): at 10:39

## 2024-05-11 RX ADMIN — Medication 15 MILLILITER(S): at 04:35

## 2024-05-11 RX ADMIN — ENOXAPARIN SODIUM 50 MILLIGRAM(S): 100 INJECTION SUBCUTANEOUS at 17:27

## 2024-05-11 RX ADMIN — FLUCONAZOLE 100 MILLIGRAM(S): 150 TABLET ORAL at 12:34

## 2024-05-11 RX ADMIN — OXYCODONE HYDROCHLORIDE 30 MILLIGRAM(S): 5 TABLET ORAL at 22:38

## 2024-05-11 RX ADMIN — Medication 15 MILLILITER(S): at 23:44

## 2024-05-11 RX ADMIN — CHLORHEXIDINE GLUCONATE 1 APPLICATION(S): 213 SOLUTION TOPICAL at 12:34

## 2024-05-11 RX ADMIN — ENOXAPARIN SODIUM 50 MILLIGRAM(S): 100 INJECTION SUBCUTANEOUS at 04:33

## 2024-05-11 RX ADMIN — NALOXEGOL OXALATE 25 MILLIGRAM(S): 12.5 TABLET, FILM COATED ORAL at 12:34

## 2024-05-11 RX ADMIN — Medication 1 TABLET(S): at 12:34

## 2024-05-11 RX ADMIN — Medication 3 MILLILITER(S): at 02:37

## 2024-05-11 RX ADMIN — Medication 1 SPRAY(S): at 04:34

## 2024-05-11 RX ADMIN — Medication 15 MILLILITER(S): at 17:30

## 2024-05-11 RX ADMIN — Medication 1 PATCH: at 12:36

## 2024-05-11 RX ADMIN — BUDESONIDE AND FORMOTEROL FUMARATE DIHYDRATE 2 PUFF(S): 160; 4.5 AEROSOL RESPIRATORY (INHALATION) at 12:46

## 2024-05-11 RX ADMIN — CEFEPIME 100 MILLIGRAM(S): 1 INJECTION, POWDER, FOR SOLUTION INTRAMUSCULAR; INTRAVENOUS at 04:34

## 2024-05-11 NOTE — PROGRESS NOTE ADULT - SUBJECTIVE AND OBJECTIVE BOX
Patient is a 58y old  Female who presents with a chief complaint of SVC syndrome, DVT, mediastinal mass (09 May 2024 09:37)      SUBJECTIVE / OVERNIGHT EVENTS: Pt seen and examined at 11:50am, no overnight events, pt with hoarse voice, communicates by writing, upset that she was on liquid diet before and does not help for her health, no other complaints, PCA at bedside. No other new issues reported.    MEDICATIONS  (STANDING):  albuterol/ipratropium for Nebulization 3 milliLiter(s) Nebulizer every 6 hours  aMIOdarone    Tablet 200 milliGRAM(s) Oral daily  Biotene Dry Mouth Oral Rinse 15 milliLiter(s) Swish and Spit every 6 hours  budesonide 160 MICROgram(s)/formoterol 4.5 MICROgram(s) Inhaler 2 Puff(s) Inhalation two times a day  cefepime   IVPB 2000 milliGRAM(s) IV Intermittent every 12 hours  chlorhexidine 2% Cloths 1 Application(s) Topical daily  dexAMETHasone     Tablet 2 milliGRAM(s) Oral daily  diazepam    Tablet 5 milliGRAM(s) Oral at bedtime  enoxaparin Injectable 50 milliGRAM(s) SubCutaneous every 12 hours  fluconAZOLE   Tablet 100 milliGRAM(s) Oral daily  influenza   Vaccine 0.5 milliLiter(s) IntraMuscular once  metoclopramide Injectable 10 milliGRAM(s) IV Push every 8 hours  metoprolol tartrate 25 milliGRAM(s) Oral every 12 hours  multivitamin 1 Tablet(s) Oral daily  naloxegol 25 milliGRAM(s) Oral daily  nicotine -  14 mG/24Hr(s) Patch 1 Patch Transdermal every 24 hours  oxyCODONE  ER Tablet 30 milliGRAM(s) Oral <User Schedule>  pantoprazole    Tablet 40 milliGRAM(s) Oral two times a day  polyethylene glycol 3350 17 Gram(s) Oral every 12 hours  sodium chloride 0.65% Nasal 1 Spray(s) Both Nostrils two times a day  trimethoprim  160 mG/sulfamethoxazole 800 mG 1 Tablet(s) Oral <User Schedule>    MEDICATIONS  (PRN):  aluminum hydroxide/magnesium hydroxide/simethicone Suspension 30 milliLiter(s) Oral every 6 hours PRN Dyspepsia  benzocaine/menthol Lozenge 1 Lozenge Oral two times a day PRN Sore Throat  Biotene Dry Mouth Oral Rinse 15 milliLiter(s) Swish and Spit two times a day PRN dry mouth  diazepam    Tablet 5 milliGRAM(s) Oral two times a day PRN anxiety  FIRST- Mouthwash  BLM 5 milliLiter(s) Swish and Spit four times a day PRN Mouth Care  ondansetron    Tablet 4 milliGRAM(s) Oral every 8 hours PRN Nausea and/or Vomiting  oxyCODONE    IR 10 milliGRAM(s) Oral every 4 hours PRN Moderate Pain (4 - 6)  sodium chloride 0.9% lock flush 10 milliLiter(s) IV Push every 1 hour PRN Pre/post blood products, medications, blood draw, and to maintain line patency      Vital Signs Last 24 Hrs  T(C): 36.8 (11 May 2024 16:08), Max: 36.8 (11 May 2024 16:08)  T(F): 98.3 (11 May 2024 16:08), Max: 98.3 (11 May 2024 16:08)  HR: 93 (11 May 2024 16:08) (82 - 93)  BP: 143/73 (11 May 2024 16:08) (110/61 - 143/73)  BP(mean): --  RR: 18 (11 May 2024 16:08) (16 - 20)  SpO2: 98% (11 May 2024 16:08) (95% - 100%)    Parameters below as of 11 May 2024 16:08  Patient On (Oxygen Delivery Method): nasal cannula, high flow      CAPILLARY BLOOD GLUCOSE      POCT Blood Glucose.: 174 mg/dL (10 May 2024 23:17)    I&O's Summary    11 May 2024 07:01  -  11 May 2024 16:38  --------------------------------------------------------  IN: 0 mL / OUT: 250 mL / NET: -250 mL        PHYSICAL EXAM:  GENERAL: NAD, Ill appearing female  CHEST/LUNG: Decreased bs bilaterally; No wheeze  HEART: Regular rate and rhythm  ABDOMEN: Soft, Nontender, Nondistended  EXTREMITIES: Trace LUE non-pitting edema present; large area of ecchymosis extending from the post R calf to post R thigh- improving; generalized muscular atrophy in all 4 extremities  PSYCH: upset  NEUROLOGY: AAOX3      LABS:                        8.5    17.04 )-----------( 360      ( 11 May 2024 06:38 )             27.1     05-11    130<L>  |  96<L>  |  21  ----------------------------<  96  4.1   |  21<L>  |  0.88    Ca    8.7      11 May 2024 06:38  Phos  2.9     05-11  Mg     2.00     05-11    TPro  4.9<L>  /  Alb  2.3<L>  /  TBili  0.2  /  DBili  x   /  AST  12  /  ALT  35<H>  /  AlkPhos  113  05-11          Urinalysis Basic - ( 11 May 2024 06:38 )    Color: x / Appearance: x / SG: x / pH: x  Gluc: 96 mg/dL / Ketone: x  / Bili: x / Urobili: x   Blood: x / Protein: x / Nitrite: x   Leuk Esterase: x / RBC: x / WBC x   Sq Epi: x / Non Sq Epi: x / Bacteria: x        RADIOLOGY & ADDITIONAL TESTS:    Imaging Personally Reviewed:    Consultant(s) Notes Reviewed:      Care Discussed with Consultants/Other Providers:

## 2024-05-11 NOTE — PROGRESS NOTE ADULT - ASSESSMENT
57 yo woman, former smoker, with h/o R eye glaucoma, Fibromyalgia, Anxiety, GERD, and RCC s/p R total nephrectomy/hysterectomy; she was initially admitted to [facility] on 3/11 w/ R breast swelling c/f mastitis- imaging brooks noted supraclavicular/mediastinal mass lesion which encased the SVC and multiple other veins resulting in obliteration of the SVC and thrombosis of the R IJ, and a pancreatic neck cystic lesion.  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  Her disease/hospital course has also been c/b pericardial effusion s/p pericardial window w/ neg cytology, R pleural effusion, also negative cytology) s/p Pleurx, Afib w/ RVR and acute hypoxic resp failure 2/2 SVC syndrome- not dennis to IR-guided stenting; pt was ultimately started on Dex and transferred to [facility] on 4/4 for urgent palliative RT which she completed on 4/18. Her hospital course has also been c/b pericardial effusion with tamponade s/p window and non malignant R pleural effusion s/p pleurex, acute b/l UE and RLE dvts, anxiety, and more recently, recurrent hypoxia.    ACTIVE PROBLEMS  Metastatic CUP (carcinoma of unknown primary)  Goals of care discussion, counseling  Acute-on-chronic hypoxic respiratory failure  Dysphonia with R VC hypomobility   Oral thrush  SVC syndrome  Extensive b/l UE DVTs  Acute RLE DVT a/w RLE ecchymosis  Hypercoag state  Pericardial effusion with tamp physiology s/p pericardial window  R pleural effusion s/p pleurex  pAfib, with RVR on this admission  Anxiety/emotional lability  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)  Cancer-related pain, anxiety  Severe prot-ghazala malnutrition    - Metastatic CUP (carcinoma of unknown primary)  - Goals of care discussion, counseling  Based on 3/14 SC LN path, ddx includes primary UGI vs pancreaticobiliary primary (breast edema is likely 2/2 SVC syndrome); PD-L1 TPs 1%, pMMR, Her2 2+, FISH pending, Ki-67 30%  Ca-125 moderately elevated at 125; other tumor markers not significantly elevated  Additional diagnostic brooks includes:   * 4/19 MRCP showing pancreatic lesion (? IPMN, but pancreas was suboptimally evaluated)     * 4/24 and 4/26 EGD/EUS aborted as pt had large amount of food in the stomach that prevented passing of scope   * 4/30 UGIS aborted as pt felt too short of breath when laying down flat (improved after her pleurex was drained  Case d/w Dr. 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 Pt is a suboptimal candidate i/s/o her progressive debility and malnutrition (ECOG PS 3)   >> Discussed risks/benefits of proceeding with palliative chemo (inpt) vs best supportive care with pt's HCP Saeid on 5/10- he is amenable to trial of inpt chemotherapy and understands that hospice will be recommended if pt does not tolerate trial of inpt chemo; planning to consent pt and proceed with dose modified FOLFOX on 5/13  Pt's long term prognosis remains guarded    - Acute-on-chronic hypoxic resp failure  - Multifocal aspiration vs hosp-acquired pneumonia  Pt on high flow 40/50, wean as tolerated  Chest imaging from 5/1, 5/4 more c/w and infectious process  Pt remains afebrile; procal relatively stable  Continuing empiric Cefepime for 10d course, until 5/10, dc cefepime  Tapering off Dex over the next week (continuing pjp/ppi ppx until taper is complete)  Continuing Symbicort 2 puffs BID  Continuing Duonebs q6/prn  Will continue routine pleurex drainage as below    - Dysphonia with R VC hypomobility  - Oral thrush  Appreciate ENT eval/recs: 5/4 Flex ellis pt with R VC hypomobility  Pt going for cinesophagram today; will fu with SLP recs  Continuing Fluconazole x7d course, until 5/12 (trending transaminases)    - SVC syndrome  Appreciate Rad Onc eval/recs  Completed 10fxs of palliative RT on 4/18   Tapering steroids as above     - Extensive b/l UE DVTs  - Acute RLE DVT a/w RLE ecchymosis  - Hypercoag state  Continuing therapeutic lovenox, benefits > risks   * Of note: pt has poor UE IV access and currently requires peripheral IV in her LE extremities for admin of medication; can consider FV central line for urgent/emergent IV needs    - Pericardial effusion with tamp physiology s/p pericardial window  - R pleural effusion s/p pleurex  Likely inflammatory; both pleural and pericardial fluid cytology are negative for malignant cells  4/9 surveillance TTE with pEF and no WMAs  Continuing R pleurex drainage- up to 500cc q48h/prn    - pAfib  Pt currently SR, HR controlled  Likely precipitated by malignancy, pericardial effusion, SVC syndrome  Continuing Metoprolol 25mg q12 with holding parameters  Continuing Amio 200mg daily (monitoring for toxicities)  Continuing telemetry    - Anxiety/emotional lability  Continuing Diazepam 5mg BID/prn and nightly  Pt has poor health literacy and clinical insight which is negatively impacting her medical decision making- when ask if she wanted to defer medical decision making to her boyfriend or if she had a designated HCP she replied "he's already sick of me"  Appreciate  consult: pt lacks medical decision making capacity; medical decision making will be deferred to pt's surrogate decision maker- Saeid Peña ([phone])    - 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)  Pt with h/o R eye glaucoma, but miosis can also be associated with Pancoast syndrome i/s/o extensive R upper lung tumor  Pt denies visual deficits or other related symptoms   MRI Brain without contrast ordered for further eval- pt continues to refuse, can be completed as outpt  Will continue to monitor closely    - Anemia in neoplastic disease  Hgb remains relatively stable  4/5 iron studies not c/w iron deficiency  VSS and pt without clinical s/s of active bleeding  4/17 hemolysis labs negative; 4/18 FOBT negative x2  Trending daily cbcs; continuing supportive transfusions prn (pt does not have h/o ACS, CAD, MI, goal hgb > 7; plts > 10K)    - Cancer related pain  Currently well controlled  Continuing Oxy ER 30mg q12 q8  Continuing Oxy IR 10mg q4/prn  Continuing Dilaudid IV prn for sev breakthrough pain  Continuing bowel regimen to prevent OIC (including Movantic)    - Severe prot-ghazala malnutrition: appreciate RD recs; continuing regular diet with protein shake supplement BID (SLP eval pending as above)    Plan discussed with ACP 59 yo woman, former smoker, with h/o R eye glaucoma, Fibromyalgia, Anxiety, GERD, and RCC s/p R total nephrectomy/hysterectomy; she was initially admitted to [facility] on 3/11 w/ R breast swelling c/f mastitis- imaging brooks noted supraclavicular/mediastinal mass lesion which encased the SVC and multiple other veins resulting in obliteration of the SVC and thrombosis of the R IJ, and a pancreatic neck cystic lesion.  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  Her disease/hospital course has also been c/b pericardial effusion s/p pericardial window w/ neg cytology, R pleural effusion, also negative cytology) s/p Pleurx, Afib w/ RVR and acute hypoxic resp failure 2/2 SVC syndrome- not dennis to IR-guided stenting; pt was ultimately started on Dex and transferred to [facility] on 4/4 for urgent palliative RT which she completed on 4/18. Her hospital course has also been c/b pericardial effusion with tamponade s/p window and non malignant R pleural effusion s/p pleurex, acute b/l UE and RLE dvts, anxiety, and more recently, recurrent hypoxia.    ACTIVE PROBLEMS  Metastatic CUP (carcinoma of unknown primary)  Goals of care discussion, counseling  Acute-on-chronic hypoxic respiratory failure  Dysphonia with R VC hypomobility   Oral thrush  SVC syndrome  Extensive b/l UE DVTs  Acute RLE DVT a/w RLE ecchymosis  Hypercoag state  Pericardial effusion with tamp physiology s/p pericardial window  R pleural effusion s/p pleurex  pAfib, with RVR on this admission  Anxiety/emotional lability  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)  Cancer-related pain, anxiety  Severe prot-ghazala malnutrition    - Metastatic CUP (carcinoma of unknown primary)  - Goals of care discussion, counseling  Based on 3/14 SC LN path, ddx includes primary UGI vs pancreaticobiliary primary (breast edema is likely 2/2 SVC syndrome); PD-L1 TPs 1%, pMMR, Her2 2+, FISH pending, Ki-67 30%  Ca-125 moderately elevated at 125; other tumor markers not significantly elevated  Additional diagnostic brooks includes:   * 4/19 MRCP showing pancreatic lesion (? IPMN, but pancreas was suboptimally evaluated)     * 4/24 and 4/26 EGD/EUS aborted as pt had large amount of food in the stomach that prevented passing of scope   * 4/30 UGIS aborted as pt felt too short of breath when laying down flat (improved after her pleurex was drained  Case d/w Dr. 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 Pt is a suboptimal candidate i/s/o her progressive debility and malnutrition (ECOG PS 3)   >> Discussed risks/benefits of proceeding with palliative chemo (inpt) vs best supportive care with pt's HCP Saeid on 5/10- he is amenable to trial of inpt chemotherapy and understands that hospice will be recommended if pt does not tolerate trial of inpt chemo; planning to consent pt and proceed with dose modified FOLFOX on 5/13  Pt's long term prognosis remains guarded    - Acute-on-chronic hypoxic resp failure  - Multifocal aspiration vs hosp-acquired pneumonia  Pt on high flow 40/50, wean as tolerated  Chest imaging from 5/1, 5/4 more c/w and infectious process  Pt remains afebrile; procal relatively stable  Continuing empiric Cefepime for 10d course, until 5/10, dc cefepime  Tapering off Dex over the next week (continuing pjp/ppi ppx until taper is complete)  Continuing Symbicort 2 puffs BID  Continuing Duonebs q6/prn  Will continue routine pleurex drainage as below    - Dysphonia with R VC hypomobility  - Oral thrush  Appreciate ENT eval/recs: 5/4 Flex ellis pt with R VC hypomobility  Continuing Fluconazole x7d course, until 5/12 (trending transaminases)    - SVC syndrome  Appreciate Rad Onc eval/recs  Completed 10fxs of palliative RT on 4/18   Tapering steroids as above     - Extensive b/l UE DVTs  - Acute RLE DVT a/w RLE ecchymosis  - Hypercoag state  Continuing therapeutic lovenox, benefits > risks   * Of note: pt has poor UE IV access and currently requires peripheral IV in her LE extremities for admin of medication; can consider FV central line for urgent/emergent IV needs    - Pericardial effusion with tamp physiology s/p pericardial window  - R pleural effusion s/p pleurex  Likely inflammatory; both pleural and pericardial fluid cytology are negative for malignant cells  4/9 surveillance TTE with pEF and no WMAs  Continuing R pleurex drainage- up to 500cc q48h/prn    - pAfib  Pt currently SR, HR controlled  Likely precipitated by malignancy, pericardial effusion, SVC syndrome  Continuing Metoprolol 25mg q12 with holding parameters  Continuing Amio 200mg daily (monitoring for toxicities)  Continuing telemetry    - Anxiety/emotional lability  Continuing Diazepam 5mg BID/prn and nightly  Pt has poor health literacy and clinical insight which is negatively impacting her medical decision making- when ask if she wanted to defer medical decision making to her boyfriend or if she had a designated HCP she replied "he's already sick of me"  Appreciate  consult: pt lacks medical decision making capacity; medical decision making will be deferred to pt's surrogate decision maker- Saeid Peña ([phone])    - 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)  Pt with h/o R eye glaucoma, but miosis can also be associated with Pancoast syndrome i/s/o extensive R upper lung tumor  Pt denies visual deficits or other related symptoms   MRI Brain without contrast ordered for further eval- pt continues to refuse, can be completed as outpt  Will continue to monitor closely    - Anemia in neoplastic disease  Hgb remains relatively stable  4/5 iron studies not c/w iron deficiency  VSS and pt without clinical s/s of active bleeding  4/17 hemolysis labs negative; 4/18 FOBT negative x2  Trending daily cbcs; continuing supportive transfusions prn (pt does not have h/o ACS, CAD, MI, goal hgb > 7; plts > 10K)    - Cancer related pain  Currently well controlled  Continuing Oxy ER 30mg q12 q8  Continuing Oxy IR 10mg q4/prn  Continuing Dilaudid IV prn for sev breakthrough pain  Continuing bowel regimen to prevent OIC (including Movantic)    - Severe prot-ghazala malnutrition: appreciate RD recs; continuing regular diet with protein shake supplement BID (SLP eval pending as above)    Plan discussed with ACP

## 2024-05-12 LAB
ALBUMIN SERPL ELPH-MCNC: 2.6 G/DL — LOW (ref 3.3–5)
ALP SERPL-CCNC: 107 U/L — SIGNIFICANT CHANGE UP (ref 40–120)
ALT FLD-CCNC: 37 U/L — HIGH (ref 4–33)
ANION GAP SERPL CALC-SCNC: 14 MMOL/L — SIGNIFICANT CHANGE UP (ref 7–14)
AST SERPL-CCNC: 10 U/L — SIGNIFICANT CHANGE UP (ref 4–32)
BASOPHILS # BLD AUTO: 0.02 K/UL — SIGNIFICANT CHANGE UP (ref 0–0.2)
BASOPHILS NFR BLD AUTO: 0.1 % — SIGNIFICANT CHANGE UP (ref 0–2)
BILIRUB SERPL-MCNC: <0.2 MG/DL — SIGNIFICANT CHANGE UP (ref 0.2–1.2)
BUN SERPL-MCNC: 24 MG/DL — HIGH (ref 7–23)
CALCIUM SERPL-MCNC: 9.2 MG/DL — SIGNIFICANT CHANGE UP (ref 8.4–10.5)
CHLORIDE SERPL-SCNC: 100 MMOL/L — SIGNIFICANT CHANGE UP (ref 98–107)
CO2 SERPL-SCNC: 21 MMOL/L — LOW (ref 22–31)
CREAT SERPL-MCNC: 0.93 MG/DL — SIGNIFICANT CHANGE UP (ref 0.5–1.3)
EGFR: 70 ML/MIN/1.73M2 — SIGNIFICANT CHANGE UP
EOSINOPHIL # BLD AUTO: 0.06 K/UL — SIGNIFICANT CHANGE UP (ref 0–0.5)
EOSINOPHIL NFR BLD AUTO: 0.3 % — SIGNIFICANT CHANGE UP (ref 0–6)
GLUCOSE SERPL-MCNC: 86 MG/DL — SIGNIFICANT CHANGE UP (ref 70–99)
HCT VFR BLD CALC: 30.9 % — LOW (ref 34.5–45)
HGB BLD-MCNC: 8.9 G/DL — LOW (ref 11.5–15.5)
IANC: 17.42 K/UL — HIGH (ref 1.8–7.4)
IMM GRANULOCYTES NFR BLD AUTO: 8.7 % — HIGH (ref 0–0.9)
LYMPHOCYTES # BLD AUTO: 0.52 K/UL — LOW (ref 1–3.3)
LYMPHOCYTES # BLD AUTO: 2.5 % — LOW (ref 13–44)
MAGNESIUM SERPL-MCNC: 1.8 MG/DL — SIGNIFICANT CHANGE UP (ref 1.6–2.6)
MCHC RBC-ENTMCNC: 28.5 PG — SIGNIFICANT CHANGE UP (ref 27–34)
MCHC RBC-ENTMCNC: 28.8 GM/DL — LOW (ref 32–36)
MCV RBC AUTO: 99 FL — SIGNIFICANT CHANGE UP (ref 80–100)
MONOCYTES # BLD AUTO: 0.61 K/UL — SIGNIFICANT CHANGE UP (ref 0–0.9)
MONOCYTES NFR BLD AUTO: 3 % — SIGNIFICANT CHANGE UP (ref 2–14)
NEUTROPHILS # BLD AUTO: 17.42 K/UL — HIGH (ref 1.8–7.4)
NEUTROPHILS NFR BLD AUTO: 85.4 % — HIGH (ref 43–77)
NRBC # BLD: 0 /100 WBCS — SIGNIFICANT CHANGE UP (ref 0–0)
NRBC # FLD: 0 K/UL — SIGNIFICANT CHANGE UP (ref 0–0)
PHOSPHATE SERPL-MCNC: 2.4 MG/DL — LOW (ref 2.5–4.5)
PLATELET # BLD AUTO: 402 K/UL — HIGH (ref 150–400)
POTASSIUM SERPL-MCNC: 4.6 MMOL/L — SIGNIFICANT CHANGE UP (ref 3.5–5.3)
POTASSIUM SERPL-SCNC: 4.6 MMOL/L — SIGNIFICANT CHANGE UP (ref 3.5–5.3)
PROT SERPL-MCNC: 5.5 G/DL — LOW (ref 6–8.3)
RBC # BLD: 3.12 M/UL — LOW (ref 3.8–5.2)
RBC # FLD: 16.6 % — HIGH (ref 10.3–14.5)
SODIUM SERPL-SCNC: 135 MMOL/L — SIGNIFICANT CHANGE UP (ref 135–145)
WBC # BLD: 20.4 K/UL — HIGH (ref 3.8–10.5)
WBC # FLD AUTO: 20.4 K/UL — HIGH (ref 3.8–10.5)

## 2024-05-12 PROCEDURE — 99232 SBSQ HOSP IP/OBS MODERATE 35: CPT

## 2024-05-12 RX ORDER — SODIUM,POTASSIUM PHOSPHATES 278-250MG
1 POWDER IN PACKET (EA) ORAL
Refills: 0 | Status: COMPLETED | OUTPATIENT
Start: 2024-05-12 | End: 2024-05-12

## 2024-05-12 RX ADMIN — PANTOPRAZOLE SODIUM 40 MILLIGRAM(S): 20 TABLET, DELAYED RELEASE ORAL at 06:53

## 2024-05-12 RX ADMIN — Medication 1 PACKET(S): at 09:56

## 2024-05-12 RX ADMIN — NALOXEGOL OXALATE 25 MILLIGRAM(S): 12.5 TABLET, FILM COATED ORAL at 14:02

## 2024-05-12 RX ADMIN — POLYETHYLENE GLYCOL 3350 17 GRAM(S): 17 POWDER, FOR SOLUTION ORAL at 18:46

## 2024-05-12 RX ADMIN — Medication 3 MILLILITER(S): at 10:06

## 2024-05-12 RX ADMIN — Medication 1 TABLET(S): at 14:02

## 2024-05-12 RX ADMIN — ENOXAPARIN SODIUM 50 MILLIGRAM(S): 100 INJECTION SUBCUTANEOUS at 06:54

## 2024-05-12 RX ADMIN — Medication 1 PATCH: at 14:30

## 2024-05-12 RX ADMIN — CHLORHEXIDINE GLUCONATE 1 APPLICATION(S): 213 SOLUTION TOPICAL at 14:07

## 2024-05-12 RX ADMIN — FLUCONAZOLE 100 MILLIGRAM(S): 150 TABLET ORAL at 14:02

## 2024-05-12 RX ADMIN — AMIODARONE HYDROCHLORIDE 200 MILLIGRAM(S): 400 TABLET ORAL at 06:53

## 2024-05-12 RX ADMIN — BUDESONIDE AND FORMOTEROL FUMARATE DIHYDRATE 2 PUFF(S): 160; 4.5 AEROSOL RESPIRATORY (INHALATION) at 09:47

## 2024-05-12 RX ADMIN — Medication 25 MILLIGRAM(S): at 18:46

## 2024-05-12 RX ADMIN — Medication 1 PATCH: at 14:11

## 2024-05-12 RX ADMIN — Medication 3 MILLILITER(S): at 02:27

## 2024-05-12 RX ADMIN — Medication 15 MILLILITER(S): at 14:01

## 2024-05-12 RX ADMIN — Medication 1 PATCH: at 07:43

## 2024-05-12 RX ADMIN — Medication 2 MILLIGRAM(S): at 06:56

## 2024-05-12 RX ADMIN — ENOXAPARIN SODIUM 50 MILLIGRAM(S): 100 INJECTION SUBCUTANEOUS at 18:46

## 2024-05-12 RX ADMIN — Medication 1 PACKET(S): at 14:31

## 2024-05-12 RX ADMIN — Medication 5 MILLIGRAM(S): at 23:56

## 2024-05-12 RX ADMIN — OXYCODONE HYDROCHLORIDE 30 MILLIGRAM(S): 5 TABLET ORAL at 22:12

## 2024-05-12 RX ADMIN — OXYCODONE HYDROCHLORIDE 30 MILLIGRAM(S): 5 TABLET ORAL at 06:57

## 2024-05-12 RX ADMIN — Medication 15 MILLILITER(S): at 23:57

## 2024-05-12 RX ADMIN — OXYCODONE HYDROCHLORIDE 30 MILLIGRAM(S): 5 TABLET ORAL at 14:01

## 2024-05-12 RX ADMIN — Medication 3 MILLILITER(S): at 15:23

## 2024-05-12 RX ADMIN — Medication 15 MILLILITER(S): at 18:51

## 2024-05-12 RX ADMIN — BUDESONIDE AND FORMOTEROL FUMARATE DIHYDRATE 2 PUFF(S): 160; 4.5 AEROSOL RESPIRATORY (INHALATION) at 21:48

## 2024-05-12 RX ADMIN — Medication 3 MILLILITER(S): at 20:05

## 2024-05-12 RX ADMIN — PANTOPRAZOLE SODIUM 40 MILLIGRAM(S): 20 TABLET, DELAYED RELEASE ORAL at 18:45

## 2024-05-12 RX ADMIN — Medication 25 MILLIGRAM(S): at 06:54

## 2024-05-12 NOTE — PROGRESS NOTE ADULT - SUBJECTIVE AND OBJECTIVE BOX
Patient is a 58y old  Female who presents with a chief complaint of SVC syndrome, DVT, mediastinal mass (11 May 2024 16:37)      SUBJECTIVE / OVERNIGHT EVENTS: Pt seen and examined at 12:13pm, no overnight events, pt with hoarse voice, communicates by writing, upset that she was on liquid diet before and does not help for her health, no other complaints, PCA at bedside. No other new issues reported.        MEDICATIONS  (STANDING):  albuterol/ipratropium for Nebulization 3 milliLiter(s) Nebulizer every 6 hours  aMIOdarone    Tablet 200 milliGRAM(s) Oral daily  Biotene Dry Mouth Oral Rinse 15 milliLiter(s) Swish and Spit every 6 hours  budesonide 160 MICROgram(s)/formoterol 4.5 MICROgram(s) Inhaler 2 Puff(s) Inhalation two times a day  chlorhexidine 2% Cloths 1 Application(s) Topical daily  dexAMETHasone     Tablet 2 milliGRAM(s) Oral daily  diazepam    Tablet 5 milliGRAM(s) Oral at bedtime  enoxaparin Injectable 50 milliGRAM(s) SubCutaneous every 12 hours  influenza   Vaccine 0.5 milliLiter(s) IntraMuscular once  metoclopramide Injectable 10 milliGRAM(s) IV Push every 8 hours  metoprolol tartrate 25 milliGRAM(s) Oral every 12 hours  multivitamin 1 Tablet(s) Oral daily  naloxegol 25 milliGRAM(s) Oral daily  nicotine -  14 mG/24Hr(s) Patch 1 Patch Transdermal every 24 hours  oxyCODONE  ER Tablet 30 milliGRAM(s) Oral <User Schedule>  pantoprazole    Tablet 40 milliGRAM(s) Oral two times a day  polyethylene glycol 3350 17 Gram(s) Oral every 12 hours  sodium chloride 0.65% Nasal 1 Spray(s) Both Nostrils two times a day  trimethoprim  160 mG/sulfamethoxazole 800 mG 1 Tablet(s) Oral <User Schedule>    MEDICATIONS  (PRN):  aluminum hydroxide/magnesium hydroxide/simethicone Suspension 30 milliLiter(s) Oral every 6 hours PRN Dyspepsia  benzocaine/menthol Lozenge 1 Lozenge Oral two times a day PRN Sore Throat  Biotene Dry Mouth Oral Rinse 15 milliLiter(s) Swish and Spit two times a day PRN dry mouth  diazepam    Tablet 5 milliGRAM(s) Oral two times a day PRN anxiety  FIRST- Mouthwash  BLM 5 milliLiter(s) Swish and Spit four times a day PRN Mouth Care  ondansetron    Tablet 4 milliGRAM(s) Oral every 8 hours PRN Nausea and/or Vomiting  oxyCODONE    IR 10 milliGRAM(s) Oral every 4 hours PRN Moderate Pain (4 - 6)  sodium chloride 0.9% lock flush 10 milliLiter(s) IV Push every 1 hour PRN Pre/post blood products, medications, blood draw, and to maintain line patency      Vital Signs Last 24 Hrs  T(C): 36.6 (12 May 2024 14:58), Max: 36.8 (12 May 2024 05:41)  T(F): 97.9 (12 May 2024 14:58), Max: 98.3 (12 May 2024 05:41)  HR: 82 (12 May 2024 14:58) (82 - 98)  BP: 125/79 (12 May 2024 14:58) (116/59 - 138/61)  BP(mean): --  RR: 18 (12 May 2024 14:58) (18 - 18)  SpO2: 95% (12 May 2024 14:58) (95% - 100%)    Parameters below as of 12 May 2024 07:31  Patient On (Oxygen Delivery Method): nasal cannula, high flow  O2 Flow (L/min): 40  O2 Concentration (%): 50  CAPILLARY BLOOD GLUCOSE        I&O's Summary    11 May 2024 07:01  -  12 May 2024 07:00  --------------------------------------------------------  IN: 0 mL / OUT: 250 mL / NET: -250 mL        PHYSICAL EXAM:  GENERAL: NAD, Ill appearing female  CHEST/LUNG: Decreased bs bilaterally; No wheeze  HEART: Regular rate and rhythm  ABDOMEN: Soft, Nontender, Nondistended  EXTREMITIES: Trace LUE non-pitting edema present; large area of ecchymosis extending from the post R calf to post R thigh- improving; generalized muscular atrophy in all 4 extremities  PSYCH: upset  NEUROLOGY: AAOX3    LABS:                        8.9    20.40 )-----------( 402      ( 12 May 2024 06:05 )             30.9     05-12    135  |  100  |  24<H>  ----------------------------<  86  4.6   |  21<L>  |  0.93    Ca    9.2      12 May 2024 06:05  Phos  2.4     05-12  Mg     1.80     05-12    TPro  5.5<L>  /  Alb  2.6<L>  /  TBili  <0.2  /  DBili  x   /  AST  10  /  ALT  37<H>  /  AlkPhos  107  05-12          Urinalysis Basic - ( 12 May 2024 06:05 )    Color: x / Appearance: x / SG: x / pH: x  Gluc: 86 mg/dL / Ketone: x  / Bili: x / Urobili: x   Blood: x / Protein: x / Nitrite: x   Leuk Esterase: x / RBC: x / WBC x   Sq Epi: x / Non Sq Epi: x / Bacteria: x        RADIOLOGY & ADDITIONAL TESTS:    Imaging Personally Reviewed:    Consultant(s) Notes Reviewed:      Care Discussed with Consultants/Other Providers:   Patient is a 58y old  Female who presents with a chief complaint of SVC syndrome, DVT, mediastinal mass (11 May 2024 16:37)      SUBJECTIVE / OVERNIGHT EVENTS: Pt seen and examined at 12:13pm, no overnight events, pt denies any complaints today, still on high flow oxygen. PCA at bedside. No other new issues reported.        MEDICATIONS  (STANDING):  albuterol/ipratropium for Nebulization 3 milliLiter(s) Nebulizer every 6 hours  aMIOdarone    Tablet 200 milliGRAM(s) Oral daily  Biotene Dry Mouth Oral Rinse 15 milliLiter(s) Swish and Spit every 6 hours  budesonide 160 MICROgram(s)/formoterol 4.5 MICROgram(s) Inhaler 2 Puff(s) Inhalation two times a day  chlorhexidine 2% Cloths 1 Application(s) Topical daily  dexAMETHasone     Tablet 2 milliGRAM(s) Oral daily  diazepam    Tablet 5 milliGRAM(s) Oral at bedtime  enoxaparin Injectable 50 milliGRAM(s) SubCutaneous every 12 hours  influenza   Vaccine 0.5 milliLiter(s) IntraMuscular once  metoclopramide Injectable 10 milliGRAM(s) IV Push every 8 hours  metoprolol tartrate 25 milliGRAM(s) Oral every 12 hours  multivitamin 1 Tablet(s) Oral daily  naloxegol 25 milliGRAM(s) Oral daily  nicotine -  14 mG/24Hr(s) Patch 1 Patch Transdermal every 24 hours  oxyCODONE  ER Tablet 30 milliGRAM(s) Oral <User Schedule>  pantoprazole    Tablet 40 milliGRAM(s) Oral two times a day  polyethylene glycol 3350 17 Gram(s) Oral every 12 hours  sodium chloride 0.65% Nasal 1 Spray(s) Both Nostrils two times a day  trimethoprim  160 mG/sulfamethoxazole 800 mG 1 Tablet(s) Oral <User Schedule>    MEDICATIONS  (PRN):  aluminum hydroxide/magnesium hydroxide/simethicone Suspension 30 milliLiter(s) Oral every 6 hours PRN Dyspepsia  benzocaine/menthol Lozenge 1 Lozenge Oral two times a day PRN Sore Throat  Biotene Dry Mouth Oral Rinse 15 milliLiter(s) Swish and Spit two times a day PRN dry mouth  diazepam    Tablet 5 milliGRAM(s) Oral two times a day PRN anxiety  FIRST- Mouthwash  BLM 5 milliLiter(s) Swish and Spit four times a day PRN Mouth Care  ondansetron    Tablet 4 milliGRAM(s) Oral every 8 hours PRN Nausea and/or Vomiting  oxyCODONE    IR 10 milliGRAM(s) Oral every 4 hours PRN Moderate Pain (4 - 6)  sodium chloride 0.9% lock flush 10 milliLiter(s) IV Push every 1 hour PRN Pre/post blood products, medications, blood draw, and to maintain line patency      Vital Signs Last 24 Hrs  T(C): 36.6 (12 May 2024 14:58), Max: 36.8 (12 May 2024 05:41)  T(F): 97.9 (12 May 2024 14:58), Max: 98.3 (12 May 2024 05:41)  HR: 82 (12 May 2024 14:58) (82 - 98)  BP: 125/79 (12 May 2024 14:58) (116/59 - 138/61)  BP(mean): --  RR: 18 (12 May 2024 14:58) (18 - 18)  SpO2: 95% (12 May 2024 14:58) (95% - 100%)    Parameters below as of 12 May 2024 07:31  Patient On (Oxygen Delivery Method): nasal cannula, high flow  O2 Flow (L/min): 40  O2 Concentration (%): 50  CAPILLARY BLOOD GLUCOSE        I&O's Summary    11 May 2024 07:01  -  12 May 2024 07:00  --------------------------------------------------------  IN: 0 mL / OUT: 250 mL / NET: -250 mL        PHYSICAL EXAM:  GENERAL: NAD, Ill appearing female  CHEST/LUNG: Decreased bs bilaterally; No wheeze  HEART: Regular rate and rhythm  ABDOMEN: Soft, Nontender, Nondistended  EXTREMITIES: Trace LUE non-pitting edema present; large area of ecchymosis extending from the post R calf to post R thigh- improving; generalized muscular atrophy in all 4 extremities  PSYCH: Calm  NEUROLOGY: Alert, awake     LABS:                        8.9    20.40 )-----------( 402      ( 12 May 2024 06:05 )             30.9     05-12    135  |  100  |  24<H>  ----------------------------<  86  4.6   |  21<L>  |  0.93    Ca    9.2      12 May 2024 06:05  Phos  2.4     05-12  Mg     1.80     05-12    TPro  5.5<L>  /  Alb  2.6<L>  /  TBili  <0.2  /  DBili  x   /  AST  10  /  ALT  37<H>  /  AlkPhos  107  05-12          Urinalysis Basic - ( 12 May 2024 06:05 )    Color: x / Appearance: x / SG: x / pH: x  Gluc: 86 mg/dL / Ketone: x  / Bili: x / Urobili: x   Blood: x / Protein: x / Nitrite: x   Leuk Esterase: x / RBC: x / WBC x   Sq Epi: x / Non Sq Epi: x / Bacteria: x        RADIOLOGY & ADDITIONAL TESTS:    Imaging Personally Reviewed:    Consultant(s) Notes Reviewed:      Care Discussed with Consultants/Other Providers:

## 2024-05-12 NOTE — PROGRESS NOTE ADULT - ASSESSMENT
59 yo woman, former smoker, with h/o R eye glaucoma, Fibromyalgia, Anxiety, GERD, and RCC s/p R total nephrectomy/hysterectomy; she was initially admitted to [facility] on 3/11 w/ R breast swelling c/f mastitis- imaging brooks noted supraclavicular/mediastinal mass lesion which encased the SVC and multiple other veins resulting in obliteration of the SVC and thrombosis of the R IJ, and a pancreatic neck cystic lesion.  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  Her disease/hospital course has also been c/b pericardial effusion s/p pericardial window w/ neg cytology, R pleural effusion, also negative cytology) s/p Pleurx, Afib w/ RVR and acute hypoxic resp failure 2/2 SVC syndrome- not dennis to IR-guided stenting; pt was ultimately started on Dex and transferred to [facility] on 4/4 for urgent palliative RT which she completed on 4/18. Her hospital course has also been c/b pericardial effusion with tamponade s/p window and non malignant R pleural effusion s/p pleurex, acute b/l UE and RLE dvts, anxiety, and more recently, recurrent hypoxia.    ACTIVE PROBLEMS  Metastatic CUP (carcinoma of unknown primary)  Goals of care discussion, counseling  Acute-on-chronic hypoxic respiratory failure  Dysphonia with R VC hypomobility   Oral thrush  SVC syndrome  Extensive b/l UE DVTs  Acute RLE DVT a/w RLE ecchymosis  Hypercoag state  Pericardial effusion with tamp physiology s/p pericardial window  R pleural effusion s/p pleurex  pAfib, with RVR on this admission  Anxiety/emotional lability  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)  Cancer-related pain, anxiety  Severe prot-ghazala malnutrition    - Metastatic CUP (carcinoma of unknown primary)  - Goals of care discussion, counseling  Based on 3/14 SC LN path, ddx includes primary UGI vs pancreaticobiliary primary (breast edema is likely 2/2 SVC syndrome); PD-L1 TPs 1%, pMMR, Her2 2+, FISH pending, Ki-67 30%  Ca-125 moderately elevated at 125; other tumor markers not significantly elevated  Additional diagnostic brooks includes:   * 4/19 MRCP showing pancreatic lesion (? IPMN, but pancreas was suboptimally evaluated)     * 4/24 and 4/26 EGD/EUS aborted as pt had large amount of food in the stomach that prevented passing of scope   * 4/30 UGIS aborted as pt felt too short of breath when laying down flat (improved after her pleurex was drained  Case d/w Dr. 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 Pt is a suboptimal candidate i/s/o her progressive debility and malnutrition (ECOG PS 3)   >> Discussed risks/benefits of proceeding with palliative chemo (inpt) vs best supportive care with pt's HCP Saeid on 5/10- he is amenable to trial of inpt chemotherapy and understands that hospice will be recommended if pt does not tolerate trial of inpt chemo; planning to consent pt and proceed with dose modified FOLFOX on 5/13  Pt's long term prognosis remains guarded    - Acute-on-chronic hypoxic resp failure  - Multifocal aspiration vs hosp-acquired pneumonia  Pt on high flow 40/50, wean as tolerated  Chest imaging from 5/1, 5/4 more c/w and infectious process  Pt remains afebrile; procal relatively stable  Continuing empiric Cefepime for 10d course, until 5/10, dc cefepime  Tapering off Dex over the next week (continuing pjp/ppi ppx until taper is complete)  Continuing Symbicort 2 puffs BID  Continuing Duonebs q6/prn  Will continue routine pleurex drainage as below    - Dysphonia with R VC hypomobility  - Oral thrush  Appreciate ENT eval/recs: 5/4 Flex ellis pt with R VC hypomobility  Continuing Fluconazole x7d course, until 5/12 (trending transaminases)    - SVC syndrome  Appreciate Rad Onc eval/recs  Completed 10fxs of palliative RT on 4/18   Tapering steroids as above     - Extensive b/l UE DVTs  - Acute RLE DVT a/w RLE ecchymosis  - Hypercoag state  Continuing therapeutic lovenox, benefits > risks   * Of note: pt has poor UE IV access and currently requires peripheral IV in her LE extremities for admin of medication; can consider FV central line for urgent/emergent IV needs    - Pericardial effusion with tamp physiology s/p pericardial window  - R pleural effusion s/p pleurex  Likely inflammatory; both pleural and pericardial fluid cytology are negative for malignant cells  4/9 surveillance TTE with pEF and no WMAs  Continuing R pleurex drainage- up to 500cc q48h/prn    - pAfib  Pt currently SR, HR controlled  Likely precipitated by malignancy, pericardial effusion, SVC syndrome  Continuing Metoprolol 25mg q12 with holding parameters  Continuing Amio 200mg daily (monitoring for toxicities)  Continuing telemetry    - Anxiety/emotional lability  Continuing Diazepam 5mg BID/prn and nightly  Pt has poor health literacy and clinical insight which is negatively impacting her medical decision making- when ask if she wanted to defer medical decision making to her boyfriend or if she had a designated HCP she replied "he's already sick of me"  Appreciate  consult: pt lacks medical decision making capacity; medical decision making will be deferred to pt's surrogate decision maker- Saeid Peña ([phone])    - 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)  Pt with h/o R eye glaucoma, but miosis can also be associated with Pancoast syndrome i/s/o extensive R upper lung tumor  Pt denies visual deficits or other related symptoms   MRI Brain without contrast ordered for further eval- pt continues to refuse, can be completed as outpt  Will continue to monitor closely    - Anemia in neoplastic disease  Hgb remains relatively stable  4/5 iron studies not c/w iron deficiency  VSS and pt without clinical s/s of active bleeding  4/17 hemolysis labs negative; 4/18 FOBT negative x2  Trending daily cbcs; continuing supportive transfusions prn (pt does not have h/o ACS, CAD, MI, goal hgb > 7; plts > 10K)    - Cancer related pain  Currently well controlled  Continuing Oxy ER 30mg q12 q8  Continuing Oxy IR 10mg q4/prn  Continuing Dilaudid IV prn for sev breakthrough pain  Continuing bowel regimen to prevent OIC (including Movantic)    - Severe prot-ghazala malnutrition: appreciate RD recs; continuing regular diet with protein shake supplement BID (SLP eval pending as above)    Plan discussed with ACP 57 yo woman, former smoker, with h/o R eye glaucoma, Fibromyalgia, Anxiety, GERD, and RCC s/p R total nephrectomy/hysterectomy; she was initially admitted to [facility] on 3/11 w/ R breast swelling c/f mastitis- imaging brooks noted supraclavicular/mediastinal mass lesion which encased the SVC and multiple other veins resulting in obliteration of the SVC and thrombosis of the R IJ, and a pancreatic neck cystic lesion.  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  Her disease/hospital course has also been c/b pericardial effusion s/p pericardial window w/ neg cytology, R pleural effusion, also negative cytology) s/p Pleurx, Afib w/ RVR and acute hypoxic resp failure 2/2 SVC syndrome- not dennis to IR-guided stenting; pt was ultimately started on Dex and transferred to [facility] on 4/4 for urgent palliative RT which she completed on 4/18. Her hospital course has also been c/b pericardial effusion with tamponade s/p window and non malignant R pleural effusion s/p pleurex, acute b/l UE and RLE dvts, anxiety, and more recently, recurrent hypoxia.    ACTIVE PROBLEMS  Metastatic CUP (carcinoma of unknown primary)  Goals of care discussion, counseling  Acute-on-chronic hypoxic respiratory failure  Dysphonia with R VC hypomobility   Oral thrush  SVC syndrome  Extensive b/l UE DVTs  Acute RLE DVT a/w RLE ecchymosis  Hypercoag state  Pericardial effusion with tamp physiology s/p pericardial window  R pleural effusion s/p pleurex  pAfib, with RVR on this admission  Anxiety/emotional lability  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)  Cancer-related pain, anxiety  Severe prot-ghazala malnutrition    - Metastatic CUP (carcinoma of unknown primary)  - Goals of care discussion, counseling  Based on 3/14 SC LN path, ddx includes primary UGI vs pancreaticobiliary primary (breast edema is likely 2/2 SVC syndrome); PD-L1 TPs 1%, pMMR, Her2 2+, FISH pending, Ki-67 30%  Ca-125 moderately elevated at 125; other tumor markers not significantly elevated  Additional diagnostic brooks includes:   * 4/19 MRCP showing pancreatic lesion (? IPMN, but pancreas was suboptimally evaluated)     * 4/24 and 4/26 EGD/EUS aborted as pt had large amount of food in the stomach that prevented passing of scope   * 4/30 UGIS aborted as pt felt too short of breath when laying down flat (improved after her pleurex was drained  Case d/w Dr. 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 Pt is a suboptimal candidate i/s/o her progressive debility and malnutrition (ECOG PS 3)   >> Discussed risks/benefits of proceeding with palliative chemo (inpt) vs best supportive care with pt's HCP Saeid on 5/10- he is amenable to trial of inpt chemotherapy and understands that hospice will be recommended if pt does not tolerate trial of inpt chemo; planning to consent pt and proceed with dose modified FOLFOX on 5/13  Pt's long term prognosis remains guarded    - Acute-on-chronic hypoxic resp failure  - Multifocal aspiration vs hosp-acquired pneumonia  Pt on high flow 40/50, wean as tolerated  Chest imaging from 5/1, 5/4 more c/w and infectious process  Pt remains afebrile; procal relatively stable  Continuing empiric Cefepime for 10d course, until 5/10, dc cefepime  Tapering off Dex over the next week (continuing pjp/ppi ppx until taper is complete)  Continuing Symbicort 2 puffs BID  Continuing Duonebs q6/prn  Will continue routine pleurex drainage as below    - Dysphonia with R VC hypomobility  - Oral thrush  Appreciate ENT eval/recs: 5/4 Flex ellis pt with R VC hypomobility  Continuing Fluconazole x7d course, until 5/12 (trending transaminases)    - SVC syndrome  Appreciate Rad Onc eval/recs  Completed 10fxs of palliative RT on 4/18   Tapering steroids as above     - Extensive b/l UE DVTs  - Acute RLE DVT a/w RLE ecchymosis  - Hypercoag state  Continuing therapeutic lovenox, benefits > risks   * Of note: pt has poor UE IV access and currently requires peripheral IV in her LE extremities for admin of medication; can consider FV central line for urgent/emergent IV needs    - Pericardial effusion with tamp physiology s/p pericardial window  - R pleural effusion s/p pleurex  Likely inflammatory; both pleural and pericardial fluid cytology are negative for malignant cells  4/9 surveillance TTE with pEF and no WMAs  Continuing R pleurex drainage- up to 500cc q48h/prn    - pAfib  Pt currently SR, HR controlled  Likely precipitated by malignancy, pericardial effusion, SVC syndrome  Continuing Metoprolol 25mg q12 with holding parameters  Continuing Amio 200mg daily (monitoring for toxicities)  Continuing telemetry    - Anxiety/emotional lability  Continuing Diazepam 5mg BID/prn and nightly  Pt has poor health literacy and clinical insight which is negatively impacting her medical decision making- when ask if she wanted to defer medical decision making to her boyfriend or if she had a designated HCP she replied "he's already sick of me"  Appreciate  consult: pt lacks medical decision making capacity; medical decision making will be deferred to pt's surrogate decision maker- Saeid Peña ([phone])    - 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)  Pt with h/o R eye glaucoma, but miosis can also be associated with Pancoast syndrome i/s/o extensive R upper lung tumor  Pt denies visual deficits or other related symptoms   MRI Brain without contrast ordered for further eval- pt continues to refuse, can be completed as outpt  Will continue to monitor closely    - Anemia in neoplastic disease  Hgb remains relatively stable  4/5 iron studies not c/w iron deficiency  VSS and pt without clinical s/s of active bleeding  4/17 hemolysis labs negative; 4/18 FOBT negative x2  Trending daily cbcs; continuing supportive transfusions prn (pt does not have h/o ACS, CAD, MI, goal hgb > 7; plts > 10K)    - Cancer related pain  Currently well controlled  Continuing Oxy ER 30mg q12 q8  Continuing Oxy IR 10mg q4/prn  Continuing Dilaudid IV prn for sev breakthrough pain  Continuing bowel regimen to prevent OIC (including Movantic)    - Severe prot-ghazala malnutrition: appreciate RD recs; continuing regular diet with protein shake supplement BID (SLP eval pending as above)    -Hypophosphatemia- replete phos  Plan discussed with ACP

## 2024-05-13 LAB
ALBUMIN SERPL ELPH-MCNC: 2.2 G/DL — LOW (ref 3.3–5)
ALP SERPL-CCNC: 147 U/L — HIGH (ref 40–120)
ALT FLD-CCNC: 43 U/L — HIGH (ref 4–33)
ANION GAP SERPL CALC-SCNC: 13 MMOL/L — SIGNIFICANT CHANGE UP (ref 7–14)
AST SERPL-CCNC: 19 U/L — SIGNIFICANT CHANGE UP (ref 4–32)
BASOPHILS # BLD AUTO: 0.16 K/UL — SIGNIFICANT CHANGE UP (ref 0–0.2)
BASOPHILS NFR BLD AUTO: 0.7 % — SIGNIFICANT CHANGE UP (ref 0–2)
BILIRUB SERPL-MCNC: 0.2 MG/DL — SIGNIFICANT CHANGE UP (ref 0.2–1.2)
BUN SERPL-MCNC: 32 MG/DL — HIGH (ref 7–23)
CALCIUM SERPL-MCNC: 9.4 MG/DL — SIGNIFICANT CHANGE UP (ref 8.4–10.5)
CHLORIDE SERPL-SCNC: 103 MMOL/L — SIGNIFICANT CHANGE UP (ref 98–107)
CO2 SERPL-SCNC: 19 MMOL/L — LOW (ref 22–31)
CREAT SERPL-MCNC: 0.92 MG/DL — SIGNIFICANT CHANGE UP (ref 0.5–1.3)
EGFR: 71 ML/MIN/1.73M2 — SIGNIFICANT CHANGE UP
EOSINOPHIL # BLD AUTO: 0.06 K/UL — SIGNIFICANT CHANGE UP (ref 0–0.5)
EOSINOPHIL NFR BLD AUTO: 0.3 % — SIGNIFICANT CHANGE UP (ref 0–6)
GLUCOSE SERPL-MCNC: 93 MG/DL — SIGNIFICANT CHANGE UP (ref 70–99)
HCT VFR BLD CALC: 27.2 % — LOW (ref 34.5–45)
HGB BLD-MCNC: 8.3 G/DL — LOW (ref 11.5–15.5)
IANC: 19.84 K/UL — HIGH (ref 1.8–7.4)
IMM GRANULOCYTES NFR BLD AUTO: 8.8 % — HIGH (ref 0–0.9)
LYMPHOCYTES # BLD AUTO: 0.4 K/UL — LOW (ref 1–3.3)
LYMPHOCYTES # BLD AUTO: 1.7 % — LOW (ref 13–44)
MAGNESIUM SERPL-MCNC: 1.7 MG/DL — SIGNIFICANT CHANGE UP (ref 1.6–2.6)
MCHC RBC-ENTMCNC: 29 PG — SIGNIFICANT CHANGE UP (ref 27–34)
MCHC RBC-ENTMCNC: 30.5 GM/DL — LOW (ref 32–36)
MCV RBC AUTO: 95.1 FL — SIGNIFICANT CHANGE UP (ref 80–100)
MONOCYTES # BLD AUTO: 0.76 K/UL — SIGNIFICANT CHANGE UP (ref 0–0.9)
MONOCYTES NFR BLD AUTO: 3.3 % — SIGNIFICANT CHANGE UP (ref 2–14)
NEUTROPHILS # BLD AUTO: 19.84 K/UL — HIGH (ref 1.8–7.4)
NEUTROPHILS NFR BLD AUTO: 85.2 % — HIGH (ref 43–77)
NRBC # BLD: 0 /100 WBCS — SIGNIFICANT CHANGE UP (ref 0–0)
NRBC # FLD: 0 K/UL — SIGNIFICANT CHANGE UP (ref 0–0)
PHOSPHATE SERPL-MCNC: 2.7 MG/DL — SIGNIFICANT CHANGE UP (ref 2.5–4.5)
PLATELET # BLD AUTO: 452 K/UL — HIGH (ref 150–400)
POTASSIUM SERPL-MCNC: 5.1 MMOL/L — SIGNIFICANT CHANGE UP (ref 3.5–5.3)
POTASSIUM SERPL-SCNC: 5.1 MMOL/L — SIGNIFICANT CHANGE UP (ref 3.5–5.3)
PROT SERPL-MCNC: 5 G/DL — LOW (ref 6–8.3)
RBC # BLD: 2.86 M/UL — LOW (ref 3.8–5.2)
RBC # FLD: 16.7 % — HIGH (ref 10.3–14.5)
SODIUM SERPL-SCNC: 135 MMOL/L — SIGNIFICANT CHANGE UP (ref 135–145)
WBC # BLD: 23.26 K/UL — HIGH (ref 3.8–10.5)
WBC # FLD AUTO: 23.26 K/UL — HIGH (ref 3.8–10.5)

## 2024-05-13 PROCEDURE — 71045 X-RAY EXAM CHEST 1 VIEW: CPT | Mod: 26

## 2024-05-13 PROCEDURE — 99233 SBSQ HOSP IP/OBS HIGH 50: CPT

## 2024-05-13 RX ORDER — SALIVA SUBSTITUTE COMB NO.11 351 MG
15 POWDER IN PACKET (EA) MUCOUS MEMBRANE EVERY 6 HOURS
Refills: 0 | Status: DISCONTINUED | OUTPATIENT
Start: 2024-05-13 | End: 2024-05-30

## 2024-05-13 RX ORDER — POLYETHYLENE GLYCOL 3350 17 G/17G
17 POWDER, FOR SOLUTION ORAL EVERY 12 HOURS
Refills: 0 | Status: DISCONTINUED | OUTPATIENT
Start: 2024-05-13 | End: 2024-05-31

## 2024-05-13 RX ORDER — METOPROLOL TARTRATE 50 MG
25 TABLET ORAL EVERY 12 HOURS
Refills: 0 | Status: DISCONTINUED | OUTPATIENT
Start: 2024-05-13 | End: 2024-05-31

## 2024-05-13 RX ORDER — NICOTINE POLACRILEX 2 MG
1 GUM BUCCAL EVERY 24 HOURS
Refills: 0 | Status: DISCONTINUED | OUTPATIENT
Start: 2024-05-14 | End: 2024-05-30

## 2024-05-13 RX ORDER — NALOXEGOL OXALATE 12.5 MG/1
25 TABLET, FILM COATED ORAL DAILY
Refills: 0 | Status: DISCONTINUED | OUTPATIENT
Start: 2024-05-14 | End: 2024-05-31

## 2024-05-13 RX ORDER — AMIODARONE HYDROCHLORIDE 400 MG/1
200 TABLET ORAL DAILY
Refills: 0 | Status: DISCONTINUED | OUTPATIENT
Start: 2024-05-14 | End: 2024-05-31

## 2024-05-13 RX ORDER — SODIUM CHLORIDE 9 MG/ML
10 INJECTION INTRAMUSCULAR; INTRAVENOUS; SUBCUTANEOUS
Refills: 0 | Status: DISCONTINUED | OUTPATIENT
Start: 2024-05-13 | End: 2024-05-31

## 2024-05-13 RX ORDER — IPRATROPIUM/ALBUTEROL SULFATE 18-103MCG
3 AEROSOL WITH ADAPTER (GRAM) INHALATION
Refills: 0 | Status: DISCONTINUED | OUTPATIENT
Start: 2024-05-13 | End: 2024-05-20

## 2024-05-13 RX ORDER — IPRATROPIUM/ALBUTEROL SULFATE 18-103MCG
3 AEROSOL WITH ADAPTER (GRAM) INHALATION ONCE
Refills: 0 | Status: COMPLETED | OUTPATIENT
Start: 2024-05-13 | End: 2024-05-13

## 2024-05-13 RX ORDER — SALIVA SUBSTITUTE COMB NO.11 351 MG
15 POWDER IN PACKET (EA) MUCOUS MEMBRANE
Refills: 0 | Status: DISCONTINUED | OUTPATIENT
Start: 2024-05-13 | End: 2024-05-31

## 2024-05-13 RX ORDER — CHLORHEXIDINE GLUCONATE 213 G/1000ML
1 SOLUTION TOPICAL DAILY
Refills: 0 | Status: DISCONTINUED | OUTPATIENT
Start: 2024-05-13 | End: 2024-05-31

## 2024-05-13 RX ADMIN — AMIODARONE HYDROCHLORIDE 200 MILLIGRAM(S): 400 TABLET ORAL at 07:33

## 2024-05-13 RX ADMIN — OXYCODONE HYDROCHLORIDE 30 MILLIGRAM(S): 5 TABLET ORAL at 08:26

## 2024-05-13 RX ADMIN — OXYCODONE HYDROCHLORIDE 30 MILLIGRAM(S): 5 TABLET ORAL at 22:38

## 2024-05-13 RX ADMIN — Medication 1 PATCH: at 13:01

## 2024-05-13 RX ADMIN — Medication 15 MILLILITER(S): at 13:01

## 2024-05-13 RX ADMIN — Medication 1 PATCH: at 19:53

## 2024-05-13 RX ADMIN — Medication 2 MILLIGRAM(S): at 07:33

## 2024-05-13 RX ADMIN — Medication 3 MILLILITER(S): at 09:34

## 2024-05-13 RX ADMIN — OXYCODONE HYDROCHLORIDE 30 MILLIGRAM(S): 5 TABLET ORAL at 14:31

## 2024-05-13 RX ADMIN — Medication 25 MILLIGRAM(S): at 17:28

## 2024-05-13 RX ADMIN — Medication 1 SPRAY(S): at 07:38

## 2024-05-13 RX ADMIN — Medication 1 TABLET(S): at 08:06

## 2024-05-13 RX ADMIN — OXYCODONE HYDROCHLORIDE 30 MILLIGRAM(S): 5 TABLET ORAL at 15:49

## 2024-05-13 RX ADMIN — Medication 15 MILLILITER(S): at 07:33

## 2024-05-13 RX ADMIN — Medication 15 MILLILITER(S): at 17:27

## 2024-05-13 RX ADMIN — BUDESONIDE AND FORMOTEROL FUMARATE DIHYDRATE 2 PUFF(S): 160; 4.5 AEROSOL RESPIRATORY (INHALATION) at 08:06

## 2024-05-13 RX ADMIN — OXYCODONE HYDROCHLORIDE 30 MILLIGRAM(S): 5 TABLET ORAL at 23:38

## 2024-05-13 RX ADMIN — CHLORHEXIDINE GLUCONATE 1 APPLICATION(S): 213 SOLUTION TOPICAL at 13:02

## 2024-05-13 RX ADMIN — Medication 3 MILLILITER(S): at 22:37

## 2024-05-13 RX ADMIN — Medication 1 PATCH: at 07:52

## 2024-05-13 RX ADMIN — Medication 1 SPRAY(S): at 17:28

## 2024-05-13 RX ADMIN — Medication 3 MILLILITER(S): at 03:01

## 2024-05-13 RX ADMIN — Medication 3 MILLILITER(S): at 15:47

## 2024-05-13 RX ADMIN — PANTOPRAZOLE SODIUM 40 MILLIGRAM(S): 20 TABLET, DELAYED RELEASE ORAL at 07:37

## 2024-05-13 RX ADMIN — Medication 1 PATCH: at 14:48

## 2024-05-13 RX ADMIN — Medication 1 TABLET(S): at 13:01

## 2024-05-13 RX ADMIN — BENZOCAINE AND MENTHOL 1 LOZENGE: 5; 1 LIQUID ORAL at 08:06

## 2024-05-13 RX ADMIN — NALOXEGOL OXALATE 25 MILLIGRAM(S): 12.5 TABLET, FILM COATED ORAL at 13:01

## 2024-05-13 RX ADMIN — OXYCODONE HYDROCHLORIDE 30 MILLIGRAM(S): 5 TABLET ORAL at 07:37

## 2024-05-13 RX ADMIN — Medication 25 MILLIGRAM(S): at 07:33

## 2024-05-13 RX ADMIN — Medication 5 MILLIGRAM(S): at 23:19

## 2024-05-13 RX ADMIN — BUDESONIDE AND FORMOTEROL FUMARATE DIHYDRATE 2 PUFF(S): 160; 4.5 AEROSOL RESPIRATORY (INHALATION) at 21:51

## 2024-05-13 RX ADMIN — ENOXAPARIN SODIUM 50 MILLIGRAM(S): 100 INJECTION SUBCUTANEOUS at 07:34

## 2024-05-13 RX ADMIN — PANTOPRAZOLE SODIUM 40 MILLIGRAM(S): 20 TABLET, DELAYED RELEASE ORAL at 17:27

## 2024-05-13 NOTE — PROGRESS NOTE ADULT - ASSESSMENT
59 yo woman, former smoker, with h/o R eye glaucoma, Fibromyalgia, Anxiety, GERD, and RCC s/p R total nephrectomy/hysterectomy; she was initially admitted to [facility] on 3/11 w/ R breast swelling c/f mastitis- imaging brooks noted supraclavicular/mediastinal mass lesion which encased the SVC and multiple other veins resulting in obliteration of the SVC and thrombosis of the R IJ, and a pancreatic neck cystic lesion.  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  Her disease/hospital course has also been c/b pericardial effusion s/p pericardial window w/ neg cytology, R pleural effusion, also negative cytology) s/p Pleurx, Afib w/ RVR and acute hypoxic resp failure 2/2 SVC syndrome- not dennis to IR-guided stenting; pt was ultimately started on Dex and transferred to [facility] on 4/4 for urgent palliative RT which she completed on 4/18. Her hospital course has also been c/b pericardial effusion with tamponade s/p window and non malignant R pleural effusion s/p pleurex, acute b/l UE and RLE dvts, anxiety, and more recently, recurrent hypoxia.    ACTIVE PROBLEMS  Metastatic CUP (carcinoma of unknown primary)  Goals of care discussion, counseling  Acute-on-chronic hypoxic respiratory failure  Dysphonia with R VC hypomobility   Oral thrush  SVC syndrome  Extensive b/l UE DVTs  Acute RLE DVT a/w RLE ecchymosis  Hypercoag state  Pericardial effusion with tamp physiology s/p pericardial window  R pleural effusion s/p pleurex  pAfib, with RVR on this admission  Anxiety/emotional lability  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)  Cancer-related pain, anxiety  Severe prot-ghazala malnutrition    - Metastatic CUP (carcinoma of unknown primary)  - Goals of care discussion, counseling  Based on 3/14 SC LN path, ddx includes primary UGI vs pancreaticobiliary primary (breast edema is likely 2/2 SVC syndrome); PD-L1 TPs 1%, pMMR, Her2 2+, FISH pending, Ki-67 30%  Ca-125 moderately elevated at 125; other tumor markers not significantly elevated  Additional diagnostic brooks includes:   * 4/19 MRCP showing pancreatic lesion (? IPMN, but pancreas was suboptimally evaluated)     * 4/24 and 4/26 EGD/EUS aborted as pt had large amount of food in the stomach that prevented passing of scope   * 4/30 UGIS aborted as pt felt too short of breath when laying down flat (improved after her pleurex was drained  Case d/w Dr. 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 Pt is a suboptimal candidate i/s/o her progressive debility and malnutrition (ECOG PS 3)   >> Discussed risks/benefits of proceeding with palliative chemo (inpt) vs best supportive care with pt's HCP Saeid on 5/10 and again on 5/13- he is amenable to trial of inpt chemotherapy and understands that hospice will be recommended if pt does not tolerate trial of inpt chemo; verbal and written chemo consent obtained on 5/13   * Central line placement not feasible while pt is on HF; Vascular Surgery consulted for bedside central line placement; will proceed with chemo once pt has reliable venous access  Pt's long term prognosis remains guarded; she is dnr/dni    - Acute-on-chronic hypoxic resp failure  - Multifocal aspiration vs hosp-acquired pneumonia  Pt still dependent on HF NC- likely due to recent HAP + underlying malignancy  Recent chest imaging is stable- repeat CXR ordered for 5/14  Continuing empiric Cefepime for 10d course, until 5/10  Dex taper to be completed on 5/14 (continuing pjp/ppi ppx until taper is complete)  Continuing Symbicort 2 puffs BID  Continuing Duonebs q6/prn  Will continue routine pleurex drainage as below    - Dysphonia with R VC hypomobility  - Oral thrush  Appreciate ENT eval/recs: 5/4 Flex ellis pt with R VC hypomobility  Pt cleared for pureed diet s/p Cinesophagram  Completed 7d course of Fluconazole on 5/12    - SVC syndrome  Appreciate Rad Onc eval/recs  Completed 10fxs of palliative RT on 4/18   Tapering steroids as above     - Extensive b/l UE DVTs  - Acute RLE DVT a/w RLE ecchymosis  - Hypercoag state  Continuing therapeutic lovenox, benefits > risks   * Of note: pt has poor UE IV access and currently requires peripheral IV in her LE extremities for admin of medication; can consider FV central line for urgent/emergent IV needs    - Pericardial effusion with tamp physiology s/p pericardial window  - R pleural effusion s/p pleurex  Likely inflammatory; both pleural and pericardial fluid cytology are negative for malignant cells  4/9 surveillance TTE with pEF and no WMAs  Continuing R pleurex drainage- up to 500cc q48h/prn    - pAfib  Pt currently SR, HR controlled  Likely precipitated by malignancy, pericardial effusion, SVC syndrome  Continuing Metoprolol 25mg q12 with holding parameters  Continuing Amio 200mg daily (monitoring for toxicities)  Continuing telemetry    - Anxiety/emotional lability  Continuing Diazepam 5mg BID/prn and nightly  Pt has poor health literacy and clinical insight which is negatively impacting her medical decision making- when ask if she wanted to defer medical decision making to her boyfriend or if she had a designated HCP she replied "he's already sick of me"  Appreciate  consult: pt lacks medical decision making capacity; medical decision making will be deferred to pt's surrogate decision maker- Saeid Peña ([phone])    - 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)  Pt with h/o R eye glaucoma, but miosis can also be associated with Pancoast syndrome i/s/o extensive R upper lung tumor  Pt denies visual deficits or other related symptoms   MRI Brain without contrast ordered for further eval- pt continues to refuse, can be completed as outpt  Will continue to monitor closely    - Anemia in neoplastic disease  Hgb remains stable  4/5 iron studies not c/w iron deficiency  VSS and pt without clinical s/s of active bleeding  4/17 hemolysis labs negative; 4/18 FOBT negative x2  Trending daily cbcs; continuing supportive transfusions prn (pt does not have h/o ACS, CAD, MI, goal hgb > 7; plts > 10K)    - Cancer related pain  Currently well controlled  Continuing Oxy ER 30mg q12 q8  Continuing Oxy IR 10mg q4/prn  Continuing Dilaudid IV prn for sev breakthrough pain  Continuing bowel regimen to prevent OIC (including Movantic)    - Severe prot-ghazala malnutrition: appreciate RD recs; continuing regular diet with protein shake supplement BID (SLP eval pending as above)

## 2024-05-13 NOTE — CHART NOTE - NSCHARTNOTEFT_GEN_A_CORE
58F PMH RCC s/p R nephrectomy with supraclav/mediastinal mass with obliteration of SVC and multiple venous thrombosis. 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 IR consulted for femoral CVC placement for initiation of chemo, currently only lower extremity PIV.     - Pt unable to brought down to IR on HFNC  - Recommend vascular surgery eval for potential bedside placement     --  Michael Eckert MD, PGY-3  Vascular and Interventional Radiology   Available on Microsoft Teams    - Non-emergent consults: Place IR consult order in [place]  - Emergent issues (pager): [facility] [phone]; [facility] [phone]; [phone]  - Scheduling questions: [facility] [phone]; [facility] [phone]  - Clinic/outpatient booking: [facility] [phone]; [facility] [phone] 58F PMH RCC s/p R nephrectomy with supraclav/mediastinal mass with obliteration of SVC and multiple venous thrombosis. 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 IR consulted for femoral CVC placement for initiation of chemo, currently only lower extremity PIV.     - As patient is currently on HFNC, recommend against transport for procedure that does not require image-guidance or sedation/anesthesiology  - consider bedside placement   - discussed with team  - consult discussed with Dr. Bedoya (IR attending)      --  Michael Eckert MD, PGY-3  Vascular and Interventional Radiology   Available on [employer] Teams    - Non-emergent consults: Place IR consult order in [place]  - Emergent issues (pager): [facility] [phone]; [facility] [phone]; [phone]  - Scheduling questions: [facility] [phone]; [facility] [phone]  - Clinic/outpatient booking: [facility] [phone]; [facility] [phone]

## 2024-05-13 NOTE — PROGRESS NOTE ADULT - SUBJECTIVE AND OBJECTIVE BOX
SOLID TUMOR ONCOLOGY HOSPITALIST PROGRESS NOTE    S: No acute events overnight.  Pt complained of feeling short of breath this am and overall weak and malnourished. She confirmed that her pleurex was drained ~200cc two days ago.  Her HCP Saeid was present at bedside; risks benefits of proposed inpt chemo were reviewed with him- he had an opporutinity to ask questions and ultimately gave verbal and written consent for pt to receive inpt chemotherapy.  Pt expressed that she was scared about receiving chemo when she was so malnourished, but agreed that she wanted to try chemotherapy.  I iterated to both pt and Saeid that if pt was poorly tolerant of this round of chemo that she would be offered best supportive care in lieu of chemo and hospice would be recommended.    CURRENT MEDICATIONS  MEDICATIONS  (STANDING):  albuterol/ipratropium for Nebulization 3 milliLiter(s) Nebulizer <User Schedule>  Biotene Dry Mouth Oral Rinse 15 milliLiter(s) Swish and Spit every 6 hours  budesonide 160 MICROgram(s)/formoterol 4.5 MICROgram(s) Inhaler 2 Puff(s) Inhalation two times a day  chlorhexidine 2% Cloths 1 Application(s) Topical daily  dexAMETHasone     Tablet 2 milliGRAM(s) Oral daily  diazepam    Tablet 5 milliGRAM(s) Oral at bedtime  influenza   Vaccine 0.5 milliLiter(s) IntraMuscular once  metoclopramide Injectable 10 milliGRAM(s) IV Push every 8 hours  metoprolol tartrate 25 milliGRAM(s) Oral every 12 hours  oxyCODONE  ER Tablet 30 milliGRAM(s) Oral <User Schedule>  pantoprazole    Tablet 40 milliGRAM(s) Oral two times a day  polyethylene glycol 3350 17 Gram(s) Oral every 12 hours  sodium chloride 0.65% Nasal 1 Spray(s) Both Nostrils two times a day  trimethoprim  160 mG/sulfamethoxazole 800 mG 1 Tablet(s) Oral <User Schedule>  MEDICATIONS  (PRN):  aluminum hydroxide/magnesium hydroxide/simethicone Suspension 30 milliLiter(s) Oral every 6 hours PRN Dyspepsia  benzocaine/menthol Lozenge 1 Lozenge Oral two times a day PRN Sore Throat  Biotene Dry Mouth Oral Rinse 15 milliLiter(s) Swish and Spit two times a day PRN dry mouth  diazepam    Tablet 5 milliGRAM(s) Oral two times a day PRN anxiety  FIRST- Mouthwash  BLM 5 milliLiter(s) Swish and Spit four times a day PRN Mouth Care  ondansetron    Tablet 4 milliGRAM(s) Oral every 8 hours PRN Nausea and/or Vomiting  oxyCODONE    IR 10 milliGRAM(s) Oral every 4 hours PRN Moderate Pain (4 - 6)  sodium chloride 0.9% lock flush 10 milliLiter(s) IV Push every 1 hour PRN Pre/post blood products, medications, blood draw, and to maintain line patency      PHYSICAL EXAM  T(C): 36.7 (05-13-24 @ 23:15), Max: 36.7 (05-13-24 @ 23:15)  HR: 78 (05-13-24 @ 23:15) (78 - 100)  BP: 138/76 (05-13-24 @ 23:15) (101/53 - 154/82)  RR: 18 (05-13-24 @ 23:15) (18 - 22)  SpO2: 97% (05-13-24 @ 23:15) (93% - 100%)  Non-toxic appearing woman, sitting up in bed, appears comfortable  Pt still dysphonic, but able to write in comprehensive sentences to communicate  Mild R eyelid ptosis and R pupil miosis present/unchanged; anicteric sclera, no oral lesions/thrush  RRR, no m/r/g  Breaths non-labored; diminished breath sounds in all R lung zones; LL zones CTA  Abd soft/nt/nd, normoactive bowel sounds  Trace LUE non-pitting edema present; large area of ecchymosis extending from the post R calf to post R thigh- improving; generalized muscular atrophy in all 4 extremities  CN 2-12 grossly intact; no gross focal neuro deficits; pt ambulates with walker    LABS                        8.3    23.26 )-----------( 452      ( 13 May 2024 06:43 )             27.2     05-13    135  |  103  |  32<H>  ----------------------------<  93  5.1   |  19<L>  |  0.92    Ca    9.4      13 May 2024 06:43  Phos  2.7     05-13  Mg     1.70     05-13    TPro  5.0<L>  /  Alb  2.2<L>  /  TBili  0.2  /  DBili  x   /  AST  19  /  ALT  43<H>  /  AlkPhos  147<H>  05-13    PATHOLOGY  3/26 pleural fluid cytology: Neg for malignant cells.    3/26 Pericardium excision: Neg for malignancy.    3/20 Pericardial fluid cytology: Neg for malignant cells.    3/14 LYMPH NODE, SUPRACLAVICULAR, RIGHT, US GUIDED CORE BIOPSY AND FNA   POSITIVE FOR MALIGNANT CELLS.   Carcinoma   Touch Prep slides display crowded groups and single lying malignant   cells with enlarged nuclei containing prominent nucleoli and vacuolated   cytoplasm. Core biopsies show neoplastic cells positive for CK7 and CDX2   immunostains, while negative for CK20, TTF1, GATA3, TRPS1 and PAX8. 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 Suggest correlation with clinical information and imaging to   assist with next step management.   Moleculars: pMMR, PD-L1 TPS 1%; Her2 pending      TUMOR MARKERS  3/13 CEA 7.1  3/24 Ca 19-9 27  4/8 Ca-125 187, Ca 27.29 16.9, Ca 15-3 18.9, AFP 5.2      MICROBIOLOGY  5/1 Procal 0.44    Abx  Bactrim ppx 4/8-present  $Cefepime 5/1-10  $Fluconazole 5/6-12  $Nystatin 5/4-6    Cx Data  4/5 MRSA swab: Not detected  3/16 Quant plus TB: Negative      PERTINENT RADIOLOGY  5/10 CXR: Right effusion with suspected lower lobe atelectasis.    5/6 CXR: Right-sided Pleurx catheter with decreased effusion.    5/4 CXR: There is a catheter at the right base.  HEART: normal in size.  LUNGS: There is opacity at the right base compatible with   effusion/infiltrate.. Prominent interstitial markings, likely secondary   to chronic underlying lung disease..  BONES: degenerative changes    5/4 R elbow XR: Linear lucency in the coronoid process seen only on the lateral view   concerning for nondisplaced fracture. Consider cross-sectional imaging of   the elbow with CT or MRI for further evaluation.    5/3 CT c/a/p with IV contrast: Persistent right middle lobe consolidation and decreased right lower lobe consolidation. New patchy nodular opacities in the right upper lobe and increased patchy and groundglass opacities in the left lung. Small right pleural effusion, unchanged. Trace left pleural effusion, decreased. No evidence of metastatic disease in the abdomen or pelvis.    5/1 CXR: Small right effusion unchanged with Pleurx catheter.    4/19 MRCP: Motion degraded study, particularly on postcontrast sequences.  Pancreatic neck T2 hyperintense lesion measures 16 mm, image 28 series 5,   without clear evidence of enhancement on postcontrast phases. Most likely   this is a side branch IPMN. Remainder of the pancreas is suboptimally   evaluated due to artifact. Follow-up MRI abdomen or pancreas protocol CT   can be considered in 3-6 months for reevaluation. Partially distended stomach. Duodenal diverticulum. Colon is also partially distended with mild to moderate stool burden. Correlate   clinically. Trace pleural effusions. Right-sided pleural catheter is partially   imaged. Lung parenchyma is incompletely characterized on MRI. Soft tissue   of the mediastinum is not adequately imaged on this study.    4/18 B/L LE Doppler: Acute deep venous thrombosis: above the knee.  Acute nonocclusive deep venous thrombosis in the right common femoral vein.    417 CXR: Clear lungs with minimal effusion and Pleurx catheter.    4/10 VA dopplers B/L UE: Extensive acute, occlusive DVT RIJ, innominate and subclavian veins.  Acute non-occlusive DVT w/in R axillary vein.  Extensive acute, occlusive DVT noted in L subclavian. axillary, and proximal brachial veins.  Acute non-occlusive DVT LIJ.  Superficial vein thromboses are noted in the B/L cephalic veins.    4/9 Echocardiogram: Normal LV systolic function.  Normal mitral valve w/ normal leaflet excursion.  No pericardial effusion seen    4/4 CXR: Clear lungs w/ R pleurx catheter in place.    ok4/4 CXR AM: Small R pleural effusion w/ pleurx cath improved    OSH Imaging ([facility])  3/29 CXR: Decrease in R pleural effusion.     3/28 B/L UE Dupplers: Acute DVT involving R IJ, innominate vein, subclavian, brachial, and L IJ.  Superficial venous thrombosis involving B/L cephalic veins.  + Edema of superficial soft tissue of UE R>L.    3/27 CT neck: Bulky and conglomerate LAD invading RIJ w/ thrombus extending up to hyoid level.  Thrombus is likely combination tumor and bland.  New LIJ nonocclusive thrombus.    3/27 CT Chest: New consolidations throughout the R lung worse RLL c/f aspiration PNA.  + R pleurx cath w/ decrease in R pleural effusion.  Extensive DVT w/in thoracic and lower neck, upper SVC remains obliterated.  Large R supraclavicular mass infiltrating into the mediastinum.      3/27 CXR: Progression of R consolidation/effusion.    3/21 TTE: LV grossly normal.  Thickened pericardium.  no pericardial effusion.    3/18 TTE: Mod pericardial effusion w/ e/o hemodynamic compromise w/ diasolic collapse of RA that exceeds 1/3 of cardiac cycle >30% resp variation across MV E. wave and early diastolic inversion of RV.    3/14 CT Chest: Conglomerate soft tissue in superior mediastinum and R supraclavicular region 2/2 LAD w/ internal hypodensity c/f necrosis.  Mass encases SVC and multiple great veins resulting in obliteration of the SVC and thrombosis of the RIJ.  Multiple R sided collateral vessels and R soft tissue edema.  B/L pulm nodules.    3/14 CT neck: Extensive cellulitis/myositis along R anterior lateral neck and R upper chest wall w/ inflammatory fat induration the deep soft tissue of neck.  Large supraclavicular/mediastinal mass lesion, presumably conglomerate of LN w/ mass effect and complete occlusion of RIJ w/ associated inflammation.  Complete occlusion of R subclavian vein and nearly occlusive thrombosis in the proximal L brachiocephalic vein.    3/13 RUQ Abd US: Cholelithiasis w/o e/o cholecystitis.  Dilated CBD w/o e/o intraductal stone or other obstruction. 1.4cm pancreatic cyst w/ mild duct dilatation.    3/12 CT abd/pelv: S/p R nephrectomy. no LAD.  New 1.6cm  pancreatic neck cystic lesion w/o main pancreatic ductal dilatation.    3/10 R breast US: No e/o breast abscess      RECENT ENDOSCOPY  5/4 Flex laryngoscopy    -- Nasopharynx had no mass or exudate.    -- Base of tongue was symmetric and not enlarged.    -- Vallecula was clear    -- Epiglottis, both aryepiglottic folds and both false vocal folds were normal    -- Arytenoids both without edema and erythema     -- True vocal folds were fully mobile and without lesions. possible R VC hypomobility, Incomplete glottic closure    -- Post cricoid area was clear.    -- Interarytenoid edema was absent    4/26 Upper EUS  - Normal esophagus.  - A large amount of food (residue) inthe stomach. Procedure was aborted.    4/24 Upper EUS   - Fluid in the middle third of the esophagus and in the lower third of the esophagus.  - A large amount of food (residue) in the stomach so EGD was aborted and EUS was not attempted.  - No specimens collected.

## 2024-05-14 LAB
ALBUMIN SERPL ELPH-MCNC: 2.7 G/DL — LOW (ref 3.3–5)
ALP SERPL-CCNC: 172 U/L — HIGH (ref 40–120)
ALT FLD-CCNC: 61 U/L — HIGH (ref 4–33)
ANION GAP SERPL CALC-SCNC: 12 MMOL/L — SIGNIFICANT CHANGE UP (ref 7–14)
APTT BLD: 32.8 SEC — SIGNIFICANT CHANGE UP (ref 24.5–35.6)
AST SERPL-CCNC: 18 U/L — SIGNIFICANT CHANGE UP (ref 4–32)
BASOPHILS # BLD AUTO: 0.17 K/UL — SIGNIFICANT CHANGE UP (ref 0–0.2)
BASOPHILS NFR BLD AUTO: 0.8 % — SIGNIFICANT CHANGE UP (ref 0–2)
BILIRUB SERPL-MCNC: <0.2 MG/DL — SIGNIFICANT CHANGE UP (ref 0.2–1.2)
BLD GP AB SCN SERPL QL: NEGATIVE — SIGNIFICANT CHANGE UP
BUN SERPL-MCNC: 35 MG/DL — HIGH (ref 7–23)
CALCIUM SERPL-MCNC: 9.7 MG/DL — SIGNIFICANT CHANGE UP (ref 8.4–10.5)
CHLORIDE SERPL-SCNC: 101 MMOL/L — SIGNIFICANT CHANGE UP (ref 98–107)
CO2 SERPL-SCNC: 23 MMOL/L — SIGNIFICANT CHANGE UP (ref 22–31)
CREAT SERPL-MCNC: 0.89 MG/DL — SIGNIFICANT CHANGE UP (ref 0.5–1.3)
EGFR: 74 ML/MIN/1.73M2 — SIGNIFICANT CHANGE UP
EOSINOPHIL # BLD AUTO: 0.06 K/UL — SIGNIFICANT CHANGE UP (ref 0–0.5)
EOSINOPHIL NFR BLD AUTO: 0.3 % — SIGNIFICANT CHANGE UP (ref 0–6)
GLUCOSE SERPL-MCNC: 97 MG/DL — SIGNIFICANT CHANGE UP (ref 70–99)
HCT VFR BLD CALC: 28.5 % — LOW (ref 34.5–45)
HGB BLD-MCNC: 8.8 G/DL — LOW (ref 11.5–15.5)
IANC: 17 K/UL — HIGH (ref 1.8–7.4)
IMM GRANULOCYTES NFR BLD AUTO: 8.8 % — HIGH (ref 0–0.9)
INR BLD: 0.98 RATIO — SIGNIFICANT CHANGE UP (ref 0.85–1.18)
LYMPHOCYTES # BLD AUTO: 0.53 K/UL — LOW (ref 1–3.3)
LYMPHOCYTES # BLD AUTO: 2.6 % — LOW (ref 13–44)
MAGNESIUM SERPL-MCNC: 1.6 MG/DL — SIGNIFICANT CHANGE UP (ref 1.6–2.6)
MCHC RBC-ENTMCNC: 29 PG — SIGNIFICANT CHANGE UP (ref 27–34)
MCHC RBC-ENTMCNC: 30.9 GM/DL — LOW (ref 32–36)
MCV RBC AUTO: 94.1 FL — SIGNIFICANT CHANGE UP (ref 80–100)
MONOCYTES # BLD AUTO: 0.75 K/UL — SIGNIFICANT CHANGE UP (ref 0–0.9)
MONOCYTES NFR BLD AUTO: 3.7 % — SIGNIFICANT CHANGE UP (ref 2–14)
NEUTROPHILS # BLD AUTO: 17 K/UL — HIGH (ref 1.8–7.4)
NEUTROPHILS NFR BLD AUTO: 83.8 % — HIGH (ref 43–77)
NRBC # BLD: 0 /100 WBCS — SIGNIFICANT CHANGE UP (ref 0–0)
NRBC # FLD: 0 K/UL — SIGNIFICANT CHANGE UP (ref 0–0)
PHOSPHATE SERPL-MCNC: 2.9 MG/DL — SIGNIFICANT CHANGE UP (ref 2.5–4.5)
PLATELET # BLD AUTO: 410 K/UL — HIGH (ref 150–400)
POTASSIUM SERPL-MCNC: 4.8 MMOL/L — SIGNIFICANT CHANGE UP (ref 3.5–5.3)
POTASSIUM SERPL-SCNC: 4.8 MMOL/L — SIGNIFICANT CHANGE UP (ref 3.5–5.3)
PROCALCITONIN SERPL-MCNC: 0.62 NG/ML — HIGH (ref 0.02–0.1)
PROT SERPL-MCNC: 5.9 G/DL — LOW (ref 6–8.3)
PROTHROM AB SERPL-ACNC: 11.1 SEC — SIGNIFICANT CHANGE UP (ref 9.5–13)
RBC # BLD: 3.03 M/UL — LOW (ref 3.8–5.2)
RBC # FLD: 16.9 % — HIGH (ref 10.3–14.5)
RH IG SCN BLD-IMP: POSITIVE — SIGNIFICANT CHANGE UP
SODIUM SERPL-SCNC: 136 MMOL/L — SIGNIFICANT CHANGE UP (ref 135–145)
WBC # BLD: 20.29 K/UL — HIGH (ref 3.8–10.5)
WBC # FLD AUTO: 20.29 K/UL — HIGH (ref 3.8–10.5)

## 2024-05-14 PROCEDURE — 99233 SBSQ HOSP IP/OBS HIGH 50: CPT

## 2024-05-14 PROCEDURE — 99222 1ST HOSP IP/OBS MODERATE 55: CPT | Mod: GC

## 2024-05-14 RX ORDER — MAGNESIUM OXIDE 400 MG ORAL TABLET 241.3 MG
400 TABLET ORAL
Refills: 0 | Status: COMPLETED | OUTPATIENT
Start: 2024-05-14 | End: 2024-05-14

## 2024-05-14 RX ORDER — MAGNESIUM SULFATE 500 MG/ML
2 VIAL (ML) INJECTION ONCE
Refills: 0 | Status: COMPLETED | OUTPATIENT
Start: 2024-05-14 | End: 2024-05-14

## 2024-05-14 RX ORDER — ENOXAPARIN SODIUM 100 MG/ML
50 INJECTION SUBCUTANEOUS EVERY 12 HOURS
Refills: 0 | Status: DISCONTINUED | OUTPATIENT
Start: 2024-05-14 | End: 2024-05-14

## 2024-05-14 RX ORDER — ENOXAPARIN SODIUM 100 MG/ML
50 INJECTION SUBCUTANEOUS ONCE
Refills: 0 | Status: COMPLETED | OUTPATIENT
Start: 2024-05-14 | End: 2024-05-14

## 2024-05-14 RX ADMIN — PANTOPRAZOLE SODIUM 40 MILLIGRAM(S): 20 TABLET, DELAYED RELEASE ORAL at 17:25

## 2024-05-14 RX ADMIN — BUDESONIDE AND FORMOTEROL FUMARATE DIHYDRATE 2 PUFF(S): 160; 4.5 AEROSOL RESPIRATORY (INHALATION) at 23:22

## 2024-05-14 RX ADMIN — OXYCODONE HYDROCHLORIDE 30 MILLIGRAM(S): 5 TABLET ORAL at 23:18

## 2024-05-14 RX ADMIN — Medication 1 PATCH: at 12:20

## 2024-05-14 RX ADMIN — Medication 15 MILLILITER(S): at 06:38

## 2024-05-14 RX ADMIN — Medication 3 MILLILITER(S): at 20:22

## 2024-05-14 RX ADMIN — Medication 1 SPRAY(S): at 17:24

## 2024-05-14 RX ADMIN — Medication 15 MILLILITER(S): at 17:24

## 2024-05-14 RX ADMIN — Medication 3 MILLILITER(S): at 14:39

## 2024-05-14 RX ADMIN — BUDESONIDE AND FORMOTEROL FUMARATE DIHYDRATE 2 PUFF(S): 160; 4.5 AEROSOL RESPIRATORY (INHALATION) at 09:26

## 2024-05-14 RX ADMIN — NALOXEGOL OXALATE 25 MILLIGRAM(S): 12.5 TABLET, FILM COATED ORAL at 12:19

## 2024-05-14 RX ADMIN — Medication 3 MILLILITER(S): at 04:04

## 2024-05-14 RX ADMIN — MAGNESIUM OXIDE 400 MG ORAL TABLET 400 MILLIGRAM(S): 241.3 TABLET ORAL at 14:03

## 2024-05-14 RX ADMIN — OXYCODONE HYDROCHLORIDE 30 MILLIGRAM(S): 5 TABLET ORAL at 14:03

## 2024-05-14 RX ADMIN — Medication 1 TABLET(S): at 12:21

## 2024-05-14 RX ADMIN — PANTOPRAZOLE SODIUM 40 MILLIGRAM(S): 20 TABLET, DELAYED RELEASE ORAL at 06:37

## 2024-05-14 RX ADMIN — Medication 3 MILLILITER(S): at 07:15

## 2024-05-14 RX ADMIN — OXYCODONE HYDROCHLORIDE 30 MILLIGRAM(S): 5 TABLET ORAL at 06:37

## 2024-05-14 RX ADMIN — POLYETHYLENE GLYCOL 3350 17 GRAM(S): 17 POWDER, FOR SOLUTION ORAL at 06:35

## 2024-05-14 RX ADMIN — Medication 2 MILLIGRAM(S): at 06:39

## 2024-05-14 RX ADMIN — Medication 15 MILLILITER(S): at 12:20

## 2024-05-14 RX ADMIN — ENOXAPARIN SODIUM 50 MILLIGRAM(S): 100 INJECTION SUBCUTANEOUS at 18:04

## 2024-05-14 RX ADMIN — CHLORHEXIDINE GLUCONATE 1 APPLICATION(S): 213 SOLUTION TOPICAL at 12:21

## 2024-05-14 RX ADMIN — Medication 15 MILLILITER(S): at 00:33

## 2024-05-14 RX ADMIN — Medication 1 SPRAY(S): at 06:38

## 2024-05-14 RX ADMIN — MAGNESIUM OXIDE 400 MG ORAL TABLET 400 MILLIGRAM(S): 241.3 TABLET ORAL at 17:23

## 2024-05-14 RX ADMIN — AMIODARONE HYDROCHLORIDE 200 MILLIGRAM(S): 400 TABLET ORAL at 06:37

## 2024-05-14 RX ADMIN — Medication 25 MILLIGRAM(S): at 17:25

## 2024-05-14 RX ADMIN — Medication 25 MILLIGRAM(S): at 06:37

## 2024-05-14 RX ADMIN — Medication 1 PATCH: at 07:40

## 2024-05-14 NOTE — PROGRESS NOTE ADULT - ASSESSMENT
57 yo woman, former smoker, with h/o R eye glaucoma, Fibromyalgia, Anxiety, GERD, and RCC s/p R total nephrectomy/hysterectomy; she was initially admitted to [facility] on 3/11 w/ R breast swelling c/f mastitis- imaging brooks noted supraclavicular/mediastinal mass lesion which encased the SVC and multiple other veins resulting in obliteration of the SVC and thrombosis of the R IJ, and a pancreatic neck cystic lesion.  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  Her disease/hospital course has also been c/b pericardial effusion s/p pericardial window w/ neg cytology, R pleural effusion, also negative cytology) s/p Pleurx, Afib w/ RVR and acute hypoxic resp failure 2/2 SVC syndrome- not dennis to IR-guided stenting; pt was ultimately started on Dex and transferred to [facility] on 4/4 for urgent palliative RT which she completed on 4/18. Her hospital course has also been c/b pericardial effusion with tamponade s/p window and non malignant R pleural effusion s/p pleurex, acute b/l UE and RLE dvts, anxiety, and more recently, recurrent hypoxia.    ACTIVE PROBLEMS  Metastatic CUP (carcinoma of unknown primary)  Goals of care discussion, counseling  Acute-on-chronic hypoxic respiratory failure  Dysphonia with R VC hypomobility   Oral thrush  SVC syndrome  Extensive b/l UE DVTs  Acute RLE DVT a/w RLE ecchymosis  Hypercoag state  Pericardial effusion with tamp physiology s/p pericardial window  R pleural effusion s/p pleurex  pAfib, with RVR on this admission  Anxiety/emotional lability  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)  Cancer-related pain, anxiety  Severe prot-ghazala malnutrition    - Metastatic CUP (carcinoma of unknown primary)  - Goals of care discussion, counseling  Based on 3/14 SC LN path, ddx includes primary UGI vs pancreaticobiliary primary (breast edema is likely 2/2 SVC syndrome); PD-L1 TPs 1%, pMMR, Her2 2+, FISH pending, Ki-67 30%  Ca-125 moderately elevated at 125; other tumor markers not significantly elevated  Additional diagnostic brooks includes:   * 4/19 MRCP showing pancreatic lesion (? IPMN, but pancreas was suboptimally evaluated)     * 4/24 and 4/26 EGD/EUS aborted as pt had large amount of food in the stomach that prevented passing of scope   * 4/30 UGIS aborted as pt felt too short of breath when laying down flat (improved after her pleurex was drained  Case d/w Dr. Epsarza (Pancreatic Onc)- deferring 3rd EGD attempt at this time as it may not provide any additional diagnostic benefit;  Pt is being considered for 1L mFOLFOX for treatment of carcinoma of unknown primary (suspected UGI vs pancreaticobiliary origin). Pt is a suboptimal candidate i/s/o her progressive debility and malnutrition (ECOG PS 3)   >> Discussed risks/benefits of proceeding with palliative chemo (inpt) vs best supportive care with pt's HCP Saeid on 5/10 and again on 5/13- he is amenable to trial of inpt chemotherapy and understands that hospice will be recommended if pt does not tolerate trial of inpt chemo; verbal and written chemo consent obtained on 5/13   * Central line placement by IR is challenging pt is on HF; Vascular Surgery consulted for bedside central line placement; will proceed with chemo once pt has reliable venous access  Pt's long term prognosis remains guarded; she is dnr/dni    - Acute-on-chronic hypoxic resp failure  - Multifocal aspiration vs hosp-acquired pneumonia  Pt still dependent on HF NC- likely due to recent HAP + underlying malignancy  Recent chest imaging is stable- repeat CXR ordered for 5/14  Continuing empiric Cefepime for 10d course, until 5/10  Dex taper to be completed on 5/14 (continuing pjp/ppi ppx until taper is complete)  Continuing Symbicort 2 puffs BID  Continuing Duonebs q6/prn  Will continue routine pleurex drainage as below    - Dysphonia with R VC hypomobility  - Oral thrush  Appreciate ENT eval/recs: 5/4 Flex ellis pt with R VC hypomobility  Pt cleared for pureed diet s/p Cinesophagram  Completed 7d course of Fluconazole on 5/12    - SVC syndrome  Appreciate Rad Onc eval/recs  Completed 10fxs of palliative RT on 4/18   Tapering steroids as above     - Extensive b/l UE DVTs  - Acute RLE DVT a/w RLE ecchymosis  - Hypercoag state  Continuing therapeutic lovenox, benefits > risks   * Of note: pt has poor UE IV access and currently requires peripheral IV in her LE extremities for admin of medication; can consider FV central line for urgent/emergent IV needs    - Pericardial effusion with tamp physiology s/p pericardial window  - R pleural effusion s/p pleurex  Likely inflammatory; both pleural and pericardial fluid cytology are negative for malignant cells  4/9 surveillance TTE with pEF and no WMAs  Continuing R pleurex drainage- up to 500cc q48h/prn    - pAfib  Pt currently SR, HR controlled  Likely precipitated by malignancy, pericardial effusion, SVC syndrome  Continuing Metoprolol 25mg q12 with holding parameters  Continuing Amio 200mg daily (monitoring for toxicities)  Continuing telemetry    - Anxiety/emotional lability  Continuing Diazepam 5mg BID/prn and nightly  Pt has poor health literacy and clinical insight which is negatively impacting her medical decision making- when ask if she wanted to defer medical decision making to her boyfriend or if she had a designated HCP she replied "he's already sick of me"  Appreciate  consult: pt lacks medical decision making capacity; medical decision making will be deferred to pt's surrogate decision maker- Saeid Peña ([phone])    - 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)  Pt with h/o R eye glaucoma, but miosis can also be associated with Pancoast syndrome i/s/o extensive R upper lung tumor  Pt denies visual deficits or other related symptoms   MRI Brain without contrast ordered for further eval- pt continues to refuse, can be completed as outpt  Will continue to monitor closely    - Anemia in neoplastic disease  Hgb remains stable  4/5 iron studies not c/w iron deficiency  VSS and pt without clinical s/s of active bleeding  4/17 hemolysis labs negative; 4/18 FOBT negative x2  Trending daily cbcs; continuing supportive transfusions prn (pt does not have h/o ACS, CAD, MI, goal hgb > 7; plts > 10K)    - Cancer related pain  Currently well controlled  Continuing Oxy ER 30mg q12 q8  Continuing Oxy IR 10mg q4/prn  Continuing Dilaudid IV prn for sev breakthrough pain  Continuing bowel regimen to prevent OIC (including Movantic)    - Severe prot-ghazala malnutrition: appreciate RD recs; continuing regular diet with protein shake supplement BID (SLP eval pending as above)

## 2024-05-14 NOTE — CONSULT NOTE ADULT - CONSULT REQUESTED BY NAME
NS Onc team
Primary
4N
Keesha Segura
Oncology
medicine
primary team
Karyna
Medicine
primary team
Lynette Buitrago
GI

## 2024-05-14 NOTE — CONSULT NOTE ADULT - CONSULT REASON
femoral CVC for chemotherapy initiation
Femoral CVC
pancreatic neck cystic lesion
preop
Pain management
Cardiac Clearance
Dyspnea, hypoxemia
midline
work up and management for suspected new metastatic malignancy
carcinoma
dysphonia
Extensive Upper extremity DVTs

## 2024-05-14 NOTE — PROGRESS NOTE ADULT - SUBJECTIVE AND OBJECTIVE BOX
SOLID TUMOR ONCOLOGY HOSPITALIST PROGRESS NOTE    S: No acute events overnight.  Pt had no new complaints this morning.  She reported feeling tired and noted that she did not sleep well last night.    CURRENT MEDICATIONS  MEDICATIONS  (STANDING):  albuterol/ipratropium for Nebulization 3 milliLiter(s) Nebulizer <User Schedule>  aMIOdarone    Tablet 200 milliGRAM(s) Oral daily  Biotene Dry Mouth Oral Rinse 15 milliLiter(s) Swish and Spit every 6 hours  budesonide 160 MICROgram(s)/formoterol 4.5 MICROgram(s) Inhaler 2 Puff(s) Inhalation two times a day  chlorhexidine 2% Cloths 1 Application(s) Topical daily  diazepam    Tablet 5 milliGRAM(s) Oral at bedtime  enoxaparin Injectable 50 milliGRAM(s) SubCutaneous once  influenza   Vaccine 0.5 milliLiter(s) IntraMuscular once  metoclopramide Injectable 10 milliGRAM(s) IV Push every 8 hours  metoprolol tartrate 25 milliGRAM(s) Oral every 12 hours  multivitamin 1 Tablet(s) Oral daily  naloxegol 25 milliGRAM(s) Oral daily  nicotine -  14 mG/24Hr(s) Patch 1 Patch Transdermal every 24 hours  oxyCODONE  ER Tablet 30 milliGRAM(s) Oral <User Schedule>  pantoprazole    Tablet 40 milliGRAM(s) Oral two times a day  polyethylene glycol 3350 17 Gram(s) Oral every 12 hours  sodium chloride 0.65% Nasal 1 Spray(s) Both Nostrils two times a day  trimethoprim  160 mG/sulfamethoxazole 800 mG 1 Tablet(s) Oral <User Schedule>  MEDICATIONS  (PRN):  aluminum hydroxide/magnesium hydroxide/simethicone Suspension 30 milliLiter(s) Oral every 6 hours PRN Dyspepsia  benzocaine/menthol Lozenge 1 Lozenge Oral two times a day PRN Sore Throat  Biotene Dry Mouth Oral Rinse 15 milliLiter(s) Swish and Spit two times a day PRN dry mouth  diazepam    Tablet 5 milliGRAM(s) Oral two times a day PRN anxiety  FIRST- Mouthwash  BLM 5 milliLiter(s) Swish and Spit four times a day PRN Mouth Care  ondansetron    Tablet 4 milliGRAM(s) Oral every 8 hours PRN Nausea and/or Vomiting  oxyCODONE    IR 10 milliGRAM(s) Oral every 4 hours PRN Moderate Pain (4 - 6)  sodium chloride 0.9% lock flush 10 milliLiter(s) IV Push every 1 hour PRN Pre/post blood products, medications, blood draw, and to maintain line patency      PHYSICAL EXAM  T(C): 36.4 (05-14-24 @ 17:25), Max: 36.7 (05-13-24 @ 23:15)  HR: 88 (05-14-24 @ 17:25) (74 - 88)  BP: 140/79 (05-14-24 @ 17:25) (118/69 - 140/79)  RR: 18 (05-14-24 @ 17:25) (16 - 18)  SpO2: 99% (05-14-24 @ 17:25) (95% - 99%)    05-14-24 @ 07:01  -  05-14-24 @ 17:49  --------------------------------------------------------  IN: 0 mL / OUT: 150 mL / NET: -150 mL  Non-toxic appearing woman, sitting up in bed, appears comfortable  Pt still dysphonic, but able to write in comprehensive sentences to communicate  Mild R eyelid ptosis and R pupil miosis present/unchanged; anicteric sclera, no oral lesions/thrush  RRR, no m/r/g  Breaths non-labored; diminished breath sounds in all R lung zones; LL zones CTA  Abd soft/nt/nd, normoactive bowel sounds  Trace LUE non-pitting edema present; large area of ecchymosis extending from the post R calf to post R thigh- improving; generalized muscular atrophy in all 4 extremities  CN 2-12 grossly intact; no gross focal neuro deficits; pt ambulates with walker      LABS                        8.8    20.29 )-----------( 410      ( 14 May 2024 06:55 )             28.5     05-14    136  |  101  |  35<H>  ----------------------------<  97  4.8   |  23  |  0.89    Ca    9.7      14 May 2024 06:55  Phos  2.9     05-14  Mg     1.60     05-14    TPro  5.9<L>  /  Alb  2.7<L>  /  TBili  <0.2  /  DBili  x   /  AST  18  /  ALT  61<H>  /  AlkPhos  172<H>  05-14    PT/INR - ( 14 May 2024 11:15 )   PT: 11.1 sec;   INR: 0.98 ratio    PTT - ( 14 May 2024 11:15 )  PTT:32.8 sec      PATHOLOGY  3/26 pleural fluid cytology: Neg for malignant cells.    3/26 Pericardium excision: Neg for malignancy.    3/20 Pericardial fluid cytology: Neg for malignant cells.    3/14 LYMPH NODE, SUPRACLAVICULAR, RIGHT, US GUIDED CORE BIOPSY AND FNA   POSITIVE FOR MALIGNANT CELLS.   Carcinoma   Touch Prep slides display crowded groups and single lying malignant   cells with enlarged nuclei containing prominent nucleoli and vacuolated   cytoplasm. Core biopsies show neoplastic cells positive for CK7 and CDX2   immunostains, while negative for CK20, TTF1, GATA3, TRPS1 and PAX8. 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 Suggest correlation with clinical information and imaging to   assist with next step management.   Moleculars: pMMR, PD-L1 TPS 1%; Her2 pending      TUMOR MARKERS  3/13 CEA 7.1  3/24 Ca 19-9 27  4/8 Ca-125 187, Ca 27.29 16.9, Ca 15-3 18.9, AFP 5.2      MICROBIOLOGY  5/1 Procal 0.44    Abx  Bactrim ppx 4/8-present  $Cefepime 5/1-10  $Fluconazole 5/6-12  $Nystatin 5/4-6    Cx Data  4/5 MRSA swab: Not detected  3/16 Quant plus TB: Negative      PERTINENT RADIOLOGY  5/10 CXR: Right effusion with suspected lower lobe atelectasis.    5/6 CXR: Right-sided Pleurx catheter with decreased effusion.    5/4 CXR: There is a catheter at the right base.  HEART: normal in size.  LUNGS: There is opacity at the right base compatible with   effusion/infiltrate.. Prominent interstitial markings, likely secondary   to chronic underlying lung disease..  BONES: degenerative changes    5/4 R elbow XR: Linear lucency in the coronoid process seen only on the lateral view   concerning for nondisplaced fracture. Consider cross-sectional imaging of   the elbow with CT or MRI for further evaluation.    5/3 CT c/a/p with IV contrast: Persistent right middle lobe consolidation and decreased right lower lobe consolidation. New patchy nodular opacities in the right upper lobe and increased patchy and groundglass opacities in the left lung. Small right pleural effusion, unchanged. Trace left pleural effusion, decreased. No evidence of metastatic disease in the abdomen or pelvis.    5/1 CXR: Small right effusion unchanged with Pleurx catheter.    4/19 MRCP: Motion degraded study, particularly on postcontrast sequences.  Pancreatic neck T2 hyperintense lesion measures 16 mm, image 28 series 5,   without clear evidence of enhancement on postcontrast phases. Most likely   this is a side branch IPMN. Remainder of the pancreas is suboptimally   evaluated due to artifact. Follow-up MRI abdomen or pancreas protocol CT   can be considered in 3-6 months for reevaluation. Partially distended stomach. Duodenal diverticulum. Colon is also partially distended with mild to moderate stool burden. Correlate   clinically. Trace pleural effusions. Right-sided pleural catheter is partially   imaged. Lung parenchyma is incompletely characterized on MRI. Soft tissue   of the mediastinum is not adequately imaged on this study.    4/18 B/L LE Doppler: Acute deep venous thrombosis: above the knee.  Acute nonocclusive deep venous thrombosis in the right common femoral vein.    417 CXR: Clear lungs with minimal effusion and Pleurx catheter.    4/10 VA dopplers B/L UE: Extensive acute, occlusive DVT RIJ, innominate and subclavian veins.  Acute non-occlusive DVT w/in R axillary vein.  Extensive acute, occlusive DVT noted in L subclavian. axillary, and proximal brachial veins.  Acute non-occlusive DVT LIJ.  Superficial vein thromboses are noted in the B/L cephalic veins.    4/9 Echocardiogram: Normal LV systolic function.  Normal mitral valve w/ normal leaflet excursion.  No pericardial effusion seen    4/4 CXR: Clear lungs w/ R pleurx catheter in place.    ok4/4 CXR AM: Small R pleural effusion w/ pleurx cath improved    OSH Imaging ([facility])  3/29 CXR: Decrease in R pleural effusion.     3/28 B/L UE Dupplers: Acute DVT involving R IJ, innominate vein, subclavian, brachial, and L IJ.  Superficial venous thrombosis involving B/L cephalic veins.  + Edema of superficial soft tissue of UE R>L.    3/27 CT neck: Bulky and conglomerate LAD invading RIJ w/ thrombus extending up to hyoid level.  Thrombus is likely combination tumor and bland.  New LIJ nonocclusive thrombus.    3/27 CT Chest: New consolidations throughout the R lung worse RLL c/f aspiration PNA.  + R pleurx cath w/ decrease in R pleural effusion.  Extensive DVT w/in thoracic and lower neck, upper SVC remains obliterated.  Large R supraclavicular mass infiltrating into the mediastinum.      3/27 CXR: Progression of R consolidation/effusion.    3/21 TTE: LV grossly normal.  Thickened pericardium.  no pericardial effusion.    3/18 TTE: Mod pericardial effusion w/ e/o hemodynamic compromise w/ diasolic collapse of RA that exceeds 1/3 of cardiac cycle >30% resp variation across MV E. wave and early diastolic inversion of RV.    3/14 CT Chest: Conglomerate soft tissue in superior mediastinum and R supraclavicular region 2/2 LAD w/ internal hypodensity c/f necrosis.  Mass encases SVC and multiple great veins resulting in obliteration of the SVC and thrombosis of the RIJ.  Multiple R sided collateral vessels and R soft tissue edema.  B/L pulm nodules.    3/14 CT neck: Extensive cellulitis/myositis along R anterior lateral neck and R upper chest wall w/ inflammatory fat induration the deep soft tissue of neck.  Large supraclavicular/mediastinal mass lesion, presumably conglomerate of LN w/ mass effect and complete occlusion of RIJ w/ associated inflammation.  Complete occlusion of R subclavian vein and nearly occlusive thrombosis in the proximal L brachiocephalic vein.    3/13 RUQ Abd US: Cholelithiasis w/o e/o cholecystitis.  Dilated CBD w/o e/o intraductal stone or other obstruction. 1.4cm pancreatic cyst w/ mild duct dilatation.    3/12 CT abd/pelv: S/p R nephrectomy. no LAD.  New 1.6cm  pancreatic neck cystic lesion w/o main pancreatic ductal dilatation.    3/10 R breast US: No e/o breast abscess      RECENT ENDOSCOPY  5/4 Flex laryngoscopy    -- Nasopharynx had no mass or exudate.    -- Base of tongue was symmetric and not enlarged.    -- Vallecula was clear    -- Epiglottis, both aryepiglottic folds and both false vocal folds were normal    -- Arytenoids both without edema and erythema     -- True vocal folds were fully mobile and without lesions. possible R VC hypomobility, Incomplete glottic closure    -- Post cricoid area was clear.    -- Interarytenoid edema was absent    4/26 Upper EUS  - Normal esophagus.  - A large amount of food (residue) inthe stomach. Procedure was aborted.    4/24 Upper EUS   - Fluid in the middle third of the esophagus and in the lower third of the esophagus.  - A large amount of food (residue) in the stomach so EGD was aborted and EUS was not attempted.  - No specimens collected.

## 2024-05-14 NOTE — CONSULT NOTE ADULT - ATTENDING COMMENTS
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 She was further found to have a pancreatic neck cystic lesion, pericardial effusion s/p pericardial window (cyto negative), right pleural effusion (negative cyto) s/p PleurX, A-Fib with RVR, and hypoxemia due to SVC syndrome. Now pending EGD/EUS for pancreatic lesion. Pulmonary consulted pre-op. She is currently afebrile and hemodynamically stable. Oxygen saturation is 96% on room air at rest. She has a R PleurX that is being drained 500 mL every other day. She has no dyspnea at rest or with regular exertion currently. She has COPD/emphysema for which she takes Symbicort at home. Please restart her home Symbicort 160-4.5 mcg 2 puffs twice daily, rinse after use    Given patient's COPD, patient is at higher risk for post-op respiratory failure. However, per ARISCAT score, she is overall at low risk for rafa-procedural pulmonary complication. She should take her Symbicort on the morning prior to procedure. She can be administered Duonebs if she develops wheezing or dyspnea. Please limit sedation to lowest dose required. Needs close cardiac and respiratory monitoring during and after procedure. Consider CPAP therapy post-procedure if patient develops respiratory distress. No contraindications to proceeding with procedure from pulmonary perspective with above precautions.
Agree with above  Patient debating whether she wants to pursue intervention for panc cyst  In the interim, please obtain MRCP
As above  DVT/SVT  Rec:   Med mgmt  Can fu outpt for surveillance.
The patient was seen and examined with the Cardiology Consultation Teaching Service.    She is a 58-year-old former smoker with a history of renal cell carcinoma with prior nephrectomy and GERD who presented to [facility] with right breast swelling and pain, found to have CT findings concerning for metastatic malignancy involving the lung, and axillary, supraclavicular and mediastinal lymph nodes. Pathology was most suggestive of a primary GI malignancy, possibly of pancreaticobiliary origin.     Her hospitalization also revealed a moderate pericardial effusion requiring pericardial window and right subclavian vein and left brachiocephalic vein occlusion, with concerns for SVC syndrome, as well as the development of atrial fibrillation with rapid ventricular response, now on amiodarone.     We are consulted prior to EUS/FNB of the cystic pancreatic neck lesion.    No chest pain  No current dyspnea at rest  No palpitations at this time    PMH/PSH:  Renal cell carcinoma with prior right nephrectomy  GERD  Glaucoma  Fibromyalgia  Anxiety disorder  Former smoker  Hysterectomy    Comfortable-appearing woman in no acute distress  Alert and oriented  Afebrile  Vital signs stable  Hypertensive 150/80s  JVP is not elevated  Clear lungs  Normal heart sounds  Extremities are warm and perfused  No peripheral edema     Normocytic anemia Hb 7.9  Normal coagulation      No recent ECG noted since the patient was in atrial fibrillation  Echocardiography previously demonstrated normal LV systolic function without significant valve disease    Impression and Recommendations:   58-year-old former smoker with a history of renal cell carcinoma with prior nephrectomy and GERD who presented to [facility] with right breast swelling and pain, found to have CT findings concerning for metastatic malignancy involving the lung, and axillary, supraclavicular and mediastinal lymph nodes likely pancreaticobiliary origin. We are consulted prior to EUS and biopsy.    There are no absolute contraindications to the planned procedure. There is no evidence of ongoing myocardial ischemia, decompensated heart failure, or unstable cardiac arrhythmia.    The patient's Revised Cardiac Risk Index estimates a 3.9% 30-day risk of death, MI or cardiac arrest. The patient's Amador Perioperative Risk is 1.9% for 30-day risk of myocardial infarction or cardiac arrest.     These risk estimates should be incorporated into a shared decision making conversation between the patient or their surrogate and the performing practitioner regarding the risks, benefits and alternatives to the procedure and whether to proceed. Additional cardiac testing is unlikely to change this risk assessment or procedural outcomes from a cardiac perspective.     Please obtain a new baseline ECG.  Continue amiodarone and metoprolol for prior atrial fibrillation.  Continue enoxaparin for DVT and stroke prevention given paroxysmal atrial fibrillation.   If this interruption is required for the procedure, the time off of anticoagulation should be minimized.    Please call us with additional questions or concerns.  The Cardiology Consultation Teaching Service is signing off at this time.      Albaro Lewis MD [facility] FACP  Cardiology  [phone]
suspect cancer of unknown primary but suspect Upper GI primary   once clinically stable would get EGD to evaluate for a UGI primary +/- EUS for pancrease lesion based on MRCP findings   her2 FISH  pending but if + then suggests possible gastric primary
59 yo woman, former smoker, with h/o R eye glaucoma, Fibromyalgia, Anxiety, GERD, and RCC s/p R total nephrectomy/hysterectomy; she was initially admitted to [facility] on 3/11 w/ R breast swelling c/f mastitis- imaging [facility] noted supraclavicular/mediastinal mass lesion which encased the SVC and multiple other veins resulting in obliteration of the SVC and thrombosis of the R IJ, and a pancreatic neck cystic lesion.  Agree with above. Pt remains on HFNC 70%/35 L. oral thrush with concern for VC involvement vc malignancy - consult ENT for scope. -c/w drainage of pleurX tiw,
I have reviewed the history, pertinent labs and imaging, and discussed the care with the consult resident.  More than 50% of this 55 minute encounter including face to face with the patient was spent counseling and/or coordination of care on central venous access.  Time included review of vitals, labs, imaging, discussion with consultants and coordination with the operating room/emergency department.    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 Consult for central venous access for chemotherapy. Discussed with Dr. Gamboa goals of chemotherapy - plan would be to give low dose chemo over 48h, then remove central line as subsequent dose would not be due for 2 weeks (assuming pt was able to tolerate this dose). The goal would be to reduce the tumor burden in the chest and potentially improve quality of life, nutrition, and ideally allow for an IJ port placement if the clot burden was reduced.  If unable to tolerate chemotherapy, pt would be referred to hospice. Discussed with IR - it was felt that transporting a patient on high flow was a higher risk situation than placing the femoral line at bedside under US guidance, therefore IR refused the consult.     Pt received therapeutic lovenox this evening. Will plan for bedside Central venous catheter placement tomorrow. Discussed with the patient - she requested placement after 2pm due to her pain regimen.     - Plan for bedside CVC placement tomorrow after 2pm, per patient request   - Will obtain consent from HCP (patient assented)   - Please hold lovenox until CVC placed   - Discussed with Dr. Gamboa    The active issues are:  1. metastatic renal cell carcinoma   2. svc syndrome     The Acute Care Surgery (B Team) Attending Group Practice:  Dr. Radha Ramey    urgent issues - spectra [id number]  nonurgent issues - [phone]  patient appointments or afterhours - [phone]

## 2024-05-14 NOTE — CHART NOTE - NSCHARTNOTEFT_GEN_A_CORE
Vital Signs Last 24 Hrs  T(C): 36.7 (14 May 2024 05:28), Max: 36.7 (13 May 2024 23:15)  T(F): 98.1 (14 May 2024 05:28), Max: 98.1 (13 May 2024 23:15)  HR: 88 (14 May 2024 05:28) (74 - 96)  BP: 127/68 (14 May 2024 05:28) (101/53 - 138/76)  BP(mean): --  RR: 16 (14 May 2024 03:44) (16 - 22)  SpO2: 95% (14 May 2024 05:28) (95% - 98%)    Parameters below as of 14 May 2024 05:28  Patient On (Oxygen Delivery Method): nasal cannula, high flow    As discussed with Dr. Gamboa, called vascular surgery for femoral central access for plan to start chemo, discussed case with surgery resident (pg [phone]) Vital Signs Last 24 Hrs  T(C): 36.7 (14 May 2024 05:28), Max: 36.7 (13 May 2024 23:15)  T(F): 98.1 (14 May 2024 05:28), Max: 98.1 (13 May 2024 23:15)  HR: 88 (14 May 2024 05:28) (74 - 96)  BP: 127/68 (14 May 2024 05:28) (101/53 - 138/76)  BP(mean): --  RR: 16 (14 May 2024 03:44) (16 - 22)  SpO2: 95% (14 May 2024 05:28) (95% - 98%)    Parameters below as of 14 May 2024 05:28  Patient On (Oxygen Delivery Method): nasal cannula, high flow    As discussed with Dr. Gamboa, called vascular surgery for femoral central access for plan to start chemo, discussed case with surgery resident (pg [phone]). Results and case discussed with Dr. Gamboa, hold lovenox for now.      Addendum: [phone]  Writer spoke with surgery > no plan for femoral access, Dr. Panda aware and discussed case with surgery attending Dr. Ramey> further discussion with IR. As discussed with attending, will dose lovenox for now and hold for AM for possible IR/surgery plan for access, will keep NPO at midnight

## 2024-05-14 NOTE — CONSULT NOTE ADULT - SUBJECTIVE AND OBJECTIVE BOX
Vascular Consultation Note  =====================================================    58F w/ F hx tobacco use, RCC right nephrectomy, right sided glaucoma, fibromyalgia, anxiety, GERD, was at [facility] w/ concern for breast inflammation, lung nodules/mediastinal mass w/ biopsy concern for metastatic GI cancer. Had pleural effusion s/p chest tube and pleurx, had pericardial window for effusion. Has had poor IV access, has triple lumen in right groin. Has UE DVT on lovenox. Also has SVC syndrome, transfer from [facility] for LIJ RT treatment.      ROS otherwise negative. On oxygen.     Allergies: erythromycin (Headache; Vomiting; Rash)  penicillin (Headache; Vomiting; Rash)  Cipro (Headache; Vomiting; Rash)    PAST MEDICAL & SURGICAL HISTORY:  Anxiety      Acid reflux disease      Umbilical hernia      Uterine mass  Benign      Fibromyalgia  Joint pains      Glaucoma  Right eye      Herniated cervical disc      Cancer of kidney  right kidney      S/P partial hysterectomy  8 years ago      S/P knee surgery  knee arthroscopy right x 2 ( 2011 & 2012)      S/P hernia repair  umbilical Hernia Repair - 2011      H/O unilateral nephrectomy  Right Laparoscopic Nephrectomy - [id number]      Glaucoma following surgery  Right Eyr - 2012      History of tonsillectomy    FAMILY HISTORY:  Family history of lung cancer (Mother)    Family history of pancreatic cancer (Father)    SOCIAL HISTORY:      ADVANCE DIRECTIVES: Presumed Full Code      REVIEW OF SYSTEMS:    General: Non-Contributory  Skin/Breast: Non-Contributory  Ophthalmologic: Non-Contributory  ENMT: Non-Contributory  Respiratory and Thorax: Non-Contributory  Cardiovascular: Non-Contributory  Gastrointestinal: Non-Contributory  Genitourinary: Non-Contributory  Musculoskeletal: Non-Contributory  Neurological: Non-Contributory  Psychiatric: Non-Contributory  Hematology/Lymphatics: Non-Contributory  Endocrine: Non-Contributory  Allergic/Immunologic: Non-Contributory    HOME MEDICATIONS:      CURRENT MEDICATIONS:   --------------------------------------------------------------------------------------  Neurologic Medications  diazepam    Tablet 5 milliGRAM(s) Oral at bedtime  diazepam    Tablet 5 milliGRAM(s) Oral two times a day PRN anxiety  metoclopramide Injectable 10 milliGRAM(s) IV Push every 8 hours  ondansetron    Tablet 4 milliGRAM(s) Oral every 8 hours PRN Nausea and/or Vomiting  oxyCODONE    IR 10 milliGRAM(s) Oral every 4 hours PRN Moderate Pain (4 - 6)  oxyCODONE  ER Tablet 30 milliGRAM(s) Oral <User Schedule>    Respiratory Medications  albuterol/ipratropium for Nebulization 3 milliLiter(s) Nebulizer <User Schedule>  budesonide 160 MICROgram(s)/formoterol 4.5 MICROgram(s) Inhaler 2 Puff(s) Inhalation two times a day    Cardiovascular Medications  aMIOdarone    Tablet 200 milliGRAM(s) Oral daily  metoprolol tartrate 25 milliGRAM(s) Oral every 12 hours    Gastrointestinal Medications  aluminum hydroxide/magnesium hydroxide/simethicone Suspension 30 milliLiter(s) Oral every 6 hours PRN Dyspepsia  multivitamin 1 Tablet(s) Oral daily  naloxegol 25 milliGRAM(s) Oral daily  pantoprazole    Tablet 40 milliGRAM(s) Oral two times a day  polyethylene glycol 3350 17 Gram(s) Oral every 12 hours  sodium chloride 0.9% lock flush 10 milliLiter(s) IV Push every 1 hour PRN Pre/post blood products, medications, blood draw, and to maintain line patency    Genitourinary Medications    Hematologic/Oncologic Medications  influenza   Vaccine 0.5 milliLiter(s) IntraMuscular once    Antimicrobial/Immunologic Medications  trimethoprim  160 mG/sulfamethoxazole 800 mG 1 Tablet(s) Oral <User Schedule>    Endocrine/Metabolic Medications    Topical/Other Medications  benzocaine/menthol Lozenge 1 Lozenge Oral two times a day PRN Sore Throat  Biotene Dry Mouth Oral Rinse 15 milliLiter(s) Swish and Spit every 6 hours  Biotene Dry Mouth Oral Rinse 15 milliLiter(s) Swish and Spit two times a day PRN dry mouth  chlorhexidine 2% Cloths 1 Application(s) Topical daily  FIRST- Mouthwash  BLM 5 milliLiter(s) Swish and Spit four times a day PRN Mouth Care  nicotine -  14 mG/24Hr(s) Patch 1 Patch Transdermal every 24 hours  sodium chloride 0.65% Nasal 1 Spray(s) Both Nostrils two times a day    EXAM   HEENT  Exam: Normocephalic, atraumatic.  EOMI   RESPIRATORY  Exam: Lungs clear to auscultation, Normal expansion/effort.    CARDIOVASCULAR  Exam: AFib.  Peripheral edema    GI/NUTRITION  Exam: Abdomen soft, Non-tender, Non-distended.   VASCULAR Exam: Extremities warm, pink, well-perfused.    MUSCULOSKELETAL  Exam: All extremities moving spontaneously without limitations.    SKIN:  Exam: Good skin turgor, no skin breakdown.      METABOLIC/FLUIDS/ELECTROLYTES  multivitamin 1 Tablet(s) Oral daily    HEMATOLOGIC  [x] DVT Prophylaxis:   Transfusions:	[] PRBC	[] Platelets		[] FFP	[] Cryoprecipitate    INFECTIOUS DISEASE  Antimicrobials/Immunologic Medications:  influenza   Vaccine 0.5 milliLiter(s) IntraMuscular once  trimethoprim  160 mG/sulfamethoxazole 800 mG 1 Tablet(s) Oral <User Schedule>      Tubes/Lines/Drains    [x] Peripheral IV  [] Central Venous Line     	[] R	[] L	[] IJ	[] Fem	[] SC	Date Placed:   [] Arterial Line		[] R	[] L	[] Fem	[] Rad	[] Ax	Date Placed:   [] PICC:         	[] Midline		[] Mediport  [] Urinary Catheter		Date Placed:     ASSESSMENT: 58F PMH RCC s/p R nephrectomy with supraclav/mediastinal mass with obliteration of SVC and multiple venous thrombosis. 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 IR consulted for femoral CVC placement for initiation of chemo, currently only lower extremity PIV.     -Will discus with vascular fellow    Surgery Consultation Note  =====================================================    58F w/ F hx tobacco use, RCC right nephrectomy, right sided glaucoma, fibromyalgia, anxiety, GERD, was at [facility] w/ concern for breast inflammation, lung nodules/mediastinal mass w/ biopsy concern for metastatic GI cancer. Had pleural effusion s/p chest tube and pleurx, had pericardial window for effusion. Has had poor IV access, h/of triple lumen in right groin. Has UE DVT on lovenox. Also has SVC syndrome, transfer from [facility] for LIJ RT treatment. Surgery consulted for Femoral CVC placement for initiation of chemotherapy. IR was previously consulted and unable to perform as pt is requiring HFNC    ROS otherwise negative. On oxygen.     Allergies: erythromycin (Headache; Vomiting; Rash)  penicillin (Headache; Vomiting; Rash)  Cipro (Headache; Vomiting; Rash)    PAST MEDICAL & SURGICAL HISTORY:  Anxiety      Acid reflux disease      Umbilical hernia      Uterine mass  Benign      Fibromyalgia  Joint pains      Glaucoma  Right eye      Herniated cervical disc      Cancer of kidney  right kidney      S/P partial hysterectomy  8 years ago      S/P knee surgery  knee arthroscopy right x 2 ( 2011 & 2012)      S/P hernia repair  umbilical Hernia Repair - 2011      H/O unilateral nephrectomy  Right Laparoscopic Nephrectomy - [id number]      Glaucoma following surgery  Right Eyr - 2012      History of tonsillectomy    FAMILY HISTORY:  Family history of lung cancer (Mother)    Family history of pancreatic cancer (Father)    SOCIAL HISTORY:      ADVANCE DIRECTIVES: Presumed Full Code      REVIEW OF SYSTEMS:    General: Non-Contributory  Skin/Breast: Non-Contributory  Ophthalmologic: Non-Contributory  ENMT: Non-Contributory  Respiratory and Thorax: Non-Contributory  Cardiovascular: Non-Contributory  Gastrointestinal: Non-Contributory  Genitourinary: Non-Contributory  Musculoskeletal: Non-Contributory  Neurological: Non-Contributory  Psychiatric: Non-Contributory  Hematology/Lymphatics: Non-Contributory  Endocrine: Non-Contributory  Allergic/Immunologic: Non-Contributory    HOME MEDICATIONS:      CURRENT MEDICATIONS:   --------------------------------------------------------------------------------------  Neurologic Medications  diazepam    Tablet 5 milliGRAM(s) Oral at bedtime  diazepam    Tablet 5 milliGRAM(s) Oral two times a day PRN anxiety  metoclopramide Injectable 10 milliGRAM(s) IV Push every 8 hours  ondansetron    Tablet 4 milliGRAM(s) Oral every 8 hours PRN Nausea and/or Vomiting  oxyCODONE    IR 10 milliGRAM(s) Oral every 4 hours PRN Moderate Pain (4 - 6)  oxyCODONE  ER Tablet 30 milliGRAM(s) Oral <User Schedule>    Respiratory Medications  albuterol/ipratropium for Nebulization 3 milliLiter(s) Nebulizer <User Schedule>  budesonide 160 MICROgram(s)/formoterol 4.5 MICROgram(s) Inhaler 2 Puff(s) Inhalation two times a day    Cardiovascular Medications  aMIOdarone    Tablet 200 milliGRAM(s) Oral daily  metoprolol tartrate 25 milliGRAM(s) Oral every 12 hours    Gastrointestinal Medications  aluminum hydroxide/magnesium hydroxide/simethicone Suspension 30 milliLiter(s) Oral every 6 hours PRN Dyspepsia  multivitamin 1 Tablet(s) Oral daily  naloxegol 25 milliGRAM(s) Oral daily  pantoprazole    Tablet 40 milliGRAM(s) Oral two times a day  polyethylene glycol 3350 17 Gram(s) Oral every 12 hours  sodium chloride 0.9% lock flush 10 milliLiter(s) IV Push every 1 hour PRN Pre/post blood products, medications, blood draw, and to maintain line patency    Genitourinary Medications    Hematologic/Oncologic Medications  influenza   Vaccine 0.5 milliLiter(s) IntraMuscular once    Antimicrobial/Immunologic Medications  trimethoprim  160 mG/sulfamethoxazole 800 mG 1 Tablet(s) Oral <User Schedule>    Endocrine/Metabolic Medications    Topical/Other Medications  benzocaine/menthol Lozenge 1 Lozenge Oral two times a day PRN Sore Throat  Biotene Dry Mouth Oral Rinse 15 milliLiter(s) Swish and Spit every 6 hours  Biotene Dry Mouth Oral Rinse 15 milliLiter(s) Swish and Spit two times a day PRN dry mouth  chlorhexidine 2% Cloths 1 Application(s) Topical daily  FIRST- Mouthwash  BLM 5 milliLiter(s) Swish and Spit four times a day PRN Mouth Care  nicotine -  14 mG/24Hr(s) Patch 1 Patch Transdermal every 24 hours  sodium chloride 0.65% Nasal 1 Spray(s) Both Nostrils two times a day    EXAM   HEENT  Exam: Normocephalic, atraumatic.  EOMI   RESPIRATORY  Exam: Lungs clear to auscultation, Normal expansion/effort.    CARDIOVASCULAR  Exam: AFib.  Peripheral edema    GI/NUTRITION  Exam: Abdomen soft, Non-tender, Non-distended.   VASCULAR Exam: Extremities warm, pink, well-perfused.    MUSCULOSKELETAL  Exam: All extremities moving spontaneously without limitations.    SKIN:  Exam: Good skin turgor, no skin breakdown.      METABOLIC/FLUIDS/ELECTROLYTES  multivitamin 1 Tablet(s) Oral daily    HEMATOLOGIC  [x] DVT Prophylaxis:   Transfusions:	[] PRBC	[] Platelets		[] FFP	[] Cryoprecipitate    INFECTIOUS DISEASE  Antimicrobials/Immunologic Medications:  influenza   Vaccine 0.5 milliLiter(s) IntraMuscular once  trimethoprim  160 mG/sulfamethoxazole 800 mG 1 Tablet(s) Oral <User Schedule>      Tubes/Lines/Drains    [x] Peripheral IV  [] Central Venous Line     	[] R	[] L	[] IJ	[] Fem	[] SC	Date Placed:   [] Arterial Line		[] R	[] L	[] Fem	[] Rad	[] Ax	Date Placed:   [] PICC:         	[] Midline		[] Mediport  [] Urinary Catheter		Date Placed:     ASSESSMENT: 58F PMH RCC s/p R nephrectomy with supraclav/mediastinal mass with obliteration of SVC and multiple venous thrombosis. 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 IR consulted for femoral CVC placement for initiation of chemo, currently only lower extremity PIV.     -While bedside femoral CVC may be feasible; it is unclear the duration of treatment given the intended goals of care Vs. the type of line in which a long term (tunneled) catheter is deemed unsafe at the bedside; A long term catheter would be best accomplished by Interventional radiology.  Case discussed with ACS attending   Any further questions please page [phone]

## 2024-05-14 NOTE — CONSULT NOTE ADULT - REASON FOR ADMISSION
SVC syndrome, DVT, mediastinal mass

## 2024-05-14 NOTE — CONSULT NOTE ADULT - CONSULT REQUESTED DATE/TIME
04-May-2024 21:12
05-Apr-2024 13:08
22-Apr-2024 18:07
04-May-2024 18:17
05-Apr-2024
14-May-2024
12-Apr-2024 17:07
17-Apr-2024 15:53
02-May-2024 12:47
04-Apr-2024
04-Apr-2024 14:45
22-Apr-2024 12:54

## 2024-05-15 LAB
ALBUMIN SERPL ELPH-MCNC: 2.4 G/DL — LOW (ref 3.3–5)
ALP SERPL-CCNC: 190 U/L — HIGH (ref 40–120)
ALT FLD-CCNC: 67 U/L — HIGH (ref 4–33)
ANION GAP SERPL CALC-SCNC: 14 MMOL/L — SIGNIFICANT CHANGE UP (ref 7–14)
APTT BLD: 28.7 SEC — SIGNIFICANT CHANGE UP (ref 24.5–35.6)
AST SERPL-CCNC: 17 U/L — SIGNIFICANT CHANGE UP (ref 4–32)
BASOPHILS # BLD AUTO: 0.07 K/UL — SIGNIFICANT CHANGE UP (ref 0–0.2)
BASOPHILS NFR BLD AUTO: 0.4 % — SIGNIFICANT CHANGE UP (ref 0–2)
BILIRUB SERPL-MCNC: <0.2 MG/DL — SIGNIFICANT CHANGE UP (ref 0.2–1.2)
BUN SERPL-MCNC: 30 MG/DL — HIGH (ref 7–23)
CALCIUM SERPL-MCNC: 10 MG/DL — SIGNIFICANT CHANGE UP (ref 8.4–10.5)
CHLORIDE SERPL-SCNC: 102 MMOL/L — SIGNIFICANT CHANGE UP (ref 98–107)
CO2 SERPL-SCNC: 21 MMOL/L — LOW (ref 22–31)
CREAT SERPL-MCNC: 0.8 MG/DL — SIGNIFICANT CHANGE UP (ref 0.5–1.3)
EGFR: 84 ML/MIN/1.73M2 — SIGNIFICANT CHANGE UP
EOSINOPHIL # BLD AUTO: 0.04 K/UL — SIGNIFICANT CHANGE UP (ref 0–0.5)
EOSINOPHIL NFR BLD AUTO: 0.2 % — SIGNIFICANT CHANGE UP (ref 0–6)
GLUCOSE SERPL-MCNC: 118 MG/DL — HIGH (ref 70–99)
HCT VFR BLD CALC: 27 % — LOW (ref 34.5–45)
HGB BLD-MCNC: 8.2 G/DL — LOW (ref 11.5–15.5)
IANC: 16.26 K/UL — HIGH (ref 1.8–7.4)
IMM GRANULOCYTES NFR BLD AUTO: 7.2 % — HIGH (ref 0–0.9)
INR BLD: 1 RATIO — SIGNIFICANT CHANGE UP (ref 0.85–1.18)
LYMPHOCYTES # BLD AUTO: 0.4 K/UL — LOW (ref 1–3.3)
LYMPHOCYTES # BLD AUTO: 2.1 % — LOW (ref 13–44)
MAGNESIUM SERPL-MCNC: 1.7 MG/DL — SIGNIFICANT CHANGE UP (ref 1.6–2.6)
MCHC RBC-ENTMCNC: 29 PG — SIGNIFICANT CHANGE UP (ref 27–34)
MCHC RBC-ENTMCNC: 30.4 GM/DL — LOW (ref 32–36)
MCV RBC AUTO: 95.4 FL — SIGNIFICANT CHANGE UP (ref 80–100)
MONOCYTES # BLD AUTO: 0.87 K/UL — SIGNIFICANT CHANGE UP (ref 0–0.9)
MONOCYTES NFR BLD AUTO: 4.6 % — SIGNIFICANT CHANGE UP (ref 2–14)
NEUTROPHILS # BLD AUTO: 16.26 K/UL — HIGH (ref 1.8–7.4)
NEUTROPHILS NFR BLD AUTO: 85.5 % — HIGH (ref 43–77)
NRBC # BLD: 0 /100 WBCS — SIGNIFICANT CHANGE UP (ref 0–0)
NRBC # FLD: 0 K/UL — SIGNIFICANT CHANGE UP (ref 0–0)
PHOSPHATE SERPL-MCNC: 3.8 MG/DL — SIGNIFICANT CHANGE UP (ref 2.5–4.5)
PLATELET # BLD AUTO: 405 K/UL — HIGH (ref 150–400)
POTASSIUM SERPL-MCNC: 4.8 MMOL/L — SIGNIFICANT CHANGE UP (ref 3.5–5.3)
POTASSIUM SERPL-SCNC: 4.8 MMOL/L — SIGNIFICANT CHANGE UP (ref 3.5–5.3)
PROT SERPL-MCNC: 5.6 G/DL — LOW (ref 6–8.3)
PROTHROM AB SERPL-ACNC: 11.2 SEC — SIGNIFICANT CHANGE UP (ref 9.5–13)
RBC # BLD: 2.83 M/UL — LOW (ref 3.8–5.2)
RBC # FLD: 16.6 % — HIGH (ref 10.3–14.5)
SODIUM SERPL-SCNC: 137 MMOL/L — SIGNIFICANT CHANGE UP (ref 135–145)
WBC # BLD: 19.01 K/UL — HIGH (ref 3.8–10.5)
WBC # FLD AUTO: 19.01 K/UL — HIGH (ref 3.8–10.5)

## 2024-05-15 PROCEDURE — 99233 SBSQ HOSP IP/OBS HIGH 50: CPT

## 2024-05-15 PROCEDURE — 71045 X-RAY EXAM CHEST 1 VIEW: CPT | Mod: 26

## 2024-05-15 RX ORDER — SODIUM CHLORIDE 9 MG/ML
4 INJECTION INTRAMUSCULAR; INTRAVENOUS; SUBCUTANEOUS EVERY 12 HOURS
Refills: 0 | Status: DISCONTINUED | OUTPATIENT
Start: 2024-05-15 | End: 2024-05-31

## 2024-05-15 RX ORDER — IPRATROPIUM/ALBUTEROL SULFATE 18-103MCG
3 AEROSOL WITH ADAPTER (GRAM) INHALATION EVERY 6 HOURS
Refills: 0 | Status: DISCONTINUED | OUTPATIENT
Start: 2024-05-15 | End: 2024-05-31

## 2024-05-15 RX ORDER — ENOXAPARIN SODIUM 100 MG/ML
50 INJECTION SUBCUTANEOUS EVERY 12 HOURS
Refills: 0 | Status: DISCONTINUED | OUTPATIENT
Start: 2024-05-15 | End: 2024-05-18

## 2024-05-15 RX ADMIN — NALOXEGOL OXALATE 25 MILLIGRAM(S): 12.5 TABLET, FILM COATED ORAL at 13:38

## 2024-05-15 RX ADMIN — BUDESONIDE AND FORMOTEROL FUMARATE DIHYDRATE 2 PUFF(S): 160; 4.5 AEROSOL RESPIRATORY (INHALATION) at 21:19

## 2024-05-15 RX ADMIN — Medication 1 PATCH: at 07:04

## 2024-05-15 RX ADMIN — ENOXAPARIN SODIUM 50 MILLIGRAM(S): 100 INJECTION SUBCUTANEOUS at 21:19

## 2024-05-15 RX ADMIN — Medication 1 PATCH: at 13:00

## 2024-05-15 RX ADMIN — CHLORHEXIDINE GLUCONATE 1 APPLICATION(S): 213 SOLUTION TOPICAL at 15:03

## 2024-05-15 RX ADMIN — OXYCODONE HYDROCHLORIDE 30 MILLIGRAM(S): 5 TABLET ORAL at 13:37

## 2024-05-15 RX ADMIN — Medication 5 MILLIGRAM(S): at 00:37

## 2024-05-15 RX ADMIN — Medication 15 MILLILITER(S): at 19:03

## 2024-05-15 RX ADMIN — Medication 15 MILLILITER(S): at 07:09

## 2024-05-15 RX ADMIN — Medication 25 MILLIGRAM(S): at 07:09

## 2024-05-15 RX ADMIN — SODIUM CHLORIDE 4 MILLILITER(S): 9 INJECTION INTRAMUSCULAR; INTRAVENOUS; SUBCUTANEOUS at 22:13

## 2024-05-15 RX ADMIN — Medication 3 MILLILITER(S): at 04:52

## 2024-05-15 RX ADMIN — Medication 15 MILLILITER(S): at 00:38

## 2024-05-15 RX ADMIN — PANTOPRAZOLE SODIUM 40 MILLIGRAM(S): 20 TABLET, DELAYED RELEASE ORAL at 19:02

## 2024-05-15 RX ADMIN — Medication 1 TABLET(S): at 15:04

## 2024-05-15 RX ADMIN — OXYCODONE HYDROCHLORIDE 30 MILLIGRAM(S): 5 TABLET ORAL at 21:19

## 2024-05-15 RX ADMIN — AMIODARONE HYDROCHLORIDE 200 MILLIGRAM(S): 400 TABLET ORAL at 07:09

## 2024-05-15 RX ADMIN — Medication 3 MILLILITER(S): at 07:21

## 2024-05-15 RX ADMIN — Medication 1 PATCH: at 22:29

## 2024-05-15 RX ADMIN — OXYCODONE HYDROCHLORIDE 30 MILLIGRAM(S): 5 TABLET ORAL at 07:08

## 2024-05-15 RX ADMIN — Medication 3 MILLILITER(S): at 22:05

## 2024-05-15 RX ADMIN — PANTOPRAZOLE SODIUM 40 MILLIGRAM(S): 20 TABLET, DELAYED RELEASE ORAL at 07:09

## 2024-05-15 RX ADMIN — Medication 1 PATCH: at 13:42

## 2024-05-15 RX ADMIN — OXYCODONE HYDROCHLORIDE 30 MILLIGRAM(S): 5 TABLET ORAL at 22:29

## 2024-05-15 RX ADMIN — Medication 3 MILLILITER(S): at 15:29

## 2024-05-15 RX ADMIN — Medication 5 MILLIGRAM(S): at 23:38

## 2024-05-15 RX ADMIN — Medication 1 TABLET(S): at 15:03

## 2024-05-15 NOTE — PROGRESS NOTE ADULT - ASSESSMENT
59 yo woman, former smoker, with h/o R eye glaucoma, Fibromyalgia, Anxiety, GERD, and RCC s/p R total nephrectomy/hysterectomy; she was initially admitted to [facility] on 3/11 w/ R breast swelling c/f mastitis- imaging brooks noted supraclavicular/mediastinal mass lesion which encased the SVC and multiple other veins resulting in obliteration of the SVC and thrombosis of the R IJ, and a pancreatic neck cystic lesion.  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  Her disease/hospital course has also been c/b pericardial effusion s/p pericardial window w/ neg cytology, R pleural effusion, also negative cytology) s/p Pleurx, Afib w/ RVR and acute hypoxic resp failure 2/2 SVC syndrome- not dennis to IR-guided stenting; pt was ultimately started on Dex and transferred to [facility] on 4/4 for urgent palliative RT which she completed on 4/18. Her hospital course has also been c/b pericardial effusion with tamponade s/p window and non malignant R pleural effusion s/p pleurex, acute b/l UE and RLE dvts, anxiety, and more recently, recurrent hypoxia.    ACTIVE PROBLEMS  Metastatic CUP (carcinoma of unknown primary)  Goals of care discussion, counseling  Encounter for antineoplastic chemotherapy  Acute-on-chronic hypoxic respiratory failure  Dysphonia with R VC hypomobility   Oral thrush  SVC syndrome  Extensive b/l UE DVTs  Acute RLE DVT a/w RLE ecchymosis  Hypercoag state  Pericardial effusion with tamp physiology s/p pericardial window  R pleural effusion s/p pleurex  pAfib, with RVR on this admission  Anxiety/emotional lability  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)  Cancer-related pain, anxiety  Severe prot-ghazala malnutrition    - Metastatic CUP (carcinoma of unknown primary)  - Goals of care discussion, counseling  - Encounter for antineoplastic chemotherapy  Based on 3/14 SC LN path, ddx includes primary UGI vs pancreaticobiliary primary (breast edema is likely 2/2 SVC syndrome); PD-L1 TPs 1%, pMMR, Her2 2+, FISH pending, Ki-67 30%  Ca-125 moderately elevated at 125; other tumor markers not significantly elevated  Additional diagnostic brooks includes:   * 4/19 MRCP showing pancreatic lesion (? IPMN, but pancreas was suboptimally evaluated)     * 4/24 and 4/26 EGD/EUS aborted as pt had large amount of food in the stomach that prevented passing of scope   * 4/30 UGIS aborted as pt felt too short of breath when laying down flat (improved after her pleurex was drained  Case d/w Dr. 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 Pt is a suboptimal candidate i/s/o her progressive debility and malnutrition (ECOG PS 3)   >> Discussed risks/benefits of proceeding with palliative chemo (inpt) vs best supportive care with pt's HCP Saeid on 5/10 and again on 5/13- he is amenable to trial of inpt chemotherapy and understands that hospice will be recommended if pt does not tolerate trial of inpt chemo; verbal and written chemo consent obtained on 5/13   * Surgery to place Femoral central line for chemo this afternoon   * Planning to proceed with palliative chemo- mFOLFOX6 (20% dose reduction)- on 5/16  Pt's long term prognosis remains guarded; she is dnr/dni    - Acute-on-chronic hypoxic resp failure  - Multifocal aspiration vs hosp-acquired pneumonia  Pt weaned from HF to 6L NC today; she is still dyspneic at rest on occasion  Serial chest imaging is stable  Continuing empiric Cefepime for 10d course, until 5/10  Dex taper completed on 5/14 (Bactrim pjp ppx dced on 5/15)  Continuing Symbicort 2 puffs BID  Continuing Duonebs q6/prn  Will continue routine pleurex drainage as below    - Dysphonia with R VC hypomobility  - Oral thrush  Appreciate ENT eval/recs: 5/4 Flex ellis pt with R VC hypomobility  Pt cleared for pureed diet s/p Cinesophagram  Completed 7d course of Fluconazole on 5/12    - SVC syndrome  Appreciate Rad Onc eval/recs  Completed 10fxs of palliative RT on 4/18     - Extensive b/l UE DVTs  - Acute RLE DVT a/w RLE ecchymosis  - Hypercoag state  Continuing therapeutic lovenox, benefits > risks   * Of note: pt has poor UE IV access and currently requires peripheral IV in her LE extremities for admin of medication; FV central line being placed today for IV chemo    - Pericardial effusion with tamp physiology s/p pericardial window  - R pleural effusion s/p pleurex  Likely inflammatory; both pleural and pericardial fluid cytology are negative for malignant cells  4/9 surveillance TTE with pEF and no WMAs  Continuing R pleurex drainage- up to 500cc q48h/prn    - pAfib  Pt currently SR, HR controlled  Likely precipitated by malignancy, pericardial effusion, SVC syndrome  Continuing Metoprolol 25mg q12 with holding parameters  Continuing Amio 200mg daily (monitoring for toxicities)  Continuing telemetry    - Anxiety/emotional lability  Continuing Diazepam 5mg BID/prn and nightly  Pt has poor health literacy and clinical insight which is negatively impacting her medical decision making- when ask if she wanted to defer medical decision making to her boyfriend or if she had a designated HCP she replied "he's already sick of me"  Appreciate  consult: pt lacks medical decision making capacity; medical decision making will be deferred to pt's surrogate decision maker- Saeid Peña ([phone])    - 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)  Pt with h/o R eye glaucoma, but miosis can also be associated with Pancoast syndrome i/s/o extensive R upper lung tumor  Pt denies visual deficits or other related symptoms   MRI Brain without contrast ordered for further eval- pt continues to refuse, can be completed as outpt  Will continue to monitor closely    - Anemia in neoplastic disease  Hgb remains stable  4/5 iron studies not c/w iron deficiency  VSS and pt without clinical s/s of active bleeding  4/17 hemolysis labs negative; 4/18 FOBT negative x2  Trending daily cbcs; continuing supportive transfusions prn (pt does not have h/o ACS, CAD, MI, goal hgb > 7; plts > 10K)    - Cancer related pain  Currently well controlled  Continuing Oxy ER 30mg q12 q8  Continuing Oxy IR 10mg q4/prn  Continuing Dilaudid IV prn for sev breakthrough pain  Continuing bowel regimen to prevent OIC (including Movantic)    - Severe prot-ghazala malnutrition: appreciate RD recs; continuing regular diet with protein shake supplement BID (SLP eval pending as above)

## 2024-05-15 NOTE — CHART NOTE - NSCHARTNOTEFT_GEN_A_CORE
Vital Signs Last 24 Hrs  T(C): 36.4 (14 May 2024 20:00), Max: 36.4 (14 May 2024 17:25)  T(F): 97.6 (14 May 2024 20:00), Max: 97.6 (14 May 2024 17:25)  HR: 88 (15 May 2024 04:59) (86 - 88)  BP: 121/71 (14 May 2024 20:00) (121/71 - 140/79)  BP(mean): --  RR: 28 (15 May 2024 07:20) (18 - 28)  SpO2: 92% (15 May 2024 07:20) (92% - 100%)    Parameters below as of 15 May 2024 07:20  Patient On (Oxygen Delivery Method): nasal cannula  O2 Flow (L/min): 6                          8.2    19.01 )-----------( 405      ( 15 May 2024 06:30 )             27.0   05-15    137  |  102  |  30<H>  ----------------------------<  118<H>  4.8   |  21<L>  |  0.80    Ca    10.0      15 May 2024 06:30  Phos  3.8     05-15  Mg     1.70     05-15    TPro  5.6<L>  /  Alb  2.4<L>  /  TBili  <0.2  /  DBili  x   /  AST  17  /  ALT  67<H>  /  AlkPhos  190<H>  05-15  PT/INR - ( 15 May 2024 06:30 )   PT: 11.2 sec;   INR: 1.00 ratio         PTT - ( 15 May 2024 06:30 )  PTT:28.7 sec    Chest Xray IMPRESSION: Redemonstrated small right-sided pleural effusion with underlying atelectasis.  Right-sided Pleurx catheter in stable position. No newfocal consolidations.    Patient weaned down from HFNC to 6L NC this morning, assessed patient at bedside with Dr. Gamboa, noted with bilateral wheezing to lung bases, encourage patient to do incentive spirometer.   Above labs, CXR results and case discussed with Dr. Gamboa, as discussed ordered Duonebs /NS nebs q6h, chest PT ordered, close monitoring ongoing.  Patient on schedule for femoral access with surgery team this afternoon. Vital Signs Last 24 Hrs  T(C): 36.4 (14 May 2024 20:00), Max: 36.4 (14 May 2024 17:25)  T(F): 97.6 (14 May 2024 20:00), Max: 97.6 (14 May 2024 17:25)  HR: 88 (15 May 2024 04:59) (86 - 88)  BP: 121/71 (14 May 2024 20:00) (121/71 - 140/79)  BP(mean): --  RR: 28 (15 May 2024 07:20) (18 - 28)  SpO2: 92% (15 May 2024 07:20) (92% - 100%)    Parameters below as of 15 May 2024 07:20  Patient On (Oxygen Delivery Method): nasal cannula  O2 Flow (L/min): 6                          8.2    19.01 )-----------( 405      ( 15 May 2024 06:30 )             27.0   05-15    137  |  102  |  30<H>  ----------------------------<  118<H>  4.8   |  21<L>  |  0.80    Ca    10.0      15 May 2024 06:30  Phos  3.8     05-15  Mg     1.70     05-15    TPro  5.6<L>  /  Alb  2.4<L>  /  TBili  <0.2  /  DBili  x   /  AST  17  /  ALT  67<H>  /  AlkPhos  190<H>  05-15  PT/INR - ( 15 May 2024 06:30 )   PT: 11.2 sec;   INR: 1.00 ratio         PTT - ( 15 May 2024 06:30 )  PTT:28.7 sec    Chest Xray IMPRESSION: Redemonstrated small right-sided pleural effusion with underlying atelectasis.  Right-sided Pleurx catheter in stable position. No newfocal consolidations.    Patient weaned down from HFNC to 6L NC this morning, assessed patient at bedside with Dr. Gamboa, noted with bilateral wheezing to lung bases, encourage patient to do incentive spirometer.   Above labs, CXR results and case discussed with Dr. Gamboa, as discussed ordered Duonebs /NS nebs q6h, chest PT ordered, close monitoring ongoing.  Patient on schedule for femoral access with surgery team this afternoon.      Addendum: 1515 spoke with surgery resident again (pg [phone])> will be doing central access shortly, aware to obtain consent from patient's HCP rishabh, discussed with patient and nurse

## 2024-05-15 NOTE — PROCEDURE NOTE - NSICDXPROCEDURE_GEN_ALL_CORE_FT
PROCEDURES:  Insertion of triple lumen catheter with imaging guidance 15-May-2024 20:17:10  Jeanie Cao

## 2024-05-15 NOTE — PROGRESS NOTE ADULT - SUBJECTIVE AND OBJECTIVE BOX
[facility] ONCOLOGY HOSPITALIST PROGRESS NOTE    S: No acute events overnight.  Pt complained of feeling generally unwell this am and mentioned that she feels a little short of breath since coming off of HF NC.  Chest PT was provided at bedside; nebs and CXR also ordered.  She also confirmed that her pleurex was drained yesterday.    CURRENT MEDICATIONS  MEDICATIONS  (STANDING):  albuterol/ipratropium for Nebulization 3 milliLiter(s) Nebulizer every 6 hours  albuterol/ipratropium for Nebulization 3 milliLiter(s) Nebulizer <User Schedule>  aMIOdarone    Tablet 200 milliGRAM(s) Oral daily  Biotene Dry Mouth Oral Rinse 15 milliLiter(s) Swish and Spit every 6 hours  budesonide 160 MICROgram(s)/formoterol 4.5 MICROgram(s) Inhaler 2 Puff(s) Inhalation two times a day  chlorhexidine 2% Cloths 1 Application(s) Topical daily  diazepam    Tablet 5 milliGRAM(s) Oral at bedtime  influenza   Vaccine 0.5 milliLiter(s) IntraMuscular once  metoclopramide Injectable 10 milliGRAM(s) IV Push every 8 hours  metoprolol tartrate 25 milliGRAM(s) Oral every 12 hours  multivitamin 1 Tablet(s) Oral daily  naloxegol 25 milliGRAM(s) Oral daily  nicotine -  14 mG/24Hr(s) Patch 1 Patch Transdermal every 24 hours  oxyCODONE  ER Tablet 30 milliGRAM(s) Oral <User Schedule>  pantoprazole    Tablet 40 milliGRAM(s) Oral two times a day  polyethylene glycol 3350 17 Gram(s) Oral every 12 hours  sodium chloride 0.65% Nasal 1 Spray(s) Both Nostrils two times a day  sodium chloride 3%  Inhalation 4 milliLiter(s) Inhalation every 12 hours  trimethoprim  160 mG/sulfamethoxazole 800 mG 1 Tablet(s) Oral <User Schedule>  MEDICATIONS  (PRN):  aluminum hydroxide/magnesium hydroxide/simethicone Suspension 30 milliLiter(s) Oral every 6 hours PRN Dyspepsia  benzocaine/menthol Lozenge 1 Lozenge Oral two times a day PRN Sore Throat  Biotene Dry Mouth Oral Rinse 15 milliLiter(s) Swish and Spit two times a day PRN dry mouth  diazepam    Tablet 5 milliGRAM(s) Oral two times a day PRN anxiety  FIRST- Mouthwash  BLM 5 milliLiter(s) Swish and Spit four times a day PRN Mouth Care  ondansetron    Tablet 4 milliGRAM(s) Oral every 8 hours PRN Nausea and/or Vomiting  oxyCODONE    IR 10 milliGRAM(s) Oral every 4 hours PRN Moderate Pain (4 - 6)  sodium chloride 0.9% lock flush 10 milliLiter(s) IV Push every 1 hour PRN Pre/post blood products, medications, blood draw, and to maintain line patency      PHYSICAL EXAM  T(C): 36.4 (05-14-24 @ 20:00), Max: 36.4 (05-14-24 @ 17:25)  HR: 88 (05-15-24 @ 04:59) (86 - 88)  BP: 121/71 (05-14-24 @ 20:00) (121/71 - 140/79)  RR: 28 (05-15-24 @ 07:20) (18 - 28)  SpO2: 92% (05-15-24 @ 07:20) (92% - 100%)    05-14-24 @ 07:01  -  05-15-24 @ 07:00  --------------------------------------------------------  IN: 0 mL / OUT: 150 mL / NET: -150 mL  Non-toxic appearing woman, sitting up in bed, appears comfortable  Pt still dysphonic, but able to write in comprehensive sentences to communicate  Mild R eyelid ptosis and R pupil miosis present/unchanged; R eye with scleral injection; no oral lesions/thrush  RRR, no m/r/g  Breaths mildly labored but pt is not tachypneic; trace inspiratory wheeze and transmitted upper airway sounds auscultated in all lung zone  Abd soft/nt/nd, normoactive bowel sounds  Trace LUE non-pitting edema present; large area of ecchymosis extending from the post R calf to post R thigh- improving; generalized muscular atrophy in all 4 extremities  CN 2-12 grossly intact; no gross focal neuro deficits; pt ambulates with walker      LABS                        8.2    19.01 )-----------( 405      ( 15 May 2024 06:30 )             27.0     05-15    137  |  102  |  30<H>  ----------------------------<  118<H>  4.8   |  21<L>  |  0.80    Ca    10.0      15 May 2024 06:30  Phos  3.8     05-15  Mg     1.70     05-15    TPro  5.6<L>  /  Alb  2.4<L>  /  TBili  <0.2  /  DBili  x   /  AST  17  /  ALT  67<H>  /  AlkPhos  190<H>  05-15    PT/INR - ( 15 May 2024 06:30 )   PT: 11.2 sec;   INR: 1.00 ratio    PTT - ( 15 May 2024 06:30 )  PTT:28.7 sec    PATHOLOGY  3/26 pleural fluid cytology: Neg for malignant cells.    3/26 Pericardium excision: Neg for malignancy.    3/20 Pericardial fluid cytology: Neg for malignant cells.    3/14 LYMPH NODE, SUPRACLAVICULAR, RIGHT, US GUIDED CORE BIOPSY AND FNA   POSITIVE FOR MALIGNANT CELLS.   Carcinoma   Touch Prep slides display crowded groups and single lying malignant   cells with enlarged nuclei containing prominent nucleoli and vacuolated   cytoplasm. Core biopsies show neoplastic cells positive for CK7 and CDX2   immunostains, while negative for CK20, TTF1, GATA3, TRPS1 and PAX8. 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 Suggest correlation with clinical information and imaging to   assist with next step management.   Moleculars: pMMR, PD-L1 TPS 1%; Her2 pending      TUMOR MARKERS  3/13 CEA 7.1  3/24 Ca 19-9 27  4/8 Ca-125 187, Ca 27.29 16.9, Ca 15-3 18.9, AFP 5.2      MICROBIOLOGY  5/1 Procal 0.44    Abx  $Bactrim ppx 4/8-5/15 (dced with completion of Dex taper)  $Cefepime 5/1-10  $Fluconazole 5/6-12  $Nystatin 5/4-6    Cx Data  4/5 MRSA swab: Not detected  3/16 Quant plus TB: Negative      PERTINENT RADIOLOGY  5/15 CXR: Redemonstrated small right-sided pleural effusion with underlying   atelectasis. Right-sided Pleurx catheter in stable position. No newfocal   consolidations.    5/13 CXR: Right-sided chest tube with effusion/atelectasis suspected.    5/10 CXR: Right effusion with suspected lower lobe atelectasis.    5/6 CXR: Right-sided Pleurx catheter with decreased effusion.    5/4 CXR: There is a catheter at the right base.  HEART: normal in size.  LUNGS: There is opacity at the right base compatible with   effusion/infiltrate.. Prominent interstitial markings, likely secondary   to chronic underlying lung disease..  BONES: degenerative changes    5/4 R elbow XR: Linear lucency in the coronoid process seen only on the lateral view   concerning for nondisplaced fracture. Consider cross-sectional imaging of   the elbow with CT or MRI for further evaluation.    5/3 CT c/a/p with IV contrast: Persistent right middle lobe consolidation and decreased right lower lobe consolidation. New patchy nodular opacities in the right upper lobe and increased patchy and groundglass opacities in the left lung. Small right pleural effusion, unchanged. Trace left pleural effusion, decreased. No evidence of metastatic disease in the abdomen or pelvis.    5/1 CXR: Small right effusion unchanged with Pleurx catheter.    4/19 MRCP: Motion degraded study, particularly on postcontrast sequences.  Pancreatic neck T2 hyperintense lesion measures 16 mm, image 28 series 5,   without clear evidence of enhancement on postcontrast phases. Most likely   this is a side branch IPMN. Remainder of the pancreas is suboptimally   evaluated due to artifact. Follow-up MRI abdomen or pancreas protocol CT   can be considered in 3-6 months for reevaluation. Partially distended stomach. Duodenal diverticulum. Colon is also partially distended with mild to moderate stool burden. Correlate   clinically. Trace pleural effusions. Right-sided pleural catheter is partially   imaged. Lung parenchyma is incompletely characterized on MRI. Soft tissue   of the mediastinum is not adequately imaged on this study.    4/18 B/L LE Doppler: Acute deep venous thrombosis: above the knee.  Acute nonocclusive deep venous thrombosis in the right common femoral vein.    417 CXR: Clear lungs with minimal effusion and Pleurx catheter.    4/10 VA dopplers B/L UE: Extensive acute, occlusive DVT RIJ, innominate and subclavian veins.  Acute non-occlusive DVT w/in R axillary vein.  Extensive acute, occlusive DVT noted in L subclavian. axillary, and proximal brachial veins.  Acute non-occlusive DVT LIJ.  Superficial vein thromboses are noted in the B/L cephalic veins.    4/9 Echocardiogram: Normal LV systolic function.  Normal mitral valve w/ normal leaflet excursion.  No pericardial effusion seen    4/4 CXR: Clear lungs w/ R pleurx catheter in place.    ok4/4 CXR AM: Small R pleural effusion w/ pleurx cath improved    [facility] Imaging ([facility])  3/29 CXR: Decrease in R pleural effusion.     3/28 B/L UE Dupplers: Acute DVT involving R IJ, innominate vein, subclavian, brachial, and L IJ.  Superficial venous thrombosis involving B/L cephalic veins.  + Edema of superficial soft tissue of UE R>L.    3/27 CT neck: Bulky and conglomerate LAD invading RIJ w/ thrombus extending up to hyoid level.  Thrombus is likely combination tumor and bland.  New LIJ nonocclusive thrombus.    3/27 CT Chest: New consolidations throughout the R lung worse RLL c/f aspiration PNA.  + R pleurx cath w/ decrease in R pleural effusion.  Extensive DVT w/in thoracic and lower neck, upper SVC remains obliterated.  Large R supraclavicular mass infiltrating into the mediastinum.      3/27 CXR: Progression of R consolidation/effusion.    3/21 TTE: LV grossly normal.  Thickened pericardium.  no pericardial effusion.    3/18 TTE: Mod pericardial effusion w/ e/o hemodynamic compromise w/ diasolic collapse of RA that exceeds 1/3 of cardiac cycle >30% resp variation across MV E. wave and early diastolic inversion of RV.    3/14 CT Chest: Conglomerate soft tissue in superior mediastinum and R supraclavicular region 2/2 LAD w/ internal hypodensity c/f necrosis.  Mass encases SVC and multiple great veins resulting in obliteration of the SVC and thrombosis of the RIJ.  Multiple R sided collateral vessels and R soft tissue edema.  B/L pulm nodules.    3/14 CT neck: Extensive cellulitis/myositis along R anterior lateral neck and R upper chest wall w/ inflammatory fat induration the deep soft tissue of neck.  Large supraclavicular/mediastinal mass lesion, presumably conglomerate of LN w/ mass effect and complete occlusion of RIJ w/ associated inflammation.  Complete occlusion of R subclavian vein and nearly occlusive thrombosis in the proximal L brachiocephalic vein.    3/13 RUQ Abd US: Cholelithiasis w/o e/o cholecystitis.  Dilated CBD w/o e/o intraductal stone or other obstruction. 1.4cm pancreatic cyst w/ mild duct dilatation.    3/12 CT abd/pelv: S/p R nephrectomy. no LAD.  New 1.6cm  pancreatic neck cystic lesion w/o main pancreatic ductal dilatation.    3/10 R breast US: No e/o breast abscess      RECENT ENDOSCOPY  5/4 Flex laryngoscopy    -- Nasopharynx had no mass or exudate.    -- Base of tongue was symmetric and not enlarged.    -- Vallecula was clear    -- Epiglottis, both aryepiglottic folds and both false vocal folds were normal    -- Arytenoids both without edema and erythema     -- True vocal folds were fully mobile and without lesions. possible R VC hypomobility, Incomplete glottic closure    -- Post cricoid area was clear.    -- Interarytenoid edema was absent    4/26 Upper EUS  - Normal esophagus.  - A large amount of food (residue) inthe stomach. Procedure was aborted.    4/24 Upper EUS   - Fluid in the middle third of the esophagus and in the lower third of the esophagus.  - A large amount of food (residue) in the stomach so EGD was aborted and EUS was not attempted.  - No specimens collected.

## 2024-05-16 LAB
ALBUMIN SERPL ELPH-MCNC: 3 G/DL — LOW (ref 3.3–5)
ALP SERPL-CCNC: 207 U/L — HIGH (ref 40–120)
ALT FLD-CCNC: 61 U/L — HIGH (ref 4–33)
ANION GAP SERPL CALC-SCNC: 14 MMOL/L — SIGNIFICANT CHANGE UP (ref 7–14)
AST SERPL-CCNC: 19 U/L — SIGNIFICANT CHANGE UP (ref 4–32)
BASOPHILS # BLD AUTO: 0 K/UL — SIGNIFICANT CHANGE UP (ref 0–0.2)
BASOPHILS NFR BLD AUTO: 0 % — SIGNIFICANT CHANGE UP (ref 0–2)
BILIRUB SERPL-MCNC: 0.2 MG/DL — SIGNIFICANT CHANGE UP (ref 0.2–1.2)
BUN SERPL-MCNC: 32 MG/DL — HIGH (ref 7–23)
CALCIUM SERPL-MCNC: 10.1 MG/DL — SIGNIFICANT CHANGE UP (ref 8.4–10.5)
CHLORIDE SERPL-SCNC: 104 MMOL/L — SIGNIFICANT CHANGE UP (ref 98–107)
CO2 SERPL-SCNC: 24 MMOL/L — SIGNIFICANT CHANGE UP (ref 22–31)
CREAT SERPL-MCNC: 0.89 MG/DL — SIGNIFICANT CHANGE UP (ref 0.5–1.3)
EGFR: 74 ML/MIN/1.73M2 — SIGNIFICANT CHANGE UP
EOSINOPHIL # BLD AUTO: 0 K/UL — SIGNIFICANT CHANGE UP (ref 0–0.5)
EOSINOPHIL NFR BLD AUTO: 0 % — SIGNIFICANT CHANGE UP (ref 0–6)
GLUCOSE SERPL-MCNC: 122 MG/DL — HIGH (ref 70–99)
HCT VFR BLD CALC: 29 % — LOW (ref 34.5–45)
HGB BLD-MCNC: 8.5 G/DL — LOW (ref 11.5–15.5)
IANC: 16.51 K/UL — HIGH (ref 1.8–7.4)
LYMPHOCYTES # BLD AUTO: 0.17 K/UL — LOW (ref 1–3.3)
LYMPHOCYTES # BLD AUTO: 0.9 % — LOW (ref 13–44)
MAGNESIUM SERPL-MCNC: 2 MG/DL — SIGNIFICANT CHANGE UP (ref 1.6–2.6)
MCHC RBC-ENTMCNC: 28.7 PG — SIGNIFICANT CHANGE UP (ref 27–34)
MCHC RBC-ENTMCNC: 29.3 GM/DL — LOW (ref 32–36)
MCV RBC AUTO: 98 FL — SIGNIFICANT CHANGE UP (ref 80–100)
MONOCYTES # BLD AUTO: 0.7 K/UL — SIGNIFICANT CHANGE UP (ref 0–0.9)
MONOCYTES NFR BLD AUTO: 3.7 % — SIGNIFICANT CHANGE UP (ref 2–14)
NEUTROPHILS # BLD AUTO: 17.77 K/UL — HIGH (ref 1.8–7.4)
NEUTROPHILS NFR BLD AUTO: 93.6 % — HIGH (ref 43–77)
PHOSPHATE SERPL-MCNC: 4.2 MG/DL — SIGNIFICANT CHANGE UP (ref 2.5–4.5)
PLATELET # BLD AUTO: 440 K/UL — HIGH (ref 150–400)
POTASSIUM SERPL-MCNC: 4.7 MMOL/L — SIGNIFICANT CHANGE UP (ref 3.5–5.3)
POTASSIUM SERPL-SCNC: 4.7 MMOL/L — SIGNIFICANT CHANGE UP (ref 3.5–5.3)
PROT SERPL-MCNC: 5.9 G/DL — LOW (ref 6–8.3)
RBC # BLD: 2.96 M/UL — LOW (ref 3.8–5.2)
RBC # FLD: 16.5 % — HIGH (ref 10.3–14.5)
SODIUM SERPL-SCNC: 142 MMOL/L — SIGNIFICANT CHANGE UP (ref 135–145)
WBC # BLD: 18.99 K/UL — HIGH (ref 3.8–10.5)
WBC # FLD AUTO: 18.99 K/UL — HIGH (ref 3.8–10.5)

## 2024-05-16 PROCEDURE — 36556 INSERT NON-TUNNEL CV CATH: CPT | Mod: GC

## 2024-05-16 PROCEDURE — 99233 SBSQ HOSP IP/OBS HIGH 50: CPT

## 2024-05-16 RX ORDER — DIAZEPAM 5 MG
5 TABLET ORAL
Refills: 0 | Status: DISCONTINUED | OUTPATIENT
Start: 2024-05-16 | End: 2024-05-22

## 2024-05-16 RX ORDER — OXYCODONE HYDROCHLORIDE 5 MG/1
10 TABLET ORAL EVERY 4 HOURS
Refills: 0 | Status: DISCONTINUED | OUTPATIENT
Start: 2024-05-16 | End: 2024-05-22

## 2024-05-16 RX ORDER — FLUOROURACIL 50 MG/ML
1171 INJECTION, SOLUTION INTRAVENOUS ONCE
Refills: 0 | Status: COMPLETED | OUTPATIENT
Start: 2024-05-16 | End: 2024-05-16

## 2024-05-16 RX ORDER — LEUCOVORIN CALCIUM 5 MG
390 TABLET ORAL ONCE
Refills: 0 | Status: COMPLETED | OUTPATIENT
Start: 2024-05-16 | End: 2024-05-16

## 2024-05-16 RX ORDER — FLUOROURACIL 50 MG/ML
1171 INJECTION, SOLUTION INTRAVENOUS ONCE
Refills: 0 | Status: COMPLETED | OUTPATIENT
Start: 2024-05-17 | End: 2024-05-17

## 2024-05-16 RX ORDER — DEXAMETHASONE 0.5 MG/5ML
8 ELIXIR ORAL ONCE
Refills: 0 | Status: COMPLETED | OUTPATIENT
Start: 2024-05-16 | End: 2024-05-16

## 2024-05-16 RX ORDER — ONDANSETRON 8 MG/1
4 TABLET, FILM COATED ORAL EVERY 8 HOURS
Refills: 0 | Status: DISCONTINUED | OUTPATIENT
Start: 2024-05-16 | End: 2024-05-31

## 2024-05-16 RX ORDER — DIAZEPAM 5 MG
5 TABLET ORAL AT BEDTIME
Refills: 0 | Status: DISCONTINUED | OUTPATIENT
Start: 2024-05-16 | End: 2024-05-22

## 2024-05-16 RX ORDER — OXALIPLATIN 5 MG/ML
83 INJECTION, SOLUTION INTRAVENOUS ONCE
Refills: 0 | Status: COMPLETED | OUTPATIENT
Start: 2024-05-16 | End: 2024-05-16

## 2024-05-16 RX ORDER — ONDANSETRON 8 MG/1
8 TABLET, FILM COATED ORAL ONCE
Refills: 0 | Status: COMPLETED | OUTPATIENT
Start: 2024-05-16 | End: 2024-05-16

## 2024-05-16 RX ORDER — OXYCODONE HYDROCHLORIDE 5 MG/1
30 TABLET ORAL
Refills: 0 | Status: DISCONTINUED | OUTPATIENT
Start: 2024-05-16 | End: 2024-05-22

## 2024-05-16 RX ADMIN — Medication 25 MILLIGRAM(S): at 05:14

## 2024-05-16 RX ADMIN — SODIUM CHLORIDE 4 MILLILITER(S): 9 INJECTION INTRAMUSCULAR; INTRAVENOUS; SUBCUTANEOUS at 23:48

## 2024-05-16 RX ADMIN — Medication 101.6 MILLIGRAM(S): at 17:03

## 2024-05-16 RX ADMIN — SODIUM CHLORIDE 4 MILLILITER(S): 9 INJECTION INTRAMUSCULAR; INTRAVENOUS; SUBCUTANEOUS at 09:20

## 2024-05-16 RX ADMIN — PANTOPRAZOLE SODIUM 40 MILLIGRAM(S): 20 TABLET, DELAYED RELEASE ORAL at 19:32

## 2024-05-16 RX ADMIN — ENOXAPARIN SODIUM 50 MILLIGRAM(S): 100 INJECTION SUBCUTANEOUS at 05:14

## 2024-05-16 RX ADMIN — Medication 3 MILLILITER(S): at 15:06

## 2024-05-16 RX ADMIN — Medication 15 MILLILITER(S): at 23:27

## 2024-05-16 RX ADMIN — Medication 3 MILLILITER(S): at 21:42

## 2024-05-16 RX ADMIN — Medication 3 MILLILITER(S): at 05:29

## 2024-05-16 RX ADMIN — Medication 15 MILLILITER(S): at 05:10

## 2024-05-16 RX ADMIN — CHLORHEXIDINE GLUCONATE 1 APPLICATION(S): 213 SOLUTION TOPICAL at 14:23

## 2024-05-16 RX ADMIN — POLYETHYLENE GLYCOL 3350 17 GRAM(S): 17 POWDER, FOR SOLUTION ORAL at 05:13

## 2024-05-16 RX ADMIN — FLUOROURACIL 1171 MILLIGRAM(S): 50 INJECTION, SOLUTION INTRAVENOUS at 20:29

## 2024-05-16 RX ADMIN — Medication 5 MILLIGRAM(S): at 23:26

## 2024-05-16 RX ADMIN — Medication 390 MILLIGRAM(S): at 18:09

## 2024-05-16 RX ADMIN — BUDESONIDE AND FORMOTEROL FUMARATE DIHYDRATE 2 PUFF(S): 160; 4.5 AEROSOL RESPIRATORY (INHALATION) at 22:04

## 2024-05-16 RX ADMIN — ONDANSETRON 8 MILLIGRAM(S): 8 TABLET, FILM COATED ORAL at 17:03

## 2024-05-16 RX ADMIN — PANTOPRAZOLE SODIUM 40 MILLIGRAM(S): 20 TABLET, DELAYED RELEASE ORAL at 05:14

## 2024-05-16 RX ADMIN — POLYETHYLENE GLYCOL 3350 17 GRAM(S): 17 POWDER, FOR SOLUTION ORAL at 19:32

## 2024-05-16 RX ADMIN — BUDESONIDE AND FORMOTEROL FUMARATE DIHYDRATE 2 PUFF(S): 160; 4.5 AEROSOL RESPIRATORY (INHALATION) at 14:14

## 2024-05-16 RX ADMIN — OXYCODONE HYDROCHLORIDE 30 MILLIGRAM(S): 5 TABLET ORAL at 14:13

## 2024-05-16 RX ADMIN — ENOXAPARIN SODIUM 50 MILLIGRAM(S): 100 INJECTION SUBCUTANEOUS at 19:31

## 2024-05-16 RX ADMIN — OXALIPLATIN 83 MILLIGRAM(S): 5 INJECTION, SOLUTION INTRAVENOUS at 18:08

## 2024-05-16 RX ADMIN — Medication 25 MILLIGRAM(S): at 19:32

## 2024-05-16 RX ADMIN — Medication 15 MILLILITER(S): at 19:32

## 2024-05-16 RX ADMIN — Medication 1 PATCH: at 14:14

## 2024-05-16 RX ADMIN — Medication 1 TABLET(S): at 14:13

## 2024-05-16 RX ADMIN — AMIODARONE HYDROCHLORIDE 200 MILLIGRAM(S): 400 TABLET ORAL at 05:11

## 2024-05-16 RX ADMIN — OXYCODONE HYDROCHLORIDE 30 MILLIGRAM(S): 5 TABLET ORAL at 06:43

## 2024-05-16 RX ADMIN — Medication 15 MILLILITER(S): at 14:13

## 2024-05-16 RX ADMIN — NALOXEGOL OXALATE 25 MILLIGRAM(S): 12.5 TABLET, FILM COATED ORAL at 14:14

## 2024-05-16 RX ADMIN — OXYCODONE HYDROCHLORIDE 30 MILLIGRAM(S): 5 TABLET ORAL at 06:24

## 2024-05-16 RX ADMIN — Medication 1 SPRAY(S): at 19:32

## 2024-05-16 RX ADMIN — Medication 1 SPRAY(S): at 05:10

## 2024-05-16 RX ADMIN — Medication 15 MILLILITER(S): at 03:08

## 2024-05-16 RX ADMIN — OXYCODONE HYDROCHLORIDE 30 MILLIGRAM(S): 5 TABLET ORAL at 22:03

## 2024-05-16 RX ADMIN — Medication 3 MILLILITER(S): at 09:20

## 2024-05-16 NOTE — PROGRESS NOTE ADULT - SUBJECTIVE AND OBJECTIVE BOX
Surgery Progress Note    Subjective:     Patient seen and examined at bedside. S/p L femoral CVC yesterday, functioning well when examined on AM rounds. Patient denies pain in region, no hematoma.     OBJECTIVE:     T(C): 36.4 (05-15-24 @ 17:06), Max: 36.7 (05-15-24 @ 12:06)  HR: 102 (05-15-24 @ 17:06) (82 - 102)  BP: 105/54 (05-15-24 @ 17:06) (105/54 - 110/62)  RR: 22 (05-16-24 @ 07:35) (20 - 22)  SpO2: 100% (05-16-24 @ 07:35) (95% - 100%)  Wt(kg): --    I&O's Detail      PHYSICAL EXAM:      EXAM   HEENT  Exam: Normocephalic, atraumatic.  EOMI   RESPIRATORY  Exam: Lungs clear to auscultation, Normal expansion/effort.    CARDIOVASCULAR  Exam: AFib.  Peripheral edema    GI/NUTRITION  Exam: Abdomen soft, Non-tender, Non-distended. L femoral central venous catheter, no hematoma.   VASCULAR Exam: Extremities warm, pink, well-perfused.    MUSCULOSKELETAL  Exam: All extremities moving spontaneously without limitations.    SKIN:  Exam: Good skin turgor, no skin breakdown.          MEDICATIONS  (STANDING):  albuterol/ipratropium for Nebulization 3 milliLiter(s) Nebulizer <User Schedule>  albuterol/ipratropium for Nebulization 3 milliLiter(s) Nebulizer every 6 hours  aMIOdarone    Tablet 200 milliGRAM(s) Oral daily  Biotene Dry Mouth Oral Rinse 15 milliLiter(s) Swish and Spit every 6 hours  budesonide 160 MICROgram(s)/formoterol 4.5 MICROgram(s) Inhaler 2 Puff(s) Inhalation two times a day  chlorhexidine 2% Cloths 1 Application(s) Topical daily  diazepam    Tablet 5 milliGRAM(s) Oral at bedtime  enoxaparin Injectable 50 milliGRAM(s) SubCutaneous every 12 hours  influenza   Vaccine 0.5 milliLiter(s) IntraMuscular once  metoclopramide Injectable 10 milliGRAM(s) IV Push every 8 hours  metoprolol tartrate 25 milliGRAM(s) Oral every 12 hours  multivitamin 1 Tablet(s) Oral daily  naloxegol 25 milliGRAM(s) Oral daily  nicotine -  14 mG/24Hr(s) Patch 1 Patch Transdermal every 24 hours  oxyCODONE  ER Tablet 30 milliGRAM(s) Oral <User Schedule>  pantoprazole    Tablet 40 milliGRAM(s) Oral two times a day  polyethylene glycol 3350 17 Gram(s) Oral every 12 hours  sodium chloride 0.65% Nasal 1 Spray(s) Both Nostrils two times a day  sodium chloride 3%  Inhalation 4 milliLiter(s) Inhalation every 12 hours    MEDICATIONS  (PRN):  aluminum hydroxide/magnesium hydroxide/simethicone Suspension 30 milliLiter(s) Oral every 6 hours PRN Dyspepsia  benzocaine/menthol Lozenge 1 Lozenge Oral two times a day PRN Sore Throat  Biotene Dry Mouth Oral Rinse 15 milliLiter(s) Swish and Spit two times a day PRN dry mouth  diazepam    Tablet 5 milliGRAM(s) Oral two times a day PRN anxiety  FIRST- Mouthwash  BLM 5 milliLiter(s) Swish and Spit four times a day PRN Mouth Care  ondansetron    Tablet 4 milliGRAM(s) Oral every 8 hours PRN Nausea and/or Vomiting  oxyCODONE    IR 10 milliGRAM(s) Oral every 4 hours PRN Moderate Pain (4 - 6)  sodium chloride 0.9% lock flush 10 milliLiter(s) IV Push every 1 hour PRN Pre/post blood products, medications, blood draw, and to maintain line patency      LABS:                          8.5    18.99 )-----------( 440      ( 16 May 2024 06:50 )             29.0     05-16    142  |  104  |  32<H>  ----------------------------<  122<H>  4.7   |  24  |  0.89    Ca    10.1      16 May 2024 06:50  Phos  4.2     05-16  Mg     2.00     05-16    TPro  5.9<L>  /  Alb  3.0<L>  /  TBili  0.2  /  DBili  x   /  AST  19  /  ALT  61<H>  /  AlkPhos  207<H>  05-16    PT/INR - ( 15 May 2024 06:30 )   PT: 11.2 sec;   INR: 1.00 ratio         PTT - ( 15 May 2024 06:30 )  PTT:28.7 sec

## 2024-05-16 NOTE — PROGRESS NOTE ADULT - ASSESSMENT
58F PMH RCC s/p R nephrectomy with supraclav/mediastinal mass with obliteration of SVC and multiple venous thrombosis. 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 IR consulted for femoral CVC placement for initiation of chemo, currently only lower extremity PIV.     -S/p well positioned CVC  -Rest of care per primary    Discussed on AM rounds    B Team Surgery

## 2024-05-16 NOTE — PROGRESS NOTE ADULT - SUBJECTIVE AND OBJECTIVE BOX
SOLID TUMOR ONCOLOGY HOSPITALIST PROGRESS NOTE    S: No acute events overnight.  Pt had no new complaints this morning but expressed frustration about being bedbound s/p femoral IV line placement yesterday.    CURRENT MEDICATIONS  MEDICATIONS  (STANDING):  albuterol/ipratropium for Nebulization 3 milliLiter(s) Nebulizer <User Schedule>  albuterol/ipratropium for Nebulization 3 milliLiter(s) Nebulizer every 6 hours  aMIOdarone    Tablet 200 milliGRAM(s) Oral daily  Biotene Dry Mouth Oral Rinse 15 milliLiter(s) Swish and Spit every 6 hours  budesonide 160 MICROgram(s)/formoterol 4.5 MICROgram(s) Inhaler 2 Puff(s) Inhalation two times a day  chlorhexidine 2% Cloths 1 Application(s) Topical daily  dexAMETHasone  IVPB 8 milliGRAM(s) IV Intermittent once  diazepam    Tablet 5 milliGRAM(s) Oral at bedtime  enoxaparin Injectable 50 milliGRAM(s) SubCutaneous every 12 hours  fluorouracil IVPB (eMAR) 1171 milliGRAM(s) IV Intermittent once  influenza   Vaccine 0.5 milliLiter(s) IntraMuscular once  leucovorin IVPB (eMAR) 390 milliGRAM(s) IV Intermittent once  metoclopramide Injectable 10 milliGRAM(s) IV Push every 8 hours  metoprolol tartrate 25 milliGRAM(s) Oral every 12 hours  multivitamin 1 Tablet(s) Oral daily  naloxegol 25 milliGRAM(s) Oral daily  nicotine -  14 mG/24Hr(s) Patch 1 Patch Transdermal every 24 hours  ondansetron Injectable 8 milliGRAM(s) IV Push once  oxaliplatin IVPB (eMAR) 83 milliGRAM(s) IV Intermittent once  oxyCODONE  ER Tablet 30 milliGRAM(s) Oral <User Schedule>  pantoprazole    Tablet 40 milliGRAM(s) Oral two times a day  polyethylene glycol 3350 17 Gram(s) Oral every 12 hours  sodium chloride 0.65% Nasal 1 Spray(s) Both Nostrils two times a day  sodium chloride 3%  Inhalation 4 milliLiter(s) Inhalation every 12 hours  MEDICATIONS  (PRN):  aluminum hydroxide/magnesium hydroxide/simethicone Suspension 30 milliLiter(s) Oral every 6 hours PRN Dyspepsia  benzocaine/menthol Lozenge 1 Lozenge Oral two times a day PRN Sore Throat  Biotene Dry Mouth Oral Rinse 15 milliLiter(s) Swish and Spit two times a day PRN dry mouth  diazepam    Tablet 5 milliGRAM(s) Oral two times a day PRN anxiety  FIRST- Mouthwash  BLM 5 milliLiter(s) Swish and Spit four times a day PRN Mouth Care  ondansetron    Tablet 4 milliGRAM(s) Oral every 8 hours PRN Nausea and/or Vomiting  oxyCODONE    IR 10 milliGRAM(s) Oral every 4 hours PRN Moderate Pain (4 - 6)  sodium chloride 0.9% lock flush 10 milliLiter(s) IV Push every 1 hour PRN Pre/post blood products, medications, blood draw, and to maintain line patency      PHYSICAL EXAM  T(C): 36.3 (05-16-24 @ 11:41), Max: 36.4 (05-15-24 @ 17:06)  HR: 84 (05-16-24 @ 11:41) (84 - 102)  BP: 106/57 (05-16-24 @ 11:41) (105/54 - 106/57)  RR: 18 (05-16-24 @ 11:41) (18 - 22)  SpO2: 90% (05-16-24 @ 11:41) (90% - 100%)    05-16-24 @ 07:01  -  05-16-24 @ 16:52  --------------------------------------------------------  IN: 0 mL / OUT: 350 mL / NET: -350 mL  Non-toxic appearing woman, sitting up in bed, appears comfortable  Pt still dysphonic, but able to write in comprehensive sentences to communicate  Mild R eyelid ptosis and R pupil miosis present/unchanged; R eye with scleral injection; no oral lesions/thrush  RRR, no m/r/g  Breaths mildly labored but pt is not tachypneic; trace inspiratory wheeze and transmitted upper airway sounds auscultated in all lung zone  Abd soft/nt/nd, normoactive bowel sounds  Trace LUE non-pitting edema present; large area of ecchymosis extending from the post R calf to post R thigh- improving; generalized muscular atrophy in all 4 extremities  CN 2-12 grossly intact; no gross focal neuro deficits; pt ambulates with walker      LABS                        8.5    18.99 )-----------( 440      ( 16 May 2024 06:50 )             29.0     05-16    142  |  104  |  32<H>  ----------------------------<  122<H>  4.7   |  24  |  0.89    Ca    10.1      16 May 2024 06:50  Phos  4.2     05-16  Mg     2.00     05-16    TPro  5.9<L>  /  Alb  3.0<L>  /  TBili  0.2  /  DBili  x   /  AST  19  /  ALT  61<H>  /  AlkPhos  207<H>  05-16    PT/INR - ( 15 May 2024 06:30 )   PT: 11.2 sec;   INR: 1.00 ratio    PTT - ( 15 May 2024 06:30 )  PTT:28.7 sec    PATHOLOGY  3/26 pleural fluid cytology: Neg for malignant cells.    3/26 Pericardium excision: Neg for malignancy.    3/20 Pericardial fluid cytology: Neg for malignant cells.    3/14 LYMPH NODE, SUPRACLAVICULAR, RIGHT, US GUIDED CORE BIOPSY AND FNA   POSITIVE FOR MALIGNANT CELLS.   Carcinoma   Touch Prep slides display crowded groups and single lying malignant   cells with enlarged nuclei containing prominent nucleoli and vacuolated   cytoplasm. Core biopsies show neoplastic cells positive for CK7 and CDX2   immunostains, while negative for CK20, TTF1, GATA3, TRPS1 and PAX8. 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 Suggest correlation with clinical information and imaging to   assist with next step management.   Moleculars: pMMR, PD-L1 TPS 1%; Her2 pending      TUMOR MARKERS  3/13 CEA 7.1  3/24 Ca 19-9 27  4/8 Ca-125 187, Ca 27.29 16.9, Ca 15-3 18.9, AFP 5.2      MICROBIOLOGY  5/1 Procal 0.44    Abx  $Bactrim ppx 4/8-5/15 (dced with completion of Dex taper)  $Cefepime 5/1-10  $Fluconazole 5/6-12  $Nystatin 5/4-6    Cx Data  4/5 MRSA swab: Not detected  3/16 Quant plus TB: Negative      PERTINENT RADIOLOGY  5/15 CXR: Redemonstrated small right-sided pleural effusion with underlying   atelectasis. Right-sided Pleurx catheter in stable position. No newfocal   consolidations.    5/13 CXR: Right-sided chest tube with effusion/atelectasis suspected.    5/10 CXR: Right effusion with suspected lower lobe atelectasis.    5/6 CXR: Right-sided Pleurx catheter with decreased effusion.    5/4 CXR: There is a catheter at the right base.  HEART: normal in size.  LUNGS: There is opacity at the right base compatible with   effusion/infiltrate.. Prominent interstitial markings, likely secondary   to chronic underlying lung disease..  BONES: degenerative changes    5/4 R elbow XR: Linear lucency in the coronoid process seen only on the lateral view   concerning for nondisplaced fracture. Consider cross-sectional imaging of   the elbow with CT or MRI for further evaluation.    5/3 CT c/a/p with IV contrast: Persistent right middle lobe consolidation and decreased right lower lobe consolidation. New patchy nodular opacities in the right upper lobe and increased patchy and groundglass opacities in the left lung. Small right pleural effusion, unchanged. Trace left pleural effusion, decreased. No evidence of metastatic disease in the abdomen or pelvis.    5/1 CXR: Small right effusion unchanged with Pleurx catheter.    4/19 MRCP: Motion degraded study, particularly on postcontrast sequences.  Pancreatic neck T2 hyperintense lesion measures 16 mm, image 28 series 5,   without clear evidence of enhancement on postcontrast phases. Most likely   this is a side branch IPMN. Remainder of the pancreas is suboptimally   evaluated due to artifact. Follow-up MRI abdomen or pancreas protocol CT   can be considered in 3-6 months for reevaluation. Partially distended stomach. Duodenal diverticulum. Colon is also partially distended with mild to moderate stool burden. Correlate   clinically. Trace pleural effusions. Right-sided pleural catheter is partially   imaged. Lung parenchyma is incompletely characterized on MRI. Soft tissue   of the mediastinum is not adequately imaged on this study.    4/18 B/L LE Doppler: Acute deep venous thrombosis: above the knee.  Acute nonocclusive deep venous thrombosis in the right common femoral vein.    417 CXR: Clear lungs with minimal effusion and Pleurx catheter.    4/10 VA dopplers B/L UE: Extensive acute, occlusive DVT RIJ, innominate and subclavian veins.  Acute non-occlusive DVT w/in R axillary vein.  Extensive acute, occlusive DVT noted in L subclavian. axillary, and proximal brachial veins.  Acute non-occlusive DVT LIJ.  Superficial vein thromboses are noted in the B/L cephalic veins.    4/9 Echocardiogram: Normal LV systolic function.  Normal mitral valve w/ normal leaflet excursion.  No pericardial effusion seen    4/4 CXR: Clear lungs w/ R pleurx catheter in place.    ok4/4 CXR AM: Small R pleural effusion w/ pleurx cath improved    OSH Imaging ([facility])  3/29 CXR: Decrease in R pleural effusion.     3/28 B/L UE Dupplers: Acute DVT involving R IJ, innominate vein, subclavian, brachial, and L IJ.  Superficial venous thrombosis involving B/L cephalic veins.  + Edema of superficial soft tissue of UE R>L.    3/27 CT neck: Bulky and conglomerate LAD invading RIJ w/ thrombus extending up to hyoid level.  Thrombus is likely combination tumor and bland.  New LIJ nonocclusive thrombus.    3/27 CT Chest: New consolidations throughout the R lung worse RLL c/f aspiration PNA.  + R pleurx cath w/ decrease in R pleural effusion.  Extensive DVT w/in thoracic and lower neck, upper SVC remains obliterated.  Large R supraclavicular mass infiltrating into the mediastinum.      3/27 CXR: Progression of R consolidation/effusion.    3/21 TTE: LV grossly normal.  Thickened pericardium.  no pericardial effusion.    3/18 TTE: Mod pericardial effusion w/ e/o hemodynamic compromise w/ diasolic collapse of RA that exceeds 1/3 of cardiac cycle >30% resp variation across MV E. wave and early diastolic inversion of RV.    3/14 CT Chest: Conglomerate soft tissue in superior mediastinum and R supraclavicular region 2/2 LAD w/ internal hypodensity c/f necrosis.  Mass encases SVC and multiple great veins resulting in obliteration of the SVC and thrombosis of the RIJ.  Multiple R sided collateral vessels and R soft tissue edema.  B/L pulm nodules.    3/14 CT neck: Extensive cellulitis/myositis along R anterior lateral neck and R upper chest wall w/ inflammatory fat induration the deep soft tissue of neck.  Large supraclavicular/mediastinal mass lesion, presumably conglomerate of LN w/ mass effect and complete occlusion of RIJ w/ associated inflammation.  Complete occlusion of R subclavian vein and nearly occlusive thrombosis in the proximal L brachiocephalic vein.    3/13 RUQ Abd US: Cholelithiasis w/o e/o cholecystitis.  Dilated CBD w/o e/o intraductal stone or other obstruction. 1.4cm pancreatic cyst w/ mild duct dilatation.    3/12 CT abd/pelv: S/p R nephrectomy. no LAD.  New 1.6cm  pancreatic neck cystic lesion w/o main pancreatic ductal dilatation.    3/10 R breast US: No e/o breast abscess      RECENT ENDOSCOPY  5/4 Flex laryngoscopy    -- Nasopharynx had no mass or exudate.    -- Base of tongue was symmetric and not enlarged.    -- Vallecula was clear    -- Epiglottis, both aryepiglottic folds and both false vocal folds were normal    -- Arytenoids both without edema and erythema     -- True vocal folds were fully mobile and without lesions. possible R VC hypomobility, Incomplete glottic closure    -- Post cricoid area was clear.    -- Interarytenoid edema was absent    4/26 Upper EUS  - Normal esophagus.  - A large amount of food (residue) inthe stomach. Procedure was aborted.    4/24 Upper EUS   - Fluid in the middle third of the esophagus and in the lower third of the esophagus.  - A large amount of food (residue) in the stomach so EGD was aborted and EUS was not attempted.  - No specimens collected.

## 2024-05-16 NOTE — PROGRESS NOTE ADULT - ASSESSMENT
59 yo woman, former smoker, with h/o R eye glaucoma, Fibromyalgia, Anxiety, GERD, and RCC s/p R total nephrectomy/hysterectomy; she was initially admitted to [facility] on 3/11 w/ R breast swelling c/f mastitis- imaging brooks noted supraclavicular/mediastinal mass lesion which encased the SVC and multiple other veins resulting in obliteration of the SVC and thrombosis of the R IJ, and a pancreatic neck cystic lesion.  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  Her disease/hospital course has also been c/b pericardial effusion s/p pericardial window w/ neg cytology, R pleural effusion, also negative cytology) s/p Pleurx, Afib w/ RVR and acute hypoxic resp failure 2/2 SVC syndrome- not dennis to IR-guided stenting; pt was ultimately started on Dex and transferred to [facility] on 4/4 for urgent palliative RT which she completed on 4/18. Her hospital course has also been c/b pericardial effusion with tamponade s/p window and non malignant R pleural effusion s/p pleurex, acute b/l UE and RLE dvts, anxiety, and more recently, recurrent hypoxia.    ACTIVE PROBLEMS  Metastatic CUP (carcinoma of unknown primary)  Goals of care discussion, counseling  Encounter for antineoplastic chemotherapy  Acute-on-chronic hypoxic respiratory failure  Dysphonia with R VC hypomobility   Oral thrush  SVC syndrome  Extensive b/l UE DVTs  Acute RLE DVT a/w RLE ecchymosis  Hypercoag state  Pericardial effusion with tamp physiology s/p pericardial window  R pleural effusion s/p pleurex  pAfib, with RVR on this admission  Anxiety/emotional lability  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)  Cancer-related pain, anxiety  Severe prot-ghazala malnutrition    - Metastatic CUP (carcinoma of unknown primary)  - Goals of care discussion, counseling  - Encounter for antineoplastic chemotherapy  Based on 3/14 SC LN path, ddx includes primary UGI vs pancreaticobiliary primary (breast edema is likely 2/2 SVC syndrome); PD-L1 TPs 1%, pMMR, Her2 2+, FISH pending, Ki-67 30%  Ca-125 moderately elevated at 125; other tumor markers not significantly elevated  Additional diagnostic brooks includes:   * 4/19 MRCP showing pancreatic lesion (? IPMN, but pancreas was suboptimally evaluated)     * 4/24 and 4/26 EGD/EUS aborted as pt had large amount of food in the stomach that prevented passing of scope   * 4/30 UGIS aborted as pt felt too short of breath when laying down flat (improved after her pleurex was drained  Case d/w Dr. 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 Pt is a suboptimal candidate i/s/o her progressive debility and malnutrition (ECOG PS 3)   >> Discussed risks/benefits of proceeding with palliative chemo (inpt) vs best supportive care with pt's HCP Saeid on 5/10 and again on 5/13- he is amenable to trial of inpt chemotherapy and understands that hospice will be recommended if pt does not tolerate trial of inpt chemo; verbal and written chemo consent obtained on 5/13   * Pt s/p successful femoral vein central line placement by Surgery on 5/15 (to be removed after chemotherapy)   * Planning to proceed with palliative chemo- mFOLFOX6 (20% dose reduction)- today (total 46h infusion)  Pt's long term prognosis remains guarded; she is dnr/dni    - Acute-on-chronic hypoxic resp failure  - Multifocal aspiration vs hosp-acquired pneumonia  Pt stable today on 6L NC; she is still dyspneic at rest on occasion  Serial chest imaging is stable  Continuing empiric Cefepime for 10d course, until 5/10  Dex taper completed on 5/14 (Bactrim pjp ppx dced on 5/15)  Continuing Symbicort 2 puffs BID  Continuing Duonebs q6/prn  Will continue routine pleurex drainage as below    - Dysphonia with R VC hypomobility  - Oral thrush  Appreciate ENT eval/recs: 5/4 Flex ellis pt with R VC hypomobility  Pt cleared for pureed diet s/p Cinesophagram  Completed 7d course of Fluconazole on 5/12    - SVC syndrome  Appreciate Rad Onc eval/recs  Completed 10fxs of palliative RT on 4/18     - Extensive b/l UE DVTs  - Acute RLE DVT a/w RLE ecchymosis  - Hypercoag state  Continuing therapeutic lovenox, benefits > risks   * Of note: pt has poor UE IV access and currently requires peripheral IV in her LE extremities for admin of medication; FV central line being placed today for IV chemo    - Pericardial effusion with tamp physiology s/p pericardial window  - R pleural effusion s/p pleurex  Likely inflammatory; both pleural and pericardial fluid cytology are negative for malignant cells  4/9 surveillance TTE with pEF and no WMAs  Continuing R pleurex drainage- up to 500cc q48h/prn    - pAfib  Pt currently SR, HR controlled  Likely precipitated by malignancy, pericardial effusion, SVC syndrome  Continuing Metoprolol 25mg q12 with holding parameters  Continuing Amio 200mg daily (monitoring for toxicities)  Continuing telemetry    - Anxiety/emotional lability  Continuing Diazepam 5mg BID/prn and nightly  Pt has poor health literacy and clinical insight which is negatively impacting her medical decision making- when ask if she wanted to defer medical decision making to her boyfriend or if she had a designated HCP she replied "he's already sick of me"  Appreciate  consult: pt lacks medical decision making capacity; medical decision making will be deferred to pt's surrogate decision maker- Saeid Peña ([phone])    - 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)  Pt with h/o R eye glaucoma, but miosis can also be associated with Pancoast syndrome i/s/o extensive R upper lung tumor  Pt denies visual deficits or other related symptoms   MRI Brain without contrast ordered for further eval- pt continues to refuse, can be completed as outpt  Will continue to monitor closely    - Anemia in neoplastic disease  Hgb remains stable  4/5 iron studies not c/w iron deficiency  VSS and pt without clinical s/s of active bleeding  4/17 hemolysis labs negative; 4/18 FOBT negative x2  Trending daily cbcs; continuing supportive transfusions prn (pt does not have h/o ACS, CAD, MI, goal hgb > 7; plts > 10K)    - Cancer related pain  Currently well controlled  Continuing Oxy ER 30mg q12 q8  Continuing Oxy IR 10mg q4/prn  Continuing Dilaudid IV prn for sev breakthrough pain  Continuing bowel regimen to prevent OIC (including Movantic)    - Severe prot-ghazala malnutrition: appreciate RD recs; continuing regular diet with protein shake supplement BID (SLP eval pending as above)

## 2024-05-17 LAB
ALBUMIN SERPL ELPH-MCNC: 2.4 G/DL — LOW (ref 3.3–5)
ALP SERPL-CCNC: 222 U/L — HIGH (ref 40–120)
ALT FLD-CCNC: 83 U/L — HIGH (ref 4–33)
ANION GAP SERPL CALC-SCNC: 12 MMOL/L — SIGNIFICANT CHANGE UP (ref 7–14)
ANION GAP SERPL CALC-SCNC: 14 MMOL/L — SIGNIFICANT CHANGE UP (ref 7–14)
AST SERPL-CCNC: 24 U/L — SIGNIFICANT CHANGE UP (ref 4–32)
BASOPHILS # BLD AUTO: 0.03 K/UL — SIGNIFICANT CHANGE UP (ref 0–0.2)
BASOPHILS NFR BLD AUTO: 0.2 % — SIGNIFICANT CHANGE UP (ref 0–2)
BILIRUB SERPL-MCNC: <0.2 MG/DL — SIGNIFICANT CHANGE UP (ref 0.2–1.2)
BUN SERPL-MCNC: 34 MG/DL — HIGH (ref 7–23)
BUN SERPL-MCNC: 38 MG/DL — HIGH (ref 7–23)
CALCIUM SERPL-MCNC: 10 MG/DL — SIGNIFICANT CHANGE UP (ref 8.4–10.5)
CALCIUM SERPL-MCNC: 9.6 MG/DL — SIGNIFICANT CHANGE UP (ref 8.4–10.5)
CHLORIDE SERPL-SCNC: 102 MMOL/L — SIGNIFICANT CHANGE UP (ref 98–107)
CHLORIDE SERPL-SCNC: 104 MMOL/L — SIGNIFICANT CHANGE UP (ref 98–107)
CO2 SERPL-SCNC: 17 MMOL/L — LOW (ref 22–31)
CO2 SERPL-SCNC: 23 MMOL/L — SIGNIFICANT CHANGE UP (ref 22–31)
CREAT SERPL-MCNC: 0.89 MG/DL — SIGNIFICANT CHANGE UP (ref 0.5–1.3)
CREAT SERPL-MCNC: 0.96 MG/DL — SIGNIFICANT CHANGE UP (ref 0.5–1.3)
EGFR: 68 ML/MIN/1.73M2 — SIGNIFICANT CHANGE UP
EGFR: 74 ML/MIN/1.73M2 — SIGNIFICANT CHANGE UP
EOSINOPHIL # BLD AUTO: 0.01 K/UL — SIGNIFICANT CHANGE UP (ref 0–0.5)
EOSINOPHIL NFR BLD AUTO: 0.1 % — SIGNIFICANT CHANGE UP (ref 0–6)
GLUCOSE BLDC GLUCOMTR-MCNC: 153 MG/DL — HIGH (ref 70–99)
GLUCOSE SERPL-MCNC: 100 MG/DL — HIGH (ref 70–99)
GLUCOSE SERPL-MCNC: 121 MG/DL — HIGH (ref 70–99)
HCT VFR BLD CALC: 25.8 % — LOW (ref 34.5–45)
HGB BLD-MCNC: 7.6 G/DL — LOW (ref 11.5–15.5)
IANC: 16.89 K/UL — HIGH (ref 1.8–7.4)
IMM GRANULOCYTES NFR BLD AUTO: 4.9 % — HIGH (ref 0–0.9)
LYMPHOCYTES # BLD AUTO: 0.31 K/UL — LOW (ref 1–3.3)
LYMPHOCYTES # BLD AUTO: 1.6 % — LOW (ref 13–44)
MAGNESIUM SERPL-MCNC: 1.8 MG/DL — SIGNIFICANT CHANGE UP (ref 1.6–2.6)
MCHC RBC-ENTMCNC: 28.9 PG — SIGNIFICANT CHANGE UP (ref 27–34)
MCHC RBC-ENTMCNC: 29.5 GM/DL — LOW (ref 32–36)
MCV RBC AUTO: 98.1 FL — SIGNIFICANT CHANGE UP (ref 80–100)
MONOCYTES # BLD AUTO: 0.9 K/UL — SIGNIFICANT CHANGE UP (ref 0–0.9)
MONOCYTES NFR BLD AUTO: 4.7 % — SIGNIFICANT CHANGE UP (ref 2–14)
NEUTROPHILS # BLD AUTO: 16.89 K/UL — HIGH (ref 1.8–7.4)
NEUTROPHILS NFR BLD AUTO: 88.5 % — HIGH (ref 43–77)
NRBC # BLD: 0 /100 WBCS — SIGNIFICANT CHANGE UP (ref 0–0)
NRBC # FLD: 0 K/UL — SIGNIFICANT CHANGE UP (ref 0–0)
PHOSPHATE SERPL-MCNC: 4.6 MG/DL — HIGH (ref 2.5–4.5)
PLATELET # BLD AUTO: 392 K/UL — SIGNIFICANT CHANGE UP (ref 150–400)
POTASSIUM SERPL-MCNC: 5.8 MMOL/L — HIGH (ref 3.5–5.3)
POTASSIUM SERPL-MCNC: 5.9 MMOL/L — HIGH (ref 3.5–5.3)
POTASSIUM SERPL-SCNC: 5.8 MMOL/L — HIGH (ref 3.5–5.3)
POTASSIUM SERPL-SCNC: 5.9 MMOL/L — HIGH (ref 3.5–5.3)
PROT SERPL-MCNC: 5.6 G/DL — LOW (ref 6–8.3)
RBC # BLD: 2.63 M/UL — LOW (ref 3.8–5.2)
RBC # FLD: 16.7 % — HIGH (ref 10.3–14.5)
SODIUM SERPL-SCNC: 135 MMOL/L — SIGNIFICANT CHANGE UP (ref 135–145)
SODIUM SERPL-SCNC: 137 MMOL/L — SIGNIFICANT CHANGE UP (ref 135–145)
WBC # BLD: 19.07 K/UL — HIGH (ref 3.8–10.5)
WBC # FLD AUTO: 19.07 K/UL — HIGH (ref 3.8–10.5)

## 2024-05-17 PROCEDURE — 99233 SBSQ HOSP IP/OBS HIGH 50: CPT

## 2024-05-17 PROCEDURE — 93010 ELECTROCARDIOGRAM REPORT: CPT

## 2024-05-17 RX ORDER — PETROLATUM,WHITE
1 JELLY (GRAM) TOPICAL
Refills: 0 | Status: DISCONTINUED | OUTPATIENT
Start: 2024-05-17 | End: 2024-05-31

## 2024-05-17 RX ORDER — SODIUM ZIRCONIUM CYCLOSILICATE 10 G/10G
10 POWDER, FOR SUSPENSION ORAL ONCE
Refills: 0 | Status: COMPLETED | OUTPATIENT
Start: 2024-05-17 | End: 2024-05-17

## 2024-05-17 RX ORDER — DEXTROSE 50 % IN WATER 50 %
50 SYRINGE (ML) INTRAVENOUS ONCE
Refills: 0 | Status: COMPLETED | OUTPATIENT
Start: 2024-05-17 | End: 2024-05-17

## 2024-05-17 RX ORDER — INSULIN HUMAN 100 [IU]/ML
10 INJECTION, SOLUTION SUBCUTANEOUS ONCE
Refills: 0 | Status: COMPLETED | OUTPATIENT
Start: 2024-05-17 | End: 2024-05-17

## 2024-05-17 RX ADMIN — ENOXAPARIN SODIUM 50 MILLIGRAM(S): 100 INJECTION SUBCUTANEOUS at 04:25

## 2024-05-17 RX ADMIN — Medication 1 TABLET(S): at 14:43

## 2024-05-17 RX ADMIN — Medication 25 MILLIGRAM(S): at 19:00

## 2024-05-17 RX ADMIN — OXYCODONE HYDROCHLORIDE 30 MILLIGRAM(S): 5 TABLET ORAL at 00:03

## 2024-05-17 RX ADMIN — Medication 3 MILLILITER(S): at 21:38

## 2024-05-17 RX ADMIN — Medication 1 PATCH: at 07:04

## 2024-05-17 RX ADMIN — PANTOPRAZOLE SODIUM 40 MILLIGRAM(S): 20 TABLET, DELAYED RELEASE ORAL at 19:00

## 2024-05-17 RX ADMIN — Medication 3 MILLILITER(S): at 14:32

## 2024-05-17 RX ADMIN — Medication 3 MILLILITER(S): at 03:21

## 2024-05-17 RX ADMIN — SODIUM ZIRCONIUM CYCLOSILICATE 10 GRAM(S): 10 POWDER, FOR SUSPENSION ORAL at 21:01

## 2024-05-17 RX ADMIN — Medication 15 MILLILITER(S): at 04:28

## 2024-05-17 RX ADMIN — PANTOPRAZOLE SODIUM 40 MILLIGRAM(S): 20 TABLET, DELAYED RELEASE ORAL at 04:27

## 2024-05-17 RX ADMIN — OXYCODONE HYDROCHLORIDE 30 MILLIGRAM(S): 5 TABLET ORAL at 06:44

## 2024-05-17 RX ADMIN — SODIUM ZIRCONIUM CYCLOSILICATE 10 GRAM(S): 10 POWDER, FOR SUSPENSION ORAL at 14:42

## 2024-05-17 RX ADMIN — BUDESONIDE AND FORMOTEROL FUMARATE DIHYDRATE 2 PUFF(S): 160; 4.5 AEROSOL RESPIRATORY (INHALATION) at 22:07

## 2024-05-17 RX ADMIN — Medication 1 SPRAY(S): at 04:28

## 2024-05-17 RX ADMIN — POLYETHYLENE GLYCOL 3350 17 GRAM(S): 17 POWDER, FOR SOLUTION ORAL at 18:59

## 2024-05-17 RX ADMIN — OXYCODONE HYDROCHLORIDE 30 MILLIGRAM(S): 5 TABLET ORAL at 23:23

## 2024-05-17 RX ADMIN — OXYCODONE HYDROCHLORIDE 30 MILLIGRAM(S): 5 TABLET ORAL at 07:05

## 2024-05-17 RX ADMIN — Medication 3 MILLILITER(S): at 08:14

## 2024-05-17 RX ADMIN — ENOXAPARIN SODIUM 50 MILLIGRAM(S): 100 INJECTION SUBCUTANEOUS at 18:59

## 2024-05-17 RX ADMIN — SODIUM CHLORIDE 4 MILLILITER(S): 9 INJECTION INTRAMUSCULAR; INTRAVENOUS; SUBCUTANEOUS at 23:12

## 2024-05-17 RX ADMIN — Medication 15 MILLILITER(S): at 18:59

## 2024-05-17 RX ADMIN — Medication 1 SPRAY(S): at 19:01

## 2024-05-17 RX ADMIN — POLYETHYLENE GLYCOL 3350 17 GRAM(S): 17 POWDER, FOR SOLUTION ORAL at 04:25

## 2024-05-17 RX ADMIN — FLUOROURACIL 1171 MILLIGRAM(S): 50 INJECTION, SOLUTION INTRAVENOUS at 20:32

## 2024-05-17 RX ADMIN — Medication 50 MILLILITER(S): at 22:27

## 2024-05-17 RX ADMIN — Medication 1 PATCH: at 07:03

## 2024-05-17 RX ADMIN — NALOXEGOL OXALATE 25 MILLIGRAM(S): 12.5 TABLET, FILM COATED ORAL at 14:42

## 2024-05-17 RX ADMIN — OXYCODONE HYDROCHLORIDE 30 MILLIGRAM(S): 5 TABLET ORAL at 14:41

## 2024-05-17 RX ADMIN — SODIUM CHLORIDE 4 MILLILITER(S): 9 INJECTION INTRAMUSCULAR; INTRAVENOUS; SUBCUTANEOUS at 14:49

## 2024-05-17 RX ADMIN — INSULIN HUMAN 10 UNIT(S): 100 INJECTION, SOLUTION SUBCUTANEOUS at 22:27

## 2024-05-17 RX ADMIN — CHLORHEXIDINE GLUCONATE 1 APPLICATION(S): 213 SOLUTION TOPICAL at 14:43

## 2024-05-17 RX ADMIN — AMIODARONE HYDROCHLORIDE 200 MILLIGRAM(S): 400 TABLET ORAL at 04:27

## 2024-05-17 RX ADMIN — Medication 15 MILLILITER(S): at 14:41

## 2024-05-17 RX ADMIN — Medication 1 PATCH: at 14:41

## 2024-05-17 RX ADMIN — Medication 25 MILLIGRAM(S): at 04:27

## 2024-05-17 RX ADMIN — Medication 1 APPLICATION(S): at 19:01

## 2024-05-17 NOTE — CHART NOTE - NSCHARTNOTEFT_GEN_A_CORE
Overnight Medicine ACP Coverage        Jaya Bui PA-C  Department of Medicine   [facility]  [id number] Overnight Medicine ACP Coverage    Upon chart review patient with K of 5.8 on lab work after lokelma. Also informed by chemo nurse patient with saturation of dressing above femoral central line placed by surgery. While assessing patient at bedside for dressing change patient endorsing shortness of breath and requesting breathing treatments.     Vital Signs Last 24 Hrs  T(C): 36.6 (17 May 2024 18:58), Max: 36.6 (17 May 2024 18:58)  T(F): 97.8 (17 May 2024 18:58), Max: 97.8 (17 May 2024 18:58)  HR: 81 (17 May 2024 21:52) (77 - 97)  BP: 153/80 (17 May 2024 18:58) (132/61 - 153/80)  RR: 20 (17 May 2024 23:30) (18 - 20)  SpO2: 96% (17 May 2024 23:30) (92% - 100%)    Parameters below as of 17 May 2024 23:30  Patient On (Oxygen Delivery Method): nasal cannula, high flow  O2 Flow (L/min): 40  O2 Concentration (%): 60    PE:  Gen: Cachectic, djlladnibqj-oth-noxkhtgwu woman, sitting up in bed, communicating with writing  Resp: +B/L ronchi  CV: RRR, no m/r/g  Extremity: Noted Serosanguinous fluid around left femoral central line. No active bleeding. No hematoma. + posterior tibial and dorsal pedis pulse.     Plan:  1) Hyperkalemia  -EKG w/o peaked T waves  -Lokelma, insulin and dextrose ordered    2) Shortness of breath  -Patient with known R pleural effusion s/p pleurex and PNA. Patient currently on 6L. Patient due of breathing treatments, if patient still feels short of breath my need pleurx drained. CXR ordered. Can consider escalating oxygen from 6L->HFNC and down titrate as needed    3)  Serosanguinous fluid around femoral central line  -Surgery at bedside, advised for dressing change and okay to continue with chemo infusion    D/w Dr. Gamboa who agrees with plan above    Jaya Bui PA-C  Department of Medicine   [facility]  [id number] Overnight Medicine ACP Coverage    Upon chart review patient with K of 5.8 on lab work after lokelma. Also informed by chemo nurse patient with saturation of dressing above femoral central line placed by surgery. While assessing patient at bedside for dressing change patient endorsing shortness of breath and requesting breathing treatments.     Vital Signs Last 24 Hrs  T(C): 36.6 (17 May 2024 18:58), Max: 36.6 (17 May 2024 18:58)  T(F): 97.8 (17 May 2024 18:58), Max: 97.8 (17 May 2024 18:58)  HR: 81 (17 May 2024 21:52) (77 - 97)  BP: 153/80 (17 May 2024 18:58) (132/61 - 153/80)  RR: 20 (17 May 2024 23:30) (18 - 20)  SpO2: 96% (17 May 2024 23:30) (92% - 100%)    Parameters below as of 17 May 2024 23:30  Patient On (Oxygen Delivery Method): nasal cannula, high flow  O2 Flow (L/min): 40  O2 Concentration (%): 60    PE:  Gen: Cachectic, dmsaydiabkz-gfi-yzjecelzq woman, sitting up in bed, communicating with writing  Resp: +B/L ronchi  CV: RRR, no m/r/g  Extremity: Noted Serosanguinous fluid around left femoral central line. No active bleeding. No hematoma. + posterior tibial and dorsal pedis pulse.     Plan:  1) Hyperkalemia  -EKG w/o peaked T waves  -Lokelma, insulin and dextrose ordered    2) Shortness of breath  -Patient with known R pleural effusion s/p pleurex and PNA. Patient currently on 6L. Patient due of breathing treatments, if patient still feels short of breath my need pleurx drained. CXR ordered. Can consider escalating oxygen from 6L->HFNC and down titrate as needed    3)  Serosanguinous fluid around femoral central line  -Surgery at bedside, advised for dressing change and okay to continue with chemo infusion    D/w Dr. Gamboa who agrees with plan above    Jaya Bui PA-C  Department of Medicine   [facility]  [id number]    Follow up Note:  Patient saturating in the 80s after breathing treatment. Briefly placed on NRB. Then transitioned to HFNC. Advised RN to drain pleurx. 250cc removed. CXR performed after removal of fluids. Preliminary CXR shows Small right pleural effusion with chest tube in place, not significantly changed from prior. However when reviewed with radiology discussed aerations are noted to be improved from previous.  Patient assessed bedside states she does feel much better after pleurx was drained. Patient thanked provider.

## 2024-05-17 NOTE — PROVIDER CONTACT NOTE (OTHER) - ACTION/TREATMENT ORDERED:
Noted.   [facility]     [facility] order some blood work and xray and will notify when to restart chemotherapy.  at this tie day# holding.

## 2024-05-17 NOTE — CHART NOTE - NSCHARTNOTEFT_GEN_A_CORE
Source: Patient [x]     other [x] medical chart, nurse   Diet rx: Soft and Bite Sized: DASH/TLC {Sodium & Cholesterol Restricted} Low Sodium (05-08-24 @ 20:01) [Active]  PO supplement: Ensure Compact (4oz/118 ml -> 9 gm Protein, 220Kcal) - 2x daily    Pt 57 yo female, former smoker, with PMHx of R eye glaucoma, Fibromyalgia, Anxiety, GERD, RCC s/p R total nephrectomy/hysterectomy - per chart review.     At time of visit, Pt awake, appears weak; Pt with not interest to speak to RD. Pt's appetite not that well reported. Pt eats >50% of her meals reported. No report of chewing or swallowing difficulty with current diet consistency; no report of vomiting or diarrhea @ this time. +Last BM (5/12) per flow sheet -> ? Pt constipated -> Add bowel regimen per MD discretion. Pt C/O dry mouth -> Rec to continue with oral mouth rinse, as ordered. RD attempted to discuss food preferences, but Pt did not participate. Rec -> to add appetite stimulant to boost Pt's PO intake/appetite. Of note, Pt DNR/DNI. RD remains available.     Pt's height: 60"      IBW: 100#+/-10%    Pt's weights: 33.3 kg (5/15), 45.4 kg (4/4)     Pertinent Medications: Lovenox, Multivitamin, Protonix, Miralax, Reglan, Zofran (PRN), Biotene dry mouth oral rinse (PRN), First mouth wash (PRN)     Pertinent Labs: (5/17) WBC 19.07 H, H/H 7.6/25.8 L, K 5.9 H, phosphorus 4.6 H, BUN 34 H, Glu 100 H, Albumin 2.4 L,  H, ALT 83 H;  (3/12) HbA1c 6.6% H, HDL 50 L  Skin: per flow sheet -> +pressure injury to sacrum - stage II; +chest tube in place       Estimated Needs: [x] no change since previous assessment  Previous Nutrition Diagnosis: [x] Malnutrition, severe    Nutrition Diagnosis is [x] ongoing      Nutrition Interventions/Recommendations:   1. Encourage & assist Pt with meals; Monitor PO diet tolerance;   2. Add appetite stimulant to boost Pt's PO intake/appetite;  3. Bowel regimen per MD discretion;   4. Continue oral mouth rinse, as ordered;   5. Continue Multivitamin as ordered;   6. Replace/replete e-lytes per MD discretion;  7. Monitor labs, weekly weights, hydration status;

## 2024-05-17 NOTE — PROGRESS NOTE ADULT - ASSESSMENT
57 yo woman, former smoker, with h/o R eye glaucoma, Fibromyalgia, Anxiety, GERD, and RCC s/p R total nephrectomy/hysterectomy; she was initially admitted to [facility] on 3/11 w/ R breast swelling c/f mastitis- imaging brooks noted supraclavicular/mediastinal mass lesion which encased the SVC and multiple other veins resulting in obliteration of the SVC and thrombosis of the R IJ, and a pancreatic neck cystic lesion.  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  Her disease/hospital course has also been c/b pericardial effusion s/p pericardial window w/ neg cytology, R pleural effusion, also negative cytology) s/p Pleurx, Afib w/ RVR and acute hypoxic resp failure 2/2 SVC syndrome- not dennis to IR-guided stenting; pt was ultimately started on Dex and transferred to [facility] on 4/4 for urgent palliative RT which she completed on 4/18. Her hospital course has also been c/b pericardial effusion with tamponade s/p window and non malignant R pleural effusion s/p pleurex, acute b/l UE and RLE dvts, anxiety, and more recently, recurrent hypoxia.    ACTIVE PROBLEMS  Metastatic CUP (carcinoma of unknown primary)  Goals of care discussion, counseling  Encounter for antineoplastic chemotherapy  Acute-on-chronic hypoxic respiratory failure  Dysphonia with R VC hypomobility   Oral thrush  SVC syndrome  Extensive b/l UE DVTs  Acute RLE DVT a/w RLE ecchymosis  Hypercoag state  Pericardial effusion with tamp physiology s/p pericardial window  R pleural effusion s/p pleurex  pAfib, with RVR on this admission  Anxiety/emotional lability  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)  Cancer-related pain, anxiety  Severe prot-ghazala malnutrition    - Metastatic CUP (carcinoma of unknown primary)  - Goals of care discussion, counseling  - Encounter for antineoplastic chemotherapy  Based on 3/14 SC LN path, ddx includes primary UGI vs pancreaticobiliary primary (breast edema is likely 2/2 SVC syndrome); PD-L1 TPs 1%, pMMR, Her2 2+, FISH pending, Ki-67 30%  Ca-125 moderately elevated at 125; other tumor markers not significantly elevated  Additional diagnostic brooks includes:   * 4/19 MRCP showing pancreatic lesion (? IPMN, but pancreas was suboptimally evaluated)     * 4/24 and 4/26 EGD/EUS aborted as pt had large amount of food in the stomach that prevented passing of scope   * 4/30 UGIS aborted as pt felt too short of breath when laying down flat (improved after her pleurex was drained  Case d/w Dr. 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 Pt is a suboptimal candidate i/s/o her progressive debility and malnutrition (ECOG PS 3)   >> Discussed risks/benefits of proceeding with palliative chemo (inpt) vs best supportive care with pt's HCP Saeid on 5/10 and again on 5/13- he is amenable to trial of inpt chemotherapy and understands that hospice will be recommended if pt does not tolerate trial of inpt chemo; verbal and written chemo consent obtained on 5/13   * Pt s/p successful femoral vein central line placement by Surgery on 5/15 (to be removed after chemotherapy)   * Completing C1 palliative mFOLFOX6 (20% dose reduction), 5/16-18  Pt's long term prognosis remains guarded; she is dnr/dni    - Acute-on-chronic hypoxic resp failure  - Multifocal aspiration vs hosp-acquired pneumonia  Pt stable today on 6L NC; she is still dyspneic at rest on occasion  Serial chest imaging is stable  Continuing empiric Cefepime for 10d course, until 5/10  Dex taper completed on 5/14 (Bactrim pjp ppx dced on 5/15)  Continuing Symbicort 2 puffs BID  Continuing Duonebs q6/prn  Will continue routine pleurex drainage as below    - Dysphonia with R VC hypomobility  - Oral thrush  Appreciate ENT eval/recs: 5/4 Flex ellis pt with R VC hypomobility  Pt cleared for pureed diet s/p Cinesophagram  Completed 7d course of Fluconazole on 5/12    - SVC syndrome  Appreciate Rad Onc eval/recs  Completed 10fxs of palliative RT on 4/18     - Extensive b/l UE DVTs  - Acute RLE DVT a/w RLE ecchymosis  - Hypercoag state  Continuing therapeutic lovenox, benefits > risks   * Of note: pt has poor UE IV access and currently requires peripheral IV in her LE extremities for admin of medication; FV central line being placed today for IV chemo    - Pericardial effusion with tamp physiology s/p pericardial window  - R pleural effusion s/p pleurex  Likely inflammatory; both pleural and pericardial fluid cytology are negative for malignant cells  4/9 surveillance TTE with pEF and no WMAs  Continuing R pleurex drainage- up to 500cc q48h/prn    - pAfib  Pt currently SR, HR controlled  Likely precipitated by malignancy, pericardial effusion, SVC syndrome  Continuing Metoprolol 25mg q12 with holding parameters  Continuing Amio 200mg daily (monitoring for toxicities)  Continuing telemetry    - Anxiety/emotional lability  Continuing Diazepam 5mg BID/prn and nightly  Pt has poor health literacy and clinical insight which is negatively impacting her medical decision making- when ask if she wanted to defer medical decision making to her boyfriend or if she had a designated HCP she replied "he's already sick of me"  Appreciate  consult: pt lacks medical decision making capacity; medical decision making will be deferred to pt's surrogate decision maker- Saeid Peña ([phone])    - 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)  Pt with h/o R eye glaucoma, but miosis can also be associated with Pancoast syndrome i/s/o extensive R upper lung tumor  Pt denies visual deficits or other related symptoms   MRI Brain without contrast ordered for further eval- pt continues to refuse, can be completed as outpt  Will continue to monitor closely    - Anemia in neoplastic disease  Hgb remains stable  4/5 iron studies not c/w iron deficiency  VSS and pt without clinical s/s of active bleeding  4/17 hemolysis labs negative; 4/18 FOBT negative x2  Trending daily cbcs; continuing supportive transfusions prn (pt does not have h/o ACS, CAD, MI, goal hgb > 7; plts > 10K)    - Cancer related pain  Currently well controlled  Continuing Oxy ER 30mg q12 q8  Continuing Oxy IR 10mg q4/prn  Continuing Dilaudid IV prn for sev breakthrough pain  Continuing bowel regimen to prevent OIC (including Movantic)    - Severe prot-ghazala malnutrition: appreciate RD recs; continuing regular diet with protein shake supplement BID (SLP eval pending as above)

## 2024-05-17 NOTE — PROGRESS NOTE ADULT - SUBJECTIVE AND OBJECTIVE BOX
SOLID TUMOR ONCOLOGY HOSPITALIST PROGRESS NOTE    S: No acute events overnight.  Pt again expressed that she feels like she being forced to stay in bed which makes her very unhappy.  She denied chest pain, dyspnea, n/v/d.    CURRENT MEDICATIONS  MEDICATIONS  (STANDING):  albuterol/ipratropium for Nebulization 3 milliLiter(s) Nebulizer <User Schedule>  albuterol/ipratropium for Nebulization 3 milliLiter(s) Nebulizer every 6 hours  aMIOdarone    Tablet 200 milliGRAM(s) Oral daily  Biotene Dry Mouth Oral Rinse 15 milliLiter(s) Swish and Spit every 6 hours  budesonide 160 MICROgram(s)/formoterol 4.5 MICROgram(s) Inhaler 2 Puff(s) Inhalation two times a day  chlorhexidine 2% Cloths 1 Application(s) Topical daily  diazepam    Tablet 5 milliGRAM(s) Oral at bedtime  enoxaparin Injectable 50 milliGRAM(s) SubCutaneous every 12 hours  fluorouracil IVPB (eMAR) 1171 milliGRAM(s) IV Intermittent once  influenza   Vaccine 0.5 milliLiter(s) IntraMuscular once  metoclopramide Injectable 10 milliGRAM(s) IV Push every 8 hours  metoprolol tartrate 25 milliGRAM(s) Oral every 12 hours  multivitamin 1 Tablet(s) Oral daily  naloxegol 25 milliGRAM(s) Oral daily  nicotine -  14 mG/24Hr(s) Patch 1 Patch Transdermal every 24 hours  oxyCODONE  ER Tablet 30 milliGRAM(s) Oral <User Schedule>  pantoprazole    Tablet 40 milliGRAM(s) Oral two times a day  polyethylene glycol 3350 17 Gram(s) Oral every 12 hours  sodium chloride 0.65% Nasal 1 Spray(s) Both Nostrils two times a day  sodium chloride 3%  Inhalation 4 milliLiter(s) Inhalation every 12 hours  sodium zirconium cyclosilicate 10 Gram(s) Oral once  MEDICATIONS  (PRN):  aluminum hydroxide/magnesium hydroxide/simethicone Suspension 30 milliLiter(s) Oral every 6 hours PRN Dyspepsia  benzocaine/menthol Lozenge 1 Lozenge Oral two times a day PRN Sore Throat  Biotene Dry Mouth Oral Rinse 15 milliLiter(s) Swish and Spit two times a day PRN dry mouth  diazepam    Tablet 5 milliGRAM(s) Oral two times a day PRN anxiety  FIRST- Mouthwash  BLM 5 milliLiter(s) Swish and Spit four times a day PRN Mouth Care  ondansetron    Tablet 4 milliGRAM(s) Oral every 8 hours PRN Nausea and/or Vomiting  oxyCODONE    IR 10 milliGRAM(s) Oral every 4 hours PRN Moderate Pain (4 - 6)  sodium chloride 0.9% lock flush 10 milliLiter(s) IV Push every 1 hour PRN Pre/post blood products, medications, blood draw, and to maintain line patency    PHYSICAL EXAM  T(C): 36.3 (05-17-24 @ 04:39), Max: 37.2 (05-16-24 @ 19:00)  HR: 82 (05-17-24 @ 08:15) (66 - 92)  BP: 138/81 (05-17-24 @ 04:39) (133/73 - 144/85)  RR: 18 (05-17-24 @ 04:39) (17 - 18)  SpO2: 93% (05-17-24 @ 08:15) (72% - 94%)    05-16-24 @ 07:01  -  05-17-24 @ 07:00  --------------------------------------------------------  IN: 0 mL / OUT: 350 mL / NET: -350 mL  Cachectic, [id number] woman, sitting up in bed, appears comfortable  Pt still dysphonic, but able to write in comprehensive sentences to communicate  Mild R eyelid ptosis and R pupil miosis present/unchanged; R eye with scleral injection; no oral lesions/thrush  RRR, no m/r/g  Breaths mildly labored but pt is not tachypneic; trace inspiratory wheeze and transmitted upper airway sounds auscultated in all lung zone  Abd soft/nt/nd, normoactive bowel sounds  Trace LUE non-pitting edema present; large area of ecchymosis extending from the post R calf to post R thigh- improving; generalized muscular atrophy in all 4 extremities  CN 2-12 grossly intact; no gross focal neuro deficits; pt ambulates with walker        LABS                        7.6    19.07 )-----------( 392      ( 17 May 2024 05:59 )             25.8     05-17    135  |  104  |  34<H>  ----------------------------<  100<H>  5.9<H>   |  17<L>  |  0.89    Ca    9.6      17 May 2024 05:59  Phos  4.6     05-17  Mg     1.80     05-17    TPro  5.6<L>  /  Alb  2.4<L>  /  TBili  <0.2  /  DBili  x   /  AST  24  /  ALT  83<H>  /  AlkPhos  222<H>  05-17    PATHOLOGY  3/26 pleural fluid cytology: Neg for malignant cells.    3/26 Pericardium excision: Neg for malignancy.    3/20 Pericardial fluid cytology: Neg for malignant cells.    3/14 LYMPH NODE, SUPRACLAVICULAR, RIGHT, US GUIDED CORE BIOPSY AND FNA   POSITIVE FOR MALIGNANT CELLS.   Carcinoma   Touch Prep slides display crowded groups and single lying malignant   cells with enlarged nuclei containing prominent nucleoli and vacuolated   cytoplasm. Core biopsies show neoplastic cells positive for CK7 and CDX2   immunostains, while negative for CK20, TTF1, GATA3, TRPS1 and PAX8. 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 Suggest correlation with clinical information and imaging to   assist with next step management.   Moleculars: pMMR, PD-L1 TPS 1%; Her2 pending      TUMOR MARKERS  3/13 CEA 7.1  3/24 Ca 19-9 27  4/8 Ca-125 187, Ca 27.29 16.9, Ca 15-3 18.9, AFP 5.2      MICROBIOLOGY  5/1 Procal 0.44    Abx  $Bactrim ppx 4/8-5/15 (dced with completion of Dex taper)  $Cefepime 5/1-10  $Fluconazole 5/6-12  $Nystatin 5/4-6    Cx Data  4/5 MRSA swab: Not detected  3/16 Quant plus TB: Negative      PERTINENT RADIOLOGY  5/15 CXR: Redemonstrated small right-sided pleural effusion with underlying   atelectasis. Right-sided Pleurx catheter in stable position. No newfocal   consolidations.    5/13 CXR: Right-sided chest tube with effusion/atelectasis suspected.    5/10 CXR: Right effusion with suspected lower lobe atelectasis.    5/6 CXR: Right-sided Pleurx catheter with decreased effusion.    5/4 CXR: There is a catheter at the right base.  HEART: normal in size.  LUNGS: There is opacity at the right base compatible with   effusion/infiltrate.. Prominent interstitial markings, likely secondary   to chronic underlying lung disease..  BONES: degenerative changes    5/4 R elbow XR: Linear lucency in the coronoid process seen only on the lateral view   concerning for nondisplaced fracture. Consider cross-sectional imaging of   the elbow with CT or MRI for further evaluation.    5/3 CT c/a/p with IV contrast: Persistent right middle lobe consolidation and decreased right lower lobe consolidation. New patchy nodular opacities in the right upper lobe and increased patchy and groundglass opacities in the left lung. Small right pleural effusion, unchanged. Trace left pleural effusion, decreased. No evidence of metastatic disease in the abdomen or pelvis.    5/1 CXR: Small right effusion unchanged with Pleurx catheter.    4/19 MRCP: Motion degraded study, particularly on postcontrast sequences.  Pancreatic neck T2 hyperintense lesion measures 16 mm, image 28 series 5,   without clear evidence of enhancement on postcontrast phases. Most likely   this is a side branch IPMN. Remainder of the pancreas is suboptimally   evaluated due to artifact. Follow-up MRI abdomen or pancreas protocol CT   can be considered in 3-6 months for reevaluation. Partially distended stomach. Duodenal diverticulum. Colon is also partially distended with mild to moderate stool burden. Correlate   clinically. Trace pleural effusions. Right-sided pleural catheter is partially   imaged. Lung parenchyma is incompletely characterized on MRI. Soft tissue   of the mediastinum is not adequately imaged on this study.    4/18 B/L LE Doppler: Acute deep venous thrombosis: above the knee.  Acute nonocclusive deep venous thrombosis in the right common femoral vein.    417 CXR: Clear lungs with minimal effusion and Pleurx catheter.    4/10 VA dopplers B/L UE: Extensive acute, occlusive DVT RIJ, innominate and subclavian veins.  Acute non-occlusive DVT w/in R axillary vein.  Extensive acute, occlusive DVT noted in L subclavian. axillary, and proximal brachial veins.  Acute non-occlusive DVT LIJ.  Superficial vein thromboses are noted in the B/L cephalic veins.    4/9 Echocardiogram: Normal LV systolic function.  Normal mitral valve w/ normal leaflet excursion.  No pericardial effusion seen    4/4 CXR: Clear lungs w/ R pleurx catheter in place.    ok4/4 CXR AM: Small R pleural effusion w/ pleurx cath improved    [facility] Imaging ([facility])  3/29 CXR: Decrease in R pleural effusion.     3/28 B/L UE Dupplers: Acute DVT involving R IJ, innominate vein, subclavian, brachial, and L IJ.  Superficial venous thrombosis involving B/L cephalic veins.  + Edema of superficial soft tissue of UE R>L.    3/27 CT neck: Bulky and conglomerate LAD invading RIJ w/ thrombus extending up to hyoid level.  Thrombus is likely combination tumor and bland.  New LIJ nonocclusive thrombus.    3/27 CT Chest: New consolidations throughout the R lung worse RLL c/f aspiration PNA.  + R pleurx cath w/ decrease in R pleural effusion.  Extensive DVT w/in thoracic and lower neck, upper SVC remains obliterated.  Large R supraclavicular mass infiltrating into the mediastinum.      3/27 CXR: Progression of R consolidation/effusion.    3/21 TTE: LV grossly normal.  Thickened pericardium.  no pericardial effusion.    3/18 TTE: Mod pericardial effusion w/ e/o hemodynamic compromise w/ diasolic collapse of RA that exceeds 1/3 of cardiac cycle >30% resp variation across MV E. wave and early diastolic inversion of RV.    3/14 CT Chest: Conglomerate soft tissue in superior mediastinum and R supraclavicular region 2/2 LAD w/ internal hypodensity c/f necrosis.  Mass encases SVC and multiple great veins resulting in obliteration of the SVC and thrombosis of the RIJ.  Multiple R sided collateral vessels and R soft tissue edema.  B/L pulm nodules.    3/14 CT neck: Extensive cellulitis/myositis along R anterior lateral neck and R upper chest wall w/ inflammatory fat induration the deep soft tissue of neck.  Large supraclavicular/mediastinal mass lesion, presumably conglomerate of LN w/ mass effect and complete occlusion of RIJ w/ associated inflammation.  Complete occlusion of R subclavian vein and nearly occlusive thrombosis in the proximal L brachiocephalic vein.    3/13 RUQ Abd US: Cholelithiasis w/o e/o cholecystitis.  Dilated CBD w/o e/o intraductal stone or other obstruction. 1.4cm pancreatic cyst w/ mild duct dilatation.    3/12 CT abd/pelv: S/p R nephrectomy. no LAD.  New 1.6cm  pancreatic neck cystic lesion w/o main pancreatic ductal dilatation.    3/10 R breast US: No e/o breast abscess      RECENT ENDOSCOPY  5/4 Flex laryngoscopy    -- Nasopharynx had no mass or exudate.    -- Base of tongue was symmetric and not enlarged.    -- Vallecula was clear    -- Epiglottis, both aryepiglottic folds and both false vocal folds were normal    -- Arytenoids both without edema and erythema     -- True vocal folds were fully mobile and without lesions. possible R VC hypomobility, Incomplete glottic closure    -- Post cricoid area was clear.    -- Interarytenoid edema was absent    4/26 Upper EUS  - Normal esophagus.  - A large amount of food (residue) inthe stomach. Procedure was aborted.    4/24 Upper EUS   - Fluid in the middle third of the esophagus and in the lower third of the esophagus.  - A large amount of food (residue) in the stomach so EGD was aborted and EUS was not attempted.  - No specimens collected.

## 2024-05-18 LAB
ADD ON TEST-SPECIMEN IN LAB: SIGNIFICANT CHANGE UP
ALBUMIN SERPL ELPH-MCNC: 2.2 G/DL — LOW (ref 3.3–5)
ALP SERPL-CCNC: 196 U/L — HIGH (ref 40–120)
ALT FLD-CCNC: 69 U/L — HIGH (ref 4–33)
ANION GAP SERPL CALC-SCNC: 11 MMOL/L — SIGNIFICANT CHANGE UP (ref 7–14)
AST SERPL-CCNC: 13 U/L — SIGNIFICANT CHANGE UP (ref 4–32)
BASE EXCESS BLDA CALC-SCNC: -2.2 MMOL/L — LOW (ref -2–3)
BASE EXCESS BLDV CALC-SCNC: -2.5 MMOL/L — LOW (ref -2–3)
BASOPHILS # BLD AUTO: 0.02 K/UL — SIGNIFICANT CHANGE UP (ref 0–0.2)
BASOPHILS # BLD AUTO: 0.03 K/UL — SIGNIFICANT CHANGE UP (ref 0–0.2)
BASOPHILS NFR BLD AUTO: 0.1 % — SIGNIFICANT CHANGE UP (ref 0–2)
BASOPHILS NFR BLD AUTO: 0.2 % — SIGNIFICANT CHANGE UP (ref 0–2)
BILIRUB SERPL-MCNC: <0.2 MG/DL — SIGNIFICANT CHANGE UP (ref 0.2–1.2)
BLD GP AB SCN SERPL QL: POSITIVE — SIGNIFICANT CHANGE UP
BLOOD GAS ARTERIAL COMPREHENSIVE RESULT: SIGNIFICANT CHANGE UP
BLOOD GAS VENOUS COMPREHENSIVE RESULT: SIGNIFICANT CHANGE UP
BUN SERPL-MCNC: 37 MG/DL — HIGH (ref 7–23)
CALCIUM SERPL-MCNC: 9.8 MG/DL — SIGNIFICANT CHANGE UP (ref 8.4–10.5)
CHLORIDE BLDV-SCNC: 107 MMOL/L — SIGNIFICANT CHANGE UP (ref 96–108)
CHLORIDE SERPL-SCNC: 105 MMOL/L — SIGNIFICANT CHANGE UP (ref 98–107)
CO2 BLDA-SCNC: 27 MMOL/L — HIGH (ref 19–24)
CO2 BLDV-SCNC: 27.7 MMOL/L — HIGH (ref 22–26)
CO2 SERPL-SCNC: 22 MMOL/L — SIGNIFICANT CHANGE UP (ref 22–31)
CREAT SERPL-MCNC: 0.96 MG/DL — SIGNIFICANT CHANGE UP (ref 0.5–1.3)
EGFR: 68 ML/MIN/1.73M2 — SIGNIFICANT CHANGE UP
EOSINOPHIL # BLD AUTO: 0.01 K/UL — SIGNIFICANT CHANGE UP (ref 0–0.5)
EOSINOPHIL # BLD AUTO: 0.01 K/UL — SIGNIFICANT CHANGE UP (ref 0–0.5)
EOSINOPHIL NFR BLD AUTO: 0.1 % — SIGNIFICANT CHANGE UP (ref 0–6)
EOSINOPHIL NFR BLD AUTO: 0.1 % — SIGNIFICANT CHANGE UP (ref 0–6)
GAS PNL BLDV: 133 MMOL/L — LOW (ref 136–145)
GLUCOSE BLDV-MCNC: 74 MG/DL — SIGNIFICANT CHANGE UP (ref 70–99)
GLUCOSE SERPL-MCNC: 84 MG/DL — SIGNIFICANT CHANGE UP (ref 70–99)
HCO3 BLDA-SCNC: 25 MMOL/L — SIGNIFICANT CHANGE UP (ref 21–28)
HCO3 BLDV-SCNC: 26 MMOL/L — SIGNIFICANT CHANGE UP (ref 22–29)
HCT VFR BLD CALC: 22.4 % — LOW (ref 34.5–45)
HCT VFR BLD CALC: 25.7 % — LOW (ref 34.5–45)
HCT VFR BLDA CALC: 20 % — CRITICAL LOW (ref 34.5–46.5)
HGB BLD CALC-MCNC: 6.6 G/DL — CRITICAL LOW (ref 11.7–16.1)
HGB BLD-MCNC: 6.6 G/DL — CRITICAL LOW (ref 11.5–15.5)
HGB BLD-MCNC: 7.9 G/DL — LOW (ref 11.5–15.5)
HOROWITZ INDEX BLDA+IHG-RTO: 60 — SIGNIFICANT CHANGE UP
IANC: 13.44 K/UL — HIGH (ref 1.8–7.4)
IANC: 14.13 K/UL — HIGH (ref 1.8–7.4)
IMM GRANULOCYTES NFR BLD AUTO: 2.8 % — HIGH (ref 0–0.9)
IMM GRANULOCYTES NFR BLD AUTO: 3.5 % — HIGH (ref 0–0.9)
LACTATE BLDV-MCNC: 1.9 MMOL/L — SIGNIFICANT CHANGE UP (ref 0.5–2)
LYMPHOCYTES # BLD AUTO: 0.28 K/UL — LOW (ref 1–3.3)
LYMPHOCYTES # BLD AUTO: 0.33 K/UL — LOW (ref 1–3.3)
LYMPHOCYTES # BLD AUTO: 1.8 % — LOW (ref 13–44)
LYMPHOCYTES # BLD AUTO: 2.2 % — LOW (ref 13–44)
MAGNESIUM SERPL-MCNC: 1.6 MG/DL — SIGNIFICANT CHANGE UP (ref 1.6–2.6)
MCHC RBC-ENTMCNC: 28.7 PG — SIGNIFICANT CHANGE UP (ref 27–34)
MCHC RBC-ENTMCNC: 29.3 PG — SIGNIFICANT CHANGE UP (ref 27–34)
MCHC RBC-ENTMCNC: 29.5 GM/DL — LOW (ref 32–36)
MCHC RBC-ENTMCNC: 30.7 GM/DL — LOW (ref 32–36)
MCV RBC AUTO: 95.2 FL — SIGNIFICANT CHANGE UP (ref 80–100)
MCV RBC AUTO: 97.4 FL — SIGNIFICANT CHANGE UP (ref 80–100)
MONOCYTES # BLD AUTO: 0.59 K/UL — SIGNIFICANT CHANGE UP (ref 0–0.9)
MONOCYTES # BLD AUTO: 1 K/UL — HIGH (ref 0–0.9)
MONOCYTES NFR BLD AUTO: 3.8 % — SIGNIFICANT CHANGE UP (ref 2–14)
MONOCYTES NFR BLD AUTO: 6.5 % — SIGNIFICANT CHANGE UP (ref 2–14)
NEUTROPHILS # BLD AUTO: 13.44 K/UL — HIGH (ref 1.8–7.4)
NEUTROPHILS # BLD AUTO: 14.13 K/UL — HIGH (ref 1.8–7.4)
NEUTROPHILS NFR BLD AUTO: 87.5 % — HIGH (ref 43–77)
NEUTROPHILS NFR BLD AUTO: 91.4 % — HIGH (ref 43–77)
NRBC # BLD: 0 /100 WBCS — SIGNIFICANT CHANGE UP (ref 0–0)
NRBC # BLD: 0 /100 WBCS — SIGNIFICANT CHANGE UP (ref 0–0)
NRBC # FLD: 0 K/UL — SIGNIFICANT CHANGE UP (ref 0–0)
NRBC # FLD: 0 K/UL — SIGNIFICANT CHANGE UP (ref 0–0)
PCO2 BLDA: 54 MMHG — HIGH (ref 32–45)
PCO2 BLDV: 67 MMHG — HIGH (ref 39–52)
PH BLDA: 7.28 — LOW (ref 7.35–7.45)
PH BLDV: 7.19 — LOW (ref 7.32–7.43)
PHOSPHATE SERPL-MCNC: 4.2 MG/DL — SIGNIFICANT CHANGE UP (ref 2.5–4.5)
PLATELET # BLD AUTO: 359 K/UL — SIGNIFICANT CHANGE UP (ref 150–400)
PLATELET # BLD AUTO: 385 K/UL — SIGNIFICANT CHANGE UP (ref 150–400)
PO2 BLDA: 63 MMHG — LOW (ref 83–108)
PO2 BLDV: 38 MMHG — SIGNIFICANT CHANGE UP (ref 25–45)
POTASSIUM BLDV-SCNC: 5.6 MMOL/L — HIGH (ref 3.5–5.1)
POTASSIUM SERPL-MCNC: 5.3 MMOL/L — SIGNIFICANT CHANGE UP (ref 3.5–5.3)
POTASSIUM SERPL-SCNC: 5.3 MMOL/L — SIGNIFICANT CHANGE UP (ref 3.5–5.3)
PROCALCITONIN SERPL-MCNC: 0.55 NG/ML — HIGH (ref 0.02–0.1)
PROT SERPL-MCNC: 5.3 G/DL — LOW (ref 6–8.3)
RBC # BLD: 2.3 M/UL — LOW (ref 3.8–5.2)
RBC # BLD: 2.7 M/UL — LOW (ref 3.8–5.2)
RBC # FLD: 16.2 % — HIGH (ref 10.3–14.5)
RBC # FLD: 16.5 % — HIGH (ref 10.3–14.5)
RH IG SCN BLD-IMP: POSITIVE — SIGNIFICANT CHANGE UP
SAO2 % BLDA: 94.3 % — SIGNIFICANT CHANGE UP (ref 94–98)
SAO2 % BLDV: 62.2 % — LOW (ref 67–88)
SODIUM SERPL-SCNC: 138 MMOL/L — SIGNIFICANT CHANGE UP (ref 135–145)
WBC # BLD: 15.34 K/UL — HIGH (ref 3.8–10.5)
WBC # BLD: 15.46 K/UL — HIGH (ref 3.8–10.5)
WBC # FLD AUTO: 15.34 K/UL — HIGH (ref 3.8–10.5)
WBC # FLD AUTO: 15.46 K/UL — HIGH (ref 3.8–10.5)

## 2024-05-18 PROCEDURE — 71045 X-RAY EXAM CHEST 1 VIEW: CPT | Mod: 26

## 2024-05-18 PROCEDURE — 99233 SBSQ HOSP IP/OBS HIGH 50: CPT

## 2024-05-18 PROCEDURE — 86077 PHYS BLOOD BANK SERV XMATCH: CPT

## 2024-05-18 RX ORDER — SODIUM ZIRCONIUM CYCLOSILICATE 10 G/10G
10 POWDER, FOR SUSPENSION ORAL ONCE
Refills: 0 | Status: COMPLETED | OUTPATIENT
Start: 2024-05-18 | End: 2024-05-18

## 2024-05-18 RX ORDER — LORATADINE 10 MG/1
10 TABLET ORAL ONCE
Refills: 0 | Status: COMPLETED | OUTPATIENT
Start: 2024-05-18 | End: 2024-05-18

## 2024-05-18 RX ORDER — ENOXAPARIN SODIUM 100 MG/ML
30 INJECTION SUBCUTANEOUS EVERY 12 HOURS
Refills: 0 | Status: DISCONTINUED | OUTPATIENT
Start: 2024-05-18 | End: 2024-05-31

## 2024-05-18 RX ORDER — ACETAMINOPHEN 500 MG
650 TABLET ORAL ONCE
Refills: 0 | Status: COMPLETED | OUTPATIENT
Start: 2024-05-18 | End: 2024-05-18

## 2024-05-18 RX ORDER — FUROSEMIDE 40 MG
10 TABLET ORAL ONCE
Refills: 0 | Status: COMPLETED | OUTPATIENT
Start: 2024-05-18 | End: 2024-05-18

## 2024-05-18 RX ADMIN — Medication 15 MILLILITER(S): at 19:08

## 2024-05-18 RX ADMIN — Medication 25 MILLIGRAM(S): at 06:34

## 2024-05-18 RX ADMIN — Medication 1 PATCH: at 19:57

## 2024-05-18 RX ADMIN — Medication 5 MILLIGRAM(S): at 00:13

## 2024-05-18 RX ADMIN — BUDESONIDE AND FORMOTEROL FUMARATE DIHYDRATE 2 PUFF(S): 160; 4.5 AEROSOL RESPIRATORY (INHALATION) at 21:58

## 2024-05-18 RX ADMIN — Medication 10 MILLIGRAM(S): at 10:54

## 2024-05-18 RX ADMIN — Medication 3 MILLILITER(S): at 03:47

## 2024-05-18 RX ADMIN — SODIUM CHLORIDE 4 MILLILITER(S): 9 INJECTION INTRAMUSCULAR; INTRAVENOUS; SUBCUTANEOUS at 20:44

## 2024-05-18 RX ADMIN — CHLORHEXIDINE GLUCONATE 1 APPLICATION(S): 213 SOLUTION TOPICAL at 14:15

## 2024-05-18 RX ADMIN — PANTOPRAZOLE SODIUM 40 MILLIGRAM(S): 20 TABLET, DELAYED RELEASE ORAL at 06:34

## 2024-05-18 RX ADMIN — Medication 1 APPLICATION(S): at 19:05

## 2024-05-18 RX ADMIN — POLYETHYLENE GLYCOL 3350 17 GRAM(S): 17 POWDER, FOR SOLUTION ORAL at 19:07

## 2024-05-18 RX ADMIN — PANTOPRAZOLE SODIUM 40 MILLIGRAM(S): 20 TABLET, DELAYED RELEASE ORAL at 19:07

## 2024-05-18 RX ADMIN — Medication 3 MILLILITER(S): at 06:58

## 2024-05-18 RX ADMIN — Medication 1 SPRAY(S): at 19:06

## 2024-05-18 RX ADMIN — Medication 3 MILLILITER(S): at 14:05

## 2024-05-18 RX ADMIN — Medication 25 MILLIGRAM(S): at 19:07

## 2024-05-18 RX ADMIN — Medication 3 MILLILITER(S): at 20:44

## 2024-05-18 RX ADMIN — Medication 1 SPRAY(S): at 06:35

## 2024-05-18 RX ADMIN — Medication 1 APPLICATION(S): at 06:35

## 2024-05-18 RX ADMIN — POLYETHYLENE GLYCOL 3350 17 GRAM(S): 17 POWDER, FOR SOLUTION ORAL at 06:34

## 2024-05-18 RX ADMIN — Medication 1 PATCH: at 07:35

## 2024-05-18 RX ADMIN — ENOXAPARIN SODIUM 30 MILLIGRAM(S): 100 INJECTION SUBCUTANEOUS at 19:56

## 2024-05-18 RX ADMIN — Medication 1 PATCH: at 05:06

## 2024-05-18 RX ADMIN — SODIUM CHLORIDE 4 MILLILITER(S): 9 INJECTION INTRAMUSCULAR; INTRAVENOUS; SUBCUTANEOUS at 06:58

## 2024-05-18 RX ADMIN — Medication 1 PATCH: at 14:13

## 2024-05-18 RX ADMIN — SODIUM ZIRCONIUM CYCLOSILICATE 10 GRAM(S): 10 POWDER, FOR SUSPENSION ORAL at 19:05

## 2024-05-18 RX ADMIN — OXYCODONE HYDROCHLORIDE 30 MILLIGRAM(S): 5 TABLET ORAL at 23:46

## 2024-05-18 RX ADMIN — Medication 15 MILLILITER(S): at 06:35

## 2024-05-18 RX ADMIN — AMIODARONE HYDROCHLORIDE 200 MILLIGRAM(S): 400 TABLET ORAL at 06:35

## 2024-05-18 NOTE — PROGRESS NOTE ADULT - ASSESSMENT
59 yo woman, former smoker, with h/o R eye glaucoma, Fibromyalgia, Anxiety, GERD, and RCC s/p R total nephrectomy/hysterectomy; she was initially admitted to [facility] on 3/11 w/ R breast swelling c/f mastitis- imaging brooks noted supraclavicular/mediastinal mass lesion which encased the SVC and multiple other veins resulting in obliteration of the SVC and thrombosis of the R IJ, and a pancreatic neck cystic lesion.  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  Her disease/hospital course has also been c/b pericardial effusion s/p pericardial window w/ neg cytology, R pleural effusion, also negative cytology) s/p Pleurx, Afib w/ RVR and acute hypoxic resp failure 2/2 SVC syndrome- not dennis to IR-guided stenting; pt was ultimately started on Dex and transferred to [facility] on 4/4 for urgent palliative RT which she completed on 4/18. Her hospital course has also been c/b pericardial effusion with tamponade s/p window and non malignant R pleural effusion s/p pleurex, acute b/l UE and RLE dvts, anxiety, and more recently, recurrent hypoxia.    ACTIVE PROBLEMS  Metastatic CUP (carcinoma of unknown primary)  Goals of care discussion, counseling  Encounter for antineoplastic chemotherapy  Acute hypoxic + hypercapneic respiratory failure  Hyperkalemia  Dysphonia with R VC hypomobility   Oral thrush  SVC syndrome  Extensive b/l UE DVTs  Acute RLE DVT a/w RLE ecchymosis  Hypercoag state  Pericardial effusion with tamp physiology s/p pericardial window  R pleural effusion s/p pleurex  pAfib, with RVR on this admission  Anxiety/emotional lability  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)  Cancer-related pain, anxiety  Severe prot-gahzala malnutrition    - Metastatic CUP (carcinoma of unknown primary)  - Goals of care discussion, counseling  - Encounter for antineoplastic chemotherapy  Based on 3/14 SC LN path, ddx includes primary UGI vs pancreaticobiliary primary (breast edema is likely 2/2 SVC syndrome); PD-L1 TPs 1%, pMMR, Her2 2+, FISH pending, Ki-67 30%  Ca-125 moderately elevated at 125; other tumor markers not significantly elevated  Additional diagnostic brooks includes:   * 4/19 MRCP showing pancreatic lesion (? IPMN, but pancreas was suboptimally evaluated)     * 4/24 and 4/26 EGD/EUS aborted as pt had large amount of food in the stomach that prevented passing of scope   * 4/30 UGIS aborted as pt felt too short of breath when laying down flat (improved after her pleurex was drained  Case d/w Dr. 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 Pt is a suboptimal candidate i/s/o her progressive debility and malnutrition (ECOG PS 3)   >> Discussed risks/benefits of proceeding with palliative chemo (inpt) vs best supportive care with pt's HCP Saeid on 5/10 and again on 5/13- he is amenable to trial of inpt chemotherapy and understands that hospice will be recommended if pt does not tolerate trial of inpt chemo; verbal and written chemo consent obtained on 5/13   * Pt s/p successful femoral vein central line placement by Surgery on 5/15 (to be removed after chemotherapy)   * Completing C1 palliative mFOLFOX6 (20% dose reduction)- started 5/16, held on 5/18 due to worsening resp status  Pt's long term prognosis remains guarded; she is dnr/dni    - Acute hypercapneic + hypoxic resp failure  Pt found to have new resp acidosis on 5/18 o/n ABG ordered for lethargy; subsequently started on bipap to which she is more awake, alert, and appears comfortable  Etiology of resp acidosis is unclear, but likely cancer-related vs ? volume overload i/s/o chemo infusion  Serial CXRs without acute changes (decreasing R pleural effusion)  Repeat ABG ordered for 1pm today- if improving, will transition off bipap back to HF NC O2 and obtain NC CT chest, repeat TTE also ordered today  Sputum culture and fungal studies ordered   * Of note, pt completed 10d course of empiric Cefepime on 5/10; trending WBCs/ procal; monitoring off abx for now unless pt clinically decompensates again  Dex taper completed on 5/14 (Bactrim pjp ppx dced on 5/15)  Continuing Symbicort 2 puffs BID  Continuing Duonebs q6/prn  Will continue routine pleurex drainage as below    - Dysphonia with R VC hypomobility  - Oral thrush  Appreciate ENT eval/recs: 5/4 Flex ellis pt with R VC hypomobility  Pt previously cleared for pureed diet s/p Cinesophagram, but she is currently NPO while on bipap (can resume pureed diet when pt is off bipap)  Completed 7d course of Fluconazole on 5/12    - SVC syndrome  Appreciate Rad Onc eval/recs  Completed 10fxs of palliative RT on 4/18     - Extensive b/l UE DVTs  - Acute RLE DVT a/w RLE ecchymosis  - Hypercoag state  Continuing therapeutic lovenox, benefits > risks   * Of note: pt has poor UE IV access and currently requires peripheral IV in her LE extremities for admin of medication; FV central line being placed today for IV chemo    - Pericardial effusion with tamp physiology s/p pericardial window  - R pleural effusion s/p pleurex  Likely inflammatory; both pleural and pericardial fluid cytology are negative for malignant cells  4/9 surveillance TTE with pEF and no WMAs  Repeat TTE ordered today for brooks of new hypercapnea  Continuing R pleurex drainage- up to 500cc q48h/prn    - pAfib  Pt currently SR, HR controlled  Likely precipitated by malignancy, pericardial effusion, SVC syndrome  Continuing Metoprolol 25mg q12 with holding parameters  Continuing Amio 200mg daily (monitoring for toxicities)  Continuing telemetry    - Anxiety/emotional lability  Continuing Diazepam 5mg BID/prn and nightly  Pt has poor health literacy and clinical insight which is negatively impacting her medical decision making- when ask if she wanted to defer medical decision making to her boyfriend or if she had a designated HCP she replied "he's already sick of me"  Appreciate  consult: pt lacks medical decision making capacity; medical decision making will be deferred to pt's surrogate decision maker- Saeid Peña ([phone])    - 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)  Pt with h/o R eye glaucoma with surgery, but miosis can also be associated with Pancoast syndrome i/s/o extensive R upper lung tumor  Pt denies visual deficits or other related symptoms   MRI Brain without contrast ordered for further eval- pt has previously refused  Will continue to monitor closely    - Anemia in neoplastic disease  Hgb remains stable, 7.9 today  4/5 iron studies not c/w iron deficiency  VSS and pt without clinical s/s of active bleeding  4/17 hemolysis labs negative; 4/18 FOBT negative x2  Trending daily cbcs; continuing supportive transfusions prn (pt does not have h/o ACS, CAD, MI, goal hgb > 7; plts > 10K)    - Cancer related pain  Currently well controlled  Continuing Oxy ER 30mg q12 q8  Continuing Oxy IR 10mg q4/prn  Continuing Dilaudid IV prn for sev breakthrough pain  Continuing bowel regimen to prevent OIC (including Movantic)    - Severe prot-ghazala malnutrition: appreciate RD recs; continuing regular diet with protein shake supplement BID (SLP eval pending as above) 57 yo woman, former smoker, with h/o R eye glaucoma, Fibromyalgia, Anxiety, GERD, and RCC s/p R total nephrectomy/hysterectomy; she was initially admitted to [facility] on 3/11 w/ R breast swelling c/f mastitis- imaging brooks noted supraclavicular/mediastinal mass lesion which encased the SVC and multiple other veins resulting in obliteration of the SVC and thrombosis of the R IJ, and a pancreatic neck cystic lesion.  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  Her disease/hospital course has also been c/b pericardial effusion s/p pericardial window w/ neg cytology, R pleural effusion, also negative cytology) s/p Pleurx, Afib w/ RVR and acute hypoxic resp failure 2/2 SVC syndrome- not dennis to IR-guided stenting; pt was ultimately started on Dex and transferred to [facility] on 4/4 for urgent palliative RT which she completed on 4/18. Her hospital course has also been c/b pericardial effusion with tamponade s/p window and non malignant R pleural effusion s/p pleurex, acute b/l UE and RLE dvts, anxiety, and more recently, recurrent hypoxia.    ACTIVE PROBLEMS  Metastatic CUP (carcinoma of unknown primary)  Goals of care discussion, counseling  Encounter for antineoplastic chemotherapy  Acute hypoxic + hypercapneic respiratory failure  Hyperkalemia  Dysphonia with R VC hypomobility   Oral thrush  SVC syndrome  Extensive b/l UE DVTs  Acute RLE DVT a/w RLE ecchymosis  Hypercoag state  Pericardial effusion with tamp physiology s/p pericardial window  R pleural effusion s/p pleurex  pAfib, with RVR on this admission  Anxiety/emotional lability  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)  Cancer-related pain, anxiety  Severe prot-ghazala malnutrition    - Metastatic CUP (carcinoma of unknown primary)  - Goals of care discussion, counseling  - Encounter for antineoplastic chemotherapy  Based on 3/14 SC LN path, ddx includes primary UGI vs pancreaticobiliary primary (breast edema is likely 2/2 SVC syndrome); PD-L1 TPs 1%, pMMR, Her2 2+, FISH pending, Ki-67 30%  Ca-125 moderately elevated at 125; other tumor markers not significantly elevated  Additional diagnostic brooks includes:   * 4/19 MRCP showing pancreatic lesion (? IPMN, but pancreas was suboptimally evaluated)     * 4/24 and 4/26 EGD/EUS aborted as pt had large amount of food in the stomach that prevented passing of scope   * 4/30 UGIS aborted as pt felt too short of breath when laying down flat (improved after her pleurex was drained  Case d/w Dr. 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 Pt is a suboptimal candidate i/s/o her progressive debility and malnutrition (ECOG PS 3)   >> Discussed risks/benefits of proceeding with palliative chemo (inpt) vs best supportive care with pt's HCP Saeid on 5/10 and again on 5/13- he is amenable to trial of inpt chemotherapy and understands that hospice will be recommended if pt does not tolerate trial of inpt chemo; verbal and written chemo consent obtained on 5/13   * Pt s/p successful femoral vein central line placement by Surgery on 5/15 (to be removed after chemotherapy)   * Completing C1 palliative mFOLFOX6 (20% dose reduction)- started 5/16, held on 5/18 due to worsening resp status  Pt's long term prognosis remains guarded; she is dnr/dni    - Acute hypercapneic + hypoxic resp failure  Pt found to have new resp acidosis on 5/18 o/n ABG ordered for lethargy; subsequently started on bipap to which she is more awake, alert, and appears comfortable  Etiology of resp acidosis is unclear, but likely cancer-related vs ? volume overload i/s/o chemo infusion  Serial CXRs without acute changes (decreasing R pleural effusion)  Repeat ABG ordered for 1pm today- if improving, will transition off bipap back to HF NC O2 and obtain NC CT chest, repeat TTE also ordered today  Sputum culture and fungal studies ordered  Pt dosed Lasix IV 10mg x1 dosed this am   * Of note, pt completed 10d course of empiric Cefepime on 5/10; trending WBCs/ procal; monitoring off abx for now unless pt clinically decompensates again  Dex taper completed on 5/14 (Bactrim pjp ppx dced on 5/15)  Continuing Symbicort 2 puffs BID  Continuing Duonebs q6/prn  Will continue routine pleurex drainage as below    - Dysphonia with R VC hypomobility  - Oral thrush  Appreciate ENT eval/recs: 5/4 Flex ellis pt with R VC hypomobility  Pt previously cleared for pureed diet s/p Cinesophagram, but she is currently NPO while on bipap (can resume pureed diet when pt is off bipap)  Completed 7d course of Fluconazole on 5/12    - SVC syndrome  Appreciate Rad Onc eval/recs  Completed 10fxs of palliative RT on 4/18     - Extensive b/l UE DVTs  - Acute RLE DVT a/w RLE ecchymosis  - Hypercoag state  Continuing therapeutic lovenox, benefits > risks   * Of note: pt has poor UE IV access and currently requires peripheral IV in her LE extremities for admin of medication; FV central line being placed today for IV chemo    - Pericardial effusion with tamp physiology s/p pericardial window  - R pleural effusion s/p pleurex  Likely inflammatory; both pleural and pericardial fluid cytology are negative for malignant cells  4/9 surveillance TTE with pEF and no WMAs  Repeat TTE ordered today for brooks of new hypercapnea  Continuing R pleurex drainage- up to 500cc q48h/prn    - pAfib  Pt currently SR, HR controlled  Likely precipitated by malignancy, pericardial effusion, SVC syndrome  Continuing Metoprolol 25mg q12 with holding parameters  Continuing Amio 200mg daily (monitoring for toxicities)  Continuing telemetry    - Anxiety/emotional lability  Continuing Diazepam 5mg BID/prn and nightly  Pt has poor health literacy and clinical insight which is negatively impacting her medical decision making- when ask if she wanted to defer medical decision making to her boyfriend or if she had a designated HCP she replied "he's already sick of me"  Appreciate  consult: pt lacks medical decision making capacity; medical decision making will be deferred to pt's surrogate decision maker- Saeid Peña ([phone])    - 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)  Pt with h/o R eye glaucoma with surgery, but miosis can also be associated with Pancoast syndrome i/s/o extensive R upper lung tumor  Pt denies visual deficits or other related symptoms   MRI Brain without contrast ordered for further eval- pt has previously refused  Will continue to monitor closely    - Anemia in neoplastic disease  Hgb remains stable, 7.9 today  4/5 iron studies not c/w iron deficiency  VSS and pt without clinical s/s of active bleeding  4/17 hemolysis labs negative; 4/18 FOBT negative x2  Trending daily cbcs; continuing supportive transfusions prn (pt does not have h/o ACS, CAD, MI, goal hgb > 7; plts > 10K)    - Cancer related pain  Currently well controlled  Continuing Oxy ER 30mg q12 q8  Continuing Oxy IR 10mg q4/prn  Continuing Dilaudid IV prn for sev breakthrough pain  Continuing bowel regimen to prevent OIC (including Movantic)    - Severe prot-ghazala malnutrition: appreciate RD recs; continuing regular diet with protein shake supplement BID (SLP eval pending as above)

## 2024-05-18 NOTE — PROVIDER CONTACT NOTE (OTHER) - BACKGROUND
hold blood transfusion, pt NPO, hold PO meds, and give lasix via central line as ordered by PAT Segura pt admit dx of primary malignant neoplasm. PMHx of kidney CA, herniated cervical disc, glaucoma....

## 2024-05-18 NOTE — CHART NOTE - NSCHARTNOTEFT_GEN_A_CORE
Repeat ABG reviewed. Respiratory acidosis resolved with bipap. Transitioning pt back to HFNC. CT Chest ordered for further eval.     --Keesha Segura PA-C

## 2024-05-18 NOTE — CHART NOTE - NSCHARTNOTEFT_GEN_A_CORE
Overnight Medicine ACP Coverage      Jaya Bui PA-C  Department of Medicine  [facility]  [id number] Overnight Medicine ACP Coverage    Labs reviewed  VBG shows  pH 7.19  pCO2 67    ABG shows  pH 7.28  pCO2 54    Patient appears more tired that previous. However is easily arousal and interacting appropriately (patient A&Ox3) with writing as form of communication. Based on lab results conversed with patient about the risk of hypercapnea and possible need for NIV (BiPAP). Previous MOLST shows no trial of NIV. After lengthy discussion patient amendable to BiPAP trial if clinically indicated. Conversation witnessed by ACP colleague. Discussed with Night [facility] (Dr. Alexander) who agrees with trial of BiPAP if inline with patient wishes.     Will relay events today team. Dr. Gamboa made aware    Jaya Bui PA-C  Department of Medicine  [facility]  [id number] Overnight Medicine ACP Coverage    Labs reviewed  VBG shows  pH 7.19  pCO2 67    ABG shows  pH 7.28  pCO2 54    Patient appears more tired that previous. However is easily arousal and interacting appropriately (patient A&Ox3) with writing as form of communication. Based on lab results conversed with patient about the risk of hypercapnea and possible need for NIV (BiPAP). Previous MOLST shows no trial of NIV. After lengthy discussion patient amendable to BiPAP trial if clinically indicated. Conversation witnessed by ACP colleague. Discussed with Night [facility] (Dr. Alexander) who agrees with trial of BiPAP if inline with patient wishes.     MICU c/s but given patient status can routinely c/s Pulm in the morning. Endorsed events to day team. Dr. Gamboa made aware.     Jaya Bui PA-C  Department of Medicine  [facility]  [id number]

## 2024-05-18 NOTE — PROVIDER CONTACT NOTE (OTHER) - REASON
hold blood transfusion, pt NPO, hold PO meds, and give lasix via central line as ordered by PAT Segura

## 2024-05-18 NOTE — PROGRESS NOTE ADULT - SUBJECTIVE AND OBJECTIVE BOX
SOLID TUMOR ONCOLOGY HOSPITALIST PROGRESS NOTE    S: Overnight events documented by RYAN Bui have been reviewed.  Pt reports feeling better while being on bipap.  She confirmed that her pleurex was drained yesterday evening  She denied dyspnea, chest pain.    CURRENT MEDICATIONS  MEDICATIONS  (STANDING):  albuterol/ipratropium for Nebulization 3 milliLiter(s) Nebulizer every 6 hours  albuterol/ipratropium for Nebulization 3 milliLiter(s) Nebulizer <User Schedule>  aMIOdarone    Tablet 200 milliGRAM(s) Oral daily  Biotene Dry Mouth Oral Rinse 15 milliLiter(s) Swish and Spit every 6 hours  budesonide 160 MICROgram(s)/formoterol 4.5 MICROgram(s) Inhaler 2 Puff(s) Inhalation two times a day  chlorhexidine 2% Cloths 1 Application(s) Topical daily  diazepam    Tablet 5 milliGRAM(s) Oral at bedtime  influenza   Vaccine 0.5 milliLiter(s) IntraMuscular once  metoclopramide Injectable 10 milliGRAM(s) IV Push every 8 hours  metoprolol tartrate 25 milliGRAM(s) Oral every 12 hours  multivitamin 1 Tablet(s) Oral daily  naloxegol 25 milliGRAM(s) Oral daily  nicotine -  14 mG/24Hr(s) Patch 1 Patch Transdermal every 24 hours  oxyCODONE  ER Tablet 30 milliGRAM(s) Oral <User Schedule>  pantoprazole    Tablet 40 milliGRAM(s) Oral two times a day  petrolatum white Ointment 1 Application(s) Topical two times a day  polyethylene glycol 3350 17 Gram(s) Oral every 12 hours  sodium chloride 0.65% Nasal 1 Spray(s) Both Nostrils two times a day  sodium chloride 3%  Inhalation 4 milliLiter(s) Inhalation every 12 hours  MEDICATIONS  (PRN):  aluminum hydroxide/magnesium hydroxide/simethicone Suspension 30 milliLiter(s) Oral every 6 hours PRN Dyspepsia  benzocaine/menthol Lozenge 1 Lozenge Oral two times a day PRN Sore Throat  Biotene Dry Mouth Oral Rinse 15 milliLiter(s) Swish and Spit two times a day PRN dry mouth  diazepam    Tablet 5 milliGRAM(s) Oral two times a day PRN anxiety  FIRST- Mouthwash  BLM 5 milliLiter(s) Swish and Spit four times a day PRN Mouth Care  ondansetron    Tablet 4 milliGRAM(s) Oral every 8 hours PRN Nausea and/or Vomiting  oxyCODONE    IR 10 milliGRAM(s) Oral every 4 hours PRN Moderate Pain (4 - 6)  sodium chloride 0.9% lock flush 10 milliLiter(s) IV Push every 1 hour PRN Pre/post blood products, medications, blood draw, and to maintain line patency      PHYSICAL EXAM  T(C): 36.8 (05-18-24 @ 10:59), Max: 36.8 (05-18-24 @ 10:59)  HR: 96 (05-18-24 @ 12:21) (77 - 97)  BP: 119/59 (05-18-24 @ 10:59) (119/59 - 153/80)  RR: 18 (05-18-24 @ 10:59) (17 - 20)  SpO2: 100% (05-18-24 @ 12:21) (93% - 100%)    05-17-24 @ 07:01  -  05-18-24 @ 07:00  --------------------------------------------------------  IN: 0 mL / OUT: 250 mL / NET: -250 mL  Cachectic, [id number] woman, sleeping but easily arousable, tolerating bipap mask  Pt still dysphonic, but able to write in comprehensive sentences to communicate  Mild R eyelid ptosis and R pupil miosis present/unchanged; R eye with scleral injection; no oral lesions/thrush  RRR, no m/r/g  Breaths mildly labored but pt is not tachypneic; trace inspiratory wheeze and transmitted upper airway sounds auscultated in all lung zone  Abd soft/nt/nd, normoactive bowel sounds  Trace LUE non-pitting edema present; large area of ecchymosis extending from the post R calf to post R thigh- improving; generalized muscular atrophy in all 4 extremities  CN 2-12 grossly intact; no gross focal neuro deficits; pt ambulates with walker      LABS                        7.9    15.46 )-----------( 385      ( 18 May 2024 03:59 )             25.7     05-18    138  |  105  |  37<H>  ----------------------------<  84  5.3   |  22  |  0.96    Ca    9.8      18 May 2024 02:42  Phos  4.2     05-18  Mg     1.60     05-18    TPro  5.3<L>  /  Alb  2.2<L>  /  TBili  <0.2  /  DBili  x   /  AST  13  /  ALT  69<H>  /  AlkPhos  196<H>  05-18      CAPILLARY BLOOD GLUCOSE      POCT Blood Glucose.: 153 mg/dL (17 May 2024 22:23)    PATHOLOGY  3/26 pleural fluid cytology: Neg for malignant cells.    3/26 Pericardium excision: Neg for malignancy.    3/20 Pericardial fluid cytology: Neg for malignant cells.    3/14 LYMPH NODE, SUPRACLAVICULAR, RIGHT, US GUIDED CORE BIOPSY AND FNA   POSITIVE FOR MALIGNANT CELLS.   Carcinoma   Touch Prep slides display crowded groups and single lying malignant   cells with enlarged nuclei containing prominent nucleoli and vacuolated   cytoplasm. Core biopsies show neoplastic cells positive for CK7 and CDX2   immunostains, while negative for CK20, TTF1, GATA3, TRPS1 and PAX8. 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 Suggest correlation with clinical information and imaging to   assist with next step management.   Moleculars: pMMR, PD-L1 TPS 1%; Her2 pending      TUMOR MARKERS  3/13 CEA 7.1  3/24 Ca 19-9 27  4/8 Ca-125 187, Ca 27.29 16.9, Ca 15-3 18.9, AFP 5.2      MICROBIOLOGY  5/1 Procal 0.44    Abx  $Bactrim ppx 4/8-5/15 (dced with completion of Dex taper)  $Cefepime 5/1-10  $Fluconazole 5/6-12  $Nystatin 5/4-6    Cx Data  4/5 MRSA swab: Not detected  3/16 Quant plus TB: Negative      PERTINENT RADIOLOGY  5/18 CXR: Slight decrease of right pleural effusion. No pneumothorax.    5/15 CXR: Redemonstrated small right-sided pleural effusion with underlying   atelectasis. Right-sided Pleurx catheter in stable position. No newfocal   consolidations.    5/13 CXR: Right-sided chest tube with effusion/atelectasis suspected.    5/10 CXR: Right effusion with suspected lower lobe atelectasis.    5/6 CXR: Right-sided Pleurx catheter with decreased effusion.    5/4 CXR: There is a catheter at the right base.  HEART: normal in size.  LUNGS: There is opacity at the right base compatible with   effusion/infiltrate.. Prominent interstitial markings, likely secondary   to chronic underlying lung disease..  BONES: degenerative changes    5/4 R elbow XR: Linear lucency in the coronoid process seen only on the lateral view   concerning for nondisplaced fracture. Consider cross-sectional imaging of   the elbow with CT or MRI for further evaluation.    5/3 CT c/a/p with IV contrast: Persistent right middle lobe consolidation and decreased right lower lobe consolidation. New patchy nodular opacities in the right upper lobe and increased patchy and groundglass opacities in the left lung. Small right pleural effusion, unchanged. Trace left pleural effusion, decreased. No evidence of metastatic disease in the abdomen or pelvis.    5/1 CXR: Small right effusion unchanged with Pleurx catheter.    4/19 MRCP: Motion degraded study, particularly on postcontrast sequences.  Pancreatic neck T2 hyperintense lesion measures 16 mm, image 28 series 5,   without clear evidence of enhancement on postcontrast phases. Most likely   this is a side branch IPMN. Remainder of the pancreas is suboptimally   evaluated due to artifact. Follow-up MRI abdomen or pancreas protocol CT   can be considered in 3-6 months for reevaluation. Partially distended stomach. Duodenal diverticulum. Colon is also partially distended with mild to moderate stool burden. Correlate   clinically. Trace pleural effusions. Right-sided pleural catheter is partially   imaged. Lung parenchyma is incompletely characterized on MRI. Soft tissue   of the mediastinum is not adequately imaged on this study.    4/18 B/L LE Doppler: Acute deep venous thrombosis: above the knee.  Acute nonocclusive deep venous thrombosis in the right common femoral vein.    417 CXR: Clear lungs with minimal effusion and Pleurx catheter.    4/10 VA dopplers B/L UE: Extensive acute, occlusive DVT RIJ, innominate and subclavian veins.  Acute non-occlusive DVT w/in R axillary vein.  Extensive acute, occlusive DVT noted in L subclavian. axillary, and proximal brachial veins.  Acute non-occlusive DVT LIJ.  Superficial vein thromboses are noted in the B/L cephalic veins.    4/9 Echocardiogram: Normal LV systolic function.  Normal mitral valve w/ normal leaflet excursion.  No pericardial effusion seen    4/4 CXR: Clear lungs w/ R pleurx catheter in place.    ok4/4 CXR AM: Small R pleural effusion w/ pleurx cath improved    OSH Imaging ([facility])  3/29 CXR: Decrease in R pleural effusion.     3/28 B/L UE Dupplers: Acute DVT involving R IJ, innominate vein, subclavian, brachial, and L IJ.  Superficial venous thrombosis involving B/L cephalic veins.  + Edema of superficial soft tissue of UE R>L.    3/27 CT neck: Bulky and conglomerate LAD invading RIJ w/ thrombus extending up to hyoid level.  Thrombus is likely combination tumor and bland.  New LIJ nonocclusive thrombus.    3/27 CT Chest: New consolidations throughout the R lung worse RLL c/f aspiration PNA.  + R pleurx cath w/ decrease in R pleural effusion.  Extensive DVT w/in thoracic and lower neck, upper SVC remains obliterated.  Large R supraclavicular mass infiltrating into the mediastinum.      3/27 CXR: Progression of R consolidation/effusion.    3/21 TTE: LV grossly normal.  Thickened pericardium.  no pericardial effusion.    3/18 TTE: Mod pericardial effusion w/ e/o hemodynamic compromise w/ diasolic collapse of RA that exceeds 1/3 of cardiac cycle >30% resp variation across MV E. wave and early diastolic inversion of RV.    3/14 CT Chest: Conglomerate soft tissue in superior mediastinum and R supraclavicular region 2/2 LAD w/ internal hypodensity c/f necrosis.  Mass encases SVC and multiple great veins resulting in obliteration of the SVC and thrombosis of the RIJ.  Multiple R sided collateral vessels and R soft tissue edema.  B/L pulm nodules.    3/14 CT neck: Extensive cellulitis/myositis along R anterior lateral neck and R upper chest wall w/ inflammatory fat induration the deep soft tissue of neck.  Large supraclavicular/mediastinal mass lesion, presumably conglomerate of LN w/ mass effect and complete occlusion of RIJ w/ associated inflammation.  Complete occlusion of R subclavian vein and nearly occlusive thrombosis in the proximal L brachiocephalic vein.    3/13 RUQ Abd US: Cholelithiasis w/o e/o cholecystitis.  Dilated CBD w/o e/o intraductal stone or other obstruction. 1.4cm pancreatic cyst w/ mild duct dilatation.    3/12 CT abd/pelv: S/p R nephrectomy. no LAD.  New 1.6cm  pancreatic neck cystic lesion w/o main pancreatic ductal dilatation.    3/10 R breast US: No e/o breast abscess      RECENT ENDOSCOPY  5/4 Flex laryngoscopy    -- Nasopharynx had no mass or exudate.    -- Base of tongue was symmetric and not enlarged.    -- Vallecula was clear    -- Epiglottis, both aryepiglottic folds and both false vocal folds were normal    -- Arytenoids both without edema and erythema     -- True vocal folds were fully mobile and without lesions. possible R VC hypomobility, Incomplete glottic closure    -- Post cricoid area was clear.    -- Interarytenoid edema was absent    4/26 Upper EUS  - Normal esophagus.  - A large amount of food (residue) inthe stomach. Procedure was aborted.    4/24 Upper EUS   - Fluid in the middle third of the esophagus and in the lower third of the esophagus.  - A large amount of food (residue) in the stomach so EGD was aborted and EUS was not attempted.  - No specimens collected.

## 2024-05-19 LAB
ALBUMIN SERPL ELPH-MCNC: 2.4 G/DL — LOW (ref 3.3–5)
ALP SERPL-CCNC: 169 U/L — HIGH (ref 40–120)
ALT FLD-CCNC: 53 U/L — HIGH (ref 4–33)
ANION GAP SERPL CALC-SCNC: 13 MMOL/L — SIGNIFICANT CHANGE UP (ref 7–14)
AST SERPL-CCNC: 12 U/L — SIGNIFICANT CHANGE UP (ref 4–32)
BASOPHILS # BLD AUTO: 0.02 K/UL — SIGNIFICANT CHANGE UP (ref 0–0.2)
BASOPHILS NFR BLD AUTO: 0.2 % — SIGNIFICANT CHANGE UP (ref 0–2)
BILIRUB SERPL-MCNC: <0.2 MG/DL — SIGNIFICANT CHANGE UP (ref 0.2–1.2)
BLOOD GAS ARTERIAL COMPREHENSIVE RESULT: SIGNIFICANT CHANGE UP
BUN SERPL-MCNC: 36 MG/DL — HIGH (ref 7–23)
CALCIUM SERPL-MCNC: 9.9 MG/DL — SIGNIFICANT CHANGE UP (ref 8.4–10.5)
CHLORIDE SERPL-SCNC: 104 MMOL/L — SIGNIFICANT CHANGE UP (ref 98–107)
CO2 SERPL-SCNC: 22 MMOL/L — SIGNIFICANT CHANGE UP (ref 22–31)
CREAT SERPL-MCNC: 1.11 MG/DL — SIGNIFICANT CHANGE UP (ref 0.5–1.3)
EGFR: 57 ML/MIN/1.73M2 — LOW
EOSINOPHIL # BLD AUTO: 0.02 K/UL — SIGNIFICANT CHANGE UP (ref 0–0.5)
EOSINOPHIL NFR BLD AUTO: 0.2 % — SIGNIFICANT CHANGE UP (ref 0–6)
GLUCOSE SERPL-MCNC: 101 MG/DL — HIGH (ref 70–99)
HCT VFR BLD CALC: 23.8 % — LOW (ref 34.5–45)
HCT VFR BLD CALC: 24.1 % — LOW (ref 34.5–45)
HGB BLD-MCNC: 7.3 G/DL — LOW (ref 11.5–15.5)
HGB BLD-MCNC: 7.3 G/DL — LOW (ref 11.5–15.5)
IANC: 10.67 K/UL — HIGH (ref 1.8–7.4)
IMM GRANULOCYTES NFR BLD AUTO: 1.2 % — HIGH (ref 0–0.9)
LYMPHOCYTES # BLD AUTO: 0.25 K/UL — LOW (ref 1–3.3)
LYMPHOCYTES # BLD AUTO: 2.2 % — LOW (ref 13–44)
MAGNESIUM SERPL-MCNC: 1.8 MG/DL — SIGNIFICANT CHANGE UP (ref 1.6–2.6)
MCHC RBC-ENTMCNC: 28.6 PG — SIGNIFICANT CHANGE UP (ref 27–34)
MCHC RBC-ENTMCNC: 29.2 PG — SIGNIFICANT CHANGE UP (ref 27–34)
MCHC RBC-ENTMCNC: 30.3 GM/DL — LOW (ref 32–36)
MCHC RBC-ENTMCNC: 30.7 GM/DL — LOW (ref 32–36)
MCV RBC AUTO: 94.5 FL — SIGNIFICANT CHANGE UP (ref 80–100)
MCV RBC AUTO: 95.2 FL — SIGNIFICANT CHANGE UP (ref 80–100)
MONOCYTES # BLD AUTO: 0.26 K/UL — SIGNIFICANT CHANGE UP (ref 0–0.9)
MONOCYTES NFR BLD AUTO: 2.3 % — SIGNIFICANT CHANGE UP (ref 2–14)
NEUTROPHILS # BLD AUTO: 10.67 K/UL — HIGH (ref 1.8–7.4)
NEUTROPHILS NFR BLD AUTO: 93.9 % — HIGH (ref 43–77)
NRBC # BLD: 0 /100 WBCS — SIGNIFICANT CHANGE UP (ref 0–0)
NRBC # BLD: 0 /100 WBCS — SIGNIFICANT CHANGE UP (ref 0–0)
NRBC # FLD: 0 K/UL — SIGNIFICANT CHANGE UP (ref 0–0)
NRBC # FLD: 0 K/UL — SIGNIFICANT CHANGE UP (ref 0–0)
PHOSPHATE SERPL-MCNC: 4 MG/DL — SIGNIFICANT CHANGE UP (ref 2.5–4.5)
PLATELET # BLD AUTO: 353 K/UL — SIGNIFICANT CHANGE UP (ref 150–400)
PLATELET # BLD AUTO: 372 K/UL — SIGNIFICANT CHANGE UP (ref 150–400)
POTASSIUM SERPL-MCNC: 4.6 MMOL/L — SIGNIFICANT CHANGE UP (ref 3.5–5.3)
POTASSIUM SERPL-SCNC: 4.6 MMOL/L — SIGNIFICANT CHANGE UP (ref 3.5–5.3)
PROCALCITONIN SERPL-MCNC: 0.55 NG/ML — HIGH (ref 0.02–0.1)
PROT SERPL-MCNC: 5.5 G/DL — LOW (ref 6–8.3)
RBC # BLD: 2.5 M/UL — LOW (ref 3.8–5.2)
RBC # BLD: 2.55 M/UL — LOW (ref 3.8–5.2)
RBC # FLD: 15.9 % — HIGH (ref 10.3–14.5)
RBC # FLD: 16.3 % — HIGH (ref 10.3–14.5)
SODIUM SERPL-SCNC: 139 MMOL/L — SIGNIFICANT CHANGE UP (ref 135–145)
WBC # BLD: 11.35 K/UL — HIGH (ref 3.8–10.5)
WBC # BLD: 11.36 K/UL — HIGH (ref 3.8–10.5)
WBC # FLD AUTO: 11.35 K/UL — HIGH (ref 3.8–10.5)
WBC # FLD AUTO: 11.36 K/UL — HIGH (ref 3.8–10.5)

## 2024-05-19 PROCEDURE — 99233 SBSQ HOSP IP/OBS HIGH 50: CPT

## 2024-05-19 RX ORDER — DEXAMETHASONE 0.5 MG/5ML
4 ELIXIR ORAL EVERY 12 HOURS
Refills: 0 | Status: DISCONTINUED | OUTPATIENT
Start: 2024-05-19 | End: 2024-05-21

## 2024-05-19 RX ORDER — PANTOPRAZOLE SODIUM 20 MG/1
40 TABLET, DELAYED RELEASE ORAL EVERY 12 HOURS
Refills: 0 | Status: DISCONTINUED | OUTPATIENT
Start: 2024-05-19 | End: 2024-05-21

## 2024-05-19 RX ADMIN — Medication 1 TABLET(S): at 14:27

## 2024-05-19 RX ADMIN — Medication 3 MILLILITER(S): at 21:40

## 2024-05-19 RX ADMIN — Medication 1 PATCH: at 14:26

## 2024-05-19 RX ADMIN — PANTOPRAZOLE SODIUM 40 MILLIGRAM(S): 20 TABLET, DELAYED RELEASE ORAL at 05:56

## 2024-05-19 RX ADMIN — Medication 15 MILLILITER(S): at 18:14

## 2024-05-19 RX ADMIN — Medication 3 MILLILITER(S): at 03:10

## 2024-05-19 RX ADMIN — Medication 1 APPLICATION(S): at 18:13

## 2024-05-19 RX ADMIN — OXYCODONE HYDROCHLORIDE 30 MILLIGRAM(S): 5 TABLET ORAL at 14:25

## 2024-05-19 RX ADMIN — Medication 1 PATCH: at 13:39

## 2024-05-19 RX ADMIN — ENOXAPARIN SODIUM 30 MILLIGRAM(S): 100 INJECTION SUBCUTANEOUS at 18:14

## 2024-05-19 RX ADMIN — Medication 4 MILLIGRAM(S): at 13:44

## 2024-05-19 RX ADMIN — AMIODARONE HYDROCHLORIDE 200 MILLIGRAM(S): 400 TABLET ORAL at 05:56

## 2024-05-19 RX ADMIN — Medication 1 APPLICATION(S): at 05:57

## 2024-05-19 RX ADMIN — Medication 25 MILLIGRAM(S): at 05:58

## 2024-05-19 RX ADMIN — Medication 25 MILLIGRAM(S): at 18:14

## 2024-05-19 RX ADMIN — SODIUM CHLORIDE 4 MILLILITER(S): 9 INJECTION INTRAMUSCULAR; INTRAVENOUS; SUBCUTANEOUS at 21:41

## 2024-05-19 RX ADMIN — BUDESONIDE AND FORMOTEROL FUMARATE DIHYDRATE 2 PUFF(S): 160; 4.5 AEROSOL RESPIRATORY (INHALATION) at 14:28

## 2024-05-19 RX ADMIN — Medication 5 MILLIGRAM(S): at 22:42

## 2024-05-19 RX ADMIN — PANTOPRAZOLE SODIUM 40 MILLIGRAM(S): 20 TABLET, DELAYED RELEASE ORAL at 18:13

## 2024-05-19 RX ADMIN — Medication 4 MILLIGRAM(S): at 18:14

## 2024-05-19 RX ADMIN — OXYCODONE HYDROCHLORIDE 30 MILLIGRAM(S): 5 TABLET ORAL at 22:42

## 2024-05-19 RX ADMIN — BUDESONIDE AND FORMOTEROL FUMARATE DIHYDRATE 2 PUFF(S): 160; 4.5 AEROSOL RESPIRATORY (INHALATION) at 22:39

## 2024-05-19 RX ADMIN — NALOXEGOL OXALATE 25 MILLIGRAM(S): 12.5 TABLET, FILM COATED ORAL at 14:26

## 2024-05-19 RX ADMIN — CHLORHEXIDINE GLUCONATE 1 APPLICATION(S): 213 SOLUTION TOPICAL at 14:28

## 2024-05-19 RX ADMIN — Medication 15 MILLILITER(S): at 05:55

## 2024-05-19 RX ADMIN — POLYETHYLENE GLYCOL 3350 17 GRAM(S): 17 POWDER, FOR SOLUTION ORAL at 05:58

## 2024-05-19 RX ADMIN — OXYCODONE HYDROCHLORIDE 30 MILLIGRAM(S): 5 TABLET ORAL at 00:46

## 2024-05-19 RX ADMIN — Medication 1 PATCH: at 07:58

## 2024-05-19 RX ADMIN — Medication 3 MILLILITER(S): at 15:31

## 2024-05-19 RX ADMIN — Medication 3 MILLILITER(S): at 07:28

## 2024-05-19 RX ADMIN — SODIUM CHLORIDE 4 MILLILITER(S): 9 INJECTION INTRAMUSCULAR; INTRAVENOUS; SUBCUTANEOUS at 15:32

## 2024-05-19 RX ADMIN — Medication 1 SPRAY(S): at 05:56

## 2024-05-19 RX ADMIN — ENOXAPARIN SODIUM 30 MILLIGRAM(S): 100 INJECTION SUBCUTANEOUS at 06:11

## 2024-05-19 NOTE — PROGRESS NOTE ADULT - ASSESSMENT
59 yo woman, former smoker, with h/o R eye glaucoma, Fibromyalgia, Anxiety, GERD, and RCC s/p R total nephrectomy/hysterectomy; she was initially admitted to [facility] on 3/11 w/ R breast swelling c/f mastitis- imaging brooks noted supraclavicular/mediastinal mass lesion which encased the SVC and multiple other veins resulting in obliteration of the SVC and thrombosis of the R IJ, and a pancreatic neck cystic lesion.  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  Her disease/hospital course has also been c/b pericardial effusion s/p pericardial window w/ neg cytology, R pleural effusion, also negative cytology) s/p Pleurx, Afib w/ RVR and acute hypoxic resp failure 2/2 SVC syndrome- not dennis to IR-guided stenting; pt was ultimately started on Dex and transferred to [facility] on 4/4 for urgent palliative RT which she completed on 4/18. Her hospital course has also been c/b pericardial effusion with tamponade s/p window and non malignant R pleural effusion s/p pleurex, acute b/l UE and RLE dvts, anxiety, and more recently, recurrent hypoxia.    ACTIVE PROBLEMS  Metastatic CUP (carcinoma of unknown primary)  Goals of care discussion, counseling  Encounter for antineoplastic chemotherapy  Acute hypoxic + hypercapneic respiratory failure  Hyperkalemia  Dysphonia with R VC hypomobility   Oral thrush  SVC syndrome  Extensive b/l UE DVTs  Acute RLE DVT a/w RLE ecchymosis  Hypercoag state  Pericardial effusion with tamp physiology s/p pericardial window  R pleural effusion s/p pleurex  pAfib, with RVR on this admission  Anxiety/emotional lability  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)  Cancer-related pain, anxiety  Severe prot-ghazala malnutrition    - Metastatic CUP (carcinoma of unknown primary)  - Goals of care discussion, counseling  - Encounter for antineoplastic chemotherapy  Based on 3/14 SC LN path, ddx includes primary UGI vs pancreaticobiliary primary (breast edema is likely 2/2 SVC syndrome); PD-L1 TPs 1%, pMMR, Her2 2+, FISH pending, Ki-67 30%  Ca-125 moderately elevated at 125; other tumor markers not significantly elevated  Additional diagnostic brooks includes:   * 4/19 MRCP showing pancreatic lesion (? IPMN, but pancreas was suboptimally evaluated)     * 4/24 and 4/26 EGD/EUS aborted as pt had large amount of food in the stomach that prevented passing of scope   * 4/30 UGIS aborted as pt felt too short of breath when laying down flat (improved after her pleurex was drained  Case d/w Dr. 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 Pt is a suboptimal candidate i/s/o her progressive debility and malnutrition (ECOG PS 3)   >> Discussed risks/benefits of proceeding with palliative chemo (inpt) vs best supportive care with pt's HCP Saeid on 5/10 and again on 5/13- he is amenable to trial of inpt chemotherapy and understands that hospice will be recommended if pt does not tolerate trial of inpt chemo; verbal and written chemo consent obtained on 5/13   * Pt s/p successful femoral vein central line placement by Surgery on 5/15 (to be removed after chemotherapy)   * Completing C1 palliative mFOLFOX6 (20% dose reduction)- started 5/16, held on 5/18 due to worsening resp status  Pt's long term prognosis remains guarded; she is dnr/dni    - Acute hypercapneic + hypoxic resp failure  Pt found to have new resp acidosis on 5/18 o/n ABG ordered for lethargy; subsequently started on bipap to which she is more awake, alert, and appears comfortable  Etiology of resp acidosis is unclear, but likely cancer-related vs ? volume overload i/s/o chemo infusion  Serial CXRs without acute changes (decreasing R pleural effusion)  Repeat ABG ordered for 1pm today- if improving, will transition off bipap back to HF NC O2 and obtain NC CT chest, repeat TTE also ordered today  Sputum culture and fungal studies ordered  Pt dosed Lasix IV 10mg x1 dosed this am   * Of note, pt completed 10d course of empiric Cefepime on 5/10; trending WBCs/ procal; monitoring off abx for now unless pt clinically decompensates again  Dex taper completed on 5/14 (Bactrim pjp ppx dced on 5/15)  Continuing Symbicort 2 puffs BID  Continuing Duonebs q6/prn  Will continue routine pleurex drainage as below    - Dysphonia with R VC hypomobility  - Oral thrush  Appreciate ENT eval/recs: 5/4 Flex ellis pt with R VC hypomobility  Pt previously cleared for pureed diet s/p Cinesophagram, but she is currently NPO while on bipap (can resume pureed diet when pt is off bipap)  Completed 7d course of Fluconazole on 5/12    - SVC syndrome  Appreciate Rad Onc eval/recs  Completed 10fxs of palliative RT on 4/18     - Extensive b/l UE DVTs  - Acute RLE DVT a/w RLE ecchymosis  - Hypercoag state  Continuing therapeutic lovenox, benefits > risks   * Of note: pt has poor UE IV access and currently requires peripheral IV in her LE extremities for admin of medication; FV central line being placed today for IV chemo    - Pericardial effusion with tamp physiology s/p pericardial window  - R pleural effusion s/p pleurex  Likely inflammatory; both pleural and pericardial fluid cytology are negative for malignant cells  4/9 surveillance TTE with pEF and no WMAs  Repeat TTE ordered today for brooks of new hypercapnea  Continuing R pleurex drainage- up to 500cc q48h/prn    - pAfib  Pt currently SR, HR controlled  Likely precipitated by malignancy, pericardial effusion, SVC syndrome  Continuing Metoprolol 25mg q12 with holding parameters  Continuing Amio 200mg daily (monitoring for toxicities)  Continuing telemetry    - Anxiety/emotional lability  Continuing Diazepam 5mg BID/prn and nightly  Pt has poor health literacy and clinical insight which is negatively impacting her medical decision making- when ask if she wanted to defer medical decision making to her boyfriend or if she had a designated HCP she replied "he's already sick of me"  Appreciate  consult: pt lacks medical decision making capacity; medical decision making will be deferred to pt's surrogate decision maker- Saeid Peña ([phone])    - 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)  Pt with h/o R eye glaucoma with surgery, but miosis can also be associated with Pancoast syndrome i/s/o extensive R upper lung tumor  Pt denies visual deficits or other related symptoms   MRI Brain without contrast ordered for further eval- pt has previously refused  Will continue to monitor closely    - Anemia in neoplastic disease  Hgb remains stable, 7.9 today  4/5 iron studies not c/w iron deficiency  VSS and pt without clinical s/s of active bleeding  4/17 hemolysis labs negative; 4/18 FOBT negative x2  Trending daily cbcs; continuing supportive transfusions prn (pt does not have h/o ACS, CAD, MI, goal hgb > 7; plts > 10K)    - Cancer related pain  Currently well controlled  Continuing Oxy ER 30mg q12 q8  Continuing Oxy IR 10mg q4/prn  Continuing Dilaudid IV prn for sev breakthrough pain  Continuing bowel regimen to prevent OIC (including Movantic)    - Severe prot-ghazala malnutrition: appreciate RD recs; continuing regular diet with protein shake supplement BID (SLP eval pending as above) 57 yo woman, former smoker, with h/o R eye glaucoma, Fibromyalgia, Anxiety, GERD, and RCC s/p R total nephrectomy/hysterectomy; she was initially admitted to [facility] on 3/11 w/ R breast swelling c/f mastitis- imaging brooks noted supraclavicular/mediastinal mass lesion which encased the SVC and multiple other veins resulting in obliteration of the SVC and thrombosis of the R IJ, and a pancreatic neck cystic lesion.  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  Her disease/hospital course has also been c/b pericardial effusion s/p pericardial window w/ neg cytology, R pleural effusion, also negative cytology) s/p Pleurx, Afib w/ RVR and acute hypoxic resp failure 2/2 SVC syndrome- not dennis to IR-guided stenting; pt was ultimately started on Dex and transferred to [facility] on 4/4 for urgent palliative RT which she completed on 4/18. Her hospital course has also been c/b pericardial effusion with tamponade s/p window and non malignant R pleural effusion s/p pleurex, acute b/l UE and RLE dvts, anxiety, and more recently, recurrent hypoxia.    ACTIVE PROBLEMS  Metastatic CUP (carcinoma of unknown primary)  Goals of care discussion, counseling  Encounter for antineoplastic chemotherapy  Acute hypoxic + hypercapneic respiratory failure  Hyperkalemia  Dysphonia with R VC hypomobility   Oral thrush  SVC syndrome  Extensive b/l UE DVTs  Acute RLE DVT a/w RLE ecchymosis  Hypercoag state  Pericardial effusion with tamp physiology s/p pericardial window  R pleural effusion s/p pleurex  pAfib, with RVR on this admission  Anxiety/emotional lability  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)  Cancer-related pain, anxiety  Severe prot-ghazala malnutrition    - Metastatic CUP (carcinoma of unknown primary)  - Goals of care discussion, counseling  - Encounter for antineoplastic chemotherapy  Based on 3/14 SC LN path, ddx includes primary UGI vs pancreaticobiliary primary (breast edema is likely 2/2 SVC syndrome); PD-L1 TPs 1%, pMMR, Her2 2+, FISH pending, Ki-67 30%  Ca-125 moderately elevated at 125; other tumor markers not significantly elevated  Additional diagnostic brooks includes:   * 4/19 MRCP showing pancreatic lesion (? IPMN, but pancreas was suboptimally evaluated)     * 4/24 and 4/26 EGD/EUS aborted as pt had large amount of food in the stomach that prevented passing of scope   * 4/30 UGIS aborted as pt felt too short of breath when laying down flat (improved after her pleurex was drained  Case d/w Dr. 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 Pt is a suboptimal candidate i/s/o her progressive debility and malnutrition (ECOG PS 3)   >> Discussed risks/benefits of proceeding with palliative chemo (inpt) vs best supportive care with pt's HCP Saeid on 5/10 and again on 5/13- he is amenable to trial of inpt chemotherapy and understands that hospice will be recommended if pt does not tolerate trial of inpt chemo; verbal and written chemo consent obtained on 5/13   * Pt s/p successful femoral vein central line placement by Surgery on 5/15 (to be removed after chemotherapy)   *  Pt s/p C1 palliative mFOLFOX6 (20% dose reduction), 5/16-18   >> She did not receive the full cycle as chemo was held on 5/18 when pt developed resp acidosis, and the chemo was not resumed after that  Pt's long term prognosis remains guarded; she is dnr/dni    - Acute hypercapneic + hypoxic resp failure  Resp acidosis resolved after brief period on bipap  Pt is lethargic today, but O2 sats/HR stable on HF NC O2  Etiology of resp acidosis is unclear, but likely cancer-related vs ? volume overload i/s/o chemo infusion  Serial CXRs without acute changes (decreasing R pleural effusion)  NC CT chest and repeat TTE are pending (ordered on 5/18)  Will fu sputum culture and fungal studies (ordered 5/18)  Diuresing gently/prn: s/p Lasix IV 10mg x1 dose on 5/18, but held today given bump in Cr   * Of note, pt completed 10d course of empiric Cefepime on 5/10; trending WBCs/ procal; monitoring off abx for now unless pt clinically decompensates again  Dex taper completed on 5/14 (Bactrim pjp ppx dced on 5/15); resuming Dex IV 4mg BID today (with pjp/ppi ppx)  Continuing Symbicort 2 puffs BID  Continuing Duonebs q6/prn  Will continue routine pleurex drainage as below    - Dysphonia with R VC hypomobility  - Oral thrush  Appreciate ENT eval/recs: 5/4 Flex ellis pt with R VC hypomobility  Pt previously cleared for pureed diet s/p Cinesophagram, but she is currently NPO while on bipap (can resume pureed diet when pt is off bipap)  Completed 7d course of Fluconazole on 5/12    - SVC syndrome  Appreciate Rad Onc eval/recs  Completed 10fxs of palliative RT on 4/18     - Extensive b/l UE DVTs  - Acute RLE DVT a/w RLE ecchymosis  - Hypercoag state  Continuing therapeutic lovenox, benefits > risks   * Of note: pt has poor UE IV access and currently requires peripheral IV in her LE extremities for admin of medication; FV central line being placed today for IV chemo    - Pericardial effusion with tamp physiology s/p pericardial window  - R pleural effusion s/p pleurex  Likely inflammatory; both pleural and pericardial fluid cytology are negative for malignant cells  4/9 surveillance TTE with pEF and no WMAs  Repeat TTE ordered today for brooks of new hypercapnea  Continuing R pleurex drainage- up to 500cc q48h/prn    - pAfib  Pt currently SR, HR controlled  Likely precipitated by malignancy, pericardial effusion, SVC syndrome  Continuing Metoprolol 25mg q12 with holding parameters  Continuing Amio 200mg daily (monitoring for toxicities)  Continuing telemetry    - Anxiety/emotional lability  Continuing Diazepam 5mg BID/prn and nightly  Pt has poor health literacy and clinical insight which is negatively impacting her medical decision making- when ask if she wanted to defer medical decision making to her boyfriend or if she had a designated HCP she replied "he's already sick of me"  Appreciate  consult: pt lacks medical decision making capacity; medical decision making will be deferred to pt's surrogate decision maker- Saeid Peña ([phone])    - 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)  Pt with h/o R eye glaucoma with surgery, but miosis can also be associated with Pancoast syndrome i/s/o extensive R upper lung tumor  Pt denies visual deficits or other related symptoms   MRI Brain without contrast ordered for further eval- pt has previously refused  Will continue to monitor closely    - Anemia in neoplastic disease  Hgb remains stable, 7.9 today  4/5 iron studies not c/w iron deficiency  VSS and pt without clinical s/s of active bleeding  4/17 hemolysis labs negative; 4/18 FOBT negative x2  Trending daily cbcs; continuing supportive transfusions prn (pt does not have h/o ACS, CAD, MI, goal hgb > 7; plts > 10K)    - Cancer related pain  Currently well controlled  Continuing Oxy ER 30mg q12 q8  Continuing Oxy IR 10mg q4/prn  Continuing Dilaudid IV prn for sev breakthrough pain  Continuing bowel regimen to prevent OIC (including Movantic)    - Severe prot-ghazala malnutrition: appreciate RD recs; continuing regular diet with protein shake supplement BID (SLP eval pending as above)

## 2024-05-19 NOTE — PROGRESS NOTE ADULT - SUBJECTIVE AND OBJECTIVE BOX
SOLID TUMOR ONCOLOGY HOSPITALIST PROGRESS NOTE    S: No acute events overnight.    CURRENT MEDICATIONS  MEDICATIONS  (STANDING):  albuterol/ipratropium for Nebulization 3 milliLiter(s) Nebulizer every 6 hours  albuterol/ipratropium for Nebulization 3 milliLiter(s) Nebulizer <User Schedule>  aMIOdarone    Tablet 200 milliGRAM(s) Oral daily  Biotene Dry Mouth Oral Rinse 15 milliLiter(s) Swish and Spit every 6 hours  budesonide 160 MICROgram(s)/formoterol 4.5 MICROgram(s) Inhaler 2 Puff(s) Inhalation two times a day  chlorhexidine 2% Cloths 1 Application(s) Topical daily  dexAMETHasone  Injectable 4 milliGRAM(s) IV Push every 12 hours  diazepam    Tablet 5 milliGRAM(s) Oral at bedtime  enoxaparin Injectable 30 milliGRAM(s) SubCutaneous every 12 hours  influenza   Vaccine 0.5 milliLiter(s) IntraMuscular once  metoclopramide Injectable 10 milliGRAM(s) IV Push every 8 hours  metoprolol tartrate 25 milliGRAM(s) Oral every 12 hours  multivitamin 1 Tablet(s) Oral daily  naloxegol 25 milliGRAM(s) Oral daily  nicotine -  14 mG/24Hr(s) Patch 1 Patch Transdermal every 24 hours  oxyCODONE  ER Tablet 30 milliGRAM(s) Oral <User Schedule>  pantoprazole  Injectable 40 milliGRAM(s) IV Push every 12 hours  petrolatum white Ointment 1 Application(s) Topical two times a day  polyethylene glycol 3350 17 Gram(s) Oral every 12 hours  sodium chloride 0.65% Nasal 1 Spray(s) Both Nostrils two times a day  sodium chloride 3%  Inhalation 4 milliLiter(s) Inhalation every 12 hours  MEDICATIONS  (PRN):  aluminum hydroxide/magnesium hydroxide/simethicone Suspension 30 milliLiter(s) Oral every 6 hours PRN Dyspepsia  benzocaine/menthol Lozenge 1 Lozenge Oral two times a day PRN Sore Throat  Biotene Dry Mouth Oral Rinse 15 milliLiter(s) Swish and Spit two times a day PRN dry mouth  diazepam    Tablet 5 milliGRAM(s) Oral two times a day PRN anxiety  FIRST- Mouthwash  BLM 5 milliLiter(s) Swish and Spit four times a day PRN Mouth Care  ondansetron    Tablet 4 milliGRAM(s) Oral every 8 hours PRN Nausea and/or Vomiting  oxyCODONE    IR 10 milliGRAM(s) Oral every 4 hours PRN Moderate Pain (4 - 6)  sodium chloride 0.9% lock flush 10 milliLiter(s) IV Push every 1 hour PRN Pre/post blood products, medications, blood draw, and to maintain line patency      PHYSICAL EXAM  T(C): 36.2 (05-19-24 @ 04:50), Max: 36.2 (05-18-24 @ 19:07)  HR: 84 (05-19-24 @ 15:35) (80 - 108)  BP: 117/45 (05-19-24 @ 11:51) (117/45 - 164/89)  RR: 18 (05-19-24 @ 15:35) (18 - 20)  SpO2: 100% (05-19-24 @ 15:35) (95% - 100%)    05-18-24 @ 07:01  -  05-19-24 @ 07:00  --------------------------------------------------------  IN: 50 mL / OUT: 800 mL / NET: -750 mL  Cachectic, [id number] woman, sleeping but easily arousable, tolerating bipap mask  Pt still dysphonic, but able to write in comprehensive sentences to communicate  Mild R eyelid ptosis and R pupil miosis present/unchanged; R eye with scleral injection; no oral lesions/thrush  RRR, no m/r/g  Breaths mildly labored but pt is not tachypneic; trace inspiratory wheeze and transmitted upper airway sounds auscultated in all lung zone  Abd soft/nt/nd, normoactive bowel sounds  Trace LUE non-pitting edema present; large area of ecchymosis extending from the post R calf to post R thigh- improving; generalized muscular atrophy in all 4 extremities  CN 2-12 grossly intact; no gross focal neuro deficits; pt ambulates with walker      LABS                        7.3    11.35 )-----------( 353      ( 19 May 2024 14:04 )             24.1     05-19    139  |  104  |  36<H>  ----------------------------<  101<H>  4.6   |  22  |  1.11    Ca    9.9      19 May 2024 06:17  Phos  4.0     05-19  Mg     1.80     05-19    TPro  5.5<L>  /  Alb  2.4<L>  /  TBili  <0.2  /  DBili  x   /  AST  12  /  ALT  53<H>  /  AlkPhos  169<H>  05-19      PATHOLOGY  3/26 pleural fluid cytology: Neg for malignant cells.    3/26 Pericardium excision: Neg for malignancy.    3/20 Pericardial fluid cytology: Neg for malignant cells.    3/14 LYMPH NODE, SUPRACLAVICULAR, RIGHT, US GUIDED CORE BIOPSY AND FNA   POSITIVE FOR MALIGNANT CELLS.   Carcinoma   Touch Prep slides display crowded groups and single lying malignant   cells with enlarged nuclei containing prominent nucleoli and vacuolated   cytoplasm. Core biopsies show neoplastic cells positive for CK7 and CDX2   immunostains, while negative for CK20, TTF1, GATA3, TRPS1 and PAX8. 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 Suggest correlation with clinical information and imaging to   assist with next step management.   Moleculars: pMMR, PD-L1 TPS 1%; Her2 pending      TUMOR MARKERS  3/13 CEA 7.1  3/24 Ca 19-9 27  4/8 Ca-125 187, Ca 27.29 16.9, Ca 15-3 18.9, AFP 5.2      MICROBIOLOGY  5/1 Procal 0.44    Abx  $Bactrim ppx 4/8-5/15 (dced with completion of Dex taper)  $Cefepime 5/1-10  $Fluconazole 5/6-12  $Nystatin 5/4-6    Cx Data  4/5 MRSA swab: Not detected  3/16 Quant plus TB: Negative      PERTINENT RADIOLOGY  5/18 CXR: Slight decrease of right pleural effusion. No pneumothorax.    5/15 CXR: Redemonstrated small right-sided pleural effusion with underlying   atelectasis. Right-sided Pleurx catheter in stable position. No newfocal   consolidations.    5/13 CXR: Right-sided chest tube with effusion/atelectasis suspected.    5/10 CXR: Right effusion with suspected lower lobe atelectasis.    5/6 CXR: Right-sided Pleurx catheter with decreased effusion.    5/4 CXR: There is a catheter at the right base.  HEART: normal in size.  LUNGS: There is opacity at the right base compatible with   effusion/infiltrate.. Prominent interstitial markings, likely secondary   to chronic underlying lung disease..  BONES: degenerative changes    5/4 R elbow XR: Linear lucency in the coronoid process seen only on the lateral view   concerning for nondisplaced fracture. Consider cross-sectional imaging of   the elbow with CT or MRI for further evaluation.    5/3 CT c/a/p with IV contrast: Persistent right middle lobe consolidation and decreased right lower lobe consolidation. New patchy nodular opacities in the right upper lobe and increased patchy and groundglass opacities in the left lung. Small right pleural effusion, unchanged. Trace left pleural effusion, decreased. No evidence of metastatic disease in the abdomen or pelvis.    5/1 CXR: Small right effusion unchanged with Pleurx catheter.    4/19 MRCP: Motion degraded study, particularly on postcontrast sequences.  Pancreatic neck T2 hyperintense lesion measures 16 mm, image 28 series 5,   without clear evidence of enhancement on postcontrast phases. Most likely   this is a side branch IPMN. Remainder of the pancreas is suboptimally   evaluated due to artifact. Follow-up MRI abdomen or pancreas protocol CT   can be considered in 3-6 months for reevaluation. Partially distended stomach. Duodenal diverticulum. Colon is also partially distended with mild to moderate stool burden. Correlate   clinically. Trace pleural effusions. Right-sided pleural catheter is partially   imaged. Lung parenchyma is incompletely characterized on MRI. Soft tissue   of the mediastinum is not adequately imaged on this study.    4/18 B/L LE Doppler: Acute deep venous thrombosis: above the knee.  Acute nonocclusive deep venous thrombosis in the right common femoral vein.    417 CXR: Clear lungs with minimal effusion and Pleurx catheter.    4/10 VA dopplers B/L UE: Extensive acute, occlusive DVT RIJ, innominate and subclavian veins.  Acute non-occlusive DVT w/in R axillary vein.  Extensive acute, occlusive DVT noted in L subclavian. axillary, and proximal brachial veins.  Acute non-occlusive DVT LIJ.  Superficial vein thromboses are noted in the B/L cephalic veins.    4/9 Echocardiogram: Normal LV systolic function.  Normal mitral valve w/ normal leaflet excursion.  No pericardial effusion seen    4/4 CXR: Clear lungs w/ R pleurx catheter in place.    ok4/4 CXR AM: Small R pleural effusion w/ pleurx cath improved    OSH Imaging ([facility])  3/29 CXR: Decrease in R pleural effusion.     3/28 B/L UE Dupplers: Acute DVT involving R IJ, innominate vein, subclavian, brachial, and L IJ.  Superficial venous thrombosis involving B/L cephalic veins.  + Edema of superficial soft tissue of UE R>L.    3/27 CT neck: Bulky and conglomerate LAD invading RIJ w/ thrombus extending up to hyoid level.  Thrombus is likely combination tumor and bland.  New LIJ nonocclusive thrombus.    3/27 CT Chest: New consolidations throughout the R lung worse RLL c/f aspiration PNA.  + R pleurx cath w/ decrease in R pleural effusion.  Extensive DVT w/in thoracic and lower neck, upper SVC remains obliterated.  Large R supraclavicular mass infiltrating into the mediastinum.      3/27 CXR: Progression of R consolidation/effusion.    3/21 TTE: LV grossly normal.  Thickened pericardium.  no pericardial effusion.    3/18 TTE: Mod pericardial effusion w/ e/o hemodynamic compromise w/ diasolic collapse of RA that exceeds 1/3 of cardiac cycle >30% resp variation across MV E. wave and early diastolic inversion of RV.    3/14 CT Chest: Conglomerate soft tissue in superior mediastinum and R supraclavicular region 2/2 LAD w/ internal hypodensity c/f necrosis.  Mass encases SVC and multiple great veins resulting in obliteration of the SVC and thrombosis of the RIJ.  Multiple R sided collateral vessels and R soft tissue edema.  B/L pulm nodules.    3/14 CT neck: Extensive cellulitis/myositis along R anterior lateral neck and R upper chest wall w/ inflammatory fat induration the deep soft tissue of neck.  Large supraclavicular/mediastinal mass lesion, presumably conglomerate of LN w/ mass effect and complete occlusion of RIJ w/ associated inflammation.  Complete occlusion of R subclavian vein and nearly occlusive thrombosis in the proximal L brachiocephalic vein.    3/13 RUQ Abd US: Cholelithiasis w/o e/o cholecystitis.  Dilated CBD w/o e/o intraductal stone or other obstruction. 1.4cm pancreatic cyst w/ mild duct dilatation.    3/12 CT abd/pelv: S/p R nephrectomy. no LAD.  New 1.6cm  pancreatic neck cystic lesion w/o main pancreatic ductal dilatation.    3/10 R breast US: No e/o breast abscess      RECENT ENDOSCOPY  5/4 Flex laryngoscopy    -- Nasopharynx had no mass or exudate.    -- Base of tongue was symmetric and not enlarged.    -- Vallecula was clear    -- Epiglottis, both aryepiglottic folds and both false vocal folds were normal    -- Arytenoids both without edema and erythema     -- True vocal folds were fully mobile and without lesions. possible R VC hypomobility, Incomplete glottic closure    -- Post cricoid area was clear.    -- Interarytenoid edema was absent    4/26 Upper EUS  - Normal esophagus.  - A large amount of food (residue) inthe stomach. Procedure was aborted.    4/24 Upper EUS   - Fluid in the middle third of the esophagus and in the lower third of the esophagus.  - A large amount of food (residue) in the stomach so EGD was aborted and EUS was not attempted.  - No specimens collected. SOLID TUMOR ONCOLOGY HOSPITALIST PROGRESS NOTE    S: No acute events overnight.  Pt complained of feeling very tired and like she couldn't breath.  She was visibly lethargic, which prompted collection of ABG (results below)- as pt did not show signs of resp acidosis she was kept on HF NC and not transitioned to bipap. 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    CURRENT MEDICATIONS  MEDICATIONS  (STANDING):  albuterol/ipratropium for Nebulization 3 milliLiter(s) Nebulizer every 6 hours  albuterol/ipratropium for Nebulization 3 milliLiter(s) Nebulizer <User Schedule>  aMIOdarone    Tablet 200 milliGRAM(s) Oral daily  Biotene Dry Mouth Oral Rinse 15 milliLiter(s) Swish and Spit every 6 hours  budesonide 160 MICROgram(s)/formoterol 4.5 MICROgram(s) Inhaler 2 Puff(s) Inhalation two times a day  chlorhexidine 2% Cloths 1 Application(s) Topical daily  dexAMETHasone  Injectable 4 milliGRAM(s) IV Push every 12 hours  diazepam    Tablet 5 milliGRAM(s) Oral at bedtime  enoxaparin Injectable 30 milliGRAM(s) SubCutaneous every 12 hours  influenza   Vaccine 0.5 milliLiter(s) IntraMuscular once  metoclopramide Injectable 10 milliGRAM(s) IV Push every 8 hours  metoprolol tartrate 25 milliGRAM(s) Oral every 12 hours  multivitamin 1 Tablet(s) Oral daily  naloxegol 25 milliGRAM(s) Oral daily  nicotine -  14 mG/24Hr(s) Patch 1 Patch Transdermal every 24 hours  oxyCODONE  ER Tablet 30 milliGRAM(s) Oral <User Schedule>  pantoprazole  Injectable 40 milliGRAM(s) IV Push every 12 hours  petrolatum white Ointment 1 Application(s) Topical two times a day  polyethylene glycol 3350 17 Gram(s) Oral every 12 hours  sodium chloride 0.65% Nasal 1 Spray(s) Both Nostrils two times a day  sodium chloride 3%  Inhalation 4 milliLiter(s) Inhalation every 12 hours  MEDICATIONS  (PRN):  aluminum hydroxide/magnesium hydroxide/simethicone Suspension 30 milliLiter(s) Oral every 6 hours PRN Dyspepsia  benzocaine/menthol Lozenge 1 Lozenge Oral two times a day PRN Sore Throat  Biotene Dry Mouth Oral Rinse 15 milliLiter(s) Swish and Spit two times a day PRN dry mouth  diazepam    Tablet 5 milliGRAM(s) Oral two times a day PRN anxiety  FIRST- Mouthwash  BLM 5 milliLiter(s) Swish and Spit four times a day PRN Mouth Care  ondansetron    Tablet 4 milliGRAM(s) Oral every 8 hours PRN Nausea and/or Vomiting  oxyCODONE    IR 10 milliGRAM(s) Oral every 4 hours PRN Moderate Pain (4 - 6)  sodium chloride 0.9% lock flush 10 milliLiter(s) IV Push every 1 hour PRN Pre/post blood products, medications, blood draw, and to maintain line patency      PHYSICAL EXAM  T(C): 36.2 (05-19-24 @ 04:50), Max: 36.2 (05-18-24 @ 19:07)  HR: 84 (05-19-24 @ 15:35) (80 - 108)  BP: 117/45 (05-19-24 @ 11:51) (117/45 - 164/89)  RR: 18 (05-19-24 @ 15:35) (18 - 20)  SpO2: 100% (05-19-24 @ 15:35) (95% - 100%)    05-18-24 @ 07:01  -  05-19-24 @ 07:00  --------------------------------------------------------  IN: 50 mL / OUT: 800 mL / NET: -750 mL  Cachectic, yvptyyvsgwq-ldf-izhmphnxw woman, sleeping but easily arousable, tolerating bipap mask  Pt still dysphonic, but able to write in comprehensive sentences to communicate  Mild R eyelid ptosis and R pupil miosis present/unchanged; R eye with scleral injection; no oral lesions/thrush  RRR, no m/r/g  Breaths mildly labored but pt is not tachypneic; trace inspiratory wheeze and transmitted upper airway sounds auscultated in all lung zone  Abd soft/nt/nd, normoactive bowel sounds  Trace LUE non-pitting edema present; large area of ecchymosis extending from the post R calf to post R thigh- improving; generalized muscular atrophy in all 4 extremities  CN 2-12 grossly intact; no gross focal neuro deficits; pt ambulates with walker      LABS                        7.3    11.35 )-----------( 353      ( 19 May 2024 14:04 )             24.1     05-19    139  |  104  |  36<H>  ----------------------------<  101<H>  4.6   |  22  |  1.11    Ca    9.9      19 May 2024 06:17  Phos  4.0     05-19  Mg     1.80     05-19    TPro  5.5<L>  /  Alb  2.4<L>  /  TBili  <0.2  /  DBili  x   /  AST  12  /  ALT  53<H>  /  AlkPhos  169<H>  05-19      PATHOLOGY  3/26 pleural fluid cytology: Neg for malignant cells.    3/26 Pericardium excision: Neg for malignancy.    3/20 Pericardial fluid cytology: Neg for malignant cells.    3/14 LYMPH NODE, SUPRACLAVICULAR, RIGHT, US GUIDED CORE BIOPSY AND FNA   POSITIVE FOR MALIGNANT CELLS.   Carcinoma   Touch Prep slides display crowded groups and single lying malignant   cells with enlarged nuclei containing prominent nucleoli and vacuolated   cytoplasm. Core biopsies show neoplastic cells positive for CK7 and CDX2   immunostains, while negative for CK20, TTF1, GATA3, TRPS1 and PAX8. 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 Suggest correlation with clinical information and imaging to   assist with next step management.   Moleculars: pMMR, PD-L1 TPS 1%; Her2 pending      TUMOR MARKERS  3/13 CEA 7.1  3/24 Ca 19-9 27  4/8 Ca-125 187, Ca 27.29 16.9, Ca 15-3 18.9, AFP 5.2    ABGs  5/19 7.38/47/90/28  5/18 (after bipap) 7.34/48/131/26  5/18 (before bipap) 7.28/54/63/27    MICROBIOLOGY  5/1 Procal 0.44    Abx  $Bactrim ppx 4/8-5/15 (dced with completion of Dex taper)  $Cefepime 5/1-10  $Fluconazole 5/6-12  $Nystatin 5/4-6    Cx Data  4/5 MRSA swab: Not detected  3/16 Quant plus TB: Negative      PERTINENT RADIOLOGY  5/18 CXR: Slight decrease of right pleural effusion. No pneumothorax.    5/15 CXR: Redemonstrated small right-sided pleural effusion with underlying   atelectasis. Right-sided Pleurx catheter in stable position. No newfocal   consolidations.    5/13 CXR: Right-sided chest tube with effusion/atelectasis suspected.    5/10 CXR: Right effusion with suspected lower lobe atelectasis.    5/6 CXR: Right-sided Pleurx catheter with decreased effusion.    5/4 CXR: There is a catheter at the right base.  HEART: normal in size.  LUNGS: There is opacity at the right base compatible with   effusion/infiltrate.. Prominent interstitial markings, likely secondary   to chronic underlying lung disease..  BONES: degenerative changes    5/4 R elbow XR: Linear lucency in the coronoid process seen only on the lateral view   concerning for nondisplaced fracture. Consider cross-sectional imaging of   the elbow with CT or MRI for further evaluation.    5/3 CT c/a/p with IV contrast: Persistent right middle lobe consolidation and decreased right lower lobe consolidation. New patchy nodular opacities in the right upper lobe and increased patchy and groundglass opacities in the left lung. Small right pleural effusion, unchanged. Trace left pleural effusion, decreased. No evidence of metastatic disease in the abdomen or pelvis.    5/1 CXR: Small right effusion unchanged with Pleurx catheter.    4/19 MRCP: Motion degraded study, particularly on postcontrast sequences.  Pancreatic neck T2 hyperintense lesion measures 16 mm, image 28 series 5,   without clear evidence of enhancement on postcontrast phases. Most likely   this is a side branch IPMN. Remainder of the pancreas is suboptimally   evaluated due to artifact. Follow-up MRI abdomen or pancreas protocol CT   can be considered in 3-6 months for reevaluation. Partially distended stomach. Duodenal diverticulum. Colon is also partially distended with mild to moderate stool burden. Correlate   clinically. Trace pleural effusions. Right-sided pleural catheter is partially   imaged. Lung parenchyma is incompletely characterized on MRI. Soft tissue   of the mediastinum is not adequately imaged on this study.    4/18 B/L LE Doppler: Acute deep venous thrombosis: above the knee.  Acute nonocclusive deep venous thrombosis in the right common femoral vein.    417 CXR: Clear lungs with minimal effusion and Pleurx catheter.    4/10 VA dopplers B/L UE: Extensive acute, occlusive DVT RIJ, innominate and subclavian veins.  Acute non-occlusive DVT w/in R axillary vein.  Extensive acute, occlusive DVT noted in L subclavian. axillary, and proximal brachial veins.  Acute non-occlusive DVT LIJ.  Superficial vein thromboses are noted in the B/L cephalic veins.    4/9 Echocardiogram: Normal LV systolic function.  Normal mitral valve w/ normal leaflet excursion.  No pericardial effusion seen    4/4 CXR: Clear lungs w/ R pleurx catheter in place.    ok4/4 CXR AM: Small R pleural effusion w/ pleurx cath improved    OSH Imaging ([facility])  3/29 CXR: Decrease in R pleural effusion.     3/28 B/L UE Dupplers: Acute DVT involving R IJ, innominate vein, subclavian, brachial, and L IJ.  Superficial venous thrombosis involving B/L cephalic veins.  + Edema of superficial soft tissue of UE R>L.    3/27 CT neck: Bulky and conglomerate LAD invading RIJ w/ thrombus extending up to hyoid level.  Thrombus is likely combination tumor and bland.  New LIJ nonocclusive thrombus.    3/27 CT Chest: New consolidations throughout the R lung worse RLL c/f aspiration PNA.  + R pleurx cath w/ decrease in R pleural effusion.  Extensive DVT w/in thoracic and lower neck, upper SVC remains obliterated.  Large R supraclavicular mass infiltrating into the mediastinum.      3/27 CXR: Progression of R consolidation/effusion.    3/21 TTE: LV grossly normal.  Thickened pericardium.  no pericardial effusion.    3/18 TTE: Mod pericardial effusion w/ e/o hemodynamic compromise w/ diasolic collapse of RA that exceeds 1/3 of cardiac cycle >30% resp variation across MV E. wave and early diastolic inversion of RV.    3/14 CT Chest: Conglomerate soft tissue in superior mediastinum and R supraclavicular region 2/2 LAD w/ internal hypodensity c/f necrosis.  Mass encases SVC and multiple great veins resulting in obliteration of the SVC and thrombosis of the RIJ.  Multiple R sided collateral vessels and R soft tissue edema.  B/L pulm nodules.    3/14 CT neck: Extensive cellulitis/myositis along R anterior lateral neck and R upper chest wall w/ inflammatory fat induration the deep soft tissue of neck.  Large supraclavicular/mediastinal mass lesion, presumably conglomerate of LN w/ mass effect and complete occlusion of RIJ w/ associated inflammation.  Complete occlusion of R subclavian vein and nearly occlusive thrombosis in the proximal L brachiocephalic vein.    3/13 RUQ Abd US: Cholelithiasis w/o e/o cholecystitis.  Dilated CBD w/o e/o intraductal stone or other obstruction. 1.4cm pancreatic cyst w/ mild duct dilatation.    3/12 CT abd/pelv: S/p R nephrectomy. no LAD.  New 1.6cm  pancreatic neck cystic lesion w/o main pancreatic ductal dilatation.    3/10 R breast US: No e/o breast abscess      RECENT ENDOSCOPY  5/4 Flex laryngoscopy    -- Nasopharynx had no mass or exudate.    -- Base of tongue was symmetric and not enlarged.    -- Vallecula was clear    -- Epiglottis, both aryepiglottic folds and both false vocal folds were normal    -- Arytenoids both without edema and erythema     -- True vocal folds were fully mobile and without lesions. possible R VC hypomobility, Incomplete glottic closure    -- Post cricoid area was clear.    -- Interarytenoid edema was absent    4/26 Upper EUS  - Normal esophagus.  - A large amount of food (residue) inthe stomach. Procedure was aborted.    4/24 Upper EUS   - Fluid in the middle third of the esophagus and in the lower third of the esophagus.  - A large amount of food (residue) in the stomach so EGD was aborted and EUS was not attempted.  - No specimens collected.

## 2024-05-20 LAB
ALBUMIN SERPL ELPH-MCNC: 2.4 G/DL — LOW (ref 3.3–5)
ALP SERPL-CCNC: 163 U/L — HIGH (ref 40–120)
ALT FLD-CCNC: 48 U/L — HIGH (ref 4–33)
ANION GAP SERPL CALC-SCNC: 10 MMOL/L — SIGNIFICANT CHANGE UP (ref 7–14)
ANISOCYTOSIS BLD QL: SLIGHT — SIGNIFICANT CHANGE UP
AST SERPL-CCNC: 10 U/L — SIGNIFICANT CHANGE UP (ref 4–32)
BASOPHILS # BLD AUTO: 0.1 K/UL — SIGNIFICANT CHANGE UP (ref 0–0.2)
BASOPHILS NFR BLD AUTO: 0.9 % — SIGNIFICANT CHANGE UP (ref 0–2)
BILIRUB SERPL-MCNC: <0.2 MG/DL — SIGNIFICANT CHANGE UP (ref 0.2–1.2)
BLD GP AB SCN SERPL QL: POSITIVE — SIGNIFICANT CHANGE UP
BUN SERPL-MCNC: 35 MG/DL — HIGH (ref 7–23)
CALCIUM SERPL-MCNC: 10 MG/DL — SIGNIFICANT CHANGE UP (ref 8.4–10.5)
CHLORIDE SERPL-SCNC: 107 MMOL/L — SIGNIFICANT CHANGE UP (ref 98–107)
CO2 SERPL-SCNC: 24 MMOL/L — SIGNIFICANT CHANGE UP (ref 22–31)
CREAT SERPL-MCNC: 0.88 MG/DL — SIGNIFICANT CHANGE UP (ref 0.5–1.3)
EGFR: 75 ML/MIN/1.73M2 — SIGNIFICANT CHANGE UP
EOSINOPHIL # BLD AUTO: 0 K/UL — SIGNIFICANT CHANGE UP (ref 0–0.5)
EOSINOPHIL NFR BLD AUTO: 0 % — SIGNIFICANT CHANGE UP (ref 0–6)
FUNGITELL: >500 PG/ML — HIGH
GLUCOSE SERPL-MCNC: 120 MG/DL — HIGH (ref 70–99)
HCT VFR BLD CALC: 23.4 % — LOW (ref 34.5–45)
HGB BLD-MCNC: 7.2 G/DL — LOW (ref 11.5–15.5)
IANC: 10.17 K/UL — HIGH (ref 1.8–7.4)
LYMPHOCYTES # BLD AUTO: 0.09 K/UL — LOW (ref 1–3.3)
LYMPHOCYTES # BLD AUTO: 0.8 % — LOW (ref 13–44)
MACROCYTES BLD QL: SLIGHT — SIGNIFICANT CHANGE UP
MAGNESIUM SERPL-MCNC: 1.8 MG/DL — SIGNIFICANT CHANGE UP (ref 1.6–2.6)
MCHC RBC-ENTMCNC: 29 PG — SIGNIFICANT CHANGE UP (ref 27–34)
MCHC RBC-ENTMCNC: 30.8 GM/DL — LOW (ref 32–36)
MCV RBC AUTO: 94.4 FL — SIGNIFICANT CHANGE UP (ref 80–100)
MONOCYTES # BLD AUTO: 0.18 K/UL — SIGNIFICANT CHANGE UP (ref 0–0.9)
MONOCYTES NFR BLD AUTO: 1.7 % — LOW (ref 2–14)
NEUTROPHILS # BLD AUTO: 10.34 K/UL — HIGH (ref 1.8–7.4)
NEUTROPHILS NFR BLD AUTO: 96.6 % — HIGH (ref 43–77)
PHOSPHATE SERPL-MCNC: 4 MG/DL — SIGNIFICANT CHANGE UP (ref 2.5–4.5)
PLAT MORPH BLD: NORMAL — SIGNIFICANT CHANGE UP
PLATELET # BLD AUTO: 367 K/UL — SIGNIFICANT CHANGE UP (ref 150–400)
PLATELET COUNT - ESTIMATE: NORMAL — SIGNIFICANT CHANGE UP
POLYCHROMASIA BLD QL SMEAR: SLIGHT — SIGNIFICANT CHANGE UP
POTASSIUM SERPL-MCNC: 4.4 MMOL/L — SIGNIFICANT CHANGE UP (ref 3.5–5.3)
POTASSIUM SERPL-SCNC: 4.4 MMOL/L — SIGNIFICANT CHANGE UP (ref 3.5–5.3)
PROT SERPL-MCNC: 5.4 G/DL — LOW (ref 6–8.3)
RBC # BLD: 2.48 M/UL — LOW (ref 3.8–5.2)
RBC # FLD: 15.9 % — HIGH (ref 10.3–14.5)
RBC BLD AUTO: ABNORMAL
RH IG SCN BLD-IMP: POSITIVE — SIGNIFICANT CHANGE UP
SODIUM SERPL-SCNC: 141 MMOL/L — SIGNIFICANT CHANGE UP (ref 135–145)
WBC # BLD: 10.7 K/UL — HIGH (ref 3.8–10.5)
WBC # FLD AUTO: 10.7 K/UL — HIGH (ref 3.8–10.5)

## 2024-05-20 PROCEDURE — 99232 SBSQ HOSP IP/OBS MODERATE 35: CPT

## 2024-05-20 RX ADMIN — Medication 3 MILLILITER(S): at 21:59

## 2024-05-20 RX ADMIN — Medication 15 MILLILITER(S): at 06:16

## 2024-05-20 RX ADMIN — Medication 4 MILLIGRAM(S): at 06:24

## 2024-05-20 RX ADMIN — Medication 1 SPRAY(S): at 06:15

## 2024-05-20 RX ADMIN — OXYCODONE HYDROCHLORIDE 30 MILLIGRAM(S): 5 TABLET ORAL at 13:42

## 2024-05-20 RX ADMIN — Medication 3 MILLILITER(S): at 03:31

## 2024-05-20 RX ADMIN — OXYCODONE HYDROCHLORIDE 30 MILLIGRAM(S): 5 TABLET ORAL at 05:42

## 2024-05-20 RX ADMIN — Medication 5 MILLIGRAM(S): at 15:33

## 2024-05-20 RX ADMIN — SODIUM CHLORIDE 4 MILLILITER(S): 9 INJECTION INTRAMUSCULAR; INTRAVENOUS; SUBCUTANEOUS at 21:59

## 2024-05-20 RX ADMIN — BUDESONIDE AND FORMOTEROL FUMARATE DIHYDRATE 2 PUFF(S): 160; 4.5 AEROSOL RESPIRATORY (INHALATION) at 23:48

## 2024-05-20 RX ADMIN — BUDESONIDE AND FORMOTEROL FUMARATE DIHYDRATE 2 PUFF(S): 160; 4.5 AEROSOL RESPIRATORY (INHALATION) at 10:42

## 2024-05-20 RX ADMIN — Medication 25 MILLIGRAM(S): at 06:18

## 2024-05-20 RX ADMIN — Medication 3 MILLILITER(S): at 07:05

## 2024-05-20 RX ADMIN — PANTOPRAZOLE SODIUM 40 MILLIGRAM(S): 20 TABLET, DELAYED RELEASE ORAL at 06:17

## 2024-05-20 RX ADMIN — OXYCODONE HYDROCHLORIDE 30 MILLIGRAM(S): 5 TABLET ORAL at 23:47

## 2024-05-20 RX ADMIN — ENOXAPARIN SODIUM 30 MILLIGRAM(S): 100 INJECTION SUBCUTANEOUS at 06:20

## 2024-05-20 RX ADMIN — Medication 1 TABLET(S): at 13:46

## 2024-05-20 RX ADMIN — NALOXEGOL OXALATE 25 MILLIGRAM(S): 12.5 TABLET, FILM COATED ORAL at 13:46

## 2024-05-20 RX ADMIN — SODIUM CHLORIDE 4 MILLILITER(S): 9 INJECTION INTRAMUSCULAR; INTRAVENOUS; SUBCUTANEOUS at 07:05

## 2024-05-20 RX ADMIN — ENOXAPARIN SODIUM 30 MILLIGRAM(S): 100 INJECTION SUBCUTANEOUS at 18:03

## 2024-05-20 RX ADMIN — Medication 5 MILLIGRAM(S): at 23:52

## 2024-05-20 RX ADMIN — Medication 15 MILLILITER(S): at 23:49

## 2024-05-20 RX ADMIN — Medication 3 MILLILITER(S): at 14:57

## 2024-05-20 RX ADMIN — AMIODARONE HYDROCHLORIDE 200 MILLIGRAM(S): 400 TABLET ORAL at 06:18

## 2024-05-20 RX ADMIN — Medication 1 PATCH: at 12:00

## 2024-05-20 RX ADMIN — Medication 1 PATCH: at 07:37

## 2024-05-20 RX ADMIN — CHLORHEXIDINE GLUCONATE 1 APPLICATION(S): 213 SOLUTION TOPICAL at 13:48

## 2024-05-20 RX ADMIN — Medication 25 MILLIGRAM(S): at 18:03

## 2024-05-20 RX ADMIN — Medication 1 APPLICATION(S): at 06:19

## 2024-05-20 RX ADMIN — Medication 1 PATCH: at 13:45

## 2024-05-20 RX ADMIN — Medication 15 MILLILITER(S): at 13:45

## 2024-05-20 RX ADMIN — Medication 15 MILLILITER(S): at 00:18

## 2024-05-20 NOTE — PROGRESS NOTE ADULT - ASSESSMENT
59 yo woman, former smoker, with h/o R eye glaucoma, Fibromyalgia, Anxiety, GERD, and RCC s/p R total nephrectomy/hysterectomy; she was initially admitted to [facility] on 3/11 w/ R breast swelling c/f mastitis- imaging brooks noted supraclavicular/mediastinal mass lesion which encased the SVC and multiple other veins resulting in obliteration of the SVC and thrombosis of the R IJ, and a pancreatic neck cystic lesion.  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  Her disease/hospital course has also been c/b pericardial effusion s/p pericardial window w/ neg cytology, R pleural effusion, also negative cytology) s/p Pleurx, Afib w/ RVR and acute hypoxic resp failure 2/2 SVC syndrome- not dennis to IR-guided stenting; pt was ultimately started on Dex and transferred to [facility] on 4/4 for urgent palliative RT which she completed on 4/18. Her hospital course has also been c/b pericardial effusion with tamponade s/p window and non malignant R pleural effusion s/p pleurex, acute b/l UE and RLE dvts, anxiety, and more recently, recurrent hypoxia.    ACTIVE PROBLEMS  Metastatic CUP (carcinoma of unknown primary)  Goals of care discussion, counseling  Encounter for antineoplastic chemotherapy  Acute hypoxic + hypercapneic respiratory failure  Hyperkalemia  Dysphonia with R VC hypomobility   Oral thrush  SVC syndrome  Extensive b/l UE DVTs  Acute RLE DVT a/w RLE ecchymosis  Hypercoag state  Pericardial effusion with tamp physiology s/p pericardial window  R pleural effusion s/p pleurex  pAfib, with RVR on this admission  Anxiety/emotional lability  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)  Cancer-related pain, anxiety  Severe prot-ghazala malnutrition    - Metastatic CUP (carcinoma of unknown primary)  - Goals of care discussion, counseling  - Encounter for antineoplastic chemotherapy  Based on 3/14 SC LN path, ddx includes primary UGI vs pancreaticobiliary primary (breast edema is likely 2/2 SVC syndrome); PD-L1 TPs 1%, pMMR, Her2 2+, FISH pending, Ki-67 30%  Ca-125 moderately elevated at 125; other tumor markers not significantly elevated  Additional diagnostic brooks includes:   * 4/19 MRCP showing pancreatic lesion (? IPMN, but pancreas was suboptimally evaluated)     * 4/24 and 4/26 EGD/EUS aborted as pt had large amount of food in the stomach that prevented passing of scope   * 4/30 UGIS aborted as pt felt too short of breath when laying down flat (improved after her pleurex was drained  Case d/w Dr. 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 Pt is a suboptimal candidate i/s/o her progressive debility and malnutrition (ECOG PS 3)   >> Discussed risks/benefits of proceeding with palliative chemo (inpt) vs best supportive care with pt's HCP Saeid on 5/10 and again on 5/13- he is amenable to trial of inpt chemotherapy and understands that hospice will be recommended if pt does not tolerate trial of inpt chemo; verbal and written chemo consent obtained on 5/13   * Pt s/p successful femoral vein central line placement by Surgery on 5/15 (to be removed after chemotherapy)   *  Pt s/p C1 palliative mFOLFOX6 (20% dose reduction), 5/16-18   >> She did not receive the full cycle as chemo was held on 5/18 when pt developed resp acidosis, and the chemo was not resumed after that  Pt's prognosis is very poor, unlikely to have any meaningful recovery; she is dnr/dni.     - Acute hypercapneic + hypoxic resp failure  Resp acidosis resolved after brief period on bipap  Pt is lethargic today, but O2 sats/HR stable on HF NC O2  Etiology of resp acidosis is unclear, but likely cancer-related vs ? volume overload i/s/o chemo infusion  Serial CXRs without acute changes (decreasing R pleural effusion)  NC CT chest and repeat TTE are pending (ordered on 5/18)  Will fu sputum culture and fungal studies (ordered 5/18)  Diuresing gently/prn: s/p Lasix IV 10mg x1 dose on 5/18, but held today given bump in Cr   * Of note, pt completed 10d course of empiric Cefepime on 5/10; trending WBCs/ procal; monitoring off abx for now unless there is evidence of recurrent infection  Dex taper completed on 5/14 (Bactrim pjp ppx dced on 5/15); resuming Dex IV 4mg BID today (with pjp/ppi ppx)  Continuing Symbicort 2 puffs BID  Continuing Duonebs q6/prn  Will continue routine pleurex drainage as below    - Dysphonia with R VC hypomobility  - Oral thrush  Appreciate ENT eval/recs: 5/4 Flex ellis pt with R VC hypomobility  Pt previously cleared for pureed diet s/p Cinesophagram, but she is currently NPO while on bipap (can resume pureed diet when pt is off bipap)  Completed 7d course of Fluconazole on 5/12    - SVC syndrome  Appreciate Rad Onc eval/recs  Completed 10fxs of palliative RT on 4/18     - Extensive b/l UE DVTs  - Acute RLE DVT a/w RLE ecchymosis  - Hypercoag state  Continuing therapeutic lovenox, benefits > risks   * Of note: pt has poor UE IV access and currently requires peripheral IV in her LE extremities for admin of medication; FV central line being placed today for IV chemo    - Pericardial effusion with tamp physiology s/p pericardial window  - R pleural effusion s/p pleurex  Likely inflammatory; both pleural and pericardial fluid cytology are negative for malignant cells  4/9 surveillance TTE with pEF and no WMAs  Repeat TTE ordered for brooks of new hypercapnea  Continuing R pleurex drainage- up to 500cc q48h/prn    - pAfib  Pt currently SR, HR controlled  Likely precipitated by malignancy, pericardial effusion, SVC syndrome  Continuing Metoprolol 25mg q12 with holding parameters  Continuing Amio 200mg daily (monitoring for toxicities)  Continuing telemetry    - Anxiety/emotional lability  Continuing Diazepam 5mg BID/prn and nightly  Pt has poor health literacy and clinical insight which is negatively impacting her medical decision making- when ask if she wanted to defer medical decision making to her boyfriend or if she had a designated HCP she replied "he's already sick of me"  Appreciate  consult: pt lacks medical decision making capacity; medical decision making will be deferred to pt's surrogate decision maker- Saeid Peña ([phone])    - 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)  Pt with h/o R eye glaucoma with surgery, but miosis can also be associated with Pancoast syndrome i/s/o extensive R upper lung tumor  Pt denies visual deficits or other related symptoms   MRI Brain without contrast ordered for further eval- pt has previously refused  Will continue to monitor closely    - Anemia in neoplastic disease  Hgb remains stable, 7.9 today  4/5 iron studies not c/w iron deficiency  VSS and pt without clinical s/s of active bleeding  4/17 hemolysis labs negative; 4/18 FOBT negative x2  Trending daily cbcs; continuing supportive transfusions prn (pt does not have h/o ACS, CAD, MI, goal hgb > 7; plts > 10K)    - Cancer related pain  Currently well controlled  Continuing Oxy ER 30mg q12 q8  Continuing Oxy IR 10mg q4/prn  Continuing Dilaudid IV prn for sev breakthrough pain  Continuing bowel regimen to prevent OIC (including Movantic)    - Severe prot-ghazala malnutrition: appreciate RD recs; continuing regular diet with protein shake supplement BID (SLP eval pending as above)

## 2024-05-20 NOTE — PROGRESS NOTE ADULT - SUBJECTIVE AND OBJECTIVE BOX
eRno Doe MD  Academic Hospitalist  Pager [phone]  Email: [email or web address]  Available on [employer] Teams        PROGRESS NOTE:     Patient is a 58y old  Female who presents with a chief complaint of SVC syndrome, DVT, mediastinal mass (16 May 2024 08:47)      SUBJECTIVE / OVERNIGHT EVENTS:  Patient seen and examined this morning. Patient lethargic, not fully interactive.   ADDITIONAL REVIEW OF SYSTEMS:  Review of system unobtainable secondary to mental status.    MEDICATIONS  (STANDING):  albuterol/ipratropium for Nebulization 3 milliLiter(s) Nebulizer every 6 hours  aMIOdarone    Tablet 200 milliGRAM(s) Oral daily  Biotene Dry Mouth Oral Rinse 15 milliLiter(s) Swish and Spit every 6 hours  budesonide 160 MICROgram(s)/formoterol 4.5 MICROgram(s) Inhaler 2 Puff(s) Inhalation two times a day  chlorhexidine 2% Cloths 1 Application(s) Topical daily  dexAMETHasone  Injectable 4 milliGRAM(s) IV Push every 12 hours  diazepam    Tablet 5 milliGRAM(s) Oral at bedtime  enoxaparin Injectable 30 milliGRAM(s) SubCutaneous every 12 hours  influenza   Vaccine 0.5 milliLiter(s) IntraMuscular once  metoclopramide Injectable 10 milliGRAM(s) IV Push every 8 hours  metoprolol tartrate 25 milliGRAM(s) Oral every 12 hours  multivitamin 1 Tablet(s) Oral daily  naloxegol 25 milliGRAM(s) Oral daily  nicotine -  14 mG/24Hr(s) Patch 1 Patch Transdermal every 24 hours  oxyCODONE  ER Tablet 30 milliGRAM(s) Oral <User Schedule>  pantoprazole  Injectable 40 milliGRAM(s) IV Push every 12 hours  petrolatum white Ointment 1 Application(s) Topical two times a day  polyethylene glycol 3350 17 Gram(s) Oral every 12 hours  sodium chloride 0.65% Nasal 1 Spray(s) Both Nostrils two times a day  sodium chloride 3%  Inhalation 4 milliLiter(s) Inhalation every 12 hours    MEDICATIONS  (PRN):  aluminum hydroxide/magnesium hydroxide/simethicone Suspension 30 milliLiter(s) Oral every 6 hours PRN Dyspepsia  benzocaine/menthol Lozenge 1 Lozenge Oral two times a day PRN Sore Throat  Biotene Dry Mouth Oral Rinse 15 milliLiter(s) Swish and Spit two times a day PRN dry mouth  diazepam    Tablet 5 milliGRAM(s) Oral two times a day PRN anxiety  FIRST- Mouthwash  BLM 5 milliLiter(s) Swish and Spit four times a day PRN Mouth Care  ondansetron    Tablet 4 milliGRAM(s) Oral every 8 hours PRN Nausea and/or Vomiting  oxyCODONE    IR 10 milliGRAM(s) Oral every 4 hours PRN Moderate Pain (4 - 6)  sodium chloride 0.9% lock flush 10 milliLiter(s) IV Push every 1 hour PRN Pre/post blood products, medications, blood draw, and to maintain line patency      CAPILLARY BLOOD GLUCOSE        I&O's Summary    19 May 2024 07:01  -  20 May 2024 07:00  --------------------------------------------------------  IN: 0 mL / OUT: 350 mL / NET: -350 mL        PHYSICAL EXAM:  Vital Signs Last 24 Hrs  T(C): 36.6 (20 May 2024 06:04), Max: 36.7 (19 May 2024 18:00)  T(F): 97.8 (20 May 2024 06:04), Max: 98 (19 May 2024 18:00)  HR: 85 (20 May 2024 11:24) (83 - 96)  BP: 106/56 (20 May 2024 06:04) (106/56 - 147/70)  BP(mean): --  RR: 18 (20 May 2024 11:24) (18 - 20)  SpO2: 98% (20 May 2024 11:24) (97% - 100%)    Parameters below as of 20 May 2024 11:24  Patient On (Oxygen Delivery Method): nasal cannula, high flow  O2 Flow (L/min): 40  O2 Concentration (%): 40    Cachectic, [id number] woman, sleeping, lethargic  Pt still dysphonic, but able to write in comprehensive sentences to communicate  Mild R eyelid ptosis and R pupil miosis present/unchanged; R eye with scleral injection; no oral lesions/thrush  RRR, no m/r/g  Breaths mildly labored but pt is not tachypneic; trace inspiratory wheeze and transmitted upper airway sounds auscultated in all lung zone  Abd soft/nt/nd, normoactive bowel sounds  Trace LUE non-pitting edema present; large area of ecchymosis extending from the post R calf to post R thigh- improving; generalized muscular atrophy in all 4 extremities      LABS:                        7.2    10.70 )-----------( 367      ( 20 May 2024 05:54 )             23.4     05-20    141  |  107  |  35<H>  ----------------------------<  120<H>  4.4   |  24  |  0.88    Ca    10.0      20 May 2024 05:54  Phos  4.0     05-20  Mg     1.80     05-20    TPro  5.4<L>  /  Alb  2.4<L>  /  TBili  <0.2  /  DBili  x   /  AST  10  /  ALT  48<H>  /  AlkPhos  163<H>  05-20          Urinalysis Basic - ( 20 May 2024 05:54 )    Color: x / Appearance: x / SG: x / pH: x  Gluc: 120 mg/dL / Ketone: x  / Bili: x / Urobili: x   Blood: x / Protein: x / Nitrite: x   Leuk Esterase: x / RBC: x / WBC x   Sq Epi: x / Non Sq Epi: x / Bacteria: x          RADIOLOGY & ADDITIONAL TESTS:  Results Reviewed:   Imaging Personally Reviewed:  Electrocardiogram Personally Reviewed:    COORDINATION OF CARE:  Care Discussed with Consultants/Other Providers [Y/N]: Case discussed during interdisciplinary rounds with social work and case management  Prior or Outpatient Records Reviewed [Y/N]:

## 2024-05-21 LAB
ALBUMIN SERPL ELPH-MCNC: 2.4 G/DL — LOW (ref 3.3–5)
ALP SERPL-CCNC: 152 U/L — HIGH (ref 40–120)
ALT FLD-CCNC: 41 U/L — HIGH (ref 4–33)
ANION GAP SERPL CALC-SCNC: 9 MMOL/L — SIGNIFICANT CHANGE UP (ref 7–14)
AST SERPL-CCNC: 9 U/L — SIGNIFICANT CHANGE UP (ref 4–32)
BASOPHILS # BLD AUTO: 0.01 K/UL — SIGNIFICANT CHANGE UP (ref 0–0.2)
BASOPHILS NFR BLD AUTO: 0.1 % — SIGNIFICANT CHANGE UP (ref 0–2)
BILIRUB SERPL-MCNC: <0.2 MG/DL — SIGNIFICANT CHANGE UP (ref 0.2–1.2)
BUN SERPL-MCNC: 34 MG/DL — HIGH (ref 7–23)
CALCIUM SERPL-MCNC: 10.8 MG/DL — HIGH (ref 8.4–10.5)
CHLORIDE SERPL-SCNC: 108 MMOL/L — HIGH (ref 98–107)
CO2 SERPL-SCNC: 28 MMOL/L — SIGNIFICANT CHANGE UP (ref 22–31)
CREAT SERPL-MCNC: 0.87 MG/DL — SIGNIFICANT CHANGE UP (ref 0.5–1.3)
EGFR: 76 ML/MIN/1.73M2 — SIGNIFICANT CHANGE UP
EOSINOPHIL # BLD AUTO: 0.02 K/UL — SIGNIFICANT CHANGE UP (ref 0–0.5)
EOSINOPHIL NFR BLD AUTO: 0.2 % — SIGNIFICANT CHANGE UP (ref 0–6)
GLUCOSE SERPL-MCNC: 94 MG/DL — SIGNIFICANT CHANGE UP (ref 70–99)
HCT VFR BLD CALC: 24.2 % — LOW (ref 34.5–45)
HGB BLD-MCNC: 7.5 G/DL — LOW (ref 11.5–15.5)
IANC: 11.32 K/UL — HIGH (ref 1.8–7.4)
IMM GRANULOCYTES NFR BLD AUTO: 0.8 % — SIGNIFICANT CHANGE UP (ref 0–0.9)
LYMPHOCYTES # BLD AUTO: 0.24 K/UL — LOW (ref 1–3.3)
LYMPHOCYTES # BLD AUTO: 2 % — LOW (ref 13–44)
MAGNESIUM SERPL-MCNC: 1.8 MG/DL — SIGNIFICANT CHANGE UP (ref 1.6–2.6)
MCHC RBC-ENTMCNC: 29.4 PG — SIGNIFICANT CHANGE UP (ref 27–34)
MCHC RBC-ENTMCNC: 31 GM/DL — LOW (ref 32–36)
MCV RBC AUTO: 94.9 FL — SIGNIFICANT CHANGE UP (ref 80–100)
MONOCYTES # BLD AUTO: 0.25 K/UL — SIGNIFICANT CHANGE UP (ref 0–0.9)
MONOCYTES NFR BLD AUTO: 2.1 % — SIGNIFICANT CHANGE UP (ref 2–14)
NEUTROPHILS # BLD AUTO: 11.32 K/UL — HIGH (ref 1.8–7.4)
NEUTROPHILS NFR BLD AUTO: 94.8 % — HIGH (ref 43–77)
NRBC # BLD: 0 /100 WBCS — SIGNIFICANT CHANGE UP (ref 0–0)
NRBC # FLD: 0 K/UL — SIGNIFICANT CHANGE UP (ref 0–0)
PHOSPHATE SERPL-MCNC: 3.5 MG/DL — SIGNIFICANT CHANGE UP (ref 2.5–4.5)
PLATELET # BLD AUTO: 351 K/UL — SIGNIFICANT CHANGE UP (ref 150–400)
POTASSIUM SERPL-MCNC: 4.5 MMOL/L — SIGNIFICANT CHANGE UP (ref 3.5–5.3)
POTASSIUM SERPL-SCNC: 4.5 MMOL/L — SIGNIFICANT CHANGE UP (ref 3.5–5.3)
PROT SERPL-MCNC: 5.5 G/DL — LOW (ref 6–8.3)
RBC # BLD: 2.55 M/UL — LOW (ref 3.8–5.2)
RBC # FLD: 15.9 % — HIGH (ref 10.3–14.5)
SODIUM SERPL-SCNC: 145 MMOL/L — SIGNIFICANT CHANGE UP (ref 135–145)
WBC # BLD: 11.94 K/UL — HIGH (ref 3.8–10.5)
WBC # FLD AUTO: 11.94 K/UL — HIGH (ref 3.8–10.5)

## 2024-05-21 PROCEDURE — 99232 SBSQ HOSP IP/OBS MODERATE 35: CPT

## 2024-05-21 RX ORDER — DEXAMETHASONE 0.5 MG/5ML
4 ELIXIR ORAL EVERY 12 HOURS
Refills: 0 | Status: DISCONTINUED | OUTPATIENT
Start: 2024-05-21 | End: 2024-05-28

## 2024-05-21 RX ORDER — PANTOPRAZOLE SODIUM 20 MG/1
40 TABLET, DELAYED RELEASE ORAL EVERY 12 HOURS
Refills: 0 | Status: DISCONTINUED | OUTPATIENT
Start: 2024-05-21 | End: 2024-05-30

## 2024-05-21 RX ADMIN — OXYCODONE HYDROCHLORIDE 30 MILLIGRAM(S): 5 TABLET ORAL at 07:15

## 2024-05-21 RX ADMIN — OXYCODONE HYDROCHLORIDE 10 MILLIGRAM(S): 5 TABLET ORAL at 22:30

## 2024-05-21 RX ADMIN — Medication 1 PATCH: at 07:17

## 2024-05-21 RX ADMIN — BUDESONIDE AND FORMOTEROL FUMARATE DIHYDRATE 2 PUFF(S): 160; 4.5 AEROSOL RESPIRATORY (INHALATION) at 10:24

## 2024-05-21 RX ADMIN — BUDESONIDE AND FORMOTEROL FUMARATE DIHYDRATE 2 PUFF(S): 160; 4.5 AEROSOL RESPIRATORY (INHALATION) at 21:10

## 2024-05-21 RX ADMIN — Medication 1 PATCH: at 14:26

## 2024-05-21 RX ADMIN — Medication 3 MILLILITER(S): at 21:23

## 2024-05-21 RX ADMIN — Medication 3 MILLILITER(S): at 08:31

## 2024-05-21 RX ADMIN — SODIUM CHLORIDE 4 MILLILITER(S): 9 INJECTION INTRAMUSCULAR; INTRAVENOUS; SUBCUTANEOUS at 08:31

## 2024-05-21 RX ADMIN — Medication 25 MILLIGRAM(S): at 18:03

## 2024-05-21 RX ADMIN — Medication 1 PATCH: at 22:53

## 2024-05-21 RX ADMIN — PANTOPRAZOLE SODIUM 40 MILLIGRAM(S): 20 TABLET, DELAYED RELEASE ORAL at 18:02

## 2024-05-21 RX ADMIN — NALOXEGOL OXALATE 25 MILLIGRAM(S): 12.5 TABLET, FILM COATED ORAL at 14:25

## 2024-05-21 RX ADMIN — Medication 15 MILLILITER(S): at 06:47

## 2024-05-21 RX ADMIN — Medication 1 SPRAY(S): at 06:46

## 2024-05-21 RX ADMIN — ENOXAPARIN SODIUM 30 MILLIGRAM(S): 100 INJECTION SUBCUTANEOUS at 18:03

## 2024-05-21 RX ADMIN — Medication 4 MILLIGRAM(S): at 21:12

## 2024-05-21 RX ADMIN — Medication 15 MILLILITER(S): at 18:05

## 2024-05-21 RX ADMIN — CHLORHEXIDINE GLUCONATE 1 APPLICATION(S): 213 SOLUTION TOPICAL at 14:32

## 2024-05-21 RX ADMIN — OXYCODONE HYDROCHLORIDE 30 MILLIGRAM(S): 5 TABLET ORAL at 14:24

## 2024-05-21 RX ADMIN — OXYCODONE HYDROCHLORIDE 10 MILLIGRAM(S): 5 TABLET ORAL at 23:30

## 2024-05-21 RX ADMIN — Medication 3 MILLILITER(S): at 15:19

## 2024-05-21 RX ADMIN — OXYCODONE HYDROCHLORIDE 30 MILLIGRAM(S): 5 TABLET ORAL at 00:35

## 2024-05-21 RX ADMIN — Medication 1 TABLET(S): at 14:26

## 2024-05-21 RX ADMIN — SODIUM CHLORIDE 4 MILLILITER(S): 9 INJECTION INTRAMUSCULAR; INTRAVENOUS; SUBCUTANEOUS at 21:23

## 2024-05-21 RX ADMIN — Medication 1 APPLICATION(S): at 06:48

## 2024-05-21 RX ADMIN — AMIODARONE HYDROCHLORIDE 200 MILLIGRAM(S): 400 TABLET ORAL at 06:48

## 2024-05-21 RX ADMIN — Medication 3 MILLILITER(S): at 03:54

## 2024-05-21 RX ADMIN — Medication 5 MILLIGRAM(S): at 21:12

## 2024-05-21 RX ADMIN — ENOXAPARIN SODIUM 30 MILLIGRAM(S): 100 INJECTION SUBCUTANEOUS at 06:48

## 2024-05-21 RX ADMIN — Medication 1 PATCH: at 13:17

## 2024-05-21 RX ADMIN — Medication 25 MILLIGRAM(S): at 06:49

## 2024-05-21 RX ADMIN — POLYETHYLENE GLYCOL 3350 17 GRAM(S): 17 POWDER, FOR SOLUTION ORAL at 06:48

## 2024-05-21 NOTE — PROGRESS NOTE ADULT - ASSESSMENT
59 yo woman, former smoker, with h/o R eye glaucoma, Fibromyalgia, Anxiety, GERD, and RCC s/p R total nephrectomy/hysterectomy; she was initially admitted to [facility] on 3/11 w/ R breast swelling c/f mastitis- imaging brooks noted supraclavicular/mediastinal mass lesion which encased the SVC and multiple other veins resulting in obliteration of the SVC and thrombosis of the R IJ, and a pancreatic neck cystic lesion.  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  Her disease/hospital course has also been c/b pericardial effusion s/p pericardial window w/ neg cytology, R pleural effusion, also negative cytology) s/p Pleurx, Afib w/ RVR and acute hypoxic resp failure 2/2 SVC syndrome- not dennis to IR-guided stenting; pt was ultimately started on Dex and transferred to [facility] on 4/4 for urgent palliative RT which she completed on 4/18. Her hospital course has also been c/b pericardial effusion with tamponade s/p window and non malignant R pleural effusion s/p pleurex, acute b/l UE and RLE dvts, anxiety, and more recently, recurrent hypoxia.    ACTIVE PROBLEMS  Metastatic CUP (carcinoma of unknown primary)  Goals of care discussion, counseling  Encounter for antineoplastic chemotherapy  Acute hypoxic + hypercapneic respiratory failure  Hyperkalemia  Dysphonia with R VC hypomobility   Oral thrush  SVC syndrome  Extensive b/l UE DVTs  Acute RLE DVT a/w RLE ecchymosis  Hypercoag state  Pericardial effusion with tamp physiology s/p pericardial window  R pleural effusion s/p pleurex  pAfib, with RVR on this admission  Anxiety/emotional lability  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)  Cancer-related pain, anxiety  Severe prot-ghazala malnutrition    - Metastatic CUP (carcinoma of unknown primary)  - Goals of care discussion, counseling  - Encounter for antineoplastic chemotherapy  Based on 3/14 SC LN path, ddx includes primary UGI vs pancreaticobiliary primary (breast edema is likely 2/2 SVC syndrome); PD-L1 TPs 1%, pMMR, Her2 2+, FISH pending, Ki-67 30%  Ca-125 moderately elevated at 125; other tumor markers not significantly elevated  Additional diagnostic brooks includes:   * 4/19 MRCP showing pancreatic lesion (? IPMN, but pancreas was suboptimally evaluated)     * 4/24 and 4/26 EGD/EUS aborted as pt had large amount of food in the stomach that prevented passing of scope   * 4/30 UGIS aborted as pt felt too short of breath when laying down flat (improved after her pleurex was drained  Case d/w Dr. 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 Pt is a suboptimal candidate i/s/o her progressive debility and malnutrition (ECOG PS 3)   >> Discussed risks/benefits of proceeding with palliative chemo (inpt) vs best supportive care with pt's HCP Saeid on 5/10 and again on 5/13- he is amenable to trial of inpt chemotherapy and understands that hospice will be recommended if pt does not tolerate trial of inpt chemo; verbal and written chemo consent obtained on 5/13   * Pt s/p successful femoral vein central line placement by Surgery on 5/15 (to be removed after chemotherapy)   *  Pt s/p C1 palliative mFOLFOX6 (20% dose reduction), 5/16-18   >> She did not receive the full cycle as chemo was held on 5/18 when pt developed resp acidosis, and the chemo was not resumed after that  Pt's prognosis is very poor, unlikely to have any meaningful recovery; she is dnr/dni. Breathing effort has noticeably deteriorated.     - Acute hypercapneic + hypoxic resp failure  Resp acidosis resolved after brief period on bipap  Pt is lethargic today, but O2 sats/HR stable on HF NC O2  Etiology of resp acidosis is unclear, but likely cancer-related vs ? volume overload i/s/o chemo infusion  Serial CXRs without acute changes (decreasing R pleural effusion)  NC CT chest and repeat TTE are pending (ordered on 5/18)  Will fu sputum culture and fungal studies (ordered 5/18)  Diuresing gently/prn: s/p Lasix IV 10mg x1 dose on 5/18, but held today given bump in Cr   * Of note, pt completed 10d course of empiric Cefepime on 5/10; trending WBCs/ procal; monitoring off abx for now unless there is evidence of recurrent infection  Dex taper completed on 5/14 (Bactrim pjp ppx dced on 5/15); resuming Dex IV 4mg BID today (with pjp/ppi ppx)  Continuing Symbicort 2 puffs BID  Continuing Duonebs q6/prn  Will continue routine pleurex drainage as below    - Dysphonia with R VC hypomobility  - Oral thrush  Appreciate ENT eval/recs: 5/4 Flex ellis pt with R VC hypomobility  Pt previously cleared for pureed diet s/p Cinesophagram, but she is currently NPO while on bipap (can resume pureed diet when pt is off bipap)  Completed 7d course of Fluconazole on 5/12    - SVC syndrome  Appreciate Rad Onc eval/recs  Completed 10fxs of palliative RT on 4/18     - Extensive b/l UE DVTs  - Acute RLE DVT a/w RLE ecchymosis  - Hypercoag state  Continuing therapeutic lovenox, benefits > risks   * Of note: pt has poor UE IV access and currently requires peripheral IV in her LE extremities for admin of medication; FV central line being placed today for IV chemo    - Pericardial effusion with tamp physiology s/p pericardial window  - R pleural effusion s/p pleurex  Likely inflammatory; both pleural and pericardial fluid cytology are negative for malignant cells  4/9 surveillance TTE with pEF and no WMAs  Repeat TTE ordered for brooks of new hypercapnea  Continuing R pleurex drainage- up to 500cc q48h/prn    - pAfib  Pt currently SR, HR controlled  Likely precipitated by malignancy, pericardial effusion, SVC syndrome  Continuing Metoprolol 25mg q12 with holding parameters  Continuing Amio 200mg daily (monitoring for toxicities)  Continuing telemetry    - Anxiety/emotional lability  Continuing Diazepam 5mg BID/prn and nightly  Pt has poor health literacy and clinical insight which is negatively impacting her medical decision making- when ask if she wanted to defer medical decision making to her boyfriend or if she had a designated HCP she replied "he's already sick of me"  Appreciate  consult: pt lacks medical decision making capacity; medical decision making will be deferred to pt's surrogate decision maker- Saeid Peña ([phone])    - 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)  Pt with h/o R eye glaucoma with surgery, but miosis can also be associated with Pancoast syndrome i/s/o extensive R upper lung tumor  Pt denies visual deficits or other related symptoms   MRI Brain without contrast ordered for further eval- pt has previously refused  Will continue to monitor closely    - Anemia in neoplastic disease  Hgb remains stable, 7.9 today  4/5 iron studies not c/w iron deficiency  VSS and pt without clinical s/s of active bleeding  4/17 hemolysis labs negative; 4/18 FOBT negative x2  Trending daily cbcs; continuing supportive transfusions prn (pt does not have h/o ACS, CAD, MI, goal hgb > 7; plts > 10K)    - Cancer related pain  Currently well controlled  Continuing Oxy ER 30mg q12 q8  Continuing Oxy IR 10mg q4/prn  Continuing Dilaudid IV prn for sev breakthrough pain  Continuing bowel regimen to prevent OIC (including Movantic)    - Severe prot-ghazala malnutrition: appreciate RD recs; continuing regular diet with protein shake supplement BID (SLP eval pending as above)

## 2024-05-21 NOTE — PROGRESS NOTE ADULT - SUBJECTIVE AND OBJECTIVE BOX
Reno Doe MD  Academic Hospitalist  Pager [phone]  Email: [email or web address]  Available on [employer] Teams        PROGRESS NOTE:     Patient is a 58y old  Female who presents with a chief complaint of SVC syndrome, DVT, mediastinal mass (20 May 2024 13:11)      SUBJECTIVE / OVERNIGHT EVENTS:  Patient seen and examined this morning. Not interacting, lethargic, unfortunately appears like her condition is deteriorating.   ADDITIONAL REVIEW OF SYSTEMS:  Review of system unobtainable secondary to mental status.    MEDICATIONS  (STANDING):  albuterol/ipratropium for Nebulization 3 milliLiter(s) Nebulizer every 6 hours  aMIOdarone    Tablet 200 milliGRAM(s) Oral daily  Biotene Dry Mouth Oral Rinse 15 milliLiter(s) Swish and Spit every 6 hours  budesonide 160 MICROgram(s)/formoterol 4.5 MICROgram(s) Inhaler 2 Puff(s) Inhalation two times a day  chlorhexidine 2% Cloths 1 Application(s) Topical daily  dexAMETHasone  Injectable 4 milliGRAM(s) IV Push every 12 hours  diazepam    Tablet 5 milliGRAM(s) Oral at bedtime  enoxaparin Injectable 30 milliGRAM(s) SubCutaneous every 12 hours  influenza   Vaccine 0.5 milliLiter(s) IntraMuscular once  metoclopramide Injectable 10 milliGRAM(s) IV Push every 8 hours  metoprolol tartrate 25 milliGRAM(s) Oral every 12 hours  multivitamin 1 Tablet(s) Oral daily  naloxegol 25 milliGRAM(s) Oral daily  nicotine -  14 mG/24Hr(s) Patch 1 Patch Transdermal every 24 hours  oxyCODONE  ER Tablet 30 milliGRAM(s) Oral <User Schedule>  pantoprazole  Injectable 40 milliGRAM(s) IV Push every 12 hours  petrolatum white Ointment 1 Application(s) Topical two times a day  polyethylene glycol 3350 17 Gram(s) Oral every 12 hours  sodium chloride 0.65% Nasal 1 Spray(s) Both Nostrils two times a day  sodium chloride 3%  Inhalation 4 milliLiter(s) Inhalation every 12 hours    MEDICATIONS  (PRN):  aluminum hydroxide/magnesium hydroxide/simethicone Suspension 30 milliLiter(s) Oral every 6 hours PRN Dyspepsia  benzocaine/menthol Lozenge 1 Lozenge Oral two times a day PRN Sore Throat  Biotene Dry Mouth Oral Rinse 15 milliLiter(s) Swish and Spit two times a day PRN dry mouth  diazepam    Tablet 5 milliGRAM(s) Oral two times a day PRN anxiety  FIRST- Mouthwash  BLM 5 milliLiter(s) Swish and Spit four times a day PRN Mouth Care  ondansetron    Tablet 4 milliGRAM(s) Oral every 8 hours PRN Nausea and/or Vomiting  oxyCODONE    IR 10 milliGRAM(s) Oral every 4 hours PRN Moderate Pain (4 - 6)  sodium chloride 0.9% lock flush 10 milliLiter(s) IV Push every 1 hour PRN Pre/post blood products, medications, blood draw, and to maintain line patency      CAPILLARY BLOOD GLUCOSE        I&O's Summary      PHYSICAL EXAM:  Vital Signs Last 24 Hrs  T(C): 36.4 (21 May 2024 11:52), Max: 36.9 (20 May 2024 18:00)  T(F): 97.6 (21 May 2024 11:52), Max: 98.5 (20 May 2024 18:00)  HR: 89 (21 May 2024 11:52) (82 - 99)  BP: 92/46 (21 May 2024 11:52) (92/46 - 148/86)  BP(mean): --  RR: 18 (21 May 2024 11:52) (17 - 19)  SpO2: 96% (21 May 2024 11:52) (96% - 100%)    Parameters below as of 21 May 2024 11:52  Patient On (Oxygen Delivery Method): nasal cannula, high flow        Cachectic, yntokujwtba-bsm-flqrccphx woman, sleeping, lethargic, almost appearing like agonal breathing   Pt still dysphonic, but able to write in comprehensive sentences to communicate  Mild R eyelid ptosis and R pupil miosis present/unchanged; R eye with scleral injection; no oral lesions/thrush  RRR, no m/r/g  Breaths mildly labored, effort worsening, though not tachypneic; trace inspiratory wheeze and transmitted upper airway sounds auscultated in all lung zone  Abd soft/nt/nd, normoactive bowel sounds      LABS:                        7.5    11.94 )-----------( 351      ( 21 May 2024 07:12 )             24.2     05-21    145  |  108<H>  |  34<H>  ----------------------------<  94  4.5   |  28  |  0.87    Ca    10.8<H>      21 May 2024 07:12  Phos  3.5     05-21  Mg     1.80     05-21    TPro  5.5<L>  /  Alb  2.4<L>  /  TBili  <0.2  /  DBili  x   /  AST  9   /  ALT  41<H>  /  AlkPhos  152<H>  05-21          Urinalysis Basic - ( 21 May 2024 07:12 )    Color: x / Appearance: x / SG: x / pH: x  Gluc: 94 mg/dL / Ketone: x  / Bili: x / Urobili: x   Blood: x / Protein: x / Nitrite: x   Leuk Esterase: x / RBC: x / WBC x   Sq Epi: x / Non Sq Epi: x / Bacteria: x          RADIOLOGY & ADDITIONAL TESTS:  Results Reviewed:   Imaging Personally Reviewed:  Electrocardiogram Personally Reviewed:    COORDINATION OF CARE:  Care Discussed with Consultants/Other Providers [Y/N]: Case discussed during interdisciplinary rounds with social work and case management. Her condition is deteriorating and her prognosis is very poor.   Prior or Outpatient Records Reviewed [Y/N]:

## 2024-05-22 LAB
ALBUMIN SERPL ELPH-MCNC: 2.6 G/DL — LOW (ref 3.3–5)
ALP SERPL-CCNC: 148 U/L — HIGH (ref 40–120)
ALT FLD-CCNC: 35 U/L — HIGH (ref 4–33)
ANION GAP SERPL CALC-SCNC: 9 MMOL/L — SIGNIFICANT CHANGE UP (ref 7–14)
AST SERPL-CCNC: 8 U/L — SIGNIFICANT CHANGE UP (ref 4–32)
BASOPHILS # BLD AUTO: 0.01 K/UL — SIGNIFICANT CHANGE UP (ref 0–0.2)
BASOPHILS NFR BLD AUTO: 0.1 % — SIGNIFICANT CHANGE UP (ref 0–2)
BILIRUB SERPL-MCNC: <0.2 MG/DL — SIGNIFICANT CHANGE UP (ref 0.2–1.2)
BUN SERPL-MCNC: 35 MG/DL — HIGH (ref 7–23)
CALCIUM SERPL-MCNC: 10.3 MG/DL — SIGNIFICANT CHANGE UP (ref 8.4–10.5)
CHLORIDE SERPL-SCNC: 107 MMOL/L — SIGNIFICANT CHANGE UP (ref 98–107)
CO2 SERPL-SCNC: 27 MMOL/L — SIGNIFICANT CHANGE UP (ref 22–31)
CREAT SERPL-MCNC: 0.93 MG/DL — SIGNIFICANT CHANGE UP (ref 0.5–1.3)
EGFR: 70 ML/MIN/1.73M2 — SIGNIFICANT CHANGE UP
EOSINOPHIL # BLD AUTO: 0 K/UL — SIGNIFICANT CHANGE UP (ref 0–0.5)
EOSINOPHIL NFR BLD AUTO: 0 % — SIGNIFICANT CHANGE UP (ref 0–6)
GLUCOSE SERPL-MCNC: 120 MG/DL — HIGH (ref 70–99)
HCT VFR BLD CALC: 23.6 % — LOW (ref 34.5–45)
HGB BLD-MCNC: 7.3 G/DL — LOW (ref 11.5–15.5)
IANC: 13.2 K/UL — HIGH (ref 1.8–7.4)
IMM GRANULOCYTES NFR BLD AUTO: 0.9 % — SIGNIFICANT CHANGE UP (ref 0–0.9)
LYMPHOCYTES # BLD AUTO: 0.14 K/UL — LOW (ref 1–3.3)
LYMPHOCYTES # BLD AUTO: 1 % — LOW (ref 13–44)
MAGNESIUM SERPL-MCNC: 1.7 MG/DL — SIGNIFICANT CHANGE UP (ref 1.6–2.6)
MCHC RBC-ENTMCNC: 28.5 PG — SIGNIFICANT CHANGE UP (ref 27–34)
MCHC RBC-ENTMCNC: 30.9 GM/DL — LOW (ref 32–36)
MCV RBC AUTO: 92.2 FL — SIGNIFICANT CHANGE UP (ref 80–100)
MONOCYTES # BLD AUTO: 0.25 K/UL — SIGNIFICANT CHANGE UP (ref 0–0.9)
MONOCYTES NFR BLD AUTO: 1.8 % — LOW (ref 2–14)
NEUTROPHILS # BLD AUTO: 13.2 K/UL — HIGH (ref 1.8–7.4)
NEUTROPHILS NFR BLD AUTO: 96.2 % — HIGH (ref 43–77)
NRBC # BLD: 0 /100 WBCS — SIGNIFICANT CHANGE UP (ref 0–0)
NRBC # FLD: 0 K/UL — SIGNIFICANT CHANGE UP (ref 0–0)
PHOSPHATE SERPL-MCNC: 3.9 MG/DL — SIGNIFICANT CHANGE UP (ref 2.5–4.5)
PLATELET # BLD AUTO: 306 K/UL — SIGNIFICANT CHANGE UP (ref 150–400)
POTASSIUM SERPL-MCNC: 4.7 MMOL/L — SIGNIFICANT CHANGE UP (ref 3.5–5.3)
POTASSIUM SERPL-SCNC: 4.7 MMOL/L — SIGNIFICANT CHANGE UP (ref 3.5–5.3)
PROT SERPL-MCNC: 5.4 G/DL — LOW (ref 6–8.3)
RBC # BLD: 2.56 M/UL — LOW (ref 3.8–5.2)
RBC # FLD: 16 % — HIGH (ref 10.3–14.5)
SODIUM SERPL-SCNC: 143 MMOL/L — SIGNIFICANT CHANGE UP (ref 135–145)
WBC # BLD: 13.72 K/UL — HIGH (ref 3.8–10.5)
WBC # FLD AUTO: 13.72 K/UL — HIGH (ref 3.8–10.5)

## 2024-05-22 PROCEDURE — 99232 SBSQ HOSP IP/OBS MODERATE 35: CPT

## 2024-05-22 RX ORDER — DIAZEPAM 5 MG
5 TABLET ORAL
Refills: 0 | Status: DISCONTINUED | OUTPATIENT
Start: 2024-05-22 | End: 2024-05-28

## 2024-05-22 RX ORDER — OXYCODONE HYDROCHLORIDE 5 MG/1
10 TABLET ORAL EVERY 4 HOURS
Refills: 0 | Status: DISCONTINUED | OUTPATIENT
Start: 2024-05-22 | End: 2024-05-23

## 2024-05-22 RX ORDER — OXYCODONE HYDROCHLORIDE 5 MG/1
30 TABLET ORAL
Refills: 0 | Status: DISCONTINUED | OUTPATIENT
Start: 2024-05-22 | End: 2024-05-23

## 2024-05-22 RX ADMIN — Medication 1 PATCH: at 13:37

## 2024-05-22 RX ADMIN — Medication 15 MILLILITER(S): at 17:26

## 2024-05-22 RX ADMIN — Medication 1 SPRAY(S): at 06:15

## 2024-05-22 RX ADMIN — ENOXAPARIN SODIUM 30 MILLIGRAM(S): 100 INJECTION SUBCUTANEOUS at 17:25

## 2024-05-22 RX ADMIN — SODIUM CHLORIDE 4 MILLILITER(S): 9 INJECTION INTRAMUSCULAR; INTRAVENOUS; SUBCUTANEOUS at 07:10

## 2024-05-22 RX ADMIN — OXYCODONE HYDROCHLORIDE 30 MILLIGRAM(S): 5 TABLET ORAL at 13:36

## 2024-05-22 RX ADMIN — Medication 15 MILLILITER(S): at 06:14

## 2024-05-22 RX ADMIN — Medication 1 APPLICATION(S): at 17:26

## 2024-05-22 RX ADMIN — PANTOPRAZOLE SODIUM 40 MILLIGRAM(S): 20 TABLET, DELAYED RELEASE ORAL at 17:25

## 2024-05-22 RX ADMIN — NALOXEGOL OXALATE 25 MILLIGRAM(S): 12.5 TABLET, FILM COATED ORAL at 13:37

## 2024-05-22 RX ADMIN — Medication 3 MILLILITER(S): at 03:33

## 2024-05-22 RX ADMIN — Medication 1 TABLET(S): at 13:51

## 2024-05-22 RX ADMIN — Medication 3 MILLILITER(S): at 07:10

## 2024-05-22 RX ADMIN — Medication 1 APPLICATION(S): at 06:16

## 2024-05-22 RX ADMIN — Medication 4 MILLIGRAM(S): at 17:27

## 2024-05-22 RX ADMIN — AMIODARONE HYDROCHLORIDE 200 MILLIGRAM(S): 400 TABLET ORAL at 06:15

## 2024-05-22 RX ADMIN — Medication 25 MILLIGRAM(S): at 17:25

## 2024-05-22 RX ADMIN — Medication 15 MILLILITER(S): at 12:50

## 2024-05-22 RX ADMIN — BUDESONIDE AND FORMOTEROL FUMARATE DIHYDRATE 2 PUFF(S): 160; 4.5 AEROSOL RESPIRATORY (INHALATION) at 21:10

## 2024-05-22 RX ADMIN — Medication 25 MILLIGRAM(S): at 06:16

## 2024-05-22 RX ADMIN — ENOXAPARIN SODIUM 30 MILLIGRAM(S): 100 INJECTION SUBCUTANEOUS at 06:16

## 2024-05-22 RX ADMIN — SODIUM CHLORIDE 4 MILLILITER(S): 9 INJECTION INTRAMUSCULAR; INTRAVENOUS; SUBCUTANEOUS at 21:58

## 2024-05-22 RX ADMIN — BUDESONIDE AND FORMOTEROL FUMARATE DIHYDRATE 2 PUFF(S): 160; 4.5 AEROSOL RESPIRATORY (INHALATION) at 11:00

## 2024-05-22 RX ADMIN — Medication 1 PATCH: at 19:30

## 2024-05-22 RX ADMIN — PANTOPRAZOLE SODIUM 40 MILLIGRAM(S): 20 TABLET, DELAYED RELEASE ORAL at 06:16

## 2024-05-22 RX ADMIN — Medication 3 MILLILITER(S): at 14:35

## 2024-05-22 RX ADMIN — Medication 3 MILLILITER(S): at 21:58

## 2024-05-22 RX ADMIN — OXYCODONE HYDROCHLORIDE 30 MILLIGRAM(S): 5 TABLET ORAL at 06:35

## 2024-05-22 RX ADMIN — Medication 4 MILLIGRAM(S): at 06:15

## 2024-05-22 RX ADMIN — CHLORHEXIDINE GLUCONATE 1 APPLICATION(S): 213 SOLUTION TOPICAL at 12:50

## 2024-05-22 RX ADMIN — OXYCODONE HYDROCHLORIDE 30 MILLIGRAM(S): 5 TABLET ORAL at 23:21

## 2024-05-22 NOTE — PROGRESS NOTE ADULT - SUBJECTIVE AND OBJECTIVE BOX
Reno Doe MD  Academic Hospitalist  Pager [phone]  Email: [email or web address]  Available on [employer] Teams        PROGRESS NOTE:     Patient is a 58y old  Female who presents with a chief complaint of SVC syndrome, DVT, mediastinal mass (21 May 2024 13:00)      SUBJECTIVE / OVERNIGHT EVENTS:  Patient seen and examined this morning. Patient this morning more awake and anxious, writing down that needs help. She would like someone to be around in her room round the clock to assist her with eating and keep her company.       MEDICATIONS  (STANDING):  albuterol/ipratropium for Nebulization 3 milliLiter(s) Nebulizer every 6 hours  aMIOdarone    Tablet 200 milliGRAM(s) Oral daily  Biotene Dry Mouth Oral Rinse 15 milliLiter(s) Swish and Spit every 6 hours  budesonide 160 MICROgram(s)/formoterol 4.5 MICROgram(s) Inhaler 2 Puff(s) Inhalation two times a day  chlorhexidine 2% Cloths 1 Application(s) Topical daily  dexAMETHasone     Tablet 4 milliGRAM(s) Oral every 12 hours  diazepam    Tablet 5 milliGRAM(s) Oral at bedtime  enoxaparin Injectable 30 milliGRAM(s) SubCutaneous every 12 hours  influenza   Vaccine 0.5 milliLiter(s) IntraMuscular once  metoclopramide Injectable 10 milliGRAM(s) IV Push every 8 hours  metoprolol tartrate 25 milliGRAM(s) Oral every 12 hours  multivitamin 1 Tablet(s) Oral daily  naloxegol 25 milliGRAM(s) Oral daily  nicotine -  14 mG/24Hr(s) Patch 1 Patch Transdermal every 24 hours  oxyCODONE  ER Tablet 30 milliGRAM(s) Oral <User Schedule>  pantoprazole    Tablet 40 milliGRAM(s) Oral every 12 hours  petrolatum white Ointment 1 Application(s) Topical two times a day  polyethylene glycol 3350 17 Gram(s) Oral every 12 hours  sodium chloride 0.65% Nasal 1 Spray(s) Both Nostrils two times a day  sodium chloride 3%  Inhalation 4 milliLiter(s) Inhalation every 12 hours    MEDICATIONS  (PRN):  aluminum hydroxide/magnesium hydroxide/simethicone Suspension 30 milliLiter(s) Oral every 6 hours PRN Dyspepsia  benzocaine/menthol Lozenge 1 Lozenge Oral two times a day PRN Sore Throat  Biotene Dry Mouth Oral Rinse 15 milliLiter(s) Swish and Spit two times a day PRN dry mouth  diazepam    Tablet 5 milliGRAM(s) Oral two times a day PRN anxiety  FIRST- Mouthwash  BLM 5 milliLiter(s) Swish and Spit four times a day PRN Mouth Care  ondansetron    Tablet 4 milliGRAM(s) Oral every 8 hours PRN Nausea and/or Vomiting  oxyCODONE    IR 10 milliGRAM(s) Oral every 4 hours PRN Moderate Pain (4 - 6)  sodium chloride 0.9% lock flush 10 milliLiter(s) IV Push every 1 hour PRN Pre/post blood products, medications, blood draw, and to maintain line patency      CAPILLARY BLOOD GLUCOSE        I&O's Summary    21 May 2024 07:01  -  22 May 2024 07:00  --------------------------------------------------------  IN: 80 mL / OUT: 400 mL / NET: -320 mL        PHYSICAL EXAM:  Vital Signs Last 24 Hrs  T(C): 36.7 (22 May 2024 11:15), Max: 36.7 (21 May 2024 18:00)  T(F): 98.1 (22 May 2024 11:15), Max: 98.1 (22 May 2024 11:15)  HR: 83 (22 May 2024 11:15) (64 - 87)  BP: 105/61 (22 May 2024 11:15) (105/61 - 133/97)  BP(mean): --  RR: 18 (22 May 2024 11:15) (18 - 19)  SpO2: 100% (22 May 2024 11:15) (98% - 100%)    Parameters below as of 22 May 2024 11:15  Patient On (Oxygen Delivery Method): nasal cannula, high flow          Cachectic, zuijvudkwdr-bqk-wxhpgwsci woman, more awake this morning, anxious.  Pt still dysphonic, but able to write in comprehensive sentences to communicate  Mild R eyelid ptosis and R pupil miosis present/unchanged; R eye with scleral injection; no oral lesions/thrush  RRR, no m/r/g  Breaths not tachypneic; trace inspiratory wheeze and transmitted upper airway sounds auscultated in all lung zone  Abd soft/nt/nd, normoactive bowel sounds    LABS:                        7.3    13.72 )-----------( 306      ( 22 May 2024 07:10 )             23.6     05-22    143  |  107  |  35<H>  ----------------------------<  120<H>  4.7   |  27  |  0.93    Ca    10.3      22 May 2024 07:10  Phos  3.9     05-22  Mg     1.70     05-22    TPro  5.4<L>  /  Alb  2.6<L>  /  TBili  <0.2  /  DBili  x   /  AST  8   /  ALT  35<H>  /  AlkPhos  148<H>  05-22          Urinalysis Basic - ( 22 May 2024 07:10 )    Color: x / Appearance: x / SG: x / pH: x  Gluc: 120 mg/dL / Ketone: x  / Bili: x / Urobili: x   Blood: x / Protein: x / Nitrite: x   Leuk Esterase: x / RBC: x / WBC x   Sq Epi: x / Non Sq Epi: x / Bacteria: x          RADIOLOGY & ADDITIONAL TESTS:  Results Reviewed:   Imaging Personally Reviewed:  Electrocardiogram Personally Reviewed:    COORDINATION OF CARE:  Care Discussed with Consultants/Other Providers [Y/N]:  Prior or Outpatient Records Reviewed [Y/N]:

## 2024-05-22 NOTE — PROGRESS NOTE ADULT - ASSESSMENT
59 yo woman, former smoker, with h/o R eye glaucoma, Fibromyalgia, Anxiety, GERD, and RCC s/p R total nephrectomy/hysterectomy; she was initially admitted to [facility] on 3/11 w/ R breast swelling c/f mastitis- imaging brooks noted supraclavicular/mediastinal mass lesion which encased the SVC and multiple other veins resulting in obliteration of the SVC and thrombosis of the R IJ, and a pancreatic neck cystic lesion.  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  Her disease/hospital course has also been c/b pericardial effusion s/p pericardial window w/ neg cytology, R pleural effusion, also negative cytology) s/p Pleurx, Afib w/ RVR and acute hypoxic resp failure 2/2 SVC syndrome- not dennis to IR-guided stenting; pt was ultimately started on Dex and transferred to [facility] on 4/4 for urgent palliative RT which she completed on 4/18. Her hospital course has also been c/b pericardial effusion with tamponade s/p window and non malignant R pleural effusion s/p pleurex, acute b/l UE and RLE dvts, anxiety, and more recently, recurrent hypoxia.    ACTIVE PROBLEMS  Metastatic CUP (carcinoma of unknown primary)  Goals of care discussion, counseling  Encounter for antineoplastic chemotherapy  Acute hypoxic + hypercapneic respiratory failure  Hyperkalemia  Dysphonia with R VC hypomobility   Oral thrush  SVC syndrome  Extensive b/l UE DVTs  Acute RLE DVT a/w RLE ecchymosis  Hypercoag state  Pericardial effusion with tamp physiology s/p pericardial window  R pleural effusion s/p pleurex  pAfib, with RVR on this admission  Anxiety/emotional lability  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)  Cancer-related pain, anxiety  Severe prot-ghazala malnutrition    - Metastatic CUP (carcinoma of unknown primary)  - Goals of care discussion, counseling  - Encounter for antineoplastic chemotherapy  Based on 3/14 SC LN path, ddx includes primary UGI vs pancreaticobiliary primary (breast edema is likely 2/2 SVC syndrome); PD-L1 TPs 1%, pMMR, Her2 2+, FISH pending, Ki-67 30%  Ca-125 moderately elevated at 125; other tumor markers not significantly elevated  Additional diagnostic brooks includes:   * 4/19 MRCP showing pancreatic lesion (? IPMN, but pancreas was suboptimally evaluated)     * 4/24 and 4/26 EGD/EUS aborted as pt had large amount of food in the stomach that prevented passing of scope   * 4/30 UGIS aborted as pt felt too short of breath when laying down flat (improved after her pleurex was drained  Case d/w Dr. 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 Pt is a suboptimal candidate i/s/o her progressive debility and malnutrition (ECOG PS 3)   >> Discussed risks/benefits of proceeding with palliative chemo (inpt) vs best supportive care with pt's HCP Saeid on 5/10 and again on 5/13- he is amenable to trial of inpt chemotherapy and understands that hospice will be recommended if pt does not tolerate trial of inpt chemo; verbal and written chemo consent obtained on 5/13   * Pt s/p successful femoral vein central line placement by Surgery on 5/15 (to be removed after chemotherapy)   *  Pt s/p C1 palliative [id number] (20% dose reduction), 5/16-18   >> She did not receive the full cycle as chemo was held on 5/18 when pt developed resp acidosis, and the chemo was not resumed after that  Pt's prognosis is very poor, unlikely to have any meaningful recovery; she is dnr/dni.    - Acute hypercapneic + hypoxic resp failure  Resp acidosis resolved after brief period on bipap  Pt is lethargic today, but O2 sats/HR stable on HF NC O2  Etiology of resp acidosis is unclear, but likely cancer-related vs ? volume overload i/s/o chemo infusion  Serial CXRs without acute changes (decreasing R pleural effusion)  NC CT chest and repeat TTE are pending (ordered on 5/18)  Will fu sputum culture and fungal studies (ordered 5/18)  Diuresing gently/prn: s/p Lasix IV 10mg x1 dose on 5/18, but held today given bump in Cr   * Of note, pt completed 10d course of empiric Cefepime on 5/10; trending WBCs/ procal; monitoring off abx for now unless there is evidence of recurrent infection  Dex taper completed on 5/14 (Bactrim pjp ppx dced on 5/15); resuming Dex IV 4mg BID today (with pjp/ppi ppx)  Continuing Symbicort 2 puffs BID  Continuing Duonebs q6/prn  Will continue routine pleurex drainage as below    - Dysphonia with R VC hypomobility  - Oral thrush  Appreciate ENT eval/recs: 5/4 Flex ellis pt with R VC hypomobility  Pt previously cleared for pureed diet s/p Cinesophagram, but she is currently NPO while on bipap (can resume pureed diet when pt is off bipap)  Completed 7d course of Fluconazole on 5/12    - SVC syndrome  Appreciate Rad Onc eval/recs  Completed 10fxs of palliative RT on 4/18     - Extensive b/l UE DVTs  - Acute RLE DVT a/w RLE ecchymosis  - Hypercoag state  Continuing therapeutic lovenox, benefits > risks   * Of note: pt has poor UE IV access and currently requires peripheral IV in her LE extremities for admin of medication; FV central line being placed today for IV chemo    - Pericardial effusion with tamp physiology s/p pericardial window  - R pleural effusion s/p pleurex  Likely inflammatory; both pleural and pericardial fluid cytology are negative for malignant cells  4/9 surveillance TTE with pEF and no WMAs  Continuing R pleurex drainage- up to 500cc q48h/prn    - pAfib  Pt currently SR, HR controlled  Likely precipitated by malignancy, pericardial effusion, SVC syndrome  Continuing Metoprolol 25mg q12 with holding parameters  Continuing Amio 200mg daily (monitoring for toxicities)  Continuing telemetry    - Anxiety/emotional lability  Continuing Diazepam 5mg BID/prn and nightly  Pt has poor health literacy and clinical insight which is negatively impacting her medical decision making- when ask if she wanted to defer medical decision making to her boyfriend or if she had a designated HCP she replied "he's already sick of me"  Appreciate  consult: pt lacks medical decision making capacity; medical decision making will be deferred to pt's surrogate decision maker- Saeid Peña ([phone])    - 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)  Pt with h/o R eye glaucoma with surgery, but miosis can also be associated with Pancoast syndrome i/s/o extensive R upper lung tumor  Pt denies visual deficits or other related symptoms   MRI Brain without contrast ordered for further eval- pt has previously refused  Will continue to monitor closely    - Anemia in neoplastic disease  Hgb remains stable, 7.9 today  4/5 iron studies not c/w iron deficiency  VSS and pt without clinical s/s of active bleeding  4/17 hemolysis labs negative; 4/18 FOBT negative x2  Trending daily cbcs; continuing supportive transfusions prn (pt does not have h/o ACS, CAD, MI, goal hgb > 7; plts > 10K)    - Cancer related pain  Currently well controlled  Continuing Oxy ER 30mg q12 q8  Continuing Oxy IR 10mg q4/prn  Continuing Dilaudid IV prn for sev breakthrough pain  Continuing bowel regimen to prevent OIC (including Movantic)    - Severe prot-ghazala malnutrition: appreciate RD recs; continuing regular diet with protein shake supplement BID (SLP eval pending as above)

## 2024-05-22 NOTE — CHART NOTE - NSCHARTNOTEFT_GEN_A_CORE
Source: Patient [x]     other [x] medical chart, nurse   Diet rx: Soft and Bite Sized: DASH/TLC {Sodium & Cholesterol Restricted} Low Sodium (05-08-24 @ 20:01) [Active]  PO supplement: Ensure Compact (4oz/118 ml -> 9 gm Protein, 220Kcal) - 2x daily    Pt 57 yo female, former smoker, with PMHx of R eye glaucoma, Fibromyalgia, Anxiety, GERD, RCC s/p R total nephrectomy/hysterectomy - per chart review. Of note, Pt mets CA (CUP).    At time of visit, Pt awake, appears weak; nasal cannula high flow in use. Pt with not much interest to speak to RD. Pt answered some of RD's question by writing or nodding her head. Pt appetite/PO intake remains poor reported. Pt ate <50% of breakfast this morning per tray waste observation. Pt needs help with tray set-up reported. Pt likes Ensure Compact (PO supplement); Pt likes yogurt, pudding reported as well. No report of chewing or swallowing difficulty with current consistency diet rx; no report of vomiting or diarrhea @ this time. +Last BM (5/21) per flow sheet -> bowel regimen, if warranted, per MD discretion. Rec to add appetite stimulant to boost Pt's PO intake/appetite. Of note, Pt DNR/DNI. Case discussed with nurse. RD remains available, nurse made aware.     Pt's height: 60" (4/4)      IBW: 100#+/-10%    Pt's weights: 33.3 kg (5/15), 45.4 kg (4/4)     Pertinent Medications: Lovenox, Multivitamin, Protonix, Miralax, Reglan, Zofran (PRN), Biotene dry mouth oral rinse (PRN), First mouth wash (PRN)     Pertinent Labs: (5/22) WBC 13.72 H, H/H 7.3/23.6 L, BUN 35 H, Glu 120 H, Albumin 2.6 L,  H, ALT 35 H;  (3/12) HbA1c 6.6% H, HDL 50 L  Skin: per flow sheet -> +pressure injury to sacrum - stage II; +chest tube in place       Estimated Needs: [x] no change since previous assessment  Previous Nutrition Diagnosis: [x] Malnutrition, severe    Nutrition Diagnosis is [x] ongoing      Nutrition Interventions/Recommendations:   1. Increase PO supplement: Ensure Compact (4oz/118 ml -> 9 gm Protein, 220Kcal) -> 6x daily (2 cans with each meal);  2. Encourage & assist Pt with meals; Monitor PO diet tolerance;   3. Add appetite stimulant to boost Pt's PO intake/appetite;  4. Continue oral mouth rinse, as ordered;  5. Monitor labs, weekly weights, hydration status;

## 2024-05-23 ENCOUNTER — OUTPATIENT (OUTPATIENT)
Dept: OUTPATIENT SERVICES | Facility: HOSPITAL | Age: 59
LOS: 1 days | Discharge: HOME | End: 2024-05-23
Payer: MEDICARE

## 2024-05-23 DIAGNOSIS — J96.01 ACUTE RESPIRATORY FAILURE WITH HYPOXIA: ICD-10-CM

## 2024-05-23 DIAGNOSIS — R59.1 GENERALIZED ENLARGED LYMPH NODES: ICD-10-CM

## 2024-05-23 LAB
ALBUMIN SERPL ELPH-MCNC: 2.5 G/DL — LOW (ref 3.3–5)
ALP SERPL-CCNC: 139 U/L — HIGH (ref 40–120)
ALT FLD-CCNC: 32 U/L — SIGNIFICANT CHANGE UP (ref 4–33)
ANION GAP SERPL CALC-SCNC: 12 MMOL/L — SIGNIFICANT CHANGE UP (ref 7–14)
ANION GAP SERPL CALC-SCNC: 8 MMOL/L — SIGNIFICANT CHANGE UP (ref 7–14)
ANISOCYTOSIS BLD QL: SLIGHT — SIGNIFICANT CHANGE UP
AST SERPL-CCNC: 10 U/L — SIGNIFICANT CHANGE UP (ref 4–32)
BASE EXCESS BLDV CALC-SCNC: 5.9 MMOL/L — HIGH (ref -2–3)
BASOPHILS # BLD AUTO: 0.01 K/UL — SIGNIFICANT CHANGE UP (ref 0–0.2)
BASOPHILS NFR BLD AUTO: 0.1 % — SIGNIFICANT CHANGE UP (ref 0–2)
BILIRUB SERPL-MCNC: <0.2 MG/DL — SIGNIFICANT CHANGE UP (ref 0.2–1.2)
BLD GP AB SCN SERPL QL: POSITIVE — SIGNIFICANT CHANGE UP
BUN SERPL-MCNC: 32 MG/DL — HIGH (ref 7–23)
BUN SERPL-MCNC: 34 MG/DL — HIGH (ref 7–23)
CA-I SERPL-SCNC: 1.55 MMOL/L — HIGH (ref 1.15–1.33)
CALCIUM SERPL-MCNC: 10.2 MG/DL — SIGNIFICANT CHANGE UP (ref 8.4–10.5)
CALCIUM SERPL-MCNC: 10.5 MG/DL — SIGNIFICANT CHANGE UP (ref 8.4–10.5)
CHLORIDE BLDV-SCNC: 107 MMOL/L — SIGNIFICANT CHANGE UP (ref 96–108)
CHLORIDE SERPL-SCNC: 105 MMOL/L — SIGNIFICANT CHANGE UP (ref 98–107)
CHLORIDE SERPL-SCNC: 106 MMOL/L — SIGNIFICANT CHANGE UP (ref 98–107)
CO2 BLDV-SCNC: 34.6 MMOL/L — HIGH (ref 22–26)
CO2 SERPL-SCNC: 25 MMOL/L — SIGNIFICANT CHANGE UP (ref 22–31)
CO2 SERPL-SCNC: 30 MMOL/L — SIGNIFICANT CHANGE UP (ref 22–31)
CREAT SERPL-MCNC: 0.83 MG/DL — SIGNIFICANT CHANGE UP (ref 0.5–1.3)
CREAT SERPL-MCNC: 0.9 MG/DL — SIGNIFICANT CHANGE UP (ref 0.5–1.3)
EGFR: 73 ML/MIN/1.73M2 — SIGNIFICANT CHANGE UP
EGFR: 81 ML/MIN/1.73M2 — SIGNIFICANT CHANGE UP
EOSINOPHIL # BLD AUTO: 0 K/UL — SIGNIFICANT CHANGE UP (ref 0–0.5)
EOSINOPHIL NFR BLD AUTO: 0 % — SIGNIFICANT CHANGE UP (ref 0–6)
GALACTOMANNAN AG SERPL-ACNC: 0.08 INDEX — SIGNIFICANT CHANGE UP (ref 0–0.49)
GAS PNL BLDV: 140 MMOL/L — SIGNIFICANT CHANGE UP (ref 136–145)
GAS PNL BLDV: SIGNIFICANT CHANGE UP
GAS PNL BLDV: SIGNIFICANT CHANGE UP
GLUCOSE BLDV-MCNC: 106 MG/DL — HIGH (ref 70–99)
GLUCOSE SERPL-MCNC: 110 MG/DL — HIGH (ref 70–99)
GLUCOSE SERPL-MCNC: 122 MG/DL — HIGH (ref 70–99)
HCO3 BLDV-SCNC: 33 MMOL/L — HIGH (ref 22–29)
HCT VFR BLD CALC: 22.9 % — LOW (ref 34.5–45)
HCT VFR BLD CALC: 27.2 % — LOW (ref 34.5–45)
HCT VFR BLDA CALC: 23 % — LOW (ref 34.5–46.5)
HGB BLD CALC-MCNC: 7.7 G/DL — LOW (ref 11.7–16.1)
HGB BLD-MCNC: 7 G/DL — CRITICAL LOW (ref 11.5–15.5)
HGB BLD-MCNC: 8.3 G/DL — LOW (ref 11.5–15.5)
IANC: 12.55 K/UL — HIGH (ref 1.8–7.4)
IMM GRANULOCYTES NFR BLD AUTO: 0.9 % — SIGNIFICANT CHANGE UP (ref 0–0.9)
LACTATE BLDV-MCNC: 0.4 MMOL/L — LOW (ref 0.5–2)
LACTATE SERPL-SCNC: 0.4 MMOL/L — LOW (ref 0.5–2)
LYMPHOCYTES # BLD AUTO: 0.11 K/UL — LOW (ref 1–3.3)
LYMPHOCYTES # BLD AUTO: 0.8 % — LOW (ref 13–44)
MACROCYTES BLD QL: SLIGHT — SIGNIFICANT CHANGE UP
MAGNESIUM SERPL-MCNC: 1.7 MG/DL — SIGNIFICANT CHANGE UP (ref 1.6–2.6)
MAGNESIUM SERPL-MCNC: 1.8 MG/DL — SIGNIFICANT CHANGE UP (ref 1.6–2.6)
MANUAL SMEAR VERIFICATION: SIGNIFICANT CHANGE UP
MCHC RBC-ENTMCNC: 28.8 PG — SIGNIFICANT CHANGE UP (ref 27–34)
MCHC RBC-ENTMCNC: 29 PG — SIGNIFICANT CHANGE UP (ref 27–34)
MCHC RBC-ENTMCNC: 30.5 GM/DL — LOW (ref 32–36)
MCHC RBC-ENTMCNC: 30.6 GM/DL — LOW (ref 32–36)
MCV RBC AUTO: 94.4 FL — SIGNIFICANT CHANGE UP (ref 80–100)
MCV RBC AUTO: 95 FL — SIGNIFICANT CHANGE UP (ref 80–100)
MONOCYTES # BLD AUTO: 0.35 K/UL — SIGNIFICANT CHANGE UP (ref 0–0.9)
MONOCYTES NFR BLD AUTO: 2.7 % — SIGNIFICANT CHANGE UP (ref 2–14)
NEUTROPHILS # BLD AUTO: 12.55 K/UL — HIGH (ref 1.8–7.4)
NEUTROPHILS NFR BLD AUTO: 95.5 % — HIGH (ref 43–77)
NEUTS HYPERSEG # BLD: PRESENT — SIGNIFICANT CHANGE UP
NRBC # BLD: 0 /100 WBCS — SIGNIFICANT CHANGE UP (ref 0–0)
NRBC # BLD: 0 /100 WBCS — SIGNIFICANT CHANGE UP (ref 0–0)
NRBC # FLD: 0 K/UL — SIGNIFICANT CHANGE UP (ref 0–0)
NRBC # FLD: 0 K/UL — SIGNIFICANT CHANGE UP (ref 0–0)
PCO2 BLDV: 62 MMHG — HIGH (ref 39–52)
PH BLDV: 7.33 — SIGNIFICANT CHANGE UP (ref 7.32–7.43)
PHOSPHATE SERPL-MCNC: 3.2 MG/DL — SIGNIFICANT CHANGE UP (ref 2.5–4.5)
PHOSPHATE SERPL-MCNC: 3.2 MG/DL — SIGNIFICANT CHANGE UP (ref 2.5–4.5)
PLAT MORPH BLD: NORMAL — SIGNIFICANT CHANGE UP
PLATELET # BLD AUTO: 324 K/UL — SIGNIFICANT CHANGE UP (ref 150–400)
PLATELET # BLD AUTO: 336 K/UL — SIGNIFICANT CHANGE UP (ref 150–400)
PLATELET COUNT - ESTIMATE: NORMAL — SIGNIFICANT CHANGE UP
PO2 BLDV: 164 MMHG — HIGH (ref 25–45)
POLYCHROMASIA BLD QL SMEAR: SLIGHT — SIGNIFICANT CHANGE UP
POTASSIUM BLDV-SCNC: 4.8 MMOL/L — SIGNIFICANT CHANGE UP (ref 3.5–5.1)
POTASSIUM SERPL-MCNC: 4.6 MMOL/L — SIGNIFICANT CHANGE UP (ref 3.5–5.3)
POTASSIUM SERPL-MCNC: 4.8 MMOL/L — SIGNIFICANT CHANGE UP (ref 3.5–5.3)
POTASSIUM SERPL-SCNC: 4.6 MMOL/L — SIGNIFICANT CHANGE UP (ref 3.5–5.3)
POTASSIUM SERPL-SCNC: 4.8 MMOL/L — SIGNIFICANT CHANGE UP (ref 3.5–5.3)
PROT SERPL-MCNC: 5.6 G/DL — LOW (ref 6–8.3)
RBC # BLD: 2.41 M/UL — LOW (ref 3.8–5.2)
RBC # BLD: 2.88 M/UL — LOW (ref 3.8–5.2)
RBC # FLD: 15.8 % — HIGH (ref 10.3–14.5)
RBC # FLD: 15.9 % — HIGH (ref 10.3–14.5)
RBC BLD AUTO: ABNORMAL
RH IG SCN BLD-IMP: POSITIVE — SIGNIFICANT CHANGE UP
SAO2 % BLDV: 98.6 % — HIGH (ref 67–88)
SODIUM SERPL-SCNC: 143 MMOL/L — SIGNIFICANT CHANGE UP (ref 135–145)
SODIUM SERPL-SCNC: 143 MMOL/L — SIGNIFICANT CHANGE UP (ref 135–145)
WBC # BLD: 13.14 K/UL — HIGH (ref 3.8–10.5)
WBC # BLD: 15.89 K/UL — HIGH (ref 3.8–10.5)
WBC # FLD AUTO: 13.14 K/UL — HIGH (ref 3.8–10.5)
WBC # FLD AUTO: 15.89 K/UL — HIGH (ref 3.8–10.5)

## 2024-05-23 PROCEDURE — 93010 ELECTROCARDIOGRAM REPORT: CPT

## 2024-05-23 PROCEDURE — G0316 PROLONG INPT EVAL ADD15 M: CPT

## 2024-05-23 PROCEDURE — 99232 SBSQ HOSP IP/OBS MODERATE 35: CPT

## 2024-05-23 PROCEDURE — 99497 ADVNCD CARE PLAN 30 MIN: CPT | Mod: 25

## 2024-05-23 PROCEDURE — 71045 X-RAY EXAM CHEST 1 VIEW: CPT | Mod: 26

## 2024-05-23 PROCEDURE — 99233 SBSQ HOSP IP/OBS HIGH 50: CPT

## 2024-05-23 RX ORDER — MORPHINE SULFATE 50 MG/1
20 CAPSULE, EXTENDED RELEASE ORAL
Refills: 0 | Status: DISCONTINUED | OUTPATIENT
Start: 2024-05-23 | End: 2024-05-28

## 2024-05-23 RX ORDER — MORPHINE SULFATE 50 MG/1
20 CAPSULE, EXTENDED RELEASE ORAL EVERY 4 HOURS
Refills: 0 | Status: DISCONTINUED | OUTPATIENT
Start: 2024-05-23 | End: 2024-05-23

## 2024-05-23 RX ORDER — MORPHINE SULFATE 50 MG/1
2 CAPSULE, EXTENDED RELEASE ORAL ONCE
Refills: 0 | Status: DISCONTINUED | OUTPATIENT
Start: 2024-05-23 | End: 2024-05-23

## 2024-05-23 RX ORDER — IPRATROPIUM/ALBUTEROL SULFATE 18-103MCG
3 AEROSOL WITH ADAPTER (GRAM) INHALATION ONCE
Refills: 0 | Status: COMPLETED | OUTPATIENT
Start: 2024-05-23 | End: 2024-05-23

## 2024-05-23 RX ORDER — MORPHINE SULFATE 50 MG/1
20 CAPSULE, EXTENDED RELEASE ORAL
Refills: 0 | Status: DISCONTINUED | OUTPATIENT
Start: 2024-05-23 | End: 2024-05-23

## 2024-05-23 RX ORDER — OXYCODONE HYDROCHLORIDE 5 MG/1
30 TABLET ORAL
Refills: 0 | Status: DISCONTINUED | OUTPATIENT
Start: 2024-05-23 | End: 2024-05-28

## 2024-05-23 RX ADMIN — PANTOPRAZOLE SODIUM 40 MILLIGRAM(S): 20 TABLET, DELAYED RELEASE ORAL at 05:59

## 2024-05-23 RX ADMIN — PANTOPRAZOLE SODIUM 40 MILLIGRAM(S): 20 TABLET, DELAYED RELEASE ORAL at 17:28

## 2024-05-23 RX ADMIN — Medication 4 MILLIGRAM(S): at 06:32

## 2024-05-23 RX ADMIN — Medication 25 MILLIGRAM(S): at 17:25

## 2024-05-23 RX ADMIN — Medication 1 PATCH: at 07:30

## 2024-05-23 RX ADMIN — Medication 1 APPLICATION(S): at 17:27

## 2024-05-23 RX ADMIN — NALOXEGOL OXALATE 25 MILLIGRAM(S): 12.5 TABLET, FILM COATED ORAL at 14:13

## 2024-05-23 RX ADMIN — BUDESONIDE AND FORMOTEROL FUMARATE DIHYDRATE 2 PUFF(S): 160; 4.5 AEROSOL RESPIRATORY (INHALATION) at 22:16

## 2024-05-23 RX ADMIN — Medication 1 APPLICATION(S): at 06:00

## 2024-05-23 RX ADMIN — Medication 3 MILLILITER(S): at 03:26

## 2024-05-23 RX ADMIN — Medication 4 MILLIGRAM(S): at 17:26

## 2024-05-23 RX ADMIN — BUDESONIDE AND FORMOTEROL FUMARATE DIHYDRATE 2 PUFF(S): 160; 4.5 AEROSOL RESPIRATORY (INHALATION) at 12:08

## 2024-05-23 RX ADMIN — ENOXAPARIN SODIUM 30 MILLIGRAM(S): 100 INJECTION SUBCUTANEOUS at 06:00

## 2024-05-23 RX ADMIN — Medication 3 MILLILITER(S): at 14:09

## 2024-05-23 RX ADMIN — SODIUM CHLORIDE 4 MILLILITER(S): 9 INJECTION INTRAMUSCULAR; INTRAVENOUS; SUBCUTANEOUS at 07:18

## 2024-05-23 RX ADMIN — Medication 1 SPRAY(S): at 06:00

## 2024-05-23 RX ADMIN — OXYCODONE HYDROCHLORIDE 30 MILLIGRAM(S): 5 TABLET ORAL at 06:31

## 2024-05-23 RX ADMIN — Medication 1 TABLET(S): at 14:13

## 2024-05-23 RX ADMIN — Medication 1 PATCH: at 17:22

## 2024-05-23 RX ADMIN — AMIODARONE HYDROCHLORIDE 200 MILLIGRAM(S): 400 TABLET ORAL at 05:59

## 2024-05-23 RX ADMIN — Medication 5 MILLIGRAM(S): at 14:13

## 2024-05-23 RX ADMIN — OXYCODONE HYDROCHLORIDE 30 MILLIGRAM(S): 5 TABLET ORAL at 00:15

## 2024-05-23 RX ADMIN — Medication 25 MILLIGRAM(S): at 05:59

## 2024-05-23 RX ADMIN — Medication 1 PATCH: at 13:30

## 2024-05-23 RX ADMIN — Medication 3 MILLILITER(S): at 20:20

## 2024-05-23 RX ADMIN — Medication 5 MILLIGRAM(S): at 00:07

## 2024-05-23 RX ADMIN — OXYCODONE HYDROCHLORIDE 30 MILLIGRAM(S): 5 TABLET ORAL at 17:30

## 2024-05-23 RX ADMIN — CHLORHEXIDINE GLUCONATE 1 APPLICATION(S): 213 SOLUTION TOPICAL at 12:09

## 2024-05-23 RX ADMIN — Medication 3 MILLILITER(S): at 07:18

## 2024-05-23 RX ADMIN — SODIUM CHLORIDE 4 MILLILITER(S): 9 INJECTION INTRAMUSCULAR; INTRAVENOUS; SUBCUTANEOUS at 20:21

## 2024-05-23 RX ADMIN — ENOXAPARIN SODIUM 30 MILLIGRAM(S): 100 INJECTION SUBCUTANEOUS at 17:26

## 2024-05-23 RX ADMIN — Medication 3 MILLILITER(S): at 10:03

## 2024-05-23 NOTE — PROGRESS NOTE ADULT - ASSESSMENT
59 yo woman, former smoker, with h/o R eye glaucoma, Fibromyalgia, Anxiety, GERD, and RCC s/p R total nephrectomy/hysterectomy; she was initially admitted to [facility] on 3/11 w/ R breast swelling c/f mastitis- imaging brooks noted supraclavicular/mediastinal mass lesion which encased the SVC and multiple other veins resulting in obliteration of the SVC and thrombosis of the R IJ, and a pancreatic neck cystic lesion.  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  Her disease/hospital course has also been c/b pericardial effusion s/p pericardial window w/ neg cytology, R pleural effusion, also negative cytology) s/p Pleurx, Afib w/ RVR and acute hypoxic resp failure 2/2 SVC syndrome- not dennis to IR-guided stenting; pt was ultimately started on Dex and transferred to [facility] on 4/4 for urgent palliative RT which she completed on 4/18. Her hospital course has also been c/b pericardial effusion with tamponade s/p window and non malignant R pleural effusion s/p pleurex, acute b/l UE and RLE dvts, anxiety, and more recently, recurrent hypoxia.    ACTIVE PROBLEMS  Metastatic CUP (carcinoma of unknown primary)  Goals of care discussion, counseling  Encounter for antineoplastic chemotherapy  Acute hypoxic + hypercapneic respiratory failure  Hyperkalemia  Dysphonia with R VC hypomobility   Oral thrush  SVC syndrome  Extensive b/l UE DVTs  Acute RLE DVT a/w RLE ecchymosis  Hypercoag state  Pericardial effusion with tamp physiology s/p pericardial window  R pleural effusion s/p pleurex  pAfib, with RVR on this admission  Anxiety/emotional lability  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)  Cancer-related pain, anxiety  Severe prot-ghazala malnutrition    - Metastatic CUP (carcinoma of unknown primary)  - Goals of care discussion, counseling  - Encounter for antineoplastic chemotherapy  Based on 3/14 SC LN path, ddx includes primary UGI vs pancreaticobiliary primary (breast edema is likely 2/2 SVC syndrome); PD-L1 TPs 1%, pMMR, Her2 2+, FISH pending, Ki-67 30%  Ca-125 moderately elevated at 125; other tumor markers not significantly elevated  Additional diagnostic brooks includes:   * 4/19 MRCP showing pancreatic lesion (? IPMN, but pancreas was suboptimally evaluated)     * 4/24 and 4/26 EGD/EUS aborted as pt had large amount of food in the stomach that prevented passing of scope   * 4/30 UGIS aborted as pt felt too short of breath when laying down flat (improved after her pleurex was drained  Case d/w Dr. 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 Pt is a suboptimal candidate i/s/o her progressive debility and malnutrition (ECOG PS 3)   >> Discussed risks/benefits of proceeding with palliative chemo (inpt) vs best supportive care with pt's HCP Saeid on 5/10 and again on 5/13- he is amenable to trial of inpt chemotherapy and understands that hospice will be recommended if pt does not tolerate trial of inpt chemo; verbal and written chemo consent obtained on 5/13   * Pt s/p successful femoral vein central line placement by Surgery on 5/15 (to be removed after chemotherapy)   *  Pt s/p C1 palliative [id number] (20% dose reduction), 5/16-18   >> She did not receive the full cycle as chemo was held on 5/18 when pt developed resp acidosis, and the chemo was not resumed after that  Pt's prognosis is very poor, unlikely to have any meaningful recovery; she is dnr/dni.    - Acute hypercapneic + hypoxic resp failure  Resp acidosis resolved after brief period on bipap  Pt is lethargic today, but O2 sats/HR stable on HF NC O2  Etiology of resp acidosis is unclear, but likely cancer-related vs ? volume overload i/s/o chemo infusion  Serial CXRs without acute changes (decreasing R pleural effusion)  NC CT chest and repeat TTE are pending (ordered on 5/18)  Will fu sputum culture and fungal studies (ordered 5/18)  Diuresing gently/prn: s/p Lasix IV 10mg x1 dose on 5/18, but held today given bump in Cr   * Of note, pt completed 10d course of empiric Cefepime on 5/10; trending WBCs/ procal; monitoring off abx for now unless there is evidence of recurrent infection  Dex taper completed on 5/14 (Bactrim pjp ppx dced on 5/15); resuming Dex IV 4mg BID today (with pjp/ppi ppx)  Continuing Symbicort 2 puffs BID  Continuing Duonebs q6/prn  Will continue routine pleurex drainage as below  Worsening today, likely related to disease progression and or new PE. Patient therapeutically anticoagulated with lovenox, unlikely to benefit from any further intervention.     - Dysphonia with R VC hypomobility  - Oral thrush  Appreciate ENT eval/recs: 5/4 Flex ellis pt with R VC hypomobility  Pt previously cleared for pureed diet s/p Cinesophagram, but she is currently NPO while on bipap (can resume pureed diet when pt is off bipap)  Completed 7d course of Fluconazole on 5/12    - SVC syndrome  Appreciate Rad Onc eval/recs  Completed 10fxs of palliative RT on 4/18     - Extensive b/l UE DVTs  - Acute RLE DVT a/w RLE ecchymosis  - Hypercoag state  Continuing therapeutic lovenox, benefits > risks   * Of note: pt has poor UE IV access and currently requires peripheral IV in her LE extremities for admin of medication; FV central line being placed today for IV chemo    - Pericardial effusion with tamp physiology s/p pericardial window  - R pleural effusion s/p pleurex  Likely inflammatory; both pleural and pericardial fluid cytology are negative for malignant cells  4/9 surveillance TTE with pEF and no WMAs  Continuing R pleurex drainage- up to 500cc q48h/prn    - pAfib  Pt currently SR, HR controlled  Likely precipitated by malignancy, pericardial effusion, SVC syndrome  Continuing Metoprolol 25mg q12 with holding parameters  Continuing Amio 200mg daily (monitoring for toxicities)  Continuing telemetry    - Anxiety/emotional lability  Continuing Diazepam 5mg BID/prn and nightly  Pt has poor health literacy and clinical insight which is negatively impacting her medical decision making- when ask if she wanted to defer medical decision making to her boyfriend or if she had a designated HCP she replied "he's already sick of me"  Appreciate  consult: pt lacks medical decision making capacity; medical decision making will be deferred to pt's surrogate decision maker- Saeid Peña ([phone])    - 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)  Pt with h/o R eye glaucoma with surgery, but miosis can also be associated with Pancoast syndrome i/s/o extensive R upper lung tumor  Pt denies visual deficits or other related symptoms   MRI Brain without contrast ordered for further eval- pt has previously refused  Will continue to monitor closely    - Anemia in neoplastic disease  Hgb remains stable, 7.9 today  4/5 iron studies not c/w iron deficiency  VSS and pt without clinical s/s of active bleeding  4/17 hemolysis labs negative; 4/18 FOBT negative x2  Trending daily cbcs; continuing supportive transfusions prn (pt does not have h/o ACS, CAD, MI, goal hgb > 7; plts > 10K)    - Cancer related pain  Currently well controlled  Continuing Oxy ER 30mg q12 q8  Continuing Oxy IR 10mg q4/prn  Continuing Dilaudid IV prn for sev breakthrough pain  Continuing bowel regimen to prevent OIC (including Movantic)    - Severe prot-ghazala malnutrition: appreciate RD recs; continuing regular diet with protein shake supplement BID (SLP eval pending as above)

## 2024-05-23 NOTE — CHART NOTE - NSCHARTNOTEFT_GEN_A_CORE
Called by primary team to evaluate right pleurx catheter site. Per nursing, earlier today the wound appeared to look infected with purulent, foul-smelling drainage with high output.     At bedside examination of the wound, the skin was clean, dry, non-erythematous with no drainage on the dressing which was in place. There was one prolene suture which was intact with the other prior proline suture having pulled through the wound.   The wound appeared to have non-foul smelling, fibrinous material surrounding     - Recommend local wound care and continued drainage of the right chest via pleurx catheter.  - If there continues to be high output around the tube and the dressings become saturated, please keep saturated dressing at bedside for the team to evaluate

## 2024-05-23 NOTE — PROVIDER CONTACT NOTE (CRITICAL VALUE NOTIFICATION) - BACKGROUND
58F F w/PM Hx R total nephrostomy/hysterectomy, R glaucoma, fibromyalgia. Admitted with primary malignant neoplasm.
Pt admitted 4/3/24 diagnosis primary malignant neoplasm, SVC syndrome.
Pt admitted 4/3/24 diagnosis primary malignant neoplasm, SVC syndrome. Pt has been on lovenox bid for multiple DVTs.
Pt admitted 4/3/24 diagnosis primary malignant neoplasm, SVC syndrome.
PMH: metastatic cancer, pleural effusion, anxiety

## 2024-05-23 NOTE — PROGRESS NOTE ADULT - PROBLEM SELECTOR PLAN 2
hx of RCC s/p R nephrectomy   - Presenting to [facility] with right breast/chest wall swelling and cellulitis with imaging evidence concerning for metastatic malignancy unknown primary w/ lung nodules, and axillary, supraclavicular, and mediastinal adenopathy.    - S/p R Supraclavicular LN biopsy w/ IR on 3/14/24 w/ path suggesting metastatic carcinoma, favoring upper GI or pancreaticobiliary origin.      -> imaging shows pancreatic neck cystic lesion measuring 1.6 cm  - pleural effusions s/p R pleurex, pericardial effusion   - Course complicated with SVC syndrome    - Oncology recommends MRI/MRCP - deferred for now until after SVC treatment hx of RCC s/p R nephrectomy   - Presenting to [facility] with right breast/chest wall swelling and cellulitis with imaging evidence concerning for metastatic malignancy unknown primary w/ lung nodules, and axillary, supraclavicular, and mediastinal adenopathy.    - S/p R Supraclavicular LN biopsy w/ IR on 3/14/24 w/ path suggesting metastatic carcinoma, favoring upper GI or pancreaticobiliary origin.      -> imaging shows pancreatic neck cystic lesion measuring 1.6 cm  - pleural effusions s/p R pleurex, pericardial effusion   - Course complicated with SVC syndrome    - - PRN use in past 24 hours from 8 AM to 8 AM: none   - c/w oxycontin 30 mg TID while still able to swallow   - Patient has no IV access and unable to have permanent access   - Started morphine concentrate 20 mg Sublingal q3h PRN for pain or dyspnea   - Bowel regimen, goal BM every 2 to 3 days to prevent opioid induced constipation, hold for loose stool. - PRN use in past 24 hours from 8 AM to 8 AM: none   - c/w oxycontin 30 mg TID while still able to swallow   - Patient has no IV access and unable to have permanent access due to diffuse DVTs  - Started morphine concentrate 20 mg Sublingal q3h PRN for pain or dyspnea   - Bowel regimen, goal BM every 2 to 3 days to prevent opioid induced constipation, hold for loose stool.

## 2024-05-23 NOTE — CHART NOTE - NSCHARTNOTEFT_GEN_A_CORE
Notified by RN that there was a discharge noted on her pleurX site. Upon checking it at bedside, when the dressing came off, there was a foul smell and yellow-white discharge noted at the site of the pleurX and redness around the site as well. Patient didn't report any pain or discomfort at this time. Thoracic surgery was called for evaluation. Notified by RN that there was a discharge noted on her pleurX site. Upon checking it at bedside, when the dressing came off, there was a foul smell and yellow-white discharge noted at the site of the pleurX and redness around the site as well. Patient didn't report any pain or discomfort at this time. Thoracic surgery was called for evaluation.     Per RN, about 300cc was drained, no abnormality noted in the drain.

## 2024-05-23 NOTE — PROVIDER CONTACT NOTE (CRITICAL VALUE NOTIFICATION) - SITUATION
Critical value Hgb 7.0
Hgb 6.9
hgb 6.1 hct 18
Critical Lab for Calcium of 6.3
Glucose value from bmp came back 34

## 2024-05-23 NOTE — PROVIDER CONTACT NOTE (CRITICAL VALUE NOTIFICATION) - ACTION/TREATMENT ORDERED:
repeat labs
Hold Lovenox for now. Repeat CBC. Will transfuse if needed. Continue to monitor.
Continue to monitor patient, care ongoing.
Provider notified. Repeat CBC and type and screen to be done at 1400 5/23.
Calcium gluconate IVPB ordered. Care ongoing.

## 2024-05-23 NOTE — PROGRESS NOTE ADULT - PROBLEM SELECTOR PLAN 1
- Source: R sided chest pain, radiating. Large right supraclavicular infiltrative mass, extending into the inferior mediastinum, causing RUE and chest swelling, SVC syndrome   - PRN use in past 48 hours from 8 AM to 8 AM: none   - c/w oxycontin 30 mg TID   -   -   - Bowel regimen, goal BM every 2 to 3 days to prevent opioid induced constipation, hold for loose stool. - Source: R sided chest pain, radiating. Large right supraclavicular infiltrative mass, extending into the inferior mediastinum, causing RUE and chest swelling, SVC syndrome   - PRN use in past 48 hours from 8 AM to 8 AM: none   - c/w oxycontin 30 mg TID   - Patient has no IV access and unable to have permanent access   - Started morphine concentrate 20 mg Sublingal q3h PRN for pain or dyspnea   - Bowel regimen, goal BM every 2 to 3 days to prevent opioid induced constipation, hold for loose stool. - worsening hypoxemia, on HFNC   - Patient has no IV access and unable to have permanent access   - Started morphine concentrate 20 mg Sublingal q3h PRN for pain or dyspnea - worsening hypoxemia, on HFNC   - Patient has no IV access and unable to have permanent access due to diffuse DVTs  - Started morphine concentrate 20 mg Sublingal q3h PRN for pain or dyspnea

## 2024-05-23 NOTE — PROGRESS NOTE ADULT - PROBLEM SELECTOR PLAN 5
For       In the event of worsening symptoms, please contact the Palliative Medicine team via pager (if the patient is at [facility] #[phone] or if the patient is at [facility] #[phone]) The Geriatric and Palliative Medicine service has coverage 24 hours a day/ 7 days a week to provide medical recommendations regarding symptom management needs via telephone. - MOLST completed in [facility]- DNR/DNI - patient reaffirmed (see GOC 3/28)   - 3/22- HCP done in [facility]- patient's boyfriend Saeid Pattersonmark  - 5/23- - MOLST completed in [facility] [place] with patient's consent- DNR/DNI - patient reaffirmed (see GOC 3/28)   - 3/22- HCP done in [facility]- patient's boyfriend Saeid Peña  - 5/23- HCP agreed to comfort care.

## 2024-05-23 NOTE — PROVIDER CONTACT NOTE (CRITICAL VALUE NOTIFICATION) - ASSESSMENT
Pt is AOX4, no complains of chest pain, sob and no active bleeding. Pt in no acute distress.
Crtically low Hemoglobin value.
Patient A&Ox3. Vital signs within normal patient limits. Breathing unlabored and on high flow. Patient denies chest pain and is experiencing no shortness of breath. Patient showing no signs of hypoglycemic episode. Fingerstick was repeated, patient glucose came back as 145.
No s&s of active bleeding. Pt in no acute distress.
Pt is AOX4, no complains of chest pain, sob and no active bleeding. Pt in no acute distress.

## 2024-05-23 NOTE — PROVIDER CONTACT NOTE (CRITICAL VALUE NOTIFICATION) - RECOMMENDATIONS
Continue to monitor patient, care ongoing.
repeat labs
Calcium gluconate IVPB ordered. Care ongoing.
Provider notified.  Repeat CBC and type and screen to be done at 1400 5/23.

## 2024-05-23 NOTE — PROGRESS NOTE ADULT - PROBLEM SELECTOR PLAN 3
- on valium 5 mg BID (home med) hx of RCC s/p R nephrectomy   - Presented to [facility] with right breast/chest wall swelling and cellulitis with imaging evidence concerning for metastatic malignancy unknown primary w/ lung nodules, and axillary, supraclavicular, and mediastinal adenopathy.    - S/p R Supraclavicular LN biopsy w/ IR on 3/14/24 w/ path suggesting metastatic carcinoma, favoring upper GI or pancreaticobiliary origin.   - pleural effusions s/p R pleurex, pericardial effusion   - Course complicated with SVC syndrome, extensive b/l UE DVTs     - Received one dose of chemo mFOLFOX inpatient on 5/16-18, however unable to tolerater further chemo

## 2024-05-23 NOTE — PROGRESS NOTE ADULT - PROBLEM SELECTOR PLAN 4
- MOLST completed in [facility]- DNR/DNI - patient reaffirmed   - HCP done in [facility]- patient's boyfriend Saeid Peña - on valium 5 mg BID (home med)

## 2024-05-23 NOTE — PROGRESS NOTE ADULT - SUBJECTIVE AND OBJECTIVE BOX
Reno Doe MD  Academic Hospitalist  Pager [phone]  Email: [email or web address]  Available on [employer] Teams        PROGRESS NOTE:     Patient is a 58y old  Female who presents with a chief complaint of SVC syndrome, DVT, mediastinal mass (22 May 2024 14:09)      SUBJECTIVE / OVERNIGHT EVENTS:  Patient seen and examined this morning. Patient continues to appear more anxious, increase on O2 requirements and increasing HR.       MEDICATIONS  (STANDING):  albuterol/ipratropium for Nebulization 3 milliLiter(s) Nebulizer every 6 hours  aMIOdarone    Tablet 200 milliGRAM(s) Oral daily  Biotene Dry Mouth Oral Rinse 15 milliLiter(s) Swish and Spit every 6 hours  budesonide 160 MICROgram(s)/formoterol 4.5 MICROgram(s) Inhaler 2 Puff(s) Inhalation two times a day  chlorhexidine 2% Cloths 1 Application(s) Topical daily  dexAMETHasone     Tablet 4 milliGRAM(s) Oral every 12 hours  enoxaparin Injectable 30 milliGRAM(s) SubCutaneous every 12 hours  influenza   Vaccine 0.5 milliLiter(s) IntraMuscular once  metoclopramide Injectable 10 milliGRAM(s) IV Push every 8 hours  metoprolol tartrate 25 milliGRAM(s) Oral every 12 hours  multivitamin 1 Tablet(s) Oral daily  naloxegol 25 milliGRAM(s) Oral daily  nicotine -  14 mG/24Hr(s) Patch 1 Patch Transdermal every 24 hours  pantoprazole    Tablet 40 milliGRAM(s) Oral every 12 hours  petrolatum white Ointment 1 Application(s) Topical two times a day  polyethylene glycol 3350 17 Gram(s) Oral every 12 hours  sodium chloride 0.65% Nasal 1 Spray(s) Both Nostrils two times a day  sodium chloride 3%  Inhalation 4 milliLiter(s) Inhalation every 12 hours    MEDICATIONS  (PRN):  aluminum hydroxide/magnesium hydroxide/simethicone Suspension 30 milliLiter(s) Oral every 6 hours PRN Dyspepsia  benzocaine/menthol Lozenge 1 Lozenge Oral two times a day PRN Sore Throat  Biotene Dry Mouth Oral Rinse 15 milliLiter(s) Swish and Spit two times a day PRN dry mouth  diazepam    Tablet 5 milliGRAM(s) Oral two times a day PRN anxiety  FIRST- Mouthwash  BLM 5 milliLiter(s) Swish and Spit four times a day PRN Mouth Care  ondansetron    Tablet 4 milliGRAM(s) Oral every 8 hours PRN Nausea and/or Vomiting  oxyCODONE    IR 10 milliGRAM(s) Oral every 4 hours PRN Moderate Pain (4 - 6)  sodium chloride 0.9% lock flush 10 milliLiter(s) IV Push every 1 hour PRN Pre/post blood products, medications, blood draw, and to maintain line patency      CAPILLARY BLOOD GLUCOSE        I&O's Summary    22 May 2024 07:01  -  23 May 2024 07:00  --------------------------------------------------------  IN: 40 mL / OUT: 200 mL / NET: -160 mL        PHYSICAL EXAM:  Vital Signs Last 24 Hrs  T(C): 36.3 (23 May 2024 11:30), Max: 36.9 (23 May 2024 05:50)  T(F): 97.4 (23 May 2024 11:30), Max: 98.5 (23 May 2024 05:50)  HR: 96 (23 May 2024 11:30) (81 - 96)  BP: 129/71 (23 May 2024 11:30) (110/68 - 129/71)  BP(mean): --  RR: 18 (23 May 2024 11:30) (16 - 20)  SpO2: 97% (23 May 2024 11:30) (95% - 100%)    Parameters below as of 23 May 2024 11:30  Patient On (Oxygen Delivery Method): nasal cannula, high flow      Cachectic, [id number] woman, anxious with some distress  Pt still dysphonic, but able to write in comprehensive sentences to communicate  Mild R eyelid ptosis and R pupil miosis present/unchanged; R eye with scleral injection; no oral lesions/thrush  RRR, no m/r/g  Breaths not tachypneic; trace inspiratory wheeze and transmitted upper airway sounds auscultated in all lung zone  Abd soft/nt/nd, normoactive bowel sounds    LABS:                        8.3    15.89 )-----------( 336      ( 23 May 2024 10:55 )             27.2     05-23    143  |  105  |  32<H>  ----------------------------<  110<H>  4.6   |  30  |  0.83    Ca    10.5      23 May 2024 10:55  Phos  3.2     05-23  Mg     1.70     05-23    TPro  5.6<L>  /  Alb  2.5<L>  /  TBili  <0.2  /  DBili  x   /  AST  10  /  ALT  32  /  AlkPhos  139<H>  05-23          Urinalysis Basic - ( 23 May 2024 10:55 )    Color: x / Appearance: x / SG: x / pH: x  Gluc: 110 mg/dL / Ketone: x  / Bili: x / Urobili: x   Blood: x / Protein: x / Nitrite: x   Leuk Esterase: x / RBC: x / WBC x   Sq Epi: x / Non Sq Epi: x / Bacteria: x          RADIOLOGY & ADDITIONAL TESTS:  Results Reviewed:   Imaging Personally Reviewed:  Electrocardiogram Personally Reviewed:    COORDINATION OF CARE:  Care Discussed with Consultants/Other Providers [Y/N]: Case discussed during interdisciplinary rounds with social work and case management. Also d/w Dr. Barlow from palliative care.   Prior or Outpatient Records Reviewed [Y/N]:

## 2024-05-23 NOTE — PROGRESS NOTE ADULT - SUBJECTIVE AND OBJECTIVE BOX
Indication of Geriatrics and Palliative Medicine Services:  [  ] Complex Medical Decision Making   [X  ] Symptom/Pain management     DNR on chart:  DNI    INTERVAL EVENTS:           -------------------------------------------------------------------------------------------------------    PRESENT SYMPTOMS:     [ ] Unable to self-report      [ ] PAINADS     [ ] RDOS    [ ] No     [X ] Yes     Source if other than patient:  [ ]Family   [ ]Team     PAIN:   If blank, patient unable to specify     [X ]yes [ ]no    1. Location- R side of body- chest, abdomen, shoulder neck   2. Radiation- as above    3. Quality- sharp, burning, electric; also pressure   4. Timing- Constant   5. Minimal acceptable level/pain goal- 5/10   6. Aggravating factors-   7. QOL impact- Severe     SYMPTOMS:   Dyspnea:                           [ ]Mild [ ]Moderate [ ]Severe  Anxiety:                             [ ]Mild [ ]Moderate [X ]Severe  Fatigue:                             [ ]Mild [ ]Moderate [ ]Severe  Nausea/Vomiting:              [ ]Mild [ ]Moderate [ ]Severe  Loss of appetite:                [ ]Mild [ ]Moderate [ ]Severe  Constipation:                     [ ]Mild [ ]Moderate [ ]Severe    Other Symptoms:  [ ]All other review of systems negative - cough     -------------------------------------------------------------------------------------------------------    I STOP: [id number]    PDI	Current Rx	Drug Type	Rx Written	Rx Dispensed	Drug	Quantity	Days Supply	Prescriber Name	Prescriber KVNG #	Payment Method	Dispenser  A	Y	O	03/08/2024	03/11/2024	oxycodone hcl (ir) 10 mg tab	120	30	Bakari Morales	[id number]	Medicare	[facility]  A	N	B	02/14/2024	02/14/2024	diazepam 5 mg tablet	60	30	Neha Arreaga	[id number]	Medicare	[facility]  A	N	O	02/09/2024	02/09/2024	oxycodone hcl (ir) 10 mg tab	120	30	Replogle, Bakari	[id number]	Medicare	[facility]  A	N	B	01/11/2024	01/12/2024	diazepam 5 mg tablet	60	30	Pulatov, Pulat	[id number]	Medicare	[facility]  A	N	O	01/08/2024	01/09/2024	oxycodone hcl (ir) 10 mg tab	120	30	Replogle, Bakari	[id number]	Medicare	[facility]  A	N	B	12/12/2023	12/12/2023	diazepam 5 mg tablet	60	30	Pulatov, Pulat	[id number]	[facility]  A	N	O	12/11/2023	12/11/2023	oxycodone hcl (ir) 10 mg tab	120	30	Replogle, [place]	[id number]	Medicare	[facility]  A	N	O	11/13/2023	11/13/2023	oxycodone hcl (ir) 10 mg tab	120	30	José Miguel Caro	[id number]	Medicare	[facility]  A	N	B	10/19/2023	10/19/2023	diazepam 5 mg tablet	60	30	Pulatov, Pulat	[id number]	Cash	[facility]  A	N	O	10/13/2023	10/13/2023	oxycodone hcl (ir) 10 mg tab	120	30	José Miguel Caro	[id number]	Medicare	[facility]  A	N	B	09/11/2023	09/16/2023	diazepam 5 mg tablet	60	30	Pulatov, Pulat	[id number]	Medicare	[facility]  A	N	O	09/15/2023	09/16/2023	oxycodone hcl (ir) 10 mg tab	120	30	Sanjiv Persaud	[id number]	Medicare	[facility]  A	N	O	08/18/2023	08/19/2023	oxycodone hcl (ir) 10 mg tab	120	30	Sanjiv Persaud	[id number]	Medicare	[facility]  A	N	B	07/30/2023	08/03/2023	diazepam 5 mg tablet	60	30	Pulatov, Pulat	[id number]	Medicare	[facility]  A	N	O	07/21/2023	07/21/2023	oxycodone hcl (ir) 10 mg tab	120	30	Sanjiv Persaud	[id number]	Medicare	[facility]  A	N	B	06/25/2023	07/12/2023	diazepam 5 mg tablet	60	30	Pulatov, Pulat	[id number]	Medicare	[facility]  A	N	O	06/23/2023	06/24/2023	oxycodone hcl (ir) 10 mg tab	120	30	Sanjiv Persaud	[id number]	Medicare	[facility]      -------------------------------------------------------------------------------------------------------    ITEMS UNCHECKED ARE NOT PRESENT    PHYSICAL:  Vital Signs Last 24 Hrs  T(C): 36.6 (08 Apr 2024 09:35), Max: 36.9 (07 Apr 2024 17:30)  T(F): 97.9 (08 Apr 2024 09:35), Max: 98.5 (08 Apr 2024 01:30)  HR: 67 (08 Apr 2024 09:35) (67 - 83)  BP: 149/91 (08 Apr 2024 09:35) (130/74 - 161/88)  BP(mean): --  RR: 18 (08 Apr 2024 09:35) (18 - 18)  SpO2: 100% (08 Apr 2024 09:35) (97% - 100%)    Parameters below as of 08 Apr 2024 09:35  Patient On (Oxygen Delivery Method): nasal cannula  O2 Flow (L/min): 4      GENERAL:  [ ]Cachexia  [ ] Frail  [X ]Awake  [X ]Oriented x 3  [ ]Lethargic  [ ]Unarousable  [ ]Verbal  [ ]Non-Verbal    BEHAVIORAL:   [ ] Anxiety  [ ] Delirium [ ] Agitation [ ] Other    HEENT:   [ ]Normal   [ ]Dry mouth   [ ]ET Tube/Trach  [ ]Oral lesions  R eye glaucoma     PULMONARY:   [X ]Clear              [ ]Tachypnea  [ ]Audible excessive secretions   [ ]Rhonchi        [ ]Right [ ]Left [ ]Bilateral  [ ]Crackles        [ ]Right [ ]Left [ ]Bilateral  [ ]Wheezing     [ ]Right [ ]Left [ ]Bilateral  [ ]Diminished breath sounds [ ]right [ ]left [ ]bilateral    CARDIOVASCULAR:    [X ]Regular [ ]Irregular [ ]Tachy  [ ]Malik [ ]Murmur [ ]Other    GASTROINTESTINAL:  [X ]Soft  [ ]Distended   [ ]+BS  [X ]Non tender [ ]Tender  [ ]Other [ ]PEG [ ]OGT/ NGT      GENITOURINARY:  [X ]Normal [ ] Incontinent   [ ]Oliguria/Anuria   [ ]Che    MUSCULOSKELETAL:   [ ]Normal   [X ]Weakness  [ ]Bed/Wheelchair bound [ ]Edema    NEUROLOGIC:   [X ]No focal deficits  [ ]Cognitive impairment  [ ]Dysphagia [ ]Dysarthria [ ]Paresis [ ]Other     SKIN:   [X ]Normal  [ ]Rash  [X ]Other- edematous RUE, R breast   [ ]Pressure ulcer(s)       Present on admission [ ]y [ ]n      -------------------------------------------------------------------------------------------------------    LABS:                          8.3    15.89 )-----------( 336      ( 23 May 2024 10:55 )             27.2     05-23    143  |  105  |  32<H>  ----------------------------<  110<H>  4.6   |  30  |  0.83    Ca    10.5      23 May 2024 10:55  Phos  3.2     05-23  Mg     1.70     05-23    TPro  5.6<L>  /  Alb  2.5<L>  /  TBili  <0.2  /  DBili  x   /  AST  10  /  ALT  32  /  AlkPhos  139<H>  05-23      -------------------------------------------------------------------------------------------------------    CRITICAL CARE:  [ ]Shock Present  [ ]Septic [ ]Cardiogenic [ ]Neurologic [ ]Hypovolemic [ ]Undifferentiated    [ ]Vasopressors [ ]Inotropes    [ ]Respiratory failure present [ ]Acute  [ ]Chronic [ ]Hypoxic  [ ]Hypercarbic [ ]Mixed   [ ]Mechanical Ventilation  [ ]Trach collar   [ ]Non-invasive ventilatory support   [ ]High-Flow   [ ]Oxygen mask/venti     [ ]Other organ failure     -------------------------------------------------------------------------------------------------------    RADIOLOGY & ADDITIONAL STUDIES:       < from: CT Neck Soft Tissue w/ IV Cont (03.27.24 @ 17:10) >  Bulky and conglomerate lymphadenopathy at right level 4 invades internal   jugular vein with thrombus extending up to hyoid level. 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 IJV otherwise patent up to skull base.    New left IJV nonocclusive thrombus. Otherwise, no significant change from   3/14/24.    < end of copied text >    < from: CT Chest w/ IV Cont (03.27.24 @ 17:10) >  IMPRESSION:    In comparison with 3/14/2024, new consolidations throughout the right   lung more severely within right lower lobe likely representing pneumonia.    Interval insertion of right Pleurx catheter and decrease of right pleural   effusion. Small/moderate left pleural effusion is increased.    Redemonstrated extensive DVT within thoracic and lower neck. The upper   SVC remains obliterated.    Large right supraclavicular mass infiltrating into the mediastinum is   unchanged.    Trace right pneumothorax.    < end of copied text >    < from: CT Neck Soft Tissue w/ IV Cont (03.14.24 @ 11:24) >  IMPRESSION:    Extensive cellulitis/myositis along the right anterior lateral neck and   right upper chest wall with inflammatory fat induration the deep soft   tissue of the neck.    Large supraclavicular/mediastinal mass lesion, presumably conglomerate of   lymph nodes with mass effect and complete occlusion of the right internal   jugular vein with associated surrounding inflammatory changes in the deep   neck, concerning for infectious/inflammatory thrombophlebitis.   Clinical/laboratory correlation and serial ultrasound follow-up is   recommended.    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    No masslike lesions or abnormal enhancement in aerodigestive mucosa.   Airways are patent.    Cervical adenopathy.    < end of copied text >    < from: CT Abdomen and Pelvis w/ IV Cont (03.12.24 @ 19:42) >  IMPRESSION:  Status post right nephrectomy. No lymphadenopathy or evidence of new   metastatic lesion.  A new 1.6 mL pancreatic neck cystic lesion without main pancreatic ductal   dilatation. Differentials include side branch IPMN.  Interval increase in small right and trace left pleural effusions.    < end of copied text >    -------------------------------------------------------------------------------------------------------  MEDICATIONS:     MEDICATIONS  (STANDING):  albuterol/ipratropium for Nebulization 3 milliLiter(s) Nebulizer every 6 hours  aMIOdarone    Tablet 200 milliGRAM(s) Oral daily  Biotene Dry Mouth Oral Rinse 15 milliLiter(s) Swish and Spit every 6 hours  budesonide 160 MICROgram(s)/formoterol 4.5 MICROgram(s) Inhaler 2 Puff(s) Inhalation two times a day  chlorhexidine 2% Cloths 1 Application(s) Topical daily  dexAMETHasone     Tablet 4 milliGRAM(s) Oral every 12 hours  enoxaparin Injectable 30 milliGRAM(s) SubCutaneous every 12 hours  influenza   Vaccine 0.5 milliLiter(s) IntraMuscular once  metoclopramide Injectable 10 milliGRAM(s) IV Push every 8 hours  metoprolol tartrate 25 milliGRAM(s) Oral every 12 hours  multivitamin 1 Tablet(s) Oral daily  naloxegol 25 milliGRAM(s) Oral daily  nicotine -  14 mG/24Hr(s) Patch 1 Patch Transdermal every 24 hours  oxyCODONE  ER Tablet 30 milliGRAM(s) Oral <User Schedule>  pantoprazole    Tablet 40 milliGRAM(s) Oral every 12 hours  petrolatum white Ointment 1 Application(s) Topical two times a day  polyethylene glycol 3350 17 Gram(s) Oral every 12 hours  sodium chloride 0.65% Nasal 1 Spray(s) Both Nostrils two times a day  sodium chloride 3%  Inhalation 4 milliLiter(s) Inhalation every 12 hours    MEDICATIONS  (PRN):  aluminum hydroxide/magnesium hydroxide/simethicone Suspension 30 milliLiter(s) Oral every 6 hours PRN Dyspepsia  benzocaine/menthol Lozenge 1 Lozenge Oral two times a day PRN Sore Throat  Biotene Dry Mouth Oral Rinse 15 milliLiter(s) Swish and Spit two times a day PRN dry mouth  diazepam    Tablet 5 milliGRAM(s) Oral two times a day PRN anxiety  FIRST- Mouthwash  BLM 5 milliLiter(s) Swish and Spit four times a day PRN Mouth Care  morphine Concentrate 20 milliGRAM(s) SubLingual every 3 hours PRN Pain or dyspnea  ondansetron    Tablet 4 milliGRAM(s) Oral every 8 hours PRN Nausea and/or Vomiting  sodium chloride 0.9% lock flush 10 milliLiter(s) IV Push every 1 hour PRN Pre/post blood products, medications, blood draw, and to maintain line patency       Indication of Geriatrics and Palliative Medicine Services:  [X  ] Complex Medical Decision Making   [X  ] Symptom/Pain management     DNR on chart: Yes   DNI    INTERVAL EVENTS: Palliative reconsulted as patient's condition deteriorating. Patient last seen 4/8. Patient seen this AM, awake but fatigue on HFNC. Patient desaturated this AM, HFNC now on max settings. Primary team attempting to draw blood however difficult stick.   See below for GOC.   Patient seen again in PM, ladan Thao and her partner Indira at bedside.     -------------------------------------------------------------------------------------------------------    PRESENT SYMPTOMS:     [X ] Unable to self-report      [X ] PAINADS     [X ] RDOS    [ ] No     [ ] Yes     Source if other than patient:  [ ]Family   [ ]Team     PAIN:   If blank, patient unable to specify     [ ]yes [ ]no    1. Location-   2. Radiation-     3. Quality-   4. Timing-   5. Minimal acceptable level/pain goal-  6. Aggravating factors-   7. QOL impact-     SYMPTOMS:   Dyspnea:                           [ ]Mild [X ]Moderate [ ]Severe  Anxiety:                             [ ]Mild [ ]Moderate [ ]Severe  Fatigue:                             [ ]Mild [ ]Moderate [ ]Severe  Nausea/Vomiting:              [ ]Mild [ ]Moderate [ ]Severe  Loss of appetite:                [ ]Mild [ ]Moderate [ ]Severe  Constipation:                     [ ]Mild [ ]Moderate [ ]Severe    Other Symptoms:  [ ]All other review of systems negative    -------------------------------------------------------------------------------------------------------    I STOP: [id number]    PDI	Current Rx	Drug Type	Rx Written	Rx Dispensed	Drug	Quantity	Days Supply	Prescriber Name	Prescriber KVNG #	Payment Method	Dispenser  A	Y	O	03/08/2024	03/11/2024	oxycodone hcl (ir) 10 mg tab	120	30	ReplBakari cotto	[id number]	Medicare	[facility]  A	N	B	02/14/2024	02/14/2024	diazepam 5 mg tablet	60	30	ArreagaParadiseNeha	[id number]	Medicare	[facility]  A	N	O	02/09/2024	02/09/2024	oxycodone hcl (ir) 10 mg tab	120	30	ReplogleBakari	[id number]	Medicare	[facility]  A	N	B	01/11/2024	01/12/2024	diazepam 5 mg tablet	60	30	Pulatov, Pulat	[id number]	Medicare	[facility]  A	N	O	01/08/2024	01/09/2024	oxycodone hcl (ir) 10 mg tab	120	30	ReplogleBakari	[id number]	Medicare	[facility]  A	N	B	12/12/2023	12/12/2023	diazepam 5 mg tablet	60	30	Pulatov, Pulat	[id number]	Cash	[facility]  A	N	O	12/11/2023	12/11/2023	oxycodone hcl (ir) 10 mg tab	120	30	ReplogleBakari	[id number]	Medicare	[facility]  A	N	O	11/13/2023	11/13/2023	oxycodone hcl (ir) 10 mg tab	120	30	José Miguel Caro	[id number]	Medicare	[facility]  A	N	B	10/19/2023	10/19/2023	diazepam 5 mg tablet	60	30	Pulatov, Pulat	[id number]	Cash	[facility]  A	N	O	10/13/2023	10/13/2023	oxycodone hcl (ir) 10 mg tab	120	30	José Miguel Caro	[id number]	Medicare	[facility]  A	N	B	09/11/2023	09/16/2023	diazepam 5 mg tablet	60	30	Pulatov, Pulat	[id number]	Medicare	[facility]  A	N	O	09/15/2023	09/16/2023	oxycodone hcl (ir) 10 mg tab	120	30	Sanjiv Persaud	[id number]	Medicare	[facility]  A	N	O	08/18/2023	08/19/2023	oxycodone hcl (ir) 10 mg tab	120	30	Sanjiv Persaud	[id number]	Medicare	[facility]  A	N	B	07/30/2023	08/03/2023	diazepam 5 mg tablet	60	30	Pulatov, Pulat	[id number]	Medicare	[facility]  A	N	O	07/21/2023	07/21/2023	oxycodone hcl (ir) 10 mg tab	120	30	Sanjiv Persaud	[id number]	Medicare	[facility]  A	N	B	06/25/2023	07/12/2023	diazepam 5 mg tablet	60	30	Pulatov, Pulat	[id number]	Medicare	[facility]  A	N	O	06/23/2023	06/24/2023	oxycodone hcl (ir) 10 mg tab	120	30	Sanjiv Persaud	[id number]	Medicare	[facility]      -------------------------------------------------------------------------------------------------------    ITEMS UNCHECKED ARE NOT PRESENT    PHYSICAL:  Vital Signs Last 24 Hrs  T(C): 36.6 (08 Apr 2024 09:35), Max: 36.9 (07 Apr 2024 17:30)  T(F): 97.9 (08 Apr 2024 09:35), Max: 98.5 (08 Apr 2024 01:30)  HR: 67 (08 Apr 2024 09:35) (67 - 83)  BP: 149/91 (08 Apr 2024 09:35) (130/74 - 161/88)  BP(mean): --  RR: 18 (08 Apr 2024 09:35) (18 - 18)  SpO2: 100% (08 Apr 2024 09:35) (97% - 100%)    Parameters below as of 08 Apr 2024 09:35  Patient On (Oxygen Delivery Method): nasal cannula  O2 Flow (L/min): 4      GENERAL:  [ ]Cachexia  [ ] Frail  [X ]Awake  [X ]Oriented x 3  [ ]Lethargic  [ ]Unarousable  [ ]Verbal  [ ]Non-Verbal    BEHAVIORAL:   [ ] Anxiety  [ ] Delirium [ ] Agitation [ ] Other    HEENT:   [ ]Normal   [ ]Dry mouth   [ ]ET Tube/Trach  [ ]Oral lesions  R eye glaucoma     PULMONARY:   [ ]Clear              [ ]Tachypnea  [ ]Audible excessive secretions   [ ]Rhonchi        [ ]Right [ ]Left [ ]Bilateral  [ ]Crackles        [ ]Right [ ]Left [ ]Bilateral  [ ]Wheezing     [ ]Right [ ]Left [ ]Bilateral  [X ]Diminished breath sounds [ ]right [ ]left [X ]bilateral  R pleurex     CARDIOVASCULAR:    [X ]Regular [ ]Irregular [ ]Tachy  [ ]Malik [ ]Murmur [ ]Other    GASTROINTESTINAL:  [X ]Soft  [ ]Distended   [ ]+BS  [X ]Non tender [ ]Tender  [ ]Other [ ]PEG [ ]OGT/ NGT      GENITOURINARY:  [X ]Normal [ ] Incontinent   [ ]Oliguria/Anuria   [ ]Che    MUSCULOSKELETAL:   [ ]Normal   [X ]Weakness  [ ]Bed/Wheelchair bound [ ]Edema    NEUROLOGIC:   [X ]No focal deficits  [ ]Cognitive impairment  [ ]Dysphagia [ ]Dysarthria [ ]Paresis [ ]Other     SKIN:   [X ]Normal  [ ]Rash  [X ]Other- edematous RUE, R breast   [ ]Pressure ulcer(s)       Present on admission [ ]y [ ]n      -------------------------------------------------------------------------------------------------------    LABS:                          8.3    15.89 )-----------( 336      ( 23 May 2024 10:55 )             27.2     05-23    143  |  105  |  32<H>  ----------------------------<  110<H>  4.6   |  30  |  0.83    Ca    10.5      23 May 2024 10:55  Phos  3.2     05-23  Mg     1.70     05-23    TPro  5.6<L>  /  Alb  2.5<L>  /  TBili  <0.2  /  DBili  x   /  AST  10  /  ALT  32  /  AlkPhos  139<H>  05-23      -------------------------------------------------------------------------------------------------------    CRITICAL CARE:  [ ]Shock Present  [ ]Septic [ ]Cardiogenic [ ]Neurologic [ ]Hypovolemic [ ]Undifferentiated    [ ]Vasopressors [ ]Inotropes    [ ]Respiratory failure present [ ]Acute  [ ]Chronic [ ]Hypoxic  [ ]Hypercarbic [ ]Mixed   [ ]Mechanical Ventilation  [ ]Trach collar   [ ]Non-invasive ventilatory support   [ ]High-Flow   [ ]Oxygen mask/venti     [ ]Other organ failure     -------------------------------------------------------------------------------------------------------    RADIOLOGY & ADDITIONAL STUDIES:       < from: CT Neck Soft Tissue w/ IV Cont (03.27.24 @ 17:10) >  Bulky and conglomerate lymphadenopathy at right level 4 invades internal   jugular vein with thrombus extending up to hyoid level. 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 IJV otherwise patent up to skull base.    New left IJV nonocclusive thrombus. Otherwise, no significant change from   3/14/24.    < end of copied text >    < from: CT Chest w/ IV Cont (03.27.24 @ 17:10) >  IMPRESSION:    In comparison with 3/14/2024, new consolidations throughout the right   lung more severely within right lower lobe likely representing pneumonia.    Interval insertion of right Pleurx catheter and decrease of right pleural   effusion. Small/moderate left pleural effusion is increased.    Redemonstrated extensive DVT within thoracic and lower neck. The upper   SVC remains obliterated.    Large right supraclavicular mass infiltrating into the mediastinum is   unchanged.    Trace right pneumothorax.    < end of copied text >    < from: CT Neck Soft Tissue w/ IV Cont (03.14.24 @ 11:24) >  IMPRESSION:    Extensive cellulitis/myositis along the right anterior lateral neck and   right upper chest wall with inflammatory fat induration the deep soft   tissue of the neck.    Large supraclavicular/mediastinal mass lesion, presumably conglomerate of   lymph nodes with mass effect and complete occlusion of the right internal   jugular vein with associated surrounding inflammatory changes in the deep   neck, concerning for infectious/inflammatory thrombophlebitis.   Clinical/laboratory correlation and serial ultrasound follow-up is   recommended.    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    No masslike lesions or abnormal enhancement in aerodigestive mucosa.   Airways are patent.    Cervical adenopathy.    < end of copied text >    < from: CT Abdomen and Pelvis w/ IV Cont (03.12.24 @ 19:42) >  IMPRESSION:  Status post right nephrectomy. No lymphadenopathy or evidence of new   metastatic lesion.  A new 1.6 mL pancreatic neck cystic lesion without main pancreatic ductal   dilatation. Differentials include side branch IPMN.  Interval increase in small right and trace left pleural effusions.    < end of copied text >    < from: Xray Chest 1 View-PORTABLE IMMEDIATE (Xray Chest 1 View-PORTABLE IMMEDIATE .) (05.23.24 @ 10:39) >    IMPRESSION:  Small bilateral pleural effusions, right greater than left. No new focal   consolidations.    < end of copied text >    < from: CT Abdomen and Pelvis w/ IV Cont (05.03.24 @ 12:32) >  IMPRESSION:  Since the prior chest CT 3/27/2024:    Persistent right middle lobe consolidation and decreased right lower lobe   consolidation.    New patchy nodular opacities in the right upper lobe and increased patchy   and groundglass opacities in the left lung.    Small right pleural effusion, unchanged. Trace left pleural effusion,   decreased.    No evidence of metastatic disease in the abdomen or pelvis.    < end of copied text >    -------------------------------------------------------------------------------------------------------  MEDICATIONS:     MEDICATIONS  (STANDING):  albuterol/ipratropium for Nebulization 3 milliLiter(s) Nebulizer every 6 hours  aMIOdarone    Tablet 200 milliGRAM(s) Oral daily  Biotene Dry Mouth Oral Rinse 15 milliLiter(s) Swish and Spit every 6 hours  budesonide 160 MICROgram(s)/formoterol 4.5 MICROgram(s) Inhaler 2 Puff(s) Inhalation two times a day  chlorhexidine 2% Cloths 1 Application(s) Topical daily  dexAMETHasone     Tablet 4 milliGRAM(s) Oral every 12 hours  enoxaparin Injectable 30 milliGRAM(s) SubCutaneous every 12 hours  influenza   Vaccine 0.5 milliLiter(s) IntraMuscular once  metoclopramide Injectable 10 milliGRAM(s) IV Push every 8 hours  metoprolol tartrate 25 milliGRAM(s) Oral every 12 hours  multivitamin 1 Tablet(s) Oral daily  naloxegol 25 milliGRAM(s) Oral daily  nicotine -  14 mG/24Hr(s) Patch 1 Patch Transdermal every 24 hours  oxyCODONE  ER Tablet 30 milliGRAM(s) Oral <User Schedule>  pantoprazole    Tablet 40 milliGRAM(s) Oral every 12 hours  petrolatum white Ointment 1 Application(s) Topical two times a day  polyethylene glycol 3350 17 Gram(s) Oral every 12 hours  sodium chloride 0.65% Nasal 1 Spray(s) Both Nostrils two times a day  sodium chloride 3%  Inhalation 4 milliLiter(s) Inhalation every 12 hours    MEDICATIONS  (PRN):  aluminum hydroxide/magnesium hydroxide/simethicone Suspension 30 milliLiter(s) Oral every 6 hours PRN Dyspepsia  benzocaine/menthol Lozenge 1 Lozenge Oral two times a day PRN Sore Throat  Biotene Dry Mouth Oral Rinse 15 milliLiter(s) Swish and Spit two times a day PRN dry mouth  diazepam    Tablet 5 milliGRAM(s) Oral two times a day PRN anxiety  FIRST- Mouthwash  BLM 5 milliLiter(s) Swish and Spit four times a day PRN Mouth Care  morphine Concentrate 20 milliGRAM(s) SubLingual every 3 hours PRN Pain or dyspnea  ondansetron    Tablet 4 milliGRAM(s) Oral every 8 hours PRN Nausea and/or Vomiting  sodium chloride 0.9% lock flush 10 milliLiter(s) IV Push every 1 hour PRN Pre/post blood products, medications, blood draw, and to maintain line patency       Indication of Geriatrics and Palliative Medicine Services:  [X  ] Complex Medical Decision Making   [X  ] Symptom/Pain management     DNR on chart: Yes   DNI    INTERVAL EVENTS: Palliative reconsulted as patient's condition deteriorating. Patient last seen 4/8. Patient seen this AM, awake but fatigue on HFNC. Patient desaturated this AM, HFNC now on max settings. Primary team attempting to draw blood however difficult stick.   See below for GOC.   Patient seen again in PM, ladan Thao and her partner Indira at bedside. Discontinued oxycontin and started ATC morphine concentrate however later notified patient refusing morphine, Oxycontin reordered.     -------------------------------------------------------------------------------------------------------    PRESENT SYMPTOMS:     [X ] Unable to self-report      [X ] PAINADS     [X ] RDOS    [ ] No     [ ] Yes     Source if other than patient:  [ ]Family   [ ]Team     PAIN:   If blank, patient unable to specify     [ ]yes [ ]no    1. Location-   2. Radiation-     3. Quality-   4. Timing-   5. Minimal acceptable level/pain goal-  6. Aggravating factors-   7. QOL impact-     SYMPTOMS:   Dyspnea:                           [ ]Mild [X ]Moderate [ ]Severe  Anxiety:                             [ ]Mild [ ]Moderate [ ]Severe  Fatigue:                             [ ]Mild [ ]Moderate [ ]Severe  Nausea/Vomiting:              [ ]Mild [ ]Moderate [ ]Severe  Loss of appetite:                [ ]Mild [ ]Moderate [ ]Severe  Constipation:                     [ ]Mild [ ]Moderate [ ]Severe    Other Symptoms:  [ ]All other review of systems negative    -------------------------------------------------------------------------------------------------------    I STOP: [id number]    PDI	Current Rx	Drug Type	Rx Written	Rx Dispensed	Drug	Quantity	Days Supply	Prescriber Name	Prescriber KVNG #	Payment Method	Dispenser  A	Y	O	03/08/2024	03/11/2024	oxycodone hcl (ir) 10 mg tab	120	30	Bakari Morales	[id number]	Medicare	[facility]  A	N	B	02/14/2024	02/14/2024	diazepam 5 mg tablet	60	30	Neha Arreaga	[id number]	Medicare	[facility]  A	N	O	02/09/2024	02/09/2024	oxycodone hcl (ir) 10 mg tab	120	30	ReplBakari cotto	[id number]	Medicare	[facility]  A	N	B	01/11/2024	01/12/2024	diazepam 5 mg tablet	60	30	Pulatov, Pulat	[id number]	Medicare	[facility]  A	N	O	01/08/2024	01/09/2024	oxycodone hcl (ir) 10 mg tab	120	30	Bakari Morales	[id number]	Medicare	[facility]  A	N	B	12/12/2023	12/12/2023	diazepam 5 mg tablet	60	30	Pulatov, Pulat	[id number]	[facility]  A	N	O	12/11/2023	12/11/2023	oxycodone hcl (ir) 10 mg tab	120	30	ReplBakari cotto	[id number]	Medicare	[facility]  A	N	O	11/13/2023	11/13/2023	oxycodone hcl (ir) 10 mg tab	120	30	José Miguel Caro	[id number]	Medicare	[facility]  A	N	B	10/19/2023	10/19/2023	diazepam 5 mg tablet	60	30	Pulatov, Pulat	[id number]	Cash	[facility]  A	N	O	10/13/2023	10/13/2023	oxycodone hcl (ir) 10 mg tab	120	30	José Miguel Caro	[id number]	Medicare	[facility]  A	N	B	09/11/2023	09/16/2023	diazepam 5 mg tablet	60	30	Pulatov, Pulat	[id number]	Medicare	[facility]  A	N	O	09/15/2023	09/16/2023	oxycodone hcl (ir) 10 mg tab	120	30	Sanjiv Persaud	[id number]	Medicare	[facility]  A	N	O	08/18/2023	08/19/2023	oxycodone hcl (ir) 10 mg tab	120	30	Sanjiv Persaud	[id number]	Medicare	[facility]  A	N	B	07/30/2023	08/03/2023	diazepam 5 mg tablet	60	30	Pulatov, Pulat	[id number]	Medicare	[facility]  A	N	O	07/21/2023	07/21/2023	oxycodone hcl (ir) 10 mg tab	120	30	Sanjiv Persaud	[id number]	Medicare	[facility]  A	N	B	06/25/2023	07/12/2023	diazepam 5 mg tablet	60	30	Pulatov, Pulat	[id number]	Medicare	[facility]  A	N	O	06/23/2023	06/24/2023	oxycodone hcl (ir) 10 mg tab	120	30	Sanjiv Persaud	[id number]	Medicare	[facility]      -------------------------------------------------------------------------------------------------------    ITEMS UNCHECKED ARE NOT PRESENT    PHYSICAL:  Vital Signs Last 24 Hrs  T(C): 36.6 (08 Apr 2024 09:35), Max: 36.9 (07 Apr 2024 17:30)  T(F): 97.9 (08 Apr 2024 09:35), Max: 98.5 (08 Apr 2024 01:30)  HR: 67 (08 Apr 2024 09:35) (67 - 83)  BP: 149/91 (08 Apr 2024 09:35) (130/74 - 161/88)  BP(mean): --  RR: 18 (08 Apr 2024 09:35) (18 - 18)  SpO2: 100% (08 Apr 2024 09:35) (97% - 100%)    Parameters below as of 08 Apr 2024 09:35  Patient On (Oxygen Delivery Method): nasal cannula  O2 Flow (L/min): 4      GENERAL:  [ ]Cachexia  [ ] Frail  [X ]Awake  [X ]Oriented x 3  [ ]Lethargic  [ ]Unarousable  [ ]Verbal  [ ]Non-Verbal    BEHAVIORAL:   [ ] Anxiety  [ ] Delirium [ ] Agitation [ ] Other    HEENT:   [ ]Normal   [ ]Dry mouth   [ ]ET Tube/Trach  [ ]Oral lesions  R eye glaucoma     PULMONARY:   [ ]Clear              [ ]Tachypnea  [ ]Audible excessive secretions   [ ]Rhonchi        [ ]Right [ ]Left [ ]Bilateral  [ ]Crackles        [ ]Right [ ]Left [ ]Bilateral  [ ]Wheezing     [ ]Right [ ]Left [ ]Bilateral  [X ]Diminished breath sounds [ ]right [ ]left [X ]bilateral  R pleurex     CARDIOVASCULAR:    [X ]Regular [ ]Irregular [ ]Tachy  [ ]Malik [ ]Murmur [ ]Other    GASTROINTESTINAL:  [X ]Soft  [ ]Distended   [ ]+BS  [X ]Non tender [ ]Tender  [ ]Other [ ]PEG [ ]OGT/ NGT      GENITOURINARY:  [X ]Normal [ ] Incontinent   [ ]Oliguria/Anuria   [ ]Che    MUSCULOSKELETAL:   [ ]Normal   [X ]Weakness  [ ]Bed/Wheelchair bound [ ]Edema    NEUROLOGIC:   [X ]No focal deficits  [ ]Cognitive impairment  [ ]Dysphagia [ ]Dysarthria [ ]Paresis [ ]Other     SKIN:   [X ]Normal  [ ]Rash  [X ]Other- edematous RUE, R breast   [ ]Pressure ulcer(s)       Present on admission [ ]y [ ]n      -------------------------------------------------------------------------------------------------------    LABS:                          8.3    15.89 )-----------( 336      ( 23 May 2024 10:55 )             27.2     05-23    143  |  105  |  32<H>  ----------------------------<  110<H>  4.6   |  30  |  0.83    Ca    10.5      23 May 2024 10:55  Phos  3.2     05-23  Mg     1.70     05-23    TPro  5.6<L>  /  Alb  2.5<L>  /  TBili  <0.2  /  DBili  x   /  AST  10  /  ALT  32  /  AlkPhos  139<H>  05-23      -------------------------------------------------------------------------------------------------------    CRITICAL CARE:  [ ]Shock Present  [ ]Septic [ ]Cardiogenic [ ]Neurologic [ ]Hypovolemic [ ]Undifferentiated    [ ]Vasopressors [ ]Inotropes    [ ]Respiratory failure present [ ]Acute  [ ]Chronic [ ]Hypoxic  [ ]Hypercarbic [ ]Mixed   [ ]Mechanical Ventilation  [ ]Trach collar   [ ]Non-invasive ventilatory support   [ ]High-Flow   [ ]Oxygen mask/venti     [ ]Other organ failure     -------------------------------------------------------------------------------------------------------    RADIOLOGY & ADDITIONAL STUDIES:       < from: CT Neck Soft Tissue w/ IV Cont (03.27.24 @ 17:10) >  Bulky and conglomerate lymphadenopathy at right level 4 invades internal   jugular vein with thrombus extending up to hyoid level. 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 IJV otherwise patent up to skull base.    New left IJV nonocclusive thrombus. Otherwise, no significant change from   3/14/24.    < end of copied text >    < from: CT Chest w/ IV Cont (03.27.24 @ 17:10) >  IMPRESSION:    In comparison with 3/14/2024, new consolidations throughout the right   lung more severely within right lower lobe likely representing pneumonia.    Interval insertion of right Pleurx catheter and decrease of right pleural   effusion. Small/moderate left pleural effusion is increased.    Redemonstrated extensive DVT within thoracic and lower neck. The upper   SVC remains obliterated.    Large right supraclavicular mass infiltrating into the mediastinum is   unchanged.    Trace right pneumothorax.    < end of copied text >    < from: CT Neck Soft Tissue w/ IV Cont (03.14.24 @ 11:24) >  IMPRESSION:    Extensive cellulitis/myositis along the right anterior lateral neck and   right upper chest wall with inflammatory fat induration the deep soft   tissue of the neck.    Large supraclavicular/mediastinal mass lesion, presumably conglomerate of   lymph nodes with mass effect and complete occlusion of the right internal   jugular vein with associated surrounding inflammatory changes in the deep   neck, concerning for infectious/inflammatory thrombophlebitis.   Clinical/laboratory correlation and serial ultrasound follow-up is   recommended.    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    No masslike lesions or abnormal enhancement in aerodigestive mucosa.   Airways are patent.    Cervical adenopathy.    < end of copied text >    < from: CT Abdomen and Pelvis w/ IV Cont (03.12.24 @ 19:42) >  IMPRESSION:  Status post right nephrectomy. No lymphadenopathy or evidence of new   metastatic lesion.  A new 1.6 mL pancreatic neck cystic lesion without main pancreatic ductal   dilatation. Differentials include side branch IPMN.  Interval increase in small right and trace left pleural effusions.    < end of copied text >    < from: Xray Chest 1 View-PORTABLE IMMEDIATE (Xray Chest 1 View-PORTABLE IMMEDIATE .) (05.23.24 @ 10:39) >    IMPRESSION:  Small bilateral pleural effusions, right greater than left. No new focal   consolidations.    < end of copied text >    < from: CT Abdomen and Pelvis w/ IV Cont (05.03.24 @ 12:32) >  IMPRESSION:  Since the prior chest CT 3/27/2024:    Persistent right middle lobe consolidation and decreased right lower lobe   consolidation.    New patchy nodular opacities in the right upper lobe and increased patchy   and groundglass opacities in the left lung.    Small right pleural effusion, unchanged. Trace left pleural effusion,   decreased.    No evidence of metastatic disease in the abdomen or pelvis.    < end of copied text >    -------------------------------------------------------------------------------------------------------  MEDICATIONS:     MEDICATIONS  (STANDING):  albuterol/ipratropium for Nebulization 3 milliLiter(s) Nebulizer every 6 hours  aMIOdarone    Tablet 200 milliGRAM(s) Oral daily  Biotene Dry Mouth Oral Rinse 15 milliLiter(s) Swish and Spit every 6 hours  budesonide 160 MICROgram(s)/formoterol 4.5 MICROgram(s) Inhaler 2 Puff(s) Inhalation two times a day  chlorhexidine 2% Cloths 1 Application(s) Topical daily  dexAMETHasone     Tablet 4 milliGRAM(s) Oral every 12 hours  enoxaparin Injectable 30 milliGRAM(s) SubCutaneous every 12 hours  influenza   Vaccine 0.5 milliLiter(s) IntraMuscular once  metoclopramide Injectable 10 milliGRAM(s) IV Push every 8 hours  metoprolol tartrate 25 milliGRAM(s) Oral every 12 hours  multivitamin 1 Tablet(s) Oral daily  naloxegol 25 milliGRAM(s) Oral daily  nicotine -  14 mG/24Hr(s) Patch 1 Patch Transdermal every 24 hours  oxyCODONE  ER Tablet 30 milliGRAM(s) Oral <User Schedule>  pantoprazole    Tablet 40 milliGRAM(s) Oral every 12 hours  petrolatum white Ointment 1 Application(s) Topical two times a day  polyethylene glycol 3350 17 Gram(s) Oral every 12 hours  sodium chloride 0.65% Nasal 1 Spray(s) Both Nostrils two times a day  sodium chloride 3%  Inhalation 4 milliLiter(s) Inhalation every 12 hours    MEDICATIONS  (PRN):  aluminum hydroxide/magnesium hydroxide/simethicone Suspension 30 milliLiter(s) Oral every 6 hours PRN Dyspepsia  benzocaine/menthol Lozenge 1 Lozenge Oral two times a day PRN Sore Throat  Biotene Dry Mouth Oral Rinse 15 milliLiter(s) Swish and Spit two times a day PRN dry mouth  diazepam    Tablet 5 milliGRAM(s) Oral two times a day PRN anxiety  FIRST- Mouthwash  BLM 5 milliLiter(s) Swish and Spit four times a day PRN Mouth Care  morphine Concentrate 20 milliGRAM(s) SubLingual every 3 hours PRN Pain or dyspnea  ondansetron    Tablet 4 milliGRAM(s) Oral every 8 hours PRN Nausea and/or Vomiting  sodium chloride 0.9% lock flush 10 milliLiter(s) IV Push every 1 hour PRN Pre/post blood products, medications, blood draw, and to maintain line patency

## 2024-05-23 NOTE — PROGRESS NOTE ADULT - PROBLEM SELECTOR PLAN 6
For GOC and symptom management       In the event of worsening symptoms, please contact the Palliative Medicine team via pager (if the patient is at [facility] #[phone] or if the patient is at [facility] #[phone]) The Geriatric and Palliative Medicine service has coverage 24 hours a day/ 7 days a week to provide medical recommendations regarding symptom management needs via telephone. For GOC and symptom management - Patient with worsening hypoxemia, on max settings of HFNC, unable to be transferred to inpatient hospice. HCP agreed to comfort care. Patient has no IV access, c/w oral meds as tolerated, sublingual morphine concentrate ordered PRN.     In the event of worsening symptoms, please contact the Palliative Medicine team via pager (if the patient is at [facility] #[phone] or if the patient is at [facility] #[phone]) The Geriatric and Palliative Medicine service has coverage 24 hours a day/ 7 days a week to provide medical recommendations regarding symptom management needs via telephone. For GOC and symptom management - Patient with worsening hypoxemia, on max settings of HFNC, unable to be transferred to inpatient hospice. HCP agreed to comfort care. Patient has no IV access, c/w oral meds as tolerated, sublingual morphine concentrate ordered PRN.   Discussed with primary team and nursing.     In the event of worsening symptoms, please contact the Palliative Medicine team via pager (if the patient is at [facility] #[phone] or if the patient is at [facility] #[phone]) The Geriatric and Palliative Medicine service has coverage 24 hours a day/ 7 days a week to provide medical recommendations regarding symptom management needs via telephone.

## 2024-05-23 NOTE — PROGRESS NOTE ADULT - ASSESSMENT
58F w/ F hx tobacco use, RCC right nephrectomy, right sided glaucoma, fibromyalgia, anxiety, GERD, was at [facility] w/ concern for breast inflammation, lung nodules/mediastinal mass w/ biopsy concern for metastatic GI cancer. Had pleural effusion s/p chest tube and pleurx, had pericardial window for effusion. Has had poor IV access, has triple lumen in right groin. Has UE DVT on lovenox. Also has SVC syndrome, transfer from [facility] for [facility] RT treatment. ROS otherwise negative. On oxygen.

## 2024-05-23 NOTE — PROGRESS NOTE ADULT - CONVERSATION DETAILS
Palliative consulted for complex medical decision making in the setting of serious illness.     Patient last seen on 4/8. Since then patient has had worsening condition, unable to tolerate chemo, and with worsening hypoxemia on HFNC.     Spoke to patient’s HCP, patient's partner/boyfriend Saeid regarding patient's deteriorating condition. Elicited his understanding of patient’s illness and prognosis. Saeid states he was told patient was becoming more sick and no longer a candidate for treatment, no other options left. He also did speak to patient about this. He was told hospice was recommended so he is expecting an inpatient hospice plan. Explained patient's settings on HFNC was too high and patient could not be transferred. Discussed option of comfort care to prioritize end of life symptom management with deescalation of other interventions that would be burdensome at end of life including continued blood draws, imaging, other investigations. Saeid agreed to comfort care to prioritize patient's comfort. Emotional support provided.

## 2024-05-24 PROCEDURE — 99233 SBSQ HOSP IP/OBS HIGH 50: CPT

## 2024-05-24 PROCEDURE — 99232 SBSQ HOSP IP/OBS MODERATE 35: CPT

## 2024-05-24 RX ORDER — IPRATROPIUM/ALBUTEROL SULFATE 18-103MCG
3 AEROSOL WITH ADAPTER (GRAM) INHALATION ONCE
Refills: 0 | Status: COMPLETED | OUTPATIENT
Start: 2024-05-24 | End: 2024-05-24

## 2024-05-24 RX ORDER — CALCIUM CARBONATE 500(1250)
1 TABLET ORAL DAILY
Refills: 0 | Status: DISCONTINUED | OUTPATIENT
Start: 2024-05-24 | End: 2024-05-31

## 2024-05-24 RX ADMIN — ENOXAPARIN SODIUM 30 MILLIGRAM(S): 100 INJECTION SUBCUTANEOUS at 18:13

## 2024-05-24 RX ADMIN — Medication 1 PATCH: at 13:08

## 2024-05-24 RX ADMIN — Medication 3 MILLILITER(S): at 20:28

## 2024-05-24 RX ADMIN — Medication 1 APPLICATION(S): at 18:13

## 2024-05-24 RX ADMIN — PANTOPRAZOLE SODIUM 40 MILLIGRAM(S): 20 TABLET, DELAYED RELEASE ORAL at 18:15

## 2024-05-24 RX ADMIN — Medication 15 MILLILITER(S): at 18:14

## 2024-05-24 RX ADMIN — OXYCODONE HYDROCHLORIDE 30 MILLIGRAM(S): 5 TABLET ORAL at 15:24

## 2024-05-24 RX ADMIN — Medication 3 MILLILITER(S): at 17:44

## 2024-05-24 RX ADMIN — Medication 1 SPRAY(S): at 18:14

## 2024-05-24 RX ADMIN — BUDESONIDE AND FORMOTEROL FUMARATE DIHYDRATE 2 PUFF(S): 160; 4.5 AEROSOL RESPIRATORY (INHALATION) at 11:13

## 2024-05-24 RX ADMIN — Medication 1 TABLET(S): at 15:25

## 2024-05-24 RX ADMIN — Medication 3 MILLILITER(S): at 02:55

## 2024-05-24 RX ADMIN — CHLORHEXIDINE GLUCONATE 1 APPLICATION(S): 213 SOLUTION TOPICAL at 12:53

## 2024-05-24 RX ADMIN — SODIUM CHLORIDE 4 MILLILITER(S): 9 INJECTION INTRAMUSCULAR; INTRAVENOUS; SUBCUTANEOUS at 07:22

## 2024-05-24 RX ADMIN — SODIUM CHLORIDE 4 MILLILITER(S): 9 INJECTION INTRAMUSCULAR; INTRAVENOUS; SUBCUTANEOUS at 20:29

## 2024-05-24 RX ADMIN — Medication 15 MILLILITER(S): at 13:10

## 2024-05-24 RX ADMIN — Medication 3 MILLILITER(S): at 14:40

## 2024-05-24 RX ADMIN — Medication 4 MILLIGRAM(S): at 18:15

## 2024-05-24 RX ADMIN — Medication 25 MILLIGRAM(S): at 18:15

## 2024-05-24 RX ADMIN — Medication 3 MILLILITER(S): at 07:22

## 2024-05-24 NOTE — PROGRESS NOTE ADULT - PROBLEM SELECTOR PLAN 2
- PRN use in past 24 hours from 8 AM to 8 AM: none   - c/w oxycontin 30 mg TID while still able to swallow   - Patient has no IV access and unable to have permanent access due to diffuse DVTs  - c/w morphine concentrate 20 mg Sublingal q3h PRN for pain or dyspnea   - Bowel regimen, goal BM every 2 to 3 days to prevent opioid induced constipation, hold for loose stool.

## 2024-05-24 NOTE — PROGRESS NOTE ADULT - SUBJECTIVE AND OBJECTIVE BOX
Reno Doe MD  [employer]ist  Pager [phone]  Email: [email or web address]  Available on [employer] Teams        PROGRESS NOTE:     Patient is a 58y old  Female who presents with a chief complaint of SVC syndrome, DVT, mediastinal mass (24 May 2024 12:26)      SUBJECTIVE / OVERNIGHT EVENTS:  Patient seen and examined this morning. Remains very anxious, communicates by writing, believes that her pain medications are going to "kill me" also writing "I need help". She would like someone to be at her bedside at all times.       MEDICATIONS  (STANDING):  albuterol/ipratropium for Nebulization 3 milliLiter(s) Nebulizer every 6 hours  aMIOdarone    Tablet 200 milliGRAM(s) Oral daily  Biotene Dry Mouth Oral Rinse 15 milliLiter(s) Swish and Spit every 6 hours  budesonide 160 MICROgram(s)/formoterol 4.5 MICROgram(s) Inhaler 2 Puff(s) Inhalation two times a day  chlorhexidine 2% Cloths 1 Application(s) Topical daily  dexAMETHasone     Tablet 4 milliGRAM(s) Oral every 12 hours  enoxaparin Injectable 30 milliGRAM(s) SubCutaneous every 12 hours  influenza   Vaccine 0.5 milliLiter(s) IntraMuscular once  metoclopramide Injectable 10 milliGRAM(s) IV Push every 8 hours  metoprolol tartrate 25 milliGRAM(s) Oral every 12 hours  multivitamin 1 Tablet(s) Oral daily  naloxegol 25 milliGRAM(s) Oral daily  nicotine -  14 mG/24Hr(s) Patch 1 Patch Transdermal every 24 hours  oxyCODONE  ER Tablet 30 milliGRAM(s) Oral <User Schedule>  pantoprazole    Tablet 40 milliGRAM(s) Oral every 12 hours  petrolatum white Ointment 1 Application(s) Topical two times a day  polyethylene glycol 3350 17 Gram(s) Oral every 12 hours  sodium chloride 0.65% Nasal 1 Spray(s) Both Nostrils two times a day  sodium chloride 3%  Inhalation 4 milliLiter(s) Inhalation every 12 hours    MEDICATIONS  (PRN):  aluminum hydroxide/magnesium hydroxide/simethicone Suspension 30 milliLiter(s) Oral every 6 hours PRN Dyspepsia  benzocaine/menthol Lozenge 1 Lozenge Oral two times a day PRN Sore Throat  Biotene Dry Mouth Oral Rinse 15 milliLiter(s) Swish and Spit two times a day PRN dry mouth  calcium carbonate    500 mG (Tums) Chewable 1 Tablet(s) Chew daily PRN Heartburn  diazepam    Tablet 5 milliGRAM(s) Oral two times a day PRN anxiety  FIRST- Mouthwash  BLM 5 milliLiter(s) Swish and Spit four times a day PRN Mouth Care  morphine Concentrate 20 milliGRAM(s) SubLingual every 3 hours PRN Pain or dyspnea  ondansetron    Tablet 4 milliGRAM(s) Oral every 8 hours PRN Nausea and/or Vomiting  sodium chloride 0.9% lock flush 10 milliLiter(s) IV Push every 1 hour PRN Pre/post blood products, medications, blood draw, and to maintain line patency      CAPILLARY BLOOD GLUCOSE        I&O's Summary    23 May 2024 07:01  -  24 May 2024 07:00  --------------------------------------------------------  IN: 0 mL / OUT: 300 mL / NET: -300 mL        PHYSICAL EXAM:  Vital Signs Last 24 Hrs  T(C): 36.7 (24 May 2024 11:03), Max: 36.7 (24 May 2024 11:03)  T(F): 98.1 (24 May 2024 11:03), Max: 98.1 (24 May 2024 11:03)  HR: 72 (24 May 2024 11:03) (70 - 97)  BP: 135/65 (24 May 2024 11:03) (135/65 - 136/67)  BP(mean): --  RR: 20 (24 May 2024 11:03) (17 - 22)  SpO2: 100% (24 May 2024 11:03) (98% - 100%)    Parameters below as of 24 May 2024 11:03  Patient On (Oxygen Delivery Method): nasal cannula, high flow        Cachectic, [id number] woman, anxious with some distress  Pt still dysphonic, but able to write in comprehensive sentences to communicate  Mild R eyelid ptosis and R pupil miosis present/unchanged; R eye with scleral injection; no oral lesions/thrush  RRR, no m/r/g  Breaths not tachypneic; trace inspiratory wheeze and transmitted upper airway sounds auscultated in all lung zone  Abd soft/nt/nd, normoactive bowel sounds      LABS:                        8.3    15.89 )-----------( 336      ( 23 May 2024 10:55 )             27.2     05-23    143  |  105  |  32<H>  ----------------------------<  110<H>  4.6   |  30  |  0.83    Ca    10.5      23 May 2024 10:55  Phos  3.2     05-23  Mg     1.70     05-23    TPro  5.6<L>  /  Alb  2.5<L>  /  TBili  <0.2  /  DBili  x   /  AST  10  /  ALT  32  /  AlkPhos  139<H>  05-23          Urinalysis Basic - ( 23 May 2024 10:55 )    Color: x / Appearance: x / SG: x / pH: x  Gluc: 110 mg/dL / Ketone: x  / Bili: x / Urobili: x   Blood: x / Protein: x / Nitrite: x   Leuk Esterase: x / RBC: x / WBC x   Sq Epi: x / Non Sq Epi: x / Bacteria: x          RADIOLOGY & ADDITIONAL TESTS:  Results Reviewed:   Imaging Personally Reviewed:  Electrocardiogram Personally Reviewed:    COORDINATION OF CARE:  Care Discussed with Consultants/Other Providers [Y/N]: Case discussed during interdisciplinary rounds with social work and case management, d/w Dr. Barlow, patient seen together.   Prior or Outpatient Records Reviewed [Y/N]:

## 2024-05-24 NOTE — PROGRESS NOTE ADULT - ASSESSMENT
58F w/ F hx tobacco use, RCC right nephrectomy, right sided glaucoma, fibromyalgia, anxiety, GERD, was at [facility] w/ concern for breast inflammation, lung nodules/mediastinal mass w/ biopsy concern for metastatic GI cancer. Had pleural effusion s/p chest tube and pleurx, had pericardial window for effusion. Has had poor IV access, has triple lumen in right groin. Has UE DVT on lovenox. Also has SVC syndrome, transfer from [facility] for [facility] RT treatment. ROS otherwise negative. On oxygen.

## 2024-05-24 NOTE — PROGRESS NOTE ADULT - ASSESSMENT
57 yo woman, former smoker, with h/o R eye glaucoma, Fibromyalgia, Anxiety, GERD, and RCC s/p R total nephrectomy/hysterectomy; she was initially admitted to [facility] on 3/11 w/ R breast swelling c/f mastitis- imaging brooks noted supraclavicular/mediastinal mass lesion which encased the SVC and multiple other veins resulting in obliteration of the SVC and thrombosis of the R IJ, and a pancreatic neck cystic lesion.  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  Her disease/hospital course has also been c/b pericardial effusion s/p pericardial window w/ neg cytology, R pleural effusion, also negative cytology) s/p Pleurx, Afib w/ RVR and acute hypoxic resp failure 2/2 SVC syndrome- not dennis to IR-guided stenting; pt was ultimately started on Dex and transferred to [facility] on 4/4 for urgent palliative RT which she completed on 4/18. Her hospital course has also been c/b pericardial effusion with tamponade s/p window and non malignant R pleural effusion s/p pleurex, acute b/l UE and RLE dvts, anxiety, and more recently, recurrent hypoxia. Made comfort care on 5/23.     ACTIVE PROBLEMS  Metastatic CUP (carcinoma of unknown primary)  Goals of care discussion, counseling  Encounter for antineoplastic chemotherapy  Acute hypoxic + hypercapneic respiratory failure  Hyperkalemia  Dysphonia with R VC hypomobility   Oral thrush  SVC syndrome  Extensive b/l UE DVTs  Acute RLE DVT a/w RLE ecchymosis  Hypercoag state  Pericardial effusion with tamp physiology s/p pericardial window  R pleural effusion s/p pleurex  pAfib, with RVR on this admission  Anxiety/emotional lability  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)  Cancer-related pain, anxiety  Severe prot-ghazala malnutrition    - Metastatic CUP (carcinoma of unknown primary)  - Goals of care discussion, counseling  - Encounter for antineoplastic chemotherapy  Based on 3/14 SC LN path, ddx includes primary UGI vs pancreaticobiliary primary (breast edema is likely 2/2 SVC syndrome); PD-L1 TPs 1%, pMMR, Her2 2+, FISH pending, Ki-67 30%  Ca-125 moderately elevated at 125; other tumor markers not significantly elevated  Additional diagnostic brooks includes:   * 4/19 MRCP showing pancreatic lesion (? IPMN, but pancreas was suboptimally evaluated)     * 4/24 and 4/26 EGD/EUS aborted as pt had large amount of food in the stomach that prevented passing of scope   * 4/30 UGIS aborted as pt felt too short of breath when laying down flat (improved after her pleurex was drained  Case d/w Dr. 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 Pt is a suboptimal candidate i/s/o her progressive debility and malnutrition (ECOG PS 3)   >> Discussed risks/benefits of proceeding with palliative chemo (inpt) vs best supportive care with pt's HCP Saeid on 5/10 and again on 5/13- he is amenable to trial of inpt chemotherapy and understands that hospice will be recommended if pt does not tolerate trial of inpt chemo; verbal and written chemo consent obtained on 5/13   * Pt s/p successful femoral vein central line placement by Surgery on 5/15 (to be removed after chemotherapy)   *  Pt s/p C1 palliative [id number] (20% dose reduction), 5/16-18   >> She did not receive the full cycle as chemo was held on 5/18 when pt developed resp acidosis, and the chemo was not resumed after that  Pt's prognosis is very poor, unlikely to have any meaningful recovery; she is dnr/dni. Transitioned to comfort care by healthcare proxy.     - Acute hypercapneic + hypoxic resp failure  Resp acidosis resolved after brief period on bipap  Pt is lethargic today, but O2 sats/HR stable on HF NC O2  Etiology of resp acidosis is unclear, but likely cancer-related vs ? volume overload i/s/o chemo infusion  Serial CXRs without acute changes (decreasing R pleural effusion)  NC CT chest and repeat TTE are pending (ordered on 5/18)  Will fu sputum culture and fungal studies (ordered 5/18)  Diuresing gently/prn: s/p Lasix IV 10mg x1 dose on 5/18, but held today given bump in Cr   * Of note, pt completed 10d course of empiric Cefepime on 5/10; trending WBCs/ procal; monitoring off abx for now unless there is evidence of recurrent infection  Dex taper completed on 5/14 (Bactrim pjp ppx dced on 5/15); resuming Dex IV 4mg BID today (with pjp/ppi ppx)  Continuing Symbicort 2 puffs BID  Continuing Duonebs q6/prn  Will continue routine pleurex drainage as below  Intermittently worsens hypoxia and respiratory distress, likely related to disease progression and or new PE. Patient therapeutically anticoagulated with lovenox, unlikely to benefit from any further intervention.     - Dysphonia with R VC hypomobility  - Oral thrush  Appreciate ENT eval/recs: 5/4 Flex ellis pt with R VC hypomobility  Pt previously cleared for pureed diet s/p Cinesophagram, but she is currently NPO while on bipap (can resume pureed diet when pt is off bipap)  Completed 7d course of Fluconazole on 5/12    - SVC syndrome  Appreciate Rad Onc eval/recs  Completed 10fxs of palliative RT on 4/18     - Extensive b/l UE DVTs  - Acute RLE DVT a/w RLE ecchymosis  - Hypercoag state  Continuing therapeutic lovenox, benefits > risks  unable to have UE IV access.      - Pericardial effusion with tamp physiology s/p pericardial window  - R pleural effusion s/p pleurex  Likely inflammatory; both pleural and pericardial fluid cytology are negative for malignant cells  4/9 surveillance TTE with pEF and no WMAs  Continuing R pleurex drainage- up to 500cc q48h/prn    - pAfib  Pt currently SR, HR controlled  Likely precipitated by malignancy, pericardial effusion, SVC syndrome  Continuing Metoprolol 25mg q12 with holding parameters  Continuing Amio 200mg daily (monitoring for toxicities)  Continuing telemetry    - Anxiety/emotional lability  Continuing Diazepam 5mg BID/prn and nightly  Pt has poor health literacy and clinical insight which is negatively impacting her medical decision making- when ask if she wanted to defer medical decision making to her boyfriend or if she had a designated HCP she replied "he's already sick of me"  Appreciate  consult: pt lacks medical decision making capacity; medical decision making will be deferred to pt's surrogate decision maker- Saeid Peña ([phone])  Made comfort care, however patient often refuses medications    - 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)  Pt with h/o R eye glaucoma with surgery, but miosis can also be associated with Pancoast syndrome i/s/o extensive R upper lung tumor  Pt denies visual deficits or other related symptoms   MRI Brain without contrast ordered for further eval- pt has previously refused  Will continue to monitor closely    - Anemia in neoplastic disease  comfort care now    - Cancer related pain  Currently well controlled  Continuing Oxy ER 30mg q12 q8  Morphine sublingual  Continuing bowel regimen to prevent OIC (including Movantic)    - Severe prot-ghazala malnutrition: appreciate RD recs; continuing regular diet with protein shake supplement BID (SLP eval pending as above)

## 2024-05-24 NOTE — PROGRESS NOTE ADULT - SUBJECTIVE AND OBJECTIVE BOX
Indication of Geriatrics and Palliative Medicine Services:  [X  ] Complex Medical Decision Making   [X  ] Symptom/Pain management     DNR on chart: Yes   DNI    INTERVAL EVENTS: Patient seen this AM with primary team and nursing. Patient has been refusing her routine med oxycontin. Patient writing that she believes oxycontin and morphine will harm her. Explained reason for her decline is the cancer, not opioids and she has been on oxycontin for weeks, such medications will help with her symptoms. Patient having much existential distress over her condition, wants someone to stay with her at all times.     -------------------------------------------------------------------------------------------------------    PRESENT SYMPTOMS:     [X ] Unable to self-report      [X ] PAINADS     [X ] RDOS    [ ] No     [ ] Yes     Source if other than patient:  [ ]Family   [ ]Team     PAIN:   If blank, patient unable to specify     [ ]yes [ ]no    1. Location-   2. Radiation-     3. Quality-   4. Timing-   5. Minimal acceptable level/pain goal-  6. Aggravating factors-   7. QOL impact-     SYMPTOMS:   Dyspnea:                           [ ]Mild [X ]Moderate [ ]Severe  Anxiety:                             [ ]Mild [ ]Moderate [ ]Severe  Fatigue:                             [ ]Mild [ ]Moderate [ ]Severe  Nausea/Vomiting:              [ ]Mild [ ]Moderate [ ]Severe  Loss of appetite:                [ ]Mild [ ]Moderate [ ]Severe  Constipation:                     [ ]Mild [ ]Moderate [ ]Severe    Other Symptoms:  [ ]All other review of systems negative    -------------------------------------------------------------------------------------------------------    I STOP: [id number]    PDI	Current Rx	Drug Type	Rx Written	Rx Dispensed	Drug	Quantity	Days Supply	Prescriber Name	Prescriber KVNG #	Payment Method	Dispenser  A	Y	O	03/08/2024	03/11/2024	oxycodone hcl (ir) 10 mg tab	120	30	ReplBakari cotto	[id number]	Medicare	[facility]  A	N	B	02/14/2024	02/14/2024	diazepam 5 mg tablet	60	30	Neha Arreaga	[id number]	Medicare	[facility]  A	N	O	02/09/2024	02/09/2024	oxycodone hcl (ir) 10 mg tab	120	30	ReplBakari cotto	[id number]	Medicare	[facility]  A	N	B	01/11/2024	01/12/2024	diazepam 5 mg tablet	60	30	Pulatov, Pulat	[id number]	Medicare	[facility]  A	N	O	01/08/2024	01/09/2024	oxycodone hcl (ir) 10 mg tab	120	30	Replyadiel Bakari	[id number]	Medicare	[facility]  A	N	B	12/12/2023	12/12/2023	diazepam 5 mg tablet	60	30	Pulatov, Pulat	[id number]	[facility]  A	N	O	12/11/2023	12/11/2023	oxycodone hcl (ir) 10 mg tab	120	30	Replyadiel Bakari	[id number]	Medicare	[facility]  A	N	O	11/13/2023	11/13/2023	oxycodone hcl (ir) 10 mg tab	120	30	José Miguel Caro	[id number]	Medicare	[facility]  A	N	B	10/19/2023	10/19/2023	diazepam 5 mg tablet	60	30	Pulatov, Pulat	[id number]	Cash	[facility]  A	N	O	10/13/2023	10/13/2023	oxycodone hcl (ir) 10 mg tab	120	30	José Miguel Caro	[id number]	Medicare	[facility]  A	N	B	09/11/2023	09/16/2023	diazepam 5 mg tablet	60	30	Pulatov, Pulat	[id number]	Medicare	[facility]  A	N	O	09/15/2023	09/16/2023	oxycodone hcl (ir) 10 mg tab	120	30	Sanjiv Persaud	[id number]	Medicare	[facility]  A	N	O	08/18/2023	08/19/2023	oxycodone hcl (ir) 10 mg tab	120	30	Sanjiv Persaud	[id number]	Medicare	[facility]  A	N	B	07/30/2023	08/03/2023	diazepam 5 mg tablet	60	30	Pulatov, Pulat	[id number]	Medicare	[facility]  A	N	O	07/21/2023	07/21/2023	oxycodone hcl (ir) 10 mg tab	120	30	Sanjiv Persaud	[id number]	Medicare	[facility]  A	N	B	06/25/2023	07/12/2023	diazepam 5 mg tablet	60	30	Pulatov, Pulat	[id number]	Medicare	[facility]  A	N	O	06/23/2023	06/24/2023	oxycodone hcl (ir) 10 mg tab	120	30	Sanjiv Persaud	[id number]	Medicare	[facility]      -------------------------------------------------------------------------------------------------------    ITEMS UNCHECKED ARE NOT PRESENT    PHYSICAL:  Vital Signs Last 24 Hrs  T(C): 36.6 (08 Apr 2024 09:35), Max: 36.9 (07 Apr 2024 17:30)  T(F): 97.9 (08 Apr 2024 09:35), Max: 98.5 (08 Apr 2024 01:30)  HR: 67 (08 Apr 2024 09:35) (67 - 83)  BP: 149/91 (08 Apr 2024 09:35) (130/74 - 161/88)  BP(mean): --  RR: 18 (08 Apr 2024 09:35) (18 - 18)  SpO2: 100% (08 Apr 2024 09:35) (97% - 100%)    Parameters below as of 08 Apr 2024 09:35  Patient On (Oxygen Delivery Method): nasal cannula  O2 Flow (L/min): 4      GENERAL:  [ ]Cachexia  [ ] Frail  [X ]Awake  [X ]Oriented x 3  [ ]Lethargic  [ ]Unarousable  [ ]Verbal  [ ]Non-Verbal    BEHAVIORAL:   [ ] Anxiety  [ ] Delirium [ ] Agitation [ ] Other    HEENT:   [ ]Normal   [ ]Dry mouth   [ ]ET Tube/Trach  [ ]Oral lesions  R eye glaucoma     PULMONARY:   [ ]Clear              [ ]Tachypnea  [ ]Audible excessive secretions   [ ]Rhonchi        [ ]Right [ ]Left [ ]Bilateral  [ ]Crackles        [ ]Right [ ]Left [ ]Bilateral  [ ]Wheezing     [ ]Right [ ]Left [ ]Bilateral  [X ]Diminished breath sounds [ ]right [ ]left [X ]bilateral  R pleurex     CARDIOVASCULAR:    [X ]Regular [ ]Irregular [ ]Tachy  [ ]Malik [ ]Murmur [ ]Other    GASTROINTESTINAL:  [X ]Soft  [ ]Distended   [ ]+BS  [X ]Non tender [ ]Tender  [ ]Other [ ]PEG [ ]OGT/ NGT      GENITOURINARY:  [X ]Normal [ ] Incontinent   [ ]Oliguria/Anuria   [ ]Che    MUSCULOSKELETAL:   [ ]Normal   [X ]Weakness  [ ]Bed/Wheelchair bound [ ]Edema    NEUROLOGIC:   [X ]No focal deficits  [ ]Cognitive impairment  [ ]Dysphagia [ ]Dysarthria [ ]Paresis [ ]Other     SKIN:   [X ]Normal  [ ]Rash  [X ]Other- edematous RUE, R breast   [ ]Pressure ulcer(s)       Present on admission [ ]y [ ]n      -------------------------------------------------------------------------------------------------------    LABS:                          8.3    15.89 )-----------( 336      ( 23 May 2024 10:55 )             27.2     05-23    143  |  105  |  32<H>  ----------------------------<  110<H>  4.6   |  30  |  0.83    Ca    10.5      23 May 2024 10:55  Phos  3.2     05-23  Mg     1.70     05-23    TPro  5.6<L>  /  Alb  2.5<L>  /  TBili  <0.2  /  DBili  x   /  AST  10  /  ALT  32  /  AlkPhos  139<H>  05-23    -------------------------------------------------------------------------------------------------------    CRITICAL CARE:  [ ]Shock Present  [ ]Septic [ ]Cardiogenic [ ]Neurologic [ ]Hypovolemic [ ]Undifferentiated    [ ]Vasopressors [ ]Inotropes    [X ]Respiratory failure present [X ]Acute  [ ]Chronic [ ]Hypoxic  [ ]Hypercarbic [ ]Mixed   [ ]Mechanical Ventilation  [ ]Trach collar   [ ]Non-invasive ventilatory support   [X ]High-Flow   [ ]Oxygen mask/venti     [ ]Other organ failure     -------------------------------------------------------------------------------------------------------    RADIOLOGY & ADDITIONAL STUDIES:       < from: CT Neck Soft Tissue w/ IV Cont (03.27.24 @ 17:10) >  Bulky and conglomerate lymphadenopathy at right level 4 invades internal   jugular vein with thrombus extending up to hyoid level. 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 IJV otherwise patent up to skull base.    New left IJV nonocclusive thrombus. Otherwise, no significant change from   3/14/24.    < end of copied text >    < from: CT Chest w/ IV Cont (03.27.24 @ 17:10) >  IMPRESSION:    In comparison with 3/14/2024, new consolidations throughout the right   lung more severely within right lower lobe likely representing pneumonia.    Interval insertion of right Pleurx catheter and decrease of right pleural   effusion. Small/moderate left pleural effusion is increased.    Redemonstrated extensive DVT within thoracic and lower neck. The upper   SVC remains obliterated.    Large right supraclavicular mass infiltrating into the mediastinum is   unchanged.    Trace right pneumothorax.    < end of copied text >    < from: CT Neck Soft Tissue w/ IV Cont (03.14.24 @ 11:24) >  IMPRESSION:    Extensive cellulitis/myositis along the right anterior lateral neck and   right upper chest wall with inflammatory fat induration the deep soft   tissue of the neck.    Large supraclavicular/mediastinal mass lesion, presumably conglomerate of   lymph nodes with mass effect and complete occlusion of the right internal   jugular vein with associated surrounding inflammatory changes in the deep   neck, concerning for infectious/inflammatory thrombophlebitis.   Clinical/laboratory correlation and serial ultrasound follow-up is   recommended.    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    No masslike lesions or abnormal enhancement in aerodigestive mucosa.   Airways are patent.    Cervical adenopathy.    < end of copied text >    < from: CT Abdomen and Pelvis w/ IV Cont (03.12.24 @ 19:42) >  IMPRESSION:  Status post right nephrectomy. No lymphadenopathy or evidence of new   metastatic lesion.  A new 1.6 mL pancreatic neck cystic lesion without main pancreatic ductal   dilatation. Differentials include side branch IPMN.  Interval increase in small right and trace left pleural effusions.    < end of copied text >    < from: Xray Chest 1 View-PORTABLE IMMEDIATE (Xray Chest 1 View-PORTABLE IMMEDIATE .) (05.23.24 @ 10:39) >    IMPRESSION:  Small bilateral pleural effusions, right greater than left. No new focal   consolidations.    < end of copied text >    < from: CT Abdomen and Pelvis w/ IV Cont (05.03.24 @ 12:32) >  IMPRESSION:  Since the prior chest CT 3/27/2024:    Persistent right middle lobe consolidation and decreased right lower lobe   consolidation.    New patchy nodular opacities in the right upper lobe and increased patchy   and groundglass opacities in the left lung.    Small right pleural effusion, unchanged. Trace left pleural effusion,   decreased.    No evidence of metastatic disease in the abdomen or pelvis.    < end of copied text >    -------------------------------------------------------------------------------------------------------  MEDICATIONS:     MEDICATIONS  (STANDING):  albuterol/ipratropium for Nebulization 3 milliLiter(s) Nebulizer every 6 hours  aMIOdarone    Tablet 200 milliGRAM(s) Oral daily  Biotene Dry Mouth Oral Rinse 15 milliLiter(s) Swish and Spit every 6 hours  budesonide 160 MICROgram(s)/formoterol 4.5 MICROgram(s) Inhaler 2 Puff(s) Inhalation two times a day  chlorhexidine 2% Cloths 1 Application(s) Topical daily  dexAMETHasone     Tablet 4 milliGRAM(s) Oral every 12 hours  enoxaparin Injectable 30 milliGRAM(s) SubCutaneous every 12 hours  influenza   Vaccine 0.5 milliLiter(s) IntraMuscular once  metoclopramide Injectable 10 milliGRAM(s) IV Push every 8 hours  metoprolol tartrate 25 milliGRAM(s) Oral every 12 hours  multivitamin 1 Tablet(s) Oral daily  naloxegol 25 milliGRAM(s) Oral daily  nicotine -  14 mG/24Hr(s) Patch 1 Patch Transdermal every 24 hours  oxyCODONE  ER Tablet 30 milliGRAM(s) Oral <User Schedule>  pantoprazole    Tablet 40 milliGRAM(s) Oral every 12 hours  petrolatum white Ointment 1 Application(s) Topical two times a day  polyethylene glycol 3350 17 Gram(s) Oral every 12 hours  sodium chloride 0.65% Nasal 1 Spray(s) Both Nostrils two times a day  sodium chloride 3%  Inhalation 4 milliLiter(s) Inhalation every 12 hours    MEDICATIONS  (PRN):  aluminum hydroxide/magnesium hydroxide/simethicone Suspension 30 milliLiter(s) Oral every 6 hours PRN Dyspepsia  benzocaine/menthol Lozenge 1 Lozenge Oral two times a day PRN Sore Throat  Biotene Dry Mouth Oral Rinse 15 milliLiter(s) Swish and Spit two times a day PRN dry mouth  diazepam    Tablet 5 milliGRAM(s) Oral two times a day PRN anxiety  FIRST- Mouthwash  BLM 5 milliLiter(s) Swish and Spit four times a day PRN Mouth Care  morphine Concentrate 20 milliGRAM(s) SubLingual every 3 hours PRN Pain or dyspnea  ondansetron    Tablet 4 milliGRAM(s) Oral every 8 hours PRN Nausea and/or Vomiting  sodium chloride 0.9% lock flush 10 milliLiter(s) IV Push every 1 hour PRN Pre/post blood products, medications, blood draw, and to maintain line patency

## 2024-05-24 NOTE — PROGRESS NOTE ADULT - PROBLEM SELECTOR PLAN 6
For GOC and symptom management - Patient with worsening hypoxemia, on high settings of HFNC, unable to be transferred to inpatient hospice. HCP agreed to comfort care. Patient has no IV access, c/w oral meds as tolerated, sublingual morphine concentrate ordered PRN.   Discussed with primary team and nursing.     In the event of worsening symptoms, please contact the Palliative Medicine team via pager (if the patient is at [facility] #[phone] or if the patient is at [facility] #[phone]) The Geriatric and Palliative Medicine service has coverage 24 hours a day/ 7 days a week to provide medical recommendations regarding symptom management needs via telephone. For GOC and symptom management - Patient with worsening hypoxemia, on high settings of HFNC, unable to be transferred to inpatient hospice. HCP agreed to comfort care. Patient has no IV access, c/w oral meds as tolerated, sublingual morphine concentrate ordered PRN. But patient refusing opioids.       In the event of worsening symptoms, please contact the Palliative Medicine team via pager (if the patient is at [facility] #[phone] or if the patient is at [facility] #[phone]) The Geriatric and Palliative Medicine service has coverage 24 hours a day/ 7 days a week to provide medical recommendations regarding symptom management needs via telephone.

## 2024-05-24 NOTE — PROGRESS NOTE ADULT - PROBLEM SELECTOR PLAN 1
- worsening hypoxemia, on HFNC   - Patient has no IV access and unable to have permanent access due to diffuse DVTs  - c/w morphine concentrate 20 mg Sublingal q3h PRN for pain or dyspnea (no PRN use)   - Patient refusing opioids, thinks they are causing her decline. Tried to educate however patient struggling with existential distress

## 2024-05-24 NOTE — PROGRESS NOTE ADULT - PROBLEM SELECTOR PLAN 3
hx of RCC s/p R nephrectomy   - Presented to [facility] with right breast/chest wall swelling and cellulitis with imaging evidence concerning for metastatic malignancy unknown primary w/ lung nodules, and axillary, supraclavicular, and mediastinal adenopathy.    - S/p R Supraclavicular LN biopsy w/ IR on 3/14/24 w/ path suggesting metastatic carcinoma, favoring upper GI or pancreaticobiliary origin.   - pleural effusions s/p R pleurex, pericardial effusion   - Course complicated with SVC syndrome, extensive b/l UE DVTs     - Received one dose of chemo mFOLFOX inpatient on 5/16-18, however unable to tolerater further chemo  - Due to worsening functions status, not candidate for further interventions. HCP agreed to comfort care

## 2024-05-24 NOTE — PROGRESS NOTE ADULT - PROBLEM SELECTOR PLAN 5
- MOLST completed in [facility] with patient's consent- DNR/DNI - patient reaffirmed (see GOC 3/28)   - 3/22- HCP done in [facility] [place]- patient's boyfriend Saeid Peña  - 5/23- HCP agreed to comfort care.

## 2024-05-25 PROCEDURE — 99232 SBSQ HOSP IP/OBS MODERATE 35: CPT

## 2024-05-25 RX ORDER — OXYCODONE HYDROCHLORIDE 5 MG/1
30 TABLET ORAL ONCE
Refills: 0 | Status: DISCONTINUED | OUTPATIENT
Start: 2024-05-25 | End: 2024-05-25

## 2024-05-25 RX ADMIN — Medication 3 MILLILITER(S): at 03:03

## 2024-05-25 RX ADMIN — Medication 1 PATCH: at 13:34

## 2024-05-25 RX ADMIN — OXYCODONE HYDROCHLORIDE 30 MILLIGRAM(S): 5 TABLET ORAL at 23:57

## 2024-05-25 RX ADMIN — Medication 4 MILLIGRAM(S): at 06:55

## 2024-05-25 RX ADMIN — Medication 1 TABLET(S): at 13:36

## 2024-05-25 RX ADMIN — Medication 1 PATCH: at 07:48

## 2024-05-25 RX ADMIN — Medication 4 MILLIGRAM(S): at 18:45

## 2024-05-25 RX ADMIN — OXYCODONE HYDROCHLORIDE 30 MILLIGRAM(S): 5 TABLET ORAL at 13:32

## 2024-05-25 RX ADMIN — AMIODARONE HYDROCHLORIDE 200 MILLIGRAM(S): 400 TABLET ORAL at 06:39

## 2024-05-25 RX ADMIN — Medication 1 SPRAY(S): at 18:46

## 2024-05-25 RX ADMIN — CHLORHEXIDINE GLUCONATE 1 APPLICATION(S): 213 SOLUTION TOPICAL at 16:59

## 2024-05-25 RX ADMIN — Medication 3 MILLILITER(S): at 20:15

## 2024-05-25 RX ADMIN — Medication 1 PATCH: at 13:49

## 2024-05-25 RX ADMIN — Medication 3 MILLILITER(S): at 15:38

## 2024-05-25 RX ADMIN — Medication 3 MILLILITER(S): at 11:17

## 2024-05-25 RX ADMIN — SODIUM CHLORIDE 4 MILLILITER(S): 9 INJECTION INTRAMUSCULAR; INTRAVENOUS; SUBCUTANEOUS at 11:17

## 2024-05-25 RX ADMIN — Medication 15 MILLILITER(S): at 13:31

## 2024-05-25 RX ADMIN — ENOXAPARIN SODIUM 30 MILLIGRAM(S): 100 INJECTION SUBCUTANEOUS at 18:45

## 2024-05-25 RX ADMIN — ENOXAPARIN SODIUM 30 MILLIGRAM(S): 100 INJECTION SUBCUTANEOUS at 06:38

## 2024-05-25 RX ADMIN — Medication 1 SPRAY(S): at 06:38

## 2024-05-25 RX ADMIN — Medication 25 MILLIGRAM(S): at 06:38

## 2024-05-25 RX ADMIN — OXYCODONE HYDROCHLORIDE 30 MILLIGRAM(S): 5 TABLET ORAL at 03:28

## 2024-05-25 RX ADMIN — PANTOPRAZOLE SODIUM 40 MILLIGRAM(S): 20 TABLET, DELAYED RELEASE ORAL at 06:38

## 2024-05-25 RX ADMIN — SODIUM CHLORIDE 4 MILLILITER(S): 9 INJECTION INTRAMUSCULAR; INTRAVENOUS; SUBCUTANEOUS at 20:16

## 2024-05-25 RX ADMIN — Medication 1 APPLICATION(S): at 06:37

## 2024-05-25 RX ADMIN — Medication 25 MILLIGRAM(S): at 18:55

## 2024-05-25 RX ADMIN — BUDESONIDE AND FORMOTEROL FUMARATE DIHYDRATE 2 PUFF(S): 160; 4.5 AEROSOL RESPIRATORY (INHALATION) at 22:33

## 2024-05-25 RX ADMIN — Medication 1 APPLICATION(S): at 18:44

## 2024-05-25 RX ADMIN — NALOXEGOL OXALATE 25 MILLIGRAM(S): 12.5 TABLET, FILM COATED ORAL at 13:34

## 2024-05-25 RX ADMIN — BUDESONIDE AND FORMOTEROL FUMARATE DIHYDRATE 2 PUFF(S): 160; 4.5 AEROSOL RESPIRATORY (INHALATION) at 13:31

## 2024-05-25 RX ADMIN — PANTOPRAZOLE SODIUM 40 MILLIGRAM(S): 20 TABLET, DELAYED RELEASE ORAL at 18:46

## 2024-05-25 NOTE — PROGRESS NOTE ADULT - SUBJECTIVE AND OBJECTIVE BOX
Reno Doe MD  Academic Hospitalist  Pager [phone]  Email: [email or web address]  Available on Microsoft Teams        PROGRESS NOTE:     Patient is a 58y old  Female who presents with a chief complaint of SVC syndrome, DVT, mediastinal mass (24 May 2024 14:42)      SUBJECTIVE / OVERNIGHT EVENTS:  Patient seen and examined this morning. More lethargic this morning, sleeping.       MEDICATIONS  (STANDING):  albuterol/ipratropium for Nebulization 3 milliLiter(s) Nebulizer every 6 hours  aMIOdarone    Tablet 200 milliGRAM(s) Oral daily  Biotene Dry Mouth Oral Rinse 15 milliLiter(s) Swish and Spit every 6 hours  budesonide 160 MICROgram(s)/formoterol 4.5 MICROgram(s) Inhaler 2 Puff(s) Inhalation two times a day  chlorhexidine 2% Cloths 1 Application(s) Topical daily  dexAMETHasone     Tablet 4 milliGRAM(s) Oral every 12 hours  enoxaparin Injectable 30 milliGRAM(s) SubCutaneous every 12 hours  influenza   Vaccine 0.5 milliLiter(s) IntraMuscular once  metoclopramide Injectable 10 milliGRAM(s) IV Push every 8 hours  metoprolol tartrate 25 milliGRAM(s) Oral every 12 hours  multivitamin 1 Tablet(s) Oral daily  naloxegol 25 milliGRAM(s) Oral daily  nicotine -  14 mG/24Hr(s) Patch 1 Patch Transdermal every 24 hours  oxyCODONE  ER Tablet 30 milliGRAM(s) Oral <User Schedule>  pantoprazole    Tablet 40 milliGRAM(s) Oral every 12 hours  petrolatum white Ointment 1 Application(s) Topical two times a day  polyethylene glycol 3350 17 Gram(s) Oral every 12 hours  sodium chloride 0.65% Nasal 1 Spray(s) Both Nostrils two times a day  sodium chloride 3%  Inhalation 4 milliLiter(s) Inhalation every 12 hours    MEDICATIONS  (PRN):  aluminum hydroxide/magnesium hydroxide/simethicone Suspension 30 milliLiter(s) Oral every 6 hours PRN Dyspepsia  benzocaine/menthol Lozenge 1 Lozenge Oral two times a day PRN Sore Throat  Biotene Dry Mouth Oral Rinse 15 milliLiter(s) Swish and Spit two times a day PRN dry mouth  calcium carbonate    500 mG (Tums) Chewable 1 Tablet(s) Chew daily PRN Heartburn  diazepam    Tablet 5 milliGRAM(s) Oral two times a day PRN anxiety  FIRST- Mouthwash  BLM 5 milliLiter(s) Swish and Spit four times a day PRN Mouth Care  morphine Concentrate 20 milliGRAM(s) SubLingual every 3 hours PRN Pain or dyspnea  ondansetron    Tablet 4 milliGRAM(s) Oral every 8 hours PRN Nausea and/or Vomiting  sodium chloride 0.9% lock flush 10 milliLiter(s) IV Push every 1 hour PRN Pre/post blood products, medications, blood draw, and to maintain line patency      CAPILLARY BLOOD GLUCOSE        I&O's Summary      PHYSICAL EXAM:  Vital Signs Last 24 Hrs  T(C): --  T(F): --  HR: 88 (25 May 2024 07:39) (77 - 90)  BP: --  BP(mean): --  RR: 20 (25 May 2024 07:39) (20 - 20)  SpO2: 99% (25 May 2024 07:39) (97% - 100%)    Parameters below as of 25 May 2024 07:39  Patient On (Oxygen Delivery Method): nasal cannula, high flow  O2 Flow (L/min): 50  O2 Concentration (%): 100      Cachectic, [id number] woman, anxious, but now more letahrgic  Pt still dysphonic, but able to write in comprehensive sentences to communicate  Mild R eyelid ptosis and R pupil miosis present/unchanged; R eye with scleral injection; no oral lesions/thrush  RRR, no m/r/g  Breaths not tachypneic; trace inspiratory wheeze and transmitted upper airway sounds auscultated in all lung zone  Abd soft/nt/nd, normoactive bowel sounds      LABS:                      RADIOLOGY & ADDITIONAL TESTS:  Results Reviewed:   Imaging Personally Reviewed:  Electrocardiogram Personally Reviewed:    COORDINATION OF CARE:  Care Discussed with Consultants/Other Providers [Y/N]:  Prior or Outpatient Records Reviewed [Y/N]:

## 2024-05-25 NOTE — PROGRESS NOTE ADULT - ASSESSMENT
59 yo woman, former smoker, with h/o R eye glaucoma, Fibromyalgia, Anxiety, GERD, and RCC s/p R total nephrectomy/hysterectomy; she was initially admitted to [facility] on 3/11 w/ R breast swelling c/f mastitis- imaging brooks noted supraclavicular/mediastinal mass lesion which encased the SVC and multiple other veins resulting in obliteration of the SVC and thrombosis of the R IJ, and a pancreatic neck cystic lesion.  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  Her disease/hospital course has also been c/b pericardial effusion s/p pericardial window w/ neg cytology, R pleural effusion, also negative cytology) s/p Pleurx, Afib w/ RVR and acute hypoxic resp failure 2/2 SVC syndrome- not dennis to IR-guided stenting; pt was ultimately started on Dex and transferred to [facility] on 4/4 for urgent palliative RT which she completed on 4/18. Her hospital course has also been c/b pericardial effusion with tamponade s/p window and non malignant R pleural effusion s/p pleurex, acute b/l UE and RLE dvts, anxiety, and more recently, recurrent hypoxia. Made comfort care on 5/23.     ACTIVE PROBLEMS  Metastatic CUP (carcinoma of unknown primary)  Goals of care discussion, counseling  Encounter for antineoplastic chemotherapy  Acute hypoxic + hypercapneic respiratory failure  Hyperkalemia  Dysphonia with R VC hypomobility   Oral thrush  SVC syndrome  Extensive b/l UE DVTs  Acute RLE DVT a/w RLE ecchymosis  Hypercoag state  Pericardial effusion with tamp physiology s/p pericardial window  R pleural effusion s/p pleurex  pAfib, with RVR on this admission  Anxiety/emotional lability  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)  Cancer-related pain, anxiety  Severe prot-ghazala malnutrition    - Metastatic CUP (carcinoma of unknown primary)  - Goals of care discussion, counseling  - Encounter for antineoplastic chemotherapy  Based on 3/14 SC LN path, ddx includes primary UGI vs pancreaticobiliary primary (breast edema is likely 2/2 SVC syndrome); PD-L1 TPs 1%, pMMR, Her2 2+, FISH pending, Ki-67 30%  Ca-125 moderately elevated at 125; other tumor markers not significantly elevated  Additional diagnostic brooks includes:   * 4/19 MRCP showing pancreatic lesion (? IPMN, but pancreas was suboptimally evaluated)     * 4/24 and 4/26 EGD/EUS aborted as pt had large amount of food in the stomach that prevented passing of scope   * 4/30 UGIS aborted as pt felt too short of breath when laying down flat (improved after her pleurex was drained  Case d/w Dr. 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 Pt is a suboptimal candidate i/s/o her progressive debility and malnutrition (ECOG PS 3)   >> Discussed risks/benefits of proceeding with palliative chemo (inpt) vs best supportive care with pt's HCP Saeid on 5/10 and again on 5/13- he is amenable to trial of inpt chemotherapy and understands that hospice will be recommended if pt does not tolerate trial of inpt chemo; verbal and written chemo consent obtained on 5/13   * Pt s/p successful femoral vein central line placement by Surgery on 5/15 (to be removed after chemotherapy)   *  Pt s/p C1 palliative mFOLFOX6 (20% dose reduction), 5/16-18   >> She did not receive the full cycle as chemo was held on 5/18 when pt developed resp acidosis, and the chemo was not resumed after that  Pt's prognosis is very poor, unlikely to have any meaningful recovery; she is dnr/dni. Transitioned to comfort care by healthcare proxy.     - Acute hypercapneic + hypoxic resp failure  Resp acidosis resolved after brief period on bipap  Pt is lethargic today, but O2 sats/HR stable on HF NC O2  Etiology of resp acidosis is unclear, but likely cancer-related vs ? volume overload i/s/o chemo infusion  Serial CXRs without acute changes (decreasing R pleural effusion)  NC CT chest and repeat TTE are pending (ordered on 5/18)  Will fu sputum culture and fungal studies (ordered 5/18)  Diuresing gently/prn: s/p Lasix IV 10mg x1 dose on 5/18, but held today given bump in Cr   * Of note, pt completed 10d course of empiric Cefepime on 5/10; trending WBCs/ procal; monitoring off abx for now unless there is evidence of recurrent infection  Dex taper completed on 5/14 (Bactrim pjp ppx dced on 5/15); resuming Dex IV 4mg BID today (with pjp/ppi ppx)  Continuing Symbicort 2 puffs BID  Continuing Duonebs q6/prn  Will continue routine pleurex drainage as below  Intermittently worsens hypoxia and respiratory distress, likely related to disease progression and or new PE. Patient therapeutically anticoagulated with lovenox, unlikely to benefit from any further intervention.     - Dysphonia with R VC hypomobility  - Oral thrush  Appreciate ENT eval/recs: 5/4 Flex ellis pt with R VC hypomobility  Pt previously cleared for pureed diet s/p Cinesophagram, but she is currently NPO while on bipap (can resume pureed diet when pt is off bipap)  Completed 7d course of Fluconazole on 5/12    - SVC syndrome  Appreciate Rad Onc eval/recs  Completed 10fxs of palliative RT on 4/18     - Extensive b/l UE DVTs  - Acute RLE DVT a/w RLE ecchymosis  - Hypercoag state  Continuing therapeutic lovenox, benefits > risks  unable to have UE IV access.      - Pericardial effusion with tamp physiology s/p pericardial window  - R pleural effusion s/p pleurex  Likely inflammatory; both pleural and pericardial fluid cytology are negative for malignant cells  4/9 surveillance TTE with pEF and no WMAs  Continuing R pleurex drainage- up to 500cc q48h/prn    - pAfib  Pt currently SR, HR controlled  Likely precipitated by malignancy, pericardial effusion, SVC syndrome  Continuing Metoprolol 25mg q12 with holding parameters  Continuing Amio 200mg daily (monitoring for toxicities)  Continuing telemetry    - Anxiety/emotional lability  Continuing Diazepam 5mg BID/prn and nightly  Pt has poor health literacy and clinical insight which is negatively impacting her medical decision making- when ask if she wanted to defer medical decision making to her boyfriend or if she had a designated HCP she replied "he's already sick of me"  Appreciate  consult: pt lacks medical decision making capacity; medical decision making will be deferred to pt's surrogate decision maker- Saeid Peña ([phone])  Made comfort care, however patient often refuses medications    - 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)  Pt with h/o R eye glaucoma with surgery, but miosis can also be associated with Pancoast syndrome i/s/o extensive R upper lung tumor  Pt denies visual deficits or other related symptoms   MRI Brain without contrast ordered for further eval- pt has previously refused  Will continue to monitor closely    - Anemia in neoplastic disease  comfort care now    - Cancer related pain  Currently well controlled  Continuing Oxy ER 30mg q12 q8  Morphine sublingual  Continuing bowel regimen to prevent OIC (including Movantic)    - Severe prot-ghazala malnutrition: appreciate RD recs; continuing regular diet with protein shake supplement BID (SLP eval pending as above)

## 2024-05-26 PROCEDURE — 99232 SBSQ HOSP IP/OBS MODERATE 35: CPT

## 2024-05-26 RX ADMIN — PANTOPRAZOLE SODIUM 40 MILLIGRAM(S): 20 TABLET, DELAYED RELEASE ORAL at 18:03

## 2024-05-26 RX ADMIN — SODIUM CHLORIDE 4 MILLILITER(S): 9 INJECTION INTRAMUSCULAR; INTRAVENOUS; SUBCUTANEOUS at 21:13

## 2024-05-26 RX ADMIN — Medication 15 MILLILITER(S): at 08:22

## 2024-05-26 RX ADMIN — AMIODARONE HYDROCHLORIDE 200 MILLIGRAM(S): 400 TABLET ORAL at 07:41

## 2024-05-26 RX ADMIN — BUDESONIDE AND FORMOTEROL FUMARATE DIHYDRATE 2 PUFF(S): 160; 4.5 AEROSOL RESPIRATORY (INHALATION) at 09:18

## 2024-05-26 RX ADMIN — BUDESONIDE AND FORMOTEROL FUMARATE DIHYDRATE 2 PUFF(S): 160; 4.5 AEROSOL RESPIRATORY (INHALATION) at 23:13

## 2024-05-26 RX ADMIN — Medication 15 MILLILITER(S): at 11:38

## 2024-05-26 RX ADMIN — PANTOPRAZOLE SODIUM 40 MILLIGRAM(S): 20 TABLET, DELAYED RELEASE ORAL at 07:48

## 2024-05-26 RX ADMIN — Medication 15 MILLILITER(S): at 23:13

## 2024-05-26 RX ADMIN — OXYCODONE HYDROCHLORIDE 30 MILLIGRAM(S): 5 TABLET ORAL at 07:48

## 2024-05-26 RX ADMIN — OXYCODONE HYDROCHLORIDE 30 MILLIGRAM(S): 5 TABLET ORAL at 08:30

## 2024-05-26 RX ADMIN — NALOXEGOL OXALATE 25 MILLIGRAM(S): 12.5 TABLET, FILM COATED ORAL at 11:38

## 2024-05-26 RX ADMIN — Medication 1 APPLICATION(S): at 07:40

## 2024-05-26 RX ADMIN — Medication 1 PATCH: at 07:43

## 2024-05-26 RX ADMIN — Medication 1 TABLET(S): at 11:38

## 2024-05-26 RX ADMIN — CHLORHEXIDINE GLUCONATE 1 APPLICATION(S): 213 SOLUTION TOPICAL at 11:36

## 2024-05-26 RX ADMIN — Medication 4 MILLIGRAM(S): at 23:13

## 2024-05-26 RX ADMIN — Medication 15 MILLILITER(S): at 18:03

## 2024-05-26 RX ADMIN — Medication 25 MILLIGRAM(S): at 18:02

## 2024-05-26 RX ADMIN — Medication 3 MILLILITER(S): at 02:27

## 2024-05-26 RX ADMIN — Medication 3 MILLILITER(S): at 07:58

## 2024-05-26 RX ADMIN — SODIUM CHLORIDE 4 MILLILITER(S): 9 INJECTION INTRAMUSCULAR; INTRAVENOUS; SUBCUTANEOUS at 07:58

## 2024-05-26 RX ADMIN — Medication 4 MILLIGRAM(S): at 09:19

## 2024-05-26 RX ADMIN — Medication 3 MILLILITER(S): at 21:13

## 2024-05-26 RX ADMIN — Medication 1 SPRAY(S): at 18:04

## 2024-05-26 RX ADMIN — Medication 1 SPRAY(S): at 07:38

## 2024-05-26 RX ADMIN — OXYCODONE HYDROCHLORIDE 30 MILLIGRAM(S): 5 TABLET ORAL at 15:21

## 2024-05-26 RX ADMIN — Medication 1 APPLICATION(S): at 18:03

## 2024-05-26 RX ADMIN — ENOXAPARIN SODIUM 30 MILLIGRAM(S): 100 INJECTION SUBCUTANEOUS at 07:41

## 2024-05-26 RX ADMIN — Medication 3 MILLILITER(S): at 16:08

## 2024-05-26 RX ADMIN — OXYCODONE HYDROCHLORIDE 30 MILLIGRAM(S): 5 TABLET ORAL at 00:33

## 2024-05-26 RX ADMIN — ENOXAPARIN SODIUM 30 MILLIGRAM(S): 100 INJECTION SUBCUTANEOUS at 18:02

## 2024-05-26 RX ADMIN — OXYCODONE HYDROCHLORIDE 30 MILLIGRAM(S): 5 TABLET ORAL at 23:16

## 2024-05-26 RX ADMIN — Medication 1 PATCH: at 11:43

## 2024-05-26 RX ADMIN — Medication 25 MILLIGRAM(S): at 07:41

## 2024-05-26 RX ADMIN — Medication 1 PATCH: at 12:00

## 2024-05-26 NOTE — PROGRESS NOTE ADULT - ASSESSMENT
59 yo woman, former smoker, with h/o R eye glaucoma, Fibromyalgia, Anxiety, GERD, and RCC s/p R total nephrectomy/hysterectomy; she was initially admitted to [facility] on 3/11 w/ R breast swelling c/f mastitis- imaging brooks noted supraclavicular/mediastinal mass lesion which encased the SVC and multiple other veins resulting in obliteration of the SVC and thrombosis of the R IJ, and a pancreatic neck cystic lesion.  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  Her disease/hospital course has also been c/b pericardial effusion s/p pericardial window w/ neg cytology, R pleural effusion, also negative cytology) s/p Pleurx, Afib w/ RVR and acute hypoxic resp failure 2/2 SVC syndrome- not dennis to IR-guided stenting; pt was ultimately started on Dex and transferred to [facility] on 4/4 for urgent palliative RT which she completed on 4/18. Her hospital course has also been c/b pericardial effusion with tamponade s/p window and non malignant R pleural effusion s/p pleurex, acute b/l UE and RLE dvts, anxiety, and more recently, recurrent hypoxia. Made comfort care on 5/23.     ACTIVE PROBLEMS  Metastatic CUP (carcinoma of unknown primary)  Goals of care discussion, counseling  Encounter for antineoplastic chemotherapy  Acute hypoxic + hypercapneic respiratory failure  Hyperkalemia  Dysphonia with R VC hypomobility   Oral thrush  SVC syndrome  Extensive b/l UE DVTs  Acute RLE DVT a/w RLE ecchymosis  Hypercoag state  Pericardial effusion with tamp physiology s/p pericardial window  R pleural effusion s/p pleurex  pAfib, with RVR on this admission  Anxiety/emotional lability  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)  Cancer-related pain, anxiety  Severe prot-ghazala malnutrition    - Metastatic CUP (carcinoma of unknown primary)  - Goals of care discussion, counseling  - Encounter for antineoplastic chemotherapy  Based on 3/14 SC LN path, ddx includes primary UGI vs pancreaticobiliary primary (breast edema is likely 2/2 SVC syndrome); PD-L1 TPs 1%, pMMR, Her2 2+, FISH pending, Ki-67 30%  Ca-125 moderately elevated at 125; other tumor markers not significantly elevated  Additional diagnostic brooks includes:   * 4/19 MRCP showing pancreatic lesion (? IPMN, but pancreas was suboptimally evaluated)     * 4/24 and 4/26 EGD/EUS aborted as pt had large amount of food in the stomach that prevented passing of scope   * 4/30 UGIS aborted as pt felt too short of breath when laying down flat (improved after her pleurex was drained  Case d/w Dr. 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 Pt is a suboptimal candidate i/s/o her progressive debility and malnutrition (ECOG PS 3)   >> Discussed risks/benefits of proceeding with palliative chemo (inpt) vs best supportive care with pt's HCP Saeid on 5/10 and again on 5/13- he is amenable to trial of inpt chemotherapy and understands that hospice will be recommended if pt does not tolerate trial of inpt chemo; verbal and written chemo consent obtained on 5/13   * Pt s/p successful femoral vein central line placement by Surgery on 5/15 (to be removed after chemotherapy)   *  Pt s/p C1 palliative mFOLFOX6 (20% dose reduction), 5/16-18   >> She did not receive the full cycle as chemo was held on 5/18 when pt developed resp acidosis, and the chemo was not resumed after that  Pt's prognosis is very poor, unlikely to have any meaningful recovery; she is dnr/dni. Transitioned to comfort care by healthcare proxy.     - Acute hypercapneic + hypoxic resp failure  Resp acidosis resolved after brief period on bipap  Etiology of resp acidosis is unclear, but likely cancer-related  Serial CXRs without acute changes (decreasing R pleural effusion)  NC CT chest and repeat TTE are pending (ordered on 5/18)  Will fu sputum culture and fungal studies (ordered 5/18)   * Of note, pt completed 10d course of empiric Cefepime on 5/10; trending WBCs/ procal; monitoring off abx for now unless there is evidence of recurrent infection  Dex taper completed on 5/14 (Bactrim pjp ppx dced on 5/15); resuming Dex IV 4mg BID today (with pjp/ppi ppx)  Continuing Symbicort 2 puffs BID  Continuing Duonebs q6/prn  Will continue routine pleurex drainage as below  Intermittently worsens hypoxia and respiratory distress, likely related to disease progression and or new PE. Patient therapeutically anticoagulated with lovenox, unlikely to benefit from any further intervention.     - Dysphonia with R VC hypomobility  - Oral thrush  Appreciate ENT eval/recs: 5/4 Flex ellis pt with R VC hypomobility  Pt previously cleared for pureed diet s/p Cinesophagram, but she is currently NPO while on bipap (can resume pureed diet when pt is off bipap)  Completed 7d course of Fluconazole on 5/12    - SVC syndrome  Appreciate Rad Onc eval/recs  Completed 10fxs of palliative RT on 4/18     - Extensive b/l UE DVTs  - Acute RLE DVT a/w RLE ecchymosis  - Hypercoag state  Continuing therapeutic lovenox, benefits > risks  unable to have UE IV access.      - Pericardial effusion with tamp physiology s/p pericardial window  - R pleural effusion s/p pleurex  Likely inflammatory; both pleural and pericardial fluid cytology are negative for malignant cells  4/9 surveillance TTE with pEF and no WMAs  Continuing R pleurex drainage- up to 500cc q48h/prn    - pAfib  Pt currently SR, HR controlled  Likely precipitated by malignancy, pericardial effusion, SVC syndrome  Continuing Metoprolol 25mg q12 with holding parameters  Continuing Amio 200mg daily (monitoring for toxicities)      - Anxiety/emotional lability  Continuing Diazepam 5mg BID/prn and nightly  Pt has poor health literacy and clinical insight which is negatively impacting her medical decision making- when ask if she wanted to defer medical decision making to her boyfriend or if she had a designated HCP she replied "he's already sick of me"  Appreciate  consult: pt lacks medical decision making capacity; medical decision making will be deferred to pt's surrogate decision maker- Saeid Peña ([phone])  Made comfort care, however patient often refuses medications    - 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)  Pt with h/o R eye glaucoma with surgery, but miosis can also be associated with Pancoast syndrome i/s/o extensive R upper lung tumor  Pt denies visual deficits or other related symptoms   MRI Brain without contrast was ordered for further eval- pt has previously refused  Will continue to monitor closely    - Anemia in neoplastic disease  comfort care now    - Cancer related pain  Currently well controlled  Continuing Oxy ER 30mg q12 q8  Morphine sublingual  Continuing bowel regimen to prevent OIC (including Movantic)    - Severe prot-ghazala malnutrition: appreciate RD recs; continuing regular diet with protein shake supplement BID (SLP eval pending as above)

## 2024-05-26 NOTE — PROGRESS NOTE ADULT - SUBJECTIVE AND OBJECTIVE BOX
Reno Doe MD  Academic Hospitalist  Pager [phone]  Email: [email or web address]  Available on [employer] Teams        PROGRESS NOTE:     Patient is a 58y old  Female who presents with a chief complaint of SVC syndrome, DVT, mediastinal mass (25 May 2024 12:30)      SUBJECTIVE / OVERNIGHT EVENTS:  Patient seen and examined this morning. Sleeping, if disturbed she wakes up and becomes very anxious and often refuses medications.     MEDICATIONS  (STANDING):  albuterol/ipratropium for Nebulization 3 milliLiter(s) Nebulizer every 6 hours  aMIOdarone    Tablet 200 milliGRAM(s) Oral daily  Biotene Dry Mouth Oral Rinse 15 milliLiter(s) Swish and Spit every 6 hours  budesonide 160 MICROgram(s)/formoterol 4.5 MICROgram(s) Inhaler 2 Puff(s) Inhalation two times a day  chlorhexidine 2% Cloths 1 Application(s) Topical daily  dexAMETHasone     Tablet 4 milliGRAM(s) Oral every 12 hours  enoxaparin Injectable 30 milliGRAM(s) SubCutaneous every 12 hours  influenza   Vaccine 0.5 milliLiter(s) IntraMuscular once  metoclopramide Injectable 10 milliGRAM(s) IV Push every 8 hours  metoprolol tartrate 25 milliGRAM(s) Oral every 12 hours  multivitamin 1 Tablet(s) Oral daily  naloxegol 25 milliGRAM(s) Oral daily  nicotine -  14 mG/24Hr(s) Patch 1 Patch Transdermal every 24 hours  oxyCODONE  ER Tablet 30 milliGRAM(s) Oral <User Schedule>  pantoprazole    Tablet 40 milliGRAM(s) Oral every 12 hours  petrolatum white Ointment 1 Application(s) Topical two times a day  polyethylene glycol 3350 17 Gram(s) Oral every 12 hours  sodium chloride 0.65% Nasal 1 Spray(s) Both Nostrils two times a day  sodium chloride 3%  Inhalation 4 milliLiter(s) Inhalation every 12 hours    MEDICATIONS  (PRN):  aluminum hydroxide/magnesium hydroxide/simethicone Suspension 30 milliLiter(s) Oral every 6 hours PRN Dyspepsia  benzocaine/menthol Lozenge 1 Lozenge Oral two times a day PRN Sore Throat  Biotene Dry Mouth Oral Rinse 15 milliLiter(s) Swish and Spit two times a day PRN dry mouth  calcium carbonate    500 mG (Tums) Chewable 1 Tablet(s) Chew daily PRN Heartburn  diazepam    Tablet 5 milliGRAM(s) Oral two times a day PRN anxiety  FIRST- Mouthwash  BLM 5 milliLiter(s) Swish and Spit four times a day PRN Mouth Care  morphine Concentrate 20 milliGRAM(s) SubLingual every 3 hours PRN Pain or dyspnea  ondansetron    Tablet 4 milliGRAM(s) Oral every 8 hours PRN Nausea and/or Vomiting  sodium chloride 0.9% lock flush 10 milliLiter(s) IV Push every 1 hour PRN Pre/post blood products, medications, blood draw, and to maintain line patency      CAPILLARY BLOOD GLUCOSE        I&O's Summary    25 May 2024 07:01  -  26 May 2024 07:00  --------------------------------------------------------  IN: 0 mL / OUT: 450 mL / NET: -450 mL        PHYSICAL EXAM:  Vital Signs Last 24 Hrs  T(C): 36.6 (26 May 2024 07:27), Max: 36.6 (26 May 2024 07:27)  T(F): 97.9 (26 May 2024 07:27), Max: 97.9 (26 May 2024 07:27)  HR: 89 (26 May 2024 07:58) (84 - 102)  BP: 121/67 (26 May 2024 07:27) (115/63 - 121/67)  BP(mean): --  RR: 15 (26 May 2024 07:27) (14 - 20)  SpO2: 96% (26 May 2024 07:58) (91% - 98%)    Parameters below as of 26 May 2024 07:58  Patient On (Oxygen Delivery Method): nasal cannula, high flow        Cachectic, [id number] woman, anxious, but now more letahrgic  Pt still dysphonic, but able to write in comprehensive sentences to communicate  Mild R eyelid ptosis and R pupil miosis present/unchanged; R eye with scleral injection; no oral lesions/thrush  RRR, no m/r/g  Breaths not tachypneic; trace inspiratory wheeze and transmitted upper airway sounds auscultated in all lung zone  Abd soft/nt/nd, normoactive bowel sounds    LABS:                      RADIOLOGY & ADDITIONAL TESTS:  Results Reviewed:   Imaging Personally Reviewed:  Electrocardiogram Personally Reviewed:    COORDINATION OF CARE:  Care Discussed with Consultants/Other Providers [Y/N]:  Prior or Outpatient Records Reviewed [Y/N]:

## 2024-05-27 PROCEDURE — 99232 SBSQ HOSP IP/OBS MODERATE 35: CPT

## 2024-05-27 RX ADMIN — Medication 1 TABLET(S): at 11:09

## 2024-05-27 RX ADMIN — OXYCODONE HYDROCHLORIDE 30 MILLIGRAM(S): 5 TABLET ORAL at 06:30

## 2024-05-27 RX ADMIN — PANTOPRAZOLE SODIUM 40 MILLIGRAM(S): 20 TABLET, DELAYED RELEASE ORAL at 17:11

## 2024-05-27 RX ADMIN — OXYCODONE HYDROCHLORIDE 30 MILLIGRAM(S): 5 TABLET ORAL at 22:57

## 2024-05-27 RX ADMIN — BUDESONIDE AND FORMOTEROL FUMARATE DIHYDRATE 2 PUFF(S): 160; 4.5 AEROSOL RESPIRATORY (INHALATION) at 09:07

## 2024-05-27 RX ADMIN — Medication 3 MILLILITER(S): at 09:15

## 2024-05-27 RX ADMIN — OXYCODONE HYDROCHLORIDE 30 MILLIGRAM(S): 5 TABLET ORAL at 00:00

## 2024-05-27 RX ADMIN — Medication 15 MILLILITER(S): at 11:09

## 2024-05-27 RX ADMIN — Medication 1 PATCH: at 07:40

## 2024-05-27 RX ADMIN — NALOXEGOL OXALATE 25 MILLIGRAM(S): 12.5 TABLET, FILM COATED ORAL at 11:09

## 2024-05-27 RX ADMIN — Medication 1 APPLICATION(S): at 06:33

## 2024-05-27 RX ADMIN — Medication 15 MILLILITER(S): at 23:06

## 2024-05-27 RX ADMIN — SODIUM CHLORIDE 4 MILLILITER(S): 9 INJECTION INTRAMUSCULAR; INTRAVENOUS; SUBCUTANEOUS at 20:01

## 2024-05-27 RX ADMIN — ENOXAPARIN SODIUM 30 MILLIGRAM(S): 100 INJECTION SUBCUTANEOUS at 17:12

## 2024-05-27 RX ADMIN — Medication 25 MILLIGRAM(S): at 17:10

## 2024-05-27 RX ADMIN — Medication 3 MILLILITER(S): at 03:09

## 2024-05-27 RX ADMIN — Medication 1 PATCH: at 11:30

## 2024-05-27 RX ADMIN — OXYCODONE HYDROCHLORIDE 30 MILLIGRAM(S): 5 TABLET ORAL at 23:30

## 2024-05-27 RX ADMIN — Medication 1 SPRAY(S): at 06:29

## 2024-05-27 RX ADMIN — OXYCODONE HYDROCHLORIDE 30 MILLIGRAM(S): 5 TABLET ORAL at 07:30

## 2024-05-27 RX ADMIN — Medication 15 MILLILITER(S): at 06:29

## 2024-05-27 RX ADMIN — Medication 3 MILLILITER(S): at 20:00

## 2024-05-27 RX ADMIN — Medication 1 SPRAY(S): at 17:12

## 2024-05-27 RX ADMIN — Medication 3 MILLILITER(S): at 15:43

## 2024-05-27 RX ADMIN — OXYCODONE HYDROCHLORIDE 30 MILLIGRAM(S): 5 TABLET ORAL at 13:54

## 2024-05-27 RX ADMIN — CHLORHEXIDINE GLUCONATE 1 APPLICATION(S): 213 SOLUTION TOPICAL at 11:12

## 2024-05-27 RX ADMIN — Medication 4 MILLIGRAM(S): at 17:11

## 2024-05-27 RX ADMIN — PANTOPRAZOLE SODIUM 40 MILLIGRAM(S): 20 TABLET, DELAYED RELEASE ORAL at 06:28

## 2024-05-27 RX ADMIN — ENOXAPARIN SODIUM 30 MILLIGRAM(S): 100 INJECTION SUBCUTANEOUS at 06:32

## 2024-05-27 RX ADMIN — Medication 4 MILLIGRAM(S): at 06:29

## 2024-05-27 RX ADMIN — Medication 15 MILLILITER(S): at 17:11

## 2024-05-27 RX ADMIN — SODIUM CHLORIDE 4 MILLILITER(S): 9 INJECTION INTRAMUSCULAR; INTRAVENOUS; SUBCUTANEOUS at 09:15

## 2024-05-27 RX ADMIN — AMIODARONE HYDROCHLORIDE 200 MILLIGRAM(S): 400 TABLET ORAL at 06:30

## 2024-05-27 RX ADMIN — BUDESONIDE AND FORMOTEROL FUMARATE DIHYDRATE 2 PUFF(S): 160; 4.5 AEROSOL RESPIRATORY (INHALATION) at 22:59

## 2024-05-27 RX ADMIN — Medication 25 MILLIGRAM(S): at 06:30

## 2024-05-27 RX ADMIN — Medication 1 APPLICATION(S): at 17:12

## 2024-05-27 NOTE — PROGRESS NOTE ADULT - ASSESSMENT
59 yo woman, former smoker, with h/o R eye glaucoma, Fibromyalgia, Anxiety, GERD, and RCC s/p R total nephrectomy/hysterectomy; she was initially admitted to [facility] on 3/11 w/ R breast swelling c/f mastitis- imaging brooks noted supraclavicular/mediastinal mass lesion which encased the SVC and multiple other veins resulting in obliteration of the SVC and thrombosis of the R IJ, and a pancreatic neck cystic lesion.  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  Her disease/hospital course has also been c/b pericardial effusion s/p pericardial window w/ neg cytology, R pleural effusion, also negative cytology) s/p Pleurx, Afib w/ RVR and acute hypoxic resp failure 2/2 SVC syndrome- not dennis to IR-guided stenting; pt was ultimately started on Dex and transferred to [facility] on 4/4 for urgent palliative RT which she completed on 4/18. Her hospital course has also been c/b pericardial effusion with tamponade s/p window and non malignant R pleural effusion s/p pleurex, acute b/l UE and RLE dvts, anxiety, and more recently, recurrent hypoxia. Made comfort care on 5/23.     ACTIVE PROBLEMS  Metastatic CUP (carcinoma of unknown primary)  Goals of care discussion, counseling  Encounter for antineoplastic chemotherapy  Acute hypoxic + hypercapneic respiratory failure  Hyperkalemia  Dysphonia with R VC hypomobility   Oral thrush  SVC syndrome  Extensive b/l UE DVTs  Acute RLE DVT a/w RLE ecchymosis  Hypercoag state  Pericardial effusion with tamp physiology s/p pericardial window  R pleural effusion s/p pleurex  pAfib, with RVR on this admission  Anxiety/emotional lability  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)  Cancer-related pain, anxiety  Severe prot-ghazala malnutrition        As per surrogate decision maker- Saeid Peña ([phone])Patient now comfort care, no further blood draws or any other invasive tests.       - Metastatic CUP (carcinoma of unknown primary)  - Goals of care discussion, counseling  - Encounter for antineoplastic chemotherapy  Based on 3/14 SC LN path, ddx includes primary UGI vs pancreaticobiliary primary (breast edema is likely 2/2 SVC syndrome); PD-L1 TPs 1%, pMMR, Her2 2+, FISH pending, Ki-67 30%  Ca-125 moderately elevated at 125; other tumor markers not significantly elevated  Additional diagnostic brooks includes:   * 4/19 MRCP showing pancreatic lesion (? IPMN, but pancreas was suboptimally evaluated)     * 4/24 and 4/26 EGD/EUS aborted as pt had large amount of food in the stomach that prevented passing of scope   * 4/30 UGIS aborted as pt felt too short of breath when laying down flat (improved after her pleurex was drained  Case d/w Dr. 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 Pt is a suboptimal candidate i/s/o her progressive debility and malnutrition (ECOG PS 3)   >> Discussed risks/benefits of proceeding with palliative chemo (inpt) vs best supportive care with pt's HCP Saeid on 5/10 and again on 5/13- he is amenable to trial of inpt chemotherapy and understands that hospice will be recommended if pt does not tolerate trial of inpt chemo; verbal and written chemo consent obtained on 5/13   * Pt s/p successful femoral vein central line placement by Surgery on 5/15 (to be removed after chemotherapy)   *  Pt s/p C1 palliative [id number] (20% dose reduction), 5/16-18   >> She did not receive the full cycle as chemo was held on 5/18 when pt developed resp acidosis, and the chemo was not resumed after that  Pt's prognosis is very poor, unlikely to have any meaningful recovery; she is dnr/dni. Transitioned to comfort care by healthcare proxy.     - Acute hypercapneic + hypoxic resp failure  Resp acidosis resolved after brief period on bipap  Etiology of resp acidosis is unclear, but likely cancer-related  Serial CXRs without acute changes (decreasing R pleural effusion)  NC CT chest and repeat TTE are pending (ordered on 5/18)  Will fu sputum culture and fungal studies (ordered 5/18)   * Of note, pt completed 10d course of empiric Cefepime on 5/10; trending WBCs/ procal; monitoring off abx for now unless there is evidence of recurrent infection  Dex taper completed on 5/14 (Bactrim pjp ppx dced on 5/15); resuming Dex IV 4mg BID today (with pjp/ppi ppx)  Continuing Symbicort 2 puffs BID  Continuing Duonebs q6/prn  Will continue routine pleurex drainage as below  Intermittently worsens hypoxia and respiratory distress, likely related to disease progression and or new PE. Patient therapeutically anticoagulated with lovenox, unlikely to benefit from any further intervention.     - Dysphonia with R VC hypomobility  - Oral thrush  Appreciate ENT eval/recs: 5/4 Flex ellis pt with R VC hypomobility  Pt previously cleared for pureed diet s/p Cinesophagram, but she is currently NPO while on bipap (can resume pureed diet when pt is off bipap)  Completed 7d course of Fluconazole on 5/12    - SVC syndrome  Appreciate Rad Onc eval/recs  Completed 10fxs of palliative RT on 4/18     - Extensive b/l UE DVTs  - Acute RLE DVT a/w RLE ecchymosis  - Hypercoag state  Continuing therapeutic lovenox, benefits > risks  unable to have UE IV access.      - Pericardial effusion with tamp physiology s/p pericardial window  - R pleural effusion s/p pleurex  Likely inflammatory; both pleural and pericardial fluid cytology are negative for malignant cells  4/9 surveillance TTE with pEF and no WMAs  Continuing R pleurex drainage- up to 500cc q48h/prn    - pAfib  Pt currently SR, HR controlled  Likely precipitated by malignancy, pericardial effusion, SVC syndrome  Continuing Metoprolol 25mg q12 with holding parameters  Continuing Amio 200mg daily (monitoring for toxicities)      - Anxiety/emotional lability  Continuing Diazepam 5mg BID/prn and nightly  Pt has poor health literacy and clinical insight which is negatively impacting her medical decision making- when ask if she wanted to defer medical decision making to her boyfriend or if she had a designated HCP she replied "he's already sick of me"  Appreciate  consult: pt lacks medical decision making capacity; medical decision making will be deferred to pt's surrogate decision maker- Saeid Peña ([phone])  Made comfort care, however patient often refuses medications    - 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)  Pt with h/o R eye glaucoma with surgery, but miosis can also be associated with Pancoast syndrome i/s/o extensive R upper lung tumor  Pt denies visual deficits or other related symptoms   MRI Brain without contrast was ordered for further eval- pt has previously refused  Will continue to monitor closely    - Anemia in neoplastic disease  comfort care now    - Cancer related pain  Currently well controlled  Continuing Oxy ER 30mg q12 q8  Morphine sublingual  Continuing bowel regimen to prevent OIC (including Movantic)    - Severe prot-ghazala malnutrition: appreciate RD recs; continuing regular diet with protein shake supplement BID (SLP eval pending as above)

## 2024-05-27 NOTE — PROGRESS NOTE ADULT - SUBJECTIVE AND OBJECTIVE BOX
Reno Doe MD  Academic Hospitalist  Pager [phone]  Email: [email or web address]  Available on [employer] Teams        PROGRESS NOTE:     Patient is a 58y old  Female who presents with a chief complaint of SVC syndrome, DVT, mediastinal mass (26 May 2024 09:39)      SUBJECTIVE / OVERNIGHT EVENTS:  Patient seen and examined this morning. No acute events overnight.     MEDICATIONS  (STANDING):  albuterol/ipratropium for Nebulization 3 milliLiter(s) Nebulizer every 6 hours  aMIOdarone    Tablet 200 milliGRAM(s) Oral daily  Biotene Dry Mouth Oral Rinse 15 milliLiter(s) Swish and Spit every 6 hours  budesonide 160 MICROgram(s)/formoterol 4.5 MICROgram(s) Inhaler 2 Puff(s) Inhalation two times a day  chlorhexidine 2% Cloths 1 Application(s) Topical daily  dexAMETHasone     Tablet 4 milliGRAM(s) Oral every 12 hours  enoxaparin Injectable 30 milliGRAM(s) SubCutaneous every 12 hours  influenza   Vaccine 0.5 milliLiter(s) IntraMuscular once  metoclopramide Injectable 10 milliGRAM(s) IV Push every 8 hours  metoprolol tartrate 25 milliGRAM(s) Oral every 12 hours  multivitamin 1 Tablet(s) Oral daily  naloxegol 25 milliGRAM(s) Oral daily  nicotine -  14 mG/24Hr(s) Patch 1 Patch Transdermal every 24 hours  oxyCODONE  ER Tablet 30 milliGRAM(s) Oral <User Schedule>  pantoprazole    Tablet 40 milliGRAM(s) Oral every 12 hours  petrolatum white Ointment 1 Application(s) Topical two times a day  polyethylene glycol 3350 17 Gram(s) Oral every 12 hours  sodium chloride 0.65% Nasal 1 Spray(s) Both Nostrils two times a day  sodium chloride 3%  Inhalation 4 milliLiter(s) Inhalation every 12 hours    MEDICATIONS  (PRN):  aluminum hydroxide/magnesium hydroxide/simethicone Suspension 30 milliLiter(s) Oral every 6 hours PRN Dyspepsia  benzocaine/menthol Lozenge 1 Lozenge Oral two times a day PRN Sore Throat  Biotene Dry Mouth Oral Rinse 15 milliLiter(s) Swish and Spit two times a day PRN dry mouth  calcium carbonate    500 mG (Tums) Chewable 1 Tablet(s) Chew daily PRN Heartburn  diazepam    Tablet 5 milliGRAM(s) Oral two times a day PRN anxiety  FIRST- Mouthwash  BLM 5 milliLiter(s) Swish and Spit four times a day PRN Mouth Care  morphine Concentrate 20 milliGRAM(s) SubLingual every 3 hours PRN Pain or dyspnea  ondansetron    Tablet 4 milliGRAM(s) Oral every 8 hours PRN Nausea and/or Vomiting  sodium chloride 0.9% lock flush 10 milliLiter(s) IV Push every 1 hour PRN Pre/post blood products, medications, blood draw, and to maintain line patency      CAPILLARY BLOOD GLUCOSE        I&O's Summary      PHYSICAL EXAM:  Vital Signs Last 24 Hrs  T(C): 36.7 (27 May 2024 06:11), Max: 36.7 (26 May 2024 12:19)  T(F): 98 (27 May 2024 06:11), Max: 98 (26 May 2024 12:19)  HR: 80 (27 May 2024 07:40) (68 - 89)  BP: 141/69 (27 May 2024 06:11) (120/54 - 141/69)  BP(mean): --  RR: 18 (27 May 2024 07:40) (16 - 18)  SpO2: 100% (27 May 2024 07:40) (96% - 100%)    Parameters below as of 27 May 2024 07:40  Patient On (Oxygen Delivery Method): nasal cannula, high flow  O2 Flow (L/min): 50  O2 Concentration (%): 100        Cachectic, kpksidwxvap-zok-lbjpiysuk woman, anxious, but now more letahrgic  Pt still dysphonic, but able to write in comprehensive sentences to communicate  Mild R eyelid ptosis and R pupil miosis present/unchanged; R eye with scleral injection; no oral lesions/thrush  RRR, no m/r/g  Breaths not tachypneic; trace inspiratory wheeze and transmitted upper airway sounds auscultated in all lung zone  Abd soft/nt/nd, normoactive bowel sounds    LABS:                      RADIOLOGY & ADDITIONAL TESTS:  Results Reviewed:   Imaging Personally Reviewed:  Electrocardiogram Personally Reviewed:    COORDINATION OF CARE:  Care Discussed with Consultants/Other Providers [Y/N]:  Prior or Outpatient Records Reviewed [Y/N]:   Reno Doe MD  Academic Hospitalist  Pager [phone]  Email: [email or web address]  Available on [employer] Teams        PROGRESS NOTE:     Patient is a 58y old  Female who presents with a chief complaint of SVC syndrome, DVT, mediastinal mass (26 May 2024 09:39)      SUBJECTIVE / OVERNIGHT EVENTS:  Patient seen and examined this morning. No acute events overnight. Boyfriend at bedside feeding the patient.     MEDICATIONS  (STANDING):  albuterol/ipratropium for Nebulization 3 milliLiter(s) Nebulizer every 6 hours  aMIOdarone    Tablet 200 milliGRAM(s) Oral daily  Biotene Dry Mouth Oral Rinse 15 milliLiter(s) Swish and Spit every 6 hours  budesonide 160 MICROgram(s)/formoterol 4.5 MICROgram(s) Inhaler 2 Puff(s) Inhalation two times a day  chlorhexidine 2% Cloths 1 Application(s) Topical daily  dexAMETHasone     Tablet 4 milliGRAM(s) Oral every 12 hours  enoxaparin Injectable 30 milliGRAM(s) SubCutaneous every 12 hours  influenza   Vaccine 0.5 milliLiter(s) IntraMuscular once  metoclopramide Injectable 10 milliGRAM(s) IV Push every 8 hours  metoprolol tartrate 25 milliGRAM(s) Oral every 12 hours  multivitamin 1 Tablet(s) Oral daily  naloxegol 25 milliGRAM(s) Oral daily  nicotine -  14 mG/24Hr(s) Patch 1 Patch Transdermal every 24 hours  oxyCODONE  ER Tablet 30 milliGRAM(s) Oral <User Schedule>  pantoprazole    Tablet 40 milliGRAM(s) Oral every 12 hours  petrolatum white Ointment 1 Application(s) Topical two times a day  polyethylene glycol 3350 17 Gram(s) Oral every 12 hours  sodium chloride 0.65% Nasal 1 Spray(s) Both Nostrils two times a day  sodium chloride 3%  Inhalation 4 milliLiter(s) Inhalation every 12 hours    MEDICATIONS  (PRN):  aluminum hydroxide/magnesium hydroxide/simethicone Suspension 30 milliLiter(s) Oral every 6 hours PRN Dyspepsia  benzocaine/menthol Lozenge 1 Lozenge Oral two times a day PRN Sore Throat  Biotene Dry Mouth Oral Rinse 15 milliLiter(s) Swish and Spit two times a day PRN dry mouth  calcium carbonate    500 mG (Tums) Chewable 1 Tablet(s) Chew daily PRN Heartburn  diazepam    Tablet 5 milliGRAM(s) Oral two times a day PRN anxiety  FIRST- Mouthwash  BLM 5 milliLiter(s) Swish and Spit four times a day PRN Mouth Care  morphine Concentrate 20 milliGRAM(s) SubLingual every 3 hours PRN Pain or dyspnea  ondansetron    Tablet 4 milliGRAM(s) Oral every 8 hours PRN Nausea and/or Vomiting  sodium chloride 0.9% lock flush 10 milliLiter(s) IV Push every 1 hour PRN Pre/post blood products, medications, blood draw, and to maintain line patency      CAPILLARY BLOOD GLUCOSE        I&O's Summary      PHYSICAL EXAM:  Vital Signs Last 24 Hrs  T(C): 36.7 (27 May 2024 06:11), Max: 36.7 (26 May 2024 12:19)  T(F): 98 (27 May 2024 06:11), Max: 98 (26 May 2024 12:19)  HR: 80 (27 May 2024 07:40) (68 - 89)  BP: 141/69 (27 May 2024 06:11) (120/54 - 141/69)  BP(mean): --  RR: 18 (27 May 2024 07:40) (16 - 18)  SpO2: 100% (27 May 2024 07:40) (96% - 100%)    Parameters below as of 27 May 2024 07:40  Patient On (Oxygen Delivery Method): nasal cannula, high flow  O2 Flow (L/min): 50  O2 Concentration (%): 100        Cachectic, yjxmuvzjrjk-[id number]-[id number] woman, anxious, writing down questions on notepad.   Pt still dysphonic, but able to write in comprehensive sentences to communicate  Mild R eyelid ptosis and R pupil miosis present/unchanged; R eye with scleral injection; no oral lesions/thrush  RRR, no m/r/g  Breaths not tachypneic; trace inspiratory wheeze and transmitted upper airway sounds auscultated in all lung zone  Abd soft/nt/nd, normoactive bowel sounds    LABS:                      RADIOLOGY & ADDITIONAL TESTS:  Results Reviewed:   Imaging Personally Reviewed:  Electrocardiogram Personally Reviewed:    COORDINATION OF CARE:  Care Discussed with Consultants/Other Providers [Y/N]:  Prior or Outpatient Records Reviewed [Y/N]:

## 2024-05-28 PROCEDURE — 99233 SBSQ HOSP IP/OBS HIGH 50: CPT

## 2024-05-28 RX ORDER — MORPHINE SULFATE 50 MG/1
20 CAPSULE, EXTENDED RELEASE ORAL
Refills: 0 | Status: DISCONTINUED | OUTPATIENT
Start: 2024-05-28 | End: 2024-05-31

## 2024-05-28 RX ORDER — OXYCODONE HYDROCHLORIDE 5 MG/1
30 TABLET ORAL
Refills: 0 | Status: DISCONTINUED | OUTPATIENT
Start: 2024-05-28 | End: 2024-05-31

## 2024-05-28 RX ORDER — DEXAMETHASONE 0.5 MG/5ML
4 ELIXIR ORAL EVERY 12 HOURS
Refills: 0 | Status: DISCONTINUED | OUTPATIENT
Start: 2024-05-28 | End: 2024-05-31

## 2024-05-28 RX ORDER — DIAZEPAM 5 MG
5 TABLET ORAL
Refills: 0 | Status: DISCONTINUED | OUTPATIENT
Start: 2024-05-28 | End: 2024-05-31

## 2024-05-28 RX ADMIN — PANTOPRAZOLE SODIUM 40 MILLIGRAM(S): 20 TABLET, DELAYED RELEASE ORAL at 19:59

## 2024-05-28 RX ADMIN — Medication 3 MILLILITER(S): at 14:58

## 2024-05-28 RX ADMIN — Medication 1 SPRAY(S): at 06:51

## 2024-05-28 RX ADMIN — PANTOPRAZOLE SODIUM 40 MILLIGRAM(S): 20 TABLET, DELAYED RELEASE ORAL at 06:53

## 2024-05-28 RX ADMIN — Medication 1 TABLET(S): at 14:01

## 2024-05-28 RX ADMIN — Medication 3 MILLILITER(S): at 03:35

## 2024-05-28 RX ADMIN — OXYCODONE HYDROCHLORIDE 30 MILLIGRAM(S): 5 TABLET ORAL at 07:38

## 2024-05-28 RX ADMIN — SODIUM CHLORIDE 4 MILLILITER(S): 9 INJECTION INTRAMUSCULAR; INTRAVENOUS; SUBCUTANEOUS at 08:13

## 2024-05-28 RX ADMIN — Medication 15 MILLILITER(S): at 19:55

## 2024-05-28 RX ADMIN — OXYCODONE HYDROCHLORIDE 30 MILLIGRAM(S): 5 TABLET ORAL at 22:45

## 2024-05-28 RX ADMIN — Medication 3 MILLILITER(S): at 08:13

## 2024-05-28 RX ADMIN — Medication 4 MILLIGRAM(S): at 19:59

## 2024-05-28 RX ADMIN — Medication 30 MILLILITER(S): at 23:00

## 2024-05-28 RX ADMIN — OXYCODONE HYDROCHLORIDE 30 MILLIGRAM(S): 5 TABLET ORAL at 07:02

## 2024-05-28 RX ADMIN — NALOXEGOL OXALATE 25 MILLIGRAM(S): 12.5 TABLET, FILM COATED ORAL at 14:01

## 2024-05-28 RX ADMIN — BUDESONIDE AND FORMOTEROL FUMARATE DIHYDRATE 2 PUFF(S): 160; 4.5 AEROSOL RESPIRATORY (INHALATION) at 10:06

## 2024-05-28 RX ADMIN — BUDESONIDE AND FORMOTEROL FUMARATE DIHYDRATE 2 PUFF(S): 160; 4.5 AEROSOL RESPIRATORY (INHALATION) at 23:00

## 2024-05-28 RX ADMIN — Medication 3 MILLILITER(S): at 21:02

## 2024-05-28 RX ADMIN — Medication 25 MILLIGRAM(S): at 19:58

## 2024-05-28 RX ADMIN — AMIODARONE HYDROCHLORIDE 200 MILLIGRAM(S): 400 TABLET ORAL at 06:52

## 2024-05-28 RX ADMIN — SODIUM CHLORIDE 4 MILLILITER(S): 9 INJECTION INTRAMUSCULAR; INTRAVENOUS; SUBCUTANEOUS at 21:03

## 2024-05-28 RX ADMIN — Medication 15 MILLILITER(S): at 06:51

## 2024-05-28 RX ADMIN — Medication 25 MILLIGRAM(S): at 06:52

## 2024-05-28 RX ADMIN — ENOXAPARIN SODIUM 30 MILLIGRAM(S): 100 INJECTION SUBCUTANEOUS at 19:59

## 2024-05-28 RX ADMIN — Medication 4 MILLIGRAM(S): at 06:52

## 2024-05-28 RX ADMIN — Medication 1 PATCH: at 14:00

## 2024-05-28 RX ADMIN — Medication 1 APPLICATION(S): at 20:00

## 2024-05-28 RX ADMIN — CHLORHEXIDINE GLUCONATE 1 APPLICATION(S): 213 SOLUTION TOPICAL at 14:01

## 2024-05-28 RX ADMIN — Medication 1 TABLET(S): at 06:52

## 2024-05-28 RX ADMIN — ENOXAPARIN SODIUM 30 MILLIGRAM(S): 100 INJECTION SUBCUTANEOUS at 06:52

## 2024-05-28 RX ADMIN — Medication 15 MILLILITER(S): at 14:00

## 2024-05-28 RX ADMIN — OXYCODONE HYDROCHLORIDE 30 MILLIGRAM(S): 5 TABLET ORAL at 14:31

## 2024-05-28 NOTE — PROGRESS NOTE ADULT - SUBJECTIVE AND OBJECTIVE BOX
Indication of Geriatrics and Palliative Medicine Services:  [X  ] Complex Medical Decision Making   [X  ] Symptom/Pain management     DNR on chart: Yes   DNI    INTERVAL EVENTS: Patient seen this AM with primary oncology team. Patient awake on HFNC, comfortable in no distress. Patient has difficulty speaking but writing, says she needs help. Asked multiple times with what, patient finally says to eat.     -------------------------------------------------------------------------------------------------------    PRESENT SYMPTOMS:     [X ] Unable to self-report      [X ] PAINADS     [X ] RDOS    [ ] No     [ ] Yes     Source if other than patient:  [ ]Family   [ ]Team     PAIN:   If blank, patient unable to specify     [ ]yes [ ]no    1. Location-   2. Radiation-     3. Quality-   4. Timing-   5. Minimal acceptable level/pain goal-  6. Aggravating factors-   7. QOL impact-     SYMPTOMS:   Dyspnea:                           [ ]Mild [X ]Moderate [ ]Severe  Anxiety:                             [ ]Mild [ ]Moderate [ ]Severe  Fatigue:                             [ ]Mild [ ]Moderate [ ]Severe  Nausea/Vomiting:              [ ]Mild [ ]Moderate [ ]Severe  Loss of appetite:                [ ]Mild [ ]Moderate [ ]Severe  Constipation:                     [ ]Mild [ ]Moderate [ ]Severe    Other Symptoms:  [ ]All other review of systems negative    -------------------------------------------------------------------------------------------------------    I STOP: [id number]    PDI	Current Rx	Drug Type	Rx Written	Rx Dispensed	Drug	Quantity	Days Supply	Prescriber Name	Prescriber KVNG #	Payment Method	Dispenser  A	Y	O	03/08/2024	03/11/2024	oxycodone hcl (ir) 10 mg tab	120	30	Replogle, Bakari	[id number]	Medicare	[facility]  A	N	B	02/14/2024	02/14/2024	diazepam 5 mg tablet	60	30	Gibson Neha	[id number]	Medicare	[facility]  A	N	O	02/09/2024	02/09/2024	oxycodone hcl (ir) 10 mg tab	120	30	Replogle, Bakari	[id number]	Medicare	[facility]  A	N	B	01/11/2024	01/12/2024	diazepam 5 mg tablet	60	30	Pulatov, Pulat	[id number]	Medicare	[facility]  A	N	O	01/08/2024	01/09/2024	oxycodone hcl (ir) 10 mg tab	120	30	Replogle, Bakari	[id number]	Medicare	[facility]  A	N	B	12/12/2023	12/12/2023	diazepam 5 mg tablet	60	30	Pulatov, Pulat	[id number]	Cash	[facility]  A	N	O	12/11/2023	12/11/2023	oxycodone hcl (ir) 10 mg tab	120	30	Replogle, Bakari	[id number]	Medicare	[facility]  A	N	O	11/13/2023	11/13/2023	oxycodone hcl (ir) 10 mg tab	120	30	José Miguel Caro	[id number]	Medicare	[facility]  A	N	B	10/19/2023	10/19/2023	diazepam 5 mg tablet	60	30	Pulatov, Pulat	[id number]	Cash	[facility]  A	N	O	10/13/2023	10/13/2023	oxycodone hcl (ir) 10 mg tab	120	30	José Miguel Caor	[id number]	Medicare	[facility]  A	N	B	09/11/2023	09/16/2023	diazepam 5 mg tablet	60	30	Pulatov, Pulat	[id number]	Medicare	[facility]  A	N	O	09/15/2023	09/16/2023	oxycodone hcl (ir) 10 mg tab	120	30	Sanjiv Persaud	[id number]	Medicare	[facility]  A	N	O	08/18/2023	08/19/2023	oxycodone hcl (ir) 10 mg tab	120	30	Sanjiv Persaud	[id number]	Medicare	[facility]  A	N	B	07/30/2023	08/03/2023	diazepam 5 mg tablet	60	30	Pulatov, Pulat	[id number]	Medicare	[facility]  A	N	O	07/21/2023	07/21/2023	oxycodone hcl (ir) 10 mg tab	120	30	Sanjiv Persaud	[id number]	Medicare	[facility]  A	N	B	06/25/2023	07/12/2023	diazepam 5 mg tablet	60	30	Pulatov, Pulat	[id number]	Medicare	[facility]  A	N	O	06/23/2023	06/24/2023	oxycodone hcl (ir) 10 mg tab	120	30	Sanjiv Persaud	[id number]	Medicare	[facility]      -------------------------------------------------------------------------------------------------------    ITEMS UNCHECKED ARE NOT PRESENT    PHYSICAL:  Vital Signs Last 24 Hrs  T(C): 36.2 (28 May 2024 11:10), Max: 36.3 (28 May 2024 06:55)  T(F): 97.2 (28 May 2024 11:10), Max: 97.3 (28 May 2024 06:55)  HR: 73 (28 May 2024 19:58) (73 - 89)  BP: 112/55 (28 May 2024 19:58) (109/55 - 128/76)  BP(mean): --  RR: 20 (28 May 2024 15:25) (18 - 20)  SpO2: 96% (28 May 2024 15:25) (95% - 100%)    Parameters below as of 28 May 2024 15:25  Patient On (Oxygen Delivery Method): nasal cannula, high flow  O2 Flow (L/min): 50  O2 Concentration (%): 100    GENERAL:  [ ]Cachexia  [ ] Frail  [X ]Awake  [X ]Oriented x 3  [ ]Lethargic  [ ]Unarousable  [ ]Verbal  [ ]Non-Verbal    BEHAVIORAL:   [ ] Anxiety  [ ] Delirium [ ] Agitation [ ] Other    HEENT:   [ ]Normal   [ ]Dry mouth   [ ]ET Tube/Trach  [ ]Oral lesions  R eye glaucoma     PULMONARY:   [ ]Clear              [ ]Tachypnea  [ ]Audible excessive secretions   [ ]Rhonchi        [ ]Right [ ]Left [ ]Bilateral  [ ]Crackles        [ ]Right [ ]Left [ ]Bilateral  [ ]Wheezing     [ ]Right [ ]Left [ ]Bilateral  [X ]Diminished breath sounds [ ]right [ ]left [X ]bilateral  R pleurex     CARDIOVASCULAR:    [X ]Regular [ ]Irregular [ ]Tachy  [ ]Malik [ ]Murmur [ ]Other    GASTROINTESTINAL:  [X ]Soft  [ ]Distended   [ ]+BS  [X ]Non tender [ ]Tender  [ ]Other [ ]PEG [ ]OGT/ NGT      GENITOURINARY:  [X ]Normal [ ] Incontinent   [ ]Oliguria/Anuria   [ ]Che    MUSCULOSKELETAL:   [ ]Normal   [X ]Weakness  [ ]Bed/Wheelchair bound [ ]Edema    NEUROLOGIC:   [X ]No focal deficits  [ ]Cognitive impairment  [ ]Dysphagia [ ]Dysarthria [ ]Paresis [ ]Other     SKIN:   [X ]Normal  [ ]Rash  [X ]Other- edematous RUE, R breast   [ ]Pressure ulcer(s)       Present on admission [ ]y [ ]n      -------------------------------------------------------------------------------------------------------    LABS: None     -------------------------------------------------------------------------------------------------------    CRITICAL CARE:  [ ]Shock Present  [ ]Septic [ ]Cardiogenic [ ]Neurologic [ ]Hypovolemic [ ]Undifferentiated    [ ]Vasopressors [ ]Inotropes    [X ]Respiratory failure present [X ]Acute  [ ]Chronic [ ]Hypoxic  [ ]Hypercarbic [ ]Mixed   [ ]Mechanical Ventilation  [ ]Trach collar   [ ]Non-invasive ventilatory support   [X ]High-Flow   [ ]Oxygen mask/venti     [ ]Other organ failure     -------------------------------------------------------------------------------------------------------    RADIOLOGY & ADDITIONAL STUDIES:       < from: CT Neck Soft Tissue w/ IV Cont (03.27.24 @ 17:10) >  Bulky and conglomerate lymphadenopathy at right level 4 invades internal   jugular vein with thrombus extending up to hyoid level. 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 IJV otherwise patent up to skull base.    New left IJV nonocclusive thrombus. Otherwise, no significant change from   3/14/24.    < end of copied text >    < from: CT Chest w/ IV Cont (03.27.24 @ 17:10) >  IMPRESSION:    In comparison with 3/14/2024, new consolidations throughout the right   lung more severely within right lower lobe likely representing pneumonia.    Interval insertion of right Pleurx catheter and decrease of right pleural   effusion. Small/moderate left pleural effusion is increased.    Redemonstrated extensive DVT within thoracic and lower neck. The upper   SVC remains obliterated.    Large right supraclavicular mass infiltrating into the mediastinum is   unchanged.    Trace right pneumothorax.    < end of copied text >    < from: CT Neck Soft Tissue w/ IV Cont (03.14.24 @ 11:24) >  IMPRESSION:    Extensive cellulitis/myositis along the right anterior lateral neck and   right upper chest wall with inflammatory fat induration the deep soft   tissue of the neck.    Large supraclavicular/mediastinal mass lesion, presumably conglomerate of   lymph nodes with mass effect and complete occlusion of the right internal   jugular vein with associated surrounding inflammatory changes in the deep   neck, concerning for infectious/inflammatory thrombophlebitis.   Clinical/laboratory correlation and serial ultrasound follow-up is   recommended.    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    No masslike lesions or abnormal enhancement in aerodigestive mucosa.   Airways are patent.    Cervical adenopathy.    < end of copied text >    < from: CT Abdomen and Pelvis w/ IV Cont (03.12.24 @ 19:42) >  IMPRESSION:  Status post right nephrectomy. No lymphadenopathy or evidence of new   metastatic lesion.  A new 1.6 mL pancreatic neck cystic lesion without main pancreatic ductal   dilatation. Differentials include side branch IPMN.  Interval increase in small right and trace left pleural effusions.    < end of copied text >    < from: Xray Chest 1 View-PORTABLE IMMEDIATE (Xray Chest 1 View-PORTABLE IMMEDIATE .) (05.23.24 @ 10:39) >    IMPRESSION:  Small bilateral pleural effusions, right greater than left. No new focal   consolidations.    < end of copied text >    < from: CT Abdomen and Pelvis w/ IV Cont (05.03.24 @ 12:32) >  IMPRESSION:  Since the prior chest CT 3/27/2024:    Persistent right middle lobe consolidation and decreased right lower lobe   consolidation.    New patchy nodular opacities in the right upper lobe and increased patchy   and groundglass opacities in the left lung.    Small right pleural effusion, unchanged. Trace left pleural effusion,   decreased.    No evidence of metastatic disease in the abdomen or pelvis.    < end of copied text >    -------------------------------------------------------------------------------------------------------  MEDICATIONS:     MEDICATIONS  (STANDING):  albuterol/ipratropium for Nebulization 3 milliLiter(s) Nebulizer every 6 hours  aMIOdarone    Tablet 200 milliGRAM(s) Oral daily  Biotene Dry Mouth Oral Rinse 15 milliLiter(s) Swish and Spit every 6 hours  budesonide 160 MICROgram(s)/formoterol 4.5 MICROgram(s) Inhaler 2 Puff(s) Inhalation two times a day  chlorhexidine 2% Cloths 1 Application(s) Topical daily  dexAMETHasone     Tablet 4 milliGRAM(s) Oral every 12 hours  enoxaparin Injectable 30 milliGRAM(s) SubCutaneous every 12 hours  influenza   Vaccine 0.5 milliLiter(s) IntraMuscular once  metoclopramide Injectable 10 milliGRAM(s) IV Push every 8 hours  metoprolol tartrate 25 milliGRAM(s) Oral every 12 hours  multivitamin 1 Tablet(s) Oral daily  naloxegol 25 milliGRAM(s) Oral daily  nicotine -  14 mG/24Hr(s) Patch 1 Patch Transdermal every 24 hours  oxyCODONE  ER Tablet 30 milliGRAM(s) Oral <User Schedule>  pantoprazole    Tablet 40 milliGRAM(s) Oral every 12 hours  petrolatum white Ointment 1 Application(s) Topical two times a day  polyethylene glycol 3350 17 Gram(s) Oral every 12 hours  sodium chloride 0.65% Nasal 1 Spray(s) Both Nostrils two times a day  sodium chloride 3%  Inhalation 4 milliLiter(s) Inhalation every 12 hours    MEDICATIONS  (PRN):  aluminum hydroxide/magnesium hydroxide/simethicone Suspension 30 milliLiter(s) Oral every 6 hours PRN Dyspepsia  benzocaine/menthol Lozenge 1 Lozenge Oral two times a day PRN Sore Throat  Biotene Dry Mouth Oral Rinse 15 milliLiter(s) Swish and Spit two times a day PRN dry mouth  calcium carbonate    500 mG (Tums) Chewable 1 Tablet(s) Chew daily PRN Heartburn  diazepam    Tablet 5 milliGRAM(s) Oral two times a day PRN anxiety  FIRST- Mouthwash  BLM 5 milliLiter(s) Swish and Spit four times a day PRN Mouth Care  morphine Concentrate 20 milliGRAM(s) SubLingual every 3 hours PRN Pain or dyspnea  ondansetron    Tablet 4 milliGRAM(s) Oral every 8 hours PRN Nausea and/or Vomiting  sodium chloride 0.9% lock flush 10 milliLiter(s) IV Push every 1 hour PRN Pre/post blood products, medications, blood draw, and to maintain line patency

## 2024-05-28 NOTE — PROGRESS NOTE ADULT - SUBJECTIVE AND OBJECTIVE BOX
SOLID TUMOR ONCOLOGY HOSPITALIST PROGRESS NOTE    S: No acute events overnight    CURRENT MEDICATIONS  MEDICATIONS  (STANDING):  albuterol/ipratropium for Nebulization 3 milliLiter(s) Nebulizer every 6 hours  aMIOdarone    Tablet 200 milliGRAM(s) Oral daily  Biotene Dry Mouth Oral Rinse 15 milliLiter(s) Swish and Spit every 6 hours  budesonide 160 MICROgram(s)/formoterol 4.5 MICROgram(s) Inhaler 2 Puff(s) Inhalation two times a day  chlorhexidine 2% Cloths 1 Application(s) Topical daily  dexAMETHasone     Tablet 4 milliGRAM(s) Oral every 12 hours  enoxaparin Injectable 30 milliGRAM(s) SubCutaneous every 12 hours  influenza   Vaccine 0.5 milliLiter(s) IntraMuscular once  metoclopramide Injectable 10 milliGRAM(s) IV Push every 8 hours  metoprolol tartrate 25 milliGRAM(s) Oral every 12 hours  multivitamin 1 Tablet(s) Oral daily  naloxegol 25 milliGRAM(s) Oral daily  nicotine -  14 mG/24Hr(s) Patch 1 Patch Transdermal every 24 hours  oxyCODONE  ER Tablet 30 milliGRAM(s) Oral <User Schedule>  pantoprazole    Tablet 40 milliGRAM(s) Oral every 12 hours  petrolatum white Ointment 1 Application(s) Topical two times a day  polyethylene glycol 3350 17 Gram(s) Oral every 12 hours  sodium chloride 0.65% Nasal 1 Spray(s) Both Nostrils two times a day  sodium chloride 3%  Inhalation 4 milliLiter(s) Inhalation every 12 hours    MEDICATIONS  (PRN):  aluminum hydroxide/magnesium hydroxide/simethicone Suspension 30 milliLiter(s) Oral every 6 hours PRN Dyspepsia  benzocaine/menthol Lozenge 1 Lozenge Oral two times a day PRN Sore Throat  Biotene Dry Mouth Oral Rinse 15 milliLiter(s) Swish and Spit two times a day PRN dry mouth  calcium carbonate    500 mG (Tums) Chewable 1 Tablet(s) Chew daily PRN Heartburn  diazepam    Tablet 5 milliGRAM(s) Oral two times a day PRN anxiety  FIRST- Mouthwash  BLM 5 milliLiter(s) Swish and Spit four times a day PRN Mouth Care  morphine Concentrate 20 milliGRAM(s) SubLingual every 3 hours PRN Pain or dyspnea  ondansetron    Tablet 4 milliGRAM(s) Oral every 8 hours PRN Nausea and/or Vomiting  sodium chloride 0.9% lock flush 10 milliLiter(s) IV Push every 1 hour PRN Pre/post blood products, medications, blood draw, and to maintain line patency      PHYSICAL EXAM  T(C): 36.3 (05-28-24 @ 06:55), Max: 37.1 (05-27-24 @ 11:50)  HR: 88 (05-28-24 @ 08:13) (75 - 88)  BP: 128/76 (05-28-24 @ 06:55) (116/54 - 128/76)  RR: 18 (05-28-24 @ 08:13) (18 - 18)  SpO2: 95% (05-28-24 @ 08:13) (95% - 100%)    05-27-24 @ 07:01  -  05-28-24 @ 07:00  --------------------------------------------------------  IN: 0 mL / OUT: 500 mL / NET: -500 mL        LABS            CAPILLARY BLOOD GLUCOSE            MICROBIOLOGY          PERTINENT RADIOLOGY         SOLID TUMOR ONCOLOGY HOSPITALIST PROGRESS NOTE    S: No acute events overnight    CURRENT MEDICATIONS  MEDICATIONS  (STANDING):  albuterol/ipratropium for Nebulization 3 milliLiter(s) Nebulizer every 6 hours  aMIOdarone    Tablet 200 milliGRAM(s) Oral daily  Biotene Dry Mouth Oral Rinse 15 milliLiter(s) Swish and Spit every 6 hours  budesonide 160 MICROgram(s)/formoterol 4.5 MICROgram(s) Inhaler 2 Puff(s) Inhalation two times a day  chlorhexidine 2% Cloths 1 Application(s) Topical daily  dexAMETHasone     Tablet 4 milliGRAM(s) Oral every 12 hours  enoxaparin Injectable 30 milliGRAM(s) SubCutaneous every 12 hours  influenza   Vaccine 0.5 milliLiter(s) IntraMuscular once  metoclopramide Injectable 10 milliGRAM(s) IV Push every 8 hours  metoprolol tartrate 25 milliGRAM(s) Oral every 12 hours  multivitamin 1 Tablet(s) Oral daily  naloxegol 25 milliGRAM(s) Oral daily  nicotine -  14 mG/24Hr(s) Patch 1 Patch Transdermal every 24 hours  oxyCODONE  ER Tablet 30 milliGRAM(s) Oral <User Schedule>  pantoprazole    Tablet 40 milliGRAM(s) Oral every 12 hours  petrolatum white Ointment 1 Application(s) Topical two times a day  polyethylene glycol 3350 17 Gram(s) Oral every 12 hours  sodium chloride 0.65% Nasal 1 Spray(s) Both Nostrils two times a day  sodium chloride 3%  Inhalation 4 milliLiter(s) Inhalation every 12 hours  MEDICATIONS  (PRN):  aluminum hydroxide/magnesium hydroxide/simethicone Suspension 30 milliLiter(s) Oral every 6 hours PRN Dyspepsia  benzocaine/menthol Lozenge 1 Lozenge Oral two times a day PRN Sore Throat  Biotene Dry Mouth Oral Rinse 15 milliLiter(s) Swish and Spit two times a day PRN dry mouth  calcium carbonate    500 mG (Tums) Chewable 1 Tablet(s) Chew daily PRN Heartburn  diazepam    Tablet 5 milliGRAM(s) Oral two times a day PRN anxiety  FIRST- Mouthwash  BLM 5 milliLiter(s) Swish and Spit four times a day PRN Mouth Care  morphine Concentrate 20 milliGRAM(s) SubLingual every 3 hours PRN Pain or dyspnea  ondansetron    Tablet 4 milliGRAM(s) Oral every 8 hours PRN Nausea and/or Vomiting  sodium chloride 0.9% lock flush 10 milliLiter(s) IV Push every 1 hour PRN Pre/post blood products, medications, blood draw, and to maintain line patency      PHYSICAL EXAM  T(C): 36.3 (05-28-24 @ 06:55), Max: 37.1 (05-27-24 @ 11:50)  HR: 88 (05-28-24 @ 08:13) (75 - 88)  BP: 128/76 (05-28-24 @ 06:55) (116/54 - 128/76)  RR: 18 (05-28-24 @ 08:13) (18 - 18)  SpO2: 95% (05-28-24 @ 08:13) (95% - 100%)    05-27-24 @ 07:01  -  05-28-24 @ 07:00  --------------------------------------------------------  IN: 0 mL / OUT: 500 mL / NET: -500 mL  Cachectic, ill-appearing woman, alert/interactive, communicates via writing due to chronic dysphonia, nad  Anicteric sclera  HF NC in tact; breaths non-labored  Pt moves all extremities spontaneously    DAILY LABS DISCONTINUED      PATHOLOGY  3/26 pleural fluid cytology: Neg for malignant cells  3/26 Pericardium excision: Neg for malignancy  3/20 Pericardial fl: Neg for malignant cells  S/p R supraclavicular LN bx by IR 3/14 c/w GI origin (pMMR, PD-L1 TPS 1%, HER2 pending)      MICROBIOLOGY  Abx  $Cefepime 5/1 - 5/10  $Fluconazole 5/6-5/12  $Bactrim ppx 4/8-5/12    Recent Cx Data  4/5 MRSA swab: Not detected  3/16 Quant plus TB: Negative      PERTINENT RADIOLOGY  5/18 CXR: Slight decrease of right pleural effusion. No pneumothorax.  5/15 CXR: Redemonstrated small right-sided pleural effusion with underlying   atelectasis. Right-sided Pleurx catheter in stable position. No newfocal   consolidations.  5/13 CXR: Right-sided chest tube with effusion/atelectasis suspected.  5/10 CXR: Right effusion with suspected lower lobe atelectasis.  5/6 CXR: Right-sided Pleurx catheter with decreased effusion.  5/4 CXR: There is a catheter at the right base.  HEART: normal in size.  LUNGS: There is opacity at the right base compatible with   effusion/infiltrate.. Prominent interstitial markings, likely secondary   to chronic underlying lung disease..  BONES: degenerative changes  5/4 R elbow XR: Linear lucency in the coronoid process seen only on the lateral view   concerning for nondisplaced fracture. Consider cross-sectional imaging of   the elbow with CT or MRI for further evaluation.  5/3 CT c/a/p with IV contrast: Persistent right middle lobe consolidation and decreased right lower lobe consolidation. New patchy nodular opacities in the right upper lobe and increased patchy and groundglass opacities in the left lung. Small right pleural effusion, unchanged. Trace left pleural effusion, decreased. No evidence of metastatic disease in the abdomen or pelvis.  5/1 CXR: small Rt plueral effusion.  4/19 MRCP: 16mm Pancreatic neck hyperintense lesion, likely side branch IPMN.  Remainder of pancreas is suboptimally eval 2/2 artifact, recc f/u MRI in 3-6 months.  Partially distended stomach, duodenal diverticulum.  Colon also partially distended 2/2 stook burden.  Trace pleural effusion.   4/18 US Duplex B/L LE: R acute DVT aboive knee, and acute nonocclusive DVT in R common femoral vein   4/17 CXR: Clear lungs w/ pleurx cath in place   4/10 VA dopplers B/L UE: Extensive acute, occlusive DVT RIJ, innominate and subclavian veins.  Acute non-occlusive DVT w/in R axillary vein.  Extensive acute, occlusive DVT noted in L subclavian. axillary, and proximal brachial veins.  Acute non-occlusive DVT LIJ.  Superficial vein thromboses are noted in the B/L cephalic veins.  4/9 Echocardiogram: Normal LV systolic function.  Normal mitral valve w/ normal leaflet excursion.  No pericardial effusion seen  4/4 CXR: Clear lungs w/ R pleurx catheter in place.  4/4 CXR AM: Small R pleural effusion w/ pleurx cath improved  OSH ([facility])  3/29 CXR: Decrease in R pleural effusion.   3/28 B/L UE Dupplers: Acute DVT involving R IJ, innominate vein, subclavian, brachial, and L IJ.  Superficial venous thrombosis involving B/L cephalic veins.  + Edema of superficial soft tissue of UE R>L  3/27 CT neck: Bulky and conglomerate LAD invading RIJ w/ thrombus extending up to hyoid level.  Thrombus is likely combination tumor and bland.  New LIJ nonocclusive thrombus  3/27 CT Chest: New consolidations throughout the R lung worse RLL c/f aspiration PNA.  + R pleurx cath w/ decrease in R pleural effusion.  Extensive DVT w/in thoracic and lower neck, upper SVC remains obliterated.  Large R supraclavicular mass infiltrating into the mediastinum.    3/27 CXR: Progression of R consolidation/effusion.  3/21 TTE: LV grossly normal.  Thickened pericardium.  no pericardial effusion  3/18 TTE: Mod pericardial effusion w/ e/o hemodynamic compromise w/ diasolic collapse of RA that exceeds 1/3 of cardiac cycle >30% resp variation across MV E. wave and early diastolic inversion of RV  3/14 CT Chest: Conglomerate soft tissue in superior mediastinum and R supraclavicular region 2/2 LAD w/ internal hypodensity c/f necrosis.  Mass encases SVC and multiple great veins resulting in oblieration of the SVC and thrombosis of the RIJ.  Multiple R sided collateral vessels and R soft tissue edema.  B/L pulm nodules.  3/14 CT neck: Extensive cellulitis/myositis along R anterior lateral neck and R upper chest wall w/ inflammatory fat induration the deep soft tissue of neck.  Large supraclavicular/mediastinal mass lesion, presumably conglomerate of LN w/ mass effect and complete occlusion of RIJ w/ associated inflammation.  Complete occlusion of R subclavian vein and nearly occlusive thrombosis in the proximal L brachiocephalic vein.  3/13 RUQ Abd US: Cholelithiasis w/o e/o cholecystitis.  Dilated CBD w/o e/o intraductal stone or other obstruction. 1.4cm pancreatic cyst w/ mild duct dilatation.  3/12 CT abd/pelv: S/p R nephrectomy. no LAD.  New 1.6cm  pancreatic neck cystic lesion w/o main pancreatic ductal dilatation.  3/10 R breast US: No e/o breast abscess

## 2024-05-28 NOTE — PROGRESS NOTE ADULT - PROBLEM SELECTOR PLAN 5
- MOLST completed in [facility] with patient's consent- DNR/DNI - patient reaffirmed (see GOC 3/28)   - 3/22- HCP done in [facility]- patient's boyfriend Saeid Peña  - 5/23- HCP agreed to comfort care.

## 2024-05-28 NOTE — PROGRESS NOTE ADULT - PROBLEM SELECTOR PLAN 3
hx of RCC s/p R nephrectomy   - Presented to [facility] with right breast/chest wall swelling and cellulitis with imaging evidence concerning for metastatic malignancy unknown primary w/ lung nodules, and axillary, supraclavicular, and mediastinal adenopathy.    - S/p R Supraclavicular LN biopsy w/ IR on 3/14/24 w/ path suggesting metastatic carcinoma, favoring upper GI or pancreaticobiliary origin.   - pleural effusions s/p R pleurex, pericardial effusion   - Course complicated with SVC syndrome, extensive b/l UE DVTs     - Received one dose of chemo mFOLFOX inpatient on 5/16-18, however unable to tolerater further chemo  - Due to worsening functions status, not candidate for further interventions. HCP agreed to comfort care

## 2024-05-28 NOTE — PROGRESS NOTE ADULT - PROBLEM SELECTOR PLAN 1
- worsening hypoxemia, on HFNC   - Patient has no IV access and unable to have permanent access due to diffuse DVTs  - c/w morphine concentrate 20 mg Sublingal q3h PRN for pain or dyspnea (no PRN use)   - Patient refused opioids at times, thinks they are causing her decline. Tried to educate however patient struggling with existential distress

## 2024-05-28 NOTE — PROGRESS NOTE ADULT - REASON FOR ADMISSION
SVC syndrome, DVT, mediastinal mass

## 2024-05-28 NOTE — PROGRESS NOTE ADULT - PROBLEM SELECTOR PLAN 6
For GOC and symptom management - Patient with worsening hypoxemia, on HFNC. HCP agreed to comfort care. Pending [place] referral today. Patient has no IV access, c/w oral meds as tolerated, sublingual morphine concentrate ordered PRN.     In the event of worsening symptoms, please contact the Palliative Medicine team via pager (if the patient is at [facility] #[phone] or if the patient is at [facility] #[phone]) The Geriatric and Palliative Medicine service has coverage 24 hours a day/ 7 days a week to provide medical recommendations regarding symptom management needs via telephone.

## 2024-05-28 NOTE — PROGRESS NOTE ADULT - ASSESSMENT
58F w/ F hx tobacco use, RCC right nephrectomy, right sided glaucoma, fibromyalgia, anxiety, GERD, was at [facility] w/ concern for breast inflammation, lung nodules/mediastinal mass w/ biopsy concern for metastatic GI cancer. Had pleural effusion s/p chest tube and pleurx, had pericardial window for effusion. Has had poor IV access, has triple lumen in right groin. Has UE DVT on lovenox. Also has SVC syndrome, transfer from [facility] for LIJ RT treatment. ROS otherwise negative. On oxygen.

## 2024-05-28 NOTE — PROGRESS NOTE ADULT - RESPIRATORY DISTRESS OBSERVATION: LOOK OF FEAR
Eyes wide open, facial muscles tense, brow furrowed, mouth open, teeth together
None
Eyes wide open, facial muscles tense, brow furrowed, mouth open, teeth together

## 2024-05-29 PROCEDURE — 99233 SBSQ HOSP IP/OBS HIGH 50: CPT

## 2024-05-29 RX ADMIN — ENOXAPARIN SODIUM 30 MILLIGRAM(S): 100 INJECTION SUBCUTANEOUS at 17:58

## 2024-05-29 RX ADMIN — Medication 4 MILLIGRAM(S): at 08:12

## 2024-05-29 RX ADMIN — AMIODARONE HYDROCHLORIDE 200 MILLIGRAM(S): 400 TABLET ORAL at 08:11

## 2024-05-29 RX ADMIN — Medication 15 MILLILITER(S): at 08:15

## 2024-05-29 RX ADMIN — Medication 15 MILLILITER(S): at 17:57

## 2024-05-29 RX ADMIN — BUDESONIDE AND FORMOTEROL FUMARATE DIHYDRATE 2 PUFF(S): 160; 4.5 AEROSOL RESPIRATORY (INHALATION) at 11:49

## 2024-05-29 RX ADMIN — SODIUM CHLORIDE 4 MILLILITER(S): 9 INJECTION INTRAMUSCULAR; INTRAVENOUS; SUBCUTANEOUS at 20:33

## 2024-05-29 RX ADMIN — Medication 1 APPLICATION(S): at 08:36

## 2024-05-29 RX ADMIN — BUDESONIDE AND FORMOTEROL FUMARATE DIHYDRATE 2 PUFF(S): 160; 4.5 AEROSOL RESPIRATORY (INHALATION) at 23:07

## 2024-05-29 RX ADMIN — Medication 25 MILLIGRAM(S): at 17:59

## 2024-05-29 RX ADMIN — PANTOPRAZOLE SODIUM 40 MILLIGRAM(S): 20 TABLET, DELAYED RELEASE ORAL at 17:58

## 2024-05-29 RX ADMIN — Medication 1 PATCH: at 07:30

## 2024-05-29 RX ADMIN — SODIUM CHLORIDE 4 MILLILITER(S): 9 INJECTION INTRAMUSCULAR; INTRAVENOUS; SUBCUTANEOUS at 06:51

## 2024-05-29 RX ADMIN — Medication 3 MILLILITER(S): at 15:50

## 2024-05-29 RX ADMIN — Medication 1 APPLICATION(S): at 17:57

## 2024-05-29 RX ADMIN — Medication 15 MILLILITER(S): at 11:49

## 2024-05-29 RX ADMIN — Medication 3 MILLILITER(S): at 02:03

## 2024-05-29 RX ADMIN — Medication 1 SPRAY(S): at 08:13

## 2024-05-29 RX ADMIN — NALOXEGOL OXALATE 25 MILLIGRAM(S): 12.5 TABLET, FILM COATED ORAL at 11:50

## 2024-05-29 RX ADMIN — Medication 1 PATCH: at 14:30

## 2024-05-29 RX ADMIN — CHLORHEXIDINE GLUCONATE 1 APPLICATION(S): 213 SOLUTION TOPICAL at 11:49

## 2024-05-29 RX ADMIN — Medication 1 SPRAY(S): at 18:00

## 2024-05-29 RX ADMIN — OXYCODONE HYDROCHLORIDE 30 MILLIGRAM(S): 5 TABLET ORAL at 23:04

## 2024-05-29 RX ADMIN — Medication 1 TABLET(S): at 11:50

## 2024-05-29 RX ADMIN — Medication 25 MILLIGRAM(S): at 08:11

## 2024-05-29 RX ADMIN — Medication 15 MILLILITER(S): at 23:08

## 2024-05-29 RX ADMIN — Medication 4 MILLIGRAM(S): at 17:59

## 2024-05-29 RX ADMIN — PANTOPRAZOLE SODIUM 40 MILLIGRAM(S): 20 TABLET, DELAYED RELEASE ORAL at 08:13

## 2024-05-29 RX ADMIN — ENOXAPARIN SODIUM 30 MILLIGRAM(S): 100 INJECTION SUBCUTANEOUS at 08:13

## 2024-05-29 RX ADMIN — OXYCODONE HYDROCHLORIDE 30 MILLIGRAM(S): 5 TABLET ORAL at 13:17

## 2024-05-29 RX ADMIN — Medication 3 MILLILITER(S): at 06:51

## 2024-05-29 RX ADMIN — Medication 3 MILLILITER(S): at 20:33

## 2024-05-29 NOTE — PROGRESS NOTE ADULT - SUBJECTIVE AND OBJECTIVE BOX
SOLID TUMOR ONCOLOGY HOSPITALIST PROGRESS NOTE    S: No acute events overnight.    CURRENT MEDICATIONS  MEDICATIONS  (STANDING):  albuterol/ipratropium for Nebulization 3 milliLiter(s) Nebulizer every 6 hours  aMIOdarone    Tablet 200 milliGRAM(s) Oral daily  Biotene Dry Mouth Oral Rinse 15 milliLiter(s) Swish and Spit every 6 hours  budesonide 160 MICROgram(s)/formoterol 4.5 MICROgram(s) Inhaler 2 Puff(s) Inhalation two times a day  chlorhexidine 2% Cloths 1 Application(s) Topical daily  dexAMETHasone     Tablet 4 milliGRAM(s) Oral every 12 hours  enoxaparin Injectable 30 milliGRAM(s) SubCutaneous every 12 hours  influenza   Vaccine 0.5 milliLiter(s) IntraMuscular once  metoclopramide Injectable 10 milliGRAM(s) IV Push every 8 hours  metoprolol tartrate 25 milliGRAM(s) Oral every 12 hours  multivitamin 1 Tablet(s) Oral daily  naloxegol 25 milliGRAM(s) Oral daily  nicotine -  14 mG/24Hr(s) Patch 1 Patch Transdermal every 24 hours  oxyCODONE  ER Tablet 30 milliGRAM(s) Oral <User Schedule>  pantoprazole    Tablet 40 milliGRAM(s) Oral every 12 hours  petrolatum white Ointment 1 Application(s) Topical two times a day  polyethylene glycol 3350 17 Gram(s) Oral every 12 hours  sodium chloride 0.65% Nasal 1 Spray(s) Both Nostrils two times a day  sodium chloride 3%  Inhalation 4 milliLiter(s) Inhalation every 12 hours  MEDICATIONS  (PRN):  aluminum hydroxide/magnesium hydroxide/simethicone Suspension 30 milliLiter(s) Oral every 6 hours PRN Dyspepsia  benzocaine/menthol Lozenge 1 Lozenge Oral two times a day PRN Sore Throat  Biotene Dry Mouth Oral Rinse 15 milliLiter(s) Swish and Spit two times a day PRN dry mouth  calcium carbonate    500 mG (Tums) Chewable 1 Tablet(s) Chew daily PRN Heartburn  diazepam    Tablet 5 milliGRAM(s) Oral two times a day PRN anxiety  FIRST- Mouthwash  BLM 5 milliLiter(s) Swish and Spit four times a day PRN Mouth Care  morphine Concentrate 20 milliGRAM(s) SubLingual every 3 hours PRN Pain or dyspnea  ondansetron    Tablet 4 milliGRAM(s) Oral every 8 hours PRN Nausea and/or Vomiting  sodium chloride 0.9% lock flush 10 milliLiter(s) IV Push every 1 hour PRN Pre/post blood products, medications, blood draw, and to maintain line patency      PHYSICAL EXAM  T(C): 36.6 (05-29-24 @ 11:10), Max: 36.6 (05-29-24 @ 08:00)  HR: 90 (05-29-24 @ 11:12) (73 - 90)  BP: 109/51 (05-29-24 @ 11:10) (109/51 - 127/60)  RR: 22 (05-29-24 @ 11:12) (20 - 22)  SpO2: 95% (05-29-24 @ 11:12) (95% - 96%)    05-29-24 @ 07:01  -  05-29-24 @ 17:05  --------------------------------------------------------  IN: 0 mL / OUT: 550 mL / NET: -550 mL  Cachectic, ill-appearing woman, alert/interactive, communicates via writing due to chronic dysphonia, nad  Anicteric sclera  HF NC in tact; breaths non-labored  Pt moves all extremities spontaneously    DAILY LABS DISCONTINUED      PATHOLOGY  3/26 pleural fluid cytology: Neg for malignant cells  3/26 Pericardium excision: Neg for malignancy  3/20 Pericardial fl: Neg for malignant cells  S/p R supraclavicular LN bx by IR 3/14 c/w GI origin (pMMR, PD-L1 TPS 1%, HER2 pending)      MICROBIOLOGY  Abx  $Cefepime 5/1 - 5/10  $Fluconazole 5/6-5/12  $Bactrim ppx 4/8-5/12    Recent Cx Data  4/5 MRSA swab: Not detected  3/16 Quant plus TB: Negative      PERTINENT RADIOLOGY  5/18 CXR: Slight decrease of right pleural effusion. No pneumothorax.  5/15 CXR: Redemonstrated small right-sided pleural effusion with underlying   atelectasis. Right-sided Pleurx catheter in stable position. No newfocal   consolidations.  5/13 CXR: Right-sided chest tube with effusion/atelectasis suspected.  5/10 CXR: Right effusion with suspected lower lobe atelectasis.  5/6 CXR: Right-sided Pleurx catheter with decreased effusion.  5/4 CXR: There is a catheter at the right base.  HEART: normal in size.  LUNGS: There is opacity at the right base compatible with   effusion/infiltrate.. Prominent interstitial markings, likely secondary   to chronic underlying lung disease..  BONES: degenerative changes  5/4 R elbow XR: Linear lucency in the coronoid process seen only on the lateral view   concerning for nondisplaced fracture. Consider cross-sectional imaging of   the elbow with CT or MRI for further evaluation.  5/3 CT c/a/p with IV contrast: Persistent right middle lobe consolidation and decreased right lower lobe consolidation. New patchy nodular opacities in the right upper lobe and increased patchy and groundglass opacities in the left lung. Small right pleural effusion, unchanged. Trace left pleural effusion, decreased. No evidence of metastatic disease in the abdomen or pelvis.  5/1 CXR: small Rt plueral effusion.  4/19 MRCP: 16mm Pancreatic neck hyperintense lesion, likely side branch IPMN.  Remainder of pancreas is suboptimally eval 2/2 artifact, recc f/u MRI in 3-6 months.  Partially distended stomach, duodenal diverticulum.  Colon also partially distended 2/2 stook burden.  Trace pleural effusion.   4/18 US Duplex B/L LE: R acute DVT aboive knee, and acute nonocclusive DVT in R common femoral vein   4/17 CXR: Clear lungs w/ pleurx cath in place   4/10 VA dopplers B/L UE: Extensive acute, occlusive DVT RIJ, innominate and subclavian veins.  Acute non-occlusive DVT w/in R axillary vein.  Extensive acute, occlusive DVT noted in L subclavian. axillary, and proximal brachial veins.  Acute non-occlusive DVT LIJ.  Superficial vein thromboses are noted in the B/L cephalic veins.  4/9 Echocardiogram: Normal LV systolic function.  Normal mitral valve w/ normal leaflet excursion.  No pericardial effusion seen  4/4 CXR: Clear lungs w/ R pleurx catheter in place.  4/4 CXR AM: Small R pleural effusion w/ pleurx cath improved  OSH ([facility])  3/29 CXR: Decrease in R pleural effusion.   3/28 B/L UE Dupplers: Acute DVT involving R IJ, innominate vein, subclavian, brachial, and L IJ.  Superficial venous thrombosis involving B/L cephalic veins.  + Edema of superficial soft tissue of UE R>L  3/27 CT neck: Bulky and conglomerate LAD invading RIJ w/ thrombus extending up to hyoid level.  Thrombus is likely combination tumor and bland.  New LIJ nonocclusive thrombus  3/27 CT Chest: New consolidations throughout the R lung worse RLL c/f aspiration PNA.  + R pleurx cath w/ decrease in R pleural effusion.  Extensive DVT w/in thoracic and lower neck, upper SVC remains obliterated.  Large R supraclavicular mass infiltrating into the mediastinum.    3/27 CXR: Progression of R consolidation/effusion.  3/21 TTE: LV grossly normal.  Thickened pericardium.  no pericardial effusion  3/18 TTE: Mod pericardial effusion w/ e/o hemodynamic compromise w/ diasolic collapse of RA that exceeds 1/3 of cardiac cycle >30% resp variation across MV E. wave and early diastolic inversion of RV  3/14 CT Chest: Conglomerate soft tissue in superior mediastinum and R supraclavicular region 2/2 LAD w/ internal hypodensity c/f necrosis.  Mass encases SVC and multiple great veins resulting in oblieration of the SVC and thrombosis of the RIJ.  Multiple R sided collateral vessels and R soft tissue edema.  B/L pulm nodules.  3/14 CT neck: Extensive cellulitis/myositis along R anterior lateral neck and R upper chest wall w/ inflammatory fat induration the deep soft tissue of neck.  Large supraclavicular/mediastinal mass lesion, presumably conglomerate of LN w/ mass effect and complete occlusion of RIJ w/ associated inflammation.  Complete occlusion of R subclavian vein and nearly occlusive thrombosis in the proximal L brachiocephalic vein.  3/13 RUQ Abd US: Cholelithiasis w/o e/o cholecystitis.  Dilated CBD w/o e/o intraductal stone or other obstruction. 1.4cm pancreatic cyst w/ mild duct dilatation.  3/12 CT abd/pelv: S/p R nephrectomy. no LAD.  New 1.6cm  pancreatic neck cystic lesion w/o main pancreatic ductal dilatation.  3/10 R breast US: No e/o breast abscess

## 2024-05-29 NOTE — CHART NOTE - NSCHARTNOTEFT_GEN_A_CORE
Source: Patient [x]     other [x] medical chart, nurse   Diet rx: Soft and Bite Sized: DASH/TLC {Sodium & Cholesterol Restricted} (DASH)  No Concentrated Potassium   Low Sodium  Supplement Feeding Modality:  Oral   Ensure Compact Cans or Servings Per Day:  1 can/(4oz/118 ml -> 9 gm Protein, 220Kcal)   Frequency:  Two Times a day (05-18-24 @ 15:52) [Active]    Pt 59 yo female, former smoker, with PMHx of R eye glaucoma, Fibromyalgia, Anxiety, GERD, RCC s/p R total nephrectomy/hysterectomy - per chart review. Of note, Pt mets CA (CUP).    At time of visit, Pt awake, appears weak; nasal cannula high flow in use. Limited information obtained from Pt. Pt answered some of RD's question by writing or nodding her head. Pt appetite/PO intake remains poor reported. But Pt likes Ensure Compact (PO supplement); Pt likes yogurt, pudding reported as well. No report of chewing or swallowing difficulty with current consistency diet rx; no report of vomiting or diarrhea @ this time. +Last BM (5/27) per flow sheet -> bowel regimen, if warranted, per MD discretion. Rec to add appetite stimulant to boost Pt's PO intake/appetite. Of note, Pt DNR/DNI. Case discussed with nurse. RD remains available, nurse made aware.     Pt's height: 60" (4/4)      IBW: 100#+/-10%    Pt's weights: 33.3 kg (5/15), 45.4 kg (4/4)     Pertinent Medications: Lovenox, Multivitamin, Maalox,  Miralax (PRN), Reglan, Zofran (PRN), Tums, Biotene dry mouth oral rinse (PRN), First mouth wash    Pertinent Labs: (5/23) WBC 15.89 H, H/H 8.3/27.2 L, BUN 32 H, Glu 110 H, Albumin 2.5 L,  H;  (3/12) HbA1c 6.6% H, HDL 50 L  Skin: per flow sheet -> +pressure injury to sacrum - stage II; +chest tube in place       Estimated Needs: [x] no change since previous assessment  Previous Nutrition Diagnosis: [x] Malnutrition, severe    Nutrition Diagnosis is [x] ongoing      Nutrition Interventions/Recommendations:   1. Increase PO supplement: Ensure Compact (4oz/118 ml -> 9 gm Protein, 220Kcal) -> 6x daily (2 cans with each meal);  2. Encourage & assist Pt with meals; Monitor PO diet tolerance; Honor food preferences;    3. Add appetite stimulant to boost Pt's PO intake/appetite;  4. Continue oral mouth rinse, as ordered;  5. Monitor labs, weekly weights, hydration status;

## 2024-05-29 NOTE — PROGRESS NOTE ADULT - ASSESSMENT
57 yo woman, former smoker, with h/o R eye glaucoma, Fibromyalgia, Anxiety, GERD, and RCC s/p R total nephrectomy/hysterectomy; she was initially admitted to [facility] on 3/11 w/ R breast swelling c/f mastitis- imaging brooks noted supraclavicular/mediastinal mass lesion which encased the SVC and multiple other veins resulting in obliteration of the SVC and thrombosis of the R IJ, and a pancreatic neck cystic lesion.  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  Her disease/hospital course has also been c/b pericardial effusion s/p pericardial window w/ neg cytology, R pleural effusion, also negative cytology) s/p Pleurx, Afib w/ RVR and acute hypoxic resp failure 2/2 SVC syndrome- not dennis to IR-guided stenting; pt was ultimately started on Dex and transferred to [facility] on 4/4 for urgent palliative RT which she completed on 4/18. Her hospital course has also been c/b pericardial effusion with tamponade s/p window and non malignant R pleural effusion s/p pleurex, acute b/l UE and RLE dvts, anxiety, and more recently, recurrent hypoxia. Pt received her first cycle of palliative FOLFOX 5/16-18, c/b rapid clinical decline including acute-on-chronic hypoxic resp failure from which she is now dependent on HF NC. After GOC discussion on 5/24, pt was transitioned to full comfort measures, pending inpt hospice placement.     ACTIVE PROBLEMS  Metastatic CUP (carcinoma of unknown primary)  Comfort measures only  Acute hypoxic + hypercapneic respiratory failure  Cancer-related pain  Anxiety/emotional lability  Extensive b/l UE DVTs and RLE DVT  Hypercoag state  pAfib  SVC syndrome  Dysphonia with R VC hypomobility  Sev prot ghazala malnutrition    - Metastatic CUP (carcinoma of unknown primary)  - Goals of care discussion, counseling  - Encounter for antineoplastic chemotherapy  Pt is no longer a candidate for systemic cancer treatment given her progressive cancer-related comorbidities, including HF NC-dependent hypoxic resp failure, severe malnutrition and poor functional status (ECOG PS 4)  Inpt hospice is appropriate  Pt's HCP Saeid consented to full comfort measures and inpt hospice placement   * Pt accepted at [facility], which can accommodate her HF NC O2 requirment; awaiting response from Saeid re: whether or not he will consent to tranfer pt there  Pt's long term prognosis remains poor: days to short weeks; she is dnr/dni    - Acute hypercapneic + hypoxic resp failure  - R pleural effusion s/p pleurex (non malignant)  Suspect underlying disease progression (pt became too unstable for repeat imaging)  Pt is dependent on HF NC (near-max settings)  Continuing supportive resp care as prescribed  Continuing R pleurex drainage- up to 500cc q48h/prn    - Cancer related pain  Currently well controlled  Continuing Oxy ER 30mg q12 q8  Continuing Morphine SL 20mg q3/prn for breakthrough pain  Continuing Dilaudid IV prn for sev breakthrough pain  Continuing bowel regimen to prevent OIC (including Movantic)    - Anxiety/emotional lability  Continuing Diazepam 5mg BID/prn and nightly  Appreciate BH consult: pt lacks medical decision making capacity; medical decision making will be deferred to pt's surrogate decision maker- Saeid Peña ([phone])    - Extensive b/l UE DVTs  - Acute RLE DVT a/w RLE ecchymosis  - Hypercoag state  Continuing therapeutic lovenox, benefits > risks    - pAfib  Pt currently SR, HR controlled  Likely precipitated by malignancy, pericardial effusion, SVC syndrome  Continuing Metoprolol 25mg q12 with holding parameters  Continuing Amio 200mg daily (monitoring for toxicities)  Telemetry dced today as pt has transitioned to comfort care    - SVC syndrome: completed 10fxs of palliative RT on 4/18    - Dysphonia with R VC hypomobility  S/p Flex ellis on 5/4 showing R VC hypomobility  Continuing soft/bite-sized diet with Ensure BID  Completed 7d course of Fluconazole on 5/12 for oral thrush    - Severe prot-ghazala malnutrition: appreciate RD recs; continuing Ensure BID 59 yo woman, former smoker, with h/o R eye glaucoma, Fibromyalgia, Anxiety, GERD, and RCC s/p R total nephrectomy/hysterectomy; she was initially admitted to [facility] on 3/11 w/ R breast swelling c/f mastitis- imaging [facility] noted supraclavicular/mediastinal mass lesion which encased the SVC and multiple other veins resulting in obliteration of the SVC and thrombosis of the R IJ, and a pancreatic neck cystic lesion.  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  Her disease/hospital course has also been c/b pericardial effusion s/p pericardial window w/ neg cytology, R pleural effusion, also negative cytology) s/p Pleurx, Afib w/ RVR and acute hypoxic resp failure 2/2 SVC syndrome- not dennis to IR-guided stenting; pt was ultimately started on Dex and transferred to [facility] on 4/4 for urgent palliative RT which she completed on 4/18. Her hospital course has also been c/b pericardial effusion with tamponade s/p window and non malignant R pleural effusion s/p pleurex, acute b/l UE and RLE dvts, anxiety, and more recently, recurrent hypoxia. Pt received her first cycle of palliative FOLFOX 5/16-18, c/b rapid clinical decline including acute-on-chronic hypoxic resp failure from which she is now dependent on HF NC. After GOC discussion on 5/24, pt was transitioned to full comfort measures, pending inpt hospice placement.     ACTIVE PROBLEMS  Metastatic CUP (carcinoma of unknown primary)  Comfort measures only  Acute hypoxic + hypercapneic respiratory failure  Cancer-related pain  Anxiety/emotional lability  Extensive b/l UE DVTs and RLE DVT  Hypercoag state  pAfib  SVC syndrome  Dysphonia with R VC hypomobility  Sev prot ghazala malnutrition    - Metastatic CUP (carcinoma of unknown primary)  - Comfort measures only  Pt is no longer a candidate for systemic cancer treatment given her progressive cancer-related comorbidities, including HF NC-dependent hypoxic resp failure, severe malnutrition and poor functional status (ECOG PS 4)  Inpt hospice is appropriate  Pt's HCP Saeid consented to full comfort measures and inpt hospice placement   * Pt accepted at [facility], which can accommodate her HF NC O2 requirment; awaiting response from Saeid re: whether or not he will consent to tranfer pt there  Pt's long term prognosis remains poor: days to short weeks; she is dnr/dni    - Acute hypercapneic + hypoxic resp failure  - R pleural effusion s/p pleurex (non malignant)  Suspect underlying disease progression (pt became too unstable for repeat imaging)  Pt is dependent on HF NC (near-max settings)  Continuing supportive resp care as prescribed  Continuing R pleurex drainage- up to 500cc q48h/prn    - Cancer related pain  Currently well controlled  Continuing Oxy ER 30mg q12 q8  Continuing Morphine SL 20mg q3/prn for breakthrough pain  Continuing Dilaudid IV prn for sev breakthrough pain  Continuing bowel regimen to prevent OIC (including Movantic)    - Anxiety/emotional lability  Continuing Diazepam 5mg BID/prn and nightly  Appreciate BH consult: pt lacks medical decision making capacity; medical decision making will be deferred to pt's surrogate decision maker- Saeid Peña ([phone])    - Extensive b/l UE DVTs  - Acute RLE DVT a/w RLE ecchymosis  - Hypercoag state  Continuing therapeutic lovenox, benefits > risks    - pAfib  Pt currently SR, HR controlled  Likely precipitated by malignancy, pericardial effusion, SVC syndrome  Continuing Metoprolol 25mg q12 with holding parameters  Continuing Amio 200mg daily (monitoring for toxicities)  Telemetry dced today as pt has transitioned to comfort care    - SVC syndrome: completed 10fxs of palliative RT on 4/18    - Dysphonia with R VC hypomobility  S/p Flex ellis on 5/4 showing R VC hypomobility  Continuing soft/bite-sized diet with Ensure BID  Completed 7d course of Fluconazole on 5/12 for oral thrush    - Severe prot-ghazala malnutrition: appreciate RD recs; continuing Ensure BID

## 2024-05-29 NOTE — CHART NOTE - NSCHARTNOTEFT_GEN_A_CORE
AS OF TODAY, THERE'S NO CONTRAINDICATION TO PERIPHERAL IV LINE PLACEMENT IN ANY OF THIS PT'S LIMBS, DESPITE KNOWN DVTS, AS SHE IS TRANSITIONED TO COMFORT CARE AND MAY ULTIMATELY REQUIRE IV PAIN MEDICATION TO KEEP HER COMFORTABLE AT THE END OF LIFE. THIS WOULD TAKE PRIORITY OVER ANY RISK ASSOCIATED WITH PLACING AN IV LINE IN AN EXTREMITY WITH A DVT. As of today, there's no contraindication to peripheral IV line placement in any of this pt's limbs, despite the known DVTs, as she is transitioned to full comfort care and may ultimately require IV pain medication to keep her comfortable at the end of life. This would take priority over any risk associated with placing an IV line in an extremity with a DVT.

## 2024-05-30 ENCOUNTER — APPOINTMENT (OUTPATIENT)
Dept: HEMATOLOGY ONCOLOGY | Facility: CLINIC | Age: 59
End: 2024-05-30

## 2024-05-30 RX ORDER — ACETAMINOPHEN 500 MG
500 TABLET ORAL ONCE
Refills: 0 | Status: DISCONTINUED | OUTPATIENT
Start: 2024-05-30 | End: 2024-05-31

## 2024-05-30 RX ADMIN — ENOXAPARIN SODIUM 30 MILLIGRAM(S): 100 INJECTION SUBCUTANEOUS at 18:19

## 2024-05-30 RX ADMIN — SODIUM CHLORIDE 4 MILLILITER(S): 9 INJECTION INTRAMUSCULAR; INTRAVENOUS; SUBCUTANEOUS at 10:26

## 2024-05-30 RX ADMIN — Medication 3 MILLILITER(S): at 16:14

## 2024-05-30 RX ADMIN — Medication 3 MILLILITER(S): at 02:02

## 2024-05-30 RX ADMIN — Medication 3 MILLILITER(S): at 10:28

## 2024-05-30 RX ADMIN — SODIUM CHLORIDE 4 MILLILITER(S): 9 INJECTION INTRAMUSCULAR; INTRAVENOUS; SUBCUTANEOUS at 20:20

## 2024-05-30 RX ADMIN — OXYCODONE HYDROCHLORIDE 30 MILLIGRAM(S): 5 TABLET ORAL at 06:26

## 2024-05-30 RX ADMIN — Medication 3 MILLILITER(S): at 20:20

## 2024-05-30 NOTE — PROGRESS NOTE ADULT - PROVIDER SPECIALTY LIST ADULT
Anesthesia
Heme/Onc
Heme/Onc
Hospitalist
Pulmonology
Rad Onc
Gastroenterology
Heme/Onc
Heme/Onc
Hospitalist
Pulmonology
Pulmonology
Anesthesia
ENT
Heme/Onc
Hospitalist
ENT
Heme/Onc
Hospitalist
Heme/Onc
Hospitalist
Palliative Care
Pulmonology
Surgery
Heme/Onc
Palliative Care
Hospitalist
Hospitalist
Palliative Care
Hospitalist
Palliative Care
Palliative Care

## 2024-05-30 NOTE — PROVIDER CONTACT NOTE (OTHER) - DATE AND TIME:
18-May-2024 10:04
04-May-2024 12:53
05-May-2024 23:30
14-May-2024 16:41
18-Apr-2024 08:30
18-May-2024 08:19
18-May-2024 14:08
30-May-2024 14:01
05-May-2024 12:32
05-May-2024 20:04
14-May-2024 08:25
15-Apr-2024 11:49
17-May-2024 20:45
30-May-2024 07:00
14-May-2024 09:29
18-Apr-2024 19:20

## 2024-05-30 NOTE — ADVANCED PRACTICE NURSE CONSULT - ASSESSMENT
General: A&Ox4, anxious, cachectic, observed moving between commode and bed repeatedly, continent of urine and stool. Upon assessment, assistance with patient to commode for medium hard stool. Skin warm, dry with increased moisture in intertriginous folds, scattered areas of hyperpigmentation and hypopigmentation, scattered areas of ecchymosis on bilateral upper extremities. Blanchable erythema on bilateral heels.     While observing patient from commode to bed, patient noted to desat to 85%, saturation improved after a few seconds of sitting down and deep breathing through nose and out mouth with nasal cannula in place.    Sacrum to BL buttocks: Stage 2 originally measuring [id number] now measuring [id number] with 98% thin yellow fibrin adhered to wound bed with 2% deep red dermis. Periwound with blanchable erythema circumferentially not extending >2 cm. No drainage, no odor. No induration, no crepitus, no fluctuance, no edema, no temperature changes, no overt signs of infection noted. Goals of care: antimicrobial support, prevent from pressure, friction, shearing, excess moisture. 
General: A&Ox4, anxious, able to turn with 1 assist in bed, continent of urine and stool. Skin warm, dry with increased moisture in intertriginous folds, adequate skin turgor, bilateral feet with dry flaking skin. R chest pleurex drain with dressing intact. R groin femoral access dressing intact.     Sacrum: stage II pressure injury c/b friction, over bony prominence, 6lam0vty2.1cm skin slippage exposing pink moist dermis, small serous drainage, no odor. Periwound with no erythema, no increased warmth, no edema, no induration, no fluctuance no signs or symptoms of overt skin infection.  Goals of care: offload pressure, protect from friction and shear, monitor for tissue type changes. 
General: Originally A&Ox4 and able to verbalize, now A&Ox2, somnolent, on highflow, unable to verbalize but will communicate via writing or arm movements, anxious, cachectic with temporal wasting, worsened since previous assessment, previously patient was able to ambulate, patient is no longer able to ambulate and is now bedbound and incontinent of urine and stool, where patient was previously continent. Upon assessment, assistance with patient to commode for medium hard stool. Skin warm, dry with increased moisture in intertriginous folds, scattered areas of hyperpigmentation and hypopigmentation, scattered areas of ecchymosis on bilateral upper extremities. Blanchable erythema on bilateral heels.     While observing patient from commode to bed, patient noted to desat to 85%, saturation improved after a few seconds of sitting down and deep breathing through nose and out mouth with nasal cannula in place.    Sacrum to BL buttocks: Originally stage 2 measuring 4.4tnp3rby2mt with 98% thin yellow fibrin adhered to wound bed with 2% deep red dermis, now presenting as stage 4 measuring 4.5cmx4.5cmx0.6cm with 10% eschar, 5% palpable bone, 85% yellow moist slough. Bogginess noted at 6 o'clock, unable to express any fluid or drainage. Scant serosanguinous drainage with mild odor noted while cleansing. Periwound with hypo and hyperpigmentation circumferentially. No induration, no erythema, no crepitus, no edema, no temperature changes, no overt signs of infection noted. Goals of care: Comfort care, odor control, prevent from pressure, friction, shearing, excess moisture.

## 2024-05-30 NOTE — PROVIDER CONTACT NOTE (OTHER) - RECOMMENDATIONS
ACP Yomi Boston made aware that ACP has to draw AM labs. As per policy, RN does not draw labs or put IV access in the feet.
PAT Rojas made aware
PAT Rojas made aware
PAT Friend made aware
PAT lockwood made aware
seen by surgery, stated to continue treatment, reapply new dressing and monitor.
ACP Jaya Bui made aware
PAT Boston made aware that no consent is in the chart.
PAT Boston made aware.
PAT Segura made aware
ACP Sonali Boston made aware. As per ACP, patient said it is okay to extend IV as long as IV has no swelling, erythema and flushes well.
Follow up with provider order.
follow as verbally ordered
Follow up with provider order.

## 2024-05-30 NOTE — PROVIDER CONTACT NOTE (OTHER) - SITUATION
Confirm whether it is okay to extend pt's LLE PIV
No AM labs drawn. Provider has to come to bedside to draw AM labs as patient is B/L upper extremity precautions.
newly place triple lumen catheter for chemo
Patient refusing all meds
patient R pleurx site leaking - questioned to change dressing
pt is NPO as verbally ordered but computer order states NPO except meds
pt tachy 108bpm and /69 before metoprolol given
Patient refusing AM meds except for oxycodone
patient has saturated through dressing for a second time
can we give standing dose diazepam even though PRN diazepam was given
No consent in chart for blood transfusion
hold blood transfusion, pt NPO, hold PO meds, and give lasix via central line as ordered by PAT Segura
cannot give mag sulfate. pt has no IV access
we cannot draw blood from foot
Pt has large hematoma on right lower extremity
pleurx only drained 150 mL

## 2024-05-30 NOTE — PROGRESS NOTE ADULT - ASSESSMENT
57 yo woman, former smoker, with h/o R eye glaucoma, Fibromyalgia, Anxiety, GERD, and RCC s/p R total nephrectomy/hysterectomy; she was initially admitted to [facility] on 3/11 w/ R breast swelling c/f mastitis- imaging [facility] noted supraclavicular/mediastinal mass lesion which encased the SVC and multiple other veins resulting in obliteration of the SVC and thrombosis of the R IJ, and a pancreatic neck cystic lesion.  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  Her disease/hospital course has also been c/b pericardial effusion s/p pericardial window w/ neg cytology, R pleural effusion, also negative cytology) s/p Pleurx, Afib w/ RVR and acute hypoxic resp failure 2/2 SVC syndrome- not dennis to IR-guided stenting; pt was ultimately started on Dex and transferred to [facility] on 4/4 for urgent palliative RT which she completed on 4/18. Her hospital course has also been c/b pericardial effusion with tamponade s/p window and non malignant R pleural effusion s/p pleurex, acute b/l UE and RLE dvts, anxiety, and more recently, recurrent hypoxia. Pt received her first cycle of palliative FOLFOX 5/16-18, c/b rapid clinical decline including acute-on-chronic hypoxic resp failure from which she is now dependent on HF NC. After GOC discussion on 5/24, pt was transitioned to full comfort measures, pending inpt hospice placement.     ACTIVE PROBLEMS  Metastatic CUP (carcinoma of unknown primary)  Comfort measures only  Acute hypoxic + hypercapneic respiratory failure  Cancer-related pain  Anxiety/emotional lability  Extensive b/l UE DVTs and RLE DVT  Hypercoag state  pAfib  SVC syndrome  Dysphonia with R VC hypomobility  Sev prot ghazala malnutrition    - Metastatic CUP (carcinoma of unknown primary)  - Comfort measures only  Pt is no longer a candidate for systemic cancer treatment given her progressive cancer-related comorbidities, including HF NC-dependent hypoxic resp failure, severe malnutrition and poor functional status (ECOG PS 4)  Inpt hospice is appropriate  Pt's HCP Saeid consented to full comfort measures and inpt hospice placement   * Pt accepted at [facility], which can accommodate her HF NC O2 requirment; however, pt's HCP Saeid is not amenable to transfer to [facility] as it is too far for him to get to and he is not confident that she will get good care there based on a report from a family member   * Pt's case denied by [facility]   * SW referrals to other inpt hospice facilities (ie [facility]) are in progress  Pt's long term prognosis remains poor: days to short weeks; she is dnr/dni    - Acute hypercapneic + hypoxic resp failure  - R pleural effusion s/p pleurex (non malignant)  Suspect underlying disease progression (pt became too unstable for repeat imaging)  Pt is dependent on HF NC (near-max settings)  Continuing supportive resp care as prescribed  Continuing R pleurex drainage- up to 500cc q48h/prn    - Cancer related pain  Currently well controlled  Continuing Oxy ER 30mg q12 q8  Continuing Morphine SL 20mg q3/prn for breakthrough pain  Continuing Dilaudid IV prn for sev breakthrough pain  Continuing bowel regimen to prevent OIC (including Movantic)    - Anxiety/emotional lability  Continuing Diazepam 5mg BID/prn and nightly  Appreciate  consult: pt lacks medical decision making capacity; medical decision making will be deferred to pt's surrogate decision maker- Saeid Mariana ([phone])    - Extensive b/l UE DVTs  - Acute RLE DVT a/w RLE ecchymosis  - Hypercoag state  Continuing therapeutic lovenox, benefits > risks    - pAfib  Pt currently SR, HR controlled  Likely precipitated by malignancy, pericardial effusion, SVC syndrome  Continuing Metoprolol 25mg q12 with holding parameters  Continuing Amio 200mg daily (monitoring for toxicities)  Telemetry dced today as pt has transitioned to comfort care    - SVC syndrome: completed 10fxs of palliative RT on 4/18    - Dysphonia with R VC hypomobility  S/p Flex ellis on 5/4 showing R VC hypomobility  Continuing soft/bite-sized diet with Ensure BID  Completed 7d course of Fluconazole on 5/12 for oral thrush    - Severe prot-ghazala malnutrition: appreciate RD recs; continuing Ensure BID

## 2024-05-30 NOTE — PROGRESS NOTE ADULT - TIME BILLING
20 mins-Vitals, meds, new results/radiology, interdisciplinary charting reviewed   20 mins-Patient seen and examined and plan discussed, all questions were answered to the best of my ability  20 mins-Charting/documentation and orders.
Reviewing medical chart and results, symptom assessment and management, counseling patient/decision makers/caregivers, coordination of care, documentation.
20 mins-Vitals, meds, new results/radiology, interdisciplinary charting reviewed   20 mins-Patient seen and examined and plan discussed, all questions were answered to the best of my ability  20 mins-Charting/documentation and orders.
Reviewing medical chart and results, symptom assessment and management, counseling patient/decision makers/caregivers, coordination of care, documentation.

## 2024-05-30 NOTE — PROGRESS NOTE ADULT - NS ATTEND OPT1A GEN_ALL_CORE
History/Exam/Medical decision making

## 2024-05-30 NOTE — ADVANCED PRACTICE NURSE CONSULT - RECOMMEDATIONS
Please obtain height and weight for BMI calculation.    Topical recommendations:   ---Sacrum: Cleanse with NS, pat dry. Apply Liquid barrier film to periwound skin (allow to dry). Cover with silicone foam with border. Change daily and PRN if soiled.   ---Bilateral feet: apply sween24 moisturizer daily, avoid in between the toes.   ---Continue low air loss bed therapy, continue heel elevation, continue to turn & reposition per protocol, soft pillow between bony prominences, continue moisture management with barrier creams & single breathable pad, continue measures to decrease friction/shear/pressure. Continue with nutritional support as per dietary/orders.     Plan discussed with patient and primary RN Pawel Hopson.   Please contact Wound/Ostomy Care Service Line if we can be of further assistance (ext [phone]). Please reconsult if changes to tissue type noted. 
Recommending dakins 1/4 strength daily for sacrum    Topical Recommendations    Sacrum: Cleanse with NS, pat dry. Apply Liquid barrier film to periwound skin (allow to dry). Apply Dakins 1/4 strength moistened kerlix to base of wound, secure with abdominal pad and tegaderm or paper tape. Change daily.    Continue low air loss bed therapy, continue heel elevation, continue to turn & reposition per protocol, soft pillow between bony prominences, continue moisture management with barrier creams & single breathable pad, continue measures to decrease friction/shear/pressure. Continue with nutritional support as per dietary/orders.    Plan of care discussed with primary skin scholar/functional THIEN Ferreira and primary ACP Tatyana Ireland    Continue low air loss bed therapy, continue heel elevation, continue to turn & reposition per protocol, soft pillow between bony prominences, continue moisture management with barrier creams & single breathable pad, continue measures to decrease friction/shear/pressure. Continue with nutritional support as per dietary/orders.    Plan of care discussed with primary skin scholar Hanna and PAT Ireland.    Please contact Wound Care Service Line if we can be of further assistance (ext [phone]).   
Topical Recommendations    Sacrum to BL buttocks: Cleanse with NS, pat dry. Apply Liquid barrier film to periwound skin (allow to dry). Apply Medihoney gel to base of wound, cover with silicone foam with border. Change daily.     Continue low air loss bed therapy, continue heel elevation, continue to turn & reposition per protocol, soft pillow between bony prominences, continue moisture management with barrier creams & single breathable pad, continue measures to decrease friction/shear/pressure. Continue with nutritional support as per dietary/orders.    Plan of care discussed with primary skin scholar Hanna.    Please contact Wound Care Service Line if we can be of further assistance (ext [phone]).

## 2024-05-30 NOTE — PROGRESS NOTE ADULT - NS ATTEND OPT1 GEN_ALL_CORE
I independently performed the documented:
I attest my time as attending is greater than 50% of the total combined time spent on qualifying patient care activities by the PA/NP and attending.
I independently performed the documented:

## 2024-05-30 NOTE — PROVIDER CONTACT NOTE (OTHER) - NAME OF MD/NP/PA/DO NOTIFIED:
PAT Rojas
lesli parkinson
PAT Boston
PAT Friend
PAT Rojas
Carmela Lake
PAT Rojas
PAT Segura
PAT Segura
ACP Sonali Boston via TEAMS
[facility]
Raine Ireland
PAT Boston
PAT Boston
PAT Bui
Raine Ireland

## 2024-05-30 NOTE — ADVANCED PRACTICE NURSE CONSULT - REASON FOR CONSULT
Patient known to [facility] service line last seen on 5/1/24, seen for re-evaluation in the setting of deterioration.     Acute skin failure/SCALEs: Since last assessment, patient has become comfort measures only, has lost approximately 30lbs since admission, metastatic cancer with severe cancer related pains with no IV access 2/2 SVC syndrome and patient refusing oral pain medication at times, severe anxiety, and acute hypoxic & hypercapneic respiratory failure which has since worsened; patient now on max highflow. Patient is also in hypercoagulated state, and has severe protein calorie malnutrition. Patient on previous assessment was ambulatory, patient is now no longer ambulatory and bedbound, patient was also continent last assessment and patient is no longer continent. 
Patient known to [facility] service line last seen on 4/4/24, seen during rounds for re-evaluation of wound.     Acute skin failure markers: Metastatic cancer, extensive BL UE DVTs in setting of malginancy, acute hypoxic respiratory failure 2/2 SVC syndrome, cachectic with temporal wasting - (admitting weight: 45.4kG, last weight 45.3kG 5/1/24), cancer related pain. Patient is ambulating on own, as per staff not limiting self to bed or chair, often observed pacing in room 2/2 anxiety.    Average Vital Signs  Temperature (F): 97-99.1F  HR (BPM):   Systolic BP (mmHg): 101-162  Diastolic BP (mmHg): 51-95  SpO2%: %  Oxygen Flow (L/min): 2-6  
Patient seen on skin care rounds after wound care referral received for assessment of skin impairment and recommendations of topical management of sacral wound. Patient is a 58F w/ F hx tobacco use, RCC right nephrectomy, right sided glaucoma, fibromyalgia, anxiety, GERD, was at [facility] w/ concern for breast inflammation, lung nodules/mediastinal mass w/ biopsy concern for metastatic GI cancer. Chart reviewed: WBC 14.56, H/H 9.5/29.6, INR 0.97, Platelets 642, hA1c 6.6, sheila 19.

## 2024-05-30 NOTE — PROGRESS NOTE ADULT - NS ATTEND AMEND GEN_ALL_CORE FT
n/a
Pt with known bx proven metastatic adenocarcinoma (supraclavicular node bx) with unclear primary but suggestion of upper GI vs pancreaticobiliary origin. MRI shows pancreatic cyst without high risk features but limited exam due to motion. Plan originally for EUS but patient wishes to defer EUS but question of capacity. Discussion had with HCP as above. If pt/family agreeable, can plan for later this week.     Total time spent to complete patient's bedside assessment, physical examination, review medical chart including labs & imaging, discuss medical plan of care with housestaff was more than 25 minutes
n/a

## 2024-05-30 NOTE — PROGRESS NOTE ADULT - SUBJECTIVE AND OBJECTIVE BOX
[facility] T[facility]OR ONCOLOGY HOSPITALIST PROGRESS NOTE    S: No acute events overnight.    CURRENT MEDICATIONS  MEDICATIONS  (STANDING):  albuterol/ipratropium for Nebulization 3 milliLiter(s) Nebulizer every 6 hours  aMIOdarone    Tablet 200 milliGRAM(s) Oral daily  Biotene Dry Mouth Oral Rinse 15 milliLiter(s) Swish and Spit every 6 hours  budesonide 160 MICROgram(s)/formoterol 4.5 MICROgram(s) Inhaler 2 Puff(s) Inhalation two times a day  chlorhexidine 2% Cloths 1 Application(s) Topical daily  dexAMETHasone     Tablet 4 milliGRAM(s) Oral every 12 hours  enoxaparin Injectable 30 milliGRAM(s) SubCutaneous every 12 hours  influenza   Vaccine 0.5 milliLiter(s) IntraMuscular once  metoclopramide Injectable 10 milliGRAM(s) IV Push every 8 hours  metoprolol tartrate 25 milliGRAM(s) Oral every 12 hours  multivitamin 1 Tablet(s) Oral daily  naloxegol 25 milliGRAM(s) Oral daily  nicotine -  14 mG/24Hr(s) Patch 1 Patch Transdermal every 24 hours  oxyCODONE  ER Tablet 30 milliGRAM(s) Oral <User Schedule>  pantoprazole    Tablet 40 milliGRAM(s) Oral every 12 hours  petrolatum white Ointment 1 Application(s) Topical two times a day  polyethylene glycol 3350 17 Gram(s) Oral every 12 hours  sodium chloride 0.65% Nasal 1 Spray(s) Both Nostrils two times a day  sodium chloride 3%  Inhalation 4 milliLiter(s) Inhalation every 12 hours  MEDICATIONS  (PRN):  aluminum hydroxide/magnesium hydroxide/simethicone Suspension 30 milliLiter(s) Oral every 6 hours PRN Dyspepsia  benzocaine/menthol Lozenge 1 Lozenge Oral two times a day PRN Sore Throat  Biotene Dry Mouth Oral Rinse 15 milliLiter(s) Swish and Spit two times a day PRN dry mouth  calcium carbonate    500 mG (Tums) Chewable 1 Tablet(s) Chew daily PRN Heartburn  diazepam    Tablet 5 milliGRAM(s) Oral two times a day PRN anxiety  FIRST- Mouthwash  BLM 5 milliLiter(s) Swish and Spit four times a day PRN Mouth Care  morphine Concentrate 20 milliGRAM(s) SubLingual every 3 hours PRN Pain or dyspnea  ondansetron    Tablet 4 milliGRAM(s) Oral every 8 hours PRN Nausea and/or Vomiting  sodium chloride 0.9% lock flush 10 milliLiter(s) IV Push every 1 hour PRN Pre/post blood products, medications, blood draw, and to maintain line patency      PHYSICAL EXAM  Vital Signs Last 24 Hrs  T(C): 36.7 (30 May 2024 11:51), Max: 36.7 (30 May 2024 11:51)  T(F): 98.1 (30 May 2024 11:51), Max: 98.1 (30 May 2024 11:51)  HR: 82 (30 May 2024 16:14) (81 - 95)  BP: 107/42 (30 May 2024 11:51) (107/42 - 111/65)  RR: 20 (30 May 2024 15:07) (20 - 24)  SpO2: 95% (30 May 2024 16:14) (95% - 96%)  Parameters below as of 30 May 2024 16:14  Patient On (Oxygen Delivery Method): nasal cannula, high flow  Cachectic, ill-appearing woman, alert/interactive, communicates via writing due to chronic dysphonia, nad  Anicteric sclera  HF NC in tact; breaths non-labored  Pt moves all extremities spontaneously    DAILY LABS DISCONTINUED      PATHOLOGY  3/26 pleural fluid cytology: Neg for malignant cells  3/26 Pericardium excision: Neg for malignancy  3/20 Pericardial fl: Neg for malignant cells  S/p R supraclavicular LN bx by IR 3/14 c/w GI origin (pMMR, PD-L1 TPS 1%, HER2 pending)      MICROBIOLOGY  Abx  $Cefepime 5/1 - 5/10  $Fluconazole 5/6-5/12  $Bactrim ppx 4/8-5/12    Recent Cx Data  4/5 MRSA swab: Not detected  3/16 Quant plus TB: Negative      PERTINENT RADIOLOGY  5/18 CXR: Slight decrease of right pleural effusion. No pneumothorax.  5/15 CXR: Redemonstrated small right-sided pleural effusion with underlying   atelectasis. Right-sided Pleurx catheter in stable position. No newfocal   consolidations.  5/13 CXR: Right-sided chest tube with effusion/atelectasis suspected.  5/10 CXR: Right effusion with suspected lower lobe atelectasis.  5/6 CXR: Right-sided Pleurx catheter with decreased effusion.  5/4 CXR: There is a catheter at the right base.  HEART: normal in size.  LUNGS: There is opacity at the right base compatible with   effusion/infiltrate.. Prominent interstitial markings, likely secondary   to chronic underlying lung disease..  BONES: degenerative changes  5/4 R elbow XR: Linear lucency in the coronoid process seen only on the lateral view   concerning for nondisplaced fracture. Consider cross-sectional imaging of   the elbow with CT or MRI for further evaluation.  5/3 CT c/a/p with IV contrast: Persistent right middle lobe consolidation and decreased right lower lobe consolidation. New patchy nodular opacities in the right upper lobe and increased patchy and groundglass opacities in the left lung. Small right pleural effusion, unchanged. Trace left pleural effusion, decreased. No evidence of metastatic disease in the abdomen or pelvis.  5/1 CXR: small Rt plueral effusion.  4/19 MRCP: 16mm Pancreatic neck hyperintense lesion, likely side branch IPMN.  Remainder of pancreas is suboptimally eval 2/2 artifact, recc f/u MRI in 3-6 months.  Partially distended stomach, duodenal diverticulum.  Colon also partially distended 2/2 stook burden.  Trace pleural effusion.   4/18 US Duplex B/L LE: R acute DVT aboive knee, and acute nonocclusive DVT in R common femoral vein   4/17 CXR: Clear lungs w/ pleurx cath in place   4/10 VA dopplers B/L UE: Extensive acute, occlusive DVT RIJ, innominate and subclavian veins.  Acute non-occlusive DVT w/in R axillary vein.  Extensive acute, occlusive DVT noted in L subclavian. axillary, and proximal brachial veins.  Acute non-occlusive DVT LIJ.  Superficial vein thromboses are noted in the B/L cephalic veins.  4/9 Echocardiogram: Normal LV systolic function.  Normal mitral valve w/ normal leaflet excursion.  No pericardial effusion seen  4/4 CXR: Clear lungs w/ R pleurx catheter in place.  4/4 CXR AM: Small R pleural effusion w/ pleurx cath improved  [facility])  3/29 CXR: Decrease in R pleural effusion.   3/28 B/L UE Dupplers: Acute DVT involving R IJ, innominate vein, subclavian, brachial, and L IJ.  Superficial venous thrombosis involving B/L cephalic veins.  + Edema of superficial soft tissue of UE R>L  3/27 CT neck: Bulky and conglomerate LAD invading RIJ w/ thrombus extending up to hyoid level.  Thrombus is likely combination tumor and bland.  New LIJ nonocclusive thrombus  3/27 CT Chest: New consolidations throughout the R lung worse RLL c/f aspiration PNA.  + R pleurx cath w/ decrease in R pleural effusion.  Extensive DVT w/in thoracic and lower neck, upper SVC remains obliterated.  Large R supraclavicular mass infiltrating into the mediastinum.    3/27 CXR: Progression of R consolidation/effusion.  3/21 TTE: LV grossly normal.  Thickened pericardium.  no pericardial effusion  3/18 TTE: Mod pericardial effusion w/ e/o hemodynamic compromise w/ diasolic collapse of RA that exceeds 1/3 of cardiac cycle >30% resp variation across MV E. wave and early diastolic inversion of RV  3/14 CT Chest: Conglomerate soft tissue in superior mediastinum and R supraclavicular region 2/2 LAD w/ internal hypodensity c/f necrosis.  Mass encases SVC and multiple great veins resulting in oblieration of the SVC and thrombosis of the RIJ.  Multiple R sided collateral vessels and R soft tissue edema.  B/L pulm nodules.  3/14 CT neck: Extensive cellulitis/myositis along R anterior lateral neck and R upper chest wall w/ inflammatory fat induration the deep soft tissue of neck.  Large supraclavicular/mediastinal mass lesion, presumably conglomerate of LN w/ mass effect and complete occlusion of RIJ w/ associated inflammation.  Complete occlusion of R subclavian vein and nearly occlusive thrombosis in the proximal L brachiocephalic vein.  3/13 RUQ Abd US: Cholelithiasis w/o e/o cholecystitis.  Dilated CBD w/o e/o intraductal stone or other obstruction. 1.4cm pancreatic cyst w/ mild duct dilatation.  3/12 CT abd/pelv: S/p R nephrectomy. no LAD.  New 1.6cm  pancreatic neck cystic lesion w/o main pancreatic ductal dilatation.  3/10 R breast US: No e/o breast abscess

## 2024-05-30 NOTE — PROGRESS NOTE ADULT - NUTRITIONAL ASSESSMENT
This patient has been assessed with a concern for Malnutrition and has been determined to have a diagnosis/diagnoses of Severe protein-calorie malnutrition.    This patient is being managed with:   Diet Clear Liquid-  Entered: Apr 24 2024  4:11PM  
This patient has been assessed with a concern for Malnutrition and has been determined to have a diagnosis/diagnoses of Severe protein-calorie malnutrition.    This patient is being managed with:   Diet Clear Liquid-  Entered: Apr 24 2024  4:11PM  
This patient has been assessed with a concern for Malnutrition and has been determined to have a diagnosis/diagnoses of Severe protein-calorie malnutrition.    This patient is being managed with:   Diet Clear Liquid-  Supplement Feeding Modality:  Oral  Ensure Clear Cans or Servings Per Day:  1       Frequency:  Three Times a day  Entered: Apr 30 2024  3:42PM  
This patient has been assessed with a concern for Malnutrition and has been determined to have a diagnosis/diagnoses of Severe protein-calorie malnutrition.    This patient is being managed with:   Diet Full Liquid-  Entered: May  6 2024  1:40PM  
This patient has been assessed with a concern for Malnutrition and has been determined to have a diagnosis/diagnoses of Severe protein-calorie malnutrition.    This patient is being managed with:   Diet Full Liquid-  Entered: May  6 2024  1:40PM  
This patient has been assessed with a concern for Malnutrition and has been determined to have a diagnosis/diagnoses of Severe protein-calorie malnutrition.    This patient is being managed with:   Diet Regular-  Entered: Apr 7 2024  3:09PM  
This patient has been assessed with a concern for Malnutrition and has been determined to have a diagnosis/diagnoses of Severe protein-calorie malnutrition.    This patient is being managed with:   Diet Regular-  Low Sodium  Supplement Feeding Modality:  Oral  Vital 1.0 Cans or Servings Per Day:  1       Frequency:  Two Times a day  Entered: Apr 15 2024 12:24PM  
This patient has been assessed with a concern for Malnutrition and has been determined to have a diagnosis/diagnoses of Severe protein-calorie malnutrition.    This patient is being managed with:   Diet Soft and Bite Sized-  DASH/TLC {Sodium & Cholesterol Restricted} (DASH)  Low Sodium  Supplement Feeding Modality:  Oral  Ensure Compact Cans or Servings Per Day:  1       Frequency:  Two Times a day  Entered: May 11 2024  4:51PM  
This patient has been assessed with a concern for Malnutrition and has been determined to have a diagnosis/diagnoses of Severe protein-calorie malnutrition.    This patient is being managed with:   Diet Soft and Bite Sized-  DASH/TLC {Sodium & Cholesterol Restricted} (DASH)  Low Sodium  Supplement Feeding Modality:  Oral  Ensure Compact Cans or Servings Per Day:  1       Frequency:  Two Times a day  Entered: May 11 2024  4:51PM  
This patient has been assessed with a concern for Malnutrition and has been determined to have a diagnosis/diagnoses of Severe protein-calorie malnutrition.    This patient is being managed with:   Diet Soft and Bite Sized-  DASH/TLC {Sodium & Cholesterol Restricted} (DASH)  No Concentrated Potassium  Low Sodium  Supplement Feeding Modality:  Oral  Ensure Compact Cans or Servings Per Day:  1       Frequency:  Two Times a day  Entered: May 18 2024  8:47AM  
This patient has been assessed with a concern for Malnutrition and has been determined to have a diagnosis/diagnoses of Severe protein-calorie malnutrition.    This patient is being managed with:   Diet Soft and Bite Sized-  DASH/TLC {Sodium & Cholesterol Restricted} (DASH)  No Concentrated Potassium  Low Sodium  Supplement Feeding Modality:  Oral  Ensure Compact Cans or Servings Per Day:  1       Frequency:  Two Times a day  Entered: May 18 2024  8:47AM  
This patient has been assessed with a concern for Malnutrition and has been determined to have a diagnosis/diagnoses of Severe protein-calorie malnutrition.    This patient is being managed with:   Diet Clear Liquid-  Supplement Feeding Modality:  Oral  Ensure Clear Cans or Servings Per Day:  1       Frequency:  Three Times a day  Entered: Apr 30 2024  3:42PM  
This patient has been assessed with a concern for Malnutrition and has been determined to have a diagnosis/diagnoses of Severe protein-calorie malnutrition.    This patient is being managed with:   Diet Regular-  Low Sodium  Entered: Apr 9 2024  3:45PM  
This patient has been assessed with a concern for Malnutrition and has been determined to have a diagnosis/diagnoses of Severe protein-calorie malnutrition.    This patient is being managed with:   Diet Regular-  Low Sodium  Entered: Apr 9 2024  3:45PM  
This patient has been assessed with a concern for Malnutrition and has been determined to have a diagnosis/diagnoses of Severe protein-calorie malnutrition.    This patient is being managed with:   Diet Soft and Bite Sized-  DASH/TLC {Sodium & Cholesterol Restricted} (DASH)  No Concentrated Potassium  Low Sodium  Supplement Feeding Modality:  Oral  Ensure Compact Cans or Servings Per Day:  1       Frequency:  Two Times a day  Entered: May 18 2024  8:47AM  
This patient has been assessed with a concern for Malnutrition and has been determined to have a diagnosis/diagnoses of Severe protein-calorie malnutrition.    This patient is being managed with:   Diet Soft and Bite Sized-  DASH/TLC {Sodium & Cholesterol Restricted} (DASH)  No Concentrated Potassium  Low Sodium  Supplement Feeding Modality:  Oral  Ensure Compact Cans or Servings Per Day:  1       Frequency:  Two Times a day  Entered: May 18 2024  8:47AM  
This patient has been assessed with a concern for Malnutrition and has been determined to have a diagnosis/diagnoses of Severe protein-calorie malnutrition.    This patient is being managed with:   Diet Clear Liquid-  Supplement Feeding Modality:  Oral  Ensure Clear Cans or Servings Per Day:  1       Frequency:  Three Times a day  Entered: Apr 30 2024  3:42PM  
This patient has been assessed with a concern for Malnutrition and has been determined to have a diagnosis/diagnoses of Severe protein-calorie malnutrition.    This patient is being managed with:   Diet Regular-  Entered: Apr 7 2024  3:09PM  
This patient has been assessed with a concern for Malnutrition and has been determined to have a diagnosis/diagnoses of Severe protein-calorie malnutrition.    This patient is being managed with:   Diet Regular-  Low Sodium  Entered: Apr 9 2024  3:45PM  
This patient has been assessed with a concern for Malnutrition and has been determined to have a diagnosis/diagnoses of Severe protein-calorie malnutrition.    This patient is being managed with:   Diet Regular-  Low Sodium  Supplement Feeding Modality:  Oral  Vital 1.0 Cans or Servings Per Day:  1       Frequency:  Two Times a day  Entered: Apr 15 2024 12:24PM  
This patient has been assessed with a concern for Malnutrition and has been determined to have a diagnosis/diagnoses of Severe protein-calorie malnutrition.    This patient is being managed with:   Diet Soft and Bite Sized-  DASH/TLC {Sodium & Cholesterol Restricted} (DASH)  Low Sodium  Entered: May  8 2024  8:00PM  
This patient has been assessed with a concern for Malnutrition and has been determined to have a diagnosis/diagnoses of Severe protein-calorie malnutrition.    This patient is being managed with:   Diet Soft and Bite Sized-  DASH/TLC {Sodium & Cholesterol Restricted} (DASH)  No Concentrated Potassium  Low Sodium  Supplement Feeding Modality:  Oral  Ensure Compact Cans or Servings Per Day:  1       Frequency:  Two Times a day  Entered: May 18 2024  8:47AM  
This patient has been assessed with a concern for Malnutrition and has been determined to have a diagnosis/diagnoses of Severe protein-calorie malnutrition.    This patient is being managed with:   Diet Soft and Bite Sized-  DASH/TLC {Sodium & Cholesterol Restricted} (DASH)  No Concentrated Potassium  Low Sodium  Supplement Feeding Modality:  Oral  Ensure Compact Cans or Servings Per Day:  1       Frequency:  Two Times a day  Entered: May 18 2024  8:47AM  
This patient has been assessed with a concern for Malnutrition and has been determined to have a diagnosis/diagnoses of Severe protein-calorie malnutrition.    This patient is being managed with:   Diet Clear Liquid-  Supplement Feeding Modality:  Oral  Ensure Clear Cans or Servings Per Day:  1       Frequency:  Three Times a day  Entered: Apr 30 2024  3:42PM  
This patient has been assessed with a concern for Malnutrition and has been determined to have a diagnosis/diagnoses of Severe protein-calorie malnutrition.    This patient is being managed with:   Diet Clear Liquid-  Supplement Feeding Modality:  Oral  Ensure Clear Cans or Servings Per Day:  1       Frequency:  Three Times a day  Entered: Apr 30 2024  3:42PM  
This patient has been assessed with a concern for Malnutrition and has been determined to have a diagnosis/diagnoses of Severe protein-calorie malnutrition.    This patient is being managed with:   Diet Regular-  Low Sodium  Entered: Apr 9 2024  3:45PM  
This patient has been assessed with a concern for Malnutrition and has been determined to have a diagnosis/diagnoses of Severe protein-calorie malnutrition.    This patient is being managed with:   Diet Regular-  Low Sodium  Supplement Feeding Modality:  Oral  Vital 1.0 Cans or Servings Per Day:  1       Frequency:  Two Times a day  Entered: Apr 15 2024 12:24PM  
This patient has been assessed with a concern for Malnutrition and has been determined to have a diagnosis/diagnoses of Severe protein-calorie malnutrition.    This patient is being managed with:   Diet Soft and Bite Sized-  DASH/TLC {Sodium & Cholesterol Restricted} (DASH)  Low Sodium  Supplement Feeding Modality:  Oral  Ensure Compact Cans or Servings Per Day:  1       Frequency:  Two Times a day  Entered: May 11 2024  4:51PM  
This patient has been assessed with a concern for Malnutrition and has been determined to have a diagnosis/diagnoses of Severe protein-calorie malnutrition.    This patient is being managed with:   Diet Soft and Bite Sized-  DASH/TLC {Sodium & Cholesterol Restricted} (DASH)  No Concentrated Potassium  Low Sodium  Supplement Feeding Modality:  Oral  Ensure Compact Cans or Servings Per Day:  1       Frequency:  Two Times a day  Entered: May 18 2024  8:47AM  
This patient has been assessed with a concern for Malnutrition and has been determined to have a diagnosis/diagnoses of Severe protein-calorie malnutrition.    This patient is being managed with:   Diet Clear Liquid-  Supplement Feeding Modality:  Oral  Ensure Clear Cans or Servings Per Day:  1       Frequency:  Three Times a day  Entered: Apr 30 2024  3:42PM  
This patient has been assessed with a concern for Malnutrition and has been determined to have a diagnosis/diagnoses of Severe protein-calorie malnutrition.    This patient is being managed with:   Diet Clear Liquid-  Supplement Feeding Modality:  Oral  Ensure Clear Cans or Servings Per Day:  1       Frequency:  Three Times a day  Entered: Apr 30 2024  3:42PM  
This patient has been assessed with a concern for Malnutrition and has been determined to have a diagnosis/diagnoses of Severe protein-calorie malnutrition.    This patient is being managed with:   Diet NPO-  NPO for Procedure/Test     NPO Start Date: 21-Apr-2024   NPO Start Time: 23:00  Except Medications  Entered: Apr 21 2024  1:04PM    Diet Regular-  Low Sodium  Supplement Feeding Modality:  Oral  Vital 1.0 Cans or Servings Per Day:  1       Frequency:  Two Times a day  Entered: Apr 15 2024 12:24PM  
This patient has been assessed with a concern for Malnutrition and has been determined to have a diagnosis/diagnoses of Severe protein-calorie malnutrition.    This patient is being managed with:   Diet Regular-  1200mL Fluid Restriction ([id number])  Supplement Feeding Modality:  Oral  Ensure Plus High Protein Cans or Servings Per Day:  1       Frequency:  Daily  Entered: Mar 30 2024  3:46PM  
This patient has been assessed with a concern for Malnutrition and has been determined to have a diagnosis/diagnoses of Severe protein-calorie malnutrition.    This patient is being managed with:   Diet Regular-  Low Sodium  Supplement Feeding Modality:  Oral  Vital 1.0 Cans or Servings Per Day:  1       Frequency:  Two Times a day  Entered: Apr 15 2024 12:24PM  
This patient has been assessed with a concern for Malnutrition and has been determined to have a diagnosis/diagnoses of Severe protein-calorie malnutrition.    This patient is being managed with:   Diet Soft and Bite Sized-  DASH/TLC {Sodium & Cholesterol Restricted} (DASH)  Low Sodium  Entered: May  8 2024  8:00PM  
This patient has been assessed with a concern for Malnutrition and has been determined to have a diagnosis/diagnoses of Severe protein-calorie malnutrition.    This patient is being managed with:   Diet Soft and Bite Sized-  DASH/TLC {Sodium & Cholesterol Restricted} (DASH)  No Concentrated Potassium  Low Sodium  Supplement Feeding Modality:  Oral  Ensure Compact Cans or Servings Per Day:  1       Frequency:  Two Times a day  Entered: May 18 2024  8:47AM  
This patient has been assessed with a concern for Malnutrition and has been determined to have a diagnosis/diagnoses of Severe protein-calorie malnutrition.    This patient is being managed with:   Diet Soft and Bite Sized-  DASH/TLC {Sodium & Cholesterol Restricted} (DASH)  No Concentrated Potassium  Low Sodium  Supplement Feeding Modality:  Oral  Ensure Compact Cans or Servings Per Day:  1       Frequency:  Two Times a day  Entered: May 18 2024  8:47AM  
This patient has been assessed with a concern for Malnutrition and has been determined to have a diagnosis/diagnoses of Severe protein-calorie malnutrition.    This patient is being managed with:   Diet Clear Liquid-  Entered: Apr 24 2024  4:11PM  
This patient has been assessed with a concern for Malnutrition and has been determined to have a diagnosis/diagnoses of Severe protein-calorie malnutrition.    This patient is being managed with:   Diet NPO after Midnight-     NPO Start Date: 14-May-2024   NPO Start Time: 23:59  Except Medications  Entered: May 15 2024  4:48AM    Diet Soft and Bite Sized-  DASH/TLC {Sodium & Cholesterol Restricted} (DASH)  Low Sodium  Supplement Feeding Modality:  Oral  Ensure Compact Cans or Servings Per Day:  1       Frequency:  Two Times a day  Entered: May 11 2024  4:51PM  
This patient has been assessed with a concern for Malnutrition and has been determined to have a diagnosis/diagnoses of Severe protein-calorie malnutrition.    This patient is being managed with:   Diet Full Liquid-  Entered: May  6 2024  1:40PM  
This patient has been assessed with a concern for Malnutrition and has been determined to have a diagnosis/diagnoses of Severe protein-calorie malnutrition.    This patient is being managed with:   Diet NPO after Midnight-     NPO Start Date: 25-Apr-2024   NPO Start Time: 23:59  Entered: Apr 25 2024  5:21PM    Diet NPO after Midnight-     NPO Start Date: 25-Apr-2024   NPO Start Time: 23:59  Entered: Apr 25 2024  2:05PM    Diet Clear Liquid-  Entered: Apr 24 2024  4:11PM  
This patient has been assessed with a concern for Malnutrition and has been determined to have a diagnosis/diagnoses of Severe protein-calorie malnutrition.    This patient is being managed with:   Diet Soft and Bite Sized-  DASH/TLC {Sodium & Cholesterol Restricted} (DASH)  Low Sodium  Supplement Feeding Modality:  Oral  Ensure Compact Cans or Servings Per Day:  1       Frequency:  Two Times a day  Entered: May 11 2024  4:51PM  
This patient has been assessed with a concern for Malnutrition and has been determined to have a diagnosis/diagnoses of Severe protein-calorie malnutrition.    This patient is being managed with:   Diet Soft and Bite Sized-  DASH/TLC {Sodium & Cholesterol Restricted} (DASH)  No Concentrated Potassium  Low Sodium  Supplement Feeding Modality:  Oral  Ensure Compact Cans or Servings Per Day:  1       Frequency:  Two Times a day  Entered: May 18 2024  8:47AM  
This patient has been assessed with a concern for Malnutrition and has been determined to have a diagnosis/diagnoses of Severe protein-calorie malnutrition.    This patient is being managed with:   Diet NPO after Midnight-     NPO Start Date: 14-May-2024   NPO Start Time: 23:59  Entered: May 14 2024  5:35PM    Diet Soft and Bite Sized-  DASH/TLC {Sodium & Cholesterol Restricted} (DASH)  Low Sodium  Supplement Feeding Modality:  Oral  Ensure Compact Cans or Servings Per Day:  1       Frequency:  Two Times a day  Entered: May 11 2024  4:51PM  
This patient has been assessed with a concern for Malnutrition and has been determined to have a diagnosis/diagnoses of Severe protein-calorie malnutrition.    This patient is being managed with:   Diet Regular-  Low Sodium  Supplement Feeding Modality:  Oral  Vital 1.0 Cans or Servings Per Day:  1       Frequency:  Two Times a day  Entered: Apr 15 2024 12:24PM  
This patient has been assessed with a concern for Malnutrition and has been determined to have a diagnosis/diagnoses of Severe protein-calorie malnutrition.    This patient is being managed with:   Diet Regular-  Low Sodium  Supplement Feeding Modality:  Oral  Vital 1.0 Cans or Servings Per Day:  1       Frequency:  Two Times a day  Entered: Apr 22 2024  7:40AM  
This patient has been assessed with a concern for Malnutrition and has been determined to have a diagnosis/diagnoses of Severe protein-calorie malnutrition.    This patient is being managed with:   Diet Soft and Bite Sized-  DASH/TLC {Sodium & Cholesterol Restricted} (DASH)  Low Sodium  Entered: May  8 2024  8:00PM  
This patient has been assessed with a concern for Malnutrition and has been determined to have a diagnosis/diagnoses of Severe protein-calorie malnutrition.    This patient is being managed with:   Diet Clear Liquid-  Entered: Apr 24 2024  4:11PM  
This patient has been assessed with a concern for Malnutrition and has been determined to have a diagnosis/diagnoses of Severe protein-calorie malnutrition.    This patient is being managed with:   Diet Full Liquid-  Entered: May  6 2024  1:40PM  
This patient has been assessed with a concern for Malnutrition and has been determined to have a diagnosis/diagnoses of Severe protein-calorie malnutrition.    This patient is being managed with:   Diet Regular-  Low Sodium  Supplement Feeding Modality:  Oral  Vital 1.0 Cans or Servings Per Day:  1       Frequency:  Two Times a day  Entered: Apr 22 2024  7:40AM  
This patient has been assessed with a concern for Malnutrition and has been determined to have a diagnosis/diagnoses of Severe protein-calorie malnutrition.    This patient is being managed with:   Diet NPO after Midnight-     NPO Start Date: 23-Apr-2024   NPO Start Time: 23:59  Entered: Apr 23 2024 12:51PM  
This patient has been assessed with a concern for Malnutrition and has been determined to have a diagnosis/diagnoses of Severe protein-calorie malnutrition.    This patient is being managed with:   Diet NPO-  Except Medications  Entered: May 18 2024  8:44AM  
This patient has been assessed with a concern for Malnutrition and has been determined to have a diagnosis/diagnoses of Severe protein-calorie malnutrition.    This patient is being managed with:   Diet Soft and Bite Sized-  DASH/TLC {Sodium & Cholesterol Restricted} (DASH)  Low Sodium  Supplement Feeding Modality:  Oral  Ensure Compact Cans or Servings Per Day:  1       Frequency:  Two Times a day  Entered: May 11 2024  4:51PM  
This patient has been assessed with a concern for Malnutrition and has been determined to have a diagnosis/diagnoses of Severe protein-calorie malnutrition.    This patient is being managed with:   Diet Regular-  Low Sodium  Entered: Apr 9 2024  3:45PM  
This patient has been assessed with a concern for Malnutrition and has been determined to have a diagnosis/diagnoses of Severe protein-calorie malnutrition.    This patient is being managed with:   Diet Regular-  Low Sodium  Entered: Apr 9 2024  3:45PM  
This patient has been assessed with a concern for Malnutrition and has been determined to have a diagnosis/diagnoses of Severe protein-calorie malnutrition.    This patient is being managed with:   Diet Regular-  1200mL Fluid Restriction ([id number])  Supplement Feeding Modality:  Oral  Ensure Plus High Protein Cans or Servings Per Day:  1       Frequency:  Daily  Entered: Mar 30 2024  3:46PM  
This patient has been assessed with a concern for Malnutrition and has been determined to have a diagnosis/diagnoses of Severe protein-calorie malnutrition.    This patient is being managed with:   Diet Regular-  1200mL Fluid Restriction ([id number])  Supplement Feeding Modality:  Oral  Ensure Plus High Protein Cans or Servings Per Day:  1       Frequency:  Daily  Entered: Mar 30 2024  3:46PM

## 2024-05-30 NOTE — PROVIDER CONTACT NOTE (OTHER) - ASSESSMENT
Patient is AAOx4. Vital signs as charted. Unlabored breathing on room air. Denied chest pain, SOB, and tingling in upper and lower extremities. Normal sinus rhythm on telemetry monitoring. Pt c/p of intermittent numbness in upper and lower extremities. Pt found to have large hematoma on right lower extremity
pt asymptomatic. no s/s of distress noted. pt has no IV access.
pt asymptomatic. no s/s of distress noted.
Patient A&Ox4. Denies chest pain / SOB. Comfort measures maintained.
at the completion of day, noted the dressing site to have some drainage, serosanginous.  no fevers, flushed line no active leakage, great blood return from line where chemo was being infused.
pt asymptomatic. no s/s of distress noted.
Patient is AAOx4. Vital signs as charted. Unlabored breathing on high flow. Denied chest pain, SOB, numbness and tingling in upper and lower extremities. Continuous pulse oximetry monitoring maintained. Pt had increased WOB this AM with de-satting at 88%. ACP Sonali Boston made aware. Pt given Chest PT, suctioning and pleurx drained 600mL. Hgb 7.1. 1/2 unit PRBCs ordered to help symptomatic anemia. No consent in the chart
pt fluid overloaded - hold blood transfusion then give lasix, and pt is NPO d/t bipap as per ACP Keesha Segura
Patient A&Ox4. Denies chest pain / SOB. Comfort measures maintained.
Patient is AAOx3. Vital signs as charted. Unlabored breathing on 2L NC. Denied chest pain, SOB, numbness and tingling in upper and lower extremities. Normal sinus rhythm on telemetry monitoring. Due to multiple DVTs in upper extremities patient is on B/L upper extremity precautions so labs have to be drawn on the feet. Pt Hgb dropped to 7.5 as of 2038 on 4/17.
Patient is AAOx4. Vital signs as charted. Unlabored breathing on high flow. Denied chest pain, SOB, numbness and tingling in upper and lower extremities. Pt has multiple clots in upper extremities. Pt is on BUE precautions. Pt has LLE PIV from 5/1 US guided placed by IV RN. As per night GONZÁLEZ on 5/5, IV should be replaced by IV team.
pt asymptomatic. no s/s of distress noted.
pt asymptomatic. no s/s of distress noted.
pt asymptomatic. no s/s of distress noted. pt currently on a BIPAP.

## 2024-05-30 NOTE — PROGRESS NOTE ADULT - NSPROGADDITIONALINFOA_GEN_ALL_CORE
Poor prognosis, unable to get a long term solution for IV access since she has multiple clots in her upper extremities, making it impossible to place any kind of UE IV/port.
THERE'S NOT CONTRAINDICATION TO PERIPHERAL IV LINE PLACEMENT IN ANY OF PT'S LIMBS, DESPITE KNOWN DVTS, AS SHE HAS TRANSITIONED TO COMFORT CARE AND MAY ULTIMATELY REQUIRE IV PAIN MEDICATION TO KEEP HER COMFORTABLE AT THE END OF LIFE.
THERE'S NOT CONTRAINDICATION TO PERIPHERAL IV LINE PLACEMENT IN ANY OF PT'S LIMBS, DESPITE KNOWN DVTS, AS SHE HAS TRANSITIONED TO COMFORT CARE AND MAY ULTIMATELY REQUIRE IV PAIN MEDICATION TO KEEP HER COMFORTABLE AT THE END OF LIFE.

## 2024-05-31 ENCOUNTER — TRANSCRIPTION ENCOUNTER (OUTPATIENT)
Age: 59
End: 2024-05-31

## 2024-05-31 VITALS — RESPIRATION RATE: 24 BRPM | HEART RATE: 106 BPM | OXYGEN SATURATION: 95 %

## 2024-05-31 PROCEDURE — 99239 HOSP IP/OBS DSCHRG MGMT >30: CPT

## 2024-05-31 RX ORDER — MORPHINE SULFATE 50 MG/1
20 CAPSULE, EXTENDED RELEASE ORAL
Qty: 0 | Refills: 0 | DISCHARGE
Start: 2024-05-31

## 2024-05-31 RX ORDER — DIAZEPAM 5 MG
1 TABLET ORAL
Qty: 0 | Refills: 0 | DISCHARGE
Start: 2024-05-31

## 2024-05-31 RX ORDER — DEXAMETHASONE 0.5 MG/5ML
1 ELIXIR ORAL
Qty: 0 | Refills: 0 | DISCHARGE
Start: 2024-05-31

## 2024-05-31 RX ORDER — OXYCODONE HYDROCHLORIDE 5 MG/1
1 TABLET ORAL
Qty: 0 | Refills: 0 | DISCHARGE
Start: 2024-05-31

## 2024-05-31 RX ORDER — AMIODARONE HYDROCHLORIDE 400 MG/1
1 TABLET ORAL
Qty: 0 | Refills: 0 | DISCHARGE
Start: 2024-05-31

## 2024-05-31 RX ORDER — METOPROLOL TARTRATE 50 MG
1 TABLET ORAL
Qty: 0 | Refills: 0 | DISCHARGE
Start: 2024-05-31

## 2024-05-31 RX ORDER — PETROLATUM,WHITE
1 JELLY (GRAM) TOPICAL
Qty: 0 | Refills: 0 | DISCHARGE
Start: 2024-05-31

## 2024-05-31 RX ORDER — SALIVA SUBSTITUTE COMB NO.11 351 MG
15 POWDER IN PACKET (EA) MUCOUS MEMBRANE
Qty: 0 | Refills: 0 | DISCHARGE
Start: 2024-05-31

## 2024-05-31 RX ORDER — ONDANSETRON 8 MG/1
1 TABLET, FILM COATED ORAL
Qty: 0 | Refills: 0 | DISCHARGE
Start: 2024-05-31

## 2024-05-31 RX ORDER — IPRATROPIUM/ALBUTEROL SULFATE 18-103MCG
3 AEROSOL WITH ADAPTER (GRAM) INHALATION
Qty: 0 | Refills: 0 | DISCHARGE
Start: 2024-05-31

## 2024-05-31 RX ORDER — BUDESONIDE AND FORMOTEROL FUMARATE DIHYDRATE 160; 4.5 UG/1; UG/1
2 AEROSOL RESPIRATORY (INHALATION)
Qty: 0 | Refills: 0 | DISCHARGE
Start: 2024-05-31

## 2024-05-31 RX ADMIN — Medication 3 MILLILITER(S): at 03:36

## 2024-05-31 RX ADMIN — Medication 3 MILLILITER(S): at 07:36

## 2024-05-31 RX ADMIN — Medication 3 MILLILITER(S): at 10:16

## 2024-05-31 RX ADMIN — ENOXAPARIN SODIUM 30 MILLIGRAM(S): 100 INJECTION SUBCUTANEOUS at 06:00

## 2024-05-31 RX ADMIN — BUDESONIDE AND FORMOTEROL FUMARATE DIHYDRATE 2 PUFF(S): 160; 4.5 AEROSOL RESPIRATORY (INHALATION) at 08:38

## 2024-05-31 RX ADMIN — SODIUM CHLORIDE 4 MILLILITER(S): 9 INJECTION INTRAMUSCULAR; INTRAVENOUS; SUBCUTANEOUS at 07:36

## 2024-05-31 NOTE — DISCHARGE NOTE NURSING/CASE MANAGEMENT/SOCIAL WORK - NSDCPEWEB_GEN_ALL_CORE
[facility] for Tobacco Control website --- [email or web address] website --- [email or web address]

## 2024-05-31 NOTE — DISCHARGE NOTE NURSING/CASE MANAGEMENT/SOCIAL WORK - NSDCPEEMAIL_GEN_ALL_CORE
[facility] for Tobacco Control email [email or web address]

## 2024-05-31 NOTE — DISCHARGE NOTE NURSING/CASE MANAGEMENT/SOCIAL WORK - PATIENT PORTAL LINK FT
You can access the FollowMyHealth Patient Portal offered by [facility] by registering at the following website: [email or web address]. By joining [employer]’s FollowMyHealth portal, you will also be able to view your health information using other applications (apps) compatible with our system.

## 2024-05-31 NOTE — DISCHARGE NOTE NURSING/CASE MANAGEMENT/SOCIAL WORK - NSDCCRNAME_GEN_ALL_CORE_FT
[facility] address: [address] [place] [address], 4:30pm  via [facility] ambulance

## 2024-10-01 NOTE — PROGRESS NOTE ADULT - PROBLEM SELECTOR PLAN 4
Preprocedure reminder call Lumbar CÉSAR    Arrival time 12:45     Location: [facility] [address] [place] [address]    Do you have a ? Lm     If patient doesn't have a  they will need to call and reschedule    Has the patient had a flu shot or any other vaccinations within the past 7 days? Lm     If yes, explain that for the vaccine to work best they need to:              wait 1 week before and 1 week after getting any Vaccine           wait 1 week before and 2 weeks after getting any Covid Vaccine    If patient has concerns about the timing, send to RN pool    Have you had any antibiotics in the last five days? Lm (Oral steroid ok per Sanchez but note the patient is taking it or not)    If yes check with the nurse or provider. The procedure will most likely need to be rescheduled.    Its ok to eat and drink as usual and take your  BP and diabetes medications if this applies to you. Lm (note patient has been informed yes or no)      To call and reschedule or talk to the nurse about any questions you may have please dial    [phone]    - serum osm 265, urine osm wnl, urine sodium 25  - Pt euvolemic on exam  - heparin gtt fluid from D5 to NS  - salt tabs started

## 2024-10-11 NOTE — CONSULT NOTE ADULT - PROBLEM/RECOMMENDATION-4
BMI: BMI (kg/m2): 28.4 (09-22-24 @ 19:00)  HbA1c: A1C with Estimated Average Glucose Result: 5.6 % (09-21-24 @ 06:39)    Glucose: POCT Blood Glucose.: 80 mg/dL (08-12-24 @ 19:41)    BP: --Vital Signs Last 24 Hrs  T(C): 36.8 (10-11-24 @ 08:01), Max: 36.8 (10-11-24 @ 08:01)  T(F): 98.2 (10-11-24 @ 08:01), Max: 98.2 (10-11-24 @ 08:01)  HR: --  BP: --  BP(mean): --  RR: --  SpO2: --    Orthostatic VS  10-11-24 @ 08:01  Lying BP: --/-- HR: --  Sitting BP: 114/69 HR: 57  Standing BP: 113/67 HR: 71  Site: --  Mode: --  Orthostatic VS  10-10-24 @ 08:12  Lying BP: --/-- HR: --  Sitting BP: 110/63 HR: 70  Standing BP: 102/64 HR: 81  Site: --  Mode: --    Lipid Panel: Date/Time: 09-21-24 @ 06:32  Cholesterol, Serum: 95  LDL Cholesterol Calculated: 34  HDL Cholesterol, Serum: 44  Total Cholesterol/HDL Ration Measurement: --  Triglycerides, Serum: 82   DISPLAY PLAN FREE TEXT

## 2025-03-28 NOTE — H&P PST ADULT - OPHTHALMOLOGIC
[Good understanding] : Patient has a good understanding of lifestyle changes and steps needed to achieve self management goal
- - -
negative

## 2025-04-14 NOTE — PROGRESS NOTE ADULT - PROBLEM SELECTOR PLAN 1
Caller: Taty Zayas    Relationship: Emergency Contact    Best call back number: [phone]    What orders are you requesting (i.e. lab or imaging): WATER THERAPY    In what timeframe would the patient need to come in: ASAP    Where will you receive your lab/imaging services: [facility]    Additional notes: PATIENTS WIFE IS REQUESTING MORE WATER THERAPY FOR APPROX A MONTH TWICE WEEKLY UNTIL THEY CAN OPEN THEIR OWN POOL.         3/19 underwent Subxiphoid pericardial window with Dr Ruiz  daily labs  pericardial drain  to water seal document output q shift   echo prior to drain removal  follow up on cytology  OOB to chair ambulate    care as primary team

## (undated) DEVICE — ENDOCATCH II 15MM

## (undated) DEVICE — BASIN EMESIS 10IN GRADUATED MAUVE

## (undated) DEVICE — LUBRICATING JELLY HR ONE SHOT 3G

## (undated) DEVICE — DRAPE IOBAN 23" X 23"

## (undated) DEVICE — SYR ASEPTO

## (undated) DEVICE — STAPLER COVIDIEN ENDO GIA STANDARD HANDLE

## (undated) DEVICE — SUT STAINLESS STEEL 5 18" SCC

## (undated) DEVICE — WARMING BLANKET DUO-THERM HYPER/HYPOTHERM ADULT

## (undated) DEVICE — GOWN XXXL

## (undated) DEVICE — DRSG OPSITE 13.75 X 4"

## (undated) DEVICE — DRAPE MAGNETIC INSTRUMENT MEDIUM

## (undated) DEVICE — TUBING MEDI-VAC W MAXIGRIP CONNECTORS 1/4"X6'

## (undated) DEVICE — DRSG CURITY GAUZE SPONGE 4 X 4" 12-PLY

## (undated) DEVICE — GOWN TRIMAX LG

## (undated) DEVICE — SOL IRR POUR H2O 250ML

## (undated) DEVICE — WARMING BLANKET LOWER ADULT

## (undated) DEVICE — DRAPE 1/2 SHEET 40X57"

## (undated) DEVICE — PREP CHLORAPREP HI-LITE ORANGE 26ML

## (undated) DEVICE — DISSECTOR ENDO PEANUT 5MM

## (undated) DEVICE — CONNECTOR CARDIAC 1:1 FOR HUBLESS DRAINS

## (undated) DEVICE — SUT DOUBLE 6 WIRE STERNAL

## (undated) DEVICE — D HELP - CLEARVIEW CLEARIFY SYSTEM

## (undated) DEVICE — SUT POLYSORB 0 30" GS-21 UNDYED

## (undated) DEVICE — BITE BLOCK ADULT 20 X 27MM (GREEN)

## (undated) DEVICE — VENODYNE/SCD SLEEVE CALF MEDIUM

## (undated) DEVICE — BLADE SCALPEL SAFETYLOCK #10

## (undated) DEVICE — DRAPE CV 106" X 135"

## (undated) DEVICE — BIOPSY FORCEP COLD DISP

## (undated) DEVICE — DRAPE TOWEL BLUE 17" X 24"

## (undated) DEVICE — DRAPE MAYO STAND 30"

## (undated) DEVICE — GOWN LG

## (undated) DEVICE — DRSG TAPE UMBILICAL COTTON 2" X 30 X 1/8"

## (undated) DEVICE — BLADE SCALPEL SAFETYLOCK #15

## (undated) DEVICE — BUR WIRE PASSER MED 1.5MMX 19MM

## (undated) DEVICE — POSITIONER FOAM EGG CRATE ULNAR 2PCS (PINK)

## (undated) DEVICE — DRAPE INSTRUMENT POUCH 6.75" X 11"

## (undated) DEVICE — DRSG BANDAID 0.75X3"

## (undated) DEVICE — TRAP SPECIMEN SPUTUM 40CC

## (undated) DEVICE — SOL ANTI FOG

## (undated) DEVICE — GLV 6.5 PROTEXIS (WHITE)

## (undated) DEVICE — DENTURE CUP PINK

## (undated) DEVICE — DRSG 2X2

## (undated) DEVICE — SUT SOFSILK 0 30" V-20

## (undated) DEVICE — ELCTR ECG CONDUCTIVE ADHESIVE

## (undated) DEVICE — SUCTION CATH ARGYLE WHISTLE TIP 14FR STRAIGHT PACKED

## (undated) DEVICE — DRAIN CHANNEL 19FR ROUND FULL FLUTED

## (undated) DEVICE — PACK GENERAL MINOR

## (undated) DEVICE — PACK UNIVERSAL CARDIAC

## (undated) DEVICE — GLV 8 PROTEXIS ORTHO (CREAM)

## (undated) DEVICE — SUT BIOSYN 4-0 18" P-12

## (undated) DEVICE — SCOPE WARMER SEAL DISP

## (undated) DEVICE — CLAMP BX HOT RAD JAW 3

## (undated) DEVICE — SUT PROLENE 4-0 36" BB

## (undated) DEVICE — SUT SOFSILK 2-0 30" V-20

## (undated) DEVICE — DRSG STERISTRIPS 0.5 X 4"

## (undated) DEVICE — SYR LUER LOK 10CC

## (undated) DEVICE — ELCTR BOVIE TIP BLADE INSULATED 6.5" EDGE

## (undated) DEVICE — PACK IV START WITH CHG

## (undated) DEVICE — FOLEY TRAY 16FR 5CC LF LUBRISIL ADVANCE TEMP CLOSED

## (undated) DEVICE — CATH IV SAFE BC 22G X 1" (BLUE)

## (undated) DEVICE — PACK UNIVERSAL CARDIAC SUPPLEMNTAL B

## (undated) DEVICE — TUBING SUCTION 20FT

## (undated) DEVICE — CHEST DRAIN PLEUR-EVAC WET/WET ADULT-PEDS SINGLE (QUICK)

## (undated) DEVICE — SOL IRR POUR NS 0.9% 500ML

## (undated) DEVICE — STAPLER COVIDIEN ENDO GIA SHORT HANDLE

## (undated) DEVICE — Device

## (undated) DEVICE — SOL NORMOSOL-R PH7.4 1000ML

## (undated) DEVICE — GLV 8 PROTEXIS (WHITE)

## (undated) DEVICE — GLV 7 PROTEXIS (WHITE)

## (undated) DEVICE — ENDOCATCH 10MM SPECIMEN POUCH

## (undated) DEVICE — SUT PDS II 0 27" OS-6

## (undated) DEVICE — GLV 7.5 PROTEXIS (WHITE)

## (undated) DEVICE — SUT POLYSORB 2-0 27" GS-21

## (undated) DEVICE — PACK MAJOR ABDOMINAL SUPINE

## (undated) DEVICE — PACK BASIC GOWN

## (undated) DEVICE — BLADE SCALPEL SAFETYLOCK #11

## (undated) DEVICE — NDL HYPO SAFE 22G X 1.5" (BLACK)

## (undated) DEVICE — SUT POLYSORB 2 30" GS-26

## (undated) DEVICE — DRSG OPSITE 2.5 X 2"

## (undated) DEVICE — SUT SURGICAL STEEL 6 30" BP-1

## (undated) DEVICE — MEDICATION LABELS W MARKER

## (undated) DEVICE — DRAPE 3/4 SHEET W REINFORCEMENT 56X77"

## (undated) DEVICE — CONTAINER FORMALIN 10% 20ML

## (undated) DEVICE — DRSG CURITY GAUZE SPONGE 4 X 4" 12-PLY NON-STERILE

## (undated) DEVICE — DRAIN PLEURX KIT WITH 500ML VACUUM BOTTLE

## (undated) DEVICE — SUT POLYSORB 0 36" GS-25 UNDYED

## (undated) DEVICE — TAPE SILK 3"

## (undated) DEVICE — BIOPSY FORCEP RADIAL JAW 4 STANDARD WITH NEEDLE

## (undated) DEVICE — DRAIN RESERVOIR FOR JACKSON PRATT 100CC CARDINAL

## (undated) DEVICE — SPECIMEN CONTAINER 100ML

## (undated) DEVICE — GLV 8.5 PROTEXIS (WHITE)

## (undated) DEVICE — SUT BLUNT SZ 5

## (undated) DEVICE — DRSG TEGADERM 6"X8"

## (undated) DEVICE — TUBING IV SET GRAVITY 3Y 100" MACRO

## (undated) DEVICE — DRAIN 24 FR ROUND HUBLESS FULL FLUTED SILICONE

## (undated) DEVICE — ELCTR BOVIE TIP BLADE INSULATED 2.75" EDGE

## (undated) DEVICE — SUT POLYSORB 2-0 30" GS-21 UNDYED

## (undated) DEVICE — DRAPE LIGHT HANDLE COVER (BLUE)

## (undated) DEVICE — ELCTR AQUAMANTYS BIPOLAR SEALER 6.0

## (undated) DEVICE — STAPLER SKIN VISI-STAT 35 WIDE

## (undated) DEVICE — SALIVA EJECTOR (BLUE)

## (undated) DEVICE — CONTAINER FORMALIN 80ML YELLOW

## (undated) DEVICE — UNDERPAD LINEN SAVER 17 X 24"

## (undated) DEVICE — FOLEY TRAY 16FR 5CC LTX UMETER CLOSED

## (undated) DEVICE — CHEST DRAIN OASIS DRY SUCTION WATER SEAL

## (undated) DEVICE — SUCTION YANKAUER NO CONTROL VENT

## (undated) DEVICE — APPLICATOR FOR PROGEL EXTENDED SPRAY TIP 29CM

## (undated) DEVICE — SAW BLADE MICROAIRE STERNUM 1X34X9.4MM